# Patient Record
Sex: FEMALE | Race: WHITE | NOT HISPANIC OR LATINO
[De-identification: names, ages, dates, MRNs, and addresses within clinical notes are randomized per-mention and may not be internally consistent; named-entity substitution may affect disease eponyms.]

---

## 2017-02-17 ENCOUNTER — MEDICATION RENEWAL (OUTPATIENT)
Age: 67
End: 2017-02-17

## 2017-03-29 ENCOUNTER — MEDICATION RENEWAL (OUTPATIENT)
Age: 67
End: 2017-03-29

## 2017-05-03 ENCOUNTER — APPOINTMENT (OUTPATIENT)
Dept: CARDIOLOGY | Facility: CLINIC | Age: 67
End: 2017-05-03

## 2017-05-03 VITALS
OXYGEN SATURATION: 98 % | DIASTOLIC BLOOD PRESSURE: 82 MMHG | HEIGHT: 62 IN | WEIGHT: 212 LBS | BODY MASS INDEX: 39.01 KG/M2 | SYSTOLIC BLOOD PRESSURE: 117 MMHG | HEART RATE: 95 BPM

## 2017-05-04 LAB
25(OH)D3 SERPL-MCNC: 32.3 NG/ML
ALBUMIN SERPL ELPH-MCNC: 4.3 G/DL
ALP BLD-CCNC: 60 U/L
ALT SERPL-CCNC: 14 U/L
ANION GAP SERPL CALC-SCNC: 20 MMOL/L
AST SERPL-CCNC: 19 U/L
BASOPHILS # BLD AUTO: 0.03 K/UL
BASOPHILS NFR BLD AUTO: 0.3 %
BILIRUB SERPL-MCNC: 0.3 MG/DL
BUN SERPL-MCNC: 15 MG/DL
CALCIUM SERPL-MCNC: 9.8 MG/DL
CHLORIDE SERPL-SCNC: 101 MMOL/L
CHOLEST SERPL-MCNC: 168 MG/DL
CHOLEST/HDLC SERPL: 3.1 RATIO
CO2 SERPL-SCNC: 19 MMOL/L
CREAT SERPL-MCNC: 0.87 MG/DL
CRP SERPL HS-MCNC: 39.1 MG/L
EOSINOPHIL # BLD AUTO: 0.18 K/UL
EOSINOPHIL NFR BLD AUTO: 1.7 %
GLUCOSE SERPL-MCNC: 92 MG/DL
HBA1C MFR BLD HPLC: 6 %
HCT VFR BLD CALC: 46.7 %
HDLC SERPL-MCNC: 55 MG/DL
HGB BLD-MCNC: 15.4 G/DL
IMM GRANULOCYTES NFR BLD AUTO: 0.2 %
LDLC SERPL CALC-MCNC: 90 MG/DL
LYMPHOCYTES # BLD AUTO: 2.48 K/UL
LYMPHOCYTES NFR BLD AUTO: 23.8 %
MAGNESIUM SERPL-MCNC: 2.2 MG/DL
MAN DIFF?: NORMAL
MCHC RBC-ENTMCNC: 29.7 PG
MCHC RBC-ENTMCNC: 33 GM/DL
MCV RBC AUTO: 90.2 FL
MONOCYTES # BLD AUTO: 0.66 K/UL
MONOCYTES NFR BLD AUTO: 6.3 %
NEUTROPHILS # BLD AUTO: 7.03 K/UL
NEUTROPHILS NFR BLD AUTO: 67.7 %
PLATELET # BLD AUTO: 235 K/UL
POTASSIUM SERPL-SCNC: 4.7 MMOL/L
PROT SERPL-MCNC: 7.3 G/DL
RBC # BLD: 5.18 M/UL
RBC # FLD: 13.5 %
SODIUM SERPL-SCNC: 140 MMOL/L
T3FREE SERPL-MCNC: 3.32 PG/ML
T4 FREE SERPL-MCNC: 1.2 NG/DL
TRIGL SERPL-MCNC: 114 MG/DL
TSH SERPL-ACNC: 3.96 UIU/ML
WBC # FLD AUTO: 10.4 K/UL

## 2017-05-15 ENCOUNTER — APPOINTMENT (OUTPATIENT)
Dept: CARDIOLOGY | Facility: CLINIC | Age: 67
End: 2017-05-15

## 2017-11-15 ENCOUNTER — APPOINTMENT (OUTPATIENT)
Dept: CARDIOLOGY | Facility: CLINIC | Age: 67
End: 2017-11-15
Payer: MEDICARE

## 2017-11-15 ENCOUNTER — NON-APPOINTMENT (OUTPATIENT)
Age: 67
End: 2017-11-15

## 2017-11-15 VITALS
WEIGHT: 220 LBS | DIASTOLIC BLOOD PRESSURE: 60 MMHG | OXYGEN SATURATION: 96 % | HEIGHT: 62 IN | SYSTOLIC BLOOD PRESSURE: 104 MMHG | BODY MASS INDEX: 40.48 KG/M2 | HEART RATE: 99 BPM

## 2017-11-15 PROCEDURE — 93000 ELECTROCARDIOGRAM COMPLETE: CPT

## 2017-11-15 PROCEDURE — 99215 OFFICE O/P EST HI 40 MIN: CPT

## 2017-11-15 RX ORDER — METOPROLOL TARTRATE 25 MG/1
25 TABLET, FILM COATED ORAL TWICE DAILY
Qty: 45 | Refills: 3 | Status: DISCONTINUED | COMMUNITY
End: 2017-11-15

## 2018-01-18 ENCOUNTER — MEDICATION RENEWAL (OUTPATIENT)
Age: 68
End: 2018-01-18

## 2018-02-19 ENCOUNTER — NON-APPOINTMENT (OUTPATIENT)
Age: 68
End: 2018-02-19

## 2018-02-19 ENCOUNTER — APPOINTMENT (OUTPATIENT)
Dept: CARDIOLOGY | Facility: CLINIC | Age: 68
End: 2018-02-19
Payer: MEDICARE

## 2018-02-19 VITALS
WEIGHT: 233 LBS | SYSTOLIC BLOOD PRESSURE: 130 MMHG | HEART RATE: 126 BPM | BODY MASS INDEX: 42.88 KG/M2 | DIASTOLIC BLOOD PRESSURE: 80 MMHG | OXYGEN SATURATION: 98 % | HEIGHT: 62 IN

## 2018-02-19 PROCEDURE — 99215 OFFICE O/P EST HI 40 MIN: CPT

## 2018-02-19 PROCEDURE — 93000 ELECTROCARDIOGRAM COMPLETE: CPT

## 2018-02-23 LAB
25(OH)D3 SERPL-MCNC: 34.7 NG/ML
BASOPHILS # BLD AUTO: 0.04 K/UL
BASOPHILS NFR BLD AUTO: 0.4 %
CHOLEST SERPL-MCNC: 174 MG/DL
CHOLEST/HDLC SERPL: 2.9 RATIO
CRP SERPL HS-MCNC: 4 MG/L
EOSINOPHIL # BLD AUTO: 0.23 K/UL
EOSINOPHIL NFR BLD AUTO: 2.5 %
HBA1C MFR BLD HPLC: 6.2 %
HCT VFR BLD CALC: 45.9 %
HDLC SERPL-MCNC: 61 MG/DL
HGB BLD-MCNC: 14.6 G/DL
IMM GRANULOCYTES NFR BLD AUTO: 0.4 %
LDLC SERPL CALC-MCNC: 97 MG/DL
LYMPHOCYTES # BLD AUTO: 2.25 K/UL
LYMPHOCYTES NFR BLD AUTO: 24.9 %
MAGNESIUM SERPL-MCNC: 2 MG/DL
MAN DIFF?: NORMAL
MCHC RBC-ENTMCNC: 28.7 PG
MCHC RBC-ENTMCNC: 31.8 GM/DL
MCV RBC AUTO: 90.2 FL
MONOCYTES # BLD AUTO: 0.55 K/UL
MONOCYTES NFR BLD AUTO: 6.1 %
NEUTROPHILS # BLD AUTO: 5.92 K/UL
NEUTROPHILS NFR BLD AUTO: 65.7 %
PLATELET # BLD AUTO: 213 K/UL
RBC # BLD: 5.09 M/UL
RBC # FLD: 13.8 %
T3FREE SERPL-MCNC: 3.38 PG/ML
T4 FREE SERPL-MCNC: 1.2 NG/DL
TRIGL SERPL-MCNC: 81 MG/DL
TSH SERPL-ACNC: 2.97 UIU/ML
WBC # FLD AUTO: 9.03 K/UL

## 2018-02-27 ENCOUNTER — MEDICATION RENEWAL (OUTPATIENT)
Age: 68
End: 2018-02-27

## 2018-02-27 LAB
ALBUMIN SERPL ELPH-MCNC: 4 G/DL
ALP BLD-CCNC: 61 U/L
ALT SERPL-CCNC: 14 U/L
ANION GAP SERPL CALC-SCNC: 23 MMOL/L
AST SERPL-CCNC: 19 U/L
BILIRUB SERPL-MCNC: 0.2 MG/DL
BUN SERPL-MCNC: 17 MG/DL
CALCIUM SERPL-MCNC: 9.5 MG/DL
CHLORIDE SERPL-SCNC: 101 MMOL/L
CO2 SERPL-SCNC: 19 MMOL/L
CREAT SERPL-MCNC: 0.8 MG/DL
GLUCOSE SERPL-MCNC: 84 MG/DL
POTASSIUM SERPL-SCNC: 5.5 MMOL/L
PROT SERPL-MCNC: 7.1 G/DL
SODIUM SERPL-SCNC: 143 MMOL/L

## 2018-08-16 ENCOUNTER — NON-APPOINTMENT (OUTPATIENT)
Age: 68
End: 2018-08-16

## 2018-08-16 ENCOUNTER — APPOINTMENT (OUTPATIENT)
Dept: CARDIOLOGY | Facility: CLINIC | Age: 68
End: 2018-08-16
Payer: MEDICARE

## 2018-08-16 VITALS
HEIGHT: 62 IN | HEART RATE: 103 BPM | BODY MASS INDEX: 41.96 KG/M2 | WEIGHT: 228 LBS | DIASTOLIC BLOOD PRESSURE: 80 MMHG | SYSTOLIC BLOOD PRESSURE: 132 MMHG | OXYGEN SATURATION: 98 %

## 2018-08-16 PROCEDURE — 93000 ELECTROCARDIOGRAM COMPLETE: CPT

## 2018-08-16 PROCEDURE — 99407 BEHAV CHNG SMOKING > 10 MIN: CPT

## 2018-08-16 PROCEDURE — 99215 OFFICE O/P EST HI 40 MIN: CPT

## 2018-08-20 LAB
25(OH)D3 SERPL-MCNC: 39.3 NG/ML
ALBUMIN SERPL ELPH-MCNC: 4.6 G/DL
ALP BLD-CCNC: 56 U/L
ALT SERPL-CCNC: 10 U/L
ANION GAP SERPL CALC-SCNC: 29 MMOL/L
AST SERPL-CCNC: 16 U/L
BASOPHILS # BLD AUTO: 0.03 K/UL
BASOPHILS NFR BLD AUTO: 0.3 %
BILIRUB SERPL-MCNC: 0.3 MG/DL
BUN SERPL-MCNC: 15 MG/DL
CALCIUM SERPL-MCNC: 9.7 MG/DL
CHLORIDE SERPL-SCNC: 100 MMOL/L
CHOLEST SERPL-MCNC: 157 MG/DL
CHOLEST/HDLC SERPL: 2.9 RATIO
CO2 SERPL-SCNC: 15 MMOL/L
CREAT SERPL-MCNC: 0.96 MG/DL
CRP SERPL HS-MCNC: 5.5 MG/L
EOSINOPHIL # BLD AUTO: 0.16 K/UL
EOSINOPHIL NFR BLD AUTO: 1.5 %
GLUCOSE SERPL-MCNC: 60 MG/DL
HBA1C MFR BLD HPLC: 6.2 %
HCT VFR BLD CALC: 50.2 %
HDLC SERPL-MCNC: 55 MG/DL
HGB BLD-MCNC: 15.7 G/DL
IMM GRANULOCYTES NFR BLD AUTO: 0.3 %
LDLC SERPL CALC-MCNC: 75 MG/DL
LYMPHOCYTES # BLD AUTO: 2.28 K/UL
LYMPHOCYTES NFR BLD AUTO: 20.8 %
MAGNESIUM SERPL-MCNC: 2.3 MG/DL
MAN DIFF?: NORMAL
MCHC RBC-ENTMCNC: 29.2 PG
MCHC RBC-ENTMCNC: 31.3 GM/DL
MCV RBC AUTO: 93.3 FL
MONOCYTES # BLD AUTO: 0.75 K/UL
MONOCYTES NFR BLD AUTO: 6.8 %
NEUTROPHILS # BLD AUTO: 7.71 K/UL
NEUTROPHILS NFR BLD AUTO: 70.3 %
PLATELET # BLD AUTO: 219 K/UL
POTASSIUM SERPL-SCNC: 5 MMOL/L
PROT SERPL-MCNC: 7.1 G/DL
RBC # BLD: 5.38 M/UL
RBC # FLD: 14.6 %
SODIUM SERPL-SCNC: 144 MMOL/L
T3FREE SERPL-MCNC: 3.04 PG/ML
T4 FREE SERPL-MCNC: 1.3 NG/DL
TRIGL SERPL-MCNC: 134 MG/DL
TSH SERPL-ACNC: 2.93 UIU/ML
WBC # FLD AUTO: 10.96 K/UL

## 2018-09-14 ENCOUNTER — MESSAGE (OUTPATIENT)
Age: 68
End: 2018-09-14

## 2018-09-20 ENCOUNTER — RX RENEWAL (OUTPATIENT)
Age: 68
End: 2018-09-20

## 2018-12-06 ENCOUNTER — RX RENEWAL (OUTPATIENT)
Age: 68
End: 2018-12-06

## 2019-01-03 ENCOUNTER — RX RENEWAL (OUTPATIENT)
Age: 69
End: 2019-01-03

## 2019-01-15 ENCOUNTER — RX RENEWAL (OUTPATIENT)
Age: 69
End: 2019-01-15

## 2019-02-21 ENCOUNTER — RX RENEWAL (OUTPATIENT)
Age: 69
End: 2019-02-21

## 2019-04-11 ENCOUNTER — APPOINTMENT (OUTPATIENT)
Dept: CARDIOLOGY | Facility: CLINIC | Age: 69
End: 2019-04-11
Payer: MEDICARE

## 2019-04-11 ENCOUNTER — NON-APPOINTMENT (OUTPATIENT)
Age: 69
End: 2019-04-11

## 2019-04-11 VITALS
OXYGEN SATURATION: 97 % | DIASTOLIC BLOOD PRESSURE: 80 MMHG | BODY MASS INDEX: 39.75 KG/M2 | SYSTOLIC BLOOD PRESSURE: 132 MMHG | HEIGHT: 62 IN | HEART RATE: 110 BPM | WEIGHT: 216 LBS

## 2019-04-11 PROCEDURE — 93000 ELECTROCARDIOGRAM COMPLETE: CPT

## 2019-04-11 PROCEDURE — 99215 OFFICE O/P EST HI 40 MIN: CPT

## 2019-04-11 NOTE — REASON FOR VISIT
[FreeTextEntry1] : 68 year-old female s/p PTCA to an occluded right coronary artery and left anterior descending artery at [facility]. \par She denies any recent chest pain, shortness of breath, palpitations or diaphoresis.\par \par Hospitalized at [facility] with complaints of dyspnea suggestive of pneumonia. Patient had elevated troponins which prompted a repeat cardiac catheterization. Report states that the patient had a proximal RCA total obstruction and was being filled by collaterals from the mid LAD. The LAD shows a patent stent and a 50% mid LAD lesion. She also had moderate disease of the left circumflex artery. Subsequently underwent nuclear study which showed only a fixed inferoapical anteroapical defect.\par \par \par Since discharge from hospital she has been on nebulizers and steroids. Tried quitting smoking on several occasions, currently using nicotine patch.

## 2019-04-11 NOTE — DISCUSSION/SUMMARY
[Coronary Artery Disease] : coronary artery disease [Hyperlipidemia] : hyperlipidemia [Stable] : stable [Hypertension] : hypertension [None] : none [___ Month(s)] : [unfilled] month(s) [Patient] : the patient [FreeTextEntry1] : Mild tachycardia resolvced after several minutes of rest. \par Stopped smoking again, on Nicotine patch. Advised attempt smoking cessation with Chantix. explained R/B/A/L of use.\par Requested refill of rescue inhaler, followed by pulmonary. Awaiting CAM[facility] evaluation?. \par Labs to be drawn in 1-2 months.\par No evidencve of HF, decreased dose of diuretics.\par discussed weight loss and increased physical activity.\par A heart healthy diet and lifestyle was recommended including low-fat, low-cholesterol, low-salt foods with proper weight maintenance and better carbohydrate choices.

## 2019-04-11 NOTE — PHYSICAL EXAM
[General Appearance - Well Developed] : well developed [Normal Appearance] : normal appearance [Well Groomed] : well groomed [General Appearance - Well Nourished] : well nourished [No Deformities] : no deformities [General Appearance - In No Acute Distress] : no acute distress [Normal Conjunctiva] : the conjunctiva exhibited no abnormalities [Eyelids - No Xanthelasma] : the eyelids demonstrated no xanthelasmas [Normal Oral Mucosa] : normal oral mucosa [No Oral Pallor] : no oral pallor [No Oral Cyanosis] : no oral cyanosis [Normal Jugular Venous A Waves Present] : normal jugular venous A waves present [Normal Jugular Venous V Waves Present] : normal jugular venous V waves present [No Jugular Venous Wilkinson A Waves] : no jugular venous wilkinson A waves [Respiration, Rhythm And Depth] : normal respiratory rhythm and effort [Exaggerated Use Of Accessory Muscles For Inspiration] : no accessory muscle use [FreeTextEntry1] : Minimal expiratory wheezing. [Heart Rate And Rhythm] : heart rate and rhythm were normal [Heart Sounds] : normal S1 and S2 [Murmurs] : no murmurs present [Edema] : no peripheral edema present [Veins - Varicosity Changes] : no varicosital changes were noted in the lower extremities [2+] : left 2+ [1+] : left 1+ [No Abnormalities] : the abdominal aorta was not enlarged and no bruit was heard [Abdomen Soft] : soft [Abdomen Tenderness] : non-tender [Abdomen Mass (___ Cm)] : no abdominal mass palpated [Abnormal Walk] : normal gait [Gait - Sufficient For Exercise Testing] : the gait was sufficient for exercise testing [Nail Clubbing] : no clubbing of the fingernails [Cyanosis, Localized] : no localized cyanosis [Petechial Hemorrhages (___cm)] : no petechial hemorrhages [Skin Color & Pigmentation] : normal skin color and pigmentation [] : no rash [No Venous Stasis] : no venous stasis [Skin Lesions] : no skin lesions [No Skin Ulcers] : no skin ulcer [No Xanthoma] : no  xanthoma was observed [Oriented To Time, Place, And Person] : oriented to person, place, and time [Affect] : the affect was normal [Mood] : the mood was normal [No Anxiety] : not feeling anxious

## 2019-04-18 ENCOUNTER — RX RENEWAL (OUTPATIENT)
Age: 69
End: 2019-04-18

## 2019-06-13 ENCOUNTER — LABORATORY RESULT (OUTPATIENT)
Age: 69
End: 2019-06-13

## 2019-06-13 ENCOUNTER — APPOINTMENT (OUTPATIENT)
Dept: CARDIOLOGY | Facility: CLINIC | Age: 69
End: 2019-06-13
Payer: MEDICARE

## 2019-06-13 PROCEDURE — 36415 COLL VENOUS BLD VENIPUNCTURE: CPT

## 2019-08-14 ENCOUNTER — APPOINTMENT (OUTPATIENT)
Dept: CARDIOLOGY | Facility: CLINIC | Age: 69
End: 2019-08-14
Payer: MEDICARE

## 2019-08-14 ENCOUNTER — OUTPATIENT (OUTPATIENT)
Dept: OUTPATIENT SERVICES | Facility: HOSPITAL | Age: 69
LOS: 1 days | End: 2019-08-14
Payer: MEDICARE

## 2019-08-14 VITALS
DIASTOLIC BLOOD PRESSURE: 80 MMHG | HEART RATE: 71 BPM | SYSTOLIC BLOOD PRESSURE: 141 MMHG | HEIGHT: 62 IN | OXYGEN SATURATION: 96 %

## 2019-08-14 DIAGNOSIS — I25.10 ATHEROSCLEROTIC HEART DISEASE OF NATIVE CORONARY ARTERY WITHOUT ANGINA PECTORIS: ICD-10-CM

## 2019-08-14 DIAGNOSIS — Z87.81 PERSONAL HISTORY OF (HEALED) TRAUMATIC FRACTURE: ICD-10-CM

## 2019-08-14 DIAGNOSIS — Z82.49 FAMILY HISTORY OF ISCHEMIC HEART DISEASE AND OTHER DISEASES OF THE CIRCULATORY SYSTEM: ICD-10-CM

## 2019-08-14 PROCEDURE — 99215 OFFICE O/P EST HI 40 MIN: CPT

## 2019-08-14 PROCEDURE — 93306 TTE W/DOPPLER COMPLETE: CPT | Mod: 26

## 2019-08-14 PROCEDURE — 93000 ELECTROCARDIOGRAM COMPLETE: CPT

## 2019-08-14 PROCEDURE — 93306 TTE W/DOPPLER COMPLETE: CPT

## 2019-08-14 RX ORDER — VARENICLINE TARTRATE 0.5 (11)-1
0.5 MG X 11 & KIT ORAL
Qty: 53 | Refills: 0 | Status: DISCONTINUED | COMMUNITY
Start: 2018-08-16 | End: 2019-08-14

## 2019-08-14 RX ORDER — GUAIFENESIN AND CODEINE PHOSPHATE 100; 10 MG/5ML; MG/5ML
100-10 SYRUP ORAL
Qty: 240 | Refills: 0 | Status: DISCONTINUED | COMMUNITY
Start: 2017-11-16 | End: 2019-08-14

## 2019-08-14 RX ORDER — ALBUTEROL SULFATE 108 UG/1
108 (90 BASE) AEROSOL, METERED RESPIRATORY (INHALATION)
Qty: 1 | Refills: 3 | Status: DISCONTINUED | COMMUNITY
Start: 2018-02-19 | End: 2019-08-14

## 2019-09-24 NOTE — DISCUSSION/SUMMARY
[Coronary Artery Disease] : coronary artery disease [Hyperlipidemia] : hyperlipidemia [Hypertension] : hypertension [Stable] : stable [None] : none [Patient] : the patient [___ Month(s)] : [unfilled] month(s) [FreeTextEntry1] : 68 year-old female s/p PTCA to an occluded right coronary artery and left anterior descending artery at [facility] s/p cardiac arrest 6/14/19 - s/p cath and ICD that subsequently got infected - s/p extraction - now with LifeVest on\par - will refer for an echocardiogram\par - seeing Gutiérrez on Monday for re-evaluation for the device re-implant\par - would idealy like to tstart on BB\par \par

## 2019-09-24 NOTE — HISTORY OF PRESENT ILLNESS
[FreeTextEntry1] : 68 year-old female s/p PTCA to an occluded right coronary artery and left anterior descending artery at [facility] s/p cardiac arrest 6/14/19 - s/p cath and ICD that subsequently got infected - s/p extraction - now with LifeVest on\par \par \par She denies any recent chest pain, shortness of breath, palpitations or diaphoresis.\par \par Hospitalized at [facility] with complaints of dyspnea suggestive of pneumonia. Patient had elevated troponins which prompted a repeat cardiac catheterization. Report states that the patient had a proximal RCA total obstruction and was being filled by collaterals from the mid LAD. The LAD shows a patent stent and a 50% mid LAD lesion. She also had moderate disease of the left circumflex artery. Subsequently underwent nuclear study which showed only a fixed inferoapical anteroapical defect.\par \par \par Since discharge from hospital she has been on nebulizers and steroids. Tried quitting smoking on several occasions, currently using nicotine patch.\par

## 2019-09-24 NOTE — PHYSICAL EXAM
[General Appearance - Well Developed] : well developed [Normal Appearance] : normal appearance [Well Groomed] : well groomed [General Appearance - Well Nourished] : well nourished [No Deformities] : no deformities [General Appearance - In No Acute Distress] : no acute distress [Normal Conjunctiva] : the conjunctiva exhibited no abnormalities [Eyelids - No Xanthelasma] : the eyelids demonstrated no xanthelasmas [No Oral Pallor] : no oral pallor [Normal Oral Mucosa] : normal oral mucosa [Normal Jugular Venous A Waves Present] : normal jugular venous A waves present [No Oral Cyanosis] : no oral cyanosis [Normal Jugular Venous V Waves Present] : normal jugular venous V waves present [No Jugular Venous Wilkinson A Waves] : no jugular venous wilkinson A waves [Heart Rate And Rhythm] : heart rate and rhythm were normal [Heart Sounds] : normal S1 and S2 [Murmurs] : no murmurs present [Edema] : no peripheral edema present [Veins - Varicosity Changes] : no varicosital changes were noted in the lower extremities [2+] : right 2+ [1+] : left 1+ [No Abnormalities] : the abdominal aorta was not enlarged and no bruit was heard [Respiration, Rhythm And Depth] : normal respiratory rhythm and effort [Exaggerated Use Of Accessory Muscles For Inspiration] : no accessory muscle use [Abdomen Soft] : soft [Abdomen Tenderness] : non-tender [Abdomen Mass (___ Cm)] : no abdominal mass palpated [Abnormal Walk] : normal gait [Gait - Sufficient For Exercise Testing] : the gait was sufficient for exercise testing [Nail Clubbing] : no clubbing of the fingernails [Cyanosis, Localized] : no localized cyanosis [Petechial Hemorrhages (___cm)] : no petechial hemorrhages [Skin Color & Pigmentation] : normal skin color and pigmentation [] : no rash [No Venous Stasis] : no venous stasis [Skin Lesions] : no skin lesions [No Skin Ulcers] : no skin ulcer [No Xanthoma] : no  xanthoma was observed [Oriented To Time, Place, And Person] : oriented to person, place, and time [Affect] : the affect was normal [Mood] : the mood was normal [No Anxiety] : not feeling anxious [FreeTextEntry1] : Minimal expiratory wheezing.

## 2019-10-30 ENCOUNTER — APPOINTMENT (OUTPATIENT)
Dept: CARDIOLOGY | Facility: CLINIC | Age: 69
End: 2019-10-30

## 2019-11-14 ENCOUNTER — NON-APPOINTMENT (OUTPATIENT)
Age: 69
End: 2019-11-14

## 2019-11-14 ENCOUNTER — APPOINTMENT (OUTPATIENT)
Dept: CARDIOLOGY | Facility: CLINIC | Age: 69
End: 2019-11-14
Payer: MEDICARE

## 2019-11-14 VITALS — SYSTOLIC BLOOD PRESSURE: 142 MMHG | OXYGEN SATURATION: 98 % | DIASTOLIC BLOOD PRESSURE: 74 MMHG | HEART RATE: 113 BPM

## 2019-11-14 PROCEDURE — 93000 ELECTROCARDIOGRAM COMPLETE: CPT

## 2019-11-14 PROCEDURE — 99214 OFFICE O/P EST MOD 30 MIN: CPT

## 2019-11-14 RX ORDER — RAMIPRIL 2.5 MG/1
2.5 CAPSULE ORAL DAILY
Qty: 90 | Refills: 3 | Status: DISCONTINUED | COMMUNITY
End: 2019-11-14

## 2019-11-19 NOTE — HISTORY OF PRESENT ILLNESS
[FreeTextEntry1] : 68 year-old female s/p PTCA to an occluded right coronary artery and left anterior descending artery at [facility] s/p cardiac arrest 6/14/19 - s/p cath and ICD that subsequently got infected - s/p extraction - now with LifeVest on - now s/p re-implant of the ICD on 9/23/19. Now with SOB after walking about 3 blocks. \par \par \par She denies any recent chest pain, shortness of breath, palpitations or diaphoresis.\par \par Hospitalized at [facility] with complaints of dyspnea suggestive of pneumonia. Patient had elevated troponins which prompted a repeat cardiac catheterization. Report states that the patient had a proximal RCA total obstruction and was being filled by collaterals from the mid LAD. The LAD shows a patent stent and a 50% mid LAD lesion. She also had moderate disease of the left circumflex artery. Subsequently underwent nuclear study which showed only a fixed inferoapical anteroapical defect.\par \par \par Since discharge from hospital she has been on nebulizers and steroids. Tried quitting smoking on several occasions, currently using nicotine patch.\par

## 2019-11-19 NOTE — DISCUSSION/SUMMARY
[Coronary Artery Disease] : coronary artery disease [Hyperlipidemia] : hyperlipidemia [Hypertension] : hypertension [Stable] : stable [Patient] : the patient [None] : none [___ Month(s)] : [unfilled] month(s) [FreeTextEntry1] : 68 year-old female s/p PTCA to an occluded right coronary artery and left anterior descending artery at [facility] s/p cardiac arrest 6/14/19 - s/p cath and ICD that subsequently got infected - s/p extraction - now with LifeVest on - now s/p re-implant of the ICD on 9/23/19. Now with SOB after walking about 3 blocks. \par - following up with EP in 1/2020  to evaluate for any further arrhythmia - and if negative, will d/c the amiodarone\par - prior echo reviewed with the patient\par - seeing Brock on Monday for re-evaluation for the device re-implant\par - would idealy like to tstart on BB\par \par

## 2019-11-19 NOTE — PHYSICAL EXAM
[General Appearance - Well Developed] : well developed [Normal Appearance] : normal appearance [Well Groomed] : well groomed [No Deformities] : no deformities [General Appearance - Well Nourished] : well nourished [General Appearance - In No Acute Distress] : no acute distress [Normal Conjunctiva] : the conjunctiva exhibited no abnormalities [Eyelids - No Xanthelasma] : the eyelids demonstrated no xanthelasmas [No Oral Pallor] : no oral pallor [Normal Oral Mucosa] : normal oral mucosa [No Oral Cyanosis] : no oral cyanosis [Normal Jugular Venous A Waves Present] : normal jugular venous A waves present [Normal Jugular Venous V Waves Present] : normal jugular venous V waves present [No Jugular Venous Wilkinson A Waves] : no jugular venous wilkinson A waves [Respiration, Rhythm And Depth] : normal respiratory rhythm and effort [Exaggerated Use Of Accessory Muscles For Inspiration] : no accessory muscle use [Heart Rate And Rhythm] : heart rate and rhythm were normal [Heart Sounds] : normal S1 and S2 [Murmurs] : no murmurs present [Edema] : no peripheral edema present [Veins - Varicosity Changes] : no varicosital changes were noted in the lower extremities [2+] : left 2+ [1+] : left 1+ [No Abnormalities] : the abdominal aorta was not enlarged and no bruit was heard [Abdomen Soft] : soft [Abdomen Tenderness] : non-tender [Abdomen Mass (___ Cm)] : no abdominal mass palpated [Gait - Sufficient For Exercise Testing] : the gait was sufficient for exercise testing [Abnormal Walk] : normal gait [Nail Clubbing] : no clubbing of the fingernails [Cyanosis, Localized] : no localized cyanosis [Petechial Hemorrhages (___cm)] : no petechial hemorrhages [Skin Color & Pigmentation] : normal skin color and pigmentation [] : no rash [No Venous Stasis] : no venous stasis [Skin Lesions] : no skin lesions [No Skin Ulcers] : no skin ulcer [No Xanthoma] : no  xanthoma was observed [Oriented To Time, Place, And Person] : oriented to person, place, and time [Affect] : the affect was normal [Mood] : the mood was normal [No Anxiety] : not feeling anxious [FreeTextEntry1] : Minimal expiratory wheezing.

## 2020-02-24 ENCOUNTER — NON-APPOINTMENT (OUTPATIENT)
Age: 70
End: 2020-02-24

## 2020-02-24 ENCOUNTER — APPOINTMENT (OUTPATIENT)
Dept: CARDIOLOGY | Facility: CLINIC | Age: 70
End: 2020-02-24
Payer: MEDICARE

## 2020-02-24 VITALS
OXYGEN SATURATION: 98 % | HEART RATE: 66 BPM | DIASTOLIC BLOOD PRESSURE: 94 MMHG | BODY MASS INDEX: 39.69 KG/M2 | WEIGHT: 217 LBS | SYSTOLIC BLOOD PRESSURE: 169 MMHG

## 2020-02-24 PROCEDURE — 99214 OFFICE O/P EST MOD 30 MIN: CPT

## 2020-02-24 PROCEDURE — 93000 ELECTROCARDIOGRAM COMPLETE: CPT

## 2020-02-24 NOTE — HISTORY OF PRESENT ILLNESS
[FreeTextEntry1] : 69 year-old female s/p PTCA to an occluded right coronary artery and left anterior descending artery at [facility] s/p cardiac arrest 6/14/19 - s/p cath and ICD that subsequently got infected - s/p extraction - now with LifeVest on - now s/p re-implant of the ICD on 9/23/19. Now with SOB after walking about 3 blocks. \par \par \par She denies any recent chest pain, shortness of breath, palpitations or diaphoresis.\par \par Hospitalized at [facility] with complaints of dyspnea suggestive of pneumonia. Patient had elevated troponins which prompted a repeat cardiac catheterization. Report states that the patient had a proximal RCA total obstruction and was being filled by collaterals from the mid LAD. The LAD shows a patent stent and a 50% mid LAD lesion. She also had moderate disease of the left circumflex artery. Subsequently underwent nuclear study which showed only a fixed inferoapical anteroapical defect.\par \par \par Since discharge from hospital she has been on nebulizers and steroids. Tried quitting smoking on several occasions, currently using nicotine patch.\par

## 2020-02-24 NOTE — PHYSICAL EXAM
[General Appearance - Well Developed] : well developed [Well Groomed] : well groomed [Normal Appearance] : normal appearance [General Appearance - Well Nourished] : well nourished [No Deformities] : no deformities [General Appearance - In No Acute Distress] : no acute distress [Normal Conjunctiva] : the conjunctiva exhibited no abnormalities [Normal Oral Mucosa] : normal oral mucosa [Eyelids - No Xanthelasma] : the eyelids demonstrated no xanthelasmas [No Oral Pallor] : no oral pallor [No Oral Cyanosis] : no oral cyanosis [Normal Jugular Venous A Waves Present] : normal jugular venous A waves present [Normal Jugular Venous V Waves Present] : normal jugular venous V waves present [No Jugular Venous Wilkinson A Waves] : no jugular venous wilkinson A waves [Respiration, Rhythm And Depth] : normal respiratory rhythm and effort [Heart Rate And Rhythm] : heart rate and rhythm were normal [Exaggerated Use Of Accessory Muscles For Inspiration] : no accessory muscle use [Edema] : no peripheral edema present [Heart Sounds] : normal S1 and S2 [Murmurs] : no murmurs present [Veins - Varicosity Changes] : no varicosital changes were noted in the lower extremities [2+] : left 2+ [No Abnormalities] : the abdominal aorta was not enlarged and no bruit was heard [1+] : left 1+ [Abdomen Soft] : soft [Abdomen Tenderness] : non-tender [Gait - Sufficient For Exercise Testing] : the gait was sufficient for exercise testing [Abdomen Mass (___ Cm)] : no abdominal mass palpated [Abnormal Walk] : normal gait [Cyanosis, Localized] : no localized cyanosis [Petechial Hemorrhages (___cm)] : no petechial hemorrhages [Nail Clubbing] : no clubbing of the fingernails [Skin Color & Pigmentation] : normal skin color and pigmentation [No Venous Stasis] : no venous stasis [] : no rash [Skin Lesions] : no skin lesions [No Skin Ulcers] : no skin ulcer [No Xanthoma] : no  xanthoma was observed [Oriented To Time, Place, And Person] : oriented to person, place, and time [No Anxiety] : not feeling anxious [Affect] : the affect was normal [Mood] : the mood was normal [FreeTextEntry1] : Minimal expiratory wheezing.

## 2020-02-24 NOTE — DISCUSSION/SUMMARY
[Coronary Artery Disease] : coronary artery disease [Hyperlipidemia] : hyperlipidemia [Stable] : stable [None] : none [Patient] : the patient [Hypertension] : hypertension [___ Month(s)] : [unfilled] month(s) [FreeTextEntry1] : 69 year-old female s/p PTCA to an occluded right coronary artery and left anterior descending artery at [facility] s/p cardiac arrest 6/14/19 - s/p cath and ICD that subsequently got infected - s/p extraction - now with LifeVest on - now s/p re-implant of the ICD on 9/23/19. Now with SOB after walking about 3 blocks. \par - following up with EP in 1/2020  to evaluate for any further arrhythmia - and if negative, will d/c the amiodarone - taking every other day; weaning it off\par - taking low dose entresto - will titrate as BP allows once the BB is started \par - prior echo reviewed with the patient\par - seeing Brock on Monday for re-evaluation for the device re-implant\par - will start low dose BB - toprol\par \par

## 2020-06-01 ENCOUNTER — APPOINTMENT (OUTPATIENT)
Dept: CARDIOLOGY | Facility: CLINIC | Age: 70
End: 2020-06-01
Payer: MEDICARE

## 2020-06-01 ENCOUNTER — NON-APPOINTMENT (OUTPATIENT)
Age: 70
End: 2020-06-01

## 2020-06-01 VITALS
DIASTOLIC BLOOD PRESSURE: 66 MMHG | OXYGEN SATURATION: 96 % | SYSTOLIC BLOOD PRESSURE: 161 MMHG | BODY MASS INDEX: 42.51 KG/M2 | HEIGHT: 62 IN | WEIGHT: 231 LBS | HEART RATE: 88 BPM

## 2020-06-01 PROCEDURE — 99215 OFFICE O/P EST HI 40 MIN: CPT

## 2020-06-01 PROCEDURE — 93000 ELECTROCARDIOGRAM COMPLETE: CPT

## 2020-06-01 NOTE — PHYSICAL EXAM
[General Appearance - Well Developed] : well developed [Normal Appearance] : normal appearance [Well Groomed] : well groomed [No Deformities] : no deformities [General Appearance - Well Nourished] : well nourished [Normal Conjunctiva] : the conjunctiva exhibited no abnormalities [General Appearance - In No Acute Distress] : no acute distress [Eyelids - No Xanthelasma] : the eyelids demonstrated no xanthelasmas [Normal Oral Mucosa] : normal oral mucosa [Normal Jugular Venous A Waves Present] : normal jugular venous A waves present [No Oral Pallor] : no oral pallor [No Oral Cyanosis] : no oral cyanosis [Normal Jugular Venous V Waves Present] : normal jugular venous V waves present [No Jugular Venous Wilkinson A Waves] : no jugular venous wilkinson A waves [Respiration, Rhythm And Depth] : normal respiratory rhythm and effort [Exaggerated Use Of Accessory Muscles For Inspiration] : no accessory muscle use [Heart Sounds] : normal S1 and S2 [Heart Rate And Rhythm] : heart rate and rhythm were normal [Murmurs] : no murmurs present [Edema] : no peripheral edema present [Veins - Varicosity Changes] : no varicosital changes were noted in the lower extremities [2+] : left 2+ [1+] : left 1+ [No Abnormalities] : the abdominal aorta was not enlarged and no bruit was heard [Abdomen Soft] : soft [Abdomen Tenderness] : non-tender [Abdomen Mass (___ Cm)] : no abdominal mass palpated [Gait - Sufficient For Exercise Testing] : the gait was sufficient for exercise testing [Abnormal Walk] : normal gait [Cyanosis, Localized] : no localized cyanosis [Petechial Hemorrhages (___cm)] : no petechial hemorrhages [Nail Clubbing] : no clubbing of the fingernails [Skin Color & Pigmentation] : normal skin color and pigmentation [] : no rash [No Venous Stasis] : no venous stasis [Skin Lesions] : no skin lesions [No Skin Ulcers] : no skin ulcer [Oriented To Time, Place, And Person] : oriented to person, place, and time [No Xanthoma] : no  xanthoma was observed [Affect] : the affect was normal [No Anxiety] : not feeling anxious [Mood] : the mood was normal [FreeTextEntry1] : Minimal expiratory wheezing.

## 2020-06-01 NOTE — DISCUSSION/SUMMARY
[Coronary Artery Disease] : coronary artery disease [Hyperlipidemia] : hyperlipidemia [Hypertension] : hypertension [Stable] : stable [None] : none [Patient] : the patient [___ Month(s)] : [unfilled] month(s) [FreeTextEntry1] : 69 year-old female s/p PTCA to an occluded right coronary artery and left anterior descending artery at [facility] s/p cardiac arrest 6/14/19 - s/p cath and ICD that subsequently got infected - s/p extraction - now with LifeVest on - now s/p re-implant of the ICD on 9/23/19. Now with SOB after walking about 3 blocks. \par - following up with EP in 1/2020  to evaluate for any further arrhythmia - and if negative, will d/c the amiodarone - taking every other day; weaning it off\par - taking low dose entresto - will increase to 48/51 - for better BP control\par - BP poorly controlled - has gained over 16 lbs over the quarantine - will get a cuff for closer monitoring at home\par - prior echo reviewed with the patient\par - will c/w low dose BB - toprol\par - Encouraged patient to continue healthy exercise and eating habits, focusing on a Mediterranean style of eating and aiming for the recommended 150 minutes per week of moderate physical activity.\par \par \par

## 2020-06-01 NOTE — HISTORY OF PRESENT ILLNESS
[FreeTextEntry1] : 69 year-old female s/p PTCA to an occluded right coronary artery and left anterior descending artery at [facility] s/p cardiac arrest 6/14/19 - s/p cath and ICD that subsequently got infected - s/p extraction - now with LifeVest on - now s/p re-implant of the ICD on 9/23/19. Now with SOB after walking about 3 blocks. \par \par \par She denies any recent chest pain, shortness of breath, palpitations or diaphoresis.\par \par Hospitalized at [facility] with complaints of dyspnea suggestive of pneumonia. Patient had elevated troponins which prompted a repeat cardiac catheterization. Report states that the patient had a proximal RCA total obstruction and was being filled by collaterals from the mid LAD. The LAD shows a patent stent and a 50% mid LAD lesion. She also had moderate disease of the left circumflex artery. Subsequently underwent nuclear study which showed only a fixed inferoapical anteroapical defect.\par \par \par Since discharge from hospital she has been on nebulizers and steroids. Tried quitting smoking on several occasions, currently using nicotine patch.\par

## 2020-06-23 ENCOUNTER — APPOINTMENT (OUTPATIENT)
Dept: ELECTROPHYSIOLOGY | Facility: CLINIC | Age: 70
End: 2020-06-23
Payer: MEDICARE

## 2020-06-23 VITALS
HEART RATE: 79 BPM | SYSTOLIC BLOOD PRESSURE: 179 MMHG | HEIGHT: 62 IN | BODY MASS INDEX: 42.51 KG/M2 | OXYGEN SATURATION: 96 % | DIASTOLIC BLOOD PRESSURE: 81 MMHG | WEIGHT: 231 LBS

## 2020-06-23 PROCEDURE — 93282 PRGRMG EVAL IMPLANTABLE DFB: CPT

## 2020-06-30 ENCOUNTER — RX RENEWAL (OUTPATIENT)
Age: 70
End: 2020-06-30

## 2020-08-20 ENCOUNTER — RX RENEWAL (OUTPATIENT)
Age: 70
End: 2020-08-20

## 2020-09-17 ENCOUNTER — RX RENEWAL (OUTPATIENT)
Age: 70
End: 2020-09-17

## 2020-09-23 ENCOUNTER — APPOINTMENT (OUTPATIENT)
Dept: ELECTROPHYSIOLOGY | Facility: CLINIC | Age: 70
End: 2020-09-23
Payer: MEDICARE

## 2020-09-23 PROCEDURE — 93295 DEV INTERROG REMOTE 1/2/MLT: CPT

## 2020-09-23 PROCEDURE — 93296 REM INTERROG EVL PM/IDS: CPT

## 2020-10-05 ENCOUNTER — APPOINTMENT (OUTPATIENT)
Dept: ELECTROPHYSIOLOGY | Facility: CLINIC | Age: 70
End: 2020-10-05
Payer: MEDICARE

## 2020-10-05 ENCOUNTER — NON-APPOINTMENT (OUTPATIENT)
Age: 70
End: 2020-10-05

## 2020-10-05 ENCOUNTER — APPOINTMENT (OUTPATIENT)
Dept: CARDIOLOGY | Facility: CLINIC | Age: 70
End: 2020-10-05
Payer: MEDICARE

## 2020-10-05 VITALS
DIASTOLIC BLOOD PRESSURE: 81 MMHG | BODY MASS INDEX: 45.08 KG/M2 | WEIGHT: 245 LBS | OXYGEN SATURATION: 97 % | HEIGHT: 62 IN | SYSTOLIC BLOOD PRESSURE: 152 MMHG | HEART RATE: 67 BPM

## 2020-10-05 PROCEDURE — 93282 PRGRMG EVAL IMPLANTABLE DFB: CPT

## 2020-10-05 PROCEDURE — 93000 ELECTROCARDIOGRAM COMPLETE: CPT

## 2020-10-05 PROCEDURE — 99215 OFFICE O/P EST HI 40 MIN: CPT

## 2020-10-05 RX ORDER — AMIODARONE HYDROCHLORIDE 200 MG/1
200 TABLET ORAL DAILY
Qty: 90 | Refills: 3 | Status: DISCONTINUED | COMMUNITY
Start: 2020-06-30 | End: 2020-10-05

## 2020-10-25 NOTE — PHYSICAL EXAM
[General Appearance - Well Developed] : well developed [Normal Appearance] : normal appearance [Well Groomed] : well groomed [General Appearance - Well Nourished] : well nourished [No Deformities] : no deformities [General Appearance - In No Acute Distress] : no acute distress [Normal Conjunctiva] : the conjunctiva exhibited no abnormalities [Eyelids - No Xanthelasma] : the eyelids demonstrated no xanthelasmas [Normal Oral Mucosa] : normal oral mucosa [No Oral Pallor] : no oral pallor [No Oral Cyanosis] : no oral cyanosis [Normal Jugular Venous A Waves Present] : normal jugular venous A waves present [Normal Jugular Venous V Waves Present] : normal jugular venous V waves present [No Jugular Venous Wilkinson A Waves] : no jugular venous wilkinson A waves [Respiration, Rhythm And Depth] : normal respiratory rhythm and effort [Exaggerated Use Of Accessory Muscles For Inspiration] : no accessory muscle use [Heart Rate And Rhythm] : heart rate and rhythm were normal [Heart Sounds] : normal S1 and S2 [Murmurs] : no murmurs present [Edema] : no peripheral edema present [Veins - Varicosity Changes] : no varicosital changes were noted in the lower extremities [2+] : left 2+ [1+] : left 1+ [No Abnormalities] : the abdominal aorta was not enlarged and no bruit was heard [Abdomen Soft] : soft [Abdomen Tenderness] : non-tender [Abdomen Mass (___ Cm)] : no abdominal mass palpated [Abnormal Walk] : normal gait [Gait - Sufficient For Exercise Testing] : the gait was sufficient for exercise testing [Nail Clubbing] : no clubbing of the fingernails [Cyanosis, Localized] : no localized cyanosis [Petechial Hemorrhages (___cm)] : no petechial hemorrhages [Skin Color & Pigmentation] : normal skin color and pigmentation [] : no rash [No Venous Stasis] : no venous stasis [Skin Lesions] : no skin lesions [No Skin Ulcers] : no skin ulcer [No Xanthoma] : no  xanthoma was observed [Oriented To Time, Place, And Person] : oriented to person, place, and time [Affect] : the affect was normal [Mood] : the mood was normal [No Anxiety] : not feeling anxious [FreeTextEntry1] : Minimal expiratory wheezing.

## 2020-10-25 NOTE — HISTORY OF PRESENT ILLNESS
[FreeTextEntry1] : 69 year-old female s/p PTCA to an occluded right coronary artery and left anterior descending artery at [facility] s/p cardiac arrest 6/14/19 - s/p cath and ICD that subsequently got infected - s/p extraction - now with LifeVest on - now s/p re-implant of the ICD on 9/23/19. Now with SOB after walking about 3 blocks. \par \par \par She denies any recent chest pain, shortness of breath, palpitations or diaphoresis.\par \par Hospitalized at [facility] with complaints of dyspnea suggestive of pneumonia. Patient had elevated troponins which prompted a repeat cardiac catheterization. Report states that the patient had a proximal RCA total obstruction and was being filled by collaterals from the mid LAD. The LAD shows a patent stent and a 50% mid LAD lesion. She also had moderate disease of the left circumflex artery. Subsequently underwent nuclear study which showed only a fixed inferoapical anteroapical defect.\par \par \par Since discharge from hospital she has been on nebulizers and steroids. Tried quitting smoking on several occasions, currently using nicotine patch.\par

## 2020-10-25 NOTE — DISCUSSION/SUMMARY
[Coronary Artery Disease] : coronary artery disease [Hyperlipidemia] : hyperlipidemia [Hypertension] : hypertension [Stable] : stable [None] : none [Patient] : the patient [___ Month(s)] : [unfilled] month(s) [FreeTextEntry1] : 69 year-old female s/p PTCA to an occluded right coronary artery and left anterior descending artery at [facility] s/p cardiac arrest 6/14/19 - s/p cath and ICD that subsequently got infected - s/p extraction - now with LifeVest on - now s/p re-implant of the ICD on 9/23/19. Now with SOB after walking about 3 blocks. \par - following up with EP in 1/2020  to evaluate for any further arrhythmia - since its negative, will d/c the amiodarone - taking every other day; weaning it off\par - taking low dose entresto - will increase to 48/51 - for better BP control\par - BP poorly controlled - has gained over 16 lbs over the quarantine - will get a cuff for closer monitoring at home. Encouraged the patient to monitor blood pressure at home, keep a log, and report results back to us for evaluation. Based on results, we will adjust the regimen as necessary.\par - prior echo reviewed with the patient\par - will c/w low dose BB - toprol\par - Encouraged patient to continue healthy exercise and eating habits, focusing on a Mediterranean style of eating and aiming for the recommended 150 minutes per week of moderate physical activity.\par - Encouraged the patient to find healthy outlets and coping mechanisms to help manage stress, such as physical activity/exercise, reducing workload if possible, spending time with family and friends, engaging in an enjoyable hobby, or using meditation or mindfulness techniques.\par \par \par \par \par

## 2020-10-29 ENCOUNTER — RX RENEWAL (OUTPATIENT)
Age: 70
End: 2020-10-29

## 2020-11-05 ENCOUNTER — RX RENEWAL (OUTPATIENT)
Age: 70
End: 2020-11-05

## 2020-11-12 ENCOUNTER — RX RENEWAL (OUTPATIENT)
Age: 70
End: 2020-11-12

## 2020-12-10 ENCOUNTER — RX RENEWAL (OUTPATIENT)
Age: 70
End: 2020-12-10

## 2021-01-04 ENCOUNTER — APPOINTMENT (OUTPATIENT)
Dept: ELECTROPHYSIOLOGY | Facility: CLINIC | Age: 71
End: 2021-01-04
Payer: MEDICARE

## 2021-01-04 PROCEDURE — 93296 REM INTERROG EVL PM/IDS: CPT

## 2021-01-04 PROCEDURE — 93295 DEV INTERROG REMOTE 1/2/MLT: CPT

## 2021-04-05 ENCOUNTER — APPOINTMENT (OUTPATIENT)
Dept: ELECTROPHYSIOLOGY | Facility: CLINIC | Age: 71
End: 2021-04-05
Payer: MEDICARE

## 2021-04-05 ENCOUNTER — NON-APPOINTMENT (OUTPATIENT)
Age: 71
End: 2021-04-05

## 2021-04-05 PROCEDURE — 93295 DEV INTERROG REMOTE 1/2/MLT: CPT

## 2021-04-05 PROCEDURE — 93296 REM INTERROG EVL PM/IDS: CPT

## 2021-04-08 ENCOUNTER — APPOINTMENT (OUTPATIENT)
Dept: CARDIOLOGY | Facility: CLINIC | Age: 71
End: 2021-04-08
Payer: MEDICARE

## 2021-04-08 ENCOUNTER — APPOINTMENT (OUTPATIENT)
Dept: ELECTROPHYSIOLOGY | Facility: CLINIC | Age: 71
End: 2021-04-08
Payer: MEDICARE

## 2021-04-08 VITALS
SYSTOLIC BLOOD PRESSURE: 158 MMHG | HEIGHT: 62 IN | WEIGHT: 255 LBS | HEART RATE: 99 BPM | OXYGEN SATURATION: 95 % | DIASTOLIC BLOOD PRESSURE: 86 MMHG | BODY MASS INDEX: 46.93 KG/M2

## 2021-04-08 PROCEDURE — 93282 PRGRMG EVAL IMPLANTABLE DFB: CPT

## 2021-04-08 PROCEDURE — 99072 ADDL SUPL MATRL&STAF TM PHE: CPT

## 2021-04-08 PROCEDURE — 93000 ELECTROCARDIOGRAM COMPLETE: CPT

## 2021-04-08 PROCEDURE — 99215 OFFICE O/P EST HI 40 MIN: CPT

## 2021-04-14 ENCOUNTER — NON-APPOINTMENT (OUTPATIENT)
Age: 71
End: 2021-04-14

## 2021-07-06 ENCOUNTER — APPOINTMENT (OUTPATIENT)
Dept: ELECTROPHYSIOLOGY | Facility: CLINIC | Age: 71
End: 2021-07-06
Payer: MEDICARE

## 2021-07-06 ENCOUNTER — NON-APPOINTMENT (OUTPATIENT)
Age: 71
End: 2021-07-06

## 2021-07-06 PROCEDURE — 93296 REM INTERROG EVL PM/IDS: CPT

## 2021-07-06 PROCEDURE — 93295 DEV INTERROG REMOTE 1/2/MLT: CPT

## 2021-07-14 ENCOUNTER — APPOINTMENT (OUTPATIENT)
Dept: CARDIOLOGY | Facility: CLINIC | Age: 71
End: 2021-07-14
Payer: MEDICARE

## 2021-07-14 ENCOUNTER — NON-APPOINTMENT (OUTPATIENT)
Age: 71
End: 2021-07-14

## 2021-07-14 VITALS — HEART RATE: 85 BPM | SYSTOLIC BLOOD PRESSURE: 150 MMHG | DIASTOLIC BLOOD PRESSURE: 77 MMHG | OXYGEN SATURATION: 95 %

## 2021-07-14 DIAGNOSIS — J44.9 CHRONIC OBSTRUCTIVE PULMONARY DISEASE, UNSPECIFIED: ICD-10-CM

## 2021-07-14 DIAGNOSIS — E66.9 OBESITY, UNSPECIFIED: ICD-10-CM

## 2021-07-14 DIAGNOSIS — I73.9 PERIPHERAL VASCULAR DISEASE, UNSPECIFIED: ICD-10-CM

## 2021-07-14 PROCEDURE — 93000 ELECTROCARDIOGRAM COMPLETE: CPT

## 2021-07-14 PROCEDURE — 99214 OFFICE O/P EST MOD 30 MIN: CPT

## 2021-07-14 PROCEDURE — 99072 ADDL SUPL MATRL&STAF TM PHE: CPT

## 2021-07-14 RX ORDER — FLUTICASONE PROPIONATE 50 MCG
50 SPRAY, SUSPENSION NASAL DAILY
Refills: 0 | Status: DISCONTINUED | COMMUNITY
End: 2021-07-14

## 2021-07-14 RX ORDER — ALBUTEROL SULFATE 90 UG/1
108 (90 BASE) AEROSOL, METERED RESPIRATORY (INHALATION)
Qty: 18 | Refills: 3 | Status: DISCONTINUED | COMMUNITY
Start: 2018-09-20 | End: 2021-07-14

## 2021-07-16 ENCOUNTER — NON-APPOINTMENT (OUTPATIENT)
Age: 71
End: 2021-07-16

## 2021-07-16 NOTE — CARDIOLOGY SUMMARY
[___] : [unfilled] [LVEF ___%] : LVEF [unfilled]% [de-identified] : TTE EF 33%, mild MS, mild Aortic regurgitation, severe segmental LV systolic dysfunction.

## 2021-07-16 NOTE — REASON FOR VISIT
[Other: ____] : [unfilled] [Spouse] : spouse [Follow-Up - Clinic] : a clinic follow-up of [FreeTextEntry1] : \par  [FreeTextEntry2] : for cardiac clearance for eye surgery

## 2021-07-16 NOTE — DISCUSSION/SUMMARY
[Coronary Artery Disease] : coronary artery disease [Hyperlipidemia] : hyperlipidemia [Hypertension] : hypertension [Stable] : stable [Patient] : the patient [___ Month(s)] : in [unfilled] month(s) [FreeTextEntry1] : 69 year-old female s/p PTCA to an occluded right coronary artery with collaterals and mLAD PCI  ( 2015 ), Cardiac arrest 6/14/19 - s/p cath and ICD that subsequently got infected - s/p extraction and re-implant of the ICD- right side on 9/23/19 presents in for cardiac clearance prior to cataract surgery scheduled for 7/20/21 and 8/3/21. She reports chronic SOB (unchanged )still smoking , but denies chest pain, palpitations or diaphoresis. \par BP elevated ( states at home they are lower ). Remote transmission from ICD showed NSVT brief episode noted on 6/13/21 lasting .01 @ 192 bpm. Other NSVT lasting 0.01 sec was noted on 5/16/21 4/23/21 and 4/5/21. \par \par 1) CAD -  The LAD shows a patent stent and a 50% mid LAD lesion. She also had moderate disease of the left circumflex artery. Subsequently underwent nuclear study which showed only a fixed inferoapical anteroapical defect.\par Continue Plavix 75 mg QD and Metoprolol ER 50 mg QD \par \par 2) NSVT - Noted brief episodes of NSVT less than 0.01 seconds \par                  Increase Metop XL 75 mg QD \par                  \par \par 3) Ischemic CMP- S/p ICD \par Continue Entresto 49/51 mg bid \par Continue Lasix 40 mg QD \par check daily weights \par \par \par 3) LBBB - chronic \par Will consider EP eval for BIV upgrade if HF symptoms persist. \par \par 4) HLD- Continue Atorvastatin 40 mg QD \par \par 5) Reinforced smoking cessation \par 6) cardiac clearance pending discussion with Dr. Carl Re: if  Plavix needs to be held ( last PCI 2015 pLAD by Dr. MONTIEL) and patient with ICD which needs to be in asynchronous mode if cautery use is anticipated during eye surgery. \par \par Addendum: Discussed with Dr. Morris ( eye surgeon) re: Ms. Kohli's upcoming surgery ( cataract extraction ). Per Dr. Loja-  Need not withhold antiplatelet therapy.\par \par   Recommendations:\par  1) May continue antiplatelet therapy\par  2) Patient with ICD ( MDT ). Not dependent. It is recommended a magnet be used for the eye surgery since there is a potential for oversensing while using cautery causing inappropriate ICD therapy. Additionally she must be monitored during procedure. \par \par Discussed with [email or web address] text services also. \par \par Thank you. \par KYLE Maddox\par \par

## 2021-07-16 NOTE — HISTORY OF PRESENT ILLNESS
[FreeTextEntry1] : 69 year-old female s/p PTCA to an occluded right coronary artery with collaterals and mLAD PCI  ( 2015 ), Cardiac arrest 6/14/19 - s/p cath and ICD that subsequently got infected - s/p extraction and re-implant of the ICD- right side on 9/23/19 presents in for cardiac clearance prior to cataract surgery scheduled for 7/20/21 and 8/3/21. She reports chronic SOB (unchanged )still smoking , but denies chest pain, palpitations or diaphoresis. \par BP elevated ( states at home they are lower ). Remote transmission from ICD showed NSVT brief episode noted on 6/13/21 lasting .01 @ 192 bpm. Other NSVT lasting 0.01 sec was noted on 5/16/21 4/23/21 and 4/5/21. \par \par 1) CAD -  The LAD shows a patent stent and a 50% mid LAD lesion. She also had moderate disease of the left circumflex artery. Subsequently underwent nuclear study which showed only a fixed inferoapical anteroapical defect.\par Continue Plavix 75 mg QD and Metoprolol ER 50 mg QD \par \par 2) NSVT - Noted brief episodes of NSVT less than 0.01 seconds \par                  Increase Metop XL 75 mg QD \par                  \par \par 3) Ischemic CMP- S/p ICD \par Continue Entresto 49/51 mg bid \par Increase Lasix 40 mg bid x 4 days \par check daily weights \par \par \par 3) LBBB - chronic \par Will consider EP eval for BIV upgrade if HF symptoms persist. \par \par 4) HLD- Continue Atorvastatin 40 mg QD \par \par 5) Reinforced smoking cessation \par \par \par   \par

## 2021-07-19 ENCOUNTER — APPOINTMENT (OUTPATIENT)
Dept: CARDIOLOGY | Facility: CLINIC | Age: 71
End: 2021-07-19

## 2021-10-04 ENCOUNTER — NON-APPOINTMENT (OUTPATIENT)
Age: 71
End: 2021-10-04

## 2021-10-05 ENCOUNTER — APPOINTMENT (OUTPATIENT)
Dept: ELECTROPHYSIOLOGY | Facility: CLINIC | Age: 71
End: 2021-10-05
Payer: MEDICARE

## 2021-10-05 PROCEDURE — 93295 DEV INTERROG REMOTE 1/2/MLT: CPT

## 2021-10-05 PROCEDURE — 93296 REM INTERROG EVL PM/IDS: CPT

## 2021-10-14 RX ORDER — METOPROLOL SUCCINATE 25 MG/1
25 TABLET, EXTENDED RELEASE ORAL
Qty: 90 | Refills: 2 | Status: DISCONTINUED | COMMUNITY
Start: 2021-07-14 | End: 2021-10-14

## 2021-12-16 ENCOUNTER — RX RENEWAL (OUTPATIENT)
Age: 71
End: 2021-12-16

## 2022-01-03 ENCOUNTER — NON-APPOINTMENT (OUTPATIENT)
Age: 72
End: 2022-01-03

## 2022-01-04 ENCOUNTER — APPOINTMENT (OUTPATIENT)
Dept: ELECTROPHYSIOLOGY | Facility: CLINIC | Age: 72
End: 2022-01-04
Payer: MEDICARE

## 2022-01-04 PROCEDURE — 93296 REM INTERROG EVL PM/IDS: CPT

## 2022-01-04 PROCEDURE — 93295 DEV INTERROG REMOTE 1/2/MLT: CPT

## 2022-01-20 ENCOUNTER — APPOINTMENT (OUTPATIENT)
Dept: CARDIOLOGY | Facility: CLINIC | Age: 72
End: 2022-01-20
Payer: MEDICARE

## 2022-01-20 ENCOUNTER — APPOINTMENT (OUTPATIENT)
Dept: ELECTROPHYSIOLOGY | Facility: CLINIC | Age: 72
End: 2022-01-20
Payer: MEDICARE

## 2022-01-20 VITALS — SYSTOLIC BLOOD PRESSURE: 137 MMHG | HEART RATE: 101 BPM | DIASTOLIC BLOOD PRESSURE: 96 MMHG | OXYGEN SATURATION: 96 %

## 2022-01-20 PROCEDURE — 93282 PRGRMG EVAL IMPLANTABLE DFB: CPT

## 2022-01-20 PROCEDURE — 99215 OFFICE O/P EST HI 40 MIN: CPT

## 2022-01-20 PROCEDURE — 93000 ELECTROCARDIOGRAM COMPLETE: CPT

## 2022-01-20 NOTE — REVIEW OF SYSTEMS
[Feeling Fatigued] : feeling fatigued [Negative] : Heme/Lymph [SOB] : shortness of breath [Dyspnea on exertion] : dyspnea during exertion

## 2022-01-20 NOTE — HISTORY OF PRESENT ILLNESS
[FreeTextEntry1] : 71 year-old female s/p PTCA to an occluded right coronary artery and left anterior descending artery at [facility] s/p cardiac arrest 6/14/19 - s/p cath and ICD that subsequently got infected - s/p extraction - now with LifeVest on - now s/p re-implant of the ICD on 9/23/19. Now with SOB after walking about 3 blocks. \par She denies any recent chest pain, shortness of breath, palpitations or diaphoresis.\par Hospitalized at [facility] with complaints of dyspnea suggestive of pneumonia. Patient had elevated troponins which prompted a repeat cardiac catheterization. Report states that the patient had a proximal RCA total obstruction and was being filled by collaterals from the mid LAD. The LAD shows a patent stent and a 50% mid LAD lesion. She also had moderate disease of the left circumflex artery. Subsequently underwent nuclear study which showed only a fixed inferoapical anteroapical defect.\par Since discharge from hospital she has been on nebulizers and steroids. Tried quitting smoking on several occasions, currently using nicotine patch. \par She still is smoking. \par \par 1) CAD -  The LAD shows a patent stent and a 50% mid LAD lesion. She also had moderate disease of the left circumflex artery. Subsequently underwent nuclear study which showed only a fixed inferoapical anteroapical defect.\par Continue Plavix 75 mg QD and Metoprolol ER 50 mg QD \par \par 2) Ischemic CMP- S/p ICD \par Continue Entresto 49/51 mg bid \par Increase Lasix 40 mg bid x 4 days \par check daily weights \par Check labs Next week. \par \par 3) LBBB - chronic \par Will consider EP eval for BIV upgrade after HF symptoms are optimized \par \par 4) HLD- Continue Atorvastatin 40 mg QD \par 5) Reinforced smoking cessation \par \par \par   \par

## 2022-01-20 NOTE — DISCUSSION/SUMMARY
[Coronary Artery Disease] : coronary artery disease [Hyperlipidemia] : hyperlipidemia [Hypertension] : hypertension [Stable] : stable [Patient] : the patient [___ Month(s)] : in [unfilled] month(s) [FreeTextEntry1] : 71 year-old female s/p PTCA to an occluded right coronary artery and left anterior descending artery at [facility] s/p cardiac arrest 6/14/19 - s/p cath and ICD that subsequently got infected - s/p extraction and re-implant of the ICD on 9/23/19. Now with SOB after walking about 3 blocks. \par \par 1) HTN - taking carvedilol 25 mg bid since her admission for the knee,  taking low dose entresto - will increase to 48/51 - for better BP control\par - BP poorly controlled - has gained over 16 lbs over the quarantine - will get a cuff for closer monitoring at home. Encouraged the patient to monitor blood pressure at home, keep a log, and report results back to us for evaluation. Based on results, we will adjust the regimen as necessary.\par - will refer for repeat TTE in setting of worsening CHO/SOB - pt is not very active - especially since she broke her leg in 9/2021 - was hospitalized and then had an infection in that leg\par \par \par 2) CAD - Cath showed LAD showed a patent stent and a 50% mid LAD lesion. She also had moderate disease of the left circumflex artery. Subsequently underwent nuclear study which showed only a fixed inferoapical anteroapical defect.\par Continue Plavix 75 mg QD and Metoprolol ER 50 mg QD \par \par 2) Ischemic CMP- S/p ICD \par Continue Entresto 49/51 mg bid \par Increase Lasix 40 mg bid x 4 days \par check daily weights \par Check labs Next week. \par \par 3) LBBB - chronic \par Will consider EP eval for BIV upgrade after HF symptoms are optimized\par of note - she was seen by EP in 1/2020  to evaluate for any further arrhythmia - since its negative, was Amiodarone was discontinued .\par \par 4) HLD- Continue Atorvastatin 40 mg QD  Encouraged patient to continue healthy exercise and eating habits, focusing on a Mediterranean style of eating and aiming for the recommended 150 minutes per week of moderate physical activity.\par \par 5) Reinforced smoking cessation \par \par F/u in 3 MOnths but advised to call with BP nos and weight in 2 weeks\par \par \par   \par \par \par

## 2022-01-27 ENCOUNTER — APPOINTMENT (OUTPATIENT)
Dept: CARDIOLOGY | Facility: CLINIC | Age: 72
End: 2022-01-27

## 2022-01-31 RX ORDER — METOPROLOL SUCCINATE 50 MG/1
50 TABLET, EXTENDED RELEASE ORAL DAILY
Qty: 90 | Refills: 3 | Status: DISCONTINUED | COMMUNITY
Start: 2020-02-24 | End: 2022-01-31

## 2022-02-22 ENCOUNTER — APPOINTMENT (OUTPATIENT)
Dept: CARDIOLOGY | Facility: CLINIC | Age: 72
End: 2022-02-22
Payer: MEDICARE

## 2022-02-22 PROCEDURE — 93306 TTE W/DOPPLER COMPLETE: CPT

## 2022-03-03 ENCOUNTER — RX RENEWAL (OUTPATIENT)
Age: 72
End: 2022-03-03

## 2022-03-04 ENCOUNTER — RX RENEWAL (OUTPATIENT)
Age: 72
End: 2022-03-04

## 2022-04-11 ENCOUNTER — RX RENEWAL (OUTPATIENT)
Age: 72
End: 2022-04-11

## 2022-04-21 ENCOUNTER — NON-APPOINTMENT (OUTPATIENT)
Age: 72
End: 2022-04-21

## 2022-04-21 ENCOUNTER — APPOINTMENT (OUTPATIENT)
Dept: ELECTROPHYSIOLOGY | Facility: CLINIC | Age: 72
End: 2022-04-21
Payer: MEDICARE

## 2022-04-21 PROCEDURE — 93296 REM INTERROG EVL PM/IDS: CPT

## 2022-04-21 PROCEDURE — 93295 DEV INTERROG REMOTE 1/2/MLT: CPT

## 2022-07-21 ENCOUNTER — APPOINTMENT (OUTPATIENT)
Dept: ELECTROPHYSIOLOGY | Facility: CLINIC | Age: 72
End: 2022-07-21

## 2022-07-21 ENCOUNTER — APPOINTMENT (OUTPATIENT)
Dept: CARDIOLOGY | Facility: CLINIC | Age: 72
End: 2022-07-21

## 2022-07-21 ENCOUNTER — NON-APPOINTMENT (OUTPATIENT)
Age: 72
End: 2022-07-21

## 2022-07-21 VITALS — OXYGEN SATURATION: 95 % | BODY MASS INDEX: 43.24 KG/M2 | HEIGHT: 62 IN | WEIGHT: 235 LBS | HEART RATE: 82 BPM

## 2022-07-21 VITALS — OXYGEN SATURATION: 97 % | SYSTOLIC BLOOD PRESSURE: 159 MMHG | DIASTOLIC BLOOD PRESSURE: 79 MMHG

## 2022-07-21 PROCEDURE — 99214 OFFICE O/P EST MOD 30 MIN: CPT

## 2022-07-21 PROCEDURE — 93000 ELECTROCARDIOGRAM COMPLETE: CPT | Mod: 59

## 2022-07-21 PROCEDURE — 93282 PRGRMG EVAL IMPLANTABLE DFB: CPT

## 2022-07-21 PROCEDURE — 93000 ELECTROCARDIOGRAM COMPLETE: CPT

## 2022-07-21 NOTE — PHYSICAL EXAM
[Normal Appearance] : normal appearance [General Appearance - Well Developed] : well developed [Well Groomed] : well groomed [General Appearance - Well Nourished] : well nourished [General Appearance - In No Acute Distress] : no acute distress [No Deformities] : no deformities [Normal Conjunctiva] : the conjunctiva exhibited no abnormalities [Eyelids - No Xanthelasma] : the eyelids demonstrated no xanthelasmas [Normal Oral Mucosa] : normal oral mucosa [No Oral Pallor] : no oral pallor [No Oral Cyanosis] : no oral cyanosis [Normal Jugular Venous A Waves Present] : normal jugular venous A waves present [Normal Jugular Venous V Waves Present] : normal jugular venous V waves present [No Jugular Venous Wilkinson A Waves] : no jugular venous wilkinson A waves [Respiration, Rhythm And Depth] : normal respiratory rhythm and effort [Exaggerated Use Of Accessory Muscles For Inspiration] : no accessory muscle use [Heart Rate And Rhythm] : heart rate and rhythm were normal [Heart Sounds] : normal S1 and S2 [Murmurs] : no murmurs present [Edema] : no peripheral edema present [Veins - Varicosity Changes] : no varicosital changes were noted in the lower extremities [2+] : left 2+ [1+] : left 1+ [No Abnormalities] : the abdominal aorta was not enlarged and no bruit was heard [Abdomen Tenderness] : non-tender [Abdomen Soft] : soft [Abdomen Mass (___ Cm)] : no abdominal mass palpated [Gait - Sufficient For Exercise Testing] : the gait was sufficient for exercise testing [Abnormal Walk] : normal gait [Nail Clubbing] : no clubbing of the fingernails [Cyanosis, Localized] : no localized cyanosis [Petechial Hemorrhages (___cm)] : no petechial hemorrhages [Skin Color & Pigmentation] : normal skin color and pigmentation [] : no rash [No Venous Stasis] : no venous stasis [No Skin Ulcers] : no skin ulcer [Skin Lesions] : no skin lesions [No Xanthoma] : no  xanthoma was observed [Oriented To Time, Place, And Person] : oriented to person, place, and time [Affect] : the affect was normal [No Anxiety] : not feeling anxious [Mood] : the mood was normal [FreeTextEntry1] : Minimal expiratory wheezing.

## 2022-07-21 NOTE — DISCUSSION/SUMMARY
[Coronary Artery Disease] : coronary artery disease [Hyperlipidemia] : hyperlipidemia [Hypertension] : hypertension [Stable] : stable [Patient] : the patient [___ Month(s)] : in [unfilled] month(s) [FreeTextEntry1] : 71 year-old female s/p PTCA to an occluded right coronary artery and left anterior descending artery at [facility] s/p cardiac arrest 6/14/19 - s/p cath and ICD that subsequently got infected - s/p extraction and re-implant of the ICD on 9/23/19. Now with SOB after walking about 3 blocks. \par \par 1) HTN - taking carvedilol 25 mg bid since her admission for the knee,  taking low dose entresto - will increase to 48/51 - for better BP control\par - BP poorly controlled - has gained over 16 lbs over the quarantine - will get a cuff for closer monitoring at home. Encouraged the patient to monitor blood pressure at home, keep a log, and report results back to us for evaluation. Based on results, we will adjust the regimen as necessary.\par - will refer for repeat TTE in setting of worsening CHO/SOB - pt is not very active - especially since she broke her leg in 9/2021 - was hospitalized and then had an infection in that leg\par \par \par 2) CAD - Cath showed LAD showed a patent stent and a 50% mid LAD lesion. She also had moderate disease of the left circumflex artery. Subsequently underwent nuclear study which showed only a fixed inferoapical anteroapical defect.\par Continue Plavix 75 mg QD and Metoprolol ER 50 mg QD \par \par 2) Ischemic CMP- S/p ICD \par Continue Entresto 49/51 mg bid \par Increase Lasix 40 mg bid x 4 days \par check daily weights \par Check labs Next week. \par \par 3) LBBB - chronic \par Will consider EP eval for BIV upgrade after HF symptoms are optimized\par of note - she was seen by EP in 1/2020  to evaluate for any further arrhythmia - since its negative, was Amiodarone was discontinued .\par \par 4) HLD- Continue Atorvastatin 40 mg QD  Encouraged patient to continue healthy exercise and eating habits, focusing on a Mediterranean style of eating and aiming for the recommended 150 minutes per week of moderate physical activity.\par \par 5) Reinforced smoking cessation \par \par F/u in 3 MOnths but advised to call with BP nos and weight in 2 weeks\par \par \par   \par \par \par  [EKG obtained to assist in diagnosis and management of assessed problem(s)] : EKG obtained to assist in diagnosis and management of assessed problem(s)

## 2022-07-21 NOTE — ADDENDUM
[FreeTextEntry1] : Reviewed meds and dosages with patients  and sent in Rx. \par \par Thanks,\par bt

## 2022-07-21 NOTE — HISTORY OF PRESENT ILLNESS
[FreeTextEntry1] : 71 year-old female s/p PTCA to an occluded right coronary artery and left anterior descending artery at [facility] s/p cardiac arrest 6/14/19 - s/p cath and ICD that subsequently got infected - s/p extraction - now with LifeVest on - now s/p re-implant of the ICD on 9/23/19. Now with SOB after walking about 3 blocks. \par She denies any recent chest pain, shortness of breath, palpitations or diaphoresis.\par Hospitalized at [facility] with complaints of dyspnea suggestive of pneumonia. Patient had elevated troponins which prompted a repeat cardiac catheterization. Report states that the patient had a proximal RCA total obstruction and was being filled by collaterals from the mid LAD. The LAD shows a patent stent and a 50% mid LAD lesion. She also had moderate disease of the left circumflex artery. Subsequently underwent nuclear study which showed only a fixed inferoapical anteroapical defect.\par Since discharge from hospital she has been on nebulizers and steroids. Tried quitting smoking on several occasions, currently using nicotine patch. \par She still is smoking. \par \par 1) CAD -  The LAD shows a patent stent and a 50% mid LAD lesion. She also had moderate disease of the left circumflex artery. Subsequently underwent nuclear study which showed only a fixed inferoapical anteroapical defect.\par Continue Plavix 75 mg QD and Metoprolol ER 50 mg QD \par \par 2) Ischemic CMP- S/p ICD \par Continue Entresto 49/51 mg bid \par Increase Lasix 40 mg bid x 4 days \par check daily weights \par Check labs Next week. \par \par 3) LBBB - chronic \par Will consider EP eval for BIV upgrade after HF symptoms are optimized \par \par 4) HLD- Continue Atorvastatin 40 mg QD \par 5) Reinforced smoking cessation \par \par Interval Note\par July 21, 2022\par \par Ms. Kohli is a 71 year old female that [facility].\par She carries a past medical history as outlined below:\par \par -Hx of Cardiac Arrest: 6/14/2019\par -Hx of PTCA-> RCA/ LAD\par -Hx of ICD implant infection -> reimplant 9/23/2019-> [employer] Visia AF MRI VR [id number]\par -Hx of ischemic cardiomyopathy\par -Hx of LBBB- chronic\par -Hx of Hyperlipidemia\par \par Most recent echocardiogram performed on February 22, 2022\par LVEF 40%\par Heavily calcified mitral valve leaflets with decrease opening\par MIld MR\par Mild LA enlargement\par MIld concentric LVH\par Moderate segmental LV systolic dysfunction wit hypokinesis of the inferior wall,posterior-lateral wall\par basal interventricular septum and apex\par \par   \par

## 2022-08-02 NOTE — REVIEW OF SYSTEMS
Left a message for pt to return call in regards to the previous message.     E advise sent to pt as well.    [Negative] : Heme/Lymph

## 2022-09-29 ENCOUNTER — RX RENEWAL (OUTPATIENT)
Age: 72
End: 2022-09-29

## 2022-10-20 ENCOUNTER — APPOINTMENT (OUTPATIENT)
Dept: ELECTROPHYSIOLOGY | Facility: CLINIC | Age: 72
End: 2022-10-20

## 2022-10-20 ENCOUNTER — NON-APPOINTMENT (OUTPATIENT)
Age: 72
End: 2022-10-20

## 2022-10-20 PROCEDURE — 93296 REM INTERROG EVL PM/IDS: CPT

## 2022-10-20 PROCEDURE — 93295 DEV INTERROG REMOTE 1/2/MLT: CPT

## 2022-10-21 ENCOUNTER — APPOINTMENT (OUTPATIENT)
Dept: ELECTROPHYSIOLOGY | Facility: CLINIC | Age: 72
End: 2022-10-21

## 2022-11-03 ENCOUNTER — RX RENEWAL (OUTPATIENT)
Age: 72
End: 2022-11-03

## 2022-11-09 ENCOUNTER — RX RENEWAL (OUTPATIENT)
Age: 72
End: 2022-11-09

## 2022-12-15 ENCOUNTER — RX RENEWAL (OUTPATIENT)
Age: 72
End: 2022-12-15

## 2022-12-29 ENCOUNTER — RX RENEWAL (OUTPATIENT)
Age: 72
End: 2022-12-29

## 2023-01-01 ENCOUNTER — INPATIENT (INPATIENT)
Facility: HOSPITAL | Age: 73
LOS: 4 days | Discharge: HOME HEALTH | DRG: 309 | End: 2023-09-01
Attending: HOSPITALIST | Admitting: HOSPITALIST
Payer: MEDICARE

## 2023-01-01 ENCOUNTER — LABORATORY RESULT (OUTPATIENT)
Age: 73
End: 2023-01-01

## 2023-01-01 ENCOUNTER — TRANSCRIPTION ENCOUNTER (OUTPATIENT)
Age: 73
End: 2023-01-01

## 2023-01-01 ENCOUNTER — RX RENEWAL (OUTPATIENT)
Age: 73
End: 2023-01-01

## 2023-01-01 ENCOUNTER — NON-APPOINTMENT (OUTPATIENT)
Age: 73
End: 2023-01-01

## 2023-01-01 ENCOUNTER — INPATIENT (INPATIENT)
Facility: HOSPITAL | Age: 73
LOS: 24 days | Discharge: HOME | DRG: 270 | End: 2023-11-09
Attending: INTERNAL MEDICINE | Admitting: STUDENT IN AN ORGANIZED HEALTH CARE EDUCATION/TRAINING PROGRAM
Payer: MEDICARE

## 2023-01-01 ENCOUNTER — INPATIENT (INPATIENT)
Facility: HOSPITAL | Age: 73
LOS: 5 days | Discharge: HOME | DRG: 286 | End: 2023-10-06
Attending: STUDENT IN AN ORGANIZED HEALTH CARE EDUCATION/TRAINING PROGRAM | Admitting: HOSPITALIST
Payer: MEDICARE

## 2023-01-01 ENCOUNTER — APPOINTMENT (OUTPATIENT)
Dept: CV DIAGNOSITCS | Facility: HOSPITAL | Age: 73
End: 2023-01-01

## 2023-01-01 ENCOUNTER — APPOINTMENT (OUTPATIENT)
Dept: ELECTROPHYSIOLOGY | Facility: CLINIC | Age: 73
End: 2023-01-01
Payer: MEDICARE

## 2023-01-01 ENCOUNTER — EMERGENCY (EMERGENCY)
Facility: HOSPITAL | Age: 73
LOS: 1 days | Discharge: HOME | End: 2023-01-01
Attending: STUDENT IN AN ORGANIZED HEALTH CARE EDUCATION/TRAINING PROGRAM
Payer: MEDICARE

## 2023-01-01 ENCOUNTER — EMERGENCY (EMERGENCY)
Facility: HOSPITAL | Age: 73
LOS: 0 days | Discharge: ANOTHER HOSPITAL | End: 2023-09-30
Attending: STUDENT IN AN ORGANIZED HEALTH CARE EDUCATION/TRAINING PROGRAM
Payer: MEDICARE

## 2023-01-01 ENCOUNTER — APPOINTMENT (OUTPATIENT)
Dept: ELECTROPHYSIOLOGY | Facility: CLINIC | Age: 73
End: 2023-01-01

## 2023-01-01 ENCOUNTER — APPOINTMENT (OUTPATIENT)
Dept: CARDIOLOGY | Facility: CLINIC | Age: 73
End: 2023-01-01
Payer: MEDICARE

## 2023-01-01 ENCOUNTER — INPATIENT (INPATIENT)
Facility: HOSPITAL | Age: 73
LOS: 3 days | Discharge: HOME | DRG: 871 | End: 2023-11-29
Attending: SURGERY | Admitting: SURGERY
Payer: MEDICARE

## 2023-01-01 ENCOUNTER — APPOINTMENT (OUTPATIENT)
Dept: CARDIOLOGY | Facility: CLINIC | Age: 73
End: 2023-01-01

## 2023-01-01 ENCOUNTER — APPOINTMENT (OUTPATIENT)
Dept: CARDIOTHORACIC SURGERY | Facility: HOSPITAL | Age: 73
End: 2023-01-01

## 2023-01-01 VITALS
SYSTOLIC BLOOD PRESSURE: 121 MMHG | RESPIRATION RATE: 18 BRPM | OXYGEN SATURATION: 95 % | TEMPERATURE: 98 F | HEART RATE: 68 BPM | DIASTOLIC BLOOD PRESSURE: 64 MMHG

## 2023-01-01 VITALS
HEART RATE: 66 BPM | RESPIRATION RATE: 18 BRPM | DIASTOLIC BLOOD PRESSURE: 58 MMHG | TEMPERATURE: 99 F | OXYGEN SATURATION: 96 % | SYSTOLIC BLOOD PRESSURE: 118 MMHG

## 2023-01-01 VITALS
HEIGHT: 62 IN | HEART RATE: 68 BPM | SYSTOLIC BLOOD PRESSURE: 136 MMHG | WEIGHT: 176 LBS | DIASTOLIC BLOOD PRESSURE: 76 MMHG | OXYGEN SATURATION: 97 % | BODY MASS INDEX: 32.39 KG/M2

## 2023-01-01 VITALS
HEART RATE: 66 BPM | SYSTOLIC BLOOD PRESSURE: 130 MMHG | RESPIRATION RATE: 18 BRPM | TEMPERATURE: 98 F | DIASTOLIC BLOOD PRESSURE: 68 MMHG | OXYGEN SATURATION: 95 %

## 2023-01-01 VITALS
OXYGEN SATURATION: 97 % | TEMPERATURE: 98 F | DIASTOLIC BLOOD PRESSURE: 41 MMHG | SYSTOLIC BLOOD PRESSURE: 121 MMHG | WEIGHT: 190.04 LBS | HEART RATE: 87 BPM | RESPIRATION RATE: 18 BRPM | HEIGHT: 62 IN

## 2023-01-01 VITALS
SYSTOLIC BLOOD PRESSURE: 131 MMHG | DIASTOLIC BLOOD PRESSURE: 52 MMHG | HEART RATE: 76 BPM | WEIGHT: 179.02 LBS | HEIGHT: 62 IN | RESPIRATION RATE: 18 BRPM | OXYGEN SATURATION: 94 % | TEMPERATURE: 98 F

## 2023-01-01 VITALS
WEIGHT: 186.95 LBS | DIASTOLIC BLOOD PRESSURE: 71 MMHG | SYSTOLIC BLOOD PRESSURE: 141 MMHG | TEMPERATURE: 98 F | HEART RATE: 72 BPM | OXYGEN SATURATION: 92 % | HEIGHT: 62 IN | RESPIRATION RATE: 18 BRPM

## 2023-01-01 VITALS
HEIGHT: 62 IN | WEIGHT: 179.02 LBS | RESPIRATION RATE: 22 BRPM | SYSTOLIC BLOOD PRESSURE: 128 MMHG | OXYGEN SATURATION: 95 % | HEART RATE: 76 BPM | TEMPERATURE: 98 F | DIASTOLIC BLOOD PRESSURE: 52 MMHG

## 2023-01-01 VITALS
SYSTOLIC BLOOD PRESSURE: 94 MMHG | HEART RATE: 54 BPM | TEMPERATURE: 98 F | RESPIRATION RATE: 18 BRPM | DIASTOLIC BLOOD PRESSURE: 48 MMHG | HEIGHT: 62 IN | WEIGHT: 205.91 LBS | OXYGEN SATURATION: 97 %

## 2023-01-01 VITALS
DIASTOLIC BLOOD PRESSURE: 73 MMHG | HEART RATE: 76 BPM | SYSTOLIC BLOOD PRESSURE: 126 MMHG | OXYGEN SATURATION: 95 % | TEMPERATURE: 99 F | RESPIRATION RATE: 18 BRPM

## 2023-01-01 VITALS
SYSTOLIC BLOOD PRESSURE: 153 MMHG | OXYGEN SATURATION: 93 % | DIASTOLIC BLOOD PRESSURE: 75 MMHG | RESPIRATION RATE: 16 BRPM | HEART RATE: 76 BPM | TEMPERATURE: 98 F

## 2023-01-01 VITALS
DIASTOLIC BLOOD PRESSURE: 57 MMHG | TEMPERATURE: 98 F | RESPIRATION RATE: 18 BRPM | HEART RATE: 68 BPM | OXYGEN SATURATION: 96 % | SYSTOLIC BLOOD PRESSURE: 106 MMHG

## 2023-01-01 VITALS
OXYGEN SATURATION: 96 % | TEMPERATURE: 98 F | RESPIRATION RATE: 16 BRPM | WEIGHT: 190.04 LBS | HEART RATE: 70 BPM | DIASTOLIC BLOOD PRESSURE: 48 MMHG | SYSTOLIC BLOOD PRESSURE: 109 MMHG | HEIGHT: 62 IN

## 2023-01-01 VITALS
HEART RATE: 65 BPM | DIASTOLIC BLOOD PRESSURE: 67 MMHG | BODY MASS INDEX: 35.51 KG/M2 | OXYGEN SATURATION: 95 % | HEIGHT: 62 IN | SYSTOLIC BLOOD PRESSURE: 121 MMHG | WEIGHT: 193 LBS

## 2023-01-01 VITALS
OXYGEN SATURATION: 95 % | SYSTOLIC BLOOD PRESSURE: 112 MMHG | HEART RATE: 80 BPM | WEIGHT: 206 LBS | HEIGHT: 62 IN | BODY MASS INDEX: 37.91 KG/M2 | DIASTOLIC BLOOD PRESSURE: 67 MMHG

## 2023-01-01 VITALS — OXYGEN SATURATION: 95 % | SYSTOLIC BLOOD PRESSURE: 150 MMHG | DIASTOLIC BLOOD PRESSURE: 78 MMHG | HEART RATE: 66 BPM

## 2023-01-01 VITALS
HEART RATE: 65 BPM | SYSTOLIC BLOOD PRESSURE: 156 MMHG | WEIGHT: 181 LBS | OXYGEN SATURATION: 96 % | HEIGHT: 62 IN | BODY MASS INDEX: 33.31 KG/M2 | DIASTOLIC BLOOD PRESSURE: 78 MMHG

## 2023-01-01 DIAGNOSIS — J44.9 CHRONIC OBSTRUCTIVE PULMONARY DISEASE, UNSPECIFIED: ICD-10-CM

## 2023-01-01 DIAGNOSIS — Z95.810 PRESENCE OF AUTOMATIC (IMPLANTABLE) CARDIAC DEFIBRILLATOR: ICD-10-CM

## 2023-01-01 DIAGNOSIS — B37.2 CANDIDIASIS OF SKIN AND NAIL: ICD-10-CM

## 2023-01-01 DIAGNOSIS — E11.9 TYPE 2 DIABETES MELLITUS WITHOUT COMPLICATIONS: ICD-10-CM

## 2023-01-01 DIAGNOSIS — R41.0 DISORIENTATION, UNSPECIFIED: ICD-10-CM

## 2023-01-01 DIAGNOSIS — D72.829 ELEVATED WHITE BLOOD CELL COUNT, UNSPECIFIED: ICD-10-CM

## 2023-01-01 DIAGNOSIS — I48.91 UNSPECIFIED ATRIAL FIBRILLATION: ICD-10-CM

## 2023-01-01 DIAGNOSIS — Z79.01 LONG TERM (CURRENT) USE OF ANTICOAGULANTS: ICD-10-CM

## 2023-01-01 DIAGNOSIS — F17.200 NICOTINE DEPENDENCE, UNSPECIFIED, UNCOMPLICATED: ICD-10-CM

## 2023-01-01 DIAGNOSIS — Z87.891 PERSONAL HISTORY OF NICOTINE DEPENDENCE: ICD-10-CM

## 2023-01-01 DIAGNOSIS — I25.10 ATHEROSCLEROTIC HEART DISEASE OF NATIVE CORONARY ARTERY WITHOUT ANGINA PECTORIS: ICD-10-CM

## 2023-01-01 DIAGNOSIS — J96.01 ACUTE RESPIRATORY FAILURE WITH HYPOXIA: ICD-10-CM

## 2023-01-01 DIAGNOSIS — I50.9 HEART FAILURE, UNSPECIFIED: ICD-10-CM

## 2023-01-01 DIAGNOSIS — Z29.9 ENCOUNTER FOR PROPHYLACTIC MEASURES, UNSPECIFIED: ICD-10-CM

## 2023-01-01 DIAGNOSIS — I10 ESSENTIAL (PRIMARY) HYPERTENSION: ICD-10-CM

## 2023-01-01 DIAGNOSIS — I31.39 OTHER PERICARDIAL EFFUSION (NONINFLAMMATORY): ICD-10-CM

## 2023-01-01 DIAGNOSIS — Z79.02 LONG TERM (CURRENT) USE OF ANTITHROMBOTICS/ANTIPLATELETS: ICD-10-CM

## 2023-01-01 DIAGNOSIS — R21 RASH AND OTHER NONSPECIFIC SKIN ERUPTION: ICD-10-CM

## 2023-01-01 DIAGNOSIS — R53.81 OTHER MALAISE: ICD-10-CM

## 2023-01-01 DIAGNOSIS — I49.9 CARDIAC ARRHYTHMIA, UNSPECIFIED: ICD-10-CM

## 2023-01-01 DIAGNOSIS — I25.5 ISCHEMIC CARDIOMYOPATHY: ICD-10-CM

## 2023-01-01 DIAGNOSIS — I70.213 ATHEROSCLEROSIS OF NATIVE ARTERIES OF EXTREMITIES WITH INTERMITTENT CLAUDICATION, BILATERAL LEGS: ICD-10-CM

## 2023-01-01 DIAGNOSIS — K21.9 GASTRO-ESOPHAGEAL REFLUX DISEASE WITHOUT ESOPHAGITIS: ICD-10-CM

## 2023-01-01 DIAGNOSIS — I49.01 VENTRICULAR FIBRILLATION: ICD-10-CM

## 2023-01-01 DIAGNOSIS — J96.00 ACUTE RESPIRATORY FAILURE, UNSPECIFIED WHETHER WITH HYPOXIA OR HYPERCAPNIA: ICD-10-CM

## 2023-01-01 DIAGNOSIS — Z98.890 OTHER SPECIFIED POSTPROCEDURAL STATES: Chronic | ICD-10-CM

## 2023-01-01 DIAGNOSIS — D75.1 SECONDARY POLYCYTHEMIA: ICD-10-CM

## 2023-01-01 DIAGNOSIS — I50.21 ACUTE SYSTOLIC (CONGESTIVE) HEART FAILURE: ICD-10-CM

## 2023-01-01 DIAGNOSIS — Z92.89 PERSONAL HISTORY OF OTHER MEDICAL TREATMENT: ICD-10-CM

## 2023-01-01 DIAGNOSIS — N39.0 URINARY TRACT INFECTION, SITE NOT SPECIFIED: ICD-10-CM

## 2023-01-01 DIAGNOSIS — Z45.02 ENCOUNTER FOR ADJUSTMENT AND MANAGEMENT OF AUTOMATIC IMPLANTABLE CARDIAC DEFIBRILLATOR: ICD-10-CM

## 2023-01-01 DIAGNOSIS — I73.9 PERIPHERAL VASCULAR DISEASE, UNSPECIFIED: ICD-10-CM

## 2023-01-01 DIAGNOSIS — R06.02 SHORTNESS OF BREATH: ICD-10-CM

## 2023-01-01 DIAGNOSIS — I11.0 HYPERTENSIVE HEART DISEASE WITH HEART FAILURE: ICD-10-CM

## 2023-01-01 DIAGNOSIS — K25.5 CHRONIC OR UNSPECIFIED GASTRIC ULCER WITH PERFORATION: ICD-10-CM

## 2023-01-01 DIAGNOSIS — E87.6 HYPOKALEMIA: ICD-10-CM

## 2023-01-01 DIAGNOSIS — R07.9 CHEST PAIN, UNSPECIFIED: ICD-10-CM

## 2023-01-01 DIAGNOSIS — R06.00 DYSPNEA, UNSPECIFIED: ICD-10-CM

## 2023-01-01 DIAGNOSIS — I30.9 ACUTE PERICARDITIS, UNSPECIFIED: ICD-10-CM

## 2023-01-01 DIAGNOSIS — I31.4 CARDIAC TAMPONADE: ICD-10-CM

## 2023-01-01 DIAGNOSIS — E78.5 HYPERLIPIDEMIA, UNSPECIFIED: ICD-10-CM

## 2023-01-01 DIAGNOSIS — I50.20 UNSPECIFIED SYSTOLIC (CONGESTIVE) HEART FAILURE: ICD-10-CM

## 2023-01-01 DIAGNOSIS — R79.89 OTHER SPECIFIED ABNORMAL FINDINGS OF BLOOD CHEMISTRY: ICD-10-CM

## 2023-01-01 DIAGNOSIS — I47.20 VENTRICULAR TACHYCARDIA, UNSPECIFIED: ICD-10-CM

## 2023-01-01 DIAGNOSIS — I50.22 CHRONIC SYSTOLIC (CONGESTIVE) HEART FAILURE: ICD-10-CM

## 2023-01-01 LAB
-  AMIKACIN: SIGNIFICANT CHANGE UP
-  AMOXICILLIN/CLAVULANIC ACID: SIGNIFICANT CHANGE UP
-  AMPICILLIN/SULBACTAM: SIGNIFICANT CHANGE UP
-  AMPICILLIN: SIGNIFICANT CHANGE UP
-  AZTREONAM: SIGNIFICANT CHANGE UP
-  CEFAZOLIN: SIGNIFICANT CHANGE UP
-  CEFEPIME: SIGNIFICANT CHANGE UP
-  CEFOXITIN: SIGNIFICANT CHANGE UP
-  CEFTRIAXONE: SIGNIFICANT CHANGE UP
-  CEFUROXIME: SIGNIFICANT CHANGE UP
-  CIPROFLOXACIN: SIGNIFICANT CHANGE UP
-  ERTAPENEM: SIGNIFICANT CHANGE UP
-  GENTAMICIN: SIGNIFICANT CHANGE UP
-  IMIPENEM: SIGNIFICANT CHANGE UP
-  LEVOFLOXACIN: SIGNIFICANT CHANGE UP
-  MEROPENEM: SIGNIFICANT CHANGE UP
-  NITROFURANTOIN: SIGNIFICANT CHANGE UP
-  PIPERACILLIN/TAZOBACTAM: SIGNIFICANT CHANGE UP
-  STAPHYLOCOCCUS EPIDERMIDIS, METHICILLIN RESISTANT: SIGNIFICANT CHANGE UP
-  TOBRAMYCIN: SIGNIFICANT CHANGE UP
-  TRIMETHOPRIM/SULFAMETHOXAZOLE: SIGNIFICANT CHANGE UP
A1C WITH ESTIMATED AVERAGE GLUCOSE RESULT: 6.4 % — HIGH (ref 4–5.6)
A1C WITH ESTIMATED AVERAGE GLUCOSE RESULT: 6.4 % — HIGH (ref 4–5.6)
A1C WITH ESTIMATED AVERAGE GLUCOSE RESULT: 6.7 % — HIGH (ref 4–5.6)
ALBUMIN SERPL ELPH-MCNC: 2.2 G/DL — LOW (ref 3.3–5)
ALBUMIN SERPL ELPH-MCNC: 2.2 G/DL — LOW (ref 3.3–5)
ALBUMIN SERPL ELPH-MCNC: 2.3 G/DL — LOW (ref 3.3–5)
ALBUMIN SERPL ELPH-MCNC: 2.4 G/DL — LOW (ref 3.3–5)
ALBUMIN SERPL ELPH-MCNC: 2.4 G/DL — LOW (ref 3.3–5)
ALBUMIN SERPL ELPH-MCNC: 2.5 G/DL — LOW (ref 3.3–5)
ALBUMIN SERPL ELPH-MCNC: 2.6 G/DL — LOW (ref 3.3–5)
ALBUMIN SERPL ELPH-MCNC: 2.7 G/DL — LOW (ref 3.3–5)
ALBUMIN SERPL ELPH-MCNC: 2.8 G/DL — LOW (ref 3.3–5)
ALBUMIN SERPL ELPH-MCNC: 3 G/DL — LOW (ref 3.3–5)
ALBUMIN SERPL ELPH-MCNC: 3.2 G/DL — LOW (ref 3.3–5)
ALBUMIN SERPL ELPH-MCNC: 3.3 G/DL — SIGNIFICANT CHANGE UP (ref 3.3–5)
ALBUMIN SERPL ELPH-MCNC: 3.4 G/DL — SIGNIFICANT CHANGE UP (ref 3.3–5)
ALBUMIN SERPL ELPH-MCNC: 3.6 G/DL
ALP BLD-CCNC: 89 U/L
ALP SERPL-CCNC: 55 U/L — SIGNIFICANT CHANGE UP (ref 40–120)
ALP SERPL-CCNC: 55 U/L — SIGNIFICANT CHANGE UP (ref 40–120)
ALP SERPL-CCNC: 56 U/L — SIGNIFICANT CHANGE UP (ref 40–120)
ALP SERPL-CCNC: 56 U/L — SIGNIFICANT CHANGE UP (ref 40–120)
ALP SERPL-CCNC: 57 U/L — SIGNIFICANT CHANGE UP (ref 40–120)
ALP SERPL-CCNC: 57 U/L — SIGNIFICANT CHANGE UP (ref 40–120)
ALP SERPL-CCNC: 58 U/L — SIGNIFICANT CHANGE UP (ref 40–120)
ALP SERPL-CCNC: 59 U/L — SIGNIFICANT CHANGE UP (ref 40–120)
ALP SERPL-CCNC: 60 U/L — SIGNIFICANT CHANGE UP (ref 40–120)
ALP SERPL-CCNC: 62 U/L — SIGNIFICANT CHANGE UP (ref 40–120)
ALP SERPL-CCNC: 62 U/L — SIGNIFICANT CHANGE UP (ref 40–120)
ALP SERPL-CCNC: 63 U/L — SIGNIFICANT CHANGE UP (ref 40–120)
ALP SERPL-CCNC: 64 U/L — SIGNIFICANT CHANGE UP (ref 40–120)
ALP SERPL-CCNC: 65 U/L — SIGNIFICANT CHANGE UP (ref 40–120)
ALP SERPL-CCNC: 66 U/L — SIGNIFICANT CHANGE UP (ref 40–120)
ALP SERPL-CCNC: 66 U/L — SIGNIFICANT CHANGE UP (ref 40–120)
ALP SERPL-CCNC: 67 U/L — SIGNIFICANT CHANGE UP (ref 40–120)
ALP SERPL-CCNC: 67 U/L — SIGNIFICANT CHANGE UP (ref 40–120)
ALP SERPL-CCNC: 68 U/L — SIGNIFICANT CHANGE UP (ref 40–120)
ALP SERPL-CCNC: 69 U/L — SIGNIFICANT CHANGE UP (ref 40–120)
ALP SERPL-CCNC: 69 U/L — SIGNIFICANT CHANGE UP (ref 40–120)
ALP SERPL-CCNC: 70 U/L — SIGNIFICANT CHANGE UP (ref 40–120)
ALP SERPL-CCNC: 70 U/L — SIGNIFICANT CHANGE UP (ref 40–120)
ALP SERPL-CCNC: 71 U/L — SIGNIFICANT CHANGE UP (ref 40–120)
ALP SERPL-CCNC: 72 U/L — SIGNIFICANT CHANGE UP (ref 40–120)
ALP SERPL-CCNC: 72 U/L — SIGNIFICANT CHANGE UP (ref 40–120)
ALP SERPL-CCNC: 73 U/L — SIGNIFICANT CHANGE UP (ref 40–120)
ALP SERPL-CCNC: 76 U/L — SIGNIFICANT CHANGE UP (ref 40–120)
ALP SERPL-CCNC: 76 U/L — SIGNIFICANT CHANGE UP (ref 40–120)
ALP SERPL-CCNC: 80 U/L — SIGNIFICANT CHANGE UP (ref 40–120)
ALP SERPL-CCNC: 82 U/L — SIGNIFICANT CHANGE UP (ref 40–120)
ALP SERPL-CCNC: 85 U/L — SIGNIFICANT CHANGE UP (ref 40–120)
ALP SERPL-CCNC: 91 U/L — SIGNIFICANT CHANGE UP (ref 40–120)
ALP SERPL-CCNC: 91 U/L — SIGNIFICANT CHANGE UP (ref 40–120)
ALT FLD-CCNC: 10 U/L — SIGNIFICANT CHANGE UP (ref 10–45)
ALT FLD-CCNC: 11 U/L — SIGNIFICANT CHANGE UP (ref 10–45)
ALT FLD-CCNC: 12 U/L — SIGNIFICANT CHANGE UP (ref 10–45)
ALT FLD-CCNC: 13 U/L — SIGNIFICANT CHANGE UP (ref 10–45)
ALT FLD-CCNC: 14 U/L — SIGNIFICANT CHANGE UP (ref 10–45)
ALT FLD-CCNC: 14 U/L — SIGNIFICANT CHANGE UP (ref 12–78)
ALT FLD-CCNC: 15 U/L — SIGNIFICANT CHANGE UP (ref 10–45)
ALT FLD-CCNC: 16 U/L — SIGNIFICANT CHANGE UP (ref 10–45)
ALT FLD-CCNC: 16 U/L — SIGNIFICANT CHANGE UP (ref 10–45)
ALT FLD-CCNC: 17 U/L — SIGNIFICANT CHANGE UP (ref 10–45)
ALT FLD-CCNC: 17 U/L — SIGNIFICANT CHANGE UP (ref 10–45)
ALT FLD-CCNC: 18 U/L — SIGNIFICANT CHANGE UP (ref 10–45)
ALT FLD-CCNC: 19 U/L — SIGNIFICANT CHANGE UP (ref 10–45)
ALT FLD-CCNC: 19 U/L — SIGNIFICANT CHANGE UP (ref 10–45)
ALT FLD-CCNC: 21 U/L — SIGNIFICANT CHANGE UP (ref 10–45)
ALT FLD-CCNC: 22 U/L — SIGNIFICANT CHANGE UP (ref 10–45)
ALT FLD-CCNC: 22 U/L — SIGNIFICANT CHANGE UP (ref 10–45)
ALT FLD-CCNC: 7 U/L — LOW (ref 10–45)
ALT FLD-CCNC: 8 U/L — LOW (ref 10–45)
ALT FLD-CCNC: 9 U/L — LOW (ref 10–45)
ALT SERPL-CCNC: 13 U/L
ANION GAP SERPL CALC-SCNC: 10 MMOL/L — SIGNIFICANT CHANGE UP (ref 5–17)
ANION GAP SERPL CALC-SCNC: 11 MMOL/L — SIGNIFICANT CHANGE UP (ref 5–17)
ANION GAP SERPL CALC-SCNC: 12 MMOL/L — SIGNIFICANT CHANGE UP (ref 5–17)
ANION GAP SERPL CALC-SCNC: 13 MMOL/L — SIGNIFICANT CHANGE UP (ref 5–17)
ANION GAP SERPL CALC-SCNC: 14 MMOL/L — SIGNIFICANT CHANGE UP (ref 5–17)
ANION GAP SERPL CALC-SCNC: 15 MMOL/L
ANION GAP SERPL CALC-SCNC: 15 MMOL/L — SIGNIFICANT CHANGE UP (ref 5–17)
ANION GAP SERPL CALC-SCNC: 16 MMOL/L — SIGNIFICANT CHANGE UP (ref 5–17)
ANION GAP SERPL CALC-SCNC: 17 MMOL/L — SIGNIFICANT CHANGE UP (ref 5–17)
ANION GAP SERPL CALC-SCNC: 17 MMOL/L — SIGNIFICANT CHANGE UP (ref 5–17)
ANION GAP SERPL CALC-SCNC: 18 MMOL/L — HIGH (ref 5–17)
ANION GAP SERPL CALC-SCNC: 18 MMOL/L — HIGH (ref 5–17)
ANION GAP SERPL CALC-SCNC: 7 MMOL/L — SIGNIFICANT CHANGE UP (ref 5–17)
ANION GAP SERPL CALC-SCNC: 8 MMOL/L — SIGNIFICANT CHANGE UP (ref 5–17)
ANION GAP SERPL CALC-SCNC: 9 MMOL/L — SIGNIFICANT CHANGE UP (ref 5–17)
APPEARANCE UR: ABNORMAL
APTT BLD: 20.2 SEC — LOW (ref 24.5–35.6)
APTT BLD: 20.2 SEC — LOW (ref 24.5–35.6)
APTT BLD: 22 SEC — LOW (ref 24.5–35.6)
APTT BLD: 22 SEC — LOW (ref 24.5–35.6)
APTT BLD: 23.9 SEC — LOW (ref 24.5–35.6)
APTT BLD: 23.9 SEC — LOW (ref 24.5–35.6)
APTT BLD: 25.5 SEC — SIGNIFICANT CHANGE UP (ref 24.5–35.6)
APTT BLD: 25.5 SEC — SIGNIFICANT CHANGE UP (ref 24.5–35.6)
APTT BLD: 26 SEC — SIGNIFICANT CHANGE UP (ref 24.5–35.6)
APTT BLD: 26 SEC — SIGNIFICANT CHANGE UP (ref 24.5–35.6)
APTT BLD: 26.2 SEC — SIGNIFICANT CHANGE UP (ref 24.5–35.6)
APTT BLD: 26.2 SEC — SIGNIFICANT CHANGE UP (ref 24.5–35.6)
APTT BLD: 26.4 SEC — SIGNIFICANT CHANGE UP (ref 24.5–35.6)
APTT BLD: 26.4 SEC — SIGNIFICANT CHANGE UP (ref 24.5–35.6)
APTT BLD: 27.1 SEC — SIGNIFICANT CHANGE UP (ref 24.5–35.6)
APTT BLD: 27.1 SEC — SIGNIFICANT CHANGE UP (ref 24.5–35.6)
APTT BLD: 28.2 SEC — SIGNIFICANT CHANGE UP (ref 24.5–35.6)
APTT BLD: 28.2 SEC — SIGNIFICANT CHANGE UP (ref 24.5–35.6)
APTT BLD: 28.4 SEC — SIGNIFICANT CHANGE UP (ref 24.5–35.6)
APTT BLD: 28.8 SEC — SIGNIFICANT CHANGE UP (ref 24.5–35.6)
APTT BLD: 29.2 SEC — SIGNIFICANT CHANGE UP (ref 24.5–35.6)
APTT BLD: 29.2 SEC — SIGNIFICANT CHANGE UP (ref 24.5–35.6)
APTT BLD: 29.5 SEC — SIGNIFICANT CHANGE UP (ref 24.5–35.6)
APTT BLD: 29.5 SEC — SIGNIFICANT CHANGE UP (ref 24.5–35.6)
APTT BLD: 30.4 SEC — SIGNIFICANT CHANGE UP (ref 24.5–35.6)
APTT BLD: 30.4 SEC — SIGNIFICANT CHANGE UP (ref 24.5–35.6)
APTT BLD: 30.8 SEC — SIGNIFICANT CHANGE UP (ref 24.5–35.6)
APTT BLD: 38 SEC — HIGH (ref 24.5–35.6)
APTT BLD: 38 SEC — HIGH (ref 24.5–35.6)
APTT BLD: 42.8 SEC — HIGH (ref 24.5–35.6)
APTT BLD: 42.8 SEC — HIGH (ref 24.5–35.6)
APTT BLD: 48 SEC — HIGH (ref 24.5–35.6)
APTT BLD: 48 SEC — HIGH (ref 24.5–35.6)
APTT BLD: 50.8 SEC — HIGH (ref 24.5–35.6)
APTT BLD: 50.8 SEC — HIGH (ref 24.5–35.6)
APTT BLD: 60.2 SEC — HIGH (ref 24.5–35.6)
APTT BLD: 60.2 SEC — HIGH (ref 24.5–35.6)
APTT BLD: 63.7 SEC — HIGH (ref 24.5–35.6)
APTT BLD: 63.7 SEC — HIGH (ref 24.5–35.6)
AST SERPL-CCNC: 10 U/L — SIGNIFICANT CHANGE UP (ref 10–40)
AST SERPL-CCNC: 11 U/L — SIGNIFICANT CHANGE UP (ref 10–40)
AST SERPL-CCNC: 13 U/L — SIGNIFICANT CHANGE UP (ref 10–40)
AST SERPL-CCNC: 14 U/L — SIGNIFICANT CHANGE UP (ref 10–40)
AST SERPL-CCNC: 15 U/L
AST SERPL-CCNC: 15 U/L — SIGNIFICANT CHANGE UP (ref 10–40)
AST SERPL-CCNC: 16 U/L — SIGNIFICANT CHANGE UP (ref 10–40)
AST SERPL-CCNC: 16 U/L — SIGNIFICANT CHANGE UP (ref 10–40)
AST SERPL-CCNC: 17 U/L — SIGNIFICANT CHANGE UP (ref 10–40)
AST SERPL-CCNC: 18 U/L — SIGNIFICANT CHANGE UP (ref 10–40)
AST SERPL-CCNC: 18 U/L — SIGNIFICANT CHANGE UP (ref 10–40)
AST SERPL-CCNC: 20 U/L — SIGNIFICANT CHANGE UP (ref 10–40)
AST SERPL-CCNC: 21 U/L — SIGNIFICANT CHANGE UP (ref 10–40)
AST SERPL-CCNC: 21 U/L — SIGNIFICANT CHANGE UP (ref 10–40)
AST SERPL-CCNC: 22 U/L — SIGNIFICANT CHANGE UP (ref 10–40)
AST SERPL-CCNC: 23 U/L — SIGNIFICANT CHANGE UP (ref 15–37)
AST SERPL-CCNC: 25 U/L — SIGNIFICANT CHANGE UP (ref 10–40)
AST SERPL-CCNC: 25 U/L — SIGNIFICANT CHANGE UP (ref 10–40)
AST SERPL-CCNC: 27 U/L — SIGNIFICANT CHANGE UP (ref 10–40)
AST SERPL-CCNC: 27 U/L — SIGNIFICANT CHANGE UP (ref 10–40)
AST SERPL-CCNC: 29 U/L — SIGNIFICANT CHANGE UP (ref 10–40)
AST SERPL-CCNC: 5 U/L — LOW (ref 10–40)
AST SERPL-CCNC: 5 U/L — LOW (ref 10–40)
AST SERPL-CCNC: 7 U/L — LOW (ref 10–40)
AST SERPL-CCNC: 7 U/L — LOW (ref 10–40)
AST SERPL-CCNC: 8 U/L — LOW (ref 10–40)
AST SERPL-CCNC: 8 U/L — LOW (ref 10–40)
B-OH-BUTYR SERPL-SCNC: 4.1 MMOL/L — HIGH
B-OH-BUTYR SERPL-SCNC: 4.1 MMOL/L — HIGH
BACTERIA # UR AUTO: ABNORMAL
BASE EXCESS BLDV CALC-SCNC: -0.3 MMOL/L — SIGNIFICANT CHANGE UP (ref -2–3)
BASE EXCESS BLDV CALC-SCNC: 1.3 MMOL/L — SIGNIFICANT CHANGE UP (ref -2–3)
BASE EXCESS BLDV CALC-SCNC: 1.3 MMOL/L — SIGNIFICANT CHANGE UP (ref -2–3)
BASE EXCESS BLDV CALC-SCNC: 2.6 MMOL/L — SIGNIFICANT CHANGE UP (ref -2–3)
BASE EXCESS BLDV CALC-SCNC: 2.9 MMOL/L — SIGNIFICANT CHANGE UP (ref -2–3)
BASE EXCESS BLDV CALC-SCNC: 2.9 MMOL/L — SIGNIFICANT CHANGE UP (ref -2–3)
BASE EXCESS BLDV CALC-SCNC: 3.6 MMOL/L — HIGH (ref -2–3)
BASE EXCESS BLDV CALC-SCNC: 3.6 MMOL/L — HIGH (ref -2–3)
BASE EXCESS BLDV CALC-SCNC: 4.1 MMOL/L — HIGH (ref -2–3)
BASE EXCESS BLDV CALC-SCNC: 4.1 MMOL/L — HIGH (ref -2–3)
BASE EXCESS BLDV CALC-SCNC: 4.7 MMOL/L — HIGH (ref -2–3)
BASE EXCESS BLDV CALC-SCNC: 4.7 MMOL/L — HIGH (ref -2–3)
BASE EXCESS BLDV CALC-SCNC: 5.7 MMOL/L — HIGH (ref -2–3)
BASE EXCESS BLDV CALC-SCNC: 5.7 MMOL/L — HIGH (ref -2–3)
BASOPHILS # BLD AUTO: 0 K/UL — SIGNIFICANT CHANGE UP (ref 0–0.2)
BASOPHILS # BLD AUTO: 0.04 K/UL — SIGNIFICANT CHANGE UP (ref 0–0.2)
BASOPHILS # BLD AUTO: 0.04 K/UL — SIGNIFICANT CHANGE UP (ref 0–0.2)
BASOPHILS # BLD AUTO: 0.05 K/UL — SIGNIFICANT CHANGE UP (ref 0–0.2)
BASOPHILS # BLD AUTO: 0.06 K/UL — SIGNIFICANT CHANGE UP (ref 0–0.2)
BASOPHILS # BLD AUTO: 0.07 K/UL — SIGNIFICANT CHANGE UP (ref 0–0.2)
BASOPHILS # BLD AUTO: 0.08 K/UL — SIGNIFICANT CHANGE UP (ref 0–0.2)
BASOPHILS # BLD AUTO: 0.09 K/UL — SIGNIFICANT CHANGE UP (ref 0–0.2)
BASOPHILS # BLD AUTO: 0.09 K/UL — SIGNIFICANT CHANGE UP (ref 0–0.2)
BASOPHILS # BLD AUTO: 0.14 K/UL — SIGNIFICANT CHANGE UP (ref 0–0.2)
BASOPHILS # BLD AUTO: 0.14 K/UL — SIGNIFICANT CHANGE UP (ref 0–0.2)
BASOPHILS NFR BLD AUTO: 0 % — SIGNIFICANT CHANGE UP (ref 0–2)
BASOPHILS NFR BLD AUTO: 0.2 % — SIGNIFICANT CHANGE UP (ref 0–2)
BASOPHILS NFR BLD AUTO: 0.3 % — SIGNIFICANT CHANGE UP (ref 0–2)
BASOPHILS NFR BLD AUTO: 0.4 % — SIGNIFICANT CHANGE UP (ref 0–2)
BASOPHILS NFR BLD AUTO: 0.6 % — SIGNIFICANT CHANGE UP (ref 0–2)
BASOPHILS NFR BLD AUTO: 0.9 % — SIGNIFICANT CHANGE UP (ref 0–2)
BASOPHILS NFR BLD AUTO: 0.9 % — SIGNIFICANT CHANGE UP (ref 0–2)
BILIRUB SERPL-MCNC: 0.2 MG/DL — SIGNIFICANT CHANGE UP (ref 0.2–1.2)
BILIRUB SERPL-MCNC: 0.3 MG/DL — SIGNIFICANT CHANGE UP (ref 0.2–1.2)
BILIRUB SERPL-MCNC: 0.4 MG/DL
BILIRUB SERPL-MCNC: 0.4 MG/DL — SIGNIFICANT CHANGE UP (ref 0.2–1.2)
BILIRUB SERPL-MCNC: 0.5 MG/DL — SIGNIFICANT CHANGE UP (ref 0.2–1.2)
BILIRUB SERPL-MCNC: 0.6 MG/DL — SIGNIFICANT CHANGE UP (ref 0.2–1.2)
BILIRUB SERPL-MCNC: 0.6 MG/DL — SIGNIFICANT CHANGE UP (ref 0.2–1.2)
BILIRUB SERPL-MCNC: 0.8 MG/DL — SIGNIFICANT CHANGE UP (ref 0.2–1.2)
BILIRUB SERPL-MCNC: 0.8 MG/DL — SIGNIFICANT CHANGE UP (ref 0.2–1.2)
BILIRUB UR-MCNC: NEGATIVE — SIGNIFICANT CHANGE UP
BLD GP AB SCN SERPL QL: NEGATIVE — SIGNIFICANT CHANGE UP
BUN SERPL-MCNC: 10 MG/DL — SIGNIFICANT CHANGE UP (ref 7–23)
BUN SERPL-MCNC: 11 MG/DL — SIGNIFICANT CHANGE UP (ref 7–23)
BUN SERPL-MCNC: 12 MG/DL — SIGNIFICANT CHANGE UP (ref 7–23)
BUN SERPL-MCNC: 13 MG/DL
BUN SERPL-MCNC: 13 MG/DL — SIGNIFICANT CHANGE UP (ref 7–23)
BUN SERPL-MCNC: 14 MG/DL — SIGNIFICANT CHANGE UP (ref 7–23)
BUN SERPL-MCNC: 15 MG/DL — SIGNIFICANT CHANGE UP (ref 7–23)
BUN SERPL-MCNC: 16 MG/DL — SIGNIFICANT CHANGE UP (ref 7–23)
BUN SERPL-MCNC: 17 MG/DL — SIGNIFICANT CHANGE UP (ref 7–23)
BUN SERPL-MCNC: 18 MG/DL — SIGNIFICANT CHANGE UP (ref 7–23)
BUN SERPL-MCNC: 19 MG/DL — SIGNIFICANT CHANGE UP (ref 7–23)
BUN SERPL-MCNC: 19 MG/DL — SIGNIFICANT CHANGE UP (ref 7–23)
BUN SERPL-MCNC: 20 MG/DL — SIGNIFICANT CHANGE UP (ref 7–23)
BUN SERPL-MCNC: 20 MG/DL — SIGNIFICANT CHANGE UP (ref 7–23)
BUN SERPL-MCNC: 22 MG/DL — SIGNIFICANT CHANGE UP (ref 7–23)
BUN SERPL-MCNC: 23 MG/DL — SIGNIFICANT CHANGE UP (ref 7–23)
BUN SERPL-MCNC: 23 MG/DL — SIGNIFICANT CHANGE UP (ref 7–23)
BUN SERPL-MCNC: 24 MG/DL — HIGH (ref 7–23)
BUN SERPL-MCNC: 9 MG/DL — SIGNIFICANT CHANGE UP (ref 7–23)
BURR CELLS BLD QL SMEAR: PRESENT — SIGNIFICANT CHANGE UP
C DIFF GDH STL QL: NEGATIVE — SIGNIFICANT CHANGE UP
C DIFF GDH STL QL: NEGATIVE — SIGNIFICANT CHANGE UP
C DIFF GDH STL QL: SIGNIFICANT CHANGE UP
C DIFF GDH STL QL: SIGNIFICANT CHANGE UP
CA-I SERPL-SCNC: 1.13 MMOL/L — LOW (ref 1.15–1.33)
CA-I SERPL-SCNC: 1.13 MMOL/L — LOW (ref 1.15–1.33)
CA-I SERPL-SCNC: 1.14 MMOL/L — LOW (ref 1.15–1.33)
CA-I SERPL-SCNC: 1.16 MMOL/L — SIGNIFICANT CHANGE UP (ref 1.15–1.33)
CA-I SERPL-SCNC: 1.18 MMOL/L — SIGNIFICANT CHANGE UP (ref 1.15–1.33)
CA-I SERPL-SCNC: 1.22 MMOL/L — SIGNIFICANT CHANGE UP (ref 1.15–1.33)
CA-I SERPL-SCNC: 1.22 MMOL/L — SIGNIFICANT CHANGE UP (ref 1.15–1.33)
CA-I SERPL-SCNC: 1.23 MMOL/L — SIGNIFICANT CHANGE UP (ref 1.15–1.33)
CA-I SERPL-SCNC: 1.23 MMOL/L — SIGNIFICANT CHANGE UP (ref 1.15–1.33)
CALCIUM SERPL-MCNC: 8 MG/DL — LOW (ref 8.4–10.5)
CALCIUM SERPL-MCNC: 8.1 MG/DL — LOW (ref 8.4–10.5)
CALCIUM SERPL-MCNC: 8.2 MG/DL — LOW (ref 8.4–10.5)
CALCIUM SERPL-MCNC: 8.3 MG/DL — LOW (ref 8.4–10.5)
CALCIUM SERPL-MCNC: 8.4 MG/DL — LOW (ref 8.5–10.1)
CALCIUM SERPL-MCNC: 8.4 MG/DL — SIGNIFICANT CHANGE UP (ref 8.4–10.5)
CALCIUM SERPL-MCNC: 8.5 MG/DL — SIGNIFICANT CHANGE UP (ref 8.4–10.5)
CALCIUM SERPL-MCNC: 8.6 MG/DL
CALCIUM SERPL-MCNC: 8.6 MG/DL — SIGNIFICANT CHANGE UP (ref 8.4–10.5)
CALCIUM SERPL-MCNC: 8.7 MG/DL — SIGNIFICANT CHANGE UP (ref 8.4–10.5)
CALCIUM SERPL-MCNC: 8.8 MG/DL — SIGNIFICANT CHANGE UP (ref 8.4–10.5)
CALCIUM SERPL-MCNC: 8.9 MG/DL — SIGNIFICANT CHANGE UP (ref 8.4–10.5)
CALCIUM SERPL-MCNC: 8.9 MG/DL — SIGNIFICANT CHANGE UP (ref 8.4–10.5)
CALCIUM SERPL-MCNC: 9 MG/DL — SIGNIFICANT CHANGE UP (ref 8.4–10.5)
CALCIUM SERPL-MCNC: 9.1 MG/DL — SIGNIFICANT CHANGE UP (ref 8.4–10.5)
CALCIUM SERPL-MCNC: 9.5 MG/DL — SIGNIFICANT CHANGE UP (ref 8.4–10.5)
CALCIUM SERPL-MCNC: 9.5 MG/DL — SIGNIFICANT CHANGE UP (ref 8.4–10.5)
CHLORIDE BLDV-SCNC: 101 MMOL/L — SIGNIFICANT CHANGE UP (ref 96–108)
CHLORIDE BLDV-SCNC: 101 MMOL/L — SIGNIFICANT CHANGE UP (ref 96–108)
CHLORIDE BLDV-SCNC: 102 MMOL/L — SIGNIFICANT CHANGE UP (ref 96–108)
CHLORIDE BLDV-SCNC: 103 MMOL/L — SIGNIFICANT CHANGE UP (ref 96–108)
CHLORIDE BLDV-SCNC: 104 MMOL/L — SIGNIFICANT CHANGE UP (ref 96–108)
CHLORIDE BLDV-SCNC: 104 MMOL/L — SIGNIFICANT CHANGE UP (ref 96–108)
CHLORIDE SERPL-SCNC: 100 MMOL/L
CHLORIDE SERPL-SCNC: 100 MMOL/L — SIGNIFICANT CHANGE UP (ref 96–108)
CHLORIDE SERPL-SCNC: 101 MMOL/L — SIGNIFICANT CHANGE UP (ref 96–108)
CHLORIDE SERPL-SCNC: 102 MMOL/L — SIGNIFICANT CHANGE UP (ref 96–108)
CHLORIDE SERPL-SCNC: 103 MMOL/L — SIGNIFICANT CHANGE UP (ref 96–108)
CHLORIDE SERPL-SCNC: 104 MMOL/L — SIGNIFICANT CHANGE UP (ref 96–108)
CHLORIDE SERPL-SCNC: 105 MMOL/L — SIGNIFICANT CHANGE UP (ref 96–108)
CHLORIDE SERPL-SCNC: 106 MMOL/L — SIGNIFICANT CHANGE UP (ref 96–108)
CHLORIDE SERPL-SCNC: 107 MMOL/L — SIGNIFICANT CHANGE UP (ref 96–108)
CHLORIDE SERPL-SCNC: 98 MMOL/L — SIGNIFICANT CHANGE UP (ref 96–108)
CHLORIDE SERPL-SCNC: 99 MMOL/L — SIGNIFICANT CHANGE UP (ref 96–108)
CHOLEST SERPL-MCNC: 109 MG/DL — SIGNIFICANT CHANGE UP
CHOLEST SERPL-MCNC: 118 MG/DL
CK MB BLD-MCNC: 1.4 % — SIGNIFICANT CHANGE UP (ref 0–3.5)
CK MB BLD-MCNC: <1.3 % — SIGNIFICANT CHANGE UP (ref 0–3.5)
CK MB CFR SERPL CALC: 8.3 NG/ML — HIGH (ref 0–3.8)
CK MB CFR SERPL CALC: <1 NG/ML — SIGNIFICANT CHANGE UP (ref 0.5–3.6)
CK SERPL-CCNC: 594 U/L — HIGH (ref 25–170)
CK SERPL-CCNC: 78 U/L — SIGNIFICANT CHANGE UP (ref 26–192)
CO2 BLDV-SCNC: 25 MMOL/L — SIGNIFICANT CHANGE UP (ref 22–26)
CO2 BLDV-SCNC: 27 MMOL/L — HIGH (ref 22–26)
CO2 BLDV-SCNC: 27 MMOL/L — HIGH (ref 22–26)
CO2 BLDV-SCNC: 28 MMOL/L — HIGH (ref 22–26)
CO2 BLDV-SCNC: 30 MMOL/L — HIGH (ref 22–26)
CO2 BLDV-SCNC: 32 MMOL/L — HIGH (ref 22–26)
CO2 BLDV-SCNC: 32 MMOL/L — HIGH (ref 22–26)
CO2 BLDV-SCNC: 35 MMOL/L — HIGH (ref 22–26)
CO2 BLDV-SCNC: 35 MMOL/L — HIGH (ref 22–26)
CO2 SERPL-SCNC: 14 MMOL/L — LOW (ref 22–31)
CO2 SERPL-SCNC: 14 MMOL/L — LOW (ref 22–31)
CO2 SERPL-SCNC: 16 MMOL/L — LOW (ref 22–31)
CO2 SERPL-SCNC: 17 MMOL/L — LOW (ref 22–31)
CO2 SERPL-SCNC: 17 MMOL/L — LOW (ref 22–31)
CO2 SERPL-SCNC: 18 MMOL/L — LOW (ref 22–31)
CO2 SERPL-SCNC: 18 MMOL/L — LOW (ref 22–31)
CO2 SERPL-SCNC: 19 MMOL/L — LOW (ref 22–31)
CO2 SERPL-SCNC: 20 MMOL/L — LOW (ref 22–31)
CO2 SERPL-SCNC: 21 MMOL/L — LOW (ref 22–31)
CO2 SERPL-SCNC: 22 MMOL/L — SIGNIFICANT CHANGE UP (ref 22–31)
CO2 SERPL-SCNC: 23 MMOL/L — SIGNIFICANT CHANGE UP (ref 22–31)
CO2 SERPL-SCNC: 24 MMOL/L — SIGNIFICANT CHANGE UP (ref 22–31)
CO2 SERPL-SCNC: 25 MMOL/L — SIGNIFICANT CHANGE UP (ref 22–31)
CO2 SERPL-SCNC: 26 MMOL/L
CO2 SERPL-SCNC: 26 MMOL/L — SIGNIFICANT CHANGE UP (ref 22–31)
CO2 SERPL-SCNC: 27 MMOL/L — SIGNIFICANT CHANGE UP (ref 22–31)
CO2 SERPL-SCNC: 28 MMOL/L — SIGNIFICANT CHANGE UP (ref 22–31)
CO2 SERPL-SCNC: 28 MMOL/L — SIGNIFICANT CHANGE UP (ref 22–31)
CO2 SERPL-SCNC: 29 MMOL/L — SIGNIFICANT CHANGE UP (ref 22–31)
CO2 SERPL-SCNC: 30 MMOL/L — SIGNIFICANT CHANGE UP (ref 22–31)
COLOR SPEC: SIGNIFICANT CHANGE UP
COLOR SPEC: YELLOW — SIGNIFICANT CHANGE UP
COLOR SPEC: YELLOW — SIGNIFICANT CHANGE UP
COMMENT - URINE: SIGNIFICANT CHANGE UP
COMMENT - URINE: SIGNIFICANT CHANGE UP
CREAT SERPL-MCNC: 0.5 MG/DL — SIGNIFICANT CHANGE UP (ref 0.5–1.3)
CREAT SERPL-MCNC: 0.5 MG/DL — SIGNIFICANT CHANGE UP (ref 0.5–1.3)
CREAT SERPL-MCNC: 0.54 MG/DL — SIGNIFICANT CHANGE UP (ref 0.5–1.3)
CREAT SERPL-MCNC: 0.54 MG/DL — SIGNIFICANT CHANGE UP (ref 0.5–1.3)
CREAT SERPL-MCNC: 0.55 MG/DL — SIGNIFICANT CHANGE UP (ref 0.5–1.3)
CREAT SERPL-MCNC: 0.55 MG/DL — SIGNIFICANT CHANGE UP (ref 0.5–1.3)
CREAT SERPL-MCNC: 0.56 MG/DL — SIGNIFICANT CHANGE UP (ref 0.5–1.3)
CREAT SERPL-MCNC: 0.61 MG/DL — SIGNIFICANT CHANGE UP (ref 0.5–1.3)
CREAT SERPL-MCNC: 0.61 MG/DL — SIGNIFICANT CHANGE UP (ref 0.5–1.3)
CREAT SERPL-MCNC: 0.62 MG/DL — SIGNIFICANT CHANGE UP (ref 0.5–1.3)
CREAT SERPL-MCNC: 0.65 MG/DL — SIGNIFICANT CHANGE UP (ref 0.5–1.3)
CREAT SERPL-MCNC: 0.66 MG/DL — SIGNIFICANT CHANGE UP (ref 0.5–1.3)
CREAT SERPL-MCNC: 0.66 MG/DL — SIGNIFICANT CHANGE UP (ref 0.5–1.3)
CREAT SERPL-MCNC: 0.67 MG/DL — SIGNIFICANT CHANGE UP (ref 0.5–1.3)
CREAT SERPL-MCNC: 0.69 MG/DL — SIGNIFICANT CHANGE UP (ref 0.5–1.3)
CREAT SERPL-MCNC: 0.7 MG/DL — SIGNIFICANT CHANGE UP (ref 0.5–1.3)
CREAT SERPL-MCNC: 0.7 MG/DL — SIGNIFICANT CHANGE UP (ref 0.5–1.3)
CREAT SERPL-MCNC: 0.71 MG/DL — SIGNIFICANT CHANGE UP (ref 0.5–1.3)
CREAT SERPL-MCNC: 0.72 MG/DL — SIGNIFICANT CHANGE UP (ref 0.5–1.3)
CREAT SERPL-MCNC: 0.73 MG/DL — SIGNIFICANT CHANGE UP (ref 0.5–1.3)
CREAT SERPL-MCNC: 0.73 MG/DL — SIGNIFICANT CHANGE UP (ref 0.5–1.3)
CREAT SERPL-MCNC: 0.75 MG/DL — SIGNIFICANT CHANGE UP (ref 0.5–1.3)
CREAT SERPL-MCNC: 0.75 MG/DL — SIGNIFICANT CHANGE UP (ref 0.5–1.3)
CREAT SERPL-MCNC: 0.76 MG/DL — SIGNIFICANT CHANGE UP (ref 0.5–1.3)
CREAT SERPL-MCNC: 0.77 MG/DL — SIGNIFICANT CHANGE UP (ref 0.5–1.3)
CREAT SERPL-MCNC: 0.78 MG/DL — SIGNIFICANT CHANGE UP (ref 0.5–1.3)
CREAT SERPL-MCNC: 0.79 MG/DL — SIGNIFICANT CHANGE UP (ref 0.5–1.3)
CREAT SERPL-MCNC: 0.81 MG/DL — SIGNIFICANT CHANGE UP (ref 0.5–1.3)
CREAT SERPL-MCNC: 0.82 MG/DL — SIGNIFICANT CHANGE UP (ref 0.5–1.3)
CREAT SERPL-MCNC: 0.83 MG/DL — SIGNIFICANT CHANGE UP (ref 0.5–1.3)
CREAT SERPL-MCNC: 0.85 MG/DL — SIGNIFICANT CHANGE UP (ref 0.5–1.3)
CREAT SERPL-MCNC: 0.86 MG/DL
CREAT SERPL-MCNC: 0.88 MG/DL — SIGNIFICANT CHANGE UP (ref 0.5–1.3)
CREAT SERPL-MCNC: 0.88 MG/DL — SIGNIFICANT CHANGE UP (ref 0.5–1.3)
CREAT SERPL-MCNC: 0.89 MG/DL — SIGNIFICANT CHANGE UP (ref 0.5–1.3)
CREAT SERPL-MCNC: 0.9 MG/DL — SIGNIFICANT CHANGE UP (ref 0.5–1.3)
CREAT SERPL-MCNC: 0.91 MG/DL — SIGNIFICANT CHANGE UP (ref 0.5–1.3)
CREAT SERPL-MCNC: 0.91 MG/DL — SIGNIFICANT CHANGE UP (ref 0.5–1.3)
CREAT SERPL-MCNC: 0.93 MG/DL — SIGNIFICANT CHANGE UP (ref 0.5–1.3)
CREAT SERPL-MCNC: 0.95 MG/DL — SIGNIFICANT CHANGE UP (ref 0.5–1.3)
CREAT SERPL-MCNC: 0.98 MG/DL — SIGNIFICANT CHANGE UP (ref 0.5–1.3)
CREAT SERPL-MCNC: 0.98 MG/DL — SIGNIFICANT CHANGE UP (ref 0.5–1.3)
CREAT SERPL-MCNC: 0.99 MG/DL — SIGNIFICANT CHANGE UP (ref 0.5–1.3)
CULTURE RESULTS: SIGNIFICANT CHANGE UP
DACRYOCYTES BLD QL SMEAR: SLIGHT — SIGNIFICANT CHANGE UP
DACRYOCYTES BLD QL SMEAR: SLIGHT — SIGNIFICANT CHANGE UP
DIFF PNL FLD: ABNORMAL
EGFR: 100 ML/MIN/1.73M2 — SIGNIFICANT CHANGE UP
EGFR: 100 ML/MIN/1.73M2 — SIGNIFICANT CHANGE UP
EGFR: 61 ML/MIN/1.73M2 — SIGNIFICANT CHANGE UP
EGFR: 62 ML/MIN/1.73M2 — SIGNIFICANT CHANGE UP
EGFR: 62 ML/MIN/1.73M2 — SIGNIFICANT CHANGE UP
EGFR: 64 ML/MIN/1.73M2 — SIGNIFICANT CHANGE UP
EGFR: 66 ML/MIN/1.73M2 — SIGNIFICANT CHANGE UP
EGFR: 67 ML/MIN/1.73M2 — SIGNIFICANT CHANGE UP
EGFR: 67 ML/MIN/1.73M2 — SIGNIFICANT CHANGE UP
EGFR: 68 ML/MIN/1.73M2 — SIGNIFICANT CHANGE UP
EGFR: 69 ML/MIN/1.73M2 — SIGNIFICANT CHANGE UP
EGFR: 70 ML/MIN/1.73M2 — SIGNIFICANT CHANGE UP
EGFR: 70 ML/MIN/1.73M2 — SIGNIFICANT CHANGE UP
EGFR: 72 ML/MIN/1.73M2
EGFR: 73 ML/MIN/1.73M2 — SIGNIFICANT CHANGE UP
EGFR: 75 ML/MIN/1.73M2 — SIGNIFICANT CHANGE UP
EGFR: 76 ML/MIN/1.73M2 — SIGNIFICANT CHANGE UP
EGFR: 78 ML/MIN/1.73M2 — SIGNIFICANT CHANGE UP
EGFR: 80 ML/MIN/1.73M2 — SIGNIFICANT CHANGE UP
EGFR: 81 ML/MIN/1.73M2 — SIGNIFICANT CHANGE UP
EGFR: 82 ML/MIN/1.73M2 — SIGNIFICANT CHANGE UP
EGFR: 84 ML/MIN/1.73M2 — SIGNIFICANT CHANGE UP
EGFR: 85 ML/MIN/1.73M2 — SIGNIFICANT CHANGE UP
EGFR: 85 ML/MIN/1.73M2 — SIGNIFICANT CHANGE UP
EGFR: 88 ML/MIN/1.73M2 — SIGNIFICANT CHANGE UP
EGFR: 88 ML/MIN/1.73M2 — SIGNIFICANT CHANGE UP
EGFR: 89 ML/MIN/1.73M2 — SIGNIFICANT CHANGE UP
EGFR: 91 ML/MIN/1.73M2 — SIGNIFICANT CHANGE UP
EGFR: 92 ML/MIN/1.73M2 — SIGNIFICANT CHANGE UP
EGFR: 92 ML/MIN/1.73M2 — SIGNIFICANT CHANGE UP
EGFR: 93 ML/MIN/1.73M2 — SIGNIFICANT CHANGE UP
EGFR: 94 ML/MIN/1.73M2 — SIGNIFICANT CHANGE UP
EGFR: 95 ML/MIN/1.73M2 — SIGNIFICANT CHANGE UP
EGFR: 96 ML/MIN/1.73M2 — SIGNIFICANT CHANGE UP
EGFR: 96 ML/MIN/1.73M2 — SIGNIFICANT CHANGE UP
EGFR: 98 ML/MIN/1.73M2 — SIGNIFICANT CHANGE UP
ELLIPTOCYTES BLD QL SMEAR: SLIGHT — SIGNIFICANT CHANGE UP
ELLIPTOCYTES BLD QL SMEAR: SLIGHT — SIGNIFICANT CHANGE UP
EOSINOPHIL # BLD AUTO: 0 K/UL — SIGNIFICANT CHANGE UP (ref 0–0.5)
EOSINOPHIL # BLD AUTO: 0.02 K/UL — SIGNIFICANT CHANGE UP (ref 0–0.5)
EOSINOPHIL # BLD AUTO: 0.02 K/UL — SIGNIFICANT CHANGE UP (ref 0–0.5)
EOSINOPHIL # BLD AUTO: 0.04 K/UL — SIGNIFICANT CHANGE UP (ref 0–0.5)
EOSINOPHIL # BLD AUTO: 0.07 K/UL — SIGNIFICANT CHANGE UP (ref 0–0.5)
EOSINOPHIL # BLD AUTO: 0.09 K/UL — SIGNIFICANT CHANGE UP (ref 0–0.5)
EOSINOPHIL # BLD AUTO: 0.09 K/UL — SIGNIFICANT CHANGE UP (ref 0–0.5)
EOSINOPHIL # BLD AUTO: 0.14 K/UL — SIGNIFICANT CHANGE UP (ref 0–0.5)
EOSINOPHIL # BLD AUTO: 0.16 K/UL — SIGNIFICANT CHANGE UP (ref 0–0.5)
EOSINOPHIL # BLD AUTO: 0.16 K/UL — SIGNIFICANT CHANGE UP (ref 0–0.5)
EOSINOPHIL # BLD AUTO: 0.18 K/UL — SIGNIFICANT CHANGE UP (ref 0–0.5)
EOSINOPHIL # BLD AUTO: 0.2 K/UL — SIGNIFICANT CHANGE UP (ref 0–0.5)
EOSINOPHIL # BLD AUTO: 0.25 K/UL — SIGNIFICANT CHANGE UP (ref 0–0.5)
EOSINOPHIL # BLD AUTO: 0.27 K/UL — SIGNIFICANT CHANGE UP (ref 0–0.5)
EOSINOPHIL # BLD AUTO: 0.36 K/UL — SIGNIFICANT CHANGE UP (ref 0–0.5)
EOSINOPHIL NFR BLD AUTO: 0 % — SIGNIFICANT CHANGE UP (ref 0–6)
EOSINOPHIL NFR BLD AUTO: 0.1 % — SIGNIFICANT CHANGE UP (ref 0–6)
EOSINOPHIL NFR BLD AUTO: 0.1 % — SIGNIFICANT CHANGE UP (ref 0–6)
EOSINOPHIL NFR BLD AUTO: 0.2 % — SIGNIFICANT CHANGE UP (ref 0–6)
EOSINOPHIL NFR BLD AUTO: 0.3 % — SIGNIFICANT CHANGE UP (ref 0–6)
EOSINOPHIL NFR BLD AUTO: 0.3 % — SIGNIFICANT CHANGE UP (ref 0–6)
EOSINOPHIL NFR BLD AUTO: 0.4 % — SIGNIFICANT CHANGE UP (ref 0–6)
EOSINOPHIL NFR BLD AUTO: 0.8 % — SIGNIFICANT CHANGE UP (ref 0–6)
EOSINOPHIL NFR BLD AUTO: 0.9 % — SIGNIFICANT CHANGE UP (ref 0–6)
EOSINOPHIL NFR BLD AUTO: 0.9 % — SIGNIFICANT CHANGE UP (ref 0–6)
EOSINOPHIL NFR BLD AUTO: 1.1 % — SIGNIFICANT CHANGE UP (ref 0–6)
EOSINOPHIL NFR BLD AUTO: 1.4 % — SIGNIFICANT CHANGE UP (ref 0–6)
EOSINOPHIL NFR BLD AUTO: 1.6 % — SIGNIFICANT CHANGE UP (ref 0–6)
EOSINOPHIL NFR BLD AUTO: 1.6 % — SIGNIFICANT CHANGE UP (ref 0–6)
EOSINOPHIL NFR BLD AUTO: 1.8 % — SIGNIFICANT CHANGE UP (ref 0–6)
EOSINOPHIL NFR BLD AUTO: 1.8 % — SIGNIFICANT CHANGE UP (ref 0–6)
EOSINOPHIL NFR BLD AUTO: 2.2 % — SIGNIFICANT CHANGE UP (ref 0–6)
EOSINOPHIL NFR BLD AUTO: 2.5 % — SIGNIFICANT CHANGE UP (ref 0–6)
EOSINOPHIL NFR BLD AUTO: 3.4 % — SIGNIFICANT CHANGE UP (ref 0–6)
EOSINOPHIL NFR BLD AUTO: 3.4 % — SIGNIFICANT CHANGE UP (ref 0–6)
EPI CELLS # UR: 1 /HPF — SIGNIFICANT CHANGE UP
EPI CELLS # UR: 12 /HPF — HIGH
EPI CELLS # UR: 12 /HPF — HIGH
EPO SERPL-MCNC: 8 MIU/ML — SIGNIFICANT CHANGE UP (ref 2.6–18.5)
ESTIMATED AVERAGE GLUCOSE: 120 MG/DL
ESTIMATED AVERAGE GLUCOSE: 137 MG/DL — HIGH (ref 68–114)
ESTIMATED AVERAGE GLUCOSE: 137 MG/DL — HIGH (ref 68–114)
ESTIMATED AVERAGE GLUCOSE: 146 MG/DL — HIGH (ref 68–114)
GAS PNL BLDA: SIGNIFICANT CHANGE UP
GAS PNL BLDV: 129 MMOL/L — LOW (ref 136–145)
GAS PNL BLDV: 132 MMOL/L — LOW (ref 136–145)
GAS PNL BLDV: 135 MMOL/L — LOW (ref 136–145)
GAS PNL BLDV: 136 MMOL/L — SIGNIFICANT CHANGE UP (ref 136–145)
GAS PNL BLDV: 136 MMOL/L — SIGNIFICANT CHANGE UP (ref 136–145)
GAS PNL BLDV: 138 MMOL/L — SIGNIFICANT CHANGE UP (ref 136–145)
GAS PNL BLDV: 138 MMOL/L — SIGNIFICANT CHANGE UP (ref 136–145)
GAS PNL BLDV: 139 MMOL/L — SIGNIFICANT CHANGE UP (ref 136–145)
GAS PNL BLDV: SIGNIFICANT CHANGE UP
GIANT PLATELETS BLD QL SMEAR: PRESENT — SIGNIFICANT CHANGE UP
GLUCOSE BLDC GLUCOMTR-MCNC: 101 MG/DL — HIGH (ref 70–99)
GLUCOSE BLDC GLUCOMTR-MCNC: 101 MG/DL — HIGH (ref 70–99)
GLUCOSE BLDC GLUCOMTR-MCNC: 102 MG/DL — HIGH (ref 70–99)
GLUCOSE BLDC GLUCOMTR-MCNC: 102 MG/DL — HIGH (ref 70–99)
GLUCOSE BLDC GLUCOMTR-MCNC: 104 MG/DL — HIGH (ref 70–99)
GLUCOSE BLDC GLUCOMTR-MCNC: 105 MG/DL — HIGH (ref 70–99)
GLUCOSE BLDC GLUCOMTR-MCNC: 105 MG/DL — HIGH (ref 70–99)
GLUCOSE BLDC GLUCOMTR-MCNC: 107 MG/DL — HIGH (ref 70–99)
GLUCOSE BLDC GLUCOMTR-MCNC: 107 MG/DL — HIGH (ref 70–99)
GLUCOSE BLDC GLUCOMTR-MCNC: 108 MG/DL — HIGH (ref 70–99)
GLUCOSE BLDC GLUCOMTR-MCNC: 109 MG/DL — HIGH (ref 70–99)
GLUCOSE BLDC GLUCOMTR-MCNC: 114 MG/DL — HIGH (ref 70–99)
GLUCOSE BLDC GLUCOMTR-MCNC: 115 MG/DL — HIGH (ref 70–99)
GLUCOSE BLDC GLUCOMTR-MCNC: 115 MG/DL — HIGH (ref 70–99)
GLUCOSE BLDC GLUCOMTR-MCNC: 116 MG/DL — HIGH (ref 70–99)
GLUCOSE BLDC GLUCOMTR-MCNC: 118 MG/DL — HIGH (ref 70–99)
GLUCOSE BLDC GLUCOMTR-MCNC: 120 MG/DL — HIGH (ref 70–99)
GLUCOSE BLDC GLUCOMTR-MCNC: 121 MG/DL — HIGH (ref 70–99)
GLUCOSE BLDC GLUCOMTR-MCNC: 122 MG/DL — HIGH (ref 70–99)
GLUCOSE BLDC GLUCOMTR-MCNC: 129 MG/DL — HIGH (ref 70–99)
GLUCOSE BLDC GLUCOMTR-MCNC: 144 MG/DL — HIGH (ref 70–99)
GLUCOSE BLDC GLUCOMTR-MCNC: 144 MG/DL — HIGH (ref 70–99)
GLUCOSE BLDC GLUCOMTR-MCNC: 86 MG/DL — SIGNIFICANT CHANGE UP (ref 70–99)
GLUCOSE BLDC GLUCOMTR-MCNC: 86 MG/DL — SIGNIFICANT CHANGE UP (ref 70–99)
GLUCOSE BLDC GLUCOMTR-MCNC: 90 MG/DL — SIGNIFICANT CHANGE UP (ref 70–99)
GLUCOSE BLDC GLUCOMTR-MCNC: 90 MG/DL — SIGNIFICANT CHANGE UP (ref 70–99)
GLUCOSE BLDC GLUCOMTR-MCNC: 95 MG/DL — SIGNIFICANT CHANGE UP (ref 70–99)
GLUCOSE BLDC GLUCOMTR-MCNC: 97 MG/DL — SIGNIFICANT CHANGE UP (ref 70–99)
GLUCOSE BLDC GLUCOMTR-MCNC: 97 MG/DL — SIGNIFICANT CHANGE UP (ref 70–99)
GLUCOSE BLDC GLUCOMTR-MCNC: 99 MG/DL — SIGNIFICANT CHANGE UP (ref 70–99)
GLUCOSE BLDC GLUCOMTR-MCNC: 99 MG/DL — SIGNIFICANT CHANGE UP (ref 70–99)
GLUCOSE BLDV-MCNC: 100 MG/DL — HIGH (ref 70–99)
GLUCOSE BLDV-MCNC: 100 MG/DL — HIGH (ref 70–99)
GLUCOSE BLDV-MCNC: 113 MG/DL — HIGH (ref 70–99)
GLUCOSE BLDV-MCNC: 113 MG/DL — HIGH (ref 70–99)
GLUCOSE BLDV-MCNC: 126 MG/DL — HIGH (ref 70–99)
GLUCOSE BLDV-MCNC: 127 MG/DL — HIGH (ref 70–99)
GLUCOSE BLDV-MCNC: 127 MG/DL — HIGH (ref 70–99)
GLUCOSE BLDV-MCNC: 129 MG/DL — HIGH (ref 70–99)
GLUCOSE BLDV-MCNC: 129 MG/DL — HIGH (ref 70–99)
GLUCOSE BLDV-MCNC: 130 MG/DL — HIGH (ref 70–99)
GLUCOSE BLDV-MCNC: 92 MG/DL — SIGNIFICANT CHANGE UP (ref 70–99)
GLUCOSE BLDV-MCNC: 92 MG/DL — SIGNIFICANT CHANGE UP (ref 70–99)
GLUCOSE BLDV-MCNC: 98 MG/DL — SIGNIFICANT CHANGE UP (ref 70–99)
GLUCOSE BLDV-MCNC: 98 MG/DL — SIGNIFICANT CHANGE UP (ref 70–99)
GLUCOSE SERPL-MCNC: 100 MG/DL — HIGH (ref 70–99)
GLUCOSE SERPL-MCNC: 101 MG/DL — HIGH (ref 70–99)
GLUCOSE SERPL-MCNC: 103 MG/DL — HIGH (ref 70–99)
GLUCOSE SERPL-MCNC: 103 MG/DL — HIGH (ref 70–99)
GLUCOSE SERPL-MCNC: 104 MG/DL — HIGH (ref 70–99)
GLUCOSE SERPL-MCNC: 104 MG/DL — HIGH (ref 70–99)
GLUCOSE SERPL-MCNC: 105 MG/DL — HIGH (ref 70–99)
GLUCOSE SERPL-MCNC: 106 MG/DL — HIGH (ref 70–99)
GLUCOSE SERPL-MCNC: 109 MG/DL — HIGH (ref 70–99)
GLUCOSE SERPL-MCNC: 111 MG/DL — HIGH (ref 70–99)
GLUCOSE SERPL-MCNC: 111 MG/DL — HIGH (ref 70–99)
GLUCOSE SERPL-MCNC: 112 MG/DL — HIGH (ref 70–99)
GLUCOSE SERPL-MCNC: 113 MG/DL — HIGH (ref 70–99)
GLUCOSE SERPL-MCNC: 115 MG/DL — HIGH (ref 70–99)
GLUCOSE SERPL-MCNC: 115 MG/DL — HIGH (ref 70–99)
GLUCOSE SERPL-MCNC: 116 MG/DL — HIGH (ref 70–99)
GLUCOSE SERPL-MCNC: 116 MG/DL — HIGH (ref 70–99)
GLUCOSE SERPL-MCNC: 117 MG/DL — HIGH (ref 70–99)
GLUCOSE SERPL-MCNC: 118 MG/DL
GLUCOSE SERPL-MCNC: 118 MG/DL — HIGH (ref 70–99)
GLUCOSE SERPL-MCNC: 121 MG/DL — HIGH (ref 70–99)
GLUCOSE SERPL-MCNC: 123 MG/DL — HIGH (ref 70–99)
GLUCOSE SERPL-MCNC: 124 MG/DL — HIGH (ref 70–99)
GLUCOSE SERPL-MCNC: 125 MG/DL — HIGH (ref 70–99)
GLUCOSE SERPL-MCNC: 125 MG/DL — HIGH (ref 70–99)
GLUCOSE SERPL-MCNC: 127 MG/DL — HIGH (ref 70–99)
GLUCOSE SERPL-MCNC: 127 MG/DL — HIGH (ref 70–99)
GLUCOSE SERPL-MCNC: 128 MG/DL — HIGH (ref 70–99)
GLUCOSE SERPL-MCNC: 129 MG/DL — HIGH (ref 70–99)
GLUCOSE SERPL-MCNC: 129 MG/DL — HIGH (ref 70–99)
GLUCOSE SERPL-MCNC: 130 MG/DL — HIGH (ref 70–99)
GLUCOSE SERPL-MCNC: 131 MG/DL — HIGH (ref 70–99)
GLUCOSE SERPL-MCNC: 133 MG/DL — HIGH (ref 70–99)
GLUCOSE SERPL-MCNC: 141 MG/DL — HIGH (ref 70–99)
GLUCOSE SERPL-MCNC: 141 MG/DL — HIGH (ref 70–99)
GLUCOSE SERPL-MCNC: 143 MG/DL — HIGH (ref 70–99)
GLUCOSE SERPL-MCNC: 143 MG/DL — HIGH (ref 70–99)
GLUCOSE SERPL-MCNC: 145 MG/DL — HIGH (ref 70–99)
GLUCOSE SERPL-MCNC: 145 MG/DL — HIGH (ref 70–99)
GLUCOSE SERPL-MCNC: 146 MG/DL — HIGH (ref 70–99)
GLUCOSE SERPL-MCNC: 146 MG/DL — HIGH (ref 70–99)
GLUCOSE SERPL-MCNC: 148 MG/DL — HIGH (ref 70–99)
GLUCOSE SERPL-MCNC: 149 MG/DL — HIGH (ref 70–99)
GLUCOSE SERPL-MCNC: 149 MG/DL — HIGH (ref 70–99)
GLUCOSE SERPL-MCNC: 150 MG/DL — HIGH (ref 70–99)
GLUCOSE SERPL-MCNC: 150 MG/DL — HIGH (ref 70–99)
GLUCOSE SERPL-MCNC: 155 MG/DL — HIGH (ref 70–99)
GLUCOSE SERPL-MCNC: 155 MG/DL — HIGH (ref 70–99)
GLUCOSE SERPL-MCNC: 157 MG/DL — HIGH (ref 70–99)
GLUCOSE SERPL-MCNC: 157 MG/DL — HIGH (ref 70–99)
GLUCOSE SERPL-MCNC: 160 MG/DL — HIGH (ref 70–99)
GLUCOSE SERPL-MCNC: 162 MG/DL — HIGH (ref 70–99)
GLUCOSE SERPL-MCNC: 162 MG/DL — HIGH (ref 70–99)
GLUCOSE SERPL-MCNC: 248 MG/DL — HIGH (ref 70–99)
GLUCOSE SERPL-MCNC: 76 MG/DL — SIGNIFICANT CHANGE UP (ref 70–99)
GLUCOSE SERPL-MCNC: 76 MG/DL — SIGNIFICANT CHANGE UP (ref 70–99)
GLUCOSE SERPL-MCNC: 91 MG/DL — SIGNIFICANT CHANGE UP (ref 70–99)
GLUCOSE SERPL-MCNC: 92 MG/DL — SIGNIFICANT CHANGE UP (ref 70–99)
GLUCOSE SERPL-MCNC: 92 MG/DL — SIGNIFICANT CHANGE UP (ref 70–99)
GLUCOSE SERPL-MCNC: 93 MG/DL — SIGNIFICANT CHANGE UP (ref 70–99)
GLUCOSE SERPL-MCNC: 93 MG/DL — SIGNIFICANT CHANGE UP (ref 70–99)
GLUCOSE SERPL-MCNC: 94 MG/DL — SIGNIFICANT CHANGE UP (ref 70–99)
GLUCOSE SERPL-MCNC: 94 MG/DL — SIGNIFICANT CHANGE UP (ref 70–99)
GLUCOSE SERPL-MCNC: 99 MG/DL — SIGNIFICANT CHANGE UP (ref 70–99)
GLUCOSE UR QL: ABNORMAL
GLUCOSE UR QL: ABNORMAL
GLUCOSE UR QL: NEGATIVE — SIGNIFICANT CHANGE UP
GRAM STN FLD: SIGNIFICANT CHANGE UP
H PYLORI AG STL QL: NEGATIVE — SIGNIFICANT CHANGE UP
H PYLORI AG STL QL: NEGATIVE — SIGNIFICANT CHANGE UP
HBA1C MFR BLD HPLC: 5.8 %
HCO3 BLDV-SCNC: 24 MMOL/L — SIGNIFICANT CHANGE UP (ref 22–29)
HCO3 BLDV-SCNC: 26 MMOL/L — SIGNIFICANT CHANGE UP (ref 22–29)
HCO3 BLDV-SCNC: 26 MMOL/L — SIGNIFICANT CHANGE UP (ref 22–29)
HCO3 BLDV-SCNC: 27 MMOL/L — SIGNIFICANT CHANGE UP (ref 22–29)
HCO3 BLDV-SCNC: 28 MMOL/L — SIGNIFICANT CHANGE UP (ref 22–29)
HCO3 BLDV-SCNC: 28 MMOL/L — SIGNIFICANT CHANGE UP (ref 22–29)
HCO3 BLDV-SCNC: 29 MMOL/L — SIGNIFICANT CHANGE UP (ref 22–29)
HCO3 BLDV-SCNC: 30 MMOL/L — HIGH (ref 22–29)
HCO3 BLDV-SCNC: 30 MMOL/L — HIGH (ref 22–29)
HCO3 BLDV-SCNC: 33 MMOL/L — HIGH (ref 22–29)
HCO3 BLDV-SCNC: 33 MMOL/L — HIGH (ref 22–29)
HCT VFR BLD CALC: 32.2 % — LOW (ref 34.5–45)
HCT VFR BLD CALC: 32.2 % — LOW (ref 34.5–45)
HCT VFR BLD CALC: 32.4 % — LOW (ref 34.5–45)
HCT VFR BLD CALC: 32.4 % — LOW (ref 34.5–45)
HCT VFR BLD CALC: 32.6 % — LOW (ref 34.5–45)
HCT VFR BLD CALC: 32.6 % — LOW (ref 34.5–45)
HCT VFR BLD CALC: 33 % — LOW (ref 34.5–45)
HCT VFR BLD CALC: 33 % — LOW (ref 34.5–45)
HCT VFR BLD CALC: 33.2 % — LOW (ref 34.5–45)
HCT VFR BLD CALC: 33.2 % — LOW (ref 34.5–45)
HCT VFR BLD CALC: 33.9 % — LOW (ref 34.5–45)
HCT VFR BLD CALC: 33.9 % — LOW (ref 34.5–45)
HCT VFR BLD CALC: 34.3 % — LOW (ref 34.5–45)
HCT VFR BLD CALC: 34.3 % — LOW (ref 34.5–45)
HCT VFR BLD CALC: 34.7 % — SIGNIFICANT CHANGE UP (ref 34.5–45)
HCT VFR BLD CALC: 34.8 % — SIGNIFICANT CHANGE UP (ref 34.5–45)
HCT VFR BLD CALC: 34.8 % — SIGNIFICANT CHANGE UP (ref 34.5–45)
HCT VFR BLD CALC: 34.9 % — SIGNIFICANT CHANGE UP (ref 34.5–45)
HCT VFR BLD CALC: 35 % — SIGNIFICANT CHANGE UP (ref 34.5–45)
HCT VFR BLD CALC: 35 % — SIGNIFICANT CHANGE UP (ref 34.5–45)
HCT VFR BLD CALC: 35.1 % — SIGNIFICANT CHANGE UP (ref 34.5–45)
HCT VFR BLD CALC: 35.1 % — SIGNIFICANT CHANGE UP (ref 34.5–45)
HCT VFR BLD CALC: 35.6 % — SIGNIFICANT CHANGE UP (ref 34.5–45)
HCT VFR BLD CALC: 35.6 % — SIGNIFICANT CHANGE UP (ref 34.5–45)
HCT VFR BLD CALC: 35.7 % — SIGNIFICANT CHANGE UP (ref 34.5–45)
HCT VFR BLD CALC: 35.7 % — SIGNIFICANT CHANGE UP (ref 34.5–45)
HCT VFR BLD CALC: 36.3 % — SIGNIFICANT CHANGE UP (ref 34.5–45)
HCT VFR BLD CALC: 36.3 % — SIGNIFICANT CHANGE UP (ref 34.5–45)
HCT VFR BLD CALC: 36.5 % — SIGNIFICANT CHANGE UP (ref 34.5–45)
HCT VFR BLD CALC: 36.5 % — SIGNIFICANT CHANGE UP (ref 34.5–45)
HCT VFR BLD CALC: 36.8 % — SIGNIFICANT CHANGE UP (ref 34.5–45)
HCT VFR BLD CALC: 36.8 % — SIGNIFICANT CHANGE UP (ref 34.5–45)
HCT VFR BLD CALC: 37.1 % — SIGNIFICANT CHANGE UP (ref 34.5–45)
HCT VFR BLD CALC: 37.1 % — SIGNIFICANT CHANGE UP (ref 34.5–45)
HCT VFR BLD CALC: 37.5 % — SIGNIFICANT CHANGE UP (ref 34.5–45)
HCT VFR BLD CALC: 37.5 % — SIGNIFICANT CHANGE UP (ref 34.5–45)
HCT VFR BLD CALC: 37.7 % — SIGNIFICANT CHANGE UP (ref 34.5–45)
HCT VFR BLD CALC: 37.7 % — SIGNIFICANT CHANGE UP (ref 34.5–45)
HCT VFR BLD CALC: 37.8 % — SIGNIFICANT CHANGE UP (ref 34.5–45)
HCT VFR BLD CALC: 37.8 % — SIGNIFICANT CHANGE UP (ref 34.5–45)
HCT VFR BLD CALC: 38.6 % — SIGNIFICANT CHANGE UP (ref 34.5–45)
HCT VFR BLD CALC: 38.6 % — SIGNIFICANT CHANGE UP (ref 34.5–45)
HCT VFR BLD CALC: 38.8 % — SIGNIFICANT CHANGE UP (ref 34.5–45)
HCT VFR BLD CALC: 38.8 % — SIGNIFICANT CHANGE UP (ref 34.5–45)
HCT VFR BLD CALC: 39.3 % — SIGNIFICANT CHANGE UP (ref 34.5–45)
HCT VFR BLD CALC: 39.3 % — SIGNIFICANT CHANGE UP (ref 34.5–45)
HCT VFR BLD CALC: 39.8 % — SIGNIFICANT CHANGE UP (ref 34.5–45)
HCT VFR BLD CALC: 40 % — SIGNIFICANT CHANGE UP (ref 34.5–45)
HCT VFR BLD CALC: 40 % — SIGNIFICANT CHANGE UP (ref 34.5–45)
HCT VFR BLD CALC: 40.1 % — SIGNIFICANT CHANGE UP (ref 34.5–45)
HCT VFR BLD CALC: 41.3 % — SIGNIFICANT CHANGE UP (ref 34.5–45)
HCT VFR BLD CALC: 41.3 % — SIGNIFICANT CHANGE UP (ref 34.5–45)
HCT VFR BLD CALC: 41.4 % — SIGNIFICANT CHANGE UP (ref 34.5–45)
HCT VFR BLD CALC: 41.9 % — SIGNIFICANT CHANGE UP (ref 34.5–45)
HCT VFR BLD CALC: 41.9 % — SIGNIFICANT CHANGE UP (ref 34.5–45)
HCT VFR BLD CALC: 42 % — SIGNIFICANT CHANGE UP (ref 34.5–45)
HCT VFR BLD CALC: 42.4 % — SIGNIFICANT CHANGE UP (ref 34.5–45)
HCT VFR BLD CALC: 43.2 % — SIGNIFICANT CHANGE UP (ref 34.5–45)
HCT VFR BLD CALC: 43.9 % — SIGNIFICANT CHANGE UP (ref 34.5–45)
HCT VFR BLD CALC: 45.2 % — HIGH (ref 34.5–45)
HCT VFR BLD CALC: 47.5 % — HIGH (ref 34.5–45)
HCT VFR BLD CALC: 48.1 % — HIGH (ref 34.5–45)
HCT VFR BLD CALC: 48.3 % — HIGH (ref 34.5–45)
HCT VFR BLD CALC: 48.6 % — HIGH (ref 34.5–45)
HCT VFR BLD CALC: 49.5 % — HIGH (ref 34.5–45)
HCT VFR BLD CALC: 50.1 % — HIGH (ref 34.5–45)
HCT VFR BLDA CALC: 33 % — LOW (ref 34.5–46.5)
HCT VFR BLDA CALC: 33 % — LOW (ref 34.5–46.5)
HCT VFR BLDA CALC: 35 % — SIGNIFICANT CHANGE UP (ref 34.5–46.5)
HCT VFR BLDA CALC: 35 % — SIGNIFICANT CHANGE UP (ref 34.5–46.5)
HCT VFR BLDA CALC: 36 % — SIGNIFICANT CHANGE UP (ref 34.5–46.5)
HCT VFR BLDA CALC: 36 % — SIGNIFICANT CHANGE UP (ref 34.5–46.5)
HCT VFR BLDA CALC: 38 % — SIGNIFICANT CHANGE UP (ref 34.5–46.5)
HCT VFR BLDA CALC: 38 % — SIGNIFICANT CHANGE UP (ref 34.5–46.5)
HCT VFR BLDA CALC: 39 % — SIGNIFICANT CHANGE UP (ref 34.5–46.5)
HCT VFR BLDA CALC: 40 % — SIGNIFICANT CHANGE UP (ref 34.5–46.5)
HCT VFR BLDA CALC: 51 % — HIGH (ref 34.5–46.5)
HCV AB S/CO SERPL IA: 0.1 S/CO — SIGNIFICANT CHANGE UP (ref 0–0.99)
HCV AB SERPL-IMP: SIGNIFICANT CHANGE UP
HDLC SERPL-MCNC: 33 MG/DL — LOW
HDLC SERPL-MCNC: 47 MG/DL
HGB BLD CALC-MCNC: 11 G/DL — LOW (ref 11.7–16.1)
HGB BLD CALC-MCNC: 11 G/DL — LOW (ref 11.7–16.1)
HGB BLD CALC-MCNC: 11.7 G/DL — SIGNIFICANT CHANGE UP (ref 11.7–16.1)
HGB BLD CALC-MCNC: 11.7 G/DL — SIGNIFICANT CHANGE UP (ref 11.7–16.1)
HGB BLD CALC-MCNC: 12 G/DL — SIGNIFICANT CHANGE UP (ref 11.7–16.1)
HGB BLD CALC-MCNC: 12 G/DL — SIGNIFICANT CHANGE UP (ref 11.7–16.1)
HGB BLD CALC-MCNC: 12.8 G/DL — SIGNIFICANT CHANGE UP (ref 11.7–16.1)
HGB BLD CALC-MCNC: 12.8 G/DL — SIGNIFICANT CHANGE UP (ref 11.7–16.1)
HGB BLD CALC-MCNC: 13 G/DL — SIGNIFICANT CHANGE UP (ref 11.7–16.1)
HGB BLD CALC-MCNC: 13.4 G/DL — SIGNIFICANT CHANGE UP (ref 11.7–16.1)
HGB BLD CALC-MCNC: 17 G/DL — HIGH (ref 11.7–16.1)
HGB BLD-MCNC: 10.2 G/DL — LOW (ref 11.5–15.5)
HGB BLD-MCNC: 10.2 G/DL — LOW (ref 11.5–15.5)
HGB BLD-MCNC: 10.4 G/DL — LOW (ref 11.5–15.5)
HGB BLD-MCNC: 10.5 G/DL — LOW (ref 11.5–15.5)
HGB BLD-MCNC: 10.9 G/DL — LOW (ref 11.5–15.5)
HGB BLD-MCNC: 10.9 G/DL — LOW (ref 11.5–15.5)
HGB BLD-MCNC: 11 G/DL — LOW (ref 11.5–15.5)
HGB BLD-MCNC: 11.1 G/DL — LOW (ref 11.5–15.5)
HGB BLD-MCNC: 11.2 G/DL — LOW (ref 11.5–15.5)
HGB BLD-MCNC: 11.5 G/DL — SIGNIFICANT CHANGE UP (ref 11.5–15.5)
HGB BLD-MCNC: 11.6 G/DL — SIGNIFICANT CHANGE UP (ref 11.5–15.5)
HGB BLD-MCNC: 11.6 G/DL — SIGNIFICANT CHANGE UP (ref 11.5–15.5)
HGB BLD-MCNC: 11.7 G/DL — SIGNIFICANT CHANGE UP (ref 11.5–15.5)
HGB BLD-MCNC: 11.7 G/DL — SIGNIFICANT CHANGE UP (ref 11.5–15.5)
HGB BLD-MCNC: 11.8 G/DL — SIGNIFICANT CHANGE UP (ref 11.5–15.5)
HGB BLD-MCNC: 11.9 G/DL — SIGNIFICANT CHANGE UP (ref 11.5–15.5)
HGB BLD-MCNC: 11.9 G/DL — SIGNIFICANT CHANGE UP (ref 11.5–15.5)
HGB BLD-MCNC: 12.1 G/DL — SIGNIFICANT CHANGE UP (ref 11.5–15.5)
HGB BLD-MCNC: 12.1 G/DL — SIGNIFICANT CHANGE UP (ref 11.5–15.5)
HGB BLD-MCNC: 12.2 G/DL — SIGNIFICANT CHANGE UP (ref 11.5–15.5)
HGB BLD-MCNC: 12.4 G/DL — SIGNIFICANT CHANGE UP (ref 11.5–15.5)
HGB BLD-MCNC: 12.8 G/DL — SIGNIFICANT CHANGE UP (ref 11.5–15.5)
HGB BLD-MCNC: 12.9 G/DL — SIGNIFICANT CHANGE UP (ref 11.5–15.5)
HGB BLD-MCNC: 13.1 G/DL — SIGNIFICANT CHANGE UP (ref 11.5–15.5)
HGB BLD-MCNC: 13.1 G/DL — SIGNIFICANT CHANGE UP (ref 11.5–15.5)
HGB BLD-MCNC: 13.3 G/DL — SIGNIFICANT CHANGE UP (ref 11.5–15.5)
HGB BLD-MCNC: 13.3 G/DL — SIGNIFICANT CHANGE UP (ref 11.5–15.5)
HGB BLD-MCNC: 13.5 G/DL — SIGNIFICANT CHANGE UP (ref 11.5–15.5)
HGB BLD-MCNC: 13.5 G/DL — SIGNIFICANT CHANGE UP (ref 11.5–15.5)
HGB BLD-MCNC: 13.6 G/DL — SIGNIFICANT CHANGE UP (ref 11.5–15.5)
HGB BLD-MCNC: 13.6 G/DL — SIGNIFICANT CHANGE UP (ref 11.5–15.5)
HGB BLD-MCNC: 13.7 G/DL — SIGNIFICANT CHANGE UP (ref 11.5–15.5)
HGB BLD-MCNC: 13.9 G/DL — SIGNIFICANT CHANGE UP (ref 11.5–15.5)
HGB BLD-MCNC: 14.1 G/DL — SIGNIFICANT CHANGE UP (ref 11.5–15.5)
HGB BLD-MCNC: 14.1 G/DL — SIGNIFICANT CHANGE UP (ref 11.5–15.5)
HGB BLD-MCNC: 14.3 G/DL — SIGNIFICANT CHANGE UP (ref 11.5–15.5)
HGB BLD-MCNC: 15.1 G/DL — SIGNIFICANT CHANGE UP (ref 11.5–15.5)
HGB BLD-MCNC: 15.3 G/DL — SIGNIFICANT CHANGE UP (ref 11.5–15.5)
HGB BLD-MCNC: 15.7 G/DL — HIGH (ref 11.5–15.5)
HGB BLD-MCNC: 15.8 G/DL — HIGH (ref 11.5–15.5)
HGB BLD-MCNC: 15.8 G/DL — HIGH (ref 11.5–15.5)
HGB BLD-MCNC: 16.5 G/DL — HIGH (ref 11.5–15.5)
HGB BLD-MCNC: 16.7 G/DL — HIGH (ref 11.5–15.5)
HGB BLDA-MCNC: 14.3 G/DL — SIGNIFICANT CHANGE UP (ref 11.7–16.1)
HGB FLD-MCNC: 14.4 G/DL — SIGNIFICANT CHANGE UP (ref 11.7–16.5)
HOROWITZ INDEX BLDV+IHG-RTO: 21 — SIGNIFICANT CHANGE UP
HOROWITZ INDEX BLDV+IHG-RTO: 21 — SIGNIFICANT CHANGE UP
HOROWITZ INDEX BLDV+IHG-RTO: 24 — SIGNIFICANT CHANGE UP
HOROWITZ INDEX BLDV+IHG-RTO: 24 — SIGNIFICANT CHANGE UP
HOROWITZ INDEX BLDV+IHG-RTO: 28 — SIGNIFICANT CHANGE UP
HOWELL-JOLLY BOD BLD QL SMEAR: PRESENT — SIGNIFICANT CHANGE UP
HOWELL-JOLLY BOD BLD QL SMEAR: PRESENT — SIGNIFICANT CHANGE UP
HYALINE CASTS # UR AUTO: 0 /LPF — SIGNIFICANT CHANGE UP (ref 0–2)
HYALINE CASTS # UR AUTO: 34 /LPF — HIGH (ref 0–2)
HYALINE CASTS # UR AUTO: 34 /LPF — HIGH (ref 0–2)
IMM GRANULOCYTES NFR BLD AUTO: 0.4 % — SIGNIFICANT CHANGE UP (ref 0–0.9)
IMM GRANULOCYTES NFR BLD AUTO: 0.5 % — SIGNIFICANT CHANGE UP (ref 0–0.9)
IMM GRANULOCYTES NFR BLD AUTO: 0.6 % — SIGNIFICANT CHANGE UP (ref 0–0.9)
IMM GRANULOCYTES NFR BLD AUTO: 0.7 % — SIGNIFICANT CHANGE UP (ref 0–0.9)
IMM GRANULOCYTES NFR BLD AUTO: 0.8 % — SIGNIFICANT CHANGE UP (ref 0–0.9)
IMM GRANULOCYTES NFR BLD AUTO: 0.8 % — SIGNIFICANT CHANGE UP (ref 0–0.9)
IMM GRANULOCYTES NFR BLD AUTO: 0.9 % — SIGNIFICANT CHANGE UP (ref 0–0.9)
IMM GRANULOCYTES NFR BLD AUTO: 1 % — HIGH (ref 0–0.9)
IMM GRANULOCYTES NFR BLD AUTO: 1.1 % — HIGH (ref 0–0.9)
IMM GRANULOCYTES NFR BLD AUTO: 1.1 % — HIGH (ref 0–0.9)
IMM GRANULOCYTES NFR BLD AUTO: 1.2 % — HIGH (ref 0–0.9)
IMM GRANULOCYTES NFR BLD AUTO: 1.3 % — HIGH (ref 0–0.9)
IMM GRANULOCYTES NFR BLD AUTO: 1.3 % — HIGH (ref 0–0.9)
IMM GRANULOCYTES NFR BLD AUTO: 2.2 % — HIGH (ref 0–0.9)
IMM GRANULOCYTES NFR BLD AUTO: 2.2 % — HIGH (ref 0–0.9)
INR BLD: 1.08 RATIO — SIGNIFICANT CHANGE UP (ref 0.85–1.18)
INR BLD: 1.09 RATIO — SIGNIFICANT CHANGE UP (ref 0.85–1.18)
INR BLD: 1.09 RATIO — SIGNIFICANT CHANGE UP (ref 0.85–1.18)
INR BLD: 1.12 RATIO — SIGNIFICANT CHANGE UP (ref 0.85–1.18)
INR BLD: 1.14 RATIO — SIGNIFICANT CHANGE UP (ref 0.85–1.18)
INR BLD: 1.14 RATIO — SIGNIFICANT CHANGE UP (ref 0.85–1.18)
INR BLD: 1.15 RATIO — SIGNIFICANT CHANGE UP (ref 0.85–1.18)
INR BLD: 1.16 RATIO — SIGNIFICANT CHANGE UP (ref 0.85–1.18)
INR BLD: 1.17 RATIO — SIGNIFICANT CHANGE UP (ref 0.85–1.18)
INR BLD: 1.18 RATIO — SIGNIFICANT CHANGE UP (ref 0.85–1.18)
INR BLD: 1.18 RATIO — SIGNIFICANT CHANGE UP (ref 0.85–1.18)
INR BLD: 1.2 RATIO — HIGH (ref 0.85–1.18)
INR BLD: 1.2 RATIO — HIGH (ref 0.85–1.18)
INR BLD: 1.21 RATIO — HIGH (ref 0.85–1.18)
INR BLD: 1.21 RATIO — HIGH (ref 0.85–1.18)
INR BLD: 1.23 RATIO — HIGH (ref 0.85–1.18)
INR BLD: 1.24 RATIO — HIGH (ref 0.85–1.18)
INR BLD: 1.24 RATIO — HIGH (ref 0.85–1.18)
INR BLD: 1.32 RATIO — HIGH (ref 0.85–1.18)
INR BLD: 1.32 RATIO — HIGH (ref 0.85–1.18)
INR BLD: 1.33 RATIO — HIGH (ref 0.85–1.18)
INR BLD: 1.33 RATIO — HIGH (ref 0.85–1.18)
INR BLD: 1.34 RATIO — HIGH (ref 0.85–1.18)
INR BLD: 1.34 RATIO — HIGH (ref 0.85–1.18)
INR BLD: 1.41 RATIO — HIGH (ref 0.85–1.18)
INR BLD: 1.41 RATIO — HIGH (ref 0.85–1.18)
INR BLD: 1.49 RATIO — HIGH (ref 0.85–1.18)
INR BLD: 1.49 RATIO — HIGH (ref 0.85–1.18)
INR BLD: 1.64 RATIO — HIGH (ref 0.85–1.18)
INR BLD: 1.64 RATIO — HIGH (ref 0.85–1.18)
INR BLD: 1.67 RATIO — HIGH (ref 0.85–1.18)
INR BLD: 1.67 RATIO — HIGH (ref 0.85–1.18)
INR BLD: 2.26 RATIO — HIGH (ref 0.85–1.18)
INR BLD: 2.26 RATIO — HIGH (ref 0.85–1.18)
KETONES UR-MCNC: ABNORMAL
KETONES UR-MCNC: ABNORMAL
KETONES UR-MCNC: NEGATIVE — SIGNIFICANT CHANGE UP
LACTATE BLDV-MCNC: 0.8 MMOL/L — SIGNIFICANT CHANGE UP (ref 0.5–2)
LACTATE BLDV-MCNC: 1 MMOL/L — SIGNIFICANT CHANGE UP (ref 0.5–2)
LACTATE BLDV-MCNC: 1 MMOL/L — SIGNIFICANT CHANGE UP (ref 0.5–2)
LACTATE BLDV-MCNC: 1.4 MMOL/L — SIGNIFICANT CHANGE UP (ref 0.5–2)
LACTATE BLDV-MCNC: 1.4 MMOL/L — SIGNIFICANT CHANGE UP (ref 0.5–2)
LACTATE BLDV-MCNC: 1.6 MMOL/L — SIGNIFICANT CHANGE UP (ref 0.5–2)
LACTATE BLDV-MCNC: 1.6 MMOL/L — SIGNIFICANT CHANGE UP (ref 0.5–2)
LACTATE BLDV-MCNC: 1.7 MMOL/L — SIGNIFICANT CHANGE UP (ref 0.5–2)
LACTATE BLDV-MCNC: 1.9 MMOL/L — SIGNIFICANT CHANGE UP (ref 0.5–2)
LACTATE BLDV-MCNC: 1.9 MMOL/L — SIGNIFICANT CHANGE UP (ref 0.5–2)
LACTATE BLDV-MCNC: 2 MMOL/L — SIGNIFICANT CHANGE UP (ref 0.5–2)
LACTATE BLDV-MCNC: 2.1 MMOL/L — HIGH (ref 0.5–2)
LACTATE BLDV-MCNC: 2.1 MMOL/L — HIGH (ref 0.5–2)
LACTATE SERPL-SCNC: 1.4 MMOL/L — SIGNIFICANT CHANGE UP (ref 0.5–2)
LACTATE SERPL-SCNC: 1.4 MMOL/L — SIGNIFICANT CHANGE UP (ref 0.5–2)
LACTATE SERPL-SCNC: 1.5 MMOL/L — SIGNIFICANT CHANGE UP (ref 0.5–2)
LACTATE SERPL-SCNC: 1.5 MMOL/L — SIGNIFICANT CHANGE UP (ref 0.5–2)
LDLC SERPL CALC-MCNC: 52 MG/DL
LEUKOCYTE ESTERASE UR-ACNC: ABNORMAL
LIDOCAIN IGE QN: 30 U/L — SIGNIFICANT CHANGE UP (ref 7–60)
LIDOCAIN IGE QN: 30 U/L — SIGNIFICANT CHANGE UP (ref 7–60)
LIDOCAIN IGE QN: 35 U/L — SIGNIFICANT CHANGE UP (ref 7–60)
LIDOCAIN IGE QN: 35 U/L — SIGNIFICANT CHANGE UP (ref 7–60)
LIPID PNL WITH DIRECT LDL SERPL: 60 MG/DL — SIGNIFICANT CHANGE UP
LYMPHOCYTES # BLD AUTO: 0.45 K/UL — LOW (ref 1–3.3)
LYMPHOCYTES # BLD AUTO: 0.45 K/UL — LOW (ref 1–3.3)
LYMPHOCYTES # BLD AUTO: 0.5 K/UL — LOW (ref 1–3.3)
LYMPHOCYTES # BLD AUTO: 0.5 K/UL — LOW (ref 1–3.3)
LYMPHOCYTES # BLD AUTO: 0.93 K/UL — LOW (ref 1–3.3)
LYMPHOCYTES # BLD AUTO: 0.93 K/UL — LOW (ref 1–3.3)
LYMPHOCYTES # BLD AUTO: 1.17 K/UL — SIGNIFICANT CHANGE UP (ref 1–3.3)
LYMPHOCYTES # BLD AUTO: 1.35 K/UL — SIGNIFICANT CHANGE UP (ref 1–3.3)
LYMPHOCYTES # BLD AUTO: 1.35 K/UL — SIGNIFICANT CHANGE UP (ref 1–3.3)
LYMPHOCYTES # BLD AUTO: 1.45 K/UL — SIGNIFICANT CHANGE UP (ref 1–3.3)
LYMPHOCYTES # BLD AUTO: 1.45 K/UL — SIGNIFICANT CHANGE UP (ref 1–3.3)
LYMPHOCYTES # BLD AUTO: 1.52 K/UL — SIGNIFICANT CHANGE UP (ref 1–3.3)
LYMPHOCYTES # BLD AUTO: 1.52 K/UL — SIGNIFICANT CHANGE UP (ref 1–3.3)
LYMPHOCYTES # BLD AUTO: 1.7 % — LOW (ref 13–44)
LYMPHOCYTES # BLD AUTO: 1.85 K/UL — SIGNIFICANT CHANGE UP (ref 1–3.3)
LYMPHOCYTES # BLD AUTO: 1.85 K/UL — SIGNIFICANT CHANGE UP (ref 1–3.3)
LYMPHOCYTES # BLD AUTO: 1.9 K/UL — SIGNIFICANT CHANGE UP (ref 1–3.3)
LYMPHOCYTES # BLD AUTO: 10.4 % — LOW (ref 13–44)
LYMPHOCYTES # BLD AUTO: 10.4 % — LOW (ref 13–44)
LYMPHOCYTES # BLD AUTO: 10.5 % — LOW (ref 13–44)
LYMPHOCYTES # BLD AUTO: 10.5 % — LOW (ref 13–44)
LYMPHOCYTES # BLD AUTO: 11.8 % — LOW (ref 13–44)
LYMPHOCYTES # BLD AUTO: 11.8 % — LOW (ref 13–44)
LYMPHOCYTES # BLD AUTO: 12.2 % — LOW (ref 13–44)
LYMPHOCYTES # BLD AUTO: 12.2 % — LOW (ref 13–44)
LYMPHOCYTES # BLD AUTO: 12.5 % — LOW (ref 13–44)
LYMPHOCYTES # BLD AUTO: 12.7 % — LOW (ref 13–44)
LYMPHOCYTES # BLD AUTO: 12.7 % — LOW (ref 13–44)
LYMPHOCYTES # BLD AUTO: 13.6 % — SIGNIFICANT CHANGE UP (ref 13–44)
LYMPHOCYTES # BLD AUTO: 13.6 % — SIGNIFICANT CHANGE UP (ref 13–44)
LYMPHOCYTES # BLD AUTO: 14.4 % — SIGNIFICANT CHANGE UP (ref 13–44)
LYMPHOCYTES # BLD AUTO: 14.5 % — SIGNIFICANT CHANGE UP (ref 13–44)
LYMPHOCYTES # BLD AUTO: 14.5 % — SIGNIFICANT CHANGE UP (ref 13–44)
LYMPHOCYTES # BLD AUTO: 15 % — SIGNIFICANT CHANGE UP (ref 13–44)
LYMPHOCYTES # BLD AUTO: 15 % — SIGNIFICANT CHANGE UP (ref 13–44)
LYMPHOCYTES # BLD AUTO: 15.5 % — SIGNIFICANT CHANGE UP (ref 13–44)
LYMPHOCYTES # BLD AUTO: 15.5 % — SIGNIFICANT CHANGE UP (ref 13–44)
LYMPHOCYTES # BLD AUTO: 16.8 % — SIGNIFICANT CHANGE UP (ref 13–44)
LYMPHOCYTES # BLD AUTO: 16.8 % — SIGNIFICANT CHANGE UP (ref 13–44)
LYMPHOCYTES # BLD AUTO: 18.9 % — SIGNIFICANT CHANGE UP (ref 13–44)
LYMPHOCYTES # BLD AUTO: 2.09 K/UL — SIGNIFICANT CHANGE UP (ref 1–3.3)
LYMPHOCYTES # BLD AUTO: 2.09 K/UL — SIGNIFICANT CHANGE UP (ref 1–3.3)
LYMPHOCYTES # BLD AUTO: 2.13 K/UL — SIGNIFICANT CHANGE UP (ref 1–3.3)
LYMPHOCYTES # BLD AUTO: 2.22 K/UL — SIGNIFICANT CHANGE UP (ref 1–3.3)
LYMPHOCYTES # BLD AUTO: 2.22 K/UL — SIGNIFICANT CHANGE UP (ref 1–3.3)
LYMPHOCYTES # BLD AUTO: 2.33 K/UL — SIGNIFICANT CHANGE UP (ref 1–3.3)
LYMPHOCYTES # BLD AUTO: 2.33 K/UL — SIGNIFICANT CHANGE UP (ref 1–3.3)
LYMPHOCYTES # BLD AUTO: 2.36 K/UL — SIGNIFICANT CHANGE UP (ref 1–3.3)
LYMPHOCYTES # BLD AUTO: 2.37 K/UL — SIGNIFICANT CHANGE UP (ref 1–3.3)
LYMPHOCYTES # BLD AUTO: 2.37 K/UL — SIGNIFICANT CHANGE UP (ref 1–3.3)
LYMPHOCYTES # BLD AUTO: 2.4 K/UL — SIGNIFICANT CHANGE UP (ref 1–3.3)
LYMPHOCYTES # BLD AUTO: 2.4 K/UL — SIGNIFICANT CHANGE UP (ref 1–3.3)
LYMPHOCYTES # BLD AUTO: 2.46 K/UL — SIGNIFICANT CHANGE UP (ref 1–3.3)
LYMPHOCYTES # BLD AUTO: 2.46 K/UL — SIGNIFICANT CHANGE UP (ref 1–3.3)
LYMPHOCYTES # BLD AUTO: 2.49 K/UL — SIGNIFICANT CHANGE UP (ref 1–3.3)
LYMPHOCYTES # BLD AUTO: 2.51 K/UL — SIGNIFICANT CHANGE UP (ref 1–3.3)
LYMPHOCYTES # BLD AUTO: 2.51 K/UL — SIGNIFICANT CHANGE UP (ref 1–3.3)
LYMPHOCYTES # BLD AUTO: 2.6 K/UL — SIGNIFICANT CHANGE UP (ref 1–3.3)
LYMPHOCYTES # BLD AUTO: 2.6 K/UL — SIGNIFICANT CHANGE UP (ref 1–3.3)
LYMPHOCYTES # BLD AUTO: 22.2 % — SIGNIFICANT CHANGE UP (ref 13–44)
LYMPHOCYTES # BLD AUTO: 27.4 % — SIGNIFICANT CHANGE UP (ref 13–44)
LYMPHOCYTES # BLD AUTO: 3.22 K/UL — SIGNIFICANT CHANGE UP (ref 1–3.3)
LYMPHOCYTES # BLD AUTO: 3.28 K/UL — SIGNIFICANT CHANGE UP (ref 1–3.3)
LYMPHOCYTES # BLD AUTO: 3.3 K/UL — SIGNIFICANT CHANGE UP (ref 1–3.3)
LYMPHOCYTES # BLD AUTO: 4.4 % — LOW (ref 13–44)
LYMPHOCYTES # BLD AUTO: 4.4 % — LOW (ref 13–44)
LYMPHOCYTES # BLD AUTO: 4.9 % — LOW (ref 13–44)
LYMPHOCYTES # BLD AUTO: 4.9 % — LOW (ref 13–44)
LYMPHOCYTES # BLD AUTO: 6.1 % — LOW (ref 13–44)
LYMPHOCYTES # BLD AUTO: 6.1 % — LOW (ref 13–44)
LYMPHOCYTES # BLD AUTO: 6.8 % — LOW (ref 13–44)
LYMPHOCYTES # BLD AUTO: 7.3 % — LOW (ref 13–44)
LYMPHOCYTES # BLD AUTO: 7.3 % — LOW (ref 13–44)
LYMPHOCYTES # BLD AUTO: 8.2 % — LOW (ref 13–44)
LYMPHOCYTES # BLD AUTO: 8.2 % — LOW (ref 13–44)
LYMPHOCYTES # BLD AUTO: 8.8 % — LOW (ref 13–44)
LYMPHOCYTES # BLD AUTO: 8.8 % — LOW (ref 13–44)
LYMPHOCYTES # BLD AUTO: 9.7 % — LOW (ref 13–44)
LYMPHOCYTES # BLD AUTO: 9.7 % — LOW (ref 13–44)
MAGNESIUM SERPL-MCNC: 1.7 MG/DL — SIGNIFICANT CHANGE UP (ref 1.6–2.6)
MAGNESIUM SERPL-MCNC: 1.7 MG/DL — SIGNIFICANT CHANGE UP (ref 1.6–2.6)
MAGNESIUM SERPL-MCNC: 1.8 MG/DL — SIGNIFICANT CHANGE UP (ref 1.6–2.6)
MAGNESIUM SERPL-MCNC: 1.9 MG/DL — SIGNIFICANT CHANGE UP (ref 1.6–2.6)
MAGNESIUM SERPL-MCNC: 2 MG/DL — SIGNIFICANT CHANGE UP (ref 1.6–2.6)
MAGNESIUM SERPL-MCNC: 2.1 MG/DL — SIGNIFICANT CHANGE UP (ref 1.6–2.6)
MAGNESIUM SERPL-MCNC: 2.2 MG/DL — SIGNIFICANT CHANGE UP (ref 1.6–2.6)
MAGNESIUM SERPL-MCNC: 2.3 MG/DL — SIGNIFICANT CHANGE UP (ref 1.6–2.6)
MAGNESIUM SERPL-MCNC: 2.4 MG/DL — SIGNIFICANT CHANGE UP (ref 1.6–2.6)
MAGNESIUM SERPL-MCNC: 2.5 MG/DL — SIGNIFICANT CHANGE UP (ref 1.6–2.6)
MAGNESIUM SERPL-MCNC: 2.6 MG/DL — SIGNIFICANT CHANGE UP (ref 1.6–2.6)
MAGNESIUM SERPL-MCNC: 2.7 MG/DL — HIGH (ref 1.6–2.6)
MAGNESIUM SERPL-MCNC: 2.7 MG/DL — HIGH (ref 1.6–2.6)
MAGNESIUM SERPL-MCNC: 2.8 MG/DL — HIGH (ref 1.6–2.6)
MAGNESIUM SERPL-MCNC: 2.9 MG/DL — HIGH (ref 1.6–2.6)
MAGNESIUM SERPL-MCNC: 2.9 MG/DL — HIGH (ref 1.6–2.6)
MANUAL SMEAR VERIFICATION: SIGNIFICANT CHANGE UP
MCHC RBC-ENTMCNC: 28.1 PG — SIGNIFICANT CHANGE UP (ref 27–34)
MCHC RBC-ENTMCNC: 28.2 PG — SIGNIFICANT CHANGE UP (ref 27–34)
MCHC RBC-ENTMCNC: 28.2 PG — SIGNIFICANT CHANGE UP (ref 27–34)
MCHC RBC-ENTMCNC: 28.3 PG — SIGNIFICANT CHANGE UP (ref 27–34)
MCHC RBC-ENTMCNC: 28.3 PG — SIGNIFICANT CHANGE UP (ref 27–34)
MCHC RBC-ENTMCNC: 28.4 PG — SIGNIFICANT CHANGE UP (ref 27–34)
MCHC RBC-ENTMCNC: 28.5 PG — SIGNIFICANT CHANGE UP (ref 27–34)
MCHC RBC-ENTMCNC: 28.6 PG — SIGNIFICANT CHANGE UP (ref 27–34)
MCHC RBC-ENTMCNC: 28.7 PG — SIGNIFICANT CHANGE UP (ref 27–34)
MCHC RBC-ENTMCNC: 28.8 PG — SIGNIFICANT CHANGE UP (ref 27–34)
MCHC RBC-ENTMCNC: 28.9 PG — SIGNIFICANT CHANGE UP (ref 27–34)
MCHC RBC-ENTMCNC: 29 PG — SIGNIFICANT CHANGE UP (ref 27–34)
MCHC RBC-ENTMCNC: 29.1 PG — SIGNIFICANT CHANGE UP (ref 27–34)
MCHC RBC-ENTMCNC: 29.2 PG — SIGNIFICANT CHANGE UP (ref 27–34)
MCHC RBC-ENTMCNC: 29.3 PG — SIGNIFICANT CHANGE UP (ref 27–34)
MCHC RBC-ENTMCNC: 29.3 PG — SIGNIFICANT CHANGE UP (ref 27–34)
MCHC RBC-ENTMCNC: 29.4 PG — SIGNIFICANT CHANGE UP (ref 27–34)
MCHC RBC-ENTMCNC: 29.4 PG — SIGNIFICANT CHANGE UP (ref 27–34)
MCHC RBC-ENTMCNC: 29.7 PG — SIGNIFICANT CHANGE UP (ref 27–34)
MCHC RBC-ENTMCNC: 29.7 PG — SIGNIFICANT CHANGE UP (ref 27–34)
MCHC RBC-ENTMCNC: 30.7 GM/DL — LOW (ref 32–36)
MCHC RBC-ENTMCNC: 30.7 GM/DL — LOW (ref 32–36)
MCHC RBC-ENTMCNC: 31.2 GM/DL — LOW (ref 32–36)
MCHC RBC-ENTMCNC: 31.2 GM/DL — LOW (ref 32–36)
MCHC RBC-ENTMCNC: 31.3 GM/DL — LOW (ref 32–36)
MCHC RBC-ENTMCNC: 31.3 GM/DL — LOW (ref 32–36)
MCHC RBC-ENTMCNC: 31.4 GM/DL — LOW (ref 32–36)
MCHC RBC-ENTMCNC: 31.4 GM/DL — LOW (ref 32–36)
MCHC RBC-ENTMCNC: 31.5 GM/DL — LOW (ref 32–36)
MCHC RBC-ENTMCNC: 31.6 GM/DL — LOW (ref 32–36)
MCHC RBC-ENTMCNC: 31.7 GM/DL — LOW (ref 32–36)
MCHC RBC-ENTMCNC: 31.8 GM/DL — LOW (ref 32–36)
MCHC RBC-ENTMCNC: 31.9 GM/DL — LOW (ref 32–36)
MCHC RBC-ENTMCNC: 31.9 GM/DL — LOW (ref 32–36)
MCHC RBC-ENTMCNC: 32 GM/DL — SIGNIFICANT CHANGE UP (ref 32–36)
MCHC RBC-ENTMCNC: 32.1 GM/DL — SIGNIFICANT CHANGE UP (ref 32–36)
MCHC RBC-ENTMCNC: 32.2 GM/DL — SIGNIFICANT CHANGE UP (ref 32–36)
MCHC RBC-ENTMCNC: 32.3 GM/DL — SIGNIFICANT CHANGE UP (ref 32–36)
MCHC RBC-ENTMCNC: 32.4 GM/DL — SIGNIFICANT CHANGE UP (ref 32–36)
MCHC RBC-ENTMCNC: 32.4 GM/DL — SIGNIFICANT CHANGE UP (ref 32–36)
MCHC RBC-ENTMCNC: 32.5 GM/DL — SIGNIFICANT CHANGE UP (ref 32–36)
MCHC RBC-ENTMCNC: 32.6 GM/DL — SIGNIFICANT CHANGE UP (ref 32–36)
MCHC RBC-ENTMCNC: 32.7 GM/DL — SIGNIFICANT CHANGE UP (ref 32–36)
MCHC RBC-ENTMCNC: 32.7 GM/DL — SIGNIFICANT CHANGE UP (ref 32–36)
MCHC RBC-ENTMCNC: 32.8 GM/DL — SIGNIFICANT CHANGE UP (ref 32–36)
MCHC RBC-ENTMCNC: 32.8 GM/DL — SIGNIFICANT CHANGE UP (ref 32–36)
MCHC RBC-ENTMCNC: 32.9 GM/DL — SIGNIFICANT CHANGE UP (ref 32–36)
MCHC RBC-ENTMCNC: 33.1 GM/DL — SIGNIFICANT CHANGE UP (ref 32–36)
MCHC RBC-ENTMCNC: 33.1 GM/DL — SIGNIFICANT CHANGE UP (ref 32–36)
MCHC RBC-ENTMCNC: 33.3 GM/DL — SIGNIFICANT CHANGE UP (ref 32–36)
MCHC RBC-ENTMCNC: 33.4 G/DL — SIGNIFICANT CHANGE UP (ref 32–36)
MCHC RBC-ENTMCNC: 33.4 GM/DL — SIGNIFICANT CHANGE UP (ref 32–36)
MCHC RBC-ENTMCNC: 33.4 GM/DL — SIGNIFICANT CHANGE UP (ref 32–36)
MCHC RBC-ENTMCNC: 33.7 GM/DL — SIGNIFICANT CHANGE UP (ref 32–36)
MCV RBC AUTO: 85.8 FL — SIGNIFICANT CHANGE UP (ref 80–100)
MCV RBC AUTO: 86.7 FL — SIGNIFICANT CHANGE UP (ref 80–100)
MCV RBC AUTO: 87.1 FL — SIGNIFICANT CHANGE UP (ref 80–100)
MCV RBC AUTO: 87.1 FL — SIGNIFICANT CHANGE UP (ref 80–100)
MCV RBC AUTO: 87.3 FL — SIGNIFICANT CHANGE UP (ref 80–100)
MCV RBC AUTO: 87.3 FL — SIGNIFICANT CHANGE UP (ref 80–100)
MCV RBC AUTO: 87.4 FL — SIGNIFICANT CHANGE UP (ref 80–100)
MCV RBC AUTO: 87.6 FL — SIGNIFICANT CHANGE UP (ref 80–100)
MCV RBC AUTO: 87.7 FL — SIGNIFICANT CHANGE UP (ref 80–100)
MCV RBC AUTO: 87.8 FL — SIGNIFICANT CHANGE UP (ref 80–100)
MCV RBC AUTO: 87.8 FL — SIGNIFICANT CHANGE UP (ref 80–100)
MCV RBC AUTO: 88.1 FL — SIGNIFICANT CHANGE UP (ref 80–100)
MCV RBC AUTO: 88.2 FL — SIGNIFICANT CHANGE UP (ref 80–100)
MCV RBC AUTO: 88.3 FL — SIGNIFICANT CHANGE UP (ref 80–100)
MCV RBC AUTO: 88.4 FL — SIGNIFICANT CHANGE UP (ref 80–100)
MCV RBC AUTO: 88.4 FL — SIGNIFICANT CHANGE UP (ref 80–100)
MCV RBC AUTO: 88.5 FL — SIGNIFICANT CHANGE UP (ref 80–100)
MCV RBC AUTO: 88.7 FL — SIGNIFICANT CHANGE UP (ref 80–100)
MCV RBC AUTO: 88.8 FL — SIGNIFICANT CHANGE UP (ref 80–100)
MCV RBC AUTO: 88.9 FL — SIGNIFICANT CHANGE UP (ref 80–100)
MCV RBC AUTO: 89.1 FL — SIGNIFICANT CHANGE UP (ref 80–100)
MCV RBC AUTO: 89.1 FL — SIGNIFICANT CHANGE UP (ref 80–100)
MCV RBC AUTO: 89.6 FL — SIGNIFICANT CHANGE UP (ref 80–100)
MCV RBC AUTO: 89.6 FL — SIGNIFICANT CHANGE UP (ref 80–100)
MCV RBC AUTO: 89.7 FL — SIGNIFICANT CHANGE UP (ref 80–100)
MCV RBC AUTO: 89.9 FL — SIGNIFICANT CHANGE UP (ref 80–100)
MCV RBC AUTO: 89.9 FL — SIGNIFICANT CHANGE UP (ref 80–100)
MCV RBC AUTO: 90 FL — SIGNIFICANT CHANGE UP (ref 80–100)
MCV RBC AUTO: 90.1 FL — SIGNIFICANT CHANGE UP (ref 80–100)
MCV RBC AUTO: 90.1 FL — SIGNIFICANT CHANGE UP (ref 80–100)
MCV RBC AUTO: 90.2 FL — SIGNIFICANT CHANGE UP (ref 80–100)
MCV RBC AUTO: 90.3 FL — SIGNIFICANT CHANGE UP (ref 80–100)
MCV RBC AUTO: 90.3 FL — SIGNIFICANT CHANGE UP (ref 80–100)
MCV RBC AUTO: 90.5 FL — SIGNIFICANT CHANGE UP (ref 80–100)
MCV RBC AUTO: 90.5 FL — SIGNIFICANT CHANGE UP (ref 80–100)
MCV RBC AUTO: 90.6 FL — SIGNIFICANT CHANGE UP (ref 80–100)
MCV RBC AUTO: 91.1 FL — SIGNIFICANT CHANGE UP (ref 80–100)
MCV RBC AUTO: 91.1 FL — SIGNIFICANT CHANGE UP (ref 80–100)
MCV RBC AUTO: 91.2 FL — SIGNIFICANT CHANGE UP (ref 80–100)
MCV RBC AUTO: 91.4 FL — SIGNIFICANT CHANGE UP (ref 80–100)
MCV RBC AUTO: 91.4 FL — SIGNIFICANT CHANGE UP (ref 80–100)
MCV RBC AUTO: 91.5 FL — SIGNIFICANT CHANGE UP (ref 80–100)
MCV RBC AUTO: 91.5 FL — SIGNIFICANT CHANGE UP (ref 80–100)
MCV RBC AUTO: 91.9 FL — SIGNIFICANT CHANGE UP (ref 80–100)
MCV RBC AUTO: 91.9 FL — SIGNIFICANT CHANGE UP (ref 80–100)
MCV RBC AUTO: 92 FL — SIGNIFICANT CHANGE UP (ref 80–100)
MCV RBC AUTO: 92 FL — SIGNIFICANT CHANGE UP (ref 80–100)
MCV RBC AUTO: 92.4 FL — SIGNIFICANT CHANGE UP (ref 80–100)
MCV RBC AUTO: 92.4 FL — SIGNIFICANT CHANGE UP (ref 80–100)
MCV RBC AUTO: 93.1 FL — SIGNIFICANT CHANGE UP (ref 80–100)
MCV RBC AUTO: 93.1 FL — SIGNIFICANT CHANGE UP (ref 80–100)
METHOD TYPE: SIGNIFICANT CHANGE UP
MONOCYTES # BLD AUTO: 0 K/UL — SIGNIFICANT CHANGE UP (ref 0–0.9)
MONOCYTES # BLD AUTO: 0 K/UL — SIGNIFICANT CHANGE UP (ref 0–0.9)
MONOCYTES # BLD AUTO: 0.45 K/UL — SIGNIFICANT CHANGE UP (ref 0–0.9)
MONOCYTES # BLD AUTO: 0.45 K/UL — SIGNIFICANT CHANGE UP (ref 0–0.9)
MONOCYTES # BLD AUTO: 0.52 K/UL — SIGNIFICANT CHANGE UP (ref 0–0.9)
MONOCYTES # BLD AUTO: 0.52 K/UL — SIGNIFICANT CHANGE UP (ref 0–0.9)
MONOCYTES # BLD AUTO: 0.59 K/UL — SIGNIFICANT CHANGE UP (ref 0–0.9)
MONOCYTES # BLD AUTO: 0.65 K/UL — SIGNIFICANT CHANGE UP (ref 0–0.9)
MONOCYTES # BLD AUTO: 0.65 K/UL — SIGNIFICANT CHANGE UP (ref 0–0.9)
MONOCYTES # BLD AUTO: 0.84 K/UL — SIGNIFICANT CHANGE UP (ref 0–0.9)
MONOCYTES # BLD AUTO: 0.84 K/UL — SIGNIFICANT CHANGE UP (ref 0–0.9)
MONOCYTES # BLD AUTO: 0.92 K/UL — HIGH (ref 0–0.9)
MONOCYTES # BLD AUTO: 0.92 K/UL — HIGH (ref 0–0.9)
MONOCYTES # BLD AUTO: 0.99 K/UL — HIGH (ref 0–0.9)
MONOCYTES # BLD AUTO: 1 K/UL — HIGH (ref 0–0.9)
MONOCYTES # BLD AUTO: 1.02 K/UL — HIGH (ref 0–0.9)
MONOCYTES # BLD AUTO: 1.02 K/UL — HIGH (ref 0–0.9)
MONOCYTES # BLD AUTO: 1.03 K/UL — HIGH (ref 0–0.9)
MONOCYTES # BLD AUTO: 1.09 K/UL — HIGH (ref 0–0.9)
MONOCYTES # BLD AUTO: 1.09 K/UL — HIGH (ref 0–0.9)
MONOCYTES # BLD AUTO: 1.21 K/UL — HIGH (ref 0–0.9)
MONOCYTES # BLD AUTO: 1.21 K/UL — HIGH (ref 0–0.9)
MONOCYTES # BLD AUTO: 1.22 K/UL — HIGH (ref 0–0.9)
MONOCYTES # BLD AUTO: 1.22 K/UL — HIGH (ref 0–0.9)
MONOCYTES # BLD AUTO: 1.27 K/UL — HIGH (ref 0–0.9)
MONOCYTES # BLD AUTO: 1.51 K/UL — HIGH (ref 0–0.9)
MONOCYTES # BLD AUTO: 1.58 K/UL — HIGH (ref 0–0.9)
MONOCYTES # BLD AUTO: 1.58 K/UL — HIGH (ref 0–0.9)
MONOCYTES # BLD AUTO: 1.69 K/UL — HIGH (ref 0–0.9)
MONOCYTES # BLD AUTO: 1.84 K/UL — HIGH (ref 0–0.9)
MONOCYTES # BLD AUTO: 1.84 K/UL — HIGH (ref 0–0.9)
MONOCYTES # BLD AUTO: 1.86 K/UL — HIGH (ref 0–0.9)
MONOCYTES # BLD AUTO: 1.86 K/UL — HIGH (ref 0–0.9)
MONOCYTES # BLD AUTO: 1.88 K/UL — HIGH (ref 0–0.9)
MONOCYTES # BLD AUTO: 1.88 K/UL — HIGH (ref 0–0.9)
MONOCYTES # BLD AUTO: 1.89 K/UL — HIGH (ref 0–0.9)
MONOCYTES # BLD AUTO: 1.89 K/UL — HIGH (ref 0–0.9)
MONOCYTES # BLD AUTO: 2.15 K/UL — HIGH (ref 0–0.9)
MONOCYTES # BLD AUTO: 2.15 K/UL — HIGH (ref 0–0.9)
MONOCYTES NFR BLD AUTO: 0 % — LOW (ref 2–14)
MONOCYTES NFR BLD AUTO: 0 % — LOW (ref 2–14)
MONOCYTES NFR BLD AUTO: 1.7 % — LOW (ref 2–14)
MONOCYTES NFR BLD AUTO: 1.7 % — LOW (ref 2–14)
MONOCYTES NFR BLD AUTO: 11.8 % — SIGNIFICANT CHANGE UP (ref 2–14)
MONOCYTES NFR BLD AUTO: 11.8 % — SIGNIFICANT CHANGE UP (ref 2–14)
MONOCYTES NFR BLD AUTO: 12.5 % — SIGNIFICANT CHANGE UP (ref 2–14)
MONOCYTES NFR BLD AUTO: 12.5 % — SIGNIFICANT CHANGE UP (ref 2–14)
MONOCYTES NFR BLD AUTO: 2.5 % — SIGNIFICANT CHANGE UP (ref 2–14)
MONOCYTES NFR BLD AUTO: 2.5 % — SIGNIFICANT CHANGE UP (ref 2–14)
MONOCYTES NFR BLD AUTO: 2.6 % — SIGNIFICANT CHANGE UP (ref 2–14)
MONOCYTES NFR BLD AUTO: 2.6 % — SIGNIFICANT CHANGE UP (ref 2–14)
MONOCYTES NFR BLD AUTO: 3.4 % — SIGNIFICANT CHANGE UP (ref 2–14)
MONOCYTES NFR BLD AUTO: 4.3 % — SIGNIFICANT CHANGE UP (ref 2–14)
MONOCYTES NFR BLD AUTO: 4.3 % — SIGNIFICANT CHANGE UP (ref 2–14)
MONOCYTES NFR BLD AUTO: 5.5 % — SIGNIFICANT CHANGE UP (ref 2–14)
MONOCYTES NFR BLD AUTO: 5.5 % — SIGNIFICANT CHANGE UP (ref 2–14)
MONOCYTES NFR BLD AUTO: 5.7 % — SIGNIFICANT CHANGE UP (ref 2–14)
MONOCYTES NFR BLD AUTO: 5.7 % — SIGNIFICANT CHANGE UP (ref 2–14)
MONOCYTES NFR BLD AUTO: 6 % — SIGNIFICANT CHANGE UP (ref 2–14)
MONOCYTES NFR BLD AUTO: 6 % — SIGNIFICANT CHANGE UP (ref 2–14)
MONOCYTES NFR BLD AUTO: 6.1 % — SIGNIFICANT CHANGE UP (ref 2–14)
MONOCYTES NFR BLD AUTO: 6.4 % — SIGNIFICANT CHANGE UP (ref 2–14)
MONOCYTES NFR BLD AUTO: 6.4 % — SIGNIFICANT CHANGE UP (ref 2–14)
MONOCYTES NFR BLD AUTO: 6.5 % — SIGNIFICANT CHANGE UP (ref 2–14)
MONOCYTES NFR BLD AUTO: 6.5 % — SIGNIFICANT CHANGE UP (ref 2–14)
MONOCYTES NFR BLD AUTO: 6.6 % — SIGNIFICANT CHANGE UP (ref 2–14)
MONOCYTES NFR BLD AUTO: 6.6 % — SIGNIFICANT CHANGE UP (ref 2–14)
MONOCYTES NFR BLD AUTO: 6.8 % — SIGNIFICANT CHANGE UP (ref 2–14)
MONOCYTES NFR BLD AUTO: 7.3 % — SIGNIFICANT CHANGE UP (ref 2–14)
MONOCYTES NFR BLD AUTO: 7.6 % — SIGNIFICANT CHANGE UP (ref 2–14)
MONOCYTES NFR BLD AUTO: 7.6 % — SIGNIFICANT CHANGE UP (ref 2–14)
MONOCYTES NFR BLD AUTO: 7.7 % — SIGNIFICANT CHANGE UP (ref 2–14)
MONOCYTES NFR BLD AUTO: 7.7 % — SIGNIFICANT CHANGE UP (ref 2–14)
MONOCYTES NFR BLD AUTO: 7.8 % — SIGNIFICANT CHANGE UP (ref 2–14)
MONOCYTES NFR BLD AUTO: 7.8 % — SIGNIFICANT CHANGE UP (ref 2–14)
MONOCYTES NFR BLD AUTO: 7.9 % — SIGNIFICANT CHANGE UP (ref 2–14)
MONOCYTES NFR BLD AUTO: 7.9 % — SIGNIFICANT CHANGE UP (ref 2–14)
MONOCYTES NFR BLD AUTO: 8.3 % — SIGNIFICANT CHANGE UP (ref 2–14)
MONOCYTES NFR BLD AUTO: 8.3 % — SIGNIFICANT CHANGE UP (ref 2–14)
MONOCYTES NFR BLD AUTO: 8.6 % — SIGNIFICANT CHANGE UP (ref 2–14)
MONOCYTES NFR BLD AUTO: 8.7 % — SIGNIFICANT CHANGE UP (ref 2–14)
MONOCYTES NFR BLD AUTO: 8.7 % — SIGNIFICANT CHANGE UP (ref 2–14)
MONOCYTES NFR BLD AUTO: 9.1 % — SIGNIFICANT CHANGE UP (ref 2–14)
MONOCYTES NFR BLD AUTO: 9.1 % — SIGNIFICANT CHANGE UP (ref 2–14)
MRSA PCR RESULT.: SIGNIFICANT CHANGE UP
MRSA PCR RESULT.: SIGNIFICANT CHANGE UP
MYELOCYTES NFR BLD: 0.9 % — HIGH (ref 0–0)
MYELOCYTES NFR BLD: 0.9 % — HIGH (ref 0–0)
NEUTROPHILS # BLD AUTO: 10 K/UL — HIGH (ref 1.8–7.4)
NEUTROPHILS # BLD AUTO: 10 K/UL — HIGH (ref 1.8–7.4)
NEUTROPHILS # BLD AUTO: 10.47 K/UL — HIGH (ref 1.8–7.4)
NEUTROPHILS # BLD AUTO: 10.47 K/UL — HIGH (ref 1.8–7.4)
NEUTROPHILS # BLD AUTO: 10.99 K/UL — HIGH (ref 1.8–7.4)
NEUTROPHILS # BLD AUTO: 10.99 K/UL — HIGH (ref 1.8–7.4)
NEUTROPHILS # BLD AUTO: 12.16 K/UL — HIGH (ref 1.8–7.4)
NEUTROPHILS # BLD AUTO: 12.16 K/UL — HIGH (ref 1.8–7.4)
NEUTROPHILS # BLD AUTO: 12.35 K/UL — HIGH (ref 1.8–7.4)
NEUTROPHILS # BLD AUTO: 12.35 K/UL — HIGH (ref 1.8–7.4)
NEUTROPHILS # BLD AUTO: 12.41 K/UL — HIGH (ref 1.8–7.4)
NEUTROPHILS # BLD AUTO: 12.72 K/UL — HIGH (ref 1.8–7.4)
NEUTROPHILS # BLD AUTO: 13.93 K/UL — HIGH (ref 1.8–7.4)
NEUTROPHILS # BLD AUTO: 13.93 K/UL — HIGH (ref 1.8–7.4)
NEUTROPHILS # BLD AUTO: 14.44 K/UL — HIGH (ref 1.8–7.4)
NEUTROPHILS # BLD AUTO: 14.44 K/UL — HIGH (ref 1.8–7.4)
NEUTROPHILS # BLD AUTO: 14.68 K/UL — HIGH (ref 1.8–7.4)
NEUTROPHILS # BLD AUTO: 14.68 K/UL — HIGH (ref 1.8–7.4)
NEUTROPHILS # BLD AUTO: 15.31 K/UL — HIGH (ref 1.8–7.4)
NEUTROPHILS # BLD AUTO: 15.41 K/UL — HIGH (ref 1.8–7.4)
NEUTROPHILS # BLD AUTO: 15.41 K/UL — HIGH (ref 1.8–7.4)
NEUTROPHILS # BLD AUTO: 16.3 K/UL — HIGH (ref 1.8–7.4)
NEUTROPHILS # BLD AUTO: 16.3 K/UL — HIGH (ref 1.8–7.4)
NEUTROPHILS # BLD AUTO: 18.65 K/UL — HIGH (ref 1.8–7.4)
NEUTROPHILS # BLD AUTO: 18.65 K/UL — HIGH (ref 1.8–7.4)
NEUTROPHILS # BLD AUTO: 19.42 K/UL — HIGH (ref 1.8–7.4)
NEUTROPHILS # BLD AUTO: 19.42 K/UL — HIGH (ref 1.8–7.4)
NEUTROPHILS # BLD AUTO: 19.53 K/UL — HIGH (ref 1.8–7.4)
NEUTROPHILS # BLD AUTO: 19.53 K/UL — HIGH (ref 1.8–7.4)
NEUTROPHILS # BLD AUTO: 21.24 K/UL — HIGH (ref 1.8–7.4)
NEUTROPHILS # BLD AUTO: 21.24 K/UL — HIGH (ref 1.8–7.4)
NEUTROPHILS # BLD AUTO: 21.79 K/UL — HIGH (ref 1.8–7.4)
NEUTROPHILS # BLD AUTO: 21.79 K/UL — HIGH (ref 1.8–7.4)
NEUTROPHILS # BLD AUTO: 22.55 K/UL — HIGH (ref 1.8–7.4)
NEUTROPHILS # BLD AUTO: 22.55 K/UL — HIGH (ref 1.8–7.4)
NEUTROPHILS # BLD AUTO: 22.83 K/UL — HIGH (ref 1.8–7.4)
NEUTROPHILS # BLD AUTO: 22.83 K/UL — HIGH (ref 1.8–7.4)
NEUTROPHILS # BLD AUTO: 25.68 K/UL — HIGH (ref 1.8–7.4)
NEUTROPHILS # BLD AUTO: 25.68 K/UL — HIGH (ref 1.8–7.4)
NEUTROPHILS # BLD AUTO: 25.92 K/UL — HIGH (ref 1.8–7.4)
NEUTROPHILS # BLD AUTO: 25.92 K/UL — HIGH (ref 1.8–7.4)
NEUTROPHILS # BLD AUTO: 28.97 K/UL — HIGH (ref 1.8–7.4)
NEUTROPHILS # BLD AUTO: 28.97 K/UL — HIGH (ref 1.8–7.4)
NEUTROPHILS # BLD AUTO: 7.33 K/UL — SIGNIFICANT CHANGE UP (ref 1.8–7.4)
NEUTROPHILS # BLD AUTO: 7.89 K/UL — HIGH (ref 1.8–7.4)
NEUTROPHILS # BLD AUTO: 7.89 K/UL — HIGH (ref 1.8–7.4)
NEUTROPHILS # BLD AUTO: 9.8 K/UL — HIGH (ref 1.8–7.4)
NEUTROPHILS NFR BLD AUTO: 61 % — SIGNIFICANT CHANGE UP (ref 43–77)
NEUTROPHILS NFR BLD AUTO: 67.4 % — SIGNIFICANT CHANGE UP (ref 43–77)
NEUTROPHILS NFR BLD AUTO: 69.9 % — SIGNIFICANT CHANGE UP (ref 43–77)
NEUTROPHILS NFR BLD AUTO: 69.9 % — SIGNIFICANT CHANGE UP (ref 43–77)
NEUTROPHILS NFR BLD AUTO: 71.5 % — SIGNIFICANT CHANGE UP (ref 43–77)
NEUTROPHILS NFR BLD AUTO: 72.5 % — SIGNIFICANT CHANGE UP (ref 43–77)
NEUTROPHILS NFR BLD AUTO: 72.5 % — SIGNIFICANT CHANGE UP (ref 43–77)
NEUTROPHILS NFR BLD AUTO: 73 % — SIGNIFICANT CHANGE UP (ref 43–77)
NEUTROPHILS NFR BLD AUTO: 73 % — SIGNIFICANT CHANGE UP (ref 43–77)
NEUTROPHILS NFR BLD AUTO: 74.1 % — SIGNIFICANT CHANGE UP (ref 43–77)
NEUTROPHILS NFR BLD AUTO: 74.1 % — SIGNIFICANT CHANGE UP (ref 43–77)
NEUTROPHILS NFR BLD AUTO: 74.2 % — SIGNIFICANT CHANGE UP (ref 43–77)
NEUTROPHILS NFR BLD AUTO: 74.2 % — SIGNIFICANT CHANGE UP (ref 43–77)
NEUTROPHILS NFR BLD AUTO: 77.4 % — HIGH (ref 43–77)
NEUTROPHILS NFR BLD AUTO: 77.4 % — HIGH (ref 43–77)
NEUTROPHILS NFR BLD AUTO: 77.6 % — HIGH (ref 43–77)
NEUTROPHILS NFR BLD AUTO: 77.7 % — HIGH (ref 43–77)
NEUTROPHILS NFR BLD AUTO: 77.7 % — HIGH (ref 43–77)
NEUTROPHILS NFR BLD AUTO: 78 % — HIGH (ref 43–77)
NEUTROPHILS NFR BLD AUTO: 78 % — HIGH (ref 43–77)
NEUTROPHILS NFR BLD AUTO: 79.1 % — HIGH (ref 43–77)
NEUTROPHILS NFR BLD AUTO: 79.1 % — HIGH (ref 43–77)
NEUTROPHILS NFR BLD AUTO: 79.8 % — HIGH (ref 43–77)
NEUTROPHILS NFR BLD AUTO: 79.8 % — HIGH (ref 43–77)
NEUTROPHILS NFR BLD AUTO: 81.1 % — HIGH (ref 43–77)
NEUTROPHILS NFR BLD AUTO: 81.1 % — HIGH (ref 43–77)
NEUTROPHILS NFR BLD AUTO: 81.3 % — HIGH (ref 43–77)
NEUTROPHILS NFR BLD AUTO: 81.3 % — HIGH (ref 43–77)
NEUTROPHILS NFR BLD AUTO: 81.5 % — HIGH (ref 43–77)
NEUTROPHILS NFR BLD AUTO: 81.5 % — HIGH (ref 43–77)
NEUTROPHILS NFR BLD AUTO: 81.9 % — HIGH (ref 43–77)
NEUTROPHILS NFR BLD AUTO: 81.9 % — HIGH (ref 43–77)
NEUTROPHILS NFR BLD AUTO: 83.1 % — HIGH (ref 43–77)
NEUTROPHILS NFR BLD AUTO: 83.1 % — HIGH (ref 43–77)
NEUTROPHILS NFR BLD AUTO: 85.8 % — HIGH (ref 43–77)
NEUTROPHILS NFR BLD AUTO: 85.8 % — HIGH (ref 43–77)
NEUTROPHILS NFR BLD AUTO: 87.6 % — HIGH (ref 43–77)
NEUTROPHILS NFR BLD AUTO: 87.6 % — HIGH (ref 43–77)
NEUTROPHILS NFR BLD AUTO: 88.9 % — HIGH (ref 43–77)
NEUTROPHILS NFR BLD AUTO: 90.4 % — HIGH (ref 43–77)
NEUTROPHILS NFR BLD AUTO: 90.4 % — HIGH (ref 43–77)
NEUTROPHILS NFR BLD AUTO: 92.2 % — HIGH (ref 43–77)
NEUTROPHILS NFR BLD AUTO: 92.2 % — HIGH (ref 43–77)
NEUTROPHILS NFR BLD AUTO: 95.7 % — HIGH (ref 43–77)
NEUTROPHILS NFR BLD AUTO: 95.7 % — HIGH (ref 43–77)
NEUTROPHILS NFR BLD AUTO: 98.3 % — HIGH (ref 43–77)
NEUTROPHILS NFR BLD AUTO: 98.3 % — HIGH (ref 43–77)
NEUTS BAND # BLD: 0.9 % — SIGNIFICANT CHANGE UP (ref 0–8)
NEUTS BAND # BLD: 0.9 % — SIGNIFICANT CHANGE UP (ref 0–8)
NITRITE UR-MCNC: NEGATIVE — SIGNIFICANT CHANGE UP
NON HDL CHOLESTEROL: 76 MG/DL — SIGNIFICANT CHANGE UP
NONHDLC SERPL-MCNC: 71 MG/DL
NRBC # BLD: 0 /100 WBCS — SIGNIFICANT CHANGE UP (ref 0–0)
NT-PROBNP SERPL-SCNC: 2656 PG/ML — HIGH (ref 0–300)
NT-PROBNP SERPL-SCNC: 3416 PG/ML — HIGH (ref 0–300)
NT-PROBNP SERPL-SCNC: 3797 PG/ML — HIGH (ref 0–300)
NT-PROBNP SERPL-SCNC: 3797 PG/ML — HIGH (ref 0–300)
NT-PROBNP SERPL-SCNC: 3973 PG/ML — HIGH (ref 0–300)
NT-PROBNP SERPL-SCNC: 3973 PG/ML — HIGH (ref 0–300)
NT-PROBNP SERPL-SCNC: 5222 PG/ML — HIGH (ref 0–300)
NT-PROBNP SERPL-SCNC: 5222 PG/ML — HIGH (ref 0–300)
ORGANISM # SPEC MICROSCOPIC CNT: SIGNIFICANT CHANGE UP
OTHER CELLS CSF MANUAL: 17.4 ML/DL — LOW (ref 18–22)
OVALOCYTES BLD QL SMEAR: SLIGHT — SIGNIFICANT CHANGE UP
OXYHGB MFR BLDA: 91.8 % — SIGNIFICANT CHANGE UP (ref 90–95)
OXYHGB MFR BLDMV: 73.2 % — LOW (ref 90–95)
PA ADP PRP-ACNC: 17 PRU — LOW (ref 194–417)
PA ADP PRP-ACNC: 17 PRU — LOW (ref 194–417)
PCO2 BLDV: 38 MMHG — LOW (ref 39–42)
PCO2 BLDV: 40 MMHG — SIGNIFICANT CHANGE UP (ref 39–42)
PCO2 BLDV: 40 MMHG — SIGNIFICANT CHANGE UP (ref 39–42)
PCO2 BLDV: 41 MMHG — SIGNIFICANT CHANGE UP (ref 39–42)
PCO2 BLDV: 42 MMHG — SIGNIFICANT CHANGE UP (ref 39–42)
PCO2 BLDV: 42 MMHG — SIGNIFICANT CHANGE UP (ref 39–42)
PCO2 BLDV: 47 MMHG — HIGH (ref 39–42)
PCO2 BLDV: 51 MMHG — HIGH (ref 39–42)
PCO2 BLDV: 51 MMHG — HIGH (ref 39–42)
PCO2 BLDV: 58 MMHG — HIGH (ref 39–42)
PCO2 BLDV: 58 MMHG — HIGH (ref 39–42)
PH BLDV: 7.36 — SIGNIFICANT CHANGE UP (ref 7.32–7.43)
PH BLDV: 7.36 — SIGNIFICANT CHANGE UP (ref 7.32–7.43)
PH BLDV: 7.38 — SIGNIFICANT CHANGE UP (ref 7.32–7.43)
PH BLDV: 7.38 — SIGNIFICANT CHANGE UP (ref 7.32–7.43)
PH BLDV: 7.39 — SIGNIFICANT CHANGE UP (ref 7.32–7.43)
PH BLDV: 7.39 — SIGNIFICANT CHANGE UP (ref 7.32–7.43)
PH BLDV: 7.4 — SIGNIFICANT CHANGE UP (ref 7.32–7.43)
PH BLDV: 7.4 — SIGNIFICANT CHANGE UP (ref 7.32–7.43)
PH BLDV: 7.41 — SIGNIFICANT CHANGE UP (ref 7.32–7.43)
PH BLDV: 7.42 — SIGNIFICANT CHANGE UP (ref 7.32–7.43)
PH BLDV: 7.42 — SIGNIFICANT CHANGE UP (ref 7.32–7.43)
PH BLDV: 7.43 — SIGNIFICANT CHANGE UP (ref 7.32–7.43)
PH BLDV: 7.45 — HIGH (ref 7.32–7.43)
PH BLDV: 7.45 — HIGH (ref 7.32–7.43)
PH UR: 5 — SIGNIFICANT CHANGE UP (ref 5–8)
PH UR: 5.5 — SIGNIFICANT CHANGE UP (ref 5–8)
PH UR: 5.5 — SIGNIFICANT CHANGE UP (ref 5–8)
PHOSPHATE SERPL-MCNC: 2.3 MG/DL — LOW (ref 2.5–4.5)
PHOSPHATE SERPL-MCNC: 2.3 MG/DL — LOW (ref 2.5–4.5)
PHOSPHATE SERPL-MCNC: 2.4 MG/DL — LOW (ref 2.5–4.5)
PHOSPHATE SERPL-MCNC: 2.5 MG/DL — SIGNIFICANT CHANGE UP (ref 2.5–4.5)
PHOSPHATE SERPL-MCNC: 2.5 MG/DL — SIGNIFICANT CHANGE UP (ref 2.5–4.5)
PHOSPHATE SERPL-MCNC: 2.6 MG/DL — SIGNIFICANT CHANGE UP (ref 2.5–4.5)
PHOSPHATE SERPL-MCNC: 2.8 MG/DL — SIGNIFICANT CHANGE UP (ref 2.5–4.5)
PHOSPHATE SERPL-MCNC: 2.8 MG/DL — SIGNIFICANT CHANGE UP (ref 2.5–4.5)
PHOSPHATE SERPL-MCNC: 2.9 MG/DL — SIGNIFICANT CHANGE UP (ref 2.5–4.5)
PHOSPHATE SERPL-MCNC: 3 MG/DL — SIGNIFICANT CHANGE UP (ref 2.5–4.5)
PHOSPHATE SERPL-MCNC: 3.1 MG/DL — SIGNIFICANT CHANGE UP (ref 2.5–4.5)
PHOSPHATE SERPL-MCNC: 3.2 MG/DL — SIGNIFICANT CHANGE UP (ref 2.5–4.5)
PHOSPHATE SERPL-MCNC: 3.2 MG/DL — SIGNIFICANT CHANGE UP (ref 2.5–4.5)
PHOSPHATE SERPL-MCNC: 3.3 MG/DL — SIGNIFICANT CHANGE UP (ref 2.5–4.5)
PHOSPHATE SERPL-MCNC: 3.4 MG/DL — SIGNIFICANT CHANGE UP (ref 2.5–4.5)
PHOSPHATE SERPL-MCNC: 3.5 MG/DL — SIGNIFICANT CHANGE UP (ref 2.5–4.5)
PHOSPHATE SERPL-MCNC: 3.6 MG/DL — SIGNIFICANT CHANGE UP (ref 2.5–4.5)
PHOSPHATE SERPL-MCNC: 3.7 MG/DL — SIGNIFICANT CHANGE UP (ref 2.5–4.5)
PHOSPHATE SERPL-MCNC: 3.9 MG/DL — SIGNIFICANT CHANGE UP (ref 2.5–4.5)
PLAT MORPH BLD: ABNORMAL
PLAT MORPH BLD: ABNORMAL
PLAT MORPH BLD: NORMAL — SIGNIFICANT CHANGE UP
PLATELET # BLD AUTO: 243 K/UL — SIGNIFICANT CHANGE UP (ref 150–400)
PLATELET # BLD AUTO: 243 K/UL — SIGNIFICANT CHANGE UP (ref 150–400)
PLATELET # BLD AUTO: 244 K/UL — SIGNIFICANT CHANGE UP (ref 150–400)
PLATELET # BLD AUTO: 244 K/UL — SIGNIFICANT CHANGE UP (ref 150–400)
PLATELET # BLD AUTO: 249 K/UL — SIGNIFICANT CHANGE UP (ref 150–400)
PLATELET # BLD AUTO: 251 K/UL — SIGNIFICANT CHANGE UP (ref 150–400)
PLATELET # BLD AUTO: 253 K/UL — SIGNIFICANT CHANGE UP (ref 150–400)
PLATELET # BLD AUTO: 253 K/UL — SIGNIFICANT CHANGE UP (ref 150–400)
PLATELET # BLD AUTO: 260 K/UL — SIGNIFICANT CHANGE UP (ref 150–400)
PLATELET # BLD AUTO: 263 K/UL — SIGNIFICANT CHANGE UP (ref 150–400)
PLATELET # BLD AUTO: 263 K/UL — SIGNIFICANT CHANGE UP (ref 150–400)
PLATELET # BLD AUTO: 266 K/UL — SIGNIFICANT CHANGE UP (ref 150–400)
PLATELET # BLD AUTO: 268 K/UL — SIGNIFICANT CHANGE UP (ref 150–400)
PLATELET # BLD AUTO: 268 K/UL — SIGNIFICANT CHANGE UP (ref 150–400)
PLATELET # BLD AUTO: 270 K/UL — SIGNIFICANT CHANGE UP (ref 150–400)
PLATELET # BLD AUTO: 270 K/UL — SIGNIFICANT CHANGE UP (ref 150–400)
PLATELET # BLD AUTO: 274 K/UL — SIGNIFICANT CHANGE UP (ref 150–400)
PLATELET # BLD AUTO: 274 K/UL — SIGNIFICANT CHANGE UP (ref 150–400)
PLATELET # BLD AUTO: 275 K/UL — SIGNIFICANT CHANGE UP (ref 150–400)
PLATELET # BLD AUTO: 286 K/UL — SIGNIFICANT CHANGE UP (ref 150–400)
PLATELET # BLD AUTO: 291 K/UL — SIGNIFICANT CHANGE UP (ref 150–400)
PLATELET # BLD AUTO: 291 K/UL — SIGNIFICANT CHANGE UP (ref 150–400)
PLATELET # BLD AUTO: 293 K/UL — SIGNIFICANT CHANGE UP (ref 150–400)
PLATELET # BLD AUTO: 295 K/UL — SIGNIFICANT CHANGE UP (ref 150–400)
PLATELET # BLD AUTO: 301 K/UL — SIGNIFICANT CHANGE UP (ref 150–400)
PLATELET # BLD AUTO: 301 K/UL — SIGNIFICANT CHANGE UP (ref 150–400)
PLATELET # BLD AUTO: 310 K/UL — SIGNIFICANT CHANGE UP (ref 150–400)
PLATELET # BLD AUTO: 310 K/UL — SIGNIFICANT CHANGE UP (ref 150–400)
PLATELET # BLD AUTO: 311 K/UL — SIGNIFICANT CHANGE UP (ref 150–400)
PLATELET # BLD AUTO: 313 K/UL — SIGNIFICANT CHANGE UP (ref 150–400)
PLATELET # BLD AUTO: 313 K/UL — SIGNIFICANT CHANGE UP (ref 150–400)
PLATELET # BLD AUTO: 318 K/UL — SIGNIFICANT CHANGE UP (ref 150–400)
PLATELET # BLD AUTO: 318 K/UL — SIGNIFICANT CHANGE UP (ref 150–400)
PLATELET # BLD AUTO: 320 K/UL — SIGNIFICANT CHANGE UP (ref 150–400)
PLATELET # BLD AUTO: 320 K/UL — SIGNIFICANT CHANGE UP (ref 150–400)
PLATELET # BLD AUTO: 321 K/UL — SIGNIFICANT CHANGE UP (ref 150–400)
PLATELET # BLD AUTO: 325 K/UL — SIGNIFICANT CHANGE UP (ref 150–400)
PLATELET # BLD AUTO: 325 K/UL — SIGNIFICANT CHANGE UP (ref 150–400)
PLATELET # BLD AUTO: 329 K/UL — SIGNIFICANT CHANGE UP (ref 150–400)
PLATELET # BLD AUTO: 329 K/UL — SIGNIFICANT CHANGE UP (ref 150–400)
PLATELET # BLD AUTO: 335 K/UL — SIGNIFICANT CHANGE UP (ref 150–400)
PLATELET # BLD AUTO: 347 K/UL — SIGNIFICANT CHANGE UP (ref 150–400)
PLATELET # BLD AUTO: 350 K/UL — SIGNIFICANT CHANGE UP (ref 150–400)
PLATELET # BLD AUTO: 350 K/UL — SIGNIFICANT CHANGE UP (ref 150–400)
PLATELET # BLD AUTO: 352 K/UL — SIGNIFICANT CHANGE UP (ref 150–400)
PLATELET # BLD AUTO: 354 K/UL — SIGNIFICANT CHANGE UP (ref 150–400)
PLATELET # BLD AUTO: 354 K/UL — SIGNIFICANT CHANGE UP (ref 150–400)
PLATELET # BLD AUTO: 362 K/UL — SIGNIFICANT CHANGE UP (ref 150–400)
PLATELET # BLD AUTO: 362 K/UL — SIGNIFICANT CHANGE UP (ref 150–400)
PLATELET # BLD AUTO: 367 K/UL — SIGNIFICANT CHANGE UP (ref 150–400)
PLATELET # BLD AUTO: 367 K/UL — SIGNIFICANT CHANGE UP (ref 150–400)
PLATELET # BLD AUTO: 377 K/UL — SIGNIFICANT CHANGE UP (ref 150–400)
PLATELET # BLD AUTO: 377 K/UL — SIGNIFICANT CHANGE UP (ref 150–400)
PLATELET # BLD AUTO: 378 K/UL — SIGNIFICANT CHANGE UP (ref 150–400)
PLATELET # BLD AUTO: 378 K/UL — SIGNIFICANT CHANGE UP (ref 150–400)
PLATELET # BLD AUTO: 381 K/UL — SIGNIFICANT CHANGE UP (ref 150–400)
PLATELET # BLD AUTO: 389 K/UL — SIGNIFICANT CHANGE UP (ref 150–400)
PLATELET # BLD AUTO: 389 K/UL — SIGNIFICANT CHANGE UP (ref 150–400)
PLATELET # BLD AUTO: 391 K/UL — SIGNIFICANT CHANGE UP (ref 150–400)
PLATELET # BLD AUTO: 391 K/UL — SIGNIFICANT CHANGE UP (ref 150–400)
PLATELET # BLD AUTO: 392 K/UL — SIGNIFICANT CHANGE UP (ref 150–400)
PLATELET # BLD AUTO: 392 K/UL — SIGNIFICANT CHANGE UP (ref 150–400)
PLATELET # BLD AUTO: 394 K/UL — SIGNIFICANT CHANGE UP (ref 150–400)
PLATELET # BLD AUTO: 394 K/UL — SIGNIFICANT CHANGE UP (ref 150–400)
PLATELET # BLD AUTO: 411 K/UL — HIGH (ref 150–400)
PLATELET # BLD AUTO: 411 K/UL — HIGH (ref 150–400)
PLATELET # BLD AUTO: 442 K/UL — HIGH (ref 150–400)
PLATELET # BLD AUTO: 442 K/UL — HIGH (ref 150–400)
PLATELET # BLD AUTO: 443 K/UL — HIGH (ref 150–400)
PLATELET # BLD AUTO: 443 K/UL — HIGH (ref 150–400)
PLATELET COUNT - ESTIMATE: NORMAL — SIGNIFICANT CHANGE UP
PLATELET COUNT - ESTIMATE: NORMAL — SIGNIFICANT CHANGE UP
PO2 BLDV: 44 MMHG — SIGNIFICANT CHANGE UP (ref 25–45)
PO2 BLDV: 44 MMHG — SIGNIFICANT CHANGE UP (ref 25–45)
PO2 BLDV: 45 MMHG — SIGNIFICANT CHANGE UP (ref 25–45)
PO2 BLDV: 45 MMHG — SIGNIFICANT CHANGE UP (ref 25–45)
PO2 BLDV: 52 MMHG — HIGH (ref 25–45)
PO2 BLDV: 52 MMHG — HIGH (ref 25–45)
PO2 BLDV: 53 MMHG — HIGH (ref 25–45)
PO2 BLDV: 53 MMHG — HIGH (ref 25–45)
PO2 BLDV: 60 MMHG — HIGH (ref 25–45)
PO2 BLDV: 71 MMHG — HIGH (ref 25–45)
PO2 BLDV: 71 MMHG — HIGH (ref 25–45)
PO2 BLDV: 74 MMHG — HIGH (ref 25–45)
POIKILOCYTOSIS BLD QL AUTO: SIGNIFICANT CHANGE UP
POIKILOCYTOSIS BLD QL AUTO: SIGNIFICANT CHANGE UP
POIKILOCYTOSIS BLD QL AUTO: SLIGHT — SIGNIFICANT CHANGE UP
POLYCHROMASIA BLD QL SMEAR: SLIGHT — SIGNIFICANT CHANGE UP
POLYCHROMASIA BLD QL SMEAR: SLIGHT — SIGNIFICANT CHANGE UP
POTASSIUM BLDV-SCNC: 3.2 MMOL/L — LOW (ref 3.5–5.1)
POTASSIUM BLDV-SCNC: 3.3 MMOL/L — LOW (ref 3.5–5.1)
POTASSIUM BLDV-SCNC: 3.3 MMOL/L — LOW (ref 3.5–5.1)
POTASSIUM BLDV-SCNC: 3.5 MMOL/L — SIGNIFICANT CHANGE UP (ref 3.5–5.1)
POTASSIUM BLDV-SCNC: 3.7 MMOL/L — SIGNIFICANT CHANGE UP (ref 3.5–5.1)
POTASSIUM BLDV-SCNC: 3.7 MMOL/L — SIGNIFICANT CHANGE UP (ref 3.5–5.1)
POTASSIUM BLDV-SCNC: 3.9 MMOL/L — SIGNIFICANT CHANGE UP (ref 3.5–5.1)
POTASSIUM BLDV-SCNC: 4.2 MMOL/L — SIGNIFICANT CHANGE UP (ref 3.5–5.1)
POTASSIUM BLDV-SCNC: 4.2 MMOL/L — SIGNIFICANT CHANGE UP (ref 3.5–5.1)
POTASSIUM SERPL-MCNC: 2.8 MMOL/L — CRITICAL LOW (ref 3.5–5.3)
POTASSIUM SERPL-MCNC: 3 MMOL/L — LOW (ref 3.5–5.3)
POTASSIUM SERPL-MCNC: 3.3 MMOL/L — LOW (ref 3.5–5.3)
POTASSIUM SERPL-MCNC: 3.5 MMOL/L — SIGNIFICANT CHANGE UP (ref 3.5–5.3)
POTASSIUM SERPL-MCNC: 3.7 MMOL/L — SIGNIFICANT CHANGE UP (ref 3.5–5.3)
POTASSIUM SERPL-MCNC: 3.8 MMOL/L — SIGNIFICANT CHANGE UP (ref 3.5–5.3)
POTASSIUM SERPL-MCNC: 3.9 MMOL/L — SIGNIFICANT CHANGE UP (ref 3.5–5.3)
POTASSIUM SERPL-MCNC: 4 MMOL/L — SIGNIFICANT CHANGE UP (ref 3.5–5.3)
POTASSIUM SERPL-MCNC: 4.1 MMOL/L — SIGNIFICANT CHANGE UP (ref 3.5–5.3)
POTASSIUM SERPL-MCNC: 4.2 MMOL/L — SIGNIFICANT CHANGE UP (ref 3.5–5.3)
POTASSIUM SERPL-MCNC: 4.3 MMOL/L — SIGNIFICANT CHANGE UP (ref 3.5–5.3)
POTASSIUM SERPL-MCNC: 4.4 MMOL/L — SIGNIFICANT CHANGE UP (ref 3.5–5.3)
POTASSIUM SERPL-MCNC: 4.5 MMOL/L — SIGNIFICANT CHANGE UP (ref 3.5–5.3)
POTASSIUM SERPL-MCNC: 4.5 MMOL/L — SIGNIFICANT CHANGE UP (ref 3.5–5.3)
POTASSIUM SERPL-MCNC: 4.6 MMOL/L — SIGNIFICANT CHANGE UP (ref 3.5–5.3)
POTASSIUM SERPL-MCNC: 4.6 MMOL/L — SIGNIFICANT CHANGE UP (ref 3.5–5.3)
POTASSIUM SERPL-MCNC: 4.7 MMOL/L — SIGNIFICANT CHANGE UP (ref 3.5–5.3)
POTASSIUM SERPL-MCNC: 5 MMOL/L — SIGNIFICANT CHANGE UP (ref 3.5–5.3)
POTASSIUM SERPL-MCNC: 5.2 MMOL/L — SIGNIFICANT CHANGE UP (ref 3.5–5.3)
POTASSIUM SERPL-MCNC: 5.2 MMOL/L — SIGNIFICANT CHANGE UP (ref 3.5–5.3)
POTASSIUM SERPL-SCNC: 2.8 MMOL/L — CRITICAL LOW (ref 3.5–5.3)
POTASSIUM SERPL-SCNC: 3 MMOL/L — LOW (ref 3.5–5.3)
POTASSIUM SERPL-SCNC: 3.3 MMOL/L — LOW (ref 3.5–5.3)
POTASSIUM SERPL-SCNC: 3.5 MMOL/L — SIGNIFICANT CHANGE UP (ref 3.5–5.3)
POTASSIUM SERPL-SCNC: 3.7 MMOL/L
POTASSIUM SERPL-SCNC: 3.7 MMOL/L — SIGNIFICANT CHANGE UP (ref 3.5–5.3)
POTASSIUM SERPL-SCNC: 3.8 MMOL/L — SIGNIFICANT CHANGE UP (ref 3.5–5.3)
POTASSIUM SERPL-SCNC: 3.9 MMOL/L — SIGNIFICANT CHANGE UP (ref 3.5–5.3)
POTASSIUM SERPL-SCNC: 4 MMOL/L — SIGNIFICANT CHANGE UP (ref 3.5–5.3)
POTASSIUM SERPL-SCNC: 4.1 MMOL/L — SIGNIFICANT CHANGE UP (ref 3.5–5.3)
POTASSIUM SERPL-SCNC: 4.2 MMOL/L — SIGNIFICANT CHANGE UP (ref 3.5–5.3)
POTASSIUM SERPL-SCNC: 4.3 MMOL/L — SIGNIFICANT CHANGE UP (ref 3.5–5.3)
POTASSIUM SERPL-SCNC: 4.4 MMOL/L — SIGNIFICANT CHANGE UP (ref 3.5–5.3)
POTASSIUM SERPL-SCNC: 4.5 MMOL/L — SIGNIFICANT CHANGE UP (ref 3.5–5.3)
POTASSIUM SERPL-SCNC: 4.5 MMOL/L — SIGNIFICANT CHANGE UP (ref 3.5–5.3)
POTASSIUM SERPL-SCNC: 4.6 MMOL/L — SIGNIFICANT CHANGE UP (ref 3.5–5.3)
POTASSIUM SERPL-SCNC: 4.6 MMOL/L — SIGNIFICANT CHANGE UP (ref 3.5–5.3)
POTASSIUM SERPL-SCNC: 4.7 MMOL/L — SIGNIFICANT CHANGE UP (ref 3.5–5.3)
POTASSIUM SERPL-SCNC: 5 MMOL/L — SIGNIFICANT CHANGE UP (ref 3.5–5.3)
POTASSIUM SERPL-SCNC: 5.2 MMOL/L — SIGNIFICANT CHANGE UP (ref 3.5–5.3)
POTASSIUM SERPL-SCNC: 5.2 MMOL/L — SIGNIFICANT CHANGE UP (ref 3.5–5.3)
PROCALCITONIN SERPL-MCNC: 0.06 NG/ML — SIGNIFICANT CHANGE UP (ref 0.02–0.1)
PROT SERPL-MCNC: 5.2 G/DL — LOW (ref 6–8.3)
PROT SERPL-MCNC: 5.2 G/DL — LOW (ref 6–8.3)
PROT SERPL-MCNC: 5.3 G/DL — LOW (ref 6–8.3)
PROT SERPL-MCNC: 5.4 G/DL — LOW (ref 6–8.3)
PROT SERPL-MCNC: 5.4 G/DL — LOW (ref 6–8.3)
PROT SERPL-MCNC: 5.5 G/DL — LOW (ref 6–8.3)
PROT SERPL-MCNC: 5.6 G/DL — LOW (ref 6–8.3)
PROT SERPL-MCNC: 5.8 G/DL — LOW (ref 6–8.3)
PROT SERPL-MCNC: 5.9 G/DL — LOW (ref 6–8.3)
PROT SERPL-MCNC: 6 G/DL — SIGNIFICANT CHANGE UP (ref 6–8.3)
PROT SERPL-MCNC: 6.1 G/DL — SIGNIFICANT CHANGE UP (ref 6–8.3)
PROT SERPL-MCNC: 6.3 G/DL — SIGNIFICANT CHANGE UP (ref 6–8.3)
PROT SERPL-MCNC: 6.4 G/DL
PROT SERPL-MCNC: 6.4 G/DL — SIGNIFICANT CHANGE UP (ref 6–8.3)
PROT SERPL-MCNC: 6.4 G/DL — SIGNIFICANT CHANGE UP (ref 6–8.3)
PROT SERPL-MCNC: 6.5 G/DL — SIGNIFICANT CHANGE UP (ref 6–8.3)
PROT SERPL-MCNC: 6.5 G/DL — SIGNIFICANT CHANGE UP (ref 6–8.3)
PROT SERPL-MCNC: 6.5 GM/DL — SIGNIFICANT CHANGE UP (ref 6–8.3)
PROT SERPL-MCNC: 6.6 G/DL — SIGNIFICANT CHANGE UP (ref 6–8.3)
PROT SERPL-MCNC: 6.7 G/DL — SIGNIFICANT CHANGE UP (ref 6–8.3)
PROT SERPL-MCNC: 7.1 G/DL — SIGNIFICANT CHANGE UP (ref 6–8.3)
PROT SERPL-MCNC: 7.1 G/DL — SIGNIFICANT CHANGE UP (ref 6–8.3)
PROT SERPL-MCNC: 7.2 G/DL — SIGNIFICANT CHANGE UP (ref 6–8.3)
PROT SERPL-MCNC: 7.2 G/DL — SIGNIFICANT CHANGE UP (ref 6–8.3)
PROT UR-MCNC: ABNORMAL
PROT UR-MCNC: ABNORMAL
PROT UR-MCNC: NEGATIVE — SIGNIFICANT CHANGE UP
PROTHROM AB SERPL-ACNC: 11.3 SEC — SIGNIFICANT CHANGE UP (ref 9.5–13)
PROTHROM AB SERPL-ACNC: 11.9 SEC — SIGNIFICANT CHANGE UP (ref 9.5–13)
PROTHROM AB SERPL-ACNC: 12 SEC — SIGNIFICANT CHANGE UP (ref 9.5–13)
PROTHROM AB SERPL-ACNC: 12.1 SEC — SIGNIFICANT CHANGE UP (ref 9.5–13)
PROTHROM AB SERPL-ACNC: 12.1 SEC — SIGNIFICANT CHANGE UP (ref 9.5–13)
PROTHROM AB SERPL-ACNC: 12.3 SEC — SIGNIFICANT CHANGE UP (ref 9.5–13)
PROTHROM AB SERPL-ACNC: 12.6 SEC — SIGNIFICANT CHANGE UP (ref 9.5–13)
PROTHROM AB SERPL-ACNC: 12.6 SEC — SIGNIFICANT CHANGE UP (ref 9.5–13)
PROTHROM AB SERPL-ACNC: 12.7 SEC — SIGNIFICANT CHANGE UP (ref 9.5–13)
PROTHROM AB SERPL-ACNC: 12.7 SEC — SIGNIFICANT CHANGE UP (ref 9.5–13)
PROTHROM AB SERPL-ACNC: 12.8 SEC — SIGNIFICANT CHANGE UP (ref 9.5–13)
PROTHROM AB SERPL-ACNC: 12.9 SEC — SIGNIFICANT CHANGE UP (ref 9.5–13)
PROTHROM AB SERPL-ACNC: 12.9 SEC — SIGNIFICANT CHANGE UP (ref 9.5–13)
PROTHROM AB SERPL-ACNC: 13.1 SEC — HIGH (ref 9.5–13)
PROTHROM AB SERPL-ACNC: 13.1 SEC — HIGH (ref 9.5–13)
PROTHROM AB SERPL-ACNC: 13.5 SEC — HIGH (ref 9.5–13)
PROTHROM AB SERPL-ACNC: 13.5 SEC — HIGH (ref 9.5–13)
PROTHROM AB SERPL-ACNC: 13.6 SEC — HIGH (ref 9.5–13)
PROTHROM AB SERPL-ACNC: 13.6 SEC — HIGH (ref 9.5–13)
PROTHROM AB SERPL-ACNC: 14 SEC — HIGH (ref 9.5–13)
PROTHROM AB SERPL-ACNC: 14 SEC — HIGH (ref 9.5–13)
PROTHROM AB SERPL-ACNC: 14.4 SEC — HIGH (ref 9.5–13)
PROTHROM AB SERPL-ACNC: 14.4 SEC — HIGH (ref 9.5–13)
PROTHROM AB SERPL-ACNC: 14.5 SEC — HIGH (ref 9.5–13)
PROTHROM AB SERPL-ACNC: 14.5 SEC — HIGH (ref 9.5–13)
PROTHROM AB SERPL-ACNC: 14.7 SEC — HIGH (ref 9.5–13)
PROTHROM AB SERPL-ACNC: 14.7 SEC — HIGH (ref 9.5–13)
PROTHROM AB SERPL-ACNC: 15.5 SEC — HIGH (ref 9.5–13)
PROTHROM AB SERPL-ACNC: 15.5 SEC — HIGH (ref 9.5–13)
PROTHROM AB SERPL-ACNC: 17.8 SEC — HIGH (ref 9.5–13)
PROTHROM AB SERPL-ACNC: 17.8 SEC — HIGH (ref 9.5–13)
PROTHROM AB SERPL-ACNC: 18.1 SEC — HIGH (ref 9.5–13)
PROTHROM AB SERPL-ACNC: 18.1 SEC — HIGH (ref 9.5–13)
PROTHROM AB SERPL-ACNC: 23.2 SEC — HIGH (ref 9.5–13)
PROTHROM AB SERPL-ACNC: 23.2 SEC — HIGH (ref 9.5–13)
RAPID RVP RESULT: SIGNIFICANT CHANGE UP
RBC # BLD: 3.57 M/UL — LOW (ref 3.8–5.2)
RBC # BLD: 3.57 M/UL — LOW (ref 3.8–5.2)
RBC # BLD: 3.58 M/UL — LOW (ref 3.8–5.2)
RBC # BLD: 3.58 M/UL — LOW (ref 3.8–5.2)
RBC # BLD: 3.63 M/UL — LOW (ref 3.8–5.2)
RBC # BLD: 3.63 M/UL — LOW (ref 3.8–5.2)
RBC # BLD: 3.64 M/UL — LOW (ref 3.8–5.2)
RBC # BLD: 3.64 M/UL — LOW (ref 3.8–5.2)
RBC # BLD: 3.66 M/UL — LOW (ref 3.8–5.2)
RBC # BLD: 3.77 M/UL — LOW (ref 3.8–5.2)
RBC # BLD: 3.77 M/UL — LOW (ref 3.8–5.2)
RBC # BLD: 3.8 M/UL — SIGNIFICANT CHANGE UP (ref 3.8–5.2)
RBC # BLD: 3.8 M/UL — SIGNIFICANT CHANGE UP (ref 3.8–5.2)
RBC # BLD: 3.82 M/UL — SIGNIFICANT CHANGE UP (ref 3.8–5.2)
RBC # BLD: 3.82 M/UL — SIGNIFICANT CHANGE UP (ref 3.8–5.2)
RBC # BLD: 3.85 M/UL — SIGNIFICANT CHANGE UP (ref 3.8–5.2)
RBC # BLD: 3.85 M/UL — SIGNIFICANT CHANGE UP (ref 3.8–5.2)
RBC # BLD: 3.89 M/UL — SIGNIFICANT CHANGE UP (ref 3.8–5.2)
RBC # BLD: 3.89 M/UL — SIGNIFICANT CHANGE UP (ref 3.8–5.2)
RBC # BLD: 3.9 M/UL — SIGNIFICANT CHANGE UP (ref 3.8–5.2)
RBC # BLD: 3.9 M/UL — SIGNIFICANT CHANGE UP (ref 3.8–5.2)
RBC # BLD: 3.93 M/UL — SIGNIFICANT CHANGE UP (ref 3.8–5.2)
RBC # BLD: 3.93 M/UL — SIGNIFICANT CHANGE UP (ref 3.8–5.2)
RBC # BLD: 3.94 M/UL — SIGNIFICANT CHANGE UP (ref 3.8–5.2)
RBC # BLD: 3.94 M/UL — SIGNIFICANT CHANGE UP (ref 3.8–5.2)
RBC # BLD: 3.95 M/UL — SIGNIFICANT CHANGE UP (ref 3.8–5.2)
RBC # BLD: 3.96 M/UL — SIGNIFICANT CHANGE UP (ref 3.8–5.2)
RBC # BLD: 3.96 M/UL — SIGNIFICANT CHANGE UP (ref 3.8–5.2)
RBC # BLD: 4.03 M/UL — SIGNIFICANT CHANGE UP (ref 3.8–5.2)
RBC # BLD: 4.03 M/UL — SIGNIFICANT CHANGE UP (ref 3.8–5.2)
RBC # BLD: 4.06 M/UL — SIGNIFICANT CHANGE UP (ref 3.8–5.2)
RBC # BLD: 4.06 M/UL — SIGNIFICANT CHANGE UP (ref 3.8–5.2)
RBC # BLD: 4.07 M/UL — SIGNIFICANT CHANGE UP (ref 3.8–5.2)
RBC # BLD: 4.07 M/UL — SIGNIFICANT CHANGE UP (ref 3.8–5.2)
RBC # BLD: 4.09 M/UL — SIGNIFICANT CHANGE UP (ref 3.8–5.2)
RBC # BLD: 4.09 M/UL — SIGNIFICANT CHANGE UP (ref 3.8–5.2)
RBC # BLD: 4.16 M/UL — SIGNIFICANT CHANGE UP (ref 3.8–5.2)
RBC # BLD: 4.17 M/UL — SIGNIFICANT CHANGE UP (ref 3.8–5.2)
RBC # BLD: 4.17 M/UL — SIGNIFICANT CHANGE UP (ref 3.8–5.2)
RBC # BLD: 4.22 M/UL — SIGNIFICANT CHANGE UP (ref 3.8–5.2)
RBC # BLD: 4.22 M/UL — SIGNIFICANT CHANGE UP (ref 3.8–5.2)
RBC # BLD: 4.3 M/UL — SIGNIFICANT CHANGE UP (ref 3.8–5.2)
RBC # BLD: 4.3 M/UL — SIGNIFICANT CHANGE UP (ref 3.8–5.2)
RBC # BLD: 4.31 M/UL — SIGNIFICANT CHANGE UP (ref 3.8–5.2)
RBC # BLD: 4.31 M/UL — SIGNIFICANT CHANGE UP (ref 3.8–5.2)
RBC # BLD: 4.39 M/UL — SIGNIFICANT CHANGE UP (ref 3.8–5.2)
RBC # BLD: 4.39 M/UL — SIGNIFICANT CHANGE UP (ref 3.8–5.2)
RBC # BLD: 4.45 M/UL — SIGNIFICANT CHANGE UP (ref 3.8–5.2)
RBC # BLD: 4.45 M/UL — SIGNIFICANT CHANGE UP (ref 3.8–5.2)
RBC # BLD: 4.51 M/UL — SIGNIFICANT CHANGE UP (ref 3.8–5.2)
RBC # BLD: 4.51 M/UL — SIGNIFICANT CHANGE UP (ref 3.8–5.2)
RBC # BLD: 4.52 M/UL — SIGNIFICANT CHANGE UP (ref 3.8–5.2)
RBC # BLD: 4.57 M/UL — SIGNIFICANT CHANGE UP (ref 3.8–5.2)
RBC # BLD: 4.57 M/UL — SIGNIFICANT CHANGE UP (ref 3.8–5.2)
RBC # BLD: 4.59 M/UL — SIGNIFICANT CHANGE UP (ref 3.8–5.2)
RBC # BLD: 4.65 M/UL — SIGNIFICANT CHANGE UP (ref 3.8–5.2)
RBC # BLD: 4.66 M/UL — SIGNIFICANT CHANGE UP (ref 3.8–5.2)
RBC # BLD: 4.68 M/UL — SIGNIFICANT CHANGE UP (ref 3.8–5.2)
RBC # BLD: 4.72 M/UL — SIGNIFICANT CHANGE UP (ref 3.8–5.2)
RBC # BLD: 4.72 M/UL — SIGNIFICANT CHANGE UP (ref 3.8–5.2)
RBC # BLD: 4.74 M/UL — SIGNIFICANT CHANGE UP (ref 3.8–5.2)
RBC # BLD: 4.74 M/UL — SIGNIFICANT CHANGE UP (ref 3.8–5.2)
RBC # BLD: 4.77 M/UL — SIGNIFICANT CHANGE UP (ref 3.8–5.2)
RBC # BLD: 4.79 M/UL — SIGNIFICANT CHANGE UP (ref 3.8–5.2)
RBC # BLD: 4.88 M/UL — SIGNIFICANT CHANGE UP (ref 3.8–5.2)
RBC # BLD: 4.88 M/UL — SIGNIFICANT CHANGE UP (ref 3.8–5.2)
RBC # BLD: 4.96 M/UL — SIGNIFICANT CHANGE UP (ref 3.8–5.2)
RBC # BLD: 5.17 M/UL — SIGNIFICANT CHANGE UP (ref 3.8–5.2)
RBC # BLD: 5.33 M/UL — HIGH (ref 3.8–5.2)
RBC # BLD: 5.45 M/UL — HIGH (ref 3.8–5.2)
RBC # BLD: 5.48 M/UL — HIGH (ref 3.8–5.2)
RBC # BLD: 5.51 M/UL — HIGH (ref 3.8–5.2)
RBC # BLD: 5.72 M/UL — HIGH (ref 3.8–5.2)
RBC # BLD: 5.77 M/UL — HIGH (ref 3.8–5.2)
RBC # FLD: 13.9 % — SIGNIFICANT CHANGE UP (ref 10.3–14.5)
RBC # FLD: 14.1 % — SIGNIFICANT CHANGE UP (ref 10.3–14.5)
RBC # FLD: 14.1 % — SIGNIFICANT CHANGE UP (ref 10.3–14.5)
RBC # FLD: 14.3 % — SIGNIFICANT CHANGE UP (ref 10.3–14.5)
RBC # FLD: 14.3 % — SIGNIFICANT CHANGE UP (ref 10.3–14.5)
RBC # FLD: 14.4 % — SIGNIFICANT CHANGE UP (ref 10.3–14.5)
RBC # FLD: 15.5 % — HIGH (ref 10.3–14.5)
RBC # FLD: 15.6 % — HIGH (ref 10.3–14.5)
RBC # FLD: 15.7 % — HIGH (ref 10.3–14.5)
RBC # FLD: 15.8 % — HIGH (ref 10.3–14.5)
RBC # FLD: 15.9 % — HIGH (ref 10.3–14.5)
RBC # FLD: 16 % — HIGH (ref 10.3–14.5)
RBC # FLD: 16.1 % — HIGH (ref 10.3–14.5)
RBC # FLD: 16.2 % — HIGH (ref 10.3–14.5)
RBC # FLD: 16.3 % — HIGH (ref 10.3–14.5)
RBC # FLD: 16.3 % — HIGH (ref 10.3–14.5)
RBC # FLD: 16.4 % — HIGH (ref 10.3–14.5)
RBC # FLD: 16.5 % — HIGH (ref 10.3–14.5)
RBC # FLD: 16.7 % — HIGH (ref 10.3–14.5)
RBC # FLD: 16.8 % — HIGH (ref 10.3–14.5)
RBC # FLD: 16.9 % — HIGH (ref 10.3–14.5)
RBC # FLD: 16.9 % — HIGH (ref 10.3–14.5)
RBC # FLD: 17.1 % — HIGH (ref 10.3–14.5)
RBC # FLD: 17.1 % — HIGH (ref 10.3–14.5)
RBC # FLD: 17.2 % — HIGH (ref 10.3–14.5)
RBC # FLD: 17.5 % — HIGH (ref 10.3–14.5)
RBC # FLD: 17.5 % — HIGH (ref 10.3–14.5)
RBC # FLD: 17.7 % — HIGH (ref 10.3–14.5)
RBC BLD AUTO: ABNORMAL
RBC BLD AUTO: SIGNIFICANT CHANGE UP
RBC BLD AUTO: SIGNIFICANT CHANGE UP
RBC CASTS # UR COMP ASSIST: 9 /HPF — HIGH (ref 0–4)
RH IG SCN BLD-IMP: POSITIVE — SIGNIFICANT CHANGE UP
S AUREUS DNA NOSE QL NAA+PROBE: SIGNIFICANT CHANGE UP
S AUREUS DNA NOSE QL NAA+PROBE: SIGNIFICANT CHANGE UP
SAO2 % BLD: 75.5 % — SIGNIFICANT CHANGE UP (ref 60–90)
SAO2 % BLDA: 94.8 % — SIGNIFICANT CHANGE UP (ref 94–98)
SAO2 % BLDV: 63.9 % — LOW (ref 67–88)
SAO2 % BLDV: 63.9 % — LOW (ref 67–88)
SAO2 % BLDV: 68.9 % — SIGNIFICANT CHANGE UP (ref 67–88)
SAO2 % BLDV: 68.9 % — SIGNIFICANT CHANGE UP (ref 67–88)
SAO2 % BLDV: 78.4 % — SIGNIFICANT CHANGE UP (ref 67–88)
SAO2 % BLDV: 78.4 % — SIGNIFICANT CHANGE UP (ref 67–88)
SAO2 % BLDV: 85.8 % — SIGNIFICANT CHANGE UP (ref 67–88)
SAO2 % BLDV: 85.8 % — SIGNIFICANT CHANGE UP (ref 67–88)
SAO2 % BLDV: 90.1 % — HIGH (ref 67–88)
SAO2 % BLDV: 90.1 % — HIGH (ref 67–88)
SAO2 % BLDV: 91.8 % — HIGH (ref 67–88)
SAO2 % BLDV: 95.5 % — HIGH (ref 67–88)
SAO2 % BLDV: 95.5 % — HIGH (ref 67–88)
SAO2 % BLDV: 96.8 % — HIGH (ref 67–88)
SARS-COV-2 RNA SPEC QL NAA+PROBE: SIGNIFICANT CHANGE UP
SODIUM SERPL-SCNC: 132 MMOL/L — LOW (ref 135–145)
SODIUM SERPL-SCNC: 133 MMOL/L — LOW (ref 135–145)
SODIUM SERPL-SCNC: 134 MMOL/L — LOW (ref 135–145)
SODIUM SERPL-SCNC: 135 MMOL/L — SIGNIFICANT CHANGE UP (ref 135–145)
SODIUM SERPL-SCNC: 135 MMOL/L — SIGNIFICANT CHANGE UP (ref 135–145)
SODIUM SERPL-SCNC: 136 MMOL/L — SIGNIFICANT CHANGE UP (ref 135–145)
SODIUM SERPL-SCNC: 137 MMOL/L — SIGNIFICANT CHANGE UP (ref 135–145)
SODIUM SERPL-SCNC: 138 MMOL/L — SIGNIFICANT CHANGE UP (ref 135–145)
SODIUM SERPL-SCNC: 139 MMOL/L — SIGNIFICANT CHANGE UP (ref 135–145)
SODIUM SERPL-SCNC: 140 MMOL/L — SIGNIFICANT CHANGE UP (ref 135–145)
SODIUM SERPL-SCNC: 141 MMOL/L — SIGNIFICANT CHANGE UP (ref 135–145)
SODIUM SERPL-SCNC: 142 MMOL/L
SODIUM SERPL-SCNC: 142 MMOL/L — SIGNIFICANT CHANGE UP (ref 135–145)
SODIUM SERPL-SCNC: 143 MMOL/L — SIGNIFICANT CHANGE UP (ref 135–145)
SP GR SPEC: 1.01 — SIGNIFICANT CHANGE UP (ref 1.01–1.02)
SP GR SPEC: 1.02 — SIGNIFICANT CHANGE UP (ref 1.01–1.02)
SP GR SPEC: 1.02 — SIGNIFICANT CHANGE UP (ref 1.01–1.02)
SPECIMEN SOURCE: SIGNIFICANT CHANGE UP
T3 SERPL-MCNC: 106 NG/DL
T3 SERPL-MCNC: 123 NG/DL — SIGNIFICANT CHANGE UP (ref 80–200)
T3 SERPL-MCNC: 123 NG/DL — SIGNIFICANT CHANGE UP (ref 80–200)
T4 AB SER-ACNC: 7.4 UG/DL — SIGNIFICANT CHANGE UP (ref 4.6–12)
T4 AB SER-ACNC: 7.4 UG/DL — SIGNIFICANT CHANGE UP (ref 4.6–12)
T4 SERPL-MCNC: 8.6 UG/DL
TRIGL SERPL-MCNC: 105 MG/DL
TRIGL SERPL-MCNC: 80 MG/DL — SIGNIFICANT CHANGE UP
TROPONIN I, HIGH SENSITIVITY RESULT: 207.8 NG/L — HIGH
TROPONIN T, HIGH SENSITIVITY RESULT: 117 NG/L — HIGH (ref 0–51)
TROPONIN T, HIGH SENSITIVITY RESULT: 24 NG/L — SIGNIFICANT CHANGE UP (ref 0–51)
TROPONIN T, HIGH SENSITIVITY RESULT: 26 NG/L — SIGNIFICANT CHANGE UP (ref 0–51)
TROPONIN T, HIGH SENSITIVITY RESULT: 27 NG/L — SIGNIFICANT CHANGE UP (ref 0–51)
TROPONIN T, HIGH SENSITIVITY RESULT: 28 NG/L — SIGNIFICANT CHANGE UP (ref 0–51)
TROPONIN T, HIGH SENSITIVITY RESULT: 29 NG/L — SIGNIFICANT CHANGE UP (ref 0–51)
TROPONIN T, HIGH SENSITIVITY RESULT: 29 NG/L — SIGNIFICANT CHANGE UP (ref 0–51)
TSH SERPL-ACNC: 9.93 UIU/ML
TSH SERPL-MCNC: 2.58 UIU/ML — SIGNIFICANT CHANGE UP (ref 0.27–4.2)
TSH SERPL-MCNC: 3.58 UIU/ML — SIGNIFICANT CHANGE UP (ref 0.27–4.2)
TSH SERPL-MCNC: 9.32 UIU/ML — HIGH (ref 0.27–4.2)
TSH SERPL-MCNC: 9.32 UIU/ML — HIGH (ref 0.27–4.2)
UFH PPP CHRO-ACNC: <0.04 IU/ML (ref 0.3–0.7)
UFH PPP CHRO-ACNC: <0.04 IU/ML (ref 0.3–0.7)
UROBILINOGEN FLD QL: NEGATIVE — SIGNIFICANT CHANGE UP
VANCOMYCIN FLD-MCNC: 15 UG/ML — SIGNIFICANT CHANGE UP
WBC # BLD: 10.63 K/UL — HIGH (ref 3.8–10.5)
WBC # BLD: 10.63 K/UL — HIGH (ref 3.8–10.5)
WBC # BLD: 11.26 K/UL — HIGH (ref 3.8–10.5)
WBC # BLD: 11.26 K/UL — HIGH (ref 3.8–10.5)
WBC # BLD: 11.45 K/UL — HIGH (ref 3.8–10.5)
WBC # BLD: 11.45 K/UL — HIGH (ref 3.8–10.5)
WBC # BLD: 11.56 K/UL — HIGH (ref 3.8–10.5)
WBC # BLD: 11.56 K/UL — HIGH (ref 3.8–10.5)
WBC # BLD: 11.65 K/UL — HIGH (ref 3.8–10.5)
WBC # BLD: 11.65 K/UL — HIGH (ref 3.8–10.5)
WBC # BLD: 12.03 K/UL — HIGH (ref 3.8–10.5)
WBC # BLD: 12.07 K/UL — HIGH (ref 3.8–10.5)
WBC # BLD: 12.18 K/UL — HIGH (ref 3.8–10.5)
WBC # BLD: 12.36 K/UL — HIGH (ref 3.8–10.5)
WBC # BLD: 12.52 K/UL — HIGH (ref 3.8–10.5)
WBC # BLD: 12.52 K/UL — HIGH (ref 3.8–10.5)
WBC # BLD: 13.6 K/UL — HIGH (ref 3.8–10.5)
WBC # BLD: 13.67 K/UL — HIGH (ref 3.8–10.5)
WBC # BLD: 13.67 K/UL — HIGH (ref 3.8–10.5)
WBC # BLD: 14.1 K/UL — HIGH (ref 3.8–10.5)
WBC # BLD: 14.1 K/UL — HIGH (ref 3.8–10.5)
WBC # BLD: 14.31 K/UL — HIGH (ref 3.8–10.5)
WBC # BLD: 14.34 K/UL — HIGH (ref 3.8–10.5)
WBC # BLD: 14.52 K/UL — HIGH (ref 3.8–10.5)
WBC # BLD: 14.53 K/UL — HIGH (ref 3.8–10.5)
WBC # BLD: 14.53 K/UL — HIGH (ref 3.8–10.5)
WBC # BLD: 14.71 K/UL — HIGH (ref 3.8–10.5)
WBC # BLD: 14.71 K/UL — HIGH (ref 3.8–10.5)
WBC # BLD: 15.03 K/UL — HIGH (ref 3.8–10.5)
WBC # BLD: 15.16 K/UL — HIGH (ref 3.8–10.5)
WBC # BLD: 15.16 K/UL — HIGH (ref 3.8–10.5)
WBC # BLD: 15.25 K/UL — HIGH (ref 3.8–10.5)
WBC # BLD: 15.45 K/UL — HIGH (ref 3.8–10.5)
WBC # BLD: 15.45 K/UL — HIGH (ref 3.8–10.5)
WBC # BLD: 15.61 K/UL — HIGH (ref 3.8–10.5)
WBC # BLD: 15.61 K/UL — HIGH (ref 3.8–10.5)
WBC # BLD: 15.65 K/UL — HIGH (ref 3.8–10.5)
WBC # BLD: 16.15 K/UL — HIGH (ref 3.8–10.5)
WBC # BLD: 16.39 K/UL — HIGH (ref 3.8–10.5)
WBC # BLD: 16.54 K/UL — HIGH (ref 3.8–10.5)
WBC # BLD: 16.54 K/UL — HIGH (ref 3.8–10.5)
WBC # BLD: 16.65 K/UL — HIGH (ref 3.8–10.5)
WBC # BLD: 16.65 K/UL — HIGH (ref 3.8–10.5)
WBC # BLD: 17.22 K/UL — HIGH (ref 3.8–10.5)
WBC # BLD: 17.29 K/UL — HIGH (ref 3.8–10.5)
WBC # BLD: 17.29 K/UL — HIGH (ref 3.8–10.5)
WBC # BLD: 17.37 K/UL — HIGH (ref 3.8–10.5)
WBC # BLD: 17.94 K/UL — HIGH (ref 3.8–10.5)
WBC # BLD: 17.94 K/UL — HIGH (ref 3.8–10.5)
WBC # BLD: 18.06 K/UL — HIGH (ref 3.8–10.5)
WBC # BLD: 18.06 K/UL — HIGH (ref 3.8–10.5)
WBC # BLD: 18.5 K/UL — HIGH (ref 3.8–10.5)
WBC # BLD: 18.5 K/UL — HIGH (ref 3.8–10.5)
WBC # BLD: 19.38 K/UL — HIGH (ref 3.8–10.5)
WBC # BLD: 19.9 K/UL — HIGH (ref 3.8–10.5)
WBC # BLD: 19.9 K/UL — HIGH (ref 3.8–10.5)
WBC # BLD: 20.44 K/UL — HIGH (ref 3.8–10.5)
WBC # BLD: 20.44 K/UL — HIGH (ref 3.8–10.5)
WBC # BLD: 21.06 K/UL — HIGH (ref 3.8–10.5)
WBC # BLD: 21.06 K/UL — HIGH (ref 3.8–10.5)
WBC # BLD: 22.87 K/UL — HIGH (ref 3.8–10.5)
WBC # BLD: 22.87 K/UL — HIGH (ref 3.8–10.5)
WBC # BLD: 24.08 K/UL — HIGH (ref 3.8–10.5)
WBC # BLD: 24.08 K/UL — HIGH (ref 3.8–10.5)
WBC # BLD: 24.95 K/UL — HIGH (ref 3.8–10.5)
WBC # BLD: 24.95 K/UL — HIGH (ref 3.8–10.5)
WBC # BLD: 25.36 K/UL — HIGH (ref 3.8–10.5)
WBC # BLD: 25.36 K/UL — HIGH (ref 3.8–10.5)
WBC # BLD: 25.54 K/UL — HIGH (ref 3.8–10.5)
WBC # BLD: 25.54 K/UL — HIGH (ref 3.8–10.5)
WBC # BLD: 26.58 K/UL — HIGH (ref 3.8–10.5)
WBC # BLD: 26.58 K/UL — HIGH (ref 3.8–10.5)
WBC # BLD: 27.89 K/UL — HIGH (ref 3.8–10.5)
WBC # BLD: 27.89 K/UL — HIGH (ref 3.8–10.5)
WBC # BLD: 29.47 K/UL — HIGH (ref 3.8–10.5)
WBC # BLD: 29.47 K/UL — HIGH (ref 3.8–10.5)
WBC # BLD: 29.6 K/UL — HIGH (ref 3.8–10.5)
WBC # BLD: 29.6 K/UL — HIGH (ref 3.8–10.5)
WBC # BLD: 9.85 K/UL — SIGNIFICANT CHANGE UP (ref 3.8–10.5)
WBC # BLD: 9.85 K/UL — SIGNIFICANT CHANGE UP (ref 3.8–10.5)
WBC # FLD AUTO: 10.63 K/UL — HIGH (ref 3.8–10.5)
WBC # FLD AUTO: 10.63 K/UL — HIGH (ref 3.8–10.5)
WBC # FLD AUTO: 11.26 K/UL — HIGH (ref 3.8–10.5)
WBC # FLD AUTO: 11.26 K/UL — HIGH (ref 3.8–10.5)
WBC # FLD AUTO: 11.45 K/UL — HIGH (ref 3.8–10.5)
WBC # FLD AUTO: 11.45 K/UL — HIGH (ref 3.8–10.5)
WBC # FLD AUTO: 11.56 K/UL — HIGH (ref 3.8–10.5)
WBC # FLD AUTO: 11.56 K/UL — HIGH (ref 3.8–10.5)
WBC # FLD AUTO: 11.65 K/UL — HIGH (ref 3.8–10.5)
WBC # FLD AUTO: 11.65 K/UL — HIGH (ref 3.8–10.5)
WBC # FLD AUTO: 12.03 K/UL — HIGH (ref 3.8–10.5)
WBC # FLD AUTO: 12.07 K/UL — HIGH (ref 3.8–10.5)
WBC # FLD AUTO: 12.18 K/UL — HIGH (ref 3.8–10.5)
WBC # FLD AUTO: 12.36 K/UL — HIGH (ref 3.8–10.5)
WBC # FLD AUTO: 12.52 K/UL — HIGH (ref 3.8–10.5)
WBC # FLD AUTO: 12.52 K/UL — HIGH (ref 3.8–10.5)
WBC # FLD AUTO: 13.6 K/UL — HIGH (ref 3.8–10.5)
WBC # FLD AUTO: 13.67 K/UL — HIGH (ref 3.8–10.5)
WBC # FLD AUTO: 13.67 K/UL — HIGH (ref 3.8–10.5)
WBC # FLD AUTO: 14.1 K/UL — HIGH (ref 3.8–10.5)
WBC # FLD AUTO: 14.1 K/UL — HIGH (ref 3.8–10.5)
WBC # FLD AUTO: 14.31 K/UL — HIGH (ref 3.8–10.5)
WBC # FLD AUTO: 14.34 K/UL — HIGH (ref 3.8–10.5)
WBC # FLD AUTO: 14.52 K/UL — HIGH (ref 3.8–10.5)
WBC # FLD AUTO: 14.53 K/UL — HIGH (ref 3.8–10.5)
WBC # FLD AUTO: 14.53 K/UL — HIGH (ref 3.8–10.5)
WBC # FLD AUTO: 14.71 K/UL — HIGH (ref 3.8–10.5)
WBC # FLD AUTO: 14.71 K/UL — HIGH (ref 3.8–10.5)
WBC # FLD AUTO: 15.03 K/UL — HIGH (ref 3.8–10.5)
WBC # FLD AUTO: 15.16 K/UL — HIGH (ref 3.8–10.5)
WBC # FLD AUTO: 15.16 K/UL — HIGH (ref 3.8–10.5)
WBC # FLD AUTO: 15.25 K/UL — HIGH (ref 3.8–10.5)
WBC # FLD AUTO: 15.45 K/UL — HIGH (ref 3.8–10.5)
WBC # FLD AUTO: 15.45 K/UL — HIGH (ref 3.8–10.5)
WBC # FLD AUTO: 15.61 K/UL — HIGH (ref 3.8–10.5)
WBC # FLD AUTO: 15.61 K/UL — HIGH (ref 3.8–10.5)
WBC # FLD AUTO: 15.65 K/UL — HIGH (ref 3.8–10.5)
WBC # FLD AUTO: 16.15 K/UL — HIGH (ref 3.8–10.5)
WBC # FLD AUTO: 16.39 K/UL — HIGH (ref 3.8–10.5)
WBC # FLD AUTO: 16.54 K/UL — HIGH (ref 3.8–10.5)
WBC # FLD AUTO: 16.54 K/UL — HIGH (ref 3.8–10.5)
WBC # FLD AUTO: 16.65 K/UL — HIGH (ref 3.8–10.5)
WBC # FLD AUTO: 16.65 K/UL — HIGH (ref 3.8–10.5)
WBC # FLD AUTO: 17.22 K/UL — HIGH (ref 3.8–10.5)
WBC # FLD AUTO: 17.29 K/UL — HIGH (ref 3.8–10.5)
WBC # FLD AUTO: 17.29 K/UL — HIGH (ref 3.8–10.5)
WBC # FLD AUTO: 17.37 K/UL — HIGH (ref 3.8–10.5)
WBC # FLD AUTO: 17.94 K/UL — HIGH (ref 3.8–10.5)
WBC # FLD AUTO: 17.94 K/UL — HIGH (ref 3.8–10.5)
WBC # FLD AUTO: 18.06 K/UL — HIGH (ref 3.8–10.5)
WBC # FLD AUTO: 18.06 K/UL — HIGH (ref 3.8–10.5)
WBC # FLD AUTO: 18.5 K/UL — HIGH (ref 3.8–10.5)
WBC # FLD AUTO: 18.5 K/UL — HIGH (ref 3.8–10.5)
WBC # FLD AUTO: 19.38 K/UL — HIGH (ref 3.8–10.5)
WBC # FLD AUTO: 19.9 K/UL — HIGH (ref 3.8–10.5)
WBC # FLD AUTO: 19.9 K/UL — HIGH (ref 3.8–10.5)
WBC # FLD AUTO: 20.44 K/UL — HIGH (ref 3.8–10.5)
WBC # FLD AUTO: 20.44 K/UL — HIGH (ref 3.8–10.5)
WBC # FLD AUTO: 21.06 K/UL — HIGH (ref 3.8–10.5)
WBC # FLD AUTO: 21.06 K/UL — HIGH (ref 3.8–10.5)
WBC # FLD AUTO: 22.87 K/UL — HIGH (ref 3.8–10.5)
WBC # FLD AUTO: 22.87 K/UL — HIGH (ref 3.8–10.5)
WBC # FLD AUTO: 24.08 K/UL — HIGH (ref 3.8–10.5)
WBC # FLD AUTO: 24.08 K/UL — HIGH (ref 3.8–10.5)
WBC # FLD AUTO: 24.95 K/UL — HIGH (ref 3.8–10.5)
WBC # FLD AUTO: 24.95 K/UL — HIGH (ref 3.8–10.5)
WBC # FLD AUTO: 25.36 K/UL — HIGH (ref 3.8–10.5)
WBC # FLD AUTO: 25.36 K/UL — HIGH (ref 3.8–10.5)
WBC # FLD AUTO: 25.54 K/UL — HIGH (ref 3.8–10.5)
WBC # FLD AUTO: 25.54 K/UL — HIGH (ref 3.8–10.5)
WBC # FLD AUTO: 26.58 K/UL — HIGH (ref 3.8–10.5)
WBC # FLD AUTO: 26.58 K/UL — HIGH (ref 3.8–10.5)
WBC # FLD AUTO: 27.89 K/UL — HIGH (ref 3.8–10.5)
WBC # FLD AUTO: 27.89 K/UL — HIGH (ref 3.8–10.5)
WBC # FLD AUTO: 29.47 K/UL — HIGH (ref 3.8–10.5)
WBC # FLD AUTO: 29.47 K/UL — HIGH (ref 3.8–10.5)
WBC # FLD AUTO: 29.6 K/UL — HIGH (ref 3.8–10.5)
WBC # FLD AUTO: 29.6 K/UL — HIGH (ref 3.8–10.5)
WBC # FLD AUTO: 9.85 K/UL — SIGNIFICANT CHANGE UP (ref 3.8–10.5)
WBC # FLD AUTO: 9.85 K/UL — SIGNIFICANT CHANGE UP (ref 3.8–10.5)
WBC UR QL: 138 /HPF — HIGH (ref 0–5)
WBC UR QL: 138 /HPF — HIGH (ref 0–5)
WBC UR QL: 5 /HPF — SIGNIFICANT CHANGE UP (ref 0–5)

## 2023-01-01 PROCEDURE — 99232 SBSQ HOSP IP/OBS MODERATE 35: CPT

## 2023-01-01 PROCEDURE — 93456 R HRT CORONARY ARTERY ANGIO: CPT

## 2023-01-01 PROCEDURE — 85014 HEMATOCRIT: CPT

## 2023-01-01 PROCEDURE — 99292 CRITICAL CARE ADDL 30 MIN: CPT | Mod: FS

## 2023-01-01 PROCEDURE — 93010 ELECTROCARDIOGRAM REPORT: CPT | Mod: 76

## 2023-01-01 PROCEDURE — 99233 SBSQ HOSP IP/OBS HIGH 50: CPT

## 2023-01-01 PROCEDURE — 83880 ASSAY OF NATRIURETIC PEPTIDE: CPT

## 2023-01-01 PROCEDURE — 96374 THER/PROPH/DIAG INJ IV PUSH: CPT

## 2023-01-01 PROCEDURE — 84100 ASSAY OF PHOSPHORUS: CPT

## 2023-01-01 PROCEDURE — 93010 ELECTROCARDIOGRAM REPORT: CPT

## 2023-01-01 PROCEDURE — 99232 SBSQ HOSP IP/OBS MODERATE 35: CPT | Mod: GC

## 2023-01-01 PROCEDURE — 99223 1ST HOSP IP/OBS HIGH 75: CPT

## 2023-01-01 PROCEDURE — 86901 BLOOD TYPING SEROLOGIC RH(D): CPT

## 2023-01-01 PROCEDURE — 71045 X-RAY EXAM CHEST 1 VIEW: CPT | Mod: 26,77

## 2023-01-01 PROCEDURE — 82962 GLUCOSE BLOOD TEST: CPT

## 2023-01-01 PROCEDURE — 85730 THROMBOPLASTIN TIME PARTIAL: CPT

## 2023-01-01 PROCEDURE — 93321 DOPPLER ECHO F-UP/LMTD STD: CPT | Mod: 26

## 2023-01-01 PROCEDURE — 80053 COMPREHEN METABOLIC PANEL: CPT

## 2023-01-01 PROCEDURE — 85025 COMPLETE CBC W/AUTO DIFF WBC: CPT

## 2023-01-01 PROCEDURE — 84484 ASSAY OF TROPONIN QUANT: CPT

## 2023-01-01 PROCEDURE — 84295 ASSAY OF SERUM SODIUM: CPT

## 2023-01-01 PROCEDURE — 84132 ASSAY OF SERUM POTASSIUM: CPT

## 2023-01-01 PROCEDURE — 93308 TTE F-UP OR LMTD: CPT | Mod: 26

## 2023-01-01 PROCEDURE — 93005 ELECTROCARDIOGRAM TRACING: CPT

## 2023-01-01 PROCEDURE — C1889: CPT

## 2023-01-01 PROCEDURE — C8929: CPT

## 2023-01-01 PROCEDURE — 83735 ASSAY OF MAGNESIUM: CPT

## 2023-01-01 PROCEDURE — 99215 OFFICE O/P EST HI 40 MIN: CPT

## 2023-01-01 PROCEDURE — 93306 TTE W/DOPPLER COMPLETE: CPT | Mod: 26

## 2023-01-01 PROCEDURE — 81001 URINALYSIS AUTO W/SCOPE: CPT

## 2023-01-01 PROCEDURE — 85027 COMPLETE CBC AUTOMATED: CPT

## 2023-01-01 PROCEDURE — 82947 ASSAY GLUCOSE BLOOD QUANT: CPT

## 2023-01-01 PROCEDURE — 99233 SBSQ HOSP IP/OBS HIGH 50: CPT | Mod: GC

## 2023-01-01 PROCEDURE — 82435 ASSAY OF BLOOD CHLORIDE: CPT

## 2023-01-01 PROCEDURE — 80061 LIPID PANEL: CPT

## 2023-01-01 PROCEDURE — 83036 HEMOGLOBIN GLYCOSYLATED A1C: CPT

## 2023-01-01 PROCEDURE — 93654 COMPRE EP EVAL TX VT: CPT

## 2023-01-01 PROCEDURE — 99285 EMERGENCY DEPT VISIT HI MDM: CPT | Mod: 25

## 2023-01-01 PROCEDURE — 83605 ASSAY OF LACTIC ACID: CPT

## 2023-01-01 PROCEDURE — C1887: CPT

## 2023-01-01 PROCEDURE — 99152 MOD SED SAME PHYS/QHP 5/>YRS: CPT

## 2023-01-01 PROCEDURE — 99223 1ST HOSP IP/OBS HIGH 75: CPT | Mod: FS

## 2023-01-01 PROCEDURE — 93295 DEV INTERROG REMOTE 1/2/MLT: CPT

## 2023-01-01 PROCEDURE — P9045: CPT

## 2023-01-01 PROCEDURE — 97165 OT EVAL LOW COMPLEX 30 MIN: CPT

## 2023-01-01 PROCEDURE — 94640 AIRWAY INHALATION TREATMENT: CPT

## 2023-01-01 PROCEDURE — 99291 CRITICAL CARE FIRST HOUR: CPT | Mod: 25

## 2023-01-01 PROCEDURE — 87641 MR-STAPH DNA AMP PROBE: CPT

## 2023-01-01 PROCEDURE — 93306 TTE W/DOPPLER COMPLETE: CPT

## 2023-01-01 PROCEDURE — 97162 PT EVAL MOD COMPLEX 30 MIN: CPT

## 2023-01-01 PROCEDURE — 93000 ELECTROCARDIOGRAM COMPLETE: CPT | Mod: 59

## 2023-01-01 PROCEDURE — C8924: CPT

## 2023-01-01 PROCEDURE — 71045 X-RAY EXAM CHEST 1 VIEW: CPT | Mod: 26

## 2023-01-01 PROCEDURE — 71045 X-RAY EXAM CHEST 1 VIEW: CPT

## 2023-01-01 PROCEDURE — 74174 CTA ABD&PLVS W/CONTRAST: CPT | Mod: MA

## 2023-01-01 PROCEDURE — 97161 PT EVAL LOW COMPLEX 20 MIN: CPT

## 2023-01-01 PROCEDURE — 87077 CULTURE AEROBIC IDENTIFY: CPT

## 2023-01-01 PROCEDURE — 96375 TX/PRO/DX INJ NEW DRUG ADDON: CPT

## 2023-01-01 PROCEDURE — 36573 INSJ PICC RS&I 5 YR+: CPT

## 2023-01-01 PROCEDURE — 93925 LOWER EXTREMITY STUDY: CPT

## 2023-01-01 PROCEDURE — 85018 HEMOGLOBIN: CPT

## 2023-01-01 PROCEDURE — 99239 HOSP IP/OBS DSCHRG MGMT >30: CPT

## 2023-01-01 PROCEDURE — 71260 CT THORAX DX C+: CPT | Mod: MA

## 2023-01-01 PROCEDURE — 93282 PRGRMG EVAL IMPLANTABLE DFB: CPT

## 2023-01-01 PROCEDURE — C1759: CPT

## 2023-01-01 PROCEDURE — 93000 ELECTROCARDIOGRAM COMPLETE: CPT | Mod: 59,PD

## 2023-01-01 PROCEDURE — 99285 EMERGENCY DEPT VISIT HI MDM: CPT | Mod: GC

## 2023-01-01 PROCEDURE — 71046 X-RAY EXAM CHEST 2 VIEWS: CPT | Mod: 26

## 2023-01-01 PROCEDURE — 87186 SC STD MICRODIL/AGAR DIL: CPT

## 2023-01-01 PROCEDURE — 93642 EP EVL 1/2CHMB TRNSVNS CVDFB: CPT | Mod: 26

## 2023-01-01 PROCEDURE — 99231 SBSQ HOSP IP/OBS SF/LOW 25: CPT | Mod: GC

## 2023-01-01 PROCEDURE — 99292 CRITICAL CARE ADDL 30 MIN: CPT

## 2023-01-01 PROCEDURE — C1732: CPT

## 2023-01-01 PROCEDURE — 82803 BLOOD GASES ANY COMBINATION: CPT

## 2023-01-01 PROCEDURE — 86850 RBC ANTIBODY SCREEN: CPT

## 2023-01-01 PROCEDURE — 87040 BLOOD CULTURE FOR BACTERIA: CPT

## 2023-01-01 PROCEDURE — 33025 INCISION OF HEART SAC: CPT

## 2023-01-01 PROCEDURE — 86900 BLOOD TYPING SEROLOGIC ABO: CPT

## 2023-01-01 PROCEDURE — 71046 X-RAY EXAM CHEST 2 VIEWS: CPT

## 2023-01-01 PROCEDURE — 99497 ADVNCD CARE PLAN 30 MIN: CPT | Mod: GC

## 2023-01-01 PROCEDURE — 93000 ELECTROCARDIOGRAM COMPLETE: CPT

## 2023-01-01 PROCEDURE — C9399: CPT

## 2023-01-01 PROCEDURE — 99285 EMERGENCY DEPT VISIT HI MDM: CPT

## 2023-01-01 PROCEDURE — C1730: CPT

## 2023-01-01 PROCEDURE — 82330 ASSAY OF CALCIUM: CPT

## 2023-01-01 PROCEDURE — 86923 COMPATIBILITY TEST ELECTRIC: CPT

## 2023-01-01 PROCEDURE — 97530 THERAPEUTIC ACTIVITIES: CPT

## 2023-01-01 PROCEDURE — 99292 CRITICAL CARE ADDL 30 MIN: CPT | Mod: FS,25

## 2023-01-01 PROCEDURE — 84436 ASSAY OF TOTAL THYROXINE: CPT

## 2023-01-01 PROCEDURE — 36415 COLL VENOUS BLD VENIPUNCTURE: CPT

## 2023-01-01 PROCEDURE — 80048 BASIC METABOLIC PNL TOTAL CA: CPT

## 2023-01-01 PROCEDURE — 97110 THERAPEUTIC EXERCISES: CPT

## 2023-01-01 PROCEDURE — 99285 EMERGENCY DEPT VISIT HI MDM: CPT | Mod: FS

## 2023-01-01 PROCEDURE — 97116 GAIT TRAINING THERAPY: CPT

## 2023-01-01 PROCEDURE — 99214 OFFICE O/P EST MOD 30 MIN: CPT

## 2023-01-01 PROCEDURE — 99291 CRITICAL CARE FIRST HOUR: CPT

## 2023-01-01 PROCEDURE — 84480 ASSAY TRIIODOTHYRONINE (T3): CPT

## 2023-01-01 PROCEDURE — 82668 ASSAY OF ERYTHROPOIETIN: CPT

## 2023-01-01 PROCEDURE — 74240 X-RAY XM UPR GI TRC 1CNTRST: CPT

## 2023-01-01 PROCEDURE — 87338 HPYLORI STOOL AG IA: CPT

## 2023-01-01 PROCEDURE — 93462 L HRT CATH TRNSPTL PUNCTURE: CPT

## 2023-01-01 PROCEDURE — 99233 SBSQ HOSP IP/OBS HIGH 50: CPT | Mod: FS

## 2023-01-01 PROCEDURE — 93282 PRGRMG EVAL IMPLANTABLE DFB: CPT | Mod: 26

## 2023-01-01 PROCEDURE — 93654 COMPRE EP EVAL TX VT: CPT | Mod: 74

## 2023-01-01 PROCEDURE — 84443 ASSAY THYROID STIM HORMONE: CPT

## 2023-01-01 PROCEDURE — 94660 CPAP INITIATION&MGMT: CPT

## 2023-01-01 PROCEDURE — 93321 DOPPLER ECHO F-UP/LMTD STD: CPT

## 2023-01-01 PROCEDURE — 93282 PRGRMG EVAL IMPLANTABLE DFB: CPT | Mod: PD

## 2023-01-01 PROCEDURE — 87086 URINE CULTURE/COLONY COUNT: CPT

## 2023-01-01 PROCEDURE — 74174 CTA ABD&PLVS W/CONTRAST: CPT | Mod: 26,MA

## 2023-01-01 PROCEDURE — 0225U NFCT DS DNA&RNA 21 SARSCOV2: CPT

## 2023-01-01 PROCEDURE — 71045 X-RAY EXAM CHEST 1 VIEW: CPT | Mod: 26,76

## 2023-01-01 PROCEDURE — 87640 STAPH A DNA AMP PROBE: CPT

## 2023-01-01 PROCEDURE — 85610 PROTHROMBIN TIME: CPT

## 2023-01-01 PROCEDURE — 99223 1ST HOSP IP/OBS HIGH 75: CPT | Mod: GC

## 2023-01-01 PROCEDURE — 80202 ASSAY OF VANCOMYCIN: CPT

## 2023-01-01 PROCEDURE — 93925 LOWER EXTREMITY STUDY: CPT | Mod: 26

## 2023-01-01 PROCEDURE — 82565 ASSAY OF CREATININE: CPT

## 2023-01-01 PROCEDURE — 82550 ASSAY OF CK (CPK): CPT

## 2023-01-01 PROCEDURE — 85379 FIBRIN DEGRADATION QUANT: CPT

## 2023-01-01 PROCEDURE — C1769: CPT

## 2023-01-01 PROCEDURE — 74240 X-RAY XM UPR GI TRC 1CNTRST: CPT | Mod: 26

## 2023-01-01 PROCEDURE — 99213 OFFICE O/P EST LOW 20 MIN: CPT

## 2023-01-01 PROCEDURE — 87324 CLOSTRIDIUM AG IA: CPT

## 2023-01-01 PROCEDURE — 93308 TTE F-UP OR LMTD: CPT

## 2023-01-01 PROCEDURE — 75572 CT HRT W/3D IMAGE: CPT | Mod: 26

## 2023-01-01 PROCEDURE — C1766: CPT

## 2023-01-01 PROCEDURE — 75572 CT HRT W/3D IMAGE: CPT

## 2023-01-01 PROCEDURE — 94010 BREATHING CAPACITY TEST: CPT | Mod: 26

## 2023-01-01 PROCEDURE — 99223 1ST HOSP IP/OBS HIGH 75: CPT | Mod: FS,GC

## 2023-01-01 PROCEDURE — 93010 ELECTROCARDIOGRAM REPORT: CPT | Mod: 77

## 2023-01-01 PROCEDURE — 71260 CT THORAX DX C+: CPT | Mod: 26,MA

## 2023-01-01 PROCEDURE — 99232 SBSQ HOSP IP/OBS MODERATE 35: CPT | Mod: FS

## 2023-01-01 PROCEDURE — 96376 TX/PRO/DX INJ SAME DRUG ADON: CPT

## 2023-01-01 PROCEDURE — 82553 CREATINE MB FRACTION: CPT

## 2023-01-01 PROCEDURE — G0378: CPT

## 2023-01-01 PROCEDURE — 85520 HEPARIN ASSAY: CPT

## 2023-01-01 PROCEDURE — 82010 KETONE BODYS QUAN: CPT

## 2023-01-01 PROCEDURE — 83690 ASSAY OF LIPASE: CPT

## 2023-01-01 PROCEDURE — 85576 BLOOD PLATELET AGGREGATION: CPT

## 2023-01-01 PROCEDURE — 93456 R HRT CORONARY ARTERY ANGIO: CPT | Mod: 26

## 2023-01-01 PROCEDURE — 99221 1ST HOSP IP/OBS SF/LOW 40: CPT

## 2023-01-01 PROCEDURE — 31622 DX BRONCHOSCOPE/WASH: CPT

## 2023-01-01 PROCEDURE — 84145 PROCALCITONIN (PCT): CPT

## 2023-01-01 PROCEDURE — 86891 AUTOLOGOUS BLOOD OP SALVAGE: CPT

## 2023-01-01 PROCEDURE — C1894: CPT

## 2023-01-01 PROCEDURE — 86803 HEPATITIS C AB TEST: CPT

## 2023-01-01 PROCEDURE — 87449 NOS EACH ORGANISM AG IA: CPT

## 2023-01-01 PROCEDURE — 87150 DNA/RNA AMPLIFIED PROBE: CPT

## 2023-01-01 PROCEDURE — 93296 REM INTERROG EVL PM/IDS: CPT

## 2023-01-01 PROCEDURE — 94010 BREATHING CAPACITY TEST: CPT

## 2023-01-01 DEVICE — LIGATING CLIPS WECK HORIZON MEDIUM (BLUE) 24: Type: IMPLANTABLE DEVICE | Status: FUNCTIONAL

## 2023-01-01 DEVICE — KIT A-LINE 1LUM 20G X 12CM SAFE KIT: Type: IMPLANTABLE DEVICE | Status: FUNCTIONAL

## 2023-01-01 DEVICE — CHEST DRAIN THORACIC ARGYLE PVC 32FR RIGHT ANGLE: Type: IMPLANTABLE DEVICE | Status: FUNCTIONAL

## 2023-01-01 RX ORDER — FUROSEMIDE 40 MG
40 TABLET ORAL DAILY
Refills: 0 | Status: DISCONTINUED | OUTPATIENT
Start: 2023-01-01 | End: 2023-01-01

## 2023-01-01 RX ORDER — ATORVASTATIN CALCIUM 80 MG/1
40 TABLET, FILM COATED ORAL AT BEDTIME
Refills: 0 | Status: DISCONTINUED | OUTPATIENT
Start: 2023-01-01 | End: 2023-01-01

## 2023-01-01 RX ORDER — METOPROLOL TARTRATE 50 MG
50 TABLET ORAL THREE TIMES A DAY
Refills: 0 | Status: DISCONTINUED | OUTPATIENT
Start: 2023-01-01 | End: 2023-01-01

## 2023-01-01 RX ORDER — SODIUM CHLORIDE 9 MG/ML
500 INJECTION, SOLUTION INTRAVENOUS ONCE
Refills: 0 | Status: COMPLETED | OUTPATIENT
Start: 2023-01-01 | End: 2023-01-01

## 2023-01-01 RX ORDER — METOPROLOL TARTRATE 50 MG
12.5 TABLET ORAL EVERY 12 HOURS
Refills: 0 | Status: DISCONTINUED | OUTPATIENT
Start: 2023-01-01 | End: 2023-01-01

## 2023-01-01 RX ORDER — CLOPIDOGREL BISULFATE 75 MG/1
75 TABLET, FILM COATED ORAL DAILY
Refills: 0 | Status: DISCONTINUED | OUTPATIENT
Start: 2023-01-01 | End: 2023-01-01

## 2023-01-01 RX ORDER — BUDESONIDE AND FORMOTEROL FUMARATE DIHYDRATE 160; 4.5 UG/1; UG/1
2 AEROSOL RESPIRATORY (INHALATION)
Refills: 0 | Status: DISCONTINUED | OUTPATIENT
Start: 2023-01-01 | End: 2023-01-01

## 2023-01-01 RX ORDER — METOPROLOL TARTRATE 50 MG
1 TABLET ORAL
Refills: 0 | DISCHARGE

## 2023-01-01 RX ORDER — AMIODARONE HYDROCHLORIDE 400 MG/1
150 TABLET ORAL ONCE
Refills: 0 | Status: COMPLETED | OUTPATIENT
Start: 2023-01-01 | End: 2023-01-01

## 2023-01-01 RX ORDER — AMIODARONE HYDROCHLORIDE 400 MG/1
200 TABLET ORAL DAILY
Refills: 0 | Status: DISCONTINUED | OUTPATIENT
Start: 2023-01-01 | End: 2023-01-01

## 2023-01-01 RX ORDER — AMIODARONE HYDROCHLORIDE 400 MG/1
TABLET ORAL
Refills: 0 | Status: DISCONTINUED | OUTPATIENT
Start: 2023-01-01 | End: 2023-01-01

## 2023-01-01 RX ORDER — NYSTATIN CREAM 100000 [USP'U]/G
1 CREAM TOPICAL THREE TIMES A DAY
Refills: 0 | Status: DISCONTINUED | OUTPATIENT
Start: 2023-01-01 | End: 2023-01-01

## 2023-01-01 RX ORDER — METOPROLOL TARTRATE 50 MG
50 TABLET ORAL ONCE
Refills: 0 | Status: DISCONTINUED | OUTPATIENT
Start: 2023-01-01 | End: 2023-01-01

## 2023-01-01 RX ORDER — IPRATROPIUM/ALBUTEROL SULFATE 18-103MCG
3 AEROSOL WITH ADAPTER (GRAM) INHALATION EVERY 6 HOURS
Refills: 0 | Status: DISCONTINUED | OUTPATIENT
Start: 2023-01-01 | End: 2023-01-01

## 2023-01-01 RX ORDER — METOPROLOL TARTRATE 50 MG
50 TABLET ORAL DAILY
Refills: 0 | Status: DISCONTINUED | OUTPATIENT
Start: 2023-01-01 | End: 2023-01-01

## 2023-01-01 RX ORDER — SOTALOL HCL 120 MG
80 TABLET ORAL EVERY 12 HOURS
Refills: 0 | Status: DISCONTINUED | OUTPATIENT
Start: 2023-01-01 | End: 2023-01-01

## 2023-01-01 RX ORDER — WARFARIN SODIUM 2.5 MG/1
5 TABLET ORAL ONCE
Refills: 0 | Status: COMPLETED | OUTPATIENT
Start: 2023-01-01 | End: 2023-01-01

## 2023-01-01 RX ORDER — DAPAGLIFLOZIN 10 MG/1
1 TABLET, FILM COATED ORAL
Qty: 30 | Refills: 0
Start: 2023-01-01 | End: 2023-01-01

## 2023-01-01 RX ORDER — AMIODARONE HYDROCHLORIDE 400 MG/1
1 TABLET ORAL
Qty: 30 | Refills: 0
Start: 2023-01-01 | End: 2023-01-01

## 2023-01-01 RX ORDER — PANTOPRAZOLE SODIUM 20 MG/1
1 TABLET, DELAYED RELEASE ORAL
Qty: 60 | Refills: 0
Start: 2023-01-01 | End: 2023-01-01

## 2023-01-01 RX ORDER — SODIUM CHLORIDE 9 MG/ML
3 INJECTION INTRAMUSCULAR; INTRAVENOUS; SUBCUTANEOUS
Refills: 0 | Status: DISCONTINUED | OUTPATIENT
Start: 2023-01-01 | End: 2023-01-01

## 2023-01-01 RX ORDER — CHLORHEXIDINE GLUCONATE 213 G/1000ML
1 SOLUTION TOPICAL
Refills: 0 | Status: DISCONTINUED | OUTPATIENT
Start: 2023-01-01 | End: 2023-01-01

## 2023-01-01 RX ORDER — WARFARIN SODIUM 2.5 MG/1
2.5 TABLET ORAL ONCE
Refills: 0 | Status: COMPLETED | OUTPATIENT
Start: 2023-01-01 | End: 2023-01-01

## 2023-01-01 RX ORDER — WARFARIN SODIUM 2.5 MG/1
2 TABLET ORAL
Qty: 60 | Refills: 0
Start: 2023-01-01 | End: 2023-01-01

## 2023-01-01 RX ORDER — HYDROMORPHONE HYDROCHLORIDE 2 MG/ML
0.5 INJECTION INTRAMUSCULAR; INTRAVENOUS; SUBCUTANEOUS EVERY 4 HOURS
Refills: 0 | Status: DISCONTINUED | OUTPATIENT
Start: 2023-01-01 | End: 2023-01-01

## 2023-01-01 RX ORDER — FUROSEMIDE 40 MG
1 TABLET ORAL
Refills: 0 | DISCHARGE

## 2023-01-01 RX ORDER — NYSTATIN CREAM 100000 [USP'U]/G
1 CREAM TOPICAL
Qty: 5 | Refills: 0 | DISCHARGE
Start: 2023-01-01 | End: 2023-01-01

## 2023-01-01 RX ORDER — ENOXAPARIN SODIUM 100 MG/ML
40 INJECTION SUBCUTANEOUS
Refills: 0 | Status: DISCONTINUED | OUTPATIENT
Start: 2023-01-01 | End: 2023-01-01

## 2023-01-01 RX ORDER — MEXILETINE HYDROCHLORIDE 150 MG/1
1 CAPSULE ORAL
Qty: 60 | Refills: 0
Start: 2023-01-01 | End: 2023-01-01

## 2023-01-01 RX ORDER — DEXMEDETOMIDINE HYDROCHLORIDE IN 0.9% SODIUM CHLORIDE 4 UG/ML
0.6 INJECTION INTRAVENOUS
Qty: 200 | Refills: 0 | Status: DISCONTINUED | OUTPATIENT
Start: 2023-01-01 | End: 2023-01-01

## 2023-01-01 RX ORDER — ASPIRIN/CALCIUM CARB/MAGNESIUM 324 MG
81 TABLET ORAL DAILY
Refills: 0 | Status: DISCONTINUED | OUTPATIENT
Start: 2023-01-01 | End: 2023-01-01

## 2023-01-01 RX ORDER — INFLUENZA VIRUS VACCINE 15; 15; 15; 15 UG/.5ML; UG/.5ML; UG/.5ML; UG/.5ML
0.7 SUSPENSION INTRAMUSCULAR ONCE
Refills: 0 | Status: DISCONTINUED | OUTPATIENT
Start: 2023-01-01 | End: 2023-01-01

## 2023-01-01 RX ORDER — DILTIAZEM HCL 120 MG
5 CAPSULE, EXT RELEASE 24 HR ORAL
Qty: 125 | Refills: 0 | Status: DISCONTINUED | OUTPATIENT
Start: 2023-01-01 | End: 2023-01-01

## 2023-01-01 RX ORDER — MAGNESIUM SULFATE 500 MG/ML
2 VIAL (ML) INJECTION ONCE
Refills: 0 | Status: COMPLETED | OUTPATIENT
Start: 2023-01-01 | End: 2023-01-01

## 2023-01-01 RX ORDER — FAMOTIDINE 20 MG/1
20 TABLET, FILM COATED ORAL
Qty: 90 | Refills: 3 | Status: DISCONTINUED | COMMUNITY
Start: 2020-10-29 | End: 2023-01-01

## 2023-01-01 RX ORDER — NICOTINE POLACRILEX 2 MG
1 GUM BUCCAL DAILY
Refills: 0 | Status: DISCONTINUED | OUTPATIENT
Start: 2023-01-01 | End: 2023-01-01

## 2023-01-01 RX ORDER — ACETAMINOPHEN 500 MG
1000 TABLET ORAL EVERY 6 HOURS
Refills: 0 | Status: DISCONTINUED | OUTPATIENT
Start: 2023-01-01 | End: 2023-01-01

## 2023-01-01 RX ORDER — QUINIDINE SULFATE 200 MG
1 TABLET ORAL
Qty: 60 | Refills: 0
Start: 2023-01-01 | End: 2023-01-01

## 2023-01-01 RX ORDER — SODIUM CHLORIDE 9 MG/ML
1000 INJECTION, SOLUTION INTRAVENOUS
Refills: 0 | Status: DISCONTINUED | OUTPATIENT
Start: 2023-01-01 | End: 2023-01-01

## 2023-01-01 RX ORDER — QUINIDINE SULFATE 200 MG
324 TABLET ORAL THREE TIMES A DAY
Refills: 0 | Status: DISCONTINUED | OUTPATIENT
Start: 2023-01-01 | End: 2023-01-01

## 2023-01-01 RX ORDER — ALBUTEROL 90 UG/1
2.5 AEROSOL, METERED ORAL ONCE
Refills: 0 | Status: COMPLETED | OUTPATIENT
Start: 2023-01-01 | End: 2023-01-01

## 2023-01-01 RX ORDER — ONDANSETRON 8 MG/1
4 TABLET, FILM COATED ORAL EVERY 8 HOURS
Refills: 0 | Status: DISCONTINUED | OUTPATIENT
Start: 2023-01-01 | End: 2023-01-01

## 2023-01-01 RX ORDER — POTASSIUM CHLORIDE 20 MEQ
20 PACKET (EA) ORAL ONCE
Refills: 0 | Status: COMPLETED | OUTPATIENT
Start: 2023-01-01 | End: 2023-01-01

## 2023-01-01 RX ORDER — LOSARTAN POTASSIUM 100 MG/1
1 TABLET, FILM COATED ORAL
Qty: 30 | Refills: 0
Start: 2023-01-01 | End: 2023-01-01

## 2023-01-01 RX ORDER — ASPIRIN/CALCIUM CARB/MAGNESIUM 324 MG
81 TABLET ORAL ONCE
Refills: 0 | Status: COMPLETED | OUTPATIENT
Start: 2023-01-01 | End: 2023-01-01

## 2023-01-01 RX ORDER — METOPROLOL TARTRATE 50 MG
25 TABLET ORAL DAILY
Refills: 0 | Status: DISCONTINUED | OUTPATIENT
Start: 2023-01-01 | End: 2023-01-01

## 2023-01-01 RX ORDER — AMIODARONE HYDROCHLORIDE 400 MG/1
200 TABLET ORAL
Refills: 0 | Status: COMPLETED | OUTPATIENT
Start: 2023-01-01 | End: 2023-01-01

## 2023-01-01 RX ORDER — HEPARIN SODIUM 5000 [USP'U]/ML
5000 INJECTION INTRAVENOUS; SUBCUTANEOUS EVERY 8 HOURS
Refills: 0 | Status: DISCONTINUED | OUTPATIENT
Start: 2023-01-01 | End: 2023-01-01

## 2023-01-01 RX ORDER — PANTOPRAZOLE SODIUM 20 MG/1
80 TABLET, DELAYED RELEASE ORAL ONCE
Refills: 0 | Status: COMPLETED | OUTPATIENT
Start: 2023-01-01 | End: 2023-01-01

## 2023-01-01 RX ORDER — NYSTATIN/TRIAMCINOLONE ACET
1 OINTMENT (GRAM) TOPICAL
Refills: 0 | Status: DISCONTINUED | OUTPATIENT
Start: 2023-01-01 | End: 2023-01-01

## 2023-01-01 RX ORDER — QUINIDINE SULFATE 200 MG
324 TABLET ORAL EVERY 8 HOURS
Refills: 0 | Status: DISCONTINUED | OUTPATIENT
Start: 2023-01-01 | End: 2023-01-01

## 2023-01-01 RX ORDER — SACUBITRIL AND VALSARTAN 49; 51 MG/1; MG/1
49-51 TABLET, FILM COATED ORAL
Qty: 180 | Refills: 1 | Status: DISCONTINUED | COMMUNITY
Start: 2020-09-17 | End: 2023-01-01

## 2023-01-01 RX ORDER — LIDOCAINE HCL 20 MG/ML
0.5 VIAL (ML) INJECTION
Qty: 2 | Refills: 0 | Status: DISCONTINUED | OUTPATIENT
Start: 2023-01-01 | End: 2023-01-01

## 2023-01-01 RX ORDER — FENTANYL CITRATE 50 UG/ML
50 INJECTION INTRAVENOUS ONCE
Refills: 0 | Status: DISCONTINUED | OUTPATIENT
Start: 2023-01-01 | End: 2023-01-01

## 2023-01-01 RX ORDER — LOSARTAN POTASSIUM 100 MG/1
25 TABLET, FILM COATED ORAL DAILY
Refills: 0 | Status: DISCONTINUED | OUTPATIENT
Start: 2023-01-01 | End: 2023-01-01

## 2023-01-01 RX ORDER — ACETAMINOPHEN 500 MG
1000 TABLET ORAL ONCE
Refills: 0 | Status: COMPLETED | OUTPATIENT
Start: 2023-01-01 | End: 2023-01-01

## 2023-01-01 RX ORDER — CEFTRIAXONE 500 MG/1
1000 INJECTION, POWDER, FOR SOLUTION INTRAMUSCULAR; INTRAVENOUS EVERY 24 HOURS
Refills: 0 | Status: COMPLETED | OUTPATIENT
Start: 2023-01-01 | End: 2023-01-01

## 2023-01-01 RX ORDER — ACETAMINOPHEN 500 MG
650 TABLET ORAL EVERY 6 HOURS
Refills: 0 | Status: DISCONTINUED | OUTPATIENT
Start: 2023-01-01 | End: 2023-01-01

## 2023-01-01 RX ORDER — FUROSEMIDE 40 MG
1 TABLET ORAL
Qty: 60 | Refills: 0
Start: 2023-01-01 | End: 2023-01-01

## 2023-01-01 RX ORDER — HEPARIN SODIUM 5000 [USP'U]/ML
1200 INJECTION INTRAVENOUS; SUBCUTANEOUS
Qty: 25000 | Refills: 0 | Status: DISCONTINUED | OUTPATIENT
Start: 2023-01-01 | End: 2023-01-01

## 2023-01-01 RX ORDER — HYDROMORPHONE HYDROCHLORIDE 2 MG/ML
0.25 INJECTION INTRAMUSCULAR; INTRAVENOUS; SUBCUTANEOUS
Refills: 0 | Status: DISCONTINUED | OUTPATIENT
Start: 2023-01-01 | End: 2023-01-01

## 2023-01-01 RX ORDER — METOPROLOL TARTRATE 50 MG
1 TABLET ORAL
Qty: 30 | Refills: 0
Start: 2023-01-01 | End: 2023-01-01

## 2023-01-01 RX ORDER — NYSTATIN 100000 1/G
100000 POWDER TOPICAL
Qty: 1 | Refills: 1 | Status: ACTIVE | COMMUNITY
Start: 1900-01-01 | End: 1900-01-01

## 2023-01-01 RX ORDER — CLOPIDOGREL BISULFATE 75 MG/1
1 TABLET, FILM COATED ORAL
Qty: 90 | Refills: 0
Start: 2023-01-01 | End: 2024-01-01

## 2023-01-01 RX ORDER — DAPAGLIFLOZIN 10 MG/1
1 TABLET, FILM COATED ORAL
Qty: 30 | Refills: 0 | DISCHARGE
Start: 2023-01-01 | End: 2023-01-01

## 2023-01-01 RX ORDER — INSULIN LISPRO 100/ML
VIAL (ML) SUBCUTANEOUS EVERY 6 HOURS
Refills: 0 | Status: DISCONTINUED | OUTPATIENT
Start: 2023-01-01 | End: 2023-01-01

## 2023-01-01 RX ORDER — DIGOXIN 250 MCG
500 TABLET ORAL ONCE
Refills: 0 | Status: DISCONTINUED | OUTPATIENT
Start: 2023-01-01 | End: 2023-01-01

## 2023-01-01 RX ORDER — MEXILETINE HYDROCHLORIDE 150 MG/1
150 CAPSULE ORAL
Refills: 0 | Status: DISCONTINUED | OUTPATIENT
Start: 2023-01-01 | End: 2023-01-01

## 2023-01-01 RX ORDER — FUROSEMIDE 40 MG
40 TABLET ORAL EVERY 12 HOURS
Refills: 0 | Status: DISCONTINUED | OUTPATIENT
Start: 2023-01-01 | End: 2023-01-01

## 2023-01-01 RX ORDER — WARFARIN SODIUM 2.5 MG/1
2 TABLET ORAL ONCE
Refills: 0 | Status: COMPLETED | OUTPATIENT
Start: 2023-01-01 | End: 2023-01-01

## 2023-01-01 RX ORDER — AMIODARONE HYDROCHLORIDE 400 MG/1
1 TABLET ORAL
Qty: 450 | Refills: 0 | Status: DISCONTINUED | OUTPATIENT
Start: 2023-01-01 | End: 2023-01-01

## 2023-01-01 RX ORDER — CHLORHEXIDINE GLUCONATE 213 G/1000ML
1 SOLUTION TOPICAL DAILY
Refills: 0 | Status: DISCONTINUED | OUTPATIENT
Start: 2023-01-01 | End: 2023-01-01

## 2023-01-01 RX ORDER — AMIODARONE HYDROCHLORIDE 400 MG/1
400 TABLET ORAL EVERY 8 HOURS
Refills: 0 | Status: DISCONTINUED | OUTPATIENT
Start: 2023-01-01 | End: 2023-01-01

## 2023-01-01 RX ORDER — FUROSEMIDE 40 MG
40 TABLET ORAL
Refills: 0 | Status: DISCONTINUED | OUTPATIENT
Start: 2023-01-01 | End: 2023-01-01

## 2023-01-01 RX ORDER — NICOTINE POLACRILEX 2 MG
1 GUM BUCCAL
Qty: 30 | Refills: 0
Start: 2023-01-01 | End: 2023-01-01

## 2023-01-01 RX ORDER — CARVEDILOL PHOSPHATE 80 MG/1
1 CAPSULE, EXTENDED RELEASE ORAL
Qty: 60 | Refills: 0
Start: 2023-01-01 | End: 2023-01-01

## 2023-01-01 RX ORDER — COLCHICINE 0.6 MG
0.6 TABLET ORAL DAILY
Refills: 0 | Status: DISCONTINUED | OUTPATIENT
Start: 2023-01-01 | End: 2023-01-01

## 2023-01-01 RX ORDER — PANTOPRAZOLE SODIUM 20 MG/1
40 TABLET, DELAYED RELEASE ORAL
Refills: 0 | Status: DISCONTINUED | OUTPATIENT
Start: 2023-01-01 | End: 2023-01-01

## 2023-01-01 RX ORDER — MIDAZOLAM HYDROCHLORIDE 1 MG/ML
2 INJECTION, SOLUTION INTRAMUSCULAR; INTRAVENOUS ONCE
Refills: 0 | Status: DISCONTINUED | OUTPATIENT
Start: 2023-01-01 | End: 2023-01-01

## 2023-01-01 RX ORDER — NYSTATIN CREAM 100000 [USP'U]/G
1 CREAM TOPICAL
Refills: 0 | Status: DISCONTINUED | OUTPATIENT
Start: 2023-01-01 | End: 2023-01-01

## 2023-01-01 RX ORDER — CHLORHEXIDINE GLUCONATE 213 G/1000ML
30 SOLUTION TOPICAL ONCE
Refills: 0 | Status: COMPLETED | OUTPATIENT
Start: 2023-01-01 | End: 2023-01-01

## 2023-01-01 RX ORDER — FUROSEMIDE 40 MG
40 TABLET ORAL
Refills: 0 | Status: COMPLETED | OUTPATIENT
Start: 2023-01-01 | End: 2023-01-01

## 2023-01-01 RX ORDER — METOPROLOL TARTRATE 50 MG
12.5 TABLET ORAL
Refills: 0 | Status: DISCONTINUED | OUTPATIENT
Start: 2023-01-01 | End: 2023-01-01

## 2023-01-01 RX ORDER — NYSTATIN CREAM 100000 [USP'U]/G
1 CREAM TOPICAL EVERY 12 HOURS
Refills: 0 | Status: DISCONTINUED | OUTPATIENT
Start: 2023-01-01 | End: 2023-01-01

## 2023-01-01 RX ORDER — SOTALOL HCL 120 MG
1 TABLET ORAL
Qty: 10 | Refills: 0
Start: 2023-01-01 | End: 2023-01-01

## 2023-01-01 RX ORDER — ALBUTEROL SULFATE 2.5 MG/3ML
(2.5 MG/3ML) SOLUTION RESPIRATORY (INHALATION)
Refills: 3 | Status: ACTIVE | COMMUNITY
Start: 2021-04-08

## 2023-01-01 RX ORDER — DAPAGLIFLOZIN 10 MG/1
10 TABLET, FILM COATED ORAL EVERY 24 HOURS
Refills: 0 | Status: DISCONTINUED | OUTPATIENT
Start: 2023-01-01 | End: 2023-01-01

## 2023-01-01 RX ORDER — CARVEDILOL PHOSPHATE 80 MG/1
6.25 CAPSULE, EXTENDED RELEASE ORAL EVERY 12 HOURS
Refills: 0 | Status: DISCONTINUED | OUTPATIENT
Start: 2023-01-01 | End: 2023-01-01

## 2023-01-01 RX ORDER — VANCOMYCIN HCL 1 G
1250 VIAL (EA) INTRAVENOUS EVERY 12 HOURS
Refills: 0 | Status: DISCONTINUED | OUTPATIENT
Start: 2023-01-01 | End: 2023-01-01

## 2023-01-01 RX ORDER — CHOLECALCIFEROL (VITAMIN D3) 125 MCG
2000 CAPSULE ORAL DAILY
Refills: 0 | Status: DISCONTINUED | OUTPATIENT
Start: 2023-01-01 | End: 2023-01-01

## 2023-01-01 RX ORDER — WARFARIN SODIUM 2.5 MG/1
2 TABLET ORAL
Qty: 6 | Refills: 0
Start: 2023-01-01 | End: 2023-01-01

## 2023-01-01 RX ORDER — METOPROLOL TARTRATE 50 MG
25 TABLET ORAL
Refills: 0 | Status: DISCONTINUED | OUTPATIENT
Start: 2023-01-01 | End: 2023-01-01

## 2023-01-01 RX ORDER — CEFAZOLIN SODIUM 1 G
VIAL (EA) INJECTION
Refills: 0 | Status: DISCONTINUED | OUTPATIENT
Start: 2023-01-01 | End: 2023-01-01

## 2023-01-01 RX ORDER — CARVEDILOL 6.25 MG/1
6.25 TABLET, FILM COATED ORAL
Qty: 180 | Refills: 3 | Status: DISCONTINUED | COMMUNITY
Start: 2021-10-14 | End: 2023-01-01

## 2023-01-01 RX ORDER — FAMOTIDINE 10 MG/ML
1 INJECTION INTRAVENOUS
Refills: 0 | DISCHARGE

## 2023-01-01 RX ORDER — PANTOPRAZOLE SODIUM 20 MG/1
40 TABLET, DELAYED RELEASE ORAL EVERY 12 HOURS
Refills: 0 | Status: DISCONTINUED | OUTPATIENT
Start: 2023-01-01 | End: 2023-01-01

## 2023-01-01 RX ORDER — ACETAMINOPHEN 500 MG
2 TABLET ORAL
Qty: 0 | Refills: 0 | DISCHARGE
Start: 2023-01-01

## 2023-01-01 RX ORDER — TETRACAINE/BENZOCAINE/BUTAMBEN 2%-14%-2%
1 OINTMENT (GRAM) TOPICAL ONCE
Refills: 0 | Status: COMPLETED | OUTPATIENT
Start: 2023-01-01 | End: 2023-01-01

## 2023-01-01 RX ORDER — METOPROLOL TARTRATE 50 MG
12.5 TABLET ORAL DAILY
Refills: 0 | Status: DISCONTINUED | OUTPATIENT
Start: 2023-01-01 | End: 2023-01-01

## 2023-01-01 RX ORDER — LANOLIN ALCOHOL/MO/W.PET/CERES
5 CREAM (GRAM) TOPICAL AT BEDTIME
Refills: 0 | Status: DISCONTINUED | OUTPATIENT
Start: 2023-01-01 | End: 2023-01-01

## 2023-01-01 RX ORDER — ADHESIVE TAPE 3"X 2.3 YD
50 MCG TAPE, NON-MEDICATED TOPICAL
Refills: 0 | Status: ACTIVE | COMMUNITY

## 2023-01-01 RX ORDER — POTASSIUM CHLORIDE 20 MEQ
40 PACKET (EA) ORAL EVERY 4 HOURS
Refills: 0 | Status: COMPLETED | OUTPATIENT
Start: 2023-01-01 | End: 2023-01-01

## 2023-01-01 RX ORDER — PIPERACILLIN AND TAZOBACTAM 4; .5 G/20ML; G/20ML
3.38 INJECTION, POWDER, LYOPHILIZED, FOR SOLUTION INTRAVENOUS EVERY 8 HOURS
Refills: 0 | Status: DISCONTINUED | OUTPATIENT
Start: 2023-01-01 | End: 2023-01-01

## 2023-01-01 RX ORDER — CLOPIDOGREL BISULFATE 75 MG/1
1 TABLET, FILM COATED ORAL
Refills: 0 | DISCHARGE

## 2023-01-01 RX ORDER — QUINIDINE SULFATE 200 MG
324 TABLET ORAL EVERY 12 HOURS
Refills: 0 | Status: DISCONTINUED | OUTPATIENT
Start: 2023-01-01 | End: 2023-01-01

## 2023-01-01 RX ORDER — NICOTINE POLACRILEX 2 MG
1 GUM BUCCAL EVERY 24 HOURS
Refills: 0 | Status: DISCONTINUED | OUTPATIENT
Start: 2023-01-01 | End: 2023-01-01

## 2023-01-01 RX ORDER — LIDOCAINE HCL 20 MG/ML
100 VIAL (ML) INJECTION ONCE
Refills: 0 | Status: COMPLETED | OUTPATIENT
Start: 2023-01-01 | End: 2023-01-01

## 2023-01-01 RX ORDER — SOTALOL HCL 120 MG
80 TABLET ORAL ONCE
Refills: 0 | Status: COMPLETED | OUTPATIENT
Start: 2023-01-01 | End: 2023-01-01

## 2023-01-01 RX ORDER — QUINIDINE SULFATE 200 MG
1 TABLET ORAL
Qty: 90 | Refills: 0
Start: 2023-01-01 | End: 2023-01-01

## 2023-01-01 RX ORDER — SACUBITRIL AND VALSARTAN 24; 26 MG/1; MG/1
1 TABLET, FILM COATED ORAL
Refills: 0 | Status: DISCONTINUED | OUTPATIENT
Start: 2023-01-01 | End: 2023-01-01

## 2023-01-01 RX ORDER — SODIUM,POTASSIUM PHOSPHATES 278-250MG
2 POWDER IN PACKET (EA) ORAL ONCE
Refills: 0 | Status: COMPLETED | OUTPATIENT
Start: 2023-01-01 | End: 2023-01-01

## 2023-01-01 RX ORDER — POTASSIUM CHLORIDE 20 MEQ
40 PACKET (EA) ORAL ONCE
Refills: 0 | Status: COMPLETED | OUTPATIENT
Start: 2023-01-01 | End: 2023-01-01

## 2023-01-01 RX ORDER — ENOXAPARIN SODIUM 100 MG/ML
40 INJECTION SUBCUTANEOUS EVERY 24 HOURS
Refills: 0 | Status: DISCONTINUED | OUTPATIENT
Start: 2023-01-01 | End: 2023-01-01

## 2023-01-01 RX ORDER — CHLORHEXIDINE GLUCONATE 4 %
5 LIQUID (ML) TOPICAL
Refills: 0 | Status: ACTIVE | COMMUNITY

## 2023-01-01 RX ORDER — SOTALOL HCL 120 MG
1 TABLET ORAL
Qty: 60 | Refills: 0
Start: 2023-01-01 | End: 2023-01-01

## 2023-01-01 RX ORDER — NOREPINEPHRINE BITARTRATE/D5W 8 MG/250ML
0.08 PLASTIC BAG, INJECTION (ML) INTRAVENOUS
Qty: 8 | Refills: 0 | Status: DISCONTINUED | OUTPATIENT
Start: 2023-01-01 | End: 2023-01-01

## 2023-01-01 RX ORDER — VANCOMYCIN HCL 1 G
1250 VIAL (EA) INTRAVENOUS ONCE
Refills: 0 | Status: COMPLETED | OUTPATIENT
Start: 2023-01-01 | End: 2023-01-01

## 2023-01-01 RX ORDER — CARVEDILOL PHOSPHATE 80 MG/1
3.12 CAPSULE, EXTENDED RELEASE ORAL EVERY 12 HOURS
Refills: 0 | Status: DISCONTINUED | OUTPATIENT
Start: 2023-01-01 | End: 2023-01-01

## 2023-01-01 RX ORDER — SACUBITRIL AND VALSARTAN 24; 26 MG/1; MG/1
1 TABLET, FILM COATED ORAL
Refills: 0 | DISCHARGE

## 2023-01-01 RX ORDER — IPRATROPIUM/ALBUTEROL SULFATE 18-103MCG
3 AEROSOL WITH ADAPTER (GRAM) INHALATION ONCE
Refills: 0 | Status: COMPLETED | OUTPATIENT
Start: 2023-01-01 | End: 2023-01-01

## 2023-01-01 RX ORDER — POTASSIUM CHLORIDE 20 MEQ
40 PACKET (EA) ORAL DAILY
Refills: 0 | Status: DISCONTINUED | OUTPATIENT
Start: 2023-01-01 | End: 2023-01-01

## 2023-01-01 RX ORDER — SACUBITRIL AND VALSARTAN 24; 26 MG/1; MG/1
1 TABLET, FILM COATED ORAL
Qty: 60 | Refills: 0
Start: 2023-01-01 | End: 2023-01-01

## 2023-01-01 RX ORDER — FAMOTIDINE 10 MG/ML
20 INJECTION INTRAVENOUS DAILY
Refills: 0 | Status: DISCONTINUED | OUTPATIENT
Start: 2023-01-01 | End: 2023-01-01

## 2023-01-01 RX ORDER — ALBUTEROL 90 UG/1
3 AEROSOL, METERED ORAL
Refills: 0 | DISCHARGE

## 2023-01-01 RX ORDER — AMIODARONE HYDROCHLORIDE 400 MG/1
400 TABLET ORAL EVERY 8 HOURS
Refills: 0 | Status: COMPLETED | OUTPATIENT
Start: 2023-01-01 | End: 2023-01-01

## 2023-01-01 RX ORDER — PIPERACILLIN AND TAZOBACTAM 4; .5 G/20ML; G/20ML
3.38 INJECTION, POWDER, LYOPHILIZED, FOR SOLUTION INTRAVENOUS ONCE
Refills: 0 | Status: COMPLETED | OUTPATIENT
Start: 2023-01-01 | End: 2023-01-01

## 2023-01-01 RX ORDER — OXYCODONE HYDROCHLORIDE 5 MG/1
10 TABLET ORAL EVERY 4 HOURS
Refills: 0 | Status: DISCONTINUED | OUTPATIENT
Start: 2023-01-01 | End: 2023-01-01

## 2023-01-01 RX ORDER — MAGNESIUM SULFATE 500 MG/ML
1 VIAL (ML) INJECTION ONCE
Refills: 0 | Status: COMPLETED | OUTPATIENT
Start: 2023-01-01 | End: 2023-01-01

## 2023-01-01 RX ORDER — POTASSIUM CHLORIDE 20 MEQ
10 PACKET (EA) ORAL
Refills: 0 | Status: COMPLETED | OUTPATIENT
Start: 2023-01-01 | End: 2023-01-01

## 2023-01-01 RX ORDER — FUROSEMIDE 40 MG
40 TABLET ORAL ONCE
Refills: 0 | Status: COMPLETED | OUTPATIENT
Start: 2023-01-01 | End: 2023-01-01

## 2023-01-01 RX ORDER — POTASSIUM CHLORIDE 20 MEQ
10 PACKET (EA) ORAL ONCE
Refills: 0 | Status: COMPLETED | OUTPATIENT
Start: 2023-01-01 | End: 2023-01-01

## 2023-01-01 RX ORDER — NYSTATIN CREAM 100000 [USP'U]/G
1 CREAM TOPICAL
Qty: 5 | Refills: 0
Start: 2023-01-01 | End: 2023-01-01

## 2023-01-01 RX ORDER — FUROSEMIDE 40 MG
20 TABLET ORAL ONCE
Refills: 0 | Status: COMPLETED | OUTPATIENT
Start: 2023-01-01 | End: 2023-01-01

## 2023-01-01 RX ORDER — ONDANSETRON 8 MG/1
4 TABLET, FILM COATED ORAL ONCE
Refills: 0 | Status: COMPLETED | OUTPATIENT
Start: 2023-01-01 | End: 2023-01-01

## 2023-01-01 RX ORDER — CHLORHEXIDINE GLUCONATE 213 G/1000ML
1 SOLUTION TOPICAL ONCE
Refills: 0 | Status: COMPLETED | OUTPATIENT
Start: 2023-01-01 | End: 2023-01-01

## 2023-01-01 RX ORDER — LANOLIN ALCOHOL/MO/W.PET/CERES
1 CREAM (GRAM) TOPICAL
Qty: 0 | Refills: 0 | DISCHARGE
Start: 2023-01-01

## 2023-01-01 RX ORDER — INSULIN LISPRO 100/ML
VIAL (ML) SUBCUTANEOUS AT BEDTIME
Refills: 0 | Status: DISCONTINUED | OUTPATIENT
Start: 2023-01-01 | End: 2023-01-01

## 2023-01-01 RX ORDER — OXYCODONE HYDROCHLORIDE 5 MG/1
5 TABLET ORAL EVERY 4 HOURS
Refills: 0 | Status: DISCONTINUED | OUTPATIENT
Start: 2023-01-01 | End: 2023-01-01

## 2023-01-01 RX ORDER — SODIUM CHLORIDE 9 MG/ML
4 INJECTION INTRAMUSCULAR; INTRAVENOUS; SUBCUTANEOUS EVERY 12 HOURS
Refills: 0 | Status: DISCONTINUED | OUTPATIENT
Start: 2023-01-01 | End: 2023-01-01

## 2023-01-01 RX ORDER — BUDESONIDE AND FORMOTEROL FUMARATE DIHYDRATE 160; 4.5 UG/1; UG/1
2 AEROSOL RESPIRATORY (INHALATION)
Refills: 0 | DISCHARGE

## 2023-01-01 RX ORDER — AMIODARONE HYDROCHLORIDE 400 MG/1
1 TABLET ORAL
Qty: 60 | Refills: 0
Start: 2023-01-01 | End: 2023-01-01

## 2023-01-01 RX ORDER — WARFARIN SODIUM 2.5 MG/1
4 TABLET ORAL ONCE
Refills: 0 | Status: COMPLETED | OUTPATIENT
Start: 2023-01-01 | End: 2023-01-01

## 2023-01-01 RX ORDER — LIDOCAINE HCL 20 MG/ML
1 VIAL (ML) INJECTION
Qty: 2 | Refills: 0 | Status: DISCONTINUED | OUTPATIENT
Start: 2023-01-01 | End: 2023-01-01

## 2023-01-01 RX ORDER — SACUBITRIL AND VALSARTAN 49; 51 MG/1; MG/1
49-51 TABLET, FILM COATED ORAL
Qty: 60 | Refills: 8 | Status: DISCONTINUED | COMMUNITY
Start: 2019-11-14 | End: 2023-01-01

## 2023-01-01 RX ORDER — METOPROLOL TARTRATE 50 MG
5 TABLET ORAL ONCE
Refills: 0 | Status: DISCONTINUED | OUTPATIENT
Start: 2023-01-01 | End: 2023-01-01

## 2023-01-01 RX ORDER — SOTALOL HYDROCHLORIDE 80 MG/1
80 TABLET ORAL TWICE DAILY
Qty: 180 | Refills: 3 | Status: DISCONTINUED | COMMUNITY
End: 2023-01-01

## 2023-01-01 RX ORDER — SODIUM CHLORIDE 9 MG/ML
500 INJECTION, SOLUTION INTRAVENOUS ONCE
Refills: 0 | Status: DISCONTINUED | OUTPATIENT
Start: 2023-01-01 | End: 2023-01-01

## 2023-01-01 RX ORDER — METOPROLOL TARTRATE 50 MG
50 TABLET ORAL
Refills: 0 | Status: DISCONTINUED | OUTPATIENT
Start: 2023-01-01 | End: 2023-01-01

## 2023-01-01 RX ORDER — ASPIRIN ENTERIC COATED TABLETS 81 MG 81 MG/1
81 TABLET, DELAYED RELEASE ORAL
Refills: 0 | Status: DISCONTINUED | COMMUNITY
End: 2023-01-01

## 2023-01-01 RX ORDER — WARFARIN SODIUM 2.5 MG/1
2.5 TABLET ORAL AT BEDTIME
Refills: 0 | Status: COMPLETED | OUTPATIENT
Start: 2023-01-01 | End: 2023-01-01

## 2023-01-01 RX ORDER — CEFUROXIME AXETIL 250 MG
1500 TABLET ORAL ONCE
Refills: 0 | Status: DISCONTINUED | OUTPATIENT
Start: 2023-01-01 | End: 2023-01-01

## 2023-01-01 RX ORDER — WARFARIN SODIUM 2.5 MG/1
4 TABLET ORAL ONCE
Refills: 0 | Status: DISCONTINUED | OUTPATIENT
Start: 2023-01-01 | End: 2023-01-01

## 2023-01-01 RX ORDER — AMIODARONE HYDROCHLORIDE 400 MG/1
150 TABLET ORAL ONCE
Refills: 0 | Status: DISCONTINUED | OUTPATIENT
Start: 2023-01-01 | End: 2023-01-01

## 2023-01-01 RX ORDER — INSULIN LISPRO 100/ML
VIAL (ML) SUBCUTANEOUS
Refills: 0 | Status: DISCONTINUED | OUTPATIENT
Start: 2023-01-01 | End: 2023-01-01

## 2023-01-01 RX ORDER — METOPROLOL TARTRATE 50 MG
0.5 TABLET ORAL
Qty: 15 | Refills: 0
Start: 2023-01-01 | End: 2023-01-01

## 2023-01-01 RX ORDER — SODIUM CHLORIDE 9 MG/ML
4 INJECTION INTRAMUSCULAR; INTRAVENOUS; SUBCUTANEOUS EVERY 8 HOURS
Refills: 0 | Status: DISCONTINUED | OUTPATIENT
Start: 2023-01-01 | End: 2023-01-01

## 2023-01-01 RX ORDER — BUDESONIDE AND FORMOTEROL FUMARATE DIHYDRATE 160; 4.5 UG/1; UG/1
160-4.5 AEROSOL RESPIRATORY (INHALATION)
Refills: 0 | Status: ACTIVE | COMMUNITY
Start: 2018-01-11

## 2023-01-01 RX ORDER — AMIODARONE HYDROCHLORIDE 400 MG/1
200 TABLET ORAL EVERY 12 HOURS
Refills: 0 | Status: DISCONTINUED | OUTPATIENT
Start: 2023-01-01 | End: 2023-01-01

## 2023-01-01 RX ORDER — ASPIRIN/CALCIUM CARB/MAGNESIUM 324 MG
1 TABLET ORAL
Qty: 30 | Refills: 0
Start: 2023-01-01 | End: 2023-01-01

## 2023-01-01 RX ORDER — ALBUTEROL 90 UG/1
2 AEROSOL, METERED ORAL
Refills: 0 | DISCHARGE

## 2023-01-01 RX ADMIN — Medication 324 MILLIGRAM(S): at 06:07

## 2023-01-01 RX ADMIN — CLOPIDOGREL BISULFATE 75 MILLIGRAM(S): 75 TABLET, FILM COATED ORAL at 13:04

## 2023-01-01 RX ADMIN — CARVEDILOL PHOSPHATE 6.25 MILLIGRAM(S): 80 CAPSULE, EXTENDED RELEASE ORAL at 05:00

## 2023-01-01 RX ADMIN — BUDESONIDE AND FORMOTEROL FUMARATE DIHYDRATE 2 PUFF(S): 160; 4.5 AEROSOL RESPIRATORY (INHALATION) at 18:26

## 2023-01-01 RX ADMIN — BUDESONIDE AND FORMOTEROL FUMARATE DIHYDRATE 2 PUFF(S): 160; 4.5 AEROSOL RESPIRATORY (INHALATION) at 05:56

## 2023-01-01 RX ADMIN — BUDESONIDE AND FORMOTEROL FUMARATE DIHYDRATE 2 PUFF(S): 160; 4.5 AEROSOL RESPIRATORY (INHALATION) at 05:07

## 2023-01-01 RX ADMIN — Medication 650 MILLIGRAM(S): at 12:14

## 2023-01-01 RX ADMIN — Medication 80 MILLIGRAM(S): at 19:53

## 2023-01-01 RX ADMIN — WARFARIN SODIUM 2.5 MILLIGRAM(S): 2.5 TABLET ORAL at 23:01

## 2023-01-01 RX ADMIN — Medication 12.5 MILLIGRAM(S): at 05:34

## 2023-01-01 RX ADMIN — CARVEDILOL PHOSPHATE 3.12 MILLIGRAM(S): 80 CAPSULE, EXTENDED RELEASE ORAL at 17:03

## 2023-01-01 RX ADMIN — Medication 324 MILLIGRAM(S): at 06:46

## 2023-01-01 RX ADMIN — Medication 40 MILLIEQUIVALENT(S): at 21:19

## 2023-01-01 RX ADMIN — SACUBITRIL AND VALSARTAN 1 TABLET(S): 24; 26 TABLET, FILM COATED ORAL at 17:31

## 2023-01-01 RX ADMIN — SODIUM CHLORIDE 3 MILLILITER(S): 9 INJECTION INTRAMUSCULAR; INTRAVENOUS; SUBCUTANEOUS at 05:29

## 2023-01-01 RX ADMIN — AMIODARONE HYDROCHLORIDE 600 MILLIGRAM(S): 400 TABLET ORAL at 21:23

## 2023-01-01 RX ADMIN — Medication 7.5 MG/MIN: at 19:59

## 2023-01-01 RX ADMIN — SODIUM CHLORIDE 25 MILLILITER(S): 9 INJECTION, SOLUTION INTRAVENOUS at 07:32

## 2023-01-01 RX ADMIN — Medication 1 APPLICATION(S): at 18:52

## 2023-01-01 RX ADMIN — AMIODARONE HYDROCHLORIDE 400 MILLIGRAM(S): 400 TABLET ORAL at 15:43

## 2023-01-01 RX ADMIN — BUDESONIDE AND FORMOTEROL FUMARATE DIHYDRATE 2 PUFF(S): 160; 4.5 AEROSOL RESPIRATORY (INHALATION) at 20:47

## 2023-01-01 RX ADMIN — Medication 5 MILLIGRAM(S): at 21:35

## 2023-01-01 RX ADMIN — BUDESONIDE AND FORMOTEROL FUMARATE DIHYDRATE 2 PUFF(S): 160; 4.5 AEROSOL RESPIRATORY (INHALATION) at 09:42

## 2023-01-01 RX ADMIN — Medication 5 MILLIGRAM(S): at 21:51

## 2023-01-01 RX ADMIN — CEFTRIAXONE 100 MILLIGRAM(S): 500 INJECTION, POWDER, FOR SOLUTION INTRAMUSCULAR; INTRAVENOUS at 16:57

## 2023-01-01 RX ADMIN — SACUBITRIL AND VALSARTAN 1 TABLET(S): 24; 26 TABLET, FILM COATED ORAL at 17:39

## 2023-01-01 RX ADMIN — CEFTRIAXONE 100 MILLIGRAM(S): 500 INJECTION, POWDER, FOR SOLUTION INTRAMUSCULAR; INTRAVENOUS at 15:42

## 2023-01-01 RX ADMIN — Medication 324 MILLIGRAM(S): at 05:17

## 2023-01-01 RX ADMIN — CLOPIDOGREL BISULFATE 75 MILLIGRAM(S): 75 TABLET, FILM COATED ORAL at 11:26

## 2023-01-01 RX ADMIN — Medication 324 MILLIGRAM(S): at 14:00

## 2023-01-01 RX ADMIN — Medication 650 MILLIGRAM(S): at 14:11

## 2023-01-01 RX ADMIN — Medication 650 MILLIGRAM(S): at 06:09

## 2023-01-01 RX ADMIN — PANTOPRAZOLE SODIUM 40 MILLIGRAM(S): 20 TABLET, DELAYED RELEASE ORAL at 08:50

## 2023-01-01 RX ADMIN — Medication 40 MILLIGRAM(S): at 20:12

## 2023-01-01 RX ADMIN — BUDESONIDE AND FORMOTEROL FUMARATE DIHYDRATE 2 PUFF(S): 160; 4.5 AEROSOL RESPIRATORY (INHALATION) at 21:19

## 2023-01-01 RX ADMIN — WARFARIN SODIUM 2.5 MILLIGRAM(S): 2.5 TABLET ORAL at 21:25

## 2023-01-01 RX ADMIN — PIPERACILLIN AND TAZOBACTAM 25 GRAM(S): 4; .5 INJECTION, POWDER, LYOPHILIZED, FOR SOLUTION INTRAVENOUS at 06:03

## 2023-01-01 RX ADMIN — Medication 1 APPLICATION(S): at 17:11

## 2023-01-01 RX ADMIN — LOSARTAN POTASSIUM 25 MILLIGRAM(S): 100 TABLET, FILM COATED ORAL at 05:45

## 2023-01-01 RX ADMIN — Medication 40 MILLIEQUIVALENT(S): at 05:29

## 2023-01-01 RX ADMIN — Medication 12.5 MILLIGRAM(S): at 05:18

## 2023-01-01 RX ADMIN — Medication 1 PATCH: at 11:03

## 2023-01-01 RX ADMIN — Medication 3 MILLILITER(S): at 20:21

## 2023-01-01 RX ADMIN — Medication 1 APPLICATION(S): at 00:25

## 2023-01-01 RX ADMIN — SODIUM CHLORIDE 4 MILLILITER(S): 9 INJECTION INTRAMUSCULAR; INTRAVENOUS; SUBCUTANEOUS at 17:31

## 2023-01-01 RX ADMIN — SODIUM CHLORIDE 4 MILLILITER(S): 9 INJECTION INTRAMUSCULAR; INTRAVENOUS; SUBCUTANEOUS at 11:53

## 2023-01-01 RX ADMIN — Medication 40 MILLIGRAM(S): at 17:08

## 2023-01-01 RX ADMIN — Medication 0.6 MILLIGRAM(S): at 11:56

## 2023-01-01 RX ADMIN — Medication 100 MILLIGRAM(S): at 05:26

## 2023-01-01 RX ADMIN — NYSTATIN CREAM 1 APPLICATION(S): 100000 CREAM TOPICAL at 17:09

## 2023-01-01 RX ADMIN — CLOPIDOGREL BISULFATE 75 MILLIGRAM(S): 75 TABLET, FILM COATED ORAL at 17:02

## 2023-01-01 RX ADMIN — AMIODARONE HYDROCHLORIDE 200 MILLIGRAM(S): 400 TABLET ORAL at 05:11

## 2023-01-01 RX ADMIN — SACUBITRIL AND VALSARTAN 1 TABLET(S): 24; 26 TABLET, FILM COATED ORAL at 17:07

## 2023-01-01 RX ADMIN — Medication 40 MILLIGRAM(S): at 05:01

## 2023-01-01 RX ADMIN — BUDESONIDE AND FORMOTEROL FUMARATE DIHYDRATE 2 PUFF(S): 160; 4.5 AEROSOL RESPIRATORY (INHALATION) at 06:30

## 2023-01-01 RX ADMIN — Medication 30 MILLILITER(S): at 19:54

## 2023-01-01 RX ADMIN — Medication 80 MILLIGRAM(S): at 12:29

## 2023-01-01 RX ADMIN — Medication 324 MILLIGRAM(S): at 05:14

## 2023-01-01 RX ADMIN — ATORVASTATIN CALCIUM 40 MILLIGRAM(S): 80 TABLET, FILM COATED ORAL at 22:45

## 2023-01-01 RX ADMIN — Medication 100 MILLIGRAM(S): at 05:29

## 2023-01-01 RX ADMIN — Medication 40 MILLIGRAM(S): at 05:18

## 2023-01-01 RX ADMIN — SODIUM CHLORIDE 4 MILLILITER(S): 9 INJECTION INTRAMUSCULAR; INTRAVENOUS; SUBCUTANEOUS at 00:05

## 2023-01-01 RX ADMIN — CHLORHEXIDINE GLUCONATE 1 APPLICATION(S): 213 SOLUTION TOPICAL at 05:23

## 2023-01-01 RX ADMIN — SODIUM CHLORIDE 3 MILLILITER(S): 9 INJECTION INTRAMUSCULAR; INTRAVENOUS; SUBCUTANEOUS at 18:14

## 2023-01-01 RX ADMIN — ATORVASTATIN CALCIUM 40 MILLIGRAM(S): 80 TABLET, FILM COATED ORAL at 21:04

## 2023-01-01 RX ADMIN — Medication 650 MILLIGRAM(S): at 19:09

## 2023-01-01 RX ADMIN — CHLORHEXIDINE GLUCONATE 1 APPLICATION(S): 213 SOLUTION TOPICAL at 05:17

## 2023-01-01 RX ADMIN — Medication 324 MILLIGRAM(S): at 05:52

## 2023-01-01 RX ADMIN — ENOXAPARIN SODIUM 40 MILLIGRAM(S): 100 INJECTION SUBCUTANEOUS at 05:10

## 2023-01-01 RX ADMIN — Medication 100 MILLIGRAM(S): at 13:04

## 2023-01-01 RX ADMIN — WARFARIN SODIUM 5 MILLIGRAM(S): 2.5 TABLET ORAL at 22:45

## 2023-01-01 RX ADMIN — CEFTRIAXONE 100 MILLIGRAM(S): 500 INJECTION, POWDER, FOR SOLUTION INTRAMUSCULAR; INTRAVENOUS at 21:30

## 2023-01-01 RX ADMIN — Medication 50 MILLIGRAM(S): at 05:52

## 2023-01-01 RX ADMIN — Medication 650 MILLIGRAM(S): at 17:38

## 2023-01-01 RX ADMIN — PANTOPRAZOLE SODIUM 40 MILLIGRAM(S): 20 TABLET, DELAYED RELEASE ORAL at 06:04

## 2023-01-01 RX ADMIN — AMIODARONE HYDROCHLORIDE 200 MILLIGRAM(S): 400 TABLET ORAL at 17:38

## 2023-01-01 RX ADMIN — SACUBITRIL AND VALSARTAN 1 TABLET(S): 24; 26 TABLET, FILM COATED ORAL at 05:30

## 2023-01-01 RX ADMIN — HEPARIN SODIUM 5000 UNIT(S): 5000 INJECTION INTRAVENOUS; SUBCUTANEOUS at 09:00

## 2023-01-01 RX ADMIN — PIPERACILLIN AND TAZOBACTAM 25 GRAM(S): 4; .5 INJECTION, POWDER, LYOPHILIZED, FOR SOLUTION INTRAVENOUS at 13:19

## 2023-01-01 RX ADMIN — CARVEDILOL PHOSPHATE 6.25 MILLIGRAM(S): 80 CAPSULE, EXTENDED RELEASE ORAL at 05:09

## 2023-01-01 RX ADMIN — ATORVASTATIN CALCIUM 40 MILLIGRAM(S): 80 TABLET, FILM COATED ORAL at 21:35

## 2023-01-01 RX ADMIN — Medication 40 MILLIEQUIVALENT(S): at 18:46

## 2023-01-01 RX ADMIN — Medication 650 MILLIGRAM(S): at 13:04

## 2023-01-01 RX ADMIN — HEPARIN SODIUM 5000 UNIT(S): 5000 INJECTION INTRAVENOUS; SUBCUTANEOUS at 23:13

## 2023-01-01 RX ADMIN — Medication 1 APPLICATION(S): at 18:18

## 2023-01-01 RX ADMIN — CLOPIDOGREL BISULFATE 75 MILLIGRAM(S): 75 TABLET, FILM COATED ORAL at 11:23

## 2023-01-01 RX ADMIN — ATORVASTATIN CALCIUM 40 MILLIGRAM(S): 80 TABLET, FILM COATED ORAL at 22:28

## 2023-01-01 RX ADMIN — BUDESONIDE AND FORMOTEROL FUMARATE DIHYDRATE 2 PUFF(S): 160; 4.5 AEROSOL RESPIRATORY (INHALATION) at 17:31

## 2023-01-01 RX ADMIN — LOSARTAN POTASSIUM 25 MILLIGRAM(S): 100 TABLET, FILM COATED ORAL at 06:18

## 2023-01-01 RX ADMIN — HEPARIN SODIUM 5000 UNIT(S): 5000 INJECTION INTRAVENOUS; SUBCUTANEOUS at 08:00

## 2023-01-01 RX ADMIN — ATORVASTATIN CALCIUM 40 MILLIGRAM(S): 80 TABLET, FILM COATED ORAL at 21:18

## 2023-01-01 RX ADMIN — NYSTATIN CREAM 1 APPLICATION(S): 100000 CREAM TOPICAL at 06:03

## 2023-01-01 RX ADMIN — Medication 650 MILLIGRAM(S): at 11:26

## 2023-01-01 RX ADMIN — NYSTATIN CREAM 1 APPLICATION(S): 100000 CREAM TOPICAL at 06:09

## 2023-01-01 RX ADMIN — NYSTATIN CREAM 1 APPLICATION(S): 100000 CREAM TOPICAL at 13:51

## 2023-01-01 RX ADMIN — ATORVASTATIN CALCIUM 40 MILLIGRAM(S): 80 TABLET, FILM COATED ORAL at 21:12

## 2023-01-01 RX ADMIN — PIPERACILLIN AND TAZOBACTAM 25 GRAM(S): 4; .5 INJECTION, POWDER, LYOPHILIZED, FOR SOLUTION INTRAVENOUS at 22:21

## 2023-01-01 RX ADMIN — SODIUM CHLORIDE 3 MILLILITER(S): 9 INJECTION INTRAMUSCULAR; INTRAVENOUS; SUBCUTANEOUS at 23:45

## 2023-01-01 RX ADMIN — FAMOTIDINE 20 MILLIGRAM(S): 10 INJECTION INTRAVENOUS at 11:02

## 2023-01-01 RX ADMIN — Medication 12.5 MILLIGRAM(S): at 06:08

## 2023-01-01 RX ADMIN — Medication 324 MILLIGRAM(S): at 06:17

## 2023-01-01 RX ADMIN — Medication 650 MILLIGRAM(S): at 06:00

## 2023-01-01 RX ADMIN — Medication 1 PATCH: at 19:00

## 2023-01-01 RX ADMIN — HEPARIN SODIUM 5000 UNIT(S): 5000 INJECTION INTRAVENOUS; SUBCUTANEOUS at 17:39

## 2023-01-01 RX ADMIN — AMIODARONE HYDROCHLORIDE 200 MILLIGRAM(S): 400 TABLET ORAL at 08:48

## 2023-01-01 RX ADMIN — Medication 324 MILLIGRAM(S): at 05:32

## 2023-01-01 RX ADMIN — PIPERACILLIN AND TAZOBACTAM 25 GRAM(S): 4; .5 INJECTION, POWDER, LYOPHILIZED, FOR SOLUTION INTRAVENOUS at 21:51

## 2023-01-01 RX ADMIN — WARFARIN SODIUM 2 MILLIGRAM(S): 2.5 TABLET ORAL at 21:11

## 2023-01-01 RX ADMIN — Medication 40 MILLIGRAM(S): at 16:58

## 2023-01-01 RX ADMIN — CHLORHEXIDINE GLUCONATE 1 APPLICATION(S): 213 SOLUTION TOPICAL at 08:19

## 2023-01-01 RX ADMIN — HEPARIN SODIUM 5000 UNIT(S): 5000 INJECTION INTRAVENOUS; SUBCUTANEOUS at 00:18

## 2023-01-01 RX ADMIN — Medication 100 MILLIGRAM(S): at 21:43

## 2023-01-01 RX ADMIN — BUDESONIDE AND FORMOTEROL FUMARATE DIHYDRATE 2 PUFF(S): 160; 4.5 AEROSOL RESPIRATORY (INHALATION) at 18:10

## 2023-01-01 RX ADMIN — Medication 12.5 MILLIGRAM(S): at 06:17

## 2023-01-01 RX ADMIN — Medication 40 MILLIGRAM(S): at 05:09

## 2023-01-01 RX ADMIN — Medication 2000 UNIT(S): at 11:54

## 2023-01-01 RX ADMIN — Medication 100 MILLIEQUIVALENT(S): at 21:34

## 2023-01-01 RX ADMIN — HEPARIN SODIUM 5000 UNIT(S): 5000 INJECTION INTRAVENOUS; SUBCUTANEOUS at 16:38

## 2023-01-01 RX ADMIN — SODIUM CHLORIDE 4 MILLILITER(S): 9 INJECTION INTRAMUSCULAR; INTRAVENOUS; SUBCUTANEOUS at 23:01

## 2023-01-01 RX ADMIN — MEXILETINE HYDROCHLORIDE 150 MILLIGRAM(S): 150 CAPSULE ORAL at 05:09

## 2023-01-01 RX ADMIN — Medication 25 MILLIGRAM(S): at 05:29

## 2023-01-01 RX ADMIN — Medication 100 MILLIGRAM(S): at 05:09

## 2023-01-01 RX ADMIN — HYDROMORPHONE HYDROCHLORIDE 0.25 MILLIGRAM(S): 2 INJECTION INTRAMUSCULAR; INTRAVENOUS; SUBCUTANEOUS at 07:32

## 2023-01-01 RX ADMIN — ENOXAPARIN SODIUM 40 MILLIGRAM(S): 100 INJECTION SUBCUTANEOUS at 06:04

## 2023-01-01 RX ADMIN — HEPARIN SODIUM 5000 UNIT(S): 5000 INJECTION INTRAVENOUS; SUBCUTANEOUS at 16:53

## 2023-01-01 RX ADMIN — Medication 1 APPLICATION(S): at 17:09

## 2023-01-01 RX ADMIN — SODIUM CHLORIDE 3 MILLILITER(S): 9 INJECTION INTRAMUSCULAR; INTRAVENOUS; SUBCUTANEOUS at 12:12

## 2023-01-01 RX ADMIN — Medication 5 MILLIGRAM(S): at 21:10

## 2023-01-01 RX ADMIN — BUDESONIDE AND FORMOTEROL FUMARATE DIHYDRATE 2 PUFF(S): 160; 4.5 AEROSOL RESPIRATORY (INHALATION) at 05:28

## 2023-01-01 RX ADMIN — ATORVASTATIN CALCIUM 40 MILLIGRAM(S): 80 TABLET, FILM COATED ORAL at 21:51

## 2023-01-01 RX ADMIN — ATORVASTATIN CALCIUM 40 MILLIGRAM(S): 80 TABLET, FILM COATED ORAL at 21:26

## 2023-01-01 RX ADMIN — MEXILETINE HYDROCHLORIDE 150 MILLIGRAM(S): 150 CAPSULE ORAL at 05:18

## 2023-01-01 RX ADMIN — AMIODARONE HYDROCHLORIDE 400 MILLIGRAM(S): 400 TABLET ORAL at 21:12

## 2023-01-01 RX ADMIN — AMIODARONE HYDROCHLORIDE 600 MILLIGRAM(S): 400 TABLET ORAL at 17:05

## 2023-01-01 RX ADMIN — Medication 650 MILLIGRAM(S): at 22:57

## 2023-01-01 RX ADMIN — Medication 324 MILLIGRAM(S): at 17:29

## 2023-01-01 RX ADMIN — PANTOPRAZOLE SODIUM 40 MILLIGRAM(S): 20 TABLET, DELAYED RELEASE ORAL at 17:11

## 2023-01-01 RX ADMIN — MEXILETINE HYDROCHLORIDE 150 MILLIGRAM(S): 150 CAPSULE ORAL at 17:38

## 2023-01-01 RX ADMIN — PANTOPRAZOLE SODIUM 40 MILLIGRAM(S): 20 TABLET, DELAYED RELEASE ORAL at 06:08

## 2023-01-01 RX ADMIN — Medication 1 TABLET(S): at 12:05

## 2023-01-01 RX ADMIN — Medication 650 MILLIGRAM(S): at 17:25

## 2023-01-01 RX ADMIN — Medication 25 GRAM(S): at 08:39

## 2023-01-01 RX ADMIN — Medication 650 MILLIGRAM(S): at 05:29

## 2023-01-01 RX ADMIN — Medication 5 MILLIGRAM(S): at 21:23

## 2023-01-01 RX ADMIN — CHLORHEXIDINE GLUCONATE 30 MILLILITER(S): 213 SOLUTION TOPICAL at 05:11

## 2023-01-01 RX ADMIN — CARVEDILOL PHOSPHATE 3.12 MILLIGRAM(S): 80 CAPSULE, EXTENDED RELEASE ORAL at 17:37

## 2023-01-01 RX ADMIN — Medication 40 MILLIGRAM(S): at 05:33

## 2023-01-01 RX ADMIN — HYDROMORPHONE HYDROCHLORIDE 0.5 MILLIGRAM(S): 2 INJECTION INTRAMUSCULAR; INTRAVENOUS; SUBCUTANEOUS at 13:00

## 2023-01-01 RX ADMIN — Medication 650 MILLIGRAM(S): at 05:59

## 2023-01-01 RX ADMIN — NYSTATIN CREAM 1 APPLICATION(S): 100000 CREAM TOPICAL at 21:12

## 2023-01-01 RX ADMIN — SACUBITRIL AND VALSARTAN 1 TABLET(S): 24; 26 TABLET, FILM COATED ORAL at 05:09

## 2023-01-01 RX ADMIN — PIPERACILLIN AND TAZOBACTAM 25 GRAM(S): 4; .5 INJECTION, POWDER, LYOPHILIZED, FOR SOLUTION INTRAVENOUS at 13:55

## 2023-01-01 RX ADMIN — Medication 5 MILLIGRAM(S): at 22:45

## 2023-01-01 RX ADMIN — Medication 324 MILLIGRAM(S): at 13:36

## 2023-01-01 RX ADMIN — AMIODARONE HYDROCHLORIDE 200 MILLIGRAM(S): 400 TABLET ORAL at 05:17

## 2023-01-01 RX ADMIN — BUDESONIDE AND FORMOTEROL FUMARATE DIHYDRATE 2 PUFF(S): 160; 4.5 AEROSOL RESPIRATORY (INHALATION) at 05:50

## 2023-01-01 RX ADMIN — Medication 80 MILLIGRAM(S): at 08:22

## 2023-01-01 RX ADMIN — Medication 5 MG/HR: at 22:55

## 2023-01-01 RX ADMIN — NYSTATIN CREAM 1 APPLICATION(S): 100000 CREAM TOPICAL at 06:08

## 2023-01-01 RX ADMIN — CLOPIDOGREL BISULFATE 75 MILLIGRAM(S): 75 TABLET, FILM COATED ORAL at 11:19

## 2023-01-01 RX ADMIN — LOSARTAN POTASSIUM 25 MILLIGRAM(S): 100 TABLET, FILM COATED ORAL at 06:08

## 2023-01-01 RX ADMIN — Medication 650 MILLIGRAM(S): at 18:58

## 2023-01-01 RX ADMIN — Medication 400 MILLIGRAM(S): at 17:05

## 2023-01-01 RX ADMIN — Medication 12.5 MILLIGRAM(S): at 21:17

## 2023-01-01 RX ADMIN — PANTOPRAZOLE SODIUM 40 MILLIGRAM(S): 20 TABLET, DELAYED RELEASE ORAL at 22:54

## 2023-01-01 RX ADMIN — Medication 650 MILLIGRAM(S): at 23:01

## 2023-01-01 RX ADMIN — Medication 40 MILLIGRAM(S): at 05:27

## 2023-01-01 RX ADMIN — ATORVASTATIN CALCIUM 40 MILLIGRAM(S): 80 TABLET, FILM COATED ORAL at 22:32

## 2023-01-01 RX ADMIN — Medication 40 MILLIGRAM(S): at 05:17

## 2023-01-01 RX ADMIN — Medication 12.7 MICROGRAM(S)/KG/MIN: at 22:29

## 2023-01-01 RX ADMIN — Medication 166.67 MILLIGRAM(S): at 17:28

## 2023-01-01 RX ADMIN — Medication 40 MILLIEQUIVALENT(S): at 01:19

## 2023-01-01 RX ADMIN — HEPARIN SODIUM 5000 UNIT(S): 5000 INJECTION INTRAVENOUS; SUBCUTANEOUS at 08:34

## 2023-01-01 RX ADMIN — Medication 3 MILLILITER(S): at 03:04

## 2023-01-01 RX ADMIN — Medication 12.5 MILLIGRAM(S): at 05:45

## 2023-01-01 RX ADMIN — CHLORHEXIDINE GLUCONATE 1 APPLICATION(S): 213 SOLUTION TOPICAL at 05:29

## 2023-01-01 RX ADMIN — BUDESONIDE AND FORMOTEROL FUMARATE DIHYDRATE 2 PUFF(S): 160; 4.5 AEROSOL RESPIRATORY (INHALATION) at 05:49

## 2023-01-01 RX ADMIN — Medication 20 MILLIEQUIVALENT(S): at 17:10

## 2023-01-01 RX ADMIN — BUDESONIDE AND FORMOTEROL FUMARATE DIHYDRATE 2 PUFF(S): 160; 4.5 AEROSOL RESPIRATORY (INHALATION) at 06:19

## 2023-01-01 RX ADMIN — Medication 1 PATCH: at 09:18

## 2023-01-01 RX ADMIN — NYSTATIN CREAM 1 APPLICATION(S): 100000 CREAM TOPICAL at 13:41

## 2023-01-01 RX ADMIN — Medication 1 TABLET(S): at 13:16

## 2023-01-01 RX ADMIN — Medication 324 MILLIGRAM(S): at 18:46

## 2023-01-01 RX ADMIN — SODIUM CHLORIDE 25 MILLILITER(S): 9 INJECTION, SOLUTION INTRAVENOUS at 19:50

## 2023-01-01 RX ADMIN — CHLORHEXIDINE GLUCONATE 1 APPLICATION(S): 213 SOLUTION TOPICAL at 10:59

## 2023-01-01 RX ADMIN — Medication 12.5 MILLIGRAM(S): at 17:08

## 2023-01-01 RX ADMIN — CHLORHEXIDINE GLUCONATE 1 APPLICATION(S): 213 SOLUTION TOPICAL at 11:30

## 2023-01-01 RX ADMIN — Medication 81 MILLIGRAM(S): at 12:15

## 2023-01-01 RX ADMIN — Medication 2 PACKET(S): at 11:53

## 2023-01-01 RX ADMIN — CLOPIDOGREL BISULFATE 75 MILLIGRAM(S): 75 TABLET, FILM COATED ORAL at 11:34

## 2023-01-01 RX ADMIN — ATORVASTATIN CALCIUM 40 MILLIGRAM(S): 80 TABLET, FILM COATED ORAL at 22:02

## 2023-01-01 RX ADMIN — CHLORHEXIDINE GLUCONATE 1 APPLICATION(S): 213 SOLUTION TOPICAL at 05:02

## 2023-01-01 RX ADMIN — Medication 40 MILLIGRAM(S): at 05:45

## 2023-01-01 RX ADMIN — Medication 650 MILLIGRAM(S): at 06:25

## 2023-01-01 RX ADMIN — BUDESONIDE AND FORMOTEROL FUMARATE DIHYDRATE 2 PUFF(S): 160; 4.5 AEROSOL RESPIRATORY (INHALATION) at 18:59

## 2023-01-01 RX ADMIN — Medication 7.5 MG/MIN: at 19:09

## 2023-01-01 RX ADMIN — Medication 40 MILLIGRAM(S): at 06:10

## 2023-01-01 RX ADMIN — Medication 10 MG/HR: at 21:35

## 2023-01-01 RX ADMIN — Medication 1 PATCH: at 21:37

## 2023-01-01 RX ADMIN — CLOPIDOGREL BISULFATE 75 MILLIGRAM(S): 75 TABLET, FILM COATED ORAL at 12:19

## 2023-01-01 RX ADMIN — NYSTATIN CREAM 1 APPLICATION(S): 100000 CREAM TOPICAL at 13:46

## 2023-01-01 RX ADMIN — HYDROMORPHONE HYDROCHLORIDE 0.25 MILLIGRAM(S): 2 INJECTION INTRAMUSCULAR; INTRAVENOUS; SUBCUTANEOUS at 07:47

## 2023-01-01 RX ADMIN — SODIUM CHLORIDE 4 MILLILITER(S): 9 INJECTION INTRAMUSCULAR; INTRAVENOUS; SUBCUTANEOUS at 15:21

## 2023-01-01 RX ADMIN — LOSARTAN POTASSIUM 25 MILLIGRAM(S): 100 TABLET, FILM COATED ORAL at 05:59

## 2023-01-01 RX ADMIN — Medication 12.5 MILLIGRAM(S): at 05:32

## 2023-01-01 RX ADMIN — SODIUM CHLORIDE 4 MILLILITER(S): 9 INJECTION INTRAMUSCULAR; INTRAVENOUS; SUBCUTANEOUS at 15:05

## 2023-01-01 RX ADMIN — MIDAZOLAM HYDROCHLORIDE 2 MILLIGRAM(S): 1 INJECTION, SOLUTION INTRAMUSCULAR; INTRAVENOUS at 07:14

## 2023-01-01 RX ADMIN — ATORVASTATIN CALCIUM 40 MILLIGRAM(S): 80 TABLET, FILM COATED ORAL at 20:05

## 2023-01-01 RX ADMIN — PIPERACILLIN AND TAZOBACTAM 25 GRAM(S): 4; .5 INJECTION, POWDER, LYOPHILIZED, FOR SOLUTION INTRAVENOUS at 22:09

## 2023-01-01 RX ADMIN — PIPERACILLIN AND TAZOBACTAM 200 GRAM(S): 4; .5 INJECTION, POWDER, LYOPHILIZED, FOR SOLUTION INTRAVENOUS at 23:01

## 2023-01-01 RX ADMIN — Medication 0.6 MILLIGRAM(S): at 11:25

## 2023-01-01 RX ADMIN — CARVEDILOL PHOSPHATE 6.25 MILLIGRAM(S): 80 CAPSULE, EXTENDED RELEASE ORAL at 16:57

## 2023-01-01 RX ADMIN — Medication 12.5 MILLIGRAM(S): at 18:58

## 2023-01-01 RX ADMIN — CLOPIDOGREL BISULFATE 75 MILLIGRAM(S): 75 TABLET, FILM COATED ORAL at 11:59

## 2023-01-01 RX ADMIN — ATORVASTATIN CALCIUM 40 MILLIGRAM(S): 80 TABLET, FILM COATED ORAL at 21:27

## 2023-01-01 RX ADMIN — Medication 324 MILLIGRAM(S): at 14:10

## 2023-01-01 RX ADMIN — HYDROMORPHONE HYDROCHLORIDE 0.5 MILLIGRAM(S): 2 INJECTION INTRAMUSCULAR; INTRAVENOUS; SUBCUTANEOUS at 12:32

## 2023-01-01 RX ADMIN — SODIUM CHLORIDE 4 MILLILITER(S): 9 INJECTION INTRAMUSCULAR; INTRAVENOUS; SUBCUTANEOUS at 02:46

## 2023-01-01 RX ADMIN — FAMOTIDINE 20 MILLIGRAM(S): 10 INJECTION INTRAVENOUS at 13:36

## 2023-01-01 RX ADMIN — MEXILETINE HYDROCHLORIDE 150 MILLIGRAM(S): 150 CAPSULE ORAL at 22:33

## 2023-01-01 RX ADMIN — Medication 324 MILLIGRAM(S): at 21:57

## 2023-01-01 RX ADMIN — Medication 650 MILLIGRAM(S): at 12:37

## 2023-01-01 RX ADMIN — HEPARIN SODIUM 15 UNIT(S)/HR: 5000 INJECTION INTRAVENOUS; SUBCUTANEOUS at 05:22

## 2023-01-01 RX ADMIN — AMIODARONE HYDROCHLORIDE 200 MILLIGRAM(S): 400 TABLET ORAL at 06:08

## 2023-01-01 RX ADMIN — Medication 5 MILLIGRAM(S): at 21:18

## 2023-01-01 RX ADMIN — CLOPIDOGREL BISULFATE 75 MILLIGRAM(S): 75 TABLET, FILM COATED ORAL at 14:19

## 2023-01-01 RX ADMIN — Medication 324 MILLIGRAM(S): at 21:23

## 2023-01-01 RX ADMIN — Medication 81 MILLIGRAM(S): at 08:44

## 2023-01-01 RX ADMIN — Medication 40 MILLIEQUIVALENT(S): at 12:17

## 2023-01-01 RX ADMIN — Medication 1 APPLICATION(S): at 21:24

## 2023-01-01 RX ADMIN — Medication 100 MILLIGRAM(S): at 13:44

## 2023-01-01 RX ADMIN — Medication 100 MILLIEQUIVALENT(S): at 14:45

## 2023-01-01 RX ADMIN — SODIUM CHLORIDE 4 MILLILITER(S): 9 INJECTION INTRAMUSCULAR; INTRAVENOUS; SUBCUTANEOUS at 23:57

## 2023-01-01 RX ADMIN — BUDESONIDE AND FORMOTEROL FUMARATE DIHYDRATE 2 PUFF(S): 160; 4.5 AEROSOL RESPIRATORY (INHALATION) at 05:10

## 2023-01-01 RX ADMIN — SODIUM CHLORIDE 500 MILLILITER(S): 9 INJECTION, SOLUTION INTRAVENOUS at 20:49

## 2023-01-01 RX ADMIN — Medication 650 MILLIGRAM(S): at 00:29

## 2023-01-01 RX ADMIN — HYDROMORPHONE HYDROCHLORIDE 0.25 MILLIGRAM(S): 2 INJECTION INTRAMUSCULAR; INTRAVENOUS; SUBCUTANEOUS at 15:15

## 2023-01-01 RX ADMIN — FAMOTIDINE 20 MILLIGRAM(S): 10 INJECTION INTRAVENOUS at 11:26

## 2023-01-01 RX ADMIN — Medication 650 MILLIGRAM(S): at 00:31

## 2023-01-01 RX ADMIN — BUDESONIDE AND FORMOTEROL FUMARATE DIHYDRATE 2 PUFF(S): 160; 4.5 AEROSOL RESPIRATORY (INHALATION) at 19:48

## 2023-01-01 RX ADMIN — Medication 1 PATCH: at 06:33

## 2023-01-01 RX ADMIN — AMIODARONE HYDROCHLORIDE 200 MILLIGRAM(S): 400 TABLET ORAL at 05:18

## 2023-01-01 RX ADMIN — Medication 7.5 MG/MIN: at 08:00

## 2023-01-01 RX ADMIN — AMIODARONE HYDROCHLORIDE 400 MILLIGRAM(S): 400 TABLET ORAL at 21:27

## 2023-01-01 RX ADMIN — Medication 25 MILLIGRAM(S): at 17:29

## 2023-01-01 RX ADMIN — NYSTATIN CREAM 1 APPLICATION(S): 100000 CREAM TOPICAL at 17:39

## 2023-01-01 RX ADMIN — SODIUM CHLORIDE 500 MILLILITER(S): 9 INJECTION, SOLUTION INTRAVENOUS at 23:32

## 2023-01-01 RX ADMIN — Medication 3.75 MG/MIN: at 05:18

## 2023-01-01 RX ADMIN — AMIODARONE HYDROCHLORIDE 400 MILLIGRAM(S): 400 TABLET ORAL at 15:26

## 2023-01-01 RX ADMIN — Medication 1 APPLICATION(S): at 05:59

## 2023-01-01 RX ADMIN — SODIUM CHLORIDE 4 MILLILITER(S): 9 INJECTION INTRAMUSCULAR; INTRAVENOUS; SUBCUTANEOUS at 18:41

## 2023-01-01 RX ADMIN — Medication 650 MILLIGRAM(S): at 05:18

## 2023-01-01 RX ADMIN — Medication 12.5 MILLIGRAM(S): at 06:02

## 2023-01-01 RX ADMIN — PIPERACILLIN AND TAZOBACTAM 25 GRAM(S): 4; .5 INJECTION, POWDER, LYOPHILIZED, FOR SOLUTION INTRAVENOUS at 14:53

## 2023-01-01 RX ADMIN — CARVEDILOL PHOSPHATE 3.12 MILLIGRAM(S): 80 CAPSULE, EXTENDED RELEASE ORAL at 05:18

## 2023-01-01 RX ADMIN — SODIUM CHLORIDE 3 MILLILITER(S): 9 INJECTION INTRAMUSCULAR; INTRAVENOUS; SUBCUTANEOUS at 05:26

## 2023-01-01 RX ADMIN — Medication 25 GRAM(S): at 10:50

## 2023-01-01 RX ADMIN — ENOXAPARIN SODIUM 40 MILLIGRAM(S): 100 INJECTION SUBCUTANEOUS at 06:09

## 2023-01-01 RX ADMIN — BUDESONIDE AND FORMOTEROL FUMARATE DIHYDRATE 2 PUFF(S): 160; 4.5 AEROSOL RESPIRATORY (INHALATION) at 17:32

## 2023-01-01 RX ADMIN — NYSTATIN CREAM 1 APPLICATION(S): 100000 CREAM TOPICAL at 23:05

## 2023-01-01 RX ADMIN — Medication 80 MILLIGRAM(S): at 09:01

## 2023-01-01 RX ADMIN — NYSTATIN CREAM 1 APPLICATION(S): 100000 CREAM TOPICAL at 06:59

## 2023-01-01 RX ADMIN — Medication 0: at 17:22

## 2023-01-01 RX ADMIN — Medication 80 MILLIGRAM(S): at 08:17

## 2023-01-01 RX ADMIN — Medication 1 PATCH: at 11:24

## 2023-01-01 RX ADMIN — BUDESONIDE AND FORMOTEROL FUMARATE DIHYDRATE 2 PUFF(S): 160; 4.5 AEROSOL RESPIRATORY (INHALATION) at 05:25

## 2023-01-01 RX ADMIN — Medication 12.5 MILLIGRAM(S): at 19:10

## 2023-01-01 RX ADMIN — Medication 5 MILLIGRAM(S): at 22:02

## 2023-01-01 RX ADMIN — Medication 10 MG/HR: at 08:04

## 2023-01-01 RX ADMIN — SODIUM CHLORIDE 4 MILLILITER(S): 9 INJECTION INTRAMUSCULAR; INTRAVENOUS; SUBCUTANEOUS at 08:19

## 2023-01-01 RX ADMIN — SODIUM CHLORIDE 25 MILLILITER(S): 9 INJECTION, SOLUTION INTRAVENOUS at 11:59

## 2023-01-01 RX ADMIN — Medication 600 MILLIGRAM(S): at 05:28

## 2023-01-01 RX ADMIN — Medication 1 PATCH: at 19:46

## 2023-01-01 RX ADMIN — NYSTATIN CREAM 1 APPLICATION(S): 100000 CREAM TOPICAL at 05:33

## 2023-01-01 RX ADMIN — NYSTATIN CREAM 1 APPLICATION(S): 100000 CREAM TOPICAL at 14:01

## 2023-01-01 RX ADMIN — NYSTATIN CREAM 1 APPLICATION(S): 100000 CREAM TOPICAL at 06:21

## 2023-01-01 RX ADMIN — ENOXAPARIN SODIUM 40 MILLIGRAM(S): 100 INJECTION SUBCUTANEOUS at 05:05

## 2023-01-01 RX ADMIN — SODIUM CHLORIDE 3 MILLILITER(S): 9 INJECTION INTRAMUSCULAR; INTRAVENOUS; SUBCUTANEOUS at 17:31

## 2023-01-01 RX ADMIN — Medication 324 MILLIGRAM(S): at 14:22

## 2023-01-01 RX ADMIN — Medication 650 MILLIGRAM(S): at 11:49

## 2023-01-01 RX ADMIN — NYSTATIN CREAM 1 APPLICATION(S): 100000 CREAM TOPICAL at 16:38

## 2023-01-01 RX ADMIN — BUDESONIDE AND FORMOTEROL FUMARATE DIHYDRATE 2 PUFF(S): 160; 4.5 AEROSOL RESPIRATORY (INHALATION) at 17:38

## 2023-01-01 RX ADMIN — SACUBITRIL AND VALSARTAN 1 TABLET(S): 24; 26 TABLET, FILM COATED ORAL at 05:49

## 2023-01-01 RX ADMIN — Medication 650 MILLIGRAM(S): at 13:00

## 2023-01-01 RX ADMIN — Medication 50 MILLIGRAM(S): at 05:57

## 2023-01-01 RX ADMIN — Medication 650 MILLIGRAM(S): at 18:11

## 2023-01-01 RX ADMIN — BUDESONIDE AND FORMOTEROL FUMARATE DIHYDRATE 2 PUFF(S): 160; 4.5 AEROSOL RESPIRATORY (INHALATION) at 22:33

## 2023-01-01 RX ADMIN — PANTOPRAZOLE SODIUM 40 MILLIGRAM(S): 20 TABLET, DELAYED RELEASE ORAL at 17:07

## 2023-01-01 RX ADMIN — Medication 1 PATCH: at 11:21

## 2023-01-01 RX ADMIN — FENTANYL CITRATE 50 MICROGRAM(S): 50 INJECTION INTRAVENOUS at 07:30

## 2023-01-01 RX ADMIN — Medication 324 MILLIGRAM(S): at 17:38

## 2023-01-01 RX ADMIN — CARVEDILOL PHOSPHATE 3.12 MILLIGRAM(S): 80 CAPSULE, EXTENDED RELEASE ORAL at 06:59

## 2023-01-01 RX ADMIN — Medication 600 MILLIGRAM(S): at 05:01

## 2023-01-01 RX ADMIN — Medication 324 MILLIGRAM(S): at 18:58

## 2023-01-01 RX ADMIN — Medication 0.6 MILLIGRAM(S): at 13:31

## 2023-01-01 RX ADMIN — BUDESONIDE AND FORMOTEROL FUMARATE DIHYDRATE 2 PUFF(S): 160; 4.5 AEROSOL RESPIRATORY (INHALATION) at 06:01

## 2023-01-01 RX ADMIN — Medication 1 APPLICATION(S): at 18:38

## 2023-01-01 RX ADMIN — Medication 3 MILLILITER(S): at 08:20

## 2023-01-01 RX ADMIN — SACUBITRIL AND VALSARTAN 1 TABLET(S): 24; 26 TABLET, FILM COATED ORAL at 05:00

## 2023-01-01 RX ADMIN — Medication 12.5 MILLIGRAM(S): at 17:30

## 2023-01-01 RX ADMIN — BUDESONIDE AND FORMOTEROL FUMARATE DIHYDRATE 2 PUFF(S): 160; 4.5 AEROSOL RESPIRATORY (INHALATION) at 05:45

## 2023-01-01 RX ADMIN — MEXILETINE HYDROCHLORIDE 150 MILLIGRAM(S): 150 CAPSULE ORAL at 05:57

## 2023-01-01 RX ADMIN — CARVEDILOL PHOSPHATE 3.12 MILLIGRAM(S): 80 CAPSULE, EXTENDED RELEASE ORAL at 05:30

## 2023-01-01 RX ADMIN — BUDESONIDE AND FORMOTEROL FUMARATE DIHYDRATE 2 PUFF(S): 160; 4.5 AEROSOL RESPIRATORY (INHALATION) at 17:59

## 2023-01-01 RX ADMIN — Medication 81 MILLIGRAM(S): at 12:38

## 2023-01-01 RX ADMIN — Medication 1 TABLET(S): at 15:39

## 2023-01-01 RX ADMIN — NYSTATIN CREAM 1 APPLICATION(S): 100000 CREAM TOPICAL at 21:25

## 2023-01-01 RX ADMIN — SACUBITRIL AND VALSARTAN 1 TABLET(S): 24; 26 TABLET, FILM COATED ORAL at 05:18

## 2023-01-01 RX ADMIN — Medication 166.67 MILLIGRAM(S): at 18:13

## 2023-01-01 RX ADMIN — Medication 1 PATCH: at 22:00

## 2023-01-01 RX ADMIN — Medication 1000 MILLIGRAM(S): at 18:00

## 2023-01-01 RX ADMIN — Medication 100 MILLIEQUIVALENT(S): at 19:55

## 2023-01-01 RX ADMIN — Medication 600 MILLIGRAM(S): at 05:09

## 2023-01-01 RX ADMIN — SODIUM CHLORIDE 500 MILLILITER(S): 9 INJECTION, SOLUTION INTRAVENOUS at 06:17

## 2023-01-01 RX ADMIN — Medication 1 PATCH: at 08:20

## 2023-01-01 RX ADMIN — SODIUM CHLORIDE 3 MILLILITER(S): 9 INJECTION INTRAMUSCULAR; INTRAVENOUS; SUBCUTANEOUS at 11:59

## 2023-01-01 RX ADMIN — Medication 15 MG/MIN: at 21:35

## 2023-01-01 RX ADMIN — Medication 324 MILLIGRAM(S): at 05:18

## 2023-01-01 RX ADMIN — Medication 20 MILLIEQUIVALENT(S): at 11:53

## 2023-01-01 RX ADMIN — FAMOTIDINE 20 MILLIGRAM(S): 10 INJECTION INTRAVENOUS at 08:19

## 2023-01-01 RX ADMIN — Medication 5 MG/HR: at 12:05

## 2023-01-01 RX ADMIN — NYSTATIN CREAM 1 APPLICATION(S): 100000 CREAM TOPICAL at 05:18

## 2023-01-01 RX ADMIN — Medication 100 MILLIEQUIVALENT(S): at 18:46

## 2023-01-01 RX ADMIN — NYSTATIN CREAM 1 APPLICATION(S): 100000 CREAM TOPICAL at 22:26

## 2023-01-01 RX ADMIN — MEXILETINE HYDROCHLORIDE 150 MILLIGRAM(S): 150 CAPSULE ORAL at 05:52

## 2023-01-01 RX ADMIN — Medication 40 MILLIEQUIVALENT(S): at 13:47

## 2023-01-01 RX ADMIN — SODIUM CHLORIDE 4 MILLILITER(S): 9 INJECTION INTRAMUSCULAR; INTRAVENOUS; SUBCUTANEOUS at 17:25

## 2023-01-01 RX ADMIN — CEFTRIAXONE 100 MILLIGRAM(S): 500 INJECTION, POWDER, FOR SOLUTION INTRAMUSCULAR; INTRAVENOUS at 21:39

## 2023-01-01 RX ADMIN — HEPARIN SODIUM 5000 UNIT(S): 5000 INJECTION INTRAVENOUS; SUBCUTANEOUS at 08:45

## 2023-01-01 RX ADMIN — AMIODARONE HYDROCHLORIDE 400 MILLIGRAM(S): 400 TABLET ORAL at 05:25

## 2023-01-01 RX ADMIN — Medication 650 MILLIGRAM(S): at 13:37

## 2023-01-01 RX ADMIN — ATORVASTATIN CALCIUM 40 MILLIGRAM(S): 80 TABLET, FILM COATED ORAL at 21:19

## 2023-01-01 RX ADMIN — AMIODARONE HYDROCHLORIDE 400 MILLIGRAM(S): 400 TABLET ORAL at 21:35

## 2023-01-01 RX ADMIN — Medication 80 MILLIGRAM(S): at 20:23

## 2023-01-01 RX ADMIN — Medication 650 MILLIGRAM(S): at 12:31

## 2023-01-01 RX ADMIN — Medication 25 MILLIGRAM(S): at 17:39

## 2023-01-01 RX ADMIN — BUDESONIDE AND FORMOTEROL FUMARATE DIHYDRATE 2 PUFF(S): 160; 4.5 AEROSOL RESPIRATORY (INHALATION) at 05:17

## 2023-01-01 RX ADMIN — SODIUM CHLORIDE 3 MILLILITER(S): 9 INJECTION INTRAMUSCULAR; INTRAVENOUS; SUBCUTANEOUS at 03:04

## 2023-01-01 RX ADMIN — Medication 12.5 MILLIGRAM(S): at 05:33

## 2023-01-01 RX ADMIN — Medication 324 MILLIGRAM(S): at 13:59

## 2023-01-01 RX ADMIN — AMIODARONE HYDROCHLORIDE 600 MILLIGRAM(S): 400 TABLET ORAL at 07:14

## 2023-01-01 RX ADMIN — Medication 650 MILLIGRAM(S): at 21:55

## 2023-01-01 RX ADMIN — FENTANYL CITRATE 50 MICROGRAM(S): 50 INJECTION INTRAVENOUS at 21:59

## 2023-01-01 RX ADMIN — Medication 324 MILLIGRAM(S): at 06:08

## 2023-01-01 RX ADMIN — CHLORHEXIDINE GLUCONATE 1 APPLICATION(S): 213 SOLUTION TOPICAL at 07:15

## 2023-01-01 RX ADMIN — PIPERACILLIN AND TAZOBACTAM 3.38 GRAM(S): 4; .5 INJECTION, POWDER, LYOPHILIZED, FOR SOLUTION INTRAVENOUS at 02:40

## 2023-01-01 RX ADMIN — Medication 25 MILLIGRAM(S): at 05:14

## 2023-01-01 RX ADMIN — Medication 324 MILLIGRAM(S): at 17:48

## 2023-01-01 RX ADMIN — Medication 650 MILLIGRAM(S): at 06:30

## 2023-01-01 RX ADMIN — AMIODARONE HYDROCHLORIDE 200 MILLIGRAM(S): 400 TABLET ORAL at 05:59

## 2023-01-01 RX ADMIN — HEPARIN SODIUM 5000 UNIT(S): 5000 INJECTION INTRAVENOUS; SUBCUTANEOUS at 08:56

## 2023-01-01 RX ADMIN — AMIODARONE HYDROCHLORIDE 400 MILLIGRAM(S): 400 TABLET ORAL at 05:11

## 2023-01-01 RX ADMIN — BUDESONIDE AND FORMOTEROL FUMARATE DIHYDRATE 2 PUFF(S): 160; 4.5 AEROSOL RESPIRATORY (INHALATION) at 05:04

## 2023-01-01 RX ADMIN — Medication 12.5 MILLIGRAM(S): at 17:49

## 2023-01-01 RX ADMIN — Medication 3.75 MG/MIN: at 19:46

## 2023-01-01 RX ADMIN — Medication 600 MILLIGRAM(S): at 18:14

## 2023-01-01 RX ADMIN — Medication 324 MILLIGRAM(S): at 16:59

## 2023-01-01 RX ADMIN — SODIUM CHLORIDE 150 MILLILITER(S): 9 INJECTION, SOLUTION INTRAVENOUS at 18:45

## 2023-01-01 RX ADMIN — Medication 1 APPLICATION(S): at 05:15

## 2023-01-01 RX ADMIN — NYSTATIN CREAM 1 APPLICATION(S): 100000 CREAM TOPICAL at 21:35

## 2023-01-01 RX ADMIN — Medication 12.7 MICROGRAM(S)/KG/MIN: at 23:32

## 2023-01-01 RX ADMIN — CARVEDILOL PHOSPHATE 3.12 MILLIGRAM(S): 80 CAPSULE, EXTENDED RELEASE ORAL at 16:39

## 2023-01-01 RX ADMIN — Medication 20 MILLIGRAM(S): at 11:59

## 2023-01-01 RX ADMIN — AMIODARONE HYDROCHLORIDE 200 MILLIGRAM(S): 400 TABLET ORAL at 05:33

## 2023-01-01 RX ADMIN — Medication 650 MILLIGRAM(S): at 00:34

## 2023-01-01 RX ADMIN — Medication 12.5 MILLIGRAM(S): at 18:11

## 2023-01-01 RX ADMIN — NYSTATIN CREAM 1 APPLICATION(S): 100000 CREAM TOPICAL at 06:01

## 2023-01-01 RX ADMIN — Medication 324 MILLIGRAM(S): at 17:26

## 2023-01-01 RX ADMIN — AMIODARONE HYDROCHLORIDE 200 MILLIGRAM(S): 400 TABLET ORAL at 05:32

## 2023-01-01 RX ADMIN — FAMOTIDINE 20 MILLIGRAM(S): 10 INJECTION INTRAVENOUS at 11:59

## 2023-01-01 RX ADMIN — Medication 650 MILLIGRAM(S): at 12:00

## 2023-01-01 RX ADMIN — NYSTATIN CREAM 1 APPLICATION(S): 100000 CREAM TOPICAL at 17:38

## 2023-01-01 RX ADMIN — Medication 7.5 MG/MIN: at 17:50

## 2023-01-01 RX ADMIN — NYSTATIN CREAM 1 APPLICATION(S): 100000 CREAM TOPICAL at 05:11

## 2023-01-01 RX ADMIN — CHLORHEXIDINE GLUCONATE 1 APPLICATION(S): 213 SOLUTION TOPICAL at 07:06

## 2023-01-01 RX ADMIN — Medication 650 MILLIGRAM(S): at 05:32

## 2023-01-01 RX ADMIN — Medication 100 MILLIGRAM(S): at 13:46

## 2023-01-01 RX ADMIN — NYSTATIN CREAM 1 APPLICATION(S): 100000 CREAM TOPICAL at 05:17

## 2023-01-01 RX ADMIN — HEPARIN SODIUM 5000 UNIT(S): 5000 INJECTION INTRAVENOUS; SUBCUTANEOUS at 17:07

## 2023-01-01 RX ADMIN — Medication 650 MILLIGRAM(S): at 12:08

## 2023-01-01 RX ADMIN — Medication 5 MILLIGRAM(S): at 22:46

## 2023-01-01 RX ADMIN — BUDESONIDE AND FORMOTEROL FUMARATE DIHYDRATE 2 PUFF(S): 160; 4.5 AEROSOL RESPIRATORY (INHALATION) at 07:30

## 2023-01-01 RX ADMIN — Medication 324 MILLIGRAM(S): at 05:33

## 2023-01-01 RX ADMIN — CLOPIDOGREL BISULFATE 75 MILLIGRAM(S): 75 TABLET, FILM COATED ORAL at 11:02

## 2023-01-01 RX ADMIN — WARFARIN SODIUM 5 MILLIGRAM(S): 2.5 TABLET ORAL at 22:02

## 2023-01-01 RX ADMIN — MEXILETINE HYDROCHLORIDE 150 MILLIGRAM(S): 150 CAPSULE ORAL at 05:49

## 2023-01-01 RX ADMIN — CHLORHEXIDINE GLUCONATE 1 APPLICATION(S): 213 SOLUTION TOPICAL at 05:14

## 2023-01-01 RX ADMIN — CHLORHEXIDINE GLUCONATE 1 APPLICATION(S): 213 SOLUTION TOPICAL at 13:04

## 2023-01-01 RX ADMIN — FENTANYL CITRATE 50 MICROGRAM(S): 50 INJECTION INTRAVENOUS at 07:14

## 2023-01-01 RX ADMIN — MEXILETINE HYDROCHLORIDE 150 MILLIGRAM(S): 150 CAPSULE ORAL at 16:39

## 2023-01-01 RX ADMIN — Medication 324 MILLIGRAM(S): at 17:17

## 2023-01-01 RX ADMIN — CLOPIDOGREL BISULFATE 75 MILLIGRAM(S): 75 TABLET, FILM COATED ORAL at 14:32

## 2023-01-01 RX ADMIN — AMIODARONE HYDROCHLORIDE 400 MILLIGRAM(S): 400 TABLET ORAL at 21:39

## 2023-01-01 RX ADMIN — Medication 40 MILLIEQUIVALENT(S): at 21:40

## 2023-01-01 RX ADMIN — Medication 1000 MILLIGRAM(S): at 12:35

## 2023-01-01 RX ADMIN — Medication 324 MILLIGRAM(S): at 05:57

## 2023-01-01 RX ADMIN — Medication 324 MILLIGRAM(S): at 18:18

## 2023-01-01 RX ADMIN — Medication 100 MILLIGRAM(S): at 13:20

## 2023-01-01 RX ADMIN — Medication 40 MILLIEQUIVALENT(S): at 02:31

## 2023-01-01 RX ADMIN — ATORVASTATIN CALCIUM 40 MILLIGRAM(S): 80 TABLET, FILM COATED ORAL at 22:04

## 2023-01-01 RX ADMIN — Medication 12.5 MILLIGRAM(S): at 05:17

## 2023-01-01 RX ADMIN — Medication 324 MILLIGRAM(S): at 22:33

## 2023-01-01 RX ADMIN — Medication 1 PATCH: at 13:16

## 2023-01-01 RX ADMIN — Medication 80 MILLIGRAM(S): at 09:12

## 2023-01-01 RX ADMIN — Medication 3.75 MG/MIN: at 01:43

## 2023-01-01 RX ADMIN — Medication 0.6 MILLIGRAM(S): at 11:49

## 2023-01-01 RX ADMIN — ENOXAPARIN SODIUM 40 MILLIGRAM(S): 100 INJECTION SUBCUTANEOUS at 05:22

## 2023-01-01 RX ADMIN — ONDANSETRON 4 MILLIGRAM(S): 8 TABLET, FILM COATED ORAL at 17:34

## 2023-01-01 RX ADMIN — ATORVASTATIN CALCIUM 40 MILLIGRAM(S): 80 TABLET, FILM COATED ORAL at 23:04

## 2023-01-01 RX ADMIN — Medication 650 MILLIGRAM(S): at 17:30

## 2023-01-01 RX ADMIN — AMIODARONE HYDROCHLORIDE 200 MILLIGRAM(S): 400 TABLET ORAL at 05:45

## 2023-01-01 RX ADMIN — Medication 25 GRAM(S): at 01:19

## 2023-01-01 RX ADMIN — SACUBITRIL AND VALSARTAN 1 TABLET(S): 24; 26 TABLET, FILM COATED ORAL at 16:58

## 2023-01-01 RX ADMIN — Medication 1 PATCH: at 13:42

## 2023-01-01 RX ADMIN — Medication 1 PATCH: at 07:40

## 2023-01-01 RX ADMIN — Medication 81 MILLIGRAM(S): at 11:55

## 2023-01-01 RX ADMIN — CLOPIDOGREL BISULFATE 75 MILLIGRAM(S): 75 TABLET, FILM COATED ORAL at 12:21

## 2023-01-01 RX ADMIN — CLOPIDOGREL BISULFATE 75 MILLIGRAM(S): 75 TABLET, FILM COATED ORAL at 13:17

## 2023-01-01 RX ADMIN — Medication 324 MILLIGRAM(S): at 05:29

## 2023-01-01 RX ADMIN — Medication 40 MILLIEQUIVALENT(S): at 08:10

## 2023-01-01 RX ADMIN — NYSTATIN CREAM 1 APPLICATION(S): 100000 CREAM TOPICAL at 05:16

## 2023-01-01 RX ADMIN — Medication 166.67 MILLIGRAM(S): at 05:11

## 2023-01-01 RX ADMIN — NYSTATIN CREAM 1 APPLICATION(S): 100000 CREAM TOPICAL at 05:58

## 2023-01-01 RX ADMIN — Medication 1 PATCH: at 13:35

## 2023-01-01 RX ADMIN — SODIUM CHLORIDE 75 MILLILITER(S): 9 INJECTION, SOLUTION INTRAVENOUS at 06:05

## 2023-01-01 RX ADMIN — ATORVASTATIN CALCIUM 40 MILLIGRAM(S): 80 TABLET, FILM COATED ORAL at 21:10

## 2023-01-01 RX ADMIN — Medication 1 APPLICATION(S): at 05:19

## 2023-01-01 RX ADMIN — CHLORHEXIDINE GLUCONATE 1 APPLICATION(S): 213 SOLUTION TOPICAL at 10:25

## 2023-01-01 RX ADMIN — Medication 650 MILLIGRAM(S): at 12:04

## 2023-01-01 RX ADMIN — ATORVASTATIN CALCIUM 40 MILLIGRAM(S): 80 TABLET, FILM COATED ORAL at 21:25

## 2023-01-01 RX ADMIN — Medication 7.5 MG/MIN: at 13:27

## 2023-01-01 RX ADMIN — NYSTATIN CREAM 1 APPLICATION(S): 100000 CREAM TOPICAL at 17:23

## 2023-01-01 RX ADMIN — Medication 81 MILLIGRAM(S): at 11:49

## 2023-01-01 RX ADMIN — Medication 81 MILLIGRAM(S): at 11:26

## 2023-01-01 RX ADMIN — ATORVASTATIN CALCIUM 40 MILLIGRAM(S): 80 TABLET, FILM COATED ORAL at 21:29

## 2023-01-01 RX ADMIN — Medication 1 APPLICATION(S): at 05:12

## 2023-01-01 RX ADMIN — CLOPIDOGREL BISULFATE 75 MILLIGRAM(S): 75 TABLET, FILM COATED ORAL at 13:36

## 2023-01-01 RX ADMIN — Medication 1 TABLET(S): at 11:53

## 2023-01-01 RX ADMIN — SODIUM CHLORIDE 4 MILLILITER(S): 9 INJECTION INTRAMUSCULAR; INTRAVENOUS; SUBCUTANEOUS at 17:14

## 2023-01-01 RX ADMIN — Medication 324 MILLIGRAM(S): at 17:08

## 2023-01-01 RX ADMIN — Medication 1 PATCH: at 11:26

## 2023-01-01 RX ADMIN — AMIODARONE HYDROCHLORIDE 600 MILLIGRAM(S): 400 TABLET ORAL at 16:27

## 2023-01-01 RX ADMIN — BUDESONIDE AND FORMOTEROL FUMARATE DIHYDRATE 2 PUFF(S): 160; 4.5 AEROSOL RESPIRATORY (INHALATION) at 08:44

## 2023-01-01 RX ADMIN — AMIODARONE HYDROCHLORIDE 600 MILLIGRAM(S): 400 TABLET ORAL at 16:30

## 2023-01-01 RX ADMIN — ATORVASTATIN CALCIUM 40 MILLIGRAM(S): 80 TABLET, FILM COATED ORAL at 21:41

## 2023-01-01 RX ADMIN — Medication 1000 MILLIGRAM(S): at 06:32

## 2023-01-01 RX ADMIN — Medication 40 MILLIGRAM(S): at 05:58

## 2023-01-01 RX ADMIN — Medication 25 MILLIGRAM(S): at 05:17

## 2023-01-01 RX ADMIN — Medication 650 MILLIGRAM(S): at 12:38

## 2023-01-01 RX ADMIN — Medication 1 APPLICATION(S): at 05:57

## 2023-01-01 RX ADMIN — ATORVASTATIN CALCIUM 40 MILLIGRAM(S): 80 TABLET, FILM COATED ORAL at 21:30

## 2023-01-01 RX ADMIN — Medication 324 MILLIGRAM(S): at 17:32

## 2023-01-01 RX ADMIN — Medication 324 MILLIGRAM(S): at 18:06

## 2023-01-01 RX ADMIN — Medication 100 MILLIGRAM(S): at 14:31

## 2023-01-01 RX ADMIN — SODIUM CHLORIDE 4 MILLILITER(S): 9 INJECTION INTRAMUSCULAR; INTRAVENOUS; SUBCUTANEOUS at 08:40

## 2023-01-01 RX ADMIN — Medication 3 MILLILITER(S): at 09:44

## 2023-01-01 RX ADMIN — Medication 650 MILLIGRAM(S): at 05:17

## 2023-01-01 RX ADMIN — NYSTATIN CREAM 1 APPLICATION(S): 100000 CREAM TOPICAL at 05:30

## 2023-01-01 RX ADMIN — MEXILETINE HYDROCHLORIDE 150 MILLIGRAM(S): 150 CAPSULE ORAL at 05:29

## 2023-01-01 RX ADMIN — SODIUM CHLORIDE 4 MILLILITER(S): 9 INJECTION INTRAMUSCULAR; INTRAVENOUS; SUBCUTANEOUS at 14:30

## 2023-01-01 RX ADMIN — LOSARTAN POTASSIUM 25 MILLIGRAM(S): 100 TABLET, FILM COATED ORAL at 05:33

## 2023-01-01 RX ADMIN — FAMOTIDINE 20 MILLIGRAM(S): 10 INJECTION INTRAVENOUS at 14:31

## 2023-01-01 RX ADMIN — MEXILETINE HYDROCHLORIDE 150 MILLIGRAM(S): 150 CAPSULE ORAL at 18:52

## 2023-01-01 RX ADMIN — BUDESONIDE AND FORMOTEROL FUMARATE DIHYDRATE 2 PUFF(S): 160; 4.5 AEROSOL RESPIRATORY (INHALATION) at 08:56

## 2023-01-01 RX ADMIN — NYSTATIN CREAM 1 APPLICATION(S): 100000 CREAM TOPICAL at 06:24

## 2023-01-01 RX ADMIN — BUDESONIDE AND FORMOTEROL FUMARATE DIHYDRATE 2 PUFF(S): 160; 4.5 AEROSOL RESPIRATORY (INHALATION) at 05:43

## 2023-01-01 RX ADMIN — SODIUM CHLORIDE 4 MILLILITER(S): 9 INJECTION INTRAMUSCULAR; INTRAVENOUS; SUBCUTANEOUS at 17:32

## 2023-01-01 RX ADMIN — Medication 100 MILLIGRAM(S): at 22:04

## 2023-01-01 RX ADMIN — Medication 100 MILLIEQUIVALENT(S): at 20:59

## 2023-01-01 RX ADMIN — ATORVASTATIN CALCIUM 40 MILLIGRAM(S): 80 TABLET, FILM COATED ORAL at 22:54

## 2023-01-01 RX ADMIN — CHLORHEXIDINE GLUCONATE 1 APPLICATION(S): 213 SOLUTION TOPICAL at 05:33

## 2023-01-01 RX ADMIN — Medication 100 MILLIGRAM(S): at 21:31

## 2023-01-01 RX ADMIN — NYSTATIN CREAM 1 APPLICATION(S): 100000 CREAM TOPICAL at 17:44

## 2023-01-01 RX ADMIN — CHLORHEXIDINE GLUCONATE 1 APPLICATION(S): 213 SOLUTION TOPICAL at 06:03

## 2023-01-01 RX ADMIN — Medication 1 PATCH: at 08:00

## 2023-01-01 RX ADMIN — SACUBITRIL AND VALSARTAN 1 TABLET(S): 24; 26 TABLET, FILM COATED ORAL at 17:38

## 2023-01-01 RX ADMIN — ALBUTEROL 2.5 MILLIGRAM(S): 90 AEROSOL, METERED ORAL at 02:46

## 2023-01-01 RX ADMIN — SODIUM CHLORIDE 4 MILLILITER(S): 9 INJECTION INTRAMUSCULAR; INTRAVENOUS; SUBCUTANEOUS at 10:25

## 2023-01-01 RX ADMIN — Medication 40 MILLIGRAM(S): at 06:59

## 2023-01-01 RX ADMIN — CHLORHEXIDINE GLUCONATE 1 APPLICATION(S): 213 SOLUTION TOPICAL at 21:50

## 2023-01-01 RX ADMIN — Medication 12.5 MILLIGRAM(S): at 05:16

## 2023-01-01 RX ADMIN — CHLORHEXIDINE GLUCONATE 1 APPLICATION(S): 213 SOLUTION TOPICAL at 05:15

## 2023-01-01 RX ADMIN — ATORVASTATIN CALCIUM 40 MILLIGRAM(S): 80 TABLET, FILM COATED ORAL at 21:09

## 2023-01-01 RX ADMIN — CLOPIDOGREL BISULFATE 75 MILLIGRAM(S): 75 TABLET, FILM COATED ORAL at 12:44

## 2023-01-01 RX ADMIN — Medication 1 APPLICATION(S): at 17:34

## 2023-01-01 RX ADMIN — Medication 324 MILLIGRAM(S): at 21:09

## 2023-01-01 RX ADMIN — Medication 650 MILLIGRAM(S): at 00:06

## 2023-01-01 RX ADMIN — Medication 400 MILLIGRAM(S): at 12:38

## 2023-01-01 RX ADMIN — Medication 40 MILLIGRAM(S): at 06:21

## 2023-01-01 RX ADMIN — HEPARIN SODIUM 5000 UNIT(S): 5000 INJECTION INTRAVENOUS; SUBCUTANEOUS at 23:30

## 2023-01-01 RX ADMIN — Medication 1 APPLICATION(S): at 05:17

## 2023-01-01 RX ADMIN — Medication 650 MILLIGRAM(S): at 17:32

## 2023-01-01 RX ADMIN — Medication 12.7 MICROGRAM(S)/KG/MIN: at 08:31

## 2023-01-01 RX ADMIN — Medication 600 MILLIGRAM(S): at 05:05

## 2023-01-01 RX ADMIN — BUDESONIDE AND FORMOTEROL FUMARATE DIHYDRATE 2 PUFF(S): 160; 4.5 AEROSOL RESPIRATORY (INHALATION) at 18:32

## 2023-01-01 RX ADMIN — SACUBITRIL AND VALSARTAN 1 TABLET(S): 24; 26 TABLET, FILM COATED ORAL at 05:42

## 2023-01-01 RX ADMIN — SACUBITRIL AND VALSARTAN 1 TABLET(S): 24; 26 TABLET, FILM COATED ORAL at 18:14

## 2023-01-01 RX ADMIN — Medication 12.5 MILLIGRAM(S): at 17:39

## 2023-01-01 RX ADMIN — AMIODARONE HYDROCHLORIDE 400 MILLIGRAM(S): 400 TABLET ORAL at 14:38

## 2023-01-01 RX ADMIN — HEPARIN SODIUM 15 UNIT(S)/HR: 5000 INJECTION INTRAVENOUS; SUBCUTANEOUS at 19:58

## 2023-01-01 RX ADMIN — SODIUM CHLORIDE 4 MILLILITER(S): 9 INJECTION INTRAMUSCULAR; INTRAVENOUS; SUBCUTANEOUS at 23:36

## 2023-01-01 RX ADMIN — Medication 1000 MILLIGRAM(S): at 13:00

## 2023-01-01 RX ADMIN — CLOPIDOGREL BISULFATE 75 MILLIGRAM(S): 75 TABLET, FILM COATED ORAL at 05:53

## 2023-01-01 RX ADMIN — SACUBITRIL AND VALSARTAN 1 TABLET(S): 24; 26 TABLET, FILM COATED ORAL at 06:07

## 2023-01-01 RX ADMIN — PANTOPRAZOLE SODIUM 40 MILLIGRAM(S): 20 TABLET, DELAYED RELEASE ORAL at 18:06

## 2023-01-01 RX ADMIN — Medication 324 MILLIGRAM(S): at 13:58

## 2023-01-01 RX ADMIN — NYSTATIN CREAM 1 APPLICATION(S): 100000 CREAM TOPICAL at 17:33

## 2023-01-01 RX ADMIN — Medication 1 PATCH: at 22:03

## 2023-01-01 RX ADMIN — NYSTATIN CREAM 1 APPLICATION(S): 100000 CREAM TOPICAL at 21:51

## 2023-01-01 RX ADMIN — Medication 324 MILLIGRAM(S): at 05:44

## 2023-01-01 RX ADMIN — Medication 7.5 MG/MIN: at 05:23

## 2023-01-01 RX ADMIN — CLOPIDOGREL BISULFATE 75 MILLIGRAM(S): 75 TABLET, FILM COATED ORAL at 11:56

## 2023-01-01 RX ADMIN — Medication 81 MILLIGRAM(S): at 12:05

## 2023-01-01 RX ADMIN — ATORVASTATIN CALCIUM 40 MILLIGRAM(S): 80 TABLET, FILM COATED ORAL at 21:39

## 2023-01-01 RX ADMIN — Medication 80 MILLIGRAM(S): at 10:28

## 2023-01-01 RX ADMIN — AMIODARONE HYDROCHLORIDE 400 MILLIGRAM(S): 400 TABLET ORAL at 05:22

## 2023-01-01 RX ADMIN — Medication 40 MILLIGRAM(S): at 16:10

## 2023-01-01 RX ADMIN — Medication 100 MILLIGRAM(S): at 21:30

## 2023-01-01 RX ADMIN — Medication 650 MILLIGRAM(S): at 13:49

## 2023-01-01 RX ADMIN — Medication 40 MILLIGRAM(S): at 06:08

## 2023-01-01 RX ADMIN — Medication 81 MILLIGRAM(S): at 13:04

## 2023-01-01 RX ADMIN — Medication 25 MILLIGRAM(S): at 17:09

## 2023-01-01 RX ADMIN — AMIODARONE HYDROCHLORIDE 200 MILLIGRAM(S): 400 TABLET ORAL at 18:11

## 2023-01-01 RX ADMIN — CHLORHEXIDINE GLUCONATE 1 APPLICATION(S): 213 SOLUTION TOPICAL at 08:03

## 2023-01-01 RX ADMIN — Medication 650 MILLIGRAM(S): at 18:32

## 2023-01-01 RX ADMIN — Medication 100 GRAM(S): at 10:58

## 2023-01-01 RX ADMIN — Medication 40 MILLIGRAM(S): at 17:26

## 2023-01-01 RX ADMIN — Medication 25 GRAM(S): at 12:17

## 2023-01-01 RX ADMIN — BUDESONIDE AND FORMOTEROL FUMARATE DIHYDRATE 2 PUFF(S): 160; 4.5 AEROSOL RESPIRATORY (INHALATION) at 18:53

## 2023-01-01 RX ADMIN — SODIUM CHLORIDE 3 MILLILITER(S): 9 INJECTION INTRAMUSCULAR; INTRAVENOUS; SUBCUTANEOUS at 08:21

## 2023-01-01 RX ADMIN — CARVEDILOL PHOSPHATE 6.25 MILLIGRAM(S): 80 CAPSULE, EXTENDED RELEASE ORAL at 18:15

## 2023-01-01 RX ADMIN — Medication 1 PATCH: at 19:01

## 2023-01-01 RX ADMIN — Medication 1000 MILLIGRAM(S): at 12:59

## 2023-01-01 RX ADMIN — ATORVASTATIN CALCIUM 40 MILLIGRAM(S): 80 TABLET, FILM COATED ORAL at 21:32

## 2023-01-01 RX ADMIN — Medication 40 MILLIGRAM(S): at 16:57

## 2023-01-01 RX ADMIN — Medication 1 PATCH: at 21:41

## 2023-01-01 RX ADMIN — Medication 324 MILLIGRAM(S): at 13:52

## 2023-01-01 RX ADMIN — Medication 1 PATCH: at 17:09

## 2023-01-01 RX ADMIN — Medication 1 APPLICATION(S): at 05:26

## 2023-01-01 RX ADMIN — SODIUM CHLORIDE 4 MILLILITER(S): 9 INJECTION INTRAMUSCULAR; INTRAVENOUS; SUBCUTANEOUS at 08:07

## 2023-01-01 RX ADMIN — BUDESONIDE AND FORMOTEROL FUMARATE DIHYDRATE 2 PUFF(S): 160; 4.5 AEROSOL RESPIRATORY (INHALATION) at 18:07

## 2023-01-01 RX ADMIN — Medication 3 MILLILITER(S): at 15:20

## 2023-01-01 RX ADMIN — WARFARIN SODIUM 5 MILLIGRAM(S): 2.5 TABLET ORAL at 23:27

## 2023-01-01 RX ADMIN — CARVEDILOL PHOSPHATE 6.25 MILLIGRAM(S): 80 CAPSULE, EXTENDED RELEASE ORAL at 05:26

## 2023-01-01 RX ADMIN — CARVEDILOL PHOSPHATE 6.25 MILLIGRAM(S): 80 CAPSULE, EXTENDED RELEASE ORAL at 17:31

## 2023-01-01 RX ADMIN — Medication 1 PATCH: at 21:29

## 2023-01-01 RX ADMIN — Medication 1 PATCH: at 04:54

## 2023-01-01 RX ADMIN — PIPERACILLIN AND TAZOBACTAM 25 GRAM(S): 4; .5 INJECTION, POWDER, LYOPHILIZED, FOR SOLUTION INTRAVENOUS at 05:42

## 2023-01-01 RX ADMIN — NYSTATIN CREAM 1 APPLICATION(S): 100000 CREAM TOPICAL at 21:55

## 2023-01-01 RX ADMIN — Medication 650 MILLIGRAM(S): at 06:17

## 2023-01-01 RX ADMIN — FAMOTIDINE 20 MILLIGRAM(S): 10 INJECTION INTRAVENOUS at 17:02

## 2023-01-01 RX ADMIN — Medication 40 MILLIGRAM(S): at 13:40

## 2023-01-01 RX ADMIN — ENOXAPARIN SODIUM 40 MILLIGRAM(S): 100 INJECTION SUBCUTANEOUS at 05:29

## 2023-01-01 RX ADMIN — AMIODARONE HYDROCHLORIDE 200 MILLIGRAM(S): 400 TABLET ORAL at 17:00

## 2023-01-01 RX ADMIN — Medication 324 MILLIGRAM(S): at 05:09

## 2023-01-01 RX ADMIN — Medication 650 MILLIGRAM(S): at 23:34

## 2023-01-01 RX ADMIN — Medication 12.5 MILLIGRAM(S): at 17:00

## 2023-01-01 RX ADMIN — Medication 100 MILLIGRAM(S): at 05:01

## 2023-01-01 RX ADMIN — FAMOTIDINE 20 MILLIGRAM(S): 10 INJECTION INTRAVENOUS at 11:23

## 2023-01-01 RX ADMIN — MEXILETINE HYDROCHLORIDE 150 MILLIGRAM(S): 150 CAPSULE ORAL at 16:52

## 2023-01-01 RX ADMIN — Medication 650 MILLIGRAM(S): at 05:34

## 2023-01-01 RX ADMIN — Medication 1 PATCH: at 07:30

## 2023-01-01 RX ADMIN — Medication 12.5 MILLIGRAM(S): at 05:59

## 2023-01-01 RX ADMIN — Medication 1 APPLICATION(S): at 07:14

## 2023-01-01 RX ADMIN — PIPERACILLIN AND TAZOBACTAM 25 GRAM(S): 4; .5 INJECTION, POWDER, LYOPHILIZED, FOR SOLUTION INTRAVENOUS at 06:04

## 2023-01-01 RX ADMIN — Medication 0.6 MILLIGRAM(S): at 12:44

## 2023-01-01 RX ADMIN — FAMOTIDINE 20 MILLIGRAM(S): 10 INJECTION INTRAVENOUS at 13:05

## 2023-01-01 RX ADMIN — HEPARIN SODIUM 12 UNIT(S)/HR: 5000 INJECTION INTRAVENOUS; SUBCUTANEOUS at 21:35

## 2023-01-01 RX ADMIN — SODIUM CHLORIDE 4 MILLILITER(S): 9 INJECTION INTRAMUSCULAR; INTRAVENOUS; SUBCUTANEOUS at 00:48

## 2023-01-01 RX ADMIN — Medication 40 MILLIGRAM(S): at 13:45

## 2023-01-01 RX ADMIN — Medication 324 MILLIGRAM(S): at 21:24

## 2023-01-01 RX ADMIN — Medication 40 MILLIGRAM(S): at 22:54

## 2023-01-01 RX ADMIN — Medication 5 MILLIGRAM(S): at 23:04

## 2023-01-01 RX ADMIN — SACUBITRIL AND VALSARTAN 1 TABLET(S): 24; 26 TABLET, FILM COATED ORAL at 05:52

## 2023-01-01 RX ADMIN — HYDROMORPHONE HYDROCHLORIDE 0.25 MILLIGRAM(S): 2 INJECTION INTRAMUSCULAR; INTRAVENOUS; SUBCUTANEOUS at 07:00

## 2023-01-01 RX ADMIN — Medication 5 MILLIGRAM(S): at 21:32

## 2023-01-01 RX ADMIN — BUDESONIDE AND FORMOTEROL FUMARATE DIHYDRATE 2 PUFF(S): 160; 4.5 AEROSOL RESPIRATORY (INHALATION) at 09:01

## 2023-01-01 RX ADMIN — Medication 81 MILLIGRAM(S): at 15:55

## 2023-01-01 RX ADMIN — Medication 40 MILLIGRAM(S): at 05:29

## 2023-01-01 RX ADMIN — Medication 1 PATCH: at 21:34

## 2023-01-01 RX ADMIN — Medication 650 MILLIGRAM(S): at 06:02

## 2023-01-01 RX ADMIN — Medication 100 MILLIEQUIVALENT(S): at 13:34

## 2023-01-01 RX ADMIN — WARFARIN SODIUM 2.5 MILLIGRAM(S): 2.5 TABLET ORAL at 22:18

## 2023-01-01 RX ADMIN — AMIODARONE HYDROCHLORIDE 400 MILLIGRAM(S): 400 TABLET ORAL at 05:17

## 2023-01-01 RX ADMIN — Medication 324 MILLIGRAM(S): at 18:11

## 2023-01-01 RX ADMIN — ATORVASTATIN CALCIUM 40 MILLIGRAM(S): 80 TABLET, FILM COATED ORAL at 22:09

## 2023-01-01 RX ADMIN — Medication 324 MILLIGRAM(S): at 05:54

## 2023-01-01 RX ADMIN — SODIUM CHLORIDE 3 MILLILITER(S): 9 INJECTION INTRAMUSCULAR; INTRAVENOUS; SUBCUTANEOUS at 05:02

## 2023-01-01 RX ADMIN — AMIODARONE HYDROCHLORIDE 200 MILLIGRAM(S): 400 TABLET ORAL at 05:35

## 2023-01-01 RX ADMIN — HEPARIN SODIUM 5000 UNIT(S): 5000 INJECTION INTRAVENOUS; SUBCUTANEOUS at 17:38

## 2023-01-01 RX ADMIN — Medication 40 MILLIGRAM(S): at 17:02

## 2023-01-01 RX ADMIN — CHLORHEXIDINE GLUCONATE 1 APPLICATION(S): 213 SOLUTION TOPICAL at 06:10

## 2023-01-01 RX ADMIN — WARFARIN SODIUM 2 MILLIGRAM(S): 2.5 TABLET ORAL at 21:51

## 2023-01-01 RX ADMIN — CHLORHEXIDINE GLUCONATE 1 APPLICATION(S): 213 SOLUTION TOPICAL at 05:13

## 2023-01-01 RX ADMIN — AMIODARONE HYDROCHLORIDE 200 MILLIGRAM(S): 400 TABLET ORAL at 05:29

## 2023-01-01 RX ADMIN — Medication 650 MILLIGRAM(S): at 16:59

## 2023-01-01 RX ADMIN — Medication 25 MILLIGRAM(S): at 05:28

## 2023-01-01 RX ADMIN — HEPARIN SODIUM 5000 UNIT(S): 5000 INJECTION INTRAVENOUS; SUBCUTANEOUS at 00:55

## 2023-01-01 RX ADMIN — Medication 40 MILLIGRAM(S): at 05:32

## 2023-01-01 RX ADMIN — Medication 40 MILLIGRAM(S): at 05:05

## 2023-01-01 RX ADMIN — Medication 650 MILLIGRAM(S): at 18:00

## 2023-01-01 RX ADMIN — NYSTATIN CREAM 1 APPLICATION(S): 100000 CREAM TOPICAL at 17:18

## 2023-01-01 RX ADMIN — Medication 10 MG/HR: at 16:36

## 2023-01-01 RX ADMIN — Medication 1 PATCH: at 09:49

## 2023-01-01 RX ADMIN — NYSTATIN CREAM 1 APPLICATION(S): 100000 CREAM TOPICAL at 17:20

## 2023-01-01 RX ADMIN — Medication 1 APPLICATION(S): at 17:50

## 2023-01-01 RX ADMIN — Medication 650 MILLIGRAM(S): at 00:36

## 2023-01-01 RX ADMIN — AMIODARONE HYDROCHLORIDE 200 MILLIGRAM(S): 400 TABLET ORAL at 21:17

## 2023-01-01 RX ADMIN — Medication 400 MILLIGRAM(S): at 05:22

## 2023-01-01 RX ADMIN — Medication 324 MILLIGRAM(S): at 05:49

## 2023-01-01 RX ADMIN — Medication 1 APPLICATION(S): at 05:23

## 2023-01-01 RX ADMIN — SODIUM CHLORIDE 4 MILLILITER(S): 9 INJECTION INTRAMUSCULAR; INTRAVENOUS; SUBCUTANEOUS at 17:26

## 2023-01-01 RX ADMIN — Medication 600 MILLIGRAM(S): at 16:58

## 2023-01-01 RX ADMIN — NYSTATIN CREAM 1 APPLICATION(S): 100000 CREAM TOPICAL at 14:11

## 2023-01-01 RX ADMIN — Medication 2000 UNIT(S): at 12:05

## 2023-01-01 RX ADMIN — Medication 7.5 MG/MIN: at 21:19

## 2023-01-01 RX ADMIN — BUDESONIDE AND FORMOTEROL FUMARATE DIHYDRATE 2 PUFF(S): 160; 4.5 AEROSOL RESPIRATORY (INHALATION) at 05:01

## 2023-01-01 RX ADMIN — Medication 324 MILLIGRAM(S): at 05:30

## 2023-01-01 RX ADMIN — SACUBITRIL AND VALSARTAN 1 TABLET(S): 24; 26 TABLET, FILM COATED ORAL at 17:37

## 2023-01-01 RX ADMIN — ATORVASTATIN CALCIUM 40 MILLIGRAM(S): 80 TABLET, FILM COATED ORAL at 21:54

## 2023-01-01 RX ADMIN — Medication 81 MILLIGRAM(S): at 12:07

## 2023-01-01 RX ADMIN — BUDESONIDE AND FORMOTEROL FUMARATE DIHYDRATE 2 PUFF(S): 160; 4.5 AEROSOL RESPIRATORY (INHALATION) at 17:11

## 2023-01-01 RX ADMIN — ATORVASTATIN CALCIUM 40 MILLIGRAM(S): 80 TABLET, FILM COATED ORAL at 21:50

## 2023-01-01 RX ADMIN — CHLORHEXIDINE GLUCONATE 1 APPLICATION(S): 213 SOLUTION TOPICAL at 06:00

## 2023-01-01 RX ADMIN — Medication 650 MILLIGRAM(S): at 01:15

## 2023-01-01 RX ADMIN — SACUBITRIL AND VALSARTAN 1 TABLET(S): 24; 26 TABLET, FILM COATED ORAL at 05:28

## 2023-01-01 RX ADMIN — WARFARIN SODIUM 2.5 MILLIGRAM(S): 2.5 TABLET ORAL at 21:10

## 2023-01-01 RX ADMIN — CARVEDILOL PHOSPHATE 3.12 MILLIGRAM(S): 80 CAPSULE, EXTENDED RELEASE ORAL at 17:38

## 2023-01-01 RX ADMIN — Medication 80 MILLIGRAM(S): at 21:04

## 2023-01-01 RX ADMIN — CARVEDILOL PHOSPHATE 3.12 MILLIGRAM(S): 80 CAPSULE, EXTENDED RELEASE ORAL at 16:52

## 2023-01-01 RX ADMIN — FAMOTIDINE 20 MILLIGRAM(S): 10 INJECTION INTRAVENOUS at 12:11

## 2023-01-01 RX ADMIN — Medication 1 APPLICATION(S): at 18:53

## 2023-01-01 RX ADMIN — Medication 20 MILLIEQUIVALENT(S): at 10:58

## 2023-01-01 RX ADMIN — SACUBITRIL AND VALSARTAN 1 TABLET(S): 24; 26 TABLET, FILM COATED ORAL at 16:52

## 2023-01-01 RX ADMIN — SACUBITRIL AND VALSARTAN 1 TABLET(S): 24; 26 TABLET, FILM COATED ORAL at 05:04

## 2023-01-01 RX ADMIN — Medication 12.5 MILLIGRAM(S): at 16:59

## 2023-01-01 RX ADMIN — Medication 600 MILLIGRAM(S): at 17:31

## 2023-01-01 RX ADMIN — WARFARIN SODIUM 4 MILLIGRAM(S): 2.5 TABLET ORAL at 21:53

## 2023-01-01 RX ADMIN — Medication 650 MILLIGRAM(S): at 17:17

## 2023-01-01 RX ADMIN — Medication 650 MILLIGRAM(S): at 14:34

## 2023-01-01 RX ADMIN — LOSARTAN POTASSIUM 25 MILLIGRAM(S): 100 TABLET, FILM COATED ORAL at 13:46

## 2023-01-01 RX ADMIN — ENOXAPARIN SODIUM 40 MILLIGRAM(S): 100 INJECTION SUBCUTANEOUS at 04:58

## 2023-01-01 RX ADMIN — Medication 324 MILLIGRAM(S): at 17:19

## 2023-01-01 RX ADMIN — Medication 650 MILLIGRAM(S): at 05:58

## 2023-01-01 RX ADMIN — BUDESONIDE AND FORMOTEROL FUMARATE DIHYDRATE 2 PUFF(S): 160; 4.5 AEROSOL RESPIRATORY (INHALATION) at 05:59

## 2023-01-01 RX ADMIN — AMIODARONE HYDROCHLORIDE 600 MILLIGRAM(S): 400 TABLET ORAL at 11:40

## 2023-01-01 RX ADMIN — HYDROMORPHONE HYDROCHLORIDE 0.25 MILLIGRAM(S): 2 INJECTION INTRAMUSCULAR; INTRAVENOUS; SUBCUTANEOUS at 14:56

## 2023-01-01 RX ADMIN — BUDESONIDE AND FORMOTEROL FUMARATE DIHYDRATE 2 PUFF(S): 160; 4.5 AEROSOL RESPIRATORY (INHALATION) at 05:26

## 2023-01-01 RX ADMIN — BUDESONIDE AND FORMOTEROL FUMARATE DIHYDRATE 2 PUFF(S): 160; 4.5 AEROSOL RESPIRATORY (INHALATION) at 20:33

## 2023-01-01 RX ADMIN — SODIUM CHLORIDE 4 MILLILITER(S): 9 INJECTION INTRAMUSCULAR; INTRAVENOUS; SUBCUTANEOUS at 10:28

## 2023-01-01 RX ADMIN — BUDESONIDE AND FORMOTEROL FUMARATE DIHYDRATE 2 PUFF(S): 160; 4.5 AEROSOL RESPIRATORY (INHALATION) at 14:22

## 2023-01-01 RX ADMIN — Medication 100 MILLIGRAM(S): at 05:05

## 2023-01-01 RX ADMIN — BUDESONIDE AND FORMOTEROL FUMARATE DIHYDRATE 2 PUFF(S): 160; 4.5 AEROSOL RESPIRATORY (INHALATION) at 20:24

## 2023-01-01 RX ADMIN — Medication 400 MILLIGRAM(S): at 12:19

## 2023-01-01 RX ADMIN — Medication 40 MILLIGRAM(S): at 23:29

## 2023-01-01 RX ADMIN — Medication 650 MILLIGRAM(S): at 18:18

## 2023-01-01 RX ADMIN — Medication 650 MILLIGRAM(S): at 11:44

## 2023-01-01 RX ADMIN — Medication 100 GRAM(S): at 10:55

## 2023-01-01 RX ADMIN — SODIUM CHLORIDE 3 MILLILITER(S): 9 INJECTION INTRAMUSCULAR; INTRAVENOUS; SUBCUTANEOUS at 12:25

## 2023-01-01 RX ADMIN — FENTANYL CITRATE 50 MICROGRAM(S): 50 INJECTION INTRAVENOUS at 21:35

## 2023-01-01 RX ADMIN — CARVEDILOL PHOSPHATE 3.12 MILLIGRAM(S): 80 CAPSULE, EXTENDED RELEASE ORAL at 06:06

## 2023-01-01 RX ADMIN — MIDAZOLAM HYDROCHLORIDE 2 MILLIGRAM(S): 1 INJECTION, SOLUTION INTRAMUSCULAR; INTRAVENOUS at 21:35

## 2023-01-01 RX ADMIN — Medication 5 MILLIGRAM(S): at 22:18

## 2023-01-01 RX ADMIN — CLOPIDOGREL BISULFATE 75 MILLIGRAM(S): 75 TABLET, FILM COATED ORAL at 13:00

## 2023-01-01 RX ADMIN — AMIODARONE HYDROCHLORIDE 400 MILLIGRAM(S): 400 TABLET ORAL at 06:49

## 2023-01-01 RX ADMIN — Medication 5 MILLIGRAM(S): at 21:50

## 2023-01-01 RX ADMIN — Medication 1 PATCH: at 11:27

## 2023-01-01 RX ADMIN — Medication 324 MILLIGRAM(S): at 21:11

## 2023-01-01 RX ADMIN — NYSTATIN CREAM 1 APPLICATION(S): 100000 CREAM TOPICAL at 05:44

## 2023-01-01 RX ADMIN — AMIODARONE HYDROCHLORIDE 200 MILLIGRAM(S): 400 TABLET ORAL at 05:14

## 2023-01-01 RX ADMIN — AMIODARONE HYDROCHLORIDE 200 MILLIGRAM(S): 400 TABLET ORAL at 06:17

## 2023-01-01 RX ADMIN — BUDESONIDE AND FORMOTEROL FUMARATE DIHYDRATE 2 PUFF(S): 160; 4.5 AEROSOL RESPIRATORY (INHALATION) at 18:18

## 2023-01-01 RX ADMIN — LOSARTAN POTASSIUM 25 MILLIGRAM(S): 100 TABLET, FILM COATED ORAL at 05:16

## 2023-01-01 RX ADMIN — BUDESONIDE AND FORMOTEROL FUMARATE DIHYDRATE 2 PUFF(S): 160; 4.5 AEROSOL RESPIRATORY (INHALATION) at 05:13

## 2023-01-01 RX ADMIN — CARVEDILOL PHOSPHATE 3.12 MILLIGRAM(S): 80 CAPSULE, EXTENDED RELEASE ORAL at 17:07

## 2023-01-01 RX ADMIN — CLOPIDOGREL BISULFATE 75 MILLIGRAM(S): 75 TABLET, FILM COATED ORAL at 08:19

## 2023-01-01 RX ADMIN — Medication 324 MILLIGRAM(S): at 05:34

## 2023-01-01 RX ADMIN — Medication 650 MILLIGRAM(S): at 19:58

## 2023-01-01 RX ADMIN — SODIUM CHLORIDE 4 MILLILITER(S): 9 INJECTION INTRAMUSCULAR; INTRAVENOUS; SUBCUTANEOUS at 06:00

## 2023-01-01 RX ADMIN — Medication 1 TABLET(S): at 14:20

## 2023-01-01 RX ADMIN — CHLORHEXIDINE GLUCONATE 1 APPLICATION(S): 213 SOLUTION TOPICAL at 05:59

## 2023-01-01 RX ADMIN — Medication 0.6 MILLIGRAM(S): at 11:34

## 2023-01-01 RX ADMIN — Medication 324 MILLIGRAM(S): at 05:59

## 2023-01-01 RX ADMIN — Medication 1 PATCH: at 07:43

## 2023-01-01 RX ADMIN — Medication 324 MILLIGRAM(S): at 22:35

## 2023-01-01 RX ADMIN — Medication 5 MILLIGRAM(S): at 20:06

## 2023-01-01 RX ADMIN — SODIUM CHLORIDE 3 MILLILITER(S): 9 INJECTION INTRAMUSCULAR; INTRAVENOUS; SUBCUTANEOUS at 05:10

## 2023-01-01 RX ADMIN — MEXILETINE HYDROCHLORIDE 150 MILLIGRAM(S): 150 CAPSULE ORAL at 17:37

## 2023-01-01 RX ADMIN — Medication 1 TABLET(S): at 11:22

## 2023-01-01 RX ADMIN — CLOPIDOGREL BISULFATE 75 MILLIGRAM(S): 75 TABLET, FILM COATED ORAL at 13:31

## 2023-01-01 RX ADMIN — SODIUM CHLORIDE 500 MILLILITER(S): 9 INJECTION, SOLUTION INTRAVENOUS at 00:16

## 2023-01-01 RX ADMIN — Medication 80 MILLIGRAM(S): at 20:19

## 2023-01-01 RX ADMIN — CLOPIDOGREL BISULFATE 75 MILLIGRAM(S): 75 TABLET, FILM COATED ORAL at 11:53

## 2023-01-01 RX ADMIN — NYSTATIN CREAM 1 APPLICATION(S): 100000 CREAM TOPICAL at 14:32

## 2023-01-01 RX ADMIN — Medication 650 MILLIGRAM(S): at 11:54

## 2023-01-01 RX ADMIN — CEFTRIAXONE 100 MILLIGRAM(S): 500 INJECTION, POWDER, FOR SOLUTION INTRAMUSCULAR; INTRAVENOUS at 16:01

## 2023-01-01 RX ADMIN — NYSTATIN CREAM 1 APPLICATION(S): 100000 CREAM TOPICAL at 17:41

## 2023-01-01 RX ADMIN — CLOPIDOGREL BISULFATE 75 MILLIGRAM(S): 75 TABLET, FILM COATED ORAL at 11:48

## 2023-01-01 RX ADMIN — SACUBITRIL AND VALSARTAN 1 TABLET(S): 24; 26 TABLET, FILM COATED ORAL at 06:59

## 2023-01-01 RX ADMIN — BUDESONIDE AND FORMOTEROL FUMARATE DIHYDRATE 2 PUFF(S): 160; 4.5 AEROSOL RESPIRATORY (INHALATION) at 08:35

## 2023-01-01 RX ADMIN — Medication 650 MILLIGRAM(S): at 21:54

## 2023-01-01 RX ADMIN — SODIUM CHLORIDE 4 MILLILITER(S): 9 INJECTION INTRAMUSCULAR; INTRAVENOUS; SUBCUTANEOUS at 22:01

## 2023-01-01 RX ADMIN — SODIUM CHLORIDE 4 MILLILITER(S): 9 INJECTION INTRAMUSCULAR; INTRAVENOUS; SUBCUTANEOUS at 16:49

## 2023-01-01 RX ADMIN — Medication 5 MILLIGRAM(S): at 21:26

## 2023-01-01 RX ADMIN — SACUBITRIL AND VALSARTAN 1 TABLET(S): 24; 26 TABLET, FILM COATED ORAL at 18:07

## 2023-01-01 RX ADMIN — Medication 1 PATCH: at 22:50

## 2023-01-01 RX ADMIN — PANTOPRAZOLE SODIUM 40 MILLIGRAM(S): 20 TABLET, DELAYED RELEASE ORAL at 05:44

## 2023-01-01 RX ADMIN — SACUBITRIL AND VALSARTAN 1 TABLET(S): 24; 26 TABLET, FILM COATED ORAL at 17:10

## 2023-01-01 RX ADMIN — MEXILETINE HYDROCHLORIDE 150 MILLIGRAM(S): 150 CAPSULE ORAL at 06:07

## 2023-01-01 RX ADMIN — CHLORHEXIDINE GLUCONATE 1 APPLICATION(S): 213 SOLUTION TOPICAL at 23:02

## 2023-01-01 RX ADMIN — AMIODARONE HYDROCHLORIDE 400 MILLIGRAM(S): 400 TABLET ORAL at 21:31

## 2023-01-01 RX ADMIN — Medication 81 MILLIGRAM(S): at 13:46

## 2023-01-01 RX ADMIN — CARVEDILOL PHOSPHATE 3.12 MILLIGRAM(S): 80 CAPSULE, EXTENDED RELEASE ORAL at 06:11

## 2023-01-01 RX ADMIN — Medication 7.5 MG/MIN: at 11:12

## 2023-01-01 RX ADMIN — CLOPIDOGREL BISULFATE 75 MILLIGRAM(S): 75 TABLET, FILM COATED ORAL at 12:11

## 2023-01-01 RX ADMIN — SACUBITRIL AND VALSARTAN 1 TABLET(S): 24; 26 TABLET, FILM COATED ORAL at 05:57

## 2023-01-01 RX ADMIN — CLOPIDOGREL BISULFATE 75 MILLIGRAM(S): 75 TABLET, FILM COATED ORAL at 11:15

## 2023-01-01 RX ADMIN — Medication 3 MILLILITER(S): at 15:00

## 2023-01-01 RX ADMIN — ATORVASTATIN CALCIUM 40 MILLIGRAM(S): 80 TABLET, FILM COATED ORAL at 21:57

## 2023-01-01 RX ADMIN — SODIUM CHLORIDE 3 MILLILITER(S): 9 INJECTION INTRAMUSCULAR; INTRAVENOUS; SUBCUTANEOUS at 13:04

## 2023-01-01 RX ADMIN — Medication 12.5 MILLIGRAM(S): at 17:31

## 2023-01-01 RX ADMIN — CLOPIDOGREL BISULFATE 75 MILLIGRAM(S): 75 TABLET, FILM COATED ORAL at 17:31

## 2023-01-01 RX ADMIN — Medication 324 MILLIGRAM(S): at 17:09

## 2023-01-01 RX ADMIN — Medication 50 MILLIGRAM(S): at 05:49

## 2023-01-01 RX ADMIN — SODIUM CHLORIDE 4 MILLILITER(S): 9 INJECTION INTRAMUSCULAR; INTRAVENOUS; SUBCUTANEOUS at 08:48

## 2023-01-01 RX ADMIN — CARVEDILOL PHOSPHATE 3.12 MILLIGRAM(S): 80 CAPSULE, EXTENDED RELEASE ORAL at 05:09

## 2023-01-01 RX ADMIN — ATORVASTATIN CALCIUM 40 MILLIGRAM(S): 80 TABLET, FILM COATED ORAL at 22:17

## 2023-01-01 RX ADMIN — Medication 650 MILLIGRAM(S): at 06:03

## 2023-01-01 RX ADMIN — Medication 650 MILLIGRAM(S): at 00:52

## 2023-01-01 RX ADMIN — BUDESONIDE AND FORMOTEROL FUMARATE DIHYDRATE 2 PUFF(S): 160; 4.5 AEROSOL RESPIRATORY (INHALATION) at 06:00

## 2023-01-01 RX ADMIN — Medication 40 MILLIGRAM(S): at 08:49

## 2023-01-01 RX ADMIN — PANTOPRAZOLE SODIUM 80 MILLIGRAM(S): 20 TABLET, DELAYED RELEASE ORAL at 23:38

## 2023-01-01 RX ADMIN — Medication 30 MILLILITER(S): at 13:44

## 2023-01-01 RX ADMIN — Medication 40 MILLIEQUIVALENT(S): at 13:15

## 2023-01-01 RX ADMIN — Medication 650 MILLIGRAM(S): at 11:40

## 2023-01-01 RX ADMIN — NYSTATIN CREAM 1 APPLICATION(S): 100000 CREAM TOPICAL at 22:01

## 2023-01-01 RX ADMIN — Medication 40 MILLIGRAM(S): at 06:06

## 2023-01-01 RX ADMIN — ATORVASTATIN CALCIUM 40 MILLIGRAM(S): 80 TABLET, FILM COATED ORAL at 21:03

## 2023-01-01 RX ADMIN — SODIUM CHLORIDE 4 MILLILITER(S): 9 INJECTION INTRAMUSCULAR; INTRAVENOUS; SUBCUTANEOUS at 23:02

## 2023-01-01 RX ADMIN — ATORVASTATIN CALCIUM 40 MILLIGRAM(S): 80 TABLET, FILM COATED ORAL at 21:24

## 2023-01-01 RX ADMIN — AMIODARONE HYDROCHLORIDE 200 MILLIGRAM(S): 400 TABLET ORAL at 06:03

## 2023-01-01 RX ADMIN — Medication 1 PATCH: at 14:20

## 2023-01-01 RX ADMIN — Medication 650 MILLIGRAM(S): at 00:10

## 2023-01-01 RX ADMIN — Medication 650 MILLIGRAM(S): at 12:06

## 2023-01-01 RX ADMIN — ATORVASTATIN CALCIUM 40 MILLIGRAM(S): 80 TABLET, FILM COATED ORAL at 22:46

## 2023-01-01 RX ADMIN — Medication 650 MILLIGRAM(S): at 00:56

## 2023-01-01 RX ADMIN — DEXMEDETOMIDINE HYDROCHLORIDE IN 0.9% SODIUM CHLORIDE 12.8 MICROGRAM(S)/KG/HR: 4 INJECTION INTRAVENOUS at 14:50

## 2023-01-01 RX ADMIN — CEFTRIAXONE 100 MILLIGRAM(S): 500 INJECTION, POWDER, FOR SOLUTION INTRAMUSCULAR; INTRAVENOUS at 21:34

## 2023-01-01 RX ADMIN — Medication 0.6 MILLIGRAM(S): at 15:25

## 2023-01-01 RX ADMIN — SACUBITRIL AND VALSARTAN 1 TABLET(S): 24; 26 TABLET, FILM COATED ORAL at 16:39

## 2023-01-01 RX ADMIN — NYSTATIN CREAM 1 APPLICATION(S): 100000 CREAM TOPICAL at 22:44

## 2023-01-01 RX ADMIN — SODIUM CHLORIDE 3 MILLILITER(S): 9 INJECTION INTRAMUSCULAR; INTRAVENOUS; SUBCUTANEOUS at 17:49

## 2023-01-01 RX ADMIN — Medication 650 MILLIGRAM(S): at 17:48

## 2023-01-01 RX ADMIN — Medication 5 MILLIGRAM(S): at 21:12

## 2023-01-01 RX ADMIN — AMIODARONE HYDROCHLORIDE 400 MILLIGRAM(S): 400 TABLET ORAL at 15:23

## 2023-01-01 RX ADMIN — Medication 81 MILLIGRAM(S): at 12:04

## 2023-01-01 RX ADMIN — NYSTATIN CREAM 1 APPLICATION(S): 100000 CREAM TOPICAL at 14:17

## 2023-01-01 RX ADMIN — HYDROMORPHONE HYDROCHLORIDE 0.25 MILLIGRAM(S): 2 INJECTION INTRAMUSCULAR; INTRAVENOUS; SUBCUTANEOUS at 06:31

## 2023-01-01 RX ADMIN — Medication 400 MILLIGRAM(S): at 12:29

## 2023-01-01 RX ADMIN — Medication 5 MILLIGRAM(S): at 21:53

## 2023-01-01 RX ADMIN — PIPERACILLIN AND TAZOBACTAM 25 GRAM(S): 4; .5 INJECTION, POWDER, LYOPHILIZED, FOR SOLUTION INTRAVENOUS at 06:09

## 2023-01-01 RX ADMIN — CHLORHEXIDINE GLUCONATE 1 APPLICATION(S): 213 SOLUTION TOPICAL at 05:19

## 2023-01-01 RX ADMIN — Medication 100 MILLIEQUIVALENT(S): at 11:50

## 2023-01-01 RX ADMIN — Medication 80 MILLIGRAM(S): at 19:33

## 2023-01-01 RX ADMIN — Medication 5 MILLIGRAM(S): at 21:33

## 2023-01-01 RX ADMIN — Medication 650 MILLIGRAM(S): at 17:20

## 2023-01-01 RX ADMIN — BUDESONIDE AND FORMOTEROL FUMARATE DIHYDRATE 2 PUFF(S): 160; 4.5 AEROSOL RESPIRATORY (INHALATION) at 16:58

## 2023-01-01 RX ADMIN — Medication 25 GRAM(S): at 21:51

## 2023-01-01 RX ADMIN — NYSTATIN CREAM 1 APPLICATION(S): 100000 CREAM TOPICAL at 14:48

## 2023-01-01 RX ADMIN — LOSARTAN POTASSIUM 25 MILLIGRAM(S): 100 TABLET, FILM COATED ORAL at 05:34

## 2023-01-01 RX ADMIN — Medication 100 MILLIGRAM(S): at 16:00

## 2023-01-01 RX ADMIN — Medication 600 MILLIGRAM(S): at 18:07

## 2023-01-01 RX ADMIN — Medication 324 MILLIGRAM(S): at 05:12

## 2023-01-01 RX ADMIN — Medication 1 SPRAY(S): at 14:50

## 2023-01-01 RX ADMIN — Medication 40 MILLIEQUIVALENT(S): at 06:11

## 2023-01-01 RX ADMIN — Medication 25 GRAM(S): at 02:56

## 2023-01-01 RX ADMIN — ATORVASTATIN CALCIUM 40 MILLIGRAM(S): 80 TABLET, FILM COATED ORAL at 21:33

## 2023-01-01 RX ADMIN — Medication 3 MILLILITER(S): at 14:51

## 2023-01-02 ENCOUNTER — RX RENEWAL (OUTPATIENT)
Age: 73
End: 2023-01-02

## 2023-01-06 ENCOUNTER — NON-APPOINTMENT (OUTPATIENT)
Age: 73
End: 2023-01-06

## 2023-01-06 ENCOUNTER — APPOINTMENT (OUTPATIENT)
Dept: ELECTROPHYSIOLOGY | Facility: CLINIC | Age: 73
End: 2023-01-06
Payer: MEDICARE

## 2023-01-06 ENCOUNTER — APPOINTMENT (OUTPATIENT)
Dept: CARDIOLOGY | Facility: CLINIC | Age: 73
End: 2023-01-06
Payer: MEDICARE

## 2023-01-06 VITALS — OXYGEN SATURATION: 97 % | DIASTOLIC BLOOD PRESSURE: 80 MMHG | SYSTOLIC BLOOD PRESSURE: 136 MMHG | HEART RATE: 78 BPM

## 2023-01-06 PROCEDURE — 93282 PRGRMG EVAL IMPLANTABLE DFB: CPT

## 2023-01-06 PROCEDURE — 93000 ELECTROCARDIOGRAM COMPLETE: CPT

## 2023-01-06 PROCEDURE — 99214 OFFICE O/P EST MOD 30 MIN: CPT

## 2023-01-06 NOTE — HISTORY OF PRESENT ILLNESS
[FreeTextEntry1] : 72 year-old female s/p PTCA to an occluded right coronary artery and left anterior descending artery at [facility] s/p cardiac arrest 6/14/19 - s/p cath and ICD that subsequently got infected - s/p extraction - now with LifeVest on - now s/p re-implant of the ICD on 9/23/19. Now with SOB after walking about 3 blocks. \par She denies any recent chest pain, shortness of breath, palpitations or diaphoresis.\par Hospitalized at [facility] with complaints of dyspnea suggestive of pneumonia. Patient had elevated troponins which prompted a repeat cardiac catheterization. Report states that the patient had a proximal RCA total obstruction and was being filled by collaterals from the mid LAD. The LAD shows a patent stent and a 50% mid LAD lesion. She also had moderate disease of the left circumflex artery. Subsequently underwent nuclear study which showed only a fixed inferoapical anteroapical defect.\par Since discharge from hospital she has been on nebulizers and steroids. Tried quitting smoking on several occasions, currently using nicotine patch. \par She still is smoking. \par \par 1) CAD -  The LAD shows a patent stent and a 50% mid LAD lesion. She also had moderate disease of the left circumflex artery. Subsequently underwent nuclear study which showed only a fixed inferoapical anteroapical defect.\par Continue Plavix 75 mg QD and Metoprolol ER 50 mg QD \par \par 2) Ischemic CMP- S/p ICD \par Continue Entresto 49/51 mg bid \par c/w Lasix 40 mg daily\par check daily weights \par labs reviewed \par interrogation shows several episodes of SVT and a couple of NSVT (5-9 beats up to 200 bpm) - asymptomatic - will increase the carvedilol to 9.375 mg bid\par \par 3) LBBB - chronic \par Will consider EP eval for BIV upgrade after HF symptoms are optimized \par \par 4) HLD- Continue Atorvastatin 40 mg QD \par 5) Reinforced smoking cessation \par \par Interval Note\par 1/6/23\par \par Ms. Kohli is a 72 year old female that [facility] a [facility].\par She carries a past medical history as outlined below:\par \par -Hx of Cardiac Arrest: 6/14/2019\par -Hx of PTCA-> RCA/ LAD\par -Hx of ICD implant infection -> reimplant 9/23/2019-> [employer], Visia AF MRI VR [id number]\par -Hx of ischemic cardiomyopathy\par -Hx of LBBB- chronic\par -Hx of Hyperlipidemia\par \par Most recent echocardiogram performed on February 22, 2022\par LVEF 40%\par Heavily calcified mitral valve leaflets with decrease opening\par MIld MR\par Mild LA enlargement\par MIld concentric LVH\par Moderate segmental LV systolic dysfunction wit hypokinesis of the inferior wall,posterior-lateral wall\par basal interventricular septum and apex\par \par has not staying active\par denies any symptoms\par compliant with all meds\par interrogation shows several episodes of SVT and a couple of NSVT (5-9 beats up to 200 bpm) - asymptomatic\par   \par

## 2023-01-06 NOTE — DISCUSSION/SUMMARY
[Coronary Artery Disease] : coronary artery disease [Hyperlipidemia] : hyperlipidemia [Hypertension] : hypertension [Stable] : stable [Patient] : the patient [___ Month(s)] : in [unfilled] month(s) [EKG obtained to assist in diagnosis and management of assessed problem(s)] : EKG obtained to assist in diagnosis and management of assessed problem(s) [FreeTextEntry1] : 72 year-old female s/p PTCA to an occluded right coronary artery and left anterior descending artery at [facility] s/p cardiac arrest 6/14/19 - s/p cath and ICD that subsequently got infected - s/p extraction and re-implant of the ICD on 9/23/19. Now with SOB after walking about 3 blocks. \par \par Interval Note\par 1/6/23\par \par Ms. Kohli is a 72 year old female that [facility] cardiovascular follow up.\par She carries a past medical history as outlined below:\par \par -Hx of Cardiac Arrest: 6/14/2019\par -Hx of PTCA-> RCA/ LAD\par -Hx of ICD implant infection -> reimplant 9/23/2019-> [employer], Visia AF MRI VR [id number]\par -Hx of ischemic cardiomyopathy\par -Hx of LBBB- chronic\par -Hx of Hyperlipidemia\par \par Most recent echocardiogram performed on February 22, 2022\par LVEF 40%\par Heavily calcified mitral valve leaflets with decrease opening\par MIld MR\par Mild LA enlargement\par MIld concentric LVH\par Moderate segmental LV systolic dysfunction wit hypokinesis of the inferior wall,posterior-lateral wall\par basal interventricular septum and apex\par \par has not staying active\par denies any symptoms\par compliant with all meds\par interogation shows several episodes of SVT and a coupls of NSVT (5-9 beats up to 200 bpm) - asymptomatic\par   \par \par 1) CAD -  The LAD shows a patent stent and a 50% mid LAD lesion. She also had moderate disease of the left circumflex artery. Subsequently underwent nuclear study which showed only a fixed inferoapical anteroapical defect.\par Continue Plavix 75 mg QD and Metoprolol ER 50 mg QD \par \par 2) Ischemic CMP- S/p ICD \par Continue Entresto 49/51 mg bid \par c/w Lasix 40 mg daily\par check daily weights \par labs reviewed \par interrogation shows several episodes of SVT and a couple of NSVT (5-9 beats up to 200 bpm) - asymptomatic - will increase the carvedilol to 9.375 mg bid\par \par 3) LBBB - chronic \par Will consider EP eval for BIV upgrade after HF symptoms are optimized \par \par 4) HLD- Continue Atorvastatin 40 mg QD \par 5) Reinforced smoking cessation \par \par \par   \par \par \par

## 2023-04-07 ENCOUNTER — NON-APPOINTMENT (OUTPATIENT)
Age: 73
End: 2023-04-07

## 2023-04-07 ENCOUNTER — APPOINTMENT (OUTPATIENT)
Dept: ELECTROPHYSIOLOGY | Facility: CLINIC | Age: 73
End: 2023-04-07
Payer: MEDICARE

## 2023-04-07 PROCEDURE — 93296 REM INTERROG EVL PM/IDS: CPT

## 2023-04-07 PROCEDURE — 93295 DEV INTERROG REMOTE 1/2/MLT: CPT

## 2023-04-20 ENCOUNTER — RX RENEWAL (OUTPATIENT)
Age: 73
End: 2023-04-20

## 2023-05-04 ENCOUNTER — RX RENEWAL (OUTPATIENT)
Age: 73
End: 2023-05-04

## 2023-07-07 NOTE — HISTORY OF PRESENT ILLNESS
[FreeTextEntry1] : 72 year-old female s/p PTCA to an occluded right coronary artery and left anterior descending artery at [facility] s/p cardiac arrest 6/14/19 - s/p cath and ICD that subsequently got infected - s/p extraction - now with LifeVest on - now s/p re-implant of the ICD on 9/23/19. Now with SOB after walking about 3 blocks. \par She denies any recent chest pain, shortness of breath, palpitations or diaphoresis.\par Hospitalized at [facility] with complaints of dyspnea suggestive of pneumonia. Patient had elevated troponins which prompted a repeat cardiac catheterization. Report states that the patient had a proximal RCA total obstruction and was being filled by collaterals from the mid LAD. The LAD shows a patent stent and a 50% mid LAD lesion. She also had moderate disease of the left circumflex artery. Subsequently underwent nuclear study which showed only a fixed inferoapical anteroapical defect.\par Since discharge from hospital she has been on nebulizers and steroids. Tried quitting smoking on several occasions, currently using nicotine patch. \par She still is smoking. \par \par 1) CAD -  The LAD shows a patent stent and a 50% mid LAD lesion. She also had moderate disease of the left circumflex artery. Subsequently underwent nuclear study which showed only a fixed inferoapical anteroapical defect.\par Continue Plavix 75 mg QD and Metoprolol ER 50 mg QD \par \par 2) Ischemic CMP- S/p ICD \par Continue Entresto 49/51 mg bid \par c/w Lasix 40 mg daily\par check daily weights \par labs reviewed \par interrogation shows several episodes of SVT and a couple of NSVT (5-9 beats up to 200 bpm) - asymptomatic - will increase the carvedilol to 9.375 mg bid\par \par 3) LBBB - chronic \par Will consider EP eval for BIV upgrade after HF symptoms are optimized \par \par 4) HLD- Continue Atorvastatin 40 mg QD \par 5) Reinforced smoking cessation \par \par Interval Note\par 7/7/23\par \par Ms. Kohli is a 72 year old female that presents [facility] for a cardiovascular follow up.\par She carries a past medical history as outlined below:\par \par -Hx of Cardiac Arrest: 6/14/2019\par -Hx of PTCA-> RCA/ LAD\par -Hx of ICD implant infection -> reimplant 9/23/2019-> [employer], Visia AF MRI VR [id number]\par -Hx of ischemic cardiomyopathy\par -Hx of LBBB- chronic\par -Hx of Hyperlipidemia\par \par Most recent echocardiogram performed on February 22, 2022\par LVEF 40%\par Heavily calcified mitral valve leaflets with decrease opening\par MIld MR\par Mild LA enlargement\par MIld concentric LVH\par Moderate segmental LV systolic dysfunction wit hypokinesis of the inferior wall,posterior-lateral wall\par basal interventricular septum and apex\par \par has not staying active\par denies any symptoms\par compliant with all meds\par interrogation shows several episodes of SVT and a couple of NSVT (5-9 beats up to 200 bpm) - asymptomatic\par   \par

## 2023-07-07 NOTE — DISCUSSION/SUMMARY
[Coronary Artery Disease] : coronary artery disease [Hyperlipidemia] : hyperlipidemia [Hypertension] : hypertension [Stable] : stable [Patient] : the patient [___ Month(s)] : in [unfilled] month(s) [EKG obtained to assist in diagnosis and management of assessed problem(s)] : EKG obtained to assist in diagnosis and management of assessed problem(s) [FreeTextEntry1] : 72 year-old female s/p PTCA to an occluded right coronary artery and left anterior descending artery at [facility] s/p cardiac arrest 6/14/19 - s/p cath and ICD that subsequently got infected - s/p extraction and re-implant of the ICD on 9/23/19. Now with SOB after walking about 3 blocks. \par \par Interval Note\par 7/7/23\par \par Ms. Kohli is a 72 year old female that presents [facility] for a cardiovascular follow up.\par She carries a past medical history as outlined below:\par \par -Hx of Cardiac Arrest: 6/14/2019\par -Hx of PTCA-> RCA/ LAD\par -Hx of ICD implant infection -> reimplant 9/23/2019-> [employer], Visia AF MRI VR [id number]\par -Hx of ischemic cardiomyopathy\par -Hx of LBBB- chronic\par -Hx of Hyperlipidemia\par \par Most recent echocardiogram performed on February 22, 2022\par LVEF 40%\par Heavily calcified mitral valve leaflets with decrease opening\par MIld MR\par Mild LA enlargement\par MIld concentric LVH\par Moderate segmental LV systolic dysfunction wit hypokinesis of the inferior wall,posterior-lateral wall\par basal interventricular septum and apex\par \par has not staying active\par denies any symptoms\par compliant with all meds\par interogation shows several episodes of SVT and a coupls of NSVT (5-9 beats up to 200 bpm) - asymptomatic\par   \par \par 1) CAD -  The LAD shows a patent stent and a 50% mid LAD lesion. She also had moderate disease of the left circumflex artery. Subsequently underwent nuclear study which showed only a fixed inferoapical anteroapical defect.\par Continue Plavix 75 mg QD and Metoprolol ER 50 mg QD \par subtle changes in ECG that will need a repeat TTE to reassess her LV function\par \par 2) Ischemic CMP- S/p ICD \par Continue Entresto 49/51 mg bid \par c/w Lasix 40 mg daily\par check daily weights \par labs reviewed - pt was sent for her routine blood work in light of the significant risk factors and the meds that she is on - needed to monitor renal, liver function along with all of her risk factors HLD, DM, etc - will resubmit the bill for the blood work\par interrogation shows several episodes of SVT and a couple of NSVT (5-9 beats up to 200 bpm) - asymptomatic - will increase the carvedilol to 9.375 mg bid\par \par 3) LBBB - chronic \par Will consider EP eval for BIV upgrade after HF symptoms are optimized \par \par 4) HLD- Continue Atorvastatin 40 mg QD \par 5) Reinforced smoking cessation \par \par \par   \par \par \par

## 2023-08-27 NOTE — ED PROVIDER NOTE - CLINICAL SUMMARY MEDICAL DECISION MAKING FREE TEXT BOX
dyspnea in setting of possibly taking coreeg+ metoprolol  known aicd w/ shock given this week  plan for labs check lytes hx of diarrhea possible electrolyte imbalance, will replete as needed   admitto tele initialtrop indeterminent will repeat, low suspicion for vte  possibly mild volume overloaded  pt w/ hypok will replete  found to have leukocytosis, pt w/ no fevers, rectall afebrile, blood cx, sent abd soft nt nd, low suspicion for intraabdominal pathology possibly hemconcentration vs viral gastro

## 2023-08-27 NOTE — ED ADULT NURSE NOTE - NSFALLRISKINTERV_ED_ALL_ED

## 2023-08-27 NOTE — H&P ADULT - PROBLEM SELECTOR PROBLEM 5
Hypokalemia Verbalized Understanding/Straightforward: Basic instructions, no meds, no home treatment/Simple: Patient demonstrates quick and easy understanding

## 2023-08-27 NOTE — H&P ADULT - PROBLEM SELECTOR PLAN 1
- Poss iso COPD exac vs HF exacerbation vs PNA  - Prelim CXR: no focal consolidations  - Neg RVP  - Currently on 2L NC >92%  - Maintain O2 sat 88-92% and wean O2 as tolerated  - Duonebs Q6H, hypersal neb Q6H, Chest PT,   - f/u procal, VBG - Poss iso arrhythmia vs URI vs COPD exac vs less likely HF exacerbation   - Prelim CXR: no focal consolidations  - Pt no longer complaining of SOB  - Neg RVP  - Currently on 2L NC >92%  - Maintain O2 sat 88-92% and wean O2 as tolerated  - Duonebs Q6H, hypersal neb Q6H, Chest PT,   - f/u procal, VBG

## 2023-08-27 NOTE — H&P ADULT - ASSESSMENT
73 yo F w/ PMH COPD, HLD, HTN, PVD, CAD s/p PCI 2015, ischemic cardiomyopathy, cardiac arrest s/p left-sided ICD (6/4/2019) complicated by infection and s/p R single-chamber ICD ([place] in 9/23/2019) p/w SOB and hyperkalemia w/ prelim CXR showing no focal consolidation.

## 2023-08-27 NOTE — ED PROVIDER NOTE - OBJECTIVE STATEMENT
72F w/ hx of CAD status post PCI (2015), ischemic cardiomyopathy, cardiac arrest s/p left-sided ICD (6/2019) complicated by infection and s/p right sided single-chamber ICD (at [place] by Dr. Gutiérrez in 9/2019). No prior history of appropriate therapies. Echo in 2/2022 demonstrated mild LAE, EF 40-45%, moderate segmental LV systolic dysfunction with hypokinesis of the inferior wall, posterior-lateral wall, basal anterior-ventricular septum and apex, mild concentric LVH pt here presents to the ER w/ shortness of breath that occurred while at rest.occurred at around noon while  pt was at rest transient.n oassociated cp, noo associated lightheadedness/n/v.of note pt w/ diarrhea today 3 bowel movemetns loose nonblodoy. pt previously this week was found to have an episode on SVT and then tachycardia w/ single AICD shock pt felt dizzy she was changed from coreg to toprol, however, pt  at bedside reports he stoppeed plavix instead of coreg

## 2023-08-27 NOTE — H&P ADULT - NSHPLABSRESULTS_GEN_ALL_CORE
16.7   17.22 )-----------( 347      ( 27 Aug 2023 16:20 )             49.5       08-27    138  |  99  |  13  ----------------------------<  148<H>  3.0<L>   |  25  |  0.99    Ca    9.0      27 Aug 2023 16:20  Mg     1.9     08-27    TPro  6.7  /  Alb  3.3  /  TBili  0.5  /  DBili  x   /  AST  15  /  ALT  9<L>  /  AlkPhos  80  08-27              PT/INR - ( 27 Aug 2023 16:20 )   PT: 12.3 sec;   INR: 1.12 ratio         PTT - ( 27 Aug 2023 16:20 )  PTT:30.8 sec      < from: Xray Chest 1 View- PORTABLE-Urgent (08.27.23 @ 17:09) >    INTERPRETATION:  Clear lungs.    < end of copied text > 16.7   17. )-----------( 347      ( 27 Aug 2023 16:20 )             49.5           138  |  99  |  13  ----------------------------<  148<H>  3.0<L>   |  25  |  0.99    Ca    9.0      27 Aug 2023 16:20  Mg     1.9         TPro  6.7  /  Alb  3.3  /  TBili  0.5  /  DBili  x   /  AST  15  /  ALT  9<L>  /  AlkPhos  80                PT/INR - ( 27 Aug 2023 16:20 )   PT: 12.3 sec;   INR: 1.12 ratio         PTT - ( 27 Aug 2023 16:20 )  PTT:30.8 sec      < from: Xray Chest 1 View- PORTABLE-Urgent (23 @ 17:09) >    INTERPRETATION:  Clear lungs.    < end of copied text >    Most recent echocardiogram performed on 2022:  LVEF 40%  Heavily calcified mitral valve leaflets with decrease opening  MIld MR  Mild LA enlargement  MIld concentric LVH  Moderate segmental LV systolic dysfunction wit hypokinesis of the inferior wall,posterior-lateral wall  basal interventricular septum and apex    EK23, Sinus Rhythm  Low voltage in precordial leads.   -Incomplete left bundle branch block.   -Poor R-wave progression LABS:                16.7   17.22 )-----------( 347      ( 27 Aug 2023 16:20 )             49.5       08-27    138  |  99  |  13  ----------------------------<  148<H>  3.0<L>   |  25  |  0.99    Ca    9.0      27 Aug 2023 16:20  Mg     1.9     08-27    TPro  6.7  /  Alb  3.3  /  TBili  0.5  /  DBili  x   /  AST  15  /  ALT  9<L>  /  AlkPhos  80  08-27        PT/INR - ( 27 Aug 2023 16:20 )   PT: 12.3 sec;   INR: 1.12 ratio    PTT - ( 27 Aug 2023 16:20 )  PTT:30.8 sec    IMAGING:  Xray Chest 1 View- PORTABLE-Urgent (08.27.23 @ 17:09)  INTERPRETATION:  Clear lungs.    Most recent echocardiogram performed on February 22, 2022:  LVEF 40%  Heavily calcified mitral valve leaflets with decrease opening  MIld MR  Mild LA enlargement  MIld concentric LVH  Moderate segmental LV systolic dysfunction wit hypokinesis of the inferior wall,posterior-lateral wall  basal interventricular septum and apex    Imaging personally reviewed by me: No consolidation on CXR, possible L small pleural effusion   EKG personally reviewed by me: Sinus Rhythm  Low voltage in precordial leads.   -Incomplete left bundle branch block.   -Poor R-wave progression

## 2023-08-27 NOTE — H&P ADULT - NSICDXPASTMEDICALHX_GEN_ALL_CORE_FT
PAST MEDICAL HISTORY:  Obese     Obesity     Smoker      PAST MEDICAL HISTORY:  CAD (coronary artery disease)     CHF with cardiomyopathy     HLD (hyperlipidemia)     HTN (hypertension)     Obese     Smoker

## 2023-08-27 NOTE — ED PROVIDER NOTE - NS ED ROS FT
Constitutional: no fevers no chills.   CV: no chest pain, no palpitations   Respiratory: + shortness of breath, no cough   GI: no abdominal pain, no nausea no vomiting  +diarrhea  Neuro: no headache, no weakness, no numbness

## 2023-08-27 NOTE — H&P ADULT - HISTORY OF PRESENT ILLNESS
73 yo F w/ PMH CAD s/p PCI 2015, ischemic cardiomyopathy, cardiac arrest s/p left-sided ICD (6/4/2019) complicated by infection and s/p R single-chamber ICD ([place] in 9/23/2019)    Echo 40-45% 2022  moderate segmental LV systolic dysfunction with hypokinesis of the inferior wall, posterior-lateral wall, basal anterior-ventricular septum and apex, mild concentric LVH.     73 yo F w/ PMH COPD, HLD, HTN, PVD, CAD s/p PCI 2015, ischemic cardiomyopathy, cardiac arrest s/p left-sided ICD (6/4/2019) complicated by infection and s/p R single-chamber ICD ([place] in 9/23/2019) p/w SOB. Pt reports random onset of dyspnea while sitting and watching TV at her daughter's home this afternoon. Pt reports her defibrillator went off a few days ago one time and has been a bit anxious since then. Pt reports episodes of diarrhea today and a productive cough w/ phlegm. Pt denies nausea, vomiting, fever, chills, chest pain, sick contacts, dysuria, hematuria, and luis daniel LE edema. Pt reports receiving 2 COVID vaccines.     Per pt's , pt  went to cardiologist last week (Dr. Wise) and was called by someone in the office who instructed pt's daughter to discontinue plavix. Pt stopped taking plavix since last week.     In ED: Potassium repletion, 40 mg IV lasix 71 yo F w/ PMH COPD, HLD, HTN, PVD, CAD s/p PCI 2015, ischemic cardiomyopathy, cardiac arrest s/p left-sided ICD (6/4/2019) complicated by infection and s/p R single-chamber ICD ([place] in 9/23/2019) p/w SOB. Pt reports random onset of dyspnea while sitting and watching TV at her daughter's home this afternoon. Pt reports her defibrillator went off a few days ago one time and has been a bit anxious since then. Pt reports 3 episodes of diarrhea today and a productive cough w/ phlegm. Pt denies nausea, vomiting, fever, chills, chest pain, sick contacts, dysuria, hematuria, and luis daniel LE edema. Pt reports receiving 2 COVID vaccines.     Per pt's , pt  went to cardiologist last week (Dr. Wise) and was called by someone in the office who instructed pt's daughter to discontinue plavix. Pt stopped taking plavix since last week, as there was confusion. Pt should have discontinued carvedilol 6.25 mg 1.5 BID rather than discontinuing clopidogrel 75 mg QD. Pt has     In ED: Potassium repletion, 40 mg IV lasix

## 2023-08-27 NOTE — H&P ADULT - NSHPSOCIALHISTORY_GEN_ALL_CORE
Pt reports living at home w/  and is fully independent, able to perform all ADLs. Pt is retired. Pt reports tobacco use (40 pack years) w/ occasional smoking currently. Pt denies alcohol and any other illicit drug use.

## 2023-08-27 NOTE — H&P ADULT - PROBLEM SELECTOR PLAN 6
- /59 on admission   - On home lasix 40 mg QD, metoprolol ER 50 mg QD  - C/w lasix and metoprolol - /59 on admission   - On home lasix 40 mg QD, metoprolol ER 50 mg BID (per previous cardiology)  - C/w lasix and metoprolol 50 mg BID

## 2023-08-27 NOTE — ED PROVIDER NOTE - ATTENDING CONTRIBUTION TO CARE
72F w/ hx of CAD status post PCI (2015), ischemic cardiomyopathy, cardiac arrest s/p left-sided ICD (6/2019) complicated by infection and s/p right sided single-chamber ICD (at [place] by Dr. Gutiérrez in 9/2019). No prior history of appropriate therapies. Echo in 2/2022 demonstrated mild LAE, EF 40-45%, moderate segmental LV systolic dysfunction with hypokinesis of the inferior wall, posterior-lateral wall, basal anterior-ventricular septum and apex, mild concentric LVH pt here presents to the ER w/ shortness of breath that occurred while at rest. pt previously this week was found to have an episode on SVT and then tachycardia w/ single AICD shock pt felt dizzy she was changed from coreg to toprol, however, pt  at bedside reports see note

## 2023-08-27 NOTE — H&P ADULT - PROBLEM SELECTOR PLAN 4
- Prelim CXR: no focal consolidations  - Currently on 2L NC >92%  - Maintain O2 sat 88-92% and wean O2 as tolerated  - Duonebs Q6H, hypersal neb Q6H, Chest PT, bedside spirometry  - f/u VBG  - CT chest  - 40mg Prednisone  - CTX 1g x7 days - Prelim CXR: no focal consolidations  - Currently on 2L NC >92%  - Maintain O2 sat 88-92% and wean O2 as tolerated  - Duonebs Q6H, hypersal neb Q6H, Chest PT, bedside spirometry  - f/u VBG

## 2023-08-27 NOTE — H&P ADULT - PROBLEM SELECTOR PLAN 3
- C/w home Entresto 49/51 mg bid   - c/w home Lasix 40 mg daily  - check daily weights  - Strict I's and O's    #HFrEF  Echo Feb 22, 2022  - LVEF 40%. MIld MR. Mild LA enlargement. MIld concentric LVH. Moderate segmental LV systolic dysfunction with hypokinesis of the inferior wall, posterior-lateral wall basal interventricular septum and apex.  - The LAD shows a patent stent and a 50% mid LAD lesion. Moderate disease of the left circumflex artery.   - F/u Echo - C/w home Entresto 49/51 mg bid   - c/w home Lasix 40 mg daily  - check daily weights  - Consult EP for device interrogation in AM  - Strict I's and O's    #HFrEF  Echo Feb 22, 2022  - LVEF 40%. MIld MR. Mild LA enlargement. MIld concentric LVH. Moderate segmental LV systolic dysfunction with hypokinesis of the inferior wall, posterior-lateral wall basal interventricular septum and apex.  - The LAD shows a patent stent and a 50% mid LAD lesion. Moderate disease of the left circumflex artery.   - F/u Echo

## 2023-08-27 NOTE — H&P ADULT - ATTENDING COMMENTS
71yo F w/ PMH of HTN, HLD, COPD, CAD s/p PCI 2015, PVD, ICM, cardiac arrest s/p ICD p/w brief episode of SOB while at rest, which resolved upon presentation w/ findings of  leukocytosis and hypokalemia on labs.    # SOB  - c/f arrythmia as symptoms described are similar to prior arrythmia where shock was administered  - Other ddx includes URI vs less likely COPD exacerbation or AdHF as symptoms resolved spontaneously w/out wheezing on exam or s/s of congestion or LE edema  - Device interrogation in AM, consider EP eval  - Duonebs  - Monitor on tele  - TTE  - c/w Toprol 50mg BID (increased recently on outpt cards visit notes)    # Rest of plan as above

## 2023-08-27 NOTE — ED PROVIDER NOTE - PHYSICAL EXAMINATION
GENERAL: short pf breath  HEENT:  oral mucosa moist, uvula midline, no tonsilar exudates, no JVD  CARDIAC: regular rate and rhythm, normal S1S2, no appreciable murmurs, 2+ pulses in UE/LE b/l  PULM: normal breath sounds, clear to ascultation bilaterally, no rales, rhonchi, wheezing  GI: Abd soft, nondistended, nontender, no rebound tenderness, no guarding, no rigidity  : no CVA tenderness b/l, no suprapubic tenderness  NEURO: no focal motor or sensory deficits, CN2-12 intact, normal speech, PERRLA, EOMI, normal gait, AAOx3  MSK: ROM intact, no peripheral edema, no calf tenderness b/l  SKIN: well-perfused, extremities warm, no visible rashes  PSYCH: appropriate mood and affect

## 2023-08-27 NOTE — ED ADULT NURSE NOTE - OBJECTIVE STATEMENT
Patient presents to Ed with c/o sob. Patient with cardiac hx +defibrillator presents with sob and reports defib recently fired  in which she has an echo scheduled for next week. Patient A&Ox3 denies chest pain +sob (pursed lip breathing) no nausea  vomiting cough fever chills headache or dizziness +rt knee surgical scar no lower ext swelling. Rt lateral AC 20g iv lock placed   labs drawn and sent.

## 2023-08-27 NOTE — H&P ADULT - NSICDXPASTSURGICALHX_GEN_ALL_CORE_FT
PAST SURGICAL HISTORY:  No significant past surgical history     No significant past surgical history      PAST SURGICAL HISTORY:  History of hip surgery

## 2023-08-27 NOTE — H&P ADULT - NSHPREVIEWOFSYSTEMS_GEN_ALL_CORE
REVIEW OF SYSTEMS:    Constitutional: No fever, chills, night sweats, or fatigue  	Eyes:  No eye pain, visual disturbances, or discharge  	ENMT:  No neck pain  	Cardiac:  No chest pain, palpitations, no leg swelling  	Respiratory:  No cough, SOB  	GI:  No nausea, vomiting, diarrhea, abdominal pain.  	:  no dysuria, hematuria, or incontinence  	MS:  No back pain.  	Neuro:  No headache or lightheadedness, dizziness   	Skin:  No skin rash REVIEW OF SYSTEMS:    Constitutional: No fever, chills, night sweats, or fatigue  	Eyes:  No eye pain, visual disturbances, or discharge  	ENMT:  No neck pain  	Cardiac:  No chest pain, palpitations, no leg swelling  	Respiratory:  +cough, +SOB  	GI:  +Diarrhea; No nausea, vomiting, abdominal pain.  	:  no dysuria, hematuria, or incontinence  	MS:  No back pain.  	Neuro:  No headache or lightheadedness, dizziness   	Skin:  No skin rash Constitutional: No fever, chills, night sweats, or fatigue  Eyes:  No eye pain, visual disturbances, or discharge  ENMT:  No neck pain  Cardiac:  No chest pain, palpitations, no leg swelling  Respiratory:  +cough, +SOB  GI:  +Diarrhea; No nausea, vomiting, abdominal pain.  :  no dysuria, hematuria, or incontinence  MS:  No back pain.  Neuro:  No headache or lightheadedness, dizziness   Skin:  No skin rash

## 2023-08-27 NOTE — H&P ADULT - NSHPPHYSICALEXAM_GEN_ALL_CORE
VITALS:   T(C): 37.1 (08-27-23 @ 19:06), Max: 37.1 (08-27-23 @ 19:06)  HR: 66 (08-27-23 @ 19:36) (54 - 71)  BP: 121/59 (08-27-23 @ 19:36) (94/48 - 130/63)  RR: 18 (08-27-23 @ 19:36) (18 - 20)  SpO2: 98% (08-27-23 @ 19:36) (97% - 98%)    PHYSICAL EXAM:     GENERAL: NAD, lying in bed comfortably  HEAD:  Atraumatic, Normocephalic  EYES: EOMI, PERRLA, conjunctiva and sclera clear  ENT: Moist mucous membranes  NECK: Supple, No JVD  CHEST/LUNG: Clear to auscultation bilaterally; No rales, rhonchi, wheezing, or rubs. Unlabored respirations  HEART: Regular rate and rhythm; No murmurs, rubs, or gallops  ABDOMEN: normal bowel sounds; Soft, nontender, nondistended  EXTREMITIES:  2+ Peripheral Pulses, brisk capillary refill. No clubbing, cyanosis, or edema  Neurological:  A&Ox3, no focal deficits   SKIN: No rashes or lesions  PSYCH: normal affect and mood VITALS:   T(C): 37.1 (08-27-23 @ 19:06), Max: 37.1 (08-27-23 @ 19:06)  HR: 66 (08-27-23 @ 19:36) (54 - 71)  BP: 121/59 (08-27-23 @ 19:36) (94/48 - 130/63)  RR: 18 (08-27-23 @ 19:36) (18 - 20)  SpO2: 98% (08-27-23 @ 19:36) (97% - 98%)    PHYSICAL EXAM:     GENERAL: +Appears uncomfortable and mildly distressed  HEAD:  Atraumatic, Normocephalic  EYES: EOMI, PERRLA, conjunctiva and sclera clear  ENT: Moist mucous membranes  NECK: Supple, No JVD  CHEST/LUNG: +luis daniel coarse breath sounds in luis daniel lung fields anteriorly and posteriorly; No rales or rubs. Unlabored respirations  HEART: Regular rate and rhythm; No murmurs, rubs, or gallops  ABDOMEN: normal bowel sounds; Soft, nontender, nondistended  EXTREMITIES:  2+ Peripheral Pulses, brisk capillary refill. No clubbing, cyanosis, or edema  Neurological:  A&Ox3, no focal deficits   SKIN: +luis daniel LE chronic venous stasis changes  PSYCH: normal affect and mood VITALS:   T(C): 37.1 (08-27-23 @ 19:06), Max: 37.1 (08-27-23 @ 19:06)  HR: 66 (08-27-23 @ 19:36) (54 - 71)  BP: 121/59 (08-27-23 @ 19:36) (94/48 - 130/63)  RR: 18 (08-27-23 @ 19:36) (18 - 20)  SpO2: 98% (08-27-23 @ 19:36) (97% - 98%)    PHYSICAL EXAM:   GENERAL: +Appears uncomfortable and mildly distressed  HEAD:  Atraumatic, Normocephalic  EYES: EOMI, PERRLA, conjunctiva and sclera clear  ENT: Moist mucous membranes  NECK: Supple, No JVD  CHEST/LUNG: +luis daniel coarse breath sounds in luis daniel lung fields anteriorly and posteriorly; No rales or rubs. Unlabored respirations  HEART: Regular rate and rhythm; No murmurs, rubs, or gallops  ABDOMEN: normal bowel sounds; Soft, nontender, nondistended  EXTREMITIES:  2+ Peripheral Pulses, brisk capillary refill. No clubbing, cyanosis, or edema  Neurological:  A&Ox3, no focal deficits   SKIN: +luis daniel LE chronic venous stasis changes  PSYCH: normal affect and mood

## 2023-08-27 NOTE — ED PROVIDER NOTE - NSICDXFAMILYHX_GEN_ALL_CORE_FT
FAMILY HISTORY:  Father  Still living? No  Family history of Alzheimer's disease, Age at diagnosis: Age Unknown    Mother  Still living? No  Family history of cancer, Age at diagnosis: Age Unknown

## 2023-08-27 NOTE — ED ADULT NURSE NOTE - NSFALLRISKASMT_ED_ALL_ED_DT
New Prescription: Pazeo (olopatadine): drops: 0.7% 1 drop once a day as directed into both eyes 02- 27-Aug-2023 16:24

## 2023-08-27 NOTE — H&P ADULT - PROBLEM SELECTOR PLAN 5
- K+ to 3.0 on admission  - S/p 40mE KCl tablet and 10 mEQ IVPB in ED  - Maintain K>4 and Mg>2 - K+ to 3.0 on admission  - S/p 40mEq KCl tablet and 10 mEQ IVPB in ED  - Maintain K>4 and Mg>2  - F/u BMP and replete as necessary

## 2023-08-27 NOTE — H&P ADULT - PROBLEM SELECTOR PLAN 2
Echo Feb 22, 2022  - LVEF 40%. MIld MR. Mild LA enlargement. MIld concentric LVH. Moderate segmental LV systolic dysfunction with hypokinesis of the inferior wall, posterior-lateral wall basal interventricular septum and apex.  - The LAD shows a patent stent and a 50% mid LAD lesion. Moderate disease of the left circumflex artery.   - C/w home Plavix 75 mg QD and home Metoprolol ER 50 mg QD   - f/u Echo

## 2023-08-28 NOTE — PHYSICAL THERAPY INITIAL EVALUATION ADULT - ADDITIONAL COMMENTS
Pt lives with  in private house, several stairs to enter. Ambulates with no device at home, uses cane in the community. Owns rolling walker from previous injury, does not use. Has scooter that uses recreationally when is out for a long time.

## 2023-08-28 NOTE — DISCUSSION/SUMMARY
[Coronary Artery Disease] : coronary artery disease [Hyperlipidemia] : hyperlipidemia [Hypertension] : hypertension [Patient] : the patient [Stable] : stable [___ Month(s)] : in [unfilled] month(s) [FreeTextEntry1] : 72 year-old female s/p PTCA to an occluded right coronary artery and left anterior descending artery at [facility] s/p cardiac arrest 6/14/19 - s/p cath and ICD that subsequently got infected - s/p extraction and re-implant of the ICD on 9/23/19. Now with SOB after walking about 3 blocks. \par  \par  Interval Note\par  7/7/23\par  \par  Ms. 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/2019-> Medtronic, Visia AF MRI VR [id number]\par  -Hx of ischemic cardiomyopathy\par  -Hx of LBBB- chronic\par  -Hx of Hyperlipidemia\par  \par  Most recent echocardiogram performed on February 22, 2022\par  LVEF 40%\par  Heavily calcified mitral valve leaflets with decrease opening\par  MIld MR\par  Mild LA enlargement\par  MIld concentric LVH\par  Moderate segmental LV systolic dysfunction wit hypokinesis of the inferior wall,posterior-lateral wall\par  basal interventricular septum and apex\par  \par  has not staying active\par  denies any symptoms\par  compliant with all meds\par  interogation shows several episodes of SVT and a coupls of NSVT (5-9 beats up to 200 bpm) - asymptomatic\par    \par  \par  1) CAD -  The LAD shows a patent stent and a 50% mid LAD lesion. She also had moderate disease of the left circumflex artery. Subsequently underwent nuclear study which showed only a fixed inferoapical anteroapical defect.\par  Continue Plavix 75 mg QD and Metoprolol ER 50 mg QD \par  subtle changes in ECG that will need a repeat TTE to reassess her LV function\par  \par  2) Ischemic CMP- S/p ICD \par  Continue Entresto 49/51 mg bid \par  c/w Lasix 40 mg daily\par  check daily weights \par  labs reviewed - pt was sent for her routine blood work in light of the significant risk factors and the meds that she is on - needed to monitor renal, liver function along with all of her risk factors HLD, DM, etc - will resubmit the bill for the blood work\par  interrogation shows several episodes of SVT and a couple of NSVT (5-9 beats up to 200 bpm) - asymptomatic - will increase the carvedilol to 9.375 mg bid\par  \par  3) LBBB - chronic \par  Will consider EP eval for BIV upgrade after HF symptoms are optimized \par  \par  4) HLD- Continue Atorvastatin 40 mg QD \par  5) Reinforced smoking cessation \par  \par  \par    \par  \par  \par

## 2023-08-28 NOTE — CONSULT NOTE ADULT - ASSESSMENT
72 year old female w/ PMH COPD, HLD, HTN, PVD, CAD s/p PCI 2015, ischemic cardiomyopathy, cardiac arrest s/p left-sided ICD (6/4/2019) complicated by infection and underwent extraction with right sided Medtronic single-chamber ICD ( at [place] by Dr. Gutiérrez on 9/23/2019, follows by Dr. Hurt) p/w SOB at rest. Pt was given IV lasix in the ED. Pt reports 3 episodes of diarrhea yesterday and this morning, decrease in appetite for about 1 week and a productive cough w/ phlegm. Labs notable for leukocytosis and UA slightly turbid, +bacteria, +leukocyte esterase. ICD remote transmission from 8/22/23 showed several episodes of nonsustained supraventricular tachycardia (SVT) as well as an episode on 8/21/23 around 10:30pm of a tachycardia at a rate of about 222 bpm that failed to terminated with 2 rounds of ATP and terminated with a single ICD shock (likely ventricular tachycardia per Dr. Hurt). Patient was watching TV and then began feeling dizzy (no CP, SOB, palpitations or syncope). Patient was recommended to discontinue coreg, started on metoprolol succinate 50mg BID. EP consulted due to recent ICD shock for VT.     1. CAD s/p PCI 2015  2. ICM w/ LVEF 40-45%  3. VT s/p ICD shock on 8/21/23  4. p/w SOB, now resolved   5. Leukocytosis    - ICD interrogated today; no further events since last remote transmission from 8/22/23.  -Outpatient TTE (2/22/2022): LVEF 40-45%, mod. segmental LVSD with hypokinesis of the inferior wall, posterior-lateral wall, basal inter-ventricular septum and apex. Mild concentric LVH (septum 1.4cm), mild LA enlargement (WILLIAN 31 cc/m2), mild MR, no AI, mild TR, no pericardial effusion.  -Pending repeat TTE  -CXR: no focal consolidations  -Blood cultures x 2 sent on 8/27/23, f/u result  -Pending Urine cx  -Continue metoprolol succinate 50mg BID  -Further EP recommendation pending discussion with Dr. Hurt.    SOO Nguyen-Ale, NP-C  [id number]   72 year old female w/ PMH COPD, HLD, HTN, PVD, CAD s/p PCI 2015, ischemic cardiomyopathy, cardiac arrest s/p left-sided ICD (6/4/2019) complicated by infection and underwent extraction with right sided Medtronic single-chamber ICD ( at [place] by Dr. Gutiérrez on 9/23/2019, follows by Dr. Hurt) p/w SOB at rest. Pt was given IV lasix in the ED. Pt reports 3 episodes of diarrhea yesterday and this morning, decrease in appetite for about 1 week and a productive cough w/ phlegm. Labs notable for leukocytosis and UA slightly turbid, +bacteria, +leukocyte esterase. ICD remote transmission from 8/22/23 showed several episodes of nonsustained supraventricular tachycardia (SVT) as well as an episode on 8/21/23 around 10:30pm of a tachycardia at a rate of about 222 bpm that failed to terminated with 2 rounds of ATP and terminated with a single ICD shock (likely ventricular tachycardia per Dr. Hurt). Patient was watching TV and then began feeling dizzy (no CP, SOB, palpitations or syncope). Patient was recommended to discontinue coreg, started on metoprolol succinate 50mg BID. EP consulted due to recent ICD shock for VT.     1. CAD s/p PCI 2015  2. ICM w/ LVEF 40-45%  3. VT s/p ICD shock on 8/21/23  4. p/w SOB, now resolved   5. Leukocytosis    - ICD interrogated today; no further events since last remote transmission from 8/22/23.  -Outpatient TTE (2/22/2022): LVEF 40-45%, mod. segmental LVSD with hypokinesis of the inferior wall, posterior-lateral wall, basal inter-ventricular septum and apex. Mild concentric LVH (septum 1.4cm), mild LA enlargement (WILLIAN 31 cc/m2), mild MR, no AI, mild TR, no pericardial effusion.  -Pending repeat TTE  -CXR: no focal consolidations  -Blood cultures x 2 sent on 8/27/23, f/u result  -Pending Urine cx  -Started on ceftriaxone per primary team  -Continue metoprolol succinate 50mg BID  -Further EP recommendation pending discussion with Dr. Hurt.    SOO Comer, NP-C  [id number]   72 year old female w/ PMH COPD, HLD, HTN, PVD, CAD s/p PCI 2015, ischemic cardiomyopathy, cardiac arrest s/p left-sided ICD (6/4/2019) complicated by infection and underwent extraction with right sided Medtronic single-chamber ICD ( at [place] by Dr. Gutiérrez on 9/23/2019, follows by Dr. Hurt) p/w SOB at rest. Pt was given IV lasix in the ED. Pt reports 3 episodes of diarrhea yesterday and this morning, decrease in appetite for about 1 week and a productive cough w/ phlegm. Labs notable for leukocytosis and UA slightly turbid, +bacteria, +leukocyte esterase. ICD remote transmission from 8/22/23 showed several episodes of nonsustained supraventricular tachycardia (SVT) as well as an episode on 8/21/23 around 10:30pm of a tachycardia at a rate of about 222 bpm that failed to terminated with 2 rounds of ATP and terminated with a single ICD shock (likely ventricular tachycardia per Dr. Hurt). Patient was watching TV and then began feeling dizzy (no CP, SOB, palpitations or syncope). Patient was recommended to discontinue coreg, started on metoprolol succinate 50mg BID. EP consulted due to recent ICD shock for VT.     1. CAD s/p PCI 2015  2. ICM w/ LVEF 40-45%  3. VT s/p ICD shock on 8/21/23  4. p/w SOB, now resolved   5. Leukocytosis    - ICD interrogated today; no further events since last remote transmission from 8/22/23.  -Outpatient TTE (2/22/2022): LVEF 40-45%, mod. segmental LVSD with hypokinesis of the inferior wall, posterior-lateral wall, basal inter-ventricular septum and apex. Mild concentric LVH (septum 1.4cm), mild LA enlargement (WILLIAN 31 cc/m2), mild MR, no AI, mild TR, no pericardial effusion.  -Pending repeat TTE  -CXR: no focal consolidations  -Blood cultures x 2 sent on 8/27/23, f/u result  -Pending Urine cx  -Started on ceftriaxone per primary team  -If potassium >4.0 and Mg >2.0, plan is to start sotalol 80mg Q12hr. Obtain EKG 2 hours after each dose of sotalol. Hold Sotalol for QTc >500ms (Baseline QTc is 468ms).  -Starting tomorrow (8/29/23) decrease metoprolol succinate to 25mg once daily   -Plan discussed with T5 and Dr. Hurt.    SOO Comer, NP-C  [id number]

## 2023-08-28 NOTE — PROGRESS NOTE ADULT - PROBLEM SELECTOR PLAN 2
Echo Feb 22, 2022  - LVEF 40%. MIld MR. Mild LA enlargement. MIld concentric LVH. Moderate segmental LV systolic dysfunction with hypokinesis of the inferior wall, posterior-lateral wall basal interventricular septum and apex.  - The LAD shows a patent stent and a 50% mid LAD lesion. Moderate disease of the left circumflex artery.   - C/w home Plavix 75 mg QD and home Metoprolol ER 50 mg QD   - f/u Echo - Persistent leukocytosis (17.22 --> 19.38)  - CXR clear, Urinalysis unremarkable  - f/u cultures and GI PCR - Persistent leukocytosis (17.22 --> 19.38)  - CXR clear  - Urinalysis: slightly turbid, +bacteria, +leukocyte esterase  - start ceftriaxone  - f/u cultures and GI PCR

## 2023-08-28 NOTE — PROGRESS NOTE ADULT - PROBLEM SELECTOR PLAN 5
- K+ to 3.0 on admission  - S/p 40mEq KCl tablet and 10 mEQ IVPB in ED  - Maintain K>4 and Mg>2  - F/u BMP and replete as necessary - K+ to 3.0 on admission, now 3.3  - S/p 40mEq KCl tablet and 10 mEQ IVPB in ED  - Maintain K>4 and Mg>2  - F/u BMP and replete as necessary

## 2023-08-28 NOTE — PATIENT PROFILE ADULT - FALL HARM RISK - HARM RISK INTERVENTIONS
Assistance with ambulation/Assistance OOB with selected safe patient handling equipment/Communicate Risk of Fall with Harm to all staff/Monitor for mental status changes/Monitor gait and stability/Reinforce activity limits and safety measures with patient and family/Reorient to person, place and time as needed/Review medications for side effects contributing to fall risk/Sit up slowly, dangle for a short time, stand at bedside before walking/Tailored Fall Risk Interventions/Toileting schedule using arm’s reach rule for commode and bathroom/Use of alarms - bed, chair and/or voice tab/Visual Cue: Yellow wristband and red socks/Bed in lowest position, wheels locked, appropriate side rails in place/Call bell, personal items and telephone in reach/Instruct patient to call for assistance before getting out of bed or chair/Non-slip footwear when patient is out of bed/[place] to call system/Physically safe environment - no spills, clutter or unnecessary equipment/Purposeful Proactive Rounding/Room/bathroom lighting operational, light cord in reach

## 2023-08-28 NOTE — PROGRESS NOTE ADULT - PROBLEM SELECTOR PLAN 6
- /59 on admission   - On home lasix 40 mg QD, metoprolol ER 50 mg BID (per previous cardiology)  - C/w lasix and metoprolol 50 mg BID - H/H consistently elevated  - f/u EPO

## 2023-08-28 NOTE — PATIENT PROFILE ADULT - NSTOBACCOCESSATIONEDU5_GEN_A_NUR
Withdrawal symptoms include negative mood, urges to smoke, and difficulty concentrating Bactrim Pregnancy And Lactation Text: This medication is Pregnancy Category D and is known to cause fetal risk.  It is also excreted in breast milk.

## 2023-08-28 NOTE — CARDIOLOGY SUMMARY
[___] : [unfilled] [LVEF ___%] : LVEF [unfilled]% [de-identified] : Subsequently underwent nuclear study which showed only a fixed inferoapical anteroapical defect. [de-identified] : ------------------------------------------------------------------------------------  Echo in 2/2022 demonstrated mild LAE, EF 40-45%, moderate segmental LV systolic dysfunction with hypokinesis of the inferior wall, posterior-lateral wall, basal anterior-ventricular septum and apex, mild concentric LVH. [de-identified] : Cath at [place]: Patient had elevated troponins which prompted a repeat cardiac catheterization. Report states that the patient had a proximal RCA total obstruction and was being filled by collaterals from the mid LAD. The LAD shows a patent stent and a 50% mid LAD lesion. She also had moderate disease of the left circumflex artery.

## 2023-08-28 NOTE — PROGRESS NOTE ADULT - SUBJECTIVE AND OBJECTIVE BOX
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      SUBJECTIVE / OVERNIGHT EVENTS:  Patient seen and evaluated at bedside.    Patient no longer complaining of SOB    Vital Signs Last 24 Hrs  T(C): 36.6 (28 Aug 2023 04:47), Max: 37.1 (27 Aug 2023 19:06)  T(F): 97.9 (28 Aug 2023 04:47), Max: 98.7 (27 Aug 2023 19:06)  HR: 71 (28 Aug 2023 04:47) (54 - 77)  BP: 111/46 (28 Aug 2023 04:47) (94/48 - 130/63)  BP(mean): 84 (27 Aug 2023 17:47) (84 - 84)  RR: 18 (28 Aug 2023 04:47) (18 - 20)  SpO2: 96% (28 Aug 2023 04:47) (96% - 98%)    Parameters below as of 28 Aug 2023 04:47  Patient On (Oxygen Delivery Method): nasal cannula  O2 Flow (L/min): 2    ROS:  Constitutional: No fever, chills, night sweats, or fatigue  Eyes:  No eye pain, visual disturbances, or discharge  ENMT:  No neck pain  Cardiac:  No chest pain, palpitations, no leg swelling  Respiratory:  +cough, no SOB  GI:  +Diarrhea; No nausea, vomiting, abdominal pain.  :  no dysuria, hematuria, or incontinence  MS:  No back pain.  Neuro:  No headache or lightheadedness, dizziness   Skin:  No skin rash    PHYSICAL EXAM:  GENERAL: +Appears uncomfortable and mildly distressed  HEAD:  Atraumatic, Normocephalic  EYES: EOMI, PERRLA, conjunctiva and sclera clear  ENT: Moist mucous membranes  NECK: Supple, No JVD  CHEST/LUNG: +luis daniel coarse breath sounds in luis daniel lung fields anteriorly and posteriorly; No rales or rubs. Unlabored respirations  HEART: Regular rate and rhythm; No murmurs, rubs, or gallops  ABDOMEN: normal bowel sounds; Soft, nontender, nondistended  EXTREMITIES:  2+ Peripheral Pulses, brisk capillary refill. No clubbing, cyanosis, or edema  Neurological:  A&Ox3, no focal deficits   SKIN: +luis daniel LE chronic venous stasis changes  PSYCH: normal affect and mood      LABS:                        16.7   17.22 )-----------( 347      ( 27 Aug 2023 16:20 )             49.5     Hgb Trend: 16.7<--  08-27    138  |  99  |  13  ----------------------------<  148<H>  3.0<L>   |  25  |  0.99    Ca    9.0      27 Aug 2023 16:20  Mg     1.9     08-27    TPro  6.7  /  Alb  3.3  /  TBili  0.5  /  DBili  x   /  AST  15  /  ALT  9<L>  /  AlkPhos  80  08-27    Creatinine Trend: 0.99<--  LIVER FUNCTIONS - ( 27 Aug 2023 16:20 )  Alb: 3.3 g/dL / Pro: 6.7 g/dL / ALK PHOS: 80 U/L / ALT: 9 U/L / AST: 15 U/L / GGT: x           PT/INR - ( 27 Aug 2023 16:20 )   PT: 12.3 sec;   INR: 1.12 ratio    PTT - ( 27 Aug 2023 16:20 )  PTT:30.8 sec    D-Dimer Assay, Quantitative (08.27.23 @ 16:20)    D-Dimer Assay, Quantitative: 275    Blood Gas Profile - Venous (08.28.23 @ 07:03)    pH, Venous: 7.43: No collection time indicated, please interpret with caution   pCO2, Venous: 41 mmHg   pO2, Venous: 74 mmHg   HCO3, Venous: 27 mmol/L   Base Excess, Venous: 2.6 mmol/L   Oxygen Saturation, Venous: 96.8 %   Total CO2, Venous: 28 mmol/L    Urinalysis Basic - ( 27 Aug 2023 16:20 )    Color: x / Appearance: x / SG: x / pH: x  Gluc: 148 mg/dL / Ketone: x  / Bili: x / Urobili: x   Blood: x / Protein: x / Nitrite: x   Leuk Esterase: x / RBC: x / WBC x   Sq Epi: x / Non Sq Epi: x / Bacteria: x      IMAGING:    < from: Xray Chest 1 View- PORTABLE-Urgent (08.27.23 @ 17:09) >  ACC: [id number] EXAM:  XR CHEST PORTABLE URGENT 1V   ORDERED BY: MICHAEL TORRES     PROCEDURE DATE:  08/27/2023      INTERPRETATION:  EXAMINATION: XR CHEST URGENT  CLINICAL INDICATION: Chest Pain  TECHNIQUE: Single frontal, portable view of the chest was obtained.  COMPARISON: Chest x-ray 1/19/2015.    FINDINGS:  Right-sided ICD.  The heart is not accurately assessed in this AP projection.  No focal consolidations. There is no pleural effusion.  There is no pneumothorax.  No acute bony abnormality.    IMPRESSION:  No focal consolidations.  --- End of Report ---      Most recent Echocardiogram from 2/22/2022:  LVEF 40%  Heavily calcified mitral valve leaflets with decrease opening  MIld MR  Mild LA enlargement  MIld concentric LVH  Moderate segmental LV systolic dysfunction wit hypokinesis of the inferior wall,posterior-lateral wall  basal interventricular septum and apex      MEDICATIONS  (STANDING):  albuterol/ipratropium for Nebulization 3 milliLiter(s) Nebulizer every 6 hours  atorvastatin 40 milliGRAM(s) Oral at bedtime  benzonatate 100 milliGRAM(s) Oral every 8 hours  budesonide  80 MICROgram(s)/formoterol 4.5 MICROgram(s) Inhaler 2 Puff(s) Inhalation two times a day  clopidogrel Tablet 75 milliGRAM(s) Oral daily  enoxaparin Injectable 40 milliGRAM(s) SubCutaneous every 24 hours  famotidine    Tablet 20 milliGRAM(s) Oral daily  furosemide    Tablet 40 milliGRAM(s) Oral daily  guaiFENesin  milliGRAM(s) Oral every 12 hours  metoprolol succinate ER 50 milliGRAM(s) Oral two times a day  nicotine -  14 mG/24Hr(s) Patch 1 Patch Transdermal every 24 hours  sacubitril 49 mG/valsartan 51 mG 1 Tablet(s) Oral two times a day  sodium chloride 0.9% for Nebulization 3 milliLiter(s) Nebulizer four times a day Elizabeth Mueller  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    SUBJECTIVE / OVERNIGHT EVENTS:  Patient seen and evaluated at bedside. She was alert and oriented, sitting in bed but appeared uncomfortable.    Patient no longer complaining of SOB. She states that she vomited earlier this morning and has not had much of an appetite. She denies diarrhea since admission. She continues to have cough productive of white sputum. Patient's  shared that her ICD is a Medtronic device and that defibrillation episode a few days ago was accompanied by tachycardia and "irregular beats."    ICU Vital Signs Last 24 Hrs  T(C): 36.8 (28 Aug 2023 12:24), Max: 37.1 (27 Aug 2023 19:06)  T(F): 98.2 (28 Aug 2023 12:24), Max: 98.7 (27 Aug 2023 19:06)  HR: 81 (28 Aug 2023 12:24) (54 - 81)  BP: 119/67 (28 Aug 2023 12:24) (94/48 - 141/83)  BP(mean): 84 (27 Aug 2023 17:47) (84 - 84)  RR: 18 (28 Aug 2023 12:24) (18 - 20)  SpO2: 97% (28 Aug 2023 12:24) (96% - 98%)    O2 Parameters below as of 28 Aug 2023 12:24  Patient On (Oxygen Delivery Method): nasal cannula  O2 Flow (L/min): 2    ROS:  Constitutional: No fever, chills, night sweats, or fatigue  Eyes:  No eye pain, visual disturbances, or discharge  ENMT:  No neck pain  Cardiac:  No chest pain, no palpitations, no leg swelling  Respiratory:  +cough, no SOB  GI:  +Diarrhea; +vomiting; no abdominal pain.  :  no dysuria, hematuria, or incontinence  MS:  No back pain.  Neuro:  No headache or lightheadedness, dizziness   Skin:  No skin rash    PHYSICAL EXAM:  GENERAL: +Appears tired and uncomfortable, sitting upright  HEAD:  Atraumatic, Normocephalic  EYES: EOMI, PERRLA, conjunctiva and sclera clear  ENT: Moist mucous membranes  NECK: Supple, No JVD  CHEST/LUNG: +luis daniel coarse breath sounds in luis daniel lung fields anteriorly and posteriorly; No rales or rubs. Unlabored respirations  HEART: Regular rate and rhythm; No murmurs, rubs, or gallops  ABDOMEN: normal bowel sounds; Soft, nontender, nondistended  EXTREMITIES:  2+ Peripheral Pulses, brisk capillary refill. No clubbing, cyanosis, or edema  Neurological:  A&Ox3, no focal deficits   SKIN: +luis daniel LE chronic venous stasis changes  PSYCH: normal affect and mood      LABS:                        16.5   19.38 )-----------( 320      ( 28 Aug 2023 07:16 )             50.1     WBC Trend:            Hgb Trend: 16.7<-- 16.5  08-28    140  |  101  |  11  ----------------------------<  117<H>  3.3<L>   |  24  |  0.81    Ca    8.7      28 Aug 2023 07:16  Phos  3.0     08-28  Mg     2.5     08-28    Creatinine Trend: 0.99<-- 0.81    TPro  6.3  /  Alb  3.0<L>  /  TBili  0.4  /  DBili  x   /  AST  15  /  ALT  7<L>  /  AlkPhos  82  08-28  LIVER FUNCTIONS - ( 28 Aug 2023 07:16 )  Alb: 3.0 g/dL / Pro: 6.3 g/dL / ALK PHOS: 82 U/L / ALT: 7 U/L / AST: 15 U/L / GGT: x             PTT - ( 27 Aug 2023 16:20 )  PTT:30.8 sec    PT/INR - ( 27 Aug 2023 16:20 )   PT: 12.3 sec;   INR: 1.12 ratio    PTT - ( 27 Aug 2023 16:20 )  PTT:30.8 sec    D-Dimer Assay, Quantitative (08.27.23 @ 16:20)    D-Dimer Assay, Quantitative: 275    Blood Gas Profile - Venous (08.28.23 @ 07:03)    pH, Venous: 7.43: No collection time indicated, please interpret with caution   pCO2, Venous: 41 mmHg   pO2, Venous: 74 mmHg   HCO3, Venous: 27 mmol/L   Base Excess, Venous: 2.6 mmol/L   Oxygen Saturation, Venous: 96.8 %   Total CO2, Venous: 28 mmol/L    Urinalysis Basic - ( 27 Aug 2023 16:20 )    Color: x / Appearance: x / SG: x / pH: x  Gluc: 148 mg/dL / Ketone: x  / Bili: x / Urobili: x   Blood: x / Protein: x / Nitrite: x   Leuk Esterase: x / RBC: x / WBC x   Sq Epi: x / Non Sq Epi: x / Bacteria: x      MEDICATIONS  (STANDING):  albuterol/ipratropium for Nebulization 3 milliLiter(s) Nebulizer every 6 hours  atorvastatin 40 milliGRAM(s) Oral at bedtime  benzonatate 100 milliGRAM(s) Oral every 8 hours  budesonide  80 MICROgram(s)/formoterol 4.5 MICROgram(s) Inhaler 2 Puff(s) Inhalation two times a day  clopidogrel Tablet 75 milliGRAM(s) Oral daily  enoxaparin Injectable 40 milliGRAM(s) SubCutaneous every 24 hours  famotidine    Tablet 20 milliGRAM(s) Oral daily  furosemide    Tablet 40 milliGRAM(s) Oral daily  guaiFENesin  milliGRAM(s) Oral every 12 hours  metoprolol succinate ER 50 milliGRAM(s) Oral two times a day  nicotine -  14 mG/24Hr(s) Patch 1 Patch Transdermal every 24 hours  sacubitril 49 mG/valsartan 51 mG 1 Tablet(s) Oral two times a day  sodium chloride 0.9% for Nebulization 3 milliLiter(s) Nebulizer four times a day      IMAGING:    < from: Xray Chest 1 View- PORTABLE-Urgent (08.27.23 @ 17:09) >  ACC: [id number] EXAM:  XR CHEST PORTABLE URGENT 1V   ORDERED BY: MICHAEL TORRES     PROCEDURE DATE:  08/27/2023      INTERPRETATION:  EXAMINATION: XR CHEST URGENT  CLINICAL INDICATION: Chest Pain  TECHNIQUE: Single frontal, portable view of the chest was obtained.  COMPARISON: Chest x-ray 1/19/2015.    FINDINGS:  Right-sided ICD.  The heart is not accurately assessed in this AP projection.  No focal consolidations. There is no pleural effusion.  There is no pneumothorax.  No acute bony abnormality.    IMPRESSION:  No focal consolidations.  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    SUBJECTIVE / OVERNIGHT EVENTS:  Patient seen and evaluated at bedside. She was alert and oriented, sitting in bed but appeared uncomfortable.    Patient no longer complaining of SOB. She states that she vomited earlier this morning and has not had much of an appetite. She denies diarrhea since admission. She continues to have cough productive of white sputum. Patient's  shared that her ICD is a [employer] device and that defibrillation episode a few days ago was accompanied by tachycardia and "irregular beats."    ICU Vital Signs Last 24 Hrs  T(C): 36.8 (28 Aug 2023 12:24), Max: 37.1 (27 Aug 2023 19:06)  T(F): 98.2 (28 Aug 2023 12:24), Max: 98.7 (27 Aug 2023 19:06)  HR: 81 (28 Aug 2023 12:24) (54 - 81)  BP: 119/67 (28 Aug 2023 12:24) (94/48 - 141/83)  BP(mean): 84 (27 Aug 2023 17:47) (84 - 84)  RR: 18 (28 Aug 2023 12:24) (18 - 20)  SpO2: 97% (28 Aug 2023 12:24) (96% - 98%)    O2 Parameters below as of 28 Aug 2023 12:24  Patient On (Oxygen Delivery Method): nasal cannula  O2 Flow (L/min): 2    ROS:  Constitutional: No fever, chills, night sweats, or fatigue  Eyes:  No eye pain, visual disturbances, or discharge  ENMT:  No neck pain  Cardiac:  No chest pain, no palpitations, no leg swelling  Respiratory:  +cough, no SOB  GI:  +Diarrhea; +vomiting; no abdominal pain.  :  no dysuria, hematuria, or incontinence  MS:  No back pain.  Neuro:  No headache or lightheadedness, dizziness   Skin:  No skin rash    PHYSICAL EXAM:  GENERAL: +Appears tired and uncomfortable, sitting upright  HEAD:  Atraumatic, Normocephalic  EYES: EOMI, PERRLA, conjunctiva and sclera clear  ENT: Moist mucous membranes  NECK: Supple, No JVD  CHEST/LUNG: +luis daniel coarse breath sounds in luis daniel lung fields anteriorly and posteriorly; No rales or rubs. Unlabored respirations  HEART: Regular rate and rhythm; No murmurs, rubs, or gallops  ABDOMEN: normal bowel sounds; Soft, nontender, nondistended  EXTREMITIES:  2+ Peripheral Pulses, brisk capillary refill. No clubbing, cyanosis, or edema  Neurological:  A&Ox3, no focal deficits   SKIN: +luis daniel LE chronic venous stasis changes  PSYCH: normal affect and mood      LABS:                        16.5   19.38 )-----------( 320      ( 28 Aug 2023 07:16 )             50.1     WBC Trend:            Hgb Trend: 16.7<-- 16.5      140  |  101  |  11  ----------------------------<  117<H>  3.3<L>   |  24  |  0.81    Ca    8.7      28 Aug 2023 07:16  Phos  3.0       Mg     2.5         Creatinine Trend: 0.99<-- 0.81    TPro  6.3  /  Alb  3.0<L>  /  TBili  0.4  /  DBili  x   /  AST  15  /  ALT  7<L>  /  AlkPhos  82    LIVER FUNCTIONS - ( 28 Aug 2023 07:16 )  Alb: 3.0 g/dL / Pro: 6.3 g/dL / ALK PHOS: 82 U/L / ALT: 7 U/L / AST: 15 U/L / GGT: x             PTT - ( 27 Aug 2023 16:20 )  PTT:30.8 sec    PT/INR - ( 27 Aug 2023 16:20 )   PT: 12.3 sec;   INR: 1.12 ratio    PTT - ( 27 Aug 2023 16:20 )  PTT:30.8 sec    D-Dimer Assay, Quantitative (23 @ 16:20)    D-Dimer Assay, Quantitative: 275    Blood Gas Profile - Venous (23 @ 07:03)    pH, Venous: 7.43: No collection time indicated, please interpret with caution   pCO2, Venous: 41 mmHg   pO2, Venous: 74 mmHg   HCO3, Venous: 27 mmol/L   Base Excess, Venous: 2.6 mmol/L   Oxygen Saturation, Venous: 96.8 %   Total CO2, Venous: 28 mmol/L    Urinalysis Basic - ( 28 Aug 2023 13:12 )    Color: Light Yellow / Appearance: Slightly Turbid / S.010 / pH: x  Gluc: x / Ketone: Negative  / Bili: Negative / Urobili: Negative   Blood: x / Protein: Negative / Nitrite: Negative   Leuk Esterase: Large / RBC: 9 /hpf / WBC 5 /HPF   Sq Epi: x / Non Sq Epi: x / Bacteria: Many      MEDICATIONS  (STANDING):  albuterol/ipratropium for Nebulization 3 milliLiter(s) Nebulizer every 6 hours  atorvastatin 40 milliGRAM(s) Oral at bedtime  benzonatate 100 milliGRAM(s) Oral every 8 hours  budesonide  80 MICROgram(s)/formoterol 4.5 MICROgram(s) Inhaler 2 Puff(s) Inhalation two times a day  clopidogrel Tablet 75 milliGRAM(s) Oral daily  enoxaparin Injectable 40 milliGRAM(s) SubCutaneous every 24 hours  famotidine    Tablet 20 milliGRAM(s) Oral daily  furosemide    Tablet 40 milliGRAM(s) Oral daily  guaiFENesin  milliGRAM(s) Oral every 12 hours  metoprolol succinate ER 50 milliGRAM(s) Oral two times a day  nicotine -  14 mG/24Hr(s) Patch 1 Patch Transdermal every 24 hours  sacubitril 49 mG/valsartan 51 mG 1 Tablet(s) Oral two times a day  sodium chloride 0.9% for Nebulization 3 milliLiter(s) Nebulizer four times a day      IMAGING:    < from: Xray Chest 1 View- PORTABLE-Urgent (23 @ 17:09) >  ACC: [id number] EXAM:  XR CHEST PORTABLE URGENT 1V   ORDERED BY: MICHAEL TORRES     PROCEDURE DATE:  2023      INTERPRETATION:  EXAMINATION: XR CHEST URGENT  CLINICAL INDICATION: Chest Pain  TECHNIQUE: Single frontal, portable view of the chest was obtained.  COMPARISON: Chest x-ray 2015.    FINDINGS:  Right-sided ICD.  The heart is not accurately assessed in this AP projection.  No focal consolidations. There is no pleural effusion.  There is no pneumothorax.  No acute bony abnormality.    IMPRESSION:  No focal consolidations.  --- End of Report ---      Most recent Echocardiogram from 2022:  LVEF 40%  Heavily calcified mitral valve leaflets with decrease opening  MIld MR  Mild LA enlargement  MIld concentric LVH  Moderate segmental LV systolic dysfunction wit hypokinesis of the inferior wall,posterior-lateral wall  basal interventricular septum and apex

## 2023-08-28 NOTE — PROGRESS NOTE ADULT - PROBLEM SELECTOR PLAN 8
- /59 on admission   - On home lasix 40 mg QD, metoprolol ER 50 mg BID (per previous cardiology)  - Hold lasix  - C/w metoprolol 50 mg BID

## 2023-08-28 NOTE — PROGRESS NOTE ADULT - PROBLEM SELECTOR PLAN 1
- Poss iso arrhythmia vs URI vs COPD exac vs less likely HF exacerbation   - Prelim CXR: no focal consolidations  - Pt no longer complaining of SOB  - Neg RVP, Neg COVID  - Currently on 2L NC >92%  - Maintain O2 sat 88-92% and wean O2 as tolerated  - Duonebs Q6H, hypersal neb Q6H, Chest PT  - VBG pH 7.43, pCO2 41, pO2 74, Calc HCO3 27  - f/u procal - Poss iso arrhythmia vs URI vs COPD exac vs less likely HF exacerbation   - Prelim CXR: no focal consolidations  - Pt no longer complaining of SOB  - Neg RVP, Neg COVID  - VBG pH 7.43, pCO2 41, pO2 74, Calc HCO3 27  - Currently on 2L NC >92%  - Maintain O2 sat 88-92% and wean O2 as tolerated  - Duonebs PRN, Symbicort PRN, Chest PT  - f/u procal

## 2023-08-28 NOTE — PHYSICAL THERAPY INITIAL EVALUATION ADULT - PLANNED THERAPY INTERVENTIONS, PT EVAL
Stair training- GOAL: 8 stairs with rail independnt, 4 wks/balance training/bed mobility training/gait training/transfer training

## 2023-08-28 NOTE — PROGRESS NOTE ADULT - PROBLEM SELECTOR PLAN 4
- Prelim CXR: no focal consolidations  - Currently on 2L NC >92%  - Maintain O2 sat 88-92% and wean O2 as tolerated  - Duonebs Q6H, hypersal neb Q6H, Chest PT, bedside spirometry  - VBG pH 7.43, pCO2 41, pO2 74, Calc HCO3 27 - C/w home Entresto 49/51 mg bid   - hold home Lasix 40 mg daily  - check daily weights  - Consult EP for device interrogation  - Strict I's and O's    #HFrEF  Echo Feb 22, 2022  - LVEF 40%. MIld MR. Mild LA enlargement. MIld concentric LVH. Moderate segmental LV systolic dysfunction with hypokinesis of the inferior wall, posterior-lateral wall basal interventricular septum and apex.  - The LAD shows a patent stent and a 50% mid LAD lesion. Moderate disease of the left circumflex artery.   - F/u Echo

## 2023-08-28 NOTE — PROGRESS NOTE ADULT - ATTENDING COMMENTS
73yo F w/ PMH of HTN, HLD, COPD, CAD s/p PCI 2015, PVD, ICM, cardiac arrest s/p ICD p/w brief episode of SOB while at rest, which resolved upon presentation w/ findings of leukocytosis and hypokalemia on labs.    #dyspnea  - c/f arrythmia as symptoms described are similar to prior arrythmia where shock was administered  - Other ddx includes URI vs less likely COPD exacerbation or AdHF as symptoms resolved spontaneously w/out wheezing on exam or s/s of congestion or LE edema  - Device interrogation pending, Ep eval pending  - Monitor on tele. EKG with PVCs  - TTE  - c/w Toprol 50mg BID (increased recently on outpt cards visit notes)  - monitor bmp, keep k>4, mg>2   - duonebs prn. continue home maintenance inhalers. wean off supplemental oxygen as tolerated.     #leukocytosis , infectious vs reactive   - f/u ua, urine cx, blood cx, procal, urine ags, gi pcr. trend lactate.   - encourage po fluid intake  - monitor cbc    #HFrEF  - euvolemic. would hold lasix in the setting of diarrhea, acute infection  - monitor closely

## 2023-08-28 NOTE — PROGRESS NOTE ADULT - ASSESSMENT
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  73 yo F w/ PMH COPD, HLD, HTN, PVD, CAD s/p PCI 2015, ischemic cardiomyopathy, cardiac arrest s/p left-sided ICD (6/4/2019) complicated by infection and s/p R single-chamber ICD ([place] in 9/23/2019) p/w SOB and hypokalemia w/ prelim CXR showing no focal consolidation.

## 2023-08-28 NOTE — PROGRESS NOTE ADULT - PROBLEM SELECTOR PLAN 3
- C/w home Entresto 49/51 mg bid   - c/w home Lasix 40 mg daily  - check daily weights  - Consult EP for device interrogation  - Strict I's and O's    #HFrEF  Echo Feb 22, 2022  - LVEF 40%. MIld MR. Mild LA enlargement. MIld concentric LVH. Moderate segmental LV systolic dysfunction with hypokinesis of the inferior wall, posterior-lateral wall basal interventricular septum and apex.  - The LAD shows a patent stent and a 50% mid LAD lesion. Moderate disease of the left circumflex artery.   - F/u Echo - Defibrillation a few days ago via ICD (Medtronic device, implanted 2019)  - f/u tele  - Consult EP  - f/u echo

## 2023-08-28 NOTE — PROGRESS NOTE ADULT - PROBLEM SELECTOR PLAN 7
- DVT ppx: Lovenox  - Diet: DASH  - GOC: Full Code - Prelim CXR: no focal consolidations  - Currently on 2L NC >92%  - Maintain O2 sat 88-92% and wean O2 as tolerated  - Duonebs PRN, Symbicort PRN, Chest PT, bedside spirometry  - VBG pH 7.43, pCO2 41, pO2 74, Calc HCO3 27

## 2023-08-28 NOTE — PHYSICAL THERAPY INITIAL EVALUATION ADULT - RISK REDUCTION/PREVENTION, PT EVAL
S CIWA





- CIWA Score


Nausea/Vomitin-No Nausea/No Vomiting


Muscle Tremors: 3


Anxiety: 2


Agitation: 2


Paroxysmal Sweats: 2


Orientation: 0-Oriented


Tacttile Disturbances: 0-None


Auditory Disturbances: 0-None


Visual Disturbances: 0-None


Headache: 0-None Present


CIWA-Ar Total Score: 9





[facility] Progress Note (SOAP)


Subjective: 





insomnia


restless


anxiety


Objective: 





20 11:15


                                   Vital Signs











Temperature  97.1 F L  20 08:49


 


Pulse Rate  84   20 08:49


 


Respiratory Rate  18   20 08:49


 


Blood Pressure  126/77   20 08:49


 


O2 Sat by Pulse Oximetry (%)  98   20 05:25








                                Laboratory Tests











  20





  14:00 14:00 14:00


 


WBC  10.7 H  


 


RBC  4.51  


 


Hgb  13.8  


 


Hct  42.0  


 


MCV  93.1  


 


MCH  30.6  


 


MCHC  32.8  


 


RDW  14.2  


 


Plt Count  176  


 


MPV  10.2  


 


Sodium   140 


 


Potassium   4.1 


 


Chloride   108 H 


 


Carbon Dioxide   24 


 


Anion Gap   9 


 


BUN   24.6 H 


 


Creatinine   1.3 


 


Est GFR (CKD-EPI)AfAm   69.22 


 


Est GFR (CKD-EPI)NonAf   59.72 


 


Random Glucose   119 H 


 


Calcium   9.5 


 


Total Bilirubin   0.4 


 


AST   25 


 


ALT   23 


 


Alkaline Phosphatase   86 


 


Total Protein   9.0 H 


 


Albumin   3.8 


 


Syphilis Serology    Non-reactive


 


COVID-19 ([facility])   














  20





  14:00


 


WBC 


 


RBC 


 


Hgb 


 


Hct 


 


MCV 


 


MCH 


 


MCHC 


 


RDW 


 


Plt Count 


 


MPV 


 


Sodium 


 


Potassium 


 


Chloride 


 


Carbon Dioxide 


 


Anion Gap 


 


BUN 


 


Creatinine 


 


Est GFR (CKD-EPI)AfAm 


 


Est GFR (CKD-EPI)NonAf 


 


Random Glucose 


 


Calcium 


 


Total Bilirubin 


 


AST 


 


ALT 


 


Alkaline Phosphatase 


 


Total Protein 


 


Albumin 


 


Syphilis Serology 


 


COVID-19 ([facility])  Not detected








labs noted


aaox3


ambulating


no acute distress





Assessment: 





20 11:16


withdrawals


Plan: 





medication for insomnia has been ordered by psychiatrist.


encouraged pt to let the medication take effect


increase fluids


continue detox risk factors

## 2023-08-28 NOTE — HISTORY OF PRESENT ILLNESS
[FreeTextEntry1] : Patient presents in for follow up s/p ICD shock  8/21/23 for possibly VT @ 222 bpm around 10:30  bpm that failed to terminate with 2 rounds of ATP and terminated with a single ICD shock. This was reviewed with EP Dr. De La Torre and her BB was changed from Carvedilol to Metop 50 mg bid . Ms. Kohli is a 72-year-old history of current smoking, CAD status post PCI (2015), ischemic cardiomyopathy, cardiac arrest s/p left-sided ICD (6/2019) complicated by infection and s/p right sided single-chamber ICD (at [facility] by Dr. Gutiérrez in 9/2019) and reimplant 9/2019, PTCA-> RCA/ LAD, chronic LBBB. Echo in 2/2022 demonstrated mild LAE, EF 40-45%, moderate segmental LV systolic dysfunction with hypokinesis of the inferior wall, posterior-lateral wall, basal anterior-ventricular septum and apex, mild concentric LVH. Patient still is smoking.   1) CAD -  The LAD shows a patent stent and a 50% mid LAD lesion. She also had moderate disease of the left circumflex artery. Subsequently underwent nuclear study which showed only a fixed inferoapical anteroapical defect. Continue Plavix 75 mg QD and Metoprolol tartrate 50 mg bid    2) Ischemic CMP- S/p ICD  Continue Entresto 49/51 mg bid  c/w Lasix 40 mg daily check daily weights  labs reviewed  interrogation shows several episodes of SVT and a couple of NSVT (5-9 beats up to 200 bpm) - asymptomatic - will increase the carvedilol to 9.375 mg bid  3) LBBB - chronic  Will consider EP eval for BIV upgrade after HF symptoms are optimized   4) HLD- Continue Atorvastatin 40 mg QD  5) Reinforced smoking cessation

## 2023-08-28 NOTE — PROCEDURE NOTE - ADDITIONAL PROCEDURE DETAILS
1) Indication for interrogation: s/p recent ICD shock   2) Presenting rhythm/tele: SR in the 70's, frequent VPCs and APCs  3) Measured data WNL, normal ICD function, Pt is not pacemaker dependent  4) Since 1/6/2023: V pace 0.1%  5) Stored data revealed no further events since last remote interrogated from 8/22/23. Previous remote transmission showed several episodes of nonsustained supraventricular tachycardia (SVT) as well as an episode on 8/21/23 around 10:30pm of a tachycardia at a rate of about 222 bpm that failed to terminated with 2 rounds of ATP and terminated with a single ICD shock (likely ventricular tachycardia per Dr. Hurt).  6) Changes made: The patient's ICD model is part of a [employer] advisory in which there is a potential for reduced energy or no energy delivered during high voltage therapy when programmed AX>B configuration. The device therapies have therefore been reprogrammed in the B>AX configuration as advised by [employer].  7) See EP consult note for further recommendation.     SOO Comer, NP-C  [id number]

## 2023-08-28 NOTE — PHYSICAL THERAPY INITIAL EVALUATION ADULT - PERTINENT HX OF CURRENT PROBLEM, REHAB EVAL
72 y/oF admitted 7/27 with SOB. As per H&P, random onset of dyspnea while sitting and watching TV at her daughter's home this afternoon. Pt reports her defibrillator went off a few days ago one time and has been a bit anxious since then. Pt reports 3 episodes of diarrhea on day of admission and a productive cough w/ phlegm. Per pt's , pt  went to cardiologist last week (Dr. Wise) and was called by someone in the office who instructed pt's daughter to discontinue plavix. Pt stopped taking plavix since last week, as there was confusion. Pt should have discontinued carvedilol 6.25 mg 1.5 BID rather than discontinuing clopidogrel 75 mg QD. CXR showing no focal consolidation. As per H&P, Poss iso arrhythmia vs URI vs COPD exac vs less likely HF exacerbation. PMH COPD, HLD, HTN, PVD, CAD s/p PCI 2015, ischemic cardiomyopathy, cardiac arrest s/p left-sided ICD (6/4/2019) complicated by infection and s/p R single-chamber ICD ([place] in 9/23/2019

## 2023-08-28 NOTE — PATIENT PROFILE ADULT - CENTRAL VENOUS CATHETER/PICC LINE
- Subjective


Encounter Date: 01/27/20


Encounter Time: 13:02


Subjective: 





breathing looning up a bit





- Objective


Vital Signs & Weight: 


 Vital Signs (12 hours)











  Temp Pulse Resp BP BP BP Pulse Ox


 


 01/27/20 12:05        95


 


 01/27/20 11:32   56 L  16     95


 


 01/27/20 11:18  97.5 F L  55 L    136/77   95


 


 01/27/20 07:44   63  16     93 L


 


 01/27/20 05:37     135/74   


 


 01/27/20 04:58  98.2 F  69  18    135/74  96








 Weight











Admit Weight                   178 lb 6 oz


 


Weight                         178 lb 6 oz














Result Diagrams: 


 01/26/20 06:11





 01/24/20 04:41





Hospitalist ROS





- Medication


Medications: 


Active Medications











Generic Name Dose Route Start Last Admin





  Trade Name Freq  PRN Reason Stop Dose Admin


 


Albuterol Sulfate  2.5 mg  01/23/20 08:51  01/24/20 04:59





  Ventolin  NEB   2.5 mg





  [id number] PRN   Administration





  Wheezing   





     





     





     


 


Albuterol/Ipratropium  3 ml  01/23/20 11:00  01/27/20 11:32





  Duoneb  NEB   3 ml





  [id number] LUIS   Administration





     





     





     





     


 


Budesonide  0.5 mg  01/24/20 18:30  01/27/20 07:46





  Pulmicort Neb Solution  INH   0.5 mg





  BID-RT LUIS   Administration





     





     





     





     


 


Carvedilol  6.25 mg  01/23/20 09:00  01/27/20 05:37





  Coreg  PO   6.25 mg





  DAILY LUIS   Administration





     





     





     





     


 


Diphenhydramine HCl  25 mg  01/23/20 15:21  01/27/20 11:33





  Benadryl  PO   25 mg





  Q4H PRN   Administration





  Itching   





     





     





     


 


Docusate Sodium  100 mg  01/24/20 12:37  01/25/20 09:17





  Colace  PO   100 mg





  DAILYPRN PRN   Administration





  Constipation   





     





     





     


 


Duloxetine HCl  30 mg  01/23/20 09:00  01/27/20 11:22





  Cymbalta  PO   30 mg





  DAILY LUIS   Administration





     





     





     





     


 


Enoxaparin Sodium  40 mg  01/24/20 09:00  01/27/20 09:13





  Lovenox  SC   Not Given





  0900 LUIS   





     





     





     





     


 


Estradiol  2 mg  01/23/20 09:00  01/27/20 11:23





  Estrace  PO   2 mg





  DAILY LUIS   Administration





     





     





     





     


 


Guaifenesin/Dextromethorphan  2 tab  01/23/20 09:00  01/27/20 11:24





  Mucinex Dm  PO   2 tab





  Q12HR LUIS   Administration





     





     





     





     


 


Vancomycin HCl 1.25 gm/ Sodium  250 mls @ 166.667 mls/hr  01/26/20 11:00  01/27/ 20 11:21





  Chloride  IVPB   250 mls





  1100,2300 LUIS   Administration





     





     





     





     


 


Lorazepam  0.5 mg  01/25/20 11:36  01/26/20 22:43





  Ativan  PO   0.5 mg





  Q4H PRN   Administration





  Anxiety   





     





     





     


 


Melatonin  3 mg  01/25/20 21:00  01/26/20 22:43





  Melatonin  PO   3 mg





  HS LUIS   Administration





     





     





     





     


 


Menthol/Methyl Salicylate  0 gm  01/23/20 23:05  01/23/20 23:20





  Muscle Rub Cream (Beng[place])  TOP   1 gm





  ASDIR PRN   Administration





  Muscle Pain   





     





     





     


 


Methocarbamol  750 mg  01/23/20 15:00  01/27/20 11:22





  Robaxin  PO   750 mg





  TID LUIS   Administration





     





     





     





     


 


Methylprednisolone Sodium Succinate  20 mg  01/25/20 12:00  01/27/20 11:25





  Solu-Medrol  IVP   20 mg





  Q6HR LUIS   Administration





     





     





     





     


 


Montelukast Sodium  10 mg  01/23/20 09:00  01/27/20 11:24





  Singulair  PO   10 mg





  DAILY LUIS   Administration





     





     





     





     


 


Morphine Sulfate  15 mg  01/23/20 09:00  01/27/20 11:23





  Ms Contin  PO   15 mg





  TID LUIS   Administration





     





     





     





     


 


Pramipexole Dihydrochloride  2 mg  01/23/20 21:00  01/26/20 20:53





  Mirapex  PO   2 mg





  HS LUIS   Administration





     





     





     





     


 


Sodium Chloride  10 ml  01/23/20 21:00  01/27/20 11:25





  Flush - Normal Saline  IVF   10 ml





  Q12HR LUIS   Administration





     





     





     





     


 


Sodium Chloride  10 ml  01/23/20 09:26  01/24/20 18:05





  Flush - Normal Saline  IVF   10 ml





  PRN PRN   Administration





  Saline Flush   





     





     





     


 


Trazodone HCl  50 mg  01/23/20 21:00  01/26/20 20:53





  Desyrel  PO   50 mg





  HS LUIS   Administration





     





     





     





     


 


Trimethoprim/Sulfamethoxazole  1 tab  01/23/20 09:00  01/27/20 11:24





  Bactrim Ds  PO   1 tab





  BID LUIS   Administration





     





     





     





     














- Exam


General Appearance: awake alert


Neck: no JVD


Heart: RRR, no murmur


Respiratory - other findings: hyperresonant , tight exp wheezes


Gastrointestinal: soft, normal bowel sounds


Extremities: no edema


Neurological - other findings: bandaged left axilla





Hosp A/P


(1) COPD exacerbation


Code(s): J44.1 - CHRONIC OBSTRUCTIVE PULMONARY DISEASE W (ACUTE) EXACERBATION   

Status: Acute   





(2) Acute respiratory failure with hypoxia


Code(s): J96.01 - ACUTE RESPIRATORY FAILURE WITH HYPOXIA   Status: Acute   





(3) Abscess of axilla, left


Code(s): L02.412 - CUTANEOUS ABSCESS OF LEFT AXILLA   Status: Acute   





- Plan





cont nebs, iv steroids, O2, etc


cont iv vancomycin pending wound C&S no

## 2023-08-29 PROBLEM — I25.10 ATHEROSCLEROTIC HEART DISEASE OF NATIVE CORONARY ARTERY WITHOUT ANGINA PECTORIS: Chronic | Status: ACTIVE | Noted: 2023-01-01

## 2023-08-29 PROBLEM — I10 ESSENTIAL (PRIMARY) HYPERTENSION: Chronic | Status: ACTIVE | Noted: 2023-01-01

## 2023-08-29 PROBLEM — I50.9 HEART FAILURE, UNSPECIFIED: Chronic | Status: ACTIVE | Noted: 2023-01-01

## 2023-08-29 PROBLEM — E78.5 HYPERLIPIDEMIA, UNSPECIFIED: Chronic | Status: ACTIVE | Noted: 2023-01-01

## 2023-08-29 NOTE — PROGRESS NOTE ADULT - PROBLEM SELECTOR PLAN 7
- Prelim CXR: no focal consolidations  - Currently on 2L NC >92%  - Maintain O2 sat 88-92% and wean O2 as tolerated  - VBG pH 7.43, pCO2 41, pO2 74, Calc HCO3 27  - Duonebs PRN, Symbicort PRN, Chest PT, bedside spirometry - Prelim CXR: no focal consolidations  - No longer requiring nasal canula  - VBG pH 7.43, pCO2 41, pO2 74, Calc HCO3 27  - Duonebs PRN, Symbicort BID, Chest PT, bedside spirometry

## 2023-08-29 NOTE — PROGRESS NOTE ADULT - PROBLEM SELECTOR PLAN 9
- DVT ppx: Lovenox  - Diet: DASH  - GOC: Full Code - DVT ppx: Lovenox  - Diet: DASH, encourage PO fluids  - GOC: Full Code

## 2023-08-29 NOTE — PROGRESS NOTE ADULT - PROBLEM SELECTOR PLAN 4
- C/w home Entresto 49/51 mg bid   - Hold lasix due to euvolemia and diarrhea  - check daily weights  - Strict I's and O's    #HFrEF  Echo Feb 22, 2022  - LVEF 40%. MIld MR. Mild LA enlargement. MIld concentric LVH. Moderate segmental LV systolic dysfunction with hypokinesis of the inferior wall, posterior-lateral wall basal interventricular septum and apex.  - The LAD shows a patent stent and a 50% mid LAD lesion. Moderate disease of the left circumflex artery.   - F/u Echo - C/w home Entresto 49/51 mg bid   - Hold lasix, can reassess volume status tomorrow (8/30)  - check daily weights  - Strict I's and O's    #HFrEF  Echo Feb 22, 2022  - LVEF 40%. MIld MR. Mild LA enlargement. MIld concentric LVH. Moderate segmental LV systolic dysfunction with hypokinesis of the inferior wall, posterior-lateral wall basal interventricular septum and apex.  - The LAD shows a patent stent and a 50% mid LAD lesion. Moderate disease of the left circumflex artery.   - F/u Echo

## 2023-08-29 NOTE — PROGRESS NOTE ADULT - SUBJECTIVE AND OBJECTIVE BOX
Elizabeth Mueller  4th Year Medical Student    Patient is a 72y old  Female with PMH COPD, HLD, HTN, PVD, CAD s/p PCI 2015, ischemic cardiomyopathy, cardiac arrest s/p left-sided ICD (6/4/2019) complicated by infection and s/p R single-chamber ICE ([place] 9/23/2019) presenting with SOB, hypokalemia, and leukocytosis.      SUBJECTIVE / OVERNIGHT EVENTS:  Patient seen and evaluated at bedside.    Overnight found to have QT//467 and given 2L at 94% O2    Vital Signs Last 24 Hrs  T(C): 36.7 (29 Aug 2023 04:51), Max: 36.8 (28 Aug 2023 11:10)  T(F): 98 (29 Aug 2023 04:51), Max: 98.2 (28 Aug 2023 11:10)  HR: 72 (29 Aug 2023 04:51) (72 - 94)  BP: 125/68 (29 Aug 2023 04:51) (119/67 - 142/68)  BP(mean): --  RR: 18 (29 Aug 2023 04:51) (18 - 18)  SpO2: 94% (29 Aug 2023 04:51) (94% - 98%)    Parameters below as of 29 Aug 2023 04:51  Patient On (Oxygen Delivery Method): nasal cannula  O2 Flow (L/min): 2    ROS:    PHYSICAL EXAM:  GENERAL: NAD, well-developed  HEAD:  Atraumatic, Normocephalic  EYES: EOMI, PERRLA, conjunctiva and sclera clear  NECK: Supple, No JVD  CHEST/LUNG: Clear to auscultation bilaterally; No wheeze  HEART: Regular rate and rhythm; Normal S1 S2, No murmurs, rubs, or gallops  ABDOMEN: Soft, Nontender, Nondistended; Bowel sounds present  EXTREMITIES:  2+ Peripheral Pulses, No clubbing, cyanosis, or edema  PSYCH: AAOx3  NEUROLOGY: non-focal  SKIN: No rashes or lesions    LABS:                        16.5   19.38 )-----------( 320      ( 28 Aug 2023 07:16 )             50.1     Hgb Trend: 16.5<--, 16.7<--  08-28    139  |  100  |  11  ----------------------------<  112<H>  4.7   |  24  |  0.79    Ca    8.7      28 Aug 2023 18:39  Phos  3.2     08-28  Mg     2.7     08-28    TPro  6.3  /  Alb  3.0<L>  /  TBili  0.4  /  DBili  x   /  AST  15  /  ALT  7<L>  /  AlkPhos  82  08-28    Creatinine Trend: 0.79<--, 0.83<--, 0.81<--, 0.99<--  LIVER FUNCTIONS - ( 28 Aug 2023 07:16 )  Alb: 3.0 g/dL / Pro: 6.3 g/dL / ALK PHOS: 82 U/L / ALT: 7 U/L / AST: 15 U/L / GGT: x           PT/INR - ( 27 Aug 2023 16:20 )   PT: 12.3 sec;   INR: 1.12 ratio    PTT - ( 27 Aug 2023 16:20 )  PTT:30.8 sec    D-Dimer Assay, Quantitative (08.27.23 @ 16:20)    D-Dimer Assay, Quantitative: 275:    Blood Gas Profile - Venous (08.28.23 @ 07:03)    pH, Venous: 7.43: No collection time indicated, please interpret with caution   pCO2, Venous: 41 mmHg   pO2, Venous: 74 mmHg   HCO3, Venous: 27 mmol/L   Base Excess, Venous: 2.6 mmol/L   Oxygen Saturation, Venous: 96.8 %   Total CO2, Venous: 28 mmol/L  Blood Gas Venous - Chloride (08.28.23 @ 07:03)    Blood Gas Venous - Chloride: 101 mmol/L    Urinalysis + Microscopic Examination (08.28.23 @ 13:12)    pH Urine: 5.0   Urine Appearance: Slightly Turbid   Color: Light Yellow   Specific Gravity: 1.010   Protein, Urine: Negative   Glucose Qualitative, Urine: Negative   Ketone - Urine: Negative   Blood, Urine: Large   Bilirubin: Negative   Urobilinogen: Negative   Leukocyte Esterase Concentration: Large   Nitrite: Negative   White Blood Cell - Urine: 5 /HPF   Red Blood Cell - Urine: 9 /hpf   Bacteria: Many   Hyaline Casts: 0 /lpf   Squamous Epithelial Cells: 1 /hpf      MEDICATIONS  (STANDING):  atorvastatin 40 milliGRAM(s) Oral at bedtime  benzonatate 100 milliGRAM(s) Oral every 8 hours  budesonide  80 MICROgram(s)/formoterol 4.5 MICROgram(s) Inhaler 2 Puff(s) Inhalation two times a day  cefTRIAXone   IVPB 1000 milliGRAM(s) IV Intermittent every 24 hours  clopidogrel Tablet 75 milliGRAM(s) Oral daily  enoxaparin Injectable 40 milliGRAM(s) SubCutaneous every 24 hours  famotidine    Tablet 20 milliGRAM(s) Oral daily  guaiFENesin  milliGRAM(s) Oral every 12 hours  metoprolol succinate ER 25 milliGRAM(s) Oral daily  nicotine -  14 mG/24Hr(s) Patch 1 Patch Transdermal every 24 hours  sacubitril 49 mG/valsartan 51 mG 1 Tablet(s) Oral two times a day  sodium chloride 0.9% for Nebulization 3 milliLiter(s) Nebulizer four times a day  sotalol 80 milliGRAM(s) Oral every 12 hours    MEDICATIONS  (PRN):  albuterol/ipratropium for Nebulization 3 milliLiter(s) Nebulizer every 6 hours PRN Shortness of Breath and/or Wheezing      IMAGING:    < from: Xray Chest 1 View- PORTABLE-Urgent (08.27.23 @ 17:09) >  INTERPRETATION:  EXAMINATION: XR CHEST URGENT  CLINICAL INDICATION: Chest Pain  TECHNIQUE: Single frontal, portable view of the chest was obtained.  COMPARISON: Chest x-ray 1/19/2015.    FINDINGS:  Right-sided ICD.  The heart is not accurately assessed in this AP projection.  No focal consolidations. There is no pleural effusion.  There is no pneumothorax.  No acute bony abnormality.    IMPRESSION:  No focal consolidations.  --- End of Report ---    Most recent Echocardiogram from 2/22/2022:  LVEF 40%  Heavily calcified mitral valve leaflets with decrease opening  MIld MR  Mild LA enlargement  MIld concentric LVH  Moderate segmental LV systolic dysfunction wit hypokinesis of the inferior wall,posterior-lateral wall  basal interventricular septum and apex   Elizabeth Mueller  4th Year Medical Student    Patient is a 72y old  Female with PMH COPD, HLD, HTN, PVD, CAD s/p PCI 2015, ischemic cardiomyopathy, cardiac arrest s/p left-sided ICD (6/4/2019) complicated by infection and s/p R single-chamber ICE ([place] 9/23/2019) presenting with SOB, hypokalemia, and leukocytosis.      SUBJECTIVE / OVERNIGHT EVENTS:  Patient seen and evaluated at bedside.    Overnight found to have QT//467 and given 2L at 94% O2    Vital Signs Last 24 Hrs  T(C): 36.7 (29 Aug 2023 04:51), Max: 36.8 (28 Aug 2023 11:10)  T(F): 98 (29 Aug 2023 04:51), Max: 98.2 (28 Aug 2023 11:10)  HR: 72 (29 Aug 2023 04:51) (72 - 94)  BP: 125/68 (29 Aug 2023 04:51) (119/67 - 142/68)  BP(mean): --  RR: 18 (29 Aug 2023 04:51) (18 - 18)  SpO2: 94% (29 Aug 2023 04:51) (94% - 98%)    Parameters below as of 29 Aug 2023 04:51  Patient On (Oxygen Delivery Method): nasal cannula  O2 Flow (L/min): 2    ROS:    PHYSICAL EXAM:  GENERAL: NAD, well-developed  HEAD:  Atraumatic, Normocephalic  EYES: EOMI, PERRLA, conjunctiva and sclera clear  NECK: Supple, No JVD  CHEST/LUNG: Clear to auscultation bilaterally; No wheeze  HEART: Regular rate and rhythm; Normal S1 S2, No murmurs, rubs, or gallops  ABDOMEN: Soft, Nontender, Nondistended; Bowel sounds present  EXTREMITIES:  2+ Peripheral Pulses, No clubbing, cyanosis, or edema  PSYCH: AAOx3  NEUROLOGY: non-focal  SKIN: No rashes or lesions    LABS:                        16.5   19.38 )-----------( 320      ( 28 Aug 2023 07:16 )             50.1     Hgb Trend: 16.5<--, 16.7<--  08-28    139  |  100  |  11  ----------------------------<  112<H>  4.7   |  24  |  0.79    Ca    8.7      28 Aug 2023 18:39  Phos  3.2     08-28  Mg     2.7     08-28    TPro  6.3  /  Alb  3.0<L>  /  TBili  0.4  /  DBili  x   /  AST  15  /  ALT  7<L>  /  AlkPhos  82  08-28    Creatinine Trend: 0.79<--, 0.83<--, 0.81<--, 0.99<--  LIVER FUNCTIONS - ( 28 Aug 2023 07:16 )  Alb: 3.0 g/dL / Pro: 6.3 g/dL / ALK PHOS: 82 U/L / ALT: 7 U/L / AST: 15 U/L / GGT: x           PT/INR - ( 27 Aug 2023 16:20 )   PT: 12.3 sec;   INR: 1.12 ratio    PTT - ( 27 Aug 2023 16:20 )  PTT:30.8 sec    D-Dimer Assay, Quantitative (08.27.23 @ 16:20)    D-Dimer Assay, Quantitative: 275    Procalcitonin, Serum (08.28.23 @ 07:16)    Procalcitonin, Serum: 0.06    Blood Gas Profile - Venous (08.28.23 @ 07:03)    pH, Venous: 7.43: No collection time indicated, please interpret with caution   pCO2, Venous: 41 mmHg   pO2, Venous: 74 mmHg   HCO3, Venous: 27 mmol/L   Base Excess, Venous: 2.6 mmol/L   Oxygen Saturation, Venous: 96.8 %   Total CO2, Venous: 28 mmol/L  Blood Gas Venous - Chloride (08.28.23 @ 07:03)    Blood Gas Venous - Chloride: 101 mmol/L    Urinalysis + Microscopic Examination (08.28.23 @ 13:12)    pH Urine: 5.0   Urine Appearance: Slightly Turbid   Color: Light Yellow   Specific Gravity: 1.010   Protein, Urine: Negative   Glucose Qualitative, Urine: Negative   Ketone - Urine: Negative   Blood, Urine: Large   Bilirubin: Negative   Urobilinogen: Negative   Leukocyte Esterase Concentration: Large   Nitrite: Negative   White Blood Cell - Urine: 5 /HPF   Red Blood Cell - Urine: 9 /hpf   Bacteria: Many   Hyaline Casts: 0 /lpf   Squamous Epithelial Cells: 1 /hpf      MEDICATIONS  (STANDING):  atorvastatin 40 milliGRAM(s) Oral at bedtime  benzonatate 100 milliGRAM(s) Oral every 8 hours  budesonide  80 MICROgram(s)/formoterol 4.5 MICROgram(s) Inhaler 2 Puff(s) Inhalation two times a day  cefTRIAXone   IVPB 1000 milliGRAM(s) IV Intermittent every 24 hours  clopidogrel Tablet 75 milliGRAM(s) Oral daily  enoxaparin Injectable 40 milliGRAM(s) SubCutaneous every 24 hours  famotidine    Tablet 20 milliGRAM(s) Oral daily  guaiFENesin  milliGRAM(s) Oral every 12 hours  metoprolol succinate ER 25 milliGRAM(s) Oral daily  nicotine -  14 mG/24Hr(s) Patch 1 Patch Transdermal every 24 hours  sacubitril 49 mG/valsartan 51 mG 1 Tablet(s) Oral two times a day  sodium chloride 0.9% for Nebulization 3 milliLiter(s) Nebulizer four times a day  sotalol 80 milliGRAM(s) Oral every 12 hours    MEDICATIONS  (PRN):  albuterol/ipratropium for Nebulization 3 milliLiter(s) Nebulizer every 6 hours PRN Shortness of Breath and/or Wheezing      IMAGING:    < from: Xray Chest 1 View- PORTABLE-Urgent (08.27.23 @ 17:09) >  INTERPRETATION:  EXAMINATION: XR CHEST URGENT  CLINICAL INDICATION: Chest Pain  TECHNIQUE: Single frontal, portable view of the chest was obtained.  COMPARISON: Chest x-ray 1/19/2015.    FINDINGS:  Right-sided ICD.  The heart is not accurately assessed in this AP projection.  No focal consolidations. There is no pleural effusion.  There is no pneumothorax.  No acute bony abnormality.    IMPRESSION:  No focal consolidations.  --- End of Report ---    Most recent Echocardiogram from 2/22/2022:  LVEF 40%  Heavily calcified mitral valve leaflets with decrease opening  MIld MR  Mild LA enlargement  MIld concentric LVH  Moderate segmental LV systolic dysfunction wit hypokinesis of the inferior wall,posterior-lateral wall  basal interventricular septum and apex   Elizabeth Mueller  4th Year Medical Student    Patient is a 72y old  Female with PMH COPD, HLD, HTN, PVD, CAD s/p PCI 2015, ischemic cardiomyopathy, cardiac arrest s/p left-sided ICD (6/4/2019) complicated by infection and s/p R single-chamber ICE ([place] 9/23/2019) presenting with SOB, hypokalemia, and leukocytosis.      SUBJECTIVE / OVERNIGHT EVENTS:  Patient seen and evaluated at bedside, no complaints. She states that she is feeling better and has a good appetite. She denies any further SOB, N/V/D. No longer requiring nasal canula. No acute events overnight.    EKG after most 8:15AM Sotalol dose: QT//484    Overnight found to have QT//467 and given 2L at 94% O2; Tele: SR at 60-90s, APCs, PVCs, and ventricular couplets during the night    Vital Signs Last 24 Hrs  T(C): 36.7 (29 Aug 2023 04:51), Max: 36.8 (28 Aug 2023 11:10)  T(F): 98 (29 Aug 2023 04:51), Max: 98.2 (28 Aug 2023 11:10)  HR: 72 (29 Aug 2023 04:51) (72 - 94)  BP: 125/68 (29 Aug 2023 04:51) (119/67 - 142/68)  BP(mean): --  RR: 18 (29 Aug 2023 04:51) (18 - 18)  SpO2: 94% (29 Aug 2023 04:51) (94% - 98%)    Parameters below as of 29 Aug 2023 04:51  Patient On (Oxygen Delivery Method): nasal cannula  O2 Flow (L/min): 2    ROS:  Constitutional: No fever, chills, night sweats, or fatigue  Eyes:  No eye pain, visual disturbances, or discharge  ENMT:  No neck pain  Cardiac:  No chest pain, no palpitations, no leg swelling  Respiratory:  mild cough, no SOB  GI:  no vomiting, no diarrhea, no abdominal pain.  :  no dysuria, hematuria, or incontinence  MS:  No back pain.  Neuro:  No headache or lightheadedness, dizziness   Skin:  No skin rash    PHYSICAL EXAM:  GENERAL: NAD, well-developed  HEAD:  Atraumatic, Normocephalic  EYES: EOMI, PERRLA, conjunctiva and sclera clear  NECK: Supple, No JVD  CHEST/LUNG: Clear to auscultation bilaterally; No wheeze  HEART: Regular rate and rhythm; Normal S1 S2, No murmurs, rubs, or gallops  ABDOMEN: Soft, Nontender, Nondistended; Bowel sounds present  EXTREMITIES:  2+ Peripheral Pulses, No clubbing, cyanosis, or edema  PSYCH: AAOx3  NEUROLOGY: non-focal  SKIN: +luis daniel LE chronic venous stasis changes    LABS:                        16.5   19.38 )-----------( 320      ( 28 Aug 2023 07:16 )             50.1     Hgb Trend: 16.5<--, 16.7<--  08-28    139  |  100  |  11  ----------------------------<  112<H>  4.7   |  24  |  0.79    Ca    8.7      28 Aug 2023 18:39  Phos  3.2     08-28  Mg     2.7     08-28    TPro  6.3  /  Alb  3.0<L>  /  TBili  0.4  /  DBili  x   /  AST  15  /  ALT  7<L>  /  AlkPhos  82  08-28    Creatinine Trend: 0.79<--, 0.83<--, 0.81<--, 0.99<--  LIVER FUNCTIONS - ( 28 Aug 2023 07:16 )  Alb: 3.0 g/dL / Pro: 6.3 g/dL / ALK PHOS: 82 U/L / ALT: 7 U/L / AST: 15 U/L / GGT: x           PT/INR - ( 27 Aug 2023 16:20 )   PT: 12.3 sec;   INR: 1.12 ratio    PTT - ( 27 Aug 2023 16:20 )  PTT:30.8 sec    D-Dimer Assay, Quantitative (08.27.23 @ 16:20)    D-Dimer Assay, Quantitative: 275    Procalcitonin, Serum (08.28.23 @ 07:16)    Procalcitonin, Serum: 0.06    Blood Gas Profile - Venous (08.28.23 @ 07:03)    pH, Venous: 7.43: No collection time indicated, please interpret with caution   pCO2, Venous: 41 mmHg   pO2, Venous: 74 mmHg   HCO3, Venous: 27 mmol/L   Base Excess, Venous: 2.6 mmol/L   Oxygen Saturation, Venous: 96.8 %   Total CO2, Venous: 28 mmol/L  Blood Gas Venous - Chloride (08.28.23 @ 07:03)    Blood Gas Venous - Chloride: 101 mmol/L    Urinalysis + Microscopic Examination (08.28.23 @ 13:12)    pH Urine: 5.0   Urine Appearance: Slightly Turbid   Color: Light Yellow   Specific Gravity: 1.010   Protein, Urine: Negative   Glucose Qualitative, Urine: Negative   Ketone - Urine: Negative   Blood, Urine: Large   Bilirubin: Negative   Urobilinogen: Negative   Leukocyte Esterase Concentration: Large   Nitrite: Negative   White Blood Cell - Urine: 5 /HPF   Red Blood Cell - Urine: 9 /hpf   Bacteria: Many   Hyaline Casts: 0 /lpf   Squamous Epithelial Cells: 1 /hpf      MEDICATIONS  (STANDING):  atorvastatin 40 milliGRAM(s) Oral at bedtime  benzonatate 100 milliGRAM(s) Oral every 8 hours  budesonide  80 MICROgram(s)/formoterol 4.5 MICROgram(s) Inhaler 2 Puff(s) Inhalation two times a day  cefTRIAXone   IVPB 1000 milliGRAM(s) IV Intermittent every 24 hours  clopidogrel Tablet 75 milliGRAM(s) Oral daily  enoxaparin Injectable 40 milliGRAM(s) SubCutaneous every 24 hours  famotidine    Tablet 20 milliGRAM(s) Oral daily  guaiFENesin  milliGRAM(s) Oral every 12 hours  metoprolol succinate ER 25 milliGRAM(s) Oral daily  nicotine -  14 mG/24Hr(s) Patch 1 Patch Transdermal every 24 hours  sacubitril 49 mG/valsartan 51 mG 1 Tablet(s) Oral two times a day  sodium chloride 0.9% for Nebulization 3 milliLiter(s) Nebulizer four times a day  sotalol 80 milliGRAM(s) Oral every 12 hours    MEDICATIONS  (PRN):  albuterol/ipratropium for Nebulization 3 milliLiter(s) Nebulizer every 6 hours PRN Shortness of Breath and/or Wheezing      IMAGING:    < from: Xray Chest 1 View- PORTABLE-Urgent (08.27.23 @ 17:09) >  INTERPRETATION:  EXAMINATION: XR CHEST URGENT  CLINICAL INDICATION: Chest Pain  TECHNIQUE: Single frontal, portable view of the chest was obtained.  COMPARISON: Chest x-ray 1/19/2015.    FINDINGS:  Right-sided ICD.  The heart is not accurately assessed in this AP projection.  No focal consolidations. There is no pleural effusion.  There is no pneumothorax.  No acute bony abnormality.    IMPRESSION:  No focal consolidations.  --- End of Report ---    Most recent Echocardiogram from 2/22/2022:  LVEF 40%  Heavily calcified mitral valve leaflets with decrease opening  MIld MR  Mild LA enlargement  MIld concentric LVH  Moderate segmental LV systolic dysfunction wit hypokinesis of the inferior wall,posterior-lateral wall  basal interventricular septum and apex

## 2023-08-29 NOTE — CONSULT NOTE ADULT - ASSESSMENT
*** Incomplete note.  72 year old female w/ MHx COPD (does not require home O2), HLD, HTN, PVD, CAD s/p PCI 2015, ischemic cardiomyopathy (LVEF 40-45%), cardiac arrest s/p left-sided ICD (6/4/2019) complicated by infection and underwent extraction with right sided [employer] single-chamber ICD ( at [place] by Dr. Gutiérrez on 9/23/2019) p/w SOB at rest, found ot ahve episode of VT on ICD interrogation, started on sotalol.    Pt reports her defibrillator went off a few days  prior to admission, but she didn't come in at that point, however, she felt worse and more short of breath over the next few days so she came to the ED. Of note, she thought she was instructed to stop plavix.  She has poor baseline functional status of about 2 blocks and stops 2/2 SOB, but no chest pain . Denies orthopnea, PND, or LE edema. Of note, at some point, patient had Cardiac Cath/PCI: Cath at [place]: Patient had elevated troponins which prompted a repeat cardiac catheterization. Report states that the patient had a proximal RCA total obstruction and was being filled by collaterals from the mid LAD. The LAD shows a patent stent and a 50% mid LAD lesion. She also had moderate disease of the left circumflex artery. Also reported TTE 2/2022 demonstrated mild LAE, EF 40-45%, moderate segmental LV systolic dysfunction with hypokinesis of the inferior wall, posterior-lateral wall, basal anterior-ventricular septum and apex, mild concentric LVH.      #HFmrEF  -EKG:sinus with nonspecific ST/T changes.   -Etiology: ICM  -Home Diuretics: lasix 40mg daily  -Prior TTE: reported TTE 2/2022 demonstrated mild LAE, EF 40-45%, moderate segmental LV systolic dysfunction with hypokinesis of the inferior wall, posterior-lateral wall, basal anterior-ventricular septum and apex, mild concentric LVH.    -Obtain TTE:  -Device: ICD  -GDMT   --c/w coreg  --c/w entresto  --MRA when K/Cr proves stable.   --Consider SGLT2i (if there is no contraindication):  -c/w statin  -c/w lasix  -Duonebs per primary team  -monitor on telemetry  -Monitor Strict I/O's  -Monitor standing, pre-prandial daily weights  -Monitor and correct electrolytes, K=4-5, and mg 2-3  -Check Lipids, A1c, TSH/FT4    #VT  -c/w BB and sotalol per EP recs  -ICD in place  - monitor on tele  -K 4-5, Mg 2-3    #CAD s/p PCi in the past  -need to obtain records of recent cath and stress testing  - no clear ischemic symptoms at this time  -VT likely scar mediated  -c/w plavix, it was apparently stopped  -c/w statin  -pharm stress contraindicated at present time 2/2 active wheezing from COPD    Aaron Hallman, Cardiology Fellow, F-2    For all New Consults and Questions:  [email or web address]   Login: [email or web address]    *** Note not final until signed by attending

## 2023-08-29 NOTE — CONSULT NOTE ADULT - ATTENDING COMMENTS
No ECG evidence of ischaemia. May consider pharm stress testing on outpatient basis when her COPD/asthma exacerbation improves.   Repeat TTE for evaluation of LV-EF reportedly recovered function from previous   Continue Plavix, Coreg, Entresto, atorvastatin at current dose.   Continue to monitor on tele

## 2023-08-29 NOTE — PROGRESS NOTE ADULT - ATTENDING COMMENTS
73yo F w/ PMH of HTN, HLD, COPD, CAD s/p PCI 2015, PVD, ICM, cardiac arrest s/p ICD p/w brief episode of SOB while at rest, which resolved upon presentation w/ findings of leukocytosis and hypokalemia on labs.    #dyspnea  - c/f arrythmia as symptoms described are similar to prior arrythmia where shock was administered  - Other ddx includes URI vs less likely COPD exacerbation or AdHF as symptoms resolved spontaneously w/out wheezing on exam or s/s of congestion or LE edema  - Device interrogated by EP, EP team started sotalol and decreasing toprolxl dosing. check EKG 2hr post sotalol dose.   - Monitor on tele. EKG with PVCs  - TTE. cards consulted for ischemic eval.   - monitor bmp, keep k>4, mg>2   - duonebs prn. continue home maintenance inhalers. wean off supplemental oxygen as tolerated.     #leukocytosis , infectious vs reactive   - UA+, on ceftriaxone pending urine cx  - f/u blood cx, procal neg, urine ags, gi pcr. trend lactate.   - encourage po fluid intake  - monitor cbc    #HFrEF  - euvolemic. would hold lasix in the setting of diarrhea, acute infection  - monitor closely

## 2023-08-29 NOTE — PROGRESS NOTE ADULT - PROBLEM SELECTOR PLAN 1
- Poss iso arrhythmia vs URI vs COPD exac vs less likely HF exacerbation  - Prelim CXR: no focal consolidations  - Pt no longer complaining of SOB  - Neg RVP, Neg COVID  - VBG pH 7.43, pCO2 41, pO2 74, Calc HCO3 27  - Currently on 2L NC >92%  - Maintain O2 sat 88-92% and wean O2 as tolerated  - Duonebs PRN, Symbicort PRN, Chest PT  - f/u procal - Poss iso arrhythmia vs URI vs COPD exac vs less likely HF exacerbation  - Prelim CXR: no focal consolidations  - Pt no longer complaining of SOB  - Neg RVP, Neg COVID  - VBG pH 7.43, pCO2 41, pO2 74, Calc HCO3 27  - Procalcitonin: 0.06  - Currently on 2L NC >92%  - Maintain O2 sat 88-92% and wean O2 as tolerated  - Duonebs PRN, Symbicort PRN, Chest PT - Poss iso arrhythmia vs URI vs COPD exac vs less likely HF exacerbation  - Prelim CXR: no focal consolidations  - Pt no longer complaining of SOB  - Neg RVP, Neg COVID  - VBG pH 7.43, pCO2 41, pO2 74, Calc HCO3 27  - Procalcitonin: 0.06  - No longer requiring nasal canula  - Duonebs PRN, Symbicort BID, Chest PT

## 2023-08-29 NOTE — PROGRESS NOTE ADULT - PROBLEM SELECTOR PLAN 8
- /59 on admission   - Hold  - C/w metoprolol 25 mg BID - /59 on admission   - Hold  - C/w metoprolol 25 mg daily - /59 on admission   - Hold lasix, reassess volume status tomorrow (8/30)  - C/w metoprolol 25 mg daily

## 2023-08-29 NOTE — PROGRESS NOTE ADULT - SUBJECTIVE AND OBJECTIVE BOX
24H hour events: Pt without complaint, no acute events overnight, Tele: SR at 60-90's, APCs, VPCs and ventricular cuplets      MEDICATIONS:  clopidogrel Tablet 75 milliGRAM(s) Oral daily  enoxaparin Injectable 40 milliGRAM(s) SubCutaneous every 24 hours  metoprolol succinate ER 25 milliGRAM(s) Oral daily  sacubitril 49 mG/valsartan 51 mG 1 Tablet(s) Oral two times a day  sotalol 80 milliGRAM(s) Oral every 12 hours  cefTRIAXone   IVPB 1000 milliGRAM(s) IV Intermittent every 24 hours  albuterol/ipratropium for Nebulization 3 milliLiter(s) Nebulizer every 6 hours PRN  benzonatate 100 milliGRAM(s) Oral every 8 hours  budesonide  80 MICROgram(s)/formoterol 4.5 MICROgram(s) Inhaler 2 Puff(s) Inhalation two times a day  guaiFENesin  milliGRAM(s) Oral every 12 hours  sodium chloride 0.9% for Nebulization 3 milliLiter(s) Nebulizer four times a day  famotidine    Tablet 20 milliGRAM(s) Oral daily  atorvastatin 40 milliGRAM(s) Oral at bedtime        REVIEW OF SYSTEMS:  Complete 12 point ROS negative.    PHYSICAL EXAM:  T(C): 36.7 (08-29-23 @ 04:51), Max: 36.8 (08-28-23 @ 11:10)  HR: 72 (08-29-23 @ 04:51) (72 - 94)  BP: 125/68 (08-29-23 @ 04:51) (119/67 - 142/68)  RR: 18 (08-29-23 @ 04:51) (18 - 18)  SpO2: 94% (08-29-23 @ 04:51) (94% - 98%)  Wt(kg): --  I&O's Summary      Appearance: Normal	  HEENT: PERRL, EOMI	  Cardiovascular: Normal S1 S2, No JVD, No murmurs  Respiratory: Lungs clear to auscultation	  Psychiatry: A & O x 3, Mood & affect appropriate  Gastrointestinal:  Soft, Non-tender, + BS	  Skin: No rashes, No ecchymoses, No cyanosis	  Neurologic: Grossly intact  Extremities: No clubbing, cyanosis or edema  Vascular: Peripheral pulses palpable 2+ bilaterally        LABS:	 	    CBC Full  -  ( 29 Aug 2023 07:36 )  WBC Count : 17.37 K/uL  Hemoglobin : 15.3 g/dL  Hematocrit : 48.3 %  Platelet Count - Automated : 321 K/uL  Mean Cell Volume : 90.6 fl  Mean Cell Hemoglobin : 28.7 pg  Mean Cell Hemoglobin Concentration : 31.7 gm/dL  Auto Neutrophil # : 12.41 K/uL  Auto Lymphocyte # : 3.28 K/uL  Auto Monocyte # : 1.27 K/uL  Auto Eosinophil # : 0.25 K/uL  Auto Basophil # : 0.07 K/uL  Auto Neutrophil % : 71.5 %  Auto Lymphocyte % : 18.9 %  Auto Monocyte % : 7.3 %  Auto Eosinophil % : 1.4 %  Auto Basophil % : 0.4 %    08-29    140  |  103  |  12  ----------------------------<  91  5.0   |  25  |  0.72  08-28    139  |  100  |  11  ----------------------------<  112<H>  4.7   |  24  |  0.79    Ca    8.7      29 Aug 2023 07:35  Ca    8.7      28 Aug 2023 18:39  Phos  3.4     08-29  Phos  3.2     08-28  Mg     2.7     08-29  Mg     2.7     08-28    TPro  5.9<L>  /  Alb  2.7<L>  /  TBili  0.3  /  DBili  x   /  AST  15  /  ALT  8<L>  /  AlkPhos  85  08-29  TPro  6.3  /  Alb  3.0<L>  /  TBili  0.4  /  DBili  x   /  AST  15  /  ALT  7<L>  /  AlkPhos  82  08-28    Thyroid Stimulating Hormone, Serum in AM (08.28.23 @ 07:17)    Thyroid Stimulating Hormone, Serum: 2.58 uIU/mL        TELEMETRY: SR at 60-90's, APCs, VPCs and ventricular cuplets

## 2023-08-29 NOTE — PROGRESS NOTE ADULT - ASSESSMENT
72 year old female w/ PMH COPD, HLD, HTN, PVD, CAD with s/p PCI 2015 and known RCA , ischemic cardiomyopathy, cardiac arrest s/p left-sided ICD (6/4/2019) complicated by infection and underwent extraction with right sided Medtronic single-chamber ICD ( at [place] by Dr. Gutiérrez on 9/23/2019, follows by Dr. Hurt) p/w SOB, hypokalemia, and leukocytosis. CXR showing no focal consolidation and urinalysis indicating infection. EP consulted for recent ICD shock on 8/21/23 for VT at rate 222 bpm.     1. CAD s/p PCI 2015  2. ICM w/ LVEF 40-45%  3. VT s/p ICD shock on 8/21/23  4. p/w SOB, now resolved   5. Leukocytosis    -ICD interrogated 8/28; no further events since last remote transmission from 8/22/23.  -Outpatient TTE (2/22/2022): LVEF 40-45%, mod. segmental LVSD with hypokinesis of the inferior wall, posterior-lateral wall, basal inter-ventricular septum and apex. Mild concentric LVH (septum 1.4cm), mild LA enlargement (WILLIAN 31 cc/m2), mild MR, no AI, mild TR, no pericardial effusion.  -TTE done, f/u result  -Blood cultures from 8/27/23 no growth to date  -F/U Urine cx result  -Started on ceftriaxone per primary team  -Continue sotalol 80mg Q12hr. Obtain EKG 2 hours after each dose of sotalol. Hold Sotalol for QTc >500ms (Baseline QTc is 468ms). QTc stable at 467ms after 1st dose of sotalol.   -Continue Metoprolol succinate at 25mg once daily   -Please have house cardiology to see patient  -Plan discussed with T5 and Dr. Hurt.    SOO Comer NP-ATIYA  Can reach me via Teams

## 2023-08-29 NOTE — PROGRESS NOTE ADULT - PROBLEM SELECTOR PLAN 5
- K+ to 3.0 on admission, currently wnl  - S/p 40mEq KCl tablet and 10 mEQ IVPB in ED  - Maintain K>4 and Mg>2  - F/u BMP and replete as necessary

## 2023-08-29 NOTE — PROGRESS NOTE ADULT - ASSESSMENT
73 yo F w/ PMH COPD, HLD, HTN, PVD, CAD s/p PCI 2015, ischemic cardiomyopathy, cardiac arrest s/p left-sided ICD (6/4/2019) complicated by infection and s/p R single-chamber ICD ([place] in 9/23/2019) p/w SOB, hypokalemia, and leukocytosis w/ prelim CXR showing no focal consolidation and urinalysis indicating infection.

## 2023-08-29 NOTE — CONSULT NOTE ADULT - SUBJECTIVE AND OBJECTIVE BOX
Patient seen and evaluated at bedside    Chief Complaint:shortness of breath.     HPI:  72 year old female w/ MHx COPD (does not require home O2), HLD, HTN, PVD, CAD s/p PCI , ischemic cardiomyopathy, cardiac arrest s/p left-sided ICD (2019) complicated by infection and underwent extraction with right sided Medtronic single-chamber ICD ( at [place] by Dr. Gutiérrez on 2019) p/w SOB at rest. Pt reports her defibrillator went off a few days  prior to admission, but she didn't come in at that point, however, she felt worse and more short of breath over the next few days so she came to the ED. Of note, she thought she was instructed to stop plavix.  She has poor baseline functional status of about 2 blocks and stops 2/2 SOB, but no chest pain . Denies orthopnea, PND, or LE edema. Of note, at some point, patient had Cardiac Cath/PCI: Cath at [place]: Patient had elevated troponins which prompted a repeat cardiac catheterization. Report states that the patient had a proximal RCA total obstruction and was being filled by collaterals from the mid LAD. The LAD shows a patent stent and a 50% mid LAD lesion. She also had moderate disease of the left circumflex artery.      PMHx:   No pertinent past medical history    Obese    Smoker    Obesity    HTN (hypertension)    HLD (hyperlipidemia)    CAD (coronary artery disease)    CHF with cardiomyopathy        PSHx:   No significant past surgical history    No significant past surgical history    History of hip surgery        Allergies:  No Known Allergies      Home Meds:    Current Medications:   albuterol/ipratropium for Nebulization 3 milliLiter(s) Nebulizer every 6 hours PRN  atorvastatin 40 milliGRAM(s) Oral at bedtime  benzonatate 100 milliGRAM(s) Oral every 8 hours  budesonide  80 MICROgram(s)/formoterol 4.5 MICROgram(s) Inhaler 2 Puff(s) Inhalation two times a day  cefTRIAXone   IVPB 1000 milliGRAM(s) IV Intermittent every 24 hours  clopidogrel Tablet 75 milliGRAM(s) Oral daily  enoxaparin Injectable 40 milliGRAM(s) SubCutaneous every 24 hours  famotidine    Tablet 20 milliGRAM(s) Oral daily  guaiFENesin  milliGRAM(s) Oral every 12 hours  metoprolol succinate ER 25 milliGRAM(s) Oral daily  nicotine -  14 mG/24Hr(s) Patch 1 Patch Transdermal every 24 hours  sacubitril 49 mG/valsartan 51 mG 1 Tablet(s) Oral two times a day  sodium chloride 0.9% for Nebulization 3 milliLiter(s) Nebulizer four times a day  sotalol 80 milliGRAM(s) Oral every 12 hours      FAMILY HISTORY:  Family history of Alzheimer's disease (Father)    Family history of cancer (Mother)        Social History:  Smoking History:  Alcohol Use:  Drug Use:    REVIEW OF SYSTEMS:  Constitutional:     [ ] negative [ ] fevers [ ] chills [ ] weight loss [ ] weight gain  HEENT:                  [ ] negative [ ] dry eyes [ ] eye irritation [ ] postnasal drip [ ] nasal congestion  CV:                         [ ] negative  [ ] chest pain [ ] orthopnea [ ] palpitations [ ] murmur  Resp:                     [ ] negative [ ] cough [ ] shortness of breath [ ] dyspnea [ ] wheezing [ ] sputum [ ]hemoptysis  GI:                          [ ] negative [ ] nausea [ ] vomiting [ ] diarrhea [ ] constipation [ ] abd pain [ ] dysphagia   :                        [ ] negative [ ] dysuria [ ] nocturia [ ] hematuria [ ] increased urinary frequency  Musculoskeletal: [ ] negative [ ] back pain [ ] myalgias [ ] arthralgias [ ] fracture  Skin:                       [ ] negative [ ] rash [ ] itch  Neurological:        [ ] negative [ ] headache [ ] dizziness [ ] syncope [ ] weakness [ ] numbness  Psychiatric:           [ ] negative [ ] anxiety [ ] depression  Endocrine:            [ ] negative [ ] diabetes [ ] thyroid problem  Heme/Lymph:      [ ] negative [ ] anemia [ ] bleeding problem  Allergic/Immune: [ ] negative [ ] itchy eyes [ ] nasal discharge [ ] hives [ ] angioedema    [ ] All other systems negative  [ ] Unable to assess ROS due to      Physical Exam:  T(F): 98.6 (-), Max: 98.6 ()  HR: 69 () (69 - 94)  BP: 146/72 () (125/68 - 146/72)  RR: 18 (-)  SpO2: 94% ()  GENERAL: No acute distress, well-developed  HEAD:  Atraumatic, Normocephalic  ENT: EOMI, PERRLA, conjunctiva and sclera clear, Neck supple, No JVD, moist mucosa  CHEST/LUNG: Clear to auscultation bilaterally; No wheeze, equal breath sounds bilaterally   BACK: No spinal tenderness  HEART: Regular rate and rhythm; No murmurs, rubs, or gallops  ABDOMEN: Soft, Nontender, Nondistended; Bowel sounds present  EXTREMITIES:  No clubbing, cyanosis, or edema  PSYCH: Nl behavior, nl affect  NEUROLOGY: AAOx3, non-focal, cranial nerves intact  SKIN: Normal color, No rashes or lesions  LINES:    Cardiovascular Diagnostic Testing:    ECG: Personally reviewed:    Echo: Personally reviewed:    Stress Testing:    Cath:    Imaging:    CXR: Personally reviewed    Labs: Personally reviewed                        15.3   17.37 )-----------( 321      ( 29 Aug 2023 07:36 )             48.3         140  |  103  |  12  ----------------------------<  91  5.0   |  25  |  0.72    Ca    8.7      29 Aug 2023 07:35  Phos  3.4       Mg     2.7         TPro  5.9<L>  /  Alb  2.7<L>  /  TBili  0.3  /  DBili  x   /  AST  15  /  ALT  8<L>  /  AlkPhos  85      PT/INR - ( 27 Aug 2023 16:20 )   PT: 12.3 sec;   INR: 1.12 ratio         PTT - ( 27 Aug 2023 16:20 )  PTT:30.8 sec    Total Cholesterol: 109  LDL: --  HDL: 33  T      Thyroid Stimulating Hormone, Serum: 2.58 uIU/mL ( @ 07:17)   Patient seen and evaluated at bedside    Chief Complaint:shortness of breath.     HPI:  72 year old female w/ MHx COPD (does not require home O2), HLD, HTN, PVD, CAD s/p PCI , ischemic cardiomyopathy, cardiac arrest s/p left-sided ICD (2019) complicated by infection and underwent extraction with right sided Medtronic single-chamber ICD ( at [place] by Dr. Gutiérrez on 2019) p/w SOB at rest. Pt reports her defibrillator went off a few days  prior to admission, but she didn't come in at that point, however, she felt worse and more short of breath over the next few days so she came to the ED. Of note, she thought she was instructed to stop plavix.  She has poor baseline functional status of about 2 blocks and stops 2/2 SOB, but no chest pain . Denies orthopnea, PND, or LE edema. Of note, at some point, patient had Cardiac Cath/PCI: at [place]: Patient had elevated troponins which prompted a repeat cardiac catheterization. Report states that the patient had a proximal RCA total obstruction and was being filled by collaterals from the mid LAD. The LAD shows a patent stent and a 50% mid LAD lesion. She also had moderate disease of the left circumflex artery. Also reported TTE 2022 demonstrated mild LAE, EF 40-45%, moderate segmental LV systolic dysfunction with hypokinesis of the inferior wall, posterior-lateral wall, basal anterior-ventricular septum and apex, mild concentric LVH.        PMHx:   No pertinent past medical history    Obese    Smoker    Obesity    HTN (hypertension)    HLD (hyperlipidemia)    CAD (coronary artery disease)    CHF with cardiomyopathy        PSHx:   No significant past surgical history    No significant past surgical history    History of hip surgery        Allergies:  No Known Allergies      Home Meds:  entresto  lasix  famotidine  atorvastatin  plavix  carvedilol  symbicort  albuterol  Current Medications:   albuterol/ipratropium for Nebulization 3 milliLiter(s) Nebulizer every 6 hours PRN  atorvastatin 40 milliGRAM(s) Oral at bedtime  benzonatate 100 milliGRAM(s) Oral every 8 hours  budesonide  80 MICROgram(s)/formoterol 4.5 MICROgram(s) Inhaler 2 Puff(s) Inhalation two times a day  cefTRIAXone   IVPB 1000 milliGRAM(s) IV Intermittent every 24 hours  clopidogrel Tablet 75 milliGRAM(s) Oral daily  enoxaparin Injectable 40 milliGRAM(s) SubCutaneous every 24 hours  famotidine    Tablet 20 milliGRAM(s) Oral daily  guaiFENesin  milliGRAM(s) Oral every 12 hours  metoprolol succinate ER 25 milliGRAM(s) Oral daily  nicotine -  14 mG/24Hr(s) Patch 1 Patch Transdermal every 24 hours  sacubitril 49 mG/valsartan 51 mG 1 Tablet(s) Oral two times a day  sodium chloride 0.9% for Nebulization 3 milliLiter(s) Nebulizer four times a day  sotalol 80 milliGRAM(s) Oral every 12 hours      FAMILY HISTORY:  Family history of Alzheimer's disease (Father)    Family history of cancer (Mother)        Social History:  Smoking History:active  Alcohol Use:denies  Drug Use:denies    REVIEW OF SYSTEMS:  Constitutional:     [ x] negative [ ] fevers [ ] chills [ ] weight loss [ ] weight gain  HEENT:                  [ x] negative [ ] dry eyes [ ] eye irritation [ ] postnasal drip [ ] nasal congestion  CV:                         [ x] negative  [ ] chest pain [ ] orthopnea [ ] palpitations [ ] murmur  Resp:                     as above[ ] negative [ ] cough [ ] shortness of breath [ ] dyspnea [ ] wheezing [ ] sputum [ ]hemoptysis  GI:                          [x ] negative [ ] nausea [ ] vomiting [ ] diarrhea [ ] constipation [ ] abd pain [ ] dysphagia   :                        [ x] negative [ ] dysuria [ ] nocturia [ ] hematuria [ ] increased urinary frequency  Musculoskeletal: [x ] negative [ ] back pain [ ] myalgias [ ] arthralgias [ ] fracture  Skin:                       [x ] negative [ ] rash [ ] itch  Neurological:        [ x] negative [ ] headache [ ] dizziness [ ] syncope [ ] weakness [ ] numbness  Psychiatric:           [ x] negative [ ] anxiety [ ] depression  Endocrine:            [x ] negative [ ] diabetes [ ] thyroid problem  Heme/Lymph:      [x ] negative [ ] anemia [ ] bleeding problem  Allergic/Immune: [x ] negative [ ] itchy eyes [ ] nasal discharge [ ] hives [ ] angioedema    [ x] All other systems negative        Physical Exam:  T(F): 98.6 (-), Max: 98.6 (-)  HR: 69 () (69 - 94)  BP: 146/72 () (125/68 - 146/72)  RR: 18 (-)  SpO2: 94% ()  GENERAL: No acute distress, well-developed  HEAD:  Atraumatic, Normocephalic  ENT: EOMI, PERRLA, conjunctiva and sclera clear, Neck supple, No JVD, moist mucosa  CHEST/LUNG: wheezing bilat.   BACK: No spinal tenderness  HEART: Regular rate and rhythm; No murmurs, rubs, or gallops  ABDOMEN: Soft, Nontender, Nondistended; Bowel sounds present  EXTREMITIES:  No clubbing, cyanosis, or edema  PSYCH: Nl behavior, nl affect  NEUROLOGY: AAOx3, non-focal, cranial nerves intact  SKIN: Normal color, No rashes or lesions      Cardiovascular Diagnostic Testing:    ECG: Personally reviewed:sinus rhtyhm with normal axis and non-specific ST/T wave changes.     CXR: Personally reviewed    Labs: Personally reviewed                        15.3   17.37 )-----------( 321      ( 29 Aug 2023 07:36 )             48.3         140  |  103  |  12  ----------------------------<  91  5.0   |  25  |  0.72    Ca    8.7      29 Aug 2023 07:35  Phos  3.4       Mg     2.7         TPro  5.9<L>  /  Alb  2.7<L>  /  TBili  0.3  /  DBili  x   /  AST  15  /  ALT  8<L>  /  AlkPhos  85      PT/INR - ( 27 Aug 2023 16:20 )   PT: 12.3 sec;   INR: 1.12 ratio         PTT - ( 27 Aug 2023 16:20 )  PTT:30.8 sec    Total Cholesterol: 109  LDL: --  HDL: 33  T      Thyroid Stimulating Hormone, Serum: 2.58 uIU/mL ( @ 07:17)

## 2023-08-29 NOTE — PROGRESS NOTE ADULT - PROBLEM SELECTOR PLAN 3
- Defibrillation a few days ago via ICD (Medtronic device, implanted 2019)  - f/u tele  - Per EP: ICD interrogated 8/28, found to have no episodes since 8/22/23  - Per EP: If potassium >4.0 and Mg >2.0, c/w sotalol 80mg Q12hr. Obtain EKG 2 hours after each dose of sotalol. Hold Sotalol for QTc >500ms (Baseline QTc is 468ms).  - Decrease metoprolol succinate to 25mg once daily - Defibrillation a few days ago via ICD (Medtronic device, implanted 2019)  - f/u tele  - Per EP: ICD interrogated 8/28, found to have no episodes since 8/22/23  - Per EP: If potassium >4.0 and Mg >2.0, c/w sotalol 80mg Q12hr. Obtain EKG 2 hours after each dose of sotalol. Hold Sotalol for QTc >500ms (Baseline QTc is 468ms).  - C/w metoprolol succinate to 25mg once daily - Defibrillation a few days ago via ICD (Medtronic device, implanted 2019)  - f/u tele  - Per EP: ICD interrogated 8/28, found to have no episodes since 8/22/23  - Per EP: If potassium >4.0 and Mg >2.0, c/w sotalol 80mg Q12hr. Obtain EKG 2 hours after each dose of sotalol. Hold Sotalol for QTc >500ms (Baseline QTc is 468ms).  - f/u cardiology consult/recs  - C/w metoprolol succinate to 25mg once daily

## 2023-08-29 NOTE — PROGRESS NOTE ADULT - PROBLEM SELECTOR PLAN 2
- Persistent leukocytosis (17.22 --> 19.38)  - CXR clear  - Urinalysis: slightly turbid, +bacteria, +leukocyte esterase  - start ceftriaxone  - f/u cultures and GI PCR. - Persistent leukocytosis (17.22 --> 19.38)  - CXR clear  - Urinalysis: slightly turbid, +bacteria, +leukocyte esterase  - C/w ceftriaxone  - f/u cultures and GI PCR. - Persistent leukocytosis (17.22 --> 19.38 --> 17.37)  - CXR clear  - Urinalysis: slightly turbid, +bacteria, +leukocyte esterase  - C/w ceftriaxone  - f/u cultures and GI PCR.

## 2023-08-30 NOTE — DIETITIAN INITIAL EVALUATION ADULT - PROBLEM SELECTOR PLAN 4
- Prelim CXR: no focal consolidations  - Currently on 2L NC >92%  - Maintain O2 sat 88-92% and wean O2 as tolerated  - Duonebs Q6H, hypersal neb Q6H, Chest PT, bedside spirometry  - f/u VBG

## 2023-08-30 NOTE — DIETITIAN INITIAL EVALUATION ADULT - PROBLEM SELECTOR PLAN 6
- /59 on admission   - On home lasix 40 mg QD, metoprolol ER 50 mg BID (per previous cardiology)  - C/w lasix and metoprolol 50 mg BID

## 2023-08-30 NOTE — DISCHARGE NOTE PROVIDER - NSDCMRMEDTOKEN_GEN_ALL_CORE_FT
Airet 2.5 mg/3 mL (0.083%) inhalation solution: 3 milligram(s) by nebulizer  atorvastatin 40 mg oral tablet: 1 tab(s) orally once a day (at bedtime)  D3 50 mcg (2000 intl units) oral capsule: 1 cap(s) orally  dapagliflozin 10 mg oral tablet: 1 tab(s) orally once a day  Entresto 49 mg-51 mg oral tablet: 1 tab(s) orally 2 times a day  famotidine 20 mg oral tablet, disintegratin tab(s) orally once a day (at bedtime)  Lasix 40 mg oral tablet: 1 tab(s) orally once a day  Plavix 75 mg oral tablet: 1 tab(s) orally once a day  Symbicort 160 mcg-4.5 mcg/inh inhalation aerosol: 2 puff(s) inhaled   Airet 2.5 mg/3 mL (0.083%) inhalation solution: 3 milligram(s) by nebulizer  atorvastatin 40 mg oral tablet: 1 tab(s) orally once a day (at bedtime)  carvedilol 6.25 mg oral tablet: 1 tab(s) orally every 12 hours  D3 50 mcg (2000 intl units) oral capsule: 1 cap(s) orally  dapagliflozin 10 mg oral tablet: 1 tab(s) orally once a day  Entresto 49 mg-51 mg oral tablet: 1 tab(s) orally 2 times a day  famotidine 20 mg oral tablet, disintegratin tab(s) orally once a day (at bedtime)  Lasix 40 mg oral tablet: 1 tab(s) orally once a day  Plavix 75 mg oral tablet: 1 tab(s) orally once a day  sotalol 80 mg oral tablet: 1 tab(s) orally every 12 hours  Symbicort 160 mcg-4.5 mcg/inh inhalation aerosol: 2 puff(s) inhaled once a day   Airet 2.5 mg/3 mL (0.083%) inhalation solution: 3 milligram(s) by nebulizer  atorvastatin 40 mg oral tablet: 1 tab(s) orally once a day (at bedtime)  Betapace 80 mg oral tablet: 1 tab(s) orally every 12 hours  carvedilol 6.25 mg oral tablet: 1 tab(s) orally every 12 hours  D3 50 mcg (2000 intl units) oral capsule: 1 cap(s) orally  dapagliflozin 10 mg oral tablet: 1 tab(s) orally once a day  Entresto 49 mg-51 mg oral tablet: 1 tab(s) orally 2 times a day  famotidine 20 mg oral tablet, disintegratin tab(s) orally once a day (at bedtime)  Lasix 40 mg oral tablet: 1 tab(s) orally once a day  Plavix 75 mg oral tablet: 1 tab(s) orally once a day  sotalol 80 mg oral tablet: 1 tab(s) orally every 12 hours  Symbicort 160 mcg-4.5 mcg/inh inhalation aerosol: 2 puff(s) inhaled once a day

## 2023-08-30 NOTE — CHART NOTE - NSCHARTNOTEFT_GEN_A_CORE
RD CHF education chart note    Patient was visited by RD for CHF education. Heart failure education provided to the patient in detail. Discussed heart failure nutrition therapy, sodium and fluid intake, importance of diet adherence, daily weights monitoring with the patient. Reinforced importance of weight gain parameters and importance of contacting MD’s about weight changes. Provided handouts on heart failure nutrition therapy, reading heart healthy nutrition labels, heart healthy shopping tips and low sodium food list. Patient verbalized understanding demonstrated by teach back method. RD contact information left with patient for any future questioning.

## 2023-08-30 NOTE — CONSULT NOTE ADULT - SUBJECTIVE AND OBJECTIVE BOX
CARDIOLOGY CONSULT PROGRESS NOTE  AUSTIN LEE  MRN-[id number]    INTERVAL EVENTS:  - tele:   -     ROS:  Negative, except as above.    MEDICATIONS  (STANDING):  atorvastatin 40 milliGRAM(s) Oral at bedtime  benzonatate 100 milliGRAM(s) Oral every 8 hours  budesonide  80 MICROgram(s)/formoterol 4.5 MICROgram(s) Inhaler 2 Puff(s) Inhalation two times a day  carvedilol 6.25 milliGRAM(s) Oral every 12 hours  cefTRIAXone   IVPB 1000 milliGRAM(s) IV Intermittent every 24 hours  clopidogrel Tablet 75 milliGRAM(s) Oral daily  enoxaparin Injectable 40 milliGRAM(s) SubCutaneous every 24 hours  famotidine    Tablet 20 milliGRAM(s) Oral daily  furosemide    Tablet 40 milliGRAM(s) Oral daily  guaiFENesin  milliGRAM(s) Oral every 12 hours  nicotine -  14 mG/24Hr(s) Patch 1 Patch Transdermal every 24 hours  sacubitril 49 mG/valsartan 51 mG 1 Tablet(s) Oral two times a day  sodium chloride 0.9% for Nebulization 3 milliLiter(s) Nebulizer four times a day  sotalol 80 milliGRAM(s) Oral every 12 hours    MEDICATIONS  (PRN):  albuterol/ipratropium for Nebulization 3 milliLiter(s) Nebulizer every 6 hours PRN Shortness of Breath and/or Wheezing      Vitals:  Vital Signs Last 24 Hrs  T(C): 36.6 (30 Aug 2023 04:31), Max: 36.9 (29 Aug 2023 20:05)  T(F): 97.8 (30 Aug 2023 04:31), Max: 98.5 (29 Aug 2023 20:05)  HR: 67 (30 Aug 2023 04:31) (67 - 74)  BP: 137/70 (30 Aug 2023 04:31) (113/68 - 141/70)  BP(mean): --  RR: 18 (30 Aug 2023 04:31) (18 - 18)  SpO2: 95% (30 Aug 2023 04:31) (93% - 95%)    Parameters below as of 30 Aug 2023 04:31  Patient On (Oxygen Delivery Method): nasal cannula  O2 Flow (L/min): 2      PE  - gen: well appearing, laying in hospital bed, NAD  - HEENT: MMM, NCAT  - neck: no JVD, supple  - heart: RRR, nml S1/S2, no RMG  - lungs: CTABL, nml WOB  - abd: soft, NTND, NABS  - ext: WWP, PPP, no ANEL    RELEVANT RECENT LABS/IMAGING/STUDIES:                        15.7   15.25 )-----------( 318      ( 30 Aug 2023 05:08 )             48.1     08-30    138  |  103  |  13  ----------------------------<  121<H>  4.1   |  26  |  0.73    Ca    8.3<L>      30 Aug 2023 05:08  Phos  3.1     08-30  Mg     2.0     08-30    TPro  5.9<L>  /  Alb  2.7<L>  /  TBili  0.3  /  DBili  x   /  AST  15  /  ALT  8<L>  /  AlkPhos  85  08-29    LIVER FUNCTIONS - ( 29 Aug 2023 07:35 )  Alb: 2.7 g/dL / Pro: 5.9 g/dL / ALK PHOS: 85 U/L / ALT: 8 U/L / AST: 15 U/L / GGT: x             --------------------------------------        --------------------------------------    Culture - Urine (collected 28 Aug 2023 17:20)  Source: Clean Catch Clean Catch (Midstream)  Preliminary Report (29 Aug 2023 22:13):    >100,000 CFU/ml Gram Negative Rods    Culture - Blood (collected 27 Aug 2023 18:50)  Source: .Blood Blood-Peripheral  Gram Stain (29 Aug 2023 14:27):    Growth in aerobic bottle: Gram Positive Cocci in Clusters  Preliminary Report (29 Aug 2023 14:27):    Growth in aerobic bottle: Gram Positive Cocci in Clusters    Direct identification is available within approximately 3-5    hours either by Blood Panel Multiplexed PCR or Direct    MALDI-TOF. Details: [email or web address]  Organism: Blood Culture PCR (29 Aug 2023 16:27)  Organism: Blood Culture PCR (29 Aug 2023 16:27)    Culture - Blood (collected 27 Aug 2023 18:40)  Source: .Blood Blood-Venous  Preliminary Report (29 Aug 2023 23:01):    No growth at 48 Hours    TTE 8/29:  CONCLUSIONS:      1. Technically difficult image quality.   2. Normal left ventricular cavity size. Left ventricular wall thickness is normal. Left ventricular systolic function is normal.   3. Basal and mid anterolateral wall, basal and mid inferolateral wall, and basal inferior segment are abnormal.   4. There is moderate (grade 2) left ventricular diastolic dysfunction, with elevated filling pressure.   5. Normal right ventricular cavity size, normal right ventricular wall thickness and normal right ventricular systolic function.   6. The left atrium is mildly dilated in size.   7. The aortic arch diameter is normal.   8. There is calcification of the mitral valve annulus.   9. No pericardial effusion seen.    ________________________________________________________________________________________  FINDINGS:     Left Ventricle:  Normal left ventricular cavity size. Left ventricular wall thickness is normal. Left ventricular systolic function is normal with an ejection fraction visually estimated at 45 to 50%. There is moderate (grade 2) left ventricular diastolic dysfunction, with elevated filling pressure. Severe left ventricular hypertrophy. There is increased LV mass and concentric hypertrophy. There is no evidence of a thrombus in the left ventricle.  LV Wall Scoring: The basal and mid anterolateral wall, basal and mid  inferolateral wall, and basal inferior segment are hypokinetic. All remaining  scored segments are normal.          Right Ventricle:  Normal right ventricular cavity size, normal wall thickness and normal right ventricular systolic function. Tricuspid annular plane systolic excursion (TAPSE) is 1.6 cm (normal >=1.7 cm). A device lead is visualized.     Left Atrium:  The left atrium is mildly dilated in size with an indexed volume of 35.20 ml/m².     Right Atrium:  The right atrium is normal in size with an indexed volume of 18.69 ml/m².     Aortic Valve:  The aortic valve appears trileaflet. There is no evidence of aortic regurgitation.     Mitral Valve:  There is calcification of the mitral valve annulus. There is mild mitral valve stenosis. There is trace mitral regurgitation.     Tricuspid Valve:  Structurally normal tricuspid valve with normal leaflet excursion. There is mild tricuspid regurgitation. Estimated pulmonary artery systolic pressure is 29 mmHg.     Pulmonic Valve:  Structurally normal pulmonic valve with normal leaflet excursion. There is trace pulmonic regurgitation.     Aorta:  The aortic annulus and aortic root appear normal in size. The aortic root at the sinuses of Valsalva diameter is normal. The ascending aorta diameter is normal. The aortic arch diameter is normal.     Pericardium:  No pericardial effusion seen. Pericardial fat pad is seen anterior to the right ventricle. There is a trace pericardial effusion.     Systemic Veins:  The inferior vena cava is normal in size measuring 1.40 cm in diameter, (normal <2.1cm) with normal inspiratory collapse (normal >50%) consistent with normal right atrial pressure (~3, range 0-5mmHg).     CARDIOLOGY CONSULT PROGRESS NOTE  AUSTIN LEE  MRN-[id number]    INTERVAL EVENTS:  - y/d TTE showed EF 45%, normal systolic fx, basal-mid antlateral wall, basal-mid inferolateral wall and basal infer walls hypokinetic; grade 2 diastolic dysfx  - NAEO      ROS:  Negative, except as above.    MEDICATIONS  (STANDING):  atorvastatin 40 milliGRAM(s) Oral at bedtime  benzonatate 100 milliGRAM(s) Oral every 8 hours  budesonide  80 MICROgram(s)/formoterol 4.5 MICROgram(s) Inhaler 2 Puff(s) Inhalation two times a day  carvedilol 6.25 milliGRAM(s) Oral every 12 hours  cefTRIAXone   IVPB 1000 milliGRAM(s) IV Intermittent every 24 hours  clopidogrel Tablet 75 milliGRAM(s) Oral daily  enoxaparin Injectable 40 milliGRAM(s) SubCutaneous every 24 hours  famotidine    Tablet 20 milliGRAM(s) Oral daily  furosemide    Tablet 40 milliGRAM(s) Oral daily  guaiFENesin  milliGRAM(s) Oral every 12 hours  nicotine -  14 mG/24Hr(s) Patch 1 Patch Transdermal every 24 hours  sacubitril 49 mG/valsartan 51 mG 1 Tablet(s) Oral two times a day  sodium chloride 0.9% for Nebulization 3 milliLiter(s) Nebulizer four times a day  sotalol 80 milliGRAM(s) Oral every 12 hours    MEDICATIONS  (PRN):  albuterol/ipratropium for Nebulization 3 milliLiter(s) Nebulizer every 6 hours PRN Shortness of Breath and/or Wheezing      Vitals:  Vital Signs Last 24 Hrs  T(C): 36.6 (30 Aug 2023 04:31), Max: 36.9 (29 Aug 2023 20:05)  T(F): 97.8 (30 Aug 2023 04:31), Max: 98.5 (29 Aug 2023 20:05)  HR: 67 (30 Aug 2023 04:31) (67 - 74)  BP: 137/70 (30 Aug 2023 04:31) (113/68 - 141/70)  BP(mean): --  RR: 18 (30 Aug 2023 04:31) (18 - 18)  SpO2: 95% (30 Aug 2023 04:31) (93% - 95%)    Parameters below as of 30 Aug 2023 04:31  Patient On (Oxygen Delivery Method): nasal cannula  O2 Flow (L/min): 2      PE  - gen: well appearing, laying in hospital bed, NAD  - HEENT: MMM, NCAT  - neck: no JVD, supple  - heart: RRR, nml S1/S2, no RMG  - lungs: CTABL, nml WOB  - abd: soft, NTND, NABS  - ext: WWP, PPP, no ANEL    RELEVANT RECENT LABS/IMAGING/STUDIES:                        15.7   15.25 )-----------( 318      ( 30 Aug 2023 05:08 )             48.1     08-30    138  |  103  |  13  ----------------------------<  121<H>  4.1   |  26  |  0.73    Ca    8.3<L>      30 Aug 2023 05:08  Phos  3.1     08-30  Mg     2.0     08-30    TPro  5.9<L>  /  Alb  2.7<L>  /  TBili  0.3  /  DBili  x   /  AST  15  /  ALT  8<L>  /  AlkPhos  85  08-29    LIVER FUNCTIONS - ( 29 Aug 2023 07:35 )  Alb: 2.7 g/dL / Pro: 5.9 g/dL / ALK PHOS: 85 U/L / ALT: 8 U/L / AST: 15 U/L / GGT: x             --------------------------------------        --------------------------------------    Culture - Urine (collected 28 Aug 2023 17:20)  Source: Clean Catch Clean Catch (Midstream)  Preliminary Report (29 Aug 2023 22:13):    >100,000 CFU/ml Gram Negative Rods    Culture - Blood (collected 27 Aug 2023 18:50)  Source: .Blood Blood-Peripheral  Gram Stain (29 Aug 2023 14:27):    Growth in aerobic bottle: Gram Positive Cocci in Clusters  Preliminary Report (29 Aug 2023 14:27):    Growth in aerobic bottle: Gram Positive Cocci in Clusters    Direct identification is available within approximately 3-5    hours either by Blood Panel Multiplexed PCR or Direct    MALDI-TOF. Details: [email or web address]  Organism: Blood Culture PCR (29 Aug 2023 16:27)  Organism: Blood Culture PCR (29 Aug 2023 16:27)    Culture - Blood (collected 27 Aug 2023 18:40)  Source: .Blood Blood-Venous  Preliminary Report (29 Aug 2023 23:01):    No growth at 48 Hours    TTE 8/29:  CONCLUSIONS:      1. Technically difficult image quality.   2. Normal left ventricular cavity size. Left ventricular wall thickness is normal. Left ventricular systolic function is normal.   3. Basal and mid anterolateral wall, basal and mid inferolateral wall, and basal inferior segment are abnormal.   4. There is moderate (grade 2) left ventricular diastolic dysfunction, with elevated filling pressure.   5. Normal right ventricular cavity size, normal right ventricular wall thickness and normal right ventricular systolic function.   6. The left atrium is mildly dilated in size.   7. The aortic arch diameter is normal.   8. There is calcification of the mitral valve annulus.   9. No pericardial effusion seen.    ________________________________________________________________________________________  FINDINGS:     Left Ventricle:  Normal left ventricular cavity size. Left ventricular wall thickness is normal. Left ventricular systolic function is normal with an ejection fraction visually estimated at 45 to 50%. There is moderate (grade 2) left ventricular diastolic dysfunction, with elevated filling pressure. Severe left ventricular hypertrophy. There is increased LV mass and concentric hypertrophy. There is no evidence of a thrombus in the left ventricle.  LV Wall Scoring: The basal and mid anterolateral wall, basal and mid  inferolateral wall, and basal inferior segment are hypokinetic. All remaining  scored segments are normal.          Right Ventricle:  Normal right ventricular cavity size, normal wall thickness and normal right ventricular systolic function. Tricuspid annular plane systolic excursion (TAPSE) is 1.6 cm (normal >=1.7 cm). A device lead is visualized.     Left Atrium:  The left atrium is mildly dilated in size with an indexed volume of 35.20 ml/m².     Right Atrium:  The right atrium is normal in size with an indexed volume of 18.69 ml/m².     Aortic Valve:  The aortic valve appears trileaflet. There is no evidence of aortic regurgitation.     Mitral Valve:  There is calcification of the mitral valve annulus. There is mild mitral valve stenosis. There is trace mitral regurgitation.     Tricuspid Valve:  Structurally normal tricuspid valve with normal leaflet excursion. There is mild tricuspid regurgitation. Estimated pulmonary artery systolic pressure is 29 mmHg.     Pulmonic Valve:  Structurally normal pulmonic valve with normal leaflet excursion. There is trace pulmonic regurgitation.     Aorta:  The aortic annulus and aortic root appear normal in size. The aortic root at the sinuses of Valsalva diameter is normal. The ascending aorta diameter is normal. The aortic arch diameter is normal.     Pericardium:  No pericardial effusion seen. Pericardial fat pad is seen anterior to the right ventricle. There is a trace pericardial effusion.     Systemic Veins:  The inferior vena cava is normal in size measuring 1.40 cm in diameter, (normal <2.1cm) with normal inspiratory collapse (normal >50%) consistent with normal right atrial pressure (~3, range 0-5mmHg).

## 2023-08-30 NOTE — PROGRESS NOTE ADULT - PROBLEM SELECTOR PLAN 4
- C/w home Entresto 49/51 mg bid  - C/w lasix 40mg qd  - check daily weights  - Strict I's and O's    #HFrEF  Echo Feb 22, 2022  - LVEF 40%. MIld MR. Mild LA enlargement. MIld concentric LVH. Moderate segmental LV systolic dysfunction with hypokinesis of the inferior wall, posterior-lateral wall basal interventricular septum and apex.  - The LAD shows a patent stent and a 50% mid LAD lesion. Moderate disease of the left circumflex artery.     Echo:  - LVEF 45-50%; severe LVH with concentric hypertrophy and moderate LV diastolic dysfunction, mild LA dilation; mild mitral valve stenosis - C/w home Entresto 49/51 mg bid  - C/w lasix 40mg qd  - check daily weights  - Strict I's and O's    #HFrEF  Echo 8/29:  - LVEF 45-50%; severe LVH with concentric hypertrophy and moderate LV diastolic dysfunction, mild LA dilation; mild mitral valve stenosis    Echo Feb 22, 2022:  - LVEF 40%. MIld MR. Mild LA enlargement. MIld concentric LVH. Moderate segmental LV systolic dysfunction with hypokinesis of the inferior wall, posterior-lateral wall basal interventricular septum and apex.  - The LAD shows a patent stent and a 50% mid LAD lesion. Moderate disease of the left circumflex artery.

## 2023-08-30 NOTE — DISCHARGE NOTE PROVIDER - NSDCFUSCHEDAPPT_GEN_ALL_CORE_FT
Campbell Hurt  [facility]  ELECTROPH [address]  Scheduled Appointment: 09/28/2023    [facility]  ELECTROPH [address]  Scheduled Appointment: 10/06/2023

## 2023-08-30 NOTE — DIETITIAN INITIAL EVALUATION ADULT - PROBLEM SELECTOR PLAN 1
- Poss iso arrhythmia vs URI vs COPD exac vs less likely HF exacerbation   - Prelim CXR: no focal consolidations  - Pt no longer complaining of SOB  - Neg RVP  - Currently on 2L NC >92%  - Maintain O2 sat 88-92% and wean O2 as tolerated  - Duonebs Q6H, hypersal neb Q6H, Chest PT,   - f/u procal, VBG

## 2023-08-30 NOTE — DISCHARGE NOTE PROVIDER - NSDCFUADDAPPT_GEN_ALL_CORE_FT
APPTS ARE READY TO BE MADE: [ X] YES    Best Family or Patient Contact (if needed):    Additional Information about above appointments (if needed):    1: Cardiology (Dr. Wise)  2: New PCP  3:   Other comments or requests:    APPTS ARE READY TO BE MADE: [ X] YES    Best Family or Patient Contact (if needed):    Additional Information about above appointments (if needed):    1: Cardiology (Dr. Wise) within 2 weeks of DC  2: New PCP  3: EP (Appt made)  Other comments or requests:    APPTS ARE READY TO BE MADE: [ X] YES    Best Family or Patient Contact (if needed):    Additional Information about above appointments (if needed):    1: Cardiology (Dr. Wise) within 2 weeks of DC  2: New PCP  3: EP (Appt made)  Other comments or requests:   Patient is awaiting callback form Dr. Wise office for scheduling.  Patient declined services for PCP.

## 2023-08-30 NOTE — PROGRESS NOTE ADULT - PROBLEM SELECTOR PLAN 7
- Prelim CXR: no focal consolidations  - No longer requiring nasal canula  - VBG pH 7.43, pCO2 41, pO2 74, Calc HCO3 27  - Duonebs PRN, Symbicort BID, Chest PT, bedside spirometry

## 2023-08-30 NOTE — DIETITIAN INITIAL EVALUATION ADULT - NSICDXPASTMEDICALHX_GEN_ALL_CORE_FT
PAST MEDICAL HISTORY:  CAD (coronary artery disease)     CHF with cardiomyopathy     HLD (hyperlipidemia)     HTN (hypertension)     Obese     Smoker

## 2023-08-30 NOTE — PROGRESS NOTE ADULT - PROBLEM SELECTOR PLAN 1
- Poss iso arrhythmia vs URI vs COPD exac vs less likely HF exacerbation  - Prelim CXR: no focal consolidations  - Pt no longer complaining of SOB  - Neg RVP, Neg COVID  - VBG pH 7.43, pCO2 41, pO2 74, Calc HCO3 27  - Procalcitonin: 0.06  - No longer requiring nasal canula  - Duonebs PRN, Symbicort BID, Chest PT

## 2023-08-30 NOTE — DISCHARGE NOTE PROVIDER - HOSPITAL COURSE
HPI:  73 yo F w/ PMH COPD, HLD, HTN, PVD, CAD s/p PCI 2015, ischemic cardiomyopathy, cardiac arrest s/p left-sided ICD (6/4/2019) complicated by infection and s/p R single-chamber ICD ([place] in 9/23/2019) p/w SOB. Pt reports random onset of dyspnea while sitting and watching TV at her daughter's home this afternoon. Pt reports her defibrillator went off a few days ago one time and has been a bit anxious since then. Pt reports 3 episodes of diarrhea today and a productive cough w/ phlegm. Pt denies nausea, vomiting, fever, chills, chest pain, sick contacts, dysuria, hematuria, and luis daniel LE edema. Pt reports receiving 2 COVID vaccines.     Per pt's , pt  went to cardiologist last week (Dr. Wise) and was called by someone in the office who instructed pt's daughter to discontinue plavix. Pt stopped taking plavix since last week, as there was confusion. Pt should have discontinued carvedilol 6.25 mg 1.5 BID rather than discontinuing clopidogrel 75 mg QD. Pt has     In ED: Potassium repletion, 40 mg IV lasix (27 Aug 2023 21:07)    Patient was admitted for SOB likely due to arrhythmia intermittent. EP consulted, interrogated PPM, found to have SVT vs VT. Initiated sotalol inpatient with EKG monitoring closely. Tolerated well    Cards consulted, added coreg, home lasix, sent dapaglaflozin to pharmacy to initiate.     Also treated for Klebsiella UTI while admitted.    Patient is hemodynamically stable and medically optimized for discharge.

## 2023-08-30 NOTE — PROGRESS NOTE ADULT - SUBJECTIVE AND OBJECTIVE BOX
Elizabeth Mueller  4th Year Medical Student    Patient is a 72y old Female w PMH COPD, HLD, HTN, PVD, CAD s/p PCI 2015, ischemic cardiomyopathy, cardiac arrest s/p L sided ICD complicated by infection and s/p R sided ICD (9/23/19) p/w SOB and hypokalemia.      SUBJECTIVE / OVERNIGHT EVENTS:  Patient seen and evaluated at bedside.    Overnight continued Sotalol, QTc ~460s      Vital Signs Last 24 Hrs  T(C): 36.6 (30 Aug 2023 04:31), Max: 36.9 (29 Aug 2023 20:05)  T(F): 97.8 (30 Aug 2023 04:31), Max: 98.5 (29 Aug 2023 20:05)  HR: 67 (30 Aug 2023 04:31) (67 - 74)  BP: 137/70 (30 Aug 2023 04:31) (113/68 - 141/70)  BP(mean): --  RR: 18 (30 Aug 2023 04:31) (18 - 18)  SpO2: 95% (30 Aug 2023 04:31) (93% - 95%)    Parameters below as of 30 Aug 2023 04:31  Patient On (Oxygen Delivery Method): nasal cannula  O2 Flow (L/min): 2    ROS:    PHYSICAL EXAM:  GENERAL: NAD, well-developed  HEAD:  Atraumatic, Normocephalic  EYES: EOMI, PERRLA, conjunctiva and sclera clear  NECK: Supple, No JVD  CHEST/LUNG: Clear to auscultation bilaterally; No wheeze  HEART: Regular rate and rhythm; Normal S1 S2, No murmurs, rubs, or gallops  ABDOMEN: Soft, Nontender, Nondistended; Bowel sounds present  EXTREMITIES:  2+ Peripheral Pulses, No clubbing, cyanosis, or edema  PSYCH: AAOx3  NEUROLOGY: non-focal  SKIN: No rashes or lesions      MEDICATIONS  (STANDING):  atorvastatin 40 milliGRAM(s) Oral at bedtime  benzonatate 100 milliGRAM(s) Oral every 8 hours  budesonide  80 MICROgram(s)/formoterol 4.5 MICROgram(s) Inhaler 2 Puff(s) Inhalation two times a day  carvedilol 6.25 milliGRAM(s) Oral every 12 hours  cefTRIAXone   IVPB 1000 milliGRAM(s) IV Intermittent every 24 hours  clopidogrel Tablet 75 milliGRAM(s) Oral daily  enoxaparin Injectable 40 milliGRAM(s) SubCutaneous every 24 hours  famotidine    Tablet 20 milliGRAM(s) Oral daily  furosemide    Tablet 40 milliGRAM(s) Oral daily  guaiFENesin  milliGRAM(s) Oral every 12 hours  nicotine -  14 mG/24Hr(s) Patch 1 Patch Transdermal every 24 hours  sacubitril 49 mG/valsartan 51 mG 1 Tablet(s) Oral two times a day  sodium chloride 0.9% for Nebulization 3 milliLiter(s) Nebulizer four times a day  sotalol 80 milliGRAM(s) Oral every 12 hours    MEDICATIONS  (PRN):  albuterol/ipratropium for Nebulization 3 milliLiter(s) Nebulizer every 6 hours PRN Shortness of Breath and/or Wheezing        LABS:                        15.7   15.25 )-----------( 318      ( 30 Aug 2023 05:08 )             48.1     Hgb Trend: 15.7<--, 15.3<--, 16.5<--, 16.7  08-31    138  |  103  |  13  ----------------------------<  121<H>  4.1   |  26  |  0.73    Ca    8.3<L>      30 Aug 2023 05:08  Phos  3.1     08-30  Mg     2.0     08-30    TPro  5.9<L>  /  Alb  2.7<L>  /  TBili  0.3  /  DBili  x   /  AST  15  /  ALT  8<L>  /  AlkPhos  85  08-29    Creatinine Trend: 0.73<--, 0.72<--, 0.79<--, 0.83<--, 0.81<--, 0.99<--  LIVER FUNCTIONS - ( 29 Aug 2023 07:35 )  Alb: 2.7 g/dL / Pro: 5.9 g/dL / ALK PHOS: 85 U/L / ALT: 8 U/L / AST: 15 U/L / GGT: x             Urinalysis + Microscopic Examination (08.28.23 @ 13:12)    pH Urine: 5.0   Urine Appearance: Slightly Turbid   Color: Light Yellow   Specific Gravity: 1.010   Protein, Urine: Negative   Glucose Qualitative, Urine: Negative   Ketone - Urine: Negative   Blood, Urine: Large   Bilirubin: Negative   Urobilinogen: Negative   Leukocyte Esterase Concentration: Large   Nitrite: Negative   White Blood Cell - Urine: 5 /HPF   Red Blood Cell - Urine: 9 /hpf   Bacteria: Many   Hyaline Casts: 0 /lpf   Squamous Epithelial Cells: 1 /hpf    Culture - Urine (08.28.23 @ 17:20)    Specimen Source: Clean Catch Clean Catch (Midstream)   Culture Results:   >100,000 CFU/ml Gram Negative Rods      IMAGING:    < from: Xray Chest 1 View- PORTABLE-Urgent (08.27.23 @ 17:09) >  INTERPRETATION:  EXAMINATION: XR CHEST URGENT  CLINICAL INDICATION: Chest Pain  TECHNIQUE: Single frontal, portable view of the chest was obtained.  COMPARISON: Chest x-ray 1/19/2015.    FINDINGS:  Right-sided ICD.  The heart is not accurately assessed in this AP projection.  No focal consolidations. There is no pleural effusion.  There is no pneumothorax.  No acute bony abnormality.    IMPRESSION:  No focal consolidations.  --- End of Report ---      < from: TTE W or WO Ultrasound Enhancing Agent (08.29.23 @ 07:18) >  FINDINGS:  Left Ventricle:  Normal left ventricular cavity size. Left ventricular wall thickness is normal. Left ventricular systolic function is normal with an ejection fraction visually estimated at 45 to 50%. There is moderate (grade 2) left ventricular diastolic dysfunction, with elevated filling pressure. Severe left ventricular hypertrophy. There is increased LV mass and concentric hypertrophy. There is no evidence of a thrombus in the left ventricle.  LV Wall Scoring: The basal and mid anterolateral wall, basal and mid  inferolateral wall, and basal inferior segment are hypokinetic. All remaining  scored segments are normal.     Right Ventricle:  Normal right ventricular cavity size, normal wall thickness and normal right ventricular systolic function. Tricuspid annular plane systolic excursion (TAPSE) is 1.6 cm (normal >=1.7 cm). A device lead is visualized.     Left Atrium:  The left atrium is mildly dilated in size with an indexed volume of 35.20 ml/m².     Right Atrium:  The right atrium is normal in size with an indexed volume of 18.69 ml/m².     Aortic Valve:  The aortic valve appears trileaflet. There is no evidence of aortic regurgitation.     Mitral Valve:  There is calcification of the mitral valve annulus. There is mild mitral valve stenosis. There is trace mitral regurgitation.     Tricuspid Valve:  Structurally normal tricuspid valve with normal leaflet excursion. There is mild tricuspid regurgitation. Estimated pulmonary artery systolic pressure is 29 mmHg.     Pulmonic Valve:  Structurally normal pulmonic valve with normal leaflet excursion. There is trace pulmonic regurgitation.     Aorta:  The aortic annulus and aortic root appear normal in size. The aortic root at the sinuses of Valsalva diameter is normal. The ascending aortadiameter is normal. The aortic arch diameter is normal.     Pericardium:  No pericardial effusion seen. Pericardial fat pad is seen anterior to the right ventricle. There is a trace pericardial effusion.     Systemic Veins:  The inferior vena cava isnormal in size measuring 1.40 cm in diameter, (normal <2.1cm) with normal inspiratory collapse (normal >50%) consistent with normal right atrial pressure (~3, range 0-5mmHg).    CONCLUSIONS:      1. Technically difficult image quality.   2. Normal left ventricular cavity size. Left ventricular wall thickness is normal. Left ventricular systolic function is normal.   3. Basal and mid anterolateral wall, basal and mid inferolateral wall, and basal inferior segment are abnormal.   4. There is moderate (grade 2) left ventricular diastolic dysfunction, with elevated filling pressure.   5. Normal right ventricular cavity size, normal right ventricular wall thickness and normal right ventricular systolic function.   6. The left atrium is mildly dilated in size.   7. The aortic arch diameter is normal.   8. There is calcification of the mitral valve annulus.   9. No pericardial effusion seen.    < end of copied text > Elizabeth Mueller  4th Year Medical Student    Patient is a 72y old Female w PMH COPD, HLD, HTN, PVD, CAD s/p PCI 2015, ischemic cardiomyopathy, cardiac arrest s/p L sided ICD complicated by infection and s/p R sided ICD (9/23/19) p/w SOB and hypokalemia.      SUBJECTIVE / OVERNIGHT EVENTS:  Patient seen and evaluated at bedside. She was alert and laying comfortably in bed. Patient states she is feeling well, is no longer experiencing SOB, and does not require O2 via NC. She denies CP or NVD and states that her appetite is good. She states that she sometimes feels dizzy when getting up but that it resolves.    Overnight continued Sotalol, post-dose ECG showed QTc ~460s  Tele: 1 episode of PVC triplet, returned to SR ~60s      Vital Signs Last 24 Hrs  T(C): 36.6 (30 Aug 2023 04:31), Max: 36.9 (29 Aug 2023 20:05)  T(F): 97.8 (30 Aug 2023 04:31), Max: 98.5 (29 Aug 2023 20:05)  HR: 67 (30 Aug 2023 04:31) (67 - 74)  BP: 137/70 (30 Aug 2023 04:31) (113/68 - 141/70)  BP(mean): --  RR: 18 (30 Aug 2023 04:31) (18 - 18)  SpO2: 95% (30 Aug 2023 04:31) (93% - 95%)    Parameters below as of 30 Aug 2023 04:31  Patient On (Oxygen Delivery Method): nasal cannula  O2 Flow (L/min): 2    ROS:  Constitutional: No fever, chills, night sweats, or fatigue  Eyes:  No eye pain, visual disturbances, or discharge  ENMT:  No neck pain  Cardiac:  No chest pain, no palpitations, no leg swelling  Respiratory:  mild cough, no SOB  GI:  no vomiting, no diarrhea, no abdominal pain.  :  no dysuria, hematuria, or incontinence  MS:  No back pain.  Neuro:  No headache or lightheadedness, dizziness   Skin:  No skin rash    PHYSICAL EXAM:  GENERAL: NAD, well-developed  HEAD:  Atraumatic, Normocephalic  EYES: EOMI, PERRLA, conjunctiva and sclera clear  CHEST/LUNG: Clear to auscultation bilaterally; No wheeze  HEART: Regular rate and rhythm; Normal S1 S2, No murmurs, rubs, or gallops  ABDOMEN: Soft, Nontender, Nondistended; Bowel sounds present  EXTREMITIES:  2+ Peripheral Pulses, no edema; +bilateral chronic venous stasis skin changes  PSYCH: AAOx3  NEUROLOGY: non-focal  SKIN: No rashes or lesions      MEDICATIONS  (STANDING):  atorvastatin 40 milliGRAM(s) Oral at bedtime  benzonatate 100 milliGRAM(s) Oral every 8 hours  budesonide  80 MICROgram(s)/formoterol 4.5 MICROgram(s) Inhaler 2 Puff(s) Inhalation two times a day  carvedilol 6.25 milliGRAM(s) Oral every 12 hours  cefTRIAXone   IVPB 1000 milliGRAM(s) IV Intermittent every 24 hours  clopidogrel Tablet 75 milliGRAM(s) Oral daily  enoxaparin Injectable 40 milliGRAM(s) SubCutaneous every 24 hours  famotidine    Tablet 20 milliGRAM(s) Oral daily  furosemide    Tablet 40 milliGRAM(s) Oral daily  guaiFENesin  milliGRAM(s) Oral every 12 hours  nicotine -  14 mG/24Hr(s) Patch 1 Patch Transdermal every 24 hours  sacubitril 49 mG/valsartan 51 mG 1 Tablet(s) Oral two times a day  sodium chloride 0.9% for Nebulization 3 milliLiter(s) Nebulizer four times a day  sotalol 80 milliGRAM(s) Oral every 12 hours    MEDICATIONS  (PRN):  albuterol/ipratropium for Nebulization 3 milliLiter(s) Nebulizer every 6 hours PRN Shortness of Breath and/or Wheezing        LABS:                        15.7   15.25 )-----------( 318      ( 30 Aug 2023 05:08 )             48.1     WBC Trend: 15.25<--, 17.37<--, 19.38<--, 17.22  08-30    Hgb Trend: 15.7<--, 15.3<--, 16.5<--, 16.7  08-30    138  |  103  |  13  ----------------------------<  121<H>  4.1   |  26  |  0.73    Potassium Trend: 4.1<--, 5.0<--, 4.7<--, 4.3<--, 3.3<--, 3.0  08-30    Ca    8.3<L>      30 Aug 2023 05:08  Phos  3.1     08-30  Mg     2.0     08-30    TPro  5.9<L>  /  Alb  2.7<L>  /  TBili  0.3  /  DBili  x   /  AST  15  /  ALT  8<L>  /  AlkPhos  85  08-29    Creatinine Trend: 0.73<--, 0.72<--, 0.79<--, 0.83<--, 0.81<--, 0.99<--  LIVER FUNCTIONS - ( 29 Aug 2023 07:35 )  Alb: 2.7 g/dL / Pro: 5.9 g/dL / ALK PHOS: 85 U/L / ALT: 8 U/L / AST: 15 U/L / GGT: x             I&O's Summary  29 Aug 2023 07:01  -  30 Aug 2023 07:00  --------------------------------------------------------  IN: 200 mL / OUT: 600 mL / NET: -400 mL      Urinalysis + Microscopic Examination (08.28.23 @ 13:12)    pH Urine: 5.0   Urine Appearance: Slightly Turbid   Color: Light Yellow   Specific Gravity: 1.010   Protein, Urine: Negative   Glucose Qualitative, Urine: Negative   Ketone - Urine: Negative   Blood, Urine: Large   Bilirubin: Negative   Urobilinogen: Negative   Leukocyte Esterase Concentration: Large   Nitrite: Negative   White Blood Cell - Urine: 5 /HPF   Red Blood Cell - Urine: 9 /hpf   Bacteria: Many   Hyaline Casts: 0 /lpf   Squamous Epithelial Cells: 1 /hpf    Culture - Urine (08.28.23 @ 17:20)    Specimen Source: Clean Catch Clean Catch (Midstream)   Culture Results:   >100,000 CFU/ml Gram Negative Rods      Culture - Blood (08.27.23 @ 18:50)    -  Staphylococcus epidermidis, Methicillin resistant: Detec   Gram Stain:   Growth in aerobic bottle: Gram Positive Cocci in Clusters   Specimen Source: .Blood Blood-Peripheral   Organism: Blood Culture PCR   Culture Results:   Growth in aerobic bottle: Gram Positive Cocci in Clusters  Direct identification is available within approximately 3-5  hours either by Blood Panel Multiplexed PCR or Direct  MALDI-TOF. Details: [email or web address]   Organism Identification: Blood Culture PCR   Method Type: PCR    Culture - Blood (08.27.23 @ 18:40)    Specimen Source: .Blood Blood-Venous   Culture Results:   No growth at 48 Hours      IMAGING:    CXR 8/27:    FINDINGS:  Right-sided ICD.  The heart is not accurately assessed in this AP projection.  No focal consolidations. There is no pleural effusion.  There is no pneumothorax.  No acute bony abnormality.    IMPRESSION:  No focal consolidations.      TTE 8/29:  CONCLUSIONS:   1. Technically difficult image quality.   2. Normal left ventricular cavity size. Left ventricular wall thickness is normal. Left ventricular systolic function is normal.   3. Basal and mid anterolateral wall, basal and mid inferolateral wall, and basal inferior segment are abnormal.   4. There is moderate (grade 2) left ventricular diastolic dysfunction, with elevated filling pressure.   5. Normal right ventricular cavity size, normal right ventricular wall thickness and normal right ventricular systolic function.   6. The left atrium is mildly dilated in size.   7. The aortic arch diameter is normal.   8. There is calcification of the mitral valve annulus.   9. No pericardial effusion seen.    FINDINGS:  Left Ventricle:  Normal left ventricular cavity size. Left ventricular wall thickness is normal. Left ventricular systolic function is normal with an ejection fraction visually estimated at 45 to 50%. There is moderate (grade 2) left ventricular diastolic dysfunction, with elevated filling pressure. Severe left ventricular hypertrophy. There is increased LV mass and concentric hypertrophy. There is no evidence of a thrombus in the left ventricle.  LV Wall Scoring: The basal and mid anterolateral wall, basal and mid  inferolateral wall, and basal inferior segment are hypokinetic. All remaining  scored segments are normal.     Right Ventricle:  Normal right ventricular cavity size, normal wall thickness and normal right ventricular systolic function. Tricuspid annular plane systolic excursion (TAPSE) is 1.6 cm (normal >=1.7 cm). A device lead is visualized.     Left Atrium:  The left atrium is mildly dilated in size with an indexed volume of 35.20 ml/m².     Right Atrium:  The right atrium is normal in size with an indexed volume of 18.69 ml/m².     Aortic Valve:  The aortic valve appears trileaflet. There is no evidence of aortic regurgitation.     Mitral Valve:  There is calcification of the mitral valve annulus. There is mild mitral valve stenosis. There is trace mitral regurgitation.     Tricuspid Valve:  Structurally normal tricuspid valve with normal leaflet excursion. There is mild tricuspid regurgitation. Estimated pulmonary artery systolic pressure is 29 mmHg.     Pulmonic Valve:  Structurally normal pulmonic valve with normal leaflet excursion. There is trace pulmonic regurgitation.     Aorta:  The aortic annulus and aortic root appear normal in size. The aortic root at the sinuses of Valsalva diameter is normal. The ascending aortadiameter is normal. The aortic arch diameter is normal.     Pericardium:  No pericardial effusion seen. Pericardial fat pad is seen anterior to the right ventricle. There is a trace pericardial effusion.     Systemic Veins:  The inferior vena cava isnormal in size measuring 1.40 cm in diameter, (normal <2.1cm) with normal inspiratory collapse (normal >50%) consistent with normal right atrial pressure (~3, range 0-5mmHg). Elizabeth Mueller  4th Year Medical Student    Patient is a 72y old Female w PMH COPD, HLD, HTN, PVD, CAD s/p PCI 2015, ischemic cardiomyopathy, cardiac arrest s/p L sided ICD complicated by infection and s/p R sided ICD (9/23/19) p/w SOB and hypokalemia.      SUBJECTIVE / OVERNIGHT EVENTS:  Patient seen and evaluated at bedside. She was alert and laying comfortably in bed. Patient states she is feeling well, is no longer experiencing SOB, and does not require O2 via NC. She denies CP or NVD and states that her appetite is good. She states that she sometimes feels dizzy when getting up but that it resolves.    Overnight continued Sotalol, post-dose ECG showed QTc ~460s  Tele: 1 episode of PVC triplet, returned to SR ~60s      Vital Signs Last 24 Hrs  T(C): 36.6 (30 Aug 2023 04:31), Max: 36.9 (29 Aug 2023 20:05)  T(F): 97.8 (30 Aug 2023 04:31), Max: 98.5 (29 Aug 2023 20:05)  HR: 67 (30 Aug 2023 04:31) (67 - 74)  BP: 137/70 (30 Aug 2023 04:31) (113/68 - 141/70)  BP(mean): --  RR: 18 (30 Aug 2023 04:31) (18 - 18)  SpO2: 95% (30 Aug 2023 04:31) (93% - 95%)    Parameters below as of 30 Aug 2023 04:31  Patient On (Oxygen Delivery Method): nasal cannula  O2 Flow (L/min): 2    ROS:  Constitutional: No fever, chills, night sweats, or fatigue  Eyes:  No eye pain, visual disturbances, or discharge  ENMT:  No neck pain  Cardiac:  No chest pain, no palpitations, no leg swelling  Respiratory:  mild cough, no SOB  GI:  no vomiting, no diarrhea, no abdominal pain.  :  no dysuria, hematuria, or incontinence  MS:  No back pain.  Neuro:  No headache or lightheadedness, dizziness   Skin:  No skin rash    PHYSICAL EXAM:  GENERAL: NAD, well-developed  HEAD:  Atraumatic, Normocephalic  EYES: EOMI, PERRLA, conjunctiva and sclera clear  CHEST/LUNG: Clear to auscultation bilaterally; No wheeze  HEART: Regular rate and rhythm; Normal S1 S2, No murmurs, rubs, or gallops  ABDOMEN: Soft, Nontender, Nondistended; Bowel sounds present  EXTREMITIES:  2+ Peripheral Pulses, no edema; +bilateral chronic venous stasis skin changes  PSYCH: AAOx3  NEUROLOGY: non-focal  SKIN: No rashes or lesions      MEDICATIONS  (STANDING):  atorvastatin 40 milliGRAM(s) Oral at bedtime  benzonatate 100 milliGRAM(s) Oral every 8 hours  budesonide  80 MICROgram(s)/formoterol 4.5 MICROgram(s) Inhaler 2 Puff(s) Inhalation two times a day  carvedilol 6.25 milliGRAM(s) Oral every 12 hours  cefTRIAXone   IVPB 1000 milliGRAM(s) IV Intermittent every 24 hours  clopidogrel Tablet 75 milliGRAM(s) Oral daily  enoxaparin Injectable 40 milliGRAM(s) SubCutaneous every 24 hours  famotidine    Tablet 20 milliGRAM(s) Oral daily  furosemide    Tablet 40 milliGRAM(s) Oral daily  guaiFENesin  milliGRAM(s) Oral every 12 hours  nicotine -  14 mG/24Hr(s) Patch 1 Patch Transdermal every 24 hours  sacubitril 49 mG/valsartan 51 mG 1 Tablet(s) Oral two times a day  sodium chloride 0.9% for Nebulization 3 milliLiter(s) Nebulizer four times a day  sotalol 80 milliGRAM(s) Oral every 12 hours    MEDICATIONS  (PRN):  albuterol/ipratropium for Nebulization 3 milliLiter(s) Nebulizer every 6 hours PRN Shortness of Breath and/or Wheezing        LABS:                        15.7   15.25 )-----------( 318      ( 30 Aug 2023 05:08 )             48.1     WBC Trend: 15.25<--, 17.37<--, 19.38<--, 17.22  08-30    Hgb Trend: 15.7<--, 15.3<--, 16.5<--, 16.7  08-30    138  |  103  |  13  ----------------------------<  121<H>  4.1   |  26  |  0.73    Potassium Trend: 4.1<--, 5.0<--, 4.7<--, 4.3<--, 3.3<--, 3.0  08-30    Ca    8.3<L>      30 Aug 2023 05:08  Phos  3.1     08-30  Mg     2.0     08-30    TPro  5.9<L>  /  Alb  2.7<L>  /  TBili  0.3  /  DBili  x   /  AST  15  /  ALT  8<L>  /  AlkPhos  85  08-29    Creatinine Trend: 0.73<--, 0.72<--, 0.79<--, 0.83<--, 0.81<--, 0.99<--  LIVER FUNCTIONS - ( 29 Aug 2023 07:35 )  Alb: 2.7 g/dL / Pro: 5.9 g/dL / ALK PHOS: 85 U/L / ALT: 8 U/L / AST: 15 U/L / GGT: x             Erythropoietin Level (08.29.23 @ 07:36)    Erythropoietin Level: 8.0      I&O's Summary  29 Aug 2023 07:01  -  30 Aug 2023 07:00  --------------------------------------------------------  IN: 200 mL / OUT: 600 mL / NET: -400 mL      Urinalysis + Microscopic Examination (08.28.23 @ 13:12)    pH Urine: 5.0   Urine Appearance: Slightly Turbid   Color: Light Yellow   Specific Gravity: 1.010   Protein, Urine: Negative   Glucose Qualitative, Urine: Negative   Ketone - Urine: Negative   Blood, Urine: Large   Bilirubin: Negative   Urobilinogen: Negative   Leukocyte Esterase Concentration: Large   Nitrite: Negative   White Blood Cell - Urine: 5 /HPF   Red Blood Cell - Urine: 9 /hpf   Bacteria: Many   Hyaline Casts: 0 /lpf   Squamous Epithelial Cells: 1 /hpf    Culture - Urine (08.28.23 @ 17:20)    Specimen Source: Clean Catch Clean Catch (Midstream)   Culture Results:   >100,000 CFU/ml Gram Negative Rods      Culture - Blood (08.27.23 @ 18:50)    -  Staphylococcus epidermidis, Methicillin resistant: Detec   Gram Stain:   Growth in aerobic bottle: Gram Positive Cocci in Clusters   Specimen Source: .Blood Blood-Peripheral   Organism: Blood Culture PCR   Culture Results:   Growth in aerobic bottle: Gram Positive Cocci in Clusters  Direct identification is available within approximately 3-5  hours either by Blood Panel Multiplexed PCR or Direct  MALDI-TOF. Details: [email or web address]   Organism Identification: Blood Culture PCR   Method Type: PCR    Culture - Blood (08.27.23 @ 18:40)    Specimen Source: .Blood Blood-Venous   Culture Results:   No growth at 48 Hours      IMAGING:    CXR 8/27:    FINDINGS:  Right-sided ICD.  The heart is not accurately assessed in this AP projection.  No focal consolidations. There is no pleural effusion.  There is no pneumothorax.  No acute bony abnormality.    IMPRESSION:  No focal consolidations.      TTE 8/29:  CONCLUSIONS:   1. Technically difficult image quality.   2. Normal left ventricular cavity size. Left ventricular wall thickness is normal. Left ventricular systolic function is normal.   3. Basal and mid anterolateral wall, basal and mid inferolateral wall, and basal inferior segment are abnormal.   4. There is moderate (grade 2) left ventricular diastolic dysfunction, with elevated filling pressure.   5. Normal right ventricular cavity size, normal right ventricular wall thickness and normal right ventricular systolic function.   6. The left atrium is mildly dilated in size.   7. The aortic arch diameter is normal.   8. There is calcification of the mitral valve annulus.   9. No pericardial effusion seen.    FINDINGS:  Left Ventricle:  Normal left ventricular cavity size. Left ventricular wall thickness is normal. Left ventricular systolic function is normal with an ejection fraction visually estimated at 45 to 50%. There is moderate (grade 2) left ventricular diastolic dysfunction, with elevated filling pressure. Severe left ventricular hypertrophy. There is increased LV mass and concentric hypertrophy. There is no evidence of a thrombus in the left ventricle.  LV Wall Scoring: The basal and mid anterolateral wall, basal and mid  inferolateral wall, and basal inferior segment are hypokinetic. All remaining  scored segments are normal.     Right Ventricle:  Normal right ventricular cavity size, normal wall thickness and normal right ventricular systolic function. Tricuspid annular plane systolic excursion (TAPSE) is 1.6 cm (normal >=1.7 cm). A device lead is visualized.     Left Atrium:  The left atrium is mildly dilated in size with an indexed volume of 35.20 ml/m².     Right Atrium:  The right atrium is normal in size with an indexed volume of 18.69 ml/m².     Aortic Valve:  The aortic valve appears trileaflet. There is no evidence of aortic regurgitation.     Mitral Valve:  There is calcification of the mitral valve annulus. There is mild mitral valve stenosis. There is trace mitral regurgitation.     Tricuspid Valve:  Structurally normal tricuspid valve with normal leaflet excursion. There is mild tricuspid regurgitation. Estimated pulmonary artery systolic pressure is 29 mmHg.     Pulmonic Valve:  Structurally normal pulmonic valve with normal leaflet excursion. There is trace pulmonic regurgitation.     Aorta:  The aortic annulus and aortic root appear normal in size. The aortic root at the sinuses of Valsalva diameter is normal. The ascending aortadiameter is normal. The aortic arch diameter is normal.     Pericardium:  No pericardial effusion seen. Pericardial fat pad is seen anterior to the right ventricle. There is a trace pericardial effusion.     Systemic Veins:  The inferior vena cava isnormal in size measuring 1.40 cm in diameter, (normal <2.1cm) with normal inspiratory collapse (normal >50%) consistent with normal right atrial pressure (~3, range 0-5mmHg).

## 2023-08-30 NOTE — DIETITIAN INITIAL EVALUATION ADULT - NS FNS DIET ORDER
Diet, Regular:   DASH/TLC {Sodium & Cholesterol Restricted} (DASH) (08-27-23 @ 22:55) [Active]

## 2023-08-30 NOTE — PROGRESS NOTE ADULT - ASSESSMENT
71 yo F w/ PMH COPD, HLD, HTN, PVD, CAD s/p PCI 2015, ischemic cardiomyopathy, cardiac arrest s/p left-sided ICD (6/4/2019) complicated by infection and s/p R single-chamber ICD ([place] in 9/23/2019) p/w SOB, hypokalemia, and leukocytosis w/ prelim CXR showing no focal consolidation and urinalysis indicating infection.

## 2023-08-30 NOTE — DIETITIAN INITIAL EVALUATION ADULT - PROBLEM SELECTOR PLAN 3
- C/w home Entresto 49/51 mg bid   - c/w home Lasix 40 mg daily  - check daily weights  - Consult EP for device interrogation in AM  - Strict I's and O's    #HFrEF  Echo Feb 22, 2022  - LVEF 40%. MIld MR. Mild LA enlargement. MIld concentric LVH. Moderate segmental LV systolic dysfunction with hypokinesis of the inferior wall, posterior-lateral wall basal interventricular septum and apex.  - The LAD shows a patent stent and a 50% mid LAD lesion. Moderate disease of the left circumflex artery.   - F/u Echo

## 2023-08-30 NOTE — PROGRESS NOTE ADULT - ATTENDING COMMENTS
71yo F w/ PMH of HTN, HLD, COPD, CAD s/p PCI 2015, PVD, ICM, cardiac arrest s/p ICD p/w brief episode of SOB while at rest, which resolved upon presentation w/ findings of leukocytosis and hypokalemia on labs.    #dyspnea  - c/f arrythmia as symptoms described are similar to prior arrythmia where shock was administered  - Other ddx includes URI vs less likely COPD exacerbation or AdHF as symptoms resolved spontaneously w/out wheezing on exam or s/s of congestion or LE edema  - Device interrogated by EP, EP team started sotalol. check EKG 2hr post sotalol dose.   - Monitor on tele. EKG with PVCs  - TTE. cards consulted for ischemic eval, f/u  - monitor bmp, keep k>4, mg>2   - duonebs prn. continue home maintenance inhalers. wean off supplemental oxygen as tolerated.     #leukocytosis , infectious vs reactive   - UA+, on ceftriaxone, urine cx growing klebsiella  - f/u blood cx - mrse likely contaminant, f/u repeat blood cx, procal neg, urine ags, gi pcr. trend lactate.   - encourage po fluid intake  - monitor cbc    #HFrEF  - euvolemic.  - lasix resumed per cards, toprolxl switched to coreg. continue entresto. hold off on dapagliflozin given uti.   - monitor closely . 73yo F w/ PMH of HTN, HLD, COPD, CAD s/p PCI 2015, PVD, ICM, cardiac arrest s/p ICD p/w brief episode of SOB while at rest, which resolved upon presentation w/ findings of leukocytosis and hypokalemia on labs.    #dyspnea  - c/f arrythmia as symptoms described are similar to prior arrythmia where shock was administered  - Other ddx includes URI vs less likely COPD exacerbation or AdHF as symptoms resolved spontaneously w/out wheezing on exam or s/s of congestion or LE edema  - Device interrogated by EP, EP team started sotalol. check EKG 2hr post sotalol dose.   - Monitor on tele. EKG with PVCs  - TTE. cards consulted for ischemic eval, f/u  - monitor bmp, keep k>4, mg>2   - duonebs prn. continue home maintenance inhalers. wean off supplemental oxygen as tolerated. check orthostatic vs. oobtc.     #leukocytosis , infectious vs reactive   - UA+, on ceftriaxone, urine cx growing klebsiella  - f/u blood cx - mrse likely contaminant, f/u repeat blood cx, procal neg, urine ags, gi pcr. trend lactate.   - encourage po fluid intake  - monitor cbc    #HFrEF  - euvolemic.  - lasix resumed per cards, toprolxl switched to coreg. continue entresto. hold off on dapagliflozin given uti.   - monitor closely .

## 2023-08-30 NOTE — PROGRESS NOTE ADULT - PROBLEM SELECTOR PLAN 6
- H/H consistently elevated  - f/u EPO - H/H consistently elevated  - EPO: 8.0  - Recommend heme f/u outpatient

## 2023-08-30 NOTE — PROGRESS NOTE ADULT - PROBLEM SELECTOR PLAN 3
- Defibrillation a few days ago via ICD (Medtronic device, implanted 2019)  - f/u tele  - Per EP: ICD interrogated 8/28, found to have no episodes since 8/22/23  - Per EP: If potassium >4.0 and Mg >2.0, c/w sotalol 80mg Q12hr. Obtain EKG 2 hours after each dose of sotalol. Hold Sotalol for QTc >500ms (Baseline QTc is 468ms).  - Per cardiology: c/w carvedilol 6.25mg BID, lasix 40mg qd

## 2023-08-30 NOTE — PROGRESS NOTE ADULT - SUBJECTIVE AND OBJECTIVE BOX
24H hour events: No acute overnight events    MEDICATIONS:  carvedilol 6.25 milliGRAM(s) Oral every 12 hours  clopidogrel Tablet 75 milliGRAM(s) Oral daily  enoxaparin Injectable 40 milliGRAM(s) SubCutaneous every 24 hours  furosemide    Tablet 40 milliGRAM(s) Oral daily  sacubitril 49 mG/valsartan 51 mG 1 Tablet(s) Oral two times a day  sotalol 80 milliGRAM(s) Oral every 12 hours  cefTRIAXone   IVPB 1000 milliGRAM(s) IV Intermittent every 24 hours  albuterol/ipratropium for Nebulization 3 milliLiter(s) Nebulizer every 6 hours PRN  benzonatate 100 milliGRAM(s) Oral every 8 hours  budesonide  80 MICROgram(s)/formoterol 4.5 MICROgram(s) Inhaler 2 Puff(s) Inhalation two times a day  guaiFENesin  milliGRAM(s) Oral every 12 hours  sodium chloride 0.9% for Nebulization 3 milliLiter(s) Nebulizer four times a day  famotidine    Tablet 20 milliGRAM(s) Oral daily  atorvastatin 40 milliGRAM(s) Oral at bedtime        REVIEW OF SYSTEMS:  See HPI, otherwise ROS negative.    PHYSICAL EXAM:  T(C): 36.6 (08-30-23 @ 04:31), Max: 36.9 (08-29-23 @ 20:05)  HR: 67 (08-30-23 @ 04:31) (67 - 74)  BP: 137/70 (08-30-23 @ 04:31) (113/68 - 141/70)  RR: 18 (08-30-23 @ 04:31) (18 - 18)  SpO2: 95% (08-30-23 @ 04:31) (93% - 95%)  Wt(kg): --  I&O's Summary    29 Aug 2023 07:01  -  30 Aug 2023 07:00  --------------------------------------------------------  IN: 200 mL / OUT: 600 mL / NET: -400 mL        Appearance: Alert. NAD	  Cardiovascular: +S1S2 RRR no m/g/r  Respiratory: CTA B/L	  Psychiatry: A & O x 3, Mood & affect appropriate  Skin: No rashes	  Neurologic: Non-focal  Extremities: No edema BLE  Vascular: Peripheral pulses palpable 2+ bilaterally      LABS:	 	    CBC Full  -  ( 30 Aug 2023 05:08 )  WBC Count : 15.25 K/uL  Hemoglobin : 15.7 g/dL  Hematocrit : 48.1 %  Platelet Count - Automated : 318 K/uL  Mean Cell Volume : 88.3 fl  Mean Cell Hemoglobin : 28.8 pg  Mean Cell Hemoglobin Concentration : 32.6 gm/dL  Auto Neutrophil # : x  Auto Lymphocyte # : x  Auto Monocyte # : x  Auto Eosinophil # : x  Auto Basophil # : x  Auto Neutrophil % : x  Auto Lymphocyte % : x  Auto Monocyte % : x  Auto Eosinophil % : x  Auto Basophil % : x    08-30    138  |  103  |  13  ----------------------------<  121<H>  4.1   |  26  |  0.73  08-29    140  |  103  |  12  ----------------------------<  91  5.0   |  25  |  0.72    Ca    8.3<L>      30 Aug 2023 05:08  Ca    8.7      29 Aug 2023 07:35  Phos  3.1     08-30  Phos  3.4     08-29  Mg     2.0     08-30  Mg     2.7     08-29    TPro  5.9<L>  /  Alb  2.7<L>  /  TBili  0.3  /  DBili  x   /  AST  15  /  ALT  8<L>  /  AlkPhos  85  08-29    TELEMETRY: SR 60-80s

## 2023-08-30 NOTE — DIETITIAN INITIAL EVALUATION ADULT - PROBLEM SELECTOR PLAN 5
- K+ to 3.0 on admission  - S/p 40mEq KCl tablet and 10 mEQ IVPB in ED  - Maintain K>4 and Mg>2  - F/u BMP and replete as necessary

## 2023-08-30 NOTE — PROGRESS NOTE ADULT - PROBLEM SELECTOR PLAN 8
- /59 on admission   - Hold lasix, reassess volume status tomorrow (8/30)  - C/w carvedilol 6.25 mg BID - /59 on admission   - C/w Lasix 40mg qd  - C/w carvedilol 6.25 mg BID - /59 on admission   - C/w Lasix 40mg qd  - C/w carvedilol 6.25 mg BID  - Orthostatic workup for brief dizziness on standing

## 2023-08-30 NOTE — PROGRESS NOTE ADULT - ASSESSMENT
72 year old female w/ PMH COPD, HLD, HTN, PVD, CAD with s/p PCI 2015 and known RCA , ischemic cardiomyopathy, cardiac arrest s/p left-sided ICD (6/4/2019) complicated by infection and underwent extraction with right sided Medtronic single-chamber ICD ( at [place] by Dr. Gutiérrez on 9/23/2019, follows by Dr. Hurt) p/w SOB, hypokalemia, and leukocytosis. CXR showing no focal consolidation and urinalysis indicating infection. EP consulted for recent ICD shock on 8/21/23 for VT at rate 222 bpm.     1. CAD s/p PCI 2015  2. ICM w/ LVEF 40-45%  3. VT s/p ICD shock on 8/21/23  4. p/w SOB, now resolved   5. Leukocytosis    - Continue to monitor on telemetry  - Keep K>4 and Mg>2  - Outpatient TTE (2/22/2022): LVEF 40-45%, mod. segmental LVSD with hypokinesis of the inferior wall, posterior-lateral wall, basal inter-ventricular septum and apex. Mild concentric LVH (septum 1.4cm), mild LA enlargement (WILLIAN 31 cc/m2), mild MR, no AI, mild TR, no pericardial effusion.  - 1/4 BCx positive for GPC, pending final  - Started on ceftriaxone per primary team  - Continue sotalol 80mg Q12hr. Obtain EKG 2 hours after each dose of sotalol. Hold Sotalol for QTc >500ms (Baseline QTc is 468ms). QTc stable at 477ms after 3rd dose of sotalol.   - Continue Metoprolol succinate at 25mg once daily     STACI Huertas-C  #[id number] 72 year old female w/ PMH COPD, HLD, HTN, PVD, CAD with s/p PCI 2015 and known RCA , ischemic cardiomyopathy, cardiac arrest s/p left-sided ICD (6/4/2019) complicated by infection and underwent extraction with right sided Medtronic single-chamber ICD ( at [place] by Dr. Gutiérrez on 9/23/2019, follows by Dr. Hurt) p/w SOB, hypokalemia, and leukocytosis. CXR showing no focal consolidation and urinalysis indicating infection. EP consulted for recent ICD shock on 8/21/23 for VT at rate 222 bpm.     1. CAD s/p PCI 2015  2. ICM w/ LVEF 40-45%  3. VT s/p ICD shock on 8/21/23  4. p/w SOB, now resolved   5. Leukocytosis    - Continue to monitor on telemetry  - Keep K>4 and Mg>2  - Outpatient TTE (2/22/2022): LVEF 40-45%, mod. segmental LVSD with hypokinesis of the inferior wall, posterior-lateral wall, basal inter-ventricular septum and apex. Mild concentric LVH (septum 1.4cm), mild LA enlargement (WILLIAN 31 cc/m2), mild MR, no AI, mild TR, no pericardial effusion.  - 1/4 BCx positive for GPC, pending final  - Started on ceftriaxone per primary team  - Continue sotalol 80mg Q12hr. Obtain EKG 2 hours after each dose of sotalol. Hold Sotalol for QTc >500ms (Baseline QTc is 468ms). QTc stable at 485ms after 4th dose of sotalol. Reviewed with Dr. Hurt.  - Continue Metoprolol succinate at 25mg once daily     STACI Huertas-C  #[id number]

## 2023-08-30 NOTE — DIETITIAN INITIAL EVALUATION ADULT - PERTINENT MEDS FT
MEDICATIONS  (STANDING):  atorvastatin 40 milliGRAM(s) Oral at bedtime  benzonatate 100 milliGRAM(s) Oral every 8 hours  budesonide  80 MICROgram(s)/formoterol 4.5 MICROgram(s) Inhaler 2 Puff(s) Inhalation two times a day  carvedilol 6.25 milliGRAM(s) Oral every 12 hours  cefTRIAXone   IVPB 1000 milliGRAM(s) IV Intermittent every 24 hours  clopidogrel Tablet 75 milliGRAM(s) Oral daily  enoxaparin Injectable 40 milliGRAM(s) SubCutaneous every 24 hours  famotidine    Tablet 20 milliGRAM(s) Oral daily  furosemide    Tablet 40 milliGRAM(s) Oral daily  guaiFENesin  milliGRAM(s) Oral every 12 hours  nicotine -  14 mG/24Hr(s) Patch 1 Patch Transdermal every 24 hours  sacubitril 49 mG/valsartan 51 mG 1 Tablet(s) Oral two times a day  sodium chloride 0.9% for Nebulization 3 milliLiter(s) Nebulizer four times a day  sotalol 80 milliGRAM(s) Oral every 12 hours    MEDICATIONS  (PRN):  albuterol/ipratropium for Nebulization 3 milliLiter(s) Nebulizer every 6 hours PRN Shortness of Breath and/or Wheezing

## 2023-08-30 NOTE — PROGRESS NOTE ADULT - PROBLEM SELECTOR PLAN 9
- DVT ppx: Lovenox  - Diet: DASH, encourage PO fluids  - GOC: Full Code - DVT ppx: Lovenox  - Diet: Per dietitian, change to consistent carb/DASH, encourage PO fluids  - GOC: Full Code

## 2023-08-30 NOTE — DISCHARGE NOTE PROVIDER - NSDCCPTREATMENT_GEN_ALL_CORE_FT
PRINCIPAL PROCEDURE  Procedure: Transthoracic echocardiography (TTE)  Findings and Treatment: Your echocardiogram showed that your left ventricular ejection fraction is 45-50%. It also showed mild left atrial dilation and mild mitral stenosis. You were given several medications to help with your heart condition, including Lasix, Carvedilol, and Entresto.

## 2023-08-30 NOTE — CONSULT NOTE ADULT - ASSESSMENT
72 year old female w/ MHx COPD (does not require home O2), HLD, HTN, PVD, CAD s/p PCI 2015, ischemic cardiomyopathy (LVEF 40-45%), cardiac arrest s/p left-sided ICD (6/4/2019) complicated by infection and underwent extraction with right sided Medtronic single-chamber ICD ( at [facility] by Dr. Gutiérrez on 9/23/2019) p/w SOB at rest, found ot ahve episode of VT on ICD interrogation, started on sotalol.    Pt reports her defibrillator went off a few days  prior to admission, but she didn't come in at that point, however, she felt worse and more short of breath over the next few days so she came to the ED. Of note, she thought she was instructed to stop plavix.  She has poor baseline functional status of about 2 blocks and stops 2/2 SOB, but no chest pain . Denies orthopnea, PND, or LE edema. Of note, at some point, patient had Cardiac Cath/PCI: Cath at [facility]: Patient had elevated troponins which prompted a repeat cardiac catheterization. Report states that the patient had a proximal RCA total obstruction and was being filled by collaterals from the mid LAD. The LAD shows a patent stent and a 50% mid LAD lesion. She also had moderate disease of the left circumflex artery. Also reported TTE 2/2022 demonstrated mild LAE, EF 40-45%, moderate segmental LV systolic dysfunction with hypokinesis of the inferior wall, posterior-lateral wall, basal anterior-ventricular septum and apex, mild concentric LVH.      #HFmrEF  -EKG:sinus with nonspecific ST/T changes.   -Etiology: ICM  -Home Diuretics: lasix 40mg daily  -TTE: 8/2023 EF 45%, normal systolic fx, basal-mid antlateral wall, basal-mid inferolateral wall and basal infer walls hypokinetic; grade 2 diastolic dysfx  -Device: ICD  -GDMT   --c/w coreg 6.25 BID  --c/w entresto 49/51  --MRA when K/Cr proves stable.   --Consider SGLT2i (if there is no contraindication):  -c/w statin  -c/w lasix 40 po qd  -Duonebs per primary team  -monitor on telemetry  -Monitor Strict I/O's  -Monitor standing, pre-prandial daily weights  -Monitor and correct electrolytes, K=4-5, and mg 2-3  -TSH 2.58  Tchol 109, LDL 76, HDL 33, TGs 80    #VT  -c/w BB as above and sotalol per EP recs  -ICD in place  - monitor on tele  -K 4-5, Mg 2-3    #CAD s/p PCi in the past  -need to obtain records of recent cath and stress testing  - no clear ischemic symptoms at this time  -VT likely scar mediated  -c/w plavix, it was apparently stopped  -c/w statin  -pharm stress contraindicated at present time 2/2 active wheezing from COPD    Danie Serrano MD  Cardiology Fellow    For all New Consults and Questions:  [email or web address]   Login: cardfellows    *** Recommendations are preliminary until cosigned by the attending. 72 year old female w/ MHx COPD (does not require home O2), HLD, HTN, PVD, CAD s/p PCI 2015, ischemic cardiomyopathy (LVEF 40-45%), cardiac arrest s/p left-sided ICD (6/4/2019) complicated by infection and underwent extraction with right sided Medtronic single-chamber ICD ( at [facility] by Dr. Gutiérrez on 9/23/2019) p/w SOB at rest, found ot ahve episode of VT on ICD interrogation, started on sotalol.    Pt reports her defibrillator went off a few days  prior to admission, but she didn't come in at that point, however, she felt worse and more short of breath over the next few days so she came to the ED. Of note, she thought she was instructed to stop plavix.  She has poor baseline functional status of about 2 blocks and stops 2/2 SOB, but no chest pain . Denies orthopnea, PND, or LE edema. Of note, at some point, patient had Cardiac Cath/PCI: Cath at [facility]: Patient had elevated troponins which prompted a repeat cardiac catheterization. Report states that the patient had a proximal RCA total obstruction and was being filled by collaterals from the mid LAD. The LAD shows a patent stent and a 50% mid LAD lesion. She also had moderate disease of the left circumflex artery. Also reported TTE 2/2022 demonstrated mild LAE, EF 40-45%, moderate segmental LV systolic dysfunction with hypokinesis of the inferior wall, posterior-lateral wall, basal anterior-ventricular septum and apex, mild concentric LVH.      #HFmrEF  -EKG:sinus with nonspecific ST/T changes.   -Etiology: ICM  -Home Diuretics: lasix 40mg daily  -TTE: 8/2023 EF 45%, normal systolic fx, basal-mid antlateral wall, basal-mid inferolateral wall and basal infer walls hypokinetic; grade 2 diastolic dysfx  -Device: ICD  -GDMT   --c/w coreg 6.25 BID  --c/w entresto 49/51  --MRA when K/Cr proves stable.   --please start dapagliflozin 10mg qd  -c/w statin  -c/w lasix 40 po qd  -Duonebs per primary team  -monitor on telemetry  -Monitor Strict I/O's  -Monitor standing, pre-prandial daily weights  -Monitor and correct electrolytes, K=4-5, and mg 2-3  -TSH 2.58  Tchol 109, LDL 76, HDL 33, TGs 80    #VT  -c/w BB as above and sotalol per EP recs  -ICD in place  - monitor on tele  -K 4-5, Mg 2-3    #CAD s/p PCi in the past  -need to obtain records of recent cath and stress testing  - no clear ischemic symptoms at this time  -VT likely scar mediated  -c/w plavix, it was apparently stopped  -c/w statin  -pharm stress contraindicated at present time 2/2 active wheezing from COPD    Danie Serrano MD  Cardiology Fellow    For all New Consults and Questions:  [email or web address]   Login: cardfellows    *** Recommendations are preliminary until cosigned by the attending. 72 year old female w/ MHx COPD (does not require home O2), HLD, HTN, PVD, CAD s/p PCI 2015, ischemic cardiomyopathy (LVEF 40-45%), cardiac arrest s/p left-sided ICD (6/4/2019) complicated by infection and underwent extraction with right sided Medtronic single-chamber ICD ( at [facility] by Dr. Gutiérrez on 9/23/2019) p/w SOB at rest, found ot ahve episode of VT on ICD interrogation, started on sotalol.    Pt reports her defibrillator went off a few days  prior to admission, but she didn't come in at that point, however, she felt worse and more short of breath over the next few days so she came to the ED. Of note, she thought she was instructed to stop plavix.  She has poor baseline functional status of about 2 blocks and stops 2/2 SOB, but no chest pain . Denies orthopnea, PND, or LE edema. Of note, at some point, patient had Cardiac Cath/PCI: Cath at [facility]: Patient had elevated troponins which prompted a repeat cardiac catheterization. Report states that the patient had a proximal RCA total obstruction and was being filled by collaterals from the mid LAD. The LAD shows a patent stent and a 50% mid LAD lesion. She also had moderate disease of the left circumflex artery. Also reported TTE 2/2022 demonstrated mild LAE, EF 40-45%, moderate segmental LV systolic dysfunction with hypokinesis of the inferior wall, posterior-lateral wall, basal anterior-ventricular septum and apex, mild concentric LVH.      #HFmrEF  -EKG:sinus with nonspecific ST/T changes.   -Etiology: ICM  -Home Diuretics: lasix 40mg daily  -TTE: 8/2023 EF 45%, normal systolic fx, basal-mid antlateral wall, basal-mid inferolateral wall and basal infer walls hypokinetic; grade 2 diastolic dysfx  -Device: ICD  -GDMT   --c/w coreg 6.25 BID  --c/w entresto 49/51  --MRA when K/Cr proves stable.   --please start dapagliflozin 10mg qd once UTI resolved, consider as outpt if needed  -c/w statin  -c/w lasix 40 po qd  -Duonebs per primary team  -monitor on telemetry  -Monitor Strict I/O's  -Monitor standing, pre-prandial daily weights  -Monitor and correct electrolytes, K=4-5, and mg 2-3  -TSH 2.58  Tchol 109, LDL 76, HDL 33, TGs 80    #VT  -c/w BB as above and sotalol per EP recs  -ICD in place  - monitor on tele  -K 4-5, Mg 2-3    #CAD s/p PCi in the past  -need to obtain records of recent cath and stress testing  - no clear ischemic symptoms at this time  -VT likely scar mediated  -c/w plavix, it was apparently stopped  -c/w statin  -pharm stress contraindicated at present time 2/2 active wheezing from COPD    Danie Serrano MD  Cardiology Fellow    For all New Consults and Questions:  [email or web address]   Login: cardfellows    *** Recommendations are preliminary until cosigned by the attending. 72 year old female w/ MHx COPD (does not require home O2), HLD, HTN, PVD, CAD s/p PCI 2015, ischemic cardiomyopathy (LVEF 40-45%), cardiac arrest s/p left-sided ICD (6/4/2019) complicated by infection and underwent extraction with right sided Medtronic single-chamber ICD ( at [facility] by Dr. Gutiérrez on 9/23/2019) p/w SOB at rest, found ot ahve episode of VT on ICD interrogation, started on sotalol.    Pt reports her defibrillator went off a few days  prior to admission, but she didn't come in at that point, however, she felt worse and more short of breath over the next few days so she came to the ED. Of note, she thought she was instructed to stop plavix.  She has poor baseline functional status of about 2 blocks and stops 2/2 SOB, but no chest pain . Denies orthopnea, PND, or LE edema. Of note, at some point, patient had Cardiac Cath/PCI: Cath at [facility]: Patient had elevated troponins which prompted a repeat cardiac catheterization. Report states that the patient had a proximal RCA total obstruction and was being filled by collaterals from the mid LAD. The LAD shows a patent stent and a 50% mid LAD lesion. She also had moderate disease of the left circumflex artery. Also reported TTE 2/2022 demonstrated mild LAE, EF 40-45%, moderate segmental LV systolic dysfunction with hypokinesis of the inferior wall, posterior-lateral wall, basal anterior-ventricular septum and apex, mild concentric LVH.      #HFmrEF  -EKG:sinus with nonspecific ST/T changes.   -Etiology: ICM  -Home Diuretics: lasix 40mg daily  -TTE: 8/2023 EF 45%, normal systolic fx, basal-mid antlateral wall, basal-mid inferolateral wall and basal infer walls hypokinetic; grade 2 diastolic dysfx  -Device: ICD  -GDMT   --c/w coreg 6.25 BID  --c/w entresto 49/51  --MRA when K/Cr proves stable.   --please start dapagliflozin 10mg qd once UTI resolved, consider as outpt if needed  -c/w statin  -c/w lasix 40 po qd  -Duonebs per primary team  -monitor on telemetry  -Monitor Strict I/O's  -Monitor standing, pre-prandial daily weights  -Monitor and correct electrolytes, K=4-5, and mg 2-3  -TSH 2.58  Tchol 109, LDL 76, HDL 33, TGs 80    #VT  -c/w BB as above and sotalol per EP recs  -ICD in place  - monitor on tele  -K 4-5, Mg 2-3    #CAD s/p PCi in the past  -need to obtain records of recent cath and stress testing  - no clear ischemic symptoms at this time  -VT likely scar mediated  -c/w plavix, it was apparently stopped  -c/w statin  -pharm stress contraindicated at present time 2/2 active wheezing from COPD    Danie Serrano MD  Cardiology Fellow    For all New Consults and Questions:  [email or web address]   Login: [employer]    *** Recommendations are preliminary until cosigned by the attending.    This patient was seen and examined personally by me and the plan was discussed with the fellow and/or resident above. Amendments were made as necessary to the above. Agree with the excellent note and plan above. GDMT ongoing.    Jefferson Lakhani MD, MPhil, [facility]  Cardiologist, [facility]  ; [facility]  Email: [email or web address] on-service contact/call information, go to [email or web address] and use "P[employer]ard" to login.  Outpatient Cardiology appointments, call [phone] to arrange with a colleague; I do not have outpatient Cardiology clinic.

## 2023-08-30 NOTE — PROGRESS NOTE ADULT - PROBLEM SELECTOR PLAN 2
- Persistent leukocytosis (17.22 --> 19.38 --> 17.37)  - CXR clear  - Urinalysis: slightly turbid, +bacteria, +leukocyte esterase  - C/w ceftriaxone  - Urine culture prelim: Gram neg rods  - f/u GI PCR - Leukocytosis: 15.25<--, 17.37<--, 19.38<--, 17.22  - Urinalysis: slightly turbid, +bacteria, +leukocyte esterase  - Urine culture prelim: Gram neg rods  - C/w ceftriaxone - Leukocytosis: 15.25<--, 17.37<--, 19.38<--, 17.22  - Urinalysis: slightly turbid, +bacteria, +leukocyte esterase  - Urine culture prelim: Gram neg rods  - f/u urine culture sensitivity  - C/w ceftriaxone

## 2023-08-30 NOTE — DIETITIAN INITIAL EVALUATION ADULT - PERTINENT LABORATORY DATA
08-30    138  |  103  |  13  ----------------------------<  121<H>  4.1   |  26  |  0.73    Ca    8.3<L>      30 Aug 2023 05:08  Phos  3.1     08-30  Mg     2.0     08-30    TPro  5.9<L>  /  Alb  2.7<L>  /  TBili  0.3  /  DBili  x   /  AST  15  /  ALT  8<L>  /  AlkPhos  85  08-29  A1C with Estimated Average Glucose Result: 6.7 % (08-28-23 @ 07:18)

## 2023-08-30 NOTE — DISCHARGE NOTE PROVIDER - CARE PROVIDER_API CALL
Marcy Wise  Cardiovascular Disease  [address] [place] [address], [address] [place] [id number]  Phone: [phone]  Fax: [phone]  Established Patient  Follow Up Time: 2 weeks

## 2023-08-30 NOTE — DISCHARGE NOTE PROVIDER - NSDCCPCAREPLAN_GEN_ALL_CORE_FT
PRINCIPAL DISCHARGE DIAGNOSIS  Diagnosis: Cardiac arrhythmia  Assessment and Plan of Treatment:       SECONDARY DISCHARGE DIAGNOSES  Diagnosis: Ischemic cardiomyopathy  Assessment and Plan of Treatment:     Diagnosis: Acute UTI  Assessment and Plan of Treatment:      PRINCIPAL DISCHARGE DIAGNOSIS  Diagnosis: Cardiac arrhythmia  Assessment and Plan of Treatment: You came to the hospital with shortness of breath and a recent shock from your pacemaker. EP interrogated the device and you were found to have SVT vs VT. You were started on Sotalol while in the hospital and tolerated it well. We recommend following up with your cardiologist outpatient for continued treatment.      SECONDARY DISCHARGE DIAGNOSES  Diagnosis: Acute UTI  Assessment and Plan of Treatment: When you came to the hospital, you were found to have signs of infection and bacteria in your urine. You were started on Ceftriaxone and responded well to treatment.    Diagnosis: Ischemic cardiomyopathy  Assessment and Plan of Treatment: To manage your heart failure, you were treated with Lasix, Carvedilol, and Entresto. Cardiology here recommends that you start Dapagliflozin outpatient. A prescription will be provided for you on discharge. We recommend following up with your cardiologist outpatient for continued treatment.

## 2023-08-31 NOTE — PROGRESS NOTE ADULT - PROBLEM SELECTOR PLAN 7
- Prelim CXR: no focal consolidations  - No longer requiring nasal canula  - VBG pH 7.43, pCO2 41, pO2 74, Calc HCO3 27  - Duonebs PRN, Symbicort BID, Chest PT, bedside spirometry - H/H consistently elevated  - EPO: 8.0  - Recommend heme f/u outpatient

## 2023-08-31 NOTE — PROGRESS NOTE ADULT - PROBLEM SELECTOR PLAN 3
- Defibrillation a few days ago via ICD (Medtronic device, implanted 2019)  - f/u tele  - Per EP: ICD interrogated 8/28, found to have no episodes since 8/22/23  - Per EP: If potassium >4.0 and Mg >2.0, c/w sotalol 80mg Q12hr. Obtain EKG 2 hours after each dose of sotalol. Hold Sotalol for QTc >500ms (Baseline QTc is 468ms).  - Completed 5 doses Sotalol  - Per cardiology: c/w carvedilol 6.25mg BID, lasix 40mg qd - Continued high WBC  - BCx grew MRSE, likely contaminant  - f/u repeat BCx, treat w IV Vanc pending results  - monitor CBC

## 2023-08-31 NOTE — PROGRESS NOTE ADULT - ASSESSMENT
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 Gutiérrez on 9/23/2019) p/w SOB at rest, found ot ahve episode of VT on ICD interrogation, started on sotalol.      #HFmrEF  -EKG:sinus with nonspecific ST/T changes.   -Etiology: ICM  -Home Diuretics: lasix 40mg PO  daily  -TTE: 8/2023 EF 45%, normal systolic fx, basal-mid antlateral wall, basal-mid inferolateral wall and basal infer walls hypokinetic; grade 2 diastolic dysfx, similar to prior.   -Device: ICD  -GDMT   --c/w coreg 6.25 BID  --c/w entresto 49/51  --MRA when K/Cr proves stable.   --please start dapagliflozin 10mg qd as an outpatient once UTI resolved  -c/w statin  -Duonebs per primary team  -monitor on telemetry  -Monitor Strict I/O's  -Monitor standing, pre-prandial daily weights  -Monitor and correct electrolytes, K=4-5, and mg 2-3  -TSH 2.58  Tchol 109, LDL 76, HDL 33, TGs 80    #VT  -c/w BB as above and sotalol per EP recs  -ICD in place  - monitor on tele  -K 4-5, Mg 2-3    #CAD s/p PCi in the past  -need to obtain records of recent cath and stress testing  - Can not get exercise, dobutamine or regadenoson stress testing at present time.    - no clear ischemic symptoms at this time  -VT likely scar mediated  -c/w plavix, it was apparently stopped  -c/w statin  outpatient follow up for further evaluation.         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 Gutiérrez on 9/23/2019) p/w SOB at rest, found ot ahve episode of VT on ICD interrogation, started on sotalol.      #HFmrEF  -EKG:sinus with nonspecific ST/T changes.   -Etiology: ICM  -Home Diuretics: lasix 40mg PO  daily  -TTE: 8/2023 EF 45%, normal systolic fx, basal-mid antlateral wall, basal-mid inferolateral wall and basal infer walls hypokinetic; grade 2 diastolic dysfx, similar to prior.   -Device: ICD  -GDMT   --c/w coreg 6.25 BID  --c/w entresto 49/51  --MRA when K/Cr proves stable.   --please start dapagliflozin 10mg qd as an outpatient once UTI resolved  -c/w statin  -Duonebs per primary team  -monitor on telemetry  -Monitor Strict I/O's  -Monitor standing, pre-prandial daily weights  -Monitor and correct electrolytes, K=4-5, and mg 2-3  -TSH 2.58  Tchol 109, LDL 76, HDL 33, TGs 80    #VT  -c/w BB as above and sotalol per EP recs  -ICD in place  - monitor on tele  -K 4-5, Mg 2-3    #CAD s/p PCi in the past  -need to obtain records of recent cath and stress testing  - Can not get exercise, dobutamine or regadenoson stress testing at present time.    - no clear ischemic symptoms at this time  -VT likely scar mediated  -c/w plavix, it was apparently stopped  -c/w statin  outpatient follow up for further evaluation.     General Cardiology Consult service will signoff at this time. Please call back with any additional questions.        Aaron Hallman, Cardiology Fellow, F-2    For all New Consults and Questions:  [email or web address]   Login: cardfellows    *** Note not final until signed by attending

## 2023-08-31 NOTE — PROGRESS NOTE ADULT - PROBLEM SELECTOR PLAN 9
- DVT ppx: Lovenox  - Diet: Per dietitian, change to consistent carb/DASH, encourage PO fluids  - GOC: Full Code - /59 on admission   - C/w Lasix 40mg qd  - C/w carvedilol 6.25 mg BID  - Orthostatic workup for brief dizziness on standing

## 2023-08-31 NOTE — PROGRESS NOTE ADULT - SUBJECTIVE AND OBJECTIVE BOX
Elizabeth Mueller  4th Year Medical Student    Patient is a 72y old Female w PMH COPD, HLD, HTN, PVD, CAD s/p PCI 2015, ischemic cardiomyopathy, cardiac arrest s/p L sided ICD complicated by infection and s/p R sided ICD (9/23/19) p/w SOB and hypokalemia.    SUBJECTIVE / OVERNIGHT EVENTS:  Patient seen and evaluated at bedside.    No acute events overnight. Patient continued Sotalol, post-dose EKG showed QTc:  Tele:    MEDICATIONS  (STANDING):  atorvastatin 40 milliGRAM(s) Oral at bedtime  benzonatate 100 milliGRAM(s) Oral every 8 hours  budesonide  80 MICROgram(s)/formoterol 4.5 MICROgram(s) Inhaler 2 Puff(s) Inhalation two times a day  carvedilol 6.25 milliGRAM(s) Oral every 12 hours  clopidogrel Tablet 75 milliGRAM(s) Oral daily  enoxaparin Injectable 40 milliGRAM(s) SubCutaneous every 24 hours  famotidine    Tablet 20 milliGRAM(s) Oral daily  furosemide    Tablet 40 milliGRAM(s) Oral daily  guaiFENesin  milliGRAM(s) Oral every 12 hours  nicotine -  14 mG/24Hr(s) Patch 1 Patch Transdermal every 24 hours  sacubitril 49 mG/valsartan 51 mG 1 Tablet(s) Oral two times a day  sodium chloride 0.9% for Nebulization 3 milliLiter(s) Nebulizer four times a day  sotalol 80 milliGRAM(s) Oral every 12 hours    MEDICATIONS  (PRN):  albuterol/ipratropium for Nebulization 3 milliLiter(s) Nebulizer every 6 hours PRN Shortness of Breath and/or Wheezing      Vital Signs Last 24 Hrs  T(C): 36.8 (31 Aug 2023 03:54), Max: 36.8 (31 Aug 2023 03:54)  T(F): 98.3 (31 Aug 2023 03:54), Max: 98.3 (31 Aug 2023 03:54)  HR: 62 (31 Aug 2023 03:54) (61 - 68)  BP: 115/72 (31 Aug 2023 03:54) (107/68 - 115/72)  BP(mean): --  RR: 18 (31 Aug 2023 03:54) (18 - 18)  SpO2: 93% (31 Aug 2023 03:54) (93% - 95%)    Parameters below as of 31 Aug 2023 03:54  Patient On (Oxygen Delivery Method): room air    PHYSICAL EXAM:  GENERAL: NAD, well-developed  HEAD:  Atraumatic, Normocephalic  EYES: EOMI, PERRLA, conjunctiva and sclera clear  NECK: Supple, No JVD  CHEST/LUNG: Clear to auscultation bilaterally; No wheeze  HEART: Regular rate and rhythm; Normal S1 S2, No murmurs, rubs, or gallops  ABDOMEN: Soft, Nontender, Nondistended; Bowel sounds present  EXTREMITIES:  2+ Peripheral Pulses, No clubbing, cyanosis, or edema  PSYCH: AAOx3  NEUROLOGY: non-focal  SKIN: No rashes or lesions    ROS:        LABS:                        15.7   15.25 )-----------( 318      ( 30 Aug 2023 05:08 )             48.1     WBC Trend: 15.25<--, 17.37<--, 19.38<--, 17.22  08-30    Hgb Trend: 15.7<--, 15.3<--, 16.5<--, 16.7<--  08-30    138  |  103  |  13  ----------------------------<  121<H>  4.1   |  26  |  0.73    Potassium Trend: 4.1<--, 5.0<--, 4.7<--, 4.3<--, 3.3<--, 3.0  08-30    Ca    8.3<L>      30 Aug 2023 05:08  Phos  3.1     08-30  Mg     2.0     08-30    TPro  5.9<L>  /  Alb  2.7<L>  /  TBili  0.3  /  DBili  x   /  AST  15  /  ALT  8<L>  /  AlkPhos  85  08-29    Creatinine Trend: 0.73<--, 0.72<--, 0.79<--, 0.83<--, 0.81<--, 0.99<--  LIVER FUNCTIONS - ( 29 Aug 2023 07:35 )  Alb: 2.7 g/dL / Pro: 5.9 g/dL / ALK PHOS: 85 U/L / ALT: 8 U/L / AST: 15 U/L / GGT: x             Erythropoietin Level (08.29.23 @ 07:36)    Erythropoietin Level: 8.0    I&O's Summary    30 Aug 2023 07:01  -  31 Aug 2023 07:00  --------------------------------------------------------  IN: 240 mL / OUT: 200 mL / NET: 40 mL      Urinalysis + Microscopic Examination (08.28.23 @ 13:12)    pH Urine: 5.0   Urine Appearance: Slightly Turbid   Color: Light Yellow   Specific Gravity: 1.010   Protein, Urine: Negative   Glucose Qualitative, Urine: Negative   Ketone - Urine: Negative   Blood, Urine: Large   Bilirubin: Negative   Urobilinogen: Negative   Leukocyte Esterase Concentration: Large   Nitrite: Negative   White Blood Cell - Urine: 5 /HPF   Red Blood Cell - Urine: 9 /hpf   Bacteria: Many   Hyaline Casts: 0 /lpf   Squamous Epithelial Cells: 1 /hpf    Culture - Urine (08.28.23 @ 17:20)    Specimen Source: Clean Catch Clean Catch (Midstream)   Culture Results:   >100,000 CFU/ml Klebsiella pneumoniae      Culture - Blood (08.27.23 @ 18:50)    -  Staphylococcus epidermidis, Methicillin resistant: Detec   Gram Stain:   Growth in aerobic bottle: Gram Positive Cocci in Clusters   Specimen Source: .Blood Blood-Peripheral   Organism: Blood Culture PCR   Culture Results:   Growth in aerobic bottle: Staphylococcus epidermidis  Coagulase Negative Staphylococci isolated from a single blood culture set  may represent contamination.  Contact the Microbiology Department at [phone] if susceptibility  testing is clinically indicated.  Direct identification is available within approximately 3-5  hours either by Blood Panel Multiplexed PCR or Direct  MALDI-TOF. Details: [email or web address]   Organism Identification: Blood Culture PCR   Method Type: PCR    Culture - Blood (08.27.23 @ 18:40)    Specimen Source: .Blood Blood-Venous   Culture Results:   No growth at 72 Hours      IMAGING:    CXR 8/27:  FINDINGS:  Right-sided ICD.  The heart is not accurately assessed in this AP projection.  No focal consolidations. There is no pleural effusion.  There is no pneumothorax.  No acute bony abnormality.    IMPRESSION:  No focal consolidations.      ECHO 8/29:  CONCLUSIONS:   1. Technically difficult image quality.   2. Normal left ventricular cavity size. Left ventricular wall thickness is normal. Left ventricular systolic function is normal.   3. Basal and mid anterolateral wall, basal and mid inferolateral wall, and basal inferior segment are abnormal.   4. There is moderate (grade 2) left ventricular diastolic dysfunction, with elevated filling pressure.   5. Normal right ventricular cavity size, normal right ventricular wall thickness and normal right ventricular systolic function.   6. The left atrium is mildly dilated in size.   7. The aortic arch diameter is normal.   8. There is calcification of the mitral valve annulus.   9. No pericardial effusion seen.    FINDINGS:  Left Ventricle:  Normal left ventricular cavity size. Left ventricular wall thickness is normal. Left ventricular systolic function is normal with an ejection fraction visually estimated at 45 to 50%. There is moderate (grade 2) left ventricular diastolic dysfunction, with elevated filling pressure. Severe left ventricular hypertrophy. There is increased LV mass and concentric hypertrophy. There is no evidence of a thrombus in the left ventricle.  LV Wall Scoring: The basal and mid anterolateral wall, basal and mid  inferolateral wall, and basal inferior segment are hypokinetic. All remaining  scored segments are normal.     Right Ventricle:  Normal right ventricular cavity size, normal wall thickness and normal right ventricular systolic function. Tricuspid annular plane systolic excursion (TAPSE) is 1.6 cm (normal >=1.7 cm). A device lead is visualized.     Left Atrium:  The left atrium is mildly dilated in size with an indexed volume of 35.20 ml/m².     Right Atrium:  The right atrium is normal in size with an indexed volume of 18.69 ml/m².     Aortic Valve:  The aortic valve appears trileaflet. There is no evidence of aortic regurgitation.     Mitral Valve:  There is calcification of the mitral valve annulus. There is mild mitral valve stenosis. There is trace mitral regurgitation.     Tricuspid Valve:  Structurally normal tricuspid valve with normal leaflet excursion. There is mild tricuspid regurgitation. Estimated pulmonary artery systolic pressure is 29 mmHg.     Pulmonic Valve:  Structurally normal pulmonic valve with normal leaflet excursion. There is trace pulmonic regurgitation.     Aorta:  The aortic annulus and aortic root appear normal in size. The aortic root at the sinuses of Valsalva diameter is normal. The ascending aortadiameter is normal. The aortic arch diameter is normal.     Pericardium:  No pericardial effusion seen. Pericardial fat pad is seen anterior to the right ventricle. There is a trace pericardial effusion.     Systemic Veins:  The inferior vena cava isnormal in size measuring 1.40 cm in diameter, (normal <2.1cm) with normal inspiratory collapse (normal >50%) consistent with normal right atrial pressure (~3, range 0-5mmHg).         Elizabeth Mueller  4th Year Medical Student    Patient is a 72y old Female w PMH COPD, HLD, HTN, PVD, CAD s/p PCI 2015, ischemic cardiomyopathy, cardiac arrest s/p L sided ICD complicated by infection and s/p R sided ICD (9/23/19) p/w SOB and hypokalemia.    SUBJECTIVE / OVERNIGHT EVENTS:  Patient seen and evaluated at bedside.    No acute events overnight. Patient continued Sotalol, post-dose EKG showed QTc: 462; morning EKG still wnl  Tele: SR 50s-60s; episodes of wide PVCs ovn    MEDICATIONS  (STANDING):  atorvastatin 40 milliGRAM(s) Oral at bedtime  benzonatate 100 milliGRAM(s) Oral every 8 hours  budesonide  80 MICROgram(s)/formoterol 4.5 MICROgram(s) Inhaler 2 Puff(s) Inhalation two times a day  carvedilol 6.25 milliGRAM(s) Oral every 12 hours  clopidogrel Tablet 75 milliGRAM(s) Oral daily  enoxaparin Injectable 40 milliGRAM(s) SubCutaneous every 24 hours  famotidine    Tablet 20 milliGRAM(s) Oral daily  furosemide    Tablet 40 milliGRAM(s) Oral daily  guaiFENesin  milliGRAM(s) Oral every 12 hours  nicotine -  14 mG/24Hr(s) Patch 1 Patch Transdermal every 24 hours  sacubitril 49 mG/valsartan 51 mG 1 Tablet(s) Oral two times a day  sodium chloride 0.9% for Nebulization 3 milliLiter(s) Nebulizer four times a day  sotalol 80 milliGRAM(s) Oral every 12 hours    MEDICATIONS  (PRN):  albuterol/ipratropium for Nebulization 3 milliLiter(s) Nebulizer every 6 hours PRN Shortness of Breath and/or Wheezing      Vital Signs Last 24 Hrs  T(C): 36.8 (31 Aug 2023 03:54), Max: 36.8 (31 Aug 2023 03:54)  T(F): 98.3 (31 Aug 2023 03:54), Max: 98.3 (31 Aug 2023 03:54)  HR: 62 (31 Aug 2023 03:54) (61 - 68)  BP: 115/72 (31 Aug 2023 03:54) (107/68 - 115/72)  BP(mean): --  RR: 18 (31 Aug 2023 03:54) (18 - 18)  SpO2: 93% (31 Aug 2023 03:54) (93% - 95%)    Parameters below as of 31 Aug 2023 03:54  Patient On (Oxygen Delivery Method): room air    PHYSICAL EXAM:  GENERAL: NAD, well-developed  HEAD:  Atraumatic, Normocephalic  EYES: EOMI, PERRLA, conjunctiva and sclera clear  NECK: Supple, No JVD  CHEST/LUNG: Clear to auscultation bilaterally; No wheeze  HEART: Regular rate and rhythm; Normal S1 S2, No murmurs, rubs, or gallops  ABDOMEN: Soft, Nontender, Nondistended; Bowel sounds present  EXTREMITIES:  2+ Peripheral Pulses, No clubbing, cyanosis, or edema  PSYCH: AAOx3  NEUROLOGY: non-focal  SKIN: No rashes or lesions    ROS:        LABS:                        15.7   15.25 )-----------( 318      ( 30 Aug 2023 05:08 )             48.1     WBC Trend: 15.25<--, 17.37<--, 19.38<--, 17.22  08-30    Hgb Trend: 15.7<--, 15.3<--, 16.5<--, 16.7<--  08-30    138  |  103  |  13  ----------------------------<  121<H>  4.1   |  26  |  0.73    Potassium Trend: 4.1<--, 5.0<--, 4.7<--, 4.3<--, 3.3<--, 3.0  08-30    Ca    8.3<L>      30 Aug 2023 05:08  Phos  3.1     08-30  Mg     2.0     08-30    TPro  5.9<L>  /  Alb  2.7<L>  /  TBili  0.3  /  DBili  x   /  AST  15  /  ALT  8<L>  /  AlkPhos  85  08-29    Creatinine Trend: 0.73<--, 0.72<--, 0.79<--, 0.83<--, 0.81<--, 0.99<--  LIVER FUNCTIONS - ( 29 Aug 2023 07:35 )  Alb: 2.7 g/dL / Pro: 5.9 g/dL / ALK PHOS: 85 U/L / ALT: 8 U/L / AST: 15 U/L / GGT: x             Erythropoietin Level (08.29.23 @ 07:36)    Erythropoietin Level: 8.0    I&O's Summary    30 Aug 2023 07:01  -  31 Aug 2023 07:00  --------------------------------------------------------  IN: 240 mL / OUT: 200 mL / NET: 40 mL      Urinalysis + Microscopic Examination (08.28.23 @ 13:12)    pH Urine: 5.0   Urine Appearance: Slightly Turbid   Color: Light Yellow   Specific Gravity: 1.010   Protein, Urine: Negative   Glucose Qualitative, Urine: Negative   Ketone - Urine: Negative   Blood, Urine: Large   Bilirubin: Negative   Urobilinogen: Negative   Leukocyte Esterase Concentration: Large   Nitrite: Negative   White Blood Cell - Urine: 5 /HPF   Red Blood Cell - Urine: 9 /hpf   Bacteria: Many   Hyaline Casts: 0 /lpf   Squamous Epithelial Cells: 1 /hpf    Culture - Urine (08.28.23 @ 17:20)    Specimen Source: Clean Catch Clean Catch (Midstream)   Culture Results:   >100,000 CFU/ml Klebsiella pneumoniae      Culture - Blood (08.27.23 @ 18:50)    -  Staphylococcus epidermidis, Methicillin resistant: Detec   Gram Stain:   Growth in aerobic bottle: Gram Positive Cocci in Clusters   Specimen Source: .Blood Blood-Peripheral   Organism: Blood Culture PCR   Culture Results:   Growth in aerobic bottle: Staphylococcus epidermidis  Coagulase Negative Staphylococci isolated from a single blood culture set  may represent contamination.  Contact the Microbiology Department at [phone] if susceptibility  testing is clinically indicated.  Direct identification is available within approximately 3-5  hours either by Blood Panel Multiplexed PCR or Direct  MALDI-TOF. Details: [email or web address]   Organism Identification: Blood Culture PCR   Method Type: PCR    Culture - Blood (08.27.23 @ 18:40)    Specimen Source: .Blood Blood-Venous   Culture Results:   No growth at 72 Hours      IMAGING:    CXR 8/27:  FINDINGS:  Right-sided ICD.  The heart is not accurately assessed in this AP projection.  No focal consolidations. There is no pleural effusion.  There is no pneumothorax.  No acute bony abnormality.    IMPRESSION:  No focal consolidations.      ECHO 8/29:  CONCLUSIONS:   1. Technically difficult image quality.   2. Normal left ventricular cavity size. Left ventricular wall thickness is normal. Left ventricular systolic function is normal.   3. Basal and mid anterolateral wall, basal and mid inferolateral wall, and basal inferior segment are abnormal.   4. There is moderate (grade 2) left ventricular diastolic dysfunction, with elevated filling pressure.   5. Normal right ventricular cavity size, normal right ventricular wall thickness and normal right ventricular systolic function.   6. The left atrium is mildly dilated in size.   7. The aortic arch diameter is normal.   8. There is calcification of the mitral valve annulus.   9. No pericardial effusion seen.    FINDINGS:  Left Ventricle:  Normal left ventricular cavity size. Left ventricular wall thickness is normal. Left ventricular systolic function is normal with an ejection fraction visually estimated at 45 to 50%. There is moderate (grade 2) left ventricular diastolic dysfunction, with elevated filling pressure. Severe left ventricular hypertrophy. There is increased LV mass and concentric hypertrophy. There is no evidence of a thrombus in the left ventricle.  LV Wall Scoring: The basal and mid anterolateral wall, basal and mid  inferolateral wall, and basal inferior segment are hypokinetic. All remaining  scored segments are normal.     Right Ventricle:  Normal right ventricular cavity size, normal wall thickness and normal right ventricular systolic function. Tricuspid annular plane systolic excursion (TAPSE) is 1.6 cm (normal >=1.7 cm). A device lead is visualized.     Left Atrium:  The left atrium is mildly dilated in size with an indexed volume of 35.20 ml/m².     Right Atrium:  The right atrium is normal in size with an indexed volume of 18.69 ml/m².     Aortic Valve:  The aortic valve appears trileaflet. There is no evidence of aortic regurgitation.     Mitral Valve:  There is calcification of the mitral valve annulus. There is mild mitral valve stenosis. There is trace mitral regurgitation.     Tricuspid Valve:  Structurally normal tricuspid valve with normal leaflet excursion. There is mild tricuspid regurgitation. Estimated pulmonary artery systolic pressure is 29 mmHg.     Pulmonic Valve:  Structurally normal pulmonic valve with normal leaflet excursion. There is trace pulmonic regurgitation.     Aorta:  The aortic annulus and aortic root appear normal in size. The aortic root at the sinuses of Valsalva diameter is normal. The ascending aortadiameter is normal. The aortic arch diameter is normal.     Pericardium:  No pericardial effusion seen. Pericardial fat pad is seen anterior to the right ventricle. There is a trace pericardial effusion.     Systemic Veins:  The inferior vena cava isnormal in size measuring 1.40 cm in diameter, (normal <2.1cm) with normal inspiratory collapse (normal >50%) consistent with normal right atrial pressure (~3, range 0-5mmHg).

## 2023-08-31 NOTE — PROGRESS NOTE ADULT - PROBLEM SELECTOR PLAN 8
- /59 on admission   - C/w Lasix 40mg qd  - C/w carvedilol 6.25 mg BID  - Orthostatic workup for brief dizziness on standing - Prelim CXR: no focal consolidations  - No longer requiring nasal canula  - VBG pH 7.43, pCO2 41, pO2 74, Calc HCO3 27  - Duonebs PRN, Symbicort BID, Chest PT, bedside spirometry

## 2023-08-31 NOTE — PROGRESS NOTE ADULT - ATTENDING COMMENTS
71yo F w/ PMH of HTN, HLD, COPD, CAD s/p PCI 2015, PVD, ICM, cardiac arrest s/p ICD p/w brief episode of SOB while at rest, which resolved upon presentation w/ findings of leukocytosis and hypokalemia on labs.    #dyspnea  - c/f arrythmia as symptoms described are similar to prior arrythmia where shock was administered  - Other ddx includes URI vs less likely COPD exacerbation or AdHF as symptoms resolved spontaneously w/out wheezing on exam or s/s of congestion or LE edema  - Device interrogated by EP, EP team started sotalol, monitoring complete.   - Monitor on tele. EKG with PVCs  - TTE. cards consulted for ischemic eval, f/u  - monitor bmp, keep k>4, mg>2   - duonebs prn. continue home maintenance inhalers. wean off supplemental oxygen as tolerated. check orthostatic vs. oobtc.     #leukocytosis , infectious vs reactive   - UA+, on ceftriaxone, urine cx growing klebsiella  - f/u initial blood cx - grew mrse likely contaminant, continue vanc until repeat blood cx are negative.   - f/u repeat blood cx, procal neg, urine ags, gi pcr. trend lactate.   - encourage po fluid intake  - monitor cbc    #HFrEF  - euvolemic.  - lasix resumed per cards, toprolxl switched to coreg. continue entresto. hold off on dapagliflozin given uti/?bacteremia.   - monitor closely    discharge planning

## 2023-08-31 NOTE — PROGRESS NOTE ADULT - ASSESSMENT
72 year old female w/ PMH COPD, HLD, HTN, PVD, CAD with s/p PCI 2015 and known RCA , ischemic cardiomyopathy, cardiac arrest s/p left-sided ICD (6/4/2019) complicated by infection and underwent extraction with right sided [employer] single-chamber ICD ( at [place] by Dr. Gutiérrez on 9/23/2019, follows by Dr. Hurt) p/w SOB, hypokalemia, and leukocytosis. CXR showing no focal consolidation and urinalysis indicating infection. EP consulted for recent ICD shock on 8/21/23 for VT at rate 222 bpm.     1. CAD s/p PCI 2015  2. ICM w/ LVEF 40-45%  3. VT s/p ICD shock on 8/21/23  4. p/w SOB, now resolved   5. Leukocytosis    - Keep K>4 and Mg>2  - Outpatient TTE (2/22/2022): LVEF 40-45%, mod. segmental LVSD with hypokinesis of the inferior wall, posterior-lateral wall, basal inter-ventricular septum and apex. Mild concentric LVH (septum 1.4cm), mild LA enlargement (WILLIAN 31 cc/m2), mild MR, no AI, mild TR, no pericardial effusion.  - 1/4 BCx positive for GPC - likely contaminant, remaining blood cultures without growth  - Started on ceftriaxone per primary team  - Sotalol monitoring is complete, patient is cleared for discharge on Sotalol 80mg BID.  - Outpatient follow up in EP device clinic in 3 months    Kamilah Nazario PA-C  #[phone]

## 2023-08-31 NOTE — PROGRESS NOTE ADULT - ASSESSMENT
71 yo F w/ PMH COPD, HLD, HTN, PVD, CAD s/p PCI 2015, ischemic cardiomyopathy, cardiac arrest s/p left-sided ICD (6/4/2019) complicated by infection and s/p R single-chamber ICD ([facility] in 9/23/2019) p/w SOB, hypokalemia, and leukocytosis w/ prelim CXR showing no focal consolidation and urinalysis indicating infection.

## 2023-08-31 NOTE — PROGRESS NOTE ADULT - PROBLEM SELECTOR PLAN 2
- Leukocytosis: 15.25<--, 17.37<--, 19.38<--, 17.22  - Urinalysis: slightly turbid, +bacteria, +leukocyte esterase  - Urine culture prelim: Gram neg rods  - f/u urine culture sensitivity  - Finished CTX

## 2023-08-31 NOTE — PROGRESS NOTE ADULT - SUBJECTIVE AND OBJECTIVE BOX
Patient seen and examined at bedside.    Overnight Events: No acute events overnight.      REVIEW OF SYSTEMS:  Constitutional:     [ x] negative [ ] fevers [ ] chills [ ] weight loss [ ] weight gain  HEENT:                  [ x] negative [ ] dry eyes [ ] eye irritation [ ] postnasal drip [ ] nasal congestion  CV:                         [x ] negative  [ ] chest pain [ ] orthopnea [ ] palpitations [ ] murmur  Resp:                     [ x] negative [ ] cough [ ] shortness of breath [ ] dyspnea [ ] wheezing [ ] sputum [ ]hemoptysis  GI:                          [ x] negative [ ] nausea [ ] vomiting [ ] diarrhea [ ] constipation [ ] abd pain [ ] dysphagia   :                        [ x] negative [ ] dysuria [ ] nocturia [ ] hematuria [ ] increased urinary frequency  Musculoskeletal: [ x] negative [ ] back pain [ ] myalgias [ ] arthralgias [ ] fracture  Skin:                       [ x] negative [ ] rash [ ] itch  Neurological:        [ x] negative [ ] headache [ ] dizziness [ ] syncope [ ] weakness [ ] numbness  Psychiatric:           [ x] negative [ ] anxiety [ ] depression  Endocrine:            [ x] negative [ ] diabetes [ ] thyroid problem  Heme/Lymph:      [ x] negative [ ] anemia [ ] bleeding problem  Allergic/Immune: [x ] negative [ ] itchy eyes [ ] nasal discharge [ ] hives [ ] angioedema    [x ] All other systems negative  [ ] Unable to assess ROS due to    Current Meds:  albuterol/ipratropium for Nebulization 3 milliLiter(s) Nebulizer every 6 hours PRN  atorvastatin 40 milliGRAM(s) Oral at bedtime  benzonatate 100 milliGRAM(s) Oral every 8 hours  budesonide  80 MICROgram(s)/formoterol 4.5 MICROgram(s) Inhaler 2 Puff(s) Inhalation two times a day  carvedilol 6.25 milliGRAM(s) Oral every 12 hours  clopidogrel Tablet 75 milliGRAM(s) Oral daily  enoxaparin Injectable 40 milliGRAM(s) SubCutaneous every 24 hours  famotidine    Tablet 20 milliGRAM(s) Oral daily  furosemide    Tablet 40 milliGRAM(s) Oral daily  guaiFENesin  milliGRAM(s) Oral every 12 hours  nicotine -  14 mG/24Hr(s) Patch 1 Patch Transdermal every 24 hours  sacubitril 49 mG/valsartan 51 mG 1 Tablet(s) Oral two times a day  sodium chloride 0.9% for Nebulization 3 milliLiter(s) Nebulizer four times a day  sotalol 80 milliGRAM(s) Oral every 12 hours      PAST MEDICAL & SURGICAL HISTORY:  Obese      Smoker      HTN (hypertension)      HLD (hyperlipidemia)      CAD (coronary artery disease)      CHF with cardiomyopathy      History of hip surgery          Vitals:  T(F): 98.3 (08-31), Max: 98.3 (08-31)  HR: 62 (08-31) (61 - 68)  BP: 115/72 (08-31) (107/68 - 115/72)  RR: 18 (08-31)  SpO2: 93% (08-31)  I&O's Summary    29 Aug 2023 07:01  -  30 Aug 2023 07:00  --------------------------------------------------------  IN: 200 mL / OUT: 600 mL / NET: -400 mL    30 Aug 2023 07:01  -  31 Aug 2023 06:54  --------------------------------------------------------  IN: 240 mL / OUT: 200 mL / NET: 40 mL        Physical Exam:  GENERAL: No acute distress, well-developed  HEAD:  Atraumatic, Normocephalic  ENT: EOMI, PERRLA, conjunctiva and sclera clear, Neck supple, No JVD, moist mucosa  CHEST/LUNG: wheezing bilat.   BACK: No spinal tenderness  HEART: Regular rate and rhythm; No murmurs, rubs, or gallops  ABDOMEN: Soft, Nontender, Nondistended; Bowel sounds present  EXTREMITIES:  No clubbing, cyanosis, or edema  PSYCH: Nl behavior, nl affect  NEUROLOGY: AAOx3, non-focal, cranial nerves intact  SKIN: Normal color, No rashes or lesions                          15.7   15.25 )-----------( 318      ( 30 Aug 2023 05:08 )             48.1     08-30    138  |  103  |  13  ----------------------------<  121<H>  4.1   |  26  |  0.73    Ca    8.3<L>      30 Aug 2023 05:08  Phos  3.1     08-30  Mg     2.0     08-30    TPro  5.9<L>  /  Alb  2.7<L>  /  TBili  0.3  /  DBili  x   /  AST  15  /  ALT  8<L>  /  AlkPhos  85  08-29

## 2023-08-31 NOTE — PROGRESS NOTE ADULT - PROBLEM SELECTOR PLAN 4
- C/w home Entresto 49/51 mg bid  - C/w lasix 40mg qd  - check daily weights  - Strict I's and O's    #HFrEF  Echo 8/29:  - LVEF 45-50%; severe LVH with concentric hypertrophy and moderate LV diastolic dysfunction, mild LA dilation; mild mitral valve stenosis    Echo Feb 22, 2022:  - LVEF 40%. MIld MR. Mild LA enlargement. MIld concentric LVH. Moderate segmental LV systolic dysfunction with hypokinesis of the inferior wall, posterior-lateral wall basal interventricular septum and apex.  - The LAD shows a patent stent and a 50% mid LAD lesion. Moderate disease of the left circumflex artery. - Defibrillation a few days ago via ICD (Medtronic device, implanted 2019)  - f/u tele  - Per EP: ICD interrogated 8/28, found to have no episodes since 8/22/23  - Per EP: If potassium >4.0 and Mg >2.0, c/w sotalol 80mg Q12hr. Obtain EKG 2 hours after each dose of sotalol. Hold Sotalol for QTc >500ms (Baseline QTc is 468ms).  - Completed 5 doses Sotalol  - Per cardiology: c/w carvedilol 6.25mg BID, lasix 40mg qd

## 2023-08-31 NOTE — PROGRESS NOTE ADULT - SUBJECTIVE AND OBJECTIVE BOX
24H hour events: No acute overnight events    MEDICATIONS:  carvedilol 6.25 milliGRAM(s) Oral every 12 hours  clopidogrel Tablet 75 milliGRAM(s) Oral daily  enoxaparin Injectable 40 milliGRAM(s) SubCutaneous every 24 hours  furosemide    Tablet 40 milliGRAM(s) Oral daily  sacubitril 49 mG/valsartan 51 mG 1 Tablet(s) Oral two times a day  sotalol 80 milliGRAM(s) Oral every 12 hours  albuterol/ipratropium for Nebulization 3 milliLiter(s) Nebulizer every 6 hours PRN  benzonatate 100 milliGRAM(s) Oral every 8 hours  budesonide  80 MICROgram(s)/formoterol 4.5 MICROgram(s) Inhaler 2 Puff(s) Inhalation two times a day  guaiFENesin  milliGRAM(s) Oral every 12 hours  sodium chloride 0.9% for Nebulization 3 milliLiter(s) Nebulizer four times a day  famotidine    Tablet 20 milliGRAM(s) Oral daily  atorvastatin 40 milliGRAM(s) Oral at bedtime        REVIEW OF SYSTEMS:  See HPI, otherwise ROS negative.    PHYSICAL EXAM:  T(C): 36.8 (08-31-23 @ 03:54), Max: 36.8 (08-31-23 @ 03:54)  HR: 62 (08-31-23 @ 03:54) (61 - 68)  BP: 115/72 (08-31-23 @ 03:54) (107/68 - 115/72)  RR: 18 (08-31-23 @ 03:54) (18 - 18)  SpO2: 93% (08-31-23 @ 03:54) (93% - 95%)  Wt(kg): --  I&O's Summary    30 Aug 2023 07:01  -  31 Aug 2023 07:00  --------------------------------------------------------  IN: 240 mL / OUT: 200 mL / NET: 40 mL        Appearance: Alert. NAD	  Cardiovascular: +S1S2 RRR no m/g/r  Respiratory: Chest clear to auscultation  Psychiatry: A & O x 3, Mood & affect appropriate	  Skin: No rashes	  Neurologic: Non-focal  Extremities: No edema BLE  Vascular: Peripheral pulses palpable 2+ bilaterally      LABS:	 	    CBC Full  -  ( 31 Aug 2023 06:38 )  WBC Count : 14.34 K/uL  Hemoglobin : 15.8 g/dL  Hematocrit : 48.6 %  Platelet Count - Automated : 335 K/uL  Mean Cell Volume : 88.2 fl  Mean Cell Hemoglobin : 28.7 pg  Mean Cell Hemoglobin Concentration : 32.5 gm/dL  Auto Neutrophil # : x  Auto Lymphocyte # : x  Auto Monocyte # : x  Auto Eosinophil # : x  Auto Basophil # : x  Auto Neutrophil % : x  Auto Lymphocyte % : x  Auto Monocyte % : x  Auto Eosinophil % : x  Auto Basophil % : x    08-31    137  |  99  |  14  ----------------------------<  109<H>  3.9   |  25  |  0.75  08-30    138  |  103  |  13  ----------------------------<  121<H>  4.1   |  26  |  0.73    Ca    9.1      31 Aug 2023 06:41  Ca    8.3<L>      30 Aug 2023 05:08  Phos  3.9     08-31  Phos  3.1     08-30  Mg     2.0     08-31  Mg     2.0     08-30    TPro  6.1  /  Alb  2.8<L>  /  TBili  0.3  /  DBili  x   /  AST  14  /  ALT  10  /  AlkPhos  73  08-31    TELEMETRY: SR  	    ECG:  	SR

## 2023-08-31 NOTE — PROVIDER CONTACT NOTE (OTHER) - ASSESSMENT
pt asymptomatic, denies cp/sob. vitals stable. pt asymptomatic. SR 60's on tele monitor. Vitals stable, /72, hr 62

## 2023-08-31 NOTE — PROVIDER CONTACT NOTE (OTHER) - REASON
3 beats WCT, upto 120s, back in SR 60s-70s
PAT 4.2 seconds, upto 127, back to SR 60s-70s
pt had 5 beats of WCT on tele  monitor.

## 2023-08-31 NOTE — PROVIDER CONTACT NOTE (OTHER) - SITUATION
pt had 5 beats of WCT on tele  monitor. 3 beats in past.
PAT 4.2 seconds, upto 127, back to SR 60s-70s
3 beats WCT, upto 120s, back in SR 60s-70s

## 2023-08-31 NOTE — PROGRESS NOTE ADULT - PROBLEM SELECTOR PLAN 5
- K+ to 3.0 on admission, currently wnl  - S/p 40mEq KCl tablet and 10 mEQ IVPB in ED  - Maintain K>4 and Mg>2  - F/u BMP and replete as necessary - C/w home Entresto 49/51 mg bid  - C/w lasix 40mg qd  - check daily weights  - Strict I's and O's    #HFrEF  Echo 8/29:  - LVEF 45-50%; severe LVH with concentric hypertrophy and moderate LV diastolic dysfunction, mild LA dilation; mild mitral valve stenosis    Echo Feb 22, 2022:  - LVEF 40%. MIld MR. Mild LA enlargement. MIld concentric LVH. Moderate segmental LV systolic dysfunction with hypokinesis of the inferior wall, posterior-lateral wall basal interventricular septum and apex.  - The LAD shows a patent stent and a 50% mid LAD lesion. Moderate disease of the left circumflex artery.

## 2023-09-01 NOTE — PROGRESS NOTE ADULT - PROBLEM SELECTOR PLAN 3
- Continued high WBC  - BCx grew MRSE, likely contaminant  - repeat BCx still showing no growth  - monitor CBC

## 2023-09-01 NOTE — DISCHARGE NOTE NURSING/CASE MANAGEMENT/SOCIAL WORK - NSDCPEEMAIL_GEN_ALL_CORE
[facility] email [email or web address]

## 2023-09-01 NOTE — PROGRESS NOTE ADULT - PROBLEM SELECTOR PLAN 4
Defibrillation a few days ago via ICD (Medtronic device, implanted 2019)  Per EP: ICD interrogated 8/28, found to have no episodes since 8/22/23  - Per EP: If potassium >4.0 and Mg >2.0, c/w sotalol 80mg Q12hr. Obtain EKG 2 hours after each dose of sotalol. Hold Sotalol for QTc >500ms (Baseline QTc is 468ms).  - Completed 5 doses Sotalol, can c/w Sotalol 80mg BID outpatient per EP, f/u at clinic in 3 months  - Per cardiology: c/w carvedilol 6.25mg BID, lasix 40mg qd

## 2023-09-01 NOTE — PROGRESS NOTE ADULT - PROVIDER SPECIALTY LIST ADULT
Electrophysiology
Internal Medicine
Cardiology
Electrophysiology
Electrophysiology
Internal Medicine

## 2023-09-01 NOTE — PROGRESS NOTE ADULT - ASSESSMENT
71 yo F w/ PMH COPD, HLD, HTN, PVD, CAD s/p PCI 2015, ischemic cardiomyopathy, cardiac arrest s/p left-sided ICD (6/4/2019) complicated by infection and s/p R single-chamber ICD ([place] in 9/23/2019) p/w SOB, hypokalemia, and leukocytosis w/ prelim CXR showing no focal consolidation and urinalysis indicating infection.

## 2023-09-01 NOTE — DISCHARGE NOTE NURSING/CASE MANAGEMENT/SOCIAL WORK - NSDCPEWEB_GEN_ALL_CORE
[facility] website --- [email or web address] --- [email or web address]

## 2023-09-01 NOTE — PROGRESS NOTE ADULT - PROBLEM SELECTOR PLAN 5
- C/w home Entresto 49/51 mg bid  - C/w lasix 40mg qd  - check daily weights  - Strict I's and O's    #HFrEF  Echo 8/29:  - LVEF 45-50%; severe LVH with concentric hypertrophy and moderate LV diastolic dysfunction, mild LA dilation; mild mitral valve stenosis    Echo Feb 22, 2022:  - LVEF 40%. MIld MR. Mild LA enlargement. MIld concentric LVH. Moderate segmental LV systolic dysfunction with hypokinesis of the inferior wall, posterior-lateral wall basal interventricular septum and apex.  - The LAD shows a patent stent and a 50% mid LAD lesion. Moderate disease of the left circumflex artery.

## 2023-09-01 NOTE — DISCHARGE NOTE NURSING/CASE MANAGEMENT/SOCIAL WORK - NSDCVIVACCINE_GEN_ALL_CORE_FT
influenza, injectable, quadrivalent, preservative free; 24-Jan-2015 06:54; [place], Auto-process (IS); [employer]; [id number]; IntraMuscular; Deltoid Left.; 0.5 milliLiter(s); VIS (VIS Published: 19-Aug-2014, VIS Presented: 24-Jan-2015);

## 2023-09-01 NOTE — PROGRESS NOTE ADULT - PROBLEM SELECTOR PLAN 7
[facility] Dosing Service  Warfarin (Coumadin) Subsequent Dosing    Art Ordaz is a 81year old female for whom pharmacy has been dosing warfarin (Coumadin).  Goal INR is 2-3    Recent Labs   Lab  11/20/18   2141  11/21/18   0923  11/22/18   0523  1 - H/H consistently elevated  - EPO: 8.0  - Recommend heme f/u outpatient

## 2023-09-01 NOTE — PROGRESS NOTE ADULT - CONVERSATION DETAILS
Spoke to patient regarding code status.    At this time she is FULL CODE. She is a survivor of cardiac arrest via CPR and is very much FULL CODE, advised that she can change her decision at any time as long as she has decision making capacity.

## 2023-09-01 NOTE — DISCHARGE NOTE NURSING/CASE MANAGEMENT/SOCIAL WORK - PATIENT PORTAL LINK FT
You can access the FollowMyHealth Patient Portal offered by [facility] by registering at the following website: [email or web address]. By joining [employer]’s FollowMyHealth portal, you will also be able to view your health information using other applications (apps) compatible with our system.

## 2023-09-01 NOTE — DISCHARGE NOTE NURSING/CASE MANAGEMENT/SOCIAL WORK - NSSCTYPOFSERV_GEN_ALL_CORE
home care agency  to follow up with teaching on dxHf and medication  after accepting  home care agency  to follow up with teaching on dxHf  and meds

## 2023-09-01 NOTE — PROGRESS NOTE ADULT - PROBLEM SELECTOR PLAN 10
- DVT ppx: Lovenox  - Diet: Per dietitian, change to consistent carb/DASH, encourage PO fluids  - GOC: Full Code
- DVT ppx: Lovenox  - Diet: Per dietitian, change to consistent carb/DASH, encourage PO fluids  - GOC: Full Code

## 2023-09-01 NOTE — DISCHARGE NOTE NURSING/CASE MANAGEMENT/SOCIAL WORK - NSDCFUADDAPPT_GEN_ALL_CORE_FT
APPTS ARE READY TO BE MADE: [ X] YES    Best Family or Patient Contact (if needed):    Additional Information about above appointments (if needed):    1: Cardiology (Dr. Wise) within 2 weeks of DC  2: New PCP  3: EP (Appt made)  Other comments or requests:

## 2023-09-01 NOTE — PROGRESS NOTE ADULT - SUBJECTIVE AND OBJECTIVE BOX
Elizabeth Mueller  4th Year Medical Student    Patient is a 72y old  Female w PMH COPD, HLD, HTN, PVD, CAD s/p PCI 2015, ischemic cardiomyopathy, cardiac arrest s/p left-sided ICD (6/4/2019) complicated by infection and s/p R single-chamber ICD (9/23/2019) presenting with SOB and found to have acute UTI.      Subjective: Patient was evaluated at bedside. She was laying comfortably and reported feeling well today. She denies SOB, CP, NVD, HA.    INTERVAL HPI/OVERNIGHT EVENTS:   No acute events overnight  Afebrile, hemodynamically stable     ICU Vital Signs Last 24 Hrs  T(C): 36.5 (01 Sep 2023 04:34), Max: 36.7 (31 Aug 2023 12:37)  T(F): 97.7 (01 Sep 2023 04:34), Max: 98.1 (31 Aug 2023 12:37)  HR: 65 (01 Sep 2023 04:34) (62 - 65)  BP: 140/75 (01 Sep 2023 04:34) (111/72 - 140/75)  RR: 18 (01 Sep 2023 04:34) (18 - 18)  SpO2: 92% (01 Sep 2023 04:34) (92% - 96%)  Patient On (Oxygen Delivery Method): room air      MEDICATIONS  (STANDING):  atorvastatin 40 milliGRAM(s) Oral at bedtime  benzonatate 100 milliGRAM(s) Oral every 8 hours  budesonide  80 MICROgram(s)/formoterol 4.5 MICROgram(s) Inhaler 2 Puff(s) Inhalation two times a day  carvedilol 6.25 milliGRAM(s) Oral every 12 hours  clopidogrel Tablet 75 milliGRAM(s) Oral daily  enoxaparin Injectable 40 milliGRAM(s) SubCutaneous every 24 hours  famotidine    Tablet 20 milliGRAM(s) Oral daily  furosemide    Tablet 40 milliGRAM(s) Oral daily  guaiFENesin  milliGRAM(s) Oral every 12 hours  nicotine -  14 mG/24Hr(s) Patch 1 Patch Transdermal every 24 hours  sacubitril 49 mG/valsartan 51 mG 1 Tablet(s) Oral two times a day  sodium chloride 0.9% for Nebulization 3 milliLiter(s) Nebulizer four times a day  sotalol 80 milliGRAM(s) Oral every 12 hours      PHYSICAL EXAM:  GENERAL: Patient laying in bed, in no acute distress.   HEAD:  Normocephalic, Atraumatic.  NERVOUS SYSTEM:  Alert & Awake. No gross motor deficits.   CHEST/LUNG: CTAB. No rales, rhonchi, or wheezes appreciated  HEART: Normal S1S2,  Regular rate and rhythm, No murmurs, rubs, or gallops appreciated.  ABDOMEN: Soft, Nontender, Nondistended  EXTREMITIES: 2+ Peripheral Pulses, No clubbing, cyanosis, or edema  SKIN: No rashes or lesions    Review of Systems:  CONSTITUTIONAL:  No weight loss, fever, chills, weakness or fatigue.  HEENT: No headache  CARDIOVASCULAR:  No chest pain, chest pressure or chest discomfort. No palpitations.  RESPIRATORY:  No shortness of breath  GASTROINTESTINAL:  No anorexia, nausea, vomiting or diarrhea.  GENITOURINARY:  Denies hematuria, dysuria.   NEUROLOGICAL:  No headache, dizziness. No change in bowel or bladder control.      LABS:                        15.8   16.15 )-----------( 311      ( 01 Sep 2023 07:11 )             47.5     09-01    136  |  98  |  17  ----------------------------<  130<H>  3.9   |  23  |  0.77    Ca    8.8      01 Sep 2023 07:11  Phos  3.3     09-01  Mg     2.0     09-01    TPro  6.1  /  Alb  3.0<L>  /  TBili  0.4  /  DBili  x   /  AST  15  /  ALT  11  /  AlkPhos  68  09-01    LIVER FUNCTIONS - ( 01 Sep 2023 07:11 )  Alb: 3.0 g/dL / Pro: 6.1 g/dL / ALK PHOS: 68 U/L / ALT: 11 U/L / AST: 15 U/L / GGT: x           Urinalysis + Microscopic Examination (08.28.23 @ 13:12)    pH Urine: 5.0   Urine Appearance: Slightly Turbid   Color: Light Yellow   Specific Gravity: 1.010   Protein, Urine: Negative   Glucose Qualitative, Urine: Negative   Ketone - Urine: Negative   Blood, Urine: Large   Bilirubin: Negative   Urobilinogen: Negative   Leukocyte Esterase Concentration: Large   Nitrite: Negative   White Blood Cell - Urine: 5 /HPF   Red Blood Cell - Urine: 9 /hpf   Bacteria: Many   Hyaline Casts: 0 /lpf   Squamous Epithelial Cells: 1 /hpf    Culture - Urine (08.28.23 @ 17:20)    -  Amikacin: S <=16   -  Amoxicillin/Clavulanic Acid: S <=8/4   -  Ampicillin: R 16 These ampicillin results predict results for amoxicillin   -  Ampicillin/Sulbactam: S <=4/2   -  Aztreonam: S <=4   -  Cefazolin: S <=2 For uncomplicated UTI with K. pneumoniae, E. coli, or P. mirablis: RONALDO <=16 is sensitive and RONALDO >=32 is resistant. This also predicts results for oral agents cefaclor, cefdinir, cefpodoxime, cefprozil, cefuroxime axetil, cephalexin and locarbef for uncomplicated UTI. Note that some isolates may be susceptible to these agents while testing resistant to cefazolin.   -  Cefepime: S <=2   -  Cefoxitin: S <=8   -  Ceftriaxone: S <=1   -  Cefuroxime: S <=4   -  Ciprofloxacin: S <=0.25   -  Ertapenem: S <=0.5   -  Gentamicin: S <=2   -  Imipenem: S <=1   -  Levofloxacin: S <=0.5   -  Meropenem: S <=1   -  Nitrofurantoin: S <=32 Should not be used to treat pyelonephritis   -  Piperacillin/Tazobactam: S <=8   -  Tobramycin: S <=2   -  Trimethoprim/Sulfamethoxazole: S <=0.5/9.5   Specimen Source: Clean Catch Clean Catch (Midstream)   Culture Results:   >100,000 CFU/ml Klebsiella pneumoniae   Organism Identification: Klebsiella pneumoniae   Organism: Klebsiella pneumoniae   Method Type: RONALDO      Culture - Blood (08.30.23 @ 15:43)    Specimen Source: .Blood Blood-Venous   Culture Results:   No growth at 24 hours    Culture - Blood (08.30.23 @ 15:43)    Specimen Source: .Blood Blood-Peripheral   Culture Results:   No growth at 24 hours    Culture - Blood (08.27.23 @ 18:50)    -  Staphylococcus epidermidis, Methicillin resistant: Detec   Gram Stain:   Growth in aerobic bottle: Gram Positive Cocci in Clusters   Specimen Source: .Blood Blood-Peripheral   Organism: Blood Culture PCR   Culture Results:   Growth in aerobic bottle: Staphylococcus epidermidis  Coagulase Negative Staphylococci isolated from a single blood culture set  may represent contamination.  Contact the Microbiology Department at [phone] if susceptibility  testing is clinically indicated.  Direct identification is available within approximately 3-5  hours either by Blood Panel Multiplexed PCR or Direct  MALDI-TOF. Details: [email or web address]   Organism Identification: Blood Culture PCR   Method Type: PCR        I&O's Summary    31 Aug 2023 07:01  -  01 Sep 2023 07:00  --------------------------------------------------------  IN: 470 mL / OUT: 750 mL / NET: -280 mL      RADIOLOGY & ADDITIONAL TESTS:    CXR 8/27:  FINDINGS:  Right-sided ICD.  The heart is not accurately assessed in this AP projection.  No focal consolidations. There is no pleural effusion.  There is no pneumothorax.  No acute bony abnormality.    IMPRESSION:  No focal consolidations.      ECHO 8/29:  CONCLUSIONS:   1. Technically difficult image quality.   2. Normal left ventricular cavity size. Left ventricular wall thickness is normal. Left ventricular systolic function is normal.   3. Basal and mid anterolateral wall, basal and mid inferolateral wall, and basal inferior segment are abnormal.   4. There is moderate (grade 2) left ventricular diastolic dysfunction, with elevated filling pressure.   5. Normal right ventricular cavity size, normal right ventricular wall thickness and normal right ventricular systolic function.   6. The left atrium is mildly dilated in size.   7. The aortic arch diameter is normal.   8. There is calcification of the mitral valve annulus.   9. No pericardial effusion seen.    FINDINGS:  Left Ventricle:  Normal left ventricular cavity size. Left ventricular wall thickness is normal. Left ventricular systolic function is normal with an ejection fraction visually estimated at 45 to 50%. There is moderate (grade 2) left ventricular diastolic dysfunction, with elevated filling pressure. Severe left ventricular hypertrophy. There is increased LV mass and concentric hypertrophy. There is no evidence of a thrombus in the left ventricle.  LV Wall Scoring: The basal and mid anterolateral wall, basal and mid  inferolateral wall, and basal inferior segment are hypokinetic. All remaining  scored segments are normal.     Right Ventricle:  Normal right ventricular cavity size, normal wall thickness and normal right ventricular systolic function. Tricuspid annular plane systolic excursion (TAPSE) is 1.6 cm (normal >=1.7 cm). A device lead is visualized.     Left Atrium:  The left atrium is mildly dilated in size with an indexed volume of 35.20 ml/m².     Right Atrium:  The right atrium is normal in size with an indexed volume of 18.69 ml/m².     Aortic Valve:  The aortic valve appears trileaflet. There is no evidence of aortic regurgitation.     Mitral Valve:  There is calcification of the mitral valve annulus. There is mild mitral valve stenosis. There is trace mitral regurgitation.     Tricuspid Valve:  Structurally normal tricuspid valve with normal leaflet excursion. There is mild tricuspid regurgitation. Estimated pulmonary artery systolic pressure is 29 mmHg.     Pulmonic Valve:  Structurally normal pulmonic valve with normal leaflet excursion. There is trace pulmonic regurgitation.     Aorta:  The aortic annulus and aortic root appear normal in size. The aortic root at the sinuses of Valsalva diameter is normal. The ascending aortadiameter is normal. The aortic arch diameter is normal.     Pericardium:  No pericardial effusion seen. Pericardial fat pad is seen anterior to the right ventricle. There is a trace pericardial effusion.     Systemic Veins:  The inferior vena cava isnormal in size measuring 1.40 cm in diameter, (normal <2.1cm) with normal inspiratory collapse (normal >50%) consistent with normal right atrial pressure (~3, range 0-5mmHg).

## 2023-09-30 NOTE — ED PROVIDER NOTE - OBJECTIVE STATEMENT
72 year old female with h/o HTN, HLD, CAD, obesity, cardiac arrest s/p L sided ICD, s/p infection with extraction, now with R side ICD and PVD presents today mike from home accompanied with her  who states that she woke up this morning asking him for assistance to go to the bathroom, he states that he usually is able to do this by herself but today unable to, after using the bathroom she sat in the chair near the kitchen, shortly after he heard a scream, when he went to her, he found she was confused and groggy, he also reports hearing her defibrillator go off 2-3 times, EMS state that her defibrillator went off once in route, pt presented awake and alert, able to follow commands, lips dry, vitals stable, bp 124/74 at the bedside, pt was seen by her electrophysiologist two days ago, her defibrillator was tested and told it was fine 72 year old female with h/o HTN, HLD, CAD, obesity, cardiac arrest s/p L sided ICD, s/p infection with extraction, now with R side ICD and PVD presents today mike from home accompanied with her  who states that she woke up this morning asking him for assistance to go to the bathroom, he states that he usually is able to do this by herself but today unable to, after using the bathroom she sat in the chair near the kitchen, shortly after he heard a scream, when he went to her, he found she was confused and groggy, he also reports hearing her defibrillator go off 2-3 times, EMS state that her defibrillator went off once in route, pt presented awake and alert, able to follow commands, lips dry, vitals stable, bp 124/74 at the bedside, pt was seen by her electrophysiologist two days ago, her defibrillator was tested and told it was fine    electrophysiologist: Dr Campbell Hurt

## 2023-09-30 NOTE — ED PROVIDER NOTE - ATTENDING APP SHARED VISIT CONTRIBUTION OF CARE
Haider Tyson DO: I have personally performed a face to face medical and diagnostic evaluation of the patient. I have discussed with and reviewed the Resident's and/or ACP's and/or Medical/PA/NP student's note and agree with the History, ROS, Physical Exam and MDM unless otherwise indicated. A brief summary of my personal evaluation and impression can be found below.    72 year old Female w/ PMHx COPD, HLD, HTN, PVD, CAD s/p PCI, ischemic cardiomyopathy, cardiac arrest s/p left-sided ICD (6/4/2019) complicated by infection and s/p R single-chamber ICD ([place] in 9/23/2019) with admission last month for firing of her ICD presents to the ED as a transfer from [facility] for firing of her ICD. Patient reports that this morning she woke up and was feeling okay.  Reports that she had gone to the bathroom and was sitting in a chair and suddenly felt a sharp pain in her right side shoulder with loud sound.  Patient screamed and was evaluated by her  who noted that she seemed a bit confused and groggy.  EMS was called and patient's defibrillator fired an additional 2 times.  She reports that she had an episode of vomiting after initial shock.  Since has felt fine and was without complaints.  She last saw her EP doctor 2 days ago with device interrogation which per patient she was told was unremarkable.  She denies headaches, dizziness, chest pain, shortness of breath, abdominal pain nausea or vomiting.    CONSTITUTIONAL: well-appearing, in NAD  SKIN: Warm dry, normal skin turgor  HEAD: NCAT  EYES: EOMI, PERRLA, no scleral icterus, conjunctiva pink  ENT: normal pharynx with no erythema or exudates  NECK: Supple; non tender. Full ROM.  CARD: RRR, no murmurs.  RESP: clear to ausculation b/l. No crackles or wheezing.  ABD: soft, non-tender, non-distended, no rebound or guarding.  NEURO: normal motor. normal sensory. CN II-XII intact. Cerebellar testing normal. Normal gait.  PSYCH: Cooperative, appropriate.     Patient with multiple shocks and a history of V. tach concern for repeat ventricular tachycardia requiring defibrillation favored over incorrectly functioning defibrillator especially as it was checked 2 days ago, will get EKG labs including troponin and proBNP electrolytes EP consult, this time patient is having no symptoms and feels okay but will keep on cardiac monitor.  Patient will need to be admitted to telemetry versus to CICU depending on findings and AICD interrogation.

## 2023-09-30 NOTE — H&P ADULT - NSHPADDITIONALINFOADULT_GEN_ALL_CORE
71yo woman with active smoking/ COPD, PVD, HFrEF s/p R sided ICD (L side removed for infection) presents after multiple shocks  Neuro: no issues  Pulm: cont home nebs, O2 goal of > 92%  CV: VF s/p over a dozen shocks from R sided ICD. No new ischemic disease and no e/o HF on RHC. Will discuss next steps with EP. Cont lido gtt for now  Renal: no issues  ID: no issues  Heme: hsq ppx  No central access

## 2023-09-30 NOTE — ED PROVIDER NOTE - OBJECTIVE STATEMENT
72 year old Female w/ PMHx COPD, HLD, HTN, PVD, CAD s/p PCI, ischemic cardiomyopathy, cardiac arrest s/p left-sided ICD (6/4/2019) complicated by infection and s/p R single-chamber ICD ([place] in 9/23/2019) with admission last month for firing of her ICD presents to the ED as a transfer from [facility] for firing of her ICD. Patient reports that this morning she woke up and was feeling okay.  Reports that she had gone to the bathroom and was sitting in a chair and suddenly felt a sharp pain in her right side shoulder with loud sound.  Patient screamed and was evaluated by her  who noted that she seemed a bit confused and groggy.  EMS was called and patient's defibrillator fired an additional 2 times.  She reports that she had an episode of vomiting after initial shock.  Since has felt fine and was without complaints.  She last saw her EP doctor 2 days ago with device interrogation which per patient she was told was unremarkable.  She denies headaches, dizziness, chest pain, shortness of breath, abdominal pain nausea or vomiting.

## 2023-09-30 NOTE — ED PROVIDER NOTE - CPE EDP EYES NORM
History of Present Illness





- Reason for Consult


Consult date: 07/08/22


Abnormal CBC with peripheral blasts


Requesting physician: Kasia Abarca





- History of Present Illness





Mr. Hilliard is a 61 year old male who presented with recurrent syncopal episodes 

with loss of consciousness. He has a known history of DVT on eliquis. On 

admission found to be markedly macrocytis, thrombocytemia, platelets 74K, 

peripheral differential revealing blasts 2%. I have spoken to primary team who 

has asked us to see patient for concern of acute bone marrow disorder, 

concerning for acute leukemia. 














Past Medical History


Past Medical History: Blood Disorder, COPD, Hypertension


Additional Past Medical History / Comment(s): chronic back pain


History of Any Multi-Drug Resistant Organisms: None Reported


Past Surgical History: Back Surgery, Orthopedic Surgery


Past Anesthesia/Blood Transfusion Reactions: No Reported Reaction


Smoking Status: Current every day smoker


Past Alcohol Use History: None Reported


Past Drug Use History: None Reported





- Past Family History


  ** Mother


Family Medical History: Hypertension





Medications and Allergies


                                Home Medications











 Medication  Instructions  Recorded  Confirmed  Type


 


ALPRAZolam [Xanax] 1 mg PO TID 07/07/22 07/07/22 History


 


Albuterol Inhaler [Ventolin Hfa 2 puff INHALATION RT-QID PRN 07/07/22 07/07/22 H

istory





Inhaler]    


 


Apixaban [Eliquis] 5 mg PO BID 07/07/22 07/07/22 History


 


Baclofen 10 mg PO BID 07/07/22 07/07/22 History


 


Fluticasone/Umeclidin/Vilanter 1 puff INHALATION RT-DAILY 07/07/22 07/07/22 

History





[Trelegy Ellipta 100-62.5-25]    


 


Furosemide [Lasix] 20 mg PO HS 07/07/22 07/07/22 History


 


Furosemide [Lasix] 40 mg PO DAILY 07/07/22 07/07/22 History


 


Mirtazapine 30 mg PO HS 07/07/22 07/07/22 History


 


Montelukast [Singulair] 10 mg PO HS 07/07/22 07/07/22 History


 


Mupirocin 2% Oint [Bactroban 2% 1 applic TOPICAL DAILY 07/07/22 07/07/22 History





Oint]    


 


Naloxone HCl [Narcan] 4 mg NASAL ONCE PRN 07/07/22 07/07/22 History


 


Pantoprazole [Protonix] 40 mg PO DAILY 07/07/22 07/07/22 History


 


Potassium Chloride ER [K-Dur 20] 20 meq PO DAILY 07/07/22 07/07/22 History


 


Pregabalin [Lyrica] 150 mg PO BID 07/07/22 07/07/22 History


 


Promethazine HCl [Phenergan Syrup] 6.25 mg PO Q6H PRN 07/07/22 07/07/22 History


 


hydroCHLOROthiazide [Hydrodiuril] 25 mg PO DAILY 07/07/22 07/07/22 History


 


lisinopriL [Prinivil] 20 mg PO DAILY 07/07/22 07/07/22 History


 


oxyCODONE-APAP 10-325MG [Percocet 1 tab PO TID 07/07/22 07/07/22 History





 mg]    








                                    Allergies











Allergy/AdvReac Type Severity Reaction Status Date / Time


 


Penicillins Allergy  Anaphylaxis Verified 07/07/22 18:15


 


tamsulosin [From Flomax] Allergy  Rash/Hives Verified 07/07/22 17:40


 


ibuprofen AdvReac  Nausea Verified 07/07/22 17:40














Physical Exam


Vitals: 


                                   Vital Signs











  Temp Pulse Pulse Pulse Resp BP BP


 


 07/08/22 16:25       


 


 07/08/22 14:00      18  


 


 07/08/22 12:08  98.0 F   64   18   133/79


 


 07/08/22 08:00  97.9 F   63   18   122/67


 


 07/08/22 04:09  97.7 F    64  16   124/71


 


 07/08/22 03:11      16  


 


 07/08/22 00:44  97.7 F    62  16   135/70


 


 07/07/22 22:19  97.8 F   72  75  18   133/64


 


 07/07/22 19:20  97.8 F  78    18  139/72 


 


 07/07/22 17:09   75    20  137/71 














  Pulse Ox


 


 07/08/22 16:25  95


 


 07/08/22 14:00 


 


 07/08/22 12:08  93 L


 


 07/08/22 08:00  97


 


 07/08/22 04:09  94 L


 


 07/08/22 03:11 


 


 07/08/22 00:44  98


 


 07/07/22 22:19  98


 


 07/07/22 19:20  95


 


 07/07/22 17:09  98








                                Intake and Output











 07/08/22 07/08/22 07/08/22





 06:59 14:59 22:59


 


Intake Total 10 358 


 


Output Total 300 300 


 


Balance -290 58 


 


Intake:   


 


  IV 10  


 


    Invasive Line 1 10  


 


  Oral  358 


 


Output:   


 


  Urine 300 300 


 


Other:   


 


  Voiding Method Toilet Toilet 





 Urinal Urinal 

















- Constitutional


General appearance: Present: cooperative, no acute distress





- EENT


Eyes: Present: EOMI


ENT: Present: NA/AT





- Neck


Neck: Present: normal ROM





- Respiratory


Respiratory: bilateral: CTA





- Cardiovascular


Rhythm: regularly irregular





- Gastrointestinal


General gastrointestinal: Present: distended, soft





- Integumentary


Integumentary: Present: pale





- Neurologic


Neurologic: Present: CNII-XII intact





- Musculoskeletal


Musculoskeletal: Present: generalized weakness





- Psychiatric


Psychiatric: Present: A&O x's 3





Results


CBC & Chem 7: 


                                 07/09/22 07:12





                                 07/09/22 07:12


Labs: 


                  Abnormal Lab Results - Last 24 Hours (Table)











  07/07/22 07/07/22 07/08/22 Range/Units





  14:48 23:00 02:13 


 


RBC  2.25 L  2.23 L  2.17 L  (4.30-5.90)  m/uL


 


Hgb  8.5 L  8.4 L  8.1 L  (13.0-17.5)  gm/dL


 


Hct  25.9 L  25.9 L  25.6 L  (39.0-53.0)  %


 


MCV  114.8 H  116.0 H  117.8 H  (80.0-100.0)  fL


 


MCH  37.5 H  37.5 H  37.1 H  (25.0-35.0)  pg


 


RDW  17.0 H  16.9 H  17.6 H  (11.5-15.5)  %


 


Plt Count  74 L  79 L  72 L  (150-450)  k/uL


 


Blast Cells %  14 H*  9 H*  4 H*  %


 


Lymphocytes # (Manual)  0.57 L  0.75 L  0.84 L  (1.0-4.8)  k/uL


 


Myelocytes # (Manual)  0.09 H    (0)  k/uL


 


Blast Cells # (Man)  0.62 H  0.45 H  0.18 H  (0)  k/uL


 


Nucleated RBCs  1 H  1 H  1 H  (0-0)  /100 WBC


 


Pathologist Review  See comment A    


 


Macrocytosis  Marked A  Marked A  Marked A  


 


Potassium     (3.5-5.1)  mmol/L


 


Chloride     ()  mmol/L


 


BUN     (9-20)  mg/dL


 


Glucose     (74-99)  mg/dL


 


Calcium     (8.4-10.2)  mg/dL


 


AST     (17-59)  U/L


 


ALT     (4-49)  U/L


 


Total Protein     (6.3-8.2)  g/dL


 


Albumin     (3.5-5.0)  g/dL


 


HDL Cholesterol     (40.00-60.00)  mg/dL














  07/08/22 07/08/22 Range/Units





  02:13 07:53 


 


RBC    (4.30-5.90)  m/uL


 


Hgb    (13.0-17.5)  gm/dL


 


Hct    (39.0-53.0)  %


 


MCV    (80.0-100.0)  fL


 


MCH    (25.0-35.0)  pg


 


RDW    (11.5-15.5)  %


 


Plt Count    (150-450)  k/uL


 


Blast Cells %    %


 


Lymphocytes # (Manual)    (1.0-4.8)  k/uL


 


Myelocytes # (Manual)    (0)  k/uL


 


Blast Cells # (Man)    (0)  k/uL


 


Nucleated RBCs    (0-0)  /100 WBC


 


Pathologist Review    


 


Macrocytosis    


 


Potassium   3.4 L  (3.5-5.1)  mmol/L


 


Chloride   108 H  ()  mmol/L


 


BUN   39 H  (9-20)  mg/dL


 


Glucose   120 H  (74-99)  mg/dL


 


Calcium   8.0 L  (8.4-10.2)  mg/dL


 


AST   73 H  (17-59)  U/L


 


ALT   62 H  (4-49)  U/L


 


Total Protein   5.7 L  (6.3-8.2)  g/dL


 


Albumin   3.2 L  (3.5-5.0)  g/dL


 


HDL Cholesterol  32.90 L   (40.00-60.00)  mg/dL














Assessment and Plan


(1) Abnormal complete blood count


Narrative/Plan: 


COncern for primary acute bone marrow discorder


 - Bone Marrow Biopsy scheduled for Tuesday


Current Visit: Yes   Status: Acute   Code(s): R79.89 - OTHER SPECIFIED ABNORMAL 

FINDINGS OF BLOOD CHEMISTRY   SNOMED Code(s): 126604984


   





(2) Macrocytic anemia


Current Visit: Yes   Status: Acute   Code(s): D53.9 - NUTRITIONAL ANEMIA, 

UNSPECIFIED   SNOMED Code(s): 30410953


   





(3) Thrombocytopenia


Current Visit: Yes   Status: Acute   Code(s): D69.6 - THROMBOCYTOPENIA, 

UNSPECIFIED   SNOMED Code(s): 338524821


   





(4) Hypokalemia


Current Visit: Yes   Status: Acute   Code(s): E87.6 - HYPOKALEMIA   SNOMED 

Code(s): 90235520


   


Plan: 





Spoke with primary team regarding abnormal peripheral smear.  Marked 

Macrocytosis with peripheral blasts amongst other immature blood cells. Likely 

concern for underlying acute bone marrow issue





Bone Marrow Biopsy Scheduled for Tuesday


Full Macrocytic Work-up Ordered





Monitor closely for infection, bleeding, DIC. 


Replacement of electrolytes per primary team, Hypokalemia, check mag


If rapid increase in WBC over weekend and a blast crisis is presenting can start

hydrea





Bone marrow biopsy will be completed on Tuesday, patient aware. We discussed 

risks and benefits and procedure. All questions answered.
History of Present Illness





- Reason for Consult


Consult date: 07/12/22


Hepatitis C


Requesting physician: Tahira Gooden





- Chief Complaint


Weakness and fall x few days





- History of Present Illness





Patient is a 61-year-old  male with a past medical he significant for 

COPD hypertension and smoking presenting to the hospital about 6 days ago for 

evaluation of possible gout about 3 times in 3 days before presentation to the 

hospital patient feels lightheaded and the next thing he is walking up on the 

floor with the symptoms the patient has been evaluated over the last 5 to 6 days

in this hospital by cardiology and neurology services patient was noticed to 

have elevated liver enzymes further work-up has been done and the patient 

hepatitis C RNA came back positive that has prompted this infectious disease 

consultation patient did not remember if he was ever tested positive for 

hepatitis C before patient did admit to smoking however is categorically denying

using any IV drugs or snorting cocaine no history of blood transfusion patient 

currently denies any pain to the right upper quadrant area no nausea no vomiting

no chest pain shortness of breath or cough





Review of Systems


Positive point has been  mentioned in the HPI rest of the systems are negative








Past Medical History


Past Medical History: Blood Disorder, COPD, Hypertension


Additional Past Medical History / Comment(s): chronic back pain


History of Any Multi-Drug Resistant Organisms: None Reported


Past Surgical History: Back Surgery, Orthopedic Surgery


Past Anesthesia/Blood Transfusion Reactions: No Reported Reaction


Smoking Status: Current every day smoker


Past Alcohol Use History: None Reported


Past Drug Use History: None Reported





- Past Family History


  ** Mother


Family Medical History: Hypertension





Medications and Allergies


                                Home Medications











 Medication  Instructions  Recorded  Confirmed  Type


 


ALPRAZolam [Xanax] 1 mg PO TID 07/07/22 07/07/22 History


 


Albuterol Inhaler [Ventolin Hfa 2 puff INHALATION RT-QID PRN 07/07/22 07/07/22 

History





Inhaler]    


 


Baclofen 10 mg PO BID 07/07/22 07/07/22 History


 


Fluticasone/Umeclidin/Vilanter 1 puff INHALATION RT-DAILY 07/07/22 07/07/22 

History





[Trelegy Ellipta 100-62.5-25]    


 


Furosemide [Lasix] 20 mg PO HS 07/07/22 07/07/22 History


 


Mirtazapine 30 mg PO HS 07/07/22 07/07/22 History


 


Montelukast [Singulair] 10 mg PO HS 07/07/22 07/07/22 History


 


Mupirocin 2% Oint [Bactroban 2% 1 applic TOPICAL DAILY 07/07/22 07/07/22 History





Oint]    


 


Naloxone HCl [Narcan] 4 mg NASAL ONCE PRN 07/07/22 07/07/22 History


 


Pantoprazole [Protonix] 40 mg PO DAILY 07/07/22 07/07/22 History


 


Potassium Chloride ER [K-Dur 20] 20 meq PO DAILY 07/07/22 07/07/22 History


 


Pregabalin [Lyrica] 150 mg PO BID 07/07/22 07/07/22 History


 


Promethazine HCl [Phenergan Syrup] 6.25 mg PO Q6H PRN 07/07/22 07/07/22 History


 


hydroCHLOROthiazide [Hydrodiuril] 25 mg PO DAILY 07/07/22 07/07/22 History


 


lisinopriL [Prinivil] 20 mg PO DAILY 07/07/22 07/07/22 History


 


Folic Acid 1 mg PO DAILY 30 Days #30 tab 07/15/22  Rx


 


HYDROcodone/APAP 7.5-325MG [Norco 1 each PO Q8H PRN 14 Days #42 tab 07/15/22  Rx





7.5-325]    


 


levETIRAcetam [Keppra] 500 mg PO Q12HR 30 Days #60 tab 07/15/22  Rx








                                    Allergies











Allergy/AdvReac Type Severity Reaction Status Date / Time


 


Penicillins Allergy  Anaphylaxis Verified 07/07/22 18:15


 


tamsulosin [From Flomax] Allergy  Rash/Hives Verified 07/07/22 17:40


 


ibuprofen AdvReac  Nausea Verified 07/07/22 17:40














Physical Exam


Vitals: 


                                   Vital Signs











  Temp Pulse Pulse Resp BP BP Pulse Ox


 


 07/12/22 12:03   80     


 


 07/12/22 04:00  97.6 F   94  20   165/72  100


 


 07/11/22 23:48    96  24   152/75  99


 


 07/11/22 20:00  98.5 F   83  18   175/80  93 L


 


 07/11/22 15:45  97.3 F L   80  16  173/89   91 L








                                Intake and Output











 07/11/22 07/12/22 07/12/22





 22:59 06:59 14:59


 


Intake Total 120  


 


Balance 120  


 


Intake:   


 


  Oral 120  


 


Other:   


 


  Voiding Method Incontinent Incontinent 


 


  # Voids 1 0 











GENERAL DESCRIPTION: Middle-aged male lying in bed, no distress. No tachypnea or

accessory muscle of respiration use.


HEENT: Shows Pallor , no scleral icterus. Oral mucous membrane is dry. No 

pharyngeal erythema or thrush


NECK: Trachea central, no thyromegaly.


LUNGS: Unlabored breathing.  Decreased breath sounds at the Base. No wheeze or 

crackle.


HEART: S1, S2, regular rate and rhythm. No loud murmur


ABDOMEN: Soft, no tenderness , guarding or rigidity, no organomegaly


EXTREMITIES: No edema of feet.


SKIN: No rash, no masses palpable.


NEUROLOGICAL: The patient is awake, alert, oriented x3, mood and affect normal.

















Results


CBC & Chem 7: 


                                 07/15/22 06:59





                                 07/15/22 06:59


Labs: 


                  Abnormal Lab Results - Last 24 Hours (Table)











  07/08/22 07/08/22 07/11/22 Range/Units





  02:13 17:01 16:18 


 


WBC    3.0 L  (3.8-10.6)  k/uL


 


RBC    2.32 L  (4.30-5.90)  m/uL


 


Hgb    8.6 L  (13.0-17.5)  gm/dL


 


Hct    26.3 L  (39.0-53.0)  %


 


MCV    113.3 H  (80.0-100.0)  fL


 


MCH    37.1 H  (25.0-35.0)  pg


 


RDW    16.3 H  (11.5-15.5)  %


 


Plt Count    60 L  (150-450)  k/uL


 


Blast Cells %    6 H*  %


 


Neutrophils # (Manual)    1.29 L  (1.3-7.7)  k/uL


 


Lymphocytes # (Manual)     (1.0-4.8)  k/uL


 


Blast Cells # (Man)    0.18 H  (0)  k/uL


 


Macrocytosis    Marked A  


 


Potassium     (3.5-5.1)  mmol/L


 


Chloride     ()  mmol/L


 


Creatinine     (0.66-1.25)  mg/dL


 


POC Glucose (mg/dL)     ()  mg/dL


 


Calcium     (8.4-10.2)  mg/dL


 


AST     (17-59)  U/L


 


ALT     (4-49)  U/L


 


Total Protein     (6.3-8.2)  g/dL


 


Albumin     (3.5-5.0)  g/dL


 


RBC Folate  1,187 H    (280 - 791)  ng/mL


 


HCV RNA Qual (PCR)   DETECTED A   (Not detected)  














  07/11/22 07/12/22 07/12/22 Range/Units





  16:18 02:33 07:20 


 


WBC    3.0 L  (3.8-10.6)  k/uL


 


RBC    2.41 L  (4.30-5.90)  m/uL


 


Hgb    9.0 L  (13.0-17.5)  gm/dL


 


Hct    28.2 L  (39.0-53.0)  %


 


MCV    117.1 H  (80.0-100.0)  fL


 


MCH    37.2 H  (25.0-35.0)  pg


 


RDW    16.8 H  (11.5-15.5)  %


 


Plt Count    59 L  (150-450)  k/uL


 


Blast Cells %    7 H*  %


 


Neutrophils # (Manual)     (1.3-7.7)  k/uL


 


Lymphocytes # (Manual)    0.99 L  (1.0-4.8)  k/uL


 


Blast Cells # (Man)    0.21 H  (0)  k/uL


 


Macrocytosis    Marked A  


 


Potassium  3.3 L    (3.5-5.1)  mmol/L


 


Chloride  111 H    ()  mmol/L


 


Creatinine  0.65 L    (0.66-1.25)  mg/dL


 


POC Glucose (mg/dL)   117 H   ()  mg/dL


 


Calcium  8.1 L    (8.4-10.2)  mg/dL


 


AST  105 H    (17-59)  U/L


 


ALT  122 H    (4-49)  U/L


 


Total Protein  5.7 L    (6.3-8.2)  g/dL


 


Albumin  3.4 L    (3.5-5.0)  g/dL


 


RBC Folate     (280 - 791)  ng/mL


 


HCV RNA Qual (PCR)     (Not detected)  














  07/12/22 Range/Units





  07:20 


 


WBC   (3.8-10.6)  k/uL


 


RBC   (4.30-5.90)  m/uL


 


Hgb   (13.0-17.5)  gm/dL


 


Hct   (39.0-53.0)  %


 


MCV   (80.0-100.0)  fL


 


MCH   (25.0-35.0)  pg


 


RDW   (11.5-15.5)  %


 


Plt Count   (150-450)  k/uL


 


Blast Cells %   %


 


Neutrophils # (Manual)   (1.3-7.7)  k/uL


 


Lymphocytes # (Manual)   (1.0-4.8)  k/uL


 


Blast Cells # (Man)   (0)  k/uL


 


Macrocytosis   


 


Potassium   (3.5-5.1)  mmol/L


 


Chloride  114 H  ()  mmol/L


 


Creatinine   (0.66-1.25)  mg/dL


 


POC Glucose (mg/dL)   ()  mg/dL


 


Calcium  8.0 L  (8.4-10.2)  mg/dL


 


AST  83 H  (17-59)  U/L


 


ALT  109 H  (4-49)  U/L


 


Total Protein  5.5 L  (6.3-8.2)  g/dL


 


Albumin  3.3 L  (3.5-5.0)  g/dL


 


RBC Folate   (280 - 791)  ng/mL


 


HCV RNA Qual (PCR)   (Not detected)  








                      Microbiology - Last 24 Hours (Table)











 07/08/22 17:01 Blood Culture - Preliminary





 Blood    No Growth after 72 hours


 


 07/08/22 16:47 Blood Culture - Preliminary





 Blood    No Growth after 72 hours














Assessment and Plan


(1) Hepatitis C


Status: Acute   Code(s): B19.20 - UNSPECIFIED VIRAL HEPATITIS C WITHOUT HEPATIC 

COMA   SNOMED Code(s): 04353937


   


Plan: 


1patient with a positive hepatitis C RNA likely indicating of chronic hepatitis

C however the patient seem to be not aware of, however no evidence of any acute 

decompensation of his liver and treatment will be mostly outpatient.


2the natural course of chronic hepatitis C including progression to liver 

cirrhosis and hepatocellular carcinoma has been discussed with the patient and 

the patient seem to be interested in getting treatment.


3we will check HIV to make sure no evidence of any coinfection genotype and 

treatment will be mostly outpatient setting.


4advised against excessive Tylenol or alcohol intake.


We will follow on clinical condition and cultures to further adjust medication 

if needed


Thank you for this consultation will follow this patient along with you





Time with Patient: Greater than 30
normal...

## 2023-09-30 NOTE — H&P ADULT - NSHPREVIEWOFSYSTEMS_GEN_ALL_CORE
CONSTITUTIONAL: No weakness, fevers or chills  EYES/ENT: No visual changes;  No vertigo or throat pain   NECK: No pain or stiffness  RESPIRATORY: No cough, wheezing, hemoptysis; No shortness of breath  CARDIOVASCULAR: No chest pain or palpitations  GASTROINTESTINAL: No abdominal or epigastric pain. No nausea, vomiting, or hematemesis; No diarrhea or constipation. No melena or hematochezia.  GENITOURINARY: No dysuria, frequency or hematuria  NEUROLOGICAL: No numbness or weakness  SKIN: No itching, rashes  All other negative CONSTITUTIONAL: No weakness, fevers or chills  EYES/ENT: No visual changes;  No vertigo or throat pain   NECK: No pain or stiffness  RESPIRATORY: No cough, wheezing, hemoptysis; No shortness of breath  CARDIOVASCULAR: See HPI   GASTROINTESTINAL: No abdominal or epigastric pain. No nausea, vomiting, or hematemesis; No diarrhea or constipation. No melena or hematochezia.  GENITOURINARY: No dysuria, frequency or hematuria  NEUROLOGICAL: No numbness or weakness  SKIN: No itching, rashes  All other negative

## 2023-09-30 NOTE — PROGRESS NOTE ADULT - SUBJECTIVE AND OBJECTIVE BOX
Patient is a 72y old  Female who presents with a chief complaint of ICD fires (30 Sep 2023 14:23)    INTERVAL HISTORY:  -     SUBJECTIVE  - Patient seen and evaluated at bedside.     MEDICATIONS:  MEDICATIONS  (STANDING):  atorvastatin 40 milliGRAM(s) Oral at bedtime  budesonide 160 MICROgram(s)/formoterol 4.5 MICROgram(s) Inhaler 2 Puff(s) Inhalation two times a day  carvedilol 3.125 milliGRAM(s) Oral every 12 hours  clopidogrel Tablet 75 milliGRAM(s) Oral daily  famotidine    Tablet 20 milliGRAM(s) Oral daily  furosemide    Tablet 40 milliGRAM(s) Oral daily  influenza  Vaccine (HIGH DOSE) 0.7 milliLiter(s) IntraMuscular once  insulin lispro (ADMELOG) corrective regimen sliding scale   SubCutaneous at bedtime  insulin lispro (ADMELOG) corrective regimen sliding scale   SubCutaneous three times a day before meals  lidocaine   Infusion 1 mG/Min (7.5 mL/Hr) IV Continuous <Continuous>  nicotine - 21 mG/24Hr(s) Patch 1 Patch Transdermal daily  nystatin Cream 1 Application(s) Topical every 12 hours    OBJECTIVE:  ICU Vital Signs Last 24 Hrs  T(C): 36.7 (30 Sep 2023 16:00), Max: 36.8 (30 Sep 2023 04:26)  T(F): 98 (30 Sep 2023 16:00), Max: 98.3 (30 Sep 2023 04:26)  HR: 69 (30 Sep 2023 19:15) (57 - 87)  BP: 127/46 (30 Sep 2023 19:15) (73/39 - 127/46)  BP(mean): 66 (30 Sep 2023 19:15) (50 - 81)  RR: 30 (30 Sep 2023 19:15) (16 - 30)  SpO2: 94% (30 Sep 2023 19:15) (90% - 97%)    O2 Parameters below as of 30 Sep 2023 18:30  Patient On (Oxygen Delivery Method): room air    Adult Advanced Hemodynamics Last 24 Hrs  CVP(mm Hg): --  CVP(cm H2O): --  CO: --  CI: --  PA: --  PA(mean): --  PCWP: --  SVR: --  SVRI: --  PVR: --  PVRI: --  CAPILLARY BLOOD GLUCOSE        CAPILLARY BLOOD GLUCOSE        I&O's Summary    30 Sep 2023 07:01  -  30 Sep 2023 19:58  --------------------------------------------------------  IN: 377.5 mL / OUT: 200 mL / NET: 177.5 mL      Daily Height in cm: 154.94 (30 Sep 2023 12:28)    Daily     PHYSICAL EXAM:  General: NAD, well-groomed, well-developed  Eyes: Conjunctiva and sclera clear  ENMT: Moist mucous membranes  Neck: Supple  Chest: Clear to auscultation bilaterally; no rales, rhonchi, or wheezing  Heart: Regular rate and rhythm; normal S1 and S2; no murmurs, rubs, or gallops  Abd: Soft, nontender, nondistended  Nervous System: AAOX3  Ext: no peripheral LE edema bilaterally  Lines/Tubes: IV peripheral    LABS:                          14.3   16.39 )-----------( 318      ( 30 Sep 2023 09:56 )             43.9     09-30    141  |  105  |  9   ----------------------------<  133<H>  4.0   |  25  |  0.56    Ca    8.4      30 Sep 2023 09:56  Phos  4.0     09-30  Mg     2.2     09-30    TPro  6.0  /  Alb  2.8<L>  /  TBili  0.4  /  DBili  x   /  AST  29  /  ALT  11  /  AlkPhos  63  09-30    LIVER FUNCTIONS - ( 30 Sep 2023 09:56 )  Alb: 2.8 g/dL / Pro: 6.0 g/dL / ALK PHOS: 63 U/L / ALT: 11 U/L / AST: 29 U/L / GGT: x           PT/INR - ( 30 Sep 2023 09:56 )   PT: 11.3 sec;   INR: 1.08 ratio         PTT - ( 30 Sep 2023 09:56 )  PTT:28.4 sec  Creatine Kinase, Serum: 594 U/L (09-30 @ 09:56)  CKMB Units: 8.3 ng/mL (09-30 @ 09:56)  Creatine Kinase, Serum: 78 U/L (09-30 @ 05:10)  CKMB Units: <1.0 ng/mL (09-30 @ 05:10)    CARDIAC MARKERS ( 30 Sep 2023 09:56 )  x     / x     / 594 U/L / x     / 8.3 ng/mL  CARDIAC MARKERS ( 30 Sep 2023 05:10 )  x     / x     / 78 U/L / x     / <1.0 ng/mL      Urinalysis Basic - ( 30 Sep 2023 09:56 )    Color: x / Appearance: x / SG: x / pH: x  Gluc: 133 mg/dL / Ketone: x  / Bili: x / Urobili: x   Blood: x / Protein: x / Nitrite: x   Leuk Esterase: x / RBC: x / WBC x   Sq Epi: x / Non Sq Epi: x / Bacteria: x      Assessment: 72F COPD, HLD, HTN, PVD, ICM/CAD s/p PCI, cardiac arrest s/p left-sided ICD (6/4/2019) complicated by infection and s/p R single-chamber ICD ([place] in 9/23/2019) recent admission of UTI (treated w/ ceftriaxone and Vanco x 3 days) and now presented w/ x14 appropriate ICD firing episodes today. Admitted to CICU on Lido gtt.    Plan:  ====================== NEUROLOGY=====================  A&Ox3  - continue to monitor mental status as per protocol     ==================== RESPIRATORY======================  Stable on room air  - continue to monitor SpO2 with goal >94%    ====================CARDIOVASCULAR==================  VF  - with x14 appropriate ICD firing episodes per interrogation  - c/w lido gtt at 1 - 9/30 LHC/RHC: Patent LAD stent, RCA , mild LCx dz, RA 8, PA 39/24, PCW 22, MVO2 76%, CI 3.76. No intervention  - 8/29 TTE: normal LV function w/ Basal/mid anterolateral wall, basal/mid inferolateral wall, and basal inferior segment are abnormal  - monitor lytes  - f/u EP reccs  - HF consult in AM    ICM/hx MI  - c/w Plavix   - Lipitor   - Coreg 3.125  - c/w Lasix 40BID     ===================== RENAL =========================  Monitor Cr and lytes  - Cr downtrending   - Continue monitoring urine output, lytes, SCr/ BUN  - replete lytes prn with goal K >4 and Mg >2    =============== GASTROINTESTINAL===================  DASH diet    ===================ENDO====================  DM  - A1c 6.7 (on 8/28)  - ISS    ===================HEMATOLOGIC/ONC ===================  H/H  & plts stable  - Monitor H/H and plts  - DVT PPX: HSQ @ 8AM 10/1    ==================INFECTIOUS DISEASE================  No acute issues  - afebrile  - + leukocytosis  - monitor and trend WBC and temperature curve     Dispo: Maintain in ICU while on Lido gtt.    Patient requires continuous monitoring with bedside rhythm monitoring, arterial line, pulse oximetry, ventilator monitoring and intermittent blood gas analysis.  Care plan discussed with ICU care team.  I have spent 35 minutes providing critical care, in addition to initial critical time provided by CICU attending Dr. Recinos, re-evaluated multiple times during the day.    Kamilah Astudillo PA-C Patient is a 72y old  Female who presents with a chief complaint of ICD fires (30 Sep 2023 14:23)    INTERVAL HISTORY:  - Admitted to CICU on Lido gtt    SUBJECTIVE  - Patient seen and evaluated at bedside.     MEDICATIONS:  MEDICATIONS  (STANDING):  atorvastatin 40 milliGRAM(s) Oral at bedtime  budesonide 160 MICROgram(s)/formoterol 4.5 MICROgram(s) Inhaler 2 Puff(s) Inhalation two times a day  carvedilol 3.125 milliGRAM(s) Oral every 12 hours  clopidogrel Tablet 75 milliGRAM(s) Oral daily  famotidine    Tablet 20 milliGRAM(s) Oral daily  furosemide    Tablet 40 milliGRAM(s) Oral daily  influenza  Vaccine (HIGH DOSE) 0.7 milliLiter(s) IntraMuscular once  insulin lispro (ADMELOG) corrective regimen sliding scale   SubCutaneous at bedtime  insulin lispro (ADMELOG) corrective regimen sliding scale   SubCutaneous three times a day before meals  lidocaine   Infusion 1 mG/Min (7.5 mL/Hr) IV Continuous <Continuous>  nicotine - 21 mG/24Hr(s) Patch 1 Patch Transdermal daily  nystatin Cream 1 Application(s) Topical every 12 hours    OBJECTIVE:  ICU Vital Signs Last 24 Hrs  T(C): 36.7 (30 Sep 2023 16:00), Max: 36.8 (30 Sep 2023 04:26)  T(F): 98 (30 Sep 2023 16:00), Max: 98.3 (30 Sep 2023 04:26)  HR: 69 (30 Sep 2023 19:15) (57 - 87)  BP: 127/46 (30 Sep 2023 19:15) (73/39 - 127/46)  BP(mean): 66 (30 Sep 2023 19:15) (50 - 81)  RR: 30 (30 Sep 2023 19:15) (16 - 30)  SpO2: 94% (30 Sep 2023 19:15) (90% - 97%)    O2 Parameters below as of 30 Sep 2023 18:30  Patient On (Oxygen Delivery Method): room air    I&O's Summary    30 Sep 2023 07:01  -  30 Sep 2023 19:58  --------------------------------------------------------  IN: 377.5 mL / OUT: 200 mL / NET: 177.5 mL      Daily Height in cm: 154.94 (30 Sep 2023 12:28)    Daily     PHYSICAL EXAM:  General: NAD, well-groomed, well-developed  Eyes: Conjunctiva and sclera clear  ENMT: Moist mucous membranes  Neck: Supple  Chest: Clear to auscultation bilaterally; no rales, rhonchi, or wheezing  Heart: Regular rate and rhythm; normal S1 and S2; no murmurs, rubs, or gallops  Abd: Soft, nontender, nondistended  Nervous System: AAOX3  Ext: no peripheral LE edema bilaterally      LABS:                        14.3   16.39 )-----------( 318      ( 30 Sep 2023 09:56 )             43.9     09-30    141  |  105  |  9   ----------------------------<  133<H>  4.0   |  25  |  0.56    Ca    8.4      30 Sep 2023 09:56  Phos  4.0     09-30  Mg     2.2     09-30    TPro  6.0  /  Alb  2.8<L>  /  TBili  0.4  /  DBili  x   /  AST  29  /  ALT  11  /  AlkPhos  63  09-30    LIVER FUNCTIONS - ( 30 Sep 2023 09:56 )  Alb: 2.8 g/dL / Pro: 6.0 g/dL / ALK PHOS: 63 U/L / ALT: 11 U/L / AST: 29 U/L / GGT: x           PT/INR - ( 30 Sep 2023 09:56 )   PT: 11.3 sec;   INR: 1.08 ratio         PTT - ( 30 Sep 2023 09:56 )  PTT:28.4 sec  Creatine Kinase, Serum: 594 U/L (09-30 @ 09:56)  CKMB Units: 8.3 ng/mL (09-30 @ 09:56)  Creatine Kinase, Serum: 78 U/L (09-30 @ 05:10)  CKMB Units: <1.0 ng/mL (09-30 @ 05:10)    CARDIAC MARKERS ( 30 Sep 2023 09:56 )  x     / x     / 594 U/L / x     / 8.3 ng/mL  CARDIAC MARKERS ( 30 Sep 2023 05:10 )  x     / x     / 78 U/L / x     / <1.0 ng/mL      Urinalysis Basic - ( 30 Sep 2023 09:56 )    Color: x / Appearance: x / SG: x / pH: x  Gluc: 133 mg/dL / Ketone: x  / Bili: x / Urobili: x   Blood: x / Protein: x / Nitrite: x   Leuk Esterase: x / RBC: x / WBC x   Sq Epi: x / Non Sq Epi: x / Bacteria: x      Assessment: 72F COPD, HLD, HTN, PVD, ICM/CAD s/p PCI, cardiac arrest s/p left-sided ICD (6/4/2019) complicated by infection and s/p R single-chamber ICD ([place] in 9/23/2019) recent admission of UTI (treated w/ ceftriaxone and Vanco x 3 days) and now presented w/ x14 appropriate ICD firing episodes today. Admitted to CICU on Lido gtt.    Plan:  ====================== NEUROLOGY=====================  A&Ox3  - continue to monitor mental status as per protocol     ==================== RESPIRATORY======================  Stable on room air  - continue to monitor SpO2 with goal >94%    ====================CARDIOVASCULAR==================  VF  - with x14 appropriate ICD firing episodes per interrogation  - c/w lido gtt at 1  - 9/30 LHC/RHC: Patent LAD stent, RCA , mild LCx dz, RA 8, PA 39/24, PCW 22, MVO2 76%, CI 3.76. No intervention  - 8/29 TTE: normal LV function w/ Basal/mid anterolateral wall, basal/mid inferolateral wall, and basal inferior segment are abnormal  - monitor lytes  - f/u EP reccs  - HF consult in AM    ICM/hx MI  - c/w Plavix   - Lipitor   - Coreg 3.125  - c/w Lasix 40BID     ===================== RENAL =========================  Monitor Cr and lytes  - Cr downtrending   - Continue monitoring urine output, lytes, SCr/ BUN  - replete lytes prn with goal K >4 and Mg >2    =============== GASTROINTESTINAL===================  DASH diet    ===================ENDO====================  DM  - A1c 6.7 (on 8/28)  - ISS    ===================HEMATOLOGIC/ONC ===================  H/H  & plts stable  - Monitor H/H and plts  - DVT PPX: HSQ @ 8AM 10/1    ==================INFECTIOUS DISEASE================  No acute issues  - afebrile  - + leukocytosis  - monitor and trend WBC and temperature curve     Dispo: Maintain in ICU while on Lido gtt.    Patient requires continuous monitoring with bedside rhythm monitoring, arterial line, pulse oximetry, ventilator monitoring and intermittent blood gas analysis.  Care plan discussed with ICU care team.  I have spent 35 minutes providing critical care, in addition to initial critical time provided by CICU attending Dr. Recinos, re-evaluated multiple times during the day.    Kamilah Astudillo PA-C

## 2023-09-30 NOTE — ED PROVIDER NOTE - NS ED ATTENDING STATEMENT MOD
This was a shared visit with the ROSIBEL. I reviewed and verified the documentation and independently performed the documented:

## 2023-09-30 NOTE — CHART NOTE - NSCHARTNOTEFT_GEN_A_CORE
Removal of Femoral Sheath    Pulses in the right lower extremity are palpable. The patient was placed in the supine position. The insertion site was identified and the sutures were removed per protocol.  The _7/5___ Persian femoral sheath was then removed. Direct pressure was applied for  __15____ minutes.     Monitoring of the right groin and both lower extremities including neuro-vascular checks and vital signs every 15 minutes x 4, then every 30 minutes x 2, then every 1 hour was ordered.    Complications: None    Comments: reggie villalobos

## 2023-09-30 NOTE — H&P ADULT - ASSESSMENT
73 y/o F w/ PMHx of COPD, HLD, HTN, PVD, ICM/CAD s/p PCI, cardiac arrest s/p left-sided ICD (6/4/2019) complicated by infection and s/p R single-chamber ICD ([place] in 9/23/2019) recent admission of ICD firing and now presented w/ another ICD firing episode today.     #NEURO: AAOX 3   Answers questions appropriately  No active issues   - Neuro assessment as per ICU protocol     #RESPIRATORY: Denies SOB or dyspnea   CTA on exam   No active issues   - Oxygen supplement as needed to maintain O 2 >92%    #CARDIOVASCULAR   Multiple ICD firings   Evaluated by EP at the bedside  - Hold PO meds and start Lido gtt   - LHC/RHC today   - Follow up w/ EP recommendation     ICM/CAD w/ hx of s/p PCI  8/29/23 echo showed normal LV function w/ Basal and mid anterolateral wall, basal and mid inferolateral wall, and basal inferior segment are abnormal.  - Cont. Home meds: Lipitor. plavix, Captopril     #GI: No active issues  - DASH diet    #RENAL   Nl kidney function  - Monitor BUN/Cr, UO and lytes    # Heme   H/H stable     # ID   afebrile and + Leukocytosis in setting of ICD firing   Denies fever, chills, sore throat, cough, urinary symptoms  - Monitor WBC     No central lines

## 2023-09-30 NOTE — ED PROVIDER NOTE - CLINICAL SUMMARY MEDICAL DECISION MAKING FREE TEXT BOX
72 year old female with h/o HTN, HLD, CAD, obesity, cardiac arrest s/p L sided ICD, s/p infection with extraction, now with R side ICD and PVD presents today mike from home accompanied with her  who states that she woke up this morning asking him for assistance to go to the bathroom, he states that he usually is able to do this by herself but today unable to, after using the bathroom she sat in the chair near the kitchen, shortly after he heard a scream, when he went to her, he found she was confused and groggy, he also reports hearing her defibrillator go off 2-3 times, EMS state that her defibrillator went off once in route, pt presented awake and alert, able to follow commands, lips dry, vitals stable, bp 124/74 at the bedside, pt was seen by her electrophysiologist two days ago, her defibrillator was tested and told it was fine, at the bedside pt is awake and alert, able to answer questions appropriately, appears in no distress, pt is not in pain,  at bedside, no pacer firing noted, vitals stable, labs drawn, transfer center called, consulted with electrophysiology, pt transferred for further evaluation

## 2023-09-30 NOTE — CONSULT NOTE ADULT - ATTENDING COMMENTS
Patient seen at bedside in CICU. I discussed the case with the CICU Attending, and Interventional Cardiology Attending on Call. The patient's  was at the bedside. He contributed to the history as needed. The patient was in her usual state of health with reported compliance with her medications and no reports of dietary indiscretion. She received 15 defibrillations for recurrent ventricular flutter / ventricular fibrillation (not ventricular tachycardia). She lost consciousness due to the arrhythmia. She reports compliance with her home dose of Sotalol (this medication was initiated in August of 2023). Her renal function is stable and her QTc is not prolonged. She has a right-sided 1-ch ICD - some of the VF events required multiple defibrillations to successfully terminate. Of note, on her interrogation, her OptiVol has been increasing. She has a known ischemic cardiomyopathy. Her last LHC was from 2015. I recommended a L and RHC today and a Lidocaine gtt. EP will follow closely.    JESICA Moon

## 2023-09-30 NOTE — ED PROVIDER NOTE - PROGRESS NOTE DETAILS
Pt evaluated by EP and Cards fellow, plan to admit to CCU for multiple episodes defibrillation secondary to VT/VF   Sherice Arrington PA-C

## 2023-09-30 NOTE — PATIENT PROFILE ADULT - FOOD INSECURITY
CHIEF COMPLAINT:   had concerns including Convey Results.      HISTORY OF PRESENT ILLNESS:   Tamia Ruffin is a 74 year old female presenting  For follow up results  Palpitations had a pasemaker placed on 5/22  Shortness of breath as always  Chest pain no  Dizziness no   Exercising with bands while watching TV every day  Has had an EGD done 2 years ago while at the hospital, can be seen in care everywhere 1/30/2017, as op document      3. Essential hypertension  Controlled, labs reviewed, taking medication as rx, needs refills, denies any change to her shortness of breath with or without activity, denies chest pains, dizziness, palpitations and myalgias    2. Sinus node dysfunction (CMS/HCC)  Following with cardiology as prescribed, denies palpitations, denies chest pains    3. Chronic kidney disease (CKD), stage 3  stable, labs reviewed, avoiding nsaids, keeping up with hydration, denies change in urination patterns such as oligouria or polyuria and no hemuturia    4. Low bone density for age  Doing weight-bearing exercises recommend    5. Pre-diabetes  Improved, No regular exercise,  specific diet to keep the carbohydrates limited,with mild weight loss, denies any polyuria, polydipsia, polyphagia    6. Hypercholesterolemia with hypertriglyceridemia  taking medications as prescribed as well as  watching fats/carbohydrates in diet,  mild recent weight loss, not exercising 3 times a week by walking  denies any change to her shortness of breath with or without activity, denies chest pains, dizziness, palpitations and myalgias    8. Fatty pancreas  Identified on CT x2 without progression, patient denies any persistent common nausea, vomiting, diarrhea the, abdominal pain    I have reviewed the medications and allergies listed in the medical record as obtained by my nursing staff and support staff and agree with their documentation.    Social history: Patient is a nonsmoker.    REVIEW OF SYSTEMS:     Please refer to  hpi for pertinent negative and positive review of systems.       PHYSICAL EXAMINATION:   Blood pressure 118/60, pulse 88, weight 80 kg, SpO2 96 %.    General:   Alert and oriented ×3, in no acute distress, pleasant and well-groomed, cooperative, conversive  Cardiovascular:  Regular rate and rhythm with  no murmur noted. Positive S1-S2 no S3-S4, no carotid bruits noted,   Respiratory:  Normal respiratory effort.  Clear to auscultation bilaterally, symmetric expansion bilateral.  No rales or rhonchi., Mild intermittent expiratory wheeze only at patient expiration       LAB RESULTS:    Lab results reviewed and can be found in the EHR     ASSESSMENT and PLAN:   Patient is a pleasant 74 year old female presenting tofollow-up on pre diabetes hypertension      3. Essential hypertension  Cont medication as rx, refills provided, labs reviewed, no medication change needed, meryl q6 months     Prediabetes  Uncontrolled, but much improved between diet changes as well metformin, recommend continue decrease carbohydrate and fats  in diet and increase cardiovascular exercise, recheck labs 12 months  Last A1c the 20185.8, 20196.3, 2025.6    Previously discussed the study showing exercise and diet changes being the best at reducing A1c, metformin the second, the patient is motivated to change diet and exercise I continue to encourage adaptation of healthy lifestyle changes  Patient is aware that there is a 70% chance of developing diabetes over the lifetime if A1c is not reduce      2. Sinus node dysfunction (CMS/HCC)  Continue following with Cardiology, notes reviewed, has pacemaker recheck annually    3. Low bone density for age  Continue good calcium intake and weight-bearing exercises last bone density 10/19    5. Hypercholesterolemia with hypertriglyceridemia   Controlled overall except for HDL and triglycerides injure always a problem, continue current medication as prescribed,  refills provided, recommend continue decrease  carbohydrate and fats  in diet and increase cardiovascular exercise, recheck 1 year    6. Fatty pancreas  Do ultrasound every 3 years, 2 CT scans 1 in 2010 and 1 in 2018 showed no progression, encourage patient to continue with weight loss    7. CKD (chronic kidney disease) stage 3, GFR 30-59 ml/min (CMS/[facility])  Reinforced continue avoiding nsaid medication on chronic basis, labs reviewed, continue increase fluids and good bp control as well as ace/arb medication, refills provided, meryl q6 months          The patient indicated and verbalized understanding of the diagnosis and agreed with the plan of care. Will return to clinic for any new or worsening symptoms otherwise at regularly scheduled appointment.       Already has follow-up scheduled for September Medicare     no

## 2023-09-30 NOTE — ED ADULT TRIAGE NOTE - CHIEF COMPLAINT QUOTE
defibrillating fired x 2 , denies chest pain, 9/28 defibrillating was check at [facility]

## 2023-09-30 NOTE — PROCEDURE NOTE - ADDITIONAL PROCEDURE DETAILS
1) Indication for interrogation: reports ICD shocks   2) Presenting rhythm: SR in the 60's   3) Measured data WNL, Pt is not pacemaker dependent, V pace 0.7% since 9/28/23  4) Stored data revealed patient received 15 shocks today between 4:54am-5:01am for ventricular fibrillation with average ventricular rate 250-462 bpm, 3 of which episodes required 2 shocks to convert to sinus rhythm and 1 episode required 3 shocks to convert to sinus rhythm.   5) OptiVol fluid index remains elevated since 9/13/23, this is indicative of fluid accumulation.   6) Patient needs to be transferred to CICU.   7) See EP consult note for further recommendation.    SOO Comer, NP-C  [phone]

## 2023-09-30 NOTE — ED PROVIDER NOTE - PROGRESS NOTE DETAILS
transfer center called, consulted with electrophysiology, discussed case with Dr michelle haq, who agreed for transfer to [facility] for evaluation

## 2023-09-30 NOTE — ED ADULT NURSE NOTE - OBJECTIVE STATEMENT
Pt states she had a dream, she doesn't remember what happened. Pt states she had a dream, she doesn't remember what happened. As per spouse states he heard pt screaming, staring into space and grabbed her chest. States he called his daughter that lived across the street and when she arrived, the defibrillator went off again. States he thinks it went off at least 3 times.

## 2023-09-30 NOTE — ED ADULT NURSE NOTE - NSFALLLASTSIX_ED_ALL_ED
No.
FAMILY HISTORY:  Father  Still living? Yes, Estimated age: 71-80  FH: diabetes mellitus, Age at diagnosis: Age Unknown    Sibling  Still living? Yes, Estimated age: 41-50  FH: breast cancer, Age at diagnosis: 31-40

## 2023-09-30 NOTE — ED ADULT NURSE NOTE - NSFALLHARMRISKINTERV_ED_ALL_ED

## 2023-09-30 NOTE — ED ADULT NURSE NOTE - CHIEF COMPLAINT QUOTE
defibrillating fired x 2 , denies chest pain, 9/28 defibrillating was check at [facility]

## 2023-09-30 NOTE — H&P ADULT - HISTORY OF PRESENT ILLNESS
72 year old Female w/ PMHx COPD, HLD, HTN, PVD, CAD s/p PCI, ischemic cardiomyopathy, cardiac arrest s/p left-sided ICD (6/4/2019) complicated by infection and s/p R single-chamber ICD ([place] in 9/23/2019) with admission last month for firing of her ICD presents to the ED as a transfer from [facility] for firing of her ICD. Patient reports that this morning she woke up and was feeling okay.  Reports that she had gone to the bathroom and was sitting in a chair and suddenly felt a sharp pain in her right side shoulder with loud sound.  Patient screamed and was evaluated by her  who noted that she seemed a bit confused and groggy.  EMS was called and patient's defibrillator fired an additional 2 times.  She reports that she had an episode of vomiting after initial shock.  Since has felt fine and was without complaints.  She last saw her EP doctor 2 days ago with device interrogation which per patient she was told was unremarkable.  She denies headaches, dizziness, chest pain, shortness of breath, abdominal pain nausea or vomiting. 71 y/o F w/ PMHx COPD, HLD, HTN, PVD, ICM/CAD s/p PCI, cardiac arrest s/p left-sided ICD (6/4/2019) complicated by infection and s/p R single-chamber ICD ([place] in 9/23/2019) recent admission of ICD firing and now presented w/ another ICD firing episode today.   Pt eports that she had gone to the bathroom and was sitting in a chair and suddenly felt a sharp pain and lightheadness in her right side shoulder with loud sound.  Patient screamed and when  came to see her, the  stated " she had a funny face, not able to answer my questions and grabbing her chest ~5mins) EMS was called and patient's defibrillator fired an additional 2 times.  She reports that she had an episode of vomiting after initial shock.  Since has felt fine and was without complaints. Pt reports that she had no further episode in ED but was told that she had 13 ICD firings.   Denies any chest pain, SOB/dyspnea, fever, chills, cough, or sore throat.  71 y/o F w/ PMHx COPD, HLD, HTN, PVD, ICM/CAD s/p PCI, cardiac arrest s/p left-sided ICD (6/4/2019) complicated by infection and s/p R single-chamber ICD ([place] in 9/23/2019) recent admission of UTI and now presented w/ multiple ICD firing episode today.   Pt reports that she had gone to the bathroom and was sitting in a chair and suddenly felt a sharp pain and lightheadness in her right side shoulder with loud sound.  Patient screamed and when  came to see her, the  stated " she had a funny face, not able to answer my questions and grabbing her chest ~5mins) EMS was called and patient's defibrillator fired an additional 2 times.  She reports that she had an episode of vomiting after initial shock.  Since has felt fine and was without complaints. Pt reports that she had no further episode in ED but was told that she had 13 ICD firings.   Denies any chest pain, SOB/dyspnea, fever, chills, cough, or sore throat.  73 y/o F w/ PMHx COPD, HLD, HTN, PVD, ICM/CAD s/p PCI, cardiac arrest s/p left-sided ICD (6/4/2019) complicated by infection and s/p R single-chamber ICD ([place] in 9/23/2019) recent admission of UTI (treated w/ ceftriaxone and Vanco x 3 days) and now presented w/ multiple ICD firing episode today.   Pt reports that she had gone to the bathroom and was sitting in a chair and suddenly felt a sharp pain and lightheadness in her right side shoulder with loud sound.  Patient screamed and when  came to see her, the  stated " she had a funny face, not able to answer my questions and grabbing her chest ~5mins) EMS was called and patient's defibrillator fired an additional 2 times.  She reports that she had an episode of vomiting after initial shock.  Since has felt fine and was without complaints. Pt reports that she had no further episode in ED but was told that she had 13 ICD firings.   Denies any chest pain, SOB/dyspnea, fever, chills, cough, or sore throat.  71 y/o F w/ PMHx COPD, HLD, HTN, PVD, ICM/CAD s/p PCI, cardiac arrest s/p left-sided ICD (6/4/2019) complicated by infection and s/p R single-chamber ICD ([place] in 9/23/2019) recent admission of UTI (treated w/ ceftriaxone and Vanco x 3 days) and now presented w/ multiple ICD firing episode today.   Pt reports that she had gone to the bathroom and was sitting in a chair and suddenly felt a sharp pain and lightheadedness in her right side shoulder with loud sound.  Patient screamed and when  came to see her, the  stated " she had a blank stare, not able to answer my questions and grabbing her chest ~5mins) EMS was called and patient's defibrillator fired an additional 2 times.  She reports that she had an episode of vomiting after initial shock.  Since has felt fine and was without complaints. Pt reports that she had no further episode in ED but was told that she had 13 ICD firings.   Denies any chest pain, SOB/dyspnea, fever, chills, cough, or sore throat.

## 2023-09-30 NOTE — ED PROVIDER NOTE - CARE PLAN
1 Principal Discharge DX:	History of implantable cardiac defibrillator (ICD)  Secondary Diagnosis:	Chest pain

## 2023-09-30 NOTE — ED PROVIDER NOTE - PHYSICAL EXAMINATION
CONSTITUTIONAL: Patient is awake, alert and oriented x 3. Patient is chronically ill appearing and in no acute distress  HEAD: NCAT  NECK: supple, FROM  LUNGS: CTA b/l, no wheezing or rales   HEART: RRR.+S1S2 no murmurs  ABDOMEN: Soft, non-distended, nttp, no rebound or guarding  EXTREMITY: no edema or calf tenderness b/l, FROM upper and lower ext b/l  SKIN: with no rash or lesions  NEURO: No focal deficits

## 2023-09-30 NOTE — CONSULT NOTE ADULT - SUBJECTIVE AND OBJECTIVE BOX
EP FELLOW CONSULT NOTE    Consulting service: ED/Licha  Reason for consult: ICD shocks     HPI:  73 y/o F w/ PMHx COPD, HLD, HTN, PVD, ICM/CAD s/p PCI, cardiac arrest s/p left-sided ICD (6/4/2019) complicated by infection and s/p R single-chamber ICD ([place] in 9/23/2019) recent admission of ICD firing and now presents with ICD shocks. Per patient she woke up not feeling well this morning and got up to go the bathroom with her husbands assistance. She then woke up confused surrounded by family. Per her  while going to the bathroom she became acutely unresponsive with her eyes rolling back and was unconscious for 20-30 seconds in two separate episodes. After she awoke she heart popping sounds and had mild R arm pain which she denotes was similar to prior episodes of her ICD firing. As such she presented to OSH ED. EP contacted from OSH and pt transferred to [facility] for further dx/tx.    Patient seen in ED. History obtained as above. She denies any recent FC NV CP SOB LE edema orthopnea PND or palpitations. She notes she was recently admitted 1 mth ago for ICD shocks and ADHF and was discharged on coreg/sotalol 80 BID/farxiga. She has been compliant with her home medications and has not missed any doses. She denies any recent illness, has had good PO intake, and denies any recent substance/ETOH use or new medications. She is minimally active at baseline, mainly walking around her house, which she has not left for the last week due to poor weather. She does not have any stairs in her home but says she generally has no CP/CHO when ambulating at home but she "gets tired if she overdoes it" but this is at baseline. She denies an recent hx of CP.    ICD interrogated:  2) Presenting rhythm: SR in the 60's   3) Measured data WNL, Pt is not pacemaker dependent, V pace 0.7% since 9/28/23  4) Stored data revealed patient received 15 shocks today between 4:54am-5:01am for ventricular fibrillation with average ventricular rate 250-462 bpm, 3 of which episodes required 2 shocks to convert to sinus rhythm and 1 episode required 3 shocks to convert to sinus rhythm.   5) OptiVol fluid index remains elevated since 9/13/23, this is indicative of fluid accumulation.       PMH:   Obese  Smoker  HTN (hypertension)  HLD (hyperlipidemia)  CAD (coronary artery disease)  CHF with cardiomyopathy    PSH:   History of hip surgery    FAMILY HISTORY:  Family history of Alzheimer's disease (Father)  Family history of cancer (Mother)    Allergies:  No Known Allergies    Home Meds:    Current Medications:   influenza  Vaccine (HIGH DOSE) 0.7 milliLiter(s) IntraMuscular once  insulin lispro (ADMELOG) corrective regimen sliding scale   SubCutaneous three times a day before meals  insulin lispro (ADMELOG) corrective regimen sliding scale   SubCutaneous at bedtime  lidocaine   Infusion 1 mG/Min IV Continuous <Continuous>  nicotine - 21 mG/24Hr(s) Patch 1 Patch Transdermal daily  nystatin Cream 1 Application(s) Topical every 12 hours    REVIEW OF SYSTEMS:  CONSTITUTIONAL: No weakness, fevers or chills  EYES/ENT: No visual changes;  No dysphagia  NECK: No pain or stiffness  RESPIRATORY: No cough, wheezing, hemoptysis; No shortness of breath  CARDIOVASCULAR: No chest pain or palpitations; No lower extremity edema  GASTROINTESTINAL: No abdominal or epigastric pain. No nausea, vomiting, or hematemesis; No diarrhea or constipation. No melena or hematochezia.  BACK: No back pain  GENITOURINARY: No dysuria, frequency or hematuria  NEUROLOGICAL: No numbness or weakness  SKIN: No itching, burning, rashes, or lesions   All other review of systems is negative unless indicated above.    Physical Exam:  T(F): 97.8 (09-30), Max: 98.3 (09-30)  HR: 65 (09-30) (65 - 87)  BP: 89/54 (09-30) (89/54 - 124/45)  RR: 18 (09-30)  SpO2: 94% (09-30)  GENERAL: No acute distress, well-developed  HEAD:  Atraumatic, Normocephalic  ENT: EOMI, PERRLA, conjunctiva and sclera clear, Neck supple,, moist mucosa  CHEST/LUNG: Mildly dec breath sounds in the bases, faint dry crackles, No wheeze, equal breath sounds bilaterally   HEART: Regular rate and rhythm; No murmurs, rubs, or gallops, minimal JVD  ABDOMEN: Soft, Nontender, Nondistended; Bowel sounds present  EXTREMITIES:  No clubbing, cyanosis, or edema  PSYCH: Nl behavior, nl affect  NEUROLOGY: AAOx3, non-focal, cranial nerves intact  SKIN: Normal color, No rashes or lesions  LINES:    ECG: NSR anteroseptal Qs  ms    Echo:   CONCLUSIONS:      1. Technically difficult image quality.   2. Normal left ventricular cavity size. Left ventricular wall thickness is normal. Left ventricular systolic function is normal.   3. Basal and mid anterolateral wall, basal and mid inferolateral wall, and basal inferior segment are abnormal.   4. There is moderate (grade 2) left ventricular diastolic dysfunction, with elevated filling pressure.   5. Normal right ventricular cavity size, normal right ventricular wall thickness and normal right ventricular systolic function.   6. The left atrium is mildly dilated in size.   7. The aortic arch diameter is normal.   8. There is calcification of the mitral valve annulus.   9. No pericardial effusion seen.      Cath:  CORONARY VESSELS: The coronary circulation is right dominant.  LM:   --  Distal left main: There was a 20 % stenosis.  LAD:--  Proximal LAD: There was a 80 % stenosis.  CX:   --  Distal circumflex: Angiography showed mild atherosclerosis with  no flow limiting lesions.  RCA:   --  Mid RCA: There was a 100 % stenosis.  --  Intervention on mid LAD: drug-eluting stent.                        14.3   16.39 )-----------( 318      ( 30 Sep 2023 09:56 )             43.9     09-30    141  |  105  |  9   ----------------------------<  133<H>  4.0   |  25  |  0.56    Ca    8.4      30 Sep 2023 09:56  Phos  4.0     09-30  Mg     2.2     09-30    TPro  6.0  /  Alb  2.8<L>  /  TBili  0.4  /  DBili  x   /  AST  29  /  ALT  11  /  AlkPhos  63  09-30    PT/INR - ( 30 Sep 2023 09:56 )   PT: 11.3 sec;   INR: 1.08 ratio    PTT - ( 30 Sep 2023 09:56 )  PTT:28.4 sec  CARDIAC MARKERS ( 30 Sep 2023 09:56 )  117 ng/L / x     / x     / 594 U/L / x     / 8.3 ng/mL  CARDIAC MARKERS ( 30 Sep 2023 05:10 )  x     / x     / x     / 78 U/L / x     / <1.0 ng/mL      A/P  73 y/o F w/ PMHx COPD, HLD, HTN, PVD, ICM/CAD s/p PCI, cardiac arrest s/p left-sided ICD (6/4/2019) complicated by infection and s/p R single-chamber ICD ([place] in 9/23/2019) recent admission of ICD firing and now presents with ICD shocks. P    #ICD Shocks  #VF Storm  - Pt with significant cardiac hx p/w 15 ICD shocks (some not successful) and multiple VF episodes c/w VF storm  - EKG NSR anteroseptal Qs and  ms on Sotalol 80 BID  - Denies any non-compliance with home meds, no recent HF sx but optivol elevated on ICD, grossly euvolemic on exam. Has hx of 2V CAD (RCA  LAD PCI) last checked in 2015  - Electrolytes grossly WNL, given 2mg Mg in ED  Plan:  - Admit to CCU for monitoring  - Resume home coreg and sotalol  - Start lido gtt at 1  - F/U LHC/RHC to r/o ischemia iso persistent VF (less c/f scar as VF not VT)  - F/U TTE  - Low threshold for intubation and sedation if VF recurs   - Rest of care per CCU team     Signed by:  Jc Dunn PGY5  Cardiology Fellow    [facility] Moon/Licha/Isauro Chou/Jorje

## 2023-09-30 NOTE — H&P ADULT - NSHPLABSRESULTS_GEN_ALL_CORE
Interpretation of Telemetry:                          14.3   16.39 )-----------( 318      ( 30 Sep 2023 09:56 )             43.9       09-30    141  |  105  |  9   ----------------------------<  133<H>  4.0   |  25  |  0.56    Ca    8.4      30 Sep 2023 09:56  Mg     2.2     09-30    TPro  6.0  /  Alb  2.8<L>  /  TBili  0.4  /  DBili  x   /  AST  29  /  ALT  11  /  AlkPhos  63  09-30      CARDIAC MARKERS ( 30 Sep 2023 09:56 )  x     / x     / 594 U/L / x     / 8.3 ng/mL  CARDIAC MARKERS ( 30 Sep 2023 05:10 )  x     / x     / 78 U/L / x     / <1.0 ng/mL Interpretation of Telemetry:                          14.3   16.39 )-----------( 318      ( 30 Sep 2023 09:56 )             43.9         141  |  105  |  9   ----------------------------<  133<H>  4.0   |  25  |  0.56    Ca    8.4      30 Sep 2023 09:56  Mg     2.2     09-30    TPro  6.0  /  Alb  2.8<L>  /  TBili  0.4  /  DBili  x   /  AST  29  /  ALT  11  /  AlkPhos  63  09-30      CARDIAC MARKERS ( 30 Sep 2023 09:56 )  x     / x     / 594 U/L / x     / 8.3 ng/mL  CARDIAC MARKERS ( 30 Sep 2023 05:10 )  x     / x     / 78 U/L / x     / <1.0 ng/mL

## 2023-09-30 NOTE — ED ADULT NURSE NOTE - OBJECTIVE STATEMENT
73 yo female A&OX4 BIB EMS from [place] s/p defib firing. Patient has hx MI w/ multiple stents and cardiac arrest, defib placed 3 years ago. Yesterday defib fired 3 times prompting visit to ED in [place]. On arrival, patient denies all complaints. Patient states never had any complaints other than defib firing. Patient denies chest pain, palpitations, SOB, LOC, abd pain, n/v/d/dizziness. Denies fevers/chills.

## 2023-10-01 NOTE — PROGRESS NOTE ADULT - ASSESSMENT
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 patient deemed stable for continued mgmt on general medical floor with telemetry. 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 c/b cardiac arrest s/p icd c/b device infections s/p removal and reimplant, copd, gerd, p/w multiple icd shocks; in er, ppm interrogated and found to have multiple recurrences of vfib over short period of time c/w electrical storm; started on lidocaine infusion and admitted to cicu for further mgmt; while in cicu, patient underwent rhc + lhc which showed no new acute significant pathological triggers; given patient has remained electrically quiet on Lidocaine infusion, ep recommended stopping lidocaine infusion, continuing to monitor on telemetry for pvcs, and considering Quinidine vs ablation. patient deemed stable for continued mgmt on general medical floor with telemetry.

## 2023-10-01 NOTE — PROGRESS NOTE ADULT - PROBLEM SELECTOR PLAN 4
hold home meds (farxiga)  a1c ~6.7  monitor fingerstick glu tidac + hs  carb consistent diet  low dose correctional scale lispro tidac + hs while inpatient  adjust to maintain goal bg 100-180

## 2023-10-01 NOTE — PROGRESS NOTE ADULT - ASSESSMENT
Amie Kohli is a 72 year old woman with COPD, HLD, HTN, PVD, CAD (s/p PCI to LAD, known  of RCA), cardiac arrest (s/p a left-sided ICD 6/4/2019,  complicated by infection, s/p removal with implantation of a R-sided, single-chamber ICD, [place] in 9/23/2019). She was recently admitted for VT and received Sotalol. 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    She has remained electrically quite on Lidocaine. Given results of L and RHC consideration given to a triggered VF - need to consider Quinidine vs. ablation. For now would stop IV Lido, resume oral Sotalol and if the patient is having PVCs on Tele obtain a 12-lead rhythm strip ( spent 10 minutes at the bedside this morning with a 12-lead ECG attached and was not able to capture a PVC). EP will follow.     JESICA Moon   [General Appearance - Well Nourished] : well nourished [General Appearance - In No Acute Distress] : no acute distress [General Appearance - Well Developed] : well developed [Normal Conjunctiva] : the conjunctiva exhibited no abnormalities [Normal Oral Mucosa] : normal oral mucosa [Normal Oropharynx] : normal oropharynx [Respiration, Rhythm And Depth] : normal respiratory rhythm and effort [Heart Rate And Rhythm] : heart rate and rhythm were normal [Heart Sounds] : normal S1 and S2 [Arterial Pulses Normal] : the arterial pulses were normal [Systolic grade ___/6] : A grade [unfilled]/6 systolic murmur was heard. [Abdomen Soft] : soft [Bowel Sounds] : normal bowel sounds [Abdomen Tenderness] : non-tender [Abnormal Walk] : normal gait [Nail Clubbing] : no clubbing of the fingernails [FreeTextEntry1] : erythema b/l LE, + venostasis [Oriented To Time, Place, And Person] : oriented to person, place, and time [Affect] : the affect was normal

## 2023-10-01 NOTE — PROGRESS NOTE ADULT - SUBJECTIVE AND OBJECTIVE BOX
JESICA Moon MD  EP Attending    Patient seen and examined at bedside in CICU    Overnight Events: No events. The patient underwent a L and RHC on 9/30    Review Of Systems: No chest pain, shortness of breath, or palpitations            Current Meds:  atorvastatin 40 milliGRAM(s) Oral at bedtime  budesonide 160 MICROgram(s)/formoterol 4.5 MICROgram(s) Inhaler 2 Puff(s) Inhalation two times a day  carvedilol 3.125 milliGRAM(s) Oral every 12 hours  clopidogrel Tablet 75 milliGRAM(s) Oral daily  clotrimazole 2% Vaginal Cream 1 Applicatorful Vaginal every 24 hours PRN  famotidine    Tablet 20 milliGRAM(s) Oral daily  furosemide    Tablet 40 milliGRAM(s) Oral daily  heparin   Injectable 5000 Unit(s) SubCutaneous every 8 hours  influenza  Vaccine (HIGH DOSE) 0.7 milliLiter(s) IntraMuscular once  insulin lispro (ADMELOG) corrective regimen sliding scale   SubCutaneous three times a day before meals  insulin lispro (ADMELOG) corrective regimen sliding scale   SubCutaneous at bedtime  lidocaine   Infusion 1 mG/Min IV Continuous <Continuous>  nicotine - 21 mG/24Hr(s) Patch 1 Patch Transdermal daily  nystatin Cream 1 Application(s) Topical every 12 hours  sacubitril 24 mG/valsartan 26 mG 1 Tablet(s) Oral two times a day    Vitals:  T(F): 98.3 (10-01), Max: 98.5 (09-30)  HR: 74 (10-01) (57 - 79)  BP: 123/58 (10-01) (73/39 - 137/59)  RR: 26 (10-01)  SpO2: 93% (10-01)  I&O's Summary    30 Sep 2023 07:01  -  01 Oct 2023 07:00  --------------------------------------------------------  IN: 587.5 mL / OUT: 850 mL / NET: -262.5 mL    01 Oct 2023 07:01  -  01 Oct 2023 09:51  --------------------------------------------------------  IN: 22.5 mL / OUT: 0 mL / NET: 22.5 mL    Physical Exam:  Appearance: No acute distress; well appearing  Eyes: PERRL, EOMI, pink conjunctiva  HEENT: Normal oral mucosa  Cardiovascular: RRR, S1, S2, no murmurs, rubs, or gallops; no edema; no JVD  Respiratory: Clear to auscultation bilaterally  Gastrointestinal: soft, non-tender, non-distended with normal bowel sounds  Musculoskeletal: No clubbing; no joint deformity   Neurologic: Non-focal  Lymphatic: No lymphadenopathy  Psychiatry: AAOx3, mood & affect appropriate  Skin: No rashes, ecchymoses, or cyanosis                          13.5   15.65 )-----------( 295      ( 01 Oct 2023 01:53 )             42.0     10-01    142  |  106  |  9   ----------------------------<  124<H>  3.5   |  25  |  0.71    Ca    8.1<L>      01 Oct 2023 01:53  Phos  3.5     10-01  Mg     2.2     10-01    TPro  5.5<L>  /  Alb  2.7<L>  /  TBili  0.4  /  DBili  x   /  AST  21  /  ALT  10  /  AlkPhos  59  10-01    PT/INR - ( 01 Oct 2023 01:53 )   PT: 12.0 sec;   INR: 1.15 ratio    PTT - ( 01 Oct 2023 01:53 )  PTT:28.8 sec    CARDIAC MARKERS ( 30 Sep 2023 09:56 )  117 ng/L / x     / x     / 594 U/L / x     / 8.3 ng/mL  CARDIAC MARKERS ( 30 Sep 2023 05:10 )  x     / x     / x     / 78 U/L / x     / <1.0 ng/mL      TTE from 8/29/2023: Technically difficult image quality, Normal left ventricular cavity size. Left ventricular wall thickness is normal. Left ventricular systolic function is normal. Basal and mid anterolateral wall, basal and mid inferolateral wall, and basal inferior segment are abnormal. There is moderate (grade 2) left ventricular diastolic dysfunction, with elevated filling pressure. Normal right ventricular cavity size, normal right ventricular wall thickness and normal right ventricular systolic function. The left atrium is mildly dilated in size. The aortic arch diameter is normal. There is calcification of the mitral valve annulus. No pericardial effusion seen. < end of copied text >    L and R Cath from 9/30/2023: Conclusions: Patent LAD stent. Mildly elevated filling pressures with preserved cardiac output.    RA 8mmHg   RV 45/12mmHg   PA 39/24/mean 31mmHg   PCWP 19mmHg   CO 6.83 L/min, CI 3.67 L/min/m2     Interpretation of Telemetry: Infrequent PVCs - R bundle, superior axis

## 2023-10-01 NOTE — PROGRESS NOTE ADULT - ASSESSMENT
Assessment: 72F COPD, HLD, HTN, PVD, ICM/CAD s/p PCI, cardiac arrest s/p left-sided ICD (6/4/2019) complicated by infection and s/p R single-chamber ICD ([place] in 9/23/2019) recent admission of UTI (treated w/ ceftriaxone and Vanco x 3 days) and now presented w/ x14 appropriate ICD firing episodes today. Admitted to CICU on [facility] gtt.    Plan:  ====================== NEUROLOGY=====================  A&Ox3  - continue to monitor mental status as per protocol     ==================== RESPIRATORY======================  Stable on room air  - continue to monitor SpO2 with goal >94%    ====================CARDIOVASCULAR==================  VF  - with x14 appropriate ICD firing episodes per interrogation  - c/w lido gtt at 1  - 9/30 LHC/RHC: Patent LAD stent, RCA , mild LCx dz, RA 8, PA 39/24, PCW 22, MVO2 76%, CI 3.76. No intervention  - 8/29 TTE: normal LV function w/ Basal/mid anterolateral wall, basal/mid inferolateral wall, and basal inferior segment are abnormal  - monitor lytes  - f/u EP reccs  - HF consult in AM    ICM/hx MI  - c/w Plavix   - Lipitor   - Coreg 3.125  - c/w Lasix 40BID     ===================== RENAL =========================  Monitor Cr and lytes  - Cr downtrending   - Continue monitoring urine output, lytes, SCr/ BUN  - replete lytes prn with goal K >4 and Mg >2    =============== GASTROINTESTINAL===================  DASH diet    ===================ENDO====================  DM  - A1c 6.7 (on 8/28)  - ISS    ===================HEMATOLOGIC/ONC ===================  H/H  & plts stable  - Monitor H/H and plts  - DVT PPX: HSQ @ 8AM 10/1    ==================INFECTIOUS DISEASE================  No acute issues  - afebrile  - + leukocytosis  - monitor and trend WBC and temperature curve     Dispo: Maintain in ICU while on Lido gtt.   Assessment: 72F COPD, HLD, HTN, PVD, ICM/CAD s/p PCI, cardiac arrest s/p left-sided ICD (6/4/2019) complicated by infection and s/p R single-chamber ICD ([place] in 9/23/2019) recent admission of UTI (treated w/ ceftriaxone and Vanco x 3 days) and now presented w/ x14 appropriate ICD firing episodes today. Admitted to CICU on [facility] gtt.    Plan:  ====================== NEUROLOGY=====================  A&Ox3  - continue to monitor mental status as per protocol     ==================== RESPIRATORY======================  Stable on room air  - continue to monitor SpO2 with goal >94%    ====================CARDIOVASCULAR==================  VF  - with x14 appropriate ICD firing episodes per interrogation  - c/w lido gtt at 1  - 9/30 LHC/RHC: Patent LAD stent, RCA , mild LCx dz, RA 8, PA 39/24, PCW 22, MVO2 76%, CI 3.76. No intervention  - 8/29 TTE: normal LV function w/ Basal/mid anterolateral wall, basal/mid inferolateral wall, and basal inferior segment are abnormal  - monitor lytes  - f/u EP for antiarrythmic agents     ICM/hx MI  - c/w Plavix   - Lipitor   - Coreg 3.125  - c/w Lasix 40BID     ===================== RENAL =========================  Monitor Cr and lytes  - Cr downtrending   - Continue monitoring urine output, lytes, SCr/ BUN  - replete lytes prn with goal K >4 and Mg >2    =============== GASTROINTESTINAL===================  DASH diet  - No active issues   ===================ENDO====================  DM  - A1c 6.7 (on 8/28)  - ISS    ===================HEMATOLOGIC/ONC ===================  H/H  & plts stable  - Monitor H/H and plts  - DVT PPX: HSQ    ==================INFECTIOUS DISEASE================  No acute issues  - afebrile  - + leukocytosis  - monitor and trend WBC and temperature curve     Dispo: Maintain in ICU while on [facility] gtt.   Assessment: 72F COPD, HLD, HTN, PVD, ICM/CAD s/p PCI, cardiac arrest s/p left-sided ICD (6/4/2019) complicated by infection and s/p R single-chamber ICD ([place] in 9/23/2019) recent admission of UTI (treated w/ ceftriaxone and Vanco x 3 days) and now presented w/ x14 appropriate ICD firing episodes today. Admitted to CICU on [facility] gtt.    Plan:  ====================== NEUROLOGY=====================  A&Ox3  - continue to monitor mental status as per protocol     ==================== RESPIRATORY======================  Stable on room air  - continue to monitor SpO2 with goal >94%    ====================CARDIOVASCULAR==================  VF  - with x14 appropriate ICD firing episodes per interrogation  - c/w lido gtt at 1  - 9/30 LHC/RHC: Patent LAD stent, RCA , mild LCx dz, RA 8, PA 39/24, PCW 22, MVO2 76%, CI 3.76. No intervention  - 8/29 TTE: normal LV function w/ Basal/mid anterolateral wall, basal/mid inferolateral wall, and basal inferior segment are abnormal  - f/u EP for antiarrythmic agents : Off lido@9:30am to capture pVC rhythm strip. restart Sotalol now 80mg BID, Cont. Coreg  - Resume entresto a lower dose and eventually increase to her home dose     ICM/hx MI  - c/w Plavix   - Lipitor   - Coreg 3.125  - c/w Lasix 40BID     ===================== RENAL =========================  Monitor Cr and lytes  - Cr downtrending   - Continue monitoring urine output, lytes, SCr/ BUN  - replete lytes prn with goal K >4 and Mg >2    =============== GASTROINTESTINAL===================  DASH diet  - No active issues   ===================ENDO====================  DM  - A1c 6.7 (on 8/28)  - ISS    ===================HEMATOLOGIC/ONC ===================  H/H  & plts stable  - Monitor H/H and plts  - DVT PPX: HSQ    ==================INFECTIOUS DISEASE================  No acute issues  - afebrile  - + leukocytosis  - monitor and trend WBC and temperature curve     Dispo: Maintain in ICU while on Lido gtt.

## 2023-10-01 NOTE — PROGRESS NOTE ADULT - SUBJECTIVE AND OBJECTIVE BOX
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 c/b cardiac arrest s/p icd c/b device infections s/p removal and reimplant, p/w multiple icd shocks; in er, ppm interrogated and found to have, "15 shocks today between 4:54am-5:01am for ventricular fibrillation with average ventricular rate 250-462 bpm, 3 of which episodes required 2 shocks to convert to sinus rhythm and 1 episode required 3 shocks to convert to sinus rhythm;" patient started on lidocaine infusion and admitted to cicu for further mgmt of multiple recurrences of vfib over short period of time c/w electrical storm; while in cicu, patient underwent rhc + lhc which showed patent lad stent, mildly elevated biventricular pressure, preserved co, but otherwise without any new acute significant pathological triggers; given patient has remained electrically quiet on Lidocaine infusion, ep recommended stopping lidocaine infusion, continuing to monitor on telemetry for pvcs, and considering Quinidine vs ablation. patient deemed stable for continued mgmt on general medical floor with telemetry.      REVIEW OF SYSTEMS:  CONSTITUTIONAL: No fever, chills  HEENT:  No blurry vision No sinus or throat pain  NECK: No pain or stiffness  RESPIRATORY: No cough, wheezing, chills or hemoptysis; No shortness of breath  CARDIOVASCULAR: No chest pain, palpitations  GASTROINTESTINAL: No abdominal pain. No nausea, vomiting, or diarrhea  GENITOURINARY: No dysuria  NEUROLOGICAL: No HA, No focal weakness  SKIN: No itching, burning, rashes, or lesions   MUSCULOSKELETAL: No joint pain or swelling; No muscle, back, or extremity pain    MEDICATIONS:  atorvastatin 40 milliGRAM(s) Oral at bedtime  budesonide 160 MICROgram(s)/formoterol 4.5 MICROgram(s) Inhaler 2 Puff(s) Inhalation two times a day  carvedilol 3.125 milliGRAM(s) Oral every 12 hours  clopidogrel Tablet 75 milliGRAM(s) Oral daily  clotrimazole 2% Vaginal Cream 1 Applicatorful Vaginal every 24 hours PRN  famotidine    Tablet 20 milliGRAM(s) Oral daily  furosemide    Tablet 40 milliGRAM(s) Oral daily  heparin   Injectable 5000 Unit(s) SubCutaneous every 8 hours  influenza  Vaccine (HIGH DOSE) 0.7 milliLiter(s) IntraMuscular once  insulin lispro (ADMELOG) corrective regimen sliding scale   SubCutaneous at bedtime  insulin lispro (ADMELOG) corrective regimen sliding scale   SubCutaneous three times a day before meals  nicotine - 21 mG/24Hr(s) Patch 1 Patch Transdermal daily  nystatin Cream 1 Application(s) Topical every 12 hours  sacubitril 24 mG/valsartan 26 mG 1 Tablet(s) Oral two times a day  sotalol. 80 milliGRAM(s) Oral every 12 hours      T(C): 37.1 (10-01-23 @ 21:30), Max: 37.1 (10-01-23 @ 21:30)  HR: 79 (10-01-23 @ 21:30) (69 - 80)  BP: 136/71 (10-01-23 @ 21:30) (100/48 - 165/59)  RR: 18 (10-01-23 @ 21:30) (18 - 36)  SpO2: 94% (10-01-23 @ 21:30) (90% - 97%)  Wt(kg): --Vital Signs Last 24 Hrs  T(C): 37.1 (01 Oct 2023 21:30), Max: 37.1 (01 Oct 2023 21:30)  T(F): 98.8 (01 Oct 2023 21:30), Max: 98.8 (01 Oct 2023 21:30)  HR: 79 (01 Oct 2023 21:30) (69 - 80)  BP: 136/71 (01 Oct 2023 21:30) (100/48 - 165/59)  BP(mean): 90 (01 Oct 2023 21:00) (69 - 94)  RR: 18 (01 Oct 2023 21:30) (18 - 36)  SpO2: 94% (01 Oct 2023 21:30) (90% - 97%)    Parameters below as of 01 Oct 2023 21:30  Patient On (Oxygen Delivery Method): room air        PHYSICAL EXAM:  GENERAL: NAD, well-groomed, well-developed  HEAD:  Atraumatic, Normocephalic  EYES: EOMI, PERRLA, conjunctiva and sclera clear  ENMT:  Moist mucous membranes  NECK: Supple, No JVD,  CHEST/LUNG: Clear to auscultation bilaterally; No rales, rhonchi, wheezing, or rubs  HEART: Regular rate and rhythm; No murmurs, rubs, or gallops  ABDOMEN: Soft, Nontender, Nondistended; Bowel sounds present  NEURO: Alert & Oriented X3  EXTREMITIES: No LE edema, no calf tenderness  LYMPH: No lymphadenopathy noted  SKIN: No rashes or lesions    Consultant(s) Notes Reviewed:  [x ] YES  [ ] NO  Care Discussed with Consultants/Other Providers [ x] YES  [ ] NO    LABS:                        13.5   15.65 )-----------( 295      ( 01 Oct 2023 01:53 )             42.0     10-01    140  |  105  |  14  ----------------------------<  117<H>  4.2   |  27  |  0.93    Ca    8.6      01 Oct 2023 17:02  Phos  3.5     10-01  Mg     2.2     10-01    TPro  5.8<L>  /  Alb  2.7<L>  /  TBili  0.4  /  DBili  x   /  AST  17  /  ALT  11  /  AlkPhos  65  10-01    PT/INR - ( 01 Oct 2023 01:53 )   PT: 12.0 sec;   INR: 1.15 ratio         PTT - ( 01 Oct 2023 01:53 )  PTT:28.8 sec  Urinalysis Basic - ( 01 Oct 2023 17:02 )    Color: x / Appearance: x / SG: x / pH: x  Gluc: 117 mg/dL / Ketone: x  / Bili: x / Urobili: x   Blood: x / Protein: x / Nitrite: x   Leuk Esterase: x / RBC: x / WBC x   Sq Epi: x / Non Sq Epi: x / Bacteria: x      CAPILLARY BLOOD GLUCOSE      POCT Blood Glucose.: 114 mg/dL (01 Oct 2023 21:03)  POCT Blood Glucose.: 104 mg/dL (01 Oct 2023 17:01)  POCT Blood Glucose.: 129 mg/dL (01 Oct 2023 11:31)  POCT Blood Glucose.: 120 mg/dL (01 Oct 2023 08:01)        Urinalysis Basic - ( 01 Oct 2023 17:02 )    Color: x / Appearance: x / SG: x / pH: x  Gluc: 117 mg/dL / Ketone: x  / Bili: x / Urobili: x   Blood: x / Protein: x / Nitrite: x   Leuk Esterase: x / RBC: x / WBC x   Sq Epi: x / Non Sq Epi: x / Bacteria: x        RADIOLOGY & ADDITIONAL TESTS:    Imaging Personally Reviewed:  [x ] YES  [ ] NO   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 c/b cardiac arrest s/p icd c/b device infections s/p removal and reimplant, copd, gerd, p/w multiple icd shocks; in er, ppm interrogated and found to have, "15 shocks today between 4:54am-5:01am for ventricular fibrillation with average ventricular rate 250-462 bpm, 3 of which episodes required 2 shocks to convert to sinus rhythm and 1 episode required 3 shocks to convert to sinus rhythm;" patient started on lidocaine infusion and admitted to cicu for further mgmt of multiple recurrences of vfib over short period of time c/w electrical storm; while in cicu, patient underwent rhc + lhc which showed patent lad stent, mildly elevated biventricular pressure, preserved co, but otherwise without any new acute significant pathological triggers; given patient has remained electrically quiet on Lidocaine infusion, ep recommended stopping lidocaine infusion, continuing to monitor on telemetry for pvcs, and considering Quinidine vs ablation. patient deemed stable for continued mgmt on general medical floor with telemetry.      REVIEW OF SYSTEMS:  CONSTITUTIONAL: No fever, chills  HEENT:  No blurry vision No sinus or throat pain  NECK: No pain or stiffness  RESPIRATORY: No cough, wheezing, chills or hemoptysis; No shortness of breath  CARDIOVASCULAR: No chest pain, palpitations  GASTROINTESTINAL: No abdominal pain. No nausea, vomiting, or diarrhea  GENITOURINARY: No dysuria  NEUROLOGICAL: No HA, No focal weakness  SKIN: No itching, burning, rashes, or lesions   MUSCULOSKELETAL: No joint pain or swelling; No muscle, back, or extremity pain    MEDICATIONS:  atorvastatin 40 milliGRAM(s) Oral at bedtime  budesonide 160 MICROgram(s)/formoterol 4.5 MICROgram(s) Inhaler 2 Puff(s) Inhalation two times a day  carvedilol 3.125 milliGRAM(s) Oral every 12 hours  clopidogrel Tablet 75 milliGRAM(s) Oral daily  clotrimazole 2% Vaginal Cream 1 Applicatorful Vaginal every 24 hours PRN  famotidine    Tablet 20 milliGRAM(s) Oral daily  furosemide    Tablet 40 milliGRAM(s) Oral daily  heparin   Injectable 5000 Unit(s) SubCutaneous every 8 hours  influenza  Vaccine (HIGH DOSE) 0.7 milliLiter(s) IntraMuscular once  insulin lispro (ADMELOG) corrective regimen sliding scale   SubCutaneous at bedtime  insulin lispro (ADMELOG) corrective regimen sliding scale   SubCutaneous three times a day before meals  nicotine - 21 mG/24Hr(s) Patch 1 Patch Transdermal daily  nystatin Cream 1 Application(s) Topical every 12 hours  sacubitril 24 mG/valsartan 26 mG 1 Tablet(s) Oral two times a day  sotalol. 80 milliGRAM(s) Oral every 12 hours      T(C): 37.1 (10-01-23 @ 21:30), Max: 37.1 (10-01-23 @ 21:30)  HR: 79 (10-01-23 @ 21:30) (69 - 80)  BP: 136/71 (10-01-23 @ 21:30) (100/48 - 165/59)  RR: 18 (10-01-23 @ 21:30) (18 - 36)  SpO2: 94% (10-01-23 @ 21:30) (90% - 97%)  Wt(kg): --Vital Signs Last 24 Hrs  T(C): 37.1 (01 Oct 2023 21:30), Max: 37.1 (01 Oct 2023 21:30)  T(F): 98.8 (01 Oct 2023 21:30), Max: 98.8 (01 Oct 2023 21:30)  HR: 79 (01 Oct 2023 21:30) (69 - 80)  BP: 136/71 (01 Oct 2023 21:30) (100/48 - 165/59)  BP(mean): 90 (01 Oct 2023 21:00) (69 - 94)  RR: 18 (01 Oct 2023 21:30) (18 - 36)  SpO2: 94% (01 Oct 2023 21:30) (90% - 97%)    Parameters below as of 01 Oct 2023 21:30  Patient On (Oxygen Delivery Method): room air        PHYSICAL EXAM:  GENERAL: NAD, well-groomed, well-developed  HEAD:  Atraumatic, Normocephalic  EYES: EOMI, PERRLA, conjunctiva and sclera clear  ENMT:  Moist mucous membranes  NECK: Supple, No JVD,  CHEST/LUNG: Clear to auscultation bilaterally; No rales, rhonchi, wheezing, or rubs  HEART: Regular rate and rhythm; No murmurs, rubs, or gallops; ICD in place  ABDOMEN: Soft, Nontender, Nondistended; Bowel sounds present  NEURO: Alert & Oriented X3  EXTREMITIES: No LE edema, no calf tenderness  LYMPH: No lymphadenopathy noted  SKIN: No rashes or lesions    Consultant(s) Notes Reviewed:  [x ] YES  [ ] NO  Care Discussed with Consultants/Other Providers [ x] YES  [ ] NO    LABS:                        13.5   15.65 )-----------( 295      ( 01 Oct 2023 01:53 )             42.0     10-01    140  |  105  |  14  ----------------------------<  117<H>  4.2   |  27  |  0.93    Ca    8.6      01 Oct 2023 17:02  Phos  3.5     10-01  Mg     2.2     10-01    TPro  5.8<L>  /  Alb  2.7<L>  /  TBili  0.4  /  DBili  x   /  AST  17  /  ALT  11  /  AlkPhos  65  10-01    PT/INR - ( 01 Oct 2023 01:53 )   PT: 12.0 sec;   INR: 1.15 ratio         PTT - ( 01 Oct 2023 01:53 )  PTT:28.8 sec  Urinalysis Basic - ( 01 Oct 2023 17:02 )    Color: x / Appearance: x / SG: x / pH: x  Gluc: 117 mg/dL / Ketone: x  / Bili: x / Urobili: x   Blood: x / Protein: x / Nitrite: x   Leuk Esterase: x / RBC: x / WBC x   Sq Epi: x / Non Sq Epi: x / Bacteria: x      CAPILLARY BLOOD GLUCOSE      POCT Blood Glucose.: 114 mg/dL (01 Oct 2023 21:03)  POCT Blood Glucose.: 104 mg/dL (01 Oct 2023 17:01)  POCT Blood Glucose.: 129 mg/dL (01 Oct 2023 11:31)  POCT Blood Glucose.: 120 mg/dL (01 Oct 2023 08:01)        Urinalysis Basic - ( 01 Oct 2023 17:02 )    Color: x / Appearance: x / SG: x / pH: x  Gluc: 117 mg/dL / Ketone: x  / Bili: x / Urobili: x   Blood: x / Protein: x / Nitrite: x   Leuk Esterase: x / RBC: x / WBC x   Sq Epi: x / Non Sq Epi: x / Bacteria: x        RADIOLOGY & ADDITIONAL TESTS:    Imaging Personally Reviewed:  [x ] YES  [ ] NO

## 2023-10-01 NOTE — PROGRESS NOTE ADULT - PROBLEM SELECTOR PLAN 1
ppm interrogation revealed multiple recurrences of vfib over short period of time   c/w electrical storm  rhc + lhc which showed no new acute significant pathological trigger  s/p lidocaine infusion in cicu  ep consulted, and given patient has remained electrically quiet on Lidocaine infusion, ep recommended stopping lidocaine infusion, continuing to monitor on telemetry for pvcs, and considering Quinidine vs ablation  monitor on telemetry  cont betapace + coreg  ep follow up in am

## 2023-10-01 NOTE — PROGRESS NOTE ADULT - SUBJECTIVE AND OBJECTIVE BOX
CICU DAY NOTE  Admission date: 9/30/23  Chief complaint/ Diagnosis: STEMI  HPI: 71 y/o F w/ PMHx COPD, HLD, HTN, PVD, ICM/CAD s/p PCI, cardiac arrest s/p left-sided ICD (6/4/2019) complicated by infection and s/p R single-chamber ICD ([place] in 9/23/2019) recent admission of UTI (treated w/ ceftriaxone and Vanco x 3 days) and now presented w/ multiple ICD firing episode today.   Pt reports that she had gone to the bathroom and was sitting in a chair and suddenly felt a sharp pain and lightheadedness in her right side shoulder with loud sound.  Patient screamed and when  came to see her, the  stated " she had a blank stare, not able to answer my questions and grabbing her chest ~5mins) EMS was called and patient's defibrillator fired an additional 2 times.  She reports that she had an episode of vomiting after initial shock.  Since has felt fine and was without complaints. Pt reports that she had no further episode in ED but was told that she had 13 ICD firings.   Denies any chest pain, SOB/dyspnea, fever, chills, cough, or sore throat.  (30 Sep 2023 12:46)    Interval history:    REVIEW OF SYSTEMS  Denies CP, Palpitation, SOB, Dyspnea [ X ] All other systems negative    MEDICATIONS  (STANDING)  atorvastatin 40 milliGRAM(s) Oral at bedtime  budesonide 160 MICROgram(s)/formoterol 4.5 MICROgram(s) Inhaler 2 Puff(s) Inhalation two times a day  carvedilol 3.125 milliGRAM(s) Oral every 12 hours  clopidogrel Tablet 75 milliGRAM(s) Oral daily  famotidine    Tablet 20 milliGRAM(s) Oral daily  furosemide    Tablet 40 milliGRAM(s) Oral daily  heparin   Injectable 5000 Unit(s) SubCutaneous every 8 hours  influenza  Vaccine (HIGH DOSE) 0.7 milliLiter(s) IntraMuscular once  insulin lispro (ADMELOG) corrective regimen sliding scale   SubCutaneous three times a day before meals  insulin lispro (ADMELOG) corrective regimen sliding scale   SubCutaneous at bedtime  lidocaine   Infusion 1 mG/Min (7.5 mL/Hr) IV Continuous <Continuous>  nicotine - 21 mG/24Hr(s) Patch 1 Patch Transdermal daily  nystatin Cream 1 Application(s) Topical every 12 hours    MEDICATIONS  (PRN):  clotrimazole 2% Vaginal Cream 1 Applicatorful Vaginal every 24 hours PRN itchiness    PAST MEDICAL & SURGICAL HISTORY:  Obese  Smoker  HTN (hypertension)  HlD (hyperlipidemia)  CAD (coronary artery disease)  CHF with cardiomyopathy  History of hip surgery    FAMILY HISTORY:  Family history of Alzheimer's disease (Father)  Family history of cancer (Mother)    Allergy   No Known Allergies    ICU Vital Signs Last 24 Hrs  T(C): 36.8 (Max: 36.9)  HR: 71 (57 - 79)  BP: 100/48  (73/39 - 137/59)  BP(mean): 69(50 - 108)  RR: 29 (16 - 36)  SpO2: 96%  (90% - 97%) on room air     I&O's Summary  IN: 587.5 mL / OUT: 850 mL / NET: -262.5 mL    PHYSICAL EXAM  Appearance: Normal, NAD  HEAD:   Normocephalic  EYES: PERRLA, conjunctiva and sclera clear  NECK: Supple, No JVD  CHEST/LUNG: Clear to auscultation bilaterally; No wheezing  HEART: Normal S1, S2. No murmurs, rubs, or gallops  ABDOMEN: + Bowel sounds, Soft, NT, ND   EXTREMITIES:  2+ Peripheral Pulses, No clubbing, cyanosis, or edema  NEUROLOGY: non-focal, aaox3  SKIN: No rashes or lesions    Interpretation of Telemetry:                        13.5   15.65 )-----------( 295                42.0       142  |  106  |  9   ----------------------------<  124<H>  3.5   |  25  |  0.71    Ca    8.1<L>     Phos  3.5     Mg     2.2       TPro  5.5<L>  /  Alb  2.7<L>  /  TBili  0.4  /  DBili  x   /  AST  21  /  ALT  10  /  AlkPhos  59                CICU DAY NOTE  Admission date: 9/30/23  Chief complaint/ Diagnosis: STEMI  HPI: 73 y/o F w/ PMHx COPD, HLD, HTN, PVD, ICM/CAD s/p PCI, cardiac arrest s/p left-sided ICD (6/4/2019) complicated by infection and s/p R single-chamber ICD ([place] in 9/23/2019) recent admission of UTI (treated w/ ceftriaxone and Vanco x 3 days) and now presented w/ multiple ICD firing episode today.   Pt reports that she had gone to the bathroom and was sitting in a chair and suddenly felt a sharp pain and lightheadedness in her right side shoulder with loud sound.  Patient screamed and when  came to see her, the  stated " she had a blank stare, not able to answer my questions and grabbing her chest ~5mins) EMS was called and patient's defibrillator fired an additional 2 times.  She reports that she had an episode of vomiting after initial shock.  Since has felt fine and was without complaints. Pt reports that she had no further episode in ED but was told that she had 13 ICD firings.   Denies any chest pain, SOB/dyspnea, fever, chills, cough, or sore throat.  (30 Sep 2023 12:46)  8/29 TTE: normal LV function w/ Basal/mid anterolateral wall, basal/mid inferolateral wall, and basal inferior segment are abnormal    Interval history: s/p LHC no intervention RCA   w/ CI 3.76  Currently on lido gtt  No further episode except frequent PVCs    REVIEW OF SYSTEMS  Denies CP, Palpitation, SOB, Dyspnea [ X ] All other systems negative    MEDICATIONS  (STANDING)  atorvastatin 40 milliGRAM(s) Oral at bedtime  budesonide 160 MICROgram(s)/formoterol 4.5 MICROgram(s) Inhaler 2 Puff(s) Inhalation two times a day  carvedilol 3.125 milliGRAM(s) Oral every 12 hours  clopidogrel Tablet 75 milliGRAM(s) Oral daily  famotidine    Tablet 20 milliGRAM(s) Oral daily  furosemide    Tablet 40 milliGRAM(s) Oral daily  heparin   Injectable 5000 Unit(s) SubCutaneous every 8 hours  influenza  Vaccine (HIGH DOSE) 0.7 milliLiter(s) IntraMuscular once  insulin lispro (ADMELOG) corrective regimen sliding scale   SubCutaneous three times a day before meals  insulin lispro (ADMELOG) corrective regimen sliding scale   SubCutaneous at bedtime  lidocaine   Infusion 1 mG/Min (7.5 mL/Hr) IV Continuous <Continuous>  nicotine - 21 mG/24Hr(s) Patch 1 Patch Transdermal daily  nystatin Cream 1 Application(s) Topical every 12 hours    MEDICATIONS  (PRN):  clotrimazole 2% Vaginal Cream 1 Applicatorful Vaginal every 24 hours PRN itchiness    PAST MEDICAL & SURGICAL HISTORY:  Obese  Smoker  HTN (hypertension)  HlD (hyperlipidemia)  CAD (coronary artery disease)  CHF with cardiomyopathy  History of hip surgery    FAMILY HISTORY:  Family history of Alzheimer's disease (Father)  Family history of cancer (Mother)    Allergy   No Known Allergies    ICU Vital Signs Last 24 Hrs  T(C): 36.8 (Max: 36.9)  HR: 71 (57 - 79)  BP: 100/48  (73/39 - 137/59)  BP(mean): 69(50 - 108)  RR: 29 (16 - 36)  SpO2: 96%  (90% - 97%) on room air     I&O's Summary  IN: 587.5 mL / OUT: 850 mL / NET: -262.5 mL    PHYSICAL EXAM  Appearance: Normal, NAD  HEAD:   Normocephalic  EYES: PERRLA, conjunctiva and sclera clear  NECK: Supple, No JVD  CHEST/LUNG: Clear to auscultation bilaterally; No wheezing  HEART: Normal S1, S2. No murmurs, rubs, or gallops  ABDOMEN: + Bowel sounds, Soft, NT, ND   EXTREMITIES:  2+ Peripheral Pulses, No clubbing, cyanosis, or edema  NEUROLOGY: non-focal, aaox3  SKIN: No rashes or lesions    Interpretation of Telemetry:                                 13.5   15.65<16.39 )-----------( 295                         42.0     142  |  106  |  9   ----------------------<  124<H>  3.5 (repleted)  |  25  |  0.71    Ca    8.1<L>     Phos  3.5     Mg     2.2       TPro  5.5<L>  /  Alb  2.7<L>  /  TBili  0.4  /  DBili  x   /  AST  21  /  ALT  10  /  AlkPhos  59

## 2023-10-01 NOTE — PROGRESS NOTE ADULT - ATTENDING COMMENTS
73yo woman with active smoking/ COPD, PVD, HFpEF s/p R sided ICD (L side removed for infection) presents after multiple shocks, unclear precipitant. May be due to frequent PVCs, not in HF and no new ischemia.    Neuro: no issues  Pulm: cont home nebs, O2 goal of > 92%  CV: VF s/p over a dozen shocks from R sided ICD. No new ischemic disease and no e/o HF on RHC. Will discuss next steps with EP. Cont lido gtt for now.   Renal: no issues  GI: DASH Diet  ID: no issues  Heme: hsq ppx  Endo: BG controlled  No central access 73yo woman with active smoking/ COPD, PVD, HFpEF s/p R sided ICD (L side removed for infection) presents after multiple shocks, unclear precipitant. May be due to frequent PVCs, not in HF and no new ischemia.    Neuro: no issues  Pulm: cont home nebs, O2 goal of > 92%  CV: VF s/p over a dozen shocks from R sided ICD. No new ischemic disease and no e/o HF on RHC. Will discuss next steps with EP. d/c lido gtt and resume home sotalaol  Renal: no issues  GI: DASH Diet  ID: no issues  Heme: hsq ppx  Endo: BG controlled  No central access

## 2023-10-01 NOTE — CHART NOTE - NSCHARTNOTEFT_GEN_A_CORE
CICU Transfer Note    Transfer from: CICU    Transfer to: (  ) Medicine    (  ) Telemetry     (   ) RCU        (    ) Palliative         (   ) Stroke Unit       (  ) MICU       Accepting Physician: Dr. Jimenez    Signout given to: Hospitalist      PAST MEDICAL & SURGICAL HISTORY:  Obese      Smoker      HTN (hypertension)      HLD (hyperlipidemia)      CAD (coronary artery disease)      CHF with cardiomyopathy      History of hip surgery          Vital Signs Last 24 Hrs  T(C): 36.7 (01 Oct 2023 15:00), Max: 36.9 (30 Sep 2023 23:00)  T(F): 98.1 (01 Oct 2023 15:00), Max: 98.4 (30 Sep 2023 23:00)  HR: 76 (01 Oct 2023 19:00) (65 - 80)  BP: 137/61 (01 Oct 2023 19:00) (100/48 - 165/59)  BP(mean): 88 (01 Oct 2023 19:00) (69 - 108)  RR: 22 (01 Oct 2023 19:00) (18 - 36)  SpO2: 92% (01 Oct 2023 19:00) (90% - 97%)    Parameters below as of 01 Oct 2023 18:00  Patient On (Oxygen Delivery Method): room air      I&O's Summary    30 Sep 2023 07:01  -  01 Oct 2023 07:00  --------------------------------------------------------  IN: 587.5 mL / OUT: 850 mL / NET: -262.5 mL    01 Oct 2023 07:01  -  01 Oct 2023 19:23  --------------------------------------------------------  IN: 382.5 mL / OUT: 925 mL / NET: -542.5 mL        MEDICATIONS  (STANDING):  atorvastatin 40 milliGRAM(s) Oral at bedtime  budesonide 160 MICROgram(s)/formoterol 4.5 MICROgram(s) Inhaler 2 Puff(s) Inhalation two times a day  carvedilol 3.125 milliGRAM(s) Oral every 12 hours  clopidogrel Tablet 75 milliGRAM(s) Oral daily  famotidine    Tablet 20 milliGRAM(s) Oral daily  furosemide    Tablet 40 milliGRAM(s) Oral daily  heparin   Injectable 5000 Unit(s) SubCutaneous every 8 hours  influenza  Vaccine (HIGH DOSE) 0.7 milliLiter(s) IntraMuscular once  insulin lispro (ADMELOG) corrective regimen sliding scale   SubCutaneous at bedtime  insulin lispro (ADMELOG) corrective regimen sliding scale   SubCutaneous three times a day before meals  nicotine - 21 mG/24Hr(s) Patch 1 Patch Transdermal daily  nystatin Cream 1 Application(s) Topical every 12 hours  sacubitril 24 mG/valsartan 26 mG 1 Tablet(s) Oral two times a day  sotalol. 80 milliGRAM(s) Oral every 12 hours    MEDICATIONS  (PRN):  clotrimazole 2% Vaginal Cream 1 Applicatorful Vaginal every 24 hours PRN itchiness    CARDIAC MARKERS ( 30 Sep 2023 09:56 )  x     / x     / 594 U/L / x     / 8.3 ng/mL  CARDIAC MARKERS ( 30 Sep 2023 05:10 )  x     / x     / 78 U/L / x     / <1.0 ng/mL                          13.5   15.65 )-----------( 295      ( 01 Oct 2023 01:53 )             42.0     10-01    140  |  105  |  14  ----------------------------<  117<H>  4.2   |  27  |  0.93    Ca    8.6      01 Oct 2023 17:02  Phos  3.5     10-01  Mg     2.2     10-01    TPro  5.8<L>  /  Alb  2.7<L>  /  TBili  0.4  /  DBili  x   /  AST  17  /  ALT  11  /  AlkPhos  65  10-01    PT/INR - ( 01 Oct 2023 01:53 )   PT: 12.0 sec;   INR: 1.15 ratio         PTT - ( 01 Oct 2023 01:53 )  PTT:28.8 sec      ASSESSMENT & PLAN:   Assessment: 72F COPD, HLD, HTN, PVD, ICM/CAD s/p PCI, cardiac arrest s/p left-sided ICD (6/4/2019) complicated by infection and s/p R single-chamber ICD ([place] in 9/23/2019) recent admission of UTI (treated w/ ceftriaxone and Vanco x 3 days) and now presented w/ x14 appropriate ICD firing episodes today. Admitted to CICU on Lido gtt.    Plan:  ====================== NEUROLOGY=====================  A&Ox3  - continue to monitor mental status as per protocol     ==================== RESPIRATORY======================  Stable on room air  - continue to monitor SpO2 with goal >94%    ====================CARDIOVASCULAR==================  VF  - with x14 appropriate ICD firing episodes per interrogation  - Lido gtt d/c'd ~9AM  - 9/30 LHC/RHC: Patent LAD stent, RCA , mild LCx dz, RA 8, PA 39/24, PCW 22, MVO2 76%, CI 3.76. No intervention  - 8/29 TTE: normal LV function w/ Basal/mid anterolateral wall, basal/mid inferolateral wall, and basal inferior segment are abnormal  - f/u EP for antiarrythmic agents : Off lido@9:30am to capture pVC rhythm strip. restart Sotalol now 80mg BID, Cont. Coreg  - Resume entresto a lower dose and eventually increase to her home dose    ICM/hx MI  - c/w Plavix   - Lipitor   - Coreg 3.125  - c/w Lasix 40BID     ===================== RENAL =========================  Monitor Cr and lytes  - Cr downtrending   - Continue monitoring urine output, lytes, SCr/ BUN  - replete lytes prn with goal K >4 and Mg >2    =============== GASTROINTESTINAL===================  DASH diet  - No active issues   ===================ENDO====================  DM  - A1c 6.7 (on 8/28)  - ISS    ===================HEMATOLOGIC/ONC ===================  H/H  & plts stable  - Monitor H/H and plts  - DVT PPX: HSQ    ==================INFECTIOUS DISEASE================  No acute issues  - afebrile  - + leukocytosis  - monitor and trend WBC and temperature curve       FOR FOLLOW UP:  [] EP reccs  [] Monitor QTc while on Sotalol  [] increase Entresto/Coreg dose as tolerated    Kamilah Astudillo PA-C

## 2023-10-02 NOTE — PROGRESS NOTE ADULT - SUBJECTIVE AND OBJECTIVE BOX
24H hour events:  Tele SR rates ~60-70s w/ brief pAT ~7 seconds. Pt feeling well. Denies chest pain/palpitations/SOB.     MEDICATIONS:  carvedilol 3.125 milliGRAM(s) Oral every 12 hours  clopidogrel Tablet 75 milliGRAM(s) Oral daily  furosemide    Tablet 40 milliGRAM(s) Oral daily  heparin   Injectable 5000 Unit(s) SubCutaneous every 8 hours  sacubitril 24 mG/valsartan 26 mG 1 Tablet(s) Oral two times a day  sotalol. 80 milliGRAM(s) Oral every 12 hours  budesonide 160 MICROgram(s)/formoterol 4.5 MICROgram(s) Inhaler 2 Puff(s) Inhalation two times a day  famotidine    Tablet 20 milliGRAM(s) Oral daily  atorvastatin 40 milliGRAM(s) Oral at bedtime  insulin lispro (ADMELOG) corrective regimen sliding scale   SubCutaneous at bedtime  insulin lispro (ADMELOG) corrective regimen sliding scale   SubCutaneous three times a day before meals  clotrimazole 2% Vaginal Cream 1 Applicatorful Vaginal every 24 hours PRN  influenza  Vaccine (HIGH DOSE) 0.7 milliLiter(s) IntraMuscular once  nystatin Cream 1 Application(s) Topical every 12 hours    REVIEW OF SYSTEMS:  Complete 12point ROS negative.    PHYSICAL EXAM:  T(C): 36.8 (10-02-23 @ 06:53), Max: 37.1 (10-01-23 @ 21:30)  HR: 71 (10-02-23 @ 06:53) (71 - 80)  BP: 134/75 (10-02-23 @ 06:53) (120/73 - 165/59)  RR: 18 (10-02-23 @ 06:53) (18 - 28)  SpO2: 94% (10-02-23 @ 06:53) (90% - 94%)  Wt(kg): --  I&O's Summary    01 Oct 2023 07:01  -  02 Oct 2023 07:00  --------------------------------------------------------  IN: 382.5 mL / OUT: 1425 mL / NET: -1042.5 mL    02 Oct 2023 07:01  -  02 Oct 2023 10:52  --------------------------------------------------------  IN: 240 mL / OUT: 0 mL / NET: 240 mL        Appearance: Normal	  HEENT: NC/AT  Cardiovascular: Normal S1 S2  Respiratory: Lungs clear to auscultation	  Psychiatry: A & O x 3, Mood & affect appropriate  Neurologic: Non-focal  Extremities: trace BLE edema        LABS:	 	    CBC Full  -  ( 02 Oct 2023 05:24 )  WBC Count : 12.03 K/uL  Hemoglobin : 12.9 g/dL  Hematocrit : 40.1 %  Platelet Count - Automated : 260 K/uL  Mean Cell Volume : 88.7 fl  Mean Cell Hemoglobin : 28.5 pg  Mean Cell Hemoglobin Concentration : 32.2 gm/dL  Auto Neutrophil # : 7.33 K/uL  Auto Lymphocyte # : 3.30 K/uL  Auto Monocyte # : 1.03 K/uL  Auto Eosinophil # : 0.27 K/uL  Auto Basophil # : 0.05 K/uL  Auto Neutrophil % : 61.0 %  Auto Lymphocyte % : 27.4 %  Auto Monocyte % : 8.6 %  Auto Eosinophil % : 2.2 %  Auto Basophil % : 0.4 %    10-02    140  |  106  |  15  ----------------------------<  113<H>  4.0   |  26  |  0.83  10-01    140  |  105  |  14  ----------------------------<  117<H>  4.2   |  27  |  0.93    Ca    8.5      02 Oct 2023 05:25  Ca    8.6      01 Oct 2023 17:02  Phos  2.9     10-02  Phos  3.5     10-01  Mg     2.0     10-02  Mg     2.2     10-01    TPro  5.5<L>  /  Alb  2.8<L>  /  TBili  0.4  /  DBili  x   /  AST  17  /  ALT  10  /  AlkPhos  63  10-02  TPro  5.8<L>  /  Alb  2.7<L>  /  TBili  0.4  /  DBili  x   /  AST  17  /  ALT  11  /  AlkPhos  65  10-01      proBNP: Pro-Brain Natriuretic Peptide (10.02.23 @ 05:25)    Pro-Brain Natriuretic Peptide: 3416 pg/mL  TSH: Thyroid Stimulating Hormone, Serum in AM (10.02.23 @ 05:25)    Thyroid Stimulating Hormone, Serum: 3.58 uIU/mL    CARDIAC MARKERS: Troponin T, High Sensitivity (09.30.23 @ 09:56)    Troponin T, High Sensitivity Result: 117: Moderate Hemolysis,  Troponin T results may be falsely decreased  *  *  Rapid upward or downward changes in high-sensitivity troponin levels  suggest acute myocardial injury. Renal impairment may cause sustained  troponin elevations.  Normal: <6 - 14ng/L  Indeterminate: 15-51 ng/L  Elevated: > 51 ng/L  See [email or web address]/[email or web address] on the [facility] intranet for more  information ng/L    RADIOLOGY: < from: Xray Chest 1 View- PORTABLE-Urgent (09.30.23 @ 12:19) >  FINDINGS:  Right chest wall AICD with intact leads.  The heart is normal in size.  The lungs are clear.  There is no pneumothorax or pleural effusion.  No acute bony abnormality.    IMPRESSION:  Clear lungs.    < end of copied text >	    PREVIOUS DIAGNOSTIC TESTING:    [ ] Echocardiogram: < from: TTE W or WO Ultrasound Enhancing Agent (08.29.23 @ 07:18) >     CONCLUSIONS:      1. Technically difficult image quality.   2. Normal left ventricular cavity size. Left ventricular wall thickness is normal. Left ventricular systolic function is normal.   3. Basal and mid anterolateral wall, basal and mid inferolateral wall, and basal inferior segment are abnormal.   4. There is moderate (grade 2) left ventricular diastolic dysfunction, with elevated filling pressure.   5. Normal right ventricular cavity size, normal right ventricular wall thickness and normal right ventricular systolic function.   6. The left atrium is mildly dilated in size.   7. The aortic arch diameter is normal.   8. There is calcification of the mitral valve annulus.   9. No pericardial effusion seen.      < end of copied text >      [ ]  Catheterization: < from: Cardiac Catheterization (09.30.23 @ 15:04) >    Diagnostic Conclusions:     Patent LAD stent. Mildly elevated filling pressures with preserved  cardiac output.  Recommendations:     1. Monitor access site.    2. Continue medical management of CAD. No obvious reason for multiple  episodes of VF and subsequent ICD shocks.    < end of copied text >

## 2023-10-02 NOTE — PHARMACOTHERAPY INTERVENTION NOTE - COMMENTS
Performed medication reconciliation and home medication list updated in prescription writer/ outpatient medication review. Medications verified with patient and patient's  at bedside and [facility]  pharmacy.     Home Medications:  albuterol 2.5 mg/3 mL (0.083%) inhalation solution: 3 milliliter(s) by nebulizer prn as needed for wheezing  atorvastatin 40 mg oral tablet: 1 tab(s) orally once a day (at bedtime)  Vitamin D3 50 mcg (2000 intl units) oral capsule: 1 cap(s) orally   Entresto 49 mg-51 mg oral tablet: 1 tab(s) orally 2 times a day   famotidine 20 mg oral tablet, disintegratin tab(s) orally once a day (at bedtime)   Lasix 40 mg oral tablet: 1 tab(s) orally once a day  Plavix 75 mg oral tablet: 1 tab(s) orally once a day  Symbicort 160 mcg-4.5 mcg/inh inhalation aerosol: 2 puff(s) inhaled once a day    Sherri Spear  Pharmacy Intern  PharmD. c/o     Performed medication reconciliation and home medication list updated in prescription writer/ outpatient medication review. Medications verified with patient and patient's  at bedside and [facility]  pharmacy.     Home Medications:  albuterol 2.5 mg/3 mL (0.083%) inhalation solution: 3 milliliter(s) by nebulizer prn as needed for wheezing  atorvastatin 40 mg oral tablet: 1 tab(s) orally once a day (at bedtime)  Vitamin D3 50 mcg (2000 intl units) oral capsule: 1 cap(s) orally   Entresto 49 mg-51 mg oral tablet: 1 tab(s) orally 2 times a day   famotidine 20 mg oral tablet, disintegratin tab(s) orally once a day (at bedtime)   Lasix 40 mg oral tablet: 1 tab(s) orally once a day  Plavix 75 mg oral tablet: 1 tab(s) orally once a day  Symbicort 160 mcg-4.5 mcg/inh inhalation aerosol: 2 puff(s) inhaled once a day    Sherri Spear  Pharmacy Intern  PharmD. c/o      Marlen Sands, PharmD, BCPS  Clinical Pharmacy Specialist  [phone] or Teams

## 2023-10-02 NOTE — PROGRESS NOTE ADULT - PROBLEM SELECTOR PLAN 1
ppm interrogation revealed multiple recurrences of vfib over short period of time   c/w electrical storm  rhc + lhc which showed no new acute significant pathological trigger  s/p lidocaine infusion in cicu  ep consulted, and given patient has remained electrically quiet on Lidocaine infusion, ep recommended stopping lidocaine infusion, continuing to monitor on telemetry for pvcs, and considering Quinidine  c/w sotalol  monitor on telemetry  cont betapace + coreg  f/u EP recs

## 2023-10-02 NOTE — PROGRESS NOTE ADULT - SUBJECTIVE AND OBJECTIVE BOX
SUBJECTIVE / OVERNIGHT EVENTS:  Today is hospital day 2d. There are no new issues or overnight events.   Denies any headache, lightheadedness, vertigo, shortness of breathe, cough, chest pain, palpitations, tachycardia, abdominal pain, nausea, vomiting, diarrhea or constipation currently    HPI:  73 y/o F w/ PMHx COPD, HLD, HTN, PVD, ICM/CAD s/p PCI, cardiac arrest s/p left-sided ICD (2019) complicated by infection and s/p R single-chamber ICD ([place] in 2019) recent admission of UTI (treated w/ ceftriaxone and Vanco x 3 days) and now presented w/ multiple ICD firing episode today.   Pt reports that she had gone to the bathroom and was sitting in a chair and suddenly felt a sharp pain and lightheadedness in her right side shoulder with loud sound.  Patient screamed and when  came to see her, the  stated " she had a blank stare, not able to answer my questions and grabbing her chest ~5mins) EMS was called and patient's defibrillator fired an additional 2 times.  She reports that she had an episode of vomiting after initial shock.  Since has felt fine and was without complaints. Pt reports that she had no further episode in ED but was told that she had 13 ICD firings.   Denies any chest pain, SOB/dyspnea, fever, chills, cough, or sore throat.  (30 Sep 2023 12:46)    MEDICATIONS  (STANDING):  atorvastatin 40 milliGRAM(s) Oral at bedtime  budesonide 160 MICROgram(s)/formoterol 4.5 MICROgram(s) Inhaler 2 Puff(s) Inhalation two times a day  carvedilol 3.125 milliGRAM(s) Oral every 12 hours  clopidogrel Tablet 75 milliGRAM(s) Oral daily  famotidine    Tablet 20 milliGRAM(s) Oral daily  furosemide    Tablet 40 milliGRAM(s) Oral daily  heparin   Injectable 5000 Unit(s) SubCutaneous every 8 hours  influenza  Vaccine (HIGH DOSE) 0.7 milliLiter(s) IntraMuscular once  insulin lispro (ADMELOG) corrective regimen sliding scale   SubCutaneous at bedtime  insulin lispro (ADMELOG) corrective regimen sliding scale   SubCutaneous three times a day before meals  nicotine - 21 mG/24Hr(s) Patch 1 Patch Transdermal daily  nystatin Cream 1 Application(s) Topical every 12 hours  sacubitril 24 mG/valsartan 26 mG 1 Tablet(s) Oral two times a day  sotalol. 80 milliGRAM(s) Oral every 12 hours    MEDICATIONS  (PRN):  clotrimazole 2% Vaginal Cream 1 Applicatorful Vaginal every 24 hours PRN itchiness    HOME MEDICATIONS:  albuterol 2.5 mg/3 mL (0.083%) inhalation solution: 3 milliliter(s) by nebulizer prn as needed for  wheezing (02 Oct 2023 11:27)  atorvastatin 40 mg oral tablet: 1 tab(s) orally once a day (at bedtime) (02 Oct 2023 11:27)  D3 50 mcg (2000 intl units) oral capsule: 1 cap(s) orally (02 Oct 2023 11:27)  Entresto 49 mg-51 mg oral tablet: 1 tab(s) orally 2 times a day (02 Oct 2023 11:27)  famotidine 20 mg oral tablet, disintegratin tab(s) orally once a day (at bedtime) (02 Oct 2023 11:27)  Lasix 40 mg oral tablet: 1 tab(s) orally once a day (02 Oct 2023 11:27)  Plavix 75 mg oral tablet: 1 tab(s) orally once a day (02 Oct 2023 11:27)  Symbicort 160 mcg-4.5 mcg/inh inhalation aerosol: 2 puff(s) inhaled once a day (02 Oct 2023 11:27)    PHYSICAL EXAM:  Vital Signs Last 24 Hrs  T(C): 36.8 (02 Oct 2023 06:53), Max: 37.1 (01 Oct 2023 21:30)  T(F): 98.2 (02 Oct 2023 06:53), Max: 98.8 (01 Oct 2023 21:30)  HR: 71 (02 Oct 2023 06:53) (71 - 80)  BP: 134/75 (02 Oct 2023 06:53) (125/56 - 140/65)  BP(mean): 90 (01 Oct 2023 21:00) (81 - 94)  RR: 18 (02 Oct 2023 06:53) (18 - 28)  SpO2: 94% (02 Oct 2023 06:53) (92% - 94%)    Parameters below as of 02 Oct 2023 06:53  Patient On (Oxygen Delivery Method): room air      I&O's Summary    01 Oct 2023 07:01  -  02 Oct 2023 07:00  --------------------------------------------------------  IN: 382.5 mL / OUT: 1425 mL / NET: -1042.5 mL    02 Oct 2023 07:01  -  02 Oct 2023 14:19  --------------------------------------------------------  IN: 600 mL / OUT: 800 mL / NET: -200 mL      CONSTITUTIONAL: Well-groomed, in no apparent distress  EYES: No conjunctival or scleral injection, non-icteric  ENMT: No external nasal lesions; Normal outer ears  NECK: Supple; Trachea midline  RESPIRATORY: Normal respiratory effort; lungs are clear to auscultation bilaterally without wheeze/rhonchi/rales  CARDIOVASCULAR: Regular rate and rhythm, normal S1 and S2, no murmur/rub/gallop; No lower extremity edema  GASTROINTESTINAL: Non-distended; No palpable masses; No tenderness; No rebound/guarding  MUSCULOSKELETAL:  Normal gait;  NEUROLOGY: A+O to person, place, and time; no gross motor deficits   PSYCHIATRY: Mood and Affect appropriate    LABS:                        12.9   12.03 )-----------( 260      ( 02 Oct 2023 05:24 )             40.1     10-02    140  |  106  |  15  ----------------------------<  113<H>  4.0   |  26  |  0.83    Ca    8.5      02 Oct 2023 05:25  Phos  2.9     10-02  Mg     2.0     10-02    TPro  5.5<L>  /  Alb  2.8<L>  /  TBili  0.4  /  DBili  x   /  AST  17  /  ALT  10  /  AlkPhos  63  10-02    PT/INR - ( 01 Oct 2023 01:53 )   PT: 12.0 sec;   INR: 1.15 ratio         PTT - ( 01 Oct 2023 01:53 )  PTT:28.8 sec      Urinalysis Basic - ( 02 Oct 2023 05:25 )    Color: x / Appearance: x / SG: x / pH: x  Gluc: 113 mg/dL / Ketone: x  / Bili: x / Urobili: x   Blood: x / Protein: x / Nitrite: x   Leuk Esterase: x / RBC: x / WBC x   Sq Epi: x / Non Sq Epi: x / Bacteria: x        SARS-CoV-2: NotDetec (27 Aug 2023 19:13)      RADIOLOGY & ADDITIONAL TESTS:  EKG  12 Lead ECG:   Ventricular Rate 67 BPM    Atrial Rate 67 BPM    P-R Interval 128 ms    QRS Duration 120 ms    Q-T Interval 444 ms    QTC Calculation(Bazett) 469 ms    P Axis -25 degrees    R Axis 45 degrees    T Axis 159 degrees    Diagnosis Line NORMAL SINUS RHYTHM  ANTEROSEPTAL INFARCT (CITED ON OR BEFORE 27-AUG-2023)  ABNORMAL ECG  WHEN COMPARED WITH ECG OF 31-AUG-2023 08:15,  NO SIGNIFICANT CHANGE WAS FOUND  Confirmed by MD Moon James ([id number]) on 2023 2:13:40 PM (23 @ 09:33)  12 Lead ECG:   Ventricular Rate 81 BPM    Atrial Rate 81 BPM    P-R Interval 172 ms    QRS Duration 124 ms    Q-T Interval 416 ms    QTC Calculation(Bazett) 483 ms    P Axis 98 degrees    R Axis 5 degrees    T Axis 166 degrees    Diagnosis Line Normal sinus rhythm  Septal infarct (cited on or before 30-SEP-2023)  Marked ST abnormality, possible lateral subendocardial injury  Abnormal ECG  When compared with ECG of 30-SEP-2023 04:27,  premature ventricular complexes are no longer present  Confirmed by Unruly Yung ([id number]) on 2023 12:21:13 PM (23 @ 04:28)    Xray Chest 1 View- PORTABLE-Urgent:   ACC: [id number] EXAM:  XR CHEST PORTABLE URGENT 1V   ORDERED BY: KYLEIGH KUHN     PROCEDURE DATE:  2023          INTERPRETATION:  EXAMINATION: XR CHEST URGENT    CLINICAL INDICATION: Chest Pain    TECHNIQUE: Single frontal, portable view of the chest was obtained.    COMPARISON: Chest x-ray 2023.    FINDINGS:  Right chest wall AICD with intact leads.  The heart is normal in size.  The lungs are clear.  There is no pneumothorax or pleural effusion.  No acute bony abnormality.    IMPRESSION:  Clear lungs.    --- End of Report ---          DANA FAUSTIN MD; Resident Radiologist  This document has been electronically signed.  MARK SHEEHAN MD; Attending Radiologist  This document has been electronically signed. Sep 30 2023 12:47PM (23 @ 12:19)  Xray Chest 1 View- PORTABLE-Urgent:   ACC: [id number] EXAM:  XR CHEST PORTABLE URGENT 1V   ORDERED BY: ZIYAD OBRIEN     PROCEDURE DATE:  2023          INTERPRETATION:  Clinical Information: Chest pain    Technique: 1 AP chest x-ray(s)    Comparison: None    Findings/  Impression: Status post defibrillator. Cardiomegaly. The lungs are clear.   No acute osseous abnormality.    --- End of Report ---            FRED SALINAS MD; Attending Interventional Radiologist  This document has been electronically signed. Sep 30 2023  2:11PM (23 @ 06:40)

## 2023-10-02 NOTE — PROGRESS NOTE ADULT - ASSESSMENT
Amie Kohli is a 72 year old woman with COPD, HLD, HTN, PVD, CAD (s/p PCI to LAD, known  of RCA), cardiac arrest (s/p a left-sided ICD 6/4/2019,  complicated by infection, s/p removal with implantation of a R-sided, single-chamber ICD, [place] in 9/23/2019). She was recently admitted for VT and received Sotalol. 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      #VF storm s/p multiple ICD defibrillations  #HFrEF   #CAD s/p PCI    - Tele overnight to this AM Sr rates ~60-70s, brief pAT ~7 secs.   - Now back on Sotalol 80mg BID, continue. EKG from this AM QTc ~470ms.   - Lidocaine discontinued over weekend, pt relatively quiet electrically. Will discuss potentially adding on Quinidine.   - RHX/LHC no CAD and mildly elevated filling pressures w/ preserved CO  - Continue diuresis, currently on Lasix 40mg qD. Would appreciate cardiology recs.   - Keep on tele. Keep k>4, Mg>2  - Discussed with EP attending and primary team.  Amie Kohli is a 72 year old woman with COPD, HLD, HTN, PVD, CAD (s/p PCI to LAD, known  of RCA), cardiac arrest (s/p a left-sided ICD 6/4/2019,  complicated by infection, s/p removal with implantation of a R-sided, single-chamber ICD, [place] in 9/23/2019). She was recently admitted for VT and received Sotalol. 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      #VF storm s/p multiple ICD defibrillations  #HFrEF   #CAD s/p PCI    - Tele overnight to this AM Sr rates ~60-70s, brief pAT ~7 secs.   - Now back on Sotalol 80mg BID, continue. EKG from this AM QTc ~470ms.   - Lidocaine discontinued over weekend, pt relatively quiet electrically. Will discuss potentially adding on Quinidine.   - RHX/LHC no CAD and mildly elevated filling pressures w/ preserved CO  - Continue diuresis, currently on Lasix 40mg qD. Would appreciate cardiology recs.   - Keep on tele. Keep k>4, Mg>2  - Discussed with EP attending and primary team.     Addendum:  - Spoke with Dr. Hurt.   - Please discontinue Sotalol, QTc borderline ~470ms and pt had breakthrough VF on Sotalol.   - Please start Quinidine 324mg TID and Mexiletine 150mg BID.   - Once pt received adequate doses of both, would do DFT to ensure any future VT/VF is sensed and appropriately treated by the ICD. Also discussed RF ablation pending DFT.  - Continue Coreg   - Keep on tele. Keep K>4, Mg>2  - Discussed with EP attending and primary team.  Amie Kohli is a 72 year old woman with COPD, HLD, HTN, PVD, CAD (s/p PCI to LAD, known  of RCA), cardiac arrest (s/p a left-sided ICD 6/4/2019,  complicated by infection, s/p removal with implantation of a R-sided, single-chamber ICD, [place] in 9/23/2019). She was recently admitted for VT and received Sotalol. 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      #VF storm s/p multiple ICD defibrillations  #HFrEF   #CAD s/p PCI    - Tele overnight to this AM Sr rates ~60-70s, brief pAT ~7 secs.   - Now back on Sotalol 80mg BID, continue. EKG from this AM QTc ~470ms.   - Lidocaine discontinued over weekend, pt relatively quiet electrically. Will discuss potentially adding on Quinidine.   - RHX/LHC no CAD and mildly elevated filling pressures w/ preserved CO  - Continue diuresis, currently on Lasix 40mg qD. Would appreciate cardiology recs.   - Keep on tele. Keep k>4, Mg>2  - Discussed with EP attending and primary team.     Addendum:  - Spoke with Dr. Hurt.   - Please discontinue Sotalol, QTc borderline ~470ms and pt had breakthrough VF on Sotalol.   - Please start Quinidine 324mg TID and Mexiletine 150mg BID.   - Once pt received adequate doses of both, would do DFT to ensure any future VT/VF is sensed and appropriately treated by the ICD. Also discussed RF ablation pending DFT.  - Continue Coreg   - Please get daily EKGs  - Keep on tele. Keep K>4, Mg>2  - Discussed with EP attending and primary team.

## 2023-10-02 NOTE — PROGRESS NOTE ADULT - ASSESSMENT
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 c/b cardiac arrest s/p icd c/b device infections s/p removal and reimplant, copd, gerd, p/w multiple icd shocks; in er, ppm interrogated and found to have multiple recurrences of vfib over short period of time c/w electrical storm; started on lidocaine infusion and admitted to [facility] for further mgmt; while in [facility], patient underwent rhc + lhc which showed no new acute significant pathological triggers; given patient has remained electrically quiet on Lidocaine infusion, ep recommended stopping lidocaine infusion, continuing to monitor on telemetry for pvcs, and considering Quinidine vs ablation. patient deemed stable for continued mgmt on general medical floor with telemetry.

## 2023-10-03 NOTE — DIETITIAN INITIAL EVALUATION ADULT - ORAL INTAKE PTA/DIET HISTORY
Nutrition assessment completed at bedside with pt's  (Mason) present at the bedside for interview. Pt reported change in appetite and intake over the past 6 months, stating she "eats less than she used to." Pt states she typically eats 2 meals a day at home and has a snack in between. Pt stated 6 months ago she used to weigh ~230 pounds, now she weighs ~193 pounds (day before admission). Pt stated wt loss possibly 2/2 reduced intake shortly after last hospital admission. Confirmed no known food allergies.

## 2023-10-03 NOTE — DIETITIAN INITIAL EVALUATION ADULT - ETIOLOGY
related to lack of prior exposure to heart healthy medical nutrition therapy related to inability to meet sufficient protein energy needs in setting of decreased oral intake

## 2023-10-03 NOTE — DIETITIAN INITIAL EVALUATION ADULT - NSFNSADHERENCEPTAFT_GEN_A_CORE
Prior to admission, pt stated she followed a regular diet at home, would limit the amount of sodium she consumes in setting of CHF and HTN. Pt stated she would typically have eggs and a corn muffin for breakfast, and for a late lunch/dinner she would have a salad and a turkey burger. Pt stated for a snack she would have yogurt. Based off of dietary recall, pt is likely meeting <75% of estimated nutrient needs.     Off note: Pt declined history of DM during RD interview.

## 2023-10-03 NOTE — PROGRESS NOTE ADULT - SUBJECTIVE AND OBJECTIVE BOX
24H hour events: Tele sinus rhythm  rates ~70-80s with intermittent PVCs. Pt feeling well. Denies chest pain/palpitations/SOB.     MEDICATIONS:  carvedilol 3.125 milliGRAM(s) Oral every 12 hours  clopidogrel Tablet 75 milliGRAM(s) Oral daily  furosemide    Tablet 40 milliGRAM(s) Oral daily  heparin   Injectable 5000 Unit(s) SubCutaneous every 8 hours  mexiletine 150 milliGRAM(s) Oral two times a day  quiNIDine gluconate  milliGRAM(s) Oral every 8 hours  sacubitril 24 mG/valsartan 26 mG 1 Tablet(s) Oral two times a day  budesonide 160 MICROgram(s)/formoterol 4.5 MICROgram(s) Inhaler 2 Puff(s) Inhalation two times a day  famotidine    Tablet 20 milliGRAM(s) Oral daily  atorvastatin 40 milliGRAM(s) Oral at bedtime  insulin lispro (ADMELOG) corrective regimen sliding scale   SubCutaneous three times a day before meals  insulin lispro (ADMELOG) corrective regimen sliding scale   SubCutaneous at bedtime  clotrimazole 2% Vaginal Cream 1 Applicatorful Vaginal every 24 hours PRN  influenza  Vaccine (HIGH DOSE) 0.7 milliLiter(s) IntraMuscular once  nystatin Cream 1 Application(s) Topical every 12 hours    REVIEW OF SYSTEMS:  Complete 12point ROS negative.    PHYSICAL EXAM:  T(C): 36.8 (10-03-23 @ 04:40), Max: 36.9 (10-02-23 @ 19:58)  HR: 74 (10-03-23 @ 04:40) (64 - 74)  BP: 140/75 (10-03-23 @ 04:40) (127/54 - 156/79)  RR: 18 (10-03-23 @ 04:40) (16 - 18)  SpO2: 95% (10-03-23 @ 04:40) (93% - 95%)  Wt(kg): --  I&O's Summary    02 Oct 2023 07:01  -  03 Oct 2023 07:00  --------------------------------------------------------  IN: 600 mL / OUT: 800 mL / NET: -200 mL        Appearance: Normal	  HEENT: NC/AT  Cardiovascular: Normal S1 S2  Respiratory: Lungs clear to auscultation	  Psychiatry: A & O x 3, Mood & affect appropriate  Neurologic: Non-focal  Extremities: No BLE edema    LABS:	 	    CBC Full  -  ( 03 Oct 2023 06:16 )  WBC Count : 13.60 K/uL  Hemoglobin : 13.7 g/dL  Hematocrit : 42.4 %  Platelet Count - Automated : 293 K/uL  Mean Cell Volume : 88.9 fl  Mean Cell Hemoglobin : 28.7 pg  Mean Cell Hemoglobin Concentration : 32.3 gm/dL  10-03    140  |  105  |  16  ----------------------------<  118<H>  3.8   |  26  |  0.75  10-02    140  |  106  |  15  ----------------------------<  113<H>  4.0   |  26  |  0.83    Ca    8.3<L>      03 Oct 2023 06:04  Ca    8.5      02 Oct 2023 05:25  Phos  2.9     10-02  Mg     2.2     10-03  Mg     2.0     10-02    TPro  5.5<L>  /  Alb  2.8<L>  /  TBili  0.4  /  DBili  x   /  AST  17  /  ALT  10  /  AlkPhos  63  10-02  TPro  5.8<L>  /  Alb  2.7<L>  /  TBili  0.4  /  DBili  x   /  AST  17  /  ALT  11  /  AlkPhos  65  10-01      proBNP: Pro-Brain Natriuretic Peptide (10.02.23 @ 05:25)    Pro-Brain Natriuretic Peptide: 3416 pg/mL  TSH: Thyroid Stimulating Hormone, Serum in AM (10.02.23 @ 05:25)    Thyroid Stimulating Hormone, Serum: 3.58 uIU/mL    CARDIAC MARKERS: Troponin T, High Sensitivity (09.30.23 @ 09:56)    Troponin T, High Sensitivity Result: 117: Moderate Hemolysis,  Troponin T results may be falsely decreased  *  *  Rapid upward or downward changes in high-sensitivity troponin levels  suggest acute myocardial injury. Renal impairment may cause sustained  troponin elevations.  Normal: <6 - 14ng/L  Indeterminate: 15-51 ng/L  Elevated: > 51 ng/L  See [email or web address]/test/[email or web address] on the [facility] intranet for more  information ng/L    RADIOLOGY: < from: Xray Chest 1 View- PORTABLE-Urgent (09.30.23 @ 12:19) >    FINDINGS:  Right chest wall AICD with intact leads.  The heart is normal in size.  The lungs are clear.  There is no pneumothorax or pleural effusion.  No acute bony abnormality.    IMPRESSION:  Clear lungs.    < end of copied text >    PREVIOUS DIAGNOSTIC TESTING:    [ ] Echocardiogram: < from: TTE W or WO Ultrasound Enhancing Agent (08.29.23 @ 07:18) >     CONCLUSIONS:      1. Technically difficult image quality.   2. Normal left ventricular cavity size. Left ventricular wall thickness is normal. Left ventricular systolic function is normal.   3. Basal and mid anterolateral wall, basal and mid inferolateral wall, and basal inferior segment are abnormal.   4. There is moderate (grade 2) left ventricular diastolic dysfunction, with elevated filling pressure.   5. Normal right ventricular cavity size, normal right ventricular wall thickness and normal right ventricular systolic function.   6. The left atrium is mildly dilated in size.   7. The aortic arch diameter is normal.   8. There is calcification of the mitral valve annulus.   9. No pericardial effusion seen.    < end of copied text >    [ ]  Catheterization: < from: Cardiac Catheterization (09.30.23 @ 15:04) >  Diagnostic Conclusions:     Patent LAD stent. Mildly elevated filling pressures with preserved  cardiac output.  Recommendations:     1. Monitor access site.    2. Continue medical management of CAD. No obvious reason for multiple  episodes of VF and subsequent ICD shocks.    < end of copied text >

## 2023-10-03 NOTE — DIETITIAN INITIAL EVALUATION ADULT - PERTINENT LABORATORY DATA
10-03    140  |  105  |  16  ----------------------------<  118<H>  3.8   |  26  |  0.75    Ca    8.3<L>      03 Oct 2023 06:04  Phos  2.9     10-02  Mg     2.2     10-03    TPro  5.5<L>  /  Alb  2.8<L>  /  TBili  0.4  /  DBili  x   /  AST  17  /  ALT  10  /  AlkPhos  63  10-02  POCT Blood Glucose.: 114 mg/dL (10-03-23 @ 07:46)  A1C with Estimated Average Glucose Result: 6.7 % (08-28-23 @ 07:18)

## 2023-10-03 NOTE — DIETITIAN INITIAL EVALUATION ADULT - REASON INDICATOR FOR ASSESSMENT
RD consult warranted for Assessment and Education   Source: Patient, Patient's  (Mason), Electronic Medical Record  Chart reviewed, events noted.

## 2023-10-03 NOTE — PROGRESS NOTE ADULT - PROBLEM SELECTOR PLAN 1
ppm interrogation revealed multiple recurrences of vfib over short period of time   c/w electrical storm  rhc + lhc which showed no new acute significant pathological trigger  s/p lidocaine infusion in cicu  s/p sotalol  Started quinidine yesterday  monitor on telemetry  cont mexiletine + coreg  f/u EP recs. Possible defibrillation threshold testing this week

## 2023-10-03 NOTE — DIETITIAN INITIAL EVALUATION ADULT - NSFNSGIIOFT_GEN_A_CORE
Pt reported no nausea, vomiting, diarrhea or constipation at this time.   - Note: Pt is ordered for Pepcid (per orders).   Pt stated her last BM was this morning.   - Last documented BM: 10/03 (per flow sheet)  - Bowel Regimen: Pt is not currently ordered for a bowel regimen (per orders)

## 2023-10-03 NOTE — DIETITIAN INITIAL EVALUATION ADULT - PERTINENT MEDS FT
MEDICATIONS  (STANDING):  atorvastatin 40 milliGRAM(s) Oral at bedtime  budesonide 160 MICROgram(s)/formoterol 4.5 MICROgram(s) Inhaler 2 Puff(s) Inhalation two times a day  carvedilol 3.125 milliGRAM(s) Oral every 12 hours  clopidogrel Tablet 75 milliGRAM(s) Oral daily  famotidine    Tablet 20 milliGRAM(s) Oral daily  furosemide    Tablet 40 milliGRAM(s) Oral daily  heparin   Injectable 5000 Unit(s) SubCutaneous every 8 hours  influenza  Vaccine (HIGH DOSE) 0.7 milliLiter(s) IntraMuscular once  insulin lispro (ADMELOG) corrective regimen sliding scale   SubCutaneous three times a day before meals  insulin lispro (ADMELOG) corrective regimen sliding scale   SubCutaneous at bedtime  mexiletine 150 milliGRAM(s) Oral two times a day  nicotine - 21 mG/24Hr(s) Patch 1 Patch Transdermal daily  nystatin Powder 1 Application(s) Topical two times a day  quiNIDine gluconate  milliGRAM(s) Oral every 8 hours  sacubitril 24 mG/valsartan 26 mG 1 Tablet(s) Oral two times a day    MEDICATIONS  (PRN):  clotrimazole 2% Vaginal Cream 1 Applicatorful Vaginal every 24 hours PRN itchiness

## 2023-10-03 NOTE — DIETITIAN INITIAL EVALUATION ADULT - ENERGY INTAKE
Fair (50-75%) Pt stated currently in-house, she is eating ~60% of meals provided. Per flow sheets, pt ate 51-75% of breakfast and % of lunch yesterday. RD provided pt with a menu to assist with food preferences and optimize protein-energy intake. Pt is amenable to trialing Glucerna 1x/day to optimize protein-energy intake. RD obtained food preferences, will honor as able.

## 2023-10-03 NOTE — DIETITIAN INITIAL EVALUATION ADULT - NS FNS DIET ORDER
[place] to call system/Call bell, personal items and telephone within reach/Instruct patient to call for assistance/Room bathroom lighting operational/Non-slip footwear when patient is off stretcher/Physically safe environment: no spills, clutter or unnecessary equipment/Stretcher in lowest position, wheels locked, appropriate side rails in place/Provide visual cue, wrist band, yellow gown, etc./Monitor gait and stability/Monitor for mental status changes and reorient to person, place, and time/Provide visual clues: red socks Diet, DASH/TLC:   Sodium & Cholesterol Restricted  Consistent Carbohydrate {No Snacks} (CSTCHO) (09-30-23 @ 13:46) [Active]

## 2023-10-03 NOTE — PROGRESS NOTE ADULT - PROBLEM SELECTOR PLAN 5
coreg  lasix  entresto hold home meds (farxiga)  a1c ~6.7  monitor fingerstick glu tidac + hs  carb consistent diet  low dose correctional scale lispro tidac + hs while inpatient  adjust to maintain goal bg 100-180

## 2023-10-03 NOTE — DIETITIAN NUTRITION RISK NOTIFICATION - TREATMENT: THE FOLLOWING DIET HAS BEEN RECOMMENDED
Diet, DASH/TLC:   Sodium & Cholesterol Restricted  Consistent Carbohydrate {No Snacks} (CSTCHO) (09-30-23 @ 13:46) [Active]

## 2023-10-03 NOTE — PROGRESS NOTE ADULT - PROBLEM SELECTOR PLAN 4
hold home meds (farxiga)  a1c ~6.7  monitor fingerstick glu tidac + hs  carb consistent diet  low dose correctional scale lispro tidac + hs while inpatient  adjust to maintain goal bg 100-180 Under L breast and L groin  Changed from nystatin cream to powder. Better for high moisture environments  Will need rx at discharge

## 2023-10-03 NOTE — PHARMACOTHERAPY INTERVENTION NOTE - COMMENTS
Counseled patient and patient's  at bedside on the following inpatient/discharge medications names (brand/generic), indication, and possible side effects:    - mexiletine 150 milliGRAM(s) Oral two times a day  - quiNIDine gluconate  milliGRAM(s) Oral every 8 hours    Patient was provided with a medication card for their new medications. Patient questions and concerns were answered and addressed. Patient demonstrated understanding. Discussed with patient that home Sotalol was discontinued.    Sherri Spear  Pharmacy Intern  PharmD. c/o 2024  Counseled patient and patient's  at bedside on the following inpatient/discharge medications names (brand/generic), indication, and possible side effects:    - mexiletine 150 milliGRAM(s) Oral two times a day  - quiNIDine gluconate  milliGRAM(s) Oral every 8 hours    Patient was provided with written education material regarding these medications. Patient questions and concerns were answered and addressed. Patient demonstrated understanding. Discussed with patient that home sotalol was discontinued.    Sherri Spear  Pharmacy Intern  PharmD. c/o 2024     Marlen Sands, PharmD, [facility]  Clinical Pharmacy Specialist  [phone] or Teams

## 2023-10-03 NOTE — PROGRESS NOTE ADULT - SUBJECTIVE AND OBJECTIVE BOX
SUBJECTIVE / OVERNIGHT EVENTS:  Today is hospital day 3d. There are no new issues or overnight events.   Denies any headache, lightheadedness, vertigo, shortness of breathe, cough, chest pain, palpitations, tachycardia, abdominal pain, nausea, vomiting, diarrhea or constipation currently    HPI:  73 y/o F w/ PMHx COPD, HLD, HTN, PVD, ICM/CAD s/p PCI, cardiac arrest s/p left-sided ICD (2019) complicated by infection and s/p R single-chamber ICD ([place] in 2019) recent admission of UTI (treated w/ ceftriaxone and Vanco x 3 days) and now presented w/ multiple ICD firing episode today.   Pt reports that she had gone to the bathroom and was sitting in a chair and suddenly felt a sharp pain and lightheadedness in her right side shoulder with loud sound.  Patient screamed and when  came to see her, the  stated " she had a blank stare, not able to answer my questions and grabbing her chest ~5mins) EMS was called and patient's defibrillator fired an additional 2 times.  She reports that she had an episode of vomiting after initial shock.  Since has felt fine and was without complaints. Pt reports that she had no further episode in ED but was told that she had 13 ICD firings.   Denies any chest pain, SOB/dyspnea, fever, chills, cough, or sore throat.  (30 Sep 2023 12:46)    MEDICATIONS  (STANDING):  atorvastatin 40 milliGRAM(s) Oral at bedtime  budesonide 160 MICROgram(s)/formoterol 4.5 MICROgram(s) Inhaler 2 Puff(s) Inhalation two times a day  carvedilol 3.125 milliGRAM(s) Oral every 12 hours  clopidogrel Tablet 75 milliGRAM(s) Oral daily  famotidine    Tablet 20 milliGRAM(s) Oral daily  furosemide    Tablet 40 milliGRAM(s) Oral daily  heparin   Injectable 5000 Unit(s) SubCutaneous every 8 hours  influenza  Vaccine (HIGH DOSE) 0.7 milliLiter(s) IntraMuscular once  insulin lispro (ADMELOG) corrective regimen sliding scale   SubCutaneous at bedtime  insulin lispro (ADMELOG) corrective regimen sliding scale   SubCutaneous three times a day before meals  mexiletine 150 milliGRAM(s) Oral two times a day  nicotine - 21 mG/24Hr(s) Patch 1 Patch Transdermal daily  nystatin Powder 1 Application(s) Topical two times a day  quiNIDine gluconate  milliGRAM(s) Oral every 8 hours  sacubitril 24 mG/valsartan 26 mG 1 Tablet(s) Oral two times a day    MEDICATIONS  (PRN):  clotrimazole 2% Vaginal Cream 1 Applicatorful Vaginal every 24 hours PRN itchiness    HOME MEDICATIONS:  albuterol 2.5 mg/3 mL (0.083%) inhalation solution: 3 milliliter(s) by nebulizer prn as needed for  wheezing (02 Oct 2023 11:27)  atorvastatin 40 mg oral tablet: 1 tab(s) orally once a day (at bedtime) (02 Oct 2023 11:27)  D3 50 mcg (2000 intl units) oral capsule: 1 cap(s) orally (02 Oct 2023 11:27)  Entresto 49 mg-51 mg oral tablet: 1 tab(s) orally 2 times a day (02 Oct 2023 11:27)  famotidine 20 mg oral tablet, disintegratin tab(s) orally once a day (at bedtime) (02 Oct 2023 11:27)  Lasix 40 mg oral tablet: 1 tab(s) orally once a day (02 Oct 2023 11:27)  Plavix 75 mg oral tablet: 1 tab(s) orally once a day (02 Oct 2023 11:27)  Symbicort 160 mcg-4.5 mcg/inh inhalation aerosol: 2 puff(s) inhaled once a day (02 Oct 2023 11:27)    PHYSICAL EXAM:  Vital Signs Last 24 Hrs  T(C): 36.6 (03 Oct 2023 11:34), Max: 36.9 (02 Oct 2023 19:58)  T(F): 97.9 (03 Oct 2023 11:34), Max: 98.4 (02 Oct 2023 19:58)  HR: 68 (03 Oct 2023 11:34) (64 - 74)  BP: 135/75 (03 Oct 2023 11:34) (135/75 - 156/79)  BP(mean): --  RR: 17 (03 Oct 2023 11:34) (16 - 18)  SpO2: 93% (03 Oct 2023 11:34) (93% - 95%)    Parameters below as of 03 Oct 2023 11:34  Patient On (Oxygen Delivery Method): room air      I&O's Summary    02 Oct 2023 07:01  -  03 Oct 2023 07:00  --------------------------------------------------------  IN: 600 mL / OUT: 800 mL / NET: -200 mL    03 Oct 2023 07:01  -  03 Oct 2023 12:57  --------------------------------------------------------  IN: 240 mL / OUT: 0 mL / NET: 240 mL      CONSTITUTIONAL: Well-groomed, in no apparent distress  EYES: No conjunctival or scleral injection, non-icteric  ENMT: No external nasal lesions; Normal outer ears  NECK: Supple; Trachea midline  RESPIRATORY: Normal respiratory effort; lungs are clear to auscultation bilaterally without wheeze/rhonchi/rales  CARDIOVASCULAR: Regular rate and rhythm, normal S1 and S2, no murmur/rub/gallop; No lower extremity edema  GASTROINTESTINAL: Non-distended; No palpable masses; No tenderness; No rebound/guarding  MUSCULOSKELETAL:  Normal gait;  NEUROLOGY: A+O to person, place, and time; no gross motor deficits   PSYCHIATRY: Mood and Affect appropriate    LABS:                        13.7   13.60 )-----------( 293      ( 03 Oct 2023 06:16 )             42.4     10-03    140  |  105  |  16  ----------------------------<  118<H>  3.8   |  26  |  0.75    Ca    8.3<L>      03 Oct 2023 06:04  Phos  2.9     10-02  Mg     2.2     10-03    TPro  5.5<L>  /  Alb  2.8<L>  /  TBili  0.4  /  DBili  x   /  AST  17  /  ALT  10  /  AlkPhos  63  10-02          Urinalysis Basic - ( 03 Oct 2023 06:04 )    Color: x / Appearance: x / SG: x / pH: x  Gluc: 118 mg/dL / Ketone: x  / Bili: x / Urobili: x   Blood: x / Protein: x / Nitrite: x   Leuk Esterase: x / RBC: x / WBC x   Sq Epi: x / Non Sq Epi: x / Bacteria: x        SARS-CoV-2: NotDetec (27 Aug 2023 19:13)      RADIOLOGY & ADDITIONAL TESTS:  EKG  12 Lead ECG:   Ventricular Rate 71 BPM    Atrial Rate 71 BPM    P-R Interval 168 ms    QRS Duration 110 ms    Q-T Interval 436 ms    QTC Calculation(Bazett) 473 ms    P Axis 63 degrees    R Axis 33 degrees    T Axis 146 degrees    Diagnosis Line NORMAL SINUS RHYTHM  ANTEROLATERAL INFARCT (CITED ON OR BEFORE 30-AUG-2023)  ABNORMAL ECG  WHEN COMPARED WITH ECG OF 01-OCT-2023 06:29, (UNCONFIRMED)  NO SIGNIFICANT CHANGE WAS FOUND  Confirmed by MD FLORENCE, ROSEMARY ([id number]) on 10/2/2023 8:45:16 PM (10-02-23 @ 06:09)  12 Lead ECG:   Ventricular Rate 67 BPM    Atrial Rate 67 BPM    P-R Interval 128 ms    QRS Duration 120 ms    Q-T Interval 444 ms    QTC Calculation(Bazett) 469 ms    P Axis -25 degrees    R Axis 45 degrees    T Axis 159 degrees    Diagnosis Line NORMAL SINUS RHYTHM  ANTEROSEPTAL INFARCT (CITED ON OR BEFORE 27-AUG-2023)  ABNORMAL ECG  WHEN COMPARED WITH ECG OF 31-AUG-2023 08:15,  NO SIGNIFICANT CHANGE WAS FOUND  Confirmed by MD Sarai, Gurinder ([id number]) on 2023 2:13:40 PM (23 @ 09:33)    Xray Chest 1 View- PORTABLE-Urgent:   ACC: [id number] EXAM:  XR CHEST PORTABLE URGENT 1V   ORDERED BY: KYLEIGH KUHN     PROCEDURE DATE:  2023          INTERPRETATION:  EXAMINATION: XR CHEST URGENT    CLINICAL INDICATION: Chest Pain    TECHNIQUE: Single frontal, portable view of the chest was obtained.    COMPARISON: Chest x-ray 2023.    FINDINGS:  Right chest wall AICD with intact leads.  The heart is normal in size.  The lungs are clear.  There is no pneumothorax or pleural effusion.  No acute bony abnormality.    IMPRESSION:  Clear lungs.    --- End of Report ---          DANA FAUSTIN MD; Resident Radiologist  This document has been electronically signed.  MARK SHEEHAN MD; Attending Radiologist  This document has been electronically signed. Sep 30 2023 12:47PM (23 @ 12:19)  Xray Chest 1 View- PORTABLE-Urgent:   ACC: [id number] EXAM:  XR CHEST PORTABLE URGENT 1V   ORDERED BY: ZIYAD OBRIEN     PROCEDURE DATE:  2023          INTERPRETATION:  Clinical Information: Chest pain    Technique: 1 AP chest x-ray(s)    Comparison: None    Findings/  Impression: Status post defibrillator. Cardiomegaly. The lungs are clear.   No acute osseous abnormality.    --- End of Report ---            FRED SALINAS MD; Attending Interventional Radiologist  This document has been electronically signed. Sep 30 2023  2:11PM (23 @ 06:40)

## 2023-10-03 NOTE — DIETITIAN INITIAL EVALUATION ADULT - SIGNS/SYMPTOMS
as evidenced by pt likely meeting <75% of EER > 1 month prior to admission, >10% wt loss x 6 months as evidenced by pt with heart failure, HTN, and CAD.

## 2023-10-03 NOTE — DIETITIAN INITIAL EVALUATION ADULT - OTHER CALCULATIONS
Estimated protein-energy needs calculated using dosing weight of 196.2 pounds for calories and IBW +10% of 115.5 pounds for protein with consideration for BMI of 37.   Defer fluid calculations to the medical team.

## 2023-10-03 NOTE — DIETITIAN INITIAL EVALUATION ADULT - PERSON TAUGHT/METHOD
Reviewed CHF diet education. Discussed Na restriction, foods high in Na to avoid, reading food labels, tips for limiting Na in your diet. Reviewed daily weights, Wt gain parameters to contact MD. Discussed Na intake in relation to fluid retention, edema and Wt gain. Pt verbalized understanding and accepted Heart Failure Nutrition Therapy Handout. Educated on importance of limiting added sugars and refined carbohydrates in setting of elevated BG levels and history of DM. Encouraged consumption of oral nutrition supplement, HBV proteins for preservation of lean muscle mass, and snacking on nutrient dense foods. RD remains available for diet education review./verbal instruction/written material/patient instructed/spouse instructed

## 2023-10-03 NOTE — DIETITIAN INITIAL EVALUATION ADULT - REASON FOR ADMISSION
Cardiac arrhythmia.  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 c/b cardiac arrest s/p icd c/b device infections s/p removal and reimplant, copd, gerd, p/w multiple icd shocks; in er, ppm interrogated and found to have multiple recurrences of vfib over short period of time c/w electrical storm; started on lidocaine infusion and admitted to [facility] for further mgmt; while in [facility], patient underwent rhc + lhc which showed no new acute significant pathological triggers; given patient has remained electrically quiet on Lidocaine infusion, ep recommended stopping lidocaine infusion, continuing to monitor on telemetry for pvcs, and considering Quinidine vs ablation. patient deemed stable for continued mgmt on general medical floor with telemetry."

## 2023-10-03 NOTE — PROGRESS NOTE ADULT - ASSESSMENT
Amie Kohli is a 72 year old woman with COPD, HLD, HTN, PVD, CAD (s/p PCI to LAD, known  of RCA), cardiac arrest (s/p a left-sided ICD 6/4/2019,  complicated by infection, s/p removal with implantation of a R-sided, single-chamber ICD, [place] in 9/23/2019). She was recently admitted for VT and received Sotalol. 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      #VF storm s/p multiple ICD defibrillations  #HFrEF   #CAD s/p PCI    - Tele SR rates ~60-80s with intermittent PVCs, 5 WCT.   - Pt now on Quinidine 324mg TID and Mexiletine 150mg BID. Continue.   - Sotalol discontinued.   - Continue Coreg  - Once pt received adequate doses of both, would do DFT to ensure any future VT/VF is sensed and appropriately treated by the ICD; potentially Friday?. Also discussed RF ablation pending DFT.  - RHX/LHC no CAD and mildly elevated filling pressures w/ preserved CO  - Continue diuresis, currently on Lasix 40mg qD. Would appreciate cardiology recs.   - Daily EKGs please.   - Keep on tele. Keep k>4, Mg>2  - Discussed with EP attending and primary team.  Amie Kohli is a 72 year old woman with COPD, HLD, HTN, PVD, CAD (s/p PCI to LAD, known  of RCA), cardiac arrest (s/p a left-sided ICD 6/4/2019,  complicated by infection, s/p removal with implantation of a R-sided, single-chamber ICD, [place] in 9/23/2019). She was recently admitted for VT and received Sotalol. 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      #VF storm s/p multiple ICD defibrillations  #HFrEF   #CAD s/p PCI    - Tele SR  rates ~60-80s with intermittent PVCs, 5 WCT.   - Pt now on Quinidine 324mg TID and Mexiletine 150mg BID. Continue.   - Sotalol discontinued.   - Continue Coreg  - Once pt received adequate doses of both, would do DFT to ensure any future VT/VF is sensed and appropriately treated by the ICD; potentially Friday?. Also discussed RF ablation pending DFT.  - RHX/LHC no CAD and mildly elevated filling pressures w/ preserved CO  - Continue diuresis, currently on Lasix 40mg qD. Would appreciate cardiology recs.   - Daily EKGs please.   - Keep on tele. Keep k>4, Mg>2  - Discussed with EP attending and primary team.

## 2023-10-03 NOTE — DIETITIAN INITIAL EVALUATION ADULT - NSFNSNUTRHOMESUPPLEMENTFT_GEN_A_CORE
Pt reported taking Vitamin D prior to admission. Pt stated she did not consume any oral nutrition supplements prior to admission.

## 2023-10-03 NOTE — DIETITIAN INITIAL EVALUATION ADULT - OTHER INFO
Weights:  - UBW (per patient): Pt stated she has currently been ~193 pounds (last weighed herself the day before admission), and stated she was ~230 pounds 6 months ago (~April 2023).  - Dosing Weight (per chart): 196.2 pounds (09/30) (Used for BMI)  - Daily Weights (per flow sheets): 197 pounds (10/03), 194.8 pounds (10/01)  - Bed Scale Weight (taken by RD): 193.6 pounds (10/03) (question accuracy)  - Per [facility] HIE: 190 pounds (09/14), 192.7 pounds (08/29), 205.15 pounds (08/27), 206 pounds (07/08), 255 pounds (04/08/2021)  Note: Pt has possibly experienced a ~15.8% weight loss x 6 months based on stated weight from 6 months ago and current bed scale weight taken by RD (clinically significant). RD to continue to monitor weights and trends as able.     Pt with HFrEF (per chart).   - Pt ordered for Lasix, Coreg and Entresto (per orders) .    Pt with history of DM (per chart).   - A1C 6.7% (08/28) indicating fair glycemic control for age.   - Per chart, "goal bg 100-180."  - Pt ordered for insulin regimen in-house (pre orders).

## 2023-10-03 NOTE — DIETITIAN INITIAL EVALUATION ADULT - ADD RECOMMEND
1) Recommend continue current diet as tolerated: DASH/TLC, Consistent Carbohydrate   2) Recommend trialing Glucerna 1x/day to optimize protein-energy intake.   3) Consider adding multivitamin daily for micronutrient coverage unless contraindicated.   4) RD obtained food preferences to optimize PO intake, will honor as able.   5) Monitor glucose fingersticks.   6) Monitor nutritional intake, tolerance to diet prescription, weights, labs, and skin integrity.   7) Malnutrition alert placed in chart.

## 2023-10-04 NOTE — PROGRESS NOTE ADULT - NS ATTEND AMEND GEN_ALL_CORE FT
I had a detailed discussion with the patient and daughter regarding options including drug therapy and NIPS vs catheter ablation. While I think she will need the latter, she would prefer to try AA therapy first. Will switch from sotalol to quinidine and mexiletine since the VF may be triggered and plan for NIPS prior to discharge.  She is agreeable to this plan. 55 minutes spent in total managing and discussing the issues and personal review of echocardiogram and prior cath films. She has a  of right and LAD stent patent
f/u QT  keep K >4, Mg > 2.0

## 2023-10-04 NOTE — PROGRESS NOTE ADULT - SUBJECTIVE AND OBJECTIVE BOX
SUBJECTIVE / OVERNIGHT EVENTS:  Today is hospital day 4d. There are no new issues or overnight events.   Denies any headache, lightheadedness, vertigo, shortness of breathe, cough, chest pain, palpitations, tachycardia, abdominal pain, nausea, vomiting, diarrhea or constipation currently    HPI:  71 y/o F w/ PMHx COPD, HLD, HTN, PVD, ICM/CAD s/p PCI, cardiac arrest s/p left-sided ICD (2019) complicated by infection and s/p R single-chamber ICD ([place] in 2019) recent admission of UTI (treated w/ ceftriaxone and Vanco x 3 days) and now presented w/ multiple ICD firing episode today.   Pt reports that she had gone to the bathroom and was sitting in a chair and suddenly felt a sharp pain and lightheadedness in her right side shoulder with loud sound.  Patient screamed and when  came to see her, the  stated " she had a blank stare, not able to answer my questions and grabbing her chest ~5mins) EMS was called and patient's defibrillator fired an additional 2 times.  She reports that she had an episode of vomiting after initial shock.  Since has felt fine and was without complaints. Pt reports that she had no further episode in ED but was told that she had 13 ICD firings.   Denies any chest pain, SOB/dyspnea, fever, chills, cough, or sore throat.  (30 Sep 2023 12:46)    MEDICATIONS  (STANDING):  atorvastatin 40 milliGRAM(s) Oral at bedtime  budesonide 160 MICROgram(s)/formoterol 4.5 MICROgram(s) Inhaler 2 Puff(s) Inhalation two times a day  carvedilol 3.125 milliGRAM(s) Oral every 12 hours  clopidogrel Tablet 75 milliGRAM(s) Oral daily  famotidine    Tablet 20 milliGRAM(s) Oral daily  furosemide    Tablet 40 milliGRAM(s) Oral daily  heparin   Injectable 5000 Unit(s) SubCutaneous every 8 hours  influenza  Vaccine (HIGH DOSE) 0.7 milliLiter(s) IntraMuscular once  insulin lispro (ADMELOG) corrective regimen sliding scale   SubCutaneous at bedtime  insulin lispro (ADMELOG) corrective regimen sliding scale   SubCutaneous three times a day before meals  mexiletine 150 milliGRAM(s) Oral two times a day  multivitamin 1 Tablet(s) Oral daily  nicotine - 21 mG/24Hr(s) Patch 1 Patch Transdermal daily  nystatin Powder 1 Application(s) Topical two times a day  quiNIDine gluconate  milliGRAM(s) Oral every 8 hours  sacubitril 24 mG/valsartan 26 mG 1 Tablet(s) Oral two times a day    MEDICATIONS  (PRN):  clotrimazole 2% Vaginal Cream 1 Applicatorful Vaginal every 24 hours PRN itchiness    HOME MEDICATIONS:  albuterol 2.5 mg/3 mL (0.083%) inhalation solution: 3 milliliter(s) by nebulizer prn as needed for  wheezing (02 Oct 2023 11:27)  atorvastatin 40 mg oral tablet: 1 tab(s) orally once a day (at bedtime) (02 Oct 2023 11:27)  D3 50 mcg (2000 intl units) oral capsule: 1 cap(s) orally (02 Oct 2023 11:27)  Entresto 49 mg-51 mg oral tablet: 1 tab(s) orally 2 times a day (02 Oct 2023 11:27)  famotidine 20 mg oral tablet, disintegratin tab(s) orally once a day (at bedtime) (02 Oct 2023 11:27)  Lasix 40 mg oral tablet: 1 tab(s) orally once a day (02 Oct 2023 11:27)  Plavix 75 mg oral tablet: 1 tab(s) orally once a day (02 Oct 2023 11:27)  Symbicort 160 mcg-4.5 mcg/inh inhalation aerosol: 2 puff(s) inhaled once a day (02 Oct 2023 11:27)    PHYSICAL EXAM:  Vital Signs Last 24 Hrs  T(C): 36.4 (04 Oct 2023 05:01), Max: 36.7 (03 Oct 2023 16:53)  T(F): 97.6 (04 Oct 2023 05:01), Max: 98 (03 Oct 2023 16:53)  HR: 68 (04 Oct 2023 05:01) (68 - 82)  BP: 108/60 (04 Oct 2023 05:01) (108/60 - 149/89)  BP(mean): --  RR: 18 (04 Oct 2023 05:01) (16 - 18)  SpO2: 92% (04 Oct 2023 05:01) (92% - 96%)    Parameters below as of 04 Oct 2023 05:01  Patient On (Oxygen Delivery Method): room air      I&O's Summary    03 Oct 2023 07:01  -  04 Oct 2023 07:00  --------------------------------------------------------  IN: 720 mL / OUT: 750 mL / NET: -30 mL      CONSTITUTIONAL: Well-groomed, in no apparent distress  EYES: No conjunctival or scleral injection, non-icteric  ENMT: No external nasal lesions; Normal outer ears  NECK: Supple; Trachea midline  RESPIRATORY: Normal respiratory effort; lungs are clear to auscultation bilaterally without wheeze/rhonchi/rales  CARDIOVASCULAR: Regular rate and rhythm, normal S1 and S2, no murmur/rub/gallop; No lower extremity edema  GASTROINTESTINAL: Non-distended; No palpable masses; No tenderness; No rebound/guarding  MUSCULOSKELETAL:  Normal gait;  NEUROLOGY: A+O to person, place, and time; no gross motor deficits   PSYCHIATRY: Mood and Affect appropriate    LABS:                        13.3   12.07 )-----------( 266      ( 04 Oct 2023 06:29 )             41.3     10-04    141  |  107  |  13  ----------------------------<  106<H>  3.9   |  26  |  0.73    Ca    8.6      04 Oct 2023 06:29  Mg     2.2     10-04            Urinalysis Basic - ( 04 Oct 2023 06:29 )    Color: x / Appearance: x / SG: x / pH: x  Gluc: 106 mg/dL / Ketone: x  / Bili: x / Urobili: x   Blood: x / Protein: x / Nitrite: x   Leuk Esterase: x / RBC: x / WBC x   Sq Epi: x / Non Sq Epi: x / Bacteria: x        SARS-CoV-2: NotDetec (27 Aug 2023 19:13)      RADIOLOGY & ADDITIONAL TESTS:  EKG  12 Lead ECG:   Ventricular Rate 72 BPM    Atrial Rate 72 BPM    P-R Interval 168 ms    QRS Duration 118 ms    Q-T Interval 446 ms    QTC Calculation(Bazett) 488 ms    P Axis 53 degrees    R Axis 39 degrees    T Axis 140 degrees    Diagnosis Line NORMAL SINUS RHYTHM  SEPTAL INFARCT (CITED ON OR BEFORE 27-AUG-2023)  ST & T WAVE ABNORMALITY, CONSIDER LATERAL ISCHEMIA  ABNORMAL ECG  WHEN COMPARED WITH ECG OF 02-OCT-2023 06:09,  NO SIGNIFICANT CHANGE WAS FOUND  Confirmed by NOVA Casanova Zlata ([id number]) on 10/3/2023 3:14:57 PM (10-03-23 @ 07:54)  12 Lead ECG:   Ventricular Rate 71 BPM    Atrial Rate 71 BPM    P-R Interval 168 ms    QRS Duration 110 ms    Q-T Interval 436 ms    QTC Calculation(Bazett) 473 ms    P Axis 63 degrees    R Axis 33 degrees    T Axis 146 degrees    Diagnosis Line NORMAL SINUS RHYTHM  ANTEROLATERAL INFARCT (CITED ON OR BEFORE 30-AUG-2023)  ABNORMAL ECG  WHEN COMPARED WITH ECG OF 01-OCT-2023 06:29, (UNCONFIRMED)  NO SIGNIFICANT CHANGE WAS FOUND  Confirmed by MD FLORENCE, ROSEMARY ([id number]) on 10/2/2023 8:45:16 PM (10-02-23 @ 06:09)    Xray Chest 1 View- PORTABLE-Urgent:   ACC: [id number] EXAM:  XR CHEST PORTABLE URGENT 1V   ORDERED BY: KYLEIGH KUHN     PROCEDURE DATE:  2023          INTERPRETATION:  EXAMINATION: XR CHEST URGENT    CLINICAL INDICATION: Chest Pain    TECHNIQUE: Single frontal, portable view of the chest was obtained.    COMPARISON: Chest x-ray 2023.    FINDINGS:  Right chest wall AICD with intact leads.  The heart is normal in size.  The lungs are clear.  There is no pneumothorax or pleural effusion.  No acute bony abnormality.    IMPRESSION:  Clear lungs.    --- End of Report ---          DANA FAUSTIN MD; Resident Radiologist  This document has been electronically signed.  MARK SHEEHAN MD; Attending Radiologist  This document has been electronically signed. Sep 30 2023 12:47PM (23 @ 12:19)  Xray Chest 1 View- PORTABLE-Urgent:   ACC: [id number] EXAM:  XR CHEST PORTABLE URGENT 1V   ORDERED BY: ZIYAD OBRIEN     PROCEDURE DATE:  2023          INTERPRETATION:  Clinical Information: Chest pain    Technique: 1 AP chest x-ray(s)    Comparison: None    Findings/  Impression: Status post defibrillator. Cardiomegaly. The lungs are clear.   No acute osseous abnormality.    --- End of Report ---            FRED SALINAS MD; Attending Interventional Radiologist  This document has been electronically signed. Sep 30 2023  2:11PM (23 @ 06:40)

## 2023-10-04 NOTE — PROGRESS NOTE ADULT - SUBJECTIVE AND OBJECTIVE BOX
24H hour events: Tele SR rates ~60-70s w/ intermittent PVCs intermittent VPacing. Pt feeling well, denies any complaints.     MEDICATIONS:  carvedilol 3.125 milliGRAM(s) Oral every 12 hours  clopidogrel Tablet 75 milliGRAM(s) Oral daily  furosemide    Tablet 40 milliGRAM(s) Oral daily  heparin   Injectable 5000 Unit(s) SubCutaneous every 8 hours  mexiletine 150 milliGRAM(s) Oral two times a day  quiNIDine gluconate  milliGRAM(s) Oral every 8 hours  sacubitril 24 mG/valsartan 26 mG 1 Tablet(s) Oral two times a day  budesonide 160 MICROgram(s)/formoterol 4.5 MICROgram(s) Inhaler 2 Puff(s) Inhalation two times a day  famotidine    Tablet 20 milliGRAM(s) Oral daily  atorvastatin 40 milliGRAM(s) Oral at bedtime  insulin lispro (ADMELOG) corrective regimen sliding scale   SubCutaneous at bedtime  insulin lispro (ADMELOG) corrective regimen sliding scale   SubCutaneous three times a day before meals  clotrimazole 2% Vaginal Cream 1 Applicatorful Vaginal every 24 hours PRN  influenza  Vaccine (HIGH DOSE) 0.7 milliLiter(s) IntraMuscular once  multivitamin 1 Tablet(s) Oral daily  nystatin Powder 1 Application(s) Topical two times a day      REVIEW OF SYSTEMS:  Complete 12point ROS negative.    PHYSICAL EXAM:  T(C): 36.4 (10-04-23 @ 05:01), Max: 36.7 (10-03-23 @ 16:53)  HR: 68 (10-04-23 @ 05:01) (68 - 82)  BP: 108/60 (10-04-23 @ 05:01) (108/60 - 149/89)  RR: 18 (10-04-23 @ 05:01) (16 - 18)  SpO2: 92% (10-04-23 @ 05:01) (92% - 96%)  Wt(kg): --  I&O's Summary    03 Oct 2023 07:01  -  04 Oct 2023 07:00  --------------------------------------------------------  IN: 720 mL / OUT: 750 mL / NET: -30 mL        Appearance: Normal	  HEENT:   Normal oral mucosa, PERRL, EOMI	  Lymphatic: No lymphadenopathy  Cardiovascular: Normal S1 S2, No JVD, No murmurs, No edema  Respiratory: Lungs clear to auscultation	  Psychiatry: A & O x 3, Mood & affect appropriate  Gastrointestinal:  Soft, Non-tender, + BS	  Skin: No rashes, No ecchymoses, No cyanosis	  Neurologic: Non-focal  Extremities: Normal range of motion, No clubbing, cyanosis or edema  Vascular: Peripheral pulses palpable 2+ bilaterally        LABS:	 	    CBC Full  -  ( 04 Oct 2023 06:29 )  WBC Count : 12.07 K/uL  Hemoglobin : 13.3 g/dL  Hematocrit : 41.3 %  Platelet Count - Automated : 266 K/uL  Mean Cell Volume : 88.8 fl  Mean Cell Hemoglobin : 28.6 pg  Mean Cell Hemoglobin Concentration : 32.2 gm/dL  Auto Neutrophil # : x  Auto Lymphocyte # : x  Auto Monocyte # : x  Auto Eosinophil # : x  Auto Basophil # : x  Auto Neutrophil % : x  Auto Lymphocyte % : x  Auto Monocyte % : x  Auto Eosinophil % : x  Auto Basophil % : x    10-04    141  |  107  |  13  ----------------------------<  106<H>  3.9   |  26  |  0.73  10-03    140  |  105  |  16  ----------------------------<  118<H>  3.8   |  26  |  0.75    Ca    8.6      04 Oct 2023 06:29  Ca    8.3<L>      03 Oct 2023 06:04  Mg     2.2     10-04  Mg     2.2     10-03        proBNP:   Lipid Profile:   HgA1c:   TSH:       CARDIAC MARKERS:          TELEMETRY: 	    ECG:  	  RADIOLOGY:  OTHER: 	    PREVIOUS DIAGNOSTIC TESTING:    [ ] Echocardiogram:    [ ]  Catheterization:  [ ] Stress Test:  	  	  ASSESSMENT/PLAN: 	     24H hour events: Tele SR rates ~60-70s w/ intermittent PVCs intermittent VPacing. Pt feeling well, denies any complaints.     MEDICATIONS:  carvedilol 3.125 milliGRAM(s) Oral every 12 hours  clopidogrel Tablet 75 milliGRAM(s) Oral daily  furosemide    Tablet 40 milliGRAM(s) Oral daily  heparin   Injectable 5000 Unit(s) SubCutaneous every 8 hours  mexiletine 150 milliGRAM(s) Oral two times a day  quiNIDine gluconate  milliGRAM(s) Oral every 8 hours  sacubitril 24 mG/valsartan 26 mG 1 Tablet(s) Oral two times a day  budesonide 160 MICROgram(s)/formoterol 4.5 MICROgram(s) Inhaler 2 Puff(s) Inhalation two times a day  famotidine    Tablet 20 milliGRAM(s) Oral daily  atorvastatin 40 milliGRAM(s) Oral at bedtime  insulin lispro (ADMELOG) corrective regimen sliding scale   SubCutaneous at bedtime  insulin lispro (ADMELOG) corrective regimen sliding scale   SubCutaneous three times a day before meals  clotrimazole 2% Vaginal Cream 1 Applicatorful Vaginal every 24 hours PRN  influenza  Vaccine (HIGH DOSE) 0.7 milliLiter(s) IntraMuscular once  multivitamin 1 Tablet(s) Oral daily  nystatin Powder 1 Application(s) Topical two times a day      REVIEW OF SYSTEMS:  Complete 12point ROS negative.    PHYSICAL EXAM:  T(C): 36.4 (10-04-23 @ 05:01), Max: 36.7 (10-03-23 @ 16:53)  HR: 68 (10-04-23 @ 05:01) (68 - 82)  BP: 108/60 (10-04-23 @ 05:01) (108/60 - 149/89)  RR: 18 (10-04-23 @ 05:01) (16 - 18)  SpO2: 92% (10-04-23 @ 05:01) (92% - 96%)  Wt(kg): --  I&O's Summary    03 Oct 2023 07:01  -  04 Oct 2023 07:00  --------------------------------------------------------  IN: 720 mL / OUT: 750 mL / NET: -30 mL        Appearance: Normal	  HEENT: NC/AT  Cardiovascular: Normal S1 S2  Respiratory: Lungs clear to auscultation	  Psychiatry: A & O x 3, Mood & affect appropriate  Neurologic: Non-focal  Extremities: No BLE edema    LABS:	 	    CBC Full  -  ( 04 Oct 2023 06:29 )  WBC Count : 12.07 K/uL  Hemoglobin : 13.3 g/dL  Hematocrit : 41.3 %  Platelet Count - Automated : 266 K/uL  Mean Cell Volume : 88.8 fl  Mean Cell Hemoglobin : 28.6 pg  Mean Cell Hemoglobin Concentration : 32.2 gm/dL  10-04    141  |  107  |  13  ----------------------------<  106<H>  3.9   |  26  |  0.73  10-03    140  |  105  |  16  ----------------------------<  118<H>  3.8   |  26  |  0.75    Ca    8.6      04 Oct 2023 06:29  Ca    8.3<L>      03 Oct 2023 06:04  Mg     2.2     10-04  Mg     2.2     10-03    proBNP: Pro-Brain Natriuretic Peptide (10.02.23 @ 05:25)    Pro-Brain Natriuretic Peptide: 3416 pg/mL    TSH: Thyroid Stimulating Hormone, Serum in AM (10.02.23 @ 05:25)    Thyroid Stimulating Hormone, Serum: 3.58 uIU/mL    CARDIAC MARKERS: Troponin T, High Sensitivity (09.30.23 @ 09:56)   Troponin T, High Sensitivity Result: 117: Moderate Hemolysis, Troponin T results may be falsely decreased     proBNP: Pro-Brain Natriuretic Peptide (10.02.23 @ 05:25)    Pro-Brain Natriuretic Peptide: 3416 pg/mL  TSH: Thyroid Stimulating Hormone, Serum in AM (10.02.23 @ 05:25)    Thyroid Stimulating Hormone, Serum: 3.58 uIU/mL    CARDIAC MARKERS: Troponin T, High Sensitivity (09.30.23 @ 09:56)    Troponin T, High Sensitivity Result: 117: Moderate Hemolysis,  Troponin T results may be falsely decreased  *  *  Rapid upward or downward changes in high-sensitivity troponin levels  suggest acute myocardial injury. Renal impairment may cause sustained  troponin elevations.  Normal: <6 - 14ng/L  Indeterminate: 15-51 ng/L  Elevated: > 51 ng/L  See [email or web address] on the [facility] intranet for more  information ng/L    RADIOLOGY: < from: Xray Chest 1 View- PORTABLE-Urgent (09.30.23 @ 12:19) >    FINDINGS:  Right chest wall AICD with intact leads.  The heart is normal in size.  The lungs are clear.  There is no pneumothorax or pleural effusion.  No acute bony abnormality.    IMPRESSION:  Clear lungs.    < end of copied text >    PREVIOUS DIAGNOSTIC TESTING:    [ ] Echocardiogram: < from: TTE W or WO Ultrasound Enhancing Agent (08.29.23 @ 07:18) >     CONCLUSIONS:      1. Technically difficult image quality.   2. Normal left ventricular cavity size. Left ventricular wall thickness is normal. Left ventricular systolic function is normal.   3. Basal and mid anterolateral wall, basal and mid inferolateral wall, and basal inferior segment are abnormal.   4. There is moderate (grade 2) left ventricular diastolic dysfunction, with elevated filling pressure.   5. Normal right ventricular cavity size, normal right ventricular wall thickness and normal right ventricular systolic function.   6. The left atrium is mildly dilated in size.   7. The aortic arch diameter is normal.   8. There is calcification of the mitral valve annulus.   9. No pericardial effusion seen.    < end of copied text >    [ ]  Catheterization: < from: Cardiac Catheterization (09.30.23 @ 15:04) >  Diagnostic Conclusions:     Patent LAD stent. Mildly elevated filling pressures with preserved  cardiac output.  Recommendations:     1. Monitor access site.    2. Continue medical management of CAD. No obvious reason for multiple  episodes of VF and subsequent ICD shocks.    < end of copied text >

## 2023-10-04 NOTE — PROGRESS NOTE ADULT - ASSESSMENT
Amie Kohli is a 72 year old woman with COPD, HLD, HTN, PVD, CAD (s/p PCI to LAD, known  of RCA), cardiac arrest (s/p a left-sided ICD 6/4/2019,  complicated by infection, s/p removal with implantation of a R-sided, single-chamber ICD, [place] in 9/23/2019). She was recently admitted for VT and received Sotalol. 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      #VF storm s/p multiple ICD defibrillations  #HFrEF   #CAD s/p PCI    - Tele SR intermittent  rates ~60-80s with intermittent PVCs, 5 WCT.   - Pt now on Quinidine 324mg TID and Mexiletine 150mg BID. Continue.   - Sotalol discontinued.   - Continue Coreg  - Once pt received adequate doses of both, would do DFT to ensure any future VT/VF is sensed and appropriately treated by the ICD; potentially Friday. Also discussed RF ablation pending DFT.  - RHX/LHC no CAD and mildly elevated filling pressures w/ preserved CO  - Continue diuresis, currently on Lasix 40mg qD. Would appreciate cardiology recs.   - Daily EKGs please. QTc prolonged today ~500ms?  - Keep on tele. Keep k>4, Mg>2  - Discussed with EP attending and primary team.        Amie Kohli is a 72 year old woman with COPD, HLD, HTN, PVD, CAD (s/p PCI to LAD, known  of RCA), cardiac arrest (s/p a left-sided ICD 6/4/2019,  complicated by infection, s/p removal with implantation of a R-sided, single-chamber ICD, [place] in 9/23/2019). She was recently admitted for VT and received Sotalol. 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      #VF storm s/p multiple ICD defibrillations  #HFrEF   #CAD s/p PCI    - Tele SR intermittent  rates ~60-80s with intermittent PVCs, 5 WCT.   - Pt now on Quinidine 324mg TID and Mexiletine 150mg BID. Continue.   - Sotalol discontinued.   - Continue Coreg  - Once pt received adequate doses of both, would do DFT to ensure any future VT/VF is sensed and appropriately treated by the ICD; potentially Friday. Also discussed RF ablation pending DFT.  - RHX/LHC no CAD and mildly elevated filling pressures w/ preserved CO  - Continue diuresis, currently on Lasix 40mg qD. Would appreciate cardiology recs.   - Daily EKGs please. QTc prolonged today ~500ms. D/C Famotidine please  - Keep on tele. Keep k>4, Mg>2  - Discussed with EP attending and primary team.        Amie Kohli is a 72 year old woman with COPD, HLD, HTN, PVD, CAD (s/p PCI to LAD, known  of RCA), cardiac arrest (s/p a left-sided ICD 6/4/2019,  complicated by infection, s/p removal with implantation of a R-sided, single-chamber ICD, [place] in 9/23/2019). She was recently admitted for VT and received Sotalol. 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      #VF storm s/p multiple ICD defibrillations  #HFrEF   #CAD s/p PCI    - Tele SR intermittent  rates ~60-80s with intermittent PVCs, 5 WCT.   - Pt now on Quinidine 324mg TID and Mexiletine 150mg BID. Continue.   - Sotalol discontinued.   - Continue Coreg  - Per Dr. Hurt, once pt received adequate doses of both, would do DFT to ensure any future VT/VF is sensed and appropriately treated by the ICD; potentially Friday. Also discussed RF ablation pending DFT.  - RHX/LHC no CAD and mildly elevated filling pressures w/ preserved CO  - Continue diuresis, currently on Lasix 40mg qD. Would appreciate cardiology recs.   - Daily EKGs please. QTc prolonged today ~500ms. D/C Famotidine please  - Keep on tele. Keep k>4, Mg>2  - Discussed with EP attending and primary team.

## 2023-10-04 NOTE — PROGRESS NOTE ADULT - PROBLEM SELECTOR PLAN 1
ppm interrogation revealed multiple recurrences of vfib over short period of time   c/w electrical storm  rhc + lhc which showed no new acute significant pathological trigger  s/p lidocaine infusion in cicu  s/p sotalol  Continue quinidine per EP  monitor on telemetry  cont mexiletine + coreg  f/u EP recs. Possible defibrillation threshold testing later this week ppm interrogation revealed multiple recurrences of vfib over short period of time   c/w electrical storm  rhc + lhc which showed no new acute significant pathological trigger  s/p lidocaine infusion in cicu  s/p sotalol  Continue quinidine per EP  monitor on telemetry  cont mexiletine + coreg  f/u EP recs. Possible defibrillation threshold testing later this week on Friday

## 2023-10-05 NOTE — PROGRESS NOTE ADULT - NSPROGADDITIONALINFOA_GEN_ALL_CORE
full code  activity as tolerated   vte ppx w heparin subq  cardiac diet
DVT Prophylaxis: Heparin SC  Diet: DASH  GI Prophylaxis: Pepcid  Activity: Increase As Tolerated  Code Status: Full  Disposition: d/c after EP clearance
DVT Prophylaxis: Heparin SC  Diet: DASH  GI Prophylaxis: Pepcid  Activity: Increase As Tolerated  Code Status: Full  Disposition: d/c after EP clearance
DVT Prophylaxis: Heparin SC  Diet: DASH  GI Prophylaxis: Pepcid  Activity: Increase As Tolerated  Code Status: Full  Disposition: d/c after EP procedure on Friday
DVT Prophylaxis: Heparin SC  Diet: DASH  GI Prophylaxis: Pepcid  Activity: Increase As Tolerated  Code Status: Full  Disposition: d/c after EP workup

## 2023-10-05 NOTE — PROVIDER CONTACT NOTE (OTHER) - ASSESSMENT
Patient is A&Ox4, no distress, no dizziness, VSS, see in flow sheets.
A&OX4, all VSS, NAD, no c/o.
Patient is A&Ox4, no distress, no dizziness, no SOB, VSS, see in flow sheets.
Patient is A&Ox4, no distress, no dizziness, no SOB, VSS, see in flow sheets. EKG obtained.

## 2023-10-05 NOTE — PROVIDER CONTACT NOTE (OTHER) - SITUATION
Sotalol EKG obtained
12-lead EKG obtained
Pt had 5 beats WCT, asymptomatic
PAT for 7.6 sec HR up to 120
Pt only has 1 IV access and requires multiple medications. Multiple attempts made by 3 RNs.

## 2023-10-05 NOTE — PROGRESS NOTE ADULT - ATTENDING COMMENTS
No VT. She would like to avoid ablation at this time if possible, Will do NIPS to make sure device detect and treats VT and check DFT

## 2023-10-05 NOTE — PROGRESS NOTE ADULT - SUBJECTIVE AND OBJECTIVE BOX
Electrophysiology Progress Note    Patient seen and examined at bedside.    Overnight Events: No acute events overnight.   - Sinus 60s-80s w PVCs on telemetry  - QTc 506 on EKG today    Review Of Systems: No chest pain, shortness of breath, palpitations, abdominal pain, reflux.     Current Meds:  atorvastatin 40 milliGRAM(s) Oral at bedtime  budesonide 160 MICROgram(s)/formoterol 4.5 MICROgram(s) Inhaler 2 Puff(s) Inhalation two times a day  carvedilol 3.125 milliGRAM(s) Oral every 12 hours  clopidogrel Tablet 75 milliGRAM(s) Oral daily  clotrimazole 2% Vaginal Cream 1 Applicatorful Vaginal every 24 hours PRN  furosemide    Tablet 40 milliGRAM(s) Oral daily  heparin   Injectable 5000 Unit(s) SubCutaneous every 8 hours  influenza  Vaccine (HIGH DOSE) 0.7 milliLiter(s) IntraMuscular once  insulin lispro (ADMELOG) corrective regimen sliding scale   SubCutaneous at bedtime  insulin lispro (ADMELOG) corrective regimen sliding scale   SubCutaneous three times a day before meals  mexiletine 150 milliGRAM(s) Oral two times a day  multivitamin 1 Tablet(s) Oral daily  nicotine - 21 mG/24Hr(s) Patch 1 Patch Transdermal daily  nystatin Powder 1 Application(s) Topical two times a day  potassium chloride   Powder 40 milliEquivalent(s) Oral once  quiNIDine gluconate  milliGRAM(s) Oral every 8 hours  sacubitril 24 mG/valsartan 26 mG 1 Tablet(s) Oral two times a day      Vitals:  T(F): 97.8 (10-05), Max: 97.8 (10-05)  HR: 78 (10-05) (71 - 78)  BP: 127/72 (10-05) (127/72 - 151/75)  RR: 18 (10-05)  SpO2: 93% (10-05)  I&O's Summary    04 Oct 2023 07:01  -  05 Oct 2023 07:00  --------------------------------------------------------  IN: 480 mL / OUT: 300 mL / NET: 180 mL    05 Oct 2023 07:01  -  05 Oct 2023 10:42  --------------------------------------------------------  IN: 240 mL / OUT: 250 mL / NET: -10 mL        Physical Exam:  Appearance: No acute distress; well appearing  Eyes: PERRL, EOMI, pink conjunctiva  HEENT: Normal oral mucosa  Cardiovascular: RRR, S1, S2, no murmurs, rubs, or gallops; no edema  Respiratory: Clear to auscultation bilaterally  Neurologic: Non-focal  Psychiatry: AAOx3, mood & affect appropriate  Skin: No rashes, ecchymoses, or cyanosis                          13.5   12.18 )-----------( 275      ( 05 Oct 2023 07:04 )             41.4     10-05    140  |  104  |  14  ----------------------------<  99  3.7   |  26  |  0.82    Ca    8.7      05 Oct 2023 07:04  Phos  3.6     10-05  Mg     2.1     10-05        CARDIAC MARKERS ( 30 Sep 2023 09:56 )  117 ng/L / x     / x     / 594 U/L / x     / 8.3 ng/mL  CARDIAC MARKERS ( 30 Sep 2023 05:10 )  x     / x     / x     / 78 U/L / x     / <1.0 ng/mL      New ECG(s): Personally reviewed  SR 78, Poor R wave progression, IVCD. QTc 506    Echo:    Stress Testing:     Cath:    Imaging:    Interpretation of Telemetry:  Sinus rhythm 60s-80s w PVCs

## 2023-10-05 NOTE — PROGRESS NOTE ADULT - PROBLEM SELECTOR PLAN 1
ppm interrogation revealed multiple recurrences of vfib over short period of time   c/w electrical storm  rhc + lhc which showed no new acute significant pathological trigger  s/p lidocaine infusion in cicu  s/p sotalol  Continue quinidine per EP  monitor on telemetry  cont mexiletine + coreg  f/u EP recs. Possible defibrillation threshold testing later this week on Friday

## 2023-10-05 NOTE — PROGRESS NOTE ADULT - ASSESSMENT
Amie Kohli is a 72 year old woman with COPD, HLD, HTN, PVD, CAD (s/p PCI to LAD, known  of RCA), cardiac arrest (s/p a left-sided ICD 6/4/2019,  complicated by infection, s/p removal with implantation of a R-sided, single-chamber ICD, [place] in 9/23/2019). She was recently admitted for VT and received Sotalol. 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 Continue.   - Sotalol discontinued.   - Continue Coreg  - Per Dr. Hurt, once pt received adequate doses of both, would do DFT to ensure any future VT/VF is sensed and appropriately treated by the ICD; potentially Friday. Also discussed RF ablation pending DFT.  - Keep NPO at midnight for procedure 10/6  - RHC/C no CAD and mildly elevated filling pressures w/ preserved CO  - Continue diuresis, currently on Lasix 40mg qD. Would appreciate cardiology recs.   - Daily EKGs please. QTc prolonged today ~500ms. D/C Famotidine please  - Keep on tele. Keep K>4, Mg>2    Jayy Bertrand MD  Cardiology Fellow  All Cardiology service information can be found 24/7 on [email or web address], password: [email or web address]      Amie Kohli is a 72 year old woman with COPD, HLD, HTN, PVD, CAD (s/p PCI to LAD, known  of RCA), cardiac arrest (s/p a left-sided ICD 6/4/2019,  complicated by infection, s/p removal with implantation of a R-sided, single-chamber ICD, [place] in 9/23/2019). She was recently admitted for VT and received Sotalol. 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 Continue.   - Sotalol discontinued.   - Continue Coreg  - Per Dr. Hurt, once pt received adequate doses of both, would do DFT to ensure any future VT/VF is sensed and appropriately treated by the ICD; potentially Friday. Also discussed RF ablation pending DFT.  - Keep NPO at midnight for procedure 10/6  - RHC/C no CAD and mildly elevated filling pressures w/ preserved CO  - Continue diuresis, currently on Lasix 40mg qD.  - Daily EKGs please. QTc prolonged today ~500ms, stable from yesterday.  - Keep on tele. Keep K>4, Mg>2    Jayy Bertrand MD  Cardiology Fellow  All Cardiology service information can be found 24/7 on [email or web address], password: [employer]      Amie Kohli is a 72 year old woman with COPD, HLD, HTN, PVD, CAD (s/p PCI to LAD, known  of RCA), cardiac arrest (s/p a left-sided ICD 6/4/2019,  complicated by infection, s/p removal with implantation of a R-sided, single-chamber ICD, [place] in 9/23/2019). She was recently admitted for VT and received Sotalol. 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 Continue.   - Sotalol discontinued.   - Continue Coreg  - Per Dr. Hurt, once pt received adequate doses of both, would do DFT to ensure any future VT/VF is sensed and appropriately treated by the ICD; potentially Friday. Also discussed RF ablation pending DFT.  - Keep NPO at midnight for NIPS with possible VT ablation 10/6  - RHC/LHC no CAD and mildly elevated filling pressures w/ preserved CO  - Continue diuresis, currently on Lasix 40mg qD.  - Daily EKGs please. QTc prolonged today ~500ms, stable from yesterday.  - Keep on tele. Keep K>4, Mg>2    Jayy Bertrand MD  Cardiology Fellow  All Cardiology service information can be found 24/7 on [email or web address], password: [email or web address]

## 2023-10-05 NOTE — PROGRESS NOTE ADULT - PROBLEM SELECTOR PLAN 3
h/o s/p pci w stent  no clinft of acs  trop 117  ekg showed nsr w no new acute st seg - t wave changes suggestive of acute ischemia; similar to prior recent ekg  [facility] 9/30 showed patent lad stent  monitor for chest pain, ekg/telemetry changes  cont home plavix, coreg, lipitor
h/o s/p pci w stent  trop 117  ekg showed nsr w no new acute st seg - t wave changes suggestive of acute ischemia; similar to prior recent ekg  [facility] 9/30 showed patent lad stent  monitor for chest pain, ekg/telemetry changes  cont home plavix, coreg, lipitor
h/o s/p pci w stent  trop 117  ekg showed nsr w no new acute st seg - t wave changes suggestive of acute ischemia; similar to prior recent ekg  [facility] 9/30 showed patent lad stent  monitor for chest pain, ekg/telemetry changes  cont home plavix, coreg, lipitor
h/o s/p pci w stent  no clinft of acs  trop 117  ekg showed nsr w no new acute st seg - t wave changes suggestive of acute ischemia; similar to prior recent ekg  [facility] 9/30 showed patent lad stent  monitor for chest pain, ekg/telemetry changes  cont home plavix, coreg, lipitor
h/o s/p pci w stent  trop 117  ekg showed nsr w no new acute st seg - t wave changes suggestive of acute ischemia; similar to prior recent ekg  [facility] + [facility] 9/30 showed patent lad stent  monitor for chest pain, ekg/telemetry changes  cont home plavix, coreg, lipitor

## 2023-10-05 NOTE — PROGRESS NOTE ADULT - SUBJECTIVE AND OBJECTIVE BOX
SUBJECTIVE / OVERNIGHT EVENTS:  Today is hospital day 5d. There are no new issues or overnight events.   Denies any headache, lightheadedness, vertigo, shortness of breathe, cough, chest pain, palpitations, tachycardia, abdominal pain, nausea, vomiting, diarrhea or constipation currently    HPI:  71 y/o F w/ PMHx COPD, HLD, HTN, PVD, ICM/CAD s/p PCI, cardiac arrest s/p left-sided ICD (2019) complicated by infection and s/p R single-chamber ICD ([place] in 2019) recent admission of UTI (treated w/ ceftriaxone and Vanco x 3 days) and now presented w/ multiple ICD firing episode today.   Pt reports that she had gone to the bathroom and was sitting in a chair and suddenly felt a sharp pain and lightheadedness in her right side shoulder with loud sound.  Patient screamed and when  came to see her, the  stated " she had a blank stare, not able to answer my questions and grabbing her chest ~5mins) EMS was called and patient's defibrillator fired an additional 2 times.  She reports that she had an episode of vomiting after initial shock.  Since has felt fine and was without complaints. Pt reports that she had no further episode in ED but was told that she had 13 ICD firings.   Denies any chest pain, SOB/dyspnea, fever, chills, cough, or sore throat.  (30 Sep 2023 12:46)    MEDICATIONS  (STANDING):  atorvastatin 40 milliGRAM(s) Oral at bedtime  budesonide 160 MICROgram(s)/formoterol 4.5 MICROgram(s) Inhaler 2 Puff(s) Inhalation two times a day  carvedilol 3.125 milliGRAM(s) Oral every 12 hours  clopidogrel Tablet 75 milliGRAM(s) Oral daily  furosemide    Tablet 40 milliGRAM(s) Oral daily  heparin   Injectable 5000 Unit(s) SubCutaneous every 8 hours  influenza  Vaccine (HIGH DOSE) 0.7 milliLiter(s) IntraMuscular once  insulin lispro (ADMELOG) corrective regimen sliding scale   SubCutaneous at bedtime  insulin lispro (ADMELOG) corrective regimen sliding scale   SubCutaneous three times a day before meals  mexiletine 150 milliGRAM(s) Oral two times a day  multivitamin 1 Tablet(s) Oral daily  nicotine - 21 mG/24Hr(s) Patch 1 Patch Transdermal daily  nystatin Powder 1 Application(s) Topical two times a day  potassium chloride   Powder 40 milliEquivalent(s) Oral once  quiNIDine gluconate  milliGRAM(s) Oral every 8 hours  sacubitril 24 mG/valsartan 26 mG 1 Tablet(s) Oral two times a day    MEDICATIONS  (PRN):  clotrimazole 2% Vaginal Cream 1 Applicatorful Vaginal every 24 hours PRN itchiness    HOME MEDICATIONS:  albuterol 2.5 mg/3 mL (0.083%) inhalation solution: 3 milliliter(s) by nebulizer prn as needed for  wheezing (02 Oct 2023 11:27)  atorvastatin 40 mg oral tablet: 1 tab(s) orally once a day (at bedtime) (02 Oct 2023 11:27)  D3 50 mcg (2000 intl units) oral capsule: 1 cap(s) orally (02 Oct 2023 11:27)  Entresto 49 mg-51 mg oral tablet: 1 tab(s) orally 2 times a day (02 Oct 2023 11:27)  famotidine 20 mg oral tablet, disintegratin tab(s) orally once a day (at bedtime) (02 Oct 2023 11:27)  Lasix 40 mg oral tablet: 1 tab(s) orally once a day (02 Oct 2023 11:27)  Plavix 75 mg oral tablet: 1 tab(s) orally once a day (02 Oct 2023 11:27)  Symbicort 160 mcg-4.5 mcg/inh inhalation aerosol: 2 puff(s) inhaled once a day (02 Oct 2023 11:27)    PHYSICAL EXAM:  Vital Signs Last 24 Hrs  T(C): 36.6 (05 Oct 2023 04:00), Max: 36.6 (05 Oct 2023 04:00)  T(F): 97.8 (05 Oct 2023 04:00), Max: 97.8 (05 Oct 2023 04:00)  HR: 78 (05 Oct 2023 04:00) (71 - 78)  BP: 127/72 (05 Oct 2023 04:00) (127/72 - 151/75)  BP(mean): --  RR: 18 (05 Oct 2023 04:00) (17 - 18)  SpO2: 93% (05 Oct 2023 04:00) (93% - 96%)    Parameters below as of 05 Oct 2023 04:00  Patient On (Oxygen Delivery Method): room air      I&O's Summary    04 Oct 2023 07:01  -  05 Oct 2023 07:00  --------------------------------------------------------  IN: 480 mL / OUT: 300 mL / NET: 180 mL      CONSTITUTIONAL: Well-groomed, in no apparent distress  EYES: No conjunctival or scleral injection, non-icteric  ENMT: No external nasal lesions; Normal outer ears  NECK: Supple; Trachea midline  RESPIRATORY: Normal respiratory effort; lungs are clear to auscultation bilaterally without wheeze/rhonchi/rales  CARDIOVASCULAR: Regular rate and rhythm, normal S1 and S2, no murmur/rub/gallop; No lower extremity edema  GASTROINTESTINAL: Non-distended; No palpable masses; No tenderness; No rebound/guarding  MUSCULOSKELETAL:  Normal gait;  NEUROLOGY: A+O to person, place, and time; no gross motor deficits   PSYCHIATRY: Mood and Affect appropriate    LABS:                        13.5   12.18 )-----------( 275      ( 05 Oct 2023 07:04 )             41.4     10-05    140  |  104  |  14  ----------------------------<  99  3.7   |  26  |  0.82    Ca    8.7      05 Oct 2023 07:04  Phos  3.6     10-05  Mg     2.1     10-05            Urinalysis Basic - ( 05 Oct 2023 07:04 )    Color: x / Appearance: x / SG: x / pH: x  Gluc: 99 mg/dL / Ketone: x  / Bili: x / Urobili: x   Blood: x / Protein: x / Nitrite: x   Leuk Esterase: x / RBC: x / WBC x   Sq Epi: x / Non Sq Epi: x / Bacteria: x        SARS-CoV-2: NotDetec (27 Aug 2023 19:13)      RADIOLOGY & ADDITIONAL TESTS:  EKG  12 Lead ECG:   Ventricular Rate 72 BPM    Atrial Rate 72 BPM    P-R Interval 168 ms    QRS Duration 118 ms    Q-T Interval 446 ms    QTC Calculation(Bazett) 488 ms    P Axis 53 degrees    R Axis 39 degrees    T Axis 140 degrees    Diagnosis Line NORMAL SINUS RHYTHM  SEPTAL INFARCT (CITED ON OR BEFORE 27-AUG-2023)  ST & T WAVE ABNORMALITY, CONSIDER LATERAL ISCHEMIA  ABNORMAL ECG  WHEN COMPARED WITH ECG OF 02-OCT-2023 06:09,  NO SIGNIFICANT CHANGE WAS FOUND  Confirmed by NOVA Casanova Zlata ([id number]) on 10/3/2023 3:14:57 PM (10-03-23 @ 07:54)  12 Lead ECG:   Ventricular Rate 71 BPM    Atrial Rate 71 BPM    P-R Interval 168 ms    QRS Duration 110 ms    Q-T Interval 436 ms    QTC Calculation(Bazett) 473 ms    P Axis 63 degrees    R Axis 33 degrees    T Axis 146 degrees    Diagnosis Line NORMAL SINUS RHYTHM  ANTEROLATERAL INFARCT (CITED ON OR BEFORE 30-AUG-2023)  ABNORMAL ECG  WHEN COMPARED WITH ECG OF 01-OCT-2023 06:29, (UNCONFIRMED)  NO SIGNIFICANT CHANGE WAS FOUND  Confirmed by MD FLORENCE, ROSEMARY ([id number]) on 10/2/2023 8:45:16 PM (10-02-23 @ 06:09)    Xray Chest 1 View- PORTABLE-Urgent:   ACC: [id number] EXAM:  XR CHEST PORTABLE URGENT 1V   ORDERED BY: KYLEIGH KUHN     PROCEDURE DATE:  2023          INTERPRETATION:  EXAMINATION: XR CHEST URGENT    CLINICAL INDICATION: Chest Pain    TECHNIQUE: Single frontal, portable view of the chest was obtained.    COMPARISON: Chest x-ray 2023.    FINDINGS:  Right chest wall AICD with intact leads.  The heart is normal in size.  The lungs are clear.  There is no pneumothorax or pleural effusion.  No acute bony abnormality.    IMPRESSION:  Clear lungs.    --- End of Report ---          DANA FAUSTIN MD; Resident Radiologist  This document has been electronically signed.  MARK SHEEHAN MD; Attending Radiologist  This document has been electronically signed. Sep 30 2023 12:47PM (23 @ 12:19)  Xray Chest 1 View- PORTABLE-Urgent:   ACC: [id number] EXAM:  XR CHEST PORTABLE URGENT 1V   ORDERED BY: ZIYAD OBRIEN     PROCEDURE DATE:  2023          INTERPRETATION:  Clinical Information: Chest pain    Technique: 1 AP chest x-ray(s)    Comparison: None    Findings/  Impression: Status post defibrillator. Cardiomegaly. The lungs are clear.   No acute osseous abnormality.    --- End of Report ---            FRED SALINAS MD; Attending Interventional Radiologist  This document has been electronically signed. Sep 30 2023  2:11PM (23 @ 06:40)

## 2023-10-05 NOTE — PROVIDER CONTACT NOTE (OTHER) - REASON
12-lead EKG obtained
Unable additional IV access
5 beat WCT
Sotalol EKG obtained
PAT for 7.6 sec HR up to 120

## 2023-10-05 NOTE — PROVIDER CONTACT NOTE (OTHER) - RECOMMENDATIONS
Continue to monitor.
Place midline.
Cont to monitor.
Provider aware.
Cardiology will f/u w/ EKG. Administer Sotalol @9 AM. Med should be given q12h.

## 2023-10-05 NOTE — PROGRESS NOTE ADULT - PROBLEM SELECTOR PROBLEM 1
VF (ventricular fibrillation)

## 2023-10-05 NOTE — PROVIDER CONTACT NOTE (OTHER) - ACTION/TREATMENT ORDERED:
Cont to monitor.
Continue to monitor.
Cardiology will f/u w/ EKG. Administer Sotalol @9 AM. Med should be given q12h.
Midline ordered. Central line team contacted for placement.
Provider aware.

## 2023-10-05 NOTE — PROGRESS NOTE ADULT - TIME BILLING
- Ordering, reviewing, and/or interpreting labs, testing, and/or imaging  - Independently obtaining a review of systems and performing a physical exam  - Reviewing consultant documentation/recommendations where indicated  - Counselling and educating patient and/or family regarding interpretation of aforementioned items and plan of care

## 2023-10-05 NOTE — PROVIDER CONTACT NOTE (OTHER) - BACKGROUND
Admitted for cardiac arrythmia.
Admitted for Chest pain.
Admitted for chest pain.
Pt COVID +, PE, AHRF requiring HFNC, PMHX: pancreatic cancer with mets, OM, lung cancer
PMHX: VF with ICD

## 2023-10-05 NOTE — PROGRESS NOTE ADULT - PROBLEM SELECTOR PLAN 2
2/2 icm c/b cardiac arrest s/p icd c/b device infections s/p removal and reimplant  clinically euvolemic  tte 8/30 showed "lvef 45-50%...Normal left ventricular cavity size. Left ventricular wall thickness is normal. Left ventricular systolic function is normal....Basal and mid anterolateral wall, basal and mid inferolateral wall, and basal inferior segment are abnormal...moderate (grade 2) left ventricular diastolic dysfunction, with elevated filling pressure"  lhc + rhc 9/30 shows "Mildly elevated filling pressures with preserved cardiac output"  strict i/o and daily weights  cont home lasix, coreg, entresto
2/2 icm c/b cardiac arrest s/p icd c/b device infections s/p removal and reimplant  clinically euvolemic  tte 8/30 showed "lvef 45-50%...Normal left ventricular cavity size. Left ventricular wall thickness is normal. Left ventricular systolic function is normal....Basal and mid anterolateral wall, basal and mid inferolateral wall, and basal inferior segment are abnormal...moderate (grade 2) left ventricular diastolic dysfunction, with elevated filling pressure"  lhc + rhc 9/30 shows "Mildly elevated filling pressures with preserved cardiac output"  follow up bnp, repeat tte  trend volume status w i/o and daily weights  cont home lasix, coreg, entresto
2/2 icm c/b cardiac arrest s/p icd c/b device infections s/p removal and reimplant  clinically euvolemic  tte 8/30 showed "lvef 45-50%...Normal left ventricular cavity size. Left ventricular wall thickness is normal. Left ventricular systolic function is normal....Basal and mid anterolateral wall, basal and mid inferolateral wall, and basal inferior segment are abnormal...moderate (grade 2) left ventricular diastolic dysfunction, with elevated filling pressure"  lhc + rhc 9/30 shows "Mildly elevated filling pressures with preserved cardiac output"  strict i/o and daily weights  cont home lasix, coreg, entresto
2/2 icm c/b cardiac arrest s/p icd c/b device infections s/p removal and reimplant  clinically euvolemic  tte 8/30 showed "lvef 45-50%...Normal left ventricular cavity size. Left ventricular wall thickness is normal. Left ventricular systolic function is normal....Basal and mid anterolateral wall, basal and mid inferolateral wall, and basal inferior segment are abnormal...moderate (grade 2) left ventricular diastolic dysfunction, with elevated filling pressure"  lhc + rhc 9/30 shows "Mildly elevated filling pressures with preserved cardiac output"  follow up bnp, repeat tte  trend volume status w i/o and daily weights  cont home lasix, coreg, entresto
2/2 icm c/b cardiac arrest s/p icd c/b device infections s/p removal and reimplant  clinically euvolemic  tte 8/30 showed "lvef 45-50%...Normal left ventricular cavity size. Left ventricular wall thickness is normal. Left ventricular systolic function is normal....Basal and mid anterolateral wall, basal and mid inferolateral wall, and basal inferior segment are abnormal...moderate (grade 2) left ventricular diastolic dysfunction, with elevated filling pressure"  lhc + rhc 9/30 shows "Mildly elevated filling pressures with preserved cardiac output"  strict i/o and daily weights  cont home lasix, coreg, entresto

## 2023-10-06 NOTE — PROGRESS NOTE ADULT - ASSESSMENT
Amie Kohli is a 72 year old woman with COPD, HLD, HTN, PVD, CAD (s/p PCI to LAD, known  of RCA), cardiac arrest (s/p a left-sided ICD 6/4/2019,  complicated by infection, s/p removal with implantation of a R-sided, single-chamber ICD, [place] in 9/23/2019). She was recently admitted for VT and received Sotalol. 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 Continue.   - Sotalol discontinued.   - Continue Coreg  - Plan for ICD testing today with Dr. Hurt. Consented for ICD testing with possible ablation and/or ICD change. Has been NPO  - RHC/C no CAD and mildly elevated filling pressures w/ preserved CO  - Continue diuresis, currently on Lasix 40mg qD.  - Daily EKGs please. QTc prolonged today ~500ms, stable from yesterday.  - Keep on tele. Keep K>4, Mg>2    Jayy Bertrand MD  Cardiology Fellow  All Cardiology service information can be found 24/7 on [email or web address], password: [email or web address]      Amie Kohli is a 72 year old woman with COPD, HLD, HTN, PVD, CAD (s/p PCI to LAD, known  of RCA), cardiac arrest (s/p a left-sided ICD 6/4/2019,  complicated by infection, s/p removal with implantation of a R-sided, single-chamber ICD, [place] in 9/23/2019). She was recently admitted for VT and received Sotalol. 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 Continue.   - Sotalol discontinued.   - DC Coreg, start Toprol XL 25mg QD  - S/p VT/VF induction with Dr. Hurt, found to have acceptable defib threshold.   - RHC/LHC no CAD and mildly elevated filling pressures w/ preserved CO  - Continue diuresis, currently on Lasix 40mg qD.  - Daily EKGs please. QTc prolonged today ~500ms, stable from yesterday.  - Keep on tele. Keep K>4, Mg>2  - Ok for DC from EP standpoint with follow up with Dr. Hurt.     Jayy Bertrand MD  Cardiology Fellow  All Cardiology service information can be found 24/7 on [email or web address], password: [email or web address]

## 2023-10-06 NOTE — DISCHARGE NOTE PROVIDER - HOSPITAL COURSE
HPI:  73 y/o F w/ PMHx COPD, HLD, HTN, PVD, ICM/CAD s/p PCI, cardiac arrest s/p left-sided ICD (6/4/2019) complicated by infection and s/p R single-chamber ICD ([place] in 9/23/2019) recent admission of UTI (treated w/ ceftriaxone and Vanco x 3 days) and now presented w/ multiple ICD firing episode today.   Pt reports that she had gone to the bathroom and was sitting in a chair and suddenly felt a sharp pain and lightheadedness in her right side shoulder with loud sound.  Patient screamed and when  came to see her, the  stated " she had a blank stare, not able to answer my questions and grabbing her chest ~5mins) EMS was called and patient's defibrillator fired an additional 2 times.  She reports that she had an episode of vomiting after initial shock.  Since has felt fine and was without complaints. Pt reports that she had no further episode in ED but was told that she had 13 ICD firings.   Denies any chest pain, SOB/dyspnea, fever, chills, cough, or sore throat.  (30 Sep 2023 12:46)     Problem/Plan - 1:  ·  Problem: VF (ventricular fibrillation).   ·  Plan: ppm interrogation revealed multiple recurrences of vfib over short period of time   c/w electrical storm  rhc + lhc which showed no new acute significant pathological trigger  s/p lidocaine infusion in cicu  s/p sotalol  Continue quinidine per EP  monitor on telemetry  cont mexiletine + coreg  f/u EP recs. Possible defibrillation threshold testing later this week on Friday.     Problem/Plan - 2:  ·  Problem: HFrEF (heart failure with reduced ejection fraction).   ·  Plan: 2/2 icm c/b cardiac arrest s/p icd c/b device infections s/p removal and reimplant  clinically euvolemic  tte 8/30 showed "lvef 45-50%...Normal left ventricular cavity size. Left ventricular wall thickness is normal. Left ventricular systolic function is normal....Basal and mid anterolateral wall, basal and mid inferolateral wall, and basal inferior segment are abnormal...moderate (grade 2) left ventricular diastolic dysfunction, with elevated filling pressure"  lhc + rhc 9/30 shows "Mildly elevated filling pressures with preserved cardiac output"  strict i/o and daily weights  cont home lasix, coreg, entresto.     Problem/Plan - 3:  ·  Problem: CAD (coronary artery disease).   ·  Plan: h/o s/p pci w stent  trop 117  ekg showed nsr w no new acute st seg - t wave changes suggestive of acute ischemia; similar to prior recent ekg  lhc + rhc 9/30 showed patent lad stent  monitor for chest pain, ekg/telemetry changes  cont home plavix, coreg, lipitor.     Problem/Plan - 4:  ·  Problem: Candidal intertrigo.   ·  Plan: Under L breast and L groin  c/w nystatin powder  Will need rx at discharge.     Problem/Plan - 5:  ·  Problem: DM (diabetes mellitus).   ·  Plan: hold home meds (farxiga)  a1c ~6.7  monitor fingerstick glu tidac + hs  carb consistent diet  low dose correctional scale lispro tidac + hs while inpatient  adjust to maintain goal bg 100-180.     Problem/Plan - 6:  ·  Problem: HTN (hypertension).   ·  Plan: coreg  lasix  entresto.     Problem/Plan - 7:  ·  Problem: HLD (hyperlipidemia).   ·  Plan: lipitor.     Problem/Plan - 8:  ·  Problem: Chronic obstructive pulmonary disease (COPD).   ·  Plan: active smoker  symbicort inhaler  accuneb/airet neb  nictoine patch.     Problem/Plan - 9:  ·  Problem: GERD (gastroesophageal reflux disease).   ·  Plan: pepcid.       Additional Information:  Additional Information: DVT Prophylaxis: Heparin SC      Important Medication Changes and Reason:    Active or Pending Issues Requiring Follow-up:    Advanced Directives:   [ x] Full code  [ ] DNR  [ ] Hospice    Discharge Diagnoses: Vfib , chf, CAD          HPI:  71 y/o F w/ PMHx COPD, HLD, HTN, PVD, ICM/CAD s/p PCI, cardiac arrest s/p left-sided ICD (6/4/2019) complicated by infection and s/p R single-chamber ICD ([place] in 9/23/2019) recent admission of UTI (treated w/ ceftriaxone and Vanco x 3 days) and now presented w/ multiple ICD firing episode today.   Pt reports that she had gone to the bathroom and was sitting in a chair and suddenly felt a sharp pain and lightheadedness in her right side shoulder with loud sound.  Patient screamed and when  came to see her, the  stated " she had a blank stare, not able to answer my questions and grabbing her chest ~5mins) EMS was called and patient's defibrillator fired an additional 2 times.  She reports that she had an episode of vomiting after initial shock.  Since has felt fine and was without complaints. Pt reports that she had no further episode in ED but was told that she had 13 ICD firings.   Denies any chest pain, SOB/dyspnea, fever, chills, cough, or sore throat.  (30 Sep 2023 12:46)     Problem/Plan - 1:  ·  Problem: VF (ventricular fibrillation).   ·  Plan: ppm interrogation revealed multiple recurrences of vfib over short period of time   c/w electrical storm  rhc + lhc which showed no new acute significant pathological trigger  s/p lidocaine infusion in cicu  s/p sotalol  Continue quinidine per EP  monitor on telemetry  cont mexiletine + coreg  f/u EP recs. Possible defibrillation threshold testing later this week on Friday.     Problem/Plan - 2:  ·  Problem: HFrEF (heart failure with reduced ejection fraction).   ·  Plan: 2/2 icm c/b cardiac arrest s/p icd c/b device infections s/p removal and reimplant  clinically euvolemic  tte 8/30 showed "lvef 45-50%...Normal left ventricular cavity size. Left ventricular wall thickness is normal. Left ventricular systolic function is normal....Basal and mid anterolateral wall, basal and mid inferolateral wall, and basal inferior segment are abnormal...moderate (grade 2) left ventricular diastolic dysfunction, with elevated filling pressure"  lhc + rhc 9/30 shows "Mildly elevated filling pressures with preserved cardiac output"  strict i/o and daily weights  cont home lasix, coreg, entresto.     Problem/Plan - 3:  ·  Problem: CAD (coronary artery disease).   ·  Plan: h/o s/p pci w stent  trop 117  ekg showed nsr w no new acute st seg - t wave changes suggestive of acute ischemia; similar to prior recent ekg  lhc + rhc 9/30 showed patent lad stent  monitor for chest pain, ekg/telemetry changes  cont home plavix, coreg, lipitor.     Problem/Plan - 4:  ·  Problem: Candidal intertrigo.   ·  Plan: Under L breast and L groin  c/w nystatin powder  Will need rx at discharge.     Problem/Plan - 5:  ·  Problem: DM (diabetes mellitus).   ·  Plan: hold home meds (farxiga)  a1c ~6.7  monitor fingerstick glu tidac + hs  carb consistent diet  low dose correctional scale lispro tidac + hs while inpatient  adjust to maintain goal bg 100-180.     Problem/Plan - 6:  ·  Problem: HTN (hypertension).   ·  Plan: coreg  lasix  entresto.     Problem/Plan - 7:  ·  Problem: HLD (hyperlipidemia).   ·  Plan: lipitor.     Problem/Plan - 8:  ·  Problem: Chronic obstructive pulmonary disease (COPD).   ·  Plan: active smoker  symbicort inhaler  accuneb/airet neb  nictoine patch.     Problem/Plan - 9:  ·  Problem: GERD (gastroesophageal reflux disease).   ·  Plan: pepcid.       Additional Information:  Additional Information: DVT Prophylaxis: Heparin SC      Important Medication Changes and Reason:    Active or Pending Issues Requiring Follow-up:    Advanced Directives:   [ x] Full code  [ ] DNR  [ ] Hospice    Discharge Diagnoses:   Vfib storm s/p multiple ICD defibrillations, s/p LHC and RHC  Pacemaker   Chronic HFrEF  CAD   Candidal intertrigo  COPD  Active Smoker

## 2023-10-06 NOTE — DISCHARGE NOTE PROVIDER - ATTENDING DISCHARGE PHYSICAL EXAMINATION:
Vital Signs Last 24 Hrs  T(C): 36.6 (06 Oct 2023 13:32), Max: 36.6 (06 Oct 2023 09:20)  T(F): 97.8 (06 Oct 2023 13:32), Max: 97.9 (06 Oct 2023 09:20)  HR: 76 (06 Oct 2023 13:32) (71 - 81)  BP: 153/75 (06 Oct 2023 13:32) (92/45 - 153/75)  BP(mean): 57 (06 Oct 2023 10:47) (57 - 57)  RR: 16 (06 Oct 2023 13:32) (16 - 20)  SpO2: 93% (06 Oct 2023 13:32) (91% - 93%)    Parameters below as of 06 Oct 2023 13:32  Patient On (Oxygen Delivery Method): room air      CONSTITUTIONAL: Well-groomed, in no apparent distress  EYES: No conjunctival or scleral injection, non-icteric  ENMT: No external nasal lesions; Normal outer ears  NECK: Supple; Trachea midline  RESPIRATORY: Normal respiratory effort; lungs are clear to auscultation bilaterally without wheeze/rhonchi/rales  CARDIOVASCULAR: Regular rate and rhythm, normal S1 and S2, no murmur/rub/gallop; No lower extremity edema  GASTROINTESTINAL: Non-distended; No palpable masses; No tenderness; No rebound/guarding  MUSCULOSKELETAL:  Normal gait;  NEUROLOGY: A+O to person, place, and time; no gross motor deficits   PSYCHIATRY: Mood and Affect appropriate

## 2023-10-06 NOTE — DISCHARGE NOTE NURSING/CASE MANAGEMENT/SOCIAL WORK - NSDCFUADDAPPT_GEN_ALL_CORE_FT
APPTS ARE READY TO BE MADE: [x ] YES    Best Family or Patient Contact (if needed):    Additional Information about above appointments (if needed):    1:   2:   3:     Other comments or requests:       Patient/Caregiver was provided with follow up request details and prefers to call the providers office on their own to schedule.

## 2023-10-06 NOTE — PROGRESS NOTE ADULT - NUTRITIONAL ASSESSMENT
This patient has been assessed with a concern for Malnutrition and has been determined to have a diagnosis/diagnoses of Severe protein-calorie malnutrition.    This patient is being managed with:   Diet DASH/TLC-  Sodium & Cholesterol Restricted  Consistent Carbohydrate {No Snacks} (CSTCHO)  Supplement Feeding Modality:  Oral  Glucerna Shake Cans or Servings Per Day:  1       Frequency:  Daily  Entered: Oct  3 2023  5:19PM  

## 2023-10-06 NOTE — DISCHARGE NOTE PROVIDER - PROVIDER TOKENS
PROVIDER:[TOKEN:[[id number]],FOLLOWUP:[2 weeks]] PROVIDER:[TOKEN:[[id number]],FOLLOWUP:[2 weeks]],PROVIDER:[TOKEN:[[id number]],FOLLOWUP:[1 month]]

## 2023-10-06 NOTE — DISCHARGE NOTE PROVIDER - NSFOLLOWUPCLINICS_GEN_ALL_ED_FT
[facility] General Internal Medicine  General Internal Medicine  [address] [id number]  Phone: [phone]  Fax:   Follow Up Time: 2 weeks

## 2023-10-06 NOTE — DISCHARGE NOTE PROVIDER - ATTENDING DISCHARGE PHYSICAL EXAM:
OP note    Pt's name:Marleny Lawrence   Pt's MRN: [id number]  1934 88 year old female  Pt's PCP: Cindy Villanueva DO    Procedure date: 12/15/2022   Preoperative Diagnosis:   1. Vulval lesion  Postoperative Diagnosis:   1. Same as above  2. S/p exam under anesthesia, vulvar biopsy x4  Procedure: Exam under anesthesia, vulvar biopsy x4  Surgeon: Dr. Mony Hernandez MD  Assistant: Dr. Kymberly Torres MD, PGY-3  Anesthesia: General, laryngeal mask airway  General   Anesthesiologist: Jordyn Smith MD; Jade Witt MD  Estimated Blood Loss: <5 cc  Fluids: per anesthesia record  Complications: None    Operative findings: Exam under anesthesia revealed a small uterus with no palpable cervical masses; no blood was noted in the vaginal vault on the exam. On the thorough exam of the vulva, both labia were noted to be swollen.  Left labia majora with white atrophic patch encompassing most of the surface; numerous white hyperkeratotic lesions were noted, as well as small, subcentimer, erythematous ulcerations.  R labia majora with a 9 cm x 2.5 cm friable, ulcerated lesion with raised, erythematous borders. Posterior fourchette with signs of atrophy; but no lesions in the area. Anus w/o lesions.  Atrophic vagina.   Erythematous indurated rash was noted on the left inner thigh.  Specimen:     SPECIMEN(S) REMOVED:   Order Name Source Comment Collection Info Order Time   SURGICAL PATHOLOGY Labia, Right 9 CM, 2-3CM WIDE Collected By: Mony Hernandez MD 12/15/2022  6:14 PM     How should test results be released to the patient's [facility] portal?   Automatic Release            Operative Procedure: Patient was taken to the operating room with IV fluids running and LMA was given.  She was then placed on the operating table in dorsal lithotomy position in Lucas stirrups and prepped and draped in the usual sterile fashion for vaginal surgery.  A timeout was performed and all parties were in agreement.  Exam under anesthesia was  performed with the above findings noted.   Four vulvar biopsy sites were identified. Lidocaine 1 % was injected subcutaneously at those areas. 4 mm punch biopsies were performed. The 1st biopsy was performed at the superior area of the right labia majora ~2 cm from the midline. Next punch biopsy was performed ~1.5 cm from the midline at the inferior portion of the right labia majora. The 3rd punch biopsy was done at the level of the left superior labia majora; the 4th one was performed at the left inferior labia majora.  Monsel's solution was applied to all the biopsy sites. The right superior labia majora and left inferior labia majora biopsy sites were still oozy, so were reinforced with a figure-of eight stitch using 3-0 Vicryl. Hemostasis was achieved.   Pt was awoken and moved to the PACU in a stable condition.        Attending: Dr. Mony Hernandez  Resident: Kymberly Torres MD, MD, PGY-3   Attending Discharge Physical Examination:

## 2023-10-06 NOTE — DISCHARGE NOTE PROVIDER - CARE PROVIDER_API CALL
Jayy Bertrand  Internal Medicine  [address] [place], [place] [address]  Phone: [phone]  Follow Up Time: 2 weeks   Jayy Bertrand  Internal Medicine  [address] [place] [address]  Phone: [phone]  Follow Up Time: 2 weeks    Campbell Hurt  Cardiac Electrophysiology  [address] [facility], [address] [place] [id number]  Phone: [phone]  Fax: [phone]  Follow Up Time: 1 month

## 2023-10-06 NOTE — DISCHARGE NOTE PROVIDER - NSDCCPCAREPLAN_GEN_ALL_CORE_FT
PRINCIPAL DISCHARGE DIAGNOSIS  Diagnosis: Ventricular arrhythmia  Assessment and Plan of Treatment: S/P dEFIB Testing      SECONDARY DISCHARGE DIAGNOSES  Diagnosis: HFrEF (heart failure with reduced ejection fraction)  Assessment and Plan of Treatment: Weigh yourself daily.  If you gain 3lbs in 3 days, or 5lbs in a week call your Health Care Provider.  Do not eat or drink foods containing more than 2000mg of salt (sodium) in your diet every day.  Call your Health Care Provider if you have any swelling or increased swelling in your feet, ankles, and/or stomach.  Take all of your medication as directed.  If you become dizzy call your Health Care Provider.

## 2023-10-06 NOTE — DISCHARGE NOTE PROVIDER - NSDCMRMEDTOKEN_GEN_ALL_CORE_FT
albuterol 2.5 mg/3 mL (0.083%) inhalation solution: 3 milliliter(s) by nebulizer prn as needed for  wheezing  atorvastatin 40 mg oral tablet: 1 tab(s) orally once a day (at bedtime)  carvedilol 3.125 mg oral tablet: 1 tab(s) orally every 12 hours  D3 50 mcg (2000 intl units) oral capsule: 1 cap(s) orally  dapagliflozin 10 mg oral tablet: 1 tab(s) orally once a day  Lasix 40 mg oral tablet: 1 tab(s) orally once a day  mexiletine 150 mg oral capsule: 1 cap(s) orally 2 times a day  Multiple Vitamins oral tablet: 1 tab(s) orally once a day  nicotine 21 mg/24 hr transdermal film, extended release: 1 patch transdermal once a day  Plavix 75 mg oral tablet: 1 tab(s) orally once a day  quiNIDine 324 mg oral tablet, extended release: 1 tab(s) orally every 8 hours  sacubitril-valsartan 24 mg-26 mg oral tablet: 1 tab(s) orally 2 times a day  Symbicort 160 mcg-4.5 mcg/inh inhalation aerosol: 2 puff(s) inhaled once a day   albuterol 2.5 mg/3 mL (0.083%) inhalation solution: 3 milliliter(s) by nebulizer prn as needed for  wheezing  atorvastatin 40 mg oral tablet: 1 tab(s) orally once a day (at bedtime)  D3 50 mcg (2000 intl units) oral capsule: 1 cap(s) orally  dapagliflozin 10 mg oral tablet: 1 tab(s) orally once a day  Lasix 40 mg oral tablet: 1 tab(s) orally once a day  metoprolol succinate 50 mg oral tablet, extended release: 1 tab(s) orally once a day  mexiletine 150 mg oral capsule: 1 cap(s) orally 2 times a day  Multiple Vitamins oral tablet: 1 tab(s) orally once a day  nicotine 21 mg/24 hr transdermal film, extended release: 1 patch transdermal once a day  Plavix 75 mg oral tablet: 1 tab(s) orally once a day  quiNIDine 324 mg oral tablet, extended release: 1 tab(s) orally every 8 hours  sacubitril-valsartan 24 mg-26 mg oral tablet: 1 tab(s) orally 2 times a day  Symbicort 160 mcg-4.5 mcg/inh inhalation aerosol: 2 puff(s) inhaled once a day   albuterol 2.5 mg/3 mL (0.083%) inhalation solution: 3 milliliter(s) by nebulizer prn as needed for  wheezing  atorvastatin 40 mg oral tablet: 1 tab(s) orally once a day (at bedtime)  D3 50 mcg (2000 intl units) oral capsule: 1 cap(s) orally  dapagliflozin 10 mg oral tablet: 1 tab(s) orally once a day  Lasix 40 mg oral tablet: 1 tab(s) orally once a day  metoprolol succinate 50 mg oral tablet, extended release: 1 tab(s) orally once a day  mexiletine 150 mg oral capsule: 1 cap(s) orally 2 times a day  Multiple Vitamins oral tablet: 1 tab(s) orally once a day  nicotine 21 mg/24 hr transdermal film, extended release: 1 patch transdermal once a day  nystatin 100,000 units/g topical powder: Apply topically to affected area 3 times a day  Plavix 75 mg oral tablet: 1 tab(s) orally once a day  quiNIDine 324 mg oral tablet, extended release: 1 tab(s) orally every 8 hours  sacubitril-valsartan 24 mg-26 mg oral tablet: 1 tab(s) orally 2 times a day  Symbicort 160 mcg-4.5 mcg/inh inhalation aerosol: 2 puff(s) inhaled once a day

## 2023-10-06 NOTE — DISCHARGE NOTE NURSING/CASE MANAGEMENT/SOCIAL WORK - NSDCPEFALRISK_GEN_ALL_CORE
For information on Fall & Injury Prevention, visit: [email or web address] OR  [email or web address] OR  https://www.cdc.gov/steadi/patient.html

## 2023-10-06 NOTE — PROGRESS NOTE ADULT - PROVIDER SPECIALTY LIST ADULT
Electrophysiology
Hospitalist
Electrophysiology
LEWIS
Electrophysiology
LEWIS
Hospitalist

## 2023-10-06 NOTE — DISCHARGE NOTE PROVIDER - NSDCFUADDAPPT_GEN_ALL_CORE_FT
APPTS ARE READY TO BE MADE: [x ] YES    Best Family or Patient Contact (if needed):    Additional Information about above appointments (if needed):    1:   2:   3:     Other comments or requests:    APPTS ARE READY TO BE MADE: [x ] YES    Best Family or Patient Contact (if needed):    Additional Information about above appointments (if needed):    1:   2:   3:     Other comments or requests:       Patient/Caregiver was provided with follow up request details and prefers to call the providers office on their own to schedule.  APPTS ARE READY TO BE MADE: [x ] YES    Best Family or Patient Contact (if needed):    Additional Information about above appointments (if needed):    1:   2:   3:     Other comments or requests:     Patient is scheduled to see Dr. Hurt at 10:00AM on 11/2 at [address] [place] [address]    Patient/Caregiver was provided with follow up request details and prefers to call the providers office on their own to schedule.

## 2023-10-06 NOTE — DISCHARGE NOTE PROVIDER - NSDCFUSCHEDAPPT_GEN_ALL_CORE_FT
Campbell Hurt  [facility]  ELECTROPH [address]  Scheduled Appointment: 11/02/2023     Campbell Hurt  [facility]  ELECTROPH [address]  Scheduled Appointment: 11/02/2023    [facility]  ELECTROPH [address]  Scheduled Appointment: 11/07/2023

## 2023-10-06 NOTE — PROGRESS NOTE ADULT - ATTENDING COMMENTS
She has had no VT and rare VPD's on quinidine and mexiletine. She is tolerating the medications well. QRS duration is 120 ms and QTc is 489ms. For NIPS and possible ablation today

## 2023-10-06 NOTE — DISCHARGE NOTE NURSING/CASE MANAGEMENT/SOCIAL WORK - NSDCVIVACCINE_GEN_ALL_CORE_FT
influenza, injectable, quadrivalent, preservative free; 24-Jan-2015 06:54; [place], Auto-process (IS); [employer]; [id number]; IntraMuscular; Deltoid Left.; 0.5 milliLiter(s); VIS (VIS Published: 19-Aug-2014, VIS Presented: 24-Jan-2015);

## 2023-10-06 NOTE — DISCHARGE NOTE PROVIDER - DETAILS OF MALNUTRITION DIAGNOSIS/DIAGNOSES
This patient has been assessed with a concern for Malnutrition and was treated during this hospitalization for the following Nutrition diagnosis/diagnoses:     -  10/03/2023: Severe protein-calorie malnutrition

## 2023-10-06 NOTE — DISCHARGE NOTE NURSING/CASE MANAGEMENT/SOCIAL WORK - PATIENT PORTAL LINK FT
You can access the FollowMyHealth Patient Portal offered by [facility] by registering at the following website: [email or web address]. By joining [employer]’s FollowMyHealth portal, you will also be able to view your health information using other applications (apps) compatible with our system.

## 2023-10-06 NOTE — DISCHARGE NOTE NURSING/CASE MANAGEMENT/SOCIAL WORK - NSDCPEEMAIL_GEN_ALL_CORE
[facility] for Tobacco Control email [email or web address]

## 2023-10-06 NOTE — DISCHARGE NOTE NURSING/CASE MANAGEMENT/SOCIAL WORK - NSDCPEWEB_GEN_ALL_CORE
[facility] for Tobacco Control website --- [email or web address] website --- [email or web address]

## 2023-10-15 NOTE — ED ADULT TRIAGE NOTE - CHIEF COMPLAINT QUOTE
AICD fired once this morning- felt dizzy and nauseous before firing. Pt still slightly nauseous. No chest pain.

## 2023-10-15 NOTE — ED PROVIDER NOTE - CLINICAL SUMMARY MEDICAL DECISION MAKING FREE TEXT BOX
72-year-old female with past medical history of COPD, CAD, HTN, HLD, PVD, cardiac arrest s/p AICD placement in 2019 on right presents emergency department after AICD firing. Pt felt dizzy prior to firing. Pt recently d/c'ed after admission for multiple firings of AICD. Pt denying symptoms at this time. + wheezing in b/l lungs. AOx3. Plan for labs, EKG, CXR. Will get cards/EP consult. Dispo pending labs, imaging and cards recs.

## 2023-10-15 NOTE — ED PROVIDER NOTE - OBJECTIVE STATEMENT
72-year-old female with past medical history of COPD, CAD, HTN, HLD, PVD, cardiac arrest s/p AICD placement in 2019 on right presents emergency department after AICD firing.  Patient states she was feeling lightheaded and dizzy at around 3 PM and then had 1 shock of her AICD.  Patient states she was recently discharged about 1 week ago after multiple firings of her AICD.  Patient supposed to have ablation in the next few weeks.  AICD is [employer] device.  Denies fevers, chills, chest pain, trouble breathing, abdominal pain, nausea/vomiting, diarrhea/constipation, dysuria, hematuria.  Patient endorses some coughing and occasional wheezing.

## 2023-10-15 NOTE — ED ADULT NURSE NOTE - OBJECTIVE STATEMENT
73 y/o female with PMH of CAD, CHF, HTN, HLD AICD, arrives to the ER complaining of AICD fired. Pt reports feeling dizzy and nauseated after that AICD fired once today in the morning.  At arrival denies SOB, chest pain, dizziness.  On assessment pt is well appearing, A&Ox4, speaking coherently, airway is patent, breathing spontaneously and unlabored. Skin is dry, warm. EKG performed, pt placed on a cardiac monitor, G placed on the, blood sent to the lab.   Comfort and safety provided. will continue to reassess. 73 y/o female with PMH of CAD, CHF, HTN, HLD AICD, arrives to the ER complaining of AICD fired. Pt reports feeling dizzy and nauseated after that AICD fired once today in the afternoon. Patient reports being recently discharged last week, after multiple firings of her AICD. Pt is scheduled for an ablation next week.  At arrival denies SOB, chest pain, dizziness.  On assessment pt is well appearing, A&Ox4, speaking coherently, airway is patent, breathing spontaneously and unlabored. Skin is dry, warm. EKG performed, pt placed on a cardiac monitor, G placed on the, blood sent to the lab.   Comfort and safety provided. will continue to reassess.

## 2023-10-15 NOTE — ED PROVIDER NOTE - PROGRESS NOTE DETAILS
Meera Reyes PGY2: Cards consulted and will come see pt. Majeste- EP interrogation of aicd revealed episode of VTach coinciding with fire. EP recommending admit and consideration for urgent ablation

## 2023-10-15 NOTE — ED PROVIDER NOTE - ATTENDING CONTRIBUTION TO CARE
HX: pt with AICD fire prior to arrival, preceding sxs of lightheadedness.  Post-AICD fire no symptoms.  REcent admission for same. Due for ablation in 2wks.      PE: well appearing, nontoxic, no respiratory distress.  Neuro nonfocal.  Skin intact. Psych normal mood.    MDM: AICD fire, check cbc r/o anemia or leukocytosis, check bmp to r/o metabolic derangement and lyte imbalance, cardiologist consult, ekg, tele monitoring, determine need for inpatient for EP procedure versus outpatient.

## 2023-10-15 NOTE — ED PROVIDER NOTE - PHYSICAL EXAMINATION
Constitutional: VS reviewed. Alert and orientedx3, well appearing, no apparent distress  HEENT: Atraumatic, EOMI  CV: RRR  Lungs: Scatted wheezes.   Abdomen: Soft, nondistended, nontender  MSK: No deformities  Skin: Warm and dry. As visualized no rashes, lesions, bruising or erythema  Neuro: Strength 5/5 in all extremities. Sensation intact.   Lymph: No pitting edema in extremities.

## 2023-10-16 NOTE — PHYSICAL THERAPY INITIAL EVALUATION ADULT - PERTINENT HX OF CURRENT PROBLEM, REHAB EVAL
73 y/o F w/ PMHx COPD, HLD, HTN, PVD, ICM/CAD s/p PCI (has  of RCA), cardiac arrest s/p left-sided ICD (6/4/2019) complicated by infection and s/p R single-chamber ICD ([employer] in 9/23/2019), recent admission for VT storm with adjustment of medication and NIPS now presenting for ICD shock in the setting of recurrent monomorphic VT with unsuccessful ATP.

## 2023-10-16 NOTE — H&P ADULT - ASSESSMENT
72F c hx COPD, HTN, PAD, CAD s/p LAD stent (last LHC showing 100%  of RCA), chronic leukocytosis of unclear etiol, cardiac arrest s/p left-sided ICD (6/4/2019) complicated by infection and s/p R single-chamber ICD reimplantation ([place] in 9/23/2019), CHF EF 45%, grade 2 DHF, recent hospitalization for UTI (treated w/ ceftriaxone and Vanco x 3 days) and VFib s/p 13 AICD firings, pw lightheadedness 2/2 sustained Vtach s/p appropriate AICD firing.

## 2023-10-16 NOTE — PHYSICAL THERAPY INITIAL EVALUATION ADULT - PLANNED THERAPY INTERVENTIONS, PT EVAL
stair negotiation GOAL: patient will negotiate up / down 3 steps independently in 2 weeks/balance training/bed mobility training/gait training/transfer training

## 2023-10-16 NOTE — H&P ADULT - NSHPREVIEWOFSYSTEMS_GEN_ALL_CORE
REVIEW OF SYSTEMS:  CONSTITUTIONAL: No weakness. No fevers. No chills. No rigors. No poor appetite.  EYES: No blurry or double vision. No eye pain.  ENT: No hearing difficulty. No sore throat. No Sinusitis/rhinorrhea.   NECK: No pain. No stiffness/rigidity.  CARDIAC: No chest pain. No palpitations. +lightheadedness. No syncope.  RESPIRATORY: No cough. No SOB.   GASTROINTESTINAL: No abdominal pain. No nausea. No vomiting. No diarrhea. No constipation.   GENITOURINARY: No dysuria. No frequency. No oliguria.  NEUROLOGICAL: No numbness/tingling. No focal weakness. No headache. +unsteady gait.  BACK: No back pain. No flank pain.  EXTREMITIES: No lower extremity edema. Full ROM. No joint pain.  SKIN: No rashes. No itching. No other lesions.  All other review of systems is negative unless indicated above.  Unless indicated above, unable to assess ROS 2/2

## 2023-10-16 NOTE — H&P ADULT - NSHPLABSRESULTS_GEN_ALL_CORE
Personally reviewed old records.  Personally reviewed labs.  Personally reviewed imaging.  Personally reviewed EKG. NSR rate 72, RBBB, qtc 490. TWI in 1/2/L/F.                          13.9   15.03 )-----------( 251      ( 15 Oct 2023 19:49 )             43.2       10-15    132<L>  |  100  |  15  ----------------------------<  121<H>  4.3   |  19<L>  |  0.89    Ca    8.7      15 Oct 2023 17:43  Phos  3.2     10-15  Mg     1.9     10-15    TPro  6.6  /  Alb  3.2<L>  /  TBili  0.5  /  DBili  x   /  AST  22  /  ALT  18  /  AlkPhos  63  10-15            LIVER FUNCTIONS - ( 15 Oct 2023 17:43 )  Alb: 3.2 g/dL / Pro: 6.6 g/dL / ALK PHOS: 63 U/L / ALT: 18 U/L / AST: 22 U/L / GGT: x                 Urinalysis Basic - ( 15 Oct 2023 17:43 )    Color: x / Appearance: x / SG: x / pH: x  Gluc: 121 mg/dL / Ketone: x  / Bili: x / Urobili: x   Blood: x / Protein: x / Nitrite: x   Leuk Esterase: x / RBC: x / WBC x   Sq Epi: x / Non Sq Epi: x / Bacteria: x

## 2023-10-16 NOTE — CONSULT NOTE ADULT - SUBJECTIVE AND OBJECTIVE BOX
Electrophysiology Consult Note   [Please check [email or web address]: [email or web address]" for cardiology service schedule and contact information]    HPI:  71 y/o F w/ PMHx COPD, HLD, HTN, PVD, ICM/CAD s/p PCI, cardiac arrest s/p left-sided ICD (6/4/2019) complicated by infection and s/p R single-chamber ICD ([place] in 9/23/2019) presenting for ICD shock.  Of note, patient recently admitted (9/30-10/6) for multiple ICD shocks- required admission to the ICU for lidocaine and sotalol.  Was shocked total of 13 times by ICD- discharged on Toprol 50mg qd, Mexilitine 150mg BID, Quinidine 324 mg q8h.  Other cardiac meds upon dc: Atorvastatin 40, Dapa 10, Lasix 40mg qd, Plavix 75mg qd, Entresto 24-26mg BID    The patient states that she has been feeling well and compliant with all of her meds since discharge.  On the day of admission, she was sitting on the couch ~430pm when she suddenly became dizzy, and shortly afterwards felt her ICD fire.  She denied any chest pain, SOB, diaphoresis, nausea, vomiting, or palpitations prior to the ICD firing.  She also denies any symptoms following the ICD firing.  ICD interrogation performed by myself in the ED notable for a 32 second episode of VT at 1627 requiring ATP x 2 and 35J shock x 1 to return to NSR.  No other recent arrhythmias or delivered therapies noted.  Labs in the ED notable for WBC 15, Na 132, K 4.3, Mg 1.9, lactate 1.7.  Per the patient, she was planned for a VT ablation with Dr Hurt in the upcoming weeks.      PAST MEDICAL & SURGICAL HISTORY:  Obese      Smoker      HTN (hypertension)      HLD (hyperlipidemia)      CAD (coronary artery disease)      CHF with cardiomyopathy      History of hip surgery        FAMILY HISTORY:  Family history of Alzheimer's disease (Father)    Family history of cancer (Mother)      SOCIAL HISTORY:  unchanged    MEDICATIONS:                  REVIEW OF SYSTEMS:  CV: chest pain (-), palpitation (-), orthopnea (-), PND (-), edema (-)  PULM: SOB (-), cough (-), wheezing (-), hemoptysis (-).   CONST: fever (-), chills (-) or fatigue (-)  GI: abdominal distension (-), abdominal pain (-) , nausea/vomiting (-), hematemesis, (-), melena (-), hematochezia (-)  : dysuria (-), frequency (-), hematuria (-).   NEURO: numbness (-), weakness (-), dizziness (-)  SKIN: itching (-), rash (-)  HEENT:  visual changes (-); vertigo or throat pain (-);  neck stiffness (-)     All other review of systems is negative unless indicated above.   -------------------------------------------------------------------------------------------  PHYSICAL EXAM:  T(C): 36.6 (10-15-23 @ 23:30), Max: 36.8 (10-15-23 @ 16:24)  HR: 74 (10-15-23 @ 23:30) (69 - 74)  BP: 143/47 (10-15-23 @ 23:30) (141/71 - 147/58)  RR: 20 (10-15-23 @ 23:30) (16 - 20)  SpO2: 95% (10-15-23 @ 23:30) (92% - 95%)  Wt(kg): --  I&O's Summary      GENERAL: NAD, obese  HEAD: Atraumatic, Normocephalic.  EYES: EOMI, PERRLA, conjunctiva and sclera clear.  ENT: Moist mucous membranes.  NECK: Supple, No JVD.  CHEST/LUNG: Clear to auscultation bilaterally; +expiratory upper airway wheezing  HEART: Regular rate and rhythm; No murmurs, rubs, or gallops.  ABDOMEN: Bowel sounds present; Soft, Nontender, Nondistended.   EXTREMITIES:  2+ Peripheral Pulses, brisk capillary refill. No clubbing, cyanosis, or edema.  PSYCH: Normal affect.  SKIN: No rashes or lesions.    -------------------------------------------------------------------------------------------  LABS:                          13.9   15.03 )-----------( 251      ( 15 Oct 2023 19:49 )             43.2     10-15    132<L>  |  100  |  15  ----------------------------<  121<H>  4.3   |  19<L>  |  0.89    Ca    8.7      15 Oct 2023 17:43  Phos  3.2     10-15  Mg     1.9     10-15    TPro  6.6  /  Alb  3.2<L>  /  TBili  0.5  /  DBili  x   /  AST  22  /  ALT  18  /  AlkPhos  63  10-15      CARDIAC MARKERS ( 15 Oct 2023 20:58 )  24 ng/L / x     / x     / x     / x     / x      CARDIAC MARKERS ( 15 Oct 2023 17:43 )  27 ng/L / x     / x     / x     / x     / x

## 2023-10-16 NOTE — PHYSICAL THERAPY INITIAL EVALUATION ADULT - GAIT TRAINING, PT EVAL
GOAL: Patient will ambulate 200 feet independently with least restrictive assistive device in 2 weeks

## 2023-10-16 NOTE — ED ADULT NURSE REASSESSMENT NOTE - NS ED NURSE REASSESS COMMENT FT1
Patient sitting comfortably in bed. Patient updated on POC. Patient on continuous cardiac monitor. Patient on purewick for hygiene. Patient given breakfast tray and adjusted in bed. No acute distress present at this time. Call bell within reach, bed in lowest position.

## 2023-10-16 NOTE — PHYSICAL THERAPY INITIAL EVALUATION ADULT - ADDITIONAL COMMENTS
Patient lives in private home with spouse, 3-4 steps to enter, first floor set up. DME: cane, rolling walker, scooter

## 2023-10-16 NOTE — H&P ADULT - PROBLEM SELECTOR PLAN 1
- cont quinidine, mexiletine, metoprolol  - f/u EP regarding poss ablation and other recs  - cont tele  - K>4, Mg >2  - recent RHF/LHC performed - cont quinidine, mexiletine, metoprolol  - reportedly EP ablation not performed last hospitalization 2/2 right groin infection s/p nystatin powder  - f/u EP regarding poss ablation and other recs  - cont tele  - K>4, Mg >2  - recent RHF/LHC performed

## 2023-10-16 NOTE — PROGRESS NOTE ADULT - SUBJECTIVE AND OBJECTIVE BOX
Patient seen and examined at bedside.    Overnight Events: No acute events overnight     Review Of Systems:   CONSTITUTIONAL: No weakness, fevers or chills  EYES/ENT: No visual changes;  No dysphagia  NECK: No pain or stiffness  RESPIRATORY: No cough, wheezing, hemoptysis  CARDIOVASCULAR: No chest pain or palpitations; No lower extremity edema  GASTROINTESTINAL: No abdominal or epigastric pain. No nausea, vomiting, or hematemesis; No diarrhea or constipation. No melena or hematochezia.  BACK: No back pain  GENITOURINARY: No dysuria, frequency or hematuria  NEUROLOGICAL: No numbness or weakness  SKIN: No itching, burning, rashes, or lesions   All other review of systems is negative unless indicated above.          Current Meds:  atorvastatin 40 milliGRAM(s) Oral at bedtime  budesonide 160 MICROgram(s)/formoterol 4.5 MICROgram(s) Inhaler 2 Puff(s) Inhalation two times a day  clopidogrel Tablet 75 milliGRAM(s) Oral daily  metoprolol succinate ER 50 milliGRAM(s) Oral daily  mexiletine 150 milliGRAM(s) Oral two times a day  quiNIDine gluconate  milliGRAM(s) Oral every 8 hours  sacubitril 24 mG/valsartan 26 mG 1 Tablet(s) Oral two times a day      Vitals:  T(F): 99.8 (10-16), Max: 99.8 (10-16)  HR: 76 (10-16) (69 - 80)  BP: 155/58 (10-16) (120/68 - 155/58)  RR: 20 (10-16)  SpO2: 96% (10-16)  I&O's Summary      Physical Exam:  Appearance: No acute distress; well appearing  Eyes: PERRL, EOMI, pink conjunctiva  HEENT: Normal oral mucosa  Cardiovascular: RRR, S1, S2, no murmurs, rubs, or gallops; no edema; no JVD  Respiratory: Clear to auscultation bilaterally  Gastrointestinal: soft, non-tender, non-distended with normal bowel sounds  Musculoskeletal: No clubbing; no joint deformity   Neurologic: Non-focal  Lymphatic: No lymphadenopathy  Psychiatry: AAOx3, mood & affect appropriate  Skin: No rashes, ecchymoses, or cyanosis                          13.9   15.03 )-----------( 251      ( 15 Oct 2023 19:49 )             43.2     10-15    132<L>  |  100  |  15  ----------------------------<  121<H>  4.3   |  19<L>  |  0.89    Ca    8.7      15 Oct 2023 17:43  Phos  3.2     10-15  Mg     1.9     10-15    TPro  6.6  /  Alb  3.2<L>  /  TBili  0.5  /  DBili  x   /  AST  22  /  ALT  18  /  AlkPhos  63  10-15      CARDIAC MARKERS ( 15 Oct 2023 20:58 )  24 ng/L / x     / x     / x     / x     / x      CARDIAC MARKERS ( 15 Oct 2023 17:43 )  27 ng/L / x     / x     / x     / x     / x            TTE 10 2023   1. Left ventricular wall thickness is normal. Left ventricular systolic function is preserved with an ejection fraction visually estimated at 55 %. Regional wall motion abnormalities present.   2. Basal inferoseptal segment, mid inferolateral segment, and basal inferior segment are abnormal.   3. Average global longitudinal strain measurements performed on [employer] Epiq machine at average HR 82 bpm, /69 mmHg. Average GLS=-16.2% (borderline).   4. The right ventricle is not well visualized. grossly normal right ventricular cavity size, wall thickness and systolic function.   5. A device wire is visualized in the right heart.   6. Mild to moderate mitral valve stenosis.   7. Trace mitral regurgitation.   8. Trileaflet aortic valve with normal systolic excursion. calcification of the aortic valve leaflets.   9. No evidence of aortic regurgitation.  10. Compared to the transthoracic echocardiogram performed on 8/29/2023 findings are similar on today's study.    Coronary Angiography 9 2023  The coronary circulation is right dominant.    LM   Distal left main: There is a 20 % stenosis.    LAD   Left anterior descending artery: Mid LAD stent patent.    CX   Mid circumflex: There is a 30 % stenosis.    RCA   Right coronary artery: Angiography shows complete occlusion.  of  the proximal RCA with left to right collaterals filling the  rPDA. There is a 100 % stenosis.

## 2023-10-16 NOTE — H&P ADULT - HISTORY OF PRESENT ILLNESS
72F c hx COPD, HTN, PAD, CAD s/p LAD stent (last [facility] showing 100%  of RCA), chronic leukocytosis of unclear etiol, cardiac arrest s/p left-sided ICD (6/4/2019) complicated by infection and s/p R single-chamber ICD reimplantation ([place] in 9/23/2019), CHF EF 45%, grade 2 DHF, recent hospitalization for UTI (treated w/ ceftriaxone and Vanco x 3 days) and VFib s/p 13 AICD firings, pw lightheadedness and AICD firing.    Pt recently hospitalized for VFib s/p multiple aicd firing. Pt placed on quinidine, mexiletine, metoprolol. Pt reports good compliance with her medications, even though she doesn't know what the meds are. Pt states at around 3-4PM yesterday, she felt "dizzy" for a few seconds, then felt AICD shock, then had resolution of her dizziness. Denies fevers, chills, CP, SOB, URI symptoms, GI symptoms. Pt states she's never seen a hematologist for leukocytosis. Pt was supposed to f/u with EP as outpt for ablation.

## 2023-10-16 NOTE — CHART NOTE - NSCHARTNOTEFT_GEN_A_CORE
Addendum Note - Medicine Attending:  ========================================================    The patient was seen and examined by me today. Currently feels well, no complaints. Ate breakfast this morning.     VT - awaiting input from EP regarding possible ablation.   Continue to monitor on telemetry.  Patient understands and agrees with the plan.     Gerber Ramos MD, TAISHA  Available on [employer] Teams

## 2023-10-16 NOTE — H&P ADULT - NSHPPHYSICALEXAM_GEN_ALL_CORE
PHYSICAL EXAM:   GENERAL: Alert. Not confused. No acute distress. Not thin. Not cachectic. Not obese.  HEAD:  Atraumatic. Normocephalic.  EYES: EOMI. PERRLA. Normal conjunctiva/sclera.  ENT: Neck supple. No JVD. Moist oral mucosa. Not edentulous. No thrush.  LYMPH: Normal supraclavicular/cervical lymph nodes.   CARDIAC: Not tachy, Not julisa. Regular rhythm. Not irregularly irregular. S1. S2. No murmur. No rub. No distant heart sounds.  LUNG/CHEST: CTAB. BS equal bilaterally. No wheezes. No rales. No rhonchi.  ABDOMEN: Soft. No tenderness. No distension. No fluid wave. Normal bowel sounds.  BACK: No midline/vertebral tenderness. No flank tenderness.  VASCULAR: +2 b/l radial or ulnar pulses. Palpable DP pulses.  EXTREMITIES:  No clubbing. No cyanosis. No edema. Moving all 4.  NEUROLOGY: A&Ox3. Non-focal exam. Cranial nerves intact. Normal speech. Sensation intact.  PSYCH: Normal behavior. Normal affect.  SKIN: No jaundice. No erythema. No rash/lesion.  Vital Signs Last 24 Hrs  T(C): 37.4 (16 Oct 2023 03:45), Max: 37.4 (16 Oct 2023 03:45)  T(F): 99.3 (16 Oct 2023 03:45), Max: 99.3 (16 Oct 2023 03:45)  HR: 74 (16 Oct 2023 03:45) (69 - 74)  BP: 120/68 (16 Oct 2023 03:45) (120/68 - 147/58)  BP(mean): 74 (15 Oct 2023 23:30) (74 - 88)  ABP: --  ABP(mean): --  RR: 20 (16 Oct 2023 03:45) (16 - 20)  SpO2: 94% (16 Oct 2023 03:45) (92% - 95%)    O2 Parameters below as of 16 Oct 2023 03:45  Patient On (Oxygen Delivery Method): room air          I&O's Summary

## 2023-10-16 NOTE — ED ADULT NURSE REASSESSMENT NOTE - NS ED NURSE REASSESS COMMENT FT1
Pt placed on primafit. Denies any pain or discomfort. Pt updated on plan of care, admitted to tele, awaiting bed. Safety and comfort measures maintained- bed in lowest position, locked, and blanket given.

## 2023-10-16 NOTE — H&P ADULT - NSHPSOCIALHISTORY_GEN_ALL_CORE
Denies Drinking   Denies substance abuse  livse with   3 children  50 pk yr smoker, quit 2 weeks ago

## 2023-10-16 NOTE — H&P ADULT - TIME BILLING
Time-based billing (NON-critical care)    The necessity of the time spent during the encounter on this date of service was due to:   - Personally obtaining above history, review of systems and physical exam findings where appropriate.  - Ordering, reviewing, and interpreting labs, imaging, and other tests where appropriate.  - Reviewing consultant documentation/recommendations in addition to discussing plan of care with consultants where appropriate.  - Answering questions, providing , and informing patient and/or family regarding above items and plan of care.  - Documentation as above.

## 2023-10-16 NOTE — H&P ADULT - NSICDXPASTSURGICALHX_GEN_ALL_CORE_FT
PAST SURGICAL HISTORY:  History of hip surgery      [Post-hospitalization from ___ Hospital] : Post-hospitalization from [unfilled] Hospital [Admitted on: ___] : The patient was admitted on [unfilled] [Discharged on ___] : discharged on [unfilled] [Discharge Summary] : discharge summary [Pertinent Labs] : pertinent labs [Radiology Findings] : radiology findings [Discharge Med List] : discharge medication list [Med Reconciliation] : medication reconciliation has been completed [Patient Contacted By: ____] : and contacted by [unfilled] [FreeTextEntry2] : Copied from [email or web address]\par '75 y.o. Female with hx of HTN, HLD, moderate to severe MR CAD/MI ICM lasted PCI 2019 to OSMAR to LCx  of RCA left to right collaterals mild LAD/LM, Lung cancer baseline O2 therapy with METS s/p chemotherapy/ immunotherapy and radiation now in remission, COPD former smoker and chronic systolic HFrEF EF ~ 35 to 40% on chronic 3l via nc o2 qhs who presents with worsening sob, VILLARREAL and orthopnea. Noted to be hypoxic to low 80s in the ed with increased work of breathing, elevated probnp and noted b/l congestion on imaging. Pt admitted with acute resp failure with hypoxia 2/2 pulm edema 2/2 acute on chronic systolic HFref exacerbation. Pt was seen in consultation with\[facility]  Cardiology team [facility] and was offered ischemic work up with [facility] as well as possible ICD placement. However pt declined further test and intervenition at this time and prefers to f/u with her primary cardiologist Dr. Hernandez. She verbalised understanding of the risk of leaving w/o further test and  possible interventions. She diuresed with few doses of IV furosemide and on discharge back to euvolemic and will continue Furosemide 40 mg twice a day in addition to entresto, metoprolol, DAPT, statins. \par She also was provided with Life vest before d/c.' \par \par Pt seen at home post- hospitalization follow-up under [facility]. Pt AOx3, denies worsening SOB, CP or dizziness.  on 3L O2, compliant with Life Vest. Pt's spouse is very involved in care and provides goo support. Pt has no acute complaints. \par

## 2023-10-16 NOTE — PROGRESS NOTE ADULT - ASSESSMENT
73 y/o F w/ PMHx COPD, HLD, HTN, PVD, ICM/CAD s/p PCI (has  of RCA), cardiac arrest s/p left-sided ICD (6/4/2019) complicated by infection and s/p R single-chamber ICD ([place] in 9/23/2019), recent admission for VT storm with adjustment of medication and NIPS now presenting for ICD shock in the setting of recurrent monomorphic VT with unsuccessful ATP.     - Recent admission for incessant VF requiring lido, sotalol, and ~13 ICD shocks, LHC/RHC at that time with no CAD and mildly elevated filling pressures w/ preserved CO  - ICD interrogation with appropriate shock for 32 sec run of VT  - continue with home mexilitine, metoprolol, quinidine for now  - K>4, Mg>2  - continue to monitor on tele  - can continue home meds with the exception of farxiga, please continue to hold farxiga   - patient was not amenable to ablation at last admission but is now willing to proceed, will discuss ablation this admission   - likely fungal infection of the groin, reportedly being treated outpatient, please continue to manage while inpatient to reduce the risk of procedure related infection    71 y/o F w/ PMHx COPD, HLD, HTN, PVD, ICM/CAD s/p PCI (has  of RCA), cardiac arrest s/p left-sided ICD (6/4/2019) complicated by infection and s/p R single-chamber ICD ([place] in 9/23/2019), recent admission for VT storm with adjustment of medication and NIPS now presenting for ICD shock in the setting of recurrent monomorphic VT with unsuccessful ATP.     - Recent admission for incessant VF requiring lido, sotalol, and ~13 ICD shocks, LHC/RHC at that time with no CAD and mildly elevated filling pressures w/ preserved CO  - ICD interrogation with appropriate shock for 32 sec run of VT  - continue with home mexilitine, metoprolol, quinidine for now  - K>4, Mg>2  - continue to monitor on tele  - can continue home meds with the exception of farxiga, please continue to hold farxiga   - patient was not amenable to ablation at last admission but is now willing to proceed, will plan for ablation later this week   - likely fungal infection of the groin, reportedly being treated outpatient, please continue to manage while inpatient to reduce the risk of procedure related infection

## 2023-10-16 NOTE — PROCEDURE NOTE - INTERROGATION NOTE: COMMENTS
1 episode of VT detected at 1647. Device delivered 2 ATP and 1 35J shock in order to convert back to NSR.

## 2023-10-17 NOTE — PROGRESS NOTE ADULT - ASSESSMENT
72F pmh COPD, HTN, PAD, CAD s/p LAD stent (last [facility] showing 100%  of RCA), chronic leukocytosis of unclear etiol, cardiac arrest s/p left-sided ICD (6/4/2019) complicated by infection and s/p R single-chamber ICD reimplantation ([place] in 9/23/2019), CHF EF 45%, grade 2 DHF, and VFib s/p 13 AICD firings, p/w lightheadedness 2/2 sustained Vtach s/p appropriate AICD firing she is being followed by EP for a VT ablation but she is first being treated for a fungal groin infection

## 2023-10-17 NOTE — DISCHARGE NOTE PROVIDER - DETAILS OF MALNUTRITION DIAGNOSIS/DIAGNOSES
This patient has been assessed with a concern for Malnutrition and was treated during this hospitalization for the following Nutrition diagnosis/diagnoses:     -  10/25/2023: Severe protein-calorie malnutrition

## 2023-10-17 NOTE — DISCHARGE NOTE PROVIDER - HOSPITAL COURSE
72F c hx COPD, HTN, PAD, CAD s/p LAD stent (last [facility] showing 100%  of RCA), chronic leukocytosis of unclear etiol, cardiac arrest s/p left-sided ICD (6/4/2019) complicated by infection and s/p R single-chamber ICD reimplantation ([place] in 9/23/2019), CHF EF 45%, grade 2 DHF, recent hospitalization for UTI (treated w/ ceftriaxone and Vanco x 3 days) and VFib s/p 13 AICD firings, pw lightheadedness and AICD firing.Pt recently hospitalized for VFib s/p multiple aicd firing. Pt placed on quinidine, mexiletine, metoprolol. Pt reports good compliance with her medications, even though she doesn't know what the meds are. Pt states at around 3-4PM yesterday, she felt "dizzy" for a few seconds, then felt AICD shock, then had resolution of her dizziness. Pt states she's never seen a hematologist for leukocytosis. Pt was supposed to f/u with EP as outpt for ablation. (16 Oct 2023 03:47)   REVIEW OF SYSTEMS:    CONSTITUTIONAL: No weakness, fevers or chills  EYES/ENT: No visual changes;  No vertigo or throat pain   NECK: No pain or stiffness  RESPIRATORY: No cough, wheezing, hemoptysis; No shortness of breath  CARDIOVASCULAR: No chest pain or palpitations  GASTROINTESTINAL: No abdominal or epigastric pain. No nausea, vomiting, or hematemesis; No diarrhea or constipation. No melena or hematochezia.  GENITOURINARY: No dysuria, frequency or hematuria  NEUROLOGICAL: No numbness or weakness  SKIN: No itching, rashes HPI:  72F c hx COPD, HTN, PAD, CAD s/p LAD stent (last [facility] showing 100%  of RCA), chronic leukocytosis of unclear etiol, cardiac arrest s/p left-sided ICD (6/4/2019) complicated by infection and s/p R single-chamber ICD reimplantation ([place] in 9/23/2019), CHF EF 45%, grade 2 DHF, recent hospitalization for UTI (treated w/ ceftriaxone and Vanco x 3 days) and VFib s/p 13 AICD firings, pw lightheadedness and AICD firing.    Pt recently hospitalized for VFib s/p multiple aicd firing. Pt placed on quinidine, mexiletine, metoprolol. Pt reports good compliance with her medications, even though she doesn't know what the meds are. Pt states at around 3-4PM yesterday, she felt "dizzy" for a few seconds, then felt AICD shock, then had resolution of her dizziness. Denies fevers, chills, CP, SOB, URI symptoms, GI symptoms. Pt states she's never seen a hematologist for leukocytosis. Pt was supposed to f/u with EP as outpt for ablation. (16 Oct 2023 03:47)    Hospital Course:      Important Medication Changes and Reason:  - Coumadin 2mg PO with follow up for INR checks    Active or Pending Issues Requiring Follow-up:  - f/u with Dr. Marcy Wise for INR check     Advanced Directives:   [x] Full code  [ ] DNR  [ ] Hospice    Discharge Diagnoses:  coumadin 2 REVIEW OF SYSTEMS:    CONSTITUTIONAL: No weakness, fevers or chills  EYES/ENT: No visual changes;  No vertigo or throat pain   NECK: No pain or stiffness  RESPIRATORY: No cough, wheezing, hemoptysis; No shortness of breath  CARDIOVASCULAR: No chest pain or palpitations  GASTROINTESTINAL: No abdominal or epigastric pain. No nausea, vomiting, or hematemesis; No diarrhea  No melena or hematochezia. Constipation +ve  GENITOURINARY: No dysuria, frequency or hematuria, incontinence +ve  NEUROLOGICAL: No numbness or weakness  SKIN: No itching, rashes HPI:  72F c hx COPD, HTN, PAD, CAD s/p LAD stent (last [facility] showing 100%  of RCA), chronic leukocytosis of unclear etiol, cardiac arrest s/p left-sided ICD (6/4/2019) complicated by infection and s/p R single-chamber ICD reimplantation ([place] in 9/23/2019), CHF EF 45%, grade 2 DHF, recent hospitalization for UTI (treated w/ ceftriaxone and Vanco x 3 days) and VFib s/p 13 AICD firings, pw lightheadedness and AICD firing.    Pt recently hospitalized for VFib s/p multiple aicd firing. Pt placed on quinidine, mexiletine, metoprolol. Pt reports good compliance with her medications, even though she doesn't know what the meds are. Pt states at around 3-4PM yesterday, she felt "dizzy" for a few seconds, then felt AICD shock, then had resolution of her dizziness. Denies fevers, chills, CP, SOB, URI symptoms, GI symptoms. Pt states she's never seen a hematologist for leukocytosis. Pt was supposed to f/u with EP as outpt for ablation. (16 Oct 2023 03:47)    Hospital Course:  73 y/o F w/ PMHx COPD, HLD, HTN, PVD, ICM/CAD s/p PCI (has  of RCA), cardiac arrest s/p left-sided ICD (6/4/2019) complicated by infection and s/p R single-chamber ICD ([place] in 9/23/2019), recent admission for VT storm with adjustment of medication and NIPS now presenting for ICD shock in the setting of recurrent monomorphic VT with unsuccessful ATP.   Seen by EP. Ablation attempted 10/20 but aborted due to hemodynamically significant pericardial effusion with drain placement. Pericardial drain removed on 10/30. Post ablation attempt developed recurrent VT s/p ICD shock, and afib w/ RVR s/p cardioversion 10/21. LRL of device reduced to 35bpm.     - Continue metoprolol succinate 12.5mg daily in AM  - Continue Amiodarone 200mg daily   - Continue dosing coumadin INR goal 2-3;  - repeat TTE done on 11/8 with trace pericardial effusion   - Continue on Quinidine   - Will allow for medical optimization and discuss plan for ablation in the future   - INR today 2.26, cleared by EP for discharge planning as discussed with Medicine ACP  Coumadin 2mg PO x 1 today  - Outpatient follow up with Dr. Barb Mcelroy and Dr. Hurt   - Keep Mg > 2 and K > 4    Also evaluated by cardiology for acute on chronic systolic HF. S/p IV diuresis, now euvolemic, recommended to continue c/w ASA 81mg, atorvastatin 40, metoprolol 12.5mg BID, and switch to Lasix PO 40mg BID.     Important Medication Changes and Reason:  - Coumadin 2mg PO with follow up for INR checks    Active or Pending Issues Requiring Follow-up:  - f/u with Dr Blas or Dr Wise for INR check     Advanced Directives:   [x] Full code  [ ] DNR  [ ] Hospice    Discharge Diagnoses:  Chronic systolic congestive heart failure  Ventricular tachycardia  Cardiac tamponade  Atrial fibrillation  COPD  Leukocytosis   HPI:  72F c hx COPD, HTN, PAD, CAD s/p LAD stent (last [facility] showing 100%  of RCA), chronic leukocytosis of unclear etiol, cardiac arrest s/p left-sided ICD (6/4/2019) complicated by infection and s/p R single-chamber ICD reimplantation ([place] in 9/23/2019), CHF EF 45%, grade 2 DHF, recent hospitalization for UTI (treated w/ ceftriaxone and Vanco x 3 days) and VFib s/p 13 AICD firings, pw lightheadedness and AICD firing.    Pt recently hospitalized for VFib s/p multiple aicd firing. Pt placed on quinidine, mexiletine, metoprolol. Pt reports good compliance with her medications, even though she doesn't know what the meds are. Pt states at around 3-4PM yesterday, she felt "dizzy" for a few seconds, then felt AICD shock, then had resolution of her dizziness. Denies fevers, chills, CP, SOB, URI symptoms, GI symptoms. Pt states she's never seen a hematologist for leukocytosis. Pt was supposed to f/u with EP as outpt for ablation. (16 Oct 2023 03:47)    Hospital Course:  71 y/o F w/ PMHx COPD, HLD, HTN, PVD, ICM/CAD s/p PCI (has  of RCA), cardiac arrest s/p left-sided ICD (6/4/2019) complicated by infection and s/p R single-chamber ICD ([place] in 9/23/2019), recent admission for VT storm with adjustment of medication and NIPS now presenting for ICD shock in the setting of recurrent monomorphic VT with unsuccessful ATP.   Seen by EP. Ablation attempted 10/20 but aborted due to hemodynamically significant pericardial effusion with drain placement. Pericardial drain removed on 10/30. Post ablation attempt developed recurrent VT s/p ICD shock, and afib w/ RVR s/p cardioversion 10/21. LRL of device reduced to 35bpm.     - Continue metoprolol succinate 12.5mg daily in AM  - Continue Amiodarone 200mg daily   - Continue dosing coumadin INR goal 2-3;  - repeat TTE done on 11/8 with trace pericardial effusion   - Continue on Quinidine   - Will allow for medical optimization and discuss plan for ablation in the future   - INR today 2.26, cleared by EP for discharge planning as discussed with Medicine ACP  Coumadin 2mg PO x 1 today  - Outpatient follow up with Dr. Barb Mcelroy and Dr. Hurt   - Keep Mg > 2 and K > 4    Also evaluated by cardiology for acute on chronic systolic HF. S/p IV diuresis, now euvolemic, recommended to continue c/w ASA 81mg, atorvastatin 40, metoprolol 12.5mg BID, and switch to Lasix PO 40mg BID.     Important Medication Changes and Reason:  - Coumadin 2mg PO with follow up for INR checks    Active or Pending Issues Requiring Follow-up:  - f/u with Dr Blas or Dr Wise for INR check     Advanced Directives:   [x] Full code  [ ] DNR  [ ] Hospice    Discharge Diagnoses:  Chronic systolic congestive heart failure  Ventricular tachycardia  Cardiac tamponade  Atrial fibrillation  COPD  Leukocytosis

## 2023-10-17 NOTE — PROGRESS NOTE ADULT - SUBJECTIVE AND OBJECTIVE BOX
Patient seen and examined at bedside.    Overnight Events: No acute events overnight, occasional PVCs, no sustained arrhythmia.     Review Of Systems:   CONSTITUTIONAL: No weakness, fevers or chills  EYES/ENT: No visual changes;  No dysphagia  NECK: No pain or stiffness  RESPIRATORY: No cough, wheezing, hemoptysis  CARDIOVASCULAR: No chest pain or palpitations; No lower extremity edema  GASTROINTESTINAL: No abdominal or epigastric pain. No nausea, vomiting, or hematemesis; No diarrhea or constipation. No melena or hematochezia.  BACK: No back pain  GENITOURINARY: No dysuria, frequency or hematuria  NEUROLOGICAL: No numbness or weakness  SKIN: No itching, burning, rashes, or lesions   All other review of systems is negative unless indicated above.          Current Meds:  atorvastatin 40 milliGRAM(s) Oral at bedtime  budesonide 160 MICROgram(s)/formoterol 4.5 MICROgram(s) Inhaler 2 Puff(s) Inhalation two times a day  clopidogrel Tablet 75 milliGRAM(s) Oral daily  influenza  Vaccine (HIGH DOSE) 0.7 milliLiter(s) IntraMuscular once  metoprolol succinate ER 50 milliGRAM(s) Oral daily  mexiletine 150 milliGRAM(s) Oral two times a day  nystatin Powder 1 Application(s) Topical two times a day  quiNIDine gluconate  milliGRAM(s) Oral every 8 hours  sacubitril 24 mG/valsartan 26 mG 1 Tablet(s) Oral two times a day      Vitals:  T(F): 98.7 (10-17), Max: 99.3 (10-16)  HR: 72 (10-17) (72 - 79)  BP: 100/51 (10-17) (100/51 - 147/63)  RR: 18 (10-17)  SpO2: 92% (10-17)  I&O's Summary    16 Oct 2023 07:01  -  17 Oct 2023 07:00  --------------------------------------------------------  IN: 0 mL / OUT: 600 mL / NET: -600 mL        Physical Exam:  Appearance: No acute distress; well appearing  Eyes: PERRL, EOMI, pink conjunctiva  HEENT: Normal oral mucosa  Cardiovascular: RRR, S1, S2, no murmurs, rubs, or gallops; no edema; no JVD  Respiratory: Clear to auscultation bilaterally  Gastrointestinal: soft, non-tender, non-distended with normal bowel sounds  Musculoskeletal: No clubbing; no joint deformity   Neurologic: Non-focal  Lymphatic: No lymphadenopathy  Psychiatry: AAOx3, mood & affect appropriate  Skin: No rashes, ecchymoses, or cyanosis                          13.1   14.31 )-----------( 243      ( 17 Oct 2023 01:02 )             39.8     10-17    133<L>  |  99  |  17  ----------------------------<  101<H>  3.8   |  20<L>  |  0.83    Ca    8.3<L>      17 Oct 2023 01:01  Phos  3.0     10-17  Mg     1.9     10-17    TPro  6.6  /  Alb  3.2<L>  /  TBili  0.5  /  DBili  x   /  AST  22  /  ALT  18  /  AlkPhos  63  10-15      CARDIAC MARKERS ( 15 Oct 2023 20:58 )  24 ng/L / x     / x     / x     / x     / x      CARDIAC MARKERS ( 15 Oct 2023 17:43 )  27 ng/L / x     / x     / x     / x     / x

## 2023-10-17 NOTE — DISCHARGE NOTE PROVIDER - PROVIDER TOKENS
PROVIDER:[TOKEN:[[id number]],FOLLOWUP:[1-3 days]] PROVIDER:[TOKEN:[[id number]],FOLLOWUP:[1-3 days]],PROVIDER:[TOKEN:[[id number]],FOLLOWUP:[1-3 days],ESTABLISHEDPATIENT:[T]],PROVIDER:[TOKEN:[[id number]],FOLLOWUP:[Routine]]

## 2023-10-17 NOTE — PROGRESS NOTE ADULT - SUBJECTIVE AND OBJECTIVE BOX
Andre Reyes, M.D.  Pager: [phone]  Office: [phone]    Patient is a 72y old  Female who presents with a chief complaint of AICD discharge (17 Oct 2023 09:31)          SUBJECTIVE / OVERNIGHT EVENTS:    No acute overnight events.  pt feels nauseas today    ROS: ( - ) Fever, ( - )Chills,  ( + )Nausea, ( - ) Cough, ( - )Shortness of breath, ( - )Chest Pain    MEDICATIONS  (STANDING):  atorvastatin 40 milliGRAM(s) Oral at bedtime  budesonide 160 MICROgram(s)/formoterol 4.5 MICROgram(s) Inhaler 2 Puff(s) Inhalation two times a day  clopidogrel Tablet 75 milliGRAM(s) Oral daily  influenza  Vaccine (HIGH DOSE) 0.7 milliLiter(s) IntraMuscular once  metoprolol succinate ER 50 milliGRAM(s) Oral daily  mexiletine 150 milliGRAM(s) Oral two times a day  nystatin/triamcinolone Cream 1 Application(s) Topical two times a day  quiNIDine gluconate  milliGRAM(s) Oral every 8 hours  sacubitril 24 mG/valsartan 26 mG 1 Tablet(s) Oral two times a day    MEDICATIONS  (PRN):          T(C): 36.7 (10-17 @ 12:00), Max: 37.4 (10-16 @ 19:31)   HR: 67   BP: 107/53   RR: 18   SpO2: 93%    PHYSICAL EXAM:    CONSTITUTIONAL: NAD, well-developed, well-groomed  EYES: PERRLA; conjunctiva and sclera clear  ENMT: Moist oral mucosa, no pharyngeal injection or exudates; normal dentition  NECK: Supple, no palpable masses; no thyromegaly  RESPIRATORY: Normal respiratory effort; lungs are clear to auscultation bilaterally  CARDIOVASCULAR: Regular rate and rhythm, normal S1 and S2, no murmur/rub/gallop; No lower extremity edema; Peripheral pulses are 2+ bilaterally  ABDOMEN: Nontender to palpation, normoactive bowel sounds, no rebound/guarding; No hepatosplenomegaly  MUSCULOSKELETAL:  no clubbing or cyanosis of digits; no joint swelling or tenderness to palpation  PSYCH: A+O to person, place, and time; affect appropriate  NEUROLOGY: CN 2-12 are intact and symmetric; no gross sensory deficits   SKIN: rash under panus and bilateral groins      LABS:                        13.1   14.31 )-----------( 243      ( 17 Oct 2023 01:02 )             39.8      10-17    133<L>  |  99  |  17  ----------------------------<  101<H>  3.8   |  20<L>  |  0.83    Ca    8.3<L>      17 Oct 2023 01:01  Phos  3.0     10-17  Mg     1.9     10-17    TPro  6.6  /  Alb  3.2<L>  /  TBili  0.5  /  DBili  x   /  AST  22  /  ALT  18  /  AlkPhos  63  10-15       CAPILLARY BLOOD GLUCOSE          RADIOLOGY & ADDITIONAL TESTS:    Imaging Personally Reviewed:  Consultant(s) Notes Reviewed:    Care Discussed with Consultants/Other Providers:

## 2023-10-17 NOTE — PROGRESS NOTE ADULT - ASSESSMENT
73 y/o F w/ PMHx COPD, HLD, HTN, PVD, ICM/CAD s/p PCI (has  of RCA), cardiac arrest s/p left-sided ICD (6/4/2019) complicated by infection and s/p R single-chamber ICD ([place] in 9/23/2019), recent admission for VT storm with adjustment of medication and NIPS now presenting for ICD shock in the setting of recurrent monomorphic VT with unsuccessful ATP.     - Recent admission for incessant VF requiring lido, sotalol, and ~13 ICD shocks, LHC/RHC at that time with no CAD and mildly elevated filling pressures w/ preserved CO  - ICD interrogation with appropriate shock for 32 sec run of VT  - continue with home mexilitine, metoprolol, quinidine for now  - K>4, Mg>2  - continue to monitor on tele  - can continue home meds with the exception of farxiga, please continue to hold farxiga   - patient was not amenable to ablation at last admission but is now willing to proceed, will plan for ablation later this week, tentative plan for Thursday   - likely fungal infection of the groin, reportedly being treated outpatient, please continue to manage while inpatient to reduce the risk of procedure related infection, currently appears mildly improved

## 2023-10-17 NOTE — DISCHARGE NOTE PROVIDER - NSDCCPCAREPLAN_GEN_ALL_CORE_FT
PRINCIPAL DISCHARGE DIAGNOSIS  Diagnosis: AICD discharge  Assessment and Plan of Treatment: Ablation attempted 10/20 but aborted due to hemodynamically significant pericardial effusion with drain placement. Pericardial drain removed on 10/30. Post ablation attempt developed recurrent VT s/p ICD shock, and atrial fibrillation with RVR s/p cardioversion 10/21. LRL of device reduced to 35bpm.   - Continue metoprolol succinate 12.5mg daily in AM  - Continue Amiodarone 200mg daily   - Continue dosing coumadin INR goal 2-3  - repeat TTE done on 11/8 with trace pericardial effusion   - Continue on quinidine   - allow for medical optimization and discuss plan for ablation in the future   - INR today 2.26, continue on Coumadin 2mg daily and follow up with Dr Blas or Dr Wise in 1-3 days to check INR  - Outpatient follow up with Dr. Barb Mcelroy and Dr. Hurt         SECONDARY DISCHARGE DIAGNOSES  Diagnosis: Atrial fibrillation  Assessment and Plan of Treatment: Atrial fibrillation is a common heart rhythm problem which increases the risk of stroke and heat attack.  It helps if you control your blood pressure, avoid alcohol, cut down on caffeine, get treatment for your thyroid if it is overactive, and perform moderate exercise in consultation with your Primary Care Provider.  Call your doctor if you experience chest tightness/pain, lightheadedness, loss of consciousness, shortness of breath (especially with exercise), feel your heart racing or beating unusually, frequent or abnormal bleeding.  It is important to take all your heart medications as prescribed.      Diagnosis: Chronic obstructive pulmonary disease (COPD)  Assessment and Plan of Treatment: Avoid environmental allergens and asthma triggers.  Participate in physical activity as tolerated.  Seek immediate medical attention if the shortness of breath does not improve with asthma treatments, or if you experience chest pain or palpitations.  Call an ambulance if your lips or nail beds become a bluish color.      Diagnosis: CHF, acute on chronic  Assessment and Plan of Treatment:   LVEF 45-50%  - continue aspirin 81mg daily, atorvastatin 40mg daily, metoprolol 12.5mg twice a day  - switch to Lasix PO 40mg twice a day; now euvolemic    Diagnosis: Leukocytosis  Assessment and Plan of Treatment:   Persistent leukocytosis since August 2023 ranging 14-29k  - no evidence of active infection or sepsis at this time  - s/p 7 days of ceftriaxone for pneumonia completed 10/28   - outpatient hematology follow up

## 2023-10-17 NOTE — DISCHARGE NOTE PROVIDER - INSTRUCTIONS
Ensure Plus High Protein Cans or Servings Per Day:  2       Frequency:  Two Times a day (11-03-23 @ 13:29) [Active]

## 2023-10-17 NOTE — DISCHARGE NOTE PROVIDER - NSDCFUSCHEDAPPT_GEN_ALL_CORE_FT
Campbell Hurt  [facility]  ELECTROPH [address]  Scheduled Appointment: 11/02/2023    [facility]  ELECTROPH [address]  Scheduled Appointment: 11/07/2023    Marcy Wise  [facility]  CARDIOLOGY [address]  Scheduled Appointment: 01/12/2024     Derrick Rios  [facility]  ELECTROPH [address]  Scheduled Appointment: 12/06/2023    Marcy Wise  [facility]  CARDIOLOGY [address]  Scheduled Appointment: 01/12/2024     Campbell Hurt  [facility]  ELECTROPH [address]  Scheduled Appointment: 12/21/2023    Marcy Wise  [facility]  CARDIOLOGY [address]  Scheduled Appointment: 01/12/2024

## 2023-10-17 NOTE — DISCHARGE NOTE PROVIDER - NSDCFUADDAPPT_GEN_ALL_CORE_FT
APPTS ARE READY TO BE MADE: [X] YES    Best Family or Patient Contact (if needed):    Additional Information about above appointments (if needed):    1: Dr. Wise for INR checks (pt has appt scheduled in Jan, needs sooner appt for f/u)  2:  3:  APPTS ARE READY TO BE MADE: [X] YES    Best Family or Patient Contact (if needed):    Additional Information about above appointments (if needed):    1: Dr. Wise for INR checks (pt has appt scheduled in Jan, needs sooner appt for f/u)  2:  3:         Patient was previously scheduled to see Dr. Hurt at 10:30AM on 12/21 at [address] [place] [address]    Patient requesting scheduling assistance and Dr. Wise's office was contacted to secure a sooner appointment. APPTS ARE READY TO BE MADE: [X] YES    Best Family or Patient Contact (if needed):    Additional Information about above appointments (if needed):    1: Dr. Wise for INR checks (pt has appt scheduled in Jan, needs sooner appt for f/u)  2:  3:   Patient was previously scheduled to see Dr. Hurt at 10:30AM on 12/21 at [address] [place] [id number]    Patient was scheduled to see Dr. Wise at 9:45AM on 11/17 at [address] [place] [id number]    Patient declined scheduling assistance for Dr. Blas as they prefer to self schedule

## 2023-10-17 NOTE — PROGRESS NOTE ADULT - PROBLEM SELECTOR PLAN 2
- appears to be appropriate for sustained VT that was symptomatic  - f/u EP will change nystatin powder to Mycolog oitment BID will change nystatin powder to Mycolog ointment BID

## 2023-10-17 NOTE — DISCHARGE NOTE PROVIDER - CARE PROVIDER_API CALL
Marcy Wise  Cardiovascular Disease  [address] [facility] [address], [address] [place] [address]  Phone: [phone]  Fax: [phone]  Follow Up Time: 1-3 days   Marcy Wise  Cardiovascular Disease  [address], [address] [place] [address]  Phone: [phone]  Fax: [phone]  Follow Up Time: 1-3 days    Terrance Blas  Internal Medicine  [address] [facility], [address]  [place] [address]  Phone: [phone]  Fax: [phone]  Established Patient  Follow Up Time: 1-3 days    Campbell Hurt  Cardiac Electrophysiology  [address], [address] [place] [address]  Phone: [phone]  Fax: [phone]  Follow Up Time: Routine

## 2023-10-17 NOTE — DISCHARGE NOTE PROVIDER - CARE PROVIDERS DIRECT ADDRESSES
,[email or web address],DirectAddress_Unknown,DirectAddress_Unknown

## 2023-10-17 NOTE — PROGRESS NOTE ADULT - PROBLEM SELECTOR PLAN 1
- cont quinidine, mexiletine, metoprolol  - reportedly EP ablation not performed last hospitalization 2/2 right groin infection s/p nystatin powder  - f/u EP regarding poss ablation and other recs  - cont tele  - K>4, Mg >2  - recent RHF/LHC performed - cont quinidine, mexiletine, metoprolol  - EP ablation is pending improvement of right groin infection (candidal dermatitis)  - cont tele  - K>4, Mg >2  - recent RHF/LHC performed

## 2023-10-17 NOTE — DISCHARGE NOTE PROVIDER - NSDCMRMEDTOKEN_GEN_ALL_CORE_FT
atorvastatin 40 mg oral tablet: 1 tab(s) orally once a day (at bedtime)  D3 50 mcg (2000 intl units) oral capsule: 1 cap(s) orally  dapagliflozin 10 mg oral tablet: 1 tab(s) orally once a day  Lasix 40 mg oral tablet: 1 tab(s) orally once a day  metoprolol succinate 50 mg oral tablet, extended release: 1 tab(s) orally once a day  mexiletine 150 mg oral capsule: 1 cap(s) orally 2 times a day  Multiple Vitamins oral tablet: 1 tab(s) orally once a day  nystatin 100,000 units/g topical powder: Apply topically to affected area 3 times a day  Plavix 75 mg oral tablet: 1 tab(s) orally once a day  quiNIDine 324 mg oral tablet, extended release: 1 tab(s) orally every 8 hours  sacubitril-valsartan 24 mg-26 mg oral tablet: 1 tab(s) orally 2 times a day  Symbicort 160 mcg-4.5 mcg/inh inhalation aerosol: 2 puff(s) inhaled once a day   acetaminophen 325 mg oral tablet: 2 tab(s) orally every 6 hours  aluminum hydroxide-magnesium hydroxide 200 mg-200 mg/5 mL oral suspension: 30 milliliter(s) orally every 4 hours As needed Dyspepsia  amiodarone 200 mg oral tablet: 1 tab(s) orally once a day  aspirin 81 mg oral tablet, chewable: 1 tab(s) orally once a day  atorvastatin 40 mg oral tablet: 1 tab(s) orally once a day (at bedtime)  D3 50 mcg (2000 intl units) oral capsule: 1 cap(s) orally  dapagliflozin 10 mg oral tablet: 1 tab(s) orally once a day  furosemide 40 mg oral tablet: 1 tab(s) orally every 12 hours  losartan 25 mg oral tablet: 1 tab(s) orally once a day  melatonin 5 mg oral tablet: 1 tab(s) orally once a day (at bedtime)  Multiple Vitamins oral tablet: 1 tab(s) orally once a day  nystatin 100,000 units/g topical powder: Apply topically to affected area 3 times a day  quiNIDine 324 mg oral tablet, extended release: 1 tab(s) orally every 12 hours  Symbicort 160 mcg-4.5 mcg/inh inhalation aerosol: 2 puff(s) inhaled once a day  Toprol-XL 25 mg oral tablet, extended release: 0.5 tab(s) orally once a day  warfarin 1 mg oral tablet: 2 tab(s) orally once a day   acetaminophen 325 mg oral tablet: 2 tab(s) orally every 6 hours  amiodarone 200 mg oral tablet: 1 tab(s) orally once a day  amoxicillin-clavulanate 875 mg-125 mg oral tablet: 875 milligram(s) orally 2 times a day  atorvastatin 40 mg oral tablet: 1 tab(s) orally once a day (at bedtime)  clopidogrel 75 mg oral tablet: 1 tab(s) orally once a day  D3 50 mcg (2000 intl units) oral capsule: 1 cap(s) orally once a day  dapagliflozin 10 mg oral tablet: 1 tab(s) orally once a day  furosemide 40 mg oral tablet: 1 tab(s) orally every 12 hours  losartan 25 mg oral tablet: 1 tab(s) orally once a day  melatonin 5 mg oral tablet: 1 tab(s) orally once a day (at bedtime)  Multiple Vitamins oral tablet: 1 tab(s) orally once a day  nystatin 100,000 units/g topical powder: Apply topically to affected area 3 times a day  pantoprazole 40 mg oral delayed release tablet: 1 tab(s) orally 2 times a day  quiNIDine 324 mg oral tablet, extended release: 1 tab(s) orally every 12 hours  Symbicort 160 mcg-4.5 mcg/inh inhalation aerosol: 2 puff(s) inhaled once a day  Toprol-XL 25 mg oral tablet, extended release: 0.5 tab(s) orally once a day  warfarin 1 mg oral tablet: 2 tab(s) orally once a day

## 2023-10-18 NOTE — CONSULT NOTE ADULT - SUBJECTIVE AND OBJECTIVE BOX
Patient is a 72y old  Female who presents with a chief complaint of AICD discharge (18 Oct 2023 12:07)      HPI:  72F c hx COPD, HTN, PAD, CAD s/p LAD stent (last C showing 100%  of RCA), chronic leukocytosis of unclear etiol, cardiac arrest s/p left-sided ICD (6/4/2019) complicated by infection and s/p R single-chamber ICD reimplantation ([place] in 9/23/2019), CHF EF 45%, grade 2 DHF, recent hospitalization for UTI (treated w/ ceftriaxone and Vanco x 3 days) and VFib s/p 13 AICD firings, pw lightheadedness and AICD firing.    Pt recently hospitalized for VFib s/p multiple aicd firing. Pt placed on quinidine, mexiletine, metoprolol. Pt reports good compliance with her medications, even though she doesn't know what the meds are. Pt states at around 3-4PM yesterday, she felt "dizzy" for a few seconds, then felt AICD shock, then had resolution of her dizziness. Denies fevers, chills, CP, SOB, URI symptoms, GI symptoms. Pt states she's never seen a hematologist for leukocytosis. Pt was supposed to f/u with EP as outpt for ablation. (16 Oct 2023 03:47)      PAST MEDICAL & SURGICAL HISTORY:  Obese      Smoker      HTN (hypertension)      HLD (hyperlipidemia)      CAD (coronary artery disease)      CHF with cardiomyopathy      History of hip surgery          MEDICATIONS  (STANDING):  atorvastatin 40 milliGRAM(s) Oral at bedtime  budesonide 160 MICROgram(s)/formoterol 4.5 MICROgram(s) Inhaler 2 Puff(s) Inhalation two times a day  clopidogrel Tablet 75 milliGRAM(s) Oral daily  influenza  Vaccine (HIGH DOSE) 0.7 milliLiter(s) IntraMuscular once  metoprolol succinate ER 50 milliGRAM(s) Oral daily  mexiletine 150 milliGRAM(s) Oral two times a day  nystatin/triamcinolone Cream 1 Application(s) Topical two times a day  potassium chloride    Tablet ER 20 milliEquivalent(s) Oral once  quiNIDine gluconate  milliGRAM(s) Oral every 8 hours  sacubitril 24 mG/valsartan 26 mG 1 Tablet(s) Oral two times a day    MEDICATIONS  (PRN):      Allergies    No Known Allergies    Intolerances        VITALS:    Vital Signs Last 24 Hrs  T(C): 36.8 (18 Oct 2023 12:34), Max: 36.9 (18 Oct 2023 11:26)  T(F): 98.2 (18 Oct 2023 12:34), Max: 98.4 (18 Oct 2023 11:26)  HR: 66 (18 Oct 2023 12:34) (66 - 79)  BP: 158/79 (18 Oct 2023 12:34) (120/58 - 158/79)  BP(mean): --  RR: 18 (18 Oct 2023 12:34) (16 - 18)  SpO2: 94% (18 Oct 2023 12:34) (92% - 95%)    Parameters below as of 18 Oct 2023 12:34  Patient On (Oxygen Delivery Method): room air        LABS:                          13.6   14.53 )-----------( 244      ( 18 Oct 2023 07:54 )             41.4       10-18    137  |  101  |  17  ----------------------------<  76  3.8   |  21<L>  |  0.70    Ca    8.6      18 Oct 2023 07:54  Phos  3.0     10-17  Mg     2.5     10-18        CAPILLARY BLOOD GLUCOSE              LOWER EXTREMITY PHYSICAL EXAM:    Vasular: DP/PT 0/4, B/L, CFT <3 seconds B/L, Temperature gradient WNL, B/L.   Neuro: Epicritic sensation intact to the level of foot, B/L.  Musculoskeletal/Ortho: pain on palp of heels  Skin: bilateral heel blanching erythema consistent with deep tissue injuries secondary to pressure present on admission

## 2023-10-18 NOTE — ADVANCED PRACTICE NURSE CONSULT - ASSESSMENT
The pt was encountered on [employer]- Mrs Kohli was lethargic but awake and able to state her name and . She is in a [employer] support surface and was able to turn herself to her side independently. As she was a/w a deep tissue injury, will request a nutrition consult for further evaluation.  upon assessment of her skin, she presents with the following:  Under breasts, pannus, b/l groins: skin changes suggestive of a fungal rash- the skin is intact with erythema and hyperpigmentation. staff had applied fungal powder which was caking from the moisture in the folds. will recommend changing to Interdry AG to be placed into the folds to wick moisture form the area. Pt reports occasional itching under her breasts.  the pt is incontinent of urine and staff have applied a Pure-wick catheter to divert urine from the skin.  On the sacrum and b/l buttocks were skin changes consistent with IAD, however, cannot r/o a deep tissue injury . The area in question measures 13cm x14cm x0cm- the skin is intact with a deep red discoloration. Will recommend routine pericare with el.  Staff was concerned that the pt had deep tissue injuries on the heels- at this time the skin is intact but dry- given pts history of PAD, will recommend boots for off-loading,

## 2023-10-18 NOTE — PROGRESS NOTE ADULT - SUBJECTIVE AND OBJECTIVE BOX
Patient seen and examined at bedside.    Overnight Events: No acute events     Review Of Systems:   CONSTITUTIONAL: No weakness, fevers or chills  EYES/ENT: No visual changes;  No dysphagia  NECK: No pain or stiffness  RESPIRATORY: No cough, wheezing, hemoptysis  CARDIOVASCULAR: No chest pain or palpitations; No lower extremity edema  GASTROINTESTINAL: No abdominal or epigastric pain. No nausea, vomiting, or hematemesis; No diarrhea or constipation. No melena or hematochezia.  BACK: No back pain  GENITOURINARY: No dysuria, frequency or hematuria  NEUROLOGICAL: No numbness or weakness  SKIN: No itching, burning, rashes, or lesions   All other review of systems is negative unless indicated above.          Current Meds:  atorvastatin 40 milliGRAM(s) Oral at bedtime  budesonide 160 MICROgram(s)/formoterol 4.5 MICROgram(s) Inhaler 2 Puff(s) Inhalation two times a day  clopidogrel Tablet 75 milliGRAM(s) Oral daily  influenza  Vaccine (HIGH DOSE) 0.7 milliLiter(s) IntraMuscular once  metoprolol succinate ER 50 milliGRAM(s) Oral daily  mexiletine 150 milliGRAM(s) Oral two times a day  nystatin/triamcinolone Cream 1 Application(s) Topical two times a day  potassium chloride    Tablet ER 20 milliEquivalent(s) Oral once  quiNIDine gluconate  milliGRAM(s) Oral every 8 hours  sacubitril 24 mG/valsartan 26 mG 1 Tablet(s) Oral two times a day      Vitals:  T(F): 98.2 (10-18), Max: 98.4 (10-18)  HR: 66 (10-18) (66 - 79)  BP: 158/79 (10-18) (120/58 - 158/79)  RR: 18 (10-18)  SpO2: 94% (10-18)  I&O's Summary    17 Oct 2023 07:01  -  18 Oct 2023 07:00  --------------------------------------------------------  IN: 500 mL / OUT: 351 mL / NET: 149 mL    18 Oct 2023 07:01  -  18 Oct 2023 13:16  --------------------------------------------------------  IN: 480 mL / OUT: 0 mL / NET: 480 mL        Physical Exam:  Appearance: No acute distress; well appearing  Eyes: PERRL, EOMI, pink conjunctiva  HEENT: Normal oral mucosa  Cardiovascular: RRR, S1, S2, no murmurs, rubs, or gallops; no edema; no JVD  Respiratory: Clear to auscultation bilaterally  Gastrointestinal: soft, non-tender, non-distended with normal bowel sounds  Musculoskeletal: No clubbing; no joint deformity   Neurologic: Non-focal  Lymphatic: No lymphadenopathy  Psychiatry: AAOx3, mood & affect appropriate  Skin: No rashes, ecchymoses, or cyanosis                          13.6   14.53 )-----------( 244      ( 18 Oct 2023 07:54 )             41.4     10-18    137  |  101  |  17  ----------------------------<  76  3.8   |  21<L>  |  0.70    Ca    8.6      18 Oct 2023 07:54  Phos  3.0     10-17  Mg     2.5     10-18        CARDIAC MARKERS ( 15 Oct 2023 20:58 )  24 ng/L / x     / x     / x     / x     / x      CARDIAC MARKERS ( 15 Oct 2023 17:43 )  27 ng/L / x     / x     / x     / x     / x

## 2023-10-18 NOTE — PROGRESS NOTE ADULT - ASSESSMENT
73 y/o F w/ PMHx COPD, HLD, HTN, PVD, ICM/CAD s/p PCI (has  of RCA), cardiac arrest s/p left-sided ICD (6/4/2019) complicated by infection and s/p R single-chamber ICD ([place] in 9/23/2019), recent admission for VT storm with adjustment of medication and NIPS now presenting for ICD shock in the setting of recurrent monomorphic VT with unsuccessful ATP.     - plan for VT ablation Thursday 10/18, please keep NPO at midnight, obtain am labs including coags   - for inheart CT today for scar assessment to aid in procedural mapping and ablation   - Recent admission for incessant VF requiring lido, sotalol, and ~13 ICD shocks, LHC/RHC at that time with no CAD and mildly elevated filling pressures w/ preserved CO  - ICD interrogation with appropriate shock for 32 sec run of VT, initial TCL prior to acceleration by -320 msec  - continue metoprolol, hold quinidine and mexiletine prior to ablation   - K>4, Mg>2  - continue to monitor on tele  - please continue to hold farxiga   - likely fungal infection of the groin, reportedly being treated outpatient, please continue to manage while inpatient to reduce the risk of procedure related infection, improving

## 2023-10-18 NOTE — PROGRESS NOTE ADULT - ASSESSMENT
72F pmh COPD, HTN, PAD, CAD s/p LAD stent (last LHC showing 100%  of RCA), chronic leukocytosis of unclear etiol, cardiac arrest s/p left-sided ICD (6/4/2019) complicated by infection and s/p R single-chamber ICD reimplantation ([place] in 9/23/2019), CHF EF 45%, grade 2 DHF, and VFib s/p 13 AICD firings, p/w lightheadedness 2/2 sustained Vtach s/p appropriate AICD firing she is being followed by EP for a VT ablation but she is first being treated for a fungal groin infection

## 2023-10-18 NOTE — PROGRESS NOTE ADULT - PROBLEM SELECTOR PLAN 1
- cont quinidine, mexiletine, metoprolol  - EP f/u appreciated--> pt planned for ablation tomorrow.  - cont tele  - K>4, Mg >2  - recent RHF/LHC performed

## 2023-10-18 NOTE — ADVANCED PRACTICE NURSE CONSULT - RECOMMEDATIONS
Will recommend the followin. B/l breasts, pannus, b/l groins: routine skin care and pat dry, Place an Interdry Gauze into the fold and allow the skin to fold over and hold it in place. Cleanse skin daily, may use the Interdry x 5 days unless it is visible soiled.  2. Sacrum, b/l buttocks: continue to monitor, routine pericare with el  3. B/l heels : routine foot care and apply moisturizer daily, off-load heels with boots  4. continue to encourage mobility  5. Seat cushion when OOB to chair  6. Nutrition consult  Tx discussed with pt/RN

## 2023-10-18 NOTE — PROGRESS NOTE ADULT - SUBJECTIVE AND OBJECTIVE BOX
Andre Reyes, M.D.  Pager: [phone]  Office: [phone]    Patient is a 72y old  Female who presents with a chief complaint of AICD discharge due to V tach (17 Oct 2023 13:34)          SUBJECTIVE / OVERNIGHT EVENTS:    No acute overnight events.    ROS: ( - ) Fever, ( - )Chills,  ( - )Nausea/Vomiting, ( - ) Cough, ( - )Shortness of breath, ( - )Chest Pain    MEDICATIONS  (STANDING):  atorvastatin 40 milliGRAM(s) Oral at bedtime  budesonide 160 MICROgram(s)/formoterol 4.5 MICROgram(s) Inhaler 2 Puff(s) Inhalation two times a day  clopidogrel Tablet 75 milliGRAM(s) Oral daily  influenza  Vaccine (HIGH DOSE) 0.7 milliLiter(s) IntraMuscular once  metoprolol succinate ER 50 milliGRAM(s) Oral daily  mexiletine 150 milliGRAM(s) Oral two times a day  nystatin/triamcinolone Cream 1 Application(s) Topical two times a day  quiNIDine gluconate  milliGRAM(s) Oral every 8 hours  sacubitril 24 mG/valsartan 26 mG 1 Tablet(s) Oral two times a day    MEDICATIONS  (PRN):          T(C): 36.9 (10-18 @ 11:26), Max: 36.9 (10-18 @ 11:26)   HR: 72   BP: 146/74   RR: 18   SpO2: 95%    PHYSICAL EXAM:    CONSTITUTIONAL: NAD, well-developed, well-groomed  EYES: PERRLA; conjunctiva and sclera clear  ENMT: Moist oral mucosa, no pharyngeal injection or exudates; normal dentition  NECK: Supple, no palpable masses; no thyromegaly  RESPIRATORY: Normal respiratory effort; lungs are clear to auscultation bilaterally  CARDIOVASCULAR: Regular rate and rhythm, normal S1 and S2, no murmur/rub/gallop; No lower extremity edema; Peripheral pulses are 2+ bilaterally  ABDOMEN: Nontender to palpation, normoactive bowel sounds, no rebound/guarding; No hepatosplenomegaly  MUSCULOSKELETAL:  no clubbing or cyanosis of digits; no joint swelling or tenderness to palpation  PSYCH: A+O to person, place, and time; affect appropriate  NEUROLOGY: CN 2-12 are intact and symmetric; no gross sensory deficits   SKIN: No rashes; no palpable lesions      LABS:                        13.6   14.53 )-----------( 244      ( 18 Oct 2023 07:54 )             41.4      10-18    137  |  101  |  17  ----------------------------<  76  3.8   |  21<L>  |  0.70    Ca    8.6      18 Oct 2023 07:54  Phos  3.0     10-17  Mg     2.5     10-18         CAPILLARY BLOOD GLUCOSE          RADIOLOGY & ADDITIONAL TESTS:    Imaging Personally Reviewed:  Consultant(s) Notes Reviewed:    Care Discussed with Consultants/Other Providers:   Andre Reyes, M.D.  Pager: [phone]  Office: [phone]    Patient is a 72y old  Female who presents with a chief complaint of AICD discharge due to V tach (17 Oct 2023 13:34)          SUBJECTIVE / OVERNIGHT EVENTS:    No acute overnight events.  No complaints  tele: sinus 60-90s PVCs    ROS: ( - ) Fever, ( - )Chills,  ( - )Nausea/Vomiting, ( - ) Cough, ( - )Shortness of breath, ( - )Chest Pain    MEDICATIONS  (STANDING):  atorvastatin 40 milliGRAM(s) Oral at bedtime  budesonide 160 MICROgram(s)/formoterol 4.5 MICROgram(s) Inhaler 2 Puff(s) Inhalation two times a day  clopidogrel Tablet 75 milliGRAM(s) Oral daily  influenza  Vaccine (HIGH DOSE) 0.7 milliLiter(s) IntraMuscular once  metoprolol succinate ER 50 milliGRAM(s) Oral daily  mexiletine 150 milliGRAM(s) Oral two times a day  nystatin/triamcinolone Cream 1 Application(s) Topical two times a day  quiNIDine gluconate  milliGRAM(s) Oral every 8 hours  sacubitril 24 mG/valsartan 26 mG 1 Tablet(s) Oral two times a day    MEDICATIONS  (PRN):          T(C): 36.9 (10-18 @ 11:26), Max: 36.9 (10-18 @ 11:26)   HR: 72   BP: 146/74   RR: 18   SpO2: 95%    PHYSICAL EXAM:    CONSTITUTIONAL: NAD, well-developed, well-groomed  EYES: PERRLA; conjunctiva and sclera clear  ENMT: Moist oral mucosa, no pharyngeal injection or exudates; normal dentition  NECK: Supple, no palpable masses; no thyromegaly  RESPIRATORY: Normal respiratory effort; lungs are clear to auscultation bilaterally  CARDIOVASCULAR: Regular rate and rhythm, normal S1 and S2, no murmur/rub/gallop; No lower extremity edema; Peripheral pulses are 2+ bilaterally  ABDOMEN: Nontender to palpation, normoactive bowel sounds, no rebound/guarding; No hepatosplenomegaly  MUSCULOSKELETAL:  no clubbing or cyanosis of digits; no joint swelling or tenderness to palpation  PSYCH: A+O to person, place, and time; affect appropriate  NEUROLOGY: CN 2-12 are intact and symmetric; no gross sensory deficits   SKIN: groins rash improving less erythema. rash under breast and rash is also improved with less erythema      LABS:                        13.6   14.53 )-----------( 244      ( 18 Oct 2023 07:54 )             41.4      10-18    137  |  101  |  17  ----------------------------<  76  3.8   |  21<L>  |  0.70    Ca    8.6      18 Oct 2023 07:54  Phos  3.0     10-17  Mg     2.5     10-18         CAPILLARY BLOOD GLUCOSE          RADIOLOGY & ADDITIONAL TESTS:    Imaging Personally Reviewed:  Consultant(s) Notes Reviewed:    Care Discussed with Consultants/Other Providers:

## 2023-10-18 NOTE — ADVANCED PRACTICE NURSE CONSULT - REASON FOR CONSULT
Requested by staff to assess skin status of pt a/w a deep tissue injury. PMH is noted:  72F c hx COPD, HTN, PAD, CAD s/p LAD stent (last C showing 100%  of RCA), chronic leukocytosis of unclear etiol, cardiac arrest s/p left-sided ICD (6/4/2019) complicated by infection and s/p R single-chamber ICD reimplantation ([place] in 9/23/2019), CHF EF 45%, grade 2 DHF, recent hospitalization for UTI (treated w/ ceftriaxone and Vanco x 3 days) and VFib s/p 13 AICD firings, pw lightheadedness and AICD firing.    Pt recently hospitalized for VFib s/p multiple aicd firing. Pt placed on quinidine, mexiletine, metoprolol. Pt reports good compliance with her medications, even though she doesn't know what the meds are. Pt states at around 3-4PM yesterday, she felt "dizzy" for a few seconds, then felt AICD shock, then had resolution of her dizziness. Denies fevers, chills, CP, SOB, URI symptoms, GI symptoms. Pt states she's never seen a hematologist for leukocytosis. Pt was supposed to f/u with EP as outpt for ablation.

## 2023-10-19 NOTE — PROGRESS NOTE ADULT - SUBJECTIVE AND OBJECTIVE BOX
Patient seen and examined at bedside.    Overnight Events: No acute events     Review Of Systems:   CONSTITUTIONAL: No weakness, fevers or chills  EYES/ENT: No visual changes;  No dysphagia  NECK: No pain or stiffness  RESPIRATORY: No cough, wheezing, hemoptysis  CARDIOVASCULAR: No chest pain or palpitations; No lower extremity edema  GASTROINTESTINAL: No abdominal or epigastric pain. No nausea, vomiting, or hematemesis; No diarrhea or constipation. No melena or hematochezia.  BACK: No back pain  GENITOURINARY: No dysuria, frequency or hematuria  NEUROLOGICAL: No numbness or weakness  SKIN: No itching, burning, rashes, or lesions   All other review of systems is negative unless indicated above.          Current Meds:  atorvastatin 40 milliGRAM(s) Oral at bedtime  budesonide 160 MICROgram(s)/formoterol 4.5 MICROgram(s) Inhaler 2 Puff(s) Inhalation two times a day  chlorhexidine 2% Cloths 1 Application(s) Topical <User Schedule>  clopidogrel Tablet 75 milliGRAM(s) Oral daily  influenza  Vaccine (HIGH DOSE) 0.7 milliLiter(s) IntraMuscular once  metoprolol succinate ER 50 milliGRAM(s) Oral daily  nystatin/triamcinolone Cream 1 Application(s) Topical two times a day  sacubitril 24 mG/valsartan 26 mG 1 Tablet(s) Oral two times a day      Vitals:  T(F): 98.8 (10-19), Max: 98.8 (10-19)  HR: 78 (10-19) (66 - 78)  BP: 113/56 (10-19) (113/56 - 158/79)  RR: 17 (10-19)  SpO2: 91% (10-19)  I&O's Summary    18 Oct 2023 07:01  -  19 Oct 2023 07:00  --------------------------------------------------------  IN: 720 mL / OUT: 200 mL / NET: 520 mL        Physical Exam:  Appearance: No acute distress; well appearing  Eyes: PERRL, EOMI, pink conjunctiva  HEENT: Normal oral mucosa  Cardiovascular: RRR, S1, S2, no murmurs, rubs, or gallops; no edema; no JVD  Respiratory: Clear to auscultation bilaterally  Gastrointestinal: soft, non-tender, non-distended with normal bowel sounds  Musculoskeletal: No clubbing; no joint deformity   Neurologic: Non-focal  Lymphatic: No lymphadenopathy  Psychiatry: AAOx3, mood & affect appropriate  Skin: No rashes, ecchymoses, or cyanosis                          14.1   16.54 )-----------( 266      ( 19 Oct 2023 06:28 )             43.2     10-19    137  |  103  |  14  ----------------------------<  100<H>  4.3   |  21<L>  |  0.67    Ca    8.6      19 Oct 2023 06:30  Mg     2.2     10-19      PT/INR - ( 19 Oct 2023 06:29 )   PT: 11.9 sec;   INR: 1.14 ratio         PTT - ( 19 Oct 2023 06:29 )  PTT:26.0 sec  CARDIAC MARKERS ( 15 Oct 2023 20:58 )  24 ng/L / x     / x     / x     / x     / x      CARDIAC MARKERS ( 15 Oct 2023 17:43 )  27 ng/L / x     / x     / x     / x     / x

## 2023-10-19 NOTE — PROGRESS NOTE ADULT - SUBJECTIVE AND OBJECTIVE BOX
Andre Reyes, M.D.  Pager: [phone]  Office: [phone]    Patient is a 72y old  Female who presents with a chief complaint of AICD discharge (19 Oct 2023 09:59)          SUBJECTIVE / OVERNIGHT EVENTS:    No acute overnight events.    ROS: ( - ) Fever, ( - )Chills,  ( - )Nausea/Vomiting, ( - ) Cough, ( - )Shortness of breath, ( - )Chest Pain    MEDICATIONS  (STANDING):  atorvastatin 40 milliGRAM(s) Oral at bedtime  budesonide 160 MICROgram(s)/formoterol 4.5 MICROgram(s) Inhaler 2 Puff(s) Inhalation two times a day  chlorhexidine 2% Cloths 1 Application(s) Topical <User Schedule>  clopidogrel Tablet 75 milliGRAM(s) Oral daily  influenza  Vaccine (HIGH DOSE) 0.7 milliLiter(s) IntraMuscular once  metoprolol succinate ER 50 milliGRAM(s) Oral daily  nystatin/triamcinolone Cream 1 Application(s) Topical two times a day  sacubitril 24 mG/valsartan 26 mG 1 Tablet(s) Oral two times a day    MEDICATIONS  (PRN):          T(C): 36.7 (10-19 @ 12:35), Max: 37.1 (10-19 @ 04:14)   HR: 75   BP: 138/59   RR: 16   SpO2: 95%    PHYSICAL EXAM:    CONSTITUTIONAL: NAD, well-developed, well-groomed  EYES: PERRLA; conjunctiva and sclera clear  ENMT: Moist oral mucosa, no pharyngeal injection or exudates; normal dentition  NECK: Supple, no palpable masses; no thyromegaly  RESPIRATORY: Normal respiratory effort; lungs are clear to auscultation bilaterally  CARDIOVASCULAR: Regular rate and rhythm, normal S1 and S2, no murmur/rub/gallop; No lower extremity edema; Peripheral pulses are 2+ bilaterally  ABDOMEN: Nontender to palpation, normoactive bowel sounds, no rebound/guarding; No hepatosplenomegaly  MUSCULOSKELETAL:  no clubbing or cyanosis of digits; no joint swelling or tenderness to palpation  PSYCH: A+O to person, place, and time; affect appropriate  NEUROLOGY: CN 2-12 are intact and symmetric; no gross sensory deficits   SKIN: No rashes; no palpable lesions      LABS:                        14.1   16.54 )-----------( 266      ( 19 Oct 2023 06:28 )             43.2      10-19    137  |  103  |  14  ----------------------------<  100<H>  4.3   |  21<L>  |  0.67    Ca    8.6      19 Oct 2023 06:30  Mg     2.2     10-19         CAPILLARY BLOOD GLUCOSE          RADIOLOGY & ADDITIONAL TESTS:    Imaging Personally Reviewed:  Consultant(s) Notes Reviewed:    Care Discussed with Consultants/Other Providers:   Andre Reyes, M.D.  Pager: [phone]  Office: [phone]    Patient is a 72y old  Female who presents with a chief complaint of AICD discharge (19 Oct 2023 09:59)          SUBJECTIVE / OVERNIGHT EVENTS:    No acute overnight events.  no complaints today just nervous about todays procedure.    ROS: ( - ) Fever, ( - )Chills,  ( - )Nausea/Vomiting, ( - ) Cough, ( - )Shortness of breath, ( - )Chest Pain    MEDICATIONS  (STANDING):  atorvastatin 40 milliGRAM(s) Oral at bedtime  budesonide 160 MICROgram(s)/formoterol 4.5 MICROgram(s) Inhaler 2 Puff(s) Inhalation two times a day  chlorhexidine 2% Cloths 1 Application(s) Topical <User Schedule>  clopidogrel Tablet 75 milliGRAM(s) Oral daily  influenza  Vaccine (HIGH DOSE) 0.7 milliLiter(s) IntraMuscular once  metoprolol succinate ER 50 milliGRAM(s) Oral daily  nystatin/triamcinolone Cream 1 Application(s) Topical two times a day  sacubitril 24 mG/valsartan 26 mG 1 Tablet(s) Oral two times a day    MEDICATIONS  (PRN):          T(C): 36.7 (10-19 @ 12:35), Max: 37.1 (10-19 @ 04:14)   HR: 75   BP: 138/59   RR: 16   SpO2: 95%    PHYSICAL EXAM:    CONSTITUTIONAL: NAD, well-developed, well-groomed  EYES: PERRLA; conjunctiva and sclera clear  ENMT: Moist oral mucosa, no pharyngeal injection or exudates; normal dentition  NECK: Supple, no palpable masses; no thyromegaly  RESPIRATORY: Normal respiratory effort; lungs are clear to auscultation bilaterally  CARDIOVASCULAR: Regular rate and rhythm, normal S1 and S2, no murmur/rub/gallop; No lower extremity edema; Peripheral pulses are 2+ bilaterally  ABDOMEN: Nontender to palpation, normoactive bowel sounds, no rebound/guarding; No hepatosplenomegaly  MUSCULOSKELETAL:  no clubbing or cyanosis of digits; no joint swelling or tenderness to palpation  PSYCH: A+O to person, place, and time; affect appropriate  NEUROLOGY: CN 2-12 are intact and symmetric; no gross sensory deficits   SKIN: groins rash improving less erythema. rash under breast and rash is also improved with less erythema      LABS:                        14.1   16.54 )-----------( 266      ( 19 Oct 2023 06:28 )             43.2      10-19    137  |  103  |  14  ----------------------------<  100<H>  4.3   |  21<L>  |  0.67    Ca    8.6      19 Oct 2023 06:30  Mg     2.2     10-19         CAPILLARY BLOOD GLUCOSE          RADIOLOGY & ADDITIONAL TESTS:    Imaging Personally Reviewed:  Consultant(s) Notes Reviewed:    Care Discussed with Consultants/Other Providers:

## 2023-10-19 NOTE — PROGRESS NOTE ADULT - PROBLEM SELECTOR PLAN 1
- cont quinidine, mexiletine, metoprolol  - planned ablation today  - cont tele  - K>4, Mg >2  - recent RHF/LHC performed

## 2023-10-19 NOTE — CHART NOTE - NSCHARTNOTEFT_GEN_A_CORE
CICU Accept Note  Transfer from: EP Lab  Accepting physician: Dr. Nav Recinos    HPI: 72F PMHx COPD, HTN, PAD, CAD s/p LAD stent (last C showing 100%  of RCA), chronic leukocytosis of unclear etiol, cardiac arrest s/p left-sided ICD (6/4/2019) complicated by infection and s/p R single-chamber ICD reimplantation ([place] in 9/23/2019), CHF EF 45%, grade 2 DHF, recent hospitalization for UTI (treated w/ ceftriaxone and Vanco x 3 days) and VFib s/p 13 AICD firings, pw lightheadedness and AICD firing.    Pt recently hospitalized for VFib s/p multiple AICD firing. Pt placed on quinidine, mexiletine, metoprolol. Pt reports good compliance with her medications, even though she doesn't know what the meds are. Pt states at around 3-4PM yesterday, she felt "dizzy" for a few seconds, then felt AICD shock, then had resolution of her dizziness. Denies fevers, chills, CP, SOB, URI symptoms, GI symptoms. Pt states she's never seen a hematologist for leukocytosis. Pt was supposed to f/u with EP as outpt for ablation.    Patient was brought for VT ablation today and while on the table patient became hypotensive requiring dual pressors (Jarred and levo gtt). Stat TTE showed evidence of pericardial tamponade. Pericardiocentesis intraop drained 400cc of khadijah blood. Given 1350cc NS and 250cc albumin. Patient transferred to CICU for further monitoring. Upon arrival patient A&Ox3, levo gtt at 0.8.     SUBJECTIVE DATA:    REVIEW OF SYSTEMS:  CONSTITUTIONAL: No weakness, fevers or chills  EYES/ENT: No visual changes;  No vertigo or throat pain   NECK: No pain or stiffness  RESPIRATORY: No cough, wheezing, hemoptysis; No shortness of breath  CARDIOVASCULAR: No chest pain or palpitations  GASTROINTESTINAL: No abdominal or epigastric pain. No nausea, vomiting, or hematemesis; No diarrhea or constipation. No melena or hematochezia.  GENITOURINARY: No dysuria, frequency or hematuria  NEUROLOGICAL: No numbness or weakness  SKIN: No itching, rashes    OBJECTIVE DATA:    Vital Signs Last 24 Hrs  T(C): 36.7 (19 Oct 2023 14:43), Max: 37.1 (19 Oct 2023 04:14)  T(F): 98.1 (19 Oct 2023 12:35), Max: 98.8 (19 Oct 2023 04:14)  HR: 75 (19 Oct 2023 14:43) (71 - 78)  BP: 138/59 (19 Oct 2023 14:43) (113/56 - 152/79)  BP(mean): 77 (19 Oct 2023 14:43) (77 - 77)  RR: 16 (19 Oct 2023 14:43) (16 - 18)  SpO2: 95% (19 Oct 2023 14:43) (91% - 95%)    Parameters below as of 19 Oct 2023 12:35  Patient On (Oxygen Delivery Method): room air      I&O's Summary    18 Oct 2023 07:01  -  19 Oct 2023 07:00  --------------------------------------------------------  IN: 720 mL / OUT: 200 mL / NET: 520 mL    19 Oct 2023 07:01  -  19 Oct 2023 20:30  --------------------------------------------------------  IN: 150 mL / OUT: 0 mL / NET: 150 mL    General: Well nourished, well developed, NAD  Neurology/Psychiatric: A&Ox3, nonfocal, UPTON x 4. Mood and affect appropriate for situation  Respiratory: Breath sounds CTA in all lung fields b/l. No rhonchi, rales, wheezes  CV: RRR, S1, S2. No murmurs, rubs or gallops. No JVD  Abdominal: Soft, non-tender, non-distended. normoactive BS. No CVAT  Extremities: No peripheral edema, 2+ peripheral pulses in UE/LE b/l. 9 Fr venous sheath in right groin.   Skin: pustules under b/l breasts.   Lines/Tubes: Pericardial Drain (10/19-), Left radial A-Line (10/19-)    Allergies: NKDA      MEDICATIONS  (STANDING):  atorvastatin 40 milliGRAM(s) Oral at bedtime  budesonide 160 MICROgram(s)/formoterol 4.5 MICROgram(s) Inhaler 2 Puff(s) Inhalation two times a day  chlorhexidine 2% Cloths 1 Application(s) Topical <User Schedule>  clopidogrel Tablet 75 milliGRAM(s) Oral daily  influenza  Vaccine (HIGH DOSE) 0.7 milliLiter(s) IntraMuscular once  norepinephrine Infusion 0.08 MICROgram(s)/kG/Min (12.7 mL/Hr) IV Continuous <Continuous>  nystatin/triamcinolone Cream 1 Application(s) Topical two times a day    MEDICATIONS  (PRN):      LABS                                            14.1                  Neurophils% (auto):   x      (10-19 @ 06:28):    16.54)-----------(266          Lymphocytes% (auto):  x                                             43.2                   Eosinphils% (auto):   x        Manual%: Neutrophils x    ; Lymphocytes x    ; Eosinophils x    ; Bands%: x    ; Blasts x                                    137    |  103    |  14                  Calcium: 8.6   / iCa: x      (10-19 @ 06:30)    ----------------------------<  100       Magnesium: 2.2                              4.3     |  21     |  0.67             Phosphorous: x          ( 10-19 @ 06:29 )   PT: 11.9 sec;   INR: 1.14 ratio  aPTT: 26.0 sec        ASSESSMENT & PLAN:   72F PMHx COPD, HTN, PAD, CAD s/p LAD stent (last [facility] showing 100%  of RCA), chronic leukocytosis of unclear etiol, cardiac arrest s/p left-sided ICD (6/4/2019) complicated by infection and s/p R single-chamber ICD reimplantation ([place] in 9/23/2019), CHF EF 45%, grade 2 DHF, and VFib s/p 13 AICD firings underwent VT ablation today c/b pericardial tamponade. Transferred to CICU for further monitoring.     PLAN  ===NEURO===  #No active issues  - A&O x3    ===RESPIRATORY===  #Hx COPD  - Currently on room air sat >88%    ===CARDIOVASCULAR===  #Pericardial Tamponade  - s/p VT ablation c/b pericardial tamponade  - pericardial drain with 40 cc drained initially, 250cc in bag upon arrival to CICU  - Started on Jarred gtt and levo gtt, currently only on 0.08 mcg/hr levo gtt  - wean pressors as tolerated  - s/p 1350cc NS and 250cc albumin intraop  - TTE in am  - Monitor drain output  - Hold AC at this time  - Trend CBC    #CHF  - TTE 10/5 EF 55%, reg WMA, mild-mod MS, CICU Accept Note  Transfer from: EP Lab  Accepting physician: Dr. Nav Recinos    HPI: 72F PMHx COPD, HTN, PAD, CAD s/p LAD stent (last C showing 100%  of RCA), chronic leukocytosis of unclear etiol, cardiac arrest s/p left-sided ICD (6/4/2019) complicated by infection and s/p R single-chamber ICD reimplantation ([place] in 9/23/2019), CHF EF 45%, grade 2 DHF, recent hospitalization for UTI (treated w/ ceftriaxone and Vanco x 3 days) and VFib s/p 13 AICD firings, pw lightheadedness and AICD firing.    Pt recently hospitalized for VFib s/p multiple AICD firing. Pt placed on quinidine, mexiletine, metoprolol. Pt reports good compliance with her medications, even though she doesn't know what the meds are. Pt states at around 3-4PM yesterday, she felt "dizzy" for a few seconds, then felt AICD shock, then had resolution of her dizziness. Denies fevers, chills, CP, SOB, URI symptoms, GI symptoms. Pt states she's never seen a hematologist for leukocytosis. Pt was supposed to f/u with EP as outpt for ablation.    Patient was brought for VT ablation today and while on the table patient became hypotensive requiring dual pressors (Jarred and levo gtt). Stat TTE showed evidence of pericardial tamponade. Pericardiocentesis intraop drained 400cc of khadijah blood. Given 1350cc NS and 250cc albumin. Patient transferred to CICU for further monitoring. Upon arrival patient A&Ox3, levo gtt at 0.8.     SUBJECTIVE DATA:    REVIEW OF SYSTEMS:  CONSTITUTIONAL: No weakness, fevers or chills  EYES/ENT: No visual changes;  No vertigo or throat pain   NECK: No pain or stiffness  RESPIRATORY: No cough, wheezing, hemoptysis; No shortness of breath  CARDIOVASCULAR: No chest pain or palpitations  GASTROINTESTINAL: No abdominal or epigastric pain. No nausea, vomiting, or hematemesis; No diarrhea or constipation. No melena or hematochezia.  GENITOURINARY: No dysuria, frequency or hematuria  NEUROLOGICAL: No numbness or weakness  SKIN: No itching, rashes    OBJECTIVE DATA:    Vital Signs Last 24 Hrs  T(C): 36.7 (19 Oct 2023 14:43), Max: 37.1 (19 Oct 2023 04:14)  T(F): 98.1 (19 Oct 2023 12:35), Max: 98.8 (19 Oct 2023 04:14)  HR: 75 (19 Oct 2023 14:43) (71 - 78)  BP: 138/59 (19 Oct 2023 14:43) (113/56 - 152/79)  BP(mean): 77 (19 Oct 2023 14:43) (77 - 77)  RR: 16 (19 Oct 2023 14:43) (16 - 18)  SpO2: 95% (19 Oct 2023 14:43) (91% - 95%)    Parameters below as of 19 Oct 2023 12:35  Patient On (Oxygen Delivery Method): room air      I&O's Summary    18 Oct 2023 07:01  -  19 Oct 2023 07:00  --------------------------------------------------------  IN: 720 mL / OUT: 200 mL / NET: 520 mL    19 Oct 2023 07:01  -  19 Oct 2023 20:30  --------------------------------------------------------  IN: 150 mL / OUT: 0 mL / NET: 150 mL    General: Well nourished, well developed, NAD  Neurology/Psychiatric: A&Ox3, nonfocal, UPTON x 4. Mood and affect appropriate for situation  Respiratory: Breath sounds CTA in all lung fields b/l. No rhonchi, rales, wheezes  CV: RRR, S1, S2. No murmurs, rubs or gallops. No JVD  Abdominal: Soft, non-tender, non-distended. normoactive BS. No CVAT  Extremities: No peripheral edema, 2+ peripheral pulses in UE/LE b/l. 9 Fr venous sheath in right groin.   Skin: pustules under b/l breasts.   Lines/Tubes: Pericardial Drain (10/19-), Left radial A-Line (10/19-)    Allergies: NKDA      MEDICATIONS  (STANDING):  atorvastatin 40 milliGRAM(s) Oral at bedtime  budesonide 160 MICROgram(s)/formoterol 4.5 MICROgram(s) Inhaler 2 Puff(s) Inhalation two times a day  chlorhexidine 2% Cloths 1 Application(s) Topical <User Schedule>  clopidogrel Tablet 75 milliGRAM(s) Oral daily  influenza  Vaccine (HIGH DOSE) 0.7 milliLiter(s) IntraMuscular once  norepinephrine Infusion 0.08 MICROgram(s)/kG/Min (12.7 mL/Hr) IV Continuous <Continuous>  nystatin/triamcinolone Cream 1 Application(s) Topical two times a day    MEDICATIONS  (PRN):      LABS                                            14.1                  Neurophils% (auto):   x      (10-19 @ 06:28):    16.54)-----------(266          Lymphocytes% (auto):  x                                             43.2                   Eosinphils% (auto):   x        Manual%: Neutrophils x    ; Lymphocytes x    ; Eosinophils x    ; Bands%: x    ; Blasts x                                    137    |  103    |  14                  Calcium: 8.6   / iCa: x      (10-19 @ 06:30)    ----------------------------<  100       Magnesium: 2.2                              4.3     |  21     |  0.67             Phosphorous: x          ( 10-19 @ 06:29 )   PT: 11.9 sec;   INR: 1.14 ratio  aPTT: 26.0 sec        ASSESSMENT & PLAN:   72F PMHx COPD, HTN, PAD, CAD s/p LAD stent (last [facility] showing 100%  of RCA), chronic leukocytosis of unclear etiol, cardiac arrest s/p left-sided ICD (6/4/2019) complicated by infection and s/p R single-chamber ICD reimplantation ([place] in 9/23/2019), CHF EF 45%, grade 2 DHF, and VFib s/p 13 AICD firings underwent VT ablation today c/b pericardial tamponade. Transferred to CICU for further monitoring.     PLAN  ===NEURO===  #No active issues  - A&O x3    ===RESPIRATORY===  #Hx COPD  - Currently on room air sat >88%    ===CARDIOVASCULAR===  #Pericardial Tamponade  - s/p VT ablation c/b pericardial tamponade  - pericardial drain with 40 cc drained initially, 250cc in bag upon arrival to CICU  - Started on Jarred gtt and levo gtt, currently only on 0.08 mcg/hr levo gtt  - wean pressors as tolerated  - s/p 1350cc NS and 250cc albumin intraop  - TTE in am  - Monitor drain output  - Hold AC at this time  - Trend CBC    #History VT  - Start 400 amio TID PO per EP  - VT ablation aborted after tamponade  - AICD  - replete lytes as needed to maintain K>4, Mag>2    #CHF  - TTE 10/5 EF 55%, reg WMA, mild-mod MS  - Hold metoprolol and entresto i/s/o hypotesion    #CAD s/p stents  - Continue plavix    ===GI===  #No active issues  - Monitor for BM  - DASH Diet    ===RENAL===  #No active issues  - Strict I&O's  - Replete lytes as needed to maintain K>4, Mag>2    ===ENDO===  #No active issues  - Monitor glucose on CMP  - TSH wnl    ===HEME-ONC===  #Pericardial tamponade  - Hold AC  - Trend CBC  - No blood products given    ===ID===  #Chronic leukocytosis  - 1 dose vanco given  - Give CTX now for gram negative coverage  - Blood cultures sent    ======================= DISPOSITION  =====================  [X] Critical   [ ] Guarded    [ ] Stable    [X] Maintain in CICU  [ ] Downgrade to [place]  [ ] Discharge Home    Patient requires continuous monitoring with bedside rhythm monitoring, pulse ox monitoring, and intermittent blood gas analysis. Care plan discussed with ICU care team. Patient remained critical and at risk for life threatening decompensation.  Patient seen, examined and plan discussed with CCU team during rounds.     I have personally provided 35 minutes of critical care time excluding time spent on separate procedures, in addition to initial critical care time provided by the CICU Attending, Dr. Recinos

## 2023-10-19 NOTE — PROGRESS NOTE ADULT - ASSESSMENT
71 y/o F w/ PMHx COPD, HLD, HTN, PVD, ICM/CAD s/p PCI (has  of RCA), cardiac arrest s/p left-sided ICD (6/4/2019) complicated by infection and s/p R single-chamber ICD ([place] in 9/23/2019), recent admission for VT storm with adjustment of medication and NIPS now presenting for ICD shock in the setting of recurrent monomorphic VT with unsuccessful ATP.     - plan for VT ablation today, please keep NPO   - Recent admission for incessant VF requiring lido, sotalol, and ~13 ICD shocks, LHC/RHC at that time with no CAD and mildly elevated filling pressures w/ preserved CO  - ICD interrogation with appropriate shock for 32 sec run of VT, initial TCL prior to acceleration by -320 msec  - continue metoprolol, hold quinidine and mexiletine prior to ablation   - K>4, Mg>2  - continue to monitor on tele  - please continue to hold farxiga   - likely fungal infection of the groin, reportedly being treated outpatient, please continue to manage while inpatient to reduce the risk of procedure related infection, improving

## 2023-10-19 NOTE — PRE-ANESTHESIA EVALUATION ADULT - NSANTHPMHFT_GEN_ALL_CORE
right groin puncture site with purulent discharge. pt had a c/cath about 1 month ago. ultrasound done pe-op - no abscess  aicd turned off -pre-procedure

## 2023-10-20 NOTE — PROGRESS NOTE ADULT - SUBJECTIVE AND OBJECTIVE BOX
Patient is a 72y old  Female who presents with a chief complaint of AICD discharge (19 Oct 2023 13:00)    HPI:  72F c hx COPD, HTN, PAD, CAD s/p LAD stent (last C showing 100%  of RCA), chronic leukocytosis of unclear etiol, cardiac arrest s/p left-sided ICD (2019) complicated by infection and s/p R single-chamber ICD reimplantation ([place] in 2019), CHF EF 45%, grade 2 DHF, recent hospitalization for UTI (treated w/ ceftriaxone and Vanco x 3 days) and VFib s/p 13 AICD firings, pw lightheadedness and AICD firing.    Pt recently hospitalized for VFib s/p multiple aicd firing. Pt placed on quinidine, mexiletine, metoprolol. Pt reports good compliance with her medications, even though she doesn't know what the meds are. Pt states at around 3-4PM yesterday, she felt "dizzy" for a few seconds, then felt AICD shock, then had resolution of her dizziness. Denies fevers, chills, CP, SOB, URI symptoms, GI symptoms. Pt states she's never seen a hematologist for leukocytosis. Pt was supposed to f/u with EP as outpt for ablation. (16 Oct 2023 03:47)       INTERVAL HPI/OVERNIGHT EVENTS:   No overnight events   Afebrile, hemodynamically stable     Subjective:    ICU Vital Signs Last 24 Hrs  T(C): 37.1 (20 Oct 2023 03:00), Max: 37.1 (20 Oct 2023 03:00)  T(F): 98.7 (20 Oct 2023 03:00), Max: 98.7 (20 Oct 2023 03:00)  HR: 63 (20 Oct 2023 06:49) (63 - 114)  BP: 138/59 (19 Oct 2023 14:43) (138/59 - 152/79)  BP(mean): 77 (19 Oct 2023 14:43) (77 - 77)  ABP: 112/45 (20 Oct 2023 06:45) (91/45 - 142/66)  ABP(mean): 70 (20 Oct 2023 06:45) (62 - 95)  RR: 23 (20 Oct 2023 06:45) (14 - 32)  SpO2: 97% (20 Oct 2023 06:49) (92% - 99%)    O2 Parameters below as of 20 Oct 2023 06:49  Patient On (Oxygen Delivery Method): room air          I&O's Summary    19 Oct 2023 07:01  -  20 Oct 2023 07:00  --------------------------------------------------------  IN: 1266.7 mL / OUT: 740 mL / NET: 526.7 mL          Daily Height in cm: 157.48 (19 Oct 2023 20:30)    Daily Weight in k.6 (20 Oct 2023 04:15)    Adult Advanced Hemodynamics Last 24 Hrs  CVP(mm Hg): --  CVP(cm H2O): --  CO: --  CI: --  PA: --  PA(mean): --  PCWP: --  SVR: --  SVRI: --  PVR: --  PVRI: --    EKG/Telemetry Events:    MEDICATIONS  (STANDING):  aMIOdarone    Tablet 400 milliGRAM(s) Oral every 8 hours  aMIOdarone    Tablet   Oral   atorvastatin 40 milliGRAM(s) Oral at bedtime  budesonide 160 MICROgram(s)/formoterol 4.5 MICROgram(s) Inhaler 2 Puff(s) Inhalation two times a day  cefTRIAXone   IVPB 1000 milliGRAM(s) IV Intermittent every 24 hours  chlorhexidine 2% Cloths 1 Application(s) Topical <User Schedule>  clopidogrel Tablet 75 milliGRAM(s) Oral daily  influenza  Vaccine (HIGH DOSE) 0.7 milliLiter(s) IntraMuscular once  norepinephrine Infusion 0.08 MICROgram(s)/kG/Min (12.7 mL/Hr) IV Continuous <Continuous>  nystatin/triamcinolone Cream 1 Application(s) Topical two times a day    MEDICATIONS  (PRN):      PHYSICAL EXAM:  GENERAL:   HEAD:  Atraumatic, Normocephalic  EYES: EOMI, PERRLA, conjunctiva and sclera clear  NECK: Supple, No JVD, Normal thyroid, no enlarged nodes  NERVOUS SYSTEM:  Alert & Awake.   CHEST/LUNG: B/L good air entry; No rales, rhonchi, or wheezing  HEART: S1S2 normal, no S3, Regular rate and rhythm; No murmurs  ABDOMEN: Soft, Nontender, Nondistended; Bowel sounds present  EXTREMITIES:  2+ Peripheral Pulses, No clubbing, cyanosis, or edema  LYMPH: No lymphadenopathy noted  SKIN: No rashes or lesions    LABS:                        12.8   24.95 )-----------( 268      ( 20 Oct 2023 06:57 )             39.3     10-20    137  |  105  |  18  ----------------------------<  146<H>  4.5   |  16<L>  |  0.76    Ca    8.8      20 Oct 2023 06:54  Phos  3.9     10-20  Mg     2.0     10-20    TPro  6.0  /  Alb  3.0<L>  /  TBili  0.4  /  DBili  x   /  AST  17  /  ALT  14  /  AlkPhos  64  10-20    LIVER FUNCTIONS - ( 20 Oct 2023 06:54 )  Alb: 3.0 g/dL / Pro: 6.0 g/dL / ALK PHOS: 64 U/L / ALT: 14 U/L / AST: 17 U/L / GGT: x           PT/INR - ( 20 Oct 2023 02:20 )   PT: 12.9 sec;   INR: 1.18 ratio         PTT - ( 20 Oct 2023 02:20 )  PTT:26.4 sec  CAPILLARY BLOOD GLUCOSE        ABG - ( 20 Oct 2023 06:48 )  pH, Arterial: 7.35  pH, Blood: x     /  pCO2: 32    /  pO2: 85    / HCO3: 18    / Base Excess: -6.9  /  SaO2: 97.8                    Urinalysis Basic - ( 20 Oct 2023 06:54 )    Color: x / Appearance: x / SG: x / pH: x  Gluc: 146 mg/dL / Ketone: x  / Bili: x / Urobili: x   Blood: x / Protein: x / Nitrite: x   Leuk Esterase: x / RBC: x / WBC x   Sq Epi: x / Non Sq Epi: x / Bacteria: x          RADIOLOGY & ADDITIONAL TESTS:  CXR:        Care Discussed with Consultants/Other Providers [ x] YES  [ ] NO           Patient is a 72y old  Female who presents with a chief complaint of AICD discharge (19 Oct 2023 13:00)    HPI:  72F c hx COPD, HTN, PAD, CAD s/p LAD stent (last C showing 100%  of RCA), chronic leukocytosis of unclear etiol, cardiac arrest s/p left-sided ICD (2019) complicated by infection and s/p R single-chamber ICD reimplantation ([place] in 2019), CHF EF 45%, grade 2 DHF, recent hospitalization for UTI (treated w/ ceftriaxone and Vanco x 3 days) and VFib s/p 13 AICD firings, pw lightheadedness and AICD firing.    Pt recently hospitalized for VFib s/p multiple aicd firing. Pt placed on quinidine, mexiletine, metoprolol. Pt reports good compliance with her medications, even though she doesn't know what the meds are. Pt states at around 3-4PM yesterday, she felt "dizzy" for a few seconds, then felt AICD shock, then had resolution of her dizziness. Denies fevers, chills, CP, SOB, URI symptoms, GI symptoms. Pt states she's never seen a hematologist for leukocytosis. Pt was supposed to f/u with EP as outpt for ablation. (16 Oct 2023 03:47)       INTERVAL HPI/OVERNIGHT EVENTS:   No overnight events, pressors able to be weaned  Afebrile, hemodynamically stable     Subjective: Pt reports feeling better than yesterday with no complaints    ICU Vital Signs Last 24 Hrs  T(C): 37.1 (20 Oct 2023 03:00), Max: 37.1 (20 Oct 2023 03:00)  T(F): 98.7 (20 Oct 2023 03:00), Max: 98.7 (20 Oct 2023 03:00)  HR: 63 (20 Oct 2023 06:49) (63 - 114)  BP: 138/59 (19 Oct 2023 14:43) (138/59 - 152/79)  BP(mean): 77 (19 Oct 2023 14:43) (77 - 77)  ABP: 112/45 (20 Oct 2023 06:45) (91/45 - 142/66)  ABP(mean): 70 (20 Oct 2023 06:45) (62 - 95)  RR: 23 (20 Oct 2023 06:45) (14 - 32)  SpO2: 97% (20 Oct 2023 06:49) (92% - 99%)    O2 Parameters below as of 20 Oct 2023 06:49  Patient On (Oxygen Delivery Method): room air          I&O's Summary    19 Oct 2023 07:01  -  20 Oct 2023 07:00  --------------------------------------------------------  IN: 1266.7 mL / OUT: 740 mL / NET: 526.7 mL          Daily Height in cm: 157.48 (19 Oct 2023 20:30)    Daily Weight in k.6 (20 Oct 2023 04:15)    Adult Advanced Hemodynamics Last 24 Hrs  CVP(mm Hg): --  CVP(cm H2O): --  CO: --  CI: --  PA: --  PA(mean): --  PCWP: --  SVR: --  SVRI: --  PVR: --  PVRI: --    EKG/Telemetry Events:    MEDICATIONS  (STANDING):  aMIOdarone    Tablet 400 milliGRAM(s) Oral every 8 hours  aMIOdarone    Tablet   Oral   atorvastatin 40 milliGRAM(s) Oral at bedtime  budesonide 160 MICROgram(s)/formoterol 4.5 MICROgram(s) Inhaler 2 Puff(s) Inhalation two times a day  cefTRIAXone   IVPB 1000 milliGRAM(s) IV Intermittent every 24 hours  chlorhexidine 2% Cloths 1 Application(s) Topical <User Schedule>  clopidogrel Tablet 75 milliGRAM(s) Oral daily  influenza  Vaccine (HIGH DOSE) 0.7 milliLiter(s) IntraMuscular once  norepinephrine Infusion 0.08 MICROgram(s)/kG/Min (12.7 mL/Hr) IV Continuous <Continuous>  nystatin/triamcinolone Cream 1 Application(s) Topical two times a day    MEDICATIONS  (PRN):      PHYSICAL EXAM:  GENERAL:   HEAD:  Atraumatic, Normocephalic  EYES: EOMI, PERRLA, conjunctiva and sclera clear  NECK: Supple, No JVD  NERVOUS SYSTEM:  Alert & Awake.   CHEST/LUNG: B/L good air entry; No rales, rhonchi, or wheezing  HEART: S1S2 normal, no S3, Regular rate and rhythm; No murmurs  ABDOMEN: Soft, Nontender, Nondistended; Bowel sounds present  EXTREMITIES:  2+ Peripheral Pulses, No clubbing, cyanosis, or edema      LABS:                        12.8   24.95 )-----------( 268      ( 20 Oct 2023 06:57 )             39.3     10-20    137  |  105  |  18  ----------------------------<  146<H>  4.5   |  16<L>  |  0.76    Ca    8.8      20 Oct 2023 06:54  Phos  3.9     10-20  Mg     2.0     10-20    TPro  6.0  /  Alb  3.0<L>  /  TBili  0.4  /  DBili  x   /  AST  17  /  ALT  14  /  AlkPhos  64  10-20    LIVER FUNCTIONS - ( 20 Oct 2023 06:54 )  Alb: 3.0 g/dL / Pro: 6.0 g/dL / ALK PHOS: 64 U/L / ALT: 14 U/L / AST: 17 U/L / GGT: x           PT/INR - ( 20 Oct 2023 02:20 )   PT: 12.9 sec;   INR: 1.18 ratio         PTT - ( 20 Oct 2023 02:20 )  PTT:26.4 sec  CAPILLARY BLOOD GLUCOSE        ABG - ( 20 Oct 2023 06:48 )  pH, Arterial: 7.35  pH, Blood: x     /  pCO2: 32    /  pO2: 85    / HCO3: 18    / Base Excess: -6.9  /  SaO2: 97.8                    Urinalysis Basic - ( 20 Oct 2023 06:54 )    Color: x / Appearance: x / SG: x / pH: x  Gluc: 146 mg/dL / Ketone: x  / Bili: x / Urobili: x   Blood: x / Protein: x / Nitrite: x   Leuk Esterase: x / RBC: x / WBC x   Sq Epi: x / Non Sq Epi: x / Bacteria: x          RADIOLOGY & ADDITIONAL TESTS:  CXR:        Care Discussed with Consultants/Other Providers [ x] YES  [ ] NO

## 2023-10-20 NOTE — CHART NOTE - NSCHARTNOTEFT_GEN_A_CORE
CCU Transfer Note    Transfer from: CCU  Transfer to:  (  ) Medicine    ( x ) Telemetry    (  ) RCU    (  ) Palliative    (  ) Stroke Unit    (  ) _______________  Accepting physician: Derrick    HPI  72F c hx COPD, HTN, PAD, CAD s/p LAD stent (last [facility] showing 100%  of RCA), chronic leukocytosis of unclear etiol, cardiac arrest s/p left-sided ICD (6/4/2019) complicated by infection and s/p R single-chamber ICD reimplantation ([place] in 9/23/2019), CHF EF 45%, grade 2 DHF, recent hospitalization for UTI (treated w/ ceftriaxone and Vanco x 3 days) and VFib s/p 13 AICD firings, pw lightheadedness and AICD firing.    Pt recently hospitalized for VFib s/p multiple aicd firing. Pt placed on quinidine, mexiletine, metoprolol. Pt reports good compliance with her medications, even though she doesn't know what the meds are. Pt states at around 3-4PM yesterday, she felt "dizzy" for a few seconds, then felt AICD shock, then had resolution of her dizziness. Denies fevers, chills, CP, SOB, URI symptoms, GI symptoms. Pt states she's never seen a hematologist for leukocytosis. Pt was supposed to f/u with EP as outpt for ablation.    Hospital/CCU course:  Pt went for VT ablation on 10/19 which was aborted 2/2 cardiac tamponade causing hypotension requiring pressors. A pericardial drain was placed. In the CICU, pressors were weaned and the drain was removed after decreased output and a TTE showing no pericardial effusion. EP continues to follow though is unlikely to do any further procedure this admission.     To-Do:  [ ] f/u EP recs  [ ] restart AC once appropriate after bloody pericardial effusion  [ ] metoprolol and entresto have been restarted after hypotension resolved  [ ] f/u blood and urine cultures, continue antibiotics for now for UTI  [ ] should see heme outpt for chronic leukocytosis        MEDICATIONS:  STANDING MEDICATIONS  aMIOdarone    Tablet 400 milliGRAM(s) Oral every 8 hours  aMIOdarone    Tablet   Oral   atorvastatin 40 milliGRAM(s) Oral at bedtime  budesonide 160 MICROgram(s)/formoterol 4.5 MICROgram(s) Inhaler 2 Puff(s) Inhalation two times a day  cefTRIAXone   IVPB 1000 milliGRAM(s) IV Intermittent every 24 hours  chlorhexidine 2% Cloths 1 Application(s) Topical <User Schedule>  clopidogrel Tablet 75 milliGRAM(s) Oral daily  influenza  Vaccine (HIGH DOSE) 0.7 milliLiter(s) IntraMuscular once  nystatin/triamcinolone Cream 1 Application(s) Topical two times a day  ondansetron Injectable 4 milliGRAM(s) IV Push once    PRN MEDICATIONS      VITAL SIGNS: Last 24 Hours  T(C): 36.4 (20 Oct 2023 16:00), Max: 37.1 (20 Oct 2023 03:00)  T(F): 97.5 (20 Oct 2023 16:00), Max: 98.7 (20 Oct 2023 03:00)  HR: 66 (20 Oct 2023 16:00) (58 - 114)  BP: 118/56 (20 Oct 2023 16:00) (102/52 - 148/65)  BP(mean): 80 (20 Oct 2023 16:00) (68 - 95)  RR: 28 (20 Oct 2023 16:00) (14 - 38)  SpO2: 92% (20 Oct 2023 16:00) (91% - 99%)    LABS:                        12.8   24.95 )-----------( 268      ( 20 Oct 2023 06:57 )             39.3     10-20    137  |  105  |  18  ----------------------------<  146<H>  4.5   |  16<L>  |  0.76    Ca    8.8      20 Oct 2023 06:54  Phos  3.9     10-20  Mg     2.0     10-20    TPro  6.0  /  Alb  3.0<L>  /  TBili  0.4  /  DBili  x   /  AST  17  /  ALT  14  /  AlkPhos  64  10-20    PT/INR - ( 20 Oct 2023 02:20 )   PT: 12.9 sec;   INR: 1.18 ratio         PTT - ( 20 Oct 2023 02:20 )  PTT:26.4 sec  Urinalysis Basic - ( 20 Oct 2023 06:54 )    Color: x / Appearance: x / SG: x / pH: x  Gluc: 146 mg/dL / Ketone: x  / Bili: x / Urobili: x   Blood: x / Protein: x / Nitrite: x   Leuk Esterase: x / RBC: x / WBC x   Sq Epi: x / Non Sq Epi: x / Bacteria: x      ABG - ( 20 Oct 2023 06:48 )  pH, Arterial: 7.35  pH, Blood: x     /  pCO2: 32    /  pO2: 85    / HCO3: 18    / Base Excess: -6.9  /  SaO2: 97.8                  ASSESSMENT & PLAN:         For Follow-Up:

## 2023-10-20 NOTE — PROGRESS NOTE ADULT - SUBJECTIVE AND OBJECTIVE BOX
Patient seen and examined at bedside.    Overnight Events: Patient taken for VT ablation yesterday afternoon complicated by small but hemodynamically significant pericardial effusion. Procedure aborted and pericardial drain placed with 240cc of blood expressed. Overnight drainage has been minimal - drain flushed several times without evidence of obstruction. Patient is hemodynamically and symptomatically improved although still on low dose levophed (0.3).     Current Meds:  aMIOdarone    Tablet 400 milliGRAM(s) Oral every 8 hours  aMIOdarone    Tablet   Oral   atorvastatin 40 milliGRAM(s) Oral at bedtime  budesonide 160 MICROgram(s)/formoterol 4.5 MICROgram(s) Inhaler 2 Puff(s) Inhalation two times a day  cefTRIAXone   IVPB 1000 milliGRAM(s) IV Intermittent every 24 hours  chlorhexidine 2% Cloths 1 Application(s) Topical <User Schedule>  clopidogrel Tablet 75 milliGRAM(s) Oral daily  influenza  Vaccine (HIGH DOSE) 0.7 milliLiter(s) IntraMuscular once  norepinephrine Infusion 0.08 MICROgram(s)/kG/Min IV Continuous <Continuous>  nystatin/triamcinolone Cream 1 Application(s) Topical two times a day      Vitals:  T(F): 98.3 (10-20), Max: 98.7 (10-20)  HR: 74 (10-20) (58 - 114)  BP: 126/59 (10-20) (126/59 - 152/79)  RR: 25 (10-20)  SpO2: 94% (10-20)  I&O's Summary    19 Oct 2023 07:01  -  20 Oct 2023 07:00  --------------------------------------------------------  IN: 1266.7 mL / OUT: 740 mL / NET: 526.7 mL    20 Oct 2023 07:01  -  20 Oct 2023 08:51  --------------------------------------------------------  IN: 6.4 mL / OUT: 0 mL / NET: 6.4 mL        Physical Exam:  Appearance: No acute distress; well appearing  Eyes: PERRL, EOMI, pink conjunctiva  HEENT: Normal oral mucosa  Cardiovascular: RRR, S1, S2, no murmurs, rubs, or gallops; no edema; no JVD  Respiratory: Clear to auscultation bilaterally  Gastrointestinal: soft, non-tender, non-distended with normal bowel sounds  Musculoskeletal: No clubbing; no joint deformity   Neurologic: Non-focal  Lymphatic: No lymphadenopathy  Psychiatry: AAOx3, mood & affect appropriate  Skin: No rashes, ecchymoses, or cyanosis                          12.8   24.95 )-----------( 268      ( 20 Oct 2023 06:57 )             39.3     10-20    137  |  105  |  18  ----------------------------<  146<H>  4.5   |  16<L>  |  0.76    Ca    8.8      20 Oct 2023 06:54  Phos  3.9     10-20  Mg     2.0     10-20    TPro  6.0  /  Alb  3.0<L>  /  TBili  0.4  /  DBili  x   /  AST  17  /  ALT  14  /  AlkPhos  64  10-20    PT/INR - ( 20 Oct 2023 02:20 )   PT: 12.9 sec;   INR: 1.18 ratio         PTT - ( 20 Oct 2023 02:20 )  PTT:26.4 sec  CARDIAC MARKERS ( 15 Oct 2023 20:58 )  24 ng/L / x     / x     / x     / x     / x      CARDIAC MARKERS ( 15 Oct 2023 17:43 )  27 ng/L / x     / x     / x     / x     / x

## 2023-10-20 NOTE — PROGRESS NOTE ADULT - ASSESSMENT
73 y/o F w/ PMHx COPD, HLD, HTN, PVD, ICM/CAD s/p PCI (has  of RCA), cardiac arrest s/p left-sided ICD (6/4/2019) complicated by infection and s/p R single-chamber ICD ([place] in 9/23/2019), recent admission for VT storm with adjustment of medication and NIPS now presenting for ICD shock in the setting of recurrent monomorphic VT with unsuccessful ATP. Ablation attempted 10/20 but aborted due to hemodynamically significant pericardial effusion s/p drain placement.    - s/p aborted VT ablation in s/o pericardial effusion, drain in place with minimal output despite several attempts at flushing. Will repeat TTE today to assess effusion.   - undergoing oral amiodarone load, continue as written, will need q6mo TSH / LFT, annual CXR and eye exam as outpatient   - remains on low dose levophed, downtitrate as tolerated   - will restart beta blockade when hemodynamically appropriate   - continue to monitor on tele  - likely fungal infection of the groin as well as infectious process involving the site of prior RHC, management per CICU team, remains on Abx and anti fungals  - no further VT overnight, short runs of SVT initiated by PVCs, will monitor   - EP will continue to follow

## 2023-10-20 NOTE — PROGRESS NOTE ADULT - ATTENDING COMMENTS
71yo woman with COPD HTN CAD s/p PCI to LAD and RCA , recurrent VT who presented for ablation yesterday, developed pericardial effusion with tamponade prior and pericardial drain was placed with improvment in her shock. Now off levo all together    Neuro: no active issues  Resp: COPD history but O2 sat 93% on RA. Cont duonebs prn  CV: Tamponade that has improved post drain. Overnight no drainage. Holding AC, cont amio 400 TID PO. pull drain. likely repeat VT ablation in 1mo  GI: DASH diet  Renal: Cr at baseline  ID: cont CFTX for UTI, ultrasound her R leg for possible absces  Heme: Cont plavix  Endo: BG controlled  9Fr sheath in her groin from procedure, remove today. positional radial art line. Remove today also, BP cuff correlating. Pull pericardial drain

## 2023-10-20 NOTE — PROGRESS NOTE ADULT - ASSESSMENT
ASSESSMENT & PLAN:   72F PMHx COPD, HTN, PAD, CAD s/p LAD stent (last C showing 100%  of RCA), chronic leukocytosis of unclear etiol, cardiac arrest s/p left-sided ICD (6/4/2019) complicated by infection and s/p R single-chamber ICD reimplantation ([place] in 9/23/2019), CHF EF 45%, grade 2 DHF, and VFib s/p 13 AICD firings underwent VT ablation today c/b pericardial tamponade. Transferred to CICU for further monitoring.     PLAN  ===NEURO===  #No active issues  - A&O x3    ===RESPIRATORY===  #Hx COPD  - Currently on room air sat >88%    ===CARDIOVASCULAR===  #Pericardial Tamponade  - s/p VT ablation c/b pericardial tamponade  - pericardial drain with 40 cc drained initially, 250cc in bag upon arrival to CICU  - Started on Jarred gtt and levo gtt, currently only on 0.08 mcg/hr levo gtt  - wean pressors as tolerated  - s/p 1350cc NS and 250cc albumin intraop  - TTE in am  - Monitor drain output  - Hold AC at this time  - Trend CBC    #History VT  - Start 400 amio TID PO per EP  - VT ablation aborted after tamponade  - AICD  - replete lytes as needed to maintain K>4, Mag>2    #CHF  - TTE 10/5 EF 55%, reg WMA, mild-mod MS  - Hold metoprolol and entresto i/s/o hypotesion    #CAD s/p stents  - Continue plavix    ===GI===  #No active issues  - Monitor for BM  - DASH Diet    ===RENAL===  #No active issues  - Strict I&O's  - Replete lytes as needed to maintain K>4, Mag>2    ===ENDO===  #No active issues  - Monitor glucose on CMP  - TSH wnl    ===HEME-ONC===  #Pericardial tamponade  - Hold AC  - Trend CBC  - No blood products given    ===ID===  #Chronic leukocytosis  - 1 dose vanco given  - Give CTX now for gram negative coverage  - Blood cultures sent    ======================= DISPOSITION  =====================  [X] Critical   [ ] Guarded    [ ] Stable    [X] Maintain in CICU  [ ] Downgrade to Telemtry  [ ] Discharge Home   ASSESSMENT & PLAN:   72F PMHx COPD, HTN, PAD, CAD s/p LAD stent (last C showing 100%  of RCA), chronic leukocytosis of unclear etiol, cardiac arrest s/p left-sided ICD (6/4/2019) complicated by infection and s/p R single-chamber ICD reimplantation ([place] in 9/23/2019), CHF EF 45%, grade 2 DHF, and VFib s/p 13 AICD firings underwent VT ablation today c/b pericardial tamponade. Transferred to CICU for further monitoring.     PLAN  ===NEURO===  #No active issues  - A&O x3    ===RESPIRATORY===  #Hx COPD  - Currently on room air sat >88%    ===CARDIOVASCULAR===  #Pericardial Tamponade  - s/p VT ablation c/b pericardial tamponade  - pericardial drain with 40 cc drained initially, 250cc in bag upon arrival to CICU  - Started on Jarred gtt and levo gtt, currently only on 0.08 mcg/hr levo gtt  - wean pressors as tolerated  - s/p 1350cc NS and 250cc albumin intraop  -  f/u TTE  - Monitor drain output, has slowed to minimal output   - Hold AC at this time  - Trend CBC    #History VT  - Started 400 amio TID PO per EP  - VT ablation aborted after tamponade  - has AICD  - replete lytes as needed to maintain K>4, Mag>2  - fu EP recs   - will need q6mo TSH / LFT, annual CXR and eye exam as outpatient (amiodarone)    #CHF  - TTE 10/5 EF 55%, reg WMA, mild-mod MS  - Hold metoprolol and entresto i/s/o hypotension, restart as tolerated and per EP     #CAD s/p stents  - Continue plavix    ===GI===  #No active issues  - Monitor for BM  - DASH Diet    ===RENAL===  #No active issues  - Strict I&O's  - Replete lytes as needed to maintain K>4, Mag>2    ===ENDO===  #No active issues  - Monitor glucose on CMP  - TSH wnl    ===HEME-ONC===  #Pericardial tamponade  - Hold AC  - Trend CBC  - No blood products given    ===ID===  #Chronic leukocytosis  - 1 dose vanco given  - Give CTX now for gram negative coverage  - Blood cultures sent, f/u results  - reports of pus from previous groin access, ordering ultrasound to r/o abscess    ======================= DISPOSITION  =====================  [X] Critical   [ ] Guarded    [ ] Stable    [X] Maintain in CICU  [ ] Downgrade to Telemtry  [ ] Discharge Home

## 2023-10-21 NOTE — PROGRESS NOTE ADULT - ASSESSMENT
71 y/o F w/ PMHx COPD, HLD, HTN, PVD, ICM/CAD s/p PCI (has  of RCA), cardiac arrest s/p left-sided ICD (6/4/2019) complicated by infection and s/p R single-chamber ICD ([place] in 9/23/2019), recent admission for VT storm with adjustment of medication and NIPS now presenting for ICD shock in the setting of recurrent monomorphic VT with unsuccessful ATP.  Ablation attempted 10/20 but aborted due to hemodynamically significant pericardial effusion with drain placement.  Pericardial drain removed on 10/20/23.  This AM patient appears tachypneic RR 30 with O2 sat 90% on room air. She complaints of pleuritic CP with coughing only.     1.  monomorphic VT with ICD shock  2. VT Ablation attempted 10/20 but aborted due to hemodynamically significant pericardial effusion with drain placement  3. History of CAD/ PCI/ ICM   4. Fungal infection groins     - pericardial drain removed yesterday, c/o pleuritic chest pain with coughing.  Obtain limited echo today to assess for pericardial effusion - ordered  - Check BNP and Chest XRAY now to assess for HF exacerbation    - continue on oral amiodarone load 400mg PO Q 8 hours. Monitor TSH, LFT's, opthalmology and pulmonary function tests as outpatient   - Started Colchicine 0.6mg PO daily now- ordered   - plan to restart beta blockade when hemodynamically appropriate   - EP will continue to follow   - Above discussed with Medicine ACP    RONNIE Domínguez [facility]  [phone]     Addendum- Notified by ACP that patient has monomorphic VT on telemetry, appears to be initiated with PVC, attempted unsuccessful termination with ATP with subsequent ICD shock back to NSR.  Code team called.  Discussed with CICU Fellow at bedside plan to transfer back to CICU and add Lidocaine drip for now.  Obtain limited Echo at bedside in CICU.  Will continue to  monitor closely.     RONNIE AKINSCNP-BC 73 y/o F w/ PMHx COPD, HLD, HTN, PVD, ICM/CAD s/p PCI (has  of RCA), cardiac arrest s/p left-sided ICD (6/4/2019) complicated by infection and s/p R single-chamber ICD ([place] in 9/23/2019), recent admission for VT storm with adjustment of medication and NIPS now presenting for ICD shock in the setting of recurrent monomorphic VT with unsuccessful ATP.  Ablation attempted 10/20 but aborted due to hemodynamically significant pericardial effusion with drain placement.  Pericardial drain removed on 10/20/23.  This AM patient appears tachypneic RR 30 with O2 sat 90% on room air. She complaints of pleuritic CP with coughing only.     1.  monomorphic VT with ICD shock  2. VT Ablation attempted 10/20 but aborted due to hemodynamically significant pericardial effusion with drain placement  3. History of CAD/ PCI/ ICM   4. Fungal infection groins   5. Leukocytosis 24.95 on 10/20     - pericardial drain removed yesterday, c/o pleuritic chest pain with coughing.  Obtain limited echo today to assess for pericardial effusion - ordered  - Check BNP and Chest XRAY now to assess for HF exacerbation    - continue on oral amiodarone load 400mg PO Q 8 hours. Monitor TSH, LFT's, opthalmology and pulmonary function tests as outpatient   AST 10, ALT 12, baseline TSH 3.58 on 10/2   - Started Colchicine 0.6mg PO daily now- ordered   - plan to restart beta blockade when hemodynamically appropriate   - Follow up labs today, monitor Leukocytosis   - EP will continue to follow   - Above discussed with Medicine ACP    RONNIE Domínguez [facility]  [phone]     Addendum- Notified by ACP that patient has monomorphic VT on telemetry, appears to be initiated with PVC, attempted unsuccessful termination with ATP with subsequent ICD shock back to NSR.  Code team called.  Discussed with CICU Fellow at bedside plan to transfer back to CICU and add Lidocaine drip for now.  Obtain limited Echo at bedside in CICU.  Will continue to  monitor closely.     RONNIE Domínguez [facility]

## 2023-10-21 NOTE — PROGRESS NOTE ADULT - NS ATTEND AMEND GEN_ALL_CORE FT
71 y/o F w/ PMHx COPD, HLD, HTN, PVD, ICM/CAD s/p PCI (has  of RCA), cardiac arrest s/p left-sided ICD (6/4/2019) complicated by infection and s/p R single-chamber ICD ([place] in 9/23/2019), recent admission for VT storm with adjustment of medication and NIPS now presenting for ICD shock in the setting of recurrent monomorphic VT with unsuccessful ATP.  Ablation attempted 10/20 but aborted due to hemodynamically significant pericardial effusion with drain placement.  Pericardial drain removed on 10/20/23.  This AM patient appears tachypneic RR 30 with O2 sat 90% on room air. She complaints of pleuritic CP with coughing only. Pericardial drain removed yesterday, c/o pleuritic chest pain with coughing.  Obtain limited echo today to assess for pericardial effusion - ordered. Check BNP and Chest XRAY now to assess for HF exacerbation. Continue on oral amiodarone load 400mg PO Q 8 hours. Monitor TSH, LFT's, ophthalmology and pulmonary function tests as outpatient. AST 10, ALT 12, baseline TSH 3.58 on 10/2. Started Colchicine 0.6mg PO daily now- ordered. Plan to restart beta blockade when hemodynamically appropriate. Follow up labs today, monitor Leukocytosis. EP will continue to follow. Above discussed with Medicine. Addendum- Notified by ACP that patient has monomorphic VT on telemetry, appears to be initiated with PVC, attempted unsuccessful termination with ATP with subsequent ICD shock back to NSR.  Code team called.  Discussed with CICU Fellow at bedside plan to transfer back to CICU and add Lidocaine drip for now.  Obtain limited Echo at bedside in CICU.  Will continue to  monitor closely.     Later on in the evening patient developed persistent atrial fibrillation.  Despite amiodarone boluses IV, AF remains persistent.  Will cardiovert now before concern for AC becomes an issue given recent pericardial effusion.
seen and agree  plan for VT ablation tomorrow
seen and agree  breakthrough VT despite meds  Plan for VT ablation wed/thurs  please continue to treat groin infection

## 2023-10-21 NOTE — PROGRESS NOTE ADULT - PROBLEM SELECTOR PLAN 4
TTE: Left ventricular systolic function is mildly decreased (45-50%)  Home Entresto and metoprolol held due to hypotension perioperatively. Now off pressors.

## 2023-10-21 NOTE — PROGRESS NOTE ADULT - NSPROGADDITIONALINFOA_GEN_ALL_CORE
Time-based billing (NON-critical care).      minutes spent on total encounter. The necessity of the time spent during the encounter on this date of service was due to:     - Reviewing, and interpreting labs, testing, and imaging.  - Independently obtaining a review of systems and performing a physical exam  - Reviewing prior records and where necessary, outpatient records.  - Counselling and educating patient regarding interpretation of aforementioned items and plan of care.    d/w ACP Time-based billing (NON-critical care).     51 minutes spent on total encounter. The necessity of the time spent during the encounter on this date of service was due to:     - Reviewing, and interpreting labs, testing, and imaging.  - Independently obtaining a review of systems and performing a physical exam  - Reviewing prior records and where necessary, outpatient records.  - Counselling and educating patient regarding interpretation of aforementioned items and plan of care.    d/w ACP

## 2023-10-21 NOTE — RAPID RESPONSE TEAM SUMMARY - NSSITUATIONBACKGROUNDRRT_GEN_ALL_CORE
72F PMHx COPD, HTN, PAD, CAD s/p LAD stent (last C showing 100%  of RCA), chronic leukocytosis of unclear etiol, cardiac arrest s/p left-sided ICD (6/4/2019) complicated by infection and s/p R single-chamber ICD reimplantation ([place] in 9/23/2019), CHF EF 45%, grade 2 DHF, and VFib s/p 13 AICD firings    Underwent VT ablation 2d prior, just downgraded from CICU.    RRT called for symptomatic VT to 220s, patient felt anxious and chest discomfort.    On arrival, patient sinus tach to 100s. Hemodynamically stable, slightly decreased SPO2 started on NC.    Patient AOx4, anxious.    Tele reviewed, true VT, shocked by AICD out of arrythmia.    Interventions:  - Lidocaine gtt started  - EP alerted, came to bedside  - CICU consulted, accepted patient back to CICU  - Mg noted to be 1.8, gave 2g mag IV and sent CBC, CMP, VBG w lytes to be followed up in CICU.

## 2023-10-21 NOTE — PROGRESS NOTE ADULT - SUBJECTIVE AND OBJECTIVE BOX
AUSTIN LEE  MRN-[id number]  Patient is a 72y old  Female who presents with a chief complaint of AICD discharge (21 Oct 2023 09:26)    HPI:  72F c hx COPD, HTN, PAD, CAD s/p LAD stent (last C showing 100%  of RCA), chronic leukocytosis of unclear etiol, cardiac arrest s/p left-sided ICD (6/4/2019) complicated by infection and s/p R single-chamber ICD reimplantation ([facility] in 9/23/2019), CHF EF 45%, grade 2 DHF, recent hospitalization for UTI (treated w/ ceftriaxone and Vanco x 3 days) and VFib s/p 13 AICD firings, pw lightheadedness and AICD firing.    Pt recently hospitalized for VFib s/p multiple aicd firing. Pt placed on quinidine, mexiletine, metoprolol. Pt reports good compliance with her medications, even though she doesn't know what the meds are. Pt states at around 3-4PM yesterday, she felt "dizzy" for a few seconds, then felt AICD shock, then had resolution of her dizziness. Denies fevers, chills, CP, SOB, URI symptoms, GI symptoms. Pt states she's never seen a hematologist for leukocytosis. Pt was supposed to f/u with EP as outpt for ablation. (16 Oct 2023 03:47)      Hospital Course:    24 HOUR EVENTS:    REVIEW OF SYSTEMS:    CONSTITUTIONAL: No weakness, fevers or chills  EYES/ENT: No visual changes;  No vertigo or throat pain   NECK: No pain or stiffness  RESPIRATORY: No cough, wheezing, hemoptysis; No shortness of breath  CARDIOVASCULAR: No chest pain or palpitations  GASTROINTESTINAL: No abdominal or epigastric pain. No nausea, vomiting, or hematemesis; No diarrhea or constipation. No melena or hematochezia.  GENITOURINARY: No dysuria, frequency or hematuria  NEUROLOGICAL: No numbness or weakness  SKIN: No itching, rashes      ICU Vital Signs Last 24 Hrs  T(C): 36.6 (21 Oct 2023 17:00), Max: 37.2 (21 Oct 2023 09:26)  T(F): 97.8 (21 Oct 2023 17:00), Max: 98.9 (21 Oct 2023 09:26)  HR: 130 (21 Oct 2023 18:00) (75 - 143)  BP: 87/54 (21 Oct 2023 18:00) (86/54 - 144/59)  BP(mean): 64 (21 Oct 2023 18:00) (64 - 113)  ABP: --  ABP(mean): --  RR: 22 (21 Oct 2023 18:00) (18 - 37)  SpO2: 96% (21 Oct 2023 18:00) (90% - 97%)    O2 Parameters below as of 21 Oct 2023 17:00  Patient On (Oxygen Delivery Method): nasal cannula  O2 Flow (L/min): 4          CVP(mm Hg): --  CO: --  CI: --  PA: --  PA(mean): --  PA(direct): --  PCWP: --  LA: --  RA: --  SVR: --  SVRI: --  PVR: --  PVRI: --  I&O's Summary    20 Oct 2023 07:01  -  21 Oct 2023 07:00  --------------------------------------------------------  IN: 514.4 mL / OUT: 275 mL / NET: 239.4 mL    21 Oct 2023 07:01  -  21 Oct 2023 20:12  --------------------------------------------------------  IN: 127.5 mL / OUT: 200 mL / NET: -72.5 mL        CAPILLARY BLOOD GLUCOSE    CAPILLARY BLOOD GLUCOSE      POCT Blood Glucose.: 144 mg/dL (21 Oct 2023 09:30)      PHYSICAL EXAM:  GENERAL: No acute distress, well-developed  HEAD:  Atraumatic, Normocephalic  EYES: EOMI, PERRLA, conjunctiva and sclera clear  NECK: Supple, no lymphadenopathy, no JVD  CHEST/LUNG: CTAB; No wheezes, rales, or rhonchi  HEART: Regular rate and rhythm. Normal S1/S2. No murmurs, rubs, or gallops  ABDOMEN: Soft, non-tender, non-distended; normal bowel sounds, no organomegaly  EXTREMITIES:  2+ peripheral pulses b/l, No clubbing, cyanosis, or edema  NEUROLOGY: A&O x 3, no focal deficits  SKIN: No rashes or lesions    ============================I/O===========================   I&O's Detail    20 Oct 2023 07:01  -  21 Oct 2023 07:00  --------------------------------------------------------  IN:    IV PiggyBack: 50 mL    Norepinephrine: 14.4 mL    Oral Fluid: 450 mL  Total IN: 514.4 mL    OUT:    Voided (mL): 275 mL  Total OUT: 275 mL    Total NET: 239.4 mL      21 Oct 2023 07:01  -  21 Oct 2023 20:12  --------------------------------------------------------  IN:    Lidocaine: 67.5 mL    Oral Fluid: 60 mL  Total IN: 127.5 mL    OUT:    Voided (mL): 200 mL  Total OUT: 200 mL    Total NET: -72.5 mL        ============================ LABS =========================                        12.2   25.36 )-----------( 253      ( 21 Oct 2023 17:23 )             36.5     10-21    133<L>  |  102  |  22  ----------------------------<  150<H>  3.9   |  18<L>  |  0.71    Ca    8.6      21 Oct 2023 17:23  Phos  2.4     10-21  Mg     2.8     10-21    TPro  5.8<L>  /  Alb  2.6<L>  /  TBili  0.5  /  DBili  x   /  AST  11  /  ALT  12  /  AlkPhos  64  10-21                LIVER FUNCTIONS - ( 21 Oct 2023 17:23 )  Alb: 2.6 g/dL / Pro: 5.8 g/dL / ALK PHOS: 64 U/L / ALT: 12 U/L / AST: 11 U/L / GGT: x           PT/INR - ( 20 Oct 2023 02:20 )   PT: 12.9 sec;   INR: 1.18 ratio         PTT - ( 20 Oct 2023 02:20 )  PTT:26.4 sec  ABG - ( 20 Oct 2023 06:48 )  pH, Arterial: 7.35  pH, Blood: x     /  pCO2: 32    /  pO2: 85    / HCO3: 18    / Base Excess: -6.9  /  SaO2: 97.8              Blood Gas Venous - Lactate: 2.1 mmol/L (10-21-23 @ 17:20)  Lactate, Blood: 1.5 mmol/L (10-20-23 @ 17:31)  Blood Gas Arterial, Lactate: 1.0 mmol/L (10-20-23 @ 06:48)  Blood Gas Arterial, Lactate: 0.9 mmol/L (10-20-23 @ 01:55)  Blood Gas Arterial, Lactate: 1.6 mmol/L (10-19-23 @ 20:50)  Blood Gas Arterial, Lactate: 1.6 mmol/L (10-19-23 @ 19:12)  Blood Gas Arterial, Lactate: 1.4 mmol/L (10-19-23 @ 17:53)    Urinalysis Basic - ( 21 Oct 2023 17:23 )    Color: x / Appearance: x / SG: x / pH: x  Gluc: 150 mg/dL / Ketone: x  / Bili: x / Urobili: x   Blood: x / Protein: x / Nitrite: x   Leuk Esterase: x / RBC: x / WBC x   Sq Epi: x / Non Sq Epi: x / Bacteria: x      ======================Micro/Rad/Cardio=================  Telemtry: Reviewed   EKG: Reviewed  CXR: Reviewed  Culture: Reviewed   Echo: Transthoracic Echocardiogram:   Patient name: AUSTIN LEE  YOB: 1950   Age: 68 (F)   MR#: [id number]  Study Date: 8/14/2019  Location: O/PSonographer: Sherri Cadet RDCS    ======================================================  PAST MEDICAL & SURGICAL HISTORY:  Obese      Smoker      HTN (hypertension)      HLD (hyperlipidemia)      CAD (coronary artery disease)      CHF with cardiomyopathy      History of hip surgery  ====================ASSESSMENT ==============  72F PMHx COPD, HTN, PAD, CAD s/p LAD stent (last LHC showing 100%  of RCA), chronic leukocytosis of unclear etiol, cardiac arrest s/p left-sided ICD (6/4/2019) complicated by infection and s/p R single-chamber ICD reimplantation ([facility] in 9/23/2019), CHF EF 45%, grade 2 DHF, and VFib s/p 13 AICD firings underwent VT ablation today c/b pericardial tamponade. Transferred to CICU for further monitoring. 10/21 developed AF w/ RVR.    PLAN  ===NEURO===  #No active issues  - A&O x3    ===RESPIRATORY===  #Hx COPD  - Currently on room air sat >88%    ===CARDIOVASCULAR===  #Pericardial Tamponade  - s/p VT ablation c/b pericardial tamponade  - pericardial drain with 40 cc drained initially, 250cc in bag upon arrival to CICU  - Started on Jarred gtt and levo gtt, currently only on 0.08 mcg/hr levo gtt  - wean pressors as tolerated  - s/p 1350cc NS and 250cc albumin intraop  -  f/u TTE  - Monitor drain output, has slowed to minimal output   - Trend CBC    #AF w/ RVR  #History VT  - Started 400 amio TID PO per EP  - VT ablation aborted after tamponade  - has AICD  - replete lytes as needed to maintain K>4, Mag>2  - fu EP recs   - will need q6mo TSH / LFT, annual CXR and eye exam as outpatient (amiodarone)  - New AF w/ RVR on 10/21 PM, s/p Amio 150mg IV x1  - Would consider r/s AC soon given new AF w/ RVR, pending resolution of bloody tamponade    #CHF  - TTE 10/5 EF 55%, reg WMA, mild-mod MS  - Hold metoprolol and entresto i/s/o hypotension, restart as tolerated and per EP     #CAD s/p stents  - Continue plavix    ===GI===  #No active issues  - Monitor for BM  - DASH Diet    ===RENAL===  #No active issues  - Strict I&O's  - Replete lytes as needed to maintain K>4, Mag>2    ===ENDO===  #No active issues  - Monitor glucose on CMP  - TSH wnl    ===HEME-ONC===  #Pericardial tamponade  - Would consider r/s AC soon given new AF w/ RVR, pending resolution of bloody tamponade  - Trend CBC  - No blood products given    ===ID===  #Chronic leukocytosis  - 1 dose vanco given  - Give CTX now for gram negative coverage  - Blood cultures sent, f/u results  - reports of pus from previous groin access, ordering ultrasound to r/o abscess.      Patient requires continuous monitoring with bedside rhythm monitoring, pulse ox monitoring, and intermittent blood gas analysis. Care plan discussed with ICU care team. Patient remained critical and at risk for life threatening decompensation.  Patient seen, examined and plan discussed with CCU team during rounds.     I have personally provided ____ minutes of critical care time excluding time spent on separate procedures, in addition to initial critical care time provided by the CICU Attending, Dr. Recinos.    By signing my name below, I, Brandee Spencer, attest that this documentation has been prepared under the direction and in the presence of Annalee Chandler NP.  Electronically signed: Dallin Spaulding, 10-21-23 @ 20:12    ELDA, Annalee Chandler_ , personally performed the services described in this documentation. all medical record entries made by the scribe were at my direction and in my presence. I have reviewed the chart and agree that the record reflects my personal performance and is accurate and complete  Electronically signed: Annalee Chandler NP.       AUSTIN LEE  MRN-[id number]  Patient is a 72y old  Female who presents with a chief complaint of AICD discharge (21 Oct 2023 09:26)    HPI:  72F c hx COPD, HTN, PAD, CAD s/p LAD stent (last LHC showing 100%  of RCA), chronic leukocytosis of unclear etiol, cardiac arrest s/p left-sided ICD (6/4/2019) complicated by infection and s/p R single-chamber ICD reimplantation ([facility] in 9/23/2019), CHF EF 45%, grade 2 DHF, recent hospitalization for UTI (treated w/ ceftriaxone and Vanco x 3 days) and VFib s/p 13 AICD firings, pw lightheadedness and AICD firing.    Pt recently hospitalized for VFib s/p multiple aicd firing. Pt placed on quinidine, mexiletine, metoprolol. Pt reports good compliance with her medications, even though she doesn't know what the meds are. Pt states at around 3-4PM yesterday, she felt "dizzy" for a few seconds, then felt AICD shock, then had resolution of her dizziness. Denies fevers, chills, CP, SOB, URI symptoms, GI symptoms. Pt states she's never seen a hematologist for leukocytosis. Pt was supposed to f/u with EP as outpt for ablation. (16 Oct 2023 03:47)      Hospital Course:    24 HOUR EVENTS:  s/p DCCV x2 at 200J with conversion to NSR, heparin gtt started     REVIEW OF SYSTEMS:    CONSTITUTIONAL: No weakness, fevers or chills  EYES/ENT: No visual changes;  No vertigo or throat pain   NECK: No pain or stiffness  RESPIRATORY: No cough, wheezing, hemoptysis; No shortness of breath  CARDIOVASCULAR: No chest pain or palpitations  GASTROINTESTINAL: No abdominal or epigastric pain. No nausea, vomiting, or hematemesis; No diarrhea or constipation. No melena or hematochezia.  GENITOURINARY: No dysuria, frequency or hematuria  NEUROLOGICAL: No numbness or weakness  SKIN: No itching, rashes      ICU Vital Signs Last 24 Hrs  T(C): 36.6 (21 Oct 2023 17:00), Max: 37.2 (21 Oct 2023 09:26)  T(F): 97.8 (21 Oct 2023 17:00), Max: 98.9 (21 Oct 2023 09:26)  HR: 130 (21 Oct 2023 18:00) (75 - 143)  BP: 87/54 (21 Oct 2023 18:00) (86/54 - 144/59)  BP(mean): 64 (21 Oct 2023 18:00) (64 - 113)  ABP: --  ABP(mean): --  RR: 22 (21 Oct 2023 18:00) (18 - 37)  SpO2: 96% (21 Oct 2023 18:00) (90% - 97%)    O2 Parameters below as of 21 Oct 2023 17:00  Patient On (Oxygen Delivery Method): nasal cannula  O2 Flow (L/min): 4        I&O's Summary    20 Oct 2023 07:01  -  21 Oct 2023 07:00  --------------------------------------------------------  IN: 514.4 mL / OUT: 275 mL / NET: 239.4 mL    21 Oct 2023 07:01  -  21 Oct 2023 20:12  --------------------------------------------------------  IN: 127.5 mL / OUT: 200 mL / NET: -72.5 mL        CAPILLARY BLOOD GLUCOSE    CAPILLARY BLOOD GLUCOSE      POCT Blood Glucose.: 144 mg/dL (21 Oct 2023 09:30)      PHYSICAL EXAM:  GENERAL: No acute distress, well-developed  HEAD:  Atraumatic, Normocephalic  EYES: EOMI, PERRLA, conjunctiva and sclera clear  NECK: Supple, no lymphadenopathy, no JVD  CHEST/LUNG: CTAB; No wheezes, rales, or rhonchi  HEART: Regular rate and rhythm. Normal S1/S2. No murmurs, rubs, or gallops  ABDOMEN: Soft, non-tender, non-distended; normal bowel sounds, no organomegaly  EXTREMITIES:  2+ peripheral pulses b/l, No clubbing, cyanosis, or edema  NEUROLOGY: A&O x 3, no focal deficits  SKIN: No rashes or lesions    ============================I/O===========================   I&O's Detail    20 Oct 2023 07:01  -  21 Oct 2023 07:00  --------------------------------------------------------  IN:    IV PiggyBack: 50 mL    Norepinephrine: 14.4 mL    Oral Fluid: 450 mL  Total IN: 514.4 mL    OUT:    Voided (mL): 275 mL  Total OUT: 275 mL    Total NET: 239.4 mL      21 Oct 2023 07:01  -  21 Oct 2023 20:12  --------------------------------------------------------  IN:    Lidocaine: 67.5 mL    Oral Fluid: 60 mL  Total IN: 127.5 mL    OUT:    Voided (mL): 200 mL  Total OUT: 200 mL    Total NET: -72.5 mL        ============================ LABS =========================                        12.2   25.36 )-----------( 253      ( 21 Oct 2023 17:23 )             36.5     10-21    133<L>  |  102  |  22  ----------------------------<  150<H>  3.9   |  18<L>  |  0.71    Ca    8.6      21 Oct 2023 17:23  Phos  2.4     10-21  Mg     2.8     10-21    TPro  5.8<L>  /  Alb  2.6<L>  /  TBili  0.5  /  DBili  x   /  AST  11  /  ALT  12  /  AlkPhos  64  10-21                LIVER FUNCTIONS - ( 21 Oct 2023 17:23 )  Alb: 2.6 g/dL / Pro: 5.8 g/dL / ALK PHOS: 64 U/L / ALT: 12 U/L / AST: 11 U/L / GGT: x           PT/INR - ( 20 Oct 2023 02:20 )   PT: 12.9 sec;   INR: 1.18 ratio         PTT - ( 20 Oct 2023 02:20 )  PTT:26.4 sec  ABG - ( 20 Oct 2023 06:48 )  pH, Arterial: 7.35  pH, Blood: x     /  pCO2: 32    /  pO2: 85    / HCO3: 18    / Base Excess: -6.9  /  SaO2: 97.8              Blood Gas Venous - Lactate: 2.1 mmol/L (10-21-23 @ 17:20)  Lactate, Blood: 1.5 mmol/L (10-20-23 @ 17:31)  Blood Gas Arterial, Lactate: 1.0 mmol/L (10-20-23 @ 06:48)  Blood Gas Arterial, Lactate: 0.9 mmol/L (10-20-23 @ 01:55)  Blood Gas Arterial, Lactate: 1.6 mmol/L (10-19-23 @ 20:50)  Blood Gas Arterial, Lactate: 1.6 mmol/L (10-19-23 @ 19:12)  Blood Gas Arterial, Lactate: 1.4 mmol/L (10-19-23 @ 17:53)    Urinalysis Basic - ( 21 Oct 2023 17:23 )    Color: x / Appearance: x / SG: x / pH: x  Gluc: 150 mg/dL / Ketone: x  / Bili: x / Urobili: x   Blood: x / Protein: x / Nitrite: x   Leuk Esterase: x / RBC: x / WBC x   Sq Epi: x / Non Sq Epi: x / Bacteria: x      ======================Micro/Rad/Cardio=================  Telemtry: Reviewed   EKG: Reviewed  CXR: Reviewed  Culture: Reviewed   Echo: Transthoracic Echocardiogram:   Patient name: AUSTIN LEE  YOB: 1950   Age: 68 (F)   MR#: [id number]  Study Date: 8/14/2019  Location: O/PSonographer: Sherri Cadet RDCS    ======================================================  PAST MEDICAL & SURGICAL HISTORY:  Obese      Smoker      HTN (hypertension)      HLD (hyperlipidemia)      CAD (coronary artery disease)      CHF with cardiomyopathy      History of hip surgery  ====================ASSESSMENT ==============  72F PMHx COPD, HTN, PAD, CAD s/p LAD stent (last LHC showing 100%  of RCA), chronic leukocytosis of unclear etiol, cardiac arrest s/p left-sided ICD (6/4/2019) complicated by infection and s/p R single-chamber ICD reimplantation ([facility] in 9/23/2019), CHF EF 45%, grade 2 DHF, and VFib s/p 13 AICD firings underwent VT ablation today c/b pericardial tamponade. Transferred to CICU for further monitoring. 10/21 developed AF w/ RVR.    PLAN  ===NEURO===  #No active issues  - A&O x3    ===RESPIRATORY===  #Hx COPD  - Currently on room air sat >88%    ===CARDIOVASCULAR===  #Pericardial Tamponade  - s/p VT ablation c/b pericardial tamponade  - pericardial drain with 40 cc drained initially, 250cc in bag upon arrival to CICU  -s/p drainage removal 10/20  -  f/u TTE in the AM to assess for reaccumulation   - Trend CBC    #AF w/ RVR  -new onset  -s/p DCCVx2 at 200J, now in NSR  -start heparin gtt per protocol      # VT  - Started 400 amio TID PO per EP  - VT ablation aborted after tamponade  -c/w lido gtt, wean as tolerated   - has AICD  - replete lytes as needed to maintain K>4, Mag>2  - fu EP recs   - will need q6mo TSH / LFT, annual CXR and eye exam as outpatient (amiodarone)  - New AF w/ RVR on 10/21 PM, s/p Amio 150mg IV x1  - Would consider r/s AC soon given new AF w/ RVR, pending resolution of bloody tamponade    #CHF  - TTE 10/5 EF 55%, reg WMA, mild-mod MS  - Hold metoprolol and entresto i/s/o hypotension, restart as tolerated and per EP     #CAD s/p stents  - Continue plavix    ===GI===  #No active issues  - Monitor for BM  - DASH Diet    ===RENAL===  #No active issues  - Strict I&O's  - Replete lytes as needed to maintain K>4, Mag>2    ===ENDO===  #No active issues  - Monitor glucose on CMP  - TSH wnl    ===HEME-ONC===  #Pericardial tamponade  - Would consider r/s AC soon given new AF w/ RVR, pending resolution of bloody tamponade  - Trend CBC  - No blood products given    ===ID===  #Chronic leukocytosis  - 1 dose vanco given  - Give CTX now for gram negative coverage  - Blood cultures sent, f/u results  - reports of pus from previous groin access, ordering ultrasound to r/o abscess.      Patient requires continuous monitoring with bedside rhythm monitoring, pulse ox monitoring, and intermittent blood gas analysis. Care plan discussed with ICU care team. Patient remained critical and at risk for life threatening decompensation.  Patient seen, examined and plan discussed with CCU team during rounds.     I have personally provided __30__ minutes of critical care time excluding time spent on separate procedures, in addition to initial critical care time provided by the CICU Attending, Dr. Recinos.    By signing my name below, I, Brandee Spencer, attest that this documentation has been prepared under the direction and in the presence of Annalee Chandler NP.  Electronically signed: Dallin Spaulding, 10-21-23 @ 20:12    IAnnalee_ , personally performed the services described in this documentation. all medical record entries made by the lashawnibmary were at my direction and in my presence. I have reviewed the chart and agree that the record reflects my personal performance and is accurate and complete  Electronically signed: Annalee Chandler NP.

## 2023-10-21 NOTE — PROGRESS NOTE ADULT - PROBLEM SELECTOR PLAN 1
EP following.  c/w amiodarone - will need q6mo TSH / LFT, annual CXR and eye exam as outpatient EP following.  c/w amiodarone - will need q6mo TSH / LFT, annual CXR and eye exam as outpatient    s/p 2G Mg. Now on Lidocaine drip.

## 2023-10-21 NOTE — PROGRESS NOTE ADULT - PROBLEM SELECTOR PLAN 6
Chronic   BClx: NGTD  UA with LE, WBC. Follow-up UClx  Empirically on Ceftriaxone. DC if culture negative    Outpatient hematology follow-up

## 2023-10-21 NOTE — PROGRESS NOTE ADULT - ASSESSMENT
72F c hx COPD, HTN, PAD, CAD s/p LAD stent (last LHC showing 100%  of RCA), chronic leukocytosis of unclear etiol, cardiac arrest s/p left-sided ICD (6/4/2019) complicated by infection and s/p R single-chamber ICD reimplantation ([facility] in 9/23/2019), CHF EF 45%, grade 2 DHF, recent hospitalization for UTI (treated w/ ceftriaxone and Vanco x 3 days) and VFib s/p 13 AICD firings, pw lightheadedness and AICD firing.   Initiall downgraded from CICU but with Vtach and ACID firing this AM. To be transferred back to CICU.

## 2023-10-21 NOTE — PROGRESS NOTE ADULT - SUBJECTIVE AND OBJECTIVE BOX
[facility] Division of Hospital Medicine  Seble Ruiz DO  Pager (M-F, 8A-5P): MS Teams PREFERRED    ACCEPT NOTE     Hospital Course:   72F c hx COPD, HTN, PAD, CAD s/p LAD stent (last [facility] showing 100%  of RCA), chronic leukocytosis of unclear etiol, cardiac arrest s/p left-sided ICD (6/4/2019) complicated by infection and s/p R single-chamber ICD reimplantation ([place] in 9/23/2019), CHF EF 45%, grade 2 DHF, recent hospitalization for UTI (treated w/ ceftriaxone and Vanco x 3 days) and VFib s/p 13 AICD firings, pw lightheadedness and AICD firing.    Pt went for VT ablation on 10/19 which was aborted 2/2 cardiac tamponade causing hypotension requiring pressors. A pericardial drain was placed. In the CICU, pressors were weaned and the drain was removed after decreased output and a TTE showing no pericardial effusion. Downgraded from CICU.     SUBJECTIVE / OVERNIGHT EVENTS:  ADDITIONAL REVIEW OF SYSTEMS:    MEDICATIONS  (STANDING):  aMIOdarone    Tablet 400 milliGRAM(s) Oral every 8 hours  aMIOdarone    Tablet   Oral   atorvastatin 40 milliGRAM(s) Oral at bedtime  budesonide 160 MICROgram(s)/formoterol 4.5 MICROgram(s) Inhaler 2 Puff(s) Inhalation two times a day  cefTRIAXone   IVPB 1000 milliGRAM(s) IV Intermittent every 24 hours  chlorhexidine 2% Cloths 1 Application(s) Topical <User Schedule>  clopidogrel Tablet 75 milliGRAM(s) Oral daily  colchicine 0.6 milliGRAM(s) Oral daily  influenza  Vaccine (HIGH DOSE) 0.7 milliLiter(s) IntraMuscular once  nystatin/triamcinolone Cream 1 Application(s) Topical two times a day    MEDICATIONS  (PRN):      I&O's Summary    20 Oct 2023 07:01  -  21 Oct 2023 07:00  --------------------------------------------------------  IN: 514.4 mL / OUT: 275 mL / NET: 239.4 mL        PHYSICAL EXAM:  Vital Signs Last 24 Hrs  T(C): 36.7 (21 Oct 2023 04:44), Max: 37.1 (20 Oct 2023 19:00)  T(F): 98.1 (21 Oct 2023 04:44), Max: 98.7 (20 Oct 2023 19:00)  HR: 90 (21 Oct 2023 04:44) (60 - 90)  BP: 117/65 (21 Oct 2023 04:44) (102/52 - 147/63)  BP(mean): 85 (21 Oct 2023 00:00) (68 - 113)  RR: 18 (21 Oct 2023 04:44) (18 - 38)  SpO2: 90% (21 Oct 2023 04:44) (90% - 97%)    Parameters below as of 21 Oct 2023 00:26  Patient On (Oxygen Delivery Method): room air          LABS:                        12.8   24.95 )-----------( 268      ( 20 Oct 2023 06:57 )             39.3     10-20    135  |  105  |  24<H>  ----------------------------<  160<H>  4.4   |  14<L>  |  0.83    Ca    8.6      20 Oct 2023 17:31  Phos  3.6     10-20  Mg     1.8     10-20    TPro  5.8<L>  /  Alb  2.7<L>  /  TBili  0.4  /  DBili  x   /  AST  14  /  ALT  13  /  AlkPhos  60  10-20    PT/INR - ( 20 Oct 2023 02:20 )   PT: 12.9 sec;   INR: 1.18 ratio         PTT - ( 20 Oct 2023 02:20 )  PTT:26.4 sec      Urinalysis Basic - ( 20 Oct 2023 17:31 )    Color: x / Appearance: x / SG: x / pH: x  Gluc: 160 mg/dL / Ketone: x  / Bili: x / Urobili: x   Blood: x / Protein: x / Nitrite: x   Leuk Esterase: x / RBC: x / WBC x   Sq Epi: x / Non Sq Epi: x / Bacteria: x        Culture - Blood (collected 19 Oct 2023 20:50)  Source: .Blood Blood  Preliminary Report (21 Oct 2023 01:02):    No growth at 24 hours    Culture - Blood (collected 19 Oct 2023 20:45)  Source: .Blood Blood  Preliminary Report (21 Oct 2023 01:02):    No growth at 24 hours        RADIOLOGY & ADDITIONAL TESTS:  Results Reviewed:   Imaging Personally Reviewed:  Electrocardiogram Personally Reviewed:    COORDINATION OF CARE:  Care Discussed with Consultants/Other Providers [Y/N]: Y  Prior or Outpatient Records Reviewed [Y/N]: Y   [facility] Division of Hospital Medicine  Seble DO Joseph  Pager (M-F, 8A-5P): MS Teams PREFERRED      Hospital Course:   72F c hx COPD, HTN, PAD, CAD s/p LAD stent (last C showing 100%  of RCA), chronic leukocytosis of unclear etiol, cardiac arrest s/p left-sided ICD (6/4/2019) complicated by infection and s/p R single-chamber ICD reimplantation ([place] in 9/23/2019), CHF EF 45%, grade 2 DHF, recent hospitalization for UTI (treated w/ ceftriaxone and Vanco x 3 days) and VFib s/p 13 AICD firings, pw lightheadedness and AICD firing.    Pt went for VT ablation on 10/19 which was aborted 2/2 cardiac tamponade causing hypotension requiring pressors. A pericardial drain was placed. In the CICU, pressors were weaned and the drain was removed after decreased output and a TTE showing no pericardial effusion. Downgraded from CICU.     SUBJECTIVE / OVERNIGHT EVENTS: Patient initially accepted for transfer from CICU. This morning, she reported chest pain. VTach was noted with subsequent AICD firing. She was also dyspneic. RRT was called (see event note). Patient started on Lidocaine drip and to be transferred back to CICU.     ADDITIONAL REVIEW OF SYSTEMS:    MEDICATIONS  (STANDING):  aMIOdarone    Tablet 400 milliGRAM(s) Oral every 8 hours  aMIOdarone    Tablet   Oral   atorvastatin 40 milliGRAM(s) Oral at bedtime  budesonide 160 MICROgram(s)/formoterol 4.5 MICROgram(s) Inhaler 2 Puff(s) Inhalation two times a day  cefTRIAXone   IVPB 1000 milliGRAM(s) IV Intermittent every 24 hours  chlorhexidine 2% Cloths 1 Application(s) Topical <User Schedule>  clopidogrel Tablet 75 milliGRAM(s) Oral daily  colchicine 0.6 milliGRAM(s) Oral daily  influenza  Vaccine (HIGH DOSE) 0.7 milliLiter(s) IntraMuscular once  nystatin/triamcinolone Cream 1 Application(s) Topical two times a day    MEDICATIONS  (PRN):      I&O's Summary    20 Oct 2023 07:01  -  21 Oct 2023 07:00  --------------------------------------------------------  IN: 514.4 mL / OUT: 275 mL / NET: 239.4 mL        PHYSICAL EXAM:  Vital Signs Last 24 Hrs  T(C): 36.7 (21 Oct 2023 04:44), Max: 37.1 (20 Oct 2023 19:00)  T(F): 98.1 (21 Oct 2023 04:44), Max: 98.7 (20 Oct 2023 19:00)  HR: 90 (21 Oct 2023 04:44) (60 - 90)  BP: 117/65 (21 Oct 2023 04:44) (102/52 - 147/63)  BP(mean): 85 (21 Oct 2023 00:00) (68 - 113)  RR: 18 (21 Oct 2023 04:44) (18 - 38)  SpO2: 90% (21 Oct 2023 04:44) (90% - 97%)    Parameters below as of 21 Oct 2023 00:26  Patient On (Oxygen Delivery Method): room air    CONSTITUTIONAL: Anxious-appearing.   EYES: Conjunctiva and sclera clear  ENMT: Moist oral mucosa, no pharyngeal injection or exudatesNECK: Supple, midline  RESPIRATORY: Tachypneic. Mild crackles at bases.   CARDIOVASCULAR: Regular rate and rhythm, normal S1 and S2.  ABDOMEN: Nontender to palpation, normoactive bowel sounds, no rebound/guarding  PSYCH: A+O to person, place, and time; anxious  NEUROLOGY: Moving all four extremities.   LABS:                        12.8   24.95 )-----------( 268      ( 20 Oct 2023 06:57 )             39.3     10-20    135  |  105  |  24<H>  ----------------------------<  160<H>  4.4   |  14<L>  |  0.83    Ca    8.6      20 Oct 2023 17:31  Phos  3.6     10-20  Mg     1.8     10-20    TPro  5.8<L>  /  Alb  2.7<L>  /  TBili  0.4  /  DBili  x   /  AST  14  /  ALT  13  /  AlkPhos  60  10-20    PT/INR - ( 20 Oct 2023 02:20 )   PT: 12.9 sec;   INR: 1.18 ratio         PTT - ( 20 Oct 2023 02:20 )  PTT:26.4 sec      Urinalysis Basic - ( 20 Oct 2023 17:31 )    Color: x / Appearance: x / SG: x / pH: x  Gluc: 160 mg/dL / Ketone: x  / Bili: x / Urobili: x   Blood: x / Protein: x / Nitrite: x   Leuk Esterase: x / RBC: x / WBC x   Sq Epi: x / Non Sq Epi: x / Bacteria: x        Culture - Blood (collected 19 Oct 2023 20:50)  Source: .Blood Blood  Preliminary Report (21 Oct 2023 01:02):    No growth at 24 hours    Culture - Blood (collected 19 Oct 2023 20:45)  Source: .Blood Blood  Preliminary Report (21 Oct 2023 01:02):    No growth at 24 hours        RADIOLOGY & ADDITIONAL TESTS:  Results Reviewed:   Imaging Personally Reviewed:  Electrocardiogram Personally Reviewed:    COORDINATION OF CARE:  Care Discussed with Consultants/Other Providers [Y/N]: Y  Prior or Outpatient Records Reviewed [Y/N]: Y

## 2023-10-21 NOTE — CHART NOTE - NSCHARTNOTEFT_GEN_A_CORE
CCU Accept Note    AUSTIN LEE  72y  Patient is a 72y old  Female who presents with a chief complaint of AICD discharge (21 Oct 2023 09:26)      ====================  HPI & Hospital Course:   72F c hx COPD, HTN, PAD, CAD s/p LAD stent (last LHC showing 100%  of RCA), chronic leukocytosis of unclear etiol, cardiac arrest s/p left-sided ICD (6/4/2019) complicated by infection and s/p R single-chamber ICD reimplantation ([place] in 9/23/2019), CHF EF 45%, grade 2 DHF, recent hospitalization for UTI (treated w/ ceftriaxone and Vanco x 3 days) and VFib s/p 13 AICD firings, pw lightheadedness and AICD firing.    Pt recently hospitalized for VFib s/p multiple aicd firing. Pt placed on quinidine, mexiletine, metoprolol. Pt reports good compliance with her medications, even though she doesn't know what the meds are. Pt states at around 3-4PM yesterday, she felt "dizzy" for a few seconds, then felt AICD shock, then had resolution of her dizziness. Denies fevers, chills, CP, SOB, URI symptoms, GI symptoms. Pt states she's never seen a hematologist for leukocytosis. Pt was supposed to f/u with EP as outpt for ablation.    Admitted to CICU on arrival. Pt went for VT ablation on 10/19 which was aborted 2/2 cardiac tamponade causing hypotension requiring pressors. A pericardial drain was placed. In the CICU, pressors were weaned and the drain was removed after decreased output and a TTE showing no pericardial effusion. EP continues to follow though is unlikely to do any further procedure this admission. Patient chantal downgraded to medicine floor on 10/20, however had another episode of VT x 1 minutes    Past Medical History:     Past Surgical History:     Home Medications:  atorvastatin 40 mg oral tablet: 1 tab(s) orally once a day (at bedtime) (16 Oct 2023 05:51)  D3 50 mcg (2000 intl units) oral capsule: 1 cap(s) orally (16 Oct 2023 05:51)  Lasix 40 mg oral tablet: 1 tab(s) orally once a day (16 Oct 2023 05:51)  Multiple Vitamins oral tablet: 1 tab(s) orally once a day (16 Oct 2023 05:51)  Plavix 75 mg oral tablet: 1 tab(s) orally once a day (16 Oct 2023 05:51)  Symbicort 160 mcg-4.5 mcg/inh inhalation aerosol: 2 puff(s) inhaled once a day (16 Oct 2023 05:51)      Current Medications:   MEDICATIONS  (STANDING):  aMIOdarone    Tablet 400 milliGRAM(s) Oral every 8 hours  aMIOdarone    Tablet   Oral   atorvastatin 40 milliGRAM(s) Oral at bedtime  budesonide 160 MICROgram(s)/formoterol 4.5 MICROgram(s) Inhaler 2 Puff(s) Inhalation two times a day  cefTRIAXone   IVPB 1000 milliGRAM(s) IV Intermittent every 24 hours  chlorhexidine 2% Cloths 1 Application(s) Topical <User Schedule>  clopidogrel Tablet 75 milliGRAM(s) Oral daily  colchicine 0.6 milliGRAM(s) Oral daily  influenza  Vaccine (HIGH DOSE) 0.7 milliLiter(s) IntraMuscular once  lidocaine   Infusion 1 mG/Min (7.5 mL/Hr) IV Continuous <Continuous>  magnesium sulfate  IVPB 2 Gram(s) IV Intermittent once  nystatin/triamcinolone Cream 1 Application(s) Topical two times a day    MEDICATIONS  (PRN):      Allergies:     Family History:     Social History:     ====================  Vital Signs Last 24 Hrs  T(C): 37.2 (21 Oct 2023 09:26), Max: 37.2 (21 Oct 2023 09:26)  T(F): 98.9 (21 Oct 2023 09:26), Max: 98.9 (21 Oct 2023 09:26)  HR: 94 (21 Oct 2023 10:23) (66 - 100)  BP: 131/50 (21 Oct 2023 10:23) (102/52 - 144/59)  BP(mean): 72 (21 Oct 2023 10:23) (68 - 113)  RR: 30 (21 Oct 2023 10:23) (18 - 38)  SpO2: 95% (21 Oct 2023 10:23) (90% - 96%)    Parameters below as of 21 Oct 2023 09:26  Patient On (Oxygen Delivery Method): nasal cannula  O2 Flow (L/min): 3      Adult Advanced Hemodynamics Last 24 Hrs  CVP(mm Hg): --  CVP(cm H2O): --  CO: --  CI: --  PA: --  PA(mean): --  PCWP: --  SVR: --  SVRI: --  PVR: --  PVRI: --    Physical Exam:   General: NAD  HEENT: PERRL, EOMI, normal sclera and conjunctiva, no oral lesions  Neck: Supple, no JVD  Lungs: CTA bilaterally  Heart: RRR, normal S1S2, no murmurs/rubs/gallops  Abdomen: Soft, ND/NT  Extremities: 2+ peripheral pulses, no cyanosis/clubbing/edema, full ROM  Skin: Warm, well-perfused  Neuro: A&O x3, no focal deficits      ====================  Labs & Imaging:   CBC Full  -  ( 21 Oct 2023 09:48 )  WBC Count : 27.89 K/uL  RBC Count : 4.09 M/uL  Hemoglobin : 11.9 g/dL  Hematocrit : 35.7 %  Platelet Count - Automated : 249 K/uL  Mean Cell Volume : 87.3 fl  Mean Cell Hemoglobin : 29.1 pg  Mean Cell Hemoglobin Concentration : 33.3 gm/dL  Auto Neutrophil # : 22.83 K/uL  Auto Lymphocyte # : 2.46 K/uL  Auto Monocyte # : 2.15 K/uL  Auto Eosinophil # : 0.00 K/uL  Auto Basophil # : 0.09 K/uL  Auto Neutrophil % : 81.9 %  Auto Lymphocyte % : 8.8 %  Auto Monocyte % : 7.7 %  Auto Eosinophil % : 0.0 %  Auto Basophil % : 0.3 %    10-21    133<L>  |  104  |  23  ----------------------------<  160<H>  4.3   |  19<L>  |  0.71    Ca    8.7      21 Oct 2023 09:48  Phos  2.3     10-21  Mg     2.4     10-21    TPro  6.0  /  Alb  2.5<L>  /  TBili  0.4  /  DBili  x   /  AST  10  /  ALT  12  /  AlkPhos  60  10-21    PT/INR - ( 20 Oct 2023 02:20 )   PT: 12.9 sec;   INR: 1.18 ratio         PTT - ( 20 Oct 2023 02:20 )  PTT:26.4 sec  ABG - ( 20 Oct 2023 06:48 )  pH, Arterial: 7.35  pH, Blood: x     /  pCO2: 32    /  pO2: 85    / HCO3: 18    / Base Excess: -6.9  /  SaO2: 97.8                    Culture - Blood (collected 19 Oct 2023 20:50)  Source: .Blood Blood  Preliminary Report (21 Oct 2023 01:02):    No growth at 24 hours    Culture - Blood (collected 19 Oct 2023 20:45)  Source: .Blood Blood  Preliminary Report (21 Oct 2023 01:02):    No growth at 24 hours      Urinalysis Basic - ( 21 Oct 2023 09:48 )    Color: x / Appearance: x / SG: x / pH: x  Gluc: 160 mg/dL / Ketone: x  / Bili: x / Urobili: x   Blood: x / Protein: x / Nitrite: x   Leuk Esterase: x / RBC: x / WBC x   Sq Epi: x / Non Sq Epi: x / Bacteria: x          ====================  Assessment & Plan:     ====================      Janie Mclaughlin MD PGY2 CCU Accept Note    AUSTIN LEE  72y  Patient is a 72y old  Female who presents with a chief complaint of AICD discharge (21 Oct 2023 09:26)      ====================  HPI & Hospital Course:   72F c hx COPD, HTN, PAD, CAD s/p LAD stent (last LHC showing 100%  of RCA), chronic leukocytosis of unclear etiol, cardiac arrest s/p left-sided ICD (6/4/2019) complicated by infection and s/p R single-chamber ICD reimplantation ([place] in 9/23/2019), CHF EF 45%, grade 2 DHF, recent hospitalization for UTI (treated w/ ceftriaxone and Vanco x 3 days) and VFib s/p 13 AICD firings, pw lightheadedness and AICD firing.    Pt recently hospitalized for VFib s/p multiple aicd firing. Pt placed on quinidine, mexiletine, metoprolol. Pt reports good compliance with her medications, even though she doesn't know what the meds are. Pt states at around 3-4PM yesterday, she felt "dizzy" for a few seconds, then felt AICD shock, then had resolution of her dizziness. Denies fevers, chills, CP, SOB, URI symptoms, GI symptoms. Pt states she's never seen a hematologist for leukocytosis. Pt was supposed to f/u with EP as outpt for ablation.    Admitted to CICU on arrival. Pt went for VT ablation on 10/19 which was aborted 2/2 cardiac tamponade causing hypotension requiring pressors. A pericardial drain was placed. In the CICU, pressors were weaned and the drain was removed after decreased output and a TTE showing no pericardial effusion. EP continues to follow though is unlikely to do any further procedure this admission. Patient chantal downgraded to medicine floor on 10/20, however had another episode of VT x 1 minutes, started on lidocaine gtt and gave 2g Mg, AICD shocked x1 and out of arrythmia.    Past Medical History:     Past Surgical History:     Home Medications:  atorvastatin 40 mg oral tablet: 1 tab(s) orally once a day (at bedtime) (16 Oct 2023 05:51)  D3 50 mcg (2000 intl units) oral capsule: 1 cap(s) orally (16 Oct 2023 05:51)  Lasix 40 mg oral tablet: 1 tab(s) orally once a day (16 Oct 2023 05:51)  Multiple Vitamins oral tablet: 1 tab(s) orally once a day (16 Oct 2023 05:51)  Plavix 75 mg oral tablet: 1 tab(s) orally once a day (16 Oct 2023 05:51)  Symbicort 160 mcg-4.5 mcg/inh inhalation aerosol: 2 puff(s) inhaled once a day (16 Oct 2023 05:51)      Current Medications:   MEDICATIONS  (STANDING):  aMIOdarone    Tablet 400 milliGRAM(s) Oral every 8 hours  aMIOdarone    Tablet   Oral   atorvastatin 40 milliGRAM(s) Oral at bedtime  budesonide 160 MICROgram(s)/formoterol 4.5 MICROgram(s) Inhaler 2 Puff(s) Inhalation two times a day  cefTRIAXone   IVPB 1000 milliGRAM(s) IV Intermittent every 24 hours  chlorhexidine 2% Cloths 1 Application(s) Topical <User Schedule>  clopidogrel Tablet 75 milliGRAM(s) Oral daily  colchicine 0.6 milliGRAM(s) Oral daily  influenza  Vaccine (HIGH DOSE) 0.7 milliLiter(s) IntraMuscular once  lidocaine   Infusion 1 mG/Min (7.5 mL/Hr) IV Continuous <Continuous>  magnesium sulfate  IVPB 2 Gram(s) IV Intermittent once  nystatin/triamcinolone Cream 1 Application(s) Topical two times a day    MEDICATIONS  (PRN):      Allergies:     Family History:     Social History:     ====================  Vital Signs Last 24 Hrs  T(C): 37.2 (21 Oct 2023 09:26), Max: 37.2 (21 Oct 2023 09:26)  T(F): 98.9 (21 Oct 2023 09:26), Max: 98.9 (21 Oct 2023 09:26)  HR: 94 (21 Oct 2023 10:23) (66 - 100)  BP: 131/50 (21 Oct 2023 10:23) (102/52 - 144/59)  BP(mean): 72 (21 Oct 2023 10:23) (68 - 113)  RR: 30 (21 Oct 2023 10:23) (18 - 38)  SpO2: 95% (21 Oct 2023 10:23) (90% - 96%)    Parameters below as of 21 Oct 2023 09:26  Patient On (Oxygen Delivery Method): nasal cannula  O2 Flow (L/min): 3      Adult Advanced Hemodynamics Last 24 Hrs  CVP(mm Hg): --  CVP(cm H2O): --  CO: --  CI: --  PA: --  PA(mean): --  PCWP: --  SVR: --  SVRI: --  PVR: --  PVRI: --    Physical Exam:   General: NAD  HEENT: PERRL, EOMI, normal sclera and conjunctiva, no oral lesions  Neck: Supple, no JVD  Lungs: CTA bilaterally  Heart: RRR, normal S1S2, no murmurs/rubs/gallops  Abdomen: Soft, ND/NT  Extremities: 2+ peripheral pulses, no cyanosis/clubbing/edema, full ROM  Skin: Warm, well-perfused  Neuro: A&O x3, no focal deficits      ====================  Labs & Imaging:   CBC Full  -  ( 21 Oct 2023 09:48 )  WBC Count : 27.89 K/uL  RBC Count : 4.09 M/uL  Hemoglobin : 11.9 g/dL  Hematocrit : 35.7 %  Platelet Count - Automated : 249 K/uL  Mean Cell Volume : 87.3 fl  Mean Cell Hemoglobin : 29.1 pg  Mean Cell Hemoglobin Concentration : 33.3 gm/dL  Auto Neutrophil # : 22.83 K/uL  Auto Lymphocyte # : 2.46 K/uL  Auto Monocyte # : 2.15 K/uL  Auto Eosinophil # : 0.00 K/uL  Auto Basophil # : 0.09 K/uL  Auto Neutrophil % : 81.9 %  Auto Lymphocyte % : 8.8 %  Auto Monocyte % : 7.7 %  Auto Eosinophil % : 0.0 %  Auto Basophil % : 0.3 %    10-21    133<L>  |  104  |  23  ----------------------------<  160<H>  4.3   |  19<L>  |  0.71    Ca    8.7      21 Oct 2023 09:48  Phos  2.3     10-21  Mg     2.4     10-21    TPro  6.0  /  Alb  2.5<L>  /  TBili  0.4  /  DBili  x   /  AST  10  /  ALT  12  /  AlkPhos  60  10-21    PT/INR - ( 20 Oct 2023 02:20 )   PT: 12.9 sec;   INR: 1.18 ratio         PTT - ( 20 Oct 2023 02:20 )  PTT:26.4 sec  ABG - ( 20 Oct 2023 06:48 )  pH, Arterial: 7.35  pH, Blood: x     /  pCO2: 32    /  pO2: 85    / HCO3: 18    / Base Excess: -6.9  /  SaO2: 97.8                    Culture - Blood (collected 19 Oct 2023 20:50)  Source: .Blood Blood  Preliminary Report (21 Oct 2023 01:02):    No growth at 24 hours    Culture - Blood (collected 19 Oct 2023 20:45)  Source: .Blood Blood  Preliminary Report (21 Oct 2023 01:02):    No growth at 24 hours      Urinalysis Basic - ( 21 Oct 2023 09:48 )    Color: x / Appearance: x / SG: x / pH: x  Gluc: 160 mg/dL / Ketone: x  / Bili: x / Urobili: x   Blood: x / Protein: x / Nitrite: x   Leuk Esterase: x / RBC: x / WBC x   Sq Epi: x / Non Sq Epi: x / Bacteria: x          ASSESSMENT & PLAN:   72F PMHx COPD, HTN, PAD, CAD s/p LAD stent (last LHC showing 100%  of RCA), chronic leukocytosis of unclear etiol, cardiac arrest s/p left-sided ICD (6/4/2019) complicated by infection and s/p R single-chamber ICD reimplantation ([place] in 9/23/2019), CHF EF 45%, grade 2 DHF, and VFib s/p 13 AICD firings underwent VT ablation today c/b pericardial tamponade. Transferred to CICU for further monitoring.     PLAN  ===NEURO===  #No active issues  - A&O x3    ===RESPIRATORY===  #Hx COPD  - Currently on room air sat >88%    ===CARDIOVASCULAR===  #Pericardial Tamponade  - s/p VT ablation c/b pericardial tamponade  - pericardial drain with 40 cc drained initially, 250cc in bag upon arrival to CICU  - Started on Jarred gtt and levo gtt, currently only on 0.08 mcg/hr levo gtt  - wean pressors as tolerated  - s/p 1350cc NS and 250cc albumin intraop  -  f/u TTE  - Monitor drain output, has slowed to minimal output   - Hold AC at this time  - Trend CBC    #History VT  - Started 400 amio TID PO per EP  - VT ablation aborted after tamponade  - has AICD  - replete lytes as needed to maintain K>4, Mag>2  - fu EP recs   - will need q6mo TSH / LFT, annual CXR and eye exam as outpatient (amiodarone)    #CHF  - TTE 10/5 EF 55%, reg WMA, mild-mod MS  - Hold metoprolol and entresto i/s/o hypotension, restart as tolerated and per EP     #CAD s/p stents  - Continue plavix    ===GI===  #No active issues  - Monitor for BM  - DASH Diet    ===RENAL===  #No active issues  - Strict I&O's  - Replete lytes as needed to maintain K>4, Mag>2    ===ENDO===  #No active issues  - Monitor glucose on CMP  - TSH wnl    ===HEME-ONC===  #Pericardial tamponade  - Hold AC  - Trend CBC  - No blood products given    ===ID===  #Chronic leukocytosis  - 1 dose vanco given  - Give CTX now for gram negative coverage  - Blood cultures sent, f/u results  - reports of pus from previous groin access, ordering ultrasound to r/o abscess CCU Accept Note    AUSTIN LEE  72y  Patient is a 72y old  Female who presents with a chief complaint of AICD discharge (21 Oct 2023 09:26)      ====================  HPI & Hospital Course:   72F c hx COPD, HTN, PAD, CAD s/p LAD stent (last LHC showing 100%  of RCA), chronic leukocytosis of unclear etiol, cardiac arrest s/p left-sided ICD (6/4/2019) complicated by infection and s/p R single-chamber ICD reimplantation ([place] in 9/23/2019), CHF EF 45%, grade 2 DHF, recent hospitalization for UTI (treated w/ ceftriaxone and Vanco x 3 days) and VFib s/p 13 AICD firings, pw lightheadedness and AICD firing.    Pt recently hospitalized for VFib s/p multiple aicd firing. Pt placed on quinidine, mexiletine, metoprolol. Pt reports good compliance with her medications, even though she doesn't know what the meds are. Pt states at around 3-4PM yesterday, she felt "dizzy" for a few seconds, then felt AICD shock, then had resolution of her dizziness. Denies fevers, chills, CP, SOB, URI symptoms, GI symptoms. Pt states she's never seen a hematologist for leukocytosis. Pt was supposed to f/u with EP as outpt for ablation.    Admitted to CICU on arrival. Pt went for VT ablation on 10/19 which was aborted 2/2 cardiac tamponade causing hypotension requiring pressors. A pericardial drain was placed. In the CICU, pressors were weaned and the drain was removed after decreased output and a TTE showing no pericardial effusion. EP continues to follow though is unlikely to do any further procedure this admission. Patient chantal downgraded to medicine floor on 10/20, however had another episode of VT x 1 minutes, started on lidocaine gtt and gave 2g Mg, AICD shocked x1 and out of arrythmia.    Past Medical History:     Past Surgical History:     Home Medications:  atorvastatin 40 mg oral tablet: 1 tab(s) orally once a day (at bedtime) (16 Oct 2023 05:51)  D3 50 mcg (2000 intl units) oral capsule: 1 cap(s) orally (16 Oct 2023 05:51)  Lasix 40 mg oral tablet: 1 tab(s) orally once a day (16 Oct 2023 05:51)  Multiple Vitamins oral tablet: 1 tab(s) orally once a day (16 Oct 2023 05:51)  Plavix 75 mg oral tablet: 1 tab(s) orally once a day (16 Oct 2023 05:51)  Symbicort 160 mcg-4.5 mcg/inh inhalation aerosol: 2 puff(s) inhaled once a day (16 Oct 2023 05:51)      Current Medications:   MEDICATIONS  (STANDING):  aMIOdarone    Tablet 400 milliGRAM(s) Oral every 8 hours  aMIOdarone    Tablet   Oral   atorvastatin 40 milliGRAM(s) Oral at bedtime  budesonide 160 MICROgram(s)/formoterol 4.5 MICROgram(s) Inhaler 2 Puff(s) Inhalation two times a day  cefTRIAXone   IVPB 1000 milliGRAM(s) IV Intermittent every 24 hours  chlorhexidine 2% Cloths 1 Application(s) Topical <User Schedule>  clopidogrel Tablet 75 milliGRAM(s) Oral daily  colchicine 0.6 milliGRAM(s) Oral daily  influenza  Vaccine (HIGH DOSE) 0.7 milliLiter(s) IntraMuscular once  lidocaine   Infusion 1 mG/Min (7.5 mL/Hr) IV Continuous <Continuous>  magnesium sulfate  IVPB 2 Gram(s) IV Intermittent once  nystatin/triamcinolone Cream 1 Application(s) Topical two times a day    MEDICATIONS  (PRN):      Allergies:     Family History:     Social History:     ====================  Vital Signs Last 24 Hrs  T(C): 37.2 (21 Oct 2023 09:26), Max: 37.2 (21 Oct 2023 09:26)  T(F): 98.9 (21 Oct 2023 09:26), Max: 98.9 (21 Oct 2023 09:26)  HR: 94 (21 Oct 2023 10:23) (66 - 100)  BP: 131/50 (21 Oct 2023 10:23) (102/52 - 144/59)  BP(mean): 72 (21 Oct 2023 10:23) (68 - 113)  RR: 30 (21 Oct 2023 10:23) (18 - 38)  SpO2: 95% (21 Oct 2023 10:23) (90% - 96%)    Parameters below as of 21 Oct 2023 09:26  Patient On (Oxygen Delivery Method): nasal cannula  O2 Flow (L/min): 3      Adult Advanced Hemodynamics Last 24 Hrs  CVP(mm Hg): --  CVP(cm H2O): --  CO: --  CI: --  PA: --  PA(mean): --  PCWP: --  SVR: --  SVRI: --  PVR: --  PVRI: --    Physical Exam:   General: NAD  HEENT: PERRL, EOMI, normal sclera and conjunctiva, no oral lesions  Neck: Supple, no JVD  Lungs: CTA bilaterally  Heart: RRR, normal S1S2, no murmurs/rubs/gallops  Abdomen: Soft, ND/NT  Extremities: 2+ peripheral pulses, no cyanosis/clubbing/edema, full ROM  Skin: Warm, well-perfused  Neuro: A&O x3, no focal deficits      ====================  Labs & Imaging:   CBC Full  -  ( 21 Oct 2023 09:48 )  WBC Count : 27.89 K/uL  RBC Count : 4.09 M/uL  Hemoglobin : 11.9 g/dL  Hematocrit : 35.7 %  Platelet Count - Automated : 249 K/uL  Mean Cell Volume : 87.3 fl  Mean Cell Hemoglobin : 29.1 pg  Mean Cell Hemoglobin Concentration : 33.3 gm/dL  Auto Neutrophil # : 22.83 K/uL  Auto Lymphocyte # : 2.46 K/uL  Auto Monocyte # : 2.15 K/uL  Auto Eosinophil # : 0.00 K/uL  Auto Basophil # : 0.09 K/uL  Auto Neutrophil % : 81.9 %  Auto Lymphocyte % : 8.8 %  Auto Monocyte % : 7.7 %  Auto Eosinophil % : 0.0 %  Auto Basophil % : 0.3 %    10-21    133<L>  |  104  |  23  ----------------------------<  160<H>  4.3   |  19<L>  |  0.71    Ca    8.7      21 Oct 2023 09:48  Phos  2.3     10-21  Mg     2.4     10-21    TPro  6.0  /  Alb  2.5<L>  /  TBili  0.4  /  DBili  x   /  AST  10  /  ALT  12  /  AlkPhos  60  10-21    PT/INR - ( 20 Oct 2023 02:20 )   PT: 12.9 sec;   INR: 1.18 ratio         PTT - ( 20 Oct 2023 02:20 )  PTT:26.4 sec  ABG - ( 20 Oct 2023 06:48 )  pH, Arterial: 7.35  pH, Blood: x     /  pCO2: 32    /  pO2: 85    / HCO3: 18    / Base Excess: -6.9  /  SaO2: 97.8                    Culture - Blood (collected 19 Oct 2023 20:50)  Source: .Blood Blood  Preliminary Report (21 Oct 2023 01:02):    No growth at 24 hours    Culture - Blood (collected 19 Oct 2023 20:45)  Source: .Blood Blood  Preliminary Report (21 Oct 2023 01:02):    No growth at 24 hours      Urinalysis Basic - ( 21 Oct 2023 09:48 )    Color: x / Appearance: x / SG: x / pH: x  Gluc: 160 mg/dL / Ketone: x  / Bili: x / Urobili: x   Blood: x / Protein: x / Nitrite: x   Leuk Esterase: x / RBC: x / WBC x   Sq Epi: x / Non Sq Epi: x / Bacteria: x          ASSESSMENT & PLAN:   72F PMHx COPD, HTN, PAD, CAD s/p LAD stent (last LHC showing 100%  of RCA), chronic leukocytosis of unclear etiol, cardiac arrest s/p left-sided ICD (6/4/2019) complicated by infection and s/p R single-chamber ICD reimplantation ([place] in 9/23/2019), CHF EF 45%, grade 2 DHF, and VFib s/p 13 AICD firings underwent VT ablation today c/b pericardial tamponade. Transferred to CICU for further monitoring. 10/21 developed AF w/ RVR.    PLAN  ===NEURO===  #No active issues  - A&O x3    ===RESPIRATORY===  #Hx COPD  - Currently on room air sat >88%    ===CARDIOVASCULAR===  #Pericardial Tamponade  - s/p VT ablation c/b pericardial tamponade  - pericardial drain with 40 cc drained initially, 250cc in bag upon arrival to CICU  - Started on Jarred gtt and levo gtt, currently only on 0.08 mcg/hr levo gtt  - wean pressors as tolerated  - s/p 1350cc NS and 250cc albumin intraop  -  f/u TTE  - Monitor drain output, has slowed to minimal output   - Trend CBC    #AF w/ RVR  #History VT  - Started 400 amio TID PO per EP  - VT ablation aborted after tamponade  - has AICD  - replete lytes as needed to maintain K>4, Mag>2  - fu EP recs   - will need q6mo TSH / LFT, annual CXR and eye exam as outpatient (amiodarone)  - New AF w/ RVR on 10/21 PM, s/p Amio 150mg IV x1  - Would consider r/s AC soon given new AF w/ RVR, pending resolution of bloody tamponade    #CHF  - TTE 10/5 EF 55%, reg WMA, mild-mod MS  - Hold metoprolol and entresto i/s/o hypotension, restart as tolerated and per EP     #CAD s/p stents  - Continue plavix    ===GI===  #No active issues  - Monitor for BM  - DASH Diet    ===RENAL===  #No active issues  - Strict I&O's  - Replete lytes as needed to maintain K>4, Mag>2    ===ENDO===  #No active issues  - Monitor glucose on CMP  - TSH wnl    ===HEME-ONC===  #Pericardial tamponade  - Would consider r/s AC soon given new AF w/ RVR, pending resolution of bloody tamponade  - Trend CBC  - No blood products given    ===ID===  #Chronic leukocytosis  - 1 dose vanco given  - Give CTX now for gram negative coverage  - Blood cultures sent, f/u results  - reports of pus from previous groin access, ordering ultrasound to r/o abscess

## 2023-10-21 NOTE — CHART NOTE - NSCHARTNOTEFT_GEN_A_CORE
Patient is a 72y old  Female who presents with a chief complaint of AICD discharge (21 Oct 2023 09:26)  Notified by RN , patient c/o chest pain, VT tach with AICD firing x1 . Pt seen/ examined and RRT called. EP and Cardiology consult at bedside . Repeat labs obtained , Mag 2 G and Lidocaine gtt started. Pt transferred back to ICU . Plan for VT ablation next week once diuresed. Attending  When updated and at beside.      Vital Signs Last 24 Hrs  T(C): 36.7 (21 Oct 2023 04:44), Max: 37.1 (20 Oct 2023 19:00)  T(F): 98.1 (21 Oct 2023 04:44), Max: 98.7 (20 Oct 2023 19:00)  HR: 90 (21 Oct 2023 04:44) (66 - 90)  BP: 117/65 (21 Oct 2023 04:44) (102/52 - 144/59)  BP(mean): 85 (21 Oct 2023 00:00) (68 - 113)  RR: 18 (21 Oct 2023 04:44) (18 - 38)  SpO2: 90% (21 Oct 2023 04:44) (90% - 96%)    Parameters below as of 21 Oct 2023 00:26  Patient On (Oxygen Delivery Method): room air    2L NC       Physical Exam:  General: WN/WD mild resp distress  Neurology: A&Ox3, nonfocal, UPTON x 4  Head:  Normocephalic, atraumatic  Respiratory: +rales, tachypneic RR 30's  CV: RRR, S1S2, no murmur  Abdominal: Soft, NT, ND no palpable mass  MSK: No edema, + peripheral pulses, FROM all 4 extremity  Labs:                          11.9   27.89 )-----------( x        ( 21 Oct 2023 09:48 )             35.7     10-20    135  |  105  |  24<H>  ----------------------------<  160<H>  4.4   |  14<L>  |  0.83    Ca    8.6      20 Oct 2023 17:31  Phos  3.6     10-20  Mg     1.8     10-20    TPro  5.8<L>  /  Alb  2.7<L>  /  TBili  0.4  /  DBili  x   /  AST  14  /  ALT  13  /  AlkPhos  60  10-20        ABG - ( 20 Oct 2023 06:48 )  pH, Arterial: 7.35  pH, Blood: x     /  pCO2: 32    /  pO2: 85    / HCO3: 18    / Base Excess: -6.9  /  SaO2: 97.8                Radiology:    HPI:  72F c hx COPD, HTN, PAD, CAD s/p LAD stent (last C showing 100%  of RCA), chronic leukocytosis of unclear etiol, cardiac arrest s/p left-sided ICD (6/4/2019) complicated by infection and s/p R single-chamber ICD reimplantation ([place] in 9/23/2019), CHF EF 45%, grade 2 DHF, recent hospitalization for UTI (treated w/ ceftriaxone and Vanco x 3 days) and VFib s/p 13 AICD firings, pw lightheadedness and AICD firing.    Pt recently hospitalized for VFib s/p multiple aicd firing. Pt placed on quinidine, mexiletine, metoprolol. Pt reports good compliance with her medications, even though she doesn't know what the meds are. Pt states at around 3-4PM yesterday, she felt "dizzy" for a few seconds, then felt AICD shock, then had resolution of her dizziness. Denies fevers, chills, CP, SOB, URI symptoms, GI symptoms. Pt states she's never seen a hematologist for leukocytosis. Pt was supposed to f/u with EP as outpt for ablation. (16 Oct 2023 03:47)    Assessment & Plan:  > 2L NC applied  >mag 1.8- Mag 2G IVPB given  >Lidocaine gtt started   >repeat cbc,bmp,mag obtained  >transfer to ICU     Brenda Lopez NP-C   #[id number]

## 2023-10-21 NOTE — PROGRESS NOTE ADULT - SUBJECTIVE AND OBJECTIVE BOX
24H hour events: Patient admits to pleuritic chest pain with coughing only.  Denies CP, palpitations or SOB.  Patient does appear tachypneic with RR 30 at times and O2 sat 90% on room air earlier this AM.     MEDICATIONS:  aMIOdarone    Tablet 400 milliGRAM(s) Oral every 8 hours  aMIOdarone    Tablet   Oral   clopidogrel Tablet 75 milliGRAM(s) Oral daily  lidocaine   Infusion 1 mG/Min IV Continuous <Continuous>    cefTRIAXone   IVPB 1000 milliGRAM(s) IV Intermittent every 24 hours    budesonide 160 MICROgram(s)/formoterol 4.5 MICROgram(s) Inhaler 2 Puff(s) Inhalation two times a day    atorvastatin 40 milliGRAM(s) Oral at bedtime  colchicine 0.6 milliGRAM(s) Oral daily    chlorhexidine 2% Cloths 1 Application(s) Topical <User Schedule>  influenza  Vaccine (HIGH DOSE) 0.7 milliLiter(s) IntraMuscular once  nystatin/triamcinolone Cream 1 Application(s) Topical two times a day      REVIEW OF SYSTEMS:  Complete 12point ROS negative.    PHYSICAL EXAM:  T(C): 37.2 (10-21-23 @ 09:26), Max: 37.2 (10-21-23 @ 09:26)  HR: 91 (10-21-23 @ 12:00) (66 - 100)  BP: 125/54 (10-21-23 @ 12:00) (106/51 - 144/59)  RR: 20 (10-21-23 @ 12:00) (18 - 38)  SpO2: 95% (10-21-23 @ 12:00) (90% - 96%)    20 Oct 2023 07:01  -  21 Oct 2023 07:00  --------------------------------------------------------  IN: 514.4 mL / OUT: 275 mL / NET: 239.4 mL    21 Oct 2023 07:01  -  21 Oct 2023 12:44  --------------------------------------------------------  IN: 15 mL / OUT: 0 mL / NET: 15 mL    Appearance: Normal	  HEENT:   Normal oral mucosa, PERRL, EOMI	  Cardiovascular: Normal S1 S2, regular. No JVD, No murmurs, No edema  Respiratory: Lungs diminished at B/L bases   Psychiatry: A & O x 3, Mood & affect appropriate  Gastrointestinal:  Soft, Non-tender, + BS	  Skin: mid chest with dry gauze dressing, clean and intact. Right groin open to air, no hematoma or bleeding.  B/L groin fungal rash.   Extremities: Normal range of motion, No clubbing, cyanosis or edema  Vascular: Peripheral pulses palpable 2+ bilaterally      LABS:	 	    CBC Full  -  ( 21 Oct 2023 09:48 )  WBC Count : 27.89 K/uL  Hemoglobin : 11.9 g/dL  Hematocrit : 35.7 %  Platelet Count - Automated : 249 K/uL  Mean Cell Volume : 87.3 fl  Mean Cell Hemoglobin : 29.1 pg  Mean Cell Hemoglobin Concentration : 33.3 gm/dL  Auto Neutrophil # : 22.83 K/uL  Auto Lymphocyte # : 2.46 K/uL  Auto Monocyte # : 2.15 K/uL  Auto Eosinophil # : 0.00 K/uL  Auto Basophil # : 0.09 K/uL  Auto Neutrophil % : 81.9 %  Auto Lymphocyte % : 8.8 %  Auto Monocyte % : 7.7 %  Auto Eosinophil % : 0.0 %  Auto Basophil % : 0.3 %    10-21    133<L>  |  104  |  23  ----------------------------<  160<H>  4.3   |  19<L>  |  0.71  10-20    135  |  105  |  24<H>  ----------------------------<  160<H>  4.4   |  14<L>  |  0.83    Ca    8.7      21 Oct 2023 09:48  Ca    8.6      20 Oct 2023 17:31  Phos  2.3     10-21  Phos  3.6     10-20  Mg     2.4     10-21  Mg     1.8     10-20    TPro  6.0  /  Alb  2.5<L>  /  TBili  0.4  /  DBili  x   /  AST  10  /  ALT  12  /  AlkPhos  60  10-21  TPro  5.8<L>  /  Alb  2.7<L>  /  TBili  0.4  /  DBili  x   /  AST  14  /  ALT  13  /  AlkPhos  60  10-20        TELEMETRY: 	 NSR 80-90 with PAC, PVC, couplets    TTE:     TRANSTHORACIC ECHOCARDIOGRAM REPORT     Pt. Name:       AUSTIN LEE Study Date:    10/20/2023  MRN:            [id number]    YOB: 1950  Accession #:    [id number]     Age:           72 years  Account#:       [id number]  Gender:        F  Heart Rate:     61 bpm        Height:        62.00 in (157.48 cm)  Rhythm:         sinus rhythm  Weight:        188.00 lb (85.28 kg)  Blood Pressure: 112/45 mmHg   BSA/BMI:       1.86 m² / 34.39 kg/m²  ________________________________________________________________________________________  Referring Physician:    [id number] Nav Recinos  Interpreting Physician: Flaco Hilliard MD  Primary Sonographer:    Cintia Katz    CPT:                ECHO TTE W CON FU LTD - .m;LIMITED SPECTRAL -                      78249.m;DEFINITY ECHO CONTRAST PER ML WASTED -                      .m;DEFINITY ECHO CONTRAST PER ML - .m  Indication(s):      Other pericardial effusion (noninflammatory) - I31.39  Procedure:          Limited transthoracic echocardiogram.  Ordering Location:  CICU  Admission Status:   Inpatient  Contrast Injection: Verbal consent was obtained for injection of Ultrasonic                      Enhancing Agent following a discussion of risks and                      benefits.                      Endocardial visualization enhanced with 2 ml of Definity                      Ultrasound enhancing agent (Lot#:[id number] Exp.Date:11/2024                      Discarded Dose:8ml).  UEA Reaction:       Patient had no adverse reaction after injection of                      Ultrasound Enhancing Agent.  Study Information:  Image quality for this study is adequate.     CONCLUSIONS:      1. Left ventricular systolic function is mildly decreased. Regional wall motion abnormalities present.   2. Basal inferoseptal segment, mid inferolateral segment, mid anterolateral segment, and basal inferior segment are abnormal.   3. Device lead is visualized in the right heart.   4. No pericardial effusion seen.    FINDINGS:     Left Ventricle:  After obtaining consent, Definity ultrasound enhancing agent was given for enhanced left ventricular opacification and improved delineation of the left ventricular endocardial borders.Left ventricular systolic function is mildly decreased with an ejection fraction visually estimated at 45 to 50%. There are regional wall motion abnormalities present.  LV Wall Scoring: The basal inferoseptal segment, mid inferolateral segment, mid  anterolateral segment, and basal inferior segment are akinetic. All remaining  scored segments are normal.     Right Ventricle:  A device lead is visualized in the right heart.     Mitral Valve:  There is calcification of the mitral valve annulus.     Pericardium:  No pericardial effusion seen. There is a trace pericardial effusion.  ____________________________________________________________________  Quantitative Data:  Left Ventricle Measurements: (Indexed to BSA)     Visualized LV EF%: 45to 50%    ________________________________________________________________________________________  Electronically signed on 10/20/2023 at 10:43:50 AM by Flaco Hilliard MD    *** Final ***

## 2023-10-22 NOTE — PROGRESS NOTE ADULT - ATTENDING COMMENTS
73yo woman with COPD HTN CAD s/p PCI to LAD and RCA , recurrent VT who presented for ablation yesterday, developed pericardial effusion with tamponade prior and again developed VT while on the floor, 1 shock, and then new onset AFib with RVR unresponsive to medication, requiring repeated DCCV tonight.    Neuro: remeron for sleep tonight  Resp: COPD history but O2 sat 93% on RA. Cont duonebs prn  CV: Recurrent VT on PO amio and now IV lidocaine, EP planning another attempt at ablation  GI: DASH diet  Renal: Cr at baseline  ID: completed abx for SSTI  Heme: Cont plavix, start heparin for recent cardioversion  Endo: BG controlled  No central access

## 2023-10-22 NOTE — PROGRESS NOTE ADULT - ASSESSMENT
71 y/o F w/ PMHx COPD, HLD, HTN, PVD, ICM/CAD s/p PCI (has  of RCA), cardiac arrest s/p left-sided ICD (6/4/2019) complicated by infection and s/p R single-chamber ICD ([place] in 9/23/2019), recent admission for VT storm with adjustment of medication and NIPS now presenting for ICD shock in the setting of recurrent monomorphic VT with unsuccessful ATP.  Ablation attempted 10/20 but aborted due to hemodynamically significant pericardial effusion with drain placement.  Pericardial drain removed on 10/20/23.  Over the weekend developed new shortness of breath, recurrent VT s/p ICD shock, and afib w/ RVR s/p cardioversion. She continues to have NSVT. Currently in sinus rhythm.     - pericardial drain removed, c/o pleuritic chest pain, agree with low dose colchicine   - TTE obtained yesterday without re accumulation of pericardial fluid, now on heparin for afib s/p cardioversion, would monitor very closely for reaccumulation, consider additional limited echo in am, certainly with any clinical changes   - Elevated BNP and shortness of breath with mild reduction in EF, suspect component of HF, would consider diuresis and beta blockade as tolerated   - continue on oral amiodarone load 400mg PO Q 8 hours. Monitor TSH, LFT's, opthalmology and pulmonary function tests as outpatient   - can continue lidocaine in setting of recent VT, will discuss ideal timing for reattempt at ablation   AST 10, ALT 12, baseline TSH 3.58 on 10/2   - white count remains elevated, fungal groin infection and puss noted from prior access site earlier this week, would have very low threshold to complete 7 day course of Abx, will defer to management per CCU team   - EP will continue to follow

## 2023-10-22 NOTE — PROGRESS NOTE ADULT - SUBJECTIVE AND OBJECTIVE BOX
Patient is a 72y old  Female who presents with a chief complaint of AICD discharge (21 Oct 2023 20:12)    HPI:  72F c hx COPD, HTN, PAD, CAD s/p LAD stent (last C showing 100%  of RCA), chronic leukocytosis of unclear etiol, cardiac arrest s/p left-sided ICD (6/4/2019) complicated by infection and s/p R single-chamber ICD reimplantation ([place] in 9/23/2019), CHF EF 45%, grade 2 DHF, recent hospitalization for UTI (treated w/ ceftriaxone and Vanco x 3 days) and VFib s/p 13 AICD firings, pw lightheadedness and AICD firing.    Pt recently hospitalized for VFib s/p multiple aicd firing. Pt placed on quinidine, mexiletine, metoprolol. Pt reports good compliance with her medications, even though she doesn't know what the meds are. Pt states at around 3-4PM yesterday, she felt "dizzy" for a few seconds, then felt AICD shock, then had resolution of her dizziness. Denies fevers, chills, CP, SOB, URI symptoms, GI symptoms. Pt states she's never seen a hematologist for leukocytosis. Pt was supposed to f/u with EP as outpt for ablation. (16 Oct 2023 03:47)       INTERVAL HPI/OVERNIGHT EVENTS:   No overnight events   Afebrile, hemodynamically stable     Subjective:    ICU Vital Signs Last 24 Hrs  T(C): 36.6 (21 Oct 2023 20:00), Max: 37.2 (21 Oct 2023 09:26)  T(F): 97.8 (21 Oct 2023 20:00), Max: 98.9 (21 Oct 2023 09:26)  HR: 96 (22 Oct 2023 06:45) (75 - 155)  BP: 93/53 (22 Oct 2023 06:45) (86/54 - 133/88)  BP(mean): 68 (22 Oct 2023 06:45) (64 - 103)  ABP: --  ABP(mean): --  RR: 27 (22 Oct 2023 06:45) (18 - 37)  SpO2: 95% (22 Oct 2023 06:45) (87% - 97%)    O2 Parameters below as of 22 Oct 2023 06:00  Patient On (Oxygen Delivery Method): nasal cannula  O2 Flow (L/min): 2        I&O's Summary    21 Oct 2023 07:01  -  22 Oct 2023 07:00  --------------------------------------------------------  IN: 673 mL / OUT: 450 mL / NET: 223 mL          Daily Height in cm: 157.48 (21 Oct 2023 09:26)    Daily     Adult Advanced Hemodynamics Last 24 Hrs  CVP(mm Hg): --  CVP(cm H2O): --  CO: --  CI: --  PA: --  PA(mean): --  PCWP: --  SVR: --  SVRI: --  PVR: --  PVRI: --    EKG/Telemetry Events:    MEDICATIONS  (STANDING):  aMIOdarone    Tablet 400 milliGRAM(s) Oral every 8 hours  aMIOdarone    Tablet   Oral   aMIOdarone IVPB 150 milliGRAM(s) IV Intermittent once  atorvastatin 40 milliGRAM(s) Oral at bedtime  budesonide 160 MICROgram(s)/formoterol 4.5 MICROgram(s) Inhaler 2 Puff(s) Inhalation two times a day  chlorhexidine 2% Cloths 1 Application(s) Topical <User Schedule>  clopidogrel Tablet 75 milliGRAM(s) Oral daily  colchicine 0.6 milliGRAM(s) Oral daily  heparin  Infusion 1200 Unit(s)/Hr (13 mL/Hr) IV Continuous <Continuous>  influenza  Vaccine (HIGH DOSE) 0.7 milliLiter(s) IntraMuscular once  lidocaine   Infusion 1 mG/Min (7.5 mL/Hr) IV Continuous <Continuous>  nystatin/triamcinolone Cream 1 Application(s) Topical two times a day    MEDICATIONS  (PRN):      PHYSICAL EXAM:  GENERAL:   HEAD:  Atraumatic, Normocephalic  EYES: EOMI, PERRLA, conjunctiva and sclera clear  NECK: Supple, No JVD, Normal thyroid, no enlarged nodes  NERVOUS SYSTEM:  Alert & Awake.   CHEST/LUNG: B/L good air entry; No rales, rhonchi, or wheezing  HEART: S1S2 normal, no S3, Regular rate and rhythm; No murmurs  ABDOMEN: Soft, Nontender, Nondistended; Bowel sounds present  EXTREMITIES:  2+ Peripheral Pulses, No clubbing, cyanosis, or edema  LYMPH: No lymphadenopathy noted  SKIN: No rashes or lesions    LABS:                        12.2   24.08 )-----------( 249      ( 22 Oct 2023 02:08 )             38.6     10-22    134<L>  |  104  |  22  ----------------------------<  141<H>  4.4   |  20<L>  |  0.67    Ca    8.4      22 Oct 2023 02:08  Phos  2.9     10-22  Mg     2.9     10-22    TPro  6.1  /  Alb  2.5<L>  /  TBili  0.4  /  DBili  x   /  AST  10  /  ALT  13  /  AlkPhos  71  10-22    LIVER FUNCTIONS - ( 22 Oct 2023 02:08 )  Alb: 2.5 g/dL / Pro: 6.1 g/dL / ALK PHOS: 71 U/L / ALT: 13 U/L / AST: 10 U/L / GGT: x           PT/INR - ( 22 Oct 2023 04:16 )   PT: 13.1 sec;   INR: 1.20 ratio         PTT - ( 22 Oct 2023 04:16 )  PTT:38.0 sec  CAPILLARY BLOOD GLUCOSE      POCT Blood Glucose.: 144 mg/dL (21 Oct 2023 09:30)            Urinalysis Basic - ( 22 Oct 2023 02:08 )    Color: x / Appearance: x / SG: x / pH: x  Gluc: 141 mg/dL / Ketone: x  / Bili: x / Urobili: x   Blood: x / Protein: x / Nitrite: x   Leuk Esterase: x / RBC: x / WBC x   Sq Epi: x / Non Sq Epi: x / Bacteria: x          RADIOLOGY & ADDITIONAL TESTS:  CXR:        Care Discussed with Consultants/Other Providers [ x] YES  [ ] NO           Patient is a 72y old  Female who presents with a chief complaint of AICD discharge (21 Oct 2023 20:12)    HPI:  72F c hx COPD, HTN, PAD, CAD s/p LAD stent (last C showing 100%  of RCA), chronic leukocytosis of unclear etiol, cardiac arrest s/p left-sided ICD (6/4/2019) complicated by infection and s/p R single-chamber ICD reimplantation ([place] in 9/23/2019), CHF EF 45%, grade 2 DHF, recent hospitalization for UTI (treated w/ ceftriaxone and Vanco x 3 days) and VFib s/p 13 AICD firings, pw lightheadedness and AICD firing.    Pt recently hospitalized for VFib s/p multiple aicd firing. Pt placed on quinidine, mexiletine, metoprolol. Pt reports good compliance with her medications, even though she doesn't know what the meds are. Pt states at around 3-4PM yesterday, she felt "dizzy" for a few seconds, then felt AICD shock, then had resolution of her dizziness. Denies fevers, chills, CP, SOB, URI symptoms, GI symptoms. Pt states she's never seen a hematologist for leukocytosis. Pt was supposed to f/u with EP as outpt for ablation. (16 Oct 2023 03:47)       INTERVAL HPI/OVERNIGHT EVENTS:   No overnight events   Afebrile, hemodynamically stable     Subjective: Pt reports no specific physical complaints but feels overall anxious about her condition/course.     ICU Vital Signs Last 24 Hrs  T(C): 36.6 (21 Oct 2023 20:00), Max: 37.2 (21 Oct 2023 09:26)  T(F): 97.8 (21 Oct 2023 20:00), Max: 98.9 (21 Oct 2023 09:26)  HR: 96 (22 Oct 2023 06:45) (75 - 155)  BP: 93/53 (22 Oct 2023 06:45) (86/54 - 133/88)  BP(mean): 68 (22 Oct 2023 06:45) (64 - 103)  ABP: --  ABP(mean): --  RR: 27 (22 Oct 2023 06:45) (18 - 37)  SpO2: 95% (22 Oct 2023 06:45) (87% - 97%)    O2 Parameters below as of 22 Oct 2023 06:00  Patient On (Oxygen Delivery Method): nasal cannula  O2 Flow (L/min): 2        I&O's Summary    21 Oct 2023 07:01  -  22 Oct 2023 07:00  --------------------------------------------------------  IN: 673 mL / OUT: 450 mL / NET: 223 mL          Daily Height in cm: 157.48 (21 Oct 2023 09:26)    Daily     Adult Advanced Hemodynamics Last 24 Hrs  CVP(mm Hg): --  CVP(cm H2O): --  CO: --  CI: --  PA: --  PA(mean): --  PCWP: --  SVR: --  SVRI: --  PVR: --  PVRI: --    EKG/Telemetry Events:    MEDICATIONS  (STANDING):  aMIOdarone    Tablet 400 milliGRAM(s) Oral every 8 hours  aMIOdarone    Tablet   Oral   aMIOdarone IVPB 150 milliGRAM(s) IV Intermittent once  atorvastatin 40 milliGRAM(s) Oral at bedtime  budesonide 160 MICROgram(s)/formoterol 4.5 MICROgram(s) Inhaler 2 Puff(s) Inhalation two times a day  chlorhexidine 2% Cloths 1 Application(s) Topical <User Schedule>  clopidogrel Tablet 75 milliGRAM(s) Oral daily  colchicine 0.6 milliGRAM(s) Oral daily  heparin  Infusion 1200 Unit(s)/Hr (13 mL/Hr) IV Continuous <Continuous>  influenza  Vaccine (HIGH DOSE) 0.7 milliLiter(s) IntraMuscular once  lidocaine   Infusion 1 mG/Min (7.5 mL/Hr) IV Continuous <Continuous>  nystatin/triamcinolone Cream 1 Application(s) Topical two times a day    MEDICATIONS  (PRN):      PHYSICAL EXAM:  GENERAL:   HEAD:  Atraumatic, Normocephalic  EYES: EOMI, PERRLA, conjunctiva and sclera clear  NECK: Supple, No JVD, Normal thyroid, no enlarged nodes  NERVOUS SYSTEM:  Alert & Awake.   CHEST/LUNG: B/L good air entry; No rales, rhonchi, or wheezing  HEART: S1S2 normal, no S3, Regular rate and rhythm; No murmurs  ABDOMEN: Soft, Nontender, Nondistended; Bowel sounds present  EXTREMITIES:  2+ Peripheral Pulses, No clubbing, cyanosis, or edema      LABS:                        12.2   24.08 )-----------( 249      ( 22 Oct 2023 02:08 )             38.6     10-22    134<L>  |  104  |  22  ----------------------------<  141<H>  4.4   |  20<L>  |  0.67    Ca    8.4      22 Oct 2023 02:08  Phos  2.9     10-22  Mg     2.9     10-22    TPro  6.1  /  Alb  2.5<L>  /  TBili  0.4  /  DBili  x   /  AST  10  /  ALT  13  /  AlkPhos  71  10-22    LIVER FUNCTIONS - ( 22 Oct 2023 02:08 )  Alb: 2.5 g/dL / Pro: 6.1 g/dL / ALK PHOS: 71 U/L / ALT: 13 U/L / AST: 10 U/L / GGT: x           PT/INR - ( 22 Oct 2023 04:16 )   PT: 13.1 sec;   INR: 1.20 ratio         PTT - ( 22 Oct 2023 04:16 )  PTT:38.0 sec  CAPILLARY BLOOD GLUCOSE      POCT Blood Glucose.: 144 mg/dL (21 Oct 2023 09:30)            Urinalysis Basic - ( 22 Oct 2023 02:08 )    Color: x / Appearance: x / SG: x / pH: x  Gluc: 141 mg/dL / Ketone: x  / Bili: x / Urobili: x   Blood: x / Protein: x / Nitrite: x   Leuk Esterase: x / RBC: x / WBC x   Sq Epi: x / Non Sq Epi: x / Bacteria: x          RADIOLOGY & ADDITIONAL TESTS:  CXR:        Care Discussed with Consultants/Other Providers [ x] YES  [ ] NO

## 2023-10-22 NOTE — PROGRESS NOTE ADULT - ATTENDING COMMENTS
73 y/o F w/ PMHx COPD, HLD, HTN, PVD, ICM/CAD s/p PCI (has  of RCA), cardiac arrest s/p left-sided ICD (6/4/2019) complicated by infection and s/p R single-chamber ICD ([place] in 9/23/2019), recent admission for VT storm with adjustment of medication and NIPS now presenting for ICD shock in the setting of recurrent monomorphic VT with unsuccessful ATP.  Ablation attempted 10/20 but aborted due to hemodynamically significant pericardial effusion with drain placement.  Pericardial drain removed on 10/20/23.  Over the weekend developed new shortness of breath, recurrent VT s/p ICD shock, and afib w/ RVR s/p cardioversion. She continues to have NSVT. Currently in sinus rhythm. Pericardial drain removed, c/o pleuritic chest pain, agree with low dose colchicine. TTE obtained yesterday without re accumulation of pericardial fluid, now on heparin for afib s/p cardioversion, would monitor very closely for reaccumulation, consider additional limited echo in am, certainly with any clinical changes. Elevated BNP and shortness of breath with mild reduction in EF, suspect component of HF, would consider diuresis and beta blockade as tolerated. Continue on oral amiodarone load 400mg PO Q 8 hours. Monitor TSH, LFT's, opthalmology and pulmonary function tests as outpatient. Can continue lidocaine in setting of recent VT, will discuss ideal timing for reattempt at ablation. AST 10, ALT 12, baseline TSH 3.58 on 10/2. White count remains elevated, fungal groin infection and puss noted from prior access site earlier this week, would have very low threshold to complete 7 day course of Abx, will defer to management per CCU team.

## 2023-10-22 NOTE — PROGRESS NOTE ADULT - SUBJECTIVE AND OBJECTIVE BOX
AUSTIN LEE  MRN-[id number]  Patient is a 72y old  Female who presents with a chief complaint of AICD discharge (22 Oct 2023 11:23)    HPI:  72F c hx COPD, HTN, PAD, CAD s/p LAD stent (last LHC showing 100%  of RCA), chronic leukocytosis of unclear etiol, cardiac arrest s/p left-sided ICD (6/4/2019) complicated by infection and s/p R single-chamber ICD reimplantation ([facility] in 9/23/2019), CHF EF 45%, grade 2 DHF, recent hospitalization for UTI (treated w/ ceftriaxone and Vanco x 3 days) and VFib s/p 13 AICD firings, pw lightheadedness and AICD firing.    Pt recently hospitalized for VFib s/p multiple aicd firing. Pt placed on quinidine, mexiletine, metoprolol. Pt reports good compliance with her medications, even though she doesn't know what the meds are. Pt states at around 3-4PM yesterday, she felt "dizzy" for a few seconds, then felt AICD shock, then had resolution of her dizziness. Denies fevers, chills, CP, SOB, URI symptoms, GI symptoms. Pt states she's never seen a hematologist for leukocytosis. Pt was supposed to f/u with EP as outpt for ablation. (16 Oct 2023 03:47)      Hospital Course:    24 HOUR EVENTS:    REVIEW OF SYSTEMS:    CONSTITUTIONAL: No weakness, fevers or chills  EYES/ENT: No visual changes;  No vertigo or throat pain   NECK: No pain or stiffness  RESPIRATORY: No cough, wheezing, hemoptysis; No shortness of breath  CARDIOVASCULAR: No chest pain or palpitations  GASTROINTESTINAL: No abdominal or epigastric pain. No nausea, vomiting, or hematemesis; No diarrhea or constipation. No melena or hematochezia.  GENITOURINARY: No dysuria, frequency or hematuria  NEUROLOGICAL: No numbness or weakness  SKIN: No itching, rashes      ICU Vital Signs Last 24 Hrs  7:26)    ======================================================  PAST MEDICAL & SURGICAL HISTORY:  Obese      Smoker      HTN (hypertension)      HLD (hyperlipidemia)      CAD (coronary artery disease)      CHF with cardiomyopathy      History of hip surgery  ===================ASSESSMENT ==============  72F PMHx COPD, HTN, PAD, CAD s/p LAD stent (last LHC showing 100%  of RCA), chronic leukocytosis of unclear etiol, cardiac arrest s/p left-sided ICD (6/4/2019) complicated by infection and s/p R single-chamber ICD reimplantation ([facility] in 9/23/2019), CHF EF 45%, grade 2 DHF, and VFib s/p 13 AICD firings underwent VT ablation today c/b pericardial tamponade. Transferred to CICU for further monitoring. 10/21 developed AF w/ RVR.    PLAN  ===NEURO===  #No active issues  - A&O x3    ===RESPIRATORY===  #Hx COPD  - Currently on room air sat >88%    ===CARDIOVASCULAR===  #Pericardial Tamponade  - s/p VT ablation c/b pericardial tamponade  - pericardial drain with 40 cc drained initially, 250cc in bag upon arrival to CICU  -s/p drainage removal 10/20  -  f/u TTE 10/22am with moderate pericardial effusion, not seemingly large enough to affect hemodynamics  - Trend CBC    #AF w/ RVR  -new onset  -s/p DCCVx2 at 200J, now in NSR  -start heparin gtt per protocol      # VT  - Started 400 amio TID PO per EP  - VT ablation aborted after tamponade  -c/w lido gtt, wean as tolerated   - has AICD  - replete lytes as needed to maintain K>4, Mag>2  - fu EP recs   - will need q6mo TSH / LFT, annual CXR and eye exam as outpatient (amiodarone)  - New AF w/ RVR on 10/21 PM, s/p Amio 150mg IV x1  - AC restarted given new AF w/ RVR, closely trend Hb and pericardial effusion, can hold     #CHF  - TTE 10/5 EF 55%, reg WMA, mild-mod MS  - Hold metoprolol and entresto i/s/o hypotension, restart as tolerated and per EP     #CAD s/p stents  - Continue plavix    ===GI===  #No active issues  - Monitor for BM  - DASH Diet  - npo midnite for poss EP procedure     ===RENAL===  #No active issues  - Strict I&O's  - Replete lytes as needed to maintain K>4, Mag>2    ===ENDO===  #No active issues  - Monitor glucose on CMP  - TSH wnl    ===HEME-ONC===  #Pericardial tamponade  - heparin for AFib  - Trend CBC  - No blood products given    ===ID===  #Chronic leukocytosis  - 1 dose vanco given  - previously on CTX x3d   - Blood cultures negative  - E coli in urine   - reports of pus from previous groin access, ordering ultrasound to r/o abscess.        Patient requires continuous monitoring with bedside rhythm monitoring, pulse ox monitoring, and intermittent blood gas analysis. Care plan discussed with ICU care team. Patient remained critical and at risk for life threatening decompensation.  Patient seen, examined and plan discussed with CCU team during rounds.     I have personally provided ____ minutes of critical care time excluding time spent on separate procedures, in addition to initial critical care time provided by the CICU Attending, Dr. Recinos.    By signing my name below, I, Brandee Spencer, attest that this documentation has been prepared under the direction and in the presence of Annalee Chandler NP.  Electronically signed: Dallin Spaulding, 10-22-23 @ 20:17    I, Annalee Chandler , personally performed the services described in this documentation. all medical record entries made by the lashawnibmary were at my direction and in my presence. I have reviewed the chart and agree that the record reflects my personal performance and is accurate and complete  Electronically signed: Annalee Chandler NP.       AUSTIN LEE  MRN-[id number]  Patient is a 72y old  Female who presents with a chief complaint of AICD discharge (22 Oct 2023 11:23)    HPI:  72F c hx COPD, HTN, PAD, CAD s/p LAD stent (last LHC showing 100%  of RCA), chronic leukocytosis of unclear etiol, cardiac arrest s/p left-sided ICD (6/4/2019) complicated by infection and s/p R single-chamber ICD reimplantation ([facility] in 9/23/2019), CHF EF 45%, grade 2 DHF, recent hospitalization for UTI (treated w/ ceftriaxone and Vanco x 3 days) and VFib s/p 13 AICD firings, pw lightheadedness and AICD firing.    Pt recently hospitalized for VFib s/p multiple aicd firing. Pt placed on quinidine, mexiletine, metoprolol. Pt reports good compliance with her medications, even though she doesn't know what the meds are. Pt states at around 3-4PM yesterday, she felt "dizzy" for a few seconds, then felt AICD shock, then had resolution of her dizziness. Denies fevers, chills, CP, SOB, URI symptoms, GI symptoms. Pt states she's never seen a hematologist for leukocytosis. Pt was supposed to f/u with EP as outpt for ablation. (16 Oct 2023 03:47)      Hospital Course:    24 HOUR EVENTS:    REVIEW OF SYSTEMS:    CONSTITUTIONAL: No weakness, fevers or chills  EYES/ENT: No visual changes;  No vertigo or throat pain   NECK: No pain or stiffness  RESPIRATORY: No cough, wheezing, hemoptysis; No shortness of breath  CARDIOVASCULAR: No chest pain or palpitations  GASTROINTESTINAL: No abdominal or epigastric pain. No nausea, vomiting, or hematemesis; No diarrhea or constipation. No melena or hematochezia.  GENITOURINARY: No dysuria, frequency or hematuria  NEUROLOGICAL: No numbness or weakness  SKIN: No itching, rashes      ICU Vital Signs Last 24 Hrs  7:26)    ======================================================  PAST MEDICAL & SURGICAL HISTORY:  Obese      Smoker      HTN (hypertension)      HLD (hyperlipidemia)      CAD (coronary artery disease)      CHF with cardiomyopathy      History of hip surgery  ===================ASSESSMENT ==============  72F PMHx COPD, HTN, PAD, CAD s/p LAD stent (last LHC showing 100%  of RCA), chronic leukocytosis of unclear etiol, cardiac arrest s/p left-sided ICD (6/4/2019) complicated by infection and s/p R single-chamber ICD reimplantation ([facility] in 9/23/2019), CHF EF 45%, grade 2 DHF, and VFib s/p 13 AICD firings underwent VT ablation today c/b pericardial tamponade. Transferred to CICU for further monitoring. 10/21 developed AF w/ RVR.    PLAN  ===NEURO===  #No active issues  - A&O x3    ===RESPIRATORY===  #Hx COPD  - Currently on room air sat >88%    ===CARDIOVASCULAR===  #Pericardial Tamponade  - s/p VT ablation c/b pericardial tamponade  - pericardial drain with 40 cc drained initially, 250cc in bag upon arrival to CICU  -s/p drainage removal 10/20  -  f/u TTE 10/22am with moderate pericardial effusion, not seemingly large enough to affect hemodynamics  - Trend CBC    #AF w/ RVR  -new onset  -s/p DCCVx2 at 200J, now in NSR  -start heparin gtt per protocol      # VT  - Started 400 amio TID PO per EP  - VT ablation aborted after tamponade  -c/w lido gtt, wean as tolerated   - has AICD  - replete lytes as needed to maintain K>4, Mag>2  - fu EP recs   - will need q6mo TSH / LFT, annual CXR and eye exam as outpatient (amiodarone)  - New AF w/ RVR on 10/21 PM, s/p Amio 150mg IV x1  - AC restarted given new AF w/ RVR, closely trend Hb and pericardial effusion, can hold   -eventual VT ablation with EP    #CHF  - TTE 10/5 EF 55%, reg WMA, mild-mod MS  - Hold metoprolol and entresto i/s/o hypotension, restart as tolerated and per EP     #CAD s/p stents  - Continue plavix    ===GI===  #No active issues  - Monitor for BM  - DASH Diet  - npo midnite for poss EP procedure     ===RENAL===  #No active issues  - Strict I&O's  - Replete lytes as needed to maintain K>4, Mag>2    ===ENDO===  #No active issues  - Monitor glucose on CMP  - TSH wnl    ===HEME-ONC===  #Pericardial tamponade  - heparin for AFib  - Trend CBC  - No blood products given    ===ID===  #Chronic leukocytosis  - 1 dose vanco given  - previously on CTX x3d   - Blood cultures negative  - E coli in urine   - reports of pus from previous groin access, ordering ultrasound to r/o abscess.        Patient requires continuous monitoring with bedside rhythm monitoring, pulse ox monitoring, and intermittent blood gas analysis. Care plan discussed with ICU care team. Patient remained critical and at risk for life threatening decompensation.  Patient seen, examined and plan discussed with CCU team during rounds.     I have personally provided __30__ minutes of critical care time excluding time spent on separate procedures, in addition to initial critical care time provided by the CICU Attending, Dr. Recinos.    By signing my name below, I, Brandee Spencer, attest that this documentation has been prepared under the direction and in the presence of Annalee Chandler NP.  Electronically signed: Dallin Spaulding, 10-22-23 @ 20:17    I, Annalee Chandler , personally performed the services described in this documentation. all medical record entries made by the lashawnibmary were at my direction and in my presence. I have reviewed the chart and agree that the record reflects my personal performance and is accurate and complete  Electronically signed: Annalee Chandler NP.

## 2023-10-22 NOTE — PROGRESS NOTE ADULT - SUBJECTIVE AND OBJECTIVE BOX
Patient seen and examined at bedside.    Overnight Events: Patient developed monomorphic VT with ICD shock, transferred to CCU. Subsequent Afib with RVR for which she was cardioverted. Limited TTE with midly reduced EF, no effusion.     Current Meds:  albuterol/ipratropium for Nebulization 3 milliLiter(s) Nebulizer every 6 hours  aMIOdarone    Tablet 400 milliGRAM(s) Oral every 8 hours  aMIOdarone    Tablet   Oral   aMIOdarone IVPB 150 milliGRAM(s) IV Intermittent once  atorvastatin 40 milliGRAM(s) Oral at bedtime  budesonide 160 MICROgram(s)/formoterol 4.5 MICROgram(s) Inhaler 2 Puff(s) Inhalation two times a day  chlorhexidine 2% Cloths 1 Application(s) Topical <User Schedule>  clopidogrel Tablet 75 milliGRAM(s) Oral daily  colchicine 0.6 milliGRAM(s) Oral daily  heparin  Infusion 1200 Unit(s)/Hr IV Continuous <Continuous>  influenza  Vaccine (HIGH DOSE) 0.7 milliLiter(s) IntraMuscular once  lidocaine   Infusion 1 mG/Min IV Continuous <Continuous>  nystatin/triamcinolone Cream 1 Application(s) Topical two times a day      Vitals:  T(F): 98 (10-22), Max: 98 (10-22)  HR: 84 (10-22) (75 - 155)  BP: 113/49 (10-22) (86/54 - 133/88)  RR: 21 (10-22)  SpO2: 91% (10-22)  I&O's Summary    21 Oct 2023 07:01  -  22 Oct 2023 07:00  --------------------------------------------------------  IN: 693.5 mL / OUT: 450 mL / NET: 243.5 mL    22 Oct 2023 07:01  -  22 Oct 2023 11:23  --------------------------------------------------------  IN: 91.5 mL / OUT: 50 mL / NET: 41.5 mL        Physical Exam:  Appearance: No acute distress; well appearing  Eyes: PERRL, EOMI, pink conjunctiva  HEENT: Normal oral mucosa  Cardiovascular: RRR, S1, S2, no murmurs, rubs, or gallops; mild edema   Respiratory: dimished breath sounds at the bases   Gastrointestinal: soft, non-tender, non-distended with normal bowel sounds  Musculoskeletal: No clubbing; no joint deformity   Neurologic: Non-focal  Lymphatic: No lymphadenopathy  Psychiatry: AAOx3, mood & affect appropriate  Skin: No rashes, ecchymoses, or cyanosis                          12.2   24.08 )-----------( 249      ( 22 Oct 2023 02:08 )             38.6     10-22    134<L>  |  104  |  22  ----------------------------<  141<H>  4.4   |  20<L>  |  0.67    Ca    8.4      22 Oct 2023 02:08  Phos  2.9     10-22  Mg     2.9     10-22    TPro  6.1  /  Alb  2.5<L>  /  TBili  0.4  /  DBili  x   /  AST  10  /  ALT  13  /  AlkPhos  71  10-22    PT/INR - ( 22 Oct 2023 04:16 )   PT: 13.1 sec;   INR: 1.20 ratio         PTT - ( 22 Oct 2023 04:16 )  PTT:38.0 sec  CARDIAC MARKERS ( 15 Oct 2023 20:58 )  24 ng/L / x     / x     / x     / x     / x      CARDIAC MARKERS ( 15 Oct 2023 17:43 )  27 ng/L / x     / x     / x     / x     / x

## 2023-10-22 NOTE — PROGRESS NOTE ADULT - ASSESSMENT
====================ASSESSMENT ==============  72F PMHx COPD, HTN, PAD, CAD s/p LAD stent (last C showing 100%  of RCA), chronic leukocytosis of unclear etiol, cardiac arrest s/p left-sided ICD (6/4/2019) complicated by infection and s/p R single-chamber ICD reimplantation ([place] in 9/23/2019), CHF EF 45%, grade 2 DHF, and VFib s/p 13 AICD firings underwent VT ablation today c/b pericardial tamponade. Transferred to CICU for further monitoring. 10/21 developed AF w/ RVR.    PLAN  ===NEURO===  #No active issues  - A&O x3    ===RESPIRATORY===  #Hx COPD  - Currently on room air sat >88%    ===CARDIOVASCULAR===  #Pericardial Tamponade  - s/p VT ablation c/b pericardial tamponade  - pericardial drain with 40 cc drained initially, 250cc in bag upon arrival to CICU  -s/p drainage removal 10/20  -  f/u TTE in the AM to assess for reaccumulation   - Trend CBC    #AF w/ RVR  -new onset  -s/p DCCVx2 at 200J, now in NSR  -start heparin gtt per protocol      # VT  - Started 400 amio TID PO per EP  - VT ablation aborted after tamponade  -c/w lido gtt, wean as tolerated   - has AICD  - replete lytes as needed to maintain K>4, Mag>2  - fu EP recs   - will need q6mo TSH / LFT, annual CXR and eye exam as outpatient (amiodarone)  - New AF w/ RVR on 10/21 PM, s/p Amio 150mg IV x1  - Would consider r/s AC soon given new AF w/ RVR, pending resolution of bloody tamponade    #CHF  - TTE 10/5 EF 55%, reg WMA, mild-mod MS  - Hold metoprolol and entresto i/s/o hypotension, restart as tolerated and per EP     #CAD s/p stents  - Continue plavix    ===GI===  #No active issues  - Monitor for BM  - DASH Diet    ===RENAL===  #No active issues  - Strict I&O's  - Replete lytes as needed to maintain K>4, Mag>2    ===ENDO===  #No active issues  - Monitor glucose on CMP  - TSH wnl    ===HEME-ONC===  #Pericardial tamponade  - Would consider r/s AC soon given new AF w/ RVR, pending resolution of bloody tamponade  - Trend CBC  - No blood products given    ===ID===  #Chronic leukocytosis  - 1 dose vanco given  - Give CTX now for gram negative coverage  - Blood cultures sent, f/u results  - reports of pus from previous groin access, ordering ultrasound to r/o abscess.   ====================ASSESSMENT ==============  72F PMHx COPD, HTN, PAD, CAD s/p LAD stent (last C showing 100%  of RCA), chronic leukocytosis of unclear etiol, cardiac arrest s/p left-sided ICD (6/4/2019) complicated by infection and s/p R single-chamber ICD reimplantation ([place] in 9/23/2019), CHF EF 45%, grade 2 DHF, and VFib s/p 13 AICD firings underwent VT ablation today c/b pericardial tamponade. Transferred to CICU for further monitoring. 10/21 developed AF w/ RVR.    PLAN  ===NEURO===  #No active issues  - A&O x3    ===RESPIRATORY===  #Hx COPD  - Currently on room air sat >88%    ===CARDIOVASCULAR===  #Pericardial Tamponade  - s/p VT ablation c/b pericardial tamponade  - pericardial drain with 40 cc drained initially, 250cc in bag upon arrival to CICU  -s/p drainage removal 10/20  -  f/u TTE 10/22am with moderate pericardial effusion, not seemingly large enough to affect hemodynamics  - Trend CBC    #AF w/ RVR  -new onset  -s/p DCCVx2 at 200J, now in NSR  -start heparin gtt per protocol      # VT  - Started 400 amio TID PO per EP  - VT ablation aborted after tamponade  -c/w lido gtt, wean as tolerated   - has AICD  - replete lytes as needed to maintain K>4, Mag>2  - fu EP recs   - will need q6mo TSH / LFT, annual CXR and eye exam as outpatient (amiodarone)  - New AF w/ RVR on 10/21 PM, s/p Amio 150mg IV x1  - AC restarted given new AF w/ RVR, closely trend Hb and pericardial effusion, can hold     #CHF  - TTE 10/5 EF 55%, reg WMA, mild-mod MS  - Hold metoprolol and entresto i/s/o hypotension, restart as tolerated and per EP     #CAD s/p stents  - Continue plavix    ===GI===  #No active issues  - Monitor for BM  - DASH Diet  - npo midnite for poss EP procedure     ===RENAL===  #No active issues  - Strict I&O's  - Replete lytes as needed to maintain K>4, Mag>2    ===ENDO===  #No active issues  - Monitor glucose on CMP  - TSH wnl    ===HEME-ONC===  #Pericardial tamponade  - heparin for AFib  - Trend CBC  - No blood products given    ===ID===  #Chronic leukocytosis  - 1 dose vanco given  - previously on CTX x3d   - Blood cultures negative  - E coli in urine   - reports of pus from previous groin access, ordering ultrasound to r/o abscess.

## 2023-10-23 NOTE — PROGRESS NOTE ADULT - ATTENDING COMMENTS
History of COPD with active smoking  Admitted with VT requiring multiple AICD shocks with course complicated by afib also requiring multiple (external) shocks  During Vt ablation the patient went into tamponade and after a pericardiocentesis the ablation was aborted  She has had recurrent VT and afib thereafter  A+Ox3  VT on Lidocaine infusion and Amiodarone - wean Lidocaine and start Quinidine  Will also start Lopressor  Hemodynamics acceptable, no pressors or inotropes  O2 sats mid to high 90s on nasal cannula - wean to room air  DASH/diabetic diet  Normal renal function/Non-oliguric ARLENE  H/H normal/low but acceptable  HSQ for DVT prophylaxis / on Heparin drip for afib / PE / IABP  Afebrile, no antibiotics  Sugars controlled  No central access History of COPD with active smoking  Admitted with VT requiring multiple AICD shocks with course complicated by afib also requiring multiple (external) shocks  During Vt ablation the patient went into tamponade and after a pericardiocentesis the ablation was aborted  She has had recurrent VT and afib thereafter  A+Ox3  VT on Lidocaine infusion and Amiodarone - wean Lidocaine and start Quinidine  Will also start Lopressor  Hemodynamics acceptable, no pressors or inotropes  O2 sats mid to high 90s on nasal cannula - wean to room air  DASH/diabetic diet  Normal renal function/, compensated on exam - target even  H/H acceptable on Heparin drip for afib  Afebrile, no antibiotics  Sugars controlled  No central access

## 2023-10-23 NOTE — PROGRESS NOTE ADULT - SUBJECTIVE AND OBJECTIVE BOX
Patient seen and examined at bedside.    Overnight Events:   - Episodes of VT noted on telemetry     REVIEW OF SYSTEMS:  General: no fatigue/malaise, weight loss/gain.  Skin: no rashes.  Ophthalmologic: no blurred vision, no loss of vision. 	  ENT: no sore throat, rhinorrhea, sinus congestion.  Respiratory: no SOB, cough or wheeze.  CV: No chest pain. No palpitations.   Gastrointestinal:  no N/V/D, no melena/hematemesis/hematochezia.  Genitourinary: no dysuria/hesitancy or hematuria.  Musculoskeletal: no myalgias or arthralgias.  Neurological: no changes in vision or hearing, no lightheadedness/dizziness, no syncope/near syncope	  Psychiatric: no unusual stress/anxiety.   Hematology/Lymphatics: no unusual bleeding, bruising and no lymphadenopathy.  Endocrine: no unusual thirst.           Current Meds:  albuterol/ipratropium for Nebulization 3 milliLiter(s) Nebulizer every 6 hours  aMIOdarone    Tablet 400 milliGRAM(s) Oral every 8 hours  aMIOdarone    Tablet 200 milliGRAM(s) Oral daily  aMIOdarone    Tablet   Oral   aMIOdarone IVPB 150 milliGRAM(s) IV Intermittent once  atorvastatin 40 milliGRAM(s) Oral at bedtime  budesonide 160 MICROgram(s)/formoterol 4.5 MICROgram(s) Inhaler 2 Puff(s) Inhalation two times a day  chlorhexidine 2% Cloths 1 Application(s) Topical <User Schedule>  clopidogrel Tablet 75 milliGRAM(s) Oral daily  colchicine 0.6 milliGRAM(s) Oral daily  heparin  Infusion 1200 Unit(s)/Hr IV Continuous <Continuous>  influenza  Vaccine (HIGH DOSE) 0.7 milliLiter(s) IntraMuscular once  lidocaine   Infusion 1 mG/Min IV Continuous <Continuous>  melatonin 5 milliGRAM(s) Oral at bedtime  nystatin/triamcinolone Cream 1 Application(s) Topical two times a day      Vitals:  T(F): 97.3 (10-23), Max: 97.6 (10-22)  HR: 78 (10-23) (78 - 102)  BP: 111/49 (10-23) (97/61 - 158/64)  RR: 24 (10-23)  SpO2: 93% (10-23)  I&O's Summary    22 Oct 2023 07:01  -  23 Oct 2023 07:00  --------------------------------------------------------  IN: 744.5 mL / OUT: 450 mL / NET: 294.5 mL    23 Oct 2023 07:01  -  23 Oct 2023 11:16  --------------------------------------------------------  IN: 67.5 mL / OUT: 0 mL / NET: 67.5 mL        Physical Exam:  Appearance: No acute distress; well appearing  Eyes: PERRL, EOMI, pink conjunctiva  HEENT: Normal oral mucosa  Cardiovascular: RRR, S1, S2, no murmurs, rubs, or gallops; no edema; no JVD  Respiratory: Clear to auscultation bilaterally  Gastrointestinal: soft, non-tender, non-distended with normal bowel sounds  Musculoskeletal: No clubbing; no joint deformity   Neurologic: Non-focal  Lymphatic: No lymphadenopathy  Psychiatry: AAOx3, mood & affect appropriate  Skin: No rashes, ecchymoses, or cyanosis                          11.5   20.44 )-----------( 313      ( 23 Oct 2023 01:11 )             34.7     10-23    134<L>  |  104  |  20  ----------------------------<  128<H>  4.1   |  22  |  0.72    Ca    8.4      23 Oct 2023 01:11  Phos  2.6     10-23  Mg     2.6     10-23    TPro  5.8<L>  /  Alb  2.5<L>  /  TBili  0.3  /  DBili  x   /  AST  11  /  ALT  12  /  AlkPhos  69  10-23    PT/INR - ( 23 Oct 2023 01:11 )   PT: 12.8 sec;   INR: 1.23 ratio         PTT - ( 23 Oct 2023 06:19 )  PTT:63.7 sec

## 2023-10-23 NOTE — PROGRESS NOTE ADULT - ASSESSMENT
72F PMHx COPD, HTN, PAD, CAD s/p LAD stent (last [facility] showing 100%  of RCA), chronic leukocytosis of unclear etiol, cardiac arrest s/p left-sided ICD (6/4/2019) complicated by infection and s/p R single-chamber ICD reimplantation ([place] in 9/23/2019), CHF EF 45%, grade 2 DHF, and VFib s/p 13 AICD firings underwent VT ablation today c/b pericardial tamponade. Transferred to CICU for further monitoring. 10/21 developed AF w/ RVR.    PLAN  ===NEURO===  #No active issues  - A&O x3    ===RESPIRATORY===  #Hx COPD  - Currently on room air sat >88%    ===CARDIOVASCULAR===  #Pericardial Tamponade  - s/p VT ablation c/b pericardial tamponade  - pericardial drain with 40 cc drained initially, 250cc in bag upon arrival to CICU  -s/p drainage removal 10/20  -  f/u TTE 10/22am with moderate pericardial effusion, not seemingly large enough to affect hemodynamics  - Trend CBC    #AF w/ RVR  -new onset  -s/p DCCVx2 at 200J, now in NSR  -start heparin gtt per protocol      # VT  - Started 400 amio TID PO per EP  - VT ablation aborted after tamponade  -c/w lido gtt, wean as tolerated   - has AICD  - replete lytes as needed to maintain K>4, Mag>2  - fu EP recs   - will need q6mo TSH / LFT, annual CXR and eye exam as outpatient (amiodarone)  - New AF w/ RVR on 10/21 PM, s/p Amio 150mg IV x1  - AC restarted given new AF w/ RVR, closely trend Hb and pericardial effusion, can hold     #CHF  - TTE 10/5 EF 55%, reg WMA, mild-mod MS  - Hold metoprolol and entresto i/s/o hypotension, restart as tolerated and per EP     #CAD s/p stents  - Continue plavix    ===GI===  #No active issues  - Monitor for BM  - DASH Diet  - npo midnite for poss EP procedure     ===RENAL===  #No active issues  - Strict I&O's  - Replete lytes as needed to maintain K>4, Mag>2    ===ENDO===  #No active issues  - Monitor glucose on CMP  - TSH wnl    ===HEME-ONC===  #Pericardial tamponade  - heparin for AFib  - Trend CBC  - No blood products given    ===ID===  #Chronic leukocytosis  - 1 dose vanco given  - previously on CTX x3d   - Blood cultures negative  - E coli in urine   - reports of pus from previous groin access, ordering ultrasound to r/o abscess.   72F PMHx COPD, HTN, PAD, CAD s/p LAD stent (last [facility] showing 100%  of RCA), chronic leukocytosis of unclear etiol, cardiac arrest s/p left-sided ICD (6/4/2019) complicated by infection and s/p R single-chamber ICD reimplantation ([place] in 9/23/2019), CHF EF 45%, grade 2 DHF, and VFib s/p 13 AICD firings underwent VT ablation today c/b pericardial tamponade. Transferred to CICU for further monitoring. 10/21 developed AF w/ RVR.    PLAN  ===NEURO===  #No active issues  - A&O x3    ===RESPIRATORY===  #Hx COPD  - Currently on room air sat >88%    ===CARDIOVASCULAR===  #Pericardial Tamponade  - s/p VT ablation c/b pericardial tamponade  - pericardial drain with 40 cc drained initially, 250cc in bag upon arrival to CICU  -s/p drainage removal 10/20  - TTE 10/22 with moderate pericardial effusion, not seemingly large enough to affect hemodynamics  - TTE 23rd for staging next steps regarding attempt 2 for ablation:     - Trending CBC, stable (23rd)  - 23rd: Pt on plavix, stellate ganglion block likely not feasible at this time       #AF w/ RVR  -new onset  -s/p DCCVx2 at 200J, now in NSR  -start heparin gtt per protocol      # VT  - Started 400 amio TID PO per EP  - VT ablation aborted after tamponade  - Wean lido as tolerated. Will start quinidine 324 BID (23rd)    - has AICD  - replete lytes as needed to maintain K>4, Mag>2  - fu EP recs   - will need q6mo TSH / LFT, annual CXR and eye exam as outpatient (amiodarone)  - New AF w/ RVR on 10/21 PM, s/p Amio 150mg IV x1  - AC restarted given new AF w/ RVR, closely trend Hb and pericardial effusion, can hold     #CHF  - TTE 10/5 EF 55%, reg WMA, mild-mod MS  - Hold metoprolol and entresto i/s/o hypotension, restart as tolerated and per EP     #CAD s/p stents  - Continue plavix    ===GI===  #No active issues  - Monitor for BM  - DASH Diet  - npo midnite for poss EP procedure     ===RENAL===  #No active issues  - Strict I&O's  - Replete lytes as needed to maintain K>4, Mag>2    ===ENDO===  #No active issues  - Monitor glucose on CMP  - TSH wnl    ===HEME-ONC===  #Pericardial tamponade  - heparin for AFib  - Trend CBC  - No blood products given    ===ID===  #Chronic leukocytosis  - 1 dose vanco given  - previously on CTX x3d   - Blood cultures negative  - E coli in urine   - reports of pus from previous groin access, ultrasound considered to r/o abscess  - Pt w/o temp for days, pt WBC stable. Pt site showing fungal growth in the intertriginous areas, the wound itself appears clean, no fluctuance, no suppuration. Short timeline of ablation event to now (23rd), likely not abscess at this point     General plans for the day: Change lido to quinidine, TTE, she can eat until midnight, EP recs for ablation in future.   72F PMHx COPD, HTN, PAD, CAD s/p LAD stent (last C showing 100%  of RCA), chronic leukocytosis of unclear etiol, cardiac arrest s/p left-sided ICD (6/4/2019) complicated by infection and s/p R single-chamber ICD reimplantation ([place] in 9/23/2019), CHF EF 45%, grade 2 DHF, and VFib s/p 13 AICD firings underwent VT ablation today c/b pericardial tamponade. Transferred to CICU for further monitoring. 10/21 developed AF w/ RVR.    PLAN  ===NEURO===  #No active issues  - A&O x3    ===RESPIRATORY===  #Hx COPD  - Currently on room air sat >88%    ===CARDIOVASCULAR===  #Pericardial Tamponade  - s/p VT ablation c/b pericardial tamponade  - pericardial drain with 40 cc drained initially, 250cc in bag upon arrival to CICU, s/p drainage removal 10/20  - TTE 10/22 with moderate pericardial effusion, not seemingly large enough to affect hemodynamics  - TTE 23rd for staging next steps regarding attempt 2 for ablation:     - Trending CBC, stable (23rd)  - 23rd: Pt on plavix, stellate ganglion block likely not feasible at this time       #AF w/ RVR  -new onset  -s/p DCCVx2 at 200J (22nd), in NSR  -heparin gtt per protocol      # VT  - 400 amio TID PO per EP  - VT ablation aborted after tamponade  - Wean lido as tolerated. Will start quinidine 324 BID (23rd)    - has AICD  - replete lytes as needed to maintain K>4, Mag>2  - fu EP recs   - will need q6mo TSH / LFT, annual CXR and eye exam as outpatient (amiodarone)  - New AF w/ RVR on 10/21 PM, s/p Amio 150mg IV x1  - AC given new AF w/ RVR, closely trend Hb and pericardial effusion    #CHF  - TTE 10/5 EF 55%, reg WMA, mild-mod MS  - Held metoprolol and entresto i/s/o hypotension, restart as tolerated and per EP     #CAD s/p stents  - Continue plavix    ===GI===  #No active issues  - Monitor for BM  - DASH Diet  - npo midnite for poss EP procedure     ===RENAL===  #No active issues  - Strict I&O's  - Replete lytes as needed to maintain K>4, Mag>2    ===ENDO===  #No active issues  - Monitor glucose on CMP  - TSH wnl    ===HEME-ONC===  #Pericardial tamponade  - heparin for AFib  - Trend CBC  - No blood products given    ===ID===  #Chronic leukocytosis  - 1 dose vanco given  - previously on CTX x3d   - Blood cultures negative  - E coli in urine   - reports of pus from previous groin access, ultrasound considered to r/o abscess  - Pt w/o temp for days, pt WBC stable. Pt site showing fungal growth in the intertriginous areas, the wound itself appears clean, no fluctuance, no suppuration. Short timeline of ablation event to now (23rd), likely not abscess at this point     General plans for the day: Change lido to quinidine, TTE, she can eat until midnight, EP recs for ablation in future.

## 2023-10-23 NOTE — PROGRESS NOTE ADULT - ASSESSMENT
72F PMHx COPD, HTN, PAD, CAD s/p LAD stent (last [facility] showing 100%  of RCA), chronic leukocytosis of unclear etiol, cardiac arrest s/p left-sided ICD (6/4/2019) complicated by infection and s/p R single-chamber ICD reimplantation ([place] in 9/23/2019), CHF EF 45%, grade 2 DHF, and VFib s/p 13 AICD firings underwent VT ablation today c/b pericardial tamponade. Transferred to CICU for further monitoring. 10/21 developed AF w/ RVR.    PLAN  ===NEURO===  #No active issues  - A&O x3    ===RESPIRATORY===  #Hx COPD  - Currently on room air sat >88%    ===CARDIOVASCULAR===  #Pericardial Tamponade  - s/p VT ablation c/b pericardial tamponade  - pericardial drain with 40 cc drained initially, 250cc in bag upon arrival to CICU, s/p drainage removal 10/20  - TTE 10/22 with moderate pericardial effusion, not seemingly large enough to affect hemodynamics  - TTE 23rd for staging next steps regarding attempt 2 for ablation:     - Trending CBC, stable (23rd)  - 23rd: Pt on plavix, stellate ganglion block likely not feasible at this time       #AF w/ RVR  -new onset  -s/p DCCVx2 at 200J (22nd), in NSR  -heparin gtt held per EP d/t concern of accumulating effusion     # VT  - 400 amio TID PO per EP  - VT ablation aborted after tamponade  - Wean lido as tolerated. Will start quinidine 324 BID (23rd)    - has AICD  - replete lytes as needed to maintain K>4, Mag>2  - fu EP recs   - will need q6mo TSH / LFT, annual CXR and eye exam as outpatient (amiodarone)  - New AF w/ RVR on 10/21 PM, s/p Amio 150mg IV x1  - s/p 150 amio IVPB x2, lido 100mg x1, lido gtt increased to 1    #CHF  - TTE 10/5 EF 55%, reg WMA, mild-mod MS  - Held metoprolol and entresto i/s/o hypotension, restart as tolerated and per EP     #CAD s/p stents  - Continue plavix    ===GI===  #No active issues  - Monitor for BM  - DASH Diet     ===RENAL===  #No active issues  - Strict I&O's  - Replete lytes as needed to maintain K>4, Mag>2    ===ENDO===  #No active issues  - Monitor glucose on CMP  - TSH wnl    ===HEME-ONC===  #Pericardial tamponade  - heparin for AFib  - Trend CBC  - No blood products given    ===ID===  #Chronic leukocytosis  - 1 dose vanco given  - previously on CTX x3d   - Blood cultures negative  - E coli in urine   - reports of pus from previous groin access, ultrasound considered to r/o abscess  - Pt w/o temp for days, pt WBC stable. Pt site showing fungal growth in the intertriginous areas, the wound itself appears clean, no fluctuance, no suppuration. Short timeline of ablation event to now (23rd), likely not abscess at this point     ======================= DISPOSITION  =====================  [x] Critical   [ ] Guarded    [ ] Stable    [x] Maintain in CICU  [ ] Downgrade to Telemtry  [ ] Discharge Home    Patient requires continuous monitoring with bedside rhythm monitoring, pulse ox monitoring, and intermittent blood gas analysis. Care plan discussed with ICU care team. Patient remained critical and at risk for life threatening decompensation.  Patient seen, examined and plan discussed with CCU team during rounds.     I have personally provided 35 minutes of critical care time excluding time spent on separate procedures, in addition to initial critical care time provided by the CICU Attending, Dr. Brooks

## 2023-10-23 NOTE — PROGRESS NOTE ADULT - ASSESSMENT
73 y/o F w/ PMHx COPD, HLD, HTN, PVD, ICM/CAD s/p PCI (has  of RCA), cardiac arrest s/p left-sided ICD (6/4/2019) complicated by infection and s/p R single-chamber ICD ([place] in 9/23/2019), recent admission for VT storm with adjustment of medication and NIPS now presenting for ICD shock in the setting of recurrent monomorphic VT with unsuccessful ATP.  Ablation attempted 10/20 but aborted due to hemodynamically significant pericardial effusion with drain placement.  Pericardial drain removed on 10/20/23.  Over the weekend developed new shortness of breath, recurrent VT s/p ICD shock, and afib w/ RVR s/p cardioversion. She continues to have NSVT. Currently in sinus rhythm.     - Would repeat limited TTE today to assess for reaccumulation hold off on beta blocker  - Would start low dose quinidine Q12H and keep Lidocaine gtt for at least 24 hours   - continue on oral amiodarone load 400mg PO Q 8 hours. Monitor TSH, LFT's, opthalmology and pulmonary function tests as outpatient   AST 10, ALT 12, baseline TSH 3.58 on 10/2   - Should be evaluated for potential stellate ganglion block  - white count remains elevated, fungal groin infection and puss noted from prior access site earlier this week, would have very low threshold to complete 7 day course of Abx, will defer to management per CCU team   - Will decide timing of ablation based on repeat echo findings and treatment of possible infection    Note not final until signed by attending       Erasmo Lundberg MD  Cardiology Fellow PGY-6  Phone: [phone]    For all New Consults  [email or web address]   Login: cardThe [facility] For Medical & Veterinary SciencesyvonneFlixpress   73 y/o F w/ PMHx COPD, HLD, HTN, PVD, ICM/CAD s/p PCI (has  of RCA), cardiac arrest s/p left-sided ICD (6/4/2019) complicated by infection and s/p R single-chamber ICD ([place] in 9/23/2019), recent admission for VT storm with adjustment of medication and NIPS now presenting for ICD shock in the setting of recurrent monomorphic VT with unsuccessful ATP.  Ablation attempted 10/20 but aborted due to hemodynamically significant pericardial effusion with drain placement.  Pericardial drain removed on 10/20/23.  Over the weekend developed new shortness of breath, recurrent VT s/p ICD shock, and afib w/ RVR s/p cardioversion. She continues to have NSVT. Currently in sinus rhythm.     - Would repeat limited TTE today to assess for reaccumulation hold off on beta blocker  - Would start low dose quinidine Q12H and keep Lidocaine gtt for at least 24 hours   - continue on oral amiodarone load 400mg PO Q 8 hours. Monitor TSH, LFT's, opthalmology and pulmonary function tests as outpatient   AST 10, ALT 12, baseline TSH 3.58 on 10/2   - Should be evaluated for potential stellate ganglion block  - white count remains elevated, fungal groin infection and puss noted from prior access site earlier this week, would have very low threshold to complete 7 day course of Abx, will defer to management per CCU team   - Will decide timing of ablation based on repeat echo findings and treatment of possible infection    Note not final until signed by attending       Erasmo Lundberg MD  Cardiology Fellow PGY-6  Phone: [phone]    For all New Consults  [email or web address]   Login: [email or web address]   73 y/o F w/ PMHx COPD, HLD, HTN, PVD, ICM/CAD s/p PCI (has  of RCA), cardiac arrest s/p left-sided ICD (6/4/2019) complicated by infection and s/p R single-chamber ICD ([place] in 9/23/2019), recent admission for VT storm with adjustment of medication and NIPS now presenting for ICD shock in the setting of recurrent monomorphic VT with unsuccessful ATP.  Ablation attempted 10/20 but aborted due to hemodynamically significant pericardial effusion with drain placement.  Pericardial drain removed on 10/20/23.  Over the weekend developed new shortness of breath, recurrent VT s/p ICD shock, and afib w/ RVR s/p cardioversion. She continues to have NSVT. Currently in sinus rhythm.     - Would repeat limited TTE today to assess for reaccumulation hold off on beta blocker  - Would start low dose quinidine Q12H and keep Lidocaine gtt for at least 24 hours   - continue on oral amiodarone load 400mg PO Q 8 hours. Monitor TSH, LFT's, opthalmology and pulmonary function tests as outpatient   AST 10, ALT 12, baseline TSH 3.58 on 10/2   - Should be evaluated for potential stellate ganglion block  - white count remains elevated, fungal groin infection and puss noted from prior access site earlier this week, would have very low threshold to complete 7 day course of Abx, will defer to management per CCU team   - Will decide timing of ablation based on repeat echo findings and treatment of possible infection    Note not final until signed by attending       Erasmo Lundberg MD  Cardiology Fellow PGY-6  Phone: [phone]    For all New Consults  [email or web address]   Login: cardKnodayvonneTropic Networks   71 y/o F w/ PMHx COPD, HLD, HTN, PVD, ICM/CAD s/p PCI (has  of RCA), cardiac arrest s/p left-sided ICD (6/4/2019) complicated by infection and s/p R single-chamber ICD ([place] in 9/23/2019), recent admission for VT storm with adjustment of medication and NIPS now presenting for ICD shock in the setting of recurrent monomorphic VT with unsuccessful ATP.  Ablation attempted 10/20 but aborted due to hemodynamically significant pericardial effusion with drain placement.  Pericardial drain removed on 10/20/23.  Over the weekend developed new shortness of breath, recurrent VT s/p ICD shock, and afib w/ RVR s/p cardioversion. She continues to have NSVT. Currently in sinus rhythm.     - Would repeat limited TTE today to assess for reaccumulation hold off beta blocker until reassessment of effusion  - Can continue heparin gtt now for AF s/p electrical cardioversion 10/21  - Would start low dose quinidine Q12H and keep Lidocaine gtt for at least 24 hours   - continue on oral amiodarone load 400mg PO Q 8 hours. Monitor TSH, LFT's, opthalmology and pulmonary function tests as outpatient   AST 10, ALT 12, baseline TSH 3.58 on 10/2   - Should be evaluated for potential stellate ganglion block  - white count remains elevated, fungal groin infection and puss noted from prior access site earlier this week, would have very low threshold to complete 7 day course of Abx, will defer to management per CCU team   - Will decide timing of ablation based on repeat echo findings and treatment of possible infection    Note not final until signed by attending       Erasmo Lundberg MD  Cardiology Fellow PGY-6  Phone: [phone]    For all New Consults  [email or web address]   Login: cardScienceLogicyvonneKnox Payments   71 y/o F w/ PMHx COPD, HLD, HTN, PVD, ICM/CAD s/p PCI (has  of RCA), cardiac arrest s/p left-sided ICD (6/4/2019) complicated by infection and s/p R single-chamber ICD ([place] in 9/23/2019), recent admission for VT storm with adjustment of medication and NIPS now presenting for ICD shock in the setting of recurrent monomorphic VT with unsuccessful ATP.  Ablation attempted 10/20 but aborted due to hemodynamically significant pericardial effusion with drain placement.  Pericardial drain removed on 10/20/23.  Over the weekend developed new shortness of breath, recurrent VT s/p ICD shock, and afib w/ RVR s/p cardioversion. She continues to have NSVT. Currently in sinus rhythm.     - Would repeat limited TTE today to assess for reaccumulation hold off beta blocker until reassessment of effusion  - Can continue heparin gtt now for AF s/p electrical cardioversion 10/21  - Would start low dose quinidine Q12H and keep Lidocaine gtt for at least 24 hours   - continue on oral amiodarone load 400mg PO Q 8 hours. Monitor TSH, LFT's, opthalmology and pulmonary function tests as outpatient   AST 10, ALT 12, baseline TSH 3.58 on 10/2   - Should be evaluated for potential stellate ganglion block  - white count remains elevated, fungal groin infection and puss noted from prior access site earlier this week, would have very low threshold to complete 7 day course of Abx, will defer to management per CCU team   - Will decide timing of ablation based on repeat echo findings and treatment of possible infection    Note not final until signed by attending       Erasmo Lundberg MD  Cardiology Fellow PGY-6  Phone: [phone]    For all New Consults  [email or web address]   Login: [email or web address]   73 y/o F w/ PMHx COPD, HLD, HTN, PVD, ICM/CAD s/p PCI (has  of RCA), cardiac arrest s/p left-sided ICD (6/4/2019) complicated by infection and s/p R single-chamber ICD ([place] in 9/23/2019), recent admission for VT storm with adjustment of medication and NIPS now presenting for ICD shock in the setting of recurrent monomorphic VT with unsuccessful ATP.  Ablation attempted 10/20 but aborted due to hemodynamically significant pericardial effusion with drain placement.  Pericardial drain removed on 10/20/23.  Over the weekend developed new shortness of breath, recurrent VT s/p ICD shock, and afib w/ RVR s/p cardioversion. She continues to have NSVT. Currently in sinus rhythm.     - Would repeat limited TTE today to assess for reaccumulation hold off beta blocker until reassessment of effusion  - Can continue heparin gtt now for AF s/p electrical cardioversion 10/21  - Would start low dose quinidine Q12H and keep Lidocaine gtt for at least 24 hours   - continue on oral amiodarone load 400mg PO Q 8 hours. Monitor TSH, LFT's, opthalmology and pulmonary function tests as outpatient   AST 10, ALT 12, baseline TSH 3.58 on 10/2   - Should be evaluated for potential stellate ganglion block  - white count remains elevated, fungal groin infection and puss noted from prior access site earlier this week, would have very low threshold to complete 7 day course of Abx, will defer to management per CCU team   - Will decide timing of ablation based on repeat echo findings and treatment of possible infection    Note not final until signed by attending       Erasmo Lundberg MD  Cardiology Fellow PGY-6  Phone: [phone]    For all New Consults  [email or web address]   Login: [email or web address] Office

## 2023-10-23 NOTE — PROGRESS NOTE ADULT - SUBJECTIVE AND OBJECTIVE BOX
Patient is a 72y old  Female who presents with a chief complaint of AICD discharge (22 Oct 2023 20:16)    HPI:  72F c hx COPD, HTN, PAD, CAD s/p LAD stent (last C showing 100%  of RCA), chronic leukocytosis of unclear etiol, cardiac arrest s/p left-sided ICD (2019) complicated by infection and s/p R single-chamber ICD reimplantation ([place] in 2019), CHF EF 45%, grade 2 DHF, recent hospitalization for UTI (treated w/ ceftriaxone and Vanco x 3 days) and VFib s/p 13 AICD firings, pw lightheadedness and AICD firing.    Pt recently hospitalized for VFib s/p multiple aicd firing. Pt placed on quinidine, mexiletine, metoprolol. Pt reports good compliance with her medications, even though she doesn't know what the meds are. Pt states at around 3-4PM yesterday, she felt "dizzy" for a few seconds, then felt AICD shock, then had resolution of her dizziness. Denies fevers, chills, CP, SOB, URI symptoms, GI symptoms. Pt states she's never seen a hematologist for leukocytosis. Pt was supposed to f/u with EP as outpt for ablation. (16 Oct 2023 03:47)       INTERVAL HPI/OVERNIGHT EVENTS:   No overnight events   Afebrile, hemodynamically stable     Subjective:    ICU Vital Signs Last 24 Hrs  T(C): 36.3 (23 Oct 2023 03:00), Max: 36.4 (22 Oct 2023 19:00)  T(F): 97.3 (23 Oct 2023 03:00), Max: 97.6 (22 Oct 2023 19:00)  HR: 81 (23 Oct 2023 08:00) (78 - 102)  BP: 124/56 (23 Oct 2023 08:00) (97/61 - 158/64)  BP(mean): 80 (23 Oct 2023 08:00) (64 - 92)  ABP: --  ABP(mean): --  RR: 22 (23 Oct 2023 08:00) (18 - 30)  SpO2: 92% (23 Oct 2023 08:00) (89% - 96%)    O2 Parameters below as of 23 Oct 2023 04:00  Patient On (Oxygen Delivery Method): nasal cannula  O2 Flow (L/min): 2        I&O's Summary    22 Oct 2023 07:01  -  23 Oct 2023 07:00  --------------------------------------------------------  IN: 744.5 mL / OUT: 450 mL / NET: 294.5 mL          Daily     Daily Weight in k.1 (23 Oct 2023 05:00)    Adult Advanced Hemodynamics Last 24 Hrs  CVP(mm Hg): --  CVP(cm H2O): --  CO: --  CI: --  PA: --  PA(mean): --  PCWP: --  SVR: --  SVRI: --  PVR: --  PVRI: --    EKG/Telemetry Events:    MEDICATIONS  (STANDING):  albuterol/ipratropium for Nebulization 3 milliLiter(s) Nebulizer every 6 hours  aMIOdarone    Tablet 400 milliGRAM(s) Oral every 8 hours  aMIOdarone    Tablet 200 milliGRAM(s) Oral daily  aMIOdarone    Tablet   Oral   aMIOdarone IVPB 150 milliGRAM(s) IV Intermittent once  atorvastatin 40 milliGRAM(s) Oral at bedtime  budesonide 160 MICROgram(s)/formoterol 4.5 MICROgram(s) Inhaler 2 Puff(s) Inhalation two times a day  chlorhexidine 2% Cloths 1 Application(s) Topical <User Schedule>  clopidogrel Tablet 75 milliGRAM(s) Oral daily  colchicine 0.6 milliGRAM(s) Oral daily  heparin  Infusion 1200 Unit(s)/Hr (15 mL/Hr) IV Continuous <Continuous>  influenza  Vaccine (HIGH DOSE) 0.7 milliLiter(s) IntraMuscular once  lidocaine   Infusion 1 mG/Min (7.5 mL/Hr) IV Continuous <Continuous>  melatonin 5 milliGRAM(s) Oral at bedtime  nystatin/triamcinolone Cream 1 Application(s) Topical two times a day    MEDICATIONS  (PRN):      PHYSICAL EXAM:  GENERAL:   HEAD:  Atraumatic, Normocephalic  EYES: EOMI, PERRLA, conjunctiva and sclera clear  NECK: Supple, No JVD, Normal thyroid, no enlarged nodes  NERVOUS SYSTEM:  Alert & Awake.   CHEST/LUNG: B/L good air entry; No rales, rhonchi, or wheezing  HEART: S1S2 normal, no S3, Regular rate and rhythm; No murmurs  ABDOMEN: Soft, Nontender, Nondistended; Bowel sounds present  EXTREMITIES:  2+ Peripheral Pulses, No clubbing, cyanosis, or edema  LYMPH: No lymphadenopathy noted  SKIN: No rashes or lesions    LABS:                        11.5   20.44 )-----------( 313      ( 23 Oct 2023 01:11 )             34.7     10-    134<L>  |  104  |  20  ----------------------------<  128<H>  4.1   |  22  |  0.72    Ca    8.4      23 Oct 2023 01:11  Phos  2.6     10-  Mg     2.6     10-    TPro  5.8<L>  /  Alb  2.5<L>  /  TBili  0.3  /  DBili  x   /  AST  11  /  ALT  12  /  AlkPhos  69  10-23    LIVER FUNCTIONS - ( 23 Oct 2023 01:11 )  Alb: 2.5 g/dL / Pro: 5.8 g/dL / ALK PHOS: 69 U/L / ALT: 12 U/L / AST: 11 U/L / GGT: x           PT/INR - ( 23 Oct 2023 01:11 )   PT: 12.8 sec;   INR: 1.23 ratio         PTT - ( 23 Oct 2023 06:19 )  PTT:63.7 sec  CAPILLARY BLOOD GLUCOSE                Urinalysis Basic - ( 23 Oct 2023 01:11 )    Color: x / Appearance: x / SG: x / pH: x  Gluc: 128 mg/dL / Ketone: x  / Bili: x / Urobili: x   Blood: x / Protein: x / Nitrite: x   Leuk Esterase: x / RBC: x / WBC x   Sq Epi: x / Non Sq Epi: x / Bacteria: x          RADIOLOGY & ADDITIONAL TESTS:  CXR:        Care Discussed with Consultants/Other Providers [ x] YES  [ ] NO           Patient is a 72y old  Female who presents with a chief complaint of AICD discharge (22 Oct 2023 20:16)    HPI:  72F c hx COPD, HTN, PAD, CAD s/p LAD stent (last C showing 100%  of RCA), chronic leukocytosis of unclear etiol, cardiac arrest s/p left-sided ICD (2019) complicated by infection and s/p R single-chamber ICD reimplantation ([place] in 2019), CHF EF 45%, grade 2 DHF, recent hospitalization for UTI (treated w/ ceftriaxone and Vanco x 3 days) and VFib s/p 13 AICD firings, pw lightheadedness and AICD firing.    Pt recently hospitalized for VFib s/p multiple aicd firing. Pt placed on quinidine, mexiletine, metoprolol. Pt reports good compliance with her medications, even though she doesn't know what the meds are. Pt states at around 3-4PM yesterday, she felt "dizzy" for a few seconds, then felt AICD shock, then had resolution of her dizziness. Denies fevers, chills, CP, SOB, URI symptoms, GI symptoms. Pt states she's never seen a hematologist for leukocytosis. Pt was supposed to f/u with EP as outpt for ablation. (16 Oct 2023 03:47)       INTERVAL HPI/OVERNIGHT EVENTS:   No overnight events   Afebrile, hemodynamically stable     Subjective:    ICU Vital Signs Last 24 Hrs  T(C): 36.3 (23 Oct 2023 03:00), Max: 36.4 (22 Oct 2023 19:00)  T(F): 97.3 (23 Oct 2023 03:00), Max: 97.6 (22 Oct 2023 19:00)  HR: 81 (23 Oct 2023 08:00) (78 - 102)  BP: 124/56 (23 Oct 2023 08:00) (97/61 - 158/64)  BP(mean): 80 (23 Oct 2023 08:00) (64 - 92)  ABP: --  ABP(mean): --  RR: 22 (23 Oct 2023 08:00) (18 - 30)  SpO2: 92% (23 Oct 2023 08:00) (89% - 96%)    O2 Parameters below as of 23 Oct 2023 04:00  Patient On (Oxygen Delivery Method): nasal cannula  O2 Flow (L/min): 2    I&O's Summary    22 Oct 2023 07:01  -  23 Oct 2023 07:00  --------------------------------------------------------  IN: 744.5 mL / OUT: 450 mL / NET: 294.5 mL    Daily     Daily Weight in k.1 (23 Oct 2023 05:00)    Adult Advanced Hemodynamics Last 24 Hrs  CVP(mm Hg): --  CVP(cm H2O): --  CO: --  CI: --  PA: --  PA(mean): --  PCWP: --  SVR: --  SVRI: --  PVR: --  PVRI: --    EKG/Telemetry Events:    MEDICATIONS  (STANDING):  albuterol/ipratropium for Nebulization 3 milliLiter(s) Nebulizer every 6 hours  aMIOdarone    Tablet 400 milliGRAM(s) Oral every 8 hours  aMIOdarone    Tablet 200 milliGRAM(s) Oral daily  aMIOdarone    Tablet   Oral   aMIOdarone IVPB 150 milliGRAM(s) IV Intermittent once  atorvastatin 40 milliGRAM(s) Oral at bedtime  budesonide 160 MICROgram(s)/formoterol 4.5 MICROgram(s) Inhaler 2 Puff(s) Inhalation two times a day  chlorhexidine 2% Cloths 1 Application(s) Topical <User Schedule>  clopidogrel Tablet 75 milliGRAM(s) Oral daily  colchicine 0.6 milliGRAM(s) Oral daily  heparin  Infusion 1200 Unit(s)/Hr (15 mL/Hr) IV Continuous <Continuous>  influenza  Vaccine (HIGH DOSE) 0.7 milliLiter(s) IntraMuscular once  lidocaine   Infusion 1 mG/Min (7.5 mL/Hr) IV Continuous <Continuous>  melatonin 5 milliGRAM(s) Oral at bedtime  nystatin/triamcinolone Cream 1 Application(s) Topical two times a day    MEDICATIONS  (PRN):      PHYSICAL EXAM:  GENERAL:   HEAD:  Atraumatic, Normocephalic  EYES: EOMI, PERRLA, conjunctiva and sclera clear  NECK: Supple, No JVD,   NERVOUS SYSTEM:  Alert & Awake.   CHEST/LUNG: B/L good air entry; No rales, rhonchi, or wheezing  HEART: S1S2 normal, no S3, Regular rate and rhythm; No murmurs  ABDOMEN: Soft, Nontender, Nondistended; Bowel sounds present  EXTREMITIES:  2+ Peripheral Pulses, No clubbing, cyanosis, or edema  LYMPH: No lymphadenopathy noted  SKIN: No rashes or lesions. Fungal rash noted in b/l groin. Insertion site clean dry, nearby (~1 cm) from fungal rash, but puncture wound itself not suppurative, no surrounding erythema.      LABS:                        11.5   20.44 )-----------( 313      ( 23 Oct 2023 01:11 )             34.7     10-    134<L>  |  104  |  20  ----------------------------<  128<H>  4.1   |  22  |  0.72    Ca    8.4      23 Oct 2023 01:11  Phos  2.6     10-  Mg     2.6     10-    TPro  5.8<L>  /  Alb  2.5<L>  /  TBili  0.3  /  DBili  x   /  AST  11  /  ALT  12  /  AlkPhos  69  10-23    LIVER FUNCTIONS - ( 23 Oct 2023 01:11 )  Alb: 2.5 g/dL / Pro: 5.8 g/dL / ALK PHOS: 69 U/L / ALT: 12 U/L / AST: 11 U/L / GGT: x           PT/INR - ( 23 Oct 2023 01:11 )   PT: 12.8 sec;   INR: 1.23 ratio         PTT - ( 23 Oct 2023 06:19 )  PTT:63.7 sec  CAPILLARY BLOOD GLUCOSE    Urinalysis Basic - ( 23 Oct 2023 01:11 )    Color: x / Appearance: x / SG: x / pH: x  Gluc: 128 mg/dL / Ketone: x  / Bili: x / Urobili: x   Blood: x / Protein: x / Nitrite: x   Leuk Esterase: x / RBC: x / WBC x   Sq Epi: x / Non Sq Epi: x / Bacteria: x    RADIOLOGY & ADDITIONAL TESTS:  CXR:        Care Discussed with Consultants/Other Providers [ x] YES  [ ] NO           Patient is a 72y old  Female who presents with a chief complaint of AICD discharge (22 Oct 2023 20:16)    HPI:  72F c hx COPD, HTN, PAD, CAD s/p LAD stent (last C showing 100%  of RCA), chronic leukocytosis of unclear etiol, cardiac arrest s/p left-sided ICD (2019) complicated by infection and s/p R single-chamber ICD reimplantation ([place] in 2019), CHF EF 45%, grade 2 DHF, recent hospitalization for UTI (treated w/ ceftriaxone and Vanco x 3 days) and VFib s/p 13 AICD firings, pw lightheadedness and AICD firing.    Pt recently hospitalized for VFib s/p multiple aicd firing. Pt placed on quinidine, mexiletine, metoprolol. Pt reports good compliance with her medications, even though she doesn't know what the meds are. Pt states at around 3-4PM yesterday, she felt "dizzy" for a few seconds, then felt AICD shock, then had resolution of her dizziness. Denies fevers, chills, CP, SOB, URI symptoms, GI symptoms. Pt states she's never seen a hematologist for leukocytosis. Pt was supposed to f/u with EP as outpt for ablation. (16 Oct 2023 03:47)       INTERVAL HPI/OVERNIGHT EVENTS:   Intermittent VT   Afebrile, hemodynamically stable     Subjective:    ICU Vital Signs Last 24 Hrs  T(C): 36.3 (23 Oct 2023 03:00), Max: 36.4 (22 Oct 2023 19:00)  T(F): 97.3 (23 Oct 2023 03:00), Max: 97.6 (22 Oct 2023 19:00)  HR: 81 (23 Oct 2023 08:00) (78 - 102)  BP: 124/56 (23 Oct 2023 08:00) (97/61 - 158/64)  BP(mean): 80 (23 Oct 2023 08:00) (64 - 92)  ABP: --  ABP(mean): --  RR: 22 (23 Oct 2023 08:00) (18 - 30)  SpO2: 92% (23 Oct 2023 08:00) (89% - 96%)    O2 Parameters below as of 23 Oct 2023 04:00  Patient On (Oxygen Delivery Method): nasal cannula  O2 Flow (L/min): 2    I&O's Summary    22 Oct 2023 07:01  -  23 Oct 2023 07:00  --------------------------------------------------------  IN: 744.5 mL / OUT: 450 mL / NET: 294.5 mL    Daily     Daily Weight in k.1 (23 Oct 2023 05:00)    Adult Advanced Hemodynamics Last 24 Hrs  CVP(mm Hg): --  CVP(cm H2O): --  CO: --  CI: --  PA: --  PA(mean): --  PCWP: --  SVR: --  SVRI: --  PVR: --  PVRI: --    EKG/Telemetry Events:    MEDICATIONS  (STANDING):  albuterol/ipratropium for Nebulization 3 milliLiter(s) Nebulizer every 6 hours  aMIOdarone    Tablet 400 milliGRAM(s) Oral every 8 hours  aMIOdarone    Tablet 200 milliGRAM(s) Oral daily  aMIOdarone    Tablet   Oral   aMIOdarone IVPB 150 milliGRAM(s) IV Intermittent once  atorvastatin 40 milliGRAM(s) Oral at bedtime  budesonide 160 MICROgram(s)/formoterol 4.5 MICROgram(s) Inhaler 2 Puff(s) Inhalation two times a day  chlorhexidine 2% Cloths 1 Application(s) Topical <User Schedule>  clopidogrel Tablet 75 milliGRAM(s) Oral daily  colchicine 0.6 milliGRAM(s) Oral daily  heparin  Infusion 1200 Unit(s)/Hr (15 mL/Hr) IV Continuous <Continuous>  influenza  Vaccine (HIGH DOSE) 0.7 milliLiter(s) IntraMuscular once  lidocaine   Infusion 1 mG/Min (7.5 mL/Hr) IV Continuous <Continuous>  melatonin 5 milliGRAM(s) Oral at bedtime  nystatin/triamcinolone Cream 1 Application(s) Topical two times a day    MEDICATIONS  (PRN):      PHYSICAL EXAM:  GENERAL:   HEAD:  Atraumatic, Normocephalic  EYES: EOMI, PERRLA, conjunctiva and sclera clear  NECK: Supple, No JVD,   NERVOUS SYSTEM:  Alert & Awake.   CHEST/LUNG: B/L good air entry; No rales, rhonchi, or wheezing  HEART: S1S2 normal, no S3, Regular rate and rhythm; No murmurs  ABDOMEN: Soft, Nontender, Nondistended; Bowel sounds present  EXTREMITIES:  2+ Peripheral Pulses, No clubbing, cyanosis, or edema  LYMPH: No lymphadenopathy noted  SKIN: No rashes or lesions. Fungal rash noted in b/l groin. Insertion site clean dry, nearby (~1 cm) from fungal rash, but puncture wound itself not suppurative, no surrounding erythema.      LABS:                        11.5   20.44 )-----------( 313      ( 23 Oct 2023 01:11 )             34.7     10-    134<L>  |  104  |  20  ----------------------------<  128<H>  4.1   |  22  |  0.72    Ca    8.4      23 Oct 2023 01:11  Phos  2.6     10-  Mg     2.6     10-    TPro  5.8<L>  /  Alb  2.5<L>  /  TBili  0.3  /  DBili  x   /  AST  11  /  ALT  12  /  AlkPhos  69  10-23    LIVER FUNCTIONS - ( 23 Oct 2023 01:11 )  Alb: 2.5 g/dL / Pro: 5.8 g/dL / ALK PHOS: 69 U/L / ALT: 12 U/L / AST: 11 U/L / GGT: x           PT/INR - ( 23 Oct 2023 01:11 )   PT: 12.8 sec;   INR: 1.23 ratio         PTT - ( 23 Oct 2023 06:19 )  PTT:63.7 sec  CAPILLARY BLOOD GLUCOSE    Urinalysis Basic - ( 23 Oct 2023 01:11 )    Color: x / Appearance: x / SG: x / pH: x  Gluc: 128 mg/dL / Ketone: x  / Bili: x / Urobili: x   Blood: x / Protein: x / Nitrite: x   Leuk Esterase: x / RBC: x / WBC x   Sq Epi: x / Non Sq Epi: x / Bacteria: x    RADIOLOGY & ADDITIONAL TESTS:  CXR:        Care Discussed with Consultants/Other Providers [ x] YES  [ ] NO           Patient is a 72y old  Female who presents with a chief complaint of AICD discharge (22 Oct 2023 20:16)    HPI:  72F c hx COPD, HTN, PAD, CAD s/p LAD stent (last C showing 100%  of RCA), chronic leukocytosis of unclear etiol, cardiac arrest s/p left-sided ICD (2019) complicated by infection and s/p R single-chamber ICD reimplantation ([place] in 2019), CHF EF 45%, grade 2 DHF, recent hospitalization for UTI (treated w/ ceftriaxone and Vanco x 3 days) and VFib s/p 13 AICD firings, pw lightheadedness and AICD firing.    Pt recently hospitalized for VFib s/p multiple aicd firing. Pt placed on quinidine, mexiletine, metoprolol. Pt reports good compliance with her medications, even though she doesn't know what the meds are. Pt states at around 3-4PM yesterday, she felt "dizzy" for a few seconds, then felt AICD shock, then had resolution of her dizziness. Denies fevers, chills, CP, SOB, URI symptoms, GI symptoms. Pt states she's never seen a hematologist for leukocytosis. Pt was supposed to f/u with EP as outpt for ablation. (16 Oct 2023 03:47)       INTERVAL HPI/OVERNIGHT EVENTS:   Intermittent NSVT   Afebrile, hemodynamically stable     Subjective:    ICU Vital Signs Last 24 Hrs  T(C): 36.3 (23 Oct 2023 03:00), Max: 36.4 (22 Oct 2023 19:00)  T(F): 97.3 (23 Oct 2023 03:00), Max: 97.6 (22 Oct 2023 19:00)  HR: 81 (23 Oct 2023 08:00) (78 - 102)  BP: 124/56 (23 Oct 2023 08:00) (97/61 - 158/64)  BP(mean): 80 (23 Oct 2023 08:00) (64 - 92)  ABP: --  ABP(mean): --  RR: 22 (23 Oct 2023 08:00) (18 - 30)  SpO2: 92% (23 Oct 2023 08:00) (89% - 96%)    O2 Parameters below as of 23 Oct 2023 04:00  Patient On (Oxygen Delivery Method): nasal cannula  O2 Flow (L/min): 2    I&O's Summary    22 Oct 2023 07:01  -  23 Oct 2023 07:00  --------------------------------------------------------  IN: 744.5 mL / OUT: 450 mL / NET: 294.5 mL    Daily     Daily Weight in k.1 (23 Oct 2023 05:00)    Adult Advanced Hemodynamics Last 24 Hrs  CVP(mm Hg): --  CVP(cm H2O): --  CO: --  CI: --  PA: --  PA(mean): --  PCWP: --  SVR: --  SVRI: --  PVR: --  PVRI: --    EKG/Telemetry Events:    MEDICATIONS  (STANDING):  albuterol/ipratropium for Nebulization 3 milliLiter(s) Nebulizer every 6 hours  aMIOdarone    Tablet 400 milliGRAM(s) Oral every 8 hours  aMIOdarone    Tablet 200 milliGRAM(s) Oral daily  aMIOdarone    Tablet   Oral   aMIOdarone IVPB 150 milliGRAM(s) IV Intermittent once  atorvastatin 40 milliGRAM(s) Oral at bedtime  budesonide 160 MICROgram(s)/formoterol 4.5 MICROgram(s) Inhaler 2 Puff(s) Inhalation two times a day  chlorhexidine 2% Cloths 1 Application(s) Topical <User Schedule>  clopidogrel Tablet 75 milliGRAM(s) Oral daily  colchicine 0.6 milliGRAM(s) Oral daily  heparin  Infusion 1200 Unit(s)/Hr (15 mL/Hr) IV Continuous <Continuous>  influenza  Vaccine (HIGH DOSE) 0.7 milliLiter(s) IntraMuscular once  lidocaine   Infusion 1 mG/Min (7.5 mL/Hr) IV Continuous <Continuous>  melatonin 5 milliGRAM(s) Oral at bedtime  nystatin/triamcinolone Cream 1 Application(s) Topical two times a day    MEDICATIONS  (PRN):      PHYSICAL EXAM:  GENERAL:   HEAD:  Atraumatic, Normocephalic  EYES: EOMI, PERRLA, conjunctiva and sclera clear  NECK: Supple, No JVD,   NERVOUS SYSTEM:  Alert & Awake.   CHEST/LUNG: B/L good air entry; No rales, rhonchi, or wheezing  HEART: S1S2 normal, no S3, Regular rate and rhythm; No murmurs  ABDOMEN: Soft, Nontender, Nondistended; Bowel sounds present  EXTREMITIES:  2+ Peripheral Pulses, No clubbing, cyanosis, or edema  LYMPH: No lymphadenopathy noted  SKIN: No rashes or lesions. Fungal rash noted in b/l groin. Insertion site clean dry, nearby (~1 cm) from fungal rash, but puncture wound itself not suppurative, no surrounding erythema.      LABS:                        11.5   20.44 )-----------( 313      ( 23 Oct 2023 01:11 )             34.7     10-    134<L>  |  104  |  20  ----------------------------<  128<H>  4.1   |  22  |  0.72    Ca    8.4      23 Oct 2023 01:11  Phos  2.6     10-  Mg     2.6     10-    TPro  5.8<L>  /  Alb  2.5<L>  /  TBili  0.3  /  DBili  x   /  AST  11  /  ALT  12  /  AlkPhos  69  10-23    LIVER FUNCTIONS - ( 23 Oct 2023 01:11 )  Alb: 2.5 g/dL / Pro: 5.8 g/dL / ALK PHOS: 69 U/L / ALT: 12 U/L / AST: 11 U/L / GGT: x           PT/INR - ( 23 Oct 2023 01:11 )   PT: 12.8 sec;   INR: 1.23 ratio         PTT - ( 23 Oct 2023 06:19 )  PTT:63.7 sec  CAPILLARY BLOOD GLUCOSE    Urinalysis Basic - ( 23 Oct 2023 01:11 )    Color: x / Appearance: x / SG: x / pH: x  Gluc: 128 mg/dL / Ketone: x  / Bili: x / Urobili: x   Blood: x / Protein: x / Nitrite: x   Leuk Esterase: x / RBC: x / WBC x   Sq Epi: x / Non Sq Epi: x / Bacteria: x    RADIOLOGY & ADDITIONAL TESTS:  CXR:        Care Discussed with Consultants/Other Providers [ x] YES  [ ] NO

## 2023-10-23 NOTE — PROGRESS NOTE ADULT - SUBJECTIVE AND OBJECTIVE BOX
AUSTIN LEE  MRN-[id number]  Patient is a 72y old  Female who presents with a chief complaint of AICD discharge (23 Oct 2023 11:16)    HPI:  72F c hx COPD, HTN, PAD, CAD s/p LAD stent (last C showing 100%  of RCA), chronic leukocytosis of unclear etiol, cardiac arrest s/p left-sided ICD (2019) complicated by infection and s/p R single-chamber ICD reimplantation ([facility] in 2019), CHF EF 45%, grade 2 DHF, recent hospitalization for UTI (treated w/ ceftriaxone and Vanco x 3 days) and VFib s/p 13 AICD firings, pw lightheadedness and AICD firing.    Pt recently hospitalized for VFib s/p multiple aicd firing. Pt placed on quinidine, mexiletine, metoprolol. Pt reports good compliance with her medications, even though she doesn't know what the meds are. Pt states at around 3-4PM yesterday, she felt "dizzy" for a few seconds, then felt AICD shock, then had resolution of her dizziness. Denies fevers, chills, CP, SOB, URI symptoms, GI symptoms. Pt states she's never seen a hematologist for leukocytosis. Pt was supposed to f/u with EP as outpt for ablation. (16 Oct 2023 03:47)      24 HOUR EVENTS:  - Converted back to AFib this afternoon with decreased SBP, then AFlutter with rates 150  - Given Amio 150 IVPB x2 and lido 100mg x1. Lido gtt increased back to 1  - Started quinidine 324 BID   - Started lopressor 12.5    REVIEW OF SYSTEMS:  CONSTITUTIONAL: No weakness, fevers or chills  EYES/ENT: No visual changes;  No vertigo or throat pain   NECK: No pain or stiffness  RESPIRATORY: No cough, wheezing, hemoptysis; No shortness of breath  CARDIOVASCULAR: No chest pain or palpitations  GASTROINTESTINAL: No abdominal or epigastric pain. No nausea, vomiting, or hematemesis; No diarrhea or constipation. No melena or hematochezia.  GENITOURINARY: No dysuria, frequency or hematuria  NEUROLOGICAL: No numbness or weakness  SKIN: No itching, rashes      ICU Vital Signs Last 24 Hrs  T(C): 36.7 (23 Oct 2023 19:00), Max: 36.9 (23 Oct 2023 08:00)  T(F): 98 (23 Oct 2023 19:00), Max: 98.4 (23 Oct 2023 08:00)  HR: 84 (23 Oct 2023 19:00) (77 - 150)  BP: 113/59 (23 Oct 2023 19:00) (77/48 - 158/64)  BP(mean): 79 (23 Oct 2023 19:) (58 - 92)  ABP: --  ABP(mean): --  RR: 28 (23 Oct 2023 19:) (19 - 30)  SpO2: 93% (23 Oct 2023 19:) (90% - 100%)    O2 Parameters below as of 23 Oct 2023 19:  Patient On (Oxygen Delivery Method): nasal cannula  O2 Flow (L/min): 2          CVP(mm Hg): --  CO: --  CI: --  PA: --  PA(mean): --  PA(direct): --  PCWP: --  LA: --  RA: --  SVR: --  SVRI: --  PVR: --  PVRI: --  I&O's Summary    22 Oct 2023 07:  -  23 Oct 2023 07:00  --------------------------------------------------------  IN: 744.5 mL / OUT: 450 mL / NET: 294.5 mL    23 Oct 2023 07:  -  23 Oct 2023 19:39  --------------------------------------------------------  IN: 650.1 mL / OUT: 400 mL / NET: 250.1 mL        CAPILLARY BLOOD GLUCOSE    CAPILLARY BLOOD GLUCOSE          PHYSICAL EXAM:  GENERAL: No acute distress, well-developed  HEAD:  Atraumatic, Normocephalic  EYES: EOMI, PERRLA, conjunctiva and sclera clear  NECK: Supple, no lymphadenopathy, no JVD  CHEST/LUNG: CTAB; No wheezes, rales, or rhonchi  HEART: Regular rate and rhythm. Normal S1/S2. No murmurs, rubs, or gallops  ABDOMEN: Soft, non-tender, non-distended; normal bowel sounds, no organomegaly  EXTREMITIES:  2+ peripheral pulses b/l, No clubbing, cyanosis, or edema  NEUROLOGY: A&O x 3, no focal deficits  SKIN: No rashes or lesions    ============================I/O===========================   I&O's Detail    22 Oct 2023 07:01  -  23 Oct 2023 07:00  --------------------------------------------------------  IN:    Heparin: 272 mL    Lidocaine: 142.5 mL    Oral Fluid: 330 mL  Total IN: 744.5 mL    OUT:    Voided (mL): 450 mL  Total OUT: 450 mL    Total NET: 294.5 mL      23 Oct 2023 07:01  -  23 Oct 2023 19:39  --------------------------------------------------------  IN:    Heparin: 120 mL    IV PiggyBack: 200 mL    Lidocaine: 52.6 mL    Lidocaine: 37.5 mL    Oral Fluid: 240 mL  Total IN: 650.1 mL    OUT:    Voided (mL): 400 mL  Total OUT: 400 mL    Total NET: 250.1 mL        ============================ LABS =========================                        11.5   20.44 )-----------( 313      ( 23 Oct 2023 01:11 )             34.7     10-23    134<L>  |  104  |  20  ----------------------------<  128<H>  4.1   |  22  |  0.72    Ca    8.4      23 Oct 2023 01:11  Phos  2.6     10-23  Mg     2.6     10-23    TPro  5.8<L>  /  Alb  2.5<L>  /  TBili  0.3  /  DBili  x   /  AST  11  /  ALT  12  /  AlkPhos  69  10-23                LIVER FUNCTIONS - ( 23 Oct 2023 01:11 )  Alb: 2.5 g/dL / Pro: 5.8 g/dL / ALK PHOS: 69 U/L / ALT: 12 U/L / AST: 11 U/L / GGT: x           PT/INR - ( 23 Oct 2023 01:11 )   PT: 12.8 sec;   INR: 1.23 ratio         PTT - ( 23 Oct 2023 06:19 )  PTT:63.7 sec    Blood Gas Venous - Lactate: 2.1 mmol/L (10-21-23 @ 17:20)    Urinalysis Basic - ( 23 Oct 2023 01:11 )    Color: x / Appearance: x / SG: x / pH: x  Gluc: 128 mg/dL / Ketone: x  / Bili: x / Urobili: x   Blood: x / Protein: x / Nitrite: x   Leuk Esterase: x / RBC: x / WBC x   Sq Epi: x / Non Sq Epi: x / Bacteria: x      ======================Micro/Rad/Cardio=================  Telemetry: Reviewed   EKG: Reviewed  CXR: Reviewed  Culture: Reviewed   Echo: Transthoracic Echocardiogram:   Patient name: AUSTIN LEE  YOB: 1950   Age: 68 (F)   MR#: [id number]  Study Date: 2019  Location: O/PSonographer: Sherri Cadet RDCS  Study quality: Technically difficult  Referring Physician: RICCARDO WOLF MD  Blood Pressure: 140/80 mmHg  Height: 157 cm  Weight: 86 kg  BSA: 1.9 m2  Heart Rate: 60 mmHg  ------------------------------------------------------------------------  PROCEDURE: Transthoracic echocardiogram with 2-D, M-Mode  and complete spectral and color flow Doppler.  INDICATION: Atherosclerotic heart disease of native  coronary artery without angina pectoris (I25.10)  ------------------------------------------------------------------------  Dimensions:    Normal Values:  LA:     4.6    2.0 - 4.0 cm  Ao: 3.1    2.0 - 3.8 cm  SEPTUM: 1.4    0.6 - 1.2 cm  PWT:    1.3    0.6 - 1.1 cm  LVIDd:  6.4    3.0 - 5.6 cm  LVIDs:  4.3    1.8 - 4.0 cm  Derived variables:  LVMI: 219 g/m2  RWT: 0.40  Fractional short: 33 %  EF (Ahn Rule): 33 %Doppler Peak Velocity (m/sec):  AoV=1.2  ------------------------------------------------------------------------  Observations:  Mitral Valve: Mitral annular calcification and calcified  mitral leaflets with decreased diastolic opening. Mild  mitral regurgitation.  Peak mitral valve gradient equals 13  mm Hg, mean transmitral valve gradient equals 4 mm Hg,  consistent with mild mitral stenosis. Gradient measured at  a HR of 81bpm.  Aortic Valve/Aorta: Aortic valve not well visualized;  probably normal. Peak transaortic valve gradient equals 6  mm Hg, estimated aortic valve area equals 2.1 sqcm. Minimal  aortic regurgitation.  Peak left ventricular outflow tract  gradient equals 2 mm Hg.  Aortic Root: 3.1 cm.  Left Atrium: Normal left atrium.  LA volume index =31  cc/m2.  Left Ventricle: Severe segmental left ventricular systolic  dysfunction. Endocardial visualization enhanced with  intravenous injection of Ultrasonic Enhancing Agent  (Definity). The mid inferolateral wall, the basal  inferolateral wall, the basal inferior wall, the basal  anterolateral wall, the mid inferior wall, the mid  anterolateral wall, the mid inferoseptum, and the basal  inferoseptum are hypokinetic.  Moderate concentric left  ventricular hypertrophy. Moderate diastolic dysfunction  (Stage II).  Right Heart: Normal right atrium. Normal right ventricular  size and function. Normal tricuspid valve. Mild tricuspid  regurgitation. Normal pulmonic valve. Mild pulmonic  regurgitation.  Pericardium/Pleura: Normal pericardium with no pericardial  effusion.  Hemodynamic: Estimated right ventricular systolic pressure  equals 28 mm Hg, assuming right atrial pressure equals 3 mm  Hg, consistent with normal pulmonary pressures. Color  Doppler demonstrates no evidence of a patent foramen ovale.  ------------------------------------------------------------------------  Conclusions:  1. Mitral annular calcification and calcified mitral  leaflets with decreased diastolic opening. Mild mitral  regurgitation.  Peak mitral valve gradient equals 13 mm Hg,  mean transmitral valve gradient equals 4 mm Hg, consistent  with mild mitral stenosis. Gradient measured at a HR of  81bpm.  2. Aortic valve not well visualized; probably normal.  Minimal aortic regurgitation.  3. Moderate concentric left ventricular hypertrophy.  4. Severe segmental left ventricular systolic dysfunction.  Endocardial visualization enhanced with intravenous  injection of Ultrasonic Enhancing Agent (Definity). The mid  inferolateral wall, the basal  inferolateral wall, the  basal inferior wall, the basal anterolateral wall, the mid  inferior wall, the mid anterolateral wall, the mid  inferoseptum, and the basal inferoseptum are hypokinetic.  5. Moderate diastolic dysfunction (Stage II).  6. Normal right ventricular size and function.  *** No previous Echo exam.  ------------------------------------------------------------------------  Confirmed on  2019 - 16:47:43 by Dudley Marquez M.D.  ------------------------------------------------------------------------ (19 @ 13:56)    Cath: Cardiac Cath Lab - Adult:   [facility]  Department of Cardiology  [address] [place] [address]  [phone]  Cath Lab Report -- Comprehensive Report  Patient: AUSTIN LEE  Study date: 2015  Account number: [id number]  MR number: [id number]  : 1950  Gender: Female  Race: W  Case Physician(s):  Mo Izquierdo M.D.  Fellow:  СЕРГЕЙ Boogie M.D.  Referring Physician:  INDICATIONS: Angina/MI: unstable angina.  HISTORY: The patient has a history of coronary artery disease. The patient  has hypertension and medication-treated dyslipidemia.  PROCEDURE:  --  Intervention on mid LAD: drug-eluting stent.  TECHNIQUE: The risks and alternatives of the procedures and conscious  sedation were explained to the patient and informed consent was obtained.  Cardiac catheterization performed urgently. Coronary intervention  performed electively.  Local anesthetic given. Right radial artery access. A PRELUDE KIT was  inserted in the vessel. Right femoral artery access. A 6FR SHEATH PINNACLE  was inserted in the vessel. RADIATION EXPOSURE: 6.9 min. A successful  drug-eluting stent was performed on the 80 % lesion in the mid LAD.  Following intervention there was a 1 % residual stenosis. According to the  ACC/AHA classification system, this lesion was a type B2 lesion. There was  VELASQUEZ 3 flow after the procedure. Vessel setup was performed. A 6FR JL4  LAUNCHER guiding catheter was used to intubate the vessel. Vessel setup  was performed. A JASE [id number] wire was used to cross the lesion. A 3.50  X 20 PROMUS PREMIER drug-eluting stent was placed across the lesion and  deployed at a maximum inflation pressure of 20 gianna. Balloon angioplasty  was performed, using a 4.00 X 12 EUPHORA balloon, with 1 inflations and a  maximum inflation pressure of 12 gianna.  CONTRAST GIVEN: Omnipaque 85 ml.  MEDICATIONS GIVEN: Midazolam, 0.5 mg, IV. Fentanyl, 25 mcg, IV. Verapamil  (Isoptin, Calan, Covera), 2.5 mg, IA. Nitroglycerin, 300 mcg,  intracoronary. Labetalol (Trandate), 20 mg, IV. Heparin, 8000 units, IA.  CORONARY VESSELS:  LAD:   --  Mid LAD: There was a 80 % stenosis.  COMPLICATIONS: There were no complications.  INTERVENTIONAL RECOMMENDATIONS: ASA and Plavix for 1 year  Prepared and signed by  Mo Izquierdo M.D.  Signed 2015 11:02:43  HEMODYNAMIC TABLES  Pressures:  Intervention  Pressures:  - HR: 102  Pressures:  - Rhythm:  Pressures:  -- Aortic Pressure (S/D/M): 183/67/114  Outputs:  Baseline/ Room Air  Outputs:  -- CALCULATIONS[age]  Outputs:  -- CALCULATIONS: Body Surface Area: 2.03  Outputs:  -- CALCULATIONS: Height in cm: 160.00  Outputs:  -- CALCULATIONS: Sex: Female  Outputs:  -- CALCULATIONS: Weight in k.10 (01-23-15 @ 18:24)  Cardiac Cath Lab - Adult:   [facility]  Department of Cardiology  [address] [place] [address]  [phone]  Cath Lab Report -- Comprehensive Report  Patient: AUSTIN LEE  Study date: 2015  Account number: [id number]  MR number: [id number]  : 1950  Gender: Female  Race: W  Case Physician(s):  Mo Izquierdo M.D.  Fellow:  Sanjuana Campos M.D.  Referring Physician:  Alok Price M.D.  INDICATIONS: Angina/MI: unstable angina.  HISTORY: There was no prior cardiac history. The patient has hypertension,  medication-treated dyslipidemia, and morbid obesity.  PROCEDURE:  --  Left heart catheterization with ventriculography.  --  Left coronary angiography.  --  Right coronary angiography.  TECHNIQUE: The risks and alternatives of the procedures and conscious  sedation were explained to the patient and informed consent was obtained.  Cardiac catheterization performed electively.  Local anesthetic given. Right femoral artery access. A 5FR SHEATH PINNACLE  was inserted in the vessel. Left heart catheterization. Ventriculography  was performed. A 5FR  EXPO catheter was utilized. Left coronary  artery angiography. The vessel was injected utilizing a 5FR FL4.0 EXPO  catheter. Right coronary artery angiography. The vessel was injected  utilizing a 5FR FR4.0 EXPO catheter. RADIATION EXPOSURE: 1.6 min.  CONTRAST GIVEN: Visipaque 102 ml.  MEDICATIONS GIVEN: Fentanyl, 25 mcg, IV. Midazolam, 1 mg, IV. Labetalol  (Trandate), 20 mg, IV. Nitroglycerin, 300 mcg, IA. Labetalol (Trandate),  20 mg, IV.  VENTRICLES: Global left ventricular function was moderately depressed. EF  estimated was 40 %.  CORONARY VESSELS: The coronary circulation is right dominant.  LM:   --  Distal left main: There was a 20 % stenosis.  LAD:--  Proximal LAD: There was a 80 % stenosis.  CX:   --  Distal circumflex: Angiography showed mild atherosclerosis with  no flow limiting lesions.  RCA:   --  Mid RCA: There was a 100 % stenosis.  COMPLICATIONS: There were no complications.  DIAGNOSTIC RECOMMENDATIONS: Plan PCI of the LAD tomorrow. Pt with right  groin hematoma which is reason for delay in PCI  Prepared and signed by  Mo Izquierdo M.D.  Signed 2015 11:00:32  HEMODYNAMIC TABLES  Pressures:  Baseline/ Room Air  Pressures:  -HR: 85  Pressures:  - Rhythm:  Pressures:  -- Aortic Pressure (S/D/M): 210/77/128  Pressures:  -- Left Ventricle (s/edp): 209/25/--  Outputs:  Baseline/ Room Air  Outputs:  -- CALCULATIONS[age]  Outputs:  -- CALCULATIONS: Body SurfaceArea: 2.03  Outputs:  -- CALCULATIONS: Height in cm: 160.00  Outputs:  -- CALCULATIONS: Sex: Female  Outputs:  -- CALCULATIONS: Weight in k.10  Outputs:  -- OUTPUTS: O2 consumption: 272.02  Outputs:  -- OUTPUTS: Vo2 Indexed: 133.79 (01-22-15 @ 17:26)

## 2023-10-24 NOTE — PROGRESS NOTE ADULT - SUBJECTIVE AND OBJECTIVE BOX
Patient seen and examined at bedside.    Overnight Events:   - Had episode of slow VT given Amio 150 x 2    REVIEW OF SYSTEMS:  General: no fatigue/malaise, weight loss/gain.  Skin: no rashes.  Ophthalmologic: no blurred vision, no loss of vision. 	  ENT: no sore throat, rhinorrhea, sinus congestion.  Respiratory: no SOB, cough or wheeze.  CV: No chest pain. No palpitations.   Gastrointestinal:  no N/V/D, no melena/hematemesis/hematochezia.  Genitourinary: no dysuria/hesitancy or hematuria.  Musculoskeletal: no myalgias or arthralgias.  Neurological: no changes in vision or hearing, no lightheadedness/dizziness, no syncope/near syncope	  Psychiatric: no unusual stress/anxiety.   Hematology/Lymphatics: no unusual bleeding, bruising and no lymphadenopathy.  Endocrine: no unusual thirst.           Current Meds:  aMIOdarone    Tablet 200 milliGRAM(s) Oral daily  aMIOdarone    Tablet   Oral   aMIOdarone IVPB 150 milliGRAM(s) IV Intermittent once  atorvastatin 40 milliGRAM(s) Oral at bedtime  budesonide 160 MICROgram(s)/formoterol 4.5 MICROgram(s) Inhaler 2 Puff(s) Inhalation two times a day  chlorhexidine 2% Cloths 1 Application(s) Topical <User Schedule>  clopidogrel Tablet 75 milliGRAM(s) Oral daily  colchicine 0.6 milliGRAM(s) Oral daily  influenza  Vaccine (HIGH DOSE) 0.7 milliLiter(s) IntraMuscular once  lidocaine   Infusion 0.5 mG/Min IV Continuous <Continuous>  melatonin 5 milliGRAM(s) Oral at bedtime  metoprolol tartrate 12.5 milliGRAM(s) Oral every 12 hours  nystatin/triamcinolone Cream 1 Application(s) Topical two times a day  quiNIDine gluconate  milliGRAM(s) Oral every 12 hours      Vitals:  T(F): 98.1 (10-24), Max: 98.1 (10-24)  HR: 68 (10-24) (62 - 150)  BP: 135/59 (10-24) (77/48 - 163/69)  RR: 37 (10-24)  SpO2: 92% (10-24)  I&O's Summary    23 Oct 2023 07:01  -  24 Oct 2023 07:00  --------------------------------------------------------  IN: 890.5 mL / OUT: 750 mL / NET: 140.5 mL    24 Oct 2023 07:01  -  24 Oct 2023 11:13  --------------------------------------------------------  IN: 176.4 mL / OUT: 0 mL / NET: 176.4 mL        Physical Exam:  Appearance: No acute distress; well appearing  Eyes: PERRL, EOMI, pink conjunctiva  HEENT: Normal oral mucosa  Cardiovascular: RRR, S1, S2, no murmurs, rubs, or gallops; no edema; no JVD  Respiratory: Clear to auscultation bilaterally  Gastrointestinal: soft, non-tender, non-distended with normal bowel sounds  Musculoskeletal: No clubbing; no joint deformity   Neurologic: Non-focal  Lymphatic: No lymphadenopathy  Psychiatry: AAOx3, mood & affect appropriate  Skin: No rashes, ecchymoses, or cyanosis                          11.1   15.61 )-----------( 352      ( 24 Oct 2023 00:24 )             34.8     10-24    133<L>  |  102  |  16  ----------------------------<  123<H>  4.2   |  24  |  0.65    Ca    8.1<L>      24 Oct 2023 00:24  Phos  2.9     10-24  Mg     2.6     10-24    TPro  5.9<L>  /  Alb  2.3<L>  /  TBili  0.3  /  DBili  x   /  AST  11  /  ALT  13  /  AlkPhos  66  10-24    PT/INR - ( 24 Oct 2023 00:24 )   PT: 12.8 sec;   INR: 1.17 ratio         PTT - ( 24 Oct 2023 00:24 )  PTT:26.2 sec

## 2023-10-24 NOTE — PROGRESS NOTE ADULT - SUBJECTIVE AND OBJECTIVE BOX
AUSTIN LEE  MRN-[id number]  Patient is a 72y old  Female who presents with a chief complaint of AICD discharge (24 Oct 2023 11:12)    HPI:  72F c hx COPD, HTN, PAD, CAD s/p LAD stent (last LHC showing 100%  of RCA), chronic leukocytosis of unclear etiol, cardiac arrest s/p left-sided ICD (2019) complicated by infection and s/p R single-chamber ICD reimplantation ([place] in 2019), CHF EF 45%, grade 2 DHF, recent hospitalization for UTI (treated w/ ceftriaxone and Vanco x 3 days) and VFib s/p 13 AICD firings, pw lightheadedness and AICD firing.    Pt recently hospitalized for VFib s/p multiple aicd firing. Pt placed on quinidine, mexiletine, metoprolol. Pt reports good compliance with her medications, even though she doesn't know what the meds are. Pt states at around 3-4PM yesterday, she felt "dizzy" for a few seconds, then felt AICD shock, then had resolution of her dizziness. Denies fevers, chills, CP, SOB, URI symptoms, GI symptoms. Pt states she's never seen a hematologist for leukocytosis. Pt was supposed to f/u with EP as outpt for ablation. (16 Oct 2023 03:47)      24 HOUR EVENTS:    REVIEW OF SYSTEMS:    CONSTITUTIONAL: No weakness, fevers or chills  EYES/ENT: No visual changes;  No vertigo or throat pain   NECK: No pain or stiffness  RESPIRATORY: No cough, wheezing, hemoptysis; No shortness of breath  CARDIOVASCULAR: No chest pain or palpitations  GASTROINTESTINAL: No abdominal or epigastric pain. No nausea, vomiting, or hematemesis; No diarrhea or constipation. No melena or hematochezia.  GENITOURINARY: No dysuria, frequency or hematuria  NEUROLOGICAL: No numbness or weakness  SKIN: No itching, rashes      ICU Vital Signs Last 24 Hrs  T(C): 36.6 (24 Oct 2023 19:00), Max: 36.7 (24 Oct 2023 03:00)  T(F): 97.8 (24 Oct 2023 19:00), Max: 98.1 (24 Oct 2023 08:00)  HR: 68 (24 Oct 2023 19:00) (62 - 82)  BP: 138/59 (24 Oct 2023 19:00) (108/54 - 164/70)  BP(mean): 85 (24 Oct 2023 19:00) (76 - 116)  ABP: --  ABP(mean): --  RR: 26 (24 Oct 2023 19:00) (20 - 37)  SpO2: 94% (24 Oct 2023 19:00) (90% - 97%)    O2 Parameters below as of 24 Oct 2023 19:00  Patient On (Oxygen Delivery Method): nasal cannula  O2 Flow (L/min): 2          CVP(mm Hg): --  CO: --  CI: --  PA: --  PA(mean): --  PA(direct): --  PCWP: --  LA: --  RA: --  SVR: --  SVRI: --  PVR: --  PVRI: --  I&O's Summary    23 Oct 2023 07:01  -  24 Oct 2023 07:00  --------------------------------------------------------  IN: 890.5 mL / OUT: 750 mL / NET: 140.5 mL    24 Oct 2023 07:01  -  24 Oct 2023 19:09  --------------------------------------------------------  IN: 414.4 mL / OUT: 370 mL / NET: 44.4 mL        CAPILLARY BLOOD GLUCOSE    CAPILLARY BLOOD GLUCOSE          PHYSICAL EXAM:  GENERAL: No acute distress, well-developed  HEAD:  Atraumatic, Normocephalic  EYES: EOMI, PERRLA, conjunctiva and sclera clear  NECK: Supple, no lymphadenopathy, no JVD  CHEST/LUNG: CTAB; No wheezes, rales, or rhonchi  HEART: Regular rate and rhythm. Normal S1/S2. No murmurs, rubs, or gallops  ABDOMEN: Soft, non-tender, non-distended; normal bowel sounds, no organomegaly  EXTREMITIES:  2+ peripheral pulses b/l, No clubbing, cyanosis, or edema  NEUROLOGY: A&O x 3, no focal deficits  SKIN: No rashes or lesions    ============================I/O===========================   I&O's Detail    23 Oct 2023 07:  -  24 Oct 2023 07:00  --------------------------------------------------------  IN:    Heparin: 120 mL    IV PiggyBack: 200 mL    Lidocaine: 52.6 mL    Lidocaine: 75 mL    Lidocaine: 22.9 mL    Oral Fluid: 420 mL  Total IN: 890.5 mL    OUT:    Voided (mL): 750 mL  Total OUT: 750 mL    Total NET: 140.5 mL      24 Oct 2023 07:01  -  24 Oct 2023 19:09  --------------------------------------------------------  IN:    IV PiggyBack: 65 mL    Lidocaine: 49.4 mL    Oral Fluid: 300 mL  Total IN: 414.4 mL    OUT:    Voided (mL): 370 mL  Total OUT: 370 mL    Total NET: 44.4 mL        ============================ LABS =========================                        11.1   15.61 )-----------( 352      ( 24 Oct 2023 00:24 )             34.8     10-24    133<L>  |  102  |  16  ----------------------------<  123<H>  4.2   |  24  |  0.65    Ca    8.1<L>      24 Oct 2023 00:24  Phos  2.9     10-24  Mg     2.6     10-24    TPro  5.9<L>  /  Alb  2.3<L>  /  TBili  0.3  /  DBili  x   /  AST  11  /  ALT  13  /  AlkPhos  66  10-24                LIVER FUNCTIONS - ( 24 Oct 2023 00:24 )  Alb: 2.3 g/dL / Pro: 5.9 g/dL / ALK PHOS: 66 U/L / ALT: 13 U/L / AST: 11 U/L / GGT: x           PT/INR - ( 24 Oct 2023 00:24 )   PT: 12.8 sec;   INR: 1.17 ratio         PTT - ( 24 Oct 2023 00:24 )  PTT:26.2 sec      Urinalysis Basic - ( 24 Oct 2023 00:24 )    Color: x / Appearance: x / SG: x / pH: x  Gluc: 123 mg/dL / Ketone: x  / Bili: x / Urobili: x   Blood: x / Protein: x / Nitrite: x   Leuk Esterase: x / RBC: x / WBC x   Sq Epi: x / Non Sq Epi: x / Bacteria: x      ======================Micro/Rad/Cardio=================  Telemetry: Reviewed   EKG: Reviewed  CXR: Reviewed  Culture: Reviewed   Echo: Transthoracic Echocardiogram:   Patient name: AUSTIN LEE  YOB: 1950   Age: 68 (F)   MR#: [id number]  Study Date: 2019  Location: O/PSonographer: Sherri Cadet RDCS  Study quality: Technically difficult  Referring Physician: RICCARDO WOLF MD  Blood Pressure: 140/80 mmHg  Height: 157 cm  Weight: 86 kg  BSA: 1.9 m2  Heart Rate: 60 mmHg  ------------------------------------------------------------------------  PROCEDURE: Transthoracic echocardiogram with 2-D, M-Mode  and complete spectral and color flow Doppler.  INDICATION: Atherosclerotic heart disease of native  coronary artery without angina pectoris (I25.10)  ------------------------------------------------------------------------  Dimensions:    Normal Values:  LA:     4.6    2.0 - 4.0 cm  Ao: 3.1    2.0 - 3.8 cm  SEPTUM: 1.4    0.6 - 1.2 cm  PWT:    1.3    0.6 - 1.1 cm  LVIDd:  6.4    3.0 - 5.6 cm  LVIDs:  4.3    1.8 - 4.0 cm  Derived variables:  LVMI: 219 g/m2  RWT: 0.40  Fractional short: 33 %  EF (Ahn Rule): 33 %Doppler Peak Velocity (m/sec):  AoV=1.2  ------------------------------------------------------------------------  Observations:  Mitral Valve: Mitral annular calcification and calcified  mitral leaflets with decreased diastolic opening. Mild  mitral regurgitation.  Peak mitral valve gradient equals 13  mm Hg, mean transmitral valve gradient equals 4 mm Hg,  consistent with mild mitral stenosis. Gradient measured at  a HR of 81bpm.  Aortic Valve/Aorta: Aortic valve not well visualized;  probably normal. Peak transaortic valve gradient equals 6  mm Hg, estimated aortic valve area equals 2.1 sqcm. Minimal  aortic regurgitation.  Peak left ventricular outflow tract  gradient equals 2 mm Hg.  Aortic Root: 3.1 cm.  Left Atrium: Normal left atrium.  LA volume index =31  cc/m2.  Left Ventricle: Severe segmental left ventricular systolic  dysfunction. Endocardial visualization enhanced with  intravenous injection of Ultrasonic Enhancing Agent  (Definity). The mid inferolateral wall, the basal  inferolateral wall, the basal inferior wall, the basal  anterolateral wall, the mid inferior wall, the mid  anterolateral wall, the mid inferoseptum, and the basal  inferoseptum are hypokinetic.  Moderate concentric left  ventricular hypertrophy. Moderate diastolic dysfunction  (Stage II).  Right Heart: Normal right atrium. Normal right ventricular  size and function. Normal tricuspid valve. Mild tricuspid  regurgitation. Normal pulmonic valve. Mild pulmonic  regurgitation.  Pericardium/Pleura: Normal pericardium with no pericardial  effusion.  Hemodynamic: Estimated right ventricular systolic pressure  equals 28 mm Hg, assuming right atrial pressure equals 3 mm  Hg, consistent with normal pulmonary pressures. Color  Doppler demonstrates no evidence of a patent foramen ovale.  ------------------------------------------------------------------------  Conclusions:  1. Mitral annular calcification and calcified mitral  leaflets with decreased diastolic opening. Mild mitral  regurgitation.  Peak mitral valve gradient equals 13 mm Hg,  mean transmitral valve gradient equals 4 mm Hg, consistent  with mild mitral stenosis. Gradient measured at a HR of  81bpm.  2. Aortic valve not well visualized; probably normal.  Minimal aortic regurgitation.  3. Moderate concentric left ventricular hypertrophy.  4. Severe segmental left ventricular systolic dysfunction.  Endocardial visualization enhanced with intravenous  injection of Ultrasonic Enhancing Agent (Definity). The mid  inferolateral wall, the basal  inferolateral wall, the  basal inferior wall, the basal anterolateral wall, the mid  inferior wall, the mid anterolateral wall, the mid  inferoseptum, and the basal inferoseptum are hypokinetic.  5. Moderate diastolic dysfunction (Stage II).  6. Normal right ventricular size and function.  *** No previous Echo exam.  ------------------------------------------------------------------------  Confirmed on  2019 - 16:47:43 by Dudley Marquez M.D.  ------------------------------------------------------------------------ (19 @ 13:56)    Cath: Cardiac Cath Lab - Adult:   [facility]  Department of Cardiology  [address] [place] [address] [phone]  Cath Lab Report -- Comprehensive Report  Patient: AUSTIN LEE  Study date: 2015  Account number: [id number]  MR number: [id number]  : 1950  Gender: Female  Race: W  Case Physician(s):  Mo Izquierdo M.D.  Fellow:  СЕРГЕЙ Boogie M.D.  Referring Physician:  INDICATIONS: Angina/MI: unstable angina.  HISTORY: The patient has a history of coronary artery disease. The patient  has hypertension and medication-treated dyslipidemia.  PROCEDURE:  --  Intervention on mid LAD: drug-eluting stent.  TECHNIQUE: The risks and alternatives of the procedures and conscious  sedation were explained to the patient and informed consent was obtained.  Cardiac catheterization performed urgently. Coronary intervention  performed electively.  Local anesthetic given. Right radial artery access. A PRELUDE KIT was  inserted in the vessel. Right femoral artery access. A 6FR SHEATH PINNACLE  was inserted in the vessel. RADIATION EXPOSURE: 6.9 min. A successful  drug-eluting stent was performed on the 80 % lesion in the mid LAD.  Following intervention there was a 1 % residual stenosis. According to the  ACC/AHA classification system, this lesion was a type B2 lesion. There was  VELASQUEZ 3 flow after the procedure. Vessel setup was performed. A 6FR JL4  LAUNCHER guiding catheter was used to intubate the vessel. Vessel setup  was performed. A JASE [id number] wire was used to cross the lesion. A 3.50  X 20 PROMUS PREMIER drug-eluting stent was placed across the lesion and  deployed at a maximum inflation pressure of 20 gianna. Balloon angioplasty  was performed, using a 4.00 X 12 EUPHORA balloon, with 1 inflations and a  maximum inflation pressure of 12 gianna.  CONTRAST GIVEN: Omnipaque 85 ml.  MEDICATIONS GIVEN: Midazolam, 0.5 mg, IV. Fentanyl, 25 mcg, IV. Verapamil  (Isoptin, Calan, Covera), 2.5 mg, IA. Nitroglycerin, 300 mcg,  intracoronary. Labetalol (Trandate), 20 mg, IV. Heparin, 8000 units, IA.  CORONARY VESSELS:  LAD:   --  Mid LAD: There was a 80 % stenosis.  COMPLICATIONS: There were no complications.  INTERVENTIONAL RECOMMENDATIONS: ASA and Plavix for 1 year  Prepared and signed by  Mo Izquierdo M.D.  Signed 2015 11:02:43  HEMODYNAMIC TABLES  Pressures:  Intervention  Pressures:  - HR: 102  Pressures:  - Rhythm:  Pressures:  -- Aortic Pressure (S/D/M): 183/67/114  Outputs:  Baseline/ Room Air  Outputs:  -- CALCULATIONS[age]  Outputs:  -- CALCULATIONS: Body Surface Area: 2.03  Outputs:  -- CALCULATIONS: Height in cm: 160.00  Outputs:  -- CALCULATIONS: Sex: Female  Outputs:  -- CALCULATIONS: Weight in k.10 (01-23-15 @ 18:24)  Cardiac Cath Lab - Adult:   [facility]  Department of Cardiology  [address] [place] [address]  [phone]  Cath Lab Report -- Comprehensive Report  Patient: AUSTIN LEE  Study date: 2015  Account number: [id number]  MR number: [id number]  : 1950  Gender: Female  Race: W  Case Physician(s):  Mo Izquierdo M.D.  Fellow:  Sanjuana Campos M.D.  Referring Physician:  Alok Price M.D.  INDICATIONS: Angina/MI: unstable angina.  HISTORY: There was no prior cardiac history. The patient has hypertension,  medication-treated dyslipidemia, and morbid obesity.  PROCEDURE:  --  Left heart catheterization with ventriculography.  --  Left coronary angiography.  --  Right coronary angiography.  TECHNIQUE: The risks and alternatives of the procedures and conscious  sedation were explained to the patient and informed consent was obtained.  Cardiac catheterization performed electively.  Local anesthetic given. Right femoral artery access. A 5FR SHEATH PINNACLE  was inserted in the vessel. Left heart catheterization. Ventriculography  was performed. A 5FR  EXPO catheter was utilized. Left coronary  artery angiography. The vessel was injected utilizing a 5FR FL4.0 EXPO  catheter. Right coronary artery angiography. The vessel was injected  utilizing a 5FR FR4.0 EXPO catheter. RADIATION EXPOSURE: 1.6 min.  CONTRAST GIVEN: Visipaque 102 ml.  MEDICATIONS GIVEN: Fentanyl, 25 mcg, IV. Midazolam, 1 mg, IV. Labetalol  (Trandate), 20 mg, IV. Nitroglycerin, 300 mcg, IA. Labetalol (Trandate),  20 mg, IV.  VENTRICLES: Global left ventricular function was moderately depressed. EF  estimated was 40 %.  CORONARY VESSELS: The coronary circulation is right dominant.  LM:   --  Distal left main: There was a 20 % stenosis.  LAD:--  Proximal LAD: There was a 80 % stenosis.  CX:   --  Distal circumflex: Angiography showed mild atherosclerosis with  no flow limiting lesions.  RCA:   --  Mid RCA: There was a 100 % stenosis.  COMPLICATIONS: There were no complications.  DIAGNOSTIC RECOMMENDATIONS: Plan PCI of the LAD tomorrow. Pt with right  groin hematoma which is reason for delay in PCI  Prepared and signed by  Mo Izquierdo M.D.  Signed 2015 11:00:32  HEMODYNAMIC TABLES  Pressures:  Baseline/ Room Air  Pressures:  -HR: 85  Pressures:  - Rhythm:  Pressures:  -- Aortic Pressure (S/D/M): 210/77/128  Pressures:  -- Left Ventricle (s/edp): 209/25/--  Outputs:  Baseline/ Room Air  Outputs:  -- CALCULATIONS[age]  Outputs:  -- CALCULATIONS: Body SurfaceArea: 2.03  Outputs:  -- CALCULATIONS: Height in cm: 160.00  Outputs:  -- CALCULATIONS: Sex: Female  Outputs:  -- CALCULATIONS: Weight in k.10  Outputs:  -- OUTPUTS: O2 consumption: 272.02  Outputs:  -- OUTPUTS: Vo2 Indexed: 133.79 (01-22-15 @ 17:26)

## 2023-10-24 NOTE — PROGRESS NOTE ADULT - ASSESSMENT
Assessment and Plan:   · Assessment	  72F PMHx COPD, HTN, PAD, CAD s/p LAD stent (last [facility] showing 100%  of RCA), chronic leukocytosis of unclear etiol, cardiac arrest s/p left-sided ICD (6/4/2019) complicated by infection and s/p R single-chamber ICD reimplantation ([place] in 9/23/2019), CHF EF 45%, grade 2 DHF, and VFib s/p 13 AICD firings underwent VT ablation today c/b pericardial tamponade. Transferred to CICU for further monitoring. 10/21 developed AF w/ RVR.    PLAN  ===NEURO===  #No active issues  - A&O x3    ===RESPIRATORY===  #Hx COPD  - Currently on room air sat >88%    ===CARDIOVASCULAR===  #Pericardial Tamponade  - s/p VT ablation c/b pericardial tamponade  - pericardial drain with 40 cc drained initially, 250cc in bag upon arrival to CICU, s/p drainage removal 10/20  - TTE 10/22 with moderate pericardial effusion, not seemingly large enough to affect hemodynamics  - TTE 23rd for staging next steps regarding attempt 2 for ablation:     - Trending CBC, stable (23rd)  - 23rd: Pt on plavix, stellate ganglion block likely not feasible at this time       #AF w/ RVR  -new onset  -s/p DCCVx2 at 200J (22nd), in NSR  -heparin gtt held per EP d/t concern of accumulating effusion     # VT  - 400 amio TID PO per EP  - VT ablation aborted after tamponade  - Wean lido as tolerated. Will start quinidine 324 BID (23rd)    - has AICD  - replete lytes as needed to maintain K>4, Mag>2  - fu EP recs   - will need q6mo TSH / LFT, annual CXR and eye exam as outpatient (amiodarone)  - New AF w/ RVR on 10/21 PM, s/p Amio 150mg IV x1  - s/p 150 amio IVPB x2, lido 100mg x1, lido gtt increased to 1    #CHF  - TTE 10/5 EF 55%, reg WMA, mild-mod MS  - Held metoprolol and entresto i/s/o hypotension, restart as tolerated and per EP     #CAD s/p stents  - Continue plavix    ===GI===  #No active issues  - Monitor for BM  - DASH Diet     ===RENAL===  #No active issues  - Strict I&O's  - Replete lytes as needed to maintain K>4, Mag>2    ===ENDO===  #No active issues  - Monitor glucose on CMP  - TSH wnl    ===HEME-ONC===  #Pericardial tamponade  - heparin for AFib  - Trend CBC  - No blood products given    ===ID===  #Chronic leukocytosis  - 1 dose vanco given  - previously on CTX x3d   - Blood cultures negative  - E coli in urine   - reports of pus from previous groin access, ultrasound considered to r/o abscess  - Pt w/o temp for days, pt WBC stable. Pt site showing fungal growth in the intertriginous areas, the wound itself appears clean, no fluctuance, no suppuration. Short timeline of ablation event to now (23rd), likely not abscess at this point     ======================= DISPOSITION  =====================  [x] Critical   [ ] Guarded    [ ] Stable    [x] Maintain in CICU  [ ] Downgrade to [place]  [ ] Discharge Home Assessment and Plan:   	  72F PMHx COPD, HTN, PAD, CAD s/p LAD stent (last [facility] showing 100%  of RCA), chronic leukocytosis of unclear etiol, cardiac arrest s/p left-sided ICD (6/4/2019) complicated by infection and s/p R single-chamber ICD reimplantation ([place] in 9/23/2019), CHF EF 45%, grade 2 DHF, and VFib s/p 13 AICD firings underwent VT ablation today c/b pericardial tamponade. Transferred to CICU for further monitoring. 10/21 developed AF w/ RVR.    PLAN  ===NEURO===  #No active issues  - A&O x3    ===RESPIRATORY===  #Hx COPD  - Currently on room air sat >88%    ===CARDIOVASCULAR===  #Pericardial Tamponade  - s/p VT ablation c/b pericardial tamponade  - pericardial drain with 40 cc drained initially, 250cc in bag upon arrival to CICU, s/p drainage removal 10/20  - TTE 10/22 with moderate pericardial effusion, not seemingly large enough to affect hemodynamics  - TTE 23rd for staging next steps regarding attempt 2 for ablation: stable effusion from 22nd     - Trending CBC, stable (23rd)  - 23rd: Pt on plavix, stellate ganglion block likely not feasible at this time    - 24th: Pt in aflutter, given lido IVP and amio 150 mg 2x. Lido back to 1.        #AF w/ RVR  - new onset  - s/p DCCVx2 at 200J (22nd), in NSR  - 23rd: heparin gtt held per EP d/t concern of accumulating effusion  - 24th: Pt in aflutter, given lido IVP and amio 150 mg 2x. Lido back to 1.       # VT  - 400 amio TID PO per EP  - VT ablation aborted after tamponade  - Wean lido as tolerated. Will start quinidine 324 BID (23rd)    - has AICD  - replete lytes as needed to maintain K>4, Mag>2  - fu EP recs   - will need q6mo TSH / LFT, annual CXR and eye exam as outpatient (amiodarone)  - New AF w/ RVR on 10/21 PM, s/p Amio 150mg IV x1  - s/p 150 amio IVPB x2, lido 100mg x1, lido gtt increased to 1    #CHF  - TTE 10/5 EF 55%, reg WMA, mild-mod MS  - Held metoprolol and entresto i/s/o hypotension, restart as tolerated and per EP     #CAD s/p stents  - Continue plavix    ===GI===  #No active issues  - Monitor for BM  - DASH Diet     ===RENAL===  #No active issues  - Strict I&O's  - Replete lytes as needed to maintain K>4, Mag>2    ===ENDO===  #No active issues  - Monitor glucose on CMP  - TSH wnl    ===HEME-ONC===  #Pericardial tamponade  - heparin for AFib (24th: held d/t effusion and per EP )  - Trend CBC  - No blood products given    ===ID===  #Chronic leukocytosis  - 1 dose vanco given  - previously on CTX x3d   - Blood cultures negative  - E coli in urine   - reports of pus from previous groin access, ultrasound considered to r/o abscess  - Pt w/o temp for days, pt WBC stable. Pt site showing fungal growth in the intertriginous areas, the wound itself appears clean, no fluctuance, no suppuration. Short timeline of ablation event to now (23rd), likely not abscess at this point     ======================= DISPOSITION  =====================  [x] Critical   [ ] Guarded    [ ] Stable    [x] Maintain in CICU  [ ] Downgrade to [place]  [ ] Discharge Home Assessment and Plan:   	  72F PMHx COPD, HTN, PAD, CAD s/p LAD stent (last [facility] showing 100%  of RCA), chronic leukocytosis of unclear etiol, cardiac arrest s/p left-sided ICD (6/4/2019) complicated by infection and s/p R single-chamber ICD reimplantation ([place] in 9/23/2019), CHF EF 45%, grade 2 DHF, and VFib s/p 13 AICD firings underwent VT ablation today c/b pericardial tamponade. Transferred to CICU for further monitoring. 10/21 developed AF w/ RVR.    PLAN  ===NEURO===  #No active issues  - A&O x3    ===RESPIRATORY===  #Hx COPD  - Currently on room air sat >88%    ===CARDIOVASCULAR===  #Pericardial Tamponade  - s/p VT ablation c/b pericardial tamponade  - pericardial drain with 40 cc drained initially, 250cc in bag upon arrival to CICU, s/p drainage removal 10/20  - TTE 10/22 with moderate pericardial effusion, not seemingly large enough to affect hemodynamics  - TTE 23rd for staging next steps regarding attempt 2 for ablation: stable effusion from 22nd     - Trending CBC, stable (23rd)  - 23rd: Pt on plavix, stellate ganglion block likely not feasible at this time    - 24th: Pt in aflutter/NSVT, given lido IVP and amio 150 mg 2x. Lido back to 1, in morning changed to 0.5.        #AF w/ RVR  - new onset  - s/p DCCVx2 at 200J (22nd), in NSR  - 23rd: heparin gtt held per EP d/t concern of accumulating effusion  -  24th: Pt in aflutter/NSVT, given lido IVP and amio 150 mg 2x. Lido back to 1, in morning changed to 0.5.        # VT  - 400 amio TID PO per EP  - VT ablation aborted after tamponade  - Wean lido as tolerated. Will start quinidine 324 BID (23rd)    - has AICD  - replete lytes as needed to maintain K>4, Mag>2  - fu EP recs   - will need q6mo TSH / LFT, annual CXR and eye exam as outpatient (amiodarone)  - New AF w/ RVR on 10/21 PM, s/p Amio 150mg IV x1  - 24th: Pt in aflutter, given lido IVP and amio 150 mg 2x. Lido back to 1, in morning changed to 0.5.       #CHF  - TTE 10/5 EF 55%, reg WMA, mild-mod MS  - Held entresto i/s/o hypotension, restart as tolerated and per EP, metoprolol restarted (24th)    #CAD s/p stents  - Continue plavix    ===GI===  #No active issues  - Monitor for BM  - DASH Diet     ===RENAL===  #No active issues  - Strict I&O's  - Replete lytes as needed to maintain K>4, Mag>2    ===ENDO===  #No active issues  - Monitor glucose on CMP  - TSH wnl    ===HEME-ONC===  #Pericardial tamponade  - heparin for AFib (24th: held d/t effusion and per EP )  - Trend CBC  - No blood products given    ===ID===  #Chronic leukocytosis  - 1 dose vanco given  - previously on CTX x3d   - Blood cultures negative  - E coli in urine   - reports of pus from previous groin access, ultrasound considered to r/o abscess  - Pt w/o temp for days, pt WBC downtrending. Pt site showing fungal growth in the intertriginous areas, the wound itself appears clean, no fluctuance, no suppuration. Short timeline of ablation event to now (23rd), likely not abscess at this point     ======================= DISPOSITION  =====================  [x] Critical   [ ] Guarded    [ ] Stable    [x] Maintain in CICU  [ ] Downgrade to [place]  [ ] Discharge Home   Assessment and Plan:   	  72F PMHx COPD, HTN, PAD, CAD s/p LAD stent (last [facility] showing 100%  of RCA), chronic leukocytosis of unclear etiol, cardiac arrest s/p left-sided ICD (6/4/2019) complicated by infection and s/p R single-chamber ICD reimplantation ([place] in 9/23/2019), CHF EF 45%, grade 2 DHF, and VFib s/p 13 AICD firings underwent VT ablation today c/b pericardial tamponade. Transferred to CICU for further monitoring. 10/21 developed AF w/ RVR.    PLAN  ===NEURO===  #No active issues  - A&O x3    ===RESPIRATORY===  #Hx COPD  - Currently on room air sat >88%    ===CARDIOVASCULAR===  #Pericardial Tamponade  - s/p VT ablation c/b pericardial tamponade  - pericardial drain with 40 cc drained initially, 250cc in bag upon arrival to CICU, s/p drainage removal 10/20  - TTE 10/22 with moderate pericardial effusion, not seemingly large enough to affect hemodynamics  - Stable effusion from 24th < 23rd < 22nd     - Trending CBC, stable (23rd)  - 23rd: Pt on plavix, stellate ganglion block likely not feasible at this time      #AF w/ RVR  - new onset  - s/p DCCVx2 at 200J (22nd), in NSR  - 23rd: heparin gtt held per EP d/t concern of accumulating effusion  - 24th: Pt in slow VT later afternoon of 23rd, given lido IVP and amio 150 mg 2x. Lido back to 1, changed to 0.5 while rounding.          # VT  - 400 amio TID PO per EP  - VT ablation aborted after tamponade  - Wean lido as tolerated. Will start quinidine 324 BID (23rd)    - has AICD  - replete lytes as needed to maintain K>4, Mag>2  - fu EP recs   - will need q6mo TSH / LFT, annual CXR and eye exam as outpatient (amiodarone)  - New AF w/ RVR on 10/21 PM, s/p Amio 150mg IV x1  - 24th: Pt in slow VT later afternoon of 23rd, given lido IVP and amio 150 mg 2x. Lido back to 1, changed to 0.5 while rounding.       #CHF  - TTE 10/5 EF 55%, reg WMA, mild-mod MS  - Held entresto i/s/o hypotension, restart as tolerated and per EP, metoprolol restarted (24th)    #CAD s/p stents  - Continue plavix    ===GI===  #No active issues  - Monitor for BM  - DASH Diet     ===RENAL===  #No active issues  - Strict I&O's  - Replete lytes as needed to maintain K>4, Mag>2    ===ENDO===  #No active issues  - Monitor glucose on CMP  - TSH wnl    ===HEME-ONC===  #Pericardial tamponade  - heparin for AFib (24th: held d/t effusion and per E )  - Trend CBC  - No blood products given    ===ID===  #Chronic leukocytosis  - 1 dose vanco given  - previously on CTX x3d   - Blood cultures negative  - E coli in urine   - reports of pus from previous groin access, ultrasound considered to r/o abscess  - Pt w/o temp for days, pt WBC downtrending. Pt site showing fungal growth in the intertriginous areas, the wound itself appears clean, no fluctuance, no suppuration. Short timeline of ablation event to now (23rd), likely not abscess at this point     ======================= DISPOSITION  =====================  [x] Critical   [ ] Guarded    [ ] Stable    [x] Maintain in CICU  [ ] Downgrade to [place]lem[place]  [ ] Discharge Home

## 2023-10-24 NOTE — PROGRESS NOTE ADULT - SUBJECTIVE AND OBJECTIVE BOX
AUSTIN LEE  MRN-[id number]  Patient is a 72y old  Female who presents with a chief complaint of AICD discharge (23 Oct 2023 11:16)    HPI:  72F c hx COPD, HTN, PAD, CAD s/p LAD stent (last C showing 100%  of RCA), chronic leukocytosis of unclear etiol, cardiac arrest s/p left-sided ICD (2019) complicated by infection and s/p R single-chamber ICD reimplantation ([place] in 2019), CHF EF 45%, grade 2 DHF, recent hospitalization for UTI (treated w/ ceftriaxone and Vanco x 3 days) and VFib s/p 13 AICD firings, pw lightheadedness and AICD firing.    Pt recently hospitalized for VFib s/p multiple aicd firing. Pt placed on quinidine, mexiletine, metoprolol. Pt reports good compliance with her medications, even though she doesn't know what the meds are. Pt states at around 3-4PM yesterday, she felt "dizzy" for a few seconds, then felt AICD shock, then had resolution of her dizziness. Denies fevers, chills, CP, SOB, URI symptoms, GI symptoms. Pt states she's never seen a hematologist for leukocytosis. Pt was supposed to f/u with EP as outpt for ablation. (16 Oct 2023 03:47)      24 HOUR EVENTS:  - Converted back to AFib this afternoon with decreased SBP, then AFlutter with rates 150  - Given Amio 150 IVPB x2 and lido 100mg x1. Lido gtt increased back to 1  - Started quinidine 324 BID   - Started lopressor 12.5    REVIEW OF SYSTEMS:  CONSTITUTIONAL: No weakness, fevers or chills  EYES/ENT: No visual changes;  No vertigo or throat pain   NECK: No pain or stiffness  RESPIRATORY: No cough, wheezing, hemoptysis; No shortness of breath  CARDIOVASCULAR: No chest pain or palpitations  GASTROINTESTINAL: No abdominal or epigastric pain. No nausea, vomiting, or hematemesis; No diarrhea or constipation. No melena or hematochezia.  GENITOURINARY: No dysuria, frequency or hematuria  NEUROLOGICAL: No numbness or weakness  SKIN: No itching, rashes      ICU Vital Signs Last 24 Hrs  T(C): 36.7 (23 Oct 2023 19:00), Max: 36.9 (23 Oct 2023 08:00)  T(F): 98 (23 Oct 2023 19:00), Max: 98.4 (23 Oct 2023 08:00)  HR: 84 (23 Oct 2023 19:00) (77 - 150)  BP: 113/59 (23 Oct 2023 19:00) (77/48 - 158/64)  BP(mean): 79 (23 Oct 2023 19:) (58 - 92)  ABP: --  ABP(mean): --  RR: 28 (23 Oct 2023 19:) (19 - 30)  SpO2: 93% (23 Oct 2023 19:) (90% - 100%)    O2 Parameters below as of 23 Oct 2023 19:  Patient On (Oxygen Delivery Method): nasal cannula  O2 Flow (L/min): 2          CVP(mm Hg): --  CO: --  CI: --  PA: --  PA(mean): --  PA(direct): --  PCWP: --  LA: --  RA: --  SVR: --  SVRI: --  PVR: --  PVRI: --  I&O's Summary    22 Oct 2023 07:  -  23 Oct 2023 07:00  --------------------------------------------------------  IN: 744.5 mL / OUT: 450 mL / NET: 294.5 mL    23 Oct 2023 07:  -  23 Oct 2023 19:39  --------------------------------------------------------  IN: 650.1 mL / OUT: 400 mL / NET: 250.1 mL        CAPILLARY BLOOD GLUCOSE    CAPILLARY BLOOD GLUCOSE          PHYSICAL EXAM:  GENERAL: No acute distress, well-developed  HEAD:  Atraumatic, Normocephalic  EYES: EOMI, PERRLA, conjunctiva and sclera clear  NECK: Supple, no lymphadenopathy, no JVD  CHEST/LUNG: CTAB; No wheezes, rales, or rhonchi  HEART: Regular rate and rhythm. Normal S1/S2. No murmurs, rubs, or gallops  ABDOMEN: Soft, non-tender, non-distended; normal bowel sounds, no organomegaly  EXTREMITIES:  2+ peripheral pulses b/l, No clubbing, cyanosis, or edema  NEUROLOGY: A&O x 3, no focal deficits  SKIN: No rashes or lesions    ============================I/O===========================   I&O's Detail    22 Oct 2023 07:01  -  23 Oct 2023 07:00  --------------------------------------------------------  IN:    Heparin: 272 mL    Lidocaine: 142.5 mL    Oral Fluid: 330 mL  Total IN: 744.5 mL    OUT:    Voided (mL): 450 mL  Total OUT: 450 mL    Total NET: 294.5 mL      23 Oct 2023 07:01  -  23 Oct 2023 19:39  --------------------------------------------------------  IN:    Heparin: 120 mL    IV PiggyBack: 200 mL    Lidocaine: 52.6 mL    Lidocaine: 37.5 mL    Oral Fluid: 240 mL  Total IN: 650.1 mL    OUT:    Voided (mL): 400 mL  Total OUT: 400 mL    Total NET: 250.1 mL        ============================ LABS =========================                        11.5   20.44 )-----------( 313      ( 23 Oct 2023 01:11 )             34.7     10-23    134<L>  |  104  |  20  ----------------------------<  128<H>  4.1   |  22  |  0.72    Ca    8.4      23 Oct 2023 01:11  Phos  2.6     10-23  Mg     2.6     10-23    TPro  5.8<L>  /  Alb  2.5<L>  /  TBili  0.3  /  DBili  x   /  AST  11  /  ALT  12  /  AlkPhos  69  10-23                LIVER FUNCTIONS - ( 23 Oct 2023 01:11 )  Alb: 2.5 g/dL / Pro: 5.8 g/dL / ALK PHOS: 69 U/L / ALT: 12 U/L / AST: 11 U/L / GGT: x           PT/INR - ( 23 Oct 2023 01:11 )   PT: 12.8 sec;   INR: 1.23 ratio         PTT - ( 23 Oct 2023 06:19 )  PTT:63.7 sec    Blood Gas Venous - Lactate: 2.1 mmol/L (10-21-23 @ 17:20)    Urinalysis Basic - ( 23 Oct 2023 01:11 )    Color: x / Appearance: x / SG: x / pH: x  Gluc: 128 mg/dL / Ketone: x  / Bili: x / Urobili: x   Blood: x / Protein: x / Nitrite: x   Leuk Esterase: x / RBC: x / WBC x   Sq Epi: x / Non Sq Epi: x / Bacteria: x      ======================Micro/Rad/Cardio=================  Telemetry: Reviewed   EKG: Reviewed  CXR: Reviewed  Culture: Reviewed   Echo: Transthoracic Echocardiogram:   Patient name: AUSTIN LEE  YOB: 1950   Age: 68 (F)   MR#: [id number]  Study Date: 2019  Location: O/PSonographer: Sherri Cadet RDCS  Study quality: Technically difficult  Referring Physician: RICCARDO WOLF MD  Blood Pressure: 140/80 mmHg  Height: 157 cm  Weight: 86 kg  BSA: 1.9 m2  Heart Rate: 60 mmHg  ------------------------------------------------------------------------  PROCEDURE: Transthoracic echocardiogram with 2-D, M-Mode  and complete spectral and color flow Doppler.  INDICATION: Atherosclerotic heart disease of native  coronary artery without angina pectoris (I25.10)  ------------------------------------------------------------------------  Dimensions:    Normal Values:  LA:     4.6    2.0 - 4.0 cm  Ao: 3.1    2.0 - 3.8 cm  SEPTUM: 1.4    0.6 - 1.2 cm  PWT:    1.3    0.6 - 1.1 cm  LVIDd:  6.4    3.0 - 5.6 cm  LVIDs:  4.3    1.8 - 4.0 cm  Derived variables:  LVMI: 219 g/m2  RWT: 0.40  Fractional short: 33 %  EF (Ahn Rule): 33 %Doppler Peak Velocity (m/sec):  AoV=1.2  ------------------------------------------------------------------------  Observations:  Mitral Valve: Mitral annular calcification and calcified  mitral leaflets with decreased diastolic opening. Mild  mitral regurgitation.  Peak mitral valve gradient equals 13  mm Hg, mean transmitral valve gradient equals 4 mm Hg,  consistent with mild mitral stenosis. Gradient measured at  a HR of 81bpm.  Aortic Valve/Aorta: Aortic valve not well visualized;  probably normal. Peak transaortic valve gradient equals 6  mm Hg, estimated aortic valve area equals 2.1 sqcm. Minimal  aortic regurgitation.  Peak left ventricular outflow tract  gradient equals 2 mm Hg.  Aortic Root: 3.1 cm.  Left Atrium: Normal left atrium.  LA volume index =31  cc/m2.  Left Ventricle: Severe segmental left ventricular systolic  dysfunction. Endocardial visualization enhanced with  intravenous injection of Ultrasonic Enhancing Agent  (Definity). The mid inferolateral wall, the basal  inferolateral wall, the basal inferior wall, the basal  anterolateral wall, the mid inferior wall, the mid  anterolateral wall, the mid inferoseptum, and the basal  inferoseptum are hypokinetic.  Moderate concentric left  ventricular hypertrophy. Moderate diastolic dysfunction  (Stage II).  Right Heart: Normal right atrium. Normal right ventricular  size and function. Normal tricuspid valve. Mild tricuspid  regurgitation. Normal pulmonic valve. Mild pulmonic  regurgitation.  Pericardium/Pleura: Normal pericardium with no pericardial  effusion.  Hemodynamic: Estimated right ventricular systolic pressure  equals 28 mm Hg, assuming right atrial pressure equals 3 mm  Hg, consistent with normal pulmonary pressures. Color  Doppler demonstrates no evidence of a patent foramen ovale.  ------------------------------------------------------------------------  Conclusions:  1. Mitral annular calcification and calcified mitral  leaflets with decreased diastolic opening. Mild mitral  regurgitation.  Peak mitral valve gradient equals 13 mm Hg,  mean transmitral valve gradient equals 4 mm Hg, consistent  with mild mitral stenosis. Gradient measured at a HR of  81bpm.  2. Aortic valve not well visualized; probably normal.  Minimal aortic regurgitation.  3. Moderate concentric left ventricular hypertrophy.  4. Severe segmental left ventricular systolic dysfunction.  Endocardial visualization enhanced with intravenous  injection of Ultrasonic Enhancing Agent (Definity). The mid  inferolateral wall, the basal  inferolateral wall, the  basal inferior wall, the basal anterolateral wall, the mid  inferior wall, the mid anterolateral wall, the mid  inferoseptum, and the basal inferoseptum are hypokinetic.  5. Moderate diastolic dysfunction (Stage II).  6. Normal right ventricular size and function.  *** No previous Echo exam.  ------------------------------------------------------------------------  Confirmed on  2019 - 16:47:43 by Dudley Marquez M.D.  ------------------------------------------------------------------------ (19 @ 13:56)    Cath: Cardiac Cath Lab - Adult:   [facility]  Department of Cardiology  [address] [place] [address]  [phone]  Cath Lab Report -- Comprehensive Report  Patient: AUSTIN LEE  Study date: 2015  Account number: [id number]  MR number: [id number]  : 1950  Gender: Female  Race: W  Case Physician(s):  Mo Izquierdo M.D.  Fellow:  СЕРГЕЙ Boogie M.D.  Referring Physician:  INDICATIONS: Angina/MI: unstable angina.  HISTORY: The patient has a history of coronary artery disease. The patient  has hypertension and medication-treated dyslipidemia.  PROCEDURE:  --  Intervention on mid LAD: drug-eluting stent.  TECHNIQUE: The risks and alternatives of the procedures and conscious  sedation were explained to the patient and informed consent was obtained.  Cardiac catheterization performed urgently. Coronary intervention  performed electively.  Local anesthetic given. Right radial artery access. A PRELUDE KIT was  inserted in the vessel. Right femoral artery access. A 6FR SHEATH PINNACLE  was inserted in the vessel. RADIATION EXPOSURE: 6.9 min. A successful  drug-eluting stent was performed on the 80 % lesion in the mid LAD.  Following intervention there was a 1 % residual stenosis. According to the  ACC/AHA classification system, this lesion was a type B2 lesion. There was  VELASQUEZ 3 flow after the procedure. Vessel setup was performed. A 6FR JL4  LAUNCHER guiding catheter was used to intubate the vessel. Vessel setup  was performed. A JASE [id number] wire was used to cross the lesion. A 3.50  X 20 PROMUS PREMIER drug-eluting stent was placed across the lesion and  deployed at a maximum inflation pressure of 20 gianna. Balloon angioplasty  was performed, using a 4.00 X 12 EUPHORA balloon, with 1 inflations and a  maximum inflation pressure of 12 gianna.  CONTRAST GIVEN: Omnipaque 85 ml.  MEDICATIONS GIVEN: Midazolam, 0.5 mg, IV. Fentanyl, 25 mcg, IV. Verapamil  (Isoptin, Calan, Covera), 2.5 mg, IA. Nitroglycerin, 300 mcg,  intracoronary. Labetalol (Trandate), 20 mg, IV. Heparin, 8000 units, IA.  CORONARY VESSELS:  LAD:   --  Mid LAD: There was a 80 % stenosis.  COMPLICATIONS: There were no complications.  INTERVENTIONAL RECOMMENDATIONS: ASA and Plavix for 1 year  Prepared and signed by  Mo Izquierdo M.D.  Signed 2015 11:02:43  HEMODYNAMIC TABLES  Pressures:  Intervention  Pressures:  - HR: 102  Pressures:  - Rhythm:  Pressures:  -- Aortic Pressure (S/D/M): 183/67/114  Outputs:  Baseline/ Room Air  Outputs:  -- CALCULATIONS: Age in years: 64.17  Outputs:  -- CALCULATIONS: Body Surface Area: 2.03  Outputs:  -- CALCULATIONS: Height in cm: 160.00  Outputs:  -- CALCULATIONS: Sex: Female  Outputs:  -- CALCULATIONS: Weight in k.10 (01-23-15 @ 18:24)  Cardiac Cath Lab - Adult:   [facility]  Department of Cardiology  [address] [place] [address]  [phone]  Cath Lab Report -- Comprehensive Report  Patient: AUSTIN LEE  Study date: 2015  Account number: [id number]  MR number: [id number]  : 1950  Gender: Female  Race: W  Case Physician(s):  Mo Izquierdo M.D.  Fellow:  Sanjuana Campos M.D.  Referring Physician:  Alok Price M.D.  INDICATIONS: Angina/MI: unstable angina.  HISTORY: There was no prior cardiac history. The patient has hypertension,  medication-treated dyslipidemia, and morbid obesity.  PROCEDURE:  --  Left heart catheterization with ventriculography.  --  Left coronary angiography.  --  Right coronary angiography.  TECHNIQUE: The risks and alternatives of the procedures and conscious  sedation were explained to the patient and informed consent was obtained.  Cardiac catheterization performed electively.  Local anesthetic given. Right femoral artery access. A 5FR SHEATH PINNACLE  was inserted in the vessel. Left heart catheterization. Ventriculography  was performed. A 5FR  EXPO catheter was utilized. Left coronary  artery angiography. The vessel was injected utilizing a 5FR FL4.0 EXPO  catheter. Right coronary artery angiography. The vessel was injected  utilizing a 5FR FR4.0 EXPO catheter. RADIATION EXPOSURE: 1.6 min.  CONTRAST GIVEN: Visipaque 102 ml.  MEDICATIONS GIVEN: Fentanyl, 25 mcg, IV. Midazolam, 1 mg, IV. Labetalol  (Trandate), 20 mg, IV. Nitroglycerin, 300 mcg, IA. Labetalol (Trandate),  20 mg, IV.  VENTRICLES: Global left ventricular function was moderately depressed. EF  estimated was 40 %.  CORONARY VESSELS: The coronary circulation is right dominant.  LM:   --  Distal left main: There was a 20 % stenosis.  LAD:--  Proximal LAD: There was a 80 % stenosis.  CX:   --  Distal circumflex: Angiography showed mild atherosclerosis with  no flow limiting lesions.  RCA:   --  Mid RCA: There was a 100 % stenosis.  COMPLICATIONS: There were no complications.  DIAGNOSTIC RECOMMENDATIONS: Plan PCI of the LAD tomorrow. Pt with right  groin hematoma which is reason for delay in PCI  Prepared and signed by  Mo Izquierdo M.D.  Signed 2015 11:00:32  HEMODYNAMIC TABLES  Pressures:  Baseline/ Room Air  Pressures:  -HR: 85  Pressures:  - Rhythm:  Pressures:  -- Aortic Pressure (S/D/M): 210/77/128  Pressures:  -- Left Ventricle (s/edp): 209/25/--  Outputs:  Baseline/ Room Air  Outputs:  -- CALCULATIONS: Age in years: 64.16  Outputs:  -- CALCULATIONS: Body SurfaceArea: 2.03  Outputs:  -- CALCULATIONS: Height in cm: 160.00  Outputs:  -- CALCULATIONS: Sex: Female  Outputs:  -- CALCULATIONS: Weight in k.10  Outputs:  -- OUTPUTS: O2 consumption: 272.02  Outputs:  -- OUTPUTS: Vo2 Indexed: 133.79 (01-22-15 @ 17:26)            AUSTIN LEE  MRN-[id number]  Patient is a 72y old  Female who presents with a chief complaint of AICD discharge (23 Oct 2023 11:16)    HPI:  72F c hx COPD, HTN, PAD, CAD s/p LAD stent (last C showing 100%  of RCA), chronic leukocytosis of unclear etiol, cardiac arrest s/p left-sided ICD (2019) complicated by infection and s/p R single-chamber ICD reimplantation ([place] in 2019), CHF EF 45%, grade 2 DHF, recent hospitalization for UTI (treated w/ ceftriaxone and Vanco x 3 days) and VFib s/p 13 AICD firings, pw lightheadedness and AICD firing.    Pt recently hospitalized for VFib s/p multiple aicd firing. Pt placed on quinidine, mexiletine, metoprolol. Pt reports good compliance with her medications, even though she doesn't know what the meds are. Pt states at around 3-4PM yesterday, she felt "dizzy" for a few seconds, then felt AICD shock, then had resolution of her dizziness. Denies fevers, chills, CP, SOB, URI symptoms, GI symptoms. Pt states she's never seen a hematologist for leukocytosis. Pt was supposed to f/u with EP as outpt for ablation. (16 Oct 2023 03:47)      24 HOUR EVENTS:  - Converted back to AFib this afternoon with decreased SBP, then AFlutter with rates 150  - Given Amio 150 IVPB x2 and lido 100mg x1. Lido gtt increased back to 1  - Started quinidine 324 BID   - Started lopressor 12.5    REVIEW OF SYSTEMS:  CONSTITUTIONAL: No weakness, fevers or chills  EYES/ENT: No visual changes;  No vertigo or throat pain   NECK: No pain or stiffness  RESPIRATORY: No cough, wheezing, hemoptysis; No shortness of breath  CARDIOVASCULAR: No chest pain or palpitations  GASTROINTESTINAL: No abdominal or epigastric pain. No nausea, vomiting, or hematemesis; No diarrhea or constipation. No melena or hematochezia.  GENITOURINARY: No dysuria, frequency or hematuria  NEUROLOGICAL: No numbness or weakness  SKIN: No itching, rashes    ICU Vital Signs Last 24 Hrs  T(C): 36.7 (24 Oct 2023 03:00), Max: 36.7 (23 Oct 2023 19:00)  T(F): 98 (24 Oct 2023 03:00), Max: 98 (23 Oct 2023 19:00)  HR: 63 (24 Oct 2023 07:01) (63 - 150)  BP: 108/54 (24 Oct 2023 07:01) (77/48 - 163/69)  BP(mean): 78 (24 Oct 2023 07:) (58 - 116)  ABP: --  ABP(mean): --  RR: 24 (24 Oct 2023 07:01) (20 - 28)  SpO2: 96% (24 Oct 2023 07:) (90% - 100%)    O2 Parameters below as of 24 Oct 2023 07:  Patient On (Oxygen Delivery Method): nasal cannula  O2 Flow (L/min): 2    CVP(mm Hg): --  CO: --  CI: --  PA: --  PA(mean): --  PA(direct): --  PCWP: --  LA: --  RA: --  SVR: --  SVRI: --  PVR: --  PVRI: --    PHYSICAL EXAM:  GENERAL: No acute distress, well-developed  HEAD:  Atraumatic, Normocephalic  EYES: EOMI, PERRLA, conjunctiva and sclera clear  NECK: Supple, no lymphadenopathy, no JVD  CHEST/LUNG: CTAB; No wheezes, rales, or rhonchi  HEART: Regular rate and rhythm. Normal S1/S2. No murmurs, rubs, or gallops  ABDOMEN: Soft, non-tender, non-distended; normal bowel sounds, no organomegaly  EXTREMITIES:  2+ peripheral pulses b/l, No clubbing, cyanosis, or edema  NEUROLOGY: A&O x 3, no focal deficits  SKIN: No rashes or lesions    ============================I/O===========================  I&O's Detail    23 Oct 2023 07:01  -  24 Oct 2023 07:00  --------------------------------------------------------  IN:    Heparin: 120 mL    IV PiggyBack: 200 mL    Lidocaine: 52.6 mL    Lidocaine: 75 mL    Lidocaine: 22.9 mL    Oral Fluid: 420 mL  Total IN: 890.5 mL    OUT:    Voided (mL): 750 mL  Total OUT: 750 mL    Total NET: 140.5 mL      24 Oct 2023 07:01  -  24 Oct 2023 08:03  --------------------------------------------------------  IN:    IV PiggyBack: 5 mL    Lidocaine: 3.8 mL  Total IN: 8.8 mL    OUT:  Total OUT: 0 mL    Total NET: 8.8 mL  ============================ LABS =========================  LABS:                        11.1   15.61 )-----------( 352      ( 24 Oct 2023 00:24 )             34.8     10-24    133<L>  |  102  |  16  ----------------------------<  123<H>  4.2   |  24  |  0.65    Ca    8.1<L>      24 Oct 2023 00:24  Phos  2.9     10-24  Mg     2.6     10-24    TPro  5.9<L>  /  Alb  2.3<L>  /  TBili  0.3  /  DBili  x   /  AST  11  /  ALT  13  /  AlkPhos  66  10-24    PT/INR - ( 24 Oct 2023 00:24 )   PT: 12.8 sec;   INR: 1.17 ratio         PTT - ( 24 Oct 2023 00:24 )  PTT:26.2 sec    ======================Micro/Rad/Cardio=================  Telemetry: Reviewed   EKG: Reviewed  CXR: Reviewed  Culture: Reviewed   Echo: Transthoracic Echocardiogram:   Patient name: AUSTIN LEE  YOB: 1950   Age: 68 (F)   MR#: [id number]  Study Date: 2019  Location: O/PSonographer: Sherri Cadet RODGER  Study quality: Technically difficult  Referring Physician: RICCARDO WOLF MD  Blood Pressure: 140/80 mmHg  Height: 157 cm  Weight: 86 kg  BSA: 1.9 m2  Heart Rate: 60 mmHg  ------------------------------------------------------------------------  PROCEDURE: Transthoracic echocardiogram with 2-D, M-Mode  and complete spectral and color flow Doppler.  INDICATION: Atherosclerotic heart disease of native  coronary artery without angina pectoris (I25.10)  ------------------------------------------------------------------------  Dimensions:    Normal Values:  LA:     4.6    2.0 - 4.0 cm  Ao: 3.1    2.0 - 3.8 cm  SEPTUM: 1.4    0.6 - 1.2 cm  PWT:    1.3    0.6 - 1.1 cm  LVIDd:  6.4    3.0 - 5.6 cm  LVIDs:  4.3    1.8 - 4.0 cm  Derived variables:  LVMI: 219 g/m2  RWT: 0.40  Fractional short: 33 %  EF (Ahn Rule): 33 %Doppler Peak Velocity (m/sec):  AoV=1.2  ------------------------------------------------------------------------  Observations:  Mitral Valve: Mitral annular calcification and calcified  mitral leaflets with decreased diastolic opening. Mild  mitral regurgitation.  Peak mitral valve gradient equals 13  mm Hg, mean transmitral valve gradient equals 4 mm Hg,  consistent with mild mitral stenosis. Gradient measured at  a HR of 81bpm.  Aortic Valve/Aorta: Aortic valve not well visualized;  probably normal. Peak transaortic valve gradient equals 6  mm Hg, estimated aortic valve area equals 2.1 sqcm. Minimal  aortic regurgitation.  Peak left ventricular outflow tract  gradient equals 2 mm Hg.  Aortic Root: 3.1 cm.  Left Atrium: Normal left atrium.  LA volume index =31  cc/m2.  Left Ventricle: Severe segmental left ventricular systolic  dysfunction. Endocardial visualization enhanced with  intravenous injection of Ultrasonic Enhancing Agent  (Definity). The mid inferolateral wall, the basal  inferolateral wall, the basal inferior wall, the basal  anterolateral wall, the mid inferior wall, the mid  anterolateral wall, the mid inferoseptum, and the basal  inferoseptum are hypokinetic.  Moderate concentric left  ventricular hypertrophy. Moderate diastolic dysfunction  (Stage II).  Right Heart: Normal right atrium. Normal right ventricular  size and function. Normal tricuspid valve. Mild tricuspid  regurgitation. Normal pulmonic valve. Mild pulmonic  regurgitation.  Pericardium/Pleura: Normal pericardium with no pericardial  effusion.  Hemodynamic: Estimated right ventricular systolic pressure  equals 28 mm Hg, assuming right atrial pressure equals 3 mm  Hg, consistent with normal pulmonary pressures. Color  Doppler demonstrates no evidence of a patent foramen ovale.  ------------------------------------------------------------------------  Conclusions:  1. Mitral annular calcification and calcified mitral  leaflets with decreased diastolic opening. Mild mitral  regurgitation.  Peak mitral valve gradient equals 13 mm Hg,  mean transmitral valve gradient equals 4 mm Hg, consistent  with mild mitral stenosis. Gradient measured at a HR of  81bpm.  2. Aortic valve not well visualized; probably normal.  Minimal aortic regurgitation.  3. Moderate concentric left ventricular hypertrophy.  4. Severe segmental left ventricular systolic dysfunction.  Endocardial visualization enhanced with intravenous  injection of Ultrasonic Enhancing Agent (Definity). The mid  inferolateral wall, the basal  inferolateral wall, the  basal inferior wall, the basal anterolateral wall, the mid  inferior wall, the mid anterolateral wall, the mid  inferoseptum, and the basal inferoseptum are hypokinetic.  5. Moderate diastolic dysfunction (Stage II).  6. Normal right ventricular size and function.  *** No previous Echo exam.  ------------------------------------------------------------------------  Confirmed on  2019 - 16:47:43 by Dudley Marquez M.D.  ------------------------------------------------------------------------ (19 @ 13:56)    Cath: Cardiac Cath Lab - Adult:   [facility]  Department of Cardiology  [address] [place] [address]  [phone]  Cath Lab Report -- Comprehensive Report  Patient: AUSTIN LEE  Study date: 2015  Account number: [id number]  MR number: [id number]  : 1950  Gender: Female  Race: W  Case Physician(s):  Mo Izquierdo M.D.  Fellow:  СЕРГЕЙ Boogie M.D.  Referring Physician:  INDICATIONS: Angina/MI: unstable angina.  HISTORY: The patient has a history of coronary artery disease. The patient  has hypertension and medication-treated dyslipidemia.  PROCEDURE:  --  Intervention on mid LAD: drug-eluting stent.  TECHNIQUE: The risks and alternatives of the procedures and conscious  sedation were explained to the patient and informed consent was obtained.  Cardiac catheterization performed urgently. Coronary intervention  performed electively.  Local anesthetic given. Right radial artery access. A PRELUDE KIT was  inserted in the vessel. Right femoral artery access. A 6FR SHEATH PINNACLE  was inserted in the vessel. RADIATION EXPOSURE: 6.9 min. A successful  drug-eluting stent was performed on the 80 % lesion in the mid LAD.  Following intervention there was a 1 % residual stenosis. According to the  ACC/AHA classification system, this lesion was a type B2 lesion. There was  VELASQUEZ 3 flow after the procedure. Vessel setup was performed. A 6FR JL4  LAUNCHER guiding catheter was used to intubate the vessel. Vessel setup  was performed. A JASE [id number] wire was used to cross the lesion. A 3.50  X 20 PROMUS PREMIER drug-eluting stent was placed across the lesion and  deployed at a maximum inflation pressure of 20 gianna. Balloon angioplasty  was performed, using a 4.00 X 12 EUPHORA balloon, with 1 inflations and a  maximum inflation pressure of 12 gianna.  CONTRAST GIVEN: Omnipaque 85 ml.  MEDICATIONS GIVEN: Midazolam, 0.5 mg, IV. Fentanyl, 25 mcg, IV. Verapamil  (Isoptin, Calan, Covera), 2.5 mg, IA. Nitroglycerin, 300 mcg,  intracoronary. Labetalol (Trandate), 20 mg, IV. Heparin, 8000 units, IA.  CORONARY VESSELS:  LAD:   --  Mid LAD: There was a 80 % stenosis.  COMPLICATIONS: There were no complications.  INTERVENTIONAL RECOMMENDATIONS: ASA and Plavix for 1 year  Prepared and signed by  Mo Izquierdo M.D.  Signed 2015 11:02:43  HEMODYNAMIC TABLES  Pressures:  Intervention  Pressures:  - HR: 102  Pressures:  - Rhythm:  Pressures:  -- Aortic Pressure (S/D/M): 183/67/114  Outputs:  Baseline/ Room Air  Outputs:  -- CALCULATIONS[age]  Outputs:  -- CALCULATIONS: Body Surface Area: 2.03  Outputs:  -- CALCULATIONS: Height in cm: 160.00  Outputs:  -- CALCULATIONS: Sex: Female  Outputs:  -- CALCULATIONS: Weight in k.10 (01-23-15 @ 18:24)  Cardiac Cath Lab - Adult:   [facility]  Department of Cardiology  [address] [place] [address]  [phone]  Cath Lab Report -- Comprehensive Report  Patient: AUSTIN LEE  Study date: 2015  Account number: [id number]  MR number: [id number]  : 1950  Gender: Female  Race: W  Case Physician(s):  Mo Izquierdo M.D.  Fellow:  Sanjuana Campos M.D.  Referring Physician:  Alok Price M.D.  INDICATIONS: Angina/MI: unstable angina.  HISTORY: There was no prior cardiac history. The patient has hypertension,  medication-treated dyslipidemia, and morbid obesity.  PROCEDURE:  --  Left heart catheterization with ventriculography.  --  Left coronary angiography.  --  Right coronary angiography.  TECHNIQUE: The risks and alternatives of the procedures and conscious  sedation were explained to the patient and informed consent was obtained.  Cardiac catheterization performed electively.  Local anesthetic given. Right femoral artery access. A 5FR SHEATH PINNACLE  was inserted in the vessel. Left heart catheterization. Ventriculography  was performed. A 5FR  EXPO catheter was utilized. Left coronary  artery angiography. The vessel was injected utilizing a 5FR FL4.0 EXPO  catheter. Right coronary artery angiography. The vessel was injected  utilizing a 5FR FR4.0 EXPO catheter. RADIATION EXPOSURE: 1.6 min.  CONTRAST GIVEN: Visipaque 102 ml.  MEDICATIONS GIVEN: Fentanyl, 25 mcg, IV. Midazolam, 1 mg, IV. Labetalol  (Trandate), 20 mg, IV. Nitroglycerin, 300 mcg, IA. Labetalol (Trandate),  20 mg, IV.  VENTRICLES: Global left ventricular function was moderately depressed. EF  estimated was 40 %.  CORONARY VESSELS: The coronary circulation is right dominant.  LM:   --  Distal left main: There was a 20 % stenosis.  LAD:--  Proximal LAD: There was a 80 % stenosis.  CX:   --  Distal circumflex: Angiography showed mild atherosclerosis with  no flow limiting lesions.  RCA:   --  Mid RCA: There was a 100 % stenosis.  COMPLICATIONS: There were no complications.  DIAGNOSTIC RECOMMENDATIONS: Plan PCI of the LAD tomorrow. Pt with right  groin hematoma which is reason for delay in PCI  Prepared and signed by  Mo Izquierdo M.D.  Signed 2015 11:00:32  HEMODYNAMIC TABLES  Pressures:  Baseline/ Room Air  Pressures:  -HR: 85  Pressures:  - Rhythm:  Pressures:  -- Aortic Pressure (S/D/M): 210/77/128  Pressures:  -- Left Ventricle (s/edp): 209/25/--  Outputs:  Baseline/ Room Air  Outputs:  -- CALCULATIONS[age]  Outputs:  -- CALCULATIONS: Body SurfaceArea: 2.03  Outputs:  -- CALCULATIONS: Height in cm: 160.00  Outputs:  -- CALCULATIONS: Sex: Female  Outputs:  -- CALCULATIONS: Weight in k.10  Outputs:  -- OUTPUTS: O2 consumption: 272.02  Outputs:  -- OUTPUTS: Vo2 Indexed: 133.79 (01-22-15 @ 17:26)            AUSTIN LEE  MRN-[id number]  Patient is a 72y old  Female who presents with a chief complaint of AICD discharge (23 Oct 2023 11:16)    HPI:  72F c hx COPD, HTN, PAD, CAD s/p LAD stent (last C showing 100%  of RCA), chronic leukocytosis of unclear etiol, cardiac arrest s/p left-sided ICD (2019) complicated by infection and s/p R single-chamber ICD reimplantation ([place] in 2019), CHF EF 45%, grade 2 DHF, recent hospitalization for UTI (treated w/ ceftriaxone and Vanco x 3 days) and VFib s/p 13 AICD firings, pw lightheadedness and AICD firing.    Pt recently hospitalized for VFib s/p multiple aicd firing. Pt placed on quinidine, mexiletine, metoprolol. Pt reports good compliance with her medications, even though she doesn't know what the meds are. Pt states at around 3-4PM yesterday, she felt "dizzy" for a few seconds, then felt AICD shock, then had resolution of her dizziness. Denies fevers, chills, CP, SOB, URI symptoms, GI symptoms. Pt states she's never seen a hematologist for leukocytosis. Pt was supposed to f/u with EP as outpt for ablation. (16 Oct 2023 03:47)      24 HOUR EVENTS:  - Converted back to AFib this afternoon with decreased SBP, then slow VT with rates 150  - Given Amio 150 IVPB x2 and lido 100mg x1. Lido gtt increased back to 1  - Started quinidine 324 BID   - Started lopressor 12.5    REVIEW OF SYSTEMS:  CONSTITUTIONAL: No weakness, fevers or chills  EYES/ENT: No visual changes;  No vertigo or throat pain   NECK: No pain or stiffness  RESPIRATORY: No cough, wheezing, hemoptysis; No shortness of breath  CARDIOVASCULAR: No chest pain or palpitations  GASTROINTESTINAL: No abdominal or epigastric pain. No nausea, vomiting, or hematemesis; No diarrhea or constipation. No melena or hematochezia.  GENITOURINARY: No dysuria, frequency or hematuria  NEUROLOGICAL: No numbness or weakness  SKIN: No itching, rashes    ICU Vital Signs Last 24 Hrs  T(C): 36.7 (24 Oct 2023 03:00), Max: 36.7 (23 Oct 2023 19:00)  T(F): 98 (24 Oct 2023 03:00), Max: 98 (23 Oct 2023 19:00)  HR: 63 (24 Oct 2023 07:01) (63 - 150)  BP: 108/54 (24 Oct 2023 07:01) (77/48 - 163/69)  BP(mean): 78 (24 Oct 2023 07:) (58 - 116)  ABP: --  ABP(mean): --  RR: 24 (24 Oct 2023 07:01) (20 - 28)  SpO2: 96% (24 Oct 2023 07:) (90% - 100%)    O2 Parameters below as of 24 Oct 2023 07:  Patient On (Oxygen Delivery Method): nasal cannula  O2 Flow (L/min): 2    CVP(mm Hg): --  CO: --  CI: --  PA: --  PA(mean): --  PA(direct): --  PCWP: --  LA: --  RA: --  SVR: --  SVRI: --  PVR: --  PVRI: --    PHYSICAL EXAM:  GENERAL: No acute distress, well-developed  HEAD:  Atraumatic, Normocephalic  EYES: EOMI, PERRLA, conjunctiva and sclera clear  NECK: Supple, no lymphadenopathy, no JVD  CHEST/LUNG: CTAB; No wheezes, rales, or rhonchi  HEART: Regular rate and rhythm. Normal S1/S2. No murmurs, rubs, or gallops  ABDOMEN: Soft, non-tender, non-distended; normal bowel sounds, no organomegaly  EXTREMITIES:  2+ peripheral pulses b/l, No clubbing, cyanosis, or edema  NEUROLOGY: A&O x 3, no focal deficits  SKIN: No rashes or lesions    ============================I/O===========================  I&O's Detail    23 Oct 2023 07:01  -  24 Oct 2023 07:00  --------------------------------------------------------  IN:    Heparin: 120 mL    IV PiggyBack: 200 mL    Lidocaine: 52.6 mL    Lidocaine: 75 mL    Lidocaine: 22.9 mL    Oral Fluid: 420 mL  Total IN: 890.5 mL    OUT:    Voided (mL): 750 mL  Total OUT: 750 mL    Total NET: 140.5 mL      24 Oct 2023 07:01  -  24 Oct 2023 08:03  --------------------------------------------------------  IN:    IV PiggyBack: 5 mL    Lidocaine: 3.8 mL  Total IN: 8.8 mL    OUT:  Total OUT: 0 mL    Total NET: 8.8 mL  ============================ LABS =========================  LABS:                        11.1   15.61 )-----------( 352      ( 24 Oct 2023 00:24 )             34.8     10-24    133<L>  |  102  |  16  ----------------------------<  123<H>  4.2   |  24  |  0.65    Ca    8.1<L>      24 Oct 2023 00:24  Phos  2.9     10-24  Mg     2.6     10-24    TPro  5.9<L>  /  Alb  2.3<L>  /  TBili  0.3  /  DBili  x   /  AST  11  /  ALT  13  /  AlkPhos  66  10-24    PT/INR - ( 24 Oct 2023 00:24 )   PT: 12.8 sec;   INR: 1.17 ratio         PTT - ( 24 Oct 2023 00:24 )  PTT:26.2 sec    ======================Micro/Rad/Cardio=================  Telemetry: Reviewed   EKG: Reviewed  CXR: Reviewed  Culture: Reviewed   Echo: Transthoracic Echocardiogram:   Patient name: AUSTIN LEE  YOB: 1950   Age: 68 (F)   MR#: [id number]  Study Date: 2019  Location: O/PSonographer: Sherri Cadet RODGER  Study quality: Technically difficult  Referring Physician: RICCARDO WOLF MD  Blood Pressure: 140/80 mmHg  Height: 157 cm  Weight: 86 kg  BSA: 1.9 m2  Heart Rate: 60 mmHg  ------------------------------------------------------------------------  PROCEDURE: Transthoracic echocardiogram with 2-D, M-Mode  and complete spectral and color flow Doppler.  INDICATION: Atherosclerotic heart disease of native  coronary artery without angina pectoris (I25.10)  ------------------------------------------------------------------------  Dimensions:    Normal Values:  LA:     4.6    2.0 - 4.0 cm  Ao: 3.1    2.0 - 3.8 cm  SEPTUM: 1.4    0.6 - 1.2 cm  PWT:    1.3    0.6 - 1.1 cm  LVIDd:  6.4    3.0 - 5.6 cm  LVIDs:  4.3    1.8 - 4.0 cm  Derived variables:  LVMI: 219 g/m2  RWT: 0.40  Fractional short: 33 %  EF (Ahn Rule): 33 %Doppler Peak Velocity (m/sec):  AoV=1.2  ------------------------------------------------------------------------  Observations:  Mitral Valve: Mitral annular calcification and calcified  mitral leaflets with decreased diastolic opening. Mild  mitral regurgitation.  Peak mitral valve gradient equals 13  mm Hg, mean transmitral valve gradient equals 4 mm Hg,  consistent with mild mitral stenosis. Gradient measured at  a HR of 81bpm.  Aortic Valve/Aorta: Aortic valve not well visualized;  probably normal. Peak transaortic valve gradient equals 6  mm Hg, estimated aortic valve area equals 2.1 sqcm. Minimal  aortic regurgitation.  Peak left ventricular outflow tract  gradient equals 2 mm Hg.  Aortic Root: 3.1 cm.  Left Atrium: Normal left atrium.  LA volume index =31  cc/m2.  Left Ventricle: Severe segmental left ventricular systolic  dysfunction. Endocardial visualization enhanced with  intravenous injection of Ultrasonic Enhancing Agent  (Definity). The mid inferolateral wall, the basal  inferolateral wall, the basal inferior wall, the basal  anterolateral wall, the mid inferior wall, the mid  anterolateral wall, the mid inferoseptum, and the basal  inferoseptum are hypokinetic.  Moderate concentric left  ventricular hypertrophy. Moderate diastolic dysfunction  (Stage II).  Right Heart: Normal right atrium. Normal right ventricular  size and function. Normal tricuspid valve. Mild tricuspid  regurgitation. Normal pulmonic valve. Mild pulmonic  regurgitation.  Pericardium/Pleura: Normal pericardium with no pericardial  effusion.  Hemodynamic: Estimated right ventricular systolic pressure  equals 28 mm Hg, assuming right atrial pressure equals 3 mm  Hg, consistent with normal pulmonary pressures. Color  Doppler demonstrates no evidence of a patent foramen ovale.  ------------------------------------------------------------------------  Conclusions:  1. Mitral annular calcification and calcified mitral  leaflets with decreased diastolic opening. Mild mitral  regurgitation.  Peak mitral valve gradient equals 13 mm Hg,  mean transmitral valve gradient equals 4 mm Hg, consistent  with mild mitral stenosis. Gradient measured at a HR of  81bpm.  2. Aortic valve not well visualized; probably normal.  Minimal aortic regurgitation.  3. Moderate concentric left ventricular hypertrophy.  4. Severe segmental left ventricular systolic dysfunction.  Endocardial visualization enhanced with intravenous  injection of Ultrasonic Enhancing Agent (Definity). The mid  inferolateral wall, the basal  inferolateral wall, the  basal inferior wall, the basal anterolateral wall, the mid  inferior wall, the mid anterolateral wall, the mid  inferoseptum, and the basal inferoseptum are hypokinetic.  5. Moderate diastolic dysfunction (Stage II).  6. Normal right ventricular size and function.  *** No previous Echo exam.  ------------------------------------------------------------------------  Confirmed on  2019 - 16:47:43 by Dudley Marquez M.D.  ------------------------------------------------------------------------ (19 @ 13:56)    Cath: Cardiac Cath Lab - Adult:   [facility]  Department of Cardiology  [address] [place] [address]  [phone]  Cath Lab Report -- Comprehensive Report  Patient: AUSTIN LEE  Study date: 2015  Account number: [id number]  MR number: [id number]  : 1950  Gender: Female  Race: W  Case Physician(s):  Mo Izquierdo M.D.  Fellow:  СЕРГЕЙ Boogie M.D.  Referring Physician:  INDICATIONS: Angina/MI: unstable angina.  HISTORY: The patient has a history of coronary artery disease. The patient  has hypertension and medication-treated dyslipidemia.  PROCEDURE:  --  Intervention on mid LAD: drug-eluting stent.  TECHNIQUE: The risks and alternatives of the procedures and conscious  sedation were explained to the patient and informed consent was obtained.  Cardiac catheterization performed urgently. Coronary intervention  performed electively.  Local anesthetic given. Right radial artery access. A PRELUDE KIT was  inserted in the vessel. Right femoral artery access. A 6FR SHEATH PINNACLE  was inserted in the vessel. RADIATION EXPOSURE: 6.9 min. A successful  drug-eluting stent was performed on the 80 % lesion in the mid LAD.  Following intervention there was a 1 % residual stenosis. According to the  ACC/AHA classification system, this lesion was a type B2 lesion. There was  VELASQUEZ 3 flow after the procedure. Vessel setup was performed. A 6FR JL4  LAUNCHER guiding catheter was used to intubate the vessel. Vessel setup  was performed. A JASE [id number] wire was used to cross the lesion. A 3.50  X 20 PROMUS PREMIER drug-eluting stent was placed across the lesion and  deployed at a maximum inflation pressure of 20 gianna. Balloon angioplasty  was performed, using a 4.00 X 12 EUPHORA balloon, with 1 inflations and a  maximum inflation pressure of 12 gianna.  CONTRAST GIVEN: Omnipaque 85 ml.  MEDICATIONS GIVEN: Midazolam, 0.5 mg, IV. Fentanyl, 25 mcg, IV. Verapamil  (Isoptin, Calan, Covera), 2.5 mg, IA. Nitroglycerin, 300 mcg,  intracoronary. Labetalol (Trandate), 20 mg, IV. Heparin, 8000 units, IA.  CORONARY VESSELS:  LAD:   --  Mid LAD: There was a 80 % stenosis.  COMPLICATIONS: There were no complications.  INTERVENTIONAL RECOMMENDATIONS: ASA and Plavix for 1 year  Prepared and signed by  Mo Izquierdo M.D.  Signed 2015 11:02:43  HEMODYNAMIC TABLES  Pressures:  Intervention  Pressures:  - HR: 102  Pressures:  - Rhythm:  Pressures:  -- Aortic Pressure (S/D/M): 183/67/114  Outputs:  Baseline/ Room Air  Outputs:  -- CALCULATIONS: [age]  Outputs:  -- CALCULATIONS: Body Surface Area: 2.03  Outputs:  -- CALCULATIONS: Height in cm: 160.00  Outputs:  -- CALCULATIONS: Sex: Female  Outputs:  -- CALCULATIONS: Weight in k.10 (01-23-15 @ 18:24)  Cardiac Cath Lab - Adult:   [facility]  Department of Cardiology  [address] [place] [address]  [phone]  Cath Lab Report -- Comprehensive Report  Patient: AUSTIN LEE  Study date: 2015  Account number: [id number]  MR number: [id number]  : 1950  Gender: Female  Race: W  Case Physician(s):  Mo Izquierdo M.D.  Fellow:  Sanjuana Campos M.D.  Referring Physician:  Alok Price M.D.  INDICATIONS: Angina/MI: unstable angina.  HISTORY: There was no prior cardiac history. The patient has hypertension,  medication-treated dyslipidemia, and morbid obesity.  PROCEDURE:  --  Left heart catheterization with ventriculography.  --  Left coronary angiography.  --  Right coronary angiography.  TECHNIQUE: The risks and alternatives of the procedures and conscious  sedation were explained to the patient and informed consent was obtained.  Cardiac catheterization performed electively.  Local anesthetic given. Right femoral artery access. A 5FR SHEATH PINNACLE  was inserted in the vessel. Left heart catheterization. Ventriculography  was performed. A 5FR  EXPO catheter was utilized. Left coronary  artery angiography. The vessel was injected utilizing a 5FR FL4.0 EXPO  catheter. Right coronary artery angiography. The vessel was injected  utilizing a 5FR FR4.0 EXPO catheter. RADIATION EXPOSURE: 1.6 min.  CONTRAST GIVEN: Visipaque 102 ml.  MEDICATIONS GIVEN: Fentanyl, 25 mcg, IV. Midazolam, 1 mg, IV. Labetalol  (Trandate), 20 mg, IV. Nitroglycerin, 300 mcg, IA. Labetalol (Trandate),  20 mg, IV.  VENTRICLES: Global left ventricular function was moderately depressed. EF  estimated was 40 %.  CORONARY VESSELS: The coronary circulation is right dominant.  LM:   --  Distal left main: There was a 20 % stenosis.  LAD:--  Proximal LAD: There was a 80 % stenosis.  CX:   --  Distal circumflex: Angiography showed mild atherosclerosis with  no flow limiting lesions.  RCA:   --  Mid RCA: There was a 100 % stenosis.  COMPLICATIONS: There were no complications.  DIAGNOSTIC RECOMMENDATIONS: Plan PCI of the LAD tomorrow. Pt with right  groin hematoma which is reason for delay in PCI  Prepared and signed by  Mo Izquierdo M.D.  Signed 2015 11:00:32  HEMODYNAMIC TABLES  Pressures:  Baseline/ Room Air  Pressures:  -HR: 85  Pressures:  - Rhythm:  Pressures:  -- Aortic Pressure (S/D/M): 210/77/128  Pressures:  -- Left Ventricle (s/edp): 209/25/--  Outputs:  Baseline/ Room Air  Outputs:  -- CALCULATIONS[age]  Outputs:  -- CALCULATIONS: Body SurfaceArea: 2.03  Outputs:  -- CALCULATIONS: Height in cm: 160.00  Outputs:  -- CALCULATIONS: Sex: Female  Outputs:  -- CALCULATIONS: Weight in k.10  Outputs:  -- OUTPUTS: O2 consumption: 272.02  Outputs:  -- OUTPUTS: Vo2 Indexed: 133.79 (01-22-15 @ 17:26)

## 2023-10-24 NOTE — PROGRESS NOTE ADULT - ATTENDING COMMENTS
History of COPD with active smoking  Admitted with VT requiring multiple AICD shocks with course complicated by afib also requiring multiple (external) shocks  During VT ablation the patient went into tamponade and after a pericardiocentesis the ablation was aborted  She has had recurrent VT and afib thereafter  A+Ox3  VT on Lidocaine infusion, Quinidine, Lopressor and Amiodarone - wean Lidocaine off  Hemodynamics acceptable, no pressors or inotropes  O2 sats mid to high 90s on nasal cannula - wean to room air  DASH/diabetic diet  Normal renal function, compensated on exam - target even  H/H acceptable  After discussion with EP, Heparin drip for afib is being held due to pericardial effusion which formed after drip was started  Afebrile, no antibiotics  Sugars controlled  No central access

## 2023-10-24 NOTE — PROGRESS NOTE ADULT - ASSESSMENT
71 y/o F w/ PMHx COPD, HLD, HTN, PVD, ICM/CAD s/p PCI (has  of RCA), cardiac arrest s/p left-sided ICD (6/4/2019) complicated by infection and s/p R single-chamber ICD ([place] in 9/23/2019), recent admission for VT storm with adjustment of medication and NIPS now presenting for ICD shock in the setting of recurrent monomorphic VT with unsuccessful ATP.  Ablation attempted 10/20 but aborted due to hemodynamically significant pericardial effusion with drain placement.  Pericardial drain removed on 10/20/23.  Over the weekend developed new shortness of breath, recurrent VT s/p ICD shock, and afib w/ RVR s/p cardioversion 10/21. She continues to have NSVT. Currently in sinus rhythm.     - Had episode of slow VT 150s overnight requiring Amio 150 x 2  - Can stop lidocaine gtt  - Continue low dose quinidine Q12 and Lopressor 12.5 Q12  - continue on oral amiodarone load 400mg PO Q 8 hours. Monitor TSH, LFT's, opthalmology and pulmonary function tests as outpatient   AST 10, ALT 12, baseline TSH 3.58 on 10/2   - Should be evaluated for potential stellate ganglion block  - white count remains elevated, fungal groin infection and puss noted from prior access site earlier this week, would have very low threshold to complete 7 day course of Abx, will defer to management per CCU team   - Will decide timing of ablation based on repeat echo findings and treatment of possible infection      Note not final until signed by attending       Erasmo Lundberg MD  Cardiology Fellow PGY-6  Phone: [phone]    For all New Consults  [email or web address]   Login: roni 73 y/o F w/ PMHx COPD, HLD, HTN, PVD, ICM/CAD s/p PCI (has  of RCA), cardiac arrest s/p left-sided ICD (6/4/2019) complicated by infection and s/p R single-chamber ICD ([place] in 9/23/2019), recent admission for VT storm with adjustment of medication and NIPS now presenting for ICD shock in the setting of recurrent monomorphic VT with unsuccessful ATP.  Ablation attempted 10/20 but aborted due to hemodynamically significant pericardial effusion with drain placement.  Pericardial drain removed on 10/20/23.  Over the weekend developed new shortness of breath, recurrent VT s/p ICD shock, and afib w/ RVR s/p cardioversion 10/21. She continues to have NSVT. Currently in sinus rhythm.     - Had episode of slow VT 150s overnight requiring Amio 150 x 2  - Can stop lidocaine gtt  - Continue low dose quinidine Q12 and Lopressor 12.5 Q12  - continue on oral amiodarone load 400mg PO Q 8 hours. Monitor TSH, LFT's, opthalmology and pulmonary function tests as outpatient   AST 10, ALT 12, baseline TSH 3.58 on 10/2   - Should be evaluated for potential stellate ganglion block  - white count remains elevated, fungal groin infection and puss noted from prior access site earlier this week, would have very low threshold to complete 7 day course of Abx, will defer to management per CCU team   - Will decide timing of ablation based on repeat echo findings and treatment of possible infection      Note not final until signed by attending       Erasom Lundberg MD  Cardiology Fellow PGY-6  Phone: [phone]    For all New Consults  [email or web address]   Login: roni 71 y/o F w/ PMHx COPD, HLD, HTN, PVD, ICM/CAD s/p PCI (has  of RCA), cardiac arrest s/p left-sided ICD (6/4/2019) complicated by infection and s/p R single-chamber ICD ([place] in 9/23/2019), recent admission for VT storm with adjustment of medication and NIPS now presenting for ICD shock in the setting of recurrent monomorphic VT with unsuccessful ATP.  Ablation attempted 10/20 but aborted due to hemodynamically significant pericardial effusion with drain placement.  Pericardial drain removed on 10/20/23.  Over the weekend developed new shortness of breath, recurrent VT s/p ICD shock, and afib w/ RVR s/p cardioversion 10/21. She continues to have NSVT. Currently in sinus rhythm.     - Had episode of slow VT 150s overnight requiring Amio 150 x 2  - Can stop lidocaine gtt  - Continue low dose quinidine Q12 and Lopressor 12.5 Q12  - continue on oral amiodarone load 400mg PO Q 8 hours. Monitor TSH, LFT's, opthalmology and pulmonary function tests as outpatient   AST 10, ALT 12, baseline TSH 3.58 on 10/2   - Should be evaluated for potential stellate ganglion block  - white count remains elevated, fungal groin infection and puss noted from prior access site earlier this week, would have very low threshold to complete 7 day course of Abx, will defer to management per CCU team   - Will decide timing of ablation based on repeat echo findings and treatment of possible infection      Note not final until signed by attending       Erasmo Lundberg MD  Cardiology Fellow PGY-6  Phone: [phone]    For all New Consults  [email or web address]   Login: roni

## 2023-10-25 NOTE — DIETITIAN INITIAL EVALUATION ADULT - ETIOLOGY
inadequate protein energy intake in setting of chronic catabolic illness increased physiological demand for nutrients

## 2023-10-25 NOTE — DIETITIAN INITIAL EVALUATION ADULT - REASON FOR ADMISSION
Pt is a 72F PMHx COPD, HTN, PAD, CAD s/p LAD stent (last LHC showing 100%  of RCA), chronic leukocytosis of unclear etiol, cardiac arrest s/p left-sided ICD (6/4/2019) complicated by infection and s/p R single-chamber ICD reimplantation ([place] in 9/23/2019), CHF EF 45%, grade 2 DHF, and VFib s/p 13 AICD firings underwent VT ablation today c/b pericardial tamponade. Transferred to CICU for further monitoring. 10/21 developed AF w/ RVR.

## 2023-10-25 NOTE — PROGRESS NOTE ADULT - SUBJECTIVE AND OBJECTIVE BOX
AUSTIN LEE  MRN-[id number]  Patient is a 72y old  Female who presents with a chief complaint of AICD discharge (23 Oct 2023 11:16)    HPI:  72F c hx COPD, HTN, PAD, CAD s/p LAD stent (last LHC showing 100%  of RCA), chronic leukocytosis of unclear etiol, cardiac arrest s/p left-sided ICD (2019) complicated by infection and s/p R single-chamber ICD reimplantation ([place] in 2019), CHF EF 45%, grade 2 DHF, recent hospitalization for UTI (treated w/ ceftriaxone and Vanco x 3 days) and VFib s/p 13 AICD firings, pw lightheadedness and AICD firing.    Pt recently hospitalized for VFib s/p multiple aicd firing. Pt placed on quinidine, mexiletine, metoprolol. Pt reports good compliance with her medications, even though she doesn't know what the meds are. Pt states at around 3-4PM yesterday, she felt "dizzy" for a few seconds, then felt AICD shock, then had resolution of her dizziness. Denies fevers, chills, CP, SOB, URI symptoms, GI symptoms. Pt states she's never seen a hematologist for leukocytosis. Pt was supposed to f/u with EP as outpt for ablation. (16 Oct 2023 03:47)      24 HOUR EVENTS:  - Lidocaine d/c'd   - Tachypneic overnight w/ no inc o2 requirement. XR w L whiteout. Temp 35.6.     REVIEW OF SYSTEMS:  CONSTITUTIONAL: No weakness, fevers or chills  EYES/ENT: No visual changes;  No vertigo or throat pain   NECK: No pain or stiffness  RESPIRATORY: No cough, wheezing, hemoptysis; No shortness of breath  CARDIOVASCULAR: No chest pain or palpitations  GASTROINTESTINAL: No abdominal or epigastric pain. No nausea, vomiting, or hematemesis; No diarrhea or constipation. No melena or hematochezia.  GENITOURINARY: No dysuria, frequency or hematuria  NEUROLOGICAL: No numbness or weakness  SKIN: No itching, rashes    ICU Vital Signs Last 24 Hrs  T(C): 35.3 (25 Oct 2023 07:00), Max: 36.8 (25 Oct 2023 03:00)  T(F): 95.6 (25 Oct 2023 07:00), Max: 98.3 (25 Oct 2023 03:00)  HR: 61 (25 Oct 2023 07:00) (61 - 77)  BP: 136/62 (25 Oct 2023 07:00) (114/53 - 164/70)  BP(mean): 89 (25 Oct 2023 07:00) (76 - 100)  ABP: --  ABP(mean): --  RR: 27 (25 Oct 2023 07:) (21 - 37)  SpO2: 97% (25 Oct 2023 07:) (90% - 99%)    O2 Parameters below as of 25 Oct 2023 07:  Patient On (Oxygen Delivery Method): nasal cannula  O2 Flow (L/min): 3    PHYSICAL EXAM:  GENERAL: No acute distress, well-developed  HEAD:  Atraumatic, Normocephalic  EYES: EOMI, PERRLA, conjunctiva and sclera clear  NECK: Supple, no lymphadenopathy, no JVD  CHEST/LUNG: Grossly diminished on L   HEART: Regular rate and rhythm. Normal S1/S2. No murmurs, rubs, or gallops  ABDOMEN: Soft, non-tender, non-distended; normal bowel sounds, no organomegaly  EXTREMITIES:  2+ peripheral pulses b/l, No clubbing, cyanosis, or edema  NEUROLOGY: A&O x 3, no focal deficits  SKIN: No rashes or lesions    ============================I/O===========================  I&O's Detail    24 Oct 2023 07:01  -  25 Oct 2023 07:00  --------------------------------------------------------  IN:    IV PiggyBack: 80 mL    Lidocaine: 83.6 mL    Oral Fluid: 360 mL  Total IN: 523.6 mL    OUT:    Voided (mL): 2170 mL  Total OUT: 2170 mL    Total NET: -1646.4 mL    ============================ LABS =========================  LABS:                        12.1   15.16 )-----------( 394      ( 25 Oct 2023 00:50 )             37.7     10-25    137  |  101  |  17  ----------------------------<  129<H>  4.2   |  25  |  0.67    Ca    8.7      25 Oct 2023 00:49  Phos  3.3     10-25  Mg     2.3     10-25    TPro  6.5  /  Alb  2.7<L>  /  TBili  0.3  /  DBili  x   /  AST  17  /  ALT  15  /  AlkPhos  70  10-25    PT/INR - ( 25 Oct 2023 00:50 )   PT: 12.6 sec;   INR: 1.21 ratio         PTT - ( 25 Oct 2023 00:50 )  PTT:25.5 sec  ======================Micro/Rad/Cardio=================  Telemetry: Reviewed   EKG: Reviewed  CXR: Reviewed  Culture: Reviewed   Echo: Transthoracic Echocardiogram:   Patient name: AUSTIN LEE  YOB: 1950   Age: 68 (F)   MR#: [id number]  Study Date: 2019  Location: O/PSonographer: Sherri Cadet RDCS  Study quality: Technically difficult  Referring Physician: RICCARDO WOLF MD  Blood Pressure: 140/80 mmHg  Height: 157 cm  Weight: 86 kg  BSA: 1.9 m2  Heart Rate: 60 mmHg  ------------------------------------------------------------------------  PROCEDURE: Transthoracic echocardiogram with 2-D, M-Mode  and complete spectral and color flow Doppler.  INDICATION: Atherosclerotic heart disease of native  coronary artery without angina pectoris (I25.10)  ------------------------------------------------------------------------  Dimensions:    Normal Values:  LA:     4.6    2.0 - 4.0 cm  Ao: 3.1    2.0 - 3.8 cm  SEPTUM: 1.4    0.6 - 1.2 cm  PWT:    1.3    0.6 - 1.1 cm  LVIDd:  6.4    3.0 - 5.6 cm  LVIDs:  4.3    1.8 - 4.0 cm  Derived variables:  LVMI: 219 g/m2  RWT: 0.40  Fractional short: 33 %  EF (Ahn Rule): 33 %Doppler Peak Velocity (m/sec):  AoV=1.2  ------------------------------------------------------------------------  Observations:  Mitral Valve: Mitral annular calcification and calcified  mitral leaflets with decreased diastolic opening. Mild  mitral regurgitation.  Peak mitral valve gradient equals 13  mm Hg, mean transmitral valve gradient equals 4 mm Hg,  consistent with mild mitral stenosis. Gradient measured at  a HR of 81bpm.  Aortic Valve/Aorta: Aortic valve not well visualized;  probably normal. Peak transaortic valve gradient equals 6  mm Hg, estimated aortic valve area equals 2.1 sqcm. Minimal  aortic regurgitation.  Peak left ventricular outflow tract  gradient equals 2 mm Hg.  Aortic Root: 3.1 cm.  Left Atrium: Normal left atrium.  LA volume index =31  cc/m2.  Left Ventricle: Severe segmental left ventricular systolic  dysfunction. Endocardial visualization enhanced with  intravenous injection of Ultrasonic Enhancing Agent  (Definity). The mid inferolateral wall, the basal  inferolateral wall, the basal inferior wall, the basal  anterolateral wall, the mid inferior wall, the mid  anterolateral wall, the mid inferoseptum, and the basal  inferoseptum are hypokinetic.  Moderate concentric left  ventricular hypertrophy. Moderate diastolic dysfunction  (Stage II).  Right Heart: Normal right atrium. Normal right ventricular  size and function. Normal tricuspid valve. Mild tricuspid  regurgitation. Normal pulmonic valve. Mild pulmonic  regurgitation.  Pericardium/Pleura: Normal pericardium with no pericardial  effusion.  Hemodynamic: Estimated right ventricular systolic pressure  equals 28 mm Hg, assuming right atrial pressure equals 3 mm  Hg, consistent with normal pulmonary pressures. Color  Doppler demonstrates no evidence of a patent foramen ovale.  ------------------------------------------------------------------------  Conclusions:  1. Mitral annular calcification and calcified mitral  leaflets with decreased diastolic opening. Mild mitral  regurgitation.  Peak mitral valve gradient equals 13 mm Hg,  mean transmitral valve gradient equals 4 mm Hg, consistent  with mild mitral stenosis. Gradient measured at a HR of  81bpm.  2. Aortic valve not well visualized; probably normal.  Minimal aortic regurgitation.  3. Moderate concentric left ventricular hypertrophy.  4. Severe segmental left ventricular systolic dysfunction.  Endocardial visualization enhanced with intravenous  injection of Ultrasonic Enhancing Agent (Definity). The mid  inferolateral wall, the basal  inferolateral wall, the  basal inferior wall, the basal anterolateral wall, the mid  inferior wall, the mid anterolateral wall, the mid  inferoseptum, and the basal inferoseptum are hypokinetic.  5. Moderate diastolic dysfunction (Stage II).  6. Normal right ventricular size and function.  *** No previous Echo exam.  ------------------------------------------------------------------------  Confirmed on  2019 - 16:47:43 by Dudley Marquez M.D.  ------------------------------------------------------------------------ (19 @ 13:56)    Cath: Cardiac Cath Lab - Adult:   [facility]  Department of Cardiology  [address] [place] [address]  [phone]  Cath Lab Report -- Comprehensive Report  Patient: AUSTIN LEE  Study date: 2015  Account number: [id number]  MR number: [id number]  : 1950  Gender: Female  Race: W  Case Physician(s):  Mo Izquierdo M.D.  Fellow:  СЕРГЕЙ Boogie M.D.  Referring Physician:  INDICATIONS: Angina/MI: unstable angina.  HISTORY: The patient has a history of coronary artery disease. The patient  has hypertension and medication-treated dyslipidemia.  PROCEDURE:  --  Intervention on mid LAD: drug-eluting stent.  TECHNIQUE: The risks and alternatives of the procedures and conscious  sedation were explained to the patient and informed consent was obtained.  Cardiac catheterization performed urgently. Coronary intervention  performed electively.  Local anesthetic given. Right radial artery access. A PRELUDE KIT was  inserted in the vessel. Right femoral artery access. A 6FR SHEATH PINNACLE  was inserted in the vessel. RADIATION EXPOSURE: 6.9 min. A successful  drug-eluting stent was performed on the 80 % lesion in the mid LAD.  Following intervention there was a 1 % residual stenosis. According to the  ACC/AHA classification system, this lesion was a type B2 lesion. There was  VELASQUZE 3 flow after the procedure. Vessel setup was performed. A 6FR JL4  LAUNCHER guiding catheter was used to intubate the vessel. Vessel setup  was performed. A JASE [id number] wire was used to cross the lesion. A 3.50  X 20 PROMUS PREMIER drug-eluting stent was placed across the lesion and  deployed at a maximum inflation pressure of 20 gianna. Balloon angioplasty  was performed, using a 4.00 X 12 EUPHORA balloon, with 1 inflations and a  maximum inflation pressure of 12 gianna.  CONTRAST GIVEN: Omnipaque 85 ml.  MEDICATIONS GIVEN: Midazolam, 0.5 mg, IV. Fentanyl, 25 mcg, IV. Verapamil  (Isoptin, Calan, Covera), 2.5 mg, IA. Nitroglycerin, 300 mcg,  intracoronary. Labetalol (Trandate), 20 mg, IV. Heparin, 8000 units, IA.  CORONARY VESSELS:  LAD:   --  Mid LAD: There was a 80 % stenosis.  COMPLICATIONS: There were no complications.  INTERVENTIONAL RECOMMENDATIONS: ASA and Plavix for 1 year  Prepared and signed by  Mo Izquierdo M.D.  Signed 2015 11:02:43  HEMODYNAMIC TABLES  Pressures:  Intervention  Pressures:  - HR: 102  Pressures:  - Rhythm:  Pressures:  -- Aortic Pressure (S/D/M): 183/67/114  Outputs:  Baseline/ Room Air  Outputs:  -- CALCULATIONS[age]  Outputs:  -- CALCULATIONS: Body Surface Area: 2.03  Outputs:  -- CALCULATIONS: Height in cm: 160.00  Outputs:  -- CALCULATIONS: Sex: Female  Outputs:  -- CALCULATIONS: Weight in k.10 (01-23-15 @ 18:24)  Cardiac Cath Lab - Adult:   [facility]  Department of Cardiology  [address] [place] [address]  [phone]  Cath Lab Report -- Comprehensive Report  Patient: AUSTIN LEE  Study date: 2015  Account number: [id number]  MR number: [id number]  : 1950  Gender: Female  Race: W  Case Physician(s):  Mo Izquierdo M.D.  Fellow:  Sanjuana Campos M.D.  Referring Physician:  Alok Price M.D.  INDICATIONS: Angina/MI: unstable angina.  HISTORY: There was no prior cardiac history. The patient has hypertension,  medication-treated dyslipidemia, and morbid obesity.  PROCEDURE:  --  Left heart catheterization with ventriculography.  --  Left coronary angiography.  --  Right coronary angiography.  TECHNIQUE: The risks and alternatives of the procedures and conscious  sedation were explained to the patient and informed consent was obtained.  Cardiac catheterization performed electively.  Local anesthetic given. Right femoral artery access. A 5FR SHEATH PINNACLE  was inserted in the vessel. Left heart catheterization. Ventriculography  was performed. A 5FR  EXPO catheter was utilized. Left coronary  artery angiography. The vessel was injected utilizing a 5FR FL4.0 EXPO  catheter. Right coronary artery angiography. The vessel was injected  utilizing a 5FR FR4.0 EXPO catheter. RADIATION EXPOSURE: 1.6 min.  CONTRAST GIVEN: Visipaque 102 ml.  MEDICATIONS GIVEN: Fentanyl, 25 mcg, IV. Midazolam, 1 mg, IV. Labetalol  (Trandate), 20 mg, IV. Nitroglycerin, 300 mcg, IA. Labetalol (Trandate),  20 mg, IV.  VENTRICLES: Global left ventricular function was moderately depressed. EF  estimated was 40 %.  CORONARY VESSELS: The coronary circulation is right dominant.  LM:   --  Distal left main: There was a 20 % stenosis.  LAD:--  Proximal LAD: There was a 80 % stenosis.  CX:   --  Distal circumflex: Angiography showed mild atherosclerosis with  no flow limiting lesions.  RCA:   --  Mid RCA: There was a 100 % stenosis.  COMPLICATIONS: There were no complications.  DIAGNOSTIC RECOMMENDATIONS: Plan PCI of the LAD tomorrow. Pt with right  groin hematoma which is reason for delay in PCI  Prepared and signed by  Mo Izquierdo M.D.  Signed 2015 11:00:32  HEMODYNAMIC TABLES  Pressures:  Baseline/ Room Air  Pressures:  -HR: 85  Pressures:  - Rhythm:  Pressures:  -- Aortic Pressure (S/D/M): 210/77/128  Pressures:  -- Left Ventricle (s/edp): 209/25/--  Outputs:  Baseline/ Room Air  Outputs:  -- CALCULATIONS[age]  Outputs:  -- CALCULATIONS: Body SurfaceArea: 2.03  Outputs:  -- CALCULATIONS: Height in cm: 160.00  Outputs:  -- CALCULATIONS: Sex: Female  Outputs:  -- CALCULATIONS: Weight in k.10  Outputs:  -- OUTPUTS: O2 consumption: 272.02  Outputs:  -- OUTPUTS: Vo2 Indexed: 133.79 (01-22-15 @ 17:26)

## 2023-10-25 NOTE — DIETITIAN INITIAL EVALUATION ADULT - REASON INDICATOR FOR ASSESSMENT
Assessment warranted for length of stay.  Information obtained from pt, pt's  at bedside, medical record.

## 2023-10-25 NOTE — DIETITIAN INITIAL EVALUATION ADULT - PERTINENT LABORATORY DATA
10-25    137  |  101  |  17  ----------------------------<  129<H>  4.2   |  25  |  0.67    Ca    8.7      25 Oct 2023 00:49  Phos  3.3     10-25  Mg     2.3     10-25    TPro  6.5  /  Alb  2.7<L>  /  TBili  0.3  /  DBili  x   /  AST  17  /  ALT  15  /  AlkPhos  70  10-25  A1C with Estimated Average Glucose Result: 6.7 % (08-28-23 @ 07:18)

## 2023-10-25 NOTE — PROGRESS NOTE ADULT - ATTENDING COMMENTS
History of COPD with active smoking  Admitted with VT requiring multiple AICD shocks with course complicated by afib also requiring multiple (external) shocks  During VT ablation the patient went into tamponade and after a pericardiocentesis the ablation was aborted  She has had recurrent VT and afib thereafter  A+Ox3  VT on Lidocaine infusion, Quinidine, Lopressor and Amiodarone - wean Lidocaine off  Hemodynamics acceptable, no pressors or inotropes  Pericardial effusion is moderate and has been unchanged over the last few days  O2 sats mid to high 90s on nasal cannula, L lung is haris out on CXR, likely due to mucus plug, refractory to nebulizers  Continue aggressive chest PT, may need bronchoscopy if lung remains down  DASH/diabetic diet  Normal renal function, compensated on exam - target even  H/H acceptable  After discussion with EP, Heparin drip for afib is being held due to pericardial effusion which formed after drip was started  Afebrile, no antibiotics  Sugars controlled  No central access

## 2023-10-25 NOTE — PROGRESS NOTE ADULT - SUBJECTIVE AND OBJECTIVE BOX
Patient seen and examined at bedside.    Overnight Events:   - SR 60s-70s  - CXR shows left lung opaci    REVIEW OF SYSTEMS:  General: no fatigue/malaise, weight loss/gain.  Skin: no rashes.  Ophthalmologic: no blurred vision, no loss of vision. 	  ENT: no sore throat, rhinorrhea, sinus congestion.  Respiratory: no SOB, cough or wheeze.  CV: No chest pain. No palpitations.   Gastrointestinal:  no N/V/D, no melena/hematemesis/hematochezia.  Genitourinary: no dysuria/hesitancy or hematuria.  Musculoskeletal: no myalgias or arthralgias.  Neurological: no changes in vision or hearing, no lightheadedness/dizziness, no syncope/near syncope	  Psychiatric: no unusual stress/anxiety.   Hematology/Lymphatics: no unusual bleeding, bruising and no lymphadenopathy.  Endocrine: no unusual thirst.           Current Meds:  aMIOdarone    Tablet 200 milliGRAM(s) Oral daily  aMIOdarone    Tablet   Oral   aMIOdarone IVPB 150 milliGRAM(s) IV Intermittent once  atorvastatin 40 milliGRAM(s) Oral at bedtime  budesonide 160 MICROgram(s)/formoterol 4.5 MICROgram(s) Inhaler 2 Puff(s) Inhalation two times a day  chlorhexidine 2% Cloths 1 Application(s) Topical <User Schedule>  clopidogrel Tablet 75 milliGRAM(s) Oral daily  colchicine 0.6 milliGRAM(s) Oral daily  influenza  Vaccine (HIGH DOSE) 0.7 milliLiter(s) IntraMuscular once  melatonin 5 milliGRAM(s) Oral at bedtime  metoprolol tartrate 25 milliGRAM(s) Oral two times a day  nystatin/triamcinolone Cream 1 Application(s) Topical two times a day  quiNIDine gluconate  milliGRAM(s) Oral every 12 hours  sodium chloride 3%  Inhalation 4 milliLiter(s) Inhalation every 12 hours PRN      Vitals:  T(F): 97.5 (10-25), Max: 98.3 (10-25)  HR: 63 (10-25) (61 - 77)  BP: 122/57 (10-25) (117/52 - 164/70)  RR: 22 (10-25)  SpO2: 95% (10-25)  I&O's Summary    24 Oct 2023 07:01  -  25 Oct 2023 07:00  --------------------------------------------------------  IN: 523.6 mL / OUT: 2170 mL / NET: -1646.4 mL    25 Oct 2023 07:01  -  25 Oct 2023 09:58  --------------------------------------------------------  IN: 120 mL / OUT: 0 mL / NET: 120 mL        Physical Exam:  Appearance: No acute distress; well appearing  Eyes: PERRL, EOMI, pink conjunctiva  HEENT: Normal oral mucosa  Cardiovascular: RRR, S1, S2, no murmurs, rubs, or gallops; no edema; no JVD  Respiratory: Decreased breath sounds left side with crackles   Gastrointestinal: soft, non-tender, non-distended with normal bowel sounds  Musculoskeletal: No clubbing; no joint deformity   Neurologic: Non-focal  Lymphatic: No lymphadenopathy  Psychiatry: AAOx3, mood & affect appropriate  Skin: No rashes, ecchymoses, or cyanosis                          12.1   15.16 )-----------( 394      ( 25 Oct 2023 00:50 )             37.7     10-25    137  |  101  |  17  ----------------------------<  129<H>  4.2   |  25  |  0.67    Ca    8.7      25 Oct 2023 00:49  Phos  3.3     10-25  Mg     2.3     10-25    TPro  6.5  /  Alb  2.7<L>  /  TBili  0.3  /  DBili  x   /  AST  17  /  ALT  15  /  AlkPhos  70  10-25    PT/INR - ( 25 Oct 2023 00:50 )   PT: 12.6 sec;   INR: 1.21 ratio         PTT - ( 25 Oct 2023 00:50 )  PTT:25.5 sec

## 2023-10-25 NOTE — DIETITIAN INITIAL EVALUATION ADULT - PROBLEM SELECTOR PLAN 1
- cont quinidine, mexiletine, metoprolol  - reportedly EP ablation not performed last hospitalization 2/2 right groin infection s/p nystatin powder  - f/u EP regarding poss ablation and other recs  - cont tele  - K>4, Mg >2  - recent RHF/LHC performed

## 2023-10-25 NOTE — PROGRESS NOTE ADULT - SUBJECTIVE AND OBJECTIVE BOX
AUSTIN LEE  MRN-[id number]  Patient is a 72y old  Female who presents with a chief complaint of Pt is a 72F PMHx COPD, HTN, PAD, CAD s/p LAD stent (last LHC showing 100%  of RCA), chronic leukocytosis of unclear etiol, cardiac arrest s/p left-sided ICD (6/4/2019) complicated by infection and s/p R single-chamber ICD reimplantation ([place] in 9/23/2019), CHF EF 45%, grade 2 DHF, and VFib s/p 13 AICD firings underwent VT ablation today c/b pericardial tamponade. Transferred to CICU for further monitoring. 10/21 developed AF w/ RVR.     (25 Oct 2023 13:43)    HPI:  72F c hx COPD, HTN, PAD, CAD s/p LAD stent (last LHC showing 100%  of RCA), chronic leukocytosis of unclear etiol, cardiac arrest s/p left-sided ICD (6/4/2019) complicated by infection and s/p R single-chamber ICD reimplantation ([place] in 9/23/2019), CHF EF 45%, grade 2 DHF, recent hospitalization for UTI (treated w/ ceftriaxone and Vanco x 3 days) and VFib s/p 13 AICD firings, pw lightheadedness and AICD firing.    Pt recently hospitalized for VFib s/p multiple aicd firing. Pt placed on quinidine, mexiletine, metoprolol. Pt reports good compliance with her medications, even though she doesn't know what the meds are. Pt states at around 3-4PM yesterday, she felt "dizzy" for a few seconds, then felt AICD shock, then had resolution of her dizziness. Denies fevers, chills, CP, SOB, URI symptoms, GI symptoms. Pt states she's never seen a hematologist for leukocytosis. Pt was supposed to f/u with EP as outpt for ablation. (16 Oct 2023 03:47)      Hospital Course:    24 HOUR EVENTS:    REVIEW OF SYSTEMS:    CONSTITUTIONAL: No weakness, fevers or chills  EYES/ENT: No visual changes;  No vertigo or throat pain   NECK: No pain or stiffness  RESPIRATORY: No cough, wheezing, hemoptysis; No shortness of breath  CARDIOVASCULAR: No chest pain or palpitations  GASTROINTESTINAL: No abdominal or epigastric pain. No nausea, vomiting, or hematemesis; No diarrhea or constipation. No melena or hematochezia.  GENITOURINARY: No dysuria, frequency or hematuria  NEUROLOGICAL: No numbness or weakness  SKIN: No itching, rashes      ICU Vital Signs Last 24 Hrs  T(C): 36.3 (25 Oct 2023 13:00), Max: 36.8 (25 Oct 2023 03:00)  T(F): 97.4 (25 Oct 2023 13:00), Max: 98.3 (25 Oct 2023 03:00)  HR: 63 (25 Oct 2023 20:00) (61 - 72)  BP: 96/46 (25 Oct 2023 20:00) (96/46 - 160/63)  BP(mean): 66 (25 Oct 2023 20:00) (66 - 99)  ABP: --  ABP(mean): --  RR: 20 (25 Oct 2023 20:00) (20 - 29)  SpO2: 95% (25 Oct 2023 20:00) (90% - 99%)    O2 Parameters below as of 25 Oct 2023 18:40  Patient On (Oxygen Delivery Method): nasal cannula            CVP(mm Hg): --  CO: --  CI: --  PA: --  PA(mean): --  PA(direct): --  PCWP: --  LA: --  RA: --  SVR: --  SVRI: --  PVR: --  PVRI: --  I&O's Summary    24 Oct 2023 07:01  -  25 Oct 2023 07:00  --------------------------------------------------------  IN: 523.6 mL / OUT: 2170 mL / NET: -1646.4 mL    25 Oct 2023 07:01  -  25 Oct 2023 20:27  --------------------------------------------------------  IN: 300 mL / OUT: 300 mL / NET: 0 mL        CAPILLARY BLOOD GLUCOSE    CAPILLARY BLOOD GLUCOSE          PHYSICAL EXAM:  GENERAL: No acute distress, well-developed  HEAD:  Atraumatic, Normocephalic  EYES: EOMI, PERRLA, conjunctiva and sclera clear  NECK: Supple, no lymphadenopathy, no JVD  CHEST/LUNG: CTAB; No wheezes, rales, or rhonchi  HEART: Regular rate and rhythm. Normal S1/S2. No murmurs, rubs, or gallops  ABDOMEN: Soft, non-tender, non-distended; normal bowel sounds, no organomegaly  EXTREMITIES:  2+ peripheral pulses b/l, No clubbing, cyanosis, or edema  NEUROLOGY: A&O x 3, no focal deficits  SKIN: No rashes or lesions    ============================I/O===========================   I&O's Detail    24 Oct 2023 07:01  -  25 Oct 2023 07:00  --------------------------------------------------------  IN:    IV PiggyBack: 80 mL    Lidocaine: 83.6 mL    Oral Fluid: 360 mL  Total IN: 523.6 mL    OUT:    Voided (mL): 2170 mL  Total OUT: 2170 mL    Total NET: -1646.4 mL      25 Oct 2023 07:01  -  25 Oct 2023 20:27  --------------------------------------------------------  IN:    Oral Fluid: 300 mL  Total IN: 300 mL    OUT:    Voided (mL): 300 mL  Total OUT: 300 mL    Total NET: 0 mL        ============================ LABS =========================                        12.1   15.16 )-----------( 394      ( 25 Oct 2023 00:50 )             37.7     10-25    137  |  101  |  17  ----------------------------<  129<H>  4.2   |  25  |  0.67    Ca    8.7      25 Oct 2023 00:49  Phos  3.3     10-25  Mg     2.3     10-25    TPro  6.5  /  Alb  2.7<L>  /  TBili  0.3  /  DBili  x   /  AST  17  /  ALT  15  /  AlkPhos  70  10-25                LIVER FUNCTIONS - ( 25 Oct 2023 00:49 )  Alb: 2.7 g/dL / Pro: 6.5 g/dL / ALK PHOS: 70 U/L / ALT: 15 U/L / AST: 17 U/L / GGT: x           PT/INR - ( 25 Oct 2023 00:50 )   PT: 12.6 sec;   INR: 1.21 ratio         PTT - ( 25 Oct 2023 00:50 )  PTT:25.5 sec      Urinalysis Basic - ( 25 Oct 2023 00:49 )    Color: x / Appearance: x / SG: x / pH: x  Gluc: 129 mg/dL / Ketone: x  / Bili: x / Urobili: x   Blood: x / Protein: x / Nitrite: x   Leuk Esterase: x / RBC: x / WBC x   Sq Epi: x / Non Sq Epi: x / Bacteria: x      ======================Micro/Rad/Cardio=================  Telemtry: Reviewed   EKG: Reviewed  CXR: Reviewed  Culture: Reviewed   Echo: Transthoracic Echocardiogram:   Patient name: AUSTIN LEE  YOB: 1950   Age: 68 (F)   MR#: [id number]  Study Date: 8/14/2019  Location: O/PSonographer: Sherri Cadet RDCS  Study quality: Technically difficult  Referring Physician: RICCARDO WOLF MD  Blood Pressure: 140/80 mmHg  Height: 157 cm  Weight: 86 kg  BSA: 1.9 m2  Heart Rate: 60 mmHg  ------------------------------------------------------------------------  PROCEDURE: Transthoracic echocardiogram with 2-D, M-Mode  and complete spectral and color flow Doppler.  INDICATION: Atherosclerotic heart disease of native  coronary artery without angina pectoris (I25.10)  ------------------------------------------------------------------------  Dimensions:    Normal Values:  LA:     4.6    2.0 - 4.0 cm  Ao: 3.1    2.0 - 3.8 cm  SEPTUM: 1.4    0.6 - 1.2 cm  PWT:    1.3    0.6 - 1.1 cm  LVIDd:  6.4    3.0 - 5.6 cm  LVIDs:  4.3    1.8 - 4.0 cm  Derived variables:  LVMI: 219 g/m2  RWT: 0.40  Fractional short: 33 %  EF (Ahn Rule): 33 %Doppler Peak Velocity (m/sec):  AoV=1.2  ------------------------------------------------------------------------  Observations:  Mitral Valve: Mitral annular calcification and calcified  mitral leaflets with decreased diastolic opening. Mild  mitral regurgitation.  Peak mitral valve gradient equals 13  mm Hg, mean transmitral valve gradient equals 4 mm Hg,  consistent with mild mitral stenosis. Gradient measured at  a HR of 81bpm.  Aortic Valve/Aorta: Aortic valve not well visualized;  probably normal. Peak transaortic valve gradient equals 6  mm Hg, estimated aortic valve area equals 2.1 sqcm. Minimal  aortic regurgitation.  Peak left ventricular outflow tract  gradient equals 2 mm Hg.  Aortic Root: 3.1 cm.  Left Atrium: Normal left atrium.  LA volume index =31  cc/m2.  Left Ventricle: Severe segmental left ventricular systolic  dysfunction. Endocardial visualization enhanced with  intravenous injection of Ultrasonic Enhancing Agent  (Definity). The mid inferolateral wall, the basal  inferolateral wall, the basal inferior wall, the basal  anterolateral wall, the mid inferior wall, the mid  anterolateral wall, the mid inferoseptum, and the basal  inferoseptum are hypokinetic.  Moderate concentric left  ventricular hypertrophy. Moderate diastolic dysfunction  (Stage II).  Right Heart: Normal right atrium. Normal right ventricular  size and function. Normal tricuspid valve. Mild tricuspid  regurgitation. Normal pulmonic valve. Mild pulmonic  regurgitation.  Pericardium/Pleura: Normal pericardium with no pericardial  effusion.  Hemodynamic: Estimated right ventricular systolic pressure  equals 28 mm Hg, assuming right atrial pressure equals 3 mm  Hg, consistent with normal pulmonary pressures. Color  Doppler demonstrates no evidence of a patent foramen ovale.  ------------------------------------------------------------------------  Conclusions:  1. Mitral annular calcification and calcified mitral  leaflets with decreased diastolic opening. Mild mitral  regurgitation.  Peak mitral valve gradient equals 13 mm Hg,  mean transmitral valve gradient equals 4 mm Hg, consistent  with mild mitral stenosis. Gradient measured at a HR of  81bpm.  2. Aortic valve not well visualized; probably normal.  Minimal aortic regurgitation.  3. Moderate concentric left ventricular hypertrophy.  4. Severe segmental left ventricular systolic dysfunction.  Endocardial visualization enhanced with intravenous  injection of Ultrasonic Enhancing Agent (Definity). The mid  inferolateral wall, the basal  inferolateral wall, the  basal inferior wall, the basal anterolateral wall, the mid  inferior wall, the mid anterolateral wall, the mid  inferoseptum, and the basal inferoseptum are hypokinetic.  5. Moderate diastolic dysfunction (Stage II).  6. Normal right ventricular size and function.  *** No previous Echo exam.  ------------------------------------------------------------------------  Confirmed on  8/14/2019 - 16:47:43 by Dudley Marquez M.D.  ------------------------------------------------------------------------ (08-14-19 @ 13:56)  ======================================================  PAST MEDICAL & SURGICAL HISTORY:  Obese      Smoker      HTN (hypertension)      HLD (hyperlipidemia)      CAD (coronary artery disease)      CHF with cardiomyopathy      History of hip surgery        ====================ASSESSMENT ==============  72F PMHx COPD, HTN, PAD, CAD s/p LAD stent (last C showing 100%  of RCA), chronic leukocytosis of unclear etiol, cardiac arrest s/p left-sided ICD (6/4/2019) complicated by infection and s/p R single-chamber ICD reimplantation ([place] in 9/23/2019), CHF EF 45%, grade 2 DHF, and VFib s/p 13 AICD firings underwent VT ablation today c/b pericardial tamponade. Transferred to CICU for further monitoring. 10/21 developed AF w/ RVR.    Plan:  ====================== NEUROLOGY=====================  No active issues  - A&O x3    ==================== RESPIRATORY======================  Hx COPD  - Currently on room air sat >88%    ====================CARDIOVASCULAR==================  Pericardial Tamponade  - s/p VT ablation c/b pericardial tamponade  - pericardial drain with 40 cc drained initially, 250cc in bag upon arrival to CICU, s/p drainage removal 10/20  - TTE 10/22 with moderate pericardial effusion, not seemingly large enough to affect hemodynamics  - Stable effusion from 24th < 23rd < 22nd     - Trending CBC, stable (23rd)  - 23rd: Pt on plavix, stellate ganglion block likely not feasible at this time  - 24th: Heparin held per EP  - Per EP, no further interventions in-house this admit        AF w/ RVR  - new onset  - s/p DCCVx2 at 200J (22nd), in NSR  - 23rd: heparin gtt held per EP d/t concern of accumulating effusion  - 24th: Pt in slow VT later afternoon of 23rd, given lido IVP and amio 150 mg 2x. Lido back to 1, changed to 0.5 while rounding.     - 25th: No ovn electrical events on quinidine, amio, lopressor. Lidocaine off     VT  - 400 amio TID PO per EP  - VT ablation aborted after tamponade  - lido d/c'd/. Will start quinidine 324 BID (23rd)    - has AICD  - replete lytes as needed to maintain K>4, Mag>2  - will need q6mo TSH / LFT, annual CXR and eye exam as outpatient (amiodarone)  - New AF w/ RVR on 10/21 PM, s/p Amio 150mg IV x1  - 24th: Pt in slow VT later afternoon of 23rd, given lido IVP and amio 150 mg 2x. Lido back to 1, changed to 0.5 while rounding.     - 25th: No events ovn on quinidine and amio      CHF  - TTE 10/5 EF 55%, reg WMA, mild-mod MS  - Held entresto i/s/o hypotension, restart as tolerated and per EP, metoprolol restarted (24th)  - 24th: Restarted lopressor     CAD s/p stents  - Continue plavix    ===================HEMATOLOGIC/ONC ===================  clopidogrel Tablet 75 milliGRAM(s) Oral daily    ===================== RENAL =========================  Continue monitoring urine output    ==================== GASTROINTESTINAL===================  No active issues  - Monitor for BM  - DASH Diet     =======================    ENDOCRINE  =====================  No active issues  - Monitor glucose on CMP  - TSH wnl    ========================INFECTIOUS DISEASE================  Chronic leukocytosis  - 1 dose vanco given  - previously on CTX x3d   - Blood cultures negative  - E coli in urine   - reports of pus from previous groin access, ultrasound considered to r/o abscess  - Pt w/o temp for days, pt WBC downtrending. Pt site showing fungal growth in the intertriginous areas, the wound itself appears clean, no fluctuance, no suppuration. Short timeline of ablation event to 23rd, likely not abscess at this point     Opacified L lung  - i/s/o tachypnea ovn w/o inc o2 requirement    - Effusion vs pna vs atelectasis   - WBC improving, no temp  - Saline nebs, chest PT  - Careful monitoring w/ daily XR, trend WBC and fever curve          Patient requires continuous monitoring with bedside rhythm monitoring, pulse ox monitoring, and intermittent blood gas analysis. Care plan discussed with ICU care team. Patient remained critical and at risk for life threatening decompensation.  Patient seen, examined and plan discussed with CCU team during rounds.     I have personally provided ____ minutes of critical care time excluding time spent on separate procedures, in addition to initial critical care time provided by the CICU Attending, Dr. Brooks.     By signing my name below, I, Lacey Pang, attest that this documentation has been prepared under the direction and in the presence of Annalee Chandler NP   Electronically signed: Dallin Woodward, 10-25-23 @ 20:27    I, Annalee Chandler, personally performed the services described in this documentation. all medical record entries made by the scribe were at my direction and in my presence. I have reviewed the chart and agree that the record reflects my personal performance and is accurate and complete  Electronically signed: Annalee Chandler NP        AUSTIN LEE  MRN-[id number]  Patient is a 72y old  Female who presents with a chief complaint of Pt is a 72F PMHx COPD, HTN, PAD, CAD s/p LAD stent (last LHC showing 100%  of RCA), chronic leukocytosis of unclear etiol, cardiac arrest s/p left-sided ICD (6/4/2019) complicated by infection and s/p R single-chamber ICD reimplantation ([place] in 9/23/2019), CHF EF 45%, grade 2 DHF, and VFib s/p 13 AICD firings underwent VT ablation today c/b pericardial tamponade. Transferred to CICU for further monitoring. 10/21 developed AF w/ RVR.     (25 Oct 2023 13:43)    HPI:  72F c hx COPD, HTN, PAD, CAD s/p LAD stent (last LHC showing 100%  of RCA), chronic leukocytosis of unclear etiol, cardiac arrest s/p left-sided ICD (6/4/2019) complicated by infection and s/p R single-chamber ICD reimplantation ([place] in 9/23/2019), CHF EF 45%, grade 2 DHF, recent hospitalization for UTI (treated w/ ceftriaxone and Vanco x 3 days) and VFib s/p 13 AICD firings, pw lightheadedness and AICD firing.    Pt recently hospitalized for VFib s/p multiple aicd firing. Pt placed on quinidine, mexiletine, metoprolol. Pt reports good compliance with her medications, even though she doesn't know what the meds are. Pt states at around 3-4PM yesterday, she felt "dizzy" for a few seconds, then felt AICD shock, then had resolution of her dizziness. Denies fevers, chills, CP, SOB, URI symptoms, GI symptoms. Pt states she's never seen a hematologist for leukocytosis. Pt was supposed to f/u with EP as outpt for ablation. (16 Oct 2023 03:47)      Hospital Course:    24 HOUR EVENTS:    REVIEW OF SYSTEMS:    CONSTITUTIONAL: No weakness, fevers or chills  EYES/ENT: No visual changes;  No vertigo or throat pain   NECK: No pain or stiffness  RESPIRATORY: No cough, wheezing, hemoptysis; No shortness of breath  CARDIOVASCULAR: No chest pain or palpitations  GASTROINTESTINAL: No abdominal or epigastric pain. No nausea, vomiting, or hematemesis; No diarrhea or constipation. No melena or hematochezia.  GENITOURINARY: No dysuria, frequency or hematuria  NEUROLOGICAL: No numbness or weakness  SKIN: No itching, rashes      ICU Vital Signs Last 24 Hrs  T(C): 36.3 (25 Oct 2023 13:00), Max: 36.8 (25 Oct 2023 03:00)  T(F): 97.4 (25 Oct 2023 13:00), Max: 98.3 (25 Oct 2023 03:00)  HR: 63 (25 Oct 2023 20:00) (61 - 72)  BP: 96/46 (25 Oct 2023 20:00) (96/46 - 160/63)  BP(mean): 66 (25 Oct 2023 20:00) (66 - 99)  ABP: --  ABP(mean): --  RR: 20 (25 Oct 2023 20:00) (20 - 29)  SpO2: 95% (25 Oct 2023 20:00) (90% - 99%)    O2 Parameters below as of 25 Oct 2023 18:40  Patient On (Oxygen Delivery Method): nasal cannula      I&O's Summary    24 Oct 2023 07:01  -  25 Oct 2023 07:00  --------------------------------------------------------  IN: 523.6 mL / OUT: 2170 mL / NET: -1646.4 mL    25 Oct 2023 07:01  -  25 Oct 2023 20:27  --------------------------------------------------------  IN: 300 mL / OUT: 300 mL / NET: 0 mL        CAPILLARY BLOOD GLUCOSE    CAPILLARY BLOOD GLUCOSE          PHYSICAL EXAM:  GENERAL: No acute distress, well-developed  HEAD:  Atraumatic, Normocephalic  EYES: EOMI, PERRLA, conjunctiva and sclera clear  NECK: Supple, no lymphadenopathy, no JVD  CHEST/LUNG: CTAB; No wheezes, rales, or rhonchi  HEART: Regular rate and rhythm. Normal S1/S2. No murmurs, rubs, or gallops  ABDOMEN: Soft, non-tender, non-distended; normal bowel sounds, no organomegaly  EXTREMITIES:  2+ peripheral pulses b/l, No clubbing, cyanosis, or edema  NEUROLOGY: A&O x 3, no focal deficits  SKIN: No rashes or lesions    ============================I/O===========================   I&O's Detail    24 Oct 2023 07:01  -  25 Oct 2023 07:00  --------------------------------------------------------  IN:    IV PiggyBack: 80 mL    Lidocaine: 83.6 mL    Oral Fluid: 360 mL  Total IN: 523.6 mL    OUT:    Voided (mL): 2170 mL  Total OUT: 2170 mL    Total NET: -1646.4 mL      25 Oct 2023 07:01  -  25 Oct 2023 20:27  --------------------------------------------------------  IN:    Oral Fluid: 300 mL  Total IN: 300 mL    OUT:    Voided (mL): 300 mL  Total OUT: 300 mL    Total NET: 0 mL        ============================ LABS =========================                        12.1   15.16 )-----------( 394      ( 25 Oct 2023 00:50 )             37.7     10-25    137  |  101  |  17  ----------------------------<  129<H>  4.2   |  25  |  0.67    Ca    8.7      25 Oct 2023 00:49  Phos  3.3     10-25  Mg     2.3     10-25    TPro  6.5  /  Alb  2.7<L>  /  TBili  0.3  /  DBili  x   /  AST  17  /  ALT  15  /  AlkPhos  70  10-25                LIVER FUNCTIONS - ( 25 Oct 2023 00:49 )  Alb: 2.7 g/dL / Pro: 6.5 g/dL / ALK PHOS: 70 U/L / ALT: 15 U/L / AST: 17 U/L / GGT: x           PT/INR - ( 25 Oct 2023 00:50 )   PT: 12.6 sec;   INR: 1.21 ratio         PTT - ( 25 Oct 2023 00:50 )  PTT:25.5 sec      Urinalysis Basic - ( 25 Oct 2023 00:49 )    Color: x / Appearance: x / SG: x / pH: x  Gluc: 129 mg/dL / Ketone: x  / Bili: x / Urobili: x   Blood: x / Protein: x / Nitrite: x   Leuk Esterase: x / RBC: x / WBC x   Sq Epi: x / Non Sq Epi: x / Bacteria: x      ======================Micro/Rad/Cardio=================  Telemtry: Reviewed   EKG: Reviewed  CXR: Reviewed  Culture: Reviewed   Echo: Transthoracic Echocardiogram:   Patient name: AUSTIN LEE  YOB: 1950   Age: 68 (F)   MR#: [id number]  Study Date: 8/14/2019  Location: O/PSonographer: Sherri Cadet RDCS  Study quality: Technically difficult  Referring Physician: RICCARDO WOLF MD  Blood Pressure: 140/80 mmHg  Height: 157 cm  Weight: 86 kg  BSA: 1.9 m2  Heart Rate: 60 mmHg  ------------------------------------------------------------------------  PROCEDURE: Transthoracic echocardiogram with 2-D, M-Mode  and complete spectral and color flow Doppler.  INDICATION: Atherosclerotic heart disease of native  coronary artery without angina pectoris (I25.10)  ------------------------------------------------------------------------  Dimensions:    Normal Values:  LA:     4.6    2.0 - 4.0 cm  Ao: 3.1    2.0 - 3.8 cm  SEPTUM: 1.4    0.6 - 1.2 cm  PWT:    1.3    0.6 - 1.1 cm  LVIDd:  6.4    3.0 - 5.6 cm  LVIDs:  4.3    1.8 - 4.0 cm  Derived variables:  LVMI: 219 g/m2  RWT: 0.40  Fractional short: 33 %  EF (Ahn Rule): 33 %Doppler Peak Velocity (m/sec):  AoV=1.2  ------------------------------------------------------------------------  Observations:  Mitral Valve: Mitral annular calcification and calcified  mitral leaflets with decreased diastolic opening. Mild  mitral regurgitation.  Peak mitral valve gradient equals 13  mm Hg, mean transmitral valve gradient equals 4 mm Hg,  consistent with mild mitral stenosis. Gradient measured at  a HR of 81bpm.  Aortic Valve/Aorta: Aortic valve not well visualized;  probably normal. Peak transaortic valve gradient equals 6  mm Hg, estimated aortic valve area equals 2.1 sqcm. Minimal  aortic regurgitation.  Peak left ventricular outflow tract  gradient equals 2 mm Hg.  Aortic Root: 3.1 cm.  Left Atrium: Normal left atrium.  LA volume index =31  cc/m2.  Left Ventricle: Severe segmental left ventricular systolic  dysfunction. Endocardial visualization enhanced with  intravenous injection of Ultrasonic Enhancing Agent  (Definity). The mid inferolateral wall, the basal  inferolateral wall, the basal inferior wall, the basal  anterolateral wall, the mid inferior wall, the mid  anterolateral wall, the mid inferoseptum, and the basal  inferoseptum are hypokinetic.  Moderate concentric left  ventricular hypertrophy. Moderate diastolic dysfunction  (Stage II).  Right Heart: Normal right atrium. Normal right ventricular  size and function. Normal tricuspid valve. Mild tricuspid  regurgitation. Normal pulmonic valve. Mild pulmonic  regurgitation.  Pericardium/Pleura: Normal pericardium with no pericardial  effusion.  Hemodynamic: Estimated right ventricular systolic pressure  equals 28 mm Hg, assuming right atrial pressure equals 3 mm  Hg, consistent with normal pulmonary pressures. Color  Doppler demonstrates no evidence of a patent foramen ovale.  ------------------------------------------------------------------------  Conclusions:  1. Mitral annular calcification and calcified mitral  leaflets with decreased diastolic opening. Mild mitral  regurgitation.  Peak mitral valve gradient equals 13 mm Hg,  mean transmitral valve gradient equals 4 mm Hg, consistent  with mild mitral stenosis. Gradient measured at a HR of  81bpm.  2. Aortic valve not well visualized; probably normal.  Minimal aortic regurgitation.  3. Moderate concentric left ventricular hypertrophy.  4. Severe segmental left ventricular systolic dysfunction.  Endocardial visualization enhanced with intravenous  injection of Ultrasonic Enhancing Agent (Definity). The mid  inferolateral wall, the basal  inferolateral wall, the  basal inferior wall, the basal anterolateral wall, the mid  inferior wall, the mid anterolateral wall, the mid  inferoseptum, and the basal inferoseptum are hypokinetic.  5. Moderate diastolic dysfunction (Stage II).  6. Normal right ventricular size and function.  *** No previous Echo exam.  ------------------------------------------------------------------------  Confirmed on  8/14/2019 - 16:47:43 by Dudley Marquez M.D.  ------------------------------------------------------------------------ (08-14-19 @ 13:56)  ======================================================  PAST MEDICAL & SURGICAL HISTORY:  Obese      Smoker      HTN (hypertension)      HLD (hyperlipidemia)      CAD (coronary artery disease)      CHF with cardiomyopathy      History of hip surgery        ====================ASSESSMENT ==============  72F PMHx COPD, HTN, PAD, CAD s/p LAD stent (last LHC showing 100%  of RCA), chronic leukocytosis of unclear etiol, cardiac arrest s/p left-sided ICD (6/4/2019) complicated by infection and s/p R single-chamber ICD reimplantation ([place] in 9/23/2019), CHF EF 45%, grade 2 DHF, and VFib s/p 13 AICD firings underwent VT ablation today c/b pericardial tamponade. Transferred to CICU for further monitoring. 10/21 developed AF w/ RVR.    Plan:  ====================== NEUROLOGY=====================  No active issues  - A&O x3    ==================== RESPIRATORY======================  Hx COPD  - Currently on room air sat >88%    ====================CARDIOVASCULAR==================  Pericardial Tamponade  - s/p VT ablation c/b pericardial tamponade  - pericardial drain with 40 cc drained initially, 250cc in bag upon arrival to CICU, s/p drainage removal 10/20  - TTE 10/22 with moderate pericardial effusion, not seemingly large enough to affect hemodynamics  - Stable effusion from 24th < 23rd < 22nd     - Trending CBC, stable (23rd)  - 23rd: Pt on plavix, stellate ganglion block likely not feasible at this time  - 24th: Heparin held per EP  - Per EP, no further interventions in-house this admit        AF w/ RVR  - new onset  - s/p DCCVx2 at 200J (22nd), in NSR  - 23rd: heparin gtt held per EP d/t concern of accumulating effusion  - 24th: Pt in slow VT later afternoon of 23rd, given lido IVP and amio 150 mg 2x. Lido back to 1, changed to 0.5 while rounding.     - 25th: No electrical events on quinidine, amio, lopressor. Lidocaine off     VT  - 400 amio TID PO per EP  - VT ablation aborted after tamponade  - lido d/c'd/. Will start quinidine 324 BID (23rd)    - has AICD  - replete lytes as needed to maintain K>4, Mag>2  - will need q6mo TSH / LFT, annual CXR and eye exam as outpatient (amiodarone)  - New AF w/ RVR on 10/21 PM, s/p Amio 150mg IV x1  - 24th: Pt in slow VT later afternoon of 23rd, given lido IVP and amio 150 mg 2x. Lido back to 1, changed to 0.5 while rounding.     - 25th: No events on quinidine and amio      CHF  - TTE 10/5 EF 55%, reg WMA, mild-mod MS  - Held entresto i/s/o hypotension, restart as tolerated and per EP, metoprolol restarted (24th)  - 24th: Restarted lopressor     CAD s/p stents  - Continue plavix    ===================HEMATOLOGIC/ONC ===================  clopidogrel Tablet 75 milliGRAM(s) Oral daily    ===================== RENAL =========================  Continue monitoring urine output    ==================== GASTROINTESTINAL===================  No active issues  - Monitor for BM  - DASH Diet     =======================    ENDOCRINE  =====================  No active issues  - Monitor glucose on CMP  - TSH wnl    ========================INFECTIOUS DISEASE================  Chronic leukocytosis  - 1 dose vanco given  - previously on CTX x3d   - Blood cultures negative  - E coli in urine   - reports of pus from previous groin access, ultrasound considered to r/o abscess  - Pt w/o temp for days, pt WBC downtrending. Pt site showing fungal growth in the intertriginous areas, the wound itself appears clean, no fluctuance, no suppuration. Short timeline of ablation event to 23rd, likely not abscess at this point     Opacified L lung  - i/s/o tachypnea ovn w/o inc o2 requirement    - Effusion vs pna vs atelectasis   - WBC improving, no temp  - Saline nebs, chest PT  - Careful monitoring w/ daily XR, trend WBC and fever curve          Patient requires continuous monitoring with bedside rhythm monitoring, pulse ox monitoring, and intermittent blood gas analysis. Care plan discussed with ICU care team. Patient remained critical and at risk for life threatening decompensation.  Patient seen, examined and plan discussed with CCU team during rounds.     I have personally provided __30__ minutes of critical care time excluding time spent on separate procedures, in addition to initial critical care time provided by the CICU Attending, Dr. Brooks.     By signing my name below, I, Lacey Pang, attest that this documentation has been prepared under the direction and in the presence of Annalee Chandler NP   Electronically signed: Dallin Woodward, 10-25-23 @ 20:27    I, Annalee Chandler, personally performed the services described in this documentation. all medical record entries made by the scribe were at my direction and in my presence. I have reviewed the chart and agree that the record reflects my personal performance and is accurate and complete  Electronically signed: Annalee Chandler NP

## 2023-10-25 NOTE — DIETITIAN INITIAL EVALUATION ADULT - ADD RECOMMEND
- Consider multivitamin and vitamin C to promote wound healing.  - Provide encouragement with PO intake, menu selections, and assistance with meals as needed.   - Reinforce nutrition education as needed - RD remains available.   - Continue to monitor nutritional intake, labs, weights, BM, skin, clinical course.

## 2023-10-25 NOTE — PROGRESS NOTE ADULT - ASSESSMENT
72F PMHx COPD, HTN, PAD, CAD s/p LAD stent (last C showing 100%  of RCA), chronic leukocytosis of unclear etiol, cardiac arrest s/p left-sided ICD (6/4/2019) complicated by infection and s/p R single-chamber ICD reimplantation ([place] in 9/23/2019), CHF EF 45%, grade 2 DHF, and VFib s/p 13 AICD firings underwent VT ablation today c/b pericardial tamponade. Transferred to CICU for further monitoring. 10/21 developed AF w/ RVR.    PLAN  ===NEURO===  #No active issues  - A&O x3    ===RESPIRATORY===  #Hx COPD  - Currently on room air sat >88%    ===CARDIOVASCULAR===  #Pericardial Tamponade  - s/p VT ablation c/b pericardial tamponade  - pericardial drain with 40 cc drained initially, 250cc in bag upon arrival to CICU, s/p drainage removal 10/20  - TTE 10/22 with moderate pericardial effusion, not seemingly large enough to affect hemodynamics  - Stable effusion from 24th < 23rd < 22nd     - Trending CBC, stable (23rd)  - 23rd: Pt on plavix, stellate ganglion block likely not feasible at this time  - 24th: Heparin held per EP  - Per EP, no further interventions in-house this admit        #AF w/ RVR  - new onset  - s/p DCCVx2 at 200J (22nd), in NSR  - 23rd: heparin gtt held per EP d/t concern of accumulating effusion  - 24th: Pt in slow VT later afternoon of 23rd, given lido IVP and amio 150 mg 2x. Lido back to 1, changed to 0.5 while rounding.     - 25th: No ovn electrical events on quinidine, amio, lopressor. Lidocaine off     # VT  - 400 amio TID PO per EP  - VT ablation aborted after tamponade  - lido d/c'd/. Will start quinidine 324 BID (23rd)    - has AICD  - replete lytes as needed to maintain K>4, Mag>2  - will need q6mo TSH / LFT, annual CXR and eye exam as outpatient (amiodarone)  - New AF w/ RVR on 10/21 PM, s/p Amio 150mg IV x1  - 24th: Pt in slow VT later afternoon of 23rd, given lido IVP and amio 150 mg 2x. Lido back to 1, changed to 0.5 while rounding.     - 25th: No events ovn on quinidine and amio      #CHF  - TTE 10/5 EF 55%, reg WMA, mild-mod MS  - Held entresto i/s/o hypotension, restart as tolerated and per EP, metoprolol restarted (24th)  - 24th: Restarted lopressor     #CAD s/p stents  - Continue plavix    ===GI===  #No active issues  - Monitor for BM  - DASH Diet     ===RENAL===  #No active issues  - Strict I&O's  - Replete lytes as needed to maintain K>4, Mag>2    ===ENDO===  #No active issues  - Monitor glucose on CMP  - TSH wnl    ===HEME-ONC===  #Pericardial tamponade  - heparin for AFib (24th: held d/t effusion and per EP)  - Trend CBC  - No blood products given    ===ID===  #Chronic leukocytosis  - 1 dose vanco given  - previously on CTX x3d   - Blood cultures negative  - E coli in urine   - reports of pus from previous groin access, ultrasound considered to r/o abscess  - Pt w/o temp for days, pt WBC downtrending. Pt site showing fungal growth in the intertriginous areas, the wound itself appears clean, no fluctuance, no suppuration. Short timeline of ablation event to 23rd, likely not abscess at this point     #Opacified L lung  - i/s/o tachypnea ovn w/o inc o2 requirement    - Effusion vs pna vs atelectasis   - WBC improving, no temp  - Saline nebs, chest PT  - Careful monitoring w/ daily XR, trend WBC and fever curve    ======================= DISPOSITION  =====================  [x] Critical   [ ] Guarded    [ ] Stable    [x] Maintain in CICU  [ ] Downgrade to [facility]  [ ] Discharge Home

## 2023-10-25 NOTE — DIETITIAN INITIAL EVALUATION ADULT - PERTINENT MEDS FT
MEDICATIONS  (STANDING):  aMIOdarone    Tablet 200 milliGRAM(s) Oral daily  aMIOdarone    Tablet   Oral   aMIOdarone IVPB 150 milliGRAM(s) IV Intermittent once  atorvastatin 40 milliGRAM(s) Oral at bedtime  budesonide 160 MICROgram(s)/formoterol 4.5 MICROgram(s) Inhaler 2 Puff(s) Inhalation two times a day  chlorhexidine 2% Cloths 1 Application(s) Topical <User Schedule>  clopidogrel Tablet 75 milliGRAM(s) Oral daily  colchicine 0.6 milliGRAM(s) Oral daily  influenza  Vaccine (HIGH DOSE) 0.7 milliLiter(s) IntraMuscular once  melatonin 5 milliGRAM(s) Oral at bedtime  metoprolol tartrate 25 milliGRAM(s) Oral two times a day  nystatin/triamcinolone Cream 1 Application(s) Topical two times a day  quiNIDine gluconate  milliGRAM(s) Oral every 12 hours    MEDICATIONS  (PRN):  sodium chloride 3%  Inhalation 4 milliLiter(s) Inhalation every 12 hours PRN SOB

## 2023-10-25 NOTE — PROGRESS NOTE ADULT - ASSESSMENT
73 y/o F w/ PMHx COPD, HLD, HTN, PVD, ICM/CAD s/p PCI (has  of RCA), cardiac arrest s/p left-sided ICD (6/4/2019) complicated by infection and s/p R single-chamber ICD ([place] in 9/23/2019), recent admission for VT storm with adjustment of medication and NIPS now presenting for ICD shock in the setting of recurrent monomorphic VT with unsuccessful ATP.  Ablation attempted 10/20 but aborted due to hemodynamically significant pericardial effusion with drain placement.  Pericardial drain removed on 10/20/23.  Over the weekend developed new shortness of breath, recurrent VT s/p ICD shock, and afib w/ RVR s/p cardioversion 10/21. She continues to have NSVT. Currently in sinus rhythm.     - Continue low dose quinidine Q12 and Lopressor 25 Q12  - continue on oral amiodarone load 400mg PO Q 8 hours. Monitor TSH, LFT's, opthalmology and pulmonary function tests as outpatient   AST 10, ALT 12, baseline TSH 3.58 on 10/2   - Anticoagulation on hold, TTE shows stable pericardial effusion  - Will decide timing of ablation based on repeat echo findings and treatment of possible infection    Note not final until signed by attending       Erasmo Lundberg MD  Cardiology Fellow PGY-6  Phone: [phone]    For all New Consults  [email or web address]   Login: [employer]   73 y/o F w/ PMHx COPD, HLD, HTN, PVD, ICM/CAD s/p PCI (has  of RCA), cardiac arrest s/p left-sided ICD (6/4/2019) complicated by infection and s/p R single-chamber ICD ([place] in 9/23/2019), recent admission for VT storm with adjustment of medication and NIPS now presenting for ICD shock in the setting of recurrent monomorphic VT with unsuccessful ATP.  Ablation attempted 10/20 but aborted due to hemodynamically significant pericardial effusion with drain placement.  Pericardial drain removed on 10/20/23.  Over the weekend developed new shortness of breath, recurrent VT s/p ICD shock, and afib w/ RVR s/p cardioversion 10/21. She continues to have NSVT. Currently in sinus rhythm.     - Continue low dose quinidine Q12 and Lopressor 25 Q12  - continue on oral amiodarone load 400mg PO Q 8 hours. Monitor TSH, LFT's, opthalmology and pulmonary function tests as outpatient   AST 10, ALT 12, baseline TSH 3.58 on 10/2   - Anticoagulation on hold, TTE shows stable pericardial effusion  - Will decide timing of ablation based on repeat echo findings and treatment of possible infection    Note not final until signed by attending       Erasmo Lundberg MD  Cardiology Fellow PGY-6  Phone: [phone]    For all New Consults  [email or web address]   Login: [employer]   71 y/o F w/ PMHx COPD, HLD, HTN, PVD, ICM/CAD s/p PCI (has  of RCA), cardiac arrest s/p left-sided ICD (6/4/2019) complicated by infection and s/p R single-chamber ICD ([place] in 9/23/2019), recent admission for VT storm with adjustment of medication and NIPS now presenting for ICD shock in the setting of recurrent monomorphic VT with unsuccessful ATP.  Ablation attempted 10/20 but aborted due to hemodynamically significant pericardial effusion with drain placement.  Pericardial drain removed on 10/20/23.  Over the weekend developed new shortness of breath, recurrent VT s/p ICD shock, and afib w/ RVR s/p cardioversion 10/21. She continues to have NSVT. Currently in sinus rhythm.     - Continue low dose quinidine Q12 and Lopressor 25 Q12  - continue on oral amiodarone load 400mg PO Q 8 hours. Monitor TSH, LFT's, opthalmology and pulmonary function tests as outpatient   AST 10, ALT 12, baseline TSH 3.58 on 10/2   - Anticoagulation on hold, TTE shows stable pericardial effusion  - Will decide timing of ablation based on repeat echo findings and treatment of possible infection    Note not final until signed by attending       Erasmo Lundberg MD  Cardiology Fellow PGY-6  Phone: [phone]    For all New Consults  [email or web address]   Login: [employer]

## 2023-10-25 NOTE — DIETITIAN INITIAL EVALUATION ADULT - ORAL INTAKE PTA/DIET HISTORY
Of note pt with recent admission at [facility], assessed by RD with decreased appetite and PO intake. Per pt, intake has not since improved. No noted/reported micronutrient supplementation PTA. NKFA or intolerances reported. No chewing/swallowing difficulty reported.

## 2023-10-25 NOTE — DIETITIAN INITIAL EVALUATION ADULT - PERSON TAUGHT/METHOD
Provided recommendations to optimize PO and protein intake, recommended small frequent meals by ordering nutrient-dense snacks and leaving non-perishable food away from tray for later consumption during the day or between meals, to start with protein, and sips of supplement throughout the day; reviewed foods with protein and menu order procedures in hospital./verbal instruction/teach back - (Patient repeats in own words)/patient instructed/spouse instructed

## 2023-10-25 NOTE — DIETITIAN INITIAL EVALUATION ADULT - NS FNS DIET ORDER
Diet, DASH/TLC:   Sodium & Cholesterol Restricted  Consistent Carbohydrate {Evening Snack} (CSTCHOSN) (10-16-23 @ 04:15)

## 2023-10-25 NOTE — DIETITIAN NUTRITION RISK NOTIFICATION - TREATMENT: THE FOLLOWING DIET HAS BEEN RECOMMENDED
Diet, DASH/TLC:   Sodium & Cholesterol Restricted  Consistent Carbohydrate {Evening Snack} (CSTCHOSN) (10-16-23 @ 04:15) [Active]

## 2023-10-26 NOTE — PROGRESS NOTE ADULT - ASSESSMENT
====================ASSESSMENT ==============  72F PMHx COPD, HTN, PAD, CAD s/p LAD stent (last C showing 100%  of RCA), chronic leukocytosis of unclear etiol, cardiac arrest s/p left-sided ICD (6/4/2019) complicated by infection and s/p R single-chamber ICD reimplantation ([place] in 9/23/2019), CHF EF 45%, grade 2 DHF, and VFib s/p 13 AICD firings underwent VT ablation today c/b pericardial tamponade. Transferred to CICU for further monitoring. 10/21 developed AF w/ RVR.    Plan:  ====================== NEUROLOGY=====================  No active issues  - A&O x3    ==================== RESPIRATORY======================  Hx COPD  - Currently on room air sat >88%    ====================CARDIOVASCULAR==================  Pericardial Tamponade  - s/p VT ablation c/b pericardial tamponade  - pericardial drain with 40 cc drained initially, 250cc in bag upon arrival to CICU, s/p drainage removal 10/20  - TTE 10/22 with moderate pericardial effusion, not seemingly large enough to affect hemodynamics  - Stable effusion from 24th < 23rd < 22nd     - Trending CBC, stable (23rd)  - 23rd: Pt on plavix, stellate ganglion block likely not feasible at this time  - 24th: Heparin held per EP  - Per EP, no further interventions in-house this admit        AF w/ RVR  - new onset  - s/p DCCVx2 at 200J (22nd), in NSR  - 23rd: heparin gtt held per EP d/t concern of accumulating effusion  - 24th: Pt in slow VT later afternoon of 23rd, given lido IVP and amio 150 mg 2x. Lido back to 1, changed to 0.5 while rounding.     - 25th: No electrical events on quinidine, amio, lopressor. Lidocaine off     VT  - 400 amio TID PO per EP  - VT ablation aborted after tamponade  - lido d/c'd/. Will start quinidine 324 BID (23rd)    - has AICD  - replete lytes as needed to maintain K>4, Mag>2  - will need q6mo TSH / LFT, annual CXR and eye exam as outpatient (amiodarone)  - New AF w/ RVR on 10/21 PM, s/p Amio 150mg IV x1  - 24th: Pt in slow VT later afternoon of 23rd, given lido IVP and amio 150 mg 2x. Lido back to 1, changed to 0.5 while rounding.     - 25th: No events on quinidine and amio      CHF  - TTE 10/5 EF 55%, reg WMA, mild-mod MS  - Held entresto i/s/o hypotension, restart as tolerated and per EP, metoprolol restarted (24th)  - 24th: Restarted lopressor     CAD s/p stents  - Continue plavix    ===================HEMATOLOGIC/ONC ===================  clopidogrel Tablet 75 milliGRAM(s) Oral daily    ===================== RENAL =========================  Continue monitoring urine output    ==================== GASTROINTESTINAL===================  No active issues  - Monitor for BM  - DASH Diet     =======================    ENDOCRINE  =====================  No active issues  - Monitor glucose on CMP  - TSH wnl    ========================INFECTIOUS DISEASE================  Chronic leukocytosis  - 1 dose vanco given  - previously on CTX x3d   - Blood cultures negative  - E coli in urine   - reports of pus from previous groin access, ultrasound considered to r/o abscess  - Pt w/o temp for days, pt WBC downtrending. Pt site showing fungal growth in the intertriginous areas, the wound itself appears clean, no fluctuance, no suppuration. Short timeline of ablation event to 23rd, likely not abscess at this point     Opacified L lung  - i/s/o tachypnea x2 nights w/o inc o2 requirement    - Effusion vs pna vs atelectasis   - WBC improving, no temp  - Saline nebs, chest PT  - Careful monitoring w/ daily XR, trend WBC and fever curve

## 2023-10-26 NOTE — PROGRESS NOTE ADULT - ATTENDING COMMENTS
History of COPD with active smoking  Admitted with VT requiring multiple AICD shocks with course complicated by afib also requiring multiple (external) shocks  During VT ablation the patient went into tamponade and after a pericardiocentesis the ablation was aborted  She has had recurrent VT and afib thereafter  A+Ox3  VT on Quinidine, Lopressor and Amiodarone, electrically quiescent   Hemodynamics acceptable, no pressors or inotropes  Pericardial effusion is moderate and has been unchanged over the last few days  O2 sats mid to high 90s on nasal cannula  L lung is haris out on CXR, likely due to mucus plug, refractory to nebulizers - trial BIPAP but will bronch if lung remains down  DASH/diabetic diet  Normal renal function, compensated on exam - target even  H/H acceptable  After discussion with EP, Heparin drip for afib is being held due to pericardial effusion which formed after drip was started  Afebrile, no antibiotics  Sugars controlled  No central access  OOB

## 2023-10-26 NOTE — PROCEDURE NOTE - NSBRONCHPROCDETAILS_GEN_A_CORE_FT
Findings:  The bronchoscope was inserted through the vocal cords under conscious sedation. Airway evaluation revealed:      Trachea: Normal caliber    Main mackenzie: Sharp    R lung tracheobronchial tree: examined to at least the first subsegmental level with normal mucosa and anatomy and without any endobronchial lesions or significant secretions    L lung tracheobronchial tree: examined to at least the first subsegmental level with normal mucosa and anatomy and without any endobronchial lesions. There were scant secretions in the left mainstem bronchus which were suctioned.    The bronchoscope was withdrawn from the airway post evaluation. The patient tolerated the procedure well with no immediate complications. Chest x-ray is pending.    Specimens: None    Impressions: No significant secretions/plugs to explain atelectasis.

## 2023-10-26 NOTE — PROGRESS NOTE ADULT - ASSESSMENT
71 y/o F w/ PMHx COPD, HLD, HTN, PVD, ICM/CAD s/p PCI (has  of RCA), cardiac arrest s/p left-sided ICD (6/4/2019) complicated by infection and s/p R single-chamber ICD ([place] in 9/23/2019), recent admission for VT storm with adjustment of medication and NIPS now presenting for ICD shock in the setting of recurrent monomorphic VT with unsuccessful ATP.  Ablation attempted 10/20 but aborted due to hemodynamically significant pericardial effusion with drain placement.  Pericardial drain removed on 10/20/23.  Over the weekend developed new shortness of breath, recurrent VT s/p ICD shock, and afib w/ RVR s/p cardioversion 10/21. She continues to have NSVT. Currently in sinus rhythm.     - Continue low dose quinidine Q12 and Lopressor 25 Q12  - continue on oral amiodarone load 400mg PO Q 8 hours. Monitor TSH, LFT's, opthalmology and pulmonary function tests as outpatient   AST 10, ALT 12, baseline TSH 3.58 on 10/2   - Anticoagulation on hold, TTE shows stable pericardial effusion  - Plan for possible bronchoscopy today if does not respond to BIPAP given L-sided white out on CXR possibly mucus plug  - Will allow for medical optimization and discuss plan for ablation in the future     Note not final until signed by attending       Erasmo Lundberg MD  Cardiology Fellow PGY-6  Phone: [phone]    For all New Consults  [email or web address]   Login: [employer]   71 y/o F w/ PMHx COPD, HLD, HTN, PVD, ICM/CAD s/p PCI (has  of RCA), cardiac arrest s/p left-sided ICD (6/4/2019) complicated by infection and s/p R single-chamber ICD ([place] in 9/23/2019), recent admission for VT storm with adjustment of medication and NIPS now presenting for ICD shock in the setting of recurrent monomorphic VT with unsuccessful ATP.  Ablation attempted 10/20 but aborted due to hemodynamically significant pericardial effusion with drain placement.  Pericardial drain removed on 10/20/23.  Over the weekend developed new shortness of breath, recurrent VT s/p ICD shock, and afib w/ RVR s/p cardioversion 10/21. She continues to have NSVT. Currently in sinus rhythm.     - Continue low dose quinidine Q12 and Lopressor 25 Q12  - continue on oral amiodarone load 400mg PO Q 8 hours. Monitor TSH, LFT's, opthalmology and pulmonary function tests as outpatient   AST 10, ALT 12, baseline TSH 3.58 on 10/2   - Anticoagulation on hold, TTE shows stable pericardial effusion  - Plan for possible bronchoscopy today if does not respond to BIPAP given L-sided white out on CXR possibly mucus plug  - Will allow for medical optimization and discuss plan for ablation in the future     Note not final until signed by attending       Erasmo Lundberg MD  Cardiology Fellow PGY-6  Phone: [phone]    For all New Consults  [email or web address]   Login: [employer]   71 y/o F w/ PMHx COPD, HLD, HTN, PVD, ICM/CAD s/p PCI (has  of RCA), cardiac arrest s/p left-sided ICD (6/4/2019) complicated by infection and s/p R single-chamber ICD ([place] in 9/23/2019), recent admission for VT storm with adjustment of medication and NIPS now presenting for ICD shock in the setting of recurrent monomorphic VT with unsuccessful ATP.  Ablation attempted 10/20 but aborted due to hemodynamically significant pericardial effusion with drain placement.  Pericardial drain removed on 10/20/23.  Over the weekend developed new shortness of breath, recurrent VT s/p ICD shock, and afib w/ RVR s/p cardioversion 10/21. She continues to have NSVT. Currently in sinus rhythm.     - Continue low dose quinidine Q12 and Lopressor 25 Q12  - continue on oral amiodarone load 400mg PO Q 8 hours. Monitor TSH, LFT's, opthalmology and pulmonary function tests as outpatient   AST 10, ALT 12, baseline TSH 3.58 on 10/2   - Anticoagulation on hold, TTE shows stable pericardial effusion  - Plan for possible bronchoscopy today if does not respond to BIPAP given L-sided white out on CXR possibly mucus plug  - Will allow for medical optimization and discuss plan for ablation in the future     Note not final until signed by attending       Erasmo Lundberg MD  Cardiology Fellow PGY-6  Phone: [phone]    For all New Consults  [email or web address]   Login: [employer]oni

## 2023-10-26 NOTE — PROGRESS NOTE ADULT - SUBJECTIVE AND OBJECTIVE BOX
Patient seen and examined at bedside.    Overnight Events:   - No acute events overnight  - On tele SR 50s-70s    REVIEW OF SYSTEMS:  General: no fatigue/malaise, weight loss/gain.  Skin: no rashes.  Ophthalmologic: no blurred vision, no loss of vision. 	  ENT: no sore throat, rhinorrhea, sinus congestion.  Respiratory: no SOB, cough or wheeze.  CV: No chest pain. No palpitations.   Gastrointestinal:  no N/V/D, no melena/hematemesis/hematochezia.  Genitourinary: no dysuria/hesitancy or hematuria.  Musculoskeletal: no myalgias or arthralgias.  Neurological: no changes in vision or hearing, no lightheadedness/dizziness, no syncope/near syncope	  Psychiatric: no unusual stress/anxiety.   Hematology/Lymphatics: no unusual bleeding, bruising and no lymphadenopathy.  Endocrine: no unusual thirst.           Current Meds:  aMIOdarone    Tablet 200 milliGRAM(s) Oral daily  aMIOdarone    Tablet   Oral   aMIOdarone IVPB 150 milliGRAM(s) IV Intermittent once  atorvastatin 40 milliGRAM(s) Oral at bedtime  budesonide 160 MICROgram(s)/formoterol 4.5 MICROgram(s) Inhaler 2 Puff(s) Inhalation two times a day  chlorhexidine 2% Cloths 1 Application(s) Topical <User Schedule>  clopidogrel Tablet 75 milliGRAM(s) Oral daily  colchicine 0.6 milliGRAM(s) Oral daily  influenza  Vaccine (HIGH DOSE) 0.7 milliLiter(s) IntraMuscular once  melatonin 5 milliGRAM(s) Oral at bedtime  metoprolol tartrate 25 milliGRAM(s) Oral two times a day  nystatin/triamcinolone Cream 1 Application(s) Topical two times a day  quiNIDine gluconate  milliGRAM(s) Oral every 12 hours  sodium chloride 3%  Inhalation 4 milliLiter(s) Inhalation every 12 hours PRN      Vitals:  T(F): 97.4 (10-26), Max: 97.8 (10-25)  HR: 59 (10-26) (59 - 74)  BP: 139/61 (10-26) (96/46 - 153/67)  RR: 24 (10-26)  SpO2: 96% (10-26)  I&O's Summary    25 Oct 2023 07:01  -  26 Oct 2023 07:00  --------------------------------------------------------  IN: 1100 mL / OUT: 400 mL / NET: 700 mL        Physical Exam:  Appearance: No acute distress; well appearing on 2L NC  Eyes: PERRL, EOMI, pink conjunctiva  HEENT: Normal oral mucosa  Cardiovascular: RRR, S1, S2, no murmurs, rubs, or gallops; no edema; no JVD  Respiratory: Decreased breath sounds left side with crackles   Gastrointestinal: soft, non-tender, non-distended with normal bowel sounds  Musculoskeletal: No clubbing; no joint deformity   Neurologic: Non-focal  Lymphatic: No lymphadenopathy  Psychiatry: AAOx3, mood & affect appropriate  Skin: No rashes, ecchymoses, or cyanosis                          11.2   14.31 )-----------( 354      ( 26 Oct 2023 03:35 )             34.7     10-26    139  |  105  |  17  ----------------------------<  104<H>  3.7   |  24  |  0.69    Ca    8.5      26 Oct 2023 03:35  Phos  3.6     10-26  Mg     2.1     10-26    TPro  5.5<L>  /  Alb  2.2<L>  /  TBili  0.2  /  DBili  x   /  AST  20  /  ALT  15  /  AlkPhos  65  10-26    PT/INR - ( 26 Oct 2023 03:35 )   PT: 13.6 sec;   INR: 1.24 ratio         PTT - ( 26 Oct 2023 03:35 )  PTT:23.9 sec

## 2023-10-26 NOTE — PROGRESS NOTE ADULT - SUBJECTIVE AND OBJECTIVE BOX
AUSTIN LEE  MRN-[id number]  Patient is a 72y old  Female who presents with a chief complaint of AICD discharge (26 Oct 2023 07:53)    HPI:  72F c hx COPD, HTN, PAD, CAD s/p LAD stent (last C showing 100%  of RCA), chronic leukocytosis of unclear etiol, cardiac arrest s/p left-sided ICD (6/4/2019) complicated by infection and s/p R single-chamber ICD reimplantation ([place] in 9/23/2019), CHF EF 45%, grade 2 DHF, recent hospitalization for UTI (treated w/ ceftriaxone and Vanco x 3 days) and VFib s/p 13 AICD firings, pw lightheadedness and AICD firing.    Pt recently hospitalized for VFib s/p multiple aicd firing. Pt placed on quinidine, mexiletine, metoprolol. Pt reports good compliance with her medications, even though she doesn't know what the meds are. Pt states at around 3-4PM yesterday, she felt "dizzy" for a few seconds, then felt AICD shock, then had resolution of her dizziness. Denies fevers, chills, CP, SOB, URI symptoms, GI symptoms. Pt states she's never seen a hematologist for leukocytosis. Pt was supposed to f/u with EP as outpt for ablation. (16 Oct 2023 03:47)      Hospital Course:    24 HOUR EVENTS:    REVIEW OF SYSTEMS:    CONSTITUTIONAL: No weakness, fevers or chills  EYES/ENT: No visual changes;  No vertigo or throat pain   NECK: No pain or stiffness  RESPIRATORY: No cough, wheezing, hemoptysis; No shortness of breath  CARDIOVASCULAR: No chest pain or palpitations  GASTROINTESTINAL: No abdominal or epigastric pain. No nausea, vomiting, or hematemesis; No diarrhea or constipation. No melena or hematochezia.  GENITOURINARY: No dysuria, frequency or hematuria  NEUROLOGICAL: No numbness or weakness  SKIN: No itching, rashes      ICU Vital Signs Last 24 Hrs  T(C): 36.6 (26 Oct 2023 19:00), Max: 36.7 (26 Oct 2023 11:00)  T(F): 97.8 (26 Oct 2023 19:00), Max: 98 (26 Oct 2023 11:00)  HR: 57 (26 Oct 2023 21:01) (52 - 78)  BP: 137/62 (26 Oct 2023 19:00) (105/49 - 182/73)  BP(mean): 89 (26 Oct 2023 19:00) (70 - 105)  ABP: --  ABP(mean): --  RR: 24 (26 Oct 2023 21:01) (16 - 28)  SpO2: 96% (26 Oct 2023 21:01) (91% - 98%)    O2 Parameters below as of 26 Oct 2023 21:01  Patient On (Oxygen Delivery Method): nasal cannula  O2 Flow (L/min): 2          CVP(mm Hg): --  CO: --  CI: --  PA: --  PA(mean): --  PA(direct): --  PCWP: --  LA: --  RA: --  SVR: --  SVRI: --  PVR: --  PVRI: --  I&O's Summary    25 Oct 2023 07:01  -  26 Oct 2023 07:00  --------------------------------------------------------  IN: 1100 mL / OUT: 400 mL / NET: 700 mL    26 Oct 2023 07:01  -  26 Oct 2023 21:13  --------------------------------------------------------  IN: 62.8 mL / OUT: 151 mL / NET: -88.2 mL        CAPILLARY BLOOD GLUCOSE    CAPILLARY BLOOD GLUCOSE          PHYSICAL EXAM:  GENERAL: No acute distress, well-developed  HEAD:  Atraumatic, Normocephalic  EYES: EOMI, PERRLA, conjunctiva and sclera clear  NECK: Supple, no lymphadenopathy, no JVD  CHEST/LUNG: CTAB; No wheezes, rales, or rhonchi  HEART: Regular rate and rhythm. Normal S1/S2. No murmurs, rubs, or gallops  ABDOMEN: Soft, non-tender, non-distended; normal bowel sounds, no organomegaly  EXTREMITIES:  2+ peripheral pulses b/l, No clubbing, cyanosis, or edema  NEUROLOGY: A&O x 3, no focal deficits  SKIN: No rashes or lesions      ======================================================  PAST MEDICAL & SURGICAL HISTORY:  Obese      Smoker      HTN (hypertension)      HLD (hyperlipidemia)      CAD (coronary artery disease)      CHF with cardiomyopathy      History of hip surgery  ====================ASSESSMENT ==============  72F PMHx COPD, HTN, PAD, CAD s/p LAD stent (last [facility] showing 100%  of RCA), chronic leukocytosis of unclear etiol, cardiac arrest s/p left-sided ICD (6/4/2019) complicated by infection and s/p R single-chamber ICD reimplantation ([place] in 9/23/2019), CHF EF 45%, grade 2 DHF, and VFib s/p 13 AICD firings underwent VT ablation today c/b pericardial tamponade. Transferred to CICU for further monitoring. 10/21 developed AF w/ RVR.    Plan:  ====================== NEUROLOGY=====================  No active issues  - A&O x3    ==================== RESPIRATORY======================  Hx COPD  - Currently on room air sat >88%    ====================CARDIOVASCULAR==================  Pericardial Tamponade  - s/p VT ablation c/b pericardial tamponade  - pericardial drain with 40 cc drained initially, 250cc in bag upon arrival to CICU, s/p drainage removal 10/20  - TTE 10/22 with moderate pericardial effusion, not seemingly large enough to affect hemodynamics  - Stable effusion from 24th < 23rd < 22nd     - Trending CBC, stable (23rd)  - 23rd: Pt on plavix, stellate ganglion block likely not feasible at this time  - 24th: Heparin held per EP  - Per EP, no further interventions in-house this admit        AF w/ RVR  - new onset  - s/p DCCVx2 at 200J (22nd), in NSR  - 23rd: heparin gtt held per EP d/t concern of accumulating effusion  - 24th: Pt in slow VT later afternoon of 23rd, given lido IVP and amio 150 mg 2x. Lido back to 1, changed to 0.5 while rounding.     - 25th: No electrical events on quinidine, amio, lopressor. Lidocaine off     VT  - 400 amio TID PO per EP  - VT ablation aborted after tamponade  - lido d/c'd/. Will start quinidine 324 BID (23rd)    - has AICD  - replete lytes as needed to maintain K>4, Mag>2  - will need q6mo TSH / LFT, annual CXR and eye exam as outpatient (amiodarone)  - New AF w/ RVR on 10/21 PM, s/p Amio 150mg IV x1  - 24th: Pt in slow VT later afternoon of 23rd, given lido IVP and amio 150 mg 2x. Lido back to 1, changed to 0.5 while rounding.     - 25th: No events on quinidine and amio      CHF  - TTE 10/5 EF 55%, reg WMA, mild-mod MS  - Held entresto i/s/o hypotension, restart as tolerated and per EP, metoprolol restarted (24th)  - 24th: Restarted lopressor     CAD s/p stents  - Continue plavix    ===================HEMATOLOGIC/ONC ===================  clopidogrel Tablet 75 milliGRAM(s) Oral daily    ===================== RENAL =========================  Continue monitoring urine output    ==================== GASTROINTESTINAL===================  No active issues  - Monitor for BM  - DASH Diet     =======================    ENDOCRINE  =====================  No active issues  - Monitor glucose on CMP  - TSH wnl    ========================INFECTIOUS DISEASE================  Chronic leukocytosis  - 1 dose vanco given  - previously on CTX x3d   - Blood cultures negative  - E coli in urine   - reports of pus from previous groin access, ultrasound considered to r/o abscess  - Pt w/o temp for days, pt WBC downtrending. Pt site showing fungal growth in the intertriginous areas, the wound itself appears clean, no fluctuance, no suppuration. Short timeline of ablation event to 23rd, likely not abscess at this point     Opacified L lung  - i/s/o tachypnea x2 nights w/o inc o2 requirement    - Effusion vs pna vs atelectasis   - WBC improving, no temp  - Saline nebs, chest PT  - Careful monitoring w/ daily XR, trend WBC and fever curve            Patient requires continuous monitoring with bedside rhythm monitoring, pulse ox monitoring, and intermittent blood gas analysis. Care plan discussed with ICU care team. Patient remained critical and at risk for life threatening decompensation.  Patient seen, examined and plan discussed with CCU team during rounds.     I have personally provided ____ minutes of critical care time excluding time spent on separate procedures, in addition to initial critical care time provided by the CICU Attending, Dr. Brooks.    By signing my name below, I, Brandee Spencer, attest that this documentation has been prepared under the direction and in the presence of STACI Solo.  Electronically signed: Dallin Spaulding, 10-26-23 @ 21:13    I, Kamilah Astudillo , personally performed the services described in this documentation. all medical record entries made by the scribe were at my direction and in my presence. I have reviewed the chart and agree that the record reflects my personal performance and is accurate and complete  Electronically signed: STACI Solo.       AUSTIN LEE  MRN-[id number]  Patient is a 72y old  Female who presents with a chief complaint of AICD discharge (26 Oct 2023 07:53)    HPI:  72F c hx COPD, HTN, PAD, CAD s/p LAD stent (last C showing 100%  of RCA), chronic leukocytosis of unclear etiol, cardiac arrest s/p left-sided ICD (6/4/2019) complicated by infection and s/p R single-chamber ICD reimplantation ([place] in 9/23/2019), CHF EF 45%, grade 2 DHF, recent hospitalization for UTI (treated w/ ceftriaxone and Vanco x 3 days) and VFib s/p 13 AICD firings, pw lightheadedness and AICD firing.    Pt recently hospitalized for VFib s/p multiple aicd firing. Pt placed on quinidine, mexiletine, metoprolol. Pt reports good compliance with her medications, even though she doesn't know what the meds are. Pt states at around 3-4PM yesterday, she felt "dizzy" for a few seconds, then felt AICD shock, then had resolution of her dizziness. Denies fevers, chills, CP, SOB, URI symptoms, GI symptoms. Pt states she's never seen a hematologist for leukocytosis. Pt was supposed to f/u with EP as outpt for ablation. (16 Oct 2023 03:47)    24 HOUR EVENTS:  - s/p bronch today    ICU Vital Signs Last 24 Hrs  T(C): 36.6 (26 Oct 2023 19:00), Max: 36.7 (26 Oct 2023 11:00)  T(F): 97.8 (26 Oct 2023 19:00), Max: 98 (26 Oct 2023 11:00)  HR: 57 (26 Oct 2023 21:01) (52 - 78)  BP: 137/62 (26 Oct 2023 19:00) (105/49 - 182/73)  BP(mean): 89 (26 Oct 2023 19:00) (70 - 105)  RR: 24 (26 Oct 2023 21:01) (16 - 28)  SpO2: 96% (26 Oct 2023 21:01) (91% - 98%)    O2 Parameters below as of 26 Oct 2023 21:01  Patient On (Oxygen Delivery Method): nasal cannula  O2 Flow (L/min): 2    I&O's Summary    25 Oct 2023 07:01  -  26 Oct 2023 07:00  --------------------------------------------------------  IN: 1100 mL / OUT: 400 mL / NET: 700 mL    26 Oct 2023 07:01  -  26 Oct 2023 21:13  --------------------------------------------------------  IN: 62.8 mL / OUT: 151 mL / NET: -88.2 mL    PHYSICAL EXAM:  GENERAL: No acute distress, well-developed  HEAD:  Atraumatic, Normocephalic  EYES: EOMI, PERRLA, conjunctiva and sclera clear  NECK: Supple, no lymphadenopathy, no JVD  CHEST/LUNG: decreased breath sounds LLL; No wheezes, rales, or rhonchi  HEART: Regular rate and rhythm. Normal S1/S2. No murmurs, rubs, or gallops  ABDOMEN: Soft, non-tender, non-distended; normal bowel sounds, no organomegaly  EXTREMITIES:  2+ peripheral pulses b/l, No clubbing, cyanosis, or edema  NEUROLOGY: A&O x 3, no focal deficits  SKIN: No rashes or lesions    ======================================================  PAST MEDICAL & SURGICAL HISTORY:  Obese      Smoker      HTN (hypertension)      HLD (hyperlipidemia)      CAD (coronary artery disease)      CHF with cardiomyopathy      History of hip surgery    ====================ASSESSMENT ==============  72F PMHx COPD, HTN, PAD, CAD s/p LAD stent (last [facility] showing 100%  of RCA), chronic leukocytosis of unclear etiol, cardiac arrest s/p left-sided ICD (6/4/2019) complicated by infection and s/p R single-chamber ICD reimplantation ([place] in 9/23/2019), CHF EF 45%, grade 2 DHF, and VFib s/p 13 AICD firings underwent VT ablation today c/b pericardial tamponade. Transferred to CICU for further monitoring. 10/21 developed AF w/ RVR.    Plan:  ====================== NEUROLOGY=====================  No active issues  - A&O x3    ==================== RESPIRATORY======================  Hx COPD  - Currently on room air sat >88%    ====================CARDIOVASCULAR==================  Pericardial Tamponade  - s/p VT ablation c/b pericardial tamponade  - pericardial drain with 40 cc drained initially, 250cc in bag upon arrival to CICU, s/p drainage removal 10/20  - TTE 10/22 with moderate pericardial effusion, not seemingly large enough to affect hemodynamics  - Stable effusion on TTE  - Trending CBC, stable (23rd)  - Heparin gtt held per EP  - Per EP, no further interventions in-house this admit        AF w/ RVR  - new onset AF w/ RVR, s/p DCCVx2 at 200J (22nd), to NSR  - heparin gtt held  - s/p Lido gtt, d/c'd     VT  - 400 amio TID PO per EP  - VT ablation aborted after tamponade  - lido d/c'd/. Quinidine 324 BID started 10/23  - has AICD  - replete lytes as needed to maintain K>4, Mag>2  - will need q6mo TSH / LFT, annual CXR and eye exam as outpatient (amiodarone)    CHF  - TTE 10/5 EF 55%, reg WMA, mild-mod MS  - c/w lopressor, uptitrate as BP tolerates    CAD s/p stents  - Continue plavix    ===================HEMATOLOGIC/ONC ===================  H/H & plts stable  - DVT ppx: HSQ    ===================== RENAL =========================  Continue monitoring urine output    ==================== GASTROINTESTINAL===================  No active issues  - Monitor for BM  - DASH Diet     =======================    ENDOCRINE  =====================  No active issues  - Monitor glucose on CMP  - TSH wnl    ========================INFECTIOUS DISEASE================  Chronic leukocytosis  - x1 dose vanco given  - previously on CTX x3d   - Blood cultures negative, UA+    Opacified L lung  - i/s/o tachypnea x2 nights w/o inc o2 requirement    - Effusion vs pna vs atelectasis   - WBC improving, no temp  - Saline nebs, chest PT  - Careful monitoring w/ daily AM XR, trend WBC and fever curve      Patient requires continuous monitoring with bedside rhythm monitoring, pulse ox monitoring, and intermittent blood gas analysis. Care plan discussed with ICU care team. Patient remained critical and at risk for life threatening decompensation.  Patient seen, examined and plan discussed with CCU team during rounds.     I have personally provided 30 minutes of critical care time excluding time spent on separate procedures, in addition to initial critical care time provided by the CICU Attending, Dr. Brooks.    By signing my name below, I, Brandee Spencer, attest that this documentation has been prepared under the direction and in the presence of STACI Solo.  Electronically signed: Dallin Spaulding, 10-26-23 @ 21:13    Kaimlah SCHROEDER , personally performed the services described in this documentation. all medical record entries made by the scribe were at my direction and in my presence. I have reviewed the chart and agree that the record reflects my personal performance and is accurate and complete  Electronically signed: STACI Solo.       AUSTIN LEE  MRN-[id number]  Patient is a 72y old  Female who presents with a chief complaint of AICD discharge (26 Oct 2023 07:53)    HPI:  72F c hx COPD, HTN, PAD, CAD s/p LAD stent (last C showing 100%  of RCA), chronic leukocytosis of unclear etiol, cardiac arrest s/p left-sided ICD (6/4/2019) complicated by infection and s/p R single-chamber ICD reimplantation ([place] in 9/23/2019), CHF EF 45%, grade 2 DHF, recent hospitalization for UTI (treated w/ ceftriaxone and Vanco x 3 days) and VFib s/p 13 AICD firings, pw lightheadedness and AICD firing.    Pt recently hospitalized for VFib s/p multiple aicd firing. Pt placed on quinidine, mexiletine, metoprolol. Pt reports good compliance with her medications, even though she doesn't know what the meds are. Pt states at around 3-4PM yesterday, she felt "dizzy" for a few seconds, then felt AICD shock, then had resolution of her dizziness. Denies fevers, chills, CP, SOB, URI symptoms, GI symptoms. Pt states she's never seen a hematologist for leukocytosis. Pt was supposed to f/u with EP as outpt for ablation. (16 Oct 2023 03:47)    24 HOUR EVENTS:  - s/p bronch today    ICU Vital Signs Last 24 Hrs  T(C): 36.6 (26 Oct 2023 19:00), Max: 36.7 (26 Oct 2023 11:00)  T(F): 97.8 (26 Oct 2023 19:00), Max: 98 (26 Oct 2023 11:00)  HR: 57 (26 Oct 2023 21:01) (52 - 78)  BP: 137/62 (26 Oct 2023 19:00) (105/49 - 182/73)  BP(mean): 89 (26 Oct 2023 19:00) (70 - 105)  RR: 24 (26 Oct 2023 21:01) (16 - 28)  SpO2: 96% (26 Oct 2023 21:01) (91% - 98%)    O2 Parameters below as of 26 Oct 2023 21:01  Patient On (Oxygen Delivery Method): nasal cannula  O2 Flow (L/min): 2    I&O's Summary    25 Oct 2023 07:01  -  26 Oct 2023 07:00  --------------------------------------------------------  IN: 1100 mL / OUT: 400 mL / NET: 700 mL    26 Oct 2023 07:01  -  26 Oct 2023 21:13  --------------------------------------------------------  IN: 62.8 mL / OUT: 151 mL / NET: -88.2 mL    PHYSICAL EXAM:  GENERAL: No acute distress, well-developed  HEAD:  Atraumatic, Normocephalic  EYES: EOMI, PERRLA, conjunctiva and sclera clear  NECK: Supple, no lymphadenopathy, no JVD  CHEST/LUNG: decreased breath sounds LLL; No wheezes, rales, or rhonchi  HEART: Regular rate and rhythm. Normal S1/S2. No murmurs, rubs, or gallops  ABDOMEN: Soft, non-tender, non-distended; normal bowel sounds, no organomegaly  EXTREMITIES:  2+ peripheral pulses b/l, No clubbing, cyanosis, or edema  NEUROLOGY: A&O x 3, no focal deficits  SKIN: No rashes or lesions    ======================================================  PAST MEDICAL & SURGICAL HISTORY:  Obese      Smoker      HTN (hypertension)      HLD (hyperlipidemia)      CAD (coronary artery disease)      CHF with cardiomyopathy      History of hip surgery    ====================ASSESSMENT ==============  72F PMHx COPD, HTN, PAD, CAD s/p LAD stent (last [facility] showing 100%  of RCA), chronic leukocytosis of unclear etiol, cardiac arrest s/p left-sided ICD (6/4/2019) complicated by infection and s/p R single-chamber ICD reimplantation ([place] in 9/23/2019), CHF EF 45%, grade 2 DHF, and VFib s/p 13 AICD firings underwent VT ablation today c/b pericardial tamponade. Transferred to CICU for further monitoring. 10/21 developed AF w/ RVR.    Plan:  ====================== NEUROLOGY=====================  No active issues  - A&O x3    ==================== RESPIRATORY======================  Hx COPD  - Currently on room air sat >88%    ====================CARDIOVASCULAR==================  Pericardial Tamponade  - s/p VT ablation c/b pericardial tamponade  - pericardial drain with 40 cc drained initially, 250cc in bag upon arrival to CICU, s/p drainage removal 10/20  - TTE 10/22 with moderate pericardial effusion, not seemingly large enough to affect hemodynamics  - Stable effusion on TTE  - Trending CBC, stable (23rd)  - Heparin gtt held per EP  - Per EP, no further interventions in-house this admit        AF w/ RVR  - new onset AF w/ RVR, s/p DCCVx2 at 200J (22nd), to NSR  - heparin gtt held  - s/p Lido gtt, d/c'd     VT  - 400 amio TID PO per EP  - VT ablation aborted after tamponade  - lido d/c'd/. Quinidine 324 BID started 10/23  - has AICD  - replete lytes as needed to maintain K>4, Mag>2  - will need q6mo TSH / LFT, annual CXR and eye exam as outpatient (amiodarone)    CHF  - TTE 10/5 EF 55%, reg WMA, mild-mod MS  - c/w lopressor, uptitrate as BP tolerates    CAD s/p stents  - Continue plavix    ===================HEMATOLOGIC/ONC ===================  H/H & plts stable  - DVT ppx: SCDs    ===================== RENAL =========================  Continue monitoring urine output    ==================== GASTROINTESTINAL===================  No active issues  - Monitor for BM  - DASH Diet     =======================    ENDOCRINE  =====================  No active issues  - Monitor glucose on CMP  - TSH wnl    ========================INFECTIOUS DISEASE================  Chronic leukocytosis  - x1 dose vanco given  - previously on CTX x3d   - Blood cultures negative, UA+    Opacified L lung  - i/s/o tachypnea x2 nights w/o inc o2 requirement    - Effusion vs pna vs atelectasis   - WBC improving, no temp  - Saline nebs, chest PT  - Careful monitoring w/ daily AM XR, trend WBC and fever curve      Patient requires continuous monitoring with bedside rhythm monitoring, pulse ox monitoring, and intermittent blood gas analysis. Care plan discussed with ICU care team. Patient remained critical and at risk for life threatening decompensation.  Patient seen, examined and plan discussed with CCU team during rounds.     I have personally provided 30 minutes of critical care time excluding time spent on separate procedures, in addition to initial critical care time provided by the CICU Attending, Dr. Brooks.    By signing my name below, I, Brandee Spencer, attest that this documentation has been prepared under the direction and in the presence of STACI Solo.  Electronically signed: Dallin Spaulding, 10-26-23 @ 21:13    Kamilah SCHROEDER , personally performed the services described in this documentation. all medical record entries made by the scribe were at my direction and in my presence. I have reviewed the chart and agree that the record reflects my personal performance and is accurate and complete  Electronically signed: STACI Solo.

## 2023-10-26 NOTE — PROGRESS NOTE ADULT - SUBJECTIVE AND OBJECTIVE BOX
AUSTIN LEE  MRN-[id number]  Patient is a 72y old  Female who presents with a chief complaint of Pt is a 72F PMHx COPD, HTN, PAD, CAD s/p LAD stent (last [facility] showing 100%  of RCA), chronic leukocytosis of unclear etiol, cardiac arrest s/p left-sided ICD (6/4/2019) complicated by infection and s/p R single-chamber ICD reimplantation ([place] in 9/23/2019), CHF EF 45%, grade 2 DHF, and VFib s/p 13 AICD firings underwent VT ablation today c/b pericardial tamponade. Transferred to CICU for further monitoring. 10/21 developed AF w/ RVR.     (25 Oct 2023 13:43)    HPI:  72F c hx COPD, HTN, PAD, CAD s/p LAD stent (last [facility] showing 100%  of RCA), chronic leukocytosis of unclear etiol, cardiac arrest s/p left-sided ICD (6/4/2019) complicated by infection and s/p R single-chamber ICD reimplantation ([place] in 9/23/2019), CHF EF 45%, grade 2 DHF, recent hospitalization for UTI (treated w/ ceftriaxone and Vanco x 3 days) and VFib s/p 13 AICD firings, pw lightheadedness and AICD firing.    Pt recently hospitalized for VFib s/p multiple aicd firing. Pt placed on quinidine, mexiletine, metoprolol. Pt reports good compliance with her medications, even though she doesn't know what the meds are. Pt states at around 3-4PM yesterday, she felt "dizzy" for a few seconds, then felt AICD shock, then had resolution of her dizziness. Denies fevers, chills, CP, SOB, URI symptoms, GI symptoms. Pt states she's never seen a hematologist for leukocytosis. Pt was supposed to f/u with EP as outpt for ablation. (16 Oct 2023 03:47)    24 HOUR EVENTS:  Tachypneic similar to last night      REVIEW OF SYSTEMS:  CONSTITUTIONAL: No weakness, fevers or chills  EYES/ENT: No visual changes;  No vertigo or throat pain   NECK: No pain or stiffness  RESPIRATORY: No cough, wheezing, hemoptysis; No shortness of breath  CARDIOVASCULAR: No chest pain or palpitations  GASTROINTESTINAL: No abdominal or epigastric pain. No nausea, vomiting, or hematemesis; No diarrhea or constipation. No melena or hematochezia.  GENITOURINARY: No dysuria, frequency or hematuria  NEUROLOGICAL: No numbness or weakness  SKIN: No itching, rashes    ICU Vital Signs Last 24 Hrs  T(C): 36.3 (26 Oct 2023 07:00), Max: 36.6 (25 Oct 2023 23:00)  T(F): 97.4 (26 Oct 2023 07:00), Max: 97.8 (25 Oct 2023 23:00)  HR: 59 (26 Oct 2023 07:00) (59 - 74)  BP: 139/61 (26 Oct 2023 07:00) (96/46 - 153/67)  BP(mean): 88 (26 Oct 2023 07:00) (66 - 97)  ABP: --  ABP(mean): --  RR: 24 (26 Oct 2023 07:00) (18 - 28)  SpO2: 98% (26 Oct 2023 07:00) (90% - 98%)    O2 Parameters below as of 26 Oct 2023 07:00  Patient On (Oxygen Delivery Method): nasal cannula  O2 Flow (L/min): 2    PHYSICAL EXAM:  GENERAL: No acute distress, well-developed  HEAD:  Atraumatic, Normocephalic  EYES: EOMI, PERRLA, conjunctiva and sclera clear  NECK: Supple, no lymphadenopathy, no JVD  CHEST/LUNG: Diminished L chest    HEART: Regular rate and rhythm. Normal S1/S2. No murmurs, rubs, or gallops  ABDOMEN: Soft, non-tender, non-distended; normal bowel sounds, no organomegaly  EXTREMITIES:  2+ peripheral pulses b/l, No clubbing, cyanosis, or edema  NEUROLOGY: A&O x 3, no focal deficits  SKIN: No rashes or lesions    ============================I/O===========================   I&O's Detail    25 Oct 2023 07:01  -  26 Oct 2023 07:00  --------------------------------------------------------  IN:    IV PiggyBack: 60 mL    Lactated Ringers Bolus: 500 mL    Oral Fluid: 540 mL  Total IN: 1100 mL    OUT:    Voided (mL): 400 mL  Total OUT: 400 mL    Total NET: 700 mL  ============================ LABS =========================  LABS:                        11.2   14.31 )-----------( 354      ( 26 Oct 2023 03:35 )             34.7     10-26    139  |  105  |  17  ----------------------------<  104<H>  3.7   |  24  |  0.69    Ca    8.5      26 Oct 2023 03:35  Phos  3.6     10-26  Mg     2.1     10-26    TPro  5.5<L>  /  Alb  2.2<L>  /  TBili  0.2  /  DBili  x   /  AST  20  /  ALT  15  /  AlkPhos  65  10-26    PT/INR - ( 26 Oct 2023 03:35 )   PT: 13.6 sec;   INR: 1.24 ratio         PTT - ( 26 Oct 2023 03:35 )  PTT:23.9 sec  ======================Micro/Rad/Cardio=================  Telemtry: Reviewed   EKG: Reviewed  CXR: Reviewed  Culture: Reviewed   Echo: Transthoracic Echocardiogram:   Patient name: AUSTIN LEE  YOB: 1950   Age: 68 (F)   MR#: [id number]  Study Date: 8/14/2019  Location: O/PSonographer: Sherri Cadet RDCS  Study quality: Technically difficult  Referring Physician: RICCARDO WOLF MD  Blood Pressure: 140/80 mmHg  Height: 157 cm  Weight: 86 kg  BSA: 1.9 m2  Heart Rate: 60 mmHg  ------------------------------------------------------------------------  PROCEDURE: Transthoracic echocardiogram with 2-D, M-Mode  and complete spectral and color flow Doppler.  INDICATION: Atherosclerotic heart disease of native  coronary artery without angina pectoris (I25.10)  ------------------------------------------------------------------------  Dimensions:    Normal Values:  LA:     4.6    2.0 - 4.0 cm  Ao: 3.1    2.0 - 3.8 cm  SEPTUM: 1.4    0.6 - 1.2 cm  PWT:    1.3    0.6 - 1.1 cm  LVIDd:  6.4    3.0 - 5.6 cm  LVIDs:  4.3    1.8 - 4.0 cm  Derived variables:  LVMI: 219 g/m2  RWT: 0.40  Fractional short: 33 %  EF (Ahn Rule): 33 %Doppler Peak Velocity (m/sec):  AoV=1.2  ------------------------------------------------------------------------  Observations:  Mitral Valve: Mitral annular calcification and calcified  mitral leaflets with decreased diastolic opening. Mild  mitral regurgitation.  Peak mitral valve gradient equals 13  mm Hg, mean transmitral valve gradient equals 4 mm Hg,  consistent with mild mitral stenosis. Gradient measured at  a HR of 81bpm.  Aortic Valve/Aorta: Aortic valve not well visualized;  probably normal. Peak transaortic valve gradient equals 6  mm Hg, estimated aortic valve area equals 2.1 sqcm. Minimal  aortic regurgitation.  Peak left ventricular outflow tract  gradient equals 2 mm Hg.  Aortic Root: 3.1 cm.  Left Atrium: Normal left atrium.  LA volume index =31  cc/m2.  Left Ventricle: Severe segmental left ventricular systolic  dysfunction. Endocardial visualization enhanced with  intravenous injection of Ultrasonic Enhancing Agent  (Definity). The mid inferolateral wall, the basal  inferolateral wall, the basal inferior wall, the basal  anterolateral wall, the mid inferior wall, the mid  anterolateral wall, the mid inferoseptum, and the basal  inferoseptum are hypokinetic.  Moderate concentric left  ventricular hypertrophy. Moderate diastolic dysfunction  (Stage II).  Right Heart: Normal right atrium. Normal right ventricular  size and function. Normal tricuspid valve. Mild tricuspid  regurgitation. Normal pulmonic valve. Mild pulmonic  regurgitation.  Pericardium/Pleura: Normal pericardium with no pericardial  effusion.  Hemodynamic: Estimated right ventricular systolic pressure  equals 28 mm Hg, assuming right atrial pressure equals 3 mm  Hg, consistent with normal pulmonary pressures. Color  Doppler demonstrates no evidence of a patent foramen ovale.  ------------------------------------------------------------------------  Conclusions:  1. Mitral annular calcification and calcified mitral  leaflets with decreased diastolic opening. Mild mitral  regurgitation.  Peak mitral valve gradient equals 13 mm Hg,  mean transmitral valve gradient equals 4 mm Hg, consistent  with mild mitral stenosis. Gradient measured at a HR of  81bpm.  2. Aortic valve not well visualized; probably normal.  Minimal aortic regurgitation.  3. Moderate concentric left ventricular hypertrophy.  4. Severe segmental left ventricular systolic dysfunction.  Endocardial visualization enhanced with intravenous  injection of Ultrasonic Enhancing Agent (Definity). The mid  inferolateral wall, the basal  inferolateral wall, the  basal inferior wall, the basal anterolateral wall, the mid  inferior wall, the mid anterolateral wall, the mid  inferoseptum, and the basal inferoseptum are hypokinetic.  5. Moderate diastolic dysfunction (Stage II).  6. Normal right ventricular size and function.  *** No previous Echo exam.  ------------------------------------------------------------------------  Confirmed on  8/14/2019 - 16:47:43 by Dudley Marquez M.D.  ------------------------------------------------------------------------ (08-14-19 @ 13:56)  ======================================================  PAST MEDICAL & SURGICAL HISTORY:  Obese      Smoker      HTN (hypertension)      HLD (hyperlipidemia)      CAD (coronary artery disease)      CHF with cardiomyopathy      History of hip surgery

## 2023-10-27 NOTE — PROGRESS NOTE ADULT - PROBLEM SELECTOR PLAN 7
- CTM Leukocytosis, pt currently clinically not infectious appearing. - CTM Leukocytosis, pt currently clinically not infectious appearing.  cont to monitor fever curve , and for any evidence of evolving infections

## 2023-10-27 NOTE — CONSULT NOTE ADULT - SUBJECTIVE AND OBJECTIVE BOX
History of Present Illness:  HPI:  72F c hx COPD, HTN, PAD, CAD s/p LAD stent (last [facility] showing 100%  of RCA), chronic leukocytosis of unclear etiol, cardiac arrest s/p left-sided ICD (2019) complicated by infection and s/p R single-chamber ICD reimplantation ([place] in 2019), CHF EF 45%, grade 2 DHF, recent hospitalization for UTI (treated w/ ceftriaxone and Vanco x 3 days) and VFib s/p 13 AICD firings, pw lightheadedness and AICD firing.    Pt recently hospitalized for VFib s/p multiple aicd firing. Pt placed on quinidine, mexiletine, metoprolol. Pt reports good compliance with her medications, even though she doesn't know what the meds are. Pt states at around 3-4PM yesterday, she felt "dizzy" for a few seconds, then felt AICD shock, then had resolution of her dizziness. Denies fevers, chills, CP, SOB, URI symptoms, GI symptoms. Pt states she's never seen a hematologist for leukocytosis. Pt was supposed to f/u with EP as outpt for ablation. (16 Oct 2023 03:47)       Past Medical History  No pertinent past medical history    Obese    Smoker    Obesity    HTN (hypertension)    HLD (hyperlipidemia)    CAD (coronary artery disease)    CHF with cardiomyopathy        Past Surgical History  No significant past surgical history    No significant past surgical history    History of hip surgery        MEDICATIONS  (STANDING):  aMIOdarone    Tablet 200 milliGRAM(s) Oral daily  aMIOdarone    Tablet   Oral   atorvastatin 40 milliGRAM(s) Oral at bedtime  budesonide 160 MICROgram(s)/formoterol 4.5 MICROgram(s) Inhaler 2 Puff(s) Inhalation two times a day  clopidogrel Tablet 75 milliGRAM(s) Oral daily  colchicine 0.6 milliGRAM(s) Oral daily  influenza  Vaccine (HIGH DOSE) 0.7 milliLiter(s) IntraMuscular once  lactated ringers. 1000 milliLiter(s) (150 mL/Hr) IV Continuous <Continuous>  melatonin 5 milliGRAM(s) Oral at bedtime  nystatin/triamcinolone Cream 1 Application(s) Topical two times a day  quiNIDine gluconate  milliGRAM(s) Oral every 12 hours    MEDICATIONS  (PRN):  acetaminophen     Tablet .. 650 milliGRAM(s) Oral every 6 hours PRN Temp greater or equal to 38C (100.4F), Mild Pain (1 - 3)  aluminum hydroxide/magnesium hydroxide/simethicone Suspension 30 milliLiter(s) Oral every 4 hours PRN Dyspepsia  sodium chloride 3%  Inhalation 4 milliLiter(s) Inhalation every 12 hours PRN SOB      Vital Signs Last 24 Hrs  T(C): 36.7 (10-27-23 @ 20:01), Max: 36.7 (10-26-23 @ 23:00)  T(F): 98 (10-27-23 @ 20:01), Max: 98.1 (10-26-23 @ 23:00)  HR: 114 (10-27-23 @ 20:01) (59 - 114)  BP: 120/81 (10-27-23 @ 20:01) (96/60 - 159/67)  RR: 20 (10-27-23 @ 20:01) (18 - 28)  SpO2: 96% (10-27-23 @ 20:01) (92% - 98%)           Daily     Daily Weight in k.3 (27 Oct 2023 06:00)  Admit Wt: Drug Dosing Weight  Height (cm): 157.5 (21 Oct 2023 09:26)  Weight (kg): 85.6 (21 Oct 2023 09:26)  BMI (kg/m2): 34.5 (21 Oct 2023 09:26)  BSA (m2): 1.87 (21 Oct 2023 09:26)    Allergies: No Known Allergies      SOCIAL HISTORY:  Smoker: [ ] Yes  [X] No                 Pt denies  ETOH use: [ ] Yes  [X] No             Pt denies  Ilicit Drug use:  [ ] Yes  [X] No     Pt denies    FAMILY HISTORY:  Family history of Alzheimer's disease (Father)    Family history of cancer (Mother)        Review of Systems  GENERAL:  no weakness, fatigue, fevers or chills  NEURO: no dizziness, numbness, tingling or weakness  SKIN: no itching, burning, rashes, or lesions   HEENT: no visual changes;  no headache, no vertigo, no recent colds  RESPIRATORY: no shortness of breath, no cough, sputum, wheezing  CARDIOVASCULAR:  no chest pain,  or palpitations  GI: no abd pain. no N/V/D.  PERIPHERAL VASCULAR: no swelling, no tenderness, no erythema    PHYSICAL EXAM  General: Well nourished, well developed, NAD.                                              Neuro: Normal exam oriented to person/place & time with no focal motor or sensory  deficits.                    Eyes: Normal exam of conjunctiva & lids, pupils equally reactive.   ENT: Normal exam of nasal/oral mucosa with absence of cyanosis.   Neck: Normal exam of jugular veins, trachea & thyroid.   Chest: Normal lung exam with good air movement absence of wheezes, rales, or rhonchi.                                                                         CV:  Auscultation: normal S1S2, RRR   Carotids: No Bruits[X]  Abdominal Aorta: normal [X] nonpalpable[X]                                                                         GI: Normal exam of abdomen with no noted masses or tenderness. +BSx4Q                                                                                            Extremities: Normal no evidence of cyanosis or deformity, Edema: none  Lower Extremity Pulses: Right[+2DP] Left[+2DP] Varicosities[none]  SKIN : Normal exam to inspection & palpation.                                                           LABS:                        11.1   16.65 )-----------( 352      ( 27 Oct 2023 01:53 )             34.9     10    140  |  106  |  16  ----------------------------<  101<H>  4.2   |  24  |  0.62    Ca    8.5      27 Oct 2023 01:53  Phos  3.1     10-27  Mg     2.0     10-27    TPro  5.5<L>  /  Alb  2.3<L>  /  TBili  0.3  /  DBili  x   /  AST  27  /  ALT  21  /  AlkPhos  64  1027    PT/INR - ( 26 Oct 2023 03:35 )   PT: 13.6 sec;   INR: 1.24 ratio         PTT - ( 26 Oct 2023 03:35 )  PTT:23.9 sec      Cardiac Cath:    TTE / SUPA:   History of Present Illness:  HPI:  72F c hx COPD, HTN, PAD, CAD s/p LAD stent (last [facility] showing 100%  of RCA), chronic leukocytosis of unclear etiol, cardiac arrest s/p left-sided ICD (2019) complicated by infection and s/p R single-chamber ICD reimplantation ([place] in 2019), CHF EF 45%, grade 2 DHF, recent hospitalization for UTI (treated w/ ceftriaxone and Vanco x 3 days) and VFib s/p 13 AICD firings, pw lightheadedness and AICD firing.    Pt recently hospitalized for VFib s/p multiple aicd firing. Pt placed on quinidine, mexiletine, metoprolol. Pt reports good compliance with her medications, even though she doesn't know what the meds are. Pt states at around 3-4PM yesterday, she felt "dizzy" for a few seconds, then felt AICD shock, then had resolution of her dizziness. Denies fevers, chills, CP, SOB, URI symptoms, GI symptoms. Pt states she's never seen a hematologist for leukocytosis. Pt was supposed to f/u with EP as outpt for ablation. (16 Oct 2023 03:47)       Past Medical History  No pertinent past medical history    Obese    Smoker    Obesity    HTN (hypertension)    HLD (hyperlipidemia)    CAD (coronary artery disease)    CHF with cardiomyopathy        Past Surgical History  No significant past surgical history    No significant past surgical history    History of hip surgery        MEDICATIONS  (STANDING):  aMIOdarone    Tablet 200 milliGRAM(s) Oral daily  aMIOdarone    Tablet   Oral   atorvastatin 40 milliGRAM(s) Oral at bedtime  budesonide 160 MICROgram(s)/formoterol 4.5 MICROgram(s) Inhaler 2 Puff(s) Inhalation two times a day  clopidogrel Tablet 75 milliGRAM(s) Oral daily  colchicine 0.6 milliGRAM(s) Oral daily  influenza  Vaccine (HIGH DOSE) 0.7 milliLiter(s) IntraMuscular once  lactated ringers. 1000 milliLiter(s) (150 mL/Hr) IV Continuous <Continuous>  melatonin 5 milliGRAM(s) Oral at bedtime  nystatin/triamcinolone Cream 1 Application(s) Topical two times a day  quiNIDine gluconate  milliGRAM(s) Oral every 12 hours    MEDICATIONS  (PRN):  acetaminophen     Tablet .. 650 milliGRAM(s) Oral every 6 hours PRN Temp greater or equal to 38C (100.4F), Mild Pain (1 - 3)  aluminum hydroxide/magnesium hydroxide/simethicone Suspension 30 milliLiter(s) Oral every 4 hours PRN Dyspepsia  sodium chloride 3%  Inhalation 4 milliLiter(s) Inhalation every 12 hours PRN SOB      Vital Signs Last 24 Hrs  T(C): 36.7 (10-27-23 @ 20:01), Max: 36.7 (10-26-23 @ 23:00)  T(F): 98 (10-27-23 @ 20:01), Max: 98.1 (10-26-23 @ 23:00)  HR: 114 (10-27-23 @ 20:01) (59 - 114)  BP: 120/81 (10-27-23 @ 20:01) (96/60 - 159/67)  RR: 20 (10-27-23 @ 20:01) (18 - 28)  SpO2: 96% (10-27-23 @ 20:01) (92% - 98%)           Daily     Daily Weight in k.3 (27 Oct 2023 06:00)  Admit Wt: Drug Dosing Weight  Height (cm): 157.5 (21 Oct 2023 09:26)  Weight (kg): 85.6 (21 Oct 2023 09:26)  BMI (kg/m2): 34.5 (21 Oct 2023 09:26)  BSA (m2): 1.87 (21 Oct 2023 09:26)    Allergies: No Known Allergies      SOCIAL HISTORY:  Smoker: [ ] Yes  [X] No                 Pt denies  ETOH use: [ ] Yes  [X] No             Pt denies  Ilicit Drug use:  [ ] Yes  [X] No     Pt denies    FAMILY HISTORY:  Family history of Alzheimer's disease (Father)    Family history of cancer (Mother)        Review of Systems  GENERAL:  no weakness, fatigue, fevers or chills  NEURO: no dizziness, numbness, tingling or weakness  SKIN: no itching or rashes  HEENT: no visual changes;  no headache, no vertigo, no recent colds  RESPIRATORY: no shortness of breath, no cough, sputum, wheezing  CARDIOVASCULAR:  no chest pain,  or palpitations  GI: no abd pain. no N/V/D.  PERIPHERAL VASCULAR: no swelling, no tenderness, no erythema    PHYSICAL EXAM  General: Well nourished, well developed, NAD.                                              Neuro: Normal exam oriented to person/place & time with no focal motor or sensory  deficits.                    Eyes: Normal exam of conjunctiva & lids, pupils equally reactive.   ENT: Normal exam of nasal/oral mucosa with absence of cyanosis.   Neck: Normal exam of jugular veins, trachea & thyroid.   Chest: Normal lung exam with good air movement absence of wheezes, rales, or rhonchi.                                                                         CV:  Auscultation: normal S1S2, RRR   Carotids: No Bruits[X]  Abdominal Aorta: normal [X] nonpalpable[X]                                                                         GI: Normal exam of abdomen with no noted masses or tenderness. +BSx4Q                                                                                            Extremities: Normal no evidence of cyanosis or deformity, Edema: none  Lower Extremity Pulses: Right[+2DP] Left[+2DP] Varicosities[none]  SKIN : Normal exam to inspection & palpation.                                                           LABS:                        11.1   16.65 )-----------( 352      ( 27 Oct 2023 01:53 )             34.9     10-27    140  |  106  |  16  ----------------------------<  101<H>  4.2   |  24  |  0.62    Ca    8.5      27 Oct 2023 01:53  Phos  3.1     10-27  Mg     2.0     10-27    TPro  5.5<L>  /  Alb  2.3<L>  /  TBili  0.3  /  DBili  x   /  AST  27  /  ALT  21  /  AlkPhos  64  10-27    PT/INR - ( 26 Oct 2023 03:35 )   PT: 13.6 sec;   INR: 1.24 ratio         PTT - ( 26 Oct 2023 03:35 )  PTT:23.9 sec          TTE / SUPA: CONCLUSIONS:      1. Technically difficult image quality.   2. Thickened, echodense pericardium predominantly seen adjacent to the right heart. Moderate-large pericardial effusion predominantly seen inferior to the right atrium and lateral to the LV. The effusion measures up to approximately 2.2 cm inferior to the right atrium and 1.6 cm lateral to the LV. There is inversion of the right atrial free wall during atrial diastole on subcostal imaging. The IVC is nondilated and collapsible (diameter~2.0 cm). Findings are consistent with echocardiographic evidence of increased pericardial pressure/early pericardial tamponade physiology.   3. Left pleural effusion noted.   4. Compared to the transthoracic echocardiogram performed on 10/24/2023 the pericardial effusion appears larger inferior to the right atriu. There is early diastolic inversion of the right atrial free wall noted on today's study. Findings were discussed with Dr. Sabine Centeno on 10/27/2023.

## 2023-10-27 NOTE — CHART NOTE - NSCHARTNOTEFT_GEN_A_CORE
Spoke to Dr. Rg re: pt's repeat TTE   -thickened, echodense pericardium predominantly seen adjacent to the right heart. Moderate-large pericardial effusion predominantly seen inferior to the right atrium and lateral to the LV. The effusion measures up to approximately 2.2 cm inferior to the right atrium and 1.6 cm lateral to the LV. There is inversion of the right atrial free wall during atrial diastole on subcostal imaging. The IVC is nondilated and collapsible (diameter~2.0 cm).   -Findings are consistent with echocardiographic evidence of increased pericardial pressure/early pericardial tamponade physiology.    Discussed w/ CCU fellow - will re-evaluate patient if needs to return to CICU, pt's BPs 90-100s systolic when previously 120-130s systolic. Saturating 97 on 2L NC. Spoke to Dr. Rg re: pt's repeat TTE     -thickened, echodense pericardium predominantly seen adjacent to the right heart. Moderate-large pericardial effusion predominantly seen inferior to the right atrium and lateral to the LV. The effusion measures up to approximately 2.2 cm inferior to the right atrium and 1.6 cm lateral to the LV. There is inversion of the right atrial free wall during atrial diastole on subcostal imaging. The IVC is nondilated and collapsible (diameter~2.0 cm).   -Findings are consistent with echocardiographic evidence of increased pericardial pressure/early pericardial tamponade physiology.    Discussed w/ CCU fellow - will re-evaluate patient if needs to return to CICU, pt's BPs 90-100s systolic when previously 120-130s systolic. Saturating 97 on 2L NC. Spoke to Dr. Rg re: pt's repeat TTE     -thickened, echodense pericardium predominantly seen adjacent to the right heart. Moderate-large pericardial effusion predominantly seen inferior to the right atrium and lateral to the LV. The effusion measures up to approximately 2.2 cm inferior to the right atrium and 1.6 cm lateral to the LV. There is inversion of the right atrial free wall during atrial diastole on subcostal imaging. The IVC is nondilated and collapsible (diameter~2.0 cm).   -Findings are consistent with echocardiographic evidence of increased pericardial pressure/early pericardial tamponade physiology.    Discussed w/ CCU fellow - will re-evaluate patient if needs to return to CICU, pt's BPs 90-100s systolic when previously 120-130s systolic. Saturating 97 on 2L NC.    10/27 18:00 Update - Discussed w/ CCU fellow - pt will have another pericardial drain and return to CICU

## 2023-10-27 NOTE — PROGRESS NOTE ADULT - SUBJECTIVE AND OBJECTIVE BOX
Patient seen and examined at bedside.    Overnight Events:   - Went into AF this AM to 100s-120s    REVIEW OF SYSTEMS:  General: no fatigue/malaise, weight loss/gain.  Skin: no rashes.  Ophthalmologic: no blurred vision, no loss of vision. 	  ENT: no sore throat, rhinorrhea, sinus congestion.  Respiratory: no SOB, cough or wheeze.  CV: No chest pain. No palpitations.   Gastrointestinal:  no N/V/D, no melena/hematemesis/hematochezia.  Genitourinary: no dysuria/hesitancy or hematuria.  Musculoskeletal: no myalgias or arthralgias.  Neurological: no changes in vision or hearing, no lightheadedness/dizziness, no syncope/near syncope	  Psychiatric: no unusual stress/anxiety.   Hematology/Lymphatics: no unusual bleeding, bruising and no lymphadenopathy.  Endocrine: no unusual thirst.           Current Meds:  acetaminophen     Tablet .. 650 milliGRAM(s) Oral every 6 hours PRN  aluminum hydroxide/magnesium hydroxide/simethicone Suspension 30 milliLiter(s) Oral every 4 hours PRN  aMIOdarone    Tablet 200 milliGRAM(s) Oral daily  aMIOdarone    Tablet   Oral   atorvastatin 40 milliGRAM(s) Oral at bedtime  budesonide 160 MICROgram(s)/formoterol 4.5 MICROgram(s) Inhaler 2 Puff(s) Inhalation two times a day  chlorhexidine 2% Cloths 1 Application(s) Topical <User Schedule>  clopidogrel Tablet 75 milliGRAM(s) Oral daily  colchicine 0.6 milliGRAM(s) Oral daily  influenza  Vaccine (HIGH DOSE) 0.7 milliLiter(s) IntraMuscular once  melatonin 5 milliGRAM(s) Oral at bedtime  metoprolol tartrate 25 milliGRAM(s) Oral two times a day  nystatin/triamcinolone Cream 1 Application(s) Topical two times a day  quiNIDine gluconate  milliGRAM(s) Oral every 12 hours  sodium chloride 3%  Inhalation 4 milliLiter(s) Inhalation every 12 hours PRN      Vitals:  T(F): 97.6 (10-27), Max: 98.1 (10-26)  HR: 113 (10-27) (52 - 114)  BP: 110/67 (10-27) (96/60 - 182/73)  RR: 20 (10-27)  SpO2: 96% (10-27)  I&O's Summary    26 Oct 2023 07:01  -  27 Oct 2023 07:00  --------------------------------------------------------  IN: 302.8 mL / OUT: 476 mL / NET: -173.2 mL    27 Oct 2023 07:01  -  27 Oct 2023 12:15  --------------------------------------------------------  IN: 0 mL / OUT: 200 mL / NET: -200 mL        Physical Exam:  Appearance: No acute distress; well appearing on 2L NC  Eyes: PERRL, EOMI, pink conjunctiva  HEENT: Normal oral mucosa  Cardiovascular: RRR, S1, S2, no murmurs, rubs, or gallops; no edema; no JVD  Respiratory: Decreased breath sounds left side with crackles slight improved in ALBERT  Gastrointestinal: soft, non-tender, non-distended with normal bowel sounds  Musculoskeletal: No clubbing; no joint deformity   Neurologic: Non-focal  Lymphatic: No lymphadenopathy  Psychiatry: AAOx3, mood & affect appropriate  Skin: No rashes, ecchymoses, or cyanosis                          11.1   16.65 )-----------( 352      ( 27 Oct 2023 01:53 )             34.9     10-27    140  |  106  |  16  ----------------------------<  101<H>  4.2   |  24  |  0.62    Ca    8.5      27 Oct 2023 01:53  Phos  3.1     10-27  Mg     2.0     10-27    TPro  5.5<L>  /  Alb  2.3<L>  /  TBili  0.3  /  DBili  x   /  AST  27  /  ALT  21  /  AlkPhos  64  10-27    PT/INR - ( 26 Oct 2023 03:35 )   PT: 13.6 sec;   INR: 1.24 ratio         PTT - ( 26 Oct 2023 03:35 )  PTT:23.9 sec

## 2023-10-27 NOTE — PROGRESS NOTE ADULT - ASSESSMENT
Ms. Amie Kohli is a 72-yo female with PMHx of COPD, HTN, PAD, CAD s/p LAD stent w/ 100%  of RCA, Chronic leukocytosis of unknown etiology, prior cardiac arrest s/p left-sided ICD (6/4/2019) complicated by infection and s/p R. single-chamber ICD-reimplantation ([place] in 9/23/2019), CHF EF 45%, grade 2 DHF, recent hospitalization for UTI tx'd w/ CTX and Vancomycin x3 days, Vfib s/p 13 AICD firings, who presented w/ lightheadedness and AICD firing, transferred to CICU after VT ablation c/b cardiac tamponade req. pressors, readmitted to CICU after downgrade c/b VT x1 min, now downgraded on quinidine and amiodarone.

## 2023-10-27 NOTE — PROGRESS NOTE ADULT - ATTENDING COMMENTS
Echo with recurrence and expansion of pericardial effusion. Early tamponade physiology noted.  Volume support.  EP discussed repeat pericardiocentesis vs. pericardial window with CT surgery.

## 2023-10-27 NOTE — CONSULT NOTE ADULT - ASSESSMENT
72 year old patient with bilat heel DTIs  - Patient seen and evaluated  - Heel DTI noted with no open lesions, no signs of infection in feet  - Recommend z flow boots in bed at all times  - Recommend applying cavilon to heels  - Podiatry signing off at this time, reconsult as needed
71 y/o F w/ PMHx COPD, HLD, HTN, PVD, ICM/CAD s/p PCI, cardiac arrest s/p left-sided ICD (6/4/2019) complicated by infection and s/p R single-chamber ICD ([place] in 9/23/2019) presenting for ICD shock.  EP consulted for VT s/p appropriate ICD shock.    #VT s/p ICD shock  Recent admission for incessant VF requiring lido, sotalol, and ~13 ICD shocks  LHC/RHC at that time with no CAD and mildly elevated filling pressures w/ preserved CO  ICD interrogation with appropriate shock for 32 sec run of VT  - continue with home mexilitine, metoprolol, quinidine for now  - K>4, Mg>2  - continue to monitor on tele  - will discuss utility of possible VT ablation this admission in the AM  - can continue rest of home meds    Yamila Acosta MD  PGY-4, Cardiology Fellow    Please check [email or web address]: [email or web address]" for cardiology service schedule and contact information.   *Please note that all recommendations are incomplete until attending attestation*
72F PMHx COPD, HTN, PAD, CAD s/p LAD stent (last LHC showing 100%  of RCA), chronic leukocytosis of unclear etiol, cardiac arrest s/p left-sided ICD (6/4/2019) complicated by infection and s/p R single-chamber ICD reimplantation ([place] in 9/23/2019), CHF EF 45%, grade 2 DHF, and VFib s/p 13 AICD firings underwent VT ablation 10/20 c/b pericardial tamponade. Transferred to CICU for further monitoring. 10/21 developed AF w/ RVR. Pericardial drain removed  10/27 TTE w/ reaccumulation of pericardial effusion. CTS consulted to undergo subxiphoid drainage in am by .

## 2023-10-27 NOTE — PROGRESS NOTE ADULT - TIME BILLING
55 minutes spent in evaluating patient , implementing treatment , reviewing records , reviewing specialist recommendations and educating patient and

## 2023-10-27 NOTE — PROGRESS NOTE ADULT - PROBLEM SELECTOR PLAN 3
- Pt w/ - Pt w/ prior ablation c/b cardiac tamponade s/p pericardial drain in CICU   - Pending limited TTE for evaluation, performed pending result

## 2023-10-27 NOTE — PROGRESS NOTE ADULT - ASSESSMENT
73 y/o F w/ PMHx COPD, HLD, HTN, PVD, ICM/CAD s/p PCI (has  of RCA), cardiac arrest s/p left-sided ICD (6/4/2019) complicated by infection and s/p R single-chamber ICD ([place] in 9/23/2019), recent admission for VT storm with adjustment of medication and NIPS now presenting for ICD shock in the setting of recurrent monomorphic VT with unsuccessful ATP.  Ablation attempted 10/20 but aborted due to hemodynamically significant pericardial effusion with drain placement.  Pericardial drain removed on 10/20/23.  Over the weekend developed new shortness of breath, recurrent VT s/p ICD shock, and afib w/ RVR s/p cardioversion 10/21. She continues to have NSVT. Bronch negative for mucus plug. Currently in Afib.    - Give metoprolol tartrate 50mg x 1 now and increase to 50mg Q8  - Check a limited TTE to assess for pericardial effusion if stable can start low dose Eliquis 2.5mg Q12  - Continue low dose quinidine Q12   - continue on oral amiodarone 200mg daily. Monitor TSH, LFT's, opthalmology and pulmonary function tests as outpatient   AST 10, ALT 12, baseline TSH 3.58 on 10/2   - Will allow for medical optimization and discuss plan for ablation in the future     Note not final until signed by attending       Erasmo Lundberg MD  Cardiology Fellow PGY-6  Phone: [phone]    For all New Consults  [email or web address]   Login: roni   71 y/o F w/ PMHx COPD, HLD, HTN, PVD, ICM/CAD s/p PCI (has  of RCA), cardiac arrest s/p left-sided ICD (6/4/2019) complicated by infection and s/p R single-chamber ICD ([place] in 9/23/2019), recent admission for VT storm with adjustment of medication and NIPS now presenting for ICD shock in the setting of recurrent monomorphic VT with unsuccessful ATP.  Ablation attempted 10/20 but aborted due to hemodynamically significant pericardial effusion with drain placement.  Pericardial drain removed on 10/20/23.  Over the weekend developed new shortness of breath, recurrent VT s/p ICD shock, and afib w/ RVR s/p cardioversion 10/21. She continues to have NSVT. Bronch negative for mucus plug. Currently in Afib.    - Give metoprolol tartrate 50mg x 1 now and increase to 50mg Q8  - Check a limited TTE to assess for pericardial effusion if stable can start low dose Eliquis 2.5mg Q12  - Continue low dose quinidine Q12   - continue on oral amiodarone 200mg daily. Monitor TSH, LFT's, opthalmology and pulmonary function tests as outpatient   AST 10, ALT 12, baseline TSH 3.58 on 10/2   - Will allow for medical optimization and discuss plan for ablation in the future     Note not final until signed by attending       Erasmo Lundberg MD  Cardiology Fellow PGY-6  Phone: [phone]    For all New Consults  [email or web address]   Login: roni   73 y/o F w/ PMHx COPD, HLD, HTN, PVD, ICM/CAD s/p PCI (has  of RCA), cardiac arrest s/p left-sided ICD (6/4/2019) complicated by infection and s/p R single-chamber ICD ([place] in 9/23/2019), recent admission for VT storm with adjustment of medication and NIPS now presenting for ICD shock in the setting of recurrent monomorphic VT with unsuccessful ATP.  Ablation attempted 10/20 but aborted due to hemodynamically significant pericardial effusion with drain placement.  Pericardial drain removed on 10/20/23.  Over the weekend developed new shortness of breath, recurrent VT s/p ICD shock, and afib w/ RVR s/p cardioversion 10/21. She continues to have NSVT. Bronch negative for mucus plug. Currently in Afib.    - Give metoprolol tartrate 50mg x 1 now and increase to 50mg Q8  - Check a limited TTE to assess for pericardial effusion if stable can start low dose Eliquis 2.5mg Q12  - Continue low dose quinidine Q12   - continue on oral amiodarone 200mg daily. Monitor TSH, LFT's, opthalmology and pulmonary function tests as outpatient   AST 10, ALT 12, baseline TSH 3.58 on 10/2   - Will allow for medical optimization and discuss plan for ablation in the future     Note not final until signed by attending       Erasmo Lundberg MD  Cardiology Fellow PGY-6  Phone: [phone]    For all New Consults  [email or web address]   Login: roni

## 2023-10-27 NOTE — PROGRESS NOTE ADULT - SUBJECTIVE AND OBJECTIVE BOX
***************************************************************  Resendez (Ji-Cheng) [facility] PGY2  Internal Medicine   ***************************************************************    AUSTIN KOHLI  72y  MRN: [id number]    Ms. Kohli is a 73 yo F c hx COPD, HTN, PAD, CAD s/p LAD stent (last LHC showing 100%  of RCA), chronic leukocytosis of unclear etiol, cardiac arrest s/p left-sided ICD (6/4/2019) complicated by infection and s/p R single-chamber ICD reimplantation ([place] in 9/23/2019), CHF EF 45%, grade 2 DHF, recent hospitalization for UTI (treated w/ ceftriaxone and Vanco x 3 days) and VFib s/p 13 AICD firings, pw lightheadedness and AICD firing. Pt recently hospitalized for VFib s/p multiple AICD firing, placed on quinidine, mexiletine, metoprolol. Pt reports good compliance with her medications, even though she doesn't know what the meds are. Pt states at around 3-4PM prior to admit, she felt "dizzy" for a few seconds, then felt AICD shock, then had resolution of her dizziness. Denies fevers, chills, CP, SOB, URI symptoms, GI symptoms. Pt states she's never seen a hematologist for leukocytosis. Pt was supposed to f/u with EP as outpt for ablation. Admitted to CICU on arrival. Pt went for VT ablation on 10/19 which was aborted 2/2 cardiac tamponade causing hypotension requiring pressors. A pericardial drain was placed. In the CICU, pressors were weaned and the drain was removed after decreased output and a TTE showing no pericardial effusion. EP continues to follow though is unlikely to do any further procedure this admission. Patient briefly downgraded to medicine floor on 10/20, however had another episode of VT x 1 minutes, started on lidocaine gtt and gave 2g Mg, AICD shocked x1 and out of arrythmia.     In CICU, pt w/ intermittent runs of afib RVR, placed on heparin, amio, BB, heparin d/c'd d/t EP thinking effusion not being assisted by blood thinner. Lidocaine weaned as quinidine started. Electrically silent since runs of afib. C/b atelectasis, physiotherapy and PT w/o atelectatic clearance. Bronched w/ minimal secretions noted. Next XR w/ improvement and pt feeling less congested.              Subjective: no events ON. Denies fever, CP, SOB, abn pain, N/V, dysuria. Tolerating diet.      MEDICATIONS  (STANDING):  aMIOdarone    Tablet 200 milliGRAM(s) Oral daily  aMIOdarone    Tablet   Oral   atorvastatin 40 milliGRAM(s) Oral at bedtime  budesonide 160 MICROgram(s)/formoterol 4.5 MICROgram(s) Inhaler 2 Puff(s) Inhalation two times a day  chlorhexidine 2% Cloths 1 Application(s) Topical <User Schedule>  clopidogrel Tablet 75 milliGRAM(s) Oral daily  colchicine 0.6 milliGRAM(s) Oral daily  influenza  Vaccine (HIGH DOSE) 0.7 milliLiter(s) IntraMuscular once  melatonin 5 milliGRAM(s) Oral at bedtime  metoprolol tartrate 25 milliGRAM(s) Oral two times a day  nystatin/triamcinolone Cream 1 Application(s) Topical two times a day  quiNIDine gluconate  milliGRAM(s) Oral every 12 hours    MEDICATIONS  (PRN):  acetaminophen     Tablet .. 650 milliGRAM(s) Oral every 6 hours PRN Temp greater or equal to 38C (100.4F), Mild Pain (1 - 3)  aluminum hydroxide/magnesium hydroxide/simethicone Suspension 30 milliLiter(s) Oral every 4 hours PRN Dyspepsia  sodium chloride 3%  Inhalation 4 milliLiter(s) Inhalation every 12 hours PRN SOB      Objective:    Vitals: Vital Signs Last 24 Hrs  T(C): 36.4 (10-27-23 @ 10:44), Max: 36.7 (10-26-23 @ 15:00)  T(F): 97.6 (10-27-23 @ 10:44), Max: 98.1 (10-26-23 @ 23:00)  HR: 113 (10-27-23 @ 10:44) (52 - 114)  BP: 110/67 (10-27-23 @ 10:44) (96/60 - 182/73)  BP(mean): 80 (10-27-23 @ 10:00) (70 - 105)  RR: 20 (10-27-23 @ 10:44) (16 - 28)  SpO2: 96% (10-27-23 @ 10:44) (92% - 98%)            I&O's Summary    26 Oct 2023 07:01  -  27 Oct 2023 07:00  --------------------------------------------------------  IN: 302.8 mL / OUT: 476 mL / NET: -173.2 mL    27 Oct 2023 07:01  -  27 Oct 2023 12:11  --------------------------------------------------------  IN: 0 mL / OUT: 200 mL / NET: -200 mL        PHYSICAL EXAM:  GENERAL: NAD  HEAD:  Atraumatic, Normocephalic  EYES: EOMI, conjunctiva and sclera clear  CHEST/LUNG: Clear to auscultation bilaterally; No rales, rhonchi, wheezing, or rubs  HEART: Regular rate and rhythm; No murmurs, rubs, or gallops  ABDOMEN: Soft, Nontender, Nondistended;   SKIN: No rashes or lesions  NERVOUS SYSTEM:  Alert & Oriented X3, no focal deficits    LABS:  10-27    140  |  106  |  16  ----------------------------<  101<H>  4.2   |  24  |  0.62  10-26    139  |  105  |  17  ----------------------------<  104<H>  3.7   |  24  |  0.69  10-25    137  |  101  |  17  ----------------------------<  129<H>  4.2   |  25  |  0.67    Ca    8.5      27 Oct 2023 01:53  Ca    8.5      26 Oct 2023 03:35  Ca    8.7      25 Oct 2023 00:49  Phos  3.1     10-27  Mg     2.0     10-27    TPro  5.5<L>  /  Alb  2.3<L>  /  TBili  0.3  /  DBili  x   /  AST  27  /  ALT  21  /  AlkPhos  64  10-27  TPro  5.5<L>  /  Alb  2.2<L>  /  TBili  0.2  /  DBili  x   /  AST  20  /  ALT  15  /  AlkPhos  65  10-26  TPro  6.5  /  Alb  2.7<L>  /  TBili  0.3  /  DBili  x   /  AST  17  /  ALT  15  /  AlkPhos  70  10-25      PT/INR - ( 26 Oct 2023 03:35 )   PT: 13.6 sec;   INR: 1.24 ratio         PTT - ( 26 Oct 2023 03:35 )  PTT:23.9 sec              Urinalysis Basic - ( 27 Oct 2023 01:53 )    Color: x / Appearance: x / SG: x / pH: x  Gluc: 101 mg/dL / Ketone: x  / Bili: x / Urobili: x   Blood: x / Protein: x / Nitrite: x   Leuk Esterase: x / RBC: x / WBC x   Sq Epi: x / Non Sq Epi: x / Bacteria: x                              11.1   16.65 )-----------( 352      ( 27 Oct 2023 01:53 )             34.9                         11.2   14.31 )-----------( 354      ( 26 Oct 2023 03:35 )             34.7                         12.1   15.16 )-----------( 394      ( 25 Oct 2023 00:50 )             37.7     CAPILLARY BLOOD GLUCOSE          RADIOLOGY & ADDITIONAL TESTS:    Imaging Personally Reviewed:  [x ] YES  [ ] NO    Consultants involved in case:   Consultant(s) Notes Reviewed:  [ x] YES  [ ] NO:   Care Discussed with Consultants/Other Providers [x ] YES  [ ] NO         ***************************************************************  Resendez (Ji-Cheng) [facility] PGY2  Internal Medicine   ***************************************************************    AUSTIN KOHLI  72y  MRN: [id number]    Ms. Kohli is a 71 yo F c hx COPD, HTN, PAD, CAD s/p LAD stent (last LHC showing 100%  of RCA), chronic leukocytosis of unclear etiol, cardiac arrest s/p left-sided ICD (6/4/2019) complicated by infection and s/p R single-chamber ICD reimplantation ([place] in 9/23/2019), CHF EF 45%, grade 2 DHF, recent hospitalization for UTI (treated w/ ceftriaxone and Vanco x 3 days) and VFib s/p 13 AICD firings, pw lightheadedness and AICD firing. Pt recently hospitalized for VFib s/p multiple AICD firing, placed on quinidine, mexiletine, metoprolol. Pt reports good compliance with her medications, even though she doesn't know what the meds are. Pt states at around 3-4PM prior to admit, she felt "dizzy" for a few seconds, then felt AICD shock, then had resolution of her dizziness. Denies fevers, chills, CP, SOB, URI symptoms, GI symptoms. Pt states she's never seen a hematologist for leukocytosis. Pt was supposed to f/u with EP as outpt for ablation. Admitted to CICU on arrival. Pt went for VT ablation on 10/19 which was aborted 2/2 cardiac tamponade causing hypotension requiring pressors. A pericardial drain was placed. In the CICU, pressors were weaned and the drain was removed after decreased output and a TTE showing no pericardial effusion. EP continues to follow though is unlikely to do any further procedure this admission. Patient briefly downgraded to medicine floor on 10/20, however had another episode of VT x 1 minutes, started on lidocaine gtt and gave 2g Mg, AICD shocked x1 and out of arrythmia, transferred back to CTICU.     In CICU, pt w/ intermittent runs of afib RVR, placed on heparin, amio, BB, heparin d/c'd d/t EP thinking effusion not being assisted by blood thinner. Lidocaine weaned as quinidine started. Electrically silent since runs of afib. c/b atelectasis, physiotherapy and PT w/o atelectatic clearance. Bronched w/ minimal secretions noted. Next XR w/ improvement and pt feeling less congested. Patient subsequently deemed stable and transitioned to floor.    Subjective: No acute events overnight. No acute complaints - denies SOB, chest pain.     SHx: Pt lives at home w/o aide    No Medication Allergies    MEDICATIONS  (STANDING):  aMIOdarone    Tablet 200 milliGRAM(s) Oral daily  aMIOdarone    Tablet   Oral   atorvastatin 40 milliGRAM(s) Oral at bedtime  budesonide 160 MICROgram(s)/formoterol 4.5 MICROgram(s) Inhaler 2 Puff(s) Inhalation two times a day  chlorhexidine 2% Cloths 1 Application(s) Topical <User Schedule>  clopidogrel Tablet 75 milliGRAM(s) Oral daily  colchicine 0.6 milliGRAM(s) Oral daily  influenza  Vaccine (HIGH DOSE) 0.7 milliLiter(s) IntraMuscular once  melatonin 5 milliGRAM(s) Oral at bedtime  metoprolol tartrate 25 milliGRAM(s) Oral two times a day  nystatin/triamcinolone Cream 1 Application(s) Topical two times a day  quiNIDine gluconate  milliGRAM(s) Oral every 12 hours    MEDICATIONS  (PRN):  acetaminophen     Tablet .. 650 milliGRAM(s) Oral every 6 hours PRN Temp greater or equal to 38C (100.4F), Mild Pain (1 - 3)  aluminum hydroxide/magnesium hydroxide/simethicone Suspension 30 milliLiter(s) Oral every 4 hours PRN Dyspepsia  sodium chloride 3%  Inhalation 4 milliLiter(s) Inhalation every 12 hours PRN SOB      Objective:    Vitals: Vital Signs Last 24 Hrs  T(C): 36.4 (10-27-23 @ 10:44), Max: 36.7 (10-26-23 @ 15:00)  T(F): 97.6 (10-27-23 @ 10:44), Max: 98.1 (10-26-23 @ 23:00)  HR: 113 (10-27-23 @ 10:44) (52 - 114)  BP: 110/67 (10-27-23 @ 10:44) (96/60 - 182/73)  BP(mean): 80 (10-27-23 @ 10:00) (70 - 105)  RR: 20 (10-27-23 @ 10:44) (16 - 28)  SpO2: 96% (10-27-23 @ 10:44) (92% - 98%)            I&O's Summary    26 Oct 2023 07:01  -  27 Oct 2023 07:00  --------------------------------------------------------  IN: 302.8 mL / OUT: 476 mL / NET: -173.2 mL    27 Oct 2023 07:01  -  27 Oct 2023 12:11  --------------------------------------------------------  IN: 0 mL / OUT: 200 mL / NET: -200 mL        PHYSICAL EXAM:  GENERAL: NAD  HEAD:  Atraumatic, Normocephalic  EYES: EOMI, conjunctiva and sclera clear  CHEST/LUNG: Clear to auscultation bilaterally; No rales, rhonchi, wheezing, or rubs  HEART: Regular rate and rhythm; No murmurs, rubs, or gallops  ABDOMEN: Soft, Nontender, Nondistended;   SKIN: No rashes or lesions  NERVOUS SYSTEM:  Alert & Oriented X3, no focal deficits    LABS:  10-27    140  |  106  |  16  ----------------------------<  101<H>  4.2   |  24  |  0.62  10-26    139  |  105  |  17  ----------------------------<  104<H>  3.7   |  24  |  0.69  10-25    137  |  101  |  17  ----------------------------<  129<H>  4.2   |  25  |  0.67    Ca    8.5      27 Oct 2023 01:53  Ca    8.5      26 Oct 2023 03:35  Ca    8.7      25 Oct 2023 00:49  Phos  3.1     10-27  Mg     2.0     10-27    TPro  5.5<L>  /  Alb  2.3<L>  /  TBili  0.3  /  DBili  x   /  AST  27  /  ALT  21  /  AlkPhos  64  10-27  TPro  5.5<L>  /  Alb  2.2<L>  /  TBili  0.2  /  DBili  x   /  AST  20  /  ALT  15  /  AlkPhos  65  10-26  TPro  6.5  /  Alb  2.7<L>  /  TBili  0.3  /  DBili  x   /  AST  17  /  ALT  15  /  AlkPhos  70  10-25      PT/INR - ( 26 Oct 2023 03:35 )   PT: 13.6 sec;   INR: 1.24 ratio         PTT - ( 26 Oct 2023 03:35 )  PTT:23.9 sec              Urinalysis Basic - ( 27 Oct 2023 01:53 )    Color: x / Appearance: x / SG: x / pH: x  Gluc: 101 mg/dL / Ketone: x  / Bili: x / Urobili: x   Blood: x / Protein: x / Nitrite: x   Leuk Esterase: x / RBC: x / WBC x   Sq Epi: x / Non Sq Epi: x / Bacteria: x                              11.1   16.65 )-----------( 352      ( 27 Oct 2023 01:53 )             34.9                         11.2   14.31 )-----------( 354      ( 26 Oct 2023 03:35 )             34.7                         12.1   15.16 )-----------( 394      ( 25 Oct 2023 00:50 )             37.7     CAPILLARY BLOOD GLUCOSE          RADIOLOGY & ADDITIONAL TESTS:    Imaging Personally Reviewed:  [x ] YES  [ ] NO    Consultants involved in case:   Consultant(s) Notes Reviewed:  [ x] YES  [ ] NO:   Care Discussed with Consultants/Other Providers [x ] YES  [ ] NO         ***************************************************************  Resendez (Ji-Cheng) [facility] PGY2  Internal Medicine   ***************************************************************    AUSTIN KOHLI  72y  MRN: [id number]    Ms. Kohli is a 71 yo F c hx COPD, HTN, PAD, CAD s/p LAD stent (last LHC showing 100%  of RCA), chronic leukocytosis of unclear etiol, cardiac arrest s/p left-sided ICD (6/4/2019) complicated by infection and s/p R single-chamber ICD reimplantation ([place] in 9/23/2019), CHF EF 45%, grade 2 DHF, recent hospitalization for UTI (treated w/ ceftriaxone and Vanco x 3 days) and VFib s/p 13 AICD firings, pw lightheadedness and AICD firing. Pt recently hospitalized for VFib s/p multiple AICD firing, placed on quinidine, mexiletine, metoprolol. Pt reports good compliance with her medications, even though she doesn't know what the meds are. Pt states at around 3-4PM prior to admit, she felt "dizzy" for a few seconds, then felt AICD shock, then had resolution of her dizziness. Denies fevers, chills, CP, SOB, URI symptoms, GI symptoms. Pt states she's never seen a hematologist for leukocytosis. Pt was supposed to f/u with EP as outpt for ablation. Admitted to CICU on arrival. Pt went for VT ablation on 10/19 which was aborted 2/2 cardiac tamponade causing hypotension requiring pressors. A pericardial drain was placed. In the CICU, pressors were weaned and the drain was removed after decreased output and a TTE showing no pericardial effusion. EP continues to follow though is unlikely to do any further procedure this admission. Patient briefly downgraded to medicine floor on 10/20, however had another episode of VT x 1 minutes, started on lidocaine gtt and gave 2g Mg, AICD shocked x1 and out of arrythmia, transferred back to CTICU.     In CICU, pt w/ intermittent runs of afib RVR, placed on heparin, amio, BB, heparin d/c'd d/t EP thinking effusion not being assisted by blood thinner. Lidocaine weaned as quinidine started. Electrically silent since runs of afib. c/b atelectasis, physiotherapy and PT w/o atelectatic clearance. Bronched w/ minimal secretions noted. Next XR w/ improvement and pt feeling less congested. Patient subsequently deemed stable and transitioned to floor.    Subjective: No acute events overnight. No acute complaints - denies SOB, chest pain.     SHx: Pt lives at home w/o aide    No Medication Allergies    MEDICATIONS  (STANDING):  aMIOdarone    Tablet 200 milliGRAM(s) Oral daily  aMIOdarone    Tablet   Oral   atorvastatin 40 milliGRAM(s) Oral at bedtime  budesonide 160 MICROgram(s)/formoterol 4.5 MICROgram(s) Inhaler 2 Puff(s) Inhalation two times a day  chlorhexidine 2% Cloths 1 Application(s) Topical <User Schedule>  clopidogrel Tablet 75 milliGRAM(s) Oral daily  colchicine 0.6 milliGRAM(s) Oral daily  influenza  Vaccine (HIGH DOSE) 0.7 milliLiter(s) IntraMuscular once  melatonin 5 milliGRAM(s) Oral at bedtime  metoprolol tartrate 25 milliGRAM(s) Oral two times a day  nystatin/triamcinolone Cream 1 Application(s) Topical two times a day  quiNIDine gluconate  milliGRAM(s) Oral every 12 hours    MEDICATIONS  (PRN):  acetaminophen     Tablet .. 650 milliGRAM(s) Oral every 6 hours PRN Temp greater or equal to 38C (100.4F), Mild Pain (1 - 3)  aluminum hydroxide/magnesium hydroxide/simethicone Suspension 30 milliLiter(s) Oral every 4 hours PRN Dyspepsia  sodium chloride 3%  Inhalation 4 milliLiter(s) Inhalation every 12 hours PRN SOB      Objective:    Vitals: Vital Signs Last 24 Hrs  T(C): 36.4 (10-27-23 @ 10:44), Max: 36.7 (10-26-23 @ 15:00)  T(F): 97.6 (10-27-23 @ 10:44), Max: 98.1 (10-26-23 @ 23:00)  HR: 113 (10-27-23 @ 10:44) (52 - 114)  BP: 110/67 (10-27-23 @ 10:44) (96/60 - 182/73)  BP(mean): 80 (10-27-23 @ 10:00) (70 - 105)  RR: 20 (10-27-23 @ 10:44) (16 - 28)  SpO2: 96% (10-27-23 @ 10:44) (92% - 98%)            I&O's Summary    26 Oct 2023 07:01  -  27 Oct 2023 07:00  --------------------------------------------------------  IN: 302.8 mL / OUT: 476 mL / NET: -173.2 mL    27 Oct 2023 07:01  -  27 Oct 2023 12:11  --------------------------------------------------------  IN: 0 mL / OUT: 200 mL / NET: -200 mL        PHYSICAL EXAM:  GENERAL: NAD  HEAD:  Atraumatic, Normocephalic  EYES: conjunctiva and sclera clear  CHEST/LUNG: Clear to auscultation bilaterally; No rales, rhonchi, wheezing, or rubs  HEART: Regular rate and rhythm; No murmurs, rubs, or gallops  ABDOMEN: Soft, Nontender, Nondistended;   SKIN:  many ecchymotic areas on the upper and lower extremities   NERVOUS SYSTEM:  Alert & Oriented X3    LABS:  10-27    140  |  106  |  16  ----------------------------<  101<H>  4.2   |  24  |  0.62  10-26    139  |  105  |  17  ----------------------------<  104<H>  3.7   |  24  |  0.69  10-25    137  |  101  |  17  ----------------------------<  129<H>  4.2   |  25  |  0.67    Ca    8.5      27 Oct 2023 01:53  Ca    8.5      26 Oct 2023 03:35  Ca    8.7      25 Oct 2023 00:49  Phos  3.1     10-27  Mg     2.0     10-27    TPro  5.5<L>  /  Alb  2.3<L>  /  TBili  0.3  /  DBili  x   /  AST  27  /  ALT  21  /  AlkPhos  64  10-27  TPro  5.5<L>  /  Alb  2.2<L>  /  TBili  0.2  /  DBili  x   /  AST  20  /  ALT  15  /  AlkPhos  65  10-26  TPro  6.5  /  Alb  2.7<L>  /  TBili  0.3  /  DBili  x   /  AST  17  /  ALT  15  /  AlkPhos  70  10-25      PT/INR - ( 26 Oct 2023 03:35 )   PT: 13.6 sec;   INR: 1.24 ratio         PTT - ( 26 Oct 2023 03:35 )  PTT:23.9 sec              Urinalysis Basic - ( 27 Oct 2023 01:53 )    Color: x / Appearance: x / SG: x / pH: x  Gluc: 101 mg/dL / Ketone: x  / Bili: x / Urobili: x   Blood: x / Protein: x / Nitrite: x   Leuk Esterase: x / RBC: x / WBC x   Sq Epi: x / Non Sq Epi: x / Bacteria: x                              11.1   16.65 )-----------( 352      ( 27 Oct 2023 01:53 )             34.9                         11.2   14.31 )-----------( 354      ( 26 Oct 2023 03:35 )             34.7                         12.1   15.16 )-----------( 394      ( 25 Oct 2023 00:50 )             37.7     CAPILLARY BLOOD GLUCOSE          RADIOLOGY & ADDITIONAL TESTS:    Imaging Personally Reviewed:  [x ] YES  [ ] NO    Consultants involved in case:   Consultant(s) Notes Reviewed:  [ x] YES  [ ] NO:   Care Discussed with Consultants/Other Providers [x ] YES  [ ] NO

## 2023-10-27 NOTE — PROGRESS NOTE ADULT - PROBLEM SELECTOR PLAN 2
- EP following, appreciate recommendations  - Metoprolol Tartrate 25 mg BID previously, recommended transition to 50 mg x1 with 50 mg q8h metoprolol tartrate. - EP following, appreciate recommendations  - Pt currently  HR w/ Afib rhythm on telemetry  - Metoprolol Tartrate 25 mg BID previously, recommended transition to 50 mg x1 with 50 mg q8h metoprolol tartrate.  - Restart Apixaban 2.5 mg q12h pending limited TTE result given pt's pericardial effusion - EP following, appreciate recommendations  - Pt currently  HR w/ Afib rhythm on telemetry  - Metoprolol Tartrate 25 mg BID previously, recommended transition to 50 mg x1 with 50 mg q8h metoprolol tartrate.  -  will consider Restarting Apixaban 2.5 mg q12h pending limited TTE result given pt's pericardial effusion  cont to monitor in Tele

## 2023-10-27 NOTE — PROGRESS NOTE ADULT - ASSESSMENT
====================ASSESSMENT ==============  72F PMHx COPD, HTN, PAD, CAD s/p LAD stent (last LHC showing 100%  of RCA), chronic leukocytosis of unclear etiol, cardiac arrest s/p left-sided ICD (6/4/2019) complicated by infection and s/p R single-chamber ICD reimplantation ([place] in 9/23/2019), CHF EF 45%, grade 2 DHF, and VFib s/p 13 AICD firings underwent VT ablation today c/b pericardial tamponade. Transferred to CICU for further monitoring. 10/21 developed AF w/ RVR.    Plan:  ====================== NEUROLOGY=====================  No active issues  - A&O x3    ==================== RESPIRATORY======================  Hx COPD  - Currently on room air sat >88%    ====================CARDIOVASCULAR==================  Pericardial Tamponade  - s/p VT ablation c/b pericardial tamponade  - pericardial drain with 40 cc drained initially, 250cc in bag upon arrival to CICU, s/p drainage removal 10/20  - TTE 10/22 with moderate pericardial effusion, not seemingly large enough to affect hemodynamics  - Stable effusion on TTE  - Trending CBC, stable (23rd)  - Heparin gtt held per EP  - Per EP, no further interventions in-house this admit        AF w/ RVR  - new onset AF w/ RVR, s/p DCCVx2 at 200J (22nd), to NSR  - heparin gtt held  - s/p Lido gtt, d/c'd     VT  - 400 amio TID PO per EP  - VT ablation aborted after tamponade  - lido d/c'd/. Quinidine 324 BID started 10/23  - has AICD  - replete lytes as needed to maintain K>4, Mag>2  - will need q6mo TSH / LFT, annual CXR and eye exam as outpatient (amiodarone)    CHF  - TTE 10/5 EF 55%, reg WMA, mild-mod MS  - c/w lopressor, uptitrate as BP tolerates    CAD s/p stents  - Continue plavix    ===================HEMATOLOGIC/ONC ===================  H/H & plts stable  - DVT ppx: SCDs    ===================== RENAL =========================  Continue monitoring urine output    ==================== GASTROINTESTINAL===================  No active issues  - Monitor for BM  - DASH Diet     =======================    ENDOCRINE  =====================  No active issues  - Monitor glucose on CMP  - TSH wnl    ========================INFECTIOUS DISEASE================  Chronic leukocytosis  - x1 dose vanco given  - previously on CTX x3d   - Blood cultures negative, UA+    Opacified L lung  - i/s/o tachypnea x2 nights w/o inc o2 requirement    - Effusion vs pna vs atelectasis   - Saline nebs, chest PT  - Careful monitoring w/ daily AM XR, trend WBC and fever curve

## 2023-10-27 NOTE — PROGRESS NOTE ADULT - ATTENDING COMMENTS
73 yo F c hx COPD, HTN, PAD, CAD s/p LAD stent (last [facility] showing 100%  of RCA), chronic leukocytosis of unclear etiol, cardiac arrest s/p left-sided ICD (6/4/2019) complicated by infection and s/p R single-chamber ICD reimplantation ([place] in 9/23/2019), CHF EF 45%, grade 2 DHF, recent hospitalization for UTI (treated w/ ceftriaxone and Vanco x 3 days) and VFib s/p 13 AICD firings, pw lightheadedness and AICD firing. Pt recently hospitalized for VFib s/p multiple AICD firing, placed on quinidine, mexiletine, metoprolol. Pt reports good compliance with her medications, even though she doesn't know what the meds are. Pt states at around 3-4PM prior to admit, she felt "dizzy" for a few seconds, then felt AICD shock, then had resolution of her dizziness.  Ney was admitted to CICU on arrival. Pt went for VT ablation on 10/19 which was aborted 2/2 cardiac tamponade causing hypotension requiring pressors. A pericardial drain was placed. In the CICU, pressors were weaned and the drain was removed after decreased output and a TTE showing no pericardial effusion.  Patient was briefly downgraded to medicine floor on 10/20, however had another episode of VT x 1 minutes, started on lidocaine gtt and gave 2g Mg, AICD shocked x1 and was out of arrythmia.     In CICU, pt w/ intermittent runs of afib RVR, placed on heparin, amio, BB, heparin d/c'd d/t EP thinking effusion not being assisted by blood thinner. Lidocaine weaned as quinidine started. Electrically silent since runs of afib.  S/P  Bronchoscopy on 10/26 with  No significant secretions/plugs to explain atelectasis as reported.  Patient was transferred to the Tele floor on 10/27     #  VTach / was in CICU        amio TID PO per EP---> monitor TSH, LFT and PFT periodically       cont Tele Monitoring        S/P  lido        CW Quinidine 324 BID started 10/23 and Metoprolol        has AICD     Monitor and replete lytes as needed to maintain K>4, Mag>2     will need q6mo TSH / LFT, annual CXR and eye exam as outpatient (amiodarone)    MOnitor QTC and cont Tele      EP to discuss plan for ablation once feasible   # A fib with RVR In the CICU  ( New )      rate control with Metoprolol , Amio     cont with Tele      AC once ok with EP       s/p DCCVx2 at 200J (22nd), to NSR      S/P heparin gtt,  s/p Lido gtt, d/c'd, s/p amio, quinidine PO  #  Pericardial tamponade       S/P pericardial drain  placement and removal 10/20      TTE 10/22 with moderate pericardial effusion, not seemingly large enough to affect hemodynamics      Limited TTE as per EP     Rest is as per Above 73 yo F c hx COPD, HTN, PAD, CAD s/p LAD stent (last [facility] showing 100%  of RCA), chronic leukocytosis of unclear etiol, cardiac arrest s/p left-sided ICD (6/4/2019) complicated by infection and s/p R single-chamber ICD reimplantation ([place] in 9/23/2019), CHF EF 45%, grade 2 DHF, recent hospitalization for UTI (treated w/ ceftriaxone and Vanco x 3 days) and VFib s/p 13 AICD firings, pw lightheadedness and AICD firing. Pt recently hospitalized for VFib s/p multiple AICD firing, placed on quinidine, mexiletine, metoprolol. Pt reports good compliance with her medications, even though she doesn't know what the meds are. Pt states at around 3-4PM prior to admit, she felt "dizzy" for a few seconds, then felt AICD shock, then had resolution of her dizziness.  Ney was admitted to CICU on arrival. Pt went for VT ablation on 10/19 which was aborted 2/2 cardiac tamponade causing hypotension requiring pressors. A pericardial drain was placed. In the CICU, pressors were weaned and the drain was removed after decreased output and a TTE showing no pericardial effusion.  Patient was briefly downgraded to medicine floor on 10/20, however had another episode of VT x 1 minutes, started on lidocaine gtt and gave 2g Mg, AICD shocked x1 and was out of arrythmia.     In CICU, pt w/ intermittent runs of afib RVR, placed on heparin, amio, BB, heparin d/c'd d/t EP thinking effusion not being assisted by blood thinner. Lidocaine weaned as quinidine started. Electrically silent since runs of afib.  S/P  Bronchoscopy on 10/26 with  No significant secretions/plugs to explain atelectasis as reported.  Patient was transferred to the Tele floor on 10/27 .  Patient was seen with  on the floor with no complaints presented     #  VTach / was in CICU and now downgraded to the floor        amio TID PO per EP---> monitor TSH, LFT and PFT periodically       cont Tele Monitoring        S/P  lido        CW Quinidine 324 BID started 10/23 and  Metoprolol to be started as per EP team      Monitor and replete lytes as needed to maintain K>4, Mag>2     will need q6mo TSH / LFT, annual CXR and eye exam as outpatient (amiodarone)    MOnitor QTC and cont Tele      EP to discuss plan for ablation once feasible   # A fib with RVR In the CICU  ( New )      rate control with possible Metoprolol , Amio     cont with Tele      AC once ok with EP       s/p DCCVx2 at 200J (22nd), to NSR      S/P heparin gtt,  s/p Lido gtt, d/c'd, s/p amio, quinidine PO  #  Pericardial tamponade       S/P pericardial drain  placement and removal 10/20      TTE 10/22 with moderate pericardial effusion, not seemingly large enough to affect hemodynamics      Limited TTE as per EP     Rest is as per Above  Discuss with team 6 residents     Silvia Evans  Hospitalist   available on TEAMS

## 2023-10-27 NOTE — PROGRESS NOTE ADULT - SUBJECTIVE AND OBJECTIVE BOX
AUSTIN LEE  MRN-[id number]  Patient is a 72y old  Female who presents with a chief complaint of AICD discharge (26 Oct 2023 07:53)    HPI:  72F c hx COPD, HTN, PAD, CAD s/p LAD stent (last [facility] showing 100%  of RCA), chronic leukocytosis of unclear etiol, cardiac arrest s/p left-sided ICD (6/4/2019) complicated by infection and s/p R single-chamber ICD reimplantation ([place] in 9/23/2019), CHF EF 45%, grade 2 DHF, recent hospitalization for UTI (treated w/ ceftriaxone and Vanco x 3 days) and VFib s/p 13 AICD firings, pw lightheadedness and AICD firing.    Pt recently hospitalized for VFib s/p multiple aicd firing. Pt placed on quinidine, mexiletine, metoprolol. Pt reports good compliance with her medications, even though she doesn't know what the meds are. Pt states at around 3-4PM yesterday, she felt "dizzy" for a few seconds, then felt AICD shock, then had resolution of her dizziness. Denies fevers, chills, CP, SOB, URI symptoms, GI symptoms. Pt states she's never seen a hematologist for leukocytosis. Pt was supposed to f/u with EP as outpt for ablation. (16 Oct 2023 03:47)    24 HOUR EVENTS:  - s/p bronch today    ICU Vital Signs Last 24 Hrs  T(C): 36.6 (27 Oct 2023 07:00), Max: 36.7 (26 Oct 2023 11:00)  T(F): 97.8 (27 Oct 2023 07:00), Max: 98.1 (26 Oct 2023 23:00)  HR: 101 (27 Oct 2023 07:00) (52 - 114)  BP: 142/56 (27 Oct 2023 07:00) (105/49 - 182/73)  BP(mean): 81 (27 Oct 2023 07:00) (70 - 105)  ABP: --  ABP(mean): --  RR: 18 (27 Oct 2023 07:00) (16 - 28)  SpO2: 96% (27 Oct 2023 07:00) (92% - 98%)    O2 Parameters below as of 27 Oct 2023 07:00  Patient On (Oxygen Delivery Method): nasal cannula  O2 Flow (L/min): 2    I&O's Detail  26 Oct 2023 07:01  -  27 Oct 2023 07:00  --------------------------------------------------------  IN:    Dexmedetomidine: 12.8 mL    Oral Fluid: 290 mL  Total IN: 302.8 mL    OUT:    Voided (mL): 476 mL  Total OUT: 476 mL    Total NET: -173.2 mL    PHYSICAL EXAM:  GENERAL: No acute distress, well-developed  HEAD:  Atraumatic, Normocephalic  EYES: EOMI, PERRLA, conjunctiva and sclera clear  NECK: Supple, no lymphadenopathy, no JVD  CHEST/LUNG: improving breath sounds LLL; No wheezes, rales, or rhonchi  HEART: Regular rate and rhythm. Normal S1/S2. No murmurs, rubs, or gallops  ABDOMEN: Soft, non-tender, non-distended; normal bowel sounds, no organomegaly  EXTREMITIES:  2+ peripheral pulses b/l, No clubbing, cyanosis, or edema  NEUROLOGY: A&O x 3, no focal deficits  SKIN: No rashes or lesions    ======================================================  PAST MEDICAL & SURGICAL HISTORY:  Obese      Smoker      HTN (hypertension)      HLD (hyperlipidemia)      CAD (coronary artery disease)      CHF with cardiomyopathy      History of hip surgery

## 2023-10-27 NOTE — CHART NOTE - NSCHARTNOTEFT_GEN_A_CORE
CICU Transfer Note    Transfer from: CICU    Transfer to: (  ) Medicine    ( x ) Telemetry     (   ) RCU        (    ) Palliative         (   ) Stroke Unit       (  ) MICU   (   ) __________________    Accepting Physician: Eulalio Gardner     Team (MAR) or ACP service: MAR      Signout given to: Eulalio Gardner     HPI & Hospital Course:   72F c hx COPD, HTN, PAD, CAD s/p LAD stent (last [facility] showing 100%  of RCA), chronic leukocytosis of unclear etiol, cardiac arrest s/p left-sided ICD (6/4/2019) complicated by infection and s/p R single-chamber ICD reimplantation ([place] in 9/23/2019), CHF EF 45%, grade 2 DHF, recent hospitalization for UTI (treated w/ ceftriaxone and Vanco x 3 days) and VFib s/p 13 AICD firings, pw lightheadedness and AICD firing.    Pt recently hospitalized for VFib s/p multiple aicd firing. Pt placed on quinidine, mexiletine, metoprolol. Pt reports good compliance with her medications, even though she doesn't know what the meds are. Pt states at around 3-4PM yesterday, she felt "dizzy" for a few seconds, then felt AICD shock, then had resolution of her dizziness. Denies fevers, chills, CP, SOB, URI symptoms, GI symptoms. Pt states she's never seen a hematologist for leukocytosis. Pt was supposed to f/u with EP as outpt for ablation.    Admitted to CICU on arrival. Pt went for VT ablation on 10/19 which was aborted 2/2 cardiac tamponade causing hypotension requiring pressors. A pericardial drain was placed. In the CICU, pressors were weaned and the drain was removed after decreased output and a TTE showing no pericardial effusion. EP continues to follow though is unlikely to do any further procedure this admission. Patient briefly downgraded to medicine floor on 10/20, however had another episode of VT x 1 minutes, started on lidocaine gtt and gave 2g Mg, AICD shocked x1 and out of arrythmia.     In CICU, pt w/ intermittent runs of afib RVR, placed on heparin, amio, BB, heparin d/c'd d/t EP thinking effusion not being assisted by blood thinner. Lidocaine weaned as quinidine started. Electrically silent since runs of afib. C/b atelectasis, physiotherapy and PT w/o atelectatic clearance. Bronched w/ minimal secretions noted. Next XR w/ improvement and pt feeling less congested.          PAST MEDICAL & SURGICAL HISTORY:  Obese  Smoker  HTN (hypertension)  HLD (hyperlipidemia)  CAD (coronary artery disease)  CHF with cardiomyopathy  History of hip surgery    Vital Signs Last 24 Hrs  T(C): 36.6 (27 Oct 2023 07:00), Max: 36.7 (26 Oct 2023 11:00)  T(F): 97.8 (27 Oct 2023 07:00), Max: 98.1 (26 Oct 2023 23:00)  HR: 101 (27 Oct 2023 07:00) (52 - 114)  BP: 142/56 (27 Oct 2023 07:00) (105/49 - 182/73)  BP(mean): 81 (27 Oct 2023 07:00) (70 - 105)  RR: 18 (27 Oct 2023 07:00) (16 - 28)  SpO2: 96% (27 Oct 2023 07:00) (92% - 98%)    Parameters below as of 27 Oct 2023 07:00  Patient On (Oxygen Delivery Method): nasal cannula  O2 Flow (L/min): 2    I&O's Summary    26 Oct 2023 07:01  -  27 Oct 2023 07:00  --------------------------------------------------------  IN: 302.8 mL / OUT: 476 mL / NET: -173.2 mL    Allergies  No Known Allergies  Intolerances    MEDICATIONS  (STANDING):  aMIOdarone    Tablet 200 milliGRAM(s) Oral daily  aMIOdarone    Tablet   Oral   atorvastatin 40 milliGRAM(s) Oral at bedtime  budesonide 160 MICROgram(s)/formoterol 4.5 MICROgram(s) Inhaler 2 Puff(s) Inhalation two times a day  chlorhexidine 2% Cloths 1 Application(s) Topical <User Schedule>  clopidogrel Tablet 75 milliGRAM(s) Oral daily  colchicine 0.6 milliGRAM(s) Oral daily  influenza  Vaccine (HIGH DOSE) 0.7 milliLiter(s) IntraMuscular once  melatonin 5 milliGRAM(s) Oral at bedtime  metoprolol tartrate 25 milliGRAM(s) Oral two times a day  nystatin/triamcinolone Cream 1 Application(s) Topical two times a day  quiNIDine gluconate  milliGRAM(s) Oral every 12 hours    MEDICATIONS  (PRN):  sodium chloride 3%  Inhalation 4 milliLiter(s) Inhalation every 12 hours PRN SOB                    11.1   16.65 )-----------( 352      ( 27 Oct 2023 01:53 )             34.9     10-27    140  |  106  |  16  ----------------------------<  101<H>  4.2   |  24  |  0.62    Ca    8.5      27 Oct 2023 01:53  Phos  3.1     10-27  Mg     2.0     10-27    TPro  5.5<L>  /  Alb  2.3<L>  /  TBili  0.3  /  DBili  x   /  AST  27  /  ALT  21  /  AlkPhos  64  10-27    PT/INR - ( 26 Oct 2023 03:35 )   PT: 13.6 sec;   INR: 1.24 ratio         PTT - ( 26 Oct 2023 03:35 )  PTT:23.9 sec    PHYSICAL EXAM:    Constitutional: Obese, quiet. AOx3.   Eyes: Pupils b/l equal, reactive.   ENMT: Pt w/ 2 lpm o2.   Neck: No nodules felt.   Back: No skin changes   Respiratory: LLL diminished, improving   Cardiovascular: RRR  Gastrointestinal: No swelling, distention. No tpp.    Extremities:  Vascular: 2+ b/l DPs, radials   Neurological: 2-12 grossly intact. Sensation grossly intact.   Skin: no breakdown noted, some ecchymosis noted on UE b/l   Musculoskeletal: ROM in UE/LE groslly normal     ASSESSMENT & PLAN:     Assessment	  ====================ASSESSMENT ==============  72F PMHx COPD, HTN, PAD, CAD s/p LAD stent (last [facility] showing 100%  of RCA), chronic leukocytosis of unclear etiol, cardiac arrest s/p left-sided ICD (6/4/2019) complicated by infection and s/p R single-chamber ICD reimplantation ([place] in 9/23/2019), CHF EF 45%, grade 2 DHF, and VFib s/p 13 AICD firings underwent VT ablation today c/b pericardial tamponade. Transferred to CICU for further monitoring. 10/21 developed AF w/ RVR. Amio loaded, BB started, quinidine started. Ggt d/c'd, PO amio and quinidine on-boarded. No current plans for EP on this admission. C/b atelectasis s/p bronch, improving.       Plan:  ====================== NEUROLOGY=====================  No active issues  - A&O x3    ==================== RESPIRATORY======================  #COPD  - Currently on room air or 2 lpm   - Keep sat >88%    ====================CARDIOVASCULAR==================  Pericardial Tamponade  - s/p VT ablation c/b pericardial tamponade  - pericardial drain with 40 cc drained initially, 250cc in bag upon arrival to CICU, s/p drainage removal 10/20  - TTE 10/22 with moderate pericardial effusion, not seemingly large enough to affect hemodynamics  - Heparin gtt held per EP  - Per EP, no further interventions in-house this admit        AF w/ RVR  - new onset AF w/ RVR, s/p DCCVx2 at 200J (22nd), to NSR  - heparin gtt held  - s/p Lido gtt, d/c'd  - s/p amio, quinidine PO      VT  - 400 amio TID PO per EP  - VT ablation aborted after tamponade  - lido d/c'd/. Quinidine 324 BID started 10/23  - has AICD  - replete lytes as needed to maintain K>4, Mag>2  - will need q6mo TSH / LFT, annual CXR and eye exam as outpatient (amiodarone)    CHF  - TTE 10/5 EF 55%, reg WMA, mild-mod MS  - c/w lopressor, uptitrate as BP tolerates    CAD s/p stents  - Continue plavix    ===================HEMATOLOGIC/ONC ===================  H/H & plts stable  - DVT ppx: SCDs    ===================== RENAL =========================  Continue monitoring urine output    ==================== GASTROINTESTINAL===================  No active issues  - Monitor for BM  - DASH Diet     =======================    ENDOCRINE  =====================  No active issues  - Monitor glucose on CMP  - TSH wnl    ========================INFECTIOUS DISEASE================  Chronic leukocytosis  - x1 dose vanco given  - previously on CTX x3d   - Blood cultures negative, UA+    Opacified L lung  - i/s/o tachypnea x2 nights w/o inc o2 requirement    - Effusion vs pna vs atelectasis   - Saline nebs, chest PT  - Careful monitoring w/ daily AM XR, trend WBC and fever curve      FOR FOLLOW UP:  [] EP for oupt ablation  [] Cards for medical management of VT/afib  [] Endocrine outpt for amiodarone TSH  [] Monitor LFTs  [] Chest physiotherapy

## 2023-10-27 NOTE — PROGRESS NOTE ADULT - PROBLEM SELECTOR PLAN 4
- Plavix 75 mg QD - Plavix 75 mg QD  - Restart home atorvastatin 40 mg bedtime - Plavix 75 mg QD  - Continue atorvastatin 40 mg bedtime

## 2023-10-27 NOTE — CHART NOTE - NSCHARTNOTEFT_GEN_A_CORE
MAR Accept Note  Transfer to:  Tele  Accepting Attending Physician:  Dr. Tsai  Assigned Room:  T [facility]    Patient seen and examined.   Labs and data reviewed.   No findings precluding transfer of service.     HPI/CCU COURSE:   Please refer to CCU transfer note for full details. Briefly, this is a   72F c hx COPD, HTN, PAD, CAD s/p LAD stent (last [facility] showing 100%  of RCA), chronic leukocytosis of unclear etiol, cardiac arrest s/p left-sided ICD (6/4/2019) complicated by infection and s/p R single-chamber ICD reimplantation ([place] in 9/23/2019), CHF EF 45%, grade 2 DHF, recent hospitalization for UTI (treated w/ ceftriaxone and Vanco x 3 days) and VFib s/p 13 AICD firings, pw lightheadedness and AICD firing.    Pt recently hospitalized for VFib s/p multiple aicd firing. Pt placed on quinidine, mexiletine, metoprolol. Pt reports good compliance with her medications, even though she doesn't know what the meds are. Pt states at around 3-4PM yesterday, she felt "dizzy" for a few seconds, then felt AICD shock, then had resolution of her dizziness. Denies fevers, chills, CP, SOB, URI symptoms, GI symptoms. Pt states she's never seen a hematologist for leukocytosis. Pt was supposed to f/u with EP as outpt for ablation.    Admitted to CICU on arrival. Pt went for VT ablation on 10/19 which was aborted 2/2 cardiac tamponade causing hypotension requiring pressors. A pericardial drain was placed. In the CICU, pressors were weaned and the drain was removed after decreased output and a TTE showing no pericardial effusion. EP continues to follow though is unlikely to do any further procedure this admission. Patient briefly downgraded to medicine floor on 10/20, however had another episode of VT x 1 minutes, started on lidocaine gtt and gave 2g Mg, AICD shocked x1 and out of arrythmia.     In CICU, pt w/ intermittent runs of afib RVR, placed on heparin, amio, BB, heparin d/c'd d/t EP thinking effusion not being assisted by blood thinner. Lidocaine weaned as quinidine started. Electrically silent since runs of afib. C/b atelectasis, physiotherapy and PT w/o atelectatic clearance. Bronched w/ minimal secretions noted. Next XR w/ improvement and pt feeling less congested.          FOR FOLLOW-UP:  [] EP for oupt ablation  [] Cards for medical management of VT/afib  [] Endocrine outpt for amiodarone TSH  [] Monitor LFTs  [] Chest physiotherapy.    Lenin Jones MD  Internal Medicine PGY-3

## 2023-10-27 NOTE — PROGRESS NOTE ADULT - PROBLEM SELECTOR PLAN 1
- CTM Telemetry, pt currently Afib    - Continue Amiodarone Taper (400 mg q8hx12 then 200 mg QD)  - Continue Quinidine 324 mg q12h - Pt presented w/ AICD shocks with VT req. CICU admits x2 and lidocaine drip  - CTM Telemetry, pt currently Afib    - Continue Amiodarone Taper (400 mg q8hx12, 200 mg QD)  - Continue Quinidine 324 mg q12h  - EP consulted appreciate recommendations           - Continue quinidine and amiodarone, will need TSH/LFTs/Optho and PFTs outpatient           - Will f/u for plans for future ablation - Pt presented w/ AICD shocks with VT req. CICU admits x2 and lidocaine drip  - CTM Telemetry, pt currently Afib    - Continue Amiodarone Taper (400 mg q8hx12, 200 mg QD)  - Continue Quinidine 324 mg q12h  - EP consulted appreciate recommendations           - Continue quinidine and amiodarone, will need TSH/LFTs/Optho and PFTs outpatient           - Will f/u for plans for future ablation  Please Monitor QTC

## 2023-10-27 NOTE — CHART NOTE - NSCHARTNOTEFT_GEN_A_CORE
CICU Transfer Note    Transfer from: CICU    Transfer to: (  ) Medicine    (  ) Telemetry     (   ) RCU        (    ) Palliative         (   ) Stroke Unit       (  ) MICU       Accepting Physician: ______________    Signout given to: ______________    CICU COURSE:      FOR FOLLOW UP:  []   []   []     Kamilah Astudillo PA-C CICU Accept Note    Transfer from: [facility] to CICU    Accepting Physician: Dr. Unruly Zuniga      Hospital Course:     72F COPD, HTN, PAD, CAD s/p LAD stent, chronic leukocytosis (unclear etiology), cardiac arrest s/p left-sided ICD (6/2019) c/c/b infection and s/p R single-chamber ICD reimplantation ([place] in 9/2019), CHF (EF 45%),  recent hospitalization for UTI (ceftriaxone/Vanco) and VFib s/p x13 AICD firings, p/w lightheadedness & AICD firing. Pt admitted to CICU, placed on quinidine, mexiletine, metoprolol and d/c'd home.    Admitted to CICU s/p attempted VT ablation- procedure aborted 2/2 cardiac tamponade w/ hypotension requiring pressors & pericardial drain placed. In CICU, pressors were weaned and drain was removed after decreased output. Follow up TTE showing no pericardial effusion. Patient briefly downgraded to medicine floor on 10/20, however had episode of VT x 1 minute, started on lidocaine gtt & AICD shocked x1. Re-transferred to CICU.     Pt w/ intermittent runs of afib RVR- amio, BB, quinidine started. C/b atelectasis, physiotherapy and PT w/o atelectatic clearance. Bronched w/ minimal secretions noted. F/u CXR w/ improvement in addition to clinical and symptomatic improvement. Pt downgraded to floors again.    On floors, became hypotensive to SBP 90s w/ c/f further tamponade. TTE w/ enlarged effusion and early signs of cardiac tamponade. Transferred to CICU again for further monitoring pre-OR with CTS for pericardial window.     FOR FOLLOW UP:  [] NPO pMN except medications for OR w/ CTS (pericardial window)  [] Cefuroxime pre-operatively on call to OR  [] c/w Amio and Quinidine   [] pending eventual VT ablation w/ EP once pericardial effusion resolves    Kamilah Astudillo PA-C

## 2023-10-27 NOTE — PROGRESS NOTE ADULT - PROBLEM SELECTOR PLAN 9
DVT PPx: SCDs  Diet: Regular  Bowel Regimen: PRN  Code: Full  Dispo: Pt re-evaluation  Pharmacy:  PCP:   Communication:

## 2023-10-27 NOTE — PROGRESS NOTE ADULT - PROBLEM SELECTOR PLAN 5
- Pt previously on entresto 24-26 BID, briefly restarted but held due to hypotension in s/o tamponade  - f/u limited TTE and monitor on metop tartrate 50 q8h then consider restarting  - Lasix diuresis PRN, patient currently euvolemic appearing - Pt previously on entresto 24-26 BID, briefly restarted but held due to hypotension in s/o tamponade  - f/u limited TTE and monitor on metop tartrate 50 q8h then consider restarting  - Lasix diuresis PRN, patient currently euvolemic appearing----> hold if any recurrence of tamponade

## 2023-10-28 NOTE — CHART NOTE - NSCHARTNOTEFT_GEN_A_CORE
s/p pericardial effusion drainage w/ chest tube by Dr. Hillman   Report received at the bedside  ~200cc blood tinged (serosang) output, did not improve her BP  Connect CT w/ LWS x24hrs   s/p Versed 2mg IVP X1 and Fentanly 100mcg   came back to us, currently on Levo @0.15 on NRB  BP 90/40 /70  - Pain management   - Blood pressure support s/p pericardial effusion drainage w/ chest tube by Dr. Hillman   Report received at the bedside  ~200cc blood tinged (serosang) output, did not improve her BP  Connect CT w/ LWS x24hrs   s/p Versed 2mg IVP X1 and Fentanly 100mcg during procedure   came back to us, currently on Levo @0.15 on NRB  BP 90/40 /70  - Pain management   - Blood pressure support

## 2023-10-28 NOTE — BRIEF OPERATIVE NOTE - COMMENTS
No unexpected foreign bodies were apparent on preliminary review of post-op x-ray. Reviewed by Dr. Hillman

## 2023-10-28 NOTE — PROGRESS NOTE ADULT - SUBJECTIVE AND OBJECTIVE BOX
AUSTIN LEE  MRN-[id number]  Patient is a 72y old  Female who presents with a chief complaint of AICD discharge (26 Oct 2023 07:53)    HPI:  72F c hx COPD, HTN, PAD, CAD s/p LAD stent (last C showing 100%  of RCA), chronic leukocytosis of unclear etiol, cardiac arrest s/p left-sided ICD (6/4/2019) complicated by infection and s/p R single-chamber ICD reimplantation ([place] in 9/23/2019), CHF EF 45%, grade 2 DHF, recent hospitalization for UTI (treated w/ ceftriaxone and Vanco x 3 days) and VFib s/p 13 AICD firings, pw lightheadedness and AICD firing.    Pt recently hospitalized for VFib s/p multiple aicd firing. Pt placed on quinidine, mexiletine, metoprolol. Pt reports good compliance with her medications, even though she doesn't know what the meds are. Pt states at around 3-4PM yesterday, she felt "dizzy" for a few seconds, then felt AICD shock, then had resolution of her dizziness. Denies fevers, chills, CP, SOB, URI symptoms, GI symptoms. Pt states she's never seen a hematologist for leukocytosis. Pt was supposed to f/u with EP as outpt for ablation. (16 Oct 2023 03:47)    24 HOUR EVENTS:  - s/p bronch today    ICU Vital Signs Last 24 Hrs  T(C): 36.6 (27 Oct 2023 07:00), Max: 36.7 (26 Oct 2023 11:00)  T(F): 97.8 (27 Oct 2023 07:00), Max: 98.1 (26 Oct 2023 23:00)  HR: 101 (27 Oct 2023 07:00) (52 - 114)  BP: 142/56 (27 Oct 2023 07:00) (105/49 - 182/73)  BP(mean): 81 (27 Oct 2023 07:00) (70 - 105)  ABP: --  ABP(mean): --  RR: 18 (27 Oct 2023 07:00) (16 - 28)  SpO2: 96% (27 Oct 2023 07:00) (92% - 98%)    O2 Parameters below as of 27 Oct 2023 07:00  Patient On (Oxygen Delivery Method): nasal cannula  O2 Flow (L/min): 2    I&O's Detail  26 Oct 2023 07:01  -  27 Oct 2023 07:00  --------------------------------------------------------  IN:    Dexmedetomidine: 12.8 mL    Oral Fluid: 290 mL  Total IN: 302.8 mL    OUT:    Voided (mL): 476 mL  Total OUT: 476 mL    Total NET: -173.2 mL    PHYSICAL EXAM:  GENERAL: No acute distress, well-developed  HEAD:  Atraumatic, Normocephalic  EYES: EOMI, PERRLA, conjunctiva and sclera clear  NECK: Supple, no lymphadenopathy, no JVD  CHEST/LUNG: improving breath sounds LLL; No wheezes, rales, or rhonchi  HEART: Regular rate and rhythm. Normal S1/S2. No murmurs, rubs, or gallops  ABDOMEN: Soft, non-tender, non-distended; normal bowel sounds, no organomegaly  EXTREMITIES:  2+ peripheral pulses b/l, No clubbing, cyanosis, or edema  NEUROLOGY: A&O x 3, no focal deficits  SKIN: No rashes or lesions    ======================================================  PAST MEDICAL & SURGICAL HISTORY:  Obese      Smoker      HTN (hypertension)      HLD (hyperlipidemia)      CAD (coronary artery disease)      CHF with cardiomyopathy      History of hip surgery     AUSTIN LEE  MRN-[id number]  Patient is a 72y old  Female who presents with a chief complaint of AICD discharge (26 Oct 2023 07:53)    HPI:  72F c hx COPD, HTN, PAD, CAD s/p LAD stent (last LHC showing 100%  of RCA), chronic leukocytosis of unclear etiol, cardiac arrest s/p left-sided ICD (6/4/2019) complicated by infection and s/p R single-chamber ICD reimplantation ([place] in 9/23/2019), CHF EF 45%, grade 2 DHF, recent hospitalization for UTI (treated w/ ceftriaxone and Vanco x 3 days) and VFib s/p 13 AICD firings, pw lightheadedness and AICD firing.    Pt recently hospitalized for VFib s/p multiple aicd firing. Pt placed on quinidine, mexiletine, metoprolol. Pt reports good compliance with her medications, even though she doesn't know what the meds are. Pt states at around 3-4PM yesterday, she felt "dizzy" for a few seconds, then felt AICD shock, then had resolution of her dizziness. Denies fevers, chills, CP, SOB, URI symptoms, GI symptoms. Pt states she's never seen a hematologist for leukocytosis. Pt was supposed to f/u with EP as outpt for ablation. (16 Oct 2023 03:47)    24 HOUR EVENTS:  s/p pericardial effusion drainage w/ chest tube. ~200cc blood tinged (serosang) output, did not improve her BP. s/p Versed 2mg IVP X1 and Fentanly 100mcg during procedure. Pt was placed on Levo @0.15 on NRB for BP 90/40 /70.    Prior to procedure, pt denied chest pain, SOB, stated she was feeling ok.        ======================================================  PAST MEDICAL & SURGICAL HISTORY:  Obese      Smoker      HTN (hypertension)      HLD (hyperlipidemia)      CAD (coronary artery disease)      CHF with cardiomyopathy      History of hip surgery      ICU Vital Signs Last 24 Hrs  T(C): 36.4 (28 Oct 2023 10:45), Max: 37 (27 Oct 2023 22:49)  T(F): 97.5 (28 Oct 2023 10:45), Max: 98.6 (27 Oct 2023 22:49)  HR: 80 (28 Oct 2023 11:30) (72 - 127)  BP: 141/61 (28 Oct 2023 11:30) (77/51 - 169/69)  BP(mean): 88 (28 Oct 2023 11:30) (59 - 107)  ABP: --  ABP(mean): --  RR: 16 (28 Oct 2023 11:30) (16 - 26)  SpO2: 99% (28 Oct 2023 11:30) (94% - 100%)    O2 Parameters below as of 28 Oct 2023 11:30  Patient On (Oxygen Delivery Method): mask, nonrebreather    I&O's Detail  26 Oct 2023 07:01  -  27 Oct 2023 07:00  --------------------------------------------------------  IN:    Dexmedetomidine: 12.8 mL    Oral Fluid: 290 mL  Total IN: 302.8 mL    OUT:    Voided (mL): 476 mL  Total OUT: 476 mL    Total NET: -173.2 mL    PHYSICAL EXAM:  GENERAL: No acute distress, well-developed  HEAD:  Atraumatic, Normocephalic  EYES: EOMI, PERRLA, conjunctiva and sclera clear  NECK: Supple, no lymphadenopathy, no JVD  CHEST/LUNG: improving breath sounds LLL; No wheezes, rales, or rhonchi  HEART: Regular rate and rhythm. Normal S1/S2. No murmurs, rubs, or gallops  ABDOMEN: Soft, non-tender, non-distended; normal bowel sounds, no organomegaly  EXTREMITIES:  2+ peripheral pulses b/l, No clubbing, cyanosis, or edema  NEUROLOGY: A&O x 3, no focal deficits  SKIN: No rashes or lesions    CBC Full  -  ( 28 Oct 2023 00:25 )  WBC Count : 19.90 K/uL  RBC Count : 4.16 M/uL  Hemoglobin : 11.8 g/dL  Hematocrit : 36.8 %  Platelet Count - Automated : 381 K/uL  Mean Cell Volume : 88.5 fl  Mean Cell Hemoglobin : 28.4 pg  Mean Cell Hemoglobin Concentration : 32.1 gm/dL  Auto Neutrophil # : 15.41 K/uL  Auto Lymphocyte # : 2.49 K/uL  Auto Monocyte # : 1.58 K/uL  Auto Eosinophil # : 0.16 K/uL  Auto Basophil # : 0.07 K/uL  Auto Neutrophil % : 77.4 %  Auto Lymphocyte % : 12.5 %  Auto Monocyte % : 7.9 %  Auto Eosinophil % : 0.8 %  Auto Basophil % : 0.4 %    10-28    140  |  105  |  14  ----------------------------<  115<H>  3.7   |  24  |  0.65    Ca    8.4      28 Oct 2023 00:25  Phos  3.0     10-28  Mg     1.9     10-28    TPro  5.9<L>  /  Alb  2.5<L>  /  TBili  0.3  /  DBili  x   /  AST  22  /  ALT  22  /  AlkPhos  68  10-28    < from: Intra-Operative Transesophageal Echo W or WO Ultrasound Enhancing Agent (10.28.23 @ 06:00) >    --------------------------------------------------------------------------------  Limited SUPA Exam for monitoring during pericardial window    Left Ventricle:  Left ventricular ejection fraction is estimated at 45 to 50%.     Right Ventricle:  Normal right ventricular global function.     Mitral Valve:  There is trace mitral regurgitation.     Tricuspid Valve:  There is moderate tricuspid regurgitation.    Pericardial effusion visualized circumferentially around heart. About 1 cm effusion visualized along right atrial and right ventricular border. 2 cm effusion visualized anterior to posterior to left ventricle. After drainage, small amount of clot visualized in pericardial space near the RV. Effusion next to LV significant improved with <1 cm seen anterior to LV.    < end of copied text >

## 2023-10-28 NOTE — PROVIDER CONTACT NOTE (OTHER) - BACKGROUND
Dx: ICD  PMH: CHF, CAD, HLD, HTN, Smoker, Obese
Dx: Sustained Vtach s/p appropriate AICD firing
s/p pericardial window
71 YO female admitted for AICD discharge
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
## 2023-10-28 NOTE — PROVIDER CONTACT NOTE (OTHER) - ACTION/TREATMENT ORDERED:
RRT sheet for further info.
Skin care management in place. Turn and position Q2h. Purposeful rounding in progress.
Notified SHANNON Ryan. Patient pending CT Heart without Coronaries w/ IV Cont today and VT ablation tomorrow. Will continue to monitor. Patient safety maintained.
check blood pressure right UE once, olivia not correlating, resume bp left UE and follow noninvasive BP, keep olivia for now

## 2023-10-28 NOTE — PROGRESS NOTE ADULT - ASSESSMENT
====================ASSESSMENT ==============  72F PMHx COPD, HTN, PAD, CAD s/p LAD stent (last LHC showing 100%  of RCA), chronic leukocytosis of unclear etiol, cardiac arrest s/p left-sided ICD (6/4/2019) complicated by infection and s/p R single-chamber ICD reimplantation ([place] in 9/23/2019), CHF EF 45%, grade 2 DHF, and VFib s/p 13 AICD firings underwent VT ablation today c/b pericardial tamponade. Transferred to CICU for further monitoring. 10/21 developed AF w/ RVR.    Plan:  ====================== NEUROLOGY=====================  No active issues  - A&O x3    ==================== RESPIRATORY======================  Hx COPD  - Currently on room air sat >88%    ====================CARDIOVASCULAR==================  Pericardial Tamponade  - s/p VT ablation c/b pericardial tamponade  - pericardial drain with 40 cc drained initially, 250cc in bag upon arrival to CICU, s/p drainage removal 10/20  - TTE 10/22 with moderate pericardial effusion, not seemingly large enough to affect hemodynamics  - Stable effusion on TTE  - Trending CBC, stable (23rd)  - Heparin gtt held per EP  - Per EP, no further interventions in-house this admit        AF w/ RVR  - new onset AF w/ RVR, s/p DCCVx2 at 200J (22nd), to NSR  - heparin gtt held  - s/p Lido gtt, d/c'd     VT  - 400 amio TID PO per EP  - VT ablation aborted after tamponade  - lido d/c'd/. Quinidine 324 BID started 10/23  - has AICD  - replete lytes as needed to maintain K>4, Mag>2  - will need q6mo TSH / LFT, annual CXR and eye exam as outpatient (amiodarone)    CHF  - TTE 10/5 EF 55%, reg WMA, mild-mod MS  - c/w lopressor, uptitrate as BP tolerates    CAD s/p stents  - Continue plavix    ===================HEMATOLOGIC/ONC ===================  H/H & plts stable  - DVT ppx: SCDs    ===================== RENAL =========================  Continue monitoring urine output    ==================== GASTROINTESTINAL===================  No active issues  - Monitor for BM  - DASH Diet     =======================    ENDOCRINE  =====================  No active issues  - Monitor glucose on CMP  - TSH wnl    ========================INFECTIOUS DISEASE================  Chronic leukocytosis  - x1 dose vanco given  - previously on CTX x3d   - Blood cultures negative, UA+    Opacified L lung  - i/s/o tachypnea x2 nights w/o inc o2 requirement    - Effusion vs pna vs atelectasis   - Saline nebs, chest PT  - Careful monitoring w/ daily AM XR, trend WBC and fever curve   ====================ASSESSMENT ==============  72F PMHx COPD, HTN, PAD, CAD s/p LAD stent (last C showing 100%  of RCA), chronic leukocytosis of unclear etiol, cardiac arrest s/p left-sided ICD (6/4/2019) complicated by infection and s/p R single-chamber ICD reimplantation ([place] in 9/23/2019), CHF EF 45%, grade 2 DHF, and VFib s/p 13 AICD firings underwent VT ablation today c/b pericardial tamponade. Transferred to CICU for further monitoring. 10/21 developed AF w/ RVR.    Plan:  ====================== NEUROLOGY=====================  No active issues  - A&O x3    ==================== RESPIRATORY======================  Hx COPD  - Currently on room air sat >88%    Opacified L lung  - i/s/o tachypnea x2 nights w/o inc o2 requirement    - Effusion vs pna vs atelectasis   - Saline, nebs, chest PT tid  - s/p bronchoscopy w/ minimal mucus  - Careful monitoring w/ daily AM XR, trend WBC and fever curve    ====================CARDIOVASCULAR==================  Pericardial Tamponade  - s/p VT ablation c/b pericardial tamponade  - pericardial drain with 40 cc drained initially, 250cc in bag upon arrival to CICU, s/p drainage removal 10/20  - TTE 10/22 with moderate pericardial effusion, not seemingly large enough to affect hemodynamics  - Stable effusion on TTE  - Trending CBC, stable (23rd)  - Heparin gtt held per EP  - Per EP, no further interventions in-house this admit        AF w/ RVR  - new onset AF w/ RVR, s/p DCCVx2 at 200J (22nd), to NSR  - heparin gtt held  - s/p Lido gtt, d/c'd     VT  - 400 amio TID PO per EP  - VT ablation aborted after tamponade  - lido d/c'd/. Quinidine 324 BID started 10/23  - has AICD  - replete lytes as needed to maintain K>4, Mag>2  - will need q6mo TSH / LFT, annual CXR and eye exam as outpatient (amiodarone)    CHF  - TTE 10/5 EF 55%, reg WMA, mild-mod MS  - c/w lopressor, uptitrate as BP tolerates    CAD s/p stents  - Continue plavix    ===================HEMATOLOGIC/ONC ===================  H/H & plts stable  - DVT ppx: SCDs    ===================== RENAL =========================  Continue monitoring urine output    ==================== GASTROINTESTINAL===================  No active issues  - Monitor for BM  - DASH Diet     =======================    ENDOCRINE  =====================  No active issues  - Monitor glucose on CMP  - TSH wnl    ========================INFECTIOUS DISEASE================  Chronic leukocytosis  - x1 dose vanco given  - previously on CTX x3d   - Blood cultures negative, UA+    Opacified L lung  - i/s/o tachypnea x2 nights w/o inc o2 requirement    - Effusion vs pna vs atelectasis   - Saline nebs, chest PT  - Careful monitoring w/ daily AM XR, trend WBC and fever curve    Vaginal rash  -c/w nystatin bid

## 2023-10-28 NOTE — PROGRESS NOTE ADULT - ATTENDING COMMENTS
Echo with recurrence and expansion of pericardial effusion. Early tamponade physiology noted.  Volume support.  Pericardial window with CT surgery today.  Extubated.  Hemodynamic monitoring with arterial line - A-line readings are meaningfully lower than noninvasive pressures on both arms.

## 2023-10-28 NOTE — PRE PROCEDURE NOTE - PRE PROCEDURE EVALUATION
Cardiac Surgery Pre-op Note:     Surgeon: Dr. Hillman    Procedure: 10/28/23: Subxiphoid drainage of pericardial effusion    HPI:  72F c hx COPD, HTN, PAD, CAD s/p LAD stent (last [facility] showing 100%  of RCA), chronic leukocytosis of unclear etiol, cardiac arrest s/p left-sided ICD (2019) complicated by infection and s/p R single-chamber ICD reimplantation ([place] in 2019), CHF EF 45%, grade 2 DHF, recent hospitalization for UTI (treated w/ ceftriaxone and Vanco x 3 days) and VFib s/p 13 AICD firings, pw lightheadedness and AICD firing.    Pt recently hospitalized for VFib s/p multiple aicd firing. Pt placed on quinidine, mexiletine, metoprolol. Pt reports good compliance with her medications, even though she doesn't know what the meds are. Pt states at around 3-4PM yesterday, she felt "dizzy" for a few seconds, then felt AICD shock, then had resolution of her dizziness. Denies fevers, chills, CP, SOB, URI symptoms, GI symptoms. Pt states she's never seen a hematologist for leukocytosis. Pt was supposed to f/u with EP as outpt for ablation. (16 Oct 2023 03:47)      PAST MEDICAL & SURGICAL HISTORY:  Obese      Smoker      HTN (hypertension)      HLD (hyperlipidemia)      CAD (coronary artery disease)      CHF with cardiomyopathy      History of hip surgery          MEDICATIONS  (STANDING):  aMIOdarone    Tablet 200 milliGRAM(s) Oral daily  aMIOdarone    Tablet   Oral   atorvastatin 40 milliGRAM(s) Oral at bedtime  budesonide 160 MICROgram(s)/formoterol 4.5 MICROgram(s) Inhaler 2 Puff(s) Inhalation two times a day  cefuroxime  IVPB 1500 milliGRAM(s) IV Intermittent once  chlorhexidine 0.12% Liquid 30 milliLiter(s) Swish and Spit once  chlorhexidine 2% Cloths 1 Application(s) Topical <User Schedule>  clopidogrel Tablet 75 milliGRAM(s) Oral daily  influenza  Vaccine (HIGH DOSE) 0.7 milliLiter(s) IntraMuscular once  melatonin 5 milliGRAM(s) Oral at bedtime  nystatin/triamcinolone Cream 1 Application(s) Topical two times a day  quiNIDine gluconate  milliGRAM(s) Oral every 12 hours    MEDICATIONS  (PRN):      Vital Signs Last 24 Hrs  T(C): 36.8 (10-28-23 @ 03:00), Max: 37 (10-27-23 @ 22:49)  T(F): 98.2 (10-28-23 @ 03:00), Max: 98.6 (10-27-23 @ 22:49)  HR: 76 (10-28-23 @ 03:00) (61 - 127)  BP: 138/61 (10-28-23 @ 03:00) (77/51 - 142/56)  RR: 20 (10-28-23 @ 03:00) (18 - 28)  SpO2: 96% (10-28-23 @ 03:00) (92% - 98%)          ABO Interpretation: A (10-26 @ 04:03)     Daily     Daily Weight in k.3 (27 Oct 2023 06:00)  Admit Wt: Drug Dosing Weight  Height (cm): 157.5 (21 Oct 2023 09:26)  Weight (kg): 85.6 (21 Oct 2023 09:26)  BMI (kg/m2): 34.5 (21 Oct 2023 09:26)  BSA (m2): 1.87 (21 Oct 2023 09:26)    Labs:                        11.8   19.90 )-----------( 381      ( 28 Oct 2023 00:25 )             36.8     10-28    140  |  105  |  14  ----------------------------<  115<H>  3.7   |  24  |  0.65    Ca    8.4      28 Oct 2023 00:25  Phos  3.0     10-28  Mg     1.9     10-28    TPro  5.9<L>  /  Alb  2.5<L>  /  TBili  0.3  /  DBili  x   /  AST  22  /  ALT  22  /  AlkPhos  68  10    PT/INR - ( 26 Oct 2023 03:35 )   PT: 13.6 sec;   INR: 1.24 ratio         PTT - ( 26 Oct 2023 03:35 )  PTT:23.9 sec    ABO Interpretation: A (10-26 @ 04:03)        Thyroid Panel:   MRSA:  / MSSA:   Urinalysis Basic - ( 28 Oct 2023 00:25 )    Color: x / Appearance: x / SG: x / pH: x  Gluc: 115 mg/dL / Ketone: x  / Bili: x / Urobili: x   Blood: x / Protein: x / Nitrite: x   Leuk Esterase: x / RBC: x / WBC x   Sq Epi: x / Non Sq Epi: x / Bacteria: x      P2Y12    CXR:     Carotid Duplex:      PFT's:    Echocardiogram:  1. Technically difficult image quality.   2. Thickened, echodense pericardium predominantly seen adjacent to the right heart. Moderate-large pericardial effusion predominantly seen inferior to the right atrium and lateral to the LV. The effusion measures up to approximately 2.2 cm inferior to the right atrium and 1.6 cm lateral to the LV. There is inversion of the right atrial free wall during atrial diastole on subcostal imaging. The IVC is nondilated and collapsible (diameter~2.0 cm). Findings are consistent with echocardiographic evidence of increased pericardial pressure/early pericardial tamponade physiology.   3. Left pleural effusion noted.   4. Compared to the transthoracic echocardiogram performed on 10/24/2023 the pericardial effusion appears larger inferior to the right atriu. There is early diastolic inversion of the right atrial free wall noted on today's study. Findings were discussed with Dr. Sabine Centeno on 10/27/2023.      Cardiac catheterization:    PHYSICAL EXAM:  Neuro: A&Ox3, NAD  Pulm: B/L BS CTA  CV: RRR,+S1S2  Abd: Soft, NT/ND +BSX4Q  Ext: B/L LE no edema, +PP, no calf tenderness    Pt has AICD/PPM [X ] Yes  [ ] No             Brand Name:  Pre-op Beta Blocker ordered within 24 hrs of surgery (CABG ONLY)? n/a [x ] Yes  [ ] No  If not, Why?  Type & Cross  [X ] Yes  [ ] No  NPO after Midnight [x ] Yes  [ ] No  Pre-op ABX ordered, to be taped on chart:  [ x] Yes  [ ] No     Hibiclens/Peridex ordered [x ] Yes  [ ] No  Intraop on Hold: PRBCs, CXR, SUPA [x ]   Consent obtained  [ ] Yes  [ ] No

## 2023-10-28 NOTE — PRE-ANESTHESIA EVALUATION ADULT - NSANTHOSAYNRD_GEN_A_CORE
No. [place] screening performed.  STOP BANG Legend: 0-2 = LOW Risk; 3-4 = INTERMEDIATE Risk; 5-8 = HIGH Risk
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
## 2023-10-28 NOTE — PROGRESS NOTE ADULT - SUBJECTIVE AND OBJECTIVE BOX
Patient is a 72y old  Female who presents with a chief complaint of AICD discharge (28 Oct 2023 07:24)    INTERVAL HISTORY:  - S/p Pericardial window this morning with CTS    SUBJECTIVE  - Patient seen and evaluated at bedside.     MEDICATIONS:  MEDICATIONS  (STANDING):  acetaminophen     Tablet .. 650 milliGRAM(s) Oral every 6 hours  albuterol/ipratropium for Nebulization 3 milliLiter(s) Nebulizer every 6 hours  aMIOdarone    Tablet 200 milliGRAM(s) Oral daily  atorvastatin 40 milliGRAM(s) Oral at bedtime  chlorhexidine 2% Cloths 1 Application(s) Topical daily  influenza  Vaccine (HIGH DOSE) 0.7 milliLiter(s) IntraMuscular once  melatonin 5 milliGRAM(s) Oral at bedtime  nystatin Cream 1 Application(s) Topical two times a day  quiNIDine gluconate  milliGRAM(s) Oral every 12 hours  sodium chloride 3%  Inhalation 4 milliLiter(s) Inhalation every 8 hours    MEDICATIONS  (PRN):  HYDROmorphone  Injectable 0.5 milliGRAM(s) IV Push every 4 hours PRN Severe Pain (7 - 10)  oxyCODONE    IR 10 milliGRAM(s) Oral every 4 hours PRN Severe Pain (7 - 10)  oxyCODONE    IR 5 milliGRAM(s) Oral every 4 hours PRN Moderate Pain (4 - 6)      OBJECTIVE:  ICU Vital Signs Last 24 Hrs  T(C): 36.4 (28 Oct 2023 15:00), Max: 37 (27 Oct 2023 22:49)  T(F): 97.5 (28 Oct 2023 15:00), Max: 98.6 (27 Oct 2023 22:49)  HR: 73 (28 Oct 2023 19:00) (72 - 127)  BP: 119/53 (28 Oct 2023 19:00) (77/51 - 169/69)  BP(mean): 77 (28 Oct 2023 19:00) (59 - 107)  RR: 15 (28 Oct 2023 19:00) (14 - 26)  SpO2: 98% (28 Oct 2023 19:00) (94% - 100%)    O2 Parameters below as of 28 Oct 2023 19:00  Patient On (Oxygen Delivery Method): nasal cannula  O2 Flow (L/min): 3    I&O's Summary    27 Oct 2023 07:01  -  28 Oct 2023 07:00  --------------------------------------------------------  IN: 1110 mL / OUT: 550 mL / NET: 560 mL    28 Oct 2023 07:01  -  28 Oct 2023 19:29  --------------------------------------------------------  IN: 120 mL / OUT: 545 mL / NET: -425 mL      Daily Height in cm: 157.48 (28 Oct 2023 08:25)      PHYSICAL EXAM:  General: NAD, well-groomed, well-developed  Eyes: Conjunctiva and sclera clear  ENMT: Moist mucous membranes  Neck: Supple  Chest: Clear to auscultation bilaterally; no rales, rhonchi, or wheezing  Heart: Regular rate and rhythm; normal S1 and S2; no murmurs, rubs, or gallops  Abd: Soft, nontender, nondistended  Nervous System: AAOX3  Ext: no peripheral LE edema bilaterally    LABS:  ABG - ( 28 Oct 2023 15:27 )  pH, Arterial: 7.32  pH, Blood: x     /  pCO2: 45    /  pO2: 98    / HCO3: 23    / Base Excess: -3.0  /  SaO2: 96.9                          11.6   29.47 )-----------( 392      ( 28 Oct 2023 16:11 )             36.3     10-28    139  |  104  |  17  ----------------------------<  145<H>  5.2   |  22  |  0.65    Ca    8.1<L>      28 Oct 2023 16:11  Phos  3.6     10-28  Mg     2.4     10-28    TPro  5.9<L>  /  Alb  2.6<L>  /  TBili  0.3  /  DBili  x   /  AST  18  /  ALT  21  /  AlkPhos  67  10-28    LIVER FUNCTIONS - ( 28 Oct 2023 16:11 )  Alb: 2.6 g/dL / Pro: 5.9 g/dL / ALK PHOS: 67 U/L / ALT: 21 U/L / AST: 18 U/L / GGT: x             Urinalysis Basic - ( 28 Oct 2023 16:11 )    Color: x / Appearance: x / SG: x / pH: x  Gluc: 145 mg/dL / Ketone: x  / Bili: x / Urobili: x   Blood: x / Protein: x / Nitrite: x   Leuk Esterase: x / RBC: x / WBC x   Sq Epi: x / Non Sq Epi: x / Bacteria: x      ====================ASSESSMENT ==============  72F PMHx COPD, HTN, PAD, CAD s/p LAD stent (last LHC showing 100%  of RCA), chronic leukocytosis of unclear etiol, cardiac arrest s/p left-sided ICD (6/4/2019) complicated by infection and s/p R single-chamber ICD reimplantation ([place] in 9/23/2019), CHF EF 45%, grade 2 DHF, and VFib s/p 13 AICD firings underwent VT ablation today c/b pericardial tamponade. Transferred to CICU for further monitoring. 10/21 developed AF w/ RVR.    Plan:  ====================== NEUROLOGY=====================  A&Ox3  - no active issues  - continue to monitor mental status as per protocol     ==================== RESPIRATORY======================  Hx COPD  - Currently on room air sat >88%    Opacified L lung  - i/s/o tachypnea x2 nights w/o inc o2 requirement    - Effusion vs pna vs atelectasis   - Saline, nebs, chest PT tid  - s/p bronchoscopy w/ minimal mucus  - Careful monitoring w/ daily AM XR, trend WBC and fever curve  - continue to monitor SpO2 with goal >94%    ====================CARDIOVASCULAR==================  Pericardial Tamponade  - s/p aborted VT ablation c/b pericardial tamponade  - pericardial drain with 40 cc drained initially, 250cc in bag upon arrival to CICU, s/p drainage removal on 10/20  - TTE 10/22 with moderate pericardial effusion, not seemingly large enough to affect hemodynamics  - Transferred to floors, became hypotensive to SBP 90s, TTE with enlarged pericardial effusion size. Transferred back to CICU 10/27 pre OR for monitoring   - now s/p pericardial window with CTS ([facility]) on 10/28; 300cc sanguinous fluid removed intraoperatively  - monitor chest tube output    AF w/ RVR  - new onset AF w/ RVR, s/p DCCVx2 at 200J (22nd), to NSR  - heparin gtt held  - s/p Lido gtt, d/c'd     VT  - 400 amio TID PO per EP  - VT ablation aborted after tamponade  - lido d/c'd/. Quinidine 324 BID started 10/23  - has AICD  - replete lytes as needed to maintain K>4, Mag>2  - will need q6mo TSH / LFT, annual CXR and eye exam as outpatient (while on amiodarone)    CHF  - TTE 10/5 EF 55%, reg WMA, mild-mod MS  - c/w lopressor, uptitrate as BP tolerates    CAD s/p stents  - on home plavix, held s/p procedure 10/28    ===================== RENAL =========================  No acute issues  - Continue monitoring urine output, lytes, SCr/ BUN  - replete lytes prn with goal K >4 and Mg >2    =============== GASTROINTESTINAL===================  DASH Diet    ===================ENDO====================  No active issues  - Monitor glucose on CMP  - TSH wnl    ===================HEMATOLOGIC/ONC ===================  Monitor H/H and plts  - DVT PPX: SCDs    ==================INFECTIOUS DISEASE================  Afebrile  - chronic leukocytosis of unknown origin   - monitor and trend WBC and temperature curve     Dispo: Maintain in ICU s/p CTS.     Patient requires continuous monitoring with bedside rhythm monitoring, arterial line, pulse oximetry, ventilator monitoring and intermittent blood gas analysis.  Care plan discussed with ICU care team.  I have spent 35 minutes providing critical care, in addition to initial critical time provided by CICU attending Dr. Zuniga, re-evaluated multiple times during the day.    Kamilah Astudillo PA-C

## 2023-10-28 NOTE — PROVIDER CONTACT NOTE (OTHER) - ASSESSMENT
Pt A&O x 4, RA to 4LNC due to SOB noted. /87, , R 30, 92% on 4LNC, 98.9F.
pt asymptomatic, right radial site benign
Pt A&Ox4, able to make needs known. Pt asymptomatic. VSS. No s/s of bleeding. Pt denies cp, denies SOB, denies pain.
Wound assessment done and noted B/L Heel DTI, sacrum DTI

## 2023-10-29 NOTE — PROGRESS NOTE ADULT - SUBJECTIVE AND OBJECTIVE BOX
Patient is a 72y old  Female who presents with a chief complaint of AICD discharge (29 Oct 2023 12:15)      INTERVAL HISTORY:  -    SUBJECTIVE  - Patient seen and evaluated at bedside.     MEDICATIONS:  MEDICATIONS  (STANDING):  acetaminophen     Tablet .. 650 milliGRAM(s) Oral every 6 hours  aspirin enteric coated 81 milliGRAM(s) Oral daily  atorvastatin 40 milliGRAM(s) Oral at bedtime  chlorhexidine 2% Cloths 1 Application(s) Topical daily  clopidogrel Tablet 75 milliGRAM(s) Oral daily  diltiazem Infusion 10 mG/Hr (10 mL/Hr) IV Continuous <Continuous>  influenza  Vaccine (HIGH DOSE) 0.7 milliLiter(s) IntraMuscular once  melatonin 5 milliGRAM(s) Oral at bedtime  nystatin Cream 1 Application(s) Topical two times a day  quiNIDine gluconate  milliGRAM(s) Oral every 12 hours  sodium chloride 3%  Inhalation 4 milliLiter(s) Inhalation every 8 hours    MEDICATIONS  (PRN):  oxyCODONE    IR 5 milliGRAM(s) Oral every 4 hours PRN Moderate Pain (4 - 6)  oxyCODONE    IR 10 milliGRAM(s) Oral every 4 hours PRN Severe Pain (7 - 10)      OBJECTIVE:  ICU Vital Signs Last 24 Hrs  T(C): 36.6 (29 Oct 2023 14:00), Max: 36.9 (28 Oct 2023 23:00)  T(F): 97.9 (29 Oct 2023 14:00), Max: 98.4 (28 Oct 2023 23:00)  HR: 75 (29 Oct 2023 18:00) (69 - 167)  BP: 128/60 (29 Oct 2023 18:00) (92/53 - 148/89)  BP(mean): 86 (29 Oct 2023 18:00) (63 - 115)  ABP: --  ABP(mean): --  RR: 23 (29 Oct 2023 18:00) (17 - 31)  SpO2: 96% (29 Oct 2023 18:00) (94% - 100%)    O2 Parameters below as of 29 Oct 2023 17:00  Patient On (Oxygen Delivery Method): nasal cannula  O2 Flow (L/min): 2          Adult Advanced Hemodynamics Last 24 Hrs  CVP(mm Hg): --  CVP(cm H2O): --  CO: --  CI: --  PA: --  PA(mean): --  PCWP: --  SVR: --  SVRI: --  PVR: --  PVRI: --  CAPILLARY BLOOD GLUCOSE        CAPILLARY BLOOD GLUCOSE        I&O's Summary    28 Oct 2023 07:01  -  29 Oct 2023 07:00  --------------------------------------------------------  IN: 840 mL / OUT: 913 mL / NET: -73 mL    29 Oct 2023 07:01  -  29 Oct 2023 19:22  --------------------------------------------------------  IN: 500 mL / OUT: 20 mL / NET: 480 mL      Daily     Daily Weight in k.5 (29 Oct 2023 04:00)    PHYSICAL EXAM:  General: NAD, well-groomed, well-developed  Eyes: Conjunctiva and sclera clear  ENMT: Moist mucous membranes  Neck: Supple  Chest: Clear to auscultation bilaterally; no rales, rhonchi, or wheezing  Heart: Regular rate and rhythm; normal S1 and S2; no murmurs, rubs, or gallops  Abd: Soft, nontender, nondistended  Nervous System: AAOX3  Ext: no peripheral LE edema bilaterally  Lines/Tubes: IV peripheral    LABS:  ABG - ( 28 Oct 2023 15:27 )  pH, Arterial: 7.32  pH, Blood: x     /  pCO2: 45    /  pO2: 98    / HCO3: 23    / Base Excess: -3.0  /  SaO2: 96.9                                    11.5   25.54 )-----------( 378      ( 29 Oct 2023 15:20 )             35.6     10-29    137  |  106  |  24<H>  ----------------------------<  143<H>  4.3   |  23  |  0.69    Ca    8.0<L>      29 Oct 2023 15:20  Phos  2.8     10-29  Mg     2.2     10-29    TPro  5.4<L>  /  Alb  2.5<L>  /  TBili  0.2  /  DBili  x   /  AST  10  /  ALT  16  /  AlkPhos  58  10-29    LIVER FUNCTIONS - ( 29 Oct 2023 15:20 )  Alb: 2.5 g/dL / Pro: 5.4 g/dL / ALK PHOS: 58 U/L / ALT: 16 U/L / AST: 10 U/L / GGT: x                   Urinalysis Basic - ( 29 Oct 2023 15:20 )    Color: x / Appearance: x / SG: x / pH: x  Gluc: 143 mg/dL / Ketone: x  / Bili: x / Urobili: x   Blood: x / Protein: x / Nitrite: x   Leuk Esterase: x / RBC: x / WBC x   Sq Epi: x / Non Sq Epi: x / Bacteria: x      ====================ASSESSMENT ==============  72F PMHx COPD, HTN, PAD, CAD s/p LAD stent (last LHC showing 100%  of RCA), chronic leukocytosis of unclear etiol, cardiac arrest s/p left-sided ICD (2019) complicated by infection and s/p R single-chamber ICD reimplantation ([place] in 2019), CHF EF 45%, grade 2 DHF, and VFib s/p 13 AICD firings underwent VT ablation today c/b pericardial tamponade. Transferred to CICU for further monitoring. 10/21 developed AF w/ RVR. 10/28 pericardial window.     Plan:  ====================== NEUROLOGY=====================  A&Ox3  - no active issues  - continue to monitor mental status as per protocol     ==================== RESPIRATORY======================  Opacified L lung - Improving  - Noted to be tachypnea x2 nights w/o inc o2 requirement, CXR at that time showed white out L hemithorax: mucus plug vs. effusion vs. PNA  - s/p bronchoscopy w/ minimal mucus; Post bronch CXR improved, still b/l effusion but improving L lung aeration  - Careful monitoring w/ daily AM XR, trend WBC and fever curve  - Saline, chest PT tid; Nebs was held 10/29 AM because of AF w/ RVR  - continue to monitor SpO2 with goal >94%, O2 therapy PRN    ====================CARDIOVASCULAR==================  Pericardial Tamponade  - s/p aborted VT ablation c/b pericardial tamponade  - pericardial drain with 40 cc drained initially, 250cc in bag upon arrival to CICU, s/p drainage removal on 10/20  - TTE 10/22 with moderate pericardial effusion, not seemingly large enough to affect hemodynamics  - Transferred to floors, became hypotensive to SBP 90s, TTE with enlarged pericardial effusion size. Transferred back to CICU 10/27 pre OR for monitoring; now s/p pericardial window with CTS (Kady) on 10/28; 300cc sanguinous fluid removed intraoperatively, chest tube in place post-operatively  - monitor chest tube output    AF w/ RVR  - new onset AF w/ RVR, s/p DCCVx2 at 200J (), to NSR  - s/p Lido gtt, d/c'd  - Again went into AF w/ RVR on 10/29 AM to 190s-200s, given Amio 150 IV. started on Cardizem 10 gtt  - f/u EP recs  - Will discuss with EP/CTS regarding r/s AC     VT  - 400 amio TID PO per EP  - VT ablation aborted after tamponade  - lido d/c'd/. Quinidine 324 BID started 10/23  - has AICD  - replete lytes as needed to maintain K>4, Mag>2  - will need q6mo TSH / LFT, annual CXR and eye exam as outpatient (while on amiodarone)    CHF  - TTE 10/5 EF 55%, reg WMA, mild-mod MS  - Holding GDMT given critical illness     CAD s/p stents  - Plavix held iso recent bloody pericardial effusion and recent pericardial window/chest tube  - c/w DAPT, can consider d/c ASA once triple therapy r/s    ===================== RENAL =========================  No acute issues  - Continue monitoring urine output, lytes, SCr/ BUN  - replete lytes prn with goal K >4 and Mg >2    =============== GASTROINTESTINAL===================  DASH Diet    ===================ENDO====================  No active issues  - Monitor glucose on CMP  - TSH wnl    ===================HEMATOLOGIC/ONC ===================  Monitor H/H and plts  - DVT PPX: SCDs. Holding chemical/systemic DVT ppx s/p procedure    ==================INFECTIOUS DISEASE================  Afebrile  - chronic leukocytosis of unknown origin   - monitor and trend WBC and temperature curve       Dispo: Maintain in ICU while on Cardizem gtt    Patient requires continuous monitoring with bedside rhythm monitoring, arterial line, pulse oximetry, ventilator monitoring and intermittent blood gas analysis.  Care plan discussed with ICU care team.  I have spent 35 minutes providing critical care, in addition to initial critical time provided by CICU attending Dr. Recinos, re-evaluated multiple times during the day.    Kamilah Astudillo PA-C Patient is a 72y old  Female who presents with a chief complaint of AICD discharge (29 Oct 2023 12:15)      INTERVAL HISTORY:  - afib w/ rvr in 200s, started on Cardizem gtt at 10    SUBJECTIVE  - Patient seen and evaluated at bedside.     MEDICATIONS:  MEDICATIONS  (STANDING):  acetaminophen     Tablet .. 650 milliGRAM(s) Oral every 6 hours  aspirin enteric coated 81 milliGRAM(s) Oral daily  atorvastatin 40 milliGRAM(s) Oral at bedtime  chlorhexidine 2% Cloths 1 Application(s) Topical daily  clopidogrel Tablet 75 milliGRAM(s) Oral daily  diltiazem Infusion 10 mG/Hr (10 mL/Hr) IV Continuous <Continuous>  influenza  Vaccine (HIGH DOSE) 0.7 milliLiter(s) IntraMuscular once  melatonin 5 milliGRAM(s) Oral at bedtime  nystatin Cream 1 Application(s) Topical two times a day  quiNIDine gluconate  milliGRAM(s) Oral every 12 hours  sodium chloride 3%  Inhalation 4 milliLiter(s) Inhalation every 8 hours    MEDICATIONS  (PRN):  oxyCODONE    IR 5 milliGRAM(s) Oral every 4 hours PRN Moderate Pain (4 - 6)  oxyCODONE    IR 10 milliGRAM(s) Oral every 4 hours PRN Severe Pain (7 - 10)      OBJECTIVE:  ICU Vital Signs Last 24 Hrs  T(C): 36.6 (29 Oct 2023 14:00), Max: 36.9 (28 Oct 2023 23:00)  T(F): 97.9 (29 Oct 2023 14:00), Max: 98.4 (28 Oct 2023 23:00)  HR: 75 (29 Oct 2023 18:00) (69 - 167)  BP: 128/60 (29 Oct 2023 18:00) (92/53 - 148/89)  BP(mean): 86 (29 Oct 2023 18:00) (63 - 115)  ABP: --  ABP(mean): --  RR: 23 (29 Oct 2023 18:00) (17 - 31)  SpO2: 96% (29 Oct 2023 18:00) (94% - 100%)    O2 Parameters below as of 29 Oct 2023 17:00  Patient On (Oxygen Delivery Method): nasal cannula  O2 Flow (L/min): 2          Adult Advanced Hemodynamics Last 24 Hrs  CVP(mm Hg): --  CVP(cm H2O): --  CO: --  CI: --  PA: --  PA(mean): --  PCWP: --  SVR: --  SVRI: --  PVR: --  PVRI: --  CAPILLARY BLOOD GLUCOSE        CAPILLARY BLOOD GLUCOSE        I&O's Summary    28 Oct 2023 07:01  -  29 Oct 2023 07:00  --------------------------------------------------------  IN: 840 mL / OUT: 913 mL / NET: -73 mL    29 Oct 2023 07:01  -  29 Oct 2023 19:22  --------------------------------------------------------  IN: 500 mL / OUT: 20 mL / NET: 480 mL      Daily     Daily Weight in k.5 (29 Oct 2023 04:00)    PHYSICAL EXAM:  General: NAD, well-groomed, well-developed  Eyes: Conjunctiva and sclera clear  ENMT: Moist mucous membranes  Neck: Supple  Chest: Clear to auscultation bilaterally; no rales, rhonchi, or wheezing  Heart: Regular rate and rhythm; normal S1 and S2; no murmurs, rubs, or gallops  Abd: Soft, nontender, nondistended  Nervous System: AAOX3  Ext: no peripheral LE edema bilaterally  Lines/Tubes: IV peripheral    LABS:  ABG - ( 28 Oct 2023 15:27 )  pH, Arterial: 7.32  pH, Blood: x     /  pCO2: 45    /  pO2: 98    / HCO3: 23    / Base Excess: -3.0  /  SaO2: 96.9                                    11.5   25.54 )-----------( 378      ( 29 Oct 2023 15:20 )             35.6     10-29    137  |  106  |  24<H>  ----------------------------<  143<H>  4.3   |  23  |  0.69    Ca    8.0<L>      29 Oct 2023 15:20  Phos  2.8     10-29  Mg     2.2     10-29    TPro  5.4<L>  /  Alb  2.5<L>  /  TBili  0.2  /  DBili  x   /  AST  10  /  ALT  16  /  AlkPhos  58  10-29    LIVER FUNCTIONS - ( 29 Oct 2023 15:20 )  Alb: 2.5 g/dL / Pro: 5.4 g/dL / ALK PHOS: 58 U/L / ALT: 16 U/L / AST: 10 U/L / GGT: x                   Urinalysis Basic - ( 29 Oct 2023 15:20 )    Color: x / Appearance: x / SG: x / pH: x  Gluc: 143 mg/dL / Ketone: x  / Bili: x / Urobili: x   Blood: x / Protein: x / Nitrite: x   Leuk Esterase: x / RBC: x / WBC x   Sq Epi: x / Non Sq Epi: x / Bacteria: x      ====================ASSESSMENT ==============  72F PMHx COPD, HTN, PAD, CAD s/p LAD stent (last LHC showing 100%  of RCA), chronic leukocytosis of unclear etiol, cardiac arrest s/p left-sided ICD (2019) complicated by infection and s/p R single-chamber ICD reimplantation ([place] in 2019), CHF EF 45%, grade 2 DHF, and VFib s/p 13 AICD firings underwent VT ablation today c/b pericardial tamponade. Transferred to CICU for further monitoring. 10/21 developed AF w/ RVR. 10/28 pericardial window.     Plan:  ====================== NEUROLOGY=====================  A&Ox3  - no active issues  - continue to monitor mental status as per protocol     ==================== RESPIRATORY======================  Opacified L lung - Improving  - Noted to be tachypnea x2 nights w/o inc o2 requirement, CXR at that time showed white out L hemithorax: mucus plug vs. effusion vs. PNA  - s/p bronchoscopy w/ minimal mucus; Post bronch CXR improved, still b/l effusion but improving L lung aeration  - Careful monitoring w/ daily AM XR, trend WBC and fever curve  - Saline, chest PT tid; Nebs was held 10/29 AM because of AF w/ RVR  - continue to monitor SpO2 with goal >94%, O2 therapy PRN    ====================CARDIOVASCULAR==================  Pericardial Tamponade  - s/p aborted VT ablation c/b pericardial tamponade  - pericardial drain with 40 cc drained initially, 250cc in bag upon arrival to CICU, s/p drainage removal on 10/20  - TTE 10/22 with moderate pericardial effusion, not seemingly large enough to affect hemodynamics  - Transferred to floors, became hypotensive to SBP 90s, TTE with enlarged pericardial effusion size. Transferred back to CICU 10/27 pre OR for monitoring; now s/p pericardial window with CTS (Kady) on 10/28; 300cc sanguinous fluid removed intraoperatively, chest tube in place post-operatively  - monitor chest tube output    AF w/ RVR  - new onset AF w/ RVR, s/p DCCVx2 at 200J (), to NSR  - s/p Lido gtt, d/c'd  - Again went into AF w/ RVR on 10/29 AM to 190s-200s, given Amio 150 IV. started on Cardizem 10 gtt  - f/u EP recs  - Will discuss with EP/CTS regarding r/s AC     VT  - 400 amio TID PO per EP  - VT ablation aborted after tamponade  - lido d/c'd/. Quinidine 324 BID started 10/23  - has AICD  - replete lytes as needed to maintain K>4, Mag>2  - will need q6mo TSH / LFT, annual CXR and eye exam as outpatient (while on amiodarone)    CHF  - TTE 10/5 EF 55%, reg WMA, mild-mod MS  - Holding GDMT given critical illness     CAD s/p stents  - Plavix held iso recent bloody pericardial effusion and recent pericardial window/chest tube  - c/w DAPT, can consider d/c ASA once triple therapy r/s    ===================== RENAL =========================  No acute issues  - Continue monitoring urine output, lytes, SCr/ BUN  - replete lytes prn with goal K >4 and Mg >2    =============== GASTROINTESTINAL===================  DASH Diet    ===================ENDO====================  No active issues  - Monitor glucose on CMP  - TSH wnl    ===================HEMATOLOGIC/ONC ===================  Monitor H/H and plts  - DVT PPX: SCDs. Holding chemical/systemic DVT ppx s/p procedure    ==================INFECTIOUS DISEASE================  Afebrile  - chronic leukocytosis of unknown origin   - monitor and trend WBC and temperature curve       Dispo: Maintain in ICU while on Cardizem gtt    Patient requires continuous monitoring with bedside rhythm monitoring, arterial line, pulse oximetry, ventilator monitoring and intermittent blood gas analysis.  Care plan discussed with ICU care team.  I have spent 35 minutes providing critical care, in addition to initial critical time provided by CICU attending Dr. Recinos, re-evaluated multiple times during the day.    Kamilah Astudillo PA-C Patient is a 72y old  Female who presents with a chief complaint of AICD discharge (29 Oct 2023 12:15)    INTERVAL HISTORY:  - afib w/ rvr in 190s-200s this morning, started on Cardizem gtt at 10    SUBJECTIVE  - Patient seen and evaluated at bedside.     MEDICATIONS:  MEDICATIONS  (STANDING):  acetaminophen     Tablet .. 650 milliGRAM(s) Oral every 6 hours  aspirin enteric coated 81 milliGRAM(s) Oral daily  atorvastatin 40 milliGRAM(s) Oral at bedtime  chlorhexidine 2% Cloths 1 Application(s) Topical daily  clopidogrel Tablet 75 milliGRAM(s) Oral daily  diltiazem Infusion 10 mG/Hr (10 mL/Hr) IV Continuous <Continuous>  influenza  Vaccine (HIGH DOSE) 0.7 milliLiter(s) IntraMuscular once  melatonin 5 milliGRAM(s) Oral at bedtime  nystatin Cream 1 Application(s) Topical two times a day  quiNIDine gluconate  milliGRAM(s) Oral every 12 hours  sodium chloride 3%  Inhalation 4 milliLiter(s) Inhalation every 8 hours    MEDICATIONS  (PRN):  oxyCODONE    IR 5 milliGRAM(s) Oral every 4 hours PRN Moderate Pain (4 - 6)  oxyCODONE    IR 10 milliGRAM(s) Oral every 4 hours PRN Severe Pain (7 - 10)      OBJECTIVE:  ICU Vital Signs Last 24 Hrs  T(C): 36.6 (29 Oct 2023 14:00), Max: 36.9 (28 Oct 2023 23:00)  T(F): 97.9 (29 Oct 2023 14:00), Max: 98.4 (28 Oct 2023 23:00)  HR: 75 (29 Oct 2023 18:00) (69 - 167)  BP: 128/60 (29 Oct 2023 18:00) (92/53 - 148/89)  BP(mean): 86 (29 Oct 2023 18:00) (63 - 115)  RR: 23 (29 Oct 2023 18:00) (17 - 31)  SpO2: 96% (29 Oct 2023 18:00) (94% - 100%)    O2 Parameters below as of 29 Oct 2023 17:00  Patient On (Oxygen Delivery Method): nasal cannula  O2 Flow (L/min): 2      I&O's Summary    28 Oct 2023 07:01  -  29 Oct 2023 07:00  --------------------------------------------------------  IN: 840 mL / OUT: 913 mL / NET: -73 mL    29 Oct 2023 07:01  -  29 Oct 2023 19:22  --------------------------------------------------------  IN: 500 mL / OUT: 20 mL / NET: 480 mL      Daily     Daily Weight in k.5 (29 Oct 2023 04:00)    PHYSICAL EXAM:  General: NAD  Chest: dec breath sounds in lower Left lung fields  Heart: Irregular rate & rhythm  Abd: Soft, nontender, nondistended  Nervous System: AAOX3  Ext: no peripheral LE edema bilaterally    LABS:  ABG - ( 28 Oct 2023 15:27 )  pH, Arterial: 7.32  pH, Blood: x     /  pCO2: 45    /  pO2: 98    / HCO3: 23    / Base Excess: -3.0  /  SaO2: 96.9                          11.5   25.54 )-----------( 378      ( 29 Oct 2023 15:20 )             35.6     10-29    137  |  106  |  24<H>  ----------------------------<  143<H>  4.3   |  23  |  0.69    Ca    8.0<L>      29 Oct 2023 15:20  Phos  2.8     10-29  Mg     2.2     10-29    TPro  5.4<L>  /  Alb  2.5<L>  /  TBili  0.2  /  DBili  x   /  AST  10  /  ALT  16  /  AlkPhos  58  10-29    LIVER FUNCTIONS - ( 29 Oct 2023 15:20 )  Alb: 2.5 g/dL / Pro: 5.4 g/dL / ALK PHOS: 58 U/L / ALT: 16 U/L / AST: 10 U/L / GGT: x           Urinalysis Basic - ( 29 Oct 2023 15:20 )    Color: x / Appearance: x / SG: x / pH: x  Gluc: 143 mg/dL / Ketone: x  / Bili: x / Urobili: x   Blood: x / Protein: x / Nitrite: x   Leuk Esterase: x / RBC: x / WBC x   Sq Epi: x / Non Sq Epi: x / Bacteria: x    Assessment & Plan:   ====================ASSESSMENT ==============  72F PMHx COPD, HTN, PAD, CAD s/p LAD stent (last LHC showing 100%  of RCA), chronic leukocytosis of unclear etiol, cardiac arrest s/p left-sided ICD (2019) complicated by infection and s/p R single-chamber ICD reimplantation ([place] in 2019), CHF EF 45%, grade 2 DHF, and VFib s/p 13 AICD firings underwent VT ablation today c/b pericardial tamponade. Transferred to CICU for further monitoring. 10/21 developed AF w/ RVR. 10/28 pericardial window.     Plan:  ====================== NEUROLOGY=====================  A&Ox3  - no active issues  - continue to monitor mental status as per protocol     ==================== RESPIRATORY======================  Opacified L lung - Improving  - Noted to be tachypnea x2 nights w/o inc o2 requirement, CXR at that time showed white out L hemithorax: mucus plug vs. effusion vs. PNA  - s/p bronchoscopy w/ minimal mucus; Post bronch CXR improved, still b/l effusion but improving L lung aeration  - Careful monitoring w/ daily AM XR, trend WBC and fever curve  - Saline, chest PT tid; Nebs was held 10/29 AM because of AF w/ RVR  - continue to monitor SpO2 with goal >94%, O2 therapy PRN    ====================CARDIOVASCULAR==================  Pericardial Tamponade  - s/p aborted VT ablation c/b pericardial tamponade  - pericardial drain with 40 cc drained initially, 250cc in bag upon arrival to CICU, s/p drainage removal on 10/20  - TTE 10/22 with moderate pericardial effusion, not seemingly large enough to affect hemodynamics  - Transferred to floors, became hypotensive to SBP 90s, TTE with enlarged pericardial effusion size. Transferred back to CICU 10/27 pre OR for monitoring; now s/p pericardial window with CTS (Kady) on 10/28; 300cc sanguinous fluid removed intraoperatively, chest tube in place post-operatively  - monitor chest tube output  - currently on water seal    AF w/ RVR  - new onset AF w/ RVR, s/p DCCVx2 at 200J (), to NSR  - s/p Lido gtt, d/c'd  - Again went into AF w/ RVR on 10/29 AM to 190s-200s, given Amio 150 IV. started on Cardizem 10 gtt  - f/u EP recs  - Will discuss with EP/CTS regarding r/s AC, holding for now     VT  - 400 amio TID PO per EP  - VT ablation aborted after tamponade  - lido d/c'd/. Quinidine 324 BID started 10/23  - has AICD  - replete lytes as needed to maintain K>4, Mag>2  - will need q6mo TSH / LFT, annual CXR and eye exam as outpatient (while on amiodarone)    CHF  - TTE 10/5 EF 55%, reg WMA, mild-mod MS  - Holding GDMT given critical illness     CAD s/p stents  - Plavix held iso recent bloody pericardial effusion and recent pericardial window/chest tube  - c/w DAPT, can consider d/c ASA once triple therapy r/s    ===================== RENAL =========================  No acute issues  - Continue monitoring urine output, lytes, SCr/ BUN  - replete lytes prn with goal K >4 and Mg >2    =============== GASTROINTESTINAL===================  DASH Diet    ===================ENDO====================  No active issues  - Monitor glucose on CMP  - TSH wnl    ===================HEMATOLOGIC/ONC ===================  Monitor H/H and plts  - DVT PPX: SCDs. Holding chemical/systemic DVT ppx s/p procedure. F/u EP/CTS regarding restarting systemic AC.     ==================INFECTIOUS DISEASE================  Afebrile  - chronic leukocytosis of unknown origin   - monitor and trend WBC and temperature curve       Dispo: Maintain in ICU while on Cardizem gtt    Patient requires continuous monitoring with bedside rhythm monitoring, arterial line, pulse oximetry, ventilator monitoring and intermittent blood gas analysis.  Care plan discussed with ICU care team.  I have spent 35 minutes providing critical care, in addition to initial critical time provided by CICU attending Dr. Recinos, re-evaluated multiple times during the day.    Kamilah Astudillo PA-C

## 2023-10-29 NOTE — PROGRESS NOTE ADULT - ASSESSMENT
====================ASSESSMENT ==============  72F PMHx COPD, HTN, PAD, CAD s/p LAD stent (last C showing 100%  of RCA), chronic leukocytosis of unclear etiol, cardiac arrest s/p left-sided ICD (6/4/2019) complicated by infection and s/p R single-chamber ICD reimplantation ([place] in 9/23/2019), CHF EF 45%, grade 2 DHF, and VFib s/p 13 AICD firings underwent VT ablation today c/b pericardial tamponade. Transferred to CICU for further monitoring. 10/21 developed AF w/ RVR. 10/28 pericardial window.     Plan:  ====================== NEUROLOGY=====================  A&Ox3  - no active issues  - continue to monitor mental status as per protocol     ==================== RESPIRATORY======================  Hx COPD  - Currently on room air sat >88%    Opacified L lung - Improving  - i/s/o tachypnea x2 nights w/o inc o2 requirement    - CXR showed complete white out of L hemithorax: Effusion vs pna vs atelectasis - most recent interval CXR improving: b/l pleural effusion L > R  - Saline, nebs, chest PT tid  - s/p bronchoscopy w/ minimal mucus  - Careful monitoring w/ daily AM XR, trend WBC and fever curve  - continue to monitor SpO2 with goal >94%    ====================CARDIOVASCULAR==================  Pericardial Tamponade  - s/p aborted VT ablation c/b pericardial tamponade  - pericardial drain with 40 cc drained initially, 250cc in bag upon arrival to CICU, s/p drainage removal on 10/20  - TTE 10/22 with moderate pericardial effusion, not seemingly large enough to affect hemodynamics  - Transferred to floors, became hypotensive to SBP 90s, TTE with enlarged pericardial effusion size. Transferred back to CICU 10/27 pre OR for monitoring   - now s/p pericardial window with CTS (Kady) on 10/28; 300cc sanguinous fluid removed intraoperatively  - monitor chest tube output    AF w/ RVR  - new onset AF w/ RVR, s/p DCCVx2 at 200J (22nd), to NSR  - heparin gtt held iso recent pericardial window  - s/p Lido gtt, d/c'd     VT  - 400 amio TID PO per EP  - VT ablation aborted after tamponade  - lido d/c'd/. Quinidine 324 BID started 10/23  - has AICD  - replete lytes as needed to maintain K>4, Mag>2  - will need q6mo TSH / LFT, annual CXR and eye exam as outpatient (while on amiodarone)    CHF  - TTE 10/5 EF 55%, reg WMA, mild-mod MS  - c/w lopressor, uptitrate as BP tolerates    CAD s/p stents  - on home plavix, held s/p procedure 10/28    ===================== RENAL =========================  No acute issues  - Continue monitoring urine output, lytes, SCr/ BUN  - replete lytes prn with goal K >4 and Mg >2    =============== GASTROINTESTINAL===================  DASH Diet    ===================ENDO====================  No active issues  - Monitor glucose on CMP  - TSH wnl    ===================HEMATOLOGIC/ONC ===================  Monitor H/H and plts  - DVT PPX: SCDs    ==================INFECTIOUS DISEASE================  Afebrile  - chronic leukocytosis of unknown origin   - monitor and trend WBC and temperature curve  ====================ASSESSMENT ==============  72F PMHx COPD, HTN, PAD, CAD s/p LAD stent (last C showing 100%  of RCA), chronic leukocytosis of unclear etiol, cardiac arrest s/p left-sided ICD (6/4/2019) complicated by infection and s/p R single-chamber ICD reimplantation ([place] in 9/23/2019), CHF EF 45%, grade 2 DHF, and VFib s/p 13 AICD firings underwent VT ablation today c/b pericardial tamponade. Transferred to CICU for further monitoring. 10/21 developed AF w/ RVR. 10/28 pericardial window.     Plan:  ====================== NEUROLOGY=====================  A&Ox3  - no active issues  - continue to monitor mental status as per protocol     ==================== RESPIRATORY======================  Hx COPD not in exacerbation  Opacified L lung - Improving  - Noted to be tachypnea x2 nights w/o inc o2 requirement, CXR at that time showed white out L hemithorax: mucus plug vs. effusion vs. PNA  - s/p bronchoscopy w/ minimal mucus; Post bronch CXR improved, still b/l effusion but improving L lung aeration  - Careful monitoring w/ daily AM XR, trend WBC and fever curve  - Saline, nebs, chest PT tid  - continue to monitor SpO2 with goal >94%, O2 therapy PRN    ====================CARDIOVASCULAR==================  Pericardial Tamponade  - s/p aborted VT ablation c/b pericardial tamponade  - pericardial drain with 40 cc drained initially, 250cc in bag upon arrival to CICU, s/p drainage removal on 10/20  - TTE 10/22 with moderate pericardial effusion, not seemingly large enough to affect hemodynamics  - Transferred to floors, became hypotensive to SBP 90s, TTE with enlarged pericardial effusion size. Transferred back to CICU 10/27 pre OR for monitoring; now s/p pericardial window with CTS (Kady) on 10/28; 300cc sanguinous fluid removed intraoperatively, chest tube in place post-operatively  - monitor chest tube output    AF w/ RVR  - new onset AF w/ RVR, s/p DCCVx2 at 200J (22nd), to NSR  - s/p Lido gtt, d/c'd  - AC held iso recent bloody pericardial effusion and recent pericardial window/chest tube  - Will discuss with CTS regarding when to r/s AC       VT  - 400 amio TID PO per EP  - VT ablation aborted after tamponade  - lido d/c'd/. Quinidine 324 BID started 10/23  - has AICD  - replete lytes as needed to maintain K>4, Mag>2  - will need q6mo TSH / LFT, annual CXR and eye exam as outpatient (while on amiodarone)    CHF  - TTE 10/5 EF 55%, reg WMA, mild-mod MS  - Holding GDMT given critical illness     CAD s/p stents  - Plavix held iso recent bloody pericardial effusion and recent pericardial window/chest tube  - Will discuss with CTS regarding when to r/s Plavix  - c/w ASA in the interim, can consider d/c once triple therapy r/s    ===================== RENAL =========================  No acute issues  - Continue monitoring urine output, lytes, SCr/ BUN  - replete lytes prn with goal K >4 and Mg >2    =============== GASTROINTESTINAL===================  DASH Diet    ===================ENDO====================  No active issues  - Monitor glucose on CMP  - TSH wnl    ===================HEMATOLOGIC/ONC ===================  Monitor H/H and plts  - DVT PPX: SCDs    ==================INFECTIOUS DISEASE================  Afebrile  - chronic leukocytosis of unknown origin   - monitor and trend WBC and temperature curve  ====================ASSESSMENT ==============  72F PMHx COPD, HTN, PAD, CAD s/p LAD stent (last C showing 100%  of RCA), chronic leukocytosis of unclear etiol, cardiac arrest s/p left-sided ICD (6/4/2019) complicated by infection and s/p R single-chamber ICD reimplantation ([place] in 9/23/2019), CHF EF 45%, grade 2 DHF, and VFib s/p 13 AICD firings underwent VT ablation today c/b pericardial tamponade. Transferred to CICU for further monitoring. 10/21 developed AF w/ RVR. 10/28 pericardial window.     Plan:  ====================== NEUROLOGY=====================  A&Ox3  - no active issues  - continue to monitor mental status as per protocol     ==================== RESPIRATORY======================  Hx COPD not in exacerbation  Opacified L lung - Improving  - Noted to be tachypnea x2 nights w/o inc o2 requirement, CXR at that time showed white out L hemithorax: mucus plug vs. effusion vs. PNA  - s/p bronchoscopy w/ minimal mucus; Post bronch CXR improved, still b/l effusion but improving L lung aeration  - Careful monitoring w/ daily AM XR, trend WBC and fever curve  - Saline, chest PT tid; Nebs was held 10/29 AM because of AF w/ RVR  - continue to monitor SpO2 with goal >94%, O2 therapy PRN    ====================CARDIOVASCULAR==================  Pericardial Tamponade  - s/p aborted VT ablation c/b pericardial tamponade  - pericardial drain with 40 cc drained initially, 250cc in bag upon arrival to CICU, s/p drainage removal on 10/20  - TTE 10/22 with moderate pericardial effusion, not seemingly large enough to affect hemodynamics  - Transferred to floors, became hypotensive to SBP 90s, TTE with enlarged pericardial effusion size. Transferred back to CICU 10/27 pre OR for monitoring; now s/p pericardial window with CTS (Kady) on 10/28; 300cc sanguinous fluid removed intraoperatively, chest tube in place post-operatively  - monitor chest tube output    AF w/ RVR  - new onset AF w/ RVR, s/p DCCVx2 at 200J (22nd), to NSR  - s/p Lido gtt, d/c'd  - Will discuss with EP/CTS regarding r/s AC       VT  - 400 amio TID PO per EP  - VT ablation aborted after tamponade  - lido d/c'd/. Quinidine 324 BID started 10/23  - has AICD  - replete lytes as needed to maintain K>4, Mag>2  - will need q6mo TSH / LFT, annual CXR and eye exam as outpatient (while on amiodarone)    CHF  - TTE 10/5 EF 55%, reg WMA, mild-mod MS  - Holding GDMT given critical illness     CAD s/p stents  - Plavix held iso recent bloody pericardial effusion and recent pericardial window/chest tube  - c/w DAPT, can consider d/c ASA once triple therapy r/s    ===================== RENAL =========================  No acute issues  - Continue monitoring urine output, lytes, SCr/ BUN  - replete lytes prn with goal K >4 and Mg >2    =============== GASTROINTESTINAL===================  DASH Diet    ===================ENDO====================  No active issues  - Monitor glucose on CMP  - TSH wnl    ===================HEMATOLOGIC/ONC ===================  Monitor H/H and plts  - DVT PPX: SCDs    ==================INFECTIOUS DISEASE================  Afebrile  - chronic leukocytosis of unknown origin   - monitor and trend WBC and temperature curve  ====================ASSESSMENT ==============  72F PMHx COPD, HTN, PAD, CAD s/p LAD stent (last C showing 100%  of RCA), chronic leukocytosis of unclear etiol, cardiac arrest s/p left-sided ICD (6/4/2019) complicated by infection and s/p R single-chamber ICD reimplantation ([place] in 9/23/2019), CHF EF 45%, grade 2 DHF, and VFib s/p 13 AICD firings underwent VT ablation today c/b pericardial tamponade. Transferred to CICU for further monitoring. 10/21 developed AF w/ RVR. 10/28 pericardial window.     Plan:  ====================== NEUROLOGY=====================  A&Ox3  - no active issues  - continue to monitor mental status as per protocol     ==================== RESPIRATORY======================  Hx COPD not in exacerbation  Opacified L lung - Improving  - Noted to be tachypnea x2 nights w/o inc o2 requirement, CXR at that time showed white out L hemithorax: mucus plug vs. effusion vs. PNA  - s/p bronchoscopy w/ minimal mucus; Post bronch CXR improved, still b/l effusion but improving L lung aeration  - Careful monitoring w/ daily AM XR, trend WBC and fever curve  - Saline, chest PT tid; Nebs was held 10/29 AM because of AF w/ RVR  - continue to monitor SpO2 with goal >94%, O2 therapy PRN    ====================CARDIOVASCULAR==================  Pericardial Tamponade  - s/p aborted VT ablation c/b pericardial tamponade  - pericardial drain with 40 cc drained initially, 250cc in bag upon arrival to CICU, s/p drainage removal on 10/20  - TTE 10/22 with moderate pericardial effusion, not seemingly large enough to affect hemodynamics  - Transferred to floors, became hypotensive to SBP 90s, TTE with enlarged pericardial effusion size. Transferred back to CICU 10/27 pre OR for monitoring; now s/p pericardial window with CTS (Kady) on 10/28; 300cc sanguinous fluid removed intraoperatively, chest tube in place post-operatively  - monitor chest tube output    AF w/ RVR  - new onset AF w/ RVR, s/p DCCVx2 at 200J (22nd), to NSR  - s/p Lido gtt, d/c'd  - Again went into AF w/ RVR on 10/29 AM to high 100s, given Amio 150 IV  - f/u EP recs  - Will discuss with EP/CTS regarding r/s AC       VT  - 400 amio TID PO per EP  - VT ablation aborted after tamponade  - lido d/c'd/. Quinidine 324 BID started 10/23  - has AICD  - replete lytes as needed to maintain K>4, Mag>2  - will need q6mo TSH / LFT, annual CXR and eye exam as outpatient (while on amiodarone)    CHF  - TTE 10/5 EF 55%, reg WMA, mild-mod MS  - Holding GDMT given critical illness     CAD s/p stents  - Plavix held iso recent bloody pericardial effusion and recent pericardial window/chest tube  - c/w DAPT, can consider d/c ASA once triple therapy r/s    ===================== RENAL =========================  No acute issues  - Continue monitoring urine output, lytes, SCr/ BUN  - replete lytes prn with goal K >4 and Mg >2    =============== GASTROINTESTINAL===================  DASH Diet    ===================ENDO====================  No active issues  - Monitor glucose on CMP  - TSH wnl    ===================HEMATOLOGIC/ONC ===================  Monitor H/H and plts  - DVT PPX: SCDs    ==================INFECTIOUS DISEASE================  Afebrile  - chronic leukocytosis of unknown origin   - monitor and trend WBC and temperature curve

## 2023-10-29 NOTE — PROGRESS NOTE ADULT - SUBJECTIVE AND OBJECTIVE BOX
Patient seen and examined at bedside.    Overnight Events:   - Continues to have AF with tachy upto 180s     REVIEW OF SYSTEMS:  General: no fatigue/malaise, weight loss/gain.  Skin: no rashes.  Ophthalmologic: no blurred vision, no loss of vision. 	  ENT: no sore throat, rhinorrhea, sinus congestion.  Respiratory: no SOB, cough or wheeze.  CV: No chest pain. No palpitations.   Gastrointestinal:  no N/V/D, no melena/hematemesis/hematochezia.  Genitourinary: no dysuria/hesitancy or hematuria.  Musculoskeletal: no myalgias or arthralgias.  Neurological: no changes in vision or hearing, no lightheadedness/dizziness, no syncope/near syncope	  Psychiatric: no unusual stress/anxiety.   Hematology/Lymphatics: no unusual bleeding, bruising and no lymphadenopathy.  Endocrine: no unusual thirst.           Current Meds:  MEDICATIONS  (STANDING):  acetaminophen     Tablet .. 650 milliGRAM(s) Oral every 6 hours  aspirin enteric coated 81 milliGRAM(s) Oral daily  atorvastatin 40 milliGRAM(s) Oral at bedtime  chlorhexidine 2% Cloths 1 Application(s) Topical daily  clopidogrel Tablet 75 milliGRAM(s) Oral daily  diltiazem Infusion 10 mG/Hr (10 mL/Hr) IV Continuous <Continuous>  influenza  Vaccine (HIGH DOSE) 0.7 milliLiter(s) IntraMuscular once  melatonin 5 milliGRAM(s) Oral at bedtime  nystatin Cream 1 Application(s) Topical two times a day  quiNIDine gluconate  milliGRAM(s) Oral every 12 hours  sodium chloride 3%  Inhalation 4 milliLiter(s) Inhalation every 8 hours        Vitals:  ICU Vital Signs Last 24 Hrs  T(C): 36.6 (29 Oct 2023 14:00), Max: 36.9 (28 Oct 2023 23:00)  T(F): 97.9 (29 Oct 2023 14:00), Max: 98.4 (28 Oct 2023 23:00)  HR: 100 (29 Oct 2023 15:00) (73 - 167)  BP: 100/58 (29 Oct 2023 14:00) (92/53 - 148/89)  BP(mean): 75 (29 Oct 2023 14:00) (63 - 115)  ABP: --  ABP(mean): --  RR: 17 (29 Oct 2023 15:00) (14 - 31)  SpO2: 97% (29 Oct 2023 15:00) (95% - 100%)    O2 Parameters below as of 29 Oct 2023 15:00  Patient On (Oxygen Delivery Method): nasal cannula  O2 Flow (L/min): 3    Physical Exam:  Appearance: No acute distress; well appearing on 2L NC  Eyes: PERRL, EOMI, pink conjunctiva  HEENT: Normal oral mucosa  Cardiovascular: RRR, S1, S2, no murmurs, rubs, or gallops; no edema; no JVD  Respiratory: Decreased breath sounds left side with crackles slight improved in ALBERT  Gastrointestinal: soft, non-tender, non-distended with normal bowel sounds  Musculoskeletal: No clubbing; no joint deformity   Neurologic: Non-focal  Lymphatic: No lymphadenopathy  Psychiatry: AAOx3, mood & affect appropriate  Skin: No rashes, ecchymoses, or cyanosis                          11.5   25.54 )-----------( 378      ( 29 Oct 2023 15:20 )             35.6       10-29    137  |  106  |  24<H>  ----------------------------<  143<H>  4.3   |  23  |  0.69    Ca    8.0<L>      29 Oct 2023 15:20  Phos  2.8     10-29  Mg     2.2     10-29    TPro  5.4<L>  /  Alb  2.5<L>  /  TBili  0.2  /  DBili  x   /  AST  10  /  ALT  16  /  AlkPhos  58  10-29

## 2023-10-29 NOTE — PROGRESS NOTE ADULT - ATTENDING COMMENTS
71yo woman with severe COPD, HTN, PAD, CAD (100%  of RCA), cardiac arrest s/p left-sided ICD (6/4/2019) complicated by infection requiring explant and now currently with right sided single-chamber ICD reimplantation ([place] in 9/23/2019), HFmrEF 45% now admitted with recurrent/refractory VT and tamponade. Now also with AFib/RVR    Neuro: no issues  Pulm: cont home inhalers, secretion mobilization  CV: Cont amio, quinidine and start dilt gtt for AFib RVR. Holding AC per EP given recurrent pericardial effusion and recent pericardial window will discuss timing of AC re-initiation for AFib requiring DCCV last week  GI: DASH diet  Renal: aim for even/euvolemia  ID: no issues  Heme: AC discussion as above  Endo: Cont BG checks  Lines: no central access. Chest tube (10/28 -)

## 2023-10-29 NOTE — PROGRESS NOTE ADULT - ASSESSMENT
73 y/o F w/ PMHx COPD, HLD, HTN, PVD, ICM/CAD s/p PCI (has  of RCA), cardiac arrest s/p left-sided ICD (6/4/2019) complicated by infection and s/p R single-chamber ICD ([place] in 9/23/2019), recent admission for VT storm with adjustment of medication and NIPS now presenting for ICD shock in the setting of recurrent monomorphic VT with unsuccessful ATP.  Ablation attempted 10/20 but aborted due to hemodynamically significant pericardial effusion with drain placement.  Pericardial drain removed on 10/20/23.  Over the weekend developed new shortness of breath, recurrent VT s/p ICD shock, and afib w/ RVR s/p cardioversion 10/21. She continues to have NSVT. Bronch negative for mucus plug. She is now s/p pericardial window. Currently in Afib with rates upto 180s.    - Cont oral amio, quinidine, and start dilt gtt  - Will allow for medical optimization and discuss plan for ablation in the future

## 2023-10-29 NOTE — PROGRESS NOTE ADULT - SUBJECTIVE AND OBJECTIVE BOX
Patient is a 72y old  Female who presents with a chief complaint of AICD discharge (28 Oct 2023 19:27)    HPI:  72F c hx COPD, HTN, PAD, CAD s/p LAD stent (last C showing 100%  of RCA), chronic leukocytosis of unclear etiol, cardiac arrest s/p left-sided ICD (2019) complicated by infection and s/p R single-chamber ICD reimplantation ([place] in 2019), CHF EF 45%, grade 2 DHF, recent hospitalization for UTI (treated w/ ceftriaxone and Vanco x 3 days) and VFib s/p 13 AICD firings, pw lightheadedness and AICD firing.    Pt recently hospitalized for VFib s/p multiple aicd firing. Pt placed on quinidine, mexiletine, metoprolol. Pt reports good compliance with her medications, even though she doesn't know what the meds are. Pt states at around 3-4PM yesterday, she felt "dizzy" for a few seconds, then felt AICD shock, then had resolution of her dizziness. Denies fevers, chills, CP, SOB, URI symptoms, GI symptoms. Pt states she's never seen a hematologist for leukocytosis. Pt was supposed to f/u with EP as outpt for ablation. (16 Oct 2023 03:47)       INTERVAL HPI/OVERNIGHT EVENTS:   No overnight events   Afebrile, hemodynamically stable     Subjective:    ICU Vital Signs Last 24 Hrs  T(C): 36.4 (29 Oct 2023 04:00), Max: 36.9 (28 Oct 2023 23:00)  T(F): 97.5 (29 Oct 2023 04:00), Max: 98.4 (28 Oct 2023 23:00)  HR: 124 (29 Oct 2023 06:00) (72 - 151)  BP: 123/67 (29 Oct 2023 06:00) (100/64 - 169/69)  BP(mean): 87 (29 Oct 2023 06:00) (69 - 115)  ABP: --  ABP(mean): --  RR: 22 (29 Oct 2023 06:00) (14 - 24)  SpO2: 96% (29 Oct 2023 06:00) (94% - 100%)    O2 Parameters below as of 29 Oct 2023 05:13  Patient On (Oxygen Delivery Method): nasal cannula          I&O's Summary    28 Oct 2023 07:01  -  29 Oct 2023 07:00  --------------------------------------------------------  IN: 840 mL / OUT: 913 mL / NET: -73 mL          Daily Height in cm: 157.48 (28 Oct 2023 08:25)    Daily Weight in k.5 (29 Oct 2023 04:00)    Adult Advanced Hemodynamics Last 24 Hrs  CVP(mm Hg): --  CVP(cm H2O): --  CO: --  CI: --  PA: --  PA(mean): --  PCWP: --  SVR: --  SVRI: --  PVR: --  PVRI: --    EKG/Telemetry Events:    MEDICATIONS  (STANDING):  acetaminophen     Tablet .. 650 milliGRAM(s) Oral every 6 hours  aMIOdarone    Tablet 400 milliGRAM(s) Oral every 8 hours  aspirin enteric coated 81 milliGRAM(s) Oral daily  atorvastatin 40 milliGRAM(s) Oral at bedtime  chlorhexidine 2% Cloths 1 Application(s) Topical daily  influenza  Vaccine (HIGH DOSE) 0.7 milliLiter(s) IntraMuscular once  melatonin 5 milliGRAM(s) Oral at bedtime  nystatin Cream 1 Application(s) Topical two times a day  quiNIDine gluconate  milliGRAM(s) Oral every 12 hours  sodium chloride 3%  Inhalation 4 milliLiter(s) Inhalation every 8 hours    MEDICATIONS  (PRN):  HYDROmorphone  Injectable 0.5 milliGRAM(s) IV Push every 4 hours PRN Severe Pain (7 - 10)  oxyCODONE    IR 5 milliGRAM(s) Oral every 4 hours PRN Moderate Pain (4 - 6)  oxyCODONE    IR 10 milliGRAM(s) Oral every 4 hours PRN Severe Pain (7 - 10)      PHYSICAL EXAM:  GENERAL:   HEAD:  Atraumatic, Normocephalic  EYES: EOMI, PERRLA, conjunctiva and sclera clear  NECK: Supple, No JVD, Normal thyroid, no enlarged nodes  NERVOUS SYSTEM:  Alert & Awake.   CHEST/LUNG: B/L good air entry; No rales, rhonchi, or wheezing  HEART: S1S2 normal, no S3, Regular rate and rhythm; No murmurs  ABDOMEN: Soft, Nontender, Nondistended; Bowel sounds present  EXTREMITIES:  2+ Peripheral Pulses, No clubbing, cyanosis, or edema  LYMPH: No lymphadenopathy noted  SKIN: No rashes or lesions    LABS:                        11.1   29.60 )-----------( 362      ( 29 Oct 2023 00:28 )             34.3     10-29    137  |  105  |  18  ----------------------------<  123<H>  4.6   |  24  |  0.71    Ca    8.2<L>      29 Oct 2023 00:28  Phos  3.1     10-29  Mg     2.3     10-29    TPro  5.6<L>  /  Alb  2.5<L>  /  TBili  0.2  /  DBili  x   /  AST  13  /  ALT  19  /  AlkPhos  62  10-29    LIVER FUNCTIONS - ( 29 Oct 2023 00:28 )  Alb: 2.5 g/dL / Pro: 5.6 g/dL / ALK PHOS: 62 U/L / ALT: 19 U/L / AST: 13 U/L / GGT: x             CAPILLARY BLOOD GLUCOSE        ABG - ( 28 Oct 2023 15:27 )  pH, Arterial: 7.32  pH, Blood: x     /  pCO2: 45    /  pO2: 98    / HCO3: 23    / Base Excess: -3.0  /  SaO2: 96.9                    Urinalysis Basic - ( 29 Oct 2023 00:28 )    Color: x / Appearance: x / SG: x / pH: x  Gluc: 123 mg/dL / Ketone: x  / Bili: x / Urobili: x   Blood: x / Protein: x / Nitrite: x   Leuk Esterase: x / RBC: x / WBC x   Sq Epi: x / Non Sq Epi: x / Bacteria: x          RADIOLOGY & ADDITIONAL TESTS:  CXR:        Care Discussed with Consultants/Other Providers [ x] YES  [ ] NO           Patient is a 72y old  Female who presents with a chief complaint of AICD discharge (28 Oct 2023 19:27)    HPI:  72F c hx COPD, HTN, PAD, CAD s/p LAD stent (last C showing 100%  of RCA), chronic leukocytosis of unclear etiol, cardiac arrest s/p left-sided ICD (2019) complicated by infection and s/p R single-chamber ICD reimplantation ([place] in 2019), CHF EF 45%, grade 2 DHF, recent hospitalization for UTI (treated w/ ceftriaxone and Vanco x 3 days) and VFib s/p 13 AICD firings, pw lightheadedness and AICD firing.    Pt recently hospitalized for VFib s/p multiple aicd firing. Pt placed on quinidine, mexiletine, metoprolol. Pt reports good compliance with her medications, even though she doesn't know what the meds are. Pt states at around 3-4PM yesterday, she felt "dizzy" for a few seconds, then felt AICD shock, then had resolution of her dizziness. Denies fevers, chills, CP, SOB, URI symptoms, GI symptoms. Pt states she's never seen a hematologist for leukocytosis. Pt was supposed to f/u with EP as outpt for ablation. (16 Oct 2023 03:47)       INTERVAL HPI/OVERNIGHT EVENTS:   Tolerated pericardial window well, currently no complaints  Chest tube in place, drained 18cc overnight    Subjective: No acute complaints, still has wet cough however unable to produce much sputum. Otherwise, denies cp/sob/palpitations/dizziness.    ICU Vital Signs Last 24 Hrs  T(C): 36.4 (29 Oct 2023 04:00), Max: 36.9 (28 Oct 2023 23:00)  T(F): 97.5 (29 Oct 2023 04:00), Max: 98.4 (28 Oct 2023 23:00)  HR: 124 (29 Oct 2023 06:00) (72 - 151)  BP: 123/67 (29 Oct 2023 06:00) (100/64 - 169/69)  BP(mean): 87 (29 Oct 2023 06:00) (69 - 115)  ABP: --  ABP(mean): --  RR: 22 (29 Oct 2023 06:00) (14 - 24)  SpO2: 96% (29 Oct 2023 06:00) (94% - 100%)    O2 Parameters below as of 29 Oct 2023 05:13  Patient On (Oxygen Delivery Method): nasal cannula          I&O's Summary    28 Oct 2023 07:01  -  29 Oct 2023 07:00  --------------------------------------------------------  IN: 840 mL / OUT: 913 mL / NET: -73 mL          Daily Height in cm: 157.48 (28 Oct 2023 08:25)    Daily Weight in k.5 (29 Oct 2023 04:00)    Adult Advanced Hemodynamics Last 24 Hrs  CVP(mm Hg): --  CVP(cm H2O): --  CO: --  CI: --  PA: --  PA(mean): --  PCWP: --  SVR: --  SVRI: --  PVR: --  PVRI: --    EKG/Telemetry Events:    MEDICATIONS  (STANDING):  acetaminophen     Tablet .. 650 milliGRAM(s) Oral every 6 hours  aMIOdarone    Tablet 400 milliGRAM(s) Oral every 8 hours  aspirin enteric coated 81 milliGRAM(s) Oral daily  atorvastatin 40 milliGRAM(s) Oral at bedtime  chlorhexidine 2% Cloths 1 Application(s) Topical daily  influenza  Vaccine (HIGH DOSE) 0.7 milliLiter(s) IntraMuscular once  melatonin 5 milliGRAM(s) Oral at bedtime  nystatin Cream 1 Application(s) Topical two times a day  quiNIDine gluconate  milliGRAM(s) Oral every 12 hours  sodium chloride 3%  Inhalation 4 milliLiter(s) Inhalation every 8 hours    MEDICATIONS  (PRN):  HYDROmorphone  Injectable 0.5 milliGRAM(s) IV Push every 4 hours PRN Severe Pain (7 - 10)  oxyCODONE    IR 5 milliGRAM(s) Oral every 4 hours PRN Moderate Pain (4 - 6)  oxyCODONE    IR 10 milliGRAM(s) Oral every 4 hours PRN Severe Pain (7 - 10)      PHYSICAL EXAM:  GENERAL:   HEAD:  Atraumatic, Normocephalic  EYES: EOMI, PERRLA, conjunctiva and sclera clear  NECK: Supple, No JVD, Normal thyroid, no enlarged nodes  NERVOUS SYSTEM:  Alert & Awake.   CHEST/LUNG: B/L good air entry; No rales, rhonchi, or wheezing  HEART: S1S2 normal, no S3, Regular rate and rhythm; No murmurs  ABDOMEN: Soft, Nontender, Nondistended; Bowel sounds present  EXTREMITIES:  2+ Peripheral Pulses, No clubbing, cyanosis, or edema  LYMPH: No lymphadenopathy noted  SKIN: No rashes or lesions    LABS:                        11.1   29.60 )-----------( 362      ( 29 Oct 2023 00:28 )             34.3     10-29    137  |  105  |  18  ----------------------------<  123<H>  4.6   |  24  |  0.71    Ca    8.2<L>      29 Oct 2023 00:28  Phos  3.1     10-29  Mg     2.3     10-29    TPro  5.6<L>  /  Alb  2.5<L>  /  TBili  0.2  /  DBili  x   /  AST  13  /  ALT  19  /  AlkPhos  62  10-29    LIVER FUNCTIONS - ( 29 Oct 2023 00:28 )  Alb: 2.5 g/dL / Pro: 5.6 g/dL / ALK PHOS: 62 U/L / ALT: 19 U/L / AST: 13 U/L / GGT: x             CAPILLARY BLOOD GLUCOSE        ABG - ( 28 Oct 2023 15:27 )  pH, Arterial: 7.32  pH, Blood: x     /  pCO2: 45    /  pO2: 98    / HCO3: 23    / Base Excess: -3.0  /  SaO2: 96.9                    Urinalysis Basic - ( 29 Oct 2023 00:28 )    Color: x / Appearance: x / SG: x / pH: x  Gluc: 123 mg/dL / Ketone: x  / Bili: x / Urobili: x   Blood: x / Protein: x / Nitrite: x   Leuk Esterase: x / RBC: x / WBC x   Sq Epi: x / Non Sq Epi: x / Bacteria: x          RADIOLOGY & ADDITIONAL TESTS:  CXR:        Care Discussed with Consultants/Other Providers [ x] YES  [ ] NO           Patient is a 72y old  Female who presents with a chief complaint of AICD discharge (28 Oct 2023 19:27)    HPI:  72F c hx COPD, HTN, PAD, CAD s/p LAD stent (last C showing 100%  of RCA), chronic leukocytosis of unclear etiol, cardiac arrest s/p left-sided ICD (2019) complicated by infection and s/p R single-chamber ICD reimplantation ([place] in 2019), CHF EF 45%, grade 2 DHF, recent hospitalization for UTI (treated w/ ceftriaxone and Vanco x 3 days) and VFib s/p 13 AICD firings, pw lightheadedness and AICD firing.    Pt recently hospitalized for VFib s/p multiple aicd firing. Pt placed on quinidine, mexiletine, metoprolol. Pt reports good compliance with her medications, even though she doesn't know what the meds are. Pt states at around 3-4PM yesterday, she felt "dizzy" for a few seconds, then felt AICD shock, then had resolution of her dizziness. Denies fevers, chills, CP, SOB, URI symptoms, GI symptoms. Pt states she's never seen a hematologist for leukocytosis. Pt was supposed to f/u with EP as outpt for ablation. (16 Oct 2023 03:47)       INTERVAL HPI/OVERNIGHT EVENTS:   Tolerated pericardial window well, currently no complaints  Chest tube in place, drained 18cc overnight    Subjective: No acute complaints, still has wet cough however unable to produce much sputum. Otherwise, denies cp/sob/palpitations/dizziness.    ICU Vital Signs Last 24 Hrs  T(C): 36.4 (29 Oct 2023 04:00), Max: 36.9 (28 Oct 2023 23:00)  T(F): 97.5 (29 Oct 2023 04:00), Max: 98.4 (28 Oct 2023 23:00)  HR: 124 (29 Oct 2023 06:00) (72 - 151)  BP: 123/67 (29 Oct 2023 06:00) (100/64 - 169/69)  BP(mean): 87 (29 Oct 2023 06:00) (69 - 115)  ABP: --  ABP(mean): --  RR: 22 (29 Oct 2023 06:00) (14 - 24)  SpO2: 96% (29 Oct 2023 06:00) (94% - 100%)    O2 Parameters below as of 29 Oct 2023 05:13  Patient On (Oxygen Delivery Method): nasal cannula          I&O's Summary    28 Oct 2023 07:01  -  29 Oct 2023 07:00  --------------------------------------------------------  IN: 840 mL / OUT: 913 mL / NET: -73 mL          Daily Height in cm: 157.48 (28 Oct 2023 08:25)    Daily Weight in k.5 (29 Oct 2023 04:00)    Adult Advanced Hemodynamics Last 24 Hrs  CVP(mm Hg): --  CVP(cm H2O): --  CO: --  CI: --  PA: --  PA(mean): --  PCWP: --  SVR: --  SVRI: --  PVR: --  PVRI: --    EKG/Telemetry Events:    MEDICATIONS  (STANDING):  acetaminophen     Tablet .. 650 milliGRAM(s) Oral every 6 hours  aMIOdarone    Tablet 400 milliGRAM(s) Oral every 8 hours  aspirin enteric coated 81 milliGRAM(s) Oral daily  atorvastatin 40 milliGRAM(s) Oral at bedtime  chlorhexidine 2% Cloths 1 Application(s) Topical daily  influenza  Vaccine (HIGH DOSE) 0.7 milliLiter(s) IntraMuscular once  melatonin 5 milliGRAM(s) Oral at bedtime  nystatin Cream 1 Application(s) Topical two times a day  quiNIDine gluconate  milliGRAM(s) Oral every 12 hours  sodium chloride 3%  Inhalation 4 milliLiter(s) Inhalation every 8 hours    MEDICATIONS  (PRN):  HYDROmorphone  Injectable 0.5 milliGRAM(s) IV Push every 4 hours PRN Severe Pain (7 - 10)  oxyCODONE    IR 5 milliGRAM(s) Oral every 4 hours PRN Moderate Pain (4 - 6)  oxyCODONE    IR 10 milliGRAM(s) Oral every 4 hours PRN Severe Pain (7 - 10)      PHYSICAL EXAM:  GENERAL: AAOx4 NAD  HEAD:  Atraumatic, Normocephalic  EYES: EOMI, PERRLA, conjunctiva and sclera clear  NECK: Supple, No JVD, Normal thyroid, no enlarged nodes  NERVOUS SYSTEM:  Alert & Awake.   CHEST/LUNG: diminshed BS on L, R CTA  HEART: S1S2 normal, no S3, Regular rate and rhythm; No murmurs  ABDOMEN: Soft, Nontender, Nondistended; Bowel sounds present  EXTREMITIES:  2+ Peripheral Pulses, No clubbing, cyanosis, or edema  LYMPH: No lymphadenopathy noted  SKIN: No rashes or lesions    LABS:                        11.1   29.60 )-----------( 362      ( 29 Oct 2023 00:28 )             34.3     10-29    137  |  105  |  18  ----------------------------<  123<H>  4.6   |  24  |  0.71    Ca    8.2<L>      29 Oct 2023 00:28  Phos  3.1     10-29  Mg     2.3     10-29    TPro  5.6<L>  /  Alb  2.5<L>  /  TBili  0.2  /  DBili  x   /  AST  13  /  ALT  19  /  AlkPhos  62  10    LIVER FUNCTIONS - ( 29 Oct 2023 00:28 )  Alb: 2.5 g/dL / Pro: 5.6 g/dL / ALK PHOS: 62 U/L / ALT: 19 U/L / AST: 13 U/L / GGT: x             CAPILLARY BLOOD GLUCOSE        ABG - ( 28 Oct 2023 15:27 )  pH, Arterial: 7.32  pH, Blood: x     /  pCO2: 45    /  pO2: 98    / HCO3: 23    / Base Excess: -3.0  /  SaO2: 96.9                    Urinalysis Basic - ( 29 Oct 2023 00:28 )    Color: x / Appearance: x / SG: x / pH: x  Gluc: 123 mg/dL / Ketone: x  / Bili: x / Urobili: x   Blood: x / Protein: x / Nitrite: x   Leuk Esterase: x / RBC: x / WBC x   Sq Epi: x / Non Sq Epi: x / Bacteria: x          RADIOLOGY & ADDITIONAL TESTS:  CXR:        Care Discussed with Consultants/Other Providers [ x] YES  [ ] NO

## 2023-10-30 NOTE — CHART NOTE - NSCHARTNOTEFT_GEN_A_CORE
72y old  Female s/p 10/28 Subxiphoid pericardial window with Dr. Hillman and chest tube placement. Pericardial drain output 50cc/24h, removed today at bedside as per Dr. Hillman. CICU providers made aware prior to removal, pt tolerated procedure well. No further cardiac surgery intervention at this time.

## 2023-10-30 NOTE — PROGRESS NOTE ADULT - ASSESSMENT
71 y/o F w/ PMHx COPD, HLD, HTN, PVD, ICM/CAD s/p PCI (has  of RCA), cardiac arrest s/p left-sided ICD (6/4/2019) complicated by infection and s/p R single-chamber ICD ([place] in 9/23/2019), recent admission for VT storm with adjustment of medication and NIPS now presenting for ICD shock in the setting of recurrent monomorphic VT with unsuccessful ATP.  Ablation attempted 10/20 but aborted due to hemodynamically significant pericardial effusion with drain placement.  Pericardial drain removed on 10/20/23.  Over the weekend developed new shortness of breath, recurrent VT s/p ICD shock, and afib w/ RVR s/p cardioversion 10/21. She continues to have NSVT. Bronch negative for mucus plug. Currently in Afib s/p pericardial window 10/28.    - Would resume low dose metoprolol tartrate  - Continue diltiazem gtt can titrate down based on HR   - Resume amiodarone, can do Amiodarone 200mg Q12 x 3 days and then 200mg daily  - Would discuss with CTS if okay to start anticoagulation if so should start Eliquis 2.5mg Q12; would need a repeat limited TTE after resumption   - Would address if patient should be on ASA and Plavix given high bleeding risk  - Will allow for medical optimization and discuss plan for ablation in the future   - Monitor on telemetry  - Keep Mg > 2 and K > 4    Note not final until signed by attending       Erasmo Lundberg MD  Cardiology Fellow PGY-6  Phone: [phone]    For all New Consults  [email or web address]   Login: [email or web address] [email or web address] 71 y/o F w/ PMHx COPD, HLD, HTN, PVD, ICM/CAD s/p PCI (has  of RCA), cardiac arrest s/p left-sided ICD (6/4/2019) complicated by infection and s/p R single-chamber ICD ([place] in 9/23/2019), recent admission for VT storm with adjustment of medication and NIPS now presenting for ICD shock in the setting of recurrent monomorphic VT with unsuccessful ATP.  Ablation attempted 10/20 but aborted due to hemodynamically significant pericardial effusion with drain placement.  Pericardial drain removed on 10/20/23.  Over the weekend developed new shortness of breath, recurrent VT s/p ICD shock, and afib w/ RVR s/p cardioversion 10/21. She continues to have NSVT. Bronch negative for mucus plug. Currently in Afib s/p pericardial window 10/28.    - Would resume low dose metoprolol tartrate  - Continue diltiazem gtt can titrate down based on HR   - Resume amiodarone, can do Amiodarone 200mg Q12 x 3 days and then 200mg daily  - Would discuss with CTS if okay to start anticoagulation if so should start Eliquis 2.5mg Q12; would need a repeat limited TTE after resumption   - Would address if patient should be on ASA and Plavix given high bleeding risk  - Will allow for medical optimization and discuss plan for ablation in the future   - Monitor on telemetry  - Keep Mg > 2 and K > 4    Note not final until signed by attending       Erasmo Lundberg MD  Cardiology Fellow PGY-6  Phone: [phone]    For all New Consults  [email or web address]   Login: cardTRINA SOLAR [email or web address] 73 y/o F w/ PMHx COPD, HLD, HTN, PVD, ICM/CAD s/p PCI (has  of RCA), cardiac arrest s/p left-sided ICD (6/4/2019) complicated by infection and s/p R single-chamber ICD ([place] in 9/23/2019), recent admission for VT storm with adjustment of medication and NIPS now presenting for ICD shock in the setting of recurrent monomorphic VT with unsuccessful ATP.  Ablation attempted 10/20 but aborted due to hemodynamically significant pericardial effusion with drain placement.  Pericardial drain removed on 10/20/23.  Over the weekend developed new shortness of breath, recurrent VT s/p ICD shock, and afib w/ RVR s/p cardioversion 10/21. She continues to have NSVT. Bronch negative for mucus plug. Currently in Afib s/p pericardial window 10/28.    - Would resume low dose metoprolol tartrate  - Continue diltiazem gtt can titrate down based on HR   - Resume amiodarone, can do Amiodarone 200mg Q12 x 3 days and then 200mg daily  - Would discuss with CTS if okay to start anticoagulation if so should start Eliquis 2.5mg Q12; would need a repeat limited TTE after resumption   - Would address if patient should be on ASA and Plavix given high bleeding risk  - Will allow for medical optimization and discuss plan for ablation in the future   - Monitor on telemetry  - Keep Mg > 2 and K > 4    Note not final until signed by attending       Erasmo Lundberg MD  Cardiology Fellow PGY-6  Phone: [phone]    For all New Consults  [email or web address]   Login: [email or web address]

## 2023-10-30 NOTE — PROGRESS NOTE ADULT - ATTENDING COMMENTS
71yo woman with severe COPD, HTN, PAD, CAD (100%  of RCA), cardiac arrest s/p left-sided ICD (6/4/2019) complicated by infection requiring explant and now currently with right sided single-chamber ICD reimplantation ([place] in 9/23/2019), HFmrEF 45% now admitted with recurrent/refractory VT and tamponade. Now also with AFib/RVR    Neuro: no issues  Pulm: cont home inhalers, secretion mobilization  CV: Cont amio, quinidine and start dilt gtt for AFib RVR. Holding AC per EP given recurrent pericardial effusion and recent pericardial window will discuss timing of AC re-initiation for AFib requiring DCCV last week  GI: DASH diet  Renal: aim for even/euvolemia  ID: no issues  Heme: AC discussion as above  Endo: Cont BG checks  Lines: no central access. Chest tube (10/28 -) 73yo woman with severe COPD, HTN, PAD, CAD (100%  of RCA), cardiac arrest s/p left-sided ICD (6/4/2019) complicated by infection requiring explant and now currently with right sided single-chamber ICD reimplantation ([place] in 9/23/2019), HFmrEF 45% now admitted with recurrent/refractory VT and tamponade. Now also with AFib/RVR    Neuro: no issues  Pulm: cont home inhalers, secretion mobilization  CV: Cont amio, quinidine and dilt gtt for AFib RVR. Holding AC per EP given recurrent pericardial effusion and recent pericardial window will discuss timing of AC re-initiation for AFib requiring DCCV last week: as per EP discussion, will monitor on the current regiment  GI: DASH diet  Renal: aim for even/euvolemia  ID: no issues  Heme: AC discussion as above  Endo: Cont BG checks  Lines: no central access. Chest tube (10/28 -)    Patient is critically ill, requiring critical care services. Despite the current condition, the patient is at a high risk of clinical and hemodynamic demise

## 2023-10-30 NOTE — PROGRESS NOTE ADULT - SUBJECTIVE AND OBJECTIVE BOX
AUSTIN LEE  MRN-[id number]  Patient is a 72y old  Female who presents with a chief complaint of AICD discharge (30 Oct 2023 11:34)    HPI:  72F c hx COPD, HTN, PAD, CAD s/p LAD stent (last [facility] showing 100%  of RCA), chronic leukocytosis of unclear etiol, cardiac arrest s/p left-sided ICD (6/4/2019) complicated by infection and s/p R single-chamber ICD reimplantation ([place] in 9/23/2019), CHF EF 45%, grade 2 DHF, recent hospitalization for UTI (treated w/ ceftriaxone and Vanco x 3 days) and VFib s/p 13 AICD firings, pw lightheadedness and AICD firing.    Pt recently hospitalized for VFib s/p multiple aicd firing. Pt placed on quinidine, mexiletine, metoprolol. Pt reports good compliance with her medications, even though she doesn't know what the meds are. Pt states at around 3-4PM yesterday, she felt "dizzy" for a few seconds, then felt AICD shock, then had resolution of her dizziness. Denies fevers, chills, CP, SOB, URI symptoms, GI symptoms. Pt states she's never seen a hematologist for leukocytosis. Pt was supposed to f/u with EP as outpt for ablation. (16 Oct 2023 03:47)      Hospital Course:    24 HOUR EVENTS:    REVIEW OF SYSTEMS:    CONSTITUTIONAL: No weakness, fevers or chills  EYES/ENT: No visual changes;  No vertigo or throat pain   NECK: No pain or stiffness  RESPIRATORY: No cough, wheezing, hemoptysis; No shortness of breath  CARDIOVASCULAR: No chest pain or palpitations  GASTROINTESTINAL: No abdominal or epigastric pain. No nausea, vomiting, or hematemesis; No diarrhea or constipation. No melena or hematochezia.  GENITOURINARY: No dysuria, frequency or hematuria  NEUROLOGICAL: No numbness or weakness  SKIN: No itching, rashes      ICU Vital Signs Last 24 Hrs  T(C): 36.4 (30 Oct 2023 19:00), Max: 36.6 (30 Oct 2023 04:00)  T(F): 97.6 (30 Oct 2023 19:00), Max: 97.8 (30 Oct 2023 04:00)  HR: 82 (30 Oct 2023 20:00) (71 - 103)  BP: 112/54 (30 Oct 2023 20:00) (95/52 - 161/77)  BP(mean): 78 (30 Oct 2023 20:00) (68 - 103)  ABP: --  ABP(mean): --  RR: 22 (30 Oct 2023 20:00) (18 - 26)  SpO2: 96% (30 Oct 2023 20:00) (92% - 98%)    O2 Parameters below as of 30 Oct 2023 20:00  Patient On (Oxygen Delivery Method): nasal cannula  O2 Flow (L/min): 2          CVP(mm Hg): --  CO: --  CI: --  PA: --  PA(mean): --  PA(direct): --  PCWP: --  LA: --  RA: --  SVR: --  SVRI: --  PVR: --  PVRI: --  I&O's Summary    29 Oct 2023 07:01  -  30 Oct 2023 07:00  --------------------------------------------------------  IN: 620 mL / OUT: 250 mL / NET: 370 mL    30 Oct 2023 07:01  -  30 Oct 2023 21:07  --------------------------------------------------------  IN: 710 mL / OUT: 115 mL / NET: 595 mL        CAPILLARY BLOOD GLUCOSE    CAPILLARY BLOOD GLUCOSE          PHYSICAL EXAM:  GENERAL: No acute distress, well-developed  HEAD:  Atraumatic, Normocephalic  EYES: EOMI, PERRLA, conjunctiva and sclera clear  NECK: Supple, no lymphadenopathy, no JVD  CHEST/LUNG: CTAB; No wheezes, rales, or rhonchi  HEART: Regular rate and rhythm. Normal S1/S2. No murmurs, rubs, or gallops  ABDOMEN: Soft, non-tender, non-distended; normal bowel sounds, no organomegaly  EXTREMITIES:  2+ peripheral pulses b/l, No clubbing, cyanosis, or edema  NEUROLOGY: A&O x 3, no focal deficits  SKIN: No rashes or lesions    ============================I/O===========================   I&O's Detail    trial pressure equals 3 mm  Hg, consistent with normal pulmonary pressures. Color  Doppler demonstrates no evidence of a patent foramen ovale.  ------------------------------------------------------------------------  Conclusions:  1. Mitral annular calcification and calcified mitral  leaflets with decreased diastolic opening. Mild mitral  regurgitation.  Peak mitral valve gradient equals 13 mm Hg,  mean transmitral valve gradient equals 4 mm Hg, consistent  with mild mitral stenosis. Gradient measured at a HR of  81bpm.  2. Aortic valve not well visualized; probably normal.  Minimal aortic regurgitation.  7:26)    ======================================================  PAST MEDICAL & SURGICAL HISTORY:  Obese      Smoker      HTN (hypertension)      HLD (hyperlipidemia)      CAD (coronary artery disease)      CHF with cardiomyopathy      History of hip surgery  ====================ASSESSMENT ==============  72F c hx COPD, HTN, PAD, CAD s/p LAD stent (last [facility] showing 100%  of RCA), chronic leukocytosis of unclear etiol, cardiac arrest s/p left-sided ICD (6/4/2019) complicated by infection and s/p R single-chamber ICD reimplantation ([place] in 9/23/2019), CHF EF 45%, grade 2 DHF, recent hospitalization for UTI (treated w/ ceftriaxone and Vanco x 3 days) and VFib s/p 13 AICD firings, pw lightheadedness and AICD firing.    Pt recently hospitalized for VFib s/p multiple aicd firing. Pt placed on quinidine, mexiletine, metoprolol. Returning to CICU after downgrade w/ c/f worsening effusion w/ early tamponade physiology. S/p window and chest tube. Afib w RVR, dilt started     ====================== NEUROLOGY=====================  A&Ox3  - no active issues  - continue to monitor mental status as per protocol     ==================== RESPIRATORY======================  Opacified L lung - Improving  - Noted to be tachypnea x2 nights w/o inc o2 requirement, CXR at that time showed white out L hemithorax: mucus plug vs. effusion vs. PNA  - s/p bronchoscopy w/ minimal mucus; Post bronch CXR improved, still b/l effusion but improving L lung aeration  - Careful monitoring w/ daily AM XR, trend WBC and fever curve  - Saline, chest PT tid; Nebs was held 10/29 AM because of AF w/ RVR  - continue to monitor SpO2 with goal >90%, O2 therapy PRN    ====================CARDIOVASCULAR==================  Pericardial Tamponade  - s/p aborted VT ablation c/b pericardial tamponade  - pericardial drain with 40 cc drained initially, 250cc in bag upon arrival to CICU, s/p drainage removal on 10/20  - TTE 10/22 with moderate pericardial effusion, not seemingly large enough to affect hemodynamics  - Transferred to floors, became hypotensive to SBP 90s, TTE with enlarged pericardial effusion size. Transferred back to CICU 10/27 pre OR for monitoring; now s/p pericardial window with CTS (Kady) on 10/28; 300cc sanguinous fluid removed intraoperatively, chest tube in place post-operatively  - monitor chest tube output  - 30 cc ovn   - currently on water seal    AF w/ RVR  - new onset AF w/ RVR, s/p DCCVx2 at 200J (22nd), to NSR  - s/p Lido gtt, d/c'd  - Again went into AF w/ RVR on 10/29 AM to 190s-200s, given Amio 150 IV. started on Cardizem 10 gtt  - f/u EP recs  - Will discuss with EP/CTS regarding r/s AC, holding for now. Currently on ASA/plavix  - EP: amio 200 bid, x3 days, then on 2nd, switch to 200 daily. Should be off ASA/plavix, and to reach out to CTS for poss Eliquis initiation then rpt TTE       VT  - 400 amio TID PO per EP  - VT ablation aborted after tamponade  - lido d/c'd/. Quinidine 324 BID started 10/23  - has AICD  - replete lytes as needed to maintain K>4, Mag>2  - will need q6mo TSH / LFT, annual CXR and eye exam as outpatient (while on amiodarone)    CHF  - TTE 10/5 EF 55%, reg WMA, mild-mod MS  - Holding GDMT given critical illness     CAD s/p stents  - Plavix held iso recent bloody pericardial effusion and recent pericardial window/chest tube  - EP: Should be off ASA/plavix, and to reach out to CTS for poss Eliquis initiation then rpt TTE      ===================== RENAL =========================  No acute issues  - Continue monitoring urine output, lytes, SCr/ BUN  - replete lytes prn with goal K >4 and Mg >2    =============== GASTROINTESTINAL===================  DASH Diet    ===================ENDO====================  No active issues  - Monitor glucose on CMP  - TSH wnl    ===================HEMATOLOGIC/ONC ===================  Monitor H/H and plts  - DVT PPX: SCDs. Holding chemical/systemic DVT ppx s/p procedure.   - Per EP, will reach out to CTS re: Eliquis initiation      ==================INFECTIOUS DISEASE================  Afebrile  - chronic leukocytosis of unknown origin   - monitor and trend WBC and temperature curve         Patient requires continuous monitoring with bedside rhythm monitoring, pulse ox monitoring, and intermittent blood gas analysis. Care plan discussed with ICU care team. Patient remained critical and at risk for life threatening decompensation.  Patient seen, examined and plan discussed with CCU team during rounds.     I have personally provided ____ minutes of critical care time excluding time spent on separate procedures, in addition to initial critical care time provided by the CICU Attending, Dr. Wise.    By signing my name below, I, Brandee Spencer, attest that this documentation has been prepared under the direction and in the presence of STACI Rocha.  Electronically signed: Rick Spaulding, 10-30-23 @ 21:07    I, Jonathan Vega, personally performed the services described in this documentation. all medical record entries made by the rick were at my direction and in my presence. I have reviewed the chart and agree that the record reflects my personal performance and is accurate and complete  Electronically signed: STACI Rocha.       AUSTIN LEE  MRN-[id number]  Patient is a 72y old  Female who presents with a chief complaint of AICD discharge (30 Oct 2023 11:34)    HPI:  72F c hx COPD, HTN, PAD, CAD s/p LAD stent (last LHC showing 100%  of RCA), chronic leukocytosis of unclear etiol, cardiac arrest s/p left-sided ICD (6/4/2019) complicated by infection and s/p R single-chamber ICD reimplantation ([place] in 9/23/2019), CHF EF 45%, grade 2 DHF, recent hospitalization for UTI (treated w/ ceftriaxone and Vanco x 3 days) and VFib s/p 13 AICD firings, pw lightheadedness and AICD firing.    Pt recently hospitalized for VFib s/p multiple aicd firing. Pt placed on quinidine, mexiletine, metoprolol. Pt reports good compliance with her medications, even though she doesn't know what the meds are. Pt states at around 3-4PM yesterday, she felt "dizzy" for a few seconds, then felt AICD shock, then had resolution of her dizziness. Denies fevers, chills, CP, SOB, URI symptoms, GI symptoms. Pt states she's never seen a hematologist for leukocytosis. Pt was supposed to f/u with EP as outpt for ablation. (16 Oct 2023 03:47)    24 HOUR EVENTS: s/p drain removal    REVIEW OF SYSTEMS:    CONSTITUTIONAL: No weakness, fevers or chills  EYES/ENT: No visual changes;  No vertigo or throat pain   NECK: No pain or stiffness  RESPIRATORY: No cough, wheezing, hemoptysis; No shortness of breath  CARDIOVASCULAR: No chest pain or palpitations  GASTROINTESTINAL: No abdominal or epigastric pain. No nausea, vomiting, or hematemesis; No diarrhea or constipation. No melena or hematochezia.  GENITOURINARY: No dysuria, frequency or hematuria  NEUROLOGICAL: No numbness or weakness  SKIN: No itching, rashes      ICU Vital Signs Last 24 Hrs  T(C): 36.4 (30 Oct 2023 19:00), Max: 36.6 (30 Oct 2023 04:00)  T(F): 97.6 (30 Oct 2023 19:00), Max: 97.8 (30 Oct 2023 04:00)  HR: 82 (30 Oct 2023 20:00) (71 - 103)  BP: 112/54 (30 Oct 2023 20:00) (95/52 - 161/77)  BP(mean): 78 (30 Oct 2023 20:00) (68 - 103)  ABP: --  ABP(mean): --  RR: 22 (30 Oct 2023 20:00) (18 - 26)  SpO2: 96% (30 Oct 2023 20:00) (92% - 98%)    O2 Parameters below as of 30 Oct 2023 20:00  Patient On (Oxygen Delivery Method): nasal cannula  O2 Flow (L/min): 2          CVP(mm Hg): --  CO: --  CI: --  PA: --  PA(mean): --  PA(direct): --  PCWP: --  LA: --  RA: --  SVR: --  SVRI: --  PVR: --  PVRI: --  I&O's Summary    29 Oct 2023 07:01  -  30 Oct 2023 07:00  --------------------------------------------------------  IN: 620 mL / OUT: 250 mL / NET: 370 mL    30 Oct 2023 07:01  -  30 Oct 2023 21:07  --------------------------------------------------------  IN: 710 mL / OUT: 115 mL / NET: 595 mL        CAPILLARY BLOOD GLUCOSE    CAPILLARY BLOOD GLUCOSE          PHYSICAL EXAM:  GENERAL: No acute distress, well-developed  HEAD:  Atraumatic, Normocephalic  EYES: EOMI, PERRLA, conjunctiva and sclera clear  NECK: Supple, no lymphadenopathy, no JVD  CHEST/LUNG: CTAB; No wheezes, rales, or rhonchi  HEART: Regular rate and rhythm. Normal S1/S2. No murmurs, rubs, or gallops  ABDOMEN: Soft, non-tender, non-distended; normal bowel sounds, no organomegaly  EXTREMITIES:  2+ peripheral pulses b/l, No clubbing, cyanosis, or edema  NEUROLOGY: A&O x 3, no focal deficits  SKIN: No rashes or lesions    ============================I/O===========================   I&O's Detail    trial pressure equals 3 mm  Hg, consistent with normal pulmonary pressures. Color  Doppler demonstrates no evidence of a patent foramen ovale.  ------------------------------------------------------------------------  Conclusions:  1. Mitral annular calcification and calcified mitral  leaflets with decreased diastolic opening. Mild mitral  regurgitation.  Peak mitral valve gradient equals 13 mm Hg,  mean transmitral valve gradient equals 4 mm Hg, consistent  with mild mitral stenosis. Gradient measured at a HR of  81bpm.  2. Aortic valve not well visualized; probably normal.  Minimal aortic regurgitation.  7:26)    ======================================================  PAST MEDICAL & SURGICAL HISTORY:  Obese      Smoker      HTN (hypertension)      HLD (hyperlipidemia)      CAD (coronary artery disease)      CHF with cardiomyopathy      History of hip surgery  ====================ASSESSMENT ==============  72F c hx COPD, HTN, PAD, CAD s/p LAD stent (last [facility] showing 100%  of RCA), chronic leukocytosis of unclear etiol, cardiac arrest s/p left-sided ICD (6/4/2019) complicated by infection and s/p R single-chamber ICD reimplantation ([place] in 9/23/2019), CHF EF 45%, grade 2 DHF, recent hospitalization for UTI (treated w/ ceftriaxone and Vanco x 3 days) and VFib s/p 13 AICD firings, pw lightheadedness and AICD firing.    Pt recently hospitalized for VFib s/p multiple aicd firing. Pt placed on quinidine, mexiletine, metoprolol. Returning to CICU after downgrade w/ c/f worsening effusion w/ early tamponade physiology. S/p window and chest tube. Afib w RVR, dilt started     ====================== NEUROLOGY=====================  A&Ox3  - no active issues  - continue to monitor mental status as per protocol     ==================== RESPIRATORY======================  Opacified L lung - Improving  - Noted to be tachypnea x2 nights w/o inc o2 requirement, CXR at that time showed white out L hemithorax: mucus plug vs. effusion vs. PNA  - s/p bronchoscopy w/ minimal mucus; Post bronch CXR improved, still b/l effusion but improving L lung aeration  - Careful monitoring w/ daily AM XR, trend WBC and fever curve  - Saline, chest PT tid; Nebs was held 10/29 AM because of AF w/ RVR  - continue to monitor SpO2 with goal >90%, O2 therapy PRN    ====================CARDIOVASCULAR==================  Pericardial Tamponade  - s/p aborted VT ablation c/b pericardial tamponade  - pericardial drain with 40 cc drained initially, 250cc in bag upon arrival to CICU, s/p drainage removal on 10/20  - TTE 10/22 with moderate pericardial effusion, not seemingly large enough to affect hemodynamics  - Transferred to floors, became hypotensive to SBP 90s, TTE with enlarged pericardial effusion size. Transferred back to CICU 10/27 pre OR for monitoring; now s/p pericardial window with CTS (Kady) on 10/28; 300cc sanguinous fluid removed intraoperatively, chest tube in place post-operatively  - monitor chest tube output  - 30 cc ovn   - currently on water seal    AF w/ RVR  - new onset AF w/ RVR, s/p DCCVx2 at 200J (22nd), to NSR  - s/p Lido gtt, d/c'd  - Again went into AF w/ RVR on 10/29 AM to 190s-200s, given Amio 150 IV. started on Cardizem 10 gtt  - f/u EP recs  - Will discuss with EP/CTS regarding r/s AC, holding for now. Currently on ASA/plavix  - EP: amio 200 bid, x3 days, then on 2nd, switch to 200 daily. Should be off ASA/plavix, and to reach out to CTS for poss Eliquis initiation then rpt TTE       VT  - 400 amio TID PO per EP  - VT ablation aborted after tamponade  - lido d/c'd/. Quinidine 324 BID started 10/23  - has AICD  - replete lytes as needed to maintain K>4, Mag>2  - will need q6mo TSH / LFT, annual CXR and eye exam as outpatient (while on amiodarone)    CHF  - TTE 10/5 EF 55%, reg WMA, mild-mod MS  - Holding GDMT given critical illness     CAD s/p stents  - Plavix held iso recent bloody pericardial effusion and recent pericardial window/chest tube  - EP: Should be off ASA/plavix, and to reach out to CTS for poss Eliquis initiation then rpt TTE      ===================== RENAL =========================  No acute issues  - Continue monitoring urine output, lytes, SCr/ BUN  - replete lytes prn with goal K >4 and Mg >2    =============== GASTROINTESTINAL===================  DASH Diet    ===================ENDO====================  No active issues  - Monitor glucose on CMP  - TSH wnl    ===================HEMATOLOGIC/ONC ===================  Monitor H/H and plts  - DVT PPX: SCDs. Holding chemical/systemic DVT ppx s/p procedure.   - Per EP, will reach out to CTS re: Eliquis initiation      ==================INFECTIOUS DISEASE================  Afebrile  - chronic leukocytosis of unknown origin   - monitor and trend WBC and temperature curve         Patient requires continuous monitoring with bedside rhythm monitoring, pulse ox monitoring, and intermittent blood gas analysis. Care plan discussed with ICU care team. Patient remained critical and at risk for life threatening decompensation.  Patient seen, examined and plan discussed with CCU team during rounds.     I have personally provided ____ minutes of critical care time excluding time spent on separate procedures, in addition to initial critical care time provided by the CICU Attending, Dr. Wise.    By signing my name below, I, Brandee Spencer, attest that this documentation has been prepared under the direction and in the presence of STACI Rocha.  Electronically signed: Dallin Spaulding, 10-30-23 @ 21:07    I, Jonathan Vega, personally performed the services described in this documentation. all medical record entries made by the lashawnibmary were at my direction and in my presence. I have reviewed the chart and agree that the record reflects my personal performance and is accurate and complete  Electronically signed: STACI Rocha.       AUSTIN LEE  MRN-[id number]  Patient is a 72y old  Female who presents with a chief complaint of AICD discharge (30 Oct 2023 11:34)    HPI:  72F c hx COPD, HTN, PAD, CAD s/p LAD stent (last [facility] showing 100%  of RCA), chronic leukocytosis of unclear etiol, cardiac arrest s/p left-sided ICD (6/4/2019) complicated by infection and s/p R single-chamber ICD reimplantation ([place] in 9/23/2019), CHF EF 45%, grade 2 DHF, recent hospitalization for UTI (treated w/ ceftriaxone and Vanco x 3 days) and VFib s/p 13 AICD firings, pw lightheadedness and AICD firing.    Pt recently hospitalized for VFib s/p multiple aicd firing. Pt placed on quinidine, mexiletine, metoprolol. Pt reports good compliance with her medications, even though she doesn't know what the meds are. Pt states at around 3-4PM yesterday, she felt "dizzy" for a few seconds, then felt AICD shock, then had resolution of her dizziness. Denies fevers, chills, CP, SOB, URI symptoms, GI symptoms. Pt states she's never seen a hematologist for leukocytosis. Pt was supposed to f/u with EP as outpt for ablation. (16 Oct 2023 03:47)    24 HOUR EVENTS: s/p drain removal today    REVIEW OF SYSTEMS:    CONSTITUTIONAL: No weakness, fevers or chills  EYES/ENT: No visual changes;  No vertigo or throat pain   NECK: No pain or stiffness  RESPIRATORY: No cough, wheezing, hemoptysis; No shortness of breath  CARDIOVASCULAR: No chest pain or palpitations  GASTROINTESTINAL: No abdominal or epigastric pain. No nausea, vomiting, or hematemesis; No diarrhea or constipation. No melena or hematochezia.  GENITOURINARY: No dysuria, frequency or hematuria  NEUROLOGICAL: No numbness or weakness  SKIN: No itching, rashes      ICU Vital Signs Last 24 Hrs  T(C): 36.4 (30 Oct 2023 19:00), Max: 36.6 (30 Oct 2023 04:00)  T(F): 97.6 (30 Oct 2023 19:00), Max: 97.8 (30 Oct 2023 04:00)  HR: 82 (30 Oct 2023 20:00) (71 - 103)  BP: 112/54 (30 Oct 2023 20:00) (95/52 - 161/77)  BP(mean): 78 (30 Oct 2023 20:00) (68 - 103)  ABP: --  ABP(mean): --  RR: 22 (30 Oct 2023 20:00) (18 - 26)  SpO2: 96% (30 Oct 2023 20:00) (92% - 98%)    O2 Parameters below as of 30 Oct 2023 20:00  Patient On (Oxygen Delivery Method): nasal cannula  O2 Flow (L/min): 2    I&O's Summary    29 Oct 2023 07:01  -  30 Oct 2023 07:00  --------------------------------------------------------  IN: 620 mL / OUT: 250 mL / NET: 370 mL    30 Oct 2023 07:01  -  30 Oct 2023 21:07  --------------------------------------------------------  IN: 710 mL / OUT: 115 mL / NET: 595 mL        CAPILLARY BLOOD GLUCOSE    CAPILLARY BLOOD GLUCOSE    ============================I/O===========================   I&O's Detail    trial pressure equals 3 mm  Hg, consistent with normal pulmonary pressures. Color  Doppler demonstrates no evidence of a patent foramen ovale.  ------------------------------------------------------------------------  Conclusions:  1. Mitral annular calcification and calcified mitral  leaflets with decreased diastolic opening. Mild mitral  regurgitation.  Peak mitral valve gradient equals 13 mm Hg,  mean transmitral valve gradient equals 4 mm Hg, consistent  with mild mitral stenosis. Gradient measured at a HR of  81bpm.  2. Aortic valve not well visualized; probably normal.  Minimal aortic regurgitation.  7:26)    ======================================================  PAST MEDICAL & SURGICAL HISTORY:  Obese      Smoker      HTN (hypertension)      HLD (hyperlipidemia)      CAD (coronary artery disease)      CHF with cardiomyopathy      History of hip surgery  ====================ASSESSMENT ==============  72F c hx COPD, HTN, PAD, CAD s/p LAD stent (last [facility] showing 100%  of RCA), chronic leukocytosis of unclear etiol, cardiac arrest s/p left-sided ICD (6/4/2019) complicated by infection and s/p R single-chamber ICD reimplantation ([place] in 9/23/2019), CHF EF 45%, grade 2 DHF, recent hospitalization for UTI (treated w/ ceftriaxone and Vanco x 3 days) and VFib s/p 13 AICD firings, pw lightheadedness and AICD firing.    Pt recently hospitalized for VFib s/p multiple aicd firing. Pt placed on quinidine, mexiletine, metoprolol. Returning to CICU after downgrade w/ c/f worsening effusion w/ early tamponade physiology. S/p window and chest tube. Afib w RVR, dilt started     ====================== NEUROLOGY=====================  A&Ox3  - no active issues  - continue to monitor mental status as per protocol     ==================== RESPIRATORY======================  Opacified L lung - Improving  - Noted to be tachypnea x2 nights w/o inc o2 requirement, CXR at that time showed white out L hemithorax: mucus plug vs. effusion vs. PNA  - s/p bronchoscopy w/ minimal mucus; Post bronch CXR improved, still b/l effusion but improving L lung aeration  - Careful monitoring w/ daily AM XR, trend WBC and fever curve  - Saline, chest PT tid; Nebs was held 10/29 AM because of AF w/ RVR  - continue to monitor SpO2 with goal >90%, O2 therapy PRN    ====================CARDIOVASCULAR==================  Pericardial Tamponade  - s/p aborted VT ablation c/b pericardial tamponade  - pericardial drain with 40 cc drained initially, 250cc in bag upon arrival to CICU, s/p drainage removal on 10/20  - TTE 10/22 with moderate pericardial effusion, not seemingly large enough to affect hemodynamics  - Transferred to floors, became hypotensive to SBP 90s, TTE with enlarged pericardial effusion size. Transferred back to CICU 10/27 pre OR for monitoring; now s/p pericardial window with CTS (Kady) on 10/28; 300cc sanguinous fluid removed intraoperatively, chest tube in place post-operatively  - monitor chest tube output  - 30 cc ovn   - currently on water seal    AF w/ RVR  - new onset AF w/ RVR, s/p DCCVx2 at 200J (22nd), to NSR  - s/p Lido gtt, d/c'd  - Again went into AF w/ RVR on 10/29 AM to 190s-200s, given Amio 150 IV. Wean Cardizem as lina  - f/u EP recs  - Will discuss with EP/CTS regarding r/s AC, holding for now. Currently on ASA/plavix  - EP: amio 200 bid, x3 days, then on 2nd, switch to 200 daily. Should be off ASA/plavix, and to reach out to CTS for poss Eliquis initiation then rpt TTE       VT  - 400 amio TID PO per EP  - VT ablation aborted after tamponade  - lido d/c'd/. Quinidine 324 BID started 10/23  - has AICD  - replete lytes as needed to maintain K>4, Mag>2  - will need q6mo TSH / LFT, annual CXR and eye exam as outpatient (while on amiodarone)    CHF  - TTE 10/5 EF 55%, reg WMA, mild-mod MS  - Holding GDMT given critical illness     CAD s/p stents  - Plavix held iso recent bloody pericardial effusion and recent pericardial window/chest tube  - EP: Should be off ASA/plavix, and to reach out to CTS for poss Eliquis initiation then rpt TTE      ===================== RENAL =========================  No acute issues  - Continue monitoring urine output, lytes, SCr/ BUN  - replete lytes prn with goal K >4 and Mg >2    =============== GASTROINTESTINAL===================  DASH Diet    ===================ENDO====================  No active issues  - Monitor glucose on CMP  - TSH wnl    ===================HEMATOLOGIC/ONC ===================  Monitor H/H and plts  - DVT PPX: SCDs. Holding chemical/systemic DVT ppx s/p procedure.   - Per EP, will reach out to CTS re: Eliquis initiation      ==================INFECTIOUS DISEASE================  Afebrile  - chronic leukocytosis of unknown origin   - monitor and trend WBC and temperature curve         Patient requires continuous monitoring with bedside rhythm monitoring, pulse ox monitoring, and intermittent blood gas analysis. Care plan discussed with ICU care team. Patient remained critical and at risk for life threatening decompensation.  Patient seen, examined and plan discussed with CCU team during rounds.     I have personally provided 35 minutes of critical care time excluding time spent on separate procedures, in addition to initial critical care time provided by the CICU Attending, Dr. Wise.    By signing my name below, I, Brandee Spencer, attest that this documentation has been prepared under the direction and in the presence of STACI Rocha.  Electronically signed: Dallin Spaulding, 10-30-23 @ 21:07    I, Jonathan Vega, personally performed the services described in this documentation. all medical record entries made by the scribe were at my direction and in my presence. I have reviewed the chart and agree that the record reflects my personal performance and is accurate and complete  Electronically signed: STACI Rocha.

## 2023-10-30 NOTE — PROGRESS NOTE ADULT - ASSESSMENT
72F c hx COPD, HTN, PAD, CAD s/p LAD stent (last C showing 100%  of RCA), chronic leukocytosis of unclear etiol, cardiac arrest s/p left-sided ICD (6/4/2019) complicated by infection and s/p R single-chamber ICD reimplantation ([place] in 9/23/2019), CHF EF 45%, grade 2 DHF, recent hospitalization for UTI (treated w/ ceftriaxone and Vanco x 3 days) and VFib s/p 13 AICD firings, pw lightheadedness and AICD firing.    Pt recently hospitalized for VFib s/p multiple aicd firing. Pt placed on quinidine, mexiletine, metoprolol. Returning to CICU after downgrade w/ c/f worsening effusion w/ early tamponade physiology. S/p window and chest tube.     ====================== NEUROLOGY=====================  A&Ox3  - no active issues  - continue to monitor mental status as per protocol     ==================== RESPIRATORY======================  Opacified L lung - Improving  - Noted to be tachypnea x2 nights w/o inc o2 requirement, CXR at that time showed white out L hemithorax: mucus plug vs. effusion vs. PNA  - s/p bronchoscopy w/ minimal mucus; Post bronch CXR improved, still b/l effusion but improving L lung aeration  - Careful monitoring w/ daily AM XR, trend WBC and fever curve  - Saline, chest PT tid; Nebs was held 10/29 AM because of AF w/ RVR  - continue to monitor SpO2 with goal >94%, O2 therapy PRN    ====================CARDIOVASCULAR==================  Pericardial Tamponade  - s/p aborted VT ablation c/b pericardial tamponade  - pericardial drain with 40 cc drained initially, 250cc in bag upon arrival to CICU, s/p drainage removal on 10/20  - TTE 10/22 with moderate pericardial effusion, not seemingly large enough to affect hemodynamics  - Transferred to floors, became hypotensive to SBP 90s, TTE with enlarged pericardial effusion size. Transferred back to CICU 10/27 pre OR for monitoring; now s/p pericardial window with CTS (Kady) on 10/28; 300cc sanguinous fluid removed intraoperatively, chest tube in place post-operatively  - monitor chest tube output  - 30 cc ovn   - currently on water seal    AF w/ RVR  - new onset AF w/ RVR, s/p DCCVx2 at 200J (22nd), to NSR  - s/p Lido gtt, d/c'd  - Again went into AF w/ RVR on 10/29 AM to 190s-200s, given Amio 150 IV. started on Cardizem 10 gtt  - f/u EP recs  - Will discuss with EP/CTS regarding r/s AC, holding for now. Currently on ASA/plavix       VT  - 400 amio TID PO per EP  - VT ablation aborted after tamponade  - lido d/c'd/. Quinidine 324 BID started 10/23  - has AICD  - replete lytes as needed to maintain K>4, Mag>2  - will need q6mo TSH / LFT, annual CXR and eye exam as outpatient (while on amiodarone)    CHF  - TTE 10/5 EF 55%, reg WMA, mild-mod MS  - Holding GDMT given critical illness     CAD s/p stents  - Plavix held iso recent bloody pericardial effusion and recent pericardial window/chest tube  - c/w DAPT, can consider d/c ASA once triple therapy r/s    ===================== RENAL =========================  No acute issues  - Continue monitoring urine output, lytes, SCr/ BUN  - replete lytes prn with goal K >4 and Mg >2    =============== GASTROINTESTINAL===================  DASH Diet    ===================ENDO====================  No active issues  - Monitor glucose on CMP  - TSH wnl    ===================HEMATOLOGIC/ONC ===================  Monitor H/H and plts  - DVT PPX: SCDs. Holding chemical/systemic DVT ppx s/p procedure. F/u EP/CTS regarding restarting systemic AC.     ==================INFECTIOUS DISEASE================  Afebrile  - chronic leukocytosis of unknown origin   - monitor and trend WBC and temperature curve      72F c hx COPD, HTN, PAD, CAD s/p LAD stent (last [facility] showing 100%  of RCA), chronic leukocytosis of unclear etiol, cardiac arrest s/p left-sided ICD (6/4/2019) complicated by infection and s/p R single-chamber ICD reimplantation ([place] in 9/23/2019), CHF EF 45%, grade 2 DHF, recent hospitalization for UTI (treated w/ ceftriaxone and Vanco x 3 days) and VFib s/p 13 AICD firings, pw lightheadedness and AICD firing.    Pt recently hospitalized for VFib s/p multiple aicd firing. Pt placed on quinidine, mexiletine, metoprolol. Returning to CICU after downgrade w/ c/f worsening effusion w/ early tamponade physiology. S/p window and chest tube. Afib w RVR, dilt started     ====================== NEUROLOGY=====================  A&Ox3  - no active issues  - continue to monitor mental status as per protocol     ==================== RESPIRATORY======================  Opacified L lung - Improving  - Noted to be tachypnea x2 nights w/o inc o2 requirement, CXR at that time showed white out L hemithorax: mucus plug vs. effusion vs. PNA  - s/p bronchoscopy w/ minimal mucus; Post bronch CXR improved, still b/l effusion but improving L lung aeration  - Careful monitoring w/ daily AM XR, trend WBC and fever curve  - Saline, chest PT tid; Nebs was held 10/29 AM because of AF w/ RVR  - continue to monitor SpO2 with goal >90%, O2 therapy PRN    ====================CARDIOVASCULAR==================  Pericardial Tamponade  - s/p aborted VT ablation c/b pericardial tamponade  - pericardial drain with 40 cc drained initially, 250cc in bag upon arrival to CICU, s/p drainage removal on 10/20  - TTE 10/22 with moderate pericardial effusion, not seemingly large enough to affect hemodynamics  - Transferred to floors, became hypotensive to SBP 90s, TTE with enlarged pericardial effusion size. Transferred back to CICU 10/27 pre OR for monitoring; now s/p pericardial window with CTS (Kady) on 10/28; 300cc sanguinous fluid removed intraoperatively, chest tube in place post-operatively  - monitor chest tube output  - 30 cc ovn   - currently on water seal    AF w/ RVR  - new onset AF w/ RVR, s/p DCCVx2 at 200J (22nd), to NSR  - s/p Lido gtt, d/c'd  - Again went into AF w/ RVR on 10/29 AM to 190s-200s, given Amio 150 IV. started on Cardizem 10 gtt  - f/u EP recs  - Will discuss with EP/CTS regarding r/s AC, holding for now. Currently on ASA/plavix       VT  - 400 amio TID PO per EP  - VT ablation aborted after tamponade  - lido d/c'd/. Quinidine 324 BID started 10/23  - has AICD  - replete lytes as needed to maintain K>4, Mag>2  - will need q6mo TSH / LFT, annual CXR and eye exam as outpatient (while on amiodarone)    CHF  - TTE 10/5 EF 55%, reg WMA, mild-mod MS  - Holding GDMT given critical illness     CAD s/p stents  - Plavix held iso recent bloody pericardial effusion and recent pericardial window/chest tube  - c/w DAPT, can consider d/c ASA once triple therapy r/s    ===================== RENAL =========================  No acute issues  - Continue monitoring urine output, lytes, SCr/ BUN  - replete lytes prn with goal K >4 and Mg >2    =============== GASTROINTESTINAL===================  DASH Diet    ===================ENDO====================  No active issues  - Monitor glucose on CMP  - TSH wnl    ===================HEMATOLOGIC/ONC ===================  Monitor H/H and plts  - DVT PPX: SCDs. Holding chemical/systemic DVT ppx s/p procedure.   - F/u EP/CTS regarding restarting systemic AC.     ==================INFECTIOUS DISEASE================  Afebrile  - chronic leukocytosis of unknown origin   - monitor and trend WBC and temperature curve      72F c hx COPD, HTN, PAD, CAD s/p LAD stent (last [facility] showing 100%  of RCA), chronic leukocytosis of unclear etiol, cardiac arrest s/p left-sided ICD (6/4/2019) complicated by infection and s/p R single-chamber ICD reimplantation ([place] in 9/23/2019), CHF EF 45%, grade 2 DHF, recent hospitalization for UTI (treated w/ ceftriaxone and Vanco x 3 days) and VFib s/p 13 AICD firings, pw lightheadedness and AICD firing.    Pt recently hospitalized for VFib s/p multiple aicd firing. Pt placed on quinidine, mexiletine, metoprolol. Returning to CICU after downgrade w/ c/f worsening effusion w/ early tamponade physiology. S/p window and chest tube. Afib w RVR, dilt started     ====================== NEUROLOGY=====================  A&Ox3  - no active issues  - continue to monitor mental status as per protocol     ==================== RESPIRATORY======================  Opacified L lung - Improving  - Noted to be tachypnea x2 nights w/o inc o2 requirement, CXR at that time showed white out L hemithorax: mucus plug vs. effusion vs. PNA  - s/p bronchoscopy w/ minimal mucus; Post bronch CXR improved, still b/l effusion but improving L lung aeration  - Careful monitoring w/ daily AM XR, trend WBC and fever curve  - Saline, chest PT tid; Nebs was held 10/29 AM because of AF w/ RVR  - continue to monitor SpO2 with goal >90%, O2 therapy PRN    ====================CARDIOVASCULAR==================  Pericardial Tamponade  - s/p aborted VT ablation c/b pericardial tamponade  - pericardial drain with 40 cc drained initially, 250cc in bag upon arrival to CICU, s/p drainage removal on 10/20  - TTE 10/22 with moderate pericardial effusion, not seemingly large enough to affect hemodynamics  - Transferred to floors, became hypotensive to SBP 90s, TTE with enlarged pericardial effusion size. Transferred back to CICU 10/27 pre OR for monitoring; now s/p pericardial window with CTS (Kady) on 10/28; 300cc sanguinous fluid removed intraoperatively, chest tube in place post-operatively  - monitor chest tube output  - 30 cc ovn   - currently on water seal    AF w/ RVR  - new onset AF w/ RVR, s/p DCCVx2 at 200J (22nd), to NSR  - s/p Lido gtt, d/c'd  - Again went into AF w/ RVR on 10/29 AM to 190s-200s, given Amio 150 IV. started on Cardizem 10 gtt  - f/u EP recs  - Will discuss with EP/CTS regarding r/s AC, holding for now. Currently on ASA/plavix  - EP: amio 200 bid, x3 days, then on 2nd, switch to 200 daily. Should be off ASA/plavix, and to reach out to CTS for poss Eliquis initiation then rpt TTE       VT  - 400 amio TID PO per EP  - VT ablation aborted after tamponade  - lido d/c'd/. Quinidine 324 BID started 10/23  - has AICD  - replete lytes as needed to maintain K>4, Mag>2  - will need q6mo TSH / LFT, annual CXR and eye exam as outpatient (while on amiodarone)    CHF  - TTE 10/5 EF 55%, reg WMA, mild-mod MS  - Holding GDMT given critical illness     CAD s/p stents  - Plavix held iso recent bloody pericardial effusion and recent pericardial window/chest tube  - EP: Should be off ASA/plavix, and to reach out to CTS for poss Eliquis initiation then rpt TTE      ===================== RENAL =========================  No acute issues  - Continue monitoring urine output, lytes, SCr/ BUN  - replete lytes prn with goal K >4 and Mg >2    =============== GASTROINTESTINAL===================  DASH Diet    ===================ENDO====================  No active issues  - Monitor glucose on CMP  - TSH wnl    ===================HEMATOLOGIC/ONC ===================  Monitor H/H and plts  - DVT PPX: SCDs. Holding chemical/systemic DVT ppx s/p procedure.   - Per EP, will reach out to CTS re: Eliquis initiation      ==================INFECTIOUS DISEASE================  Afebrile  - chronic leukocytosis of unknown origin   - monitor and trend WBC and temperature curve

## 2023-10-30 NOTE — PROGRESS NOTE ADULT - SUBJECTIVE AND OBJECTIVE BOX
Patient seen and examined at bedside.    Overnight Events:   - AF overnight back in SR/ST 70s-100s  - Reports improvement in breathing, denies chest pain          Current Meds:  acetaminophen     Tablet .. 650 milliGRAM(s) Oral every 6 hours  aspirin enteric coated 81 milliGRAM(s) Oral daily  atorvastatin 40 milliGRAM(s) Oral at bedtime  chlorhexidine 2% Cloths 1 Application(s) Topical daily  clopidogrel Tablet 75 milliGRAM(s) Oral daily  diltiazem Infusion 10 mG/Hr IV Continuous <Continuous>  influenza  Vaccine (HIGH DOSE) 0.7 milliLiter(s) IntraMuscular once  melatonin 5 milliGRAM(s) Oral at bedtime  nystatin Cream 1 Application(s) Topical two times a day  oxyCODONE    IR 5 milliGRAM(s) Oral every 4 hours PRN  oxyCODONE    IR 10 milliGRAM(s) Oral every 4 hours PRN  quiNIDine gluconate  milliGRAM(s) Oral every 12 hours  sodium chloride 3%  Inhalation 4 milliLiter(s) Inhalation every 8 hours      Vitals:  T(F): 97.5 (10-30), Max: 97.9 (10-29)  HR: 103 (10-30) (69 - 134)  BP: 109/53 (10-30) (92/53 - 161/77)  RR: 20 (10-30)  SpO2: 93% (10-30)  I&O's Summary    29 Oct 2023 07:01  -  30 Oct 2023 07:00  --------------------------------------------------------  IN: 620 mL / OUT: 250 mL / NET: 370 mL    30 Oct 2023 07:01  -  30 Oct 2023 11:34  --------------------------------------------------------  IN: 270 mL / OUT: 0 mL / NET: 270 mL        Physical Exam:  Appearance: No acute distress; well appearing on 2L NC  Eyes: PERRL, EOMI, pink conjunctiva  HEENT: Normal oral mucosa  Cardiovascular: RRR, S1, S2, no murmurs, rubs, or gallops; no edema; no JVD +dressing central chest  Respiratory: Decreased breath sounds left side with crackles slight improved in ALBERT  Gastrointestinal: soft, non-tender, non-distended with normal bowel sounds  Musculoskeletal: No clubbing; no joint deformity   Neurologic: Non-focal  Lymphatic: No lymphadenopathy  Psychiatry: AAOx3, mood & affect appropriate  Skin: No rashes, ecchymoses, or cyanosis                          11.2   22.87 )-----------( 377      ( 30 Oct 2023 03:45 )             35.0     10-30    137  |  104  |  22  ----------------------------<  127<H>  4.4   |  24  |  0.61    Ca    8.0<L>      30 Oct 2023 03:45  Phos  2.5     10-30  Mg     2.2     10-30    TPro  5.5<L>  /  Alb  2.6<L>  /  TBili  0.2  /  DBili  x   /  AST  5<L>  /  ALT  14  /  AlkPhos  58  10-30

## 2023-10-30 NOTE — PROGRESS NOTE ADULT - SUBJECTIVE AND OBJECTIVE BOX
Patient is a 72y old  Female who presents with a chief complaint of AICD discharge (29 Oct 2023 12:15)    INTERVAL HISTORY:  - Cardizem gtt at 10 started yesterday for afib/RVR   - Amio 1x IVP noon    SUBJECTIVE  - Patient seen and evaluated at bedside.     MEDICATIONS  (STANDING):  acetaminophen     Tablet .. 650 milliGRAM(s) Oral every 6 hours  aspirin enteric coated 81 milliGRAM(s) Oral daily  atorvastatin 40 milliGRAM(s) Oral at bedtime  chlorhexidine 2% Cloths 1 Application(s) Topical daily  clopidogrel Tablet 75 milliGRAM(s) Oral daily  diltiazem Infusion 10 mG/Hr (10 mL/Hr) IV Continuous <Continuous>  influenza  Vaccine (HIGH DOSE) 0.7 milliLiter(s) IntraMuscular once  melatonin 5 milliGRAM(s) Oral at bedtime  nystatin Cream 1 Application(s) Topical two times a day  quiNIDine gluconate  milliGRAM(s) Oral every 12 hours  sodium chloride 3%  Inhalation 4 milliLiter(s) Inhalation every 8 hours    MEDICATIONS  (PRN):  oxyCODONE    IR 10 milliGRAM(s) Oral every 4 hours PRN Severe Pain (7 - 10)  oxyCODONE    IR 5 milliGRAM(s) Oral every 4 hours PRN Moderate Pain (4 - 6)    OBJECTIVE:  ICU Vital Signs Last 24 Hrs  T(C): 36.6 (30 Oct 2023 04:00), Max: 36.6 (29 Oct 2023 09:00)  T(F): 97.8 (30 Oct 2023 04:00), Max: 97.9 (29 Oct 2023 09:00)  HR: 71 (30 Oct 2023 07:00) (69 - 167)  BP: 118/56 (30 Oct 2023 07:00) (92/53 - 161/77)  BP(mean): 80 (30 Oct 2023 07:00) (63 - 97)  ABP: --  ABP(mean): --  RR: 19 (30 Oct 2023 07:00) (17 - 31)  SpO2: 95% (30 Oct 2023 07:00) (93% - 98%)  O2 Parameters below as of 30 Oct 2023 07:00  Patient On (Oxygen Delivery Method): nasal cannula  O2 Flow (L/min): 2    I&O's Detail    29 Oct 2023 07:01  -  30 Oct 2023 07:00  --------------------------------------------------------  IN:    Diltiazem: 50 mL    Diltiazem: 140 mL    Oral Fluid: 430 mL  Total IN: 620 mL    OUT:    Chest Tube (mL): 50 mL    Voided (mL): 200 mL  Total OUT: 250 mL    Total NET: 370 ml    PHYSICAL EXAM:  General: NAD  Chest: dec breath sounds in lower Left lung fields  Heart: Irregular rate & rhythm  Abd: Soft, nontender, nondistended  Nervous System: AAOX3  Ext: no peripheral LE edema bilaterally    LABS:                       11.2   22.87 )-----------( 377      ( 30 Oct 2023 03:45 )             35.0     10-30    137  |  104  |  22  ----------------------------<  127<H>  4.4   |  24  |  0.61    Ca    8.0<L>      30 Oct 2023 03:45  Phos  2.5     10-30  Mg     2.2     10-30    TPro  5.5<L>  /  Alb  2.6<L>  /  TBili  0.2  /  DBili  x   /  AST  5<L>  /  ALT  14  /  AlkPhos  58  10-30         Patient is a 72y old  Female who presents with a chief complaint of AICD discharge (29 Oct 2023 12:15)    INTERVAL HISTORY:  - Cardizem gtt at 10 started yesterday for afib/RVR   - Amio 1x IVP noon 29th    SUBJECTIVE  - Patient seen and evaluated at bedside.     MEDICATIONS  (STANDING):  acetaminophen     Tablet .. 650 milliGRAM(s) Oral every 6 hours  aspirin enteric coated 81 milliGRAM(s) Oral daily  atorvastatin 40 milliGRAM(s) Oral at bedtime  chlorhexidine 2% Cloths 1 Application(s) Topical daily  clopidogrel Tablet 75 milliGRAM(s) Oral daily  diltiazem Infusion 10 mG/Hr (10 mL/Hr) IV Continuous <Continuous>  influenza  Vaccine (HIGH DOSE) 0.7 milliLiter(s) IntraMuscular once  melatonin 5 milliGRAM(s) Oral at bedtime  nystatin Cream 1 Application(s) Topical two times a day  quiNIDine gluconate  milliGRAM(s) Oral every 12 hours  sodium chloride 3%  Inhalation 4 milliLiter(s) Inhalation every 8 hours    MEDICATIONS  (PRN):  oxyCODONE    IR 10 milliGRAM(s) Oral every 4 hours PRN Severe Pain (7 - 10)  oxyCODONE    IR 5 milliGRAM(s) Oral every 4 hours PRN Moderate Pain (4 - 6)    OBJECTIVE:  ICU Vital Signs Last 24 Hrs  T(C): 36.6 (30 Oct 2023 04:00), Max: 36.6 (29 Oct 2023 09:00)  T(F): 97.8 (30 Oct 2023 04:00), Max: 97.9 (29 Oct 2023 09:00)  HR: 71 (30 Oct 2023 07:00) (69 - 167)  BP: 118/56 (30 Oct 2023 07:00) (92/53 - 161/77)  BP(mean): 80 (30 Oct 2023 07:00) (63 - 97)  ABP: --  ABP(mean): --  RR: 19 (30 Oct 2023 07:00) (17 - 31)  SpO2: 95% (30 Oct 2023 07:00) (93% - 98%)  O2 Parameters below as of 30 Oct 2023 07:00  Patient On (Oxygen Delivery Method): nasal cannula  O2 Flow (L/min): 2    I&O's Detail    29 Oct 2023 07:01  -  30 Oct 2023 07:00  --------------------------------------------------------  IN:    Diltiazem: 50 mL    Diltiazem: 140 mL    Oral Fluid: 430 mL  Total IN: 620 mL    OUT:    Chest Tube (mL): 50 mL    Voided (mL): 200 mL  Total OUT: 250 mL    Total NET: 370 ml    PHYSICAL EXAM:  General: NAD  Chest: improving breath sounds in lower Left lung fields, still unequal   Heart: Regular rate & rhythm  Abd: Soft, nontender, nondistended  Nervous System: AAOX3  Ext: no peripheral LE edema bilaterally    LABS:                       11.2   22.87 )-----------( 377      ( 30 Oct 2023 03:45 )             35.0     10-30    137  |  104  |  22  ----------------------------<  127<H>  4.4   |  24  |  0.61    Ca    8.0<L>      30 Oct 2023 03:45  Phos  2.5     10-30  Mg     2.2     10-30    TPro  5.5<L>  /  Alb  2.6<L>  /  TBili  0.2  /  DBili  x   /  AST  5<L>  /  ALT  14  /  AlkPhos  58  10-30

## 2023-10-31 NOTE — PROGRESS NOTE ADULT - ASSESSMENT
====================ASSESSMENT ==============  72F c hx COPD, HTN, PAD, CAD s/p LAD stent (last C showing 100%  of RCA), chronic leukocytosis of unclear etiol, cardiac arrest s/p left-sided ICD (6/4/2019) complicated by infection and s/p R single-chamber ICD reimplantation ([place] in 9/23/2019), CHF EF 45%, grade 2 DHF, recent hospitalization for UTI (treated w/ ceftriaxone and Vanco x 3 days) and VFib s/p 13 AICD firings, pw lightheadedness and AICD firing.    Pt recently hospitalized for VFib s/p multiple aicd firing. Pt placed on quinidine, mexiletine, metoprolol. Returning to CICU after downgrade w/ c/f worsening effusion w/ early tamponade physiology. S/p window and chest tube, removed night of the 31st.      ====================== NEUROLOGY=====================  A&Ox3  - no active issues  - continue to monitor mental status as per protocol     ==================== RESPIRATORY======================  Opacified L lung - Improving  - Noted to be tachypnea x2 nights w/o inc o2 requirement, CXR at that time showed white out L hemithorax: mucus plug vs. effusion vs. PNA  - s/p bronchoscopy w/ minimal mucus; Post bronch CXR improved, still b/l effusion but improving L lung aeration  - Careful monitoring w/ daily AM XR, trend WBC and fever curve  - Saline, chest PT tid; Nebs was held 10/29 AM because of AF w/ RVR  - continue to monitor SpO2 with goal >90%, O2 therapy PRN    ====================CARDIOVASCULAR==================  Pericardial Tamponade  - s/p aborted VT ablation c/b pericardial tamponade  - pericardial drain with 40 cc drained initially, 250cc in bag upon arrival to CICU, s/p drainage removal on 10/20  - TTE 10/22 with moderate pericardial effusion, not seemingly large enough to affect hemodynamics  - Transferred to floors, became hypotensive to SBP 90s, TTE with enlarged pericardial effusion size. Transferred back to CICU 10/27 pre OR for monitoring; now s/p pericardial window with CTS (Kady) on 10/28; 300cc sanguinous fluid removed intraoperatively, chest tube in place post-operatively  - monitor chest tube output  - 30 cc ovn   - currently on water seal    AF w/ RVR  - new onset AF w/ RVR, s/p DCCVx2 at 200J (22nd), to NSR  - s/p Lido gtt, d/c'd  - Again went into AF w/ RVR on 10/29 AM to 190s-200s, given Amio 150 IV. Wean Cardizem as lina  - f/u EP recs  - Will discuss with EP/CTS regarding r/s AC, holding for now. Currently on ASA/plavix  - EP: amio 200 bid, x3 days, then on 2nd, switch to 200 daily. Should be off ASA/plavix, and to reach out to CTS for poss Eliquis initiation then rpt TTE       VT  - 400 amio TID PO per EP  - VT ablation aborted after tamponade  - lido d/c'd/. Quinidine 324 BID started 10/23  - has AICD  - replete lytes as needed to maintain K>4, Mag>2  - will need q6mo TSH / LFT, annual CXR and eye exam as outpatient (while on amiodarone)    CHF  - TTE 10/5 EF 55%, reg WMA, mild-mod MS  - Holding GDMT given critical illness     CAD s/p stents  - Plavix held iso recent bloody pericardial effusion and recent pericardial window/chest tube  - EP: Should be off ASA/plavix, and to reach out to CTS for poss Eliquis initiation then rpt TTE      ===================== RENAL =========================  No acute issues  - Continue monitoring urine output, lytes, SCr/ BUN  - replete lytes prn with goal K >4 and Mg >2    =============== GASTROINTESTINAL===================  DASH Diet    ===================ENDO====================  No active issues  - Monitor glucose on CMP  - TSH wnl    ===================HEMATOLOGIC/ONC ===================  Monitor H/H and plts  - DVT PPX: SCDs. Holding chemical/systemic DVT ppx s/p procedure.   - Per EP, will reach out to CTS re: Eliquis initiation      ==================INFECTIOUS DISEASE================  Afebrile  - chronic leukocytosis of unknown origin   - monitor and trend WBC and temperature curve  ====================ASSESSMENT ==============  72F c hx COPD, HTN, PAD, CAD s/p LAD stent (last C showing 100%  of RCA), chronic leukocytosis of unclear etiol, cardiac arrest s/p left-sided ICD (6/4/2019) complicated by infection and s/p R single-chamber ICD reimplantation ([place] in 9/23/2019), CHF EF 45%, grade 2 DHF, recent hospitalization for UTI (treated w/ ceftriaxone and Vanco x 3 days) and VFib s/p 13 AICD firings, pw lightheadedness and AICD firing.    Pt recently hospitalized for VFib s/p multiple aicd firing. Pt placed on quinidine, mexiletine, metoprolol. Returning to CICU after downgrade w/ c/f worsening effusion w/ early tamponade physiology. S/p window and chest tube, removed night of the 31st.      ====================== NEUROLOGY=====================  A&Ox3  - no active issues  - continue to monitor mental status as per protocol     ==================== RESPIRATORY======================  Opacified L lung - Improving  - Noted to be tachypnea x2 nights w/o inc o2 requirement, CXR at that time showed white out L hemithorax: mucus plug vs. effusion vs. PNA  - s/p bronchoscopy w/ minimal mucus; Post bronch CXR improved, still b/l effusion but improving L lung aeration  - Careful monitoring w/ daily AM XR, trend WBC and fever curve  - Saline, chest PT tid; Nebs was held 10/29 AM because of AF w/ RVR  - continue to monitor SpO2 with goal >90%, O2 therapy PRN    ====================CARDIOVASCULAR==================  Pericardial Tamponade  - s/p aborted VT ablation c/b pericardial tamponade  - pericardial drain with 40 cc drained initially, 250cc in bag upon arrival to CICU, s/p drainage removal on 10/20  - TTE 10/22 with moderate pericardial effusion, not seemingly large enough to affect hemodynamics  - Transferred to floors, became hypotensive to SBP 90s, TTE with enlarged pericardial effusion size. Transferred back to CICU 10/27 pre OR for monitoring; now s/p pericardial window with CTS (Kady) on 10/28; 300cc sanguinous fluid removed intraoperatively, chest tube in place post-operatively  - TTE 31st  - Drain removed     AF w/ RVR  - new onset AF w/ RVR, s/p DCCVx2 at 200J (22nd), to NSR  - s/p Lido gtt, d/c'd  - Again went into AF w/ RVR on 10/29 AM to 190s-200s, given Amio 150 IV. Wean Cardizem as lina  - f/u EP recs  - Will discuss with EP/CTS regarding r/s AC, holding for now. Currently on ASA/plavix  - EP: amio 200 bid, x3 days, then on 2nd, switch to 200 daily. Should be off ASA/plavix, and to reach out to CTS for poss Eliquis initiation then rpt TTE       VT  - 400 amio TID PO per EP  - VT ablation aborted after tamponade  - lido d/c'd/. Quinidine 324 BID started 10/23  - has AICD  - replete lytes as needed to maintain K>4, Mag>2  - will need q6mo TSH / LFT, annual CXR and eye exam as outpatient (while on amiodarone)    CHF  - TTE 10/5 EF 55%, reg WMA, mild-mod MS  - Holding GDMT given critical illness     CAD s/p stents  - Plavix held iso recent bloody pericardial effusion and recent pericardial window/chest tube  - EP: Should be off ASA/plavix, and to reach out to CTS for poss Eliquis initiation then rpt TTE      ===================== RENAL =========================  No acute issues  - Continue monitoring urine output, lytes, SCr/ BUN  - replete lytes prn with goal K >4 and Mg >2    =============== GASTROINTESTINAL===================  DASH Diet    ===================ENDO====================  No active issues  - Monitor glucose on CMP  - TSH wnl    ===================HEMATOLOGIC/ONC ===================  Monitor H/H and plts  - DVT PPX: SCDs. Holding chemical/systemic DVT ppx s/p procedure.   - Per EP, will reach out to CTS re: Eliquis initiation      ==================INFECTIOUS DISEASE================  Afebrile  - chronic leukocytosis of unknown origin   - monitor and trend WBC and temperature curve  ====================ASSESSMENT ==============  72F c hx COPD, HTN, PAD, CAD s/p LAD stent (last C showing 100%  of RCA), chronic leukocytosis of unclear etiol, cardiac arrest s/p left-sided ICD (6/4/2019) complicated by infection and s/p R single-chamber ICD reimplantation ([place] in 9/23/2019), CHF EF 45%, grade 2 DHF, recent hospitalization for UTI (treated w/ ceftriaxone and Vanco x 3 days) and VFib s/p 13 AICD firings, pw lightheadedness and AICD firing.    Pt recently hospitalized for VFib s/p multiple aicd firing. Pt placed on quinidine, mexiletine, metoprolol. Returning to CICU after downgrade w/ c/f worsening effusion w/ early tamponade physiology. S/p window and chest tube, removed night of the 31st.      ====================== NEUROLOGY=====================  A&Ox3  - no active issues  - continue to monitor mental status as per protocol     ==================== RESPIRATORY======================  Opacified L lung - Improving  - Noted to be tachypnea x2 nights w/o inc o2 requirement, CXR at that time showed white out L hemithorax: mucus plug vs. effusion vs. PNA  - s/p bronchoscopy w/ minimal mucus; Post bronch CXR improved, still b/l effusion but improving L lung aeration  - Careful monitoring w/ daily AM XR, trend WBC and fever curve  - Saline, chest PT tid; Nebs was held 10/29 AM because of AF w/ RVR  - continue to monitor SpO2 with goal >90%, O2 therapy PRN  - Aggressive PT and pulm hygiene     ====================CARDIOVASCULAR==================  Pericardial Tamponade  - s/p aborted VT ablation c/b pericardial tamponade  - pericardial drain with 40 cc drained initially, 250cc in bag upon arrival to CICU, s/p drainage removal on 10/20  - TTE 10/22 with moderate pericardial effusion, not seemingly large enough to affect hemodynamics  - Transferred to floors, became hypotensive to SBP 90s, TTE with enlarged pericardial effusion size. Transferred back to CICU 10/27 pre OR for monitoring; now s/p pericardial window with CTS (Kady) on 10/28; 300cc sanguinous fluid removed intraoperatively, chest tube in place post-operatively  - Plan for TTE 31st  - Drain removed, sys 110s after episodes of asx hypotension.     AF w/ RVR  - new onset AF w/ RVR, s/p DCCVx2 at 200J (22nd), to NSR  - s/p Lido gtt, d/c'd  - Again went into AF w/ RVR on 10/29 AM to 190s-200s, given Amio 150 IV. Wean Cardizem as lina  - f/u EP recs  - Will discuss with EP/CTS regarding r/s AC, holding for now. Currently on ASA/plavix  - EP: amio 200 bid, x3 days, then on 2nd, switch to 200 daily.   - Coumadin if effusion stable  - ASA   - D/c'd plavix       VT  - 400 amio TID PO per EP, since d/c'd   - VT ablation aborted after tamponade  - lido d/c'd/. Quinidine 324 BID started 10/23  - has AICD  - replete lytes as needed to maintain K>4, Mag>2  - will need q6mo TSH / LFT, annual CXR and eye exam as outpatient (while on amiodarone)    CHF  - TTE 10/5 EF 55%, reg WMA, mild-mod MS  - ASA     CAD s/p stents  - Plavix held iso recent bloody pericardial effusion and recent pericardial window/chest tube  - ASA  - Coumadin initiation     ===================== RENAL =========================  No acute issues  - Continue monitoring urine output, lytes, SCr/ BUN  - replete lytes prn with goal K >4 and Mg >2    =============== GASTROINTESTINAL===================  DASH Diet    ===================ENDO====================  No active issues  - Monitor glucose on CMP  - TSH wnl    ===================HEMATOLOGIC/ONC ===================  Monitor H/H and plts  - DVT PPX: SCDs.  - Coumadin to resume after TTE     ==================INFECTIOUS DISEASE================  Afebrile  - chronic leukocytosis of unknown origin   - monitor and trend WBC and temperature curve  ====================ASSESSMENT ==============  72F c hx COPD, HTN, PAD, CAD s/p LAD stent (last C showing 100%  of RCA), chronic leukocytosis of unclear etiol, cardiac arrest s/p left-sided ICD (6/4/2019) complicated by infection and s/p R single-chamber ICD reimplantation ([place] in 9/23/2019), CHF EF 45%, grade 2 DHF, recent hospitalization for UTI (treated w/ ceftriaxone and Vanco x 3 days) and VFib s/p 13 AICD firings, pw lightheadedness and AICD firing.    Pt recently hospitalized for VFib s/p multiple aicd firing. Pt placed on quinidine, mexiletine, metoprolol. Returning to CICU after downgrade w/ c/f worsening effusion w/ early tamponade physiology. S/p window and chest tube, removed night of the 31st.      ====================== NEUROLOGY=====================  A&Ox3  - no active issues  - continue to monitor mental status as per protocol     ==================== RESPIRATORY======================  Opacified L lung - Improving  - Noted to be tachypnea x2 nights w/o inc o2 requirement, CXR at that time showed white out L hemithorax: mucus plug vs. effusion vs. PNA  - s/p bronchoscopy w/ minimal mucus; Post bronch CXR improved, still b/l effusion but improving L lung aeration  - Careful monitoring w/ daily AM XR, trend WBC and fever curve  - Saline, chest PT tid; Nebs was held 10/29 AM because of AF w/ RVR  - continue to monitor SpO2 with goal >90%, O2 therapy PRN  - Aggressive PT OOB and pulm hygiene     ====================CARDIOVASCULAR==================  Pericardial Tamponade  - s/p aborted VT ablation c/b pericardial tamponade  - pericardial drain with 40 cc drained initially, 250cc in bag upon arrival to CICU, s/p drainage removal on 10/20  - TTE 10/22 with moderate pericardial effusion, not seemingly large enough to affect hemodynamics  - Transferred to floors, became hypotensive to SBP 90s, TTE with enlarged pericardial effusion size. Transferred back to CICU 10/27 pre OR for monitoring; now s/p pericardial window with CTS (Kady) on 10/28; 300cc sanguinous fluid removed intraoperatively, chest tube in place post-operatively  - Plan for TTE 31st  - Drain removed, sys 110s after episodes of asx hypotension.     AF w/ RVR  - new onset AF w/ RVR, s/p DCCVx2 at 200J (22nd), to NSR  - s/p Lido gtt, d/c'd  - Again went into AF w/ RVR on 10/29 AM to 190s-200s, given Amio 150 IV. Wean Cardizem as lina  - f/u EP recs  - Will discuss with EP/CTS regarding r/s AC, holding for now. Currently on ASA/plavix  - EP: amio 200 bid, x3 days, then on 2nd, switch to 200 daily.   - Coumadin if effusion stable  - ASA   - D/c'd plavix       VT  - 400 amio TID PO per EP, since d/c'd   - VT ablation aborted after tamponade  - lido d/c'd/. Quinidine 324 BID started 10/23  - has AICD  - replete lytes as needed to maintain K>4, Mag>2  - will need q6mo TSH / LFT, annual CXR and eye exam as outpatient (while on amiodarone)    CHF  - TTE 10/5 EF 55%, reg WMA, mild-mod MS  - ASA     CAD s/p stents  - Plavix held iso recent bloody pericardial effusion and recent pericardial window/chest tube  - ASA  - Coumadin initiation after echoR    ===================== RENAL =========================  No acute issues  - Continue monitoring urine output, lytes, SCr/ BUN  - replete lytes prn with goal K >4 and Mg >2    =============== GASTROINTESTINAL===================  DASH Diet    ===================ENDO====================  No active issues  - Monitor glucose on CMP  - TSH wnl    ===================HEMATOLOGIC/ONC ===================  Monitor H/H and plts  - DVT PPX: SCDs.  - Coumadin to resume after TTE     ==================INFECTIOUS DISEASE================  Afebrile  - chronic leukocytosis of unknown origin   - monitor and trend WBC and temperature curve

## 2023-10-31 NOTE — PROGRESS NOTE ADULT - SUBJECTIVE AND OBJECTIVE BOX
AUSTIN LEE  MRN-[id number]  Patient is a 72y old  Female who presents with a chief complaint of AICD discharge (31 Oct 2023 09:36)    HPI:  72F c hx COPD, HTN, PAD, CAD s/p LAD stent (last LHC showing 100%  of RCA), chronic leukocytosis of unclear etiol, cardiac arrest s/p left-sided ICD (6/4/2019) complicated by infection and s/p R single-chamber ICD reimplantation ([place] in 9/23/2019), CHF EF 45%, grade 2 DHF, recent hospitalization for UTI (treated w/ ceftriaxone and Vanco x 3 days) and VFib s/p 13 AICD firings, pw lightheadedness and AICD firing.    Pt recently hospitalized for VFib s/p multiple aicd firing. Pt placed on quinidine, mexiletine, metoprolol. Pt reports good compliance with her medications, even though she doesn't know what the meds are. Pt states at around 3-4PM yesterday, she felt "dizzy" for a few seconds, then felt AICD shock, then had resolution of her dizziness. Denies fevers, chills, CP, SOB, URI symptoms, GI symptoms. Pt states she's never seen a hematologist for leukocytosis. Pt was supposed to f/u with EP as outpt for ablation. (16 Oct 2023 03:47)    24 Hours: pt with coumadin initiated today. TTE continues to show no significant change in effusion    REVIEW OF SYSTEMS:    CONSTITUTIONAL: No weakness, fevers or chills  EYES/ENT: No visual changes;  No vertigo or throat pain   NECK: No pain or stiffness  RESPIRATORY: No cough, wheezing, hemoptysis; No shortness of breath  CARDIOVASCULAR: No chest pain or palpitations  GASTROINTESTINAL: No abdominal or epigastric pain. No nausea, vomiting, or hematemesis; No diarrhea or constipation. No melena or hematochezia.  GENITOURINARY: No dysuria, frequency or hematuria  NEUROLOGICAL: No numbness or weakness  SKIN: No itching, rashes      Physical Exam:  Vital Signs Last 24 Hrs  T(C): 36.4 (31 Oct 2023 19:00), Max: 36.9 (31 Oct 2023 03:00)  T(F): 97.6 (31 Oct 2023 19:00), Max: 98.5 (31 Oct 2023 03:00)  HR: 68 (31 Oct 2023 20:00) (58 - 91)  BP: 138/60 (31 Oct 2023 20:00) (78/52 - 146/60)  BP(mean): 86 (31 Oct 2023 20:00) (61 - 93)  RR: 23 (31 Oct 2023 20:00) (16 - 33)  SpO2: 100% (31 Oct 2023 20:00) (94% - 100%)    Parameters below as of 31 Oct 2023 20:00  Patient On (Oxygen Delivery Method): nasal cannula  O2 Flow (L/min): 2      ============================I/O===========================   I&O's Detail    30 Oct 2023 07:01  -  31 Oct 2023 07:00  --------------------------------------------------------  IN:    Diltiazem: 150 mL    Diltiazem: 15 mL    Oral Fluid: 720 mL  Total IN: 885 mL    OUT:    Chest Tube (mL): 15 mL    Voided (mL): 350 mL  Total OUT: 365 mL    Total NET: 520 mL      31 Oct 2023 07:01  -  31 Oct 2023 21:59  --------------------------------------------------------  IN:    Oral Fluid: 480 mL  Total IN: 480 mL    OUT:    Voided (mL): 100 mL  Total OUT: 100 mL    Total NET: 380 mL        ============================ LABS =========================                        10.5   18.50 )-----------( 367      ( 31 Oct 2023 00:31 )             32.6     10-31    137  |  105  |  22  ----------------------------<  125<H>  4.2   |  25  |  0.62    Ca    8.3<L>      31 Oct 2023 00:31  Phos  2.6     10-31  Mg     2.2     10-31    TPro  5.3<L>  /  Alb  2.4<L>  /  TBili  0.2  /  DBili  x   /  AST  7<L>  /  ALT  13  /  AlkPhos  57  10-31    LIVER FUNCTIONS - ( 31 Oct 2023 00:31 )  Alb: 2.4 g/dL / Pro: 5.3 g/dL / ALK PHOS: 57 U/L / ALT: 13 U/L / AST: 7 U/L / GGT: x               Urinalysis Basic - ( 31 Oct 2023 00:31 )    Color: x / Appearance: x / SG: x / pH: x  Gluc: 125 mg/dL / Ketone: x  / Bili: x / Urobili: x   Blood: x / Protein: x / Nitrite: x   Leuk Esterase: x / RBC: x / WBC x   Sq Epi: x / Non Sq Epi: x / Bacteria: x      ======================Micro/Rad/Cardio=================  Culture: Reviewed   CXR: Reviewed  Echo:Reviewed  ======================================================  PAST MEDICAL & SURGICAL HISTORY:  Obese      Smoker      HTN (hypertension)      HLD (hyperlipidemia)      CAD (coronary artery disease)      CHF with cardiomyopathy      History of hip surgery      ====================ASSESSMENT ==============  72F c hx COPD, HTN, PAD, CAD s/p LAD stent (last LHC showing 100%  of RCA), chronic leukocytosis of unclear etiol, cardiac arrest s/p left-sided ICD (6/4/2019) complicated by infection and s/p R single-chamber ICD reimplantation ([place] in 9/23/2019), CHF EF 45%, grade 2 DHF, recent hospitalization for UTI (treated w/ ceftriaxone and Vanco x 3 days) and VFib s/p 13 AICD firings, pw lightheadedness and AICD firing.    Pt recently hospitalized for VFib s/p multiple aicd firing. Pt placed on quinidine, mexiletine, metoprolol. Returning to CICU after downgrade w/ c/f worsening effusion w/ early tamponade physiology. S/p window and chest tube, removed night of the 31st.      ====================== NEUROLOGY=====================  A&Ox3  - no active issues  - continue to monitor mental status as per protocol     ==================== RESPIRATORY======================  Opacified L lung - Improving  - Noted to be tachypnea x2 nights w/o inc o2 requirement, CXR at that time showed white out L hemithorax: mucus plug vs. effusion vs. PNA  - s/p bronchoscopy w/ minimal mucus; Post bronch CXR improved, still b/l effusion but improving L lung aeration  - Careful monitoring w/ daily AM XR, trend WBC and fever curve  - Saline, chest PT tid; Nebs was held 10/29 AM because of AF w/ RVR  - continue to monitor SpO2 with goal >90%, O2 therapy PRN  - Aggressive PT OOB and pulm hygiene     ====================CARDIOVASCULAR==================  Pericardial Tamponade  - s/p aborted VT ablation c/b pericardial tamponade  - pericardial drain with 40 cc drained initially, 250cc in bag upon arrival to CICU, s/p drainage removal on 10/20  - TTE 10/22 with moderate pericardial effusion, not seemingly large enough to affect hemodynamics  - Transferred to floors, became hypotensive to SBP 90s, TTE with enlarged pericardial effusion size. Transferred back to CICU 10/27 pre OR for monitoring; now s/p pericardial window with CTS (Kady) on 10/28; 300cc sanguinous fluid removed intraoperatively, chest tube in place post-operatively  - Plan for TTE 31st  - Drain removed, sys 110s after episodes of asx hypotension.     AF w/ RVR  - new onset AF w/ RVR, s/p DCCVx2 at 200J (22nd), to NSR  - s/p Lido gtt, d/c'd  - Again went into AF w/ RVR on 10/29 AM to 190s-200s, given Amio 150 IV. Wean Cardizem as lina  - f/u EP recs  - Will discuss with EP/CTS regarding r/s AC, holding for now. Currently on ASA/plavix  - EP: amio 200 bid, x3 days, then on 2nd, switch to 200 daily.   - Coumadin if effusion stable  - ASA   - D/c'd plavix       VT  - 400 amio TID PO per EP, since d/c'd   - VT ablation aborted after tamponade  - lido d/c'd/. Quinidine 324 BID started 10/23  - has AICD  - replete lytes as needed to maintain K>4, Mag>2  - will need q6mo TSH / LFT, annual CXR and eye exam as outpatient (while on amiodarone)    CHF  - TTE 10/5 EF 55%, reg WMA, mild-mod MS  - ASA     CAD s/p stents  - Plavix held iso recent bloody pericardial effusion and recent pericardial window/chest tube  - ASA  - Coumadin initiation after echoR    ===================== RENAL =========================  No acute issues  - Continue monitoring urine output, lytes, SCr/ BUN  - replete lytes prn with goal K >4 and Mg >2    =============== GASTROINTESTINAL===================  DASH Diet    ===================ENDO====================  No active issues  - Monitor glucose on CMP  - TSH wnl    ===================HEMATOLOGIC/ONC ===================  Monitor H/H and plts  - DVT PPX: SCDs.  - Coumadin to resume after TTE     ==================INFECTIOUS DISEASE================  Afebrile  - chronic leukocytosis of unknown origin   - monitor and trend WBC and temperature curve       Patient requires continuous monitoring with bedside rhythm monitoring, arterial line, pulse oximetry, ventilator monitoring and intermittent blood gas analysis.  Care plan discussed with ICU care team.  Patient remained critical; required more than usual post op care; I have spent 35 minutes providing non-routine post op care, revaluated multiple times during the day.

## 2023-10-31 NOTE — PROGRESS NOTE ADULT - ASSESSMENT
73 y/o F w/ PMHx COPD, HLD, HTN, PVD, ICM/CAD s/p PCI (has  of RCA), cardiac arrest s/p left-sided ICD (6/4/2019) complicated by infection and s/p R single-chamber ICD ([place] in 9/23/2019), recent admission for VT storm with adjustment of medication and NIPS now presenting for ICD shock in the setting of recurrent monomorphic VT with unsuccessful ATP.  Ablation attempted 10/20 but aborted due to hemodynamically significant pericardial effusion with drain placement.  Pericardial drain removed on 10/20/23.  Over the weekend developed new shortness of breath, recurrent VT s/p ICD shock, and afib w/ RVR s/p cardioversion 10/21. She continues to have NSVT. Bronch negative for mucus plug. Currently in Afib s/p pericardial window 10/28.    - Would resume low dose metoprolol tartrate  - Continue Amiodarone 200mg Q12 x 3 days and then 200mg daily  - Start Coumadin with goal INR 2-3; would need a repeat limited TTE after resumption   - Will allow for medical optimization and discuss plan for ablation in the future   - Monitor on telemetry  - Keep Mg > 2 and K > 4    Note not final until signed by attending       Erasmo Lundberg MD  Cardiology Fellow PGY-6  Phone: [phone]    For all New Consults  [email or web address]   Login: [employer]   73 y/o F w/ PMHx COPD, HLD, HTN, PVD, ICM/CAD s/p PCI (has  of RCA), cardiac arrest s/p left-sided ICD (6/4/2019) complicated by infection and s/p R single-chamber ICD ([place] in 9/23/2019), recent admission for VT storm with adjustment of medication and NIPS now presenting for ICD shock in the setting of recurrent monomorphic VT with unsuccessful ATP.  Ablation attempted 10/20 but aborted due to hemodynamically significant pericardial effusion with drain placement.  Pericardial drain removed on 10/20/23.  Over the weekend developed new shortness of breath, recurrent VT s/p ICD shock, and afib w/ RVR s/p cardioversion 10/21. She continues to have NSVT. Bronch negative for mucus plug. Currently in Afib s/p pericardial window 10/28.    - Would resume low dose metoprolol tartrate  - Continue Amiodarone 200mg Q12 x 3 days and then 200mg daily  - Start Coumadin with goal INR 2-3; would need a repeat limited TTE after resumption   - Will allow for medical optimization and discuss plan for ablation in the future   - Monitor on telemetry  - Keep Mg > 2 and K > 4    Note not final until signed by attending       Erasmo Lundberg MD  Cardiology Fellow PGY-6  Phone: [phone]    For all New Consults  [email or web address]   Login: [employer]   73 y/o F w/ PMHx COPD, HLD, HTN, PVD, ICM/CAD s/p PCI (has  of RCA), cardiac arrest s/p left-sided ICD (6/4/2019) complicated by infection and s/p R single-chamber ICD ([place] in 9/23/2019), recent admission for VT storm with adjustment of medication and NIPS now presenting for ICD shock in the setting of recurrent monomorphic VT with unsuccessful ATP.  Ablation attempted 10/20 but aborted due to hemodynamically significant pericardial effusion with drain placement.  Pericardial drain removed on 10/20/23.  Over the weekend developed new shortness of breath, recurrent VT s/p ICD shock, and afib w/ RVR s/p cardioversion 10/21. She continues to have NSVT. Bronch negative for mucus plug. Currently in Afib s/p pericardial window 10/28.    - Would resume low dose metoprolol tartrate  - Continue Amiodarone 200mg Q12 x 3 days and then 200mg daily  - Start Coumadin with goal INR 2-3; would need a repeat limited TTE after resumption   - Will allow for medical optimization and discuss plan for ablation in the future   - Monitor on telemetry  - Keep Mg > 2 and K > 4    Note not final until signed by attending       Erasmo Lundberg MD  Cardiology Fellow PGY-6  Phone: [phone]    For all New Consults  [email or web address]   Login: [employer]

## 2023-10-31 NOTE — PROGRESS NOTE ADULT - SUBJECTIVE AND OBJECTIVE BOX
72F c hx COPD, HTN, PAD, CAD s/p LAD stent (last [facility] showing 100%  of RCA), chronic leukocytosis of unclear etiol, cardiac arrest s/p left-sided ICD (6/4/2019) complicated by infection and s/p R single-chamber ICD reimplantation ([place] in 9/23/2019), CHF EF 45%, grade 2 DHF, recent hospitalization for UTI (treated w/ ceftriaxone and Vanco x 3 days) and VFib s/p 13 AICD firings, pw lightheadedness and AICD firing.    Pt recently hospitalized for VFib s/p multiple aicd firing. Pt placed on quinidine, mexiletine, metoprolol. Pt reports good compliance with her medications, even though she doesn't know what the meds are. Pt states at around 3-4PM yesterday, she felt "dizzy" for a few seconds, then felt AICD shock, then had resolution of her dizziness. Denies fevers, chills, CP, SOB, URI symptoms, GI symptoms. Pt states she's never seen a hematologist for leukocytosis. Pt was supposed to f/u with EP as outpt for ablation. (16 Oct 2023 03:47)    24 HOUR EVENTS: s/p drain removal     REVIEW OF SYSTEMS:    CONSTITUTIONAL: No weakness, fevers or chills  EYES/ENT: No visual changes;  No vertigo or throat pain   NECK: No pain or stiffness  RESPIRATORY: No cough, wheezing, hemoptysis; No shortness of breath  CARDIOVASCULAR: No chest pain or palpitations  GASTROINTESTINAL: No abdominal or epigastric pain. No nausea, vomiting, or hematemesis; No diarrhea or constipation. No melena or hematochezia.  GENITOURINARY: No dysuria, frequency or hematuria  NEUROLOGICAL: No numbness or weakness  SKIN: No itching, rashes    ICU Vital Signs Last 24 Hrs  T(C): 36.9 (31 Oct 2023 03:00), Max: 36.9 (31 Oct 2023 03:00)  T(F): 98.5 (31 Oct 2023 03:00), Max: 98.5 (31 Oct 2023 03:00)  HR: 88 (31 Oct 2023 06:00) (73 - 103)  BP: 117/56 (31 Oct 2023 06:00) (78/52 - 156/59)  BP(mean): 69 (31 Oct 2023 05:45) (61 - 103)  ABP: --  ABP(mean): --  RR: 19 (31 Oct 2023 06:00) (16 - 33)  SpO2: 97% (31 Oct 2023 06:00) (92% - 99%)    O2 Parameters below as of 31 Oct 2023 06:00  Patient On (Oxygen Delivery Method): nasal cannula  O2 Flow (L/min): 2    ============================I/O===========================   I&O's Detail    30 Oct 2023 07:01  -  31 Oct 2023 07:00  --------------------------------------------------------  IN:    Diltiazem: 150 mL    Diltiazem: 15 mL    Oral Fluid: 720 mL  Total IN: 885 mL    OUT:    Chest Tube (mL): 15 mL    Voided (mL): 350 mL  Total OUT: 365 mL    Total NET: 520 mL  ======================================================  PAST MEDICAL & SURGICAL HISTORY:  Obese  Smoker  HTN (hypertension)  HLD (hyperlipidemia)  CAD (coronary artery disease)  CHF with cardiomyopathy  History of hip surgery       72F c hx COPD, HTN, PAD, CAD s/p LAD stent (last [facility] showing 100%  of RCA), chronic leukocytosis of unclear etiol, cardiac arrest s/p left-sided ICD (6/4/2019) complicated by infection and s/p R single-chamber ICD reimplantation ([place] in 9/23/2019), CHF EF 45%, grade 2 DHF, recent hospitalization for UTI (treated w/ ceftriaxone and Vanco x 3 days) and VFib s/p 13 AICD firings, pw lightheadedness and AICD firing.    Pt recently hospitalized for VFib s/p multiple aicd firing. Pt placed on quinidine, mexiletine, metoprolol. Pt reports good compliance with her medications, even though she doesn't know what the meds are. Pt states at around 3-4PM yesterday, she felt "dizzy" for a few seconds, then felt AICD shock, then had resolution of her dizziness. Denies fevers, chills, CP, SOB, URI symptoms, GI symptoms. Pt states she's never seen a hematologist for leukocytosis. Pt was supposed to f/u with EP as outpt for ablation. (16 Oct 2023 03:47)    24 HOUR EVENTS: s/p drain removal. BP low ovn, dilt d/c'd. Pt feels well, slept well.      REVIEW OF SYSTEMS:    CONSTITUTIONAL: No weakness, fevers or chills  EYES/ENT: No visual changes;  No vertigo or throat pain   NECK: No pain or stiffness  RESPIRATORY: No cough, wheezing, hemoptysis; No shortness of breath  CARDIOVASCULAR: No chest pain or palpitations  GASTROINTESTINAL: No abdominal or epigastric pain. No nausea, vomiting, or hematemesis; No diarrhea or constipation. No melena or hematochezia.  GENITOURINARY: No dysuria, frequency or hematuria  NEUROLOGICAL: No numbness or weakness  SKIN: No itching, rashes    ICU Vital Signs Last 24 Hrs  T(C): 36.9 (31 Oct 2023 03:00), Max: 36.9 (31 Oct 2023 03:00)  T(F): 98.5 (31 Oct 2023 03:00), Max: 98.5 (31 Oct 2023 03:00)  HR: 88 (31 Oct 2023 06:00) (73 - 103)  BP: 117/56 (31 Oct 2023 06:00) (78/52 - 156/59)  BP(mean): 69 (31 Oct 2023 05:45) (61 - 103)  ABP: --  ABP(mean): --  RR: 19 (31 Oct 2023 06:00) (16 - 33)  SpO2: 97% (31 Oct 2023 06:00) (92% - 99%)    O2 Parameters below as of 31 Oct 2023 06:00  Patient On (Oxygen Delivery Method): nasal cannula  O2 Flow (L/min): 2    Exam as stated below:   CONSTITUTIONAL: In NAD.   SKIN: Warm dry. No rashes noted.    EYES: No scleral icterus. Conjunctiva pink.  NECK: No ttp.    CARD: RRR. No murmurs.  RESP: Clear to ausculation b/l. No Crackles noted. No Wheezing noted.  ABD: Soft. Non-tender. Not distended.   MSK: No pedal edema. No calf tenderness.  NEURO: UE/LE grossly intact. Motor UE/LE sensation grossly intact. CN II-XII grossly intact.   PSYCH: Cooperative, appropriate.    ============================I/O===========================   I&O's Detail    30 Oct 2023 07:01  -  31 Oct 2023 07:00  --------------------------------------------------------  IN:    Diltiazem: 150 mL    Diltiazem: 15 mL    Oral Fluid: 720 mL  Total IN: 885 mL    OUT:    Chest Tube (mL): 15 mL    Voided (mL): 350 mL  Total OUT: 365 mL    Total NET: 520 mL  ======================================================  PAST MEDICAL & SURGICAL HISTORY:  Obese  Smoker  HTN (hypertension)  HLD (hyperlipidemia)  CAD (coronary artery disease)  CHF with cardiomyopathy  History of hip surgery

## 2023-10-31 NOTE — CHART NOTE - NSCHARTNOTEFT_GEN_A_CORE
Nutrition Follow Up Note  Patient seen for: follow up  Chart reviewed, events noted        Source: [x] Patient       [x] EMR        [] RN        [] Family at bedside       [] Other:  - If unable to interview patient: [] Trach/Vent/BiPAP  [] Disoriented/confused/inappropriate to interview    Diet Order:   Diet, Regular:   Supplement Feeding Modality:  Oral  Ensure Plus High Protein Cans or Servings Per Day:  2       Frequency:  Two Times a day (10-28-23)    Is current order appropriate/adequate? [] Yes  []  No:     PO intake:   [] >75%  Adequate    [] 50-75%  Fair       [] <50%  Poor    Nutrition-related concerns:  -   -   -     GI:  Last BM: 10/31  Bowel Regimen? [x] No    Weights:   Daily Weight in k.5 (10-), Weight in k.3 (10-), Weight in k (10-), Weight in k.9 (10-25)    Nutritionally Pertinent MEDICATIONS  (STANDING):  aMIOdarone    Tablet  atorvastatin  metoprolol tartrate  quiNIDine gluconate ER    Pertinent Labs: 10-31 @ 00:31: Na 137, BUN 22, Cr 0.62, <H>, K+ 4.2, Phos 2.6, Mg 2.2, Alk Phos 57, ALT/SGPT 13, AST/SGOT 7<L>, HbA1c --    A1C with Estimated Average Glucose Result: 6.4 % (10-29-23 @ 00:28)  A1C with Estimated Average Glucose Result: 6.7 % (23 @ 07:18)    Skin per nursing documentation: suspected DTI to sacrum per documentation  Edema: +1 b/l hand, b/l ankle and generalized edema per documentation    Estimated Needs:   [] no change since previous assessment  [] recalculated:     Previous Nutrition Diagnosis:   Nutrition Diagnosis is: [] ongoing  [] resolved [] not applicable     New Nutrition Diagnosis: [] Not applicable    Nutrition Care Plan:  [x] In Progress  [] Achieved  [] Not applicable    Nutrition Interventions:     Education Provided:       [] Yes:  [] No:     Recommendations:          Monitoring and Evaluation:   Continue to monitor nutritional intake, tolerance to diet prescription, weights, labs, skin integrity    RD remains available upon request and will follow up per protocol  Cesar Macdonald RD, CDN - contact on TEAMS. Nutrition Follow Up Note  Patient seen for: follow up  Chart reviewed, events noted        Source: [x] Patient       [x] EMR        [] RN        [] Family at bedside       [] Other:  - If unable to interview patient: [] Trach/Vent/BiPAP  [] Disoriented/confused/inappropriate to interview    Diet Order:   Diet, Regular:   Supplement Feeding Modality:  Oral  Ensure Plus High Protein Cans or Servings Per Day:  2       Frequency:  Two Times a day (10-28-23)    Is current order appropriate/adequate? [x] Yes  []  No:     PO intake:   [x] >75%  Adequate    [] 50-75%  Fair       [] <50%  Poor    Nutrition-related concerns:  - patient seen when having lunch was close to 50% through portion served at time of visit, patient reports she has been eating better and eating well overall since initial assessment  - denied chewing/swallowing impairment, nausea/vomiting or abdominal pain/discomfort  - patient reports liking Ensure Plus HP and has been taking it each day  - electrolytes currently remain WNL and blood glucose trend remains acceptable    GI:  Last BM: 10/31  Bowel Regimen? [x] No    Weights:   Daily Weight in k.5 (10-29), Weight in k.3 (10-27), Weight in k (10-), Weight in k.9 (10-25)  - overall weight trend noted, patient w/ increased edema to +2 on 10/29 versus having +1 edema on 10/27-10/25 weights which likely had influence on the weight difference x2 days, will continue to monitor weight trend    Nutritionally Pertinent MEDICATIONS  (STANDING):  aMIOdarone    Tablet  atorvastatin  metoprolol tartrate  quiNIDine gluconate ER    Pertinent Labs: 10-31 @ 00:31: Na 137, BUN 22, Cr 0.62, <H>, K+ 4.2, Phos 2.6, Mg 2.2, Alk Phos 57, ALT/SGPT 13, AST/SGOT 7<L>, HbA1c --    A1C with Estimated Average Glucose Result: 6.4 % (10-29-23 @ 00:28)  A1C with Estimated Average Glucose Result: 6.7 % (23 @ 07:18)    Skin per nursing documentation: suspected DTI to sacrum per documentation  Edema: +1 b/l hand, b/l ankle and generalized edema per documentation    Estimated Needs:   [x] no change since previous assessment  Estimated Caloric Needs: 1836-2142kcal/day (30-35kcal/kg)  Estimated Protein Needs: 86-98g/pro/day (1.4-1.6g/pro/kg)  Estimated Fluid Needs: defer to team  based on IBW 61.2kg    Previous Nutrition Diagnosis: severe chronic malnutrition, increased nutrient needs  Nutrition Diagnosis is: [x] ongoing: patient now progressing w/ increasing PO intake/appetite since last assessment    New Nutrition Diagnosis: [] Not applicable    Nutrition Care Plan:  [x] In Progress  [] Achieved  [] Not applicable    Nutrition Interventions:     Education Provided:       [] Yes:  [x] No: patient focusing on eating lunch meal when seen    Recommendations:      1. recommend addition of low sodium diet to diet order 2/2 h/o CHF  2. encourage PO intake, protein source with each meal, supplement intake as ordered as tolerated  3. continue Ensure Plus HP BID to help provide extra calories and protein  4. monitor PO intake, weight trend, electrolytes, blood glucose levels, labs, BMs, wound healing    Monitoring and Evaluation:   Continue to monitor nutritional intake, tolerance to diet prescription, weights, labs, skin integrity    RD remains available upon request and will follow up per protocol  Cesar Macdonald RD, CDN - contact on TEAMS. Nutrition Follow Up Note  Patient seen for: follow up  Chart reviewed, events noted    Per chart, patient is a 71 y/o female with PMH including COPD, HTN, PAD, CAD (s/p LAD stent), chronic leukocytosis of unclear etiology, h/o cardiac arrest (s/p L sided ICD 2019, c/b infection and s/p R single chamber ICD reimplantation at [place] on 2019), CHF w/ EF 45%, grade 2 DHF, h/o recent hospitalization for UTI (treated w/ ceftriaxone and vanco x3 days) and VFib (s/p 13 AICD firings). Patient presents to [facility] w/ lightheadedness 2/2 sustained VT s/p appropriate AICD firing.    Source: [x] Patient       [x] EMR        [] RN        [] Family at bedside       [] Other:  - If unable to interview patient: [] Trach/Vent/BiPAP  [] Disoriented/confused/inappropriate to interview    Diet Order:   Diet, Regular:   Supplement Feeding Modality:  Oral  Ensure Plus High Protein Cans or Servings Per Day:  2       Frequency:  Two Times a day (10-28-)    Is current order appropriate/adequate? [x] Yes  []  No:     PO intake:   [x] >75%  Adequate    [] 50-75%  Fair       [] <50%  Poor    Nutrition-related concerns:  - patient seen when having lunch was close to 50% through portion served at time of visit, patient reports she has been eating better and eating well overall since initial assessment  - denied chewing/swallowing impairment, nausea/vomiting or abdominal pain/discomfort  - patient reports liking Ensure Plus HP and has been taking it each day  - electrolytes currently remain WNL and blood glucose trend remains acceptable    GI:  Last BM: 10/31  Bowel Regimen? [x] No    Weights:   Daily Weight in k.5 (10-), Weight in k.3 (10-), Weight in k (10-), Weight in k.9 (10-)  - overall weight trend noted, patient w/ increased edema to +2 on 10/29 versus having +1 edema on 10/27-10/25 weights which likely had influence on the weight difference x2 days, will continue to monitor weight trend    Nutritionally Pertinent MEDICATIONS  (STANDING):  aMIOdarone    Tablet  atorvastatin  metoprolol tartrate  quiNIDine gluconate ER    Pertinent Labs: 10-31 @ 00:31: Na 137, BUN 22, Cr 0.62, <H>, K+ 4.2, Phos 2.6, Mg 2.2, Alk Phos 57, ALT/SGPT 13, AST/SGOT 7<L>, HbA1c --    A1C with Estimated Average Glucose Result: 6.4 % (10-29-23 @ 00:28)  A1C with Estimated Average Glucose Result: 6.7 % (23 @ 07:18)    Skin per nursing documentation: suspected DTI to sacrum per documentation  Edema: +1 b/l hand, b/l ankle and generalized edema per documentation    Estimated Needs:   [x] no change since previous assessment  Estimated Caloric Needs: 1836-2142kcal/day (30-35kcal/kg)  Estimated Protein Needs: 86-98g/pro/day (1.4-1.6g/pro/kg)  Estimated Fluid Needs: defer to team  based on IBW 61.2kg    Previous Nutrition Diagnosis: severe chronic malnutrition, increased nutrient needs  Nutrition Diagnosis is: [x] ongoing: patient now progressing w/ increasing PO intake/appetite since last assessment    New Nutrition Diagnosis: [] Not applicable    Nutrition Care Plan:  [x] In Progress  [] Achieved  [] Not applicable    Nutrition Interventions:     Education Provided:       [] Yes:  [x] No: patient focusing on eating lunch meal when seen    Recommendations:      1. recommend addition of low sodium diet to diet order 2/2 h/o CHF  2. encourage PO intake, protein source with each meal, supplement intake as ordered as tolerated  3. continue Ensure Plus HP BID to help provide extra calories and protein  4. monitor PO intake, weight trend, electrolytes, blood glucose levels, labs, BMs, wound healing    Monitoring and Evaluation:   Continue to monitor nutritional intake, tolerance to diet prescription, weights, labs, skin integrity    RD remains available upon request and will follow up per protocol  Cesar Macdonald, TAYLER, CDN - contact on TEAMS.

## 2023-10-31 NOTE — PROGRESS NOTE ADULT - SUBJECTIVE AND OBJECTIVE BOX
Patient seen and examined at bedside.    Overnight Events:   - No acute events overnight  - AF overnight back into SR 50s-70s  - Improves shortness of breath, no CP or palpitations         Current Meds:  acetaminophen     Tablet .. 650 milliGRAM(s) Oral every 6 hours  aMIOdarone    Tablet 200 milliGRAM(s) Oral two times a day  aspirin  chewable 81 milliGRAM(s) Oral daily  atorvastatin 40 milliGRAM(s) Oral at bedtime  chlorhexidine 2% Cloths 1 Application(s) Topical daily  influenza  Vaccine (HIGH DOSE) 0.7 milliLiter(s) IntraMuscular once  melatonin 5 milliGRAM(s) Oral at bedtime  metoprolol tartrate 12.5 milliGRAM(s) Oral two times a day  nystatin Powder 1 Application(s) Topical three times a day  oxyCODONE    IR 5 milliGRAM(s) Oral every 4 hours PRN  oxyCODONE    IR 10 milliGRAM(s) Oral every 4 hours PRN  quiNIDine gluconate  milliGRAM(s) Oral every 12 hours  sodium chloride 3%  Inhalation 4 milliLiter(s) Inhalation every 8 hours      Vitals:  T(F): 98.5 (10-31), Max: 98.5 (10-31)  HR: 63 (10-31) (58 - 103)  BP: 110/51 (10-31) (78/52 - 156/59)  RR: 20 (10-31)  SpO2: 95% (10-31)  I&O's Summary    30 Oct 2023 07:01  -  31 Oct 2023 07:00  --------------------------------------------------------  IN: 885 mL / OUT: 365 mL / NET: 520 mL        Physical Exam:  Appearance: No acute distress; well appearing on 2L NC  Eyes: PERRL, EOMI, pink conjunctiva  HEENT: Normal oral mucosa  Cardiovascular: RRR, S1, S2, no murmurs, rubs, or gallops; no edema; no JVD +dressing central chest  Respiratory: Decreased breath sounds left side with crackles slight improved in ALBERT  Gastrointestinal: soft, non-tender, non-distended with normal bowel sounds  Musculoskeletal: No clubbing; no joint deformity   Neurologic: Non-focal  Lymphatic: No lymphadenopathy  Psychiatry: AAOx3, mood & affect appropriate  Skin: No rashes, ecchymoses, or cyanosis                          10.5   18.50 )-----------( 367      ( 31 Oct 2023 00:31 )             32.6     10-31    137  |  105  |  22  ----------------------------<  125<H>  4.2   |  25  |  0.62    Ca    8.3<L>      31 Oct 2023 00:31  Phos  2.6     10-31  Mg     2.2     10-31    TPro  5.3<L>  /  Alb  2.4<L>  /  TBili  0.2  /  DBili  x   /  AST  7<L>  /  ALT  13  /  AlkPhos  57  10-31

## 2023-10-31 NOTE — PROGRESS NOTE ADULT - ATTENDING COMMENTS
71yo woman with severe COPD, HTN, PAD, CAD (100%  of RCA), cardiac arrest s/p left-sided ICD (6/4/2019) complicated by infection requiring explant and now currently with right sided single-chamber ICD reimplantation ([place] in 9/23/2019), HFmrEF 45% now admitted with recurrent/refractory VT and tamponade. Now also with AFib/RVR    Neuro: no issues  Pulm: cont home inhalers, secretion mobilization  CV: Cont amio, quinidine and dilt gtt for AFib RVR. Holding AC per EP given recurrent pericardial effusion and recent pericardial window will discuss timing of AC re-initiation for AFib requiring DCCV last week: as per EP discussion, will monitor on the current regiment: after d/w CTS and EP, will slowly restart the AC with coumadin (will monitor closely in light of the concomitant use of amiodarone)  GI: DASH diet  Renal: aim for even/euvolemia  ID: no issues  Heme: AC discussion as above  Endo: Cont BG checks  Lines: no central access. Chest tube (10/28 -)    Patient is critically ill, requiring critical care services. Despite the current condition, the patient is at a high risk of clinical and hemodynamic demise 71yo woman with severe COPD, HTN, PAD, CAD (100%  of RCA), cardiac arrest s/p left-sided ICD (6/4/2019) complicated by infection requiring explant and now currently with right sided single-chamber ICD reimplantation ([place] in 9/23/2019), HFmrEF 45% now admitted with recurrent/refractory VT and tamponade. Now also with AFib/RVR    Neuro: no issues  Pulm: cont home inhalers, secretion mobilization  CV: Cont amio, quinidine and dilt gtt for AFib RVR. Holding AC per EP given recurrent pericardial effusion and recent pericardial window will discuss timing of AC re-initiation for AFib requiring DCCV last week: as per EP discussion, will monitor on the current regiment: after d/w CTS and EP, will slowly restart the AC with coumadin (will monitor closely in light of the concomitant use of amiodarone)  GI: DASH diet  Renal: aim for even/euvolemia  ID: no issues  Heme: AC discussion as above  Endo: Cont BG checks  Lines: no central access.     Patient is critically ill, requiring critical care services. Despite the current condition, the patient is at a high risk of clinical and hemodynamic demise

## 2023-11-01 NOTE — PROGRESS NOTE ADULT - ASSESSMENT
71 y/o F w/ PMHx COPD, HLD, HTN, PVD, ICM/CAD s/p PCI (has  of RCA), cardiac arrest s/p left-sided ICD (6/4/2019) complicated by infection and s/p R single-chamber ICD ([place] in 9/23/2019), recent admission for VT storm with adjustment of medication and NIPS now presenting for ICD shock in the setting of recurrent monomorphic VT with unsuccessful ATP.  Ablation attempted 10/20 but aborted due to hemodynamically significant pericardial effusion with drain placement.  Pericardial drain removed on 10/20/23.  Over the weekend developed new shortness of breath, recurrent VT s/p ICD shock, and afib w/ RVR s/p cardioversion 10/21. She continues to have NSVT. Bronch negative for mucus plug. Currently in Afib s/p pericardial window 10/28.    - Continue low dose metoprolol tartrate  - Continue Amiodarone 200mg Q12 x 3 days and then 200mg daily should have LFTs monitored   - Continue Coumadin 2mg tonight with goal INR 2-3; would need a repeat limited TTE after resumption   - Continue low-dose Quinidine   - Will allow for medical optimization and discuss plan for ablation in the future   - Monitor on telemetry  - Keep Mg > 2 and K > 4    Note not final until signed by attending       Erasmo Lundberg MD  Cardiology Fellow PGY-6  Phone: [phone]    For all New Consults  [email or web address]   Login: [email or web address]   71 y/o F w/ PMHx COPD, HLD, HTN, PVD, ICM/CAD s/p PCI (has  of RCA), cardiac arrest s/p left-sided ICD (6/4/2019) complicated by infection and s/p R single-chamber ICD ([place] in 9/23/2019), recent admission for VT storm with adjustment of medication and NIPS now presenting for ICD shock in the setting of recurrent monomorphic VT with unsuccessful ATP.  Ablation attempted 10/20 but aborted due to hemodynamically significant pericardial effusion with drain placement.  Pericardial drain removed on 10/20/23.  Over the weekend developed new shortness of breath, recurrent VT s/p ICD shock, and afib w/ RVR s/p cardioversion 10/21. She continues to have NSVT. Bronch negative for mucus plug. Currently in Afib s/p pericardial window 10/28.    - Continue low dose metoprolol tartrate  - Continue Amiodarone 200mg Q12 x 3 days and then 200mg daily should have LFTs monitored   - Continue Coumadin 2mg tonight with goal INR 2-3; would need a repeat limited TTE after resumption   - Continue low-dose Quinidine   - Will allow for medical optimization and discuss plan for ablation in the future   - Monitor on telemetry  - Keep Mg > 2 and K > 4    Note not final until signed by attending       Erasmo Lundberg MD  Cardiology Fellow PGY-6  Phone: [phone]    For all New Consults  [email or web address]   Login: [email or web address]   73 y/o F w/ PMHx COPD, HLD, HTN, PVD, ICM/CAD s/p PCI (has  of RCA), cardiac arrest s/p left-sided ICD (6/4/2019) complicated by infection and s/p R single-chamber ICD ([place] in 9/23/2019), recent admission for VT storm with adjustment of medication and NIPS now presenting for ICD shock in the setting of recurrent monomorphic VT with unsuccessful ATP.  Ablation attempted 10/20 but aborted due to hemodynamically significant pericardial effusion with drain placement.  Pericardial drain removed on 10/20/23.  Over the weekend developed new shortness of breath, recurrent VT s/p ICD shock, and afib w/ RVR s/p cardioversion 10/21. She continues to have NSVT. Bronch negative for mucus plug. Currently in Afib s/p pericardial window 10/28.    - Continue low dose metoprolol tartrate  - Continue Amiodarone 200mg Q12 x 3 days and then 200mg daily should have LFTs monitored   - Continue Coumadin 2mg tonight with goal INR 2-3; would need a repeat limited TTE after resumption   - Continue low-dose Quinidine   - Will allow for medical optimization and discuss plan for ablation in the future   - Monitor on telemetry  - Keep Mg > 2 and K > 4    Note not final until signed by attending       Erasmo Lundberg MD  Cardiology Fellow PGY-6  Phone: [phone]    For all New Consults  [email or web address]   Login: [email or web address]

## 2023-11-01 NOTE — PROGRESS NOTE ADULT - SUBJECTIVE AND OBJECTIVE BOX
Patient seen and examined at bedside.      Overnight Events:   - No acute events overnight  - Remains in SR 60s-70s  - Improves shortness of breath, no CP or palpitations  - Ambulating today    Current Meds:  acetaminophen     Tablet .. 650 milliGRAM(s) Oral every 6 hours  aMIOdarone    Tablet 200 milliGRAM(s) Oral two times a day  aspirin  chewable 81 milliGRAM(s) Oral daily  atorvastatin 40 milliGRAM(s) Oral at bedtime  chlorhexidine 2% Cloths 1 Application(s) Topical daily  influenza  Vaccine (HIGH DOSE) 0.7 milliLiter(s) IntraMuscular once  melatonin 5 milliGRAM(s) Oral at bedtime  metoprolol tartrate 12.5 milliGRAM(s) Oral two times a day  nystatin Powder 1 Application(s) Topical three times a day  quiNIDine gluconate  milliGRAM(s) Oral every 12 hours  sodium chloride 3%  Inhalation 4 milliLiter(s) Inhalation every 8 hours  warfarin 2.5 milliGRAM(s) Oral once      Vitals:  T(F): 98 (11-01), Max: 98 (11-01)  HR: 63 (11-01) (20 - 80)  BP: 126/57 (11-01) (109/51 - 146/60)  RR: 15 (11-01)  SpO2: 98% (11-01)  I&O's Summary    31 Oct 2023 07:01  -  01 Nov 2023 07:00  --------------------------------------------------------  IN: 660 mL / OUT: 200 mL / NET: 460 mL    01 Nov 2023 07:01  -  01 Nov 2023 10:59  --------------------------------------------------------  IN: 240 mL / OUT: 0 mL / NET: 240 mL          Physical Exam:  Appearance: No acute distress; well appearing on 2L NC  Eyes: PERRL, EOMI, pink conjunctiva  HEENT: Normal oral mucosa  Cardiovascular: RRR, S1, S2, no murmurs, rubs, or gallops; no edema; no JVD +dressing central chest  Respiratory: Decreased breath sounds left side with crackles slight improved in ALBERT  Gastrointestinal: soft, non-tender, non-distended with normal bowel sounds  Musculoskeletal: No clubbing; no joint deformity   Neurologic: Non-focal  Lymphatic: No lymphadenopathy  Psychiatry: AAOx3, mood & affect appropriate  Skin: No rashes, ecchymoses, or cyanosis                          10.2   17.94 )-----------( 350      ( 01 Nov 2023 00:48 )             32.4     11-01    138  |  106  |  19  ----------------------------<  111<H>  4.3   |  23  |  0.62    Ca    8.3<L>      01 Nov 2023 00:48  Phos  3.0     11-01  Mg     2.1     11-01    TPro  5.2<L>  /  Alb  2.3<L>  /  TBili  0.2  /  DBili  x   /  AST  8<L>  /  ALT  13  /  AlkPhos  55  11-01    PT/INR - ( 01 Nov 2023 00:48 )   PT: 12.7 sec;   INR: 1.16 ratio         PTT - ( 01 Nov 2023 00:48 )  PTT:22.0 sec

## 2023-11-01 NOTE — CHART NOTE - NSCHARTNOTEFT_GEN_A_CORE
CCU Transfer Note    Transfer from: CCU    Transfer to: ( x ) Telemetry --    Accepting Physician: Dr. Jj Tsai  Team covering on floor: ACP/Resident team   Signout given to: Hospitalist and     CCU COURSE:  72F COPD, HTN, PAD, CAD s/p LAD stent, chronic leukocytosis (unclear etiology), cardiac arrest s/p left-sided ICD (6/2019) c/c/b infection and s/p R single-chamber ICD reimplantation ([place] in 9/2019), CHF (EF 45%),  recent hospitalization for UTI (ceftriaxone/Vanco) and VFib s/p x13 AICD firings, p/w lightheadedness & AICD firing. Pt admitted to CICU, placed on quinidine, mexiletine, metoprolol and d/c'd home.    Admitted to CICU s/p attempted VT ablation- procedure aborted 2/2 cardiac tamponade w/ hypotension requiring pressors & pericardial drain placed. In CICU, pressors were weaned and drain was removed after decreased output. Follow up TTE showing no pericardial effusion. Patient briefly downgraded to medicine floor on 10/20, however had episode of VT x 1 minute, started on lidocaine gtt & AICD shocked x1. Re-transferred to CICU.     Pt w/ intermittent runs of afib RVR- amio, BB, quinidine started. C/b atelectasis, physiotherapy and PT w/o atelectatic clearance. Bronched w/ minimal secretions noted. F/u CXR w/ improvement in addition to clinical and symptomatic improvement. Pt downgraded to floors again.    On floors, became hypotensive to SBP 90s w/ c/f further tamponade. TTE w/ enlarged effusion and early signs of cardiac tamponade. Transferred to CICU again for further monitoring pre-OR with CTS for pericardial window. S/p pericardial window 10/28 with drain removal 10/30. Pt continued to improve was started on coumadin 10/31 evening and was ready for downgrade to floor 11/1.    FOR FOLLOW UP:  [] d/c planning likely to rehab  [] Transition Amiodarone 200mg Q12 x 3 days and then 200mg daily (ordered)  [] trend INR for coumadin      PAST MEDICAL & SURGICAL HISTORY:  Obese      Smoker      HTN (hypertension)      HLD (hyperlipidemia)      CAD (coronary artery disease)      CHF with cardiomyopathy      History of hip surgery          Vital Signs Last 24 Hrs  T(C): 36.6 (01 Nov 2023 03:00), Max: 36.6 (01 Nov 2023 00:00)  T(F): 97.8 (01 Nov 2023 03:00), Max: 97.8 (01 Nov 2023 00:00)  HR: 68 (01 Nov 2023 06:00) (20 - 80)  BP: 122/56 (01 Nov 2023 06:00) (97/46 - 146/60)  BP(mean): 80 (01 Nov 2023 06:00) (67 - 93)  RR: 22 (01 Nov 2023 06:00) (16 - 24)  SpO2: 97% (01 Nov 2023 06:00) (94% - 100%)    Parameters below as of 01 Nov 2023 06:00  Patient On (Oxygen Delivery Method): nasal cannula  O2 Flow (L/min): 2    MEDICATIONS  (STANDING):  acetaminophen     Tablet .. 650 milliGRAM(s) Oral every 6 hours  aMIOdarone    Tablet 200 milliGRAM(s) Oral two times a day  aspirin  chewable 81 milliGRAM(s) Oral daily  atorvastatin 40 milliGRAM(s) Oral at bedtime  chlorhexidine 2% Cloths 1 Application(s) Topical daily  influenza  Vaccine (HIGH DOSE) 0.7 milliLiter(s) IntraMuscular once  melatonin 5 milliGRAM(s) Oral at bedtime  metoprolol tartrate 12.5 milliGRAM(s) Oral two times a day  nystatin Powder 1 Application(s) Topical three times a day  quiNIDine gluconate  milliGRAM(s) Oral every 12 hours  sodium chloride 3%  Inhalation 4 milliLiter(s) Inhalation every 8 hours    MEDICATIONS  (PRN):  oxyCODONE    IR 5 milliGRAM(s) Oral every 4 hours PRN Moderate Pain (4 - 6)  oxyCODONE    IR 10 milliGRAM(s) Oral every 4 hours PRN Severe Pain (7 - 10)                        10.2   17.94 )-----------( 350      ( 01 Nov 2023 00:48 )             32.4     11-01    138  |  106  |  19  ----------------------------<  111<H>  4.3   |  23  |  0.62    Ca    8.3<L>      01 Nov 2023 00:48  Phos  3.0     11-01  Mg     2.1     11-01    TPro  5.2<L>  /  Alb  2.3<L>  /  TBili  0.2  /  DBili  x   /  AST  8<L>  /  ALT  13  /  AlkPhos  55  11-01    PT/INR - ( 01 Nov 2023 00:48 )   PT: 12.7 sec;   INR: 1.16 ratio         PTT - ( 01 Nov 2023 00:48 )  PTT:22.0 sec      ASSESSMENT & PLAN:   ====================ASSESSMENT ==============  72F c hx COPD, HTN, PAD, CAD s/p LAD stent (last LHC showing 100%  of RCA), chronic leukocytosis of unclear etiol, cardiac arrest s/p left-sided ICD (6/4/2019) complicated by infection and s/p R single-chamber ICD reimplantation ([place] in 9/23/2019), CHF EF 45%, grade 2 DHF, recent hospitalization for UTI (treated w/ ceftriaxone and Vanco x 3 days) and VFib s/p 13 AICD firings, pw lightheadedness and AICD firing.    Pt recently hospitalized for VFib s/p multiple aicd firing. Pt placed on quinidine, mexiletine, metoprolol. Returning to CICU after downgrade w/ c/f worsening effusion w/ early tamponade physiology. S/p window and chest tube, removed night of the 31st.      ====================== NEUROLOGY=====================  A&Ox3  - no active issues  - continue to monitor mental status as per protocol     ==================== RESPIRATORY======================  Opacified L lung - Improving  - Noted to be tachypnea x2 nights w/o inc o2 requirement, CXR at that time showed white out L hemithorax: mucus plug vs. effusion vs. PNA  - s/p bronchoscopy w/ minimal mucus; Post bronch CXR improved, still b/l effusion but improving L lung aeration  - Careful monitoring w/ daily AM XR, trend WBC and fever curve  - Saline, chest PT tid; Nebs was held 10/29 AM because of AF w/ RVR  - continue to monitor SpO2 with goal >90%, O2 therapy PRN  - Aggressive PT OOB and pulm hygiene     ====================CARDIOVASCULAR==================  Pericardial Tamponade  - s/p aborted VT ablation c/b pericardial tamponade  - pericardial drain with 40 cc drained initially, 250cc in bag upon arrival to CICU, s/p drainage removal on 10/20  - TTE 10/22 with moderate pericardial effusion, not seemingly large enough to affect hemodynamics  - Transferred to floors, became hypotensive to SBP 90s, TTE with enlarged pericardial effusion size. Transferred back to CICU 10/27 pre OR for monitoring; now s/p pericardial window with CTS ([facility]) on 10/28; 300cc sanguinous fluid removed intraoperatively, chest tube in place post-operatively  - Drain removed 10/30, sys 110s after episodes of asx hypotension.     AF w/ RVR  - new onset AF w/ RVR, s/p DCCVx2 at 200J (22nd), to NSR  - s/p Lido gtt, d/c'd  - Again went into AF w/ RVR on 10/29 AM to 190s-200s, given Amio 150 IV. Wean Cardizem as lina  - f/u EP recs  - Will discuss with EP/CTS regarding r/s AC, holding for now. Currently on ASA/plavix  - EP: amio 200 bid, x3 days, then on 2nd, switch to 200 daily.   - Coumadin if effusion stable  - ASA   - D/c'd plavix       VT  - 400 amio TID PO per EP, since d/c'd   - VT ablation aborted after tamponade  - lido d/c'd/. Quinidine 324 BID started 10/23  - has AICD  - replete lytes as needed to maintain K>4, Mag>2  - will need q6mo TSH / LFT, annual CXR and eye exam as outpatient (while on amiodarone)    CHF  - TTE 10/5 EF 55%, reg WMA, mild-mod MS  - ASA     CAD s/p stents  - Plavix held iso recent bloody pericardial effusion and recent pericardial window/chest tube  - ASA  - Coumadin initiation after echoR    ===================== RENAL =========================  No acute issues  - Continue monitoring urine output, lytes, SCr/ BUN  - replete lytes prn with goal K >4 and Mg >2    =============== GASTROINTESTINAL===================  DASH Diet    ===================ENDO====================  No active issues  - Monitor glucose on CMP  - TSH wnl    ===================HEMATOLOGIC/ONC ===================  Monitor H/H and plts  - DVT PPX: SCDs.  - Coumadin to resume after TTE     ==================INFECTIOUS DISEASE================  Afebrile  - chronic leukocytosis of unknown origin   - monitor and trend WBC and temperature curve         Jonathan Vega, CCU PA CCU Transfer Note    Transfer from: CCU    Transfer to: ( x ) Telemetry --    Accepting Physician: Dr. Jj Tsai  Team covering on floor: ACP/Resident team   Signout given to: Hospitalist and     CCU COURSE:  72F COPD, HTN, PAD, CAD s/p LAD stent, chronic leukocytosis (unclear etiology), cardiac arrest s/p left-sided ICD (6/2019) c/c/b infection and s/p R single-chamber ICD reimplantation ([place] in 9/2019), CHF (EF 45%),  recent hospitalization for UTI (ceftriaxone/Vanco) and VFib s/p x13 AICD firings, p/w lightheadedness & AICD firing. Pt admitted to CICU, placed on quinidine, mexiletine, metoprolol and d/c'd home.    Admitted to CICU s/p attempted VT ablation- procedure aborted 2/2 cardiac tamponade w/ hypotension requiring pressors & pericardial drain placed. In CICU, pressors were weaned and drain was removed after decreased output. Follow up TTE showing no pericardial effusion. Patient briefly downgraded to medicine floor on 10/20, however had episode of VT x 1 minute, started on lidocaine gtt & AICD shocked x1. Re-transferred to CICU.     Pt w/ intermittent runs of afib RVR- amio, BB, quinidine started. C/b atelectasis, physiotherapy and PT w/o atelectatic clearance. Bronched w/ minimal secretions noted. F/u CXR w/ improvement in addition to clinical and symptomatic improvement. Pt downgraded to floors again.    On floors, became hypotensive to SBP 90s w/ c/f further tamponade. TTE w/ enlarged effusion and early signs of cardiac tamponade. Transferred to CICU again for further monitoring pre-OR with CTS for pericardial window. S/p pericardial window 10/28 with drain removal 10/30. Pt continued to improve was started on coumadin 10/31 evening and was ready for downgrade to floor 11/1.    FOR FOLLOW UP:  [] d/c planning likely to rehab  [] Pulmonary consult for pulm optimization   [] Aggressive pulm hygiene   [] Transition Amiodarone 200mg Q12 x 3 days and then 200mg daily (ordered)  [] trend INR for coumadin  [] XR today (1st)  [] Rpt echo tomorrow (2nd)    PAST MEDICAL & SURGICAL HISTORY:  Obese      Smoker      HTN (hypertension)      HLD (hyperlipidemia)      CAD (coronary artery disease)      CHF with cardiomyopathy      History of hip surgery          Vital Signs Last 24 Hrs  T(C): 36.6 (01 Nov 2023 03:00), Max: 36.6 (01 Nov 2023 00:00)  T(F): 97.8 (01 Nov 2023 03:00), Max: 97.8 (01 Nov 2023 00:00)  HR: 68 (01 Nov 2023 06:00) (20 - 80)  BP: 122/56 (01 Nov 2023 06:00) (97/46 - 146/60)  BP(mean): 80 (01 Nov 2023 06:00) (67 - 93)  RR: 22 (01 Nov 2023 06:00) (16 - 24)  SpO2: 97% (01 Nov 2023 06:00) (94% - 100%)    Parameters below as of 01 Nov 2023 06:00  Patient On (Oxygen Delivery Method): nasal cannula  O2 Flow (L/min): 2    MEDICATIONS  (STANDING):  acetaminophen     Tablet .. 650 milliGRAM(s) Oral every 6 hours  aMIOdarone    Tablet 200 milliGRAM(s) Oral two times a day  aspirin  chewable 81 milliGRAM(s) Oral daily  atorvastatin 40 milliGRAM(s) Oral at bedtime  chlorhexidine 2% Cloths 1 Application(s) Topical daily  influenza  Vaccine (HIGH DOSE) 0.7 milliLiter(s) IntraMuscular once  melatonin 5 milliGRAM(s) Oral at bedtime  metoprolol tartrate 12.5 milliGRAM(s) Oral two times a day  nystatin Powder 1 Application(s) Topical three times a day  quiNIDine gluconate  milliGRAM(s) Oral every 12 hours  sodium chloride 3%  Inhalation 4 milliLiter(s) Inhalation every 8 hours    MEDICATIONS  (PRN):  oxyCODONE    IR 5 milliGRAM(s) Oral every 4 hours PRN Moderate Pain (4 - 6)  oxyCODONE    IR 10 milliGRAM(s) Oral every 4 hours PRN Severe Pain (7 - 10)                        10.2   17.94 )-----------( 350      ( 01 Nov 2023 00:48 )             32.4     11-01    138  |  106  |  19  ----------------------------<  111<H>  4.3   |  23  |  0.62    Ca    8.3<L>      01 Nov 2023 00:48  Phos  3.0     11-01  Mg     2.1     11-01    TPro  5.2<L>  /  Alb  2.3<L>  /  TBili  0.2  /  DBili  x   /  AST  8<L>  /  ALT  13  /  AlkPhos  55  11-01    PT/INR - ( 01 Nov 2023 00:48 )   PT: 12.7 sec;   INR: 1.16 ratio         PTT - ( 01 Nov 2023 00:48 )  PTT:22.0 sec      ASSESSMENT & PLAN:   ====================ASSESSMENT ==============  72F c hx COPD, HTN, PAD, CAD s/p LAD stent (last LHC showing 100%  of RCA), chronic leukocytosis of unclear etiol, cardiac arrest s/p left-sided ICD (6/4/2019) complicated by infection and s/p R single-chamber ICD reimplantation ([place] in 9/23/2019), CHF EF 45%, grade 2 DHF, recent hospitalization for UTI (treated w/ ceftriaxone and Vanco x 3 days) and VFib s/p 13 AICD firings, pw lightheadedness and AICD firing.    Pt recently hospitalized for VFib s/p multiple aicd firing. Pt placed on quinidine, mexiletine, metoprolol. Returning to CICU after downgrade w/ c/f worsening effusion w/ early tamponade physiology. S/p window and chest tube, removed night of the 31st.      ====================== NEUROLOGY=====================  A&Ox3  - no active issues  - continue to monitor mental status as per protocol     ==================== RESPIRATORY======================  Opacified L lung - Improving  - Noted to be tachypnea x2 nights w/o inc o2 requirement, CXR at that time showed white out L hemithorax: mucus plug vs. effusion vs. PNA  - s/p bronchoscopy w/ minimal mucus; Post bronch CXR improved, still b/l effusion but improving L lung aeration  - Careful monitoring w/ daily AM XR, trend WBC and fever curve  - Saline, chest PT tid; Nebs was held 10/29 AM because of AF w/ RVR  - continue to monitor SpO2 with goal >90%, O2 therapy PRN  - Aggressive PT OOB and pulm hygiene     ====================CARDIOVASCULAR==================  Pericardial Tamponade  - s/p aborted VT ablation c/b pericardial tamponade  - pericardial drain with 40 cc drained initially, 250cc in bag upon arrival to CICU, s/p drainage removal on 10/20  - TTE 10/22 with moderate pericardial effusion, not seemingly large enough to affect hemodynamics  - Transferred to floors, became hypotensive to SBP 90s, TTE with enlarged pericardial effusion size. Transferred back to CICU 10/27 pre OR for monitoring; now s/p pericardial window with CTS ([place]) on 10/28; 300cc sanguinous fluid removed intraoperatively, chest tube in place post-operatively  - Drain removed 10/30, sys 110s after episodes of asx hypotension.     AF w/ RVR  - new onset AF w/ RVR, s/p DCCVx2 at 200J (22nd), to NSR  - s/p Lido gtt, d/c'd  - Again went into AF w/ RVR on 10/29 AM to 190s-200s, given Amio 150 IV. Wean Cardizem as lina  - f/u EP recs  - Will discuss with EP/CTS regarding r/s AC, holding for now. Currently on ASA/plavix  - EP: amio 200 bid, x3 days, then on 2nd, switch to 200 daily.   - Coumadin if effusion stable  - ASA   - D/c'd plavix       VT  - 400 amio TID PO per EP, since d/c'd   - VT ablation aborted after tamponade  - lido d/c'd/. Quinidine 324 BID started 10/23  - has AICD  - replete lytes as needed to maintain K>4, Mag>2  - will need q6mo TSH / LFT, annual CXR and eye exam as outpatient (while on amiodarone)    CHF  - TTE 10/5 EF 55%, reg WMA, mild-mod MS  - ASA     CAD s/p stents  - Plavix held iso recent bloody pericardial effusion and recent pericardial window/chest tube  - ASA  - Coumadin initiation after echoR    ===================== RENAL =========================  No acute issues  - Continue monitoring urine output, lytes, SCr/ BUN  - replete lytes prn with goal K >4 and Mg >2    =============== GASTROINTESTINAL===================  DASH Diet    ===================ENDO====================  No active issues  - Monitor glucose on CMP  - TSH wnl    ===================HEMATOLOGIC/ONC ===================  Monitor H/H and plts  - DVT PPX: SCDs.  - Coumadin to resume after TTE     ==================INFECTIOUS DISEASE================  Afebrile  - chronic leukocytosis of unknown origin   - monitor and trend WBC and temperature curve         Jonathan Vega, CCU PA

## 2023-11-01 NOTE — PROGRESS NOTE ADULT - SUBJECTIVE AND OBJECTIVE BOX
PATIENT:  AUSTIN LEE  [id number]    CHIEF COMPLAINT:  Patient is a 72y old  Female who presents with a chief complaint of AICD discharge (31 Oct 2023 21:58)      INTERVAL HISTORYOVERNIGHT EVENTS:      REVIEW OF SYSTEMS:    Constitutional:     [ ] negative [ ] fevers [ ] chills [ ] weight loss [ ] weight gain  HEENT:                  [ ] negative [ ] dry eyes [ ] eye irritation [ ] postnasal drip [ ] nasal congestion  CV:                         [ ] negative  [ ] chest pain [ ] orthopnea [ ] palpitations [ ] murmur  Resp:                     [ ] negative [ ] cough [ ] shortness of breath [ ] dyspnea [ ] wheezing [ ] sputum [ ] hemoptysis  GI:                          [ ] negative [ ] nausea [ ] vomiting [ ] diarrhea [ ] constipation [ ] abd pain [ ] dysphagia   :                        [ ] negative [ ] dysuria [ ] nocturia [ ] hematuria [ ] increased urinary frequency  Musculoskeletal: [ ] negative [ ] back pain [ ] myalgias [ ] arthralgias [ ] fracture  Skin:                       [ ] negative [ ] rash [ ] itch  Neurological:        [ ] negative [ ] headache [ ] dizziness [ ] syncope [ ] weakness [ ] numbness  Psychiatric:           [ ] negative [ ] anxiety [ ] depression  Endocrine:            [ ] negative [ ] diabetes [ ] thyroid problem  Heme/Lymph:      [ ] negative [ ] anemia [ ] bleeding problem  Allergic/Immune: [ ] negative [ ] itchy eyes [ ] nasal discharge [ ] hives [ ] angioedema    [ ] All other systems negative  [ ] Unable to assess ROS because ________.    MEDICATIONS:  MEDICATIONS  (STANDING):  acetaminophen     Tablet .. 650 milliGRAM(s) Oral every 6 hours  aMIOdarone    Tablet 200 milliGRAM(s) Oral two times a day  aspirin  chewable 81 milliGRAM(s) Oral daily  atorvastatin 40 milliGRAM(s) Oral at bedtime  chlorhexidine 2% Cloths 1 Application(s) Topical daily  influenza  Vaccine (HIGH DOSE) 0.7 milliLiter(s) IntraMuscular once  melatonin 5 milliGRAM(s) Oral at bedtime  metoprolol tartrate 12.5 milliGRAM(s) Oral two times a day  nystatin Powder 1 Application(s) Topical three times a day  quiNIDine gluconate  milliGRAM(s) Oral every 12 hours  sodium chloride 3%  Inhalation 4 milliLiter(s) Inhalation every 8 hours    MEDICATIONS  (PRN):  oxyCODONE    IR 5 milliGRAM(s) Oral every 4 hours PRN Moderate Pain (4 - 6)  oxyCODONE    IR 10 milliGRAM(s) Oral every 4 hours PRN Severe Pain (7 - 10)      ALLERGIES:  Allergies    No Known Allergies    Intolerances        OBJECTIVE:  ICU Vital Signs Last 24 Hrs  T(C): 36.6 (01 Nov 2023 03:00), Max: 36.6 (01 Nov 2023 00:00)  T(F): 97.8 (01 Nov 2023 03:00), Max: 97.8 (01 Nov 2023 00:00)  HR: 68 (01 Nov 2023 06:00) (20 - 80)  BP: 122/56 (01 Nov 2023 06:00) (99/49 - 146/60)  BP(mean): 80 (01 Nov 2023 06:00) (69 - 93)  ABP: --  ABP(mean): --  RR: 22 (01 Nov 2023 06:00) (16 - 24)  SpO2: 97% (01 Nov 2023 06:00) (94% - 100%)    O2 Parameters below as of 01 Nov 2023 06:00  Patient On (Oxygen Delivery Method): nasal cannula  O2 Flow (L/min): 2          Adult Advanced Hemodynamics Last 24 Hrs  CVP(mm Hg): --  CVP(cm H2O): --  CO: --  CI: --  PA: --  PA(mean): --  PCWP: --  SVR: --  SVRI: --  PVR: --  PVRI: --  CAPILLARY BLOOD GLUCOSE        CAPILLARY BLOOD GLUCOSE        I&O's Summary    31 Oct 2023 07:01  -  01 Nov 2023 07:00  --------------------------------------------------------  IN: 660 mL / OUT: 200 mL / NET: 460 mL      Daily     Daily     PHYSICAL EXAMINATION:  General: WN/WD NAD  HEENT: PERRLA, EOMI, moist mucous membranes  Neurology: A&Ox3, nonfocal, [place] x 4  Respiratory: CTA B/L, normal respiratory effort, no wheezes, crackles, rales  CV: RRR, S1S2, no murmurs, rubs or gallops  Abdominal: Soft, NT, ND +BS, Last BM  Extremities: No edema, + peripheral pulses  Incisions:   Tubes:    LABS:                          10.2   17.94 )-----------( 350      ( 01 Nov 2023 00:48 )             32.4     11-01    138  |  106  |  19  ----------------------------<  111<H>  4.3   |  23  |  0.62    Ca    8.3<L>      01 Nov 2023 00:48  Phos  3.0     11-01  Mg     2.1     11-01    TPro  5.2<L>  /  Alb  2.3<L>  /  TBili  0.2  /  DBili  x   /  AST  8<L>  /  ALT  13  /  AlkPhos  55  11-01    LIVER FUNCTIONS - ( 01 Nov 2023 00:48 )  Alb: 2.3 g/dL / Pro: 5.2 g/dL / ALK PHOS: 55 U/L / ALT: 13 U/L / AST: 8 U/L / GGT: x           PT/INR - ( 01 Nov 2023 00:48 )   PT: 12.7 sec;   INR: 1.16 ratio         PTT - ( 01 Nov 2023 00:48 )  PTT:22.0 sec        Urinalysis Basic - ( 01 Nov 2023 00:48 )    Color: x / Appearance: x / SG: x / pH: x  Gluc: 111 mg/dL / Ketone: x  / Bili: x / Urobili: x   Blood: x / Protein: x / Nitrite: x   Leuk Esterase: x / RBC: x / WBC x   Sq Epi: x / Non Sq Epi: x / Bacteria: x      ====================ASSESSMENT ==============  72F c hx COPD, HTN, PAD, new pAFib, CAD s/p LAD stent (last C showing 100%  of RCA), chronic leukocytosis of unclear etiol, cardiac arrest s/p left-sided ICD (6/4/2019) complicated by infection and s/p R single-chamber ICD reimplantation ([place] in 9/23/2019), CHF EF 45%, grade 2 DHF, recent hospitalization for UTI (treated w/ ceftriaxone and Vanco x 3 days) and VFib s/p 13 AICD firings, pw lightheadedness and AICD firing.    Pt recently hospitalized for VFib s/p multiple aicd firing. Pt placed on quinidine, mexiletine, metoprolol. Returning to CICU after downgrade w/ c/f worsening effusion w/ early tamponade physiology. S/p window and chest tube, removed night of the 31st.      ====================== NEUROLOGY=====================  A&Ox3  - no active issues  - continue to monitor mental status as per protocol     ==================== RESPIRATORY======================  Opacified L lung on COPD  - Noted to be tachypnea x2 nights w/o inc o2 requirement, CXR at that time showed white out L hemithorax: mucus plug vs. effusion vs. PNA  - s/p bronchoscopy w/ minimal mucus; Post bronch CXR improved, still b/l effusion but improving L lung aeration  - Careful monitoring w/ daily AM XR, trend WBC and fever curve  - Saline, chest PT tid; Nebs, Ambulation, IS  - continue to monitor SpO2 with goal >90%, O2 therapy PRN    ====================CARDIOVASCULAR==================  Cardiac Tamponade  - s/p aborted VT ablation c/b pericardial tamponade  - pericardial drain with 40 cc drained initially, 250cc in bag upon arrival to CICU, s/p drainage removal on 10/20  - TTE 10/22 with moderate pericardial effusion, not seemingly large enough to affect hemodynamics  - Transferred to floors, became hypotensive to SBP 90s, TTE with enlarged pericardial effusion size. Transferred back to CICU 10/27 pre OR for monitoring; now s/p pericardial window with CTS ([place]) on 10/28; 300cc sanguinous fluid removed intraoperatively, chest tube in place post-operatively  - Drain removed, sys 110s after episodes of asx hypotension.   - Rpt Ltd TTEs 10/30-31    AF w/ RVR  - new onset AF w/ RVR, s/p DCCVx2 at 200J (22nd), to NSR  - Again went into AF w/ RVR on 10/29 AM to 190s-200s, given Amio 150 IV  - EP: amio 200 bid, x3 days, then on 2nd, switch to 200 daily.   - BB Lopressor 12.5 BID  - Coumadin for AC     VT  - VT ablation aborted after tamponade  - s/p Amio load  - lido d/c'd. Quinidine 324 BID started 10/23  - BB Lopressor 12.5 BID  - replete lytes as needed to maintain K>4, Mag>2  - will need q6mo TSH / LFT, annual CXR and eye exam as outpatient (while on amiodarone)    CHF  - TTE 10/5 EF 55%, reg WMA, mild-mod MS  - BB Lopressor 12.5 BID - > Toprol XL   - Was on Entresto and SGLT2i, resume when clinical stability achieved    CAD s/p stents  - Plavix held iso recent bloody pericardial effusion and recent pericardial window/chest tube  - ASA, Lipitor    ===================== RENAL =========================  No acute issues  - Continue monitoring urine output, lytes, SCr/ BUN  - replete lytes prn with goal K >4 and Mg >2    =============== GASTROINTESTINAL===================  DASH Diet  - Ensure regularity of BM    ===================ENDO====================  T2DM, PreDM2  - Monitor glucose on CMP  - Reinitiate SGLT2i when stable for D/C     - TSH wnl    ===================HEMATOLOGIC/ONC ===================  CBC Stable     VTE ppx  - Coumadin to resume after TTE     ==================INFECTIOUS DISEASE================  Afebrile  - chronic leukocytosis of unknown origin   - monitor and trend WBC and temperature curve

## 2023-11-01 NOTE — PROGRESS NOTE ADULT - CRITICAL CARE ATTENDING COMMENT
71yo woman with severe COPD, HTN, PAD, CAD (100%  of RCA), cardiac arrest s/p left-sided ICD (6/4/2019) complicated by infection requiring explant and now currently with right sided single-chamber ICD reimplantation ([place] in 9/23/2019), HFmrEF 45% now admitted with recurrent/refractory VT and tamponade. Now also with AFib/RVR    Neuro: no issues  Pulm: cont home inhalers, secretion mobilization  CV: Cont amio, quinidine and dilt gtt for AFib RVR. Holding AC per EP given recurrent pericardial effusion and recent pericardial window will discuss timing of AC re-initiation for AFib requiring DCCV last week: as per EP discussion, will monitor on the current regiment: after d/w CTS and EP, will slowly restart the AC with coumadin (will monitor closely in light of the concomitant use of amiodarone)  GI: DASH diet  Renal: aim for even/euvolemia  ID: no issues  Heme: AC discussion as above  Endo: Cont BG checks  Lines: no central access.     Patient is critically ill, requiring critical care services. Despite the current condition, the patient is at a high risk of clinical and hemodynamic demise

## 2023-11-02 NOTE — PROGRESS NOTE ADULT - ATTENDING COMMENTS
Transfer from CICU. CAD, HTN, PAD, pAFib, now with VT/shocks.  VT ablation c/b pericardial effusion c/b tamponade s/p pericardiocentesis and pericardial window. Now, on med rx for arrhythmia. Advance GDMT, including cautious use of anticoagulation.

## 2023-11-02 NOTE — PROGRESS NOTE ADULT - SUBJECTIVE AND OBJECTIVE BOX
Patient seen and examined at bedside.    Overnight Events: tx to floor    REVIEW OF SYSTEMS:  CONSTITUTIONAL: No weakness, fevers or chills  EYES/ENT: No visual changes;  No dysphagia  NECK: No pain or stiffness  RESPIRATORY: No cough, wheezing, hemoptysis; No shortness of breath  CARDIOVASCULAR: No chest pain or palpitations; No lower extremity edema  GASTROINTESTINAL: No abdominal or epigastric pain. No nausea, vomiting, or hematemesis; No diarrhea or constipation. No melena or hematochezia.  BACK: No back pain  GENITOURINARY: No dysuria, frequency or hematuria  NEUROLOGICAL: No numbness or weakness  SKIN: No itching, burning, rashes, or lesions   All other review of systems is negative unless indicated above.            Current Meds:  acetaminophen     Tablet .. 650 milliGRAM(s) Oral every 6 hours  aMIOdarone    Tablet 200 milliGRAM(s) Oral daily  aspirin  chewable 81 milliGRAM(s) Oral daily  atorvastatin 40 milliGRAM(s) Oral at bedtime  chlorhexidine 2% Cloths 1 Application(s) Topical daily  influenza  Vaccine (HIGH DOSE) 0.7 milliLiter(s) IntraMuscular once  melatonin 5 milliGRAM(s) Oral at bedtime  metoprolol tartrate 12.5 milliGRAM(s) Oral two times a day  nystatin Powder 1 Application(s) Topical three times a day  quiNIDine gluconate  milliGRAM(s) Oral every 12 hours  sodium chloride 3%  Inhalation 4 milliLiter(s) Inhalation every 8 hours      Vitals:  T(F): 97.6 (11-02), Max: 97.6 (11-01)  HR: 68 (11-02) (63 - 97)  BP: 112/63 (11-02) (112/63 - 136/60)  RR: 18 (11-02)  SpO2: 100% (11-02)  I&O's Summary    01 Nov 2023 07:01  -  02 Nov 2023 07:00  --------------------------------------------------------  IN: 480 mL / OUT: 0 mL / NET: 480 mL    02 Nov 2023 07:01  -  02 Nov 2023 10:59  --------------------------------------------------------  IN: 240 mL / OUT: 0 mL / NET: 240 mL        Physical Exam:  General: WN/WD NAD  HEENT: PERRLA, EOMI, moist mucous membranes  Neurology: A&Ox3, nonfocal  Respiratory: CTA B/L, normal respiratory effort, no wheezes, crackles, rales  CV: RRR, S1S2, no murmurs, rubs or gallops  Abdominal: Soft, NT, ND  Extremities: No edema,                         10.4   14.10 )-----------( 411      ( 02 Nov 2023 09:48 )             33.9     11-02    141  |  107  |  14  ----------------------------<  131<H>  4.2   |  26  |  0.54    Ca    8.3<L>      02 Nov 2023 09:48  Phos  3.0     11-01  Mg     2.0     11-02    TPro  5.2<L>  /  Alb  2.3<L>  /  TBili  0.2  /  DBili  x   /  AST  8<L>  /  ALT  13  /  AlkPhos  55  11-01    PT/INR - ( 02 Nov 2023 09:48 )   PT: 12.1 sec;   INR: 1.16 ratio         PTT - ( 02 Nov 2023 09:48 )  PTT:27.1 sec

## 2023-11-02 NOTE — PROGRESS NOTE ADULT - ASSESSMENT
71 y/o F w/ PMHx COPD, HLD, HTN, PVD, ICM/CAD s/p PCI (has  of RCA), cardiac arrest s/p left-sided ICD (6/4/2019) complicated by infection and s/p R single-chamber ICD ([place] in 9/23/2019), recent admission for VT storm with adjustment of medication and NIPS now presenting for ICD shock in the setting of recurrent monomorphic VT with unsuccessful ATP.  Ablation attempted 10/20 but aborted due to hemodynamically significant pericardial effusion with drain placement.  Pericardial drain removed on 10/20/23.  Over the weekend developed new shortness of breath, recurrent VT s/p ICD shock, and afib w/ RVR s/p cardioversion 10/21. She continues to have NSVT. Bronch negative for mucus plug. Currently in Afib s/p pericardial window 10/28.    - Continue low dose metoprolol tartrate  - Continue Amiodarone 200mg Q12 x 3 days and then 200mg daily should have LFTs monitored   - Continue Coumadin  tonight with goal INR 2-3; would need a repeat limited TTE after resumption   - Continue low-dose Quinidine   - Wean off O2 as tolerated consider pulmonary evaluation   - Will allow for medical optimization and discuss plan for ablation in the future   - Monitor on telemetry  - Keep Mg > 2 and K > 4    Note not final until signed by attending       Erasmo Lundberg MD  Cardiology Fellow PGY-6  Phone: [phone]    For all New Consults  [email or web address]   Login: roni   71 y/o F w/ PMHx COPD, HLD, HTN, PVD, ICM/CAD s/p PCI (has  of RCA), cardiac arrest s/p left-sided ICD (6/4/2019) complicated by infection and s/p R single-chamber ICD ([place] in 9/23/2019), recent admission for VT storm with adjustment of medication and NIPS now presenting for ICD shock in the setting of recurrent monomorphic VT with unsuccessful ATP.  Ablation attempted 10/20 but aborted due to hemodynamically significant pericardial effusion with drain placement.  Pericardial drain removed on 10/20/23.  Over the weekend developed new shortness of breath, recurrent VT s/p ICD shock, and afib w/ RVR s/p cardioversion 10/21. She continues to have NSVT. Bronch negative for mucus plug. Currently in Afib s/p pericardial window 10/28.    - Continue low dose metoprolol tartrate  - Continue Amiodarone 200mg Q12 x 3 days and then 200mg daily should have LFTs monitored   - Continue Coumadin  tonight with goal INR 2-3; would need a repeat limited TTE after resumption   - Continue low-dose Quinidine   - Wean off O2 as tolerated consider pulmonary evaluation   - Will allow for medical optimization and discuss plan for ablation in the future   - Monitor on telemetry  - Keep Mg > 2 and K > 4    Note not final until signed by attending       Erasmo Lundberg MD  Cardiology Fellow PGY-6  Phone: [phone]    For all New Consults  [email or web address]   Login: roni   71 y/o F w/ PMHx COPD, HLD, HTN, PVD, ICM/CAD s/p PCI (has  of RCA), cardiac arrest s/p left-sided ICD (6/4/2019) complicated by infection and s/p R single-chamber ICD ([place] in 9/23/2019), recent admission for VT storm with adjustment of medication and NIPS now presenting for ICD shock in the setting of recurrent monomorphic VT with unsuccessful ATP.  Ablation attempted 10/20 but aborted due to hemodynamically significant pericardial effusion with drain placement.  Pericardial drain removed on 10/20/23.  Over the weekend developed new shortness of breath, recurrent VT s/p ICD shock, and afib w/ RVR s/p cardioversion 10/21. She continues to have NSVT. Bronch negative for mucus plug. Currently in Afib s/p pericardial window 10/28.    - Continue low dose metoprolol tartrate  - Continue Amiodarone 200mg Q12 x 3 days and then 200mg daily should have LFTs monitored   - Continue Coumadin  tonight with goal INR 2-3; would need a repeat limited TTE after resumption   - Continue low-dose Quinidine   - Wean off O2 as tolerated consider pulmonary evaluation   - Will allow for medical optimization and discuss plan for ablation in the future   - Monitor on telemetry  - Keep Mg > 2 and K > 4    Note not final until signed by attending       Erasmo Lundberg MD  Cardiology Fellow PGY-6  Phone: [phone]    For all New Consults  [email or web address]   Login: roni

## 2023-11-02 NOTE — PROGRESS NOTE ADULT - ASSESSMENT
72F with COPD, HTN, PAD, new pAFib, CAD s/p LAD stent (last C showing 100%  of RCA), chronic leukocytosis of unclear etiol, cardiac arrest s/p left-sided ICD (6/4/2019) complicated by infection and s/p R single-chamber ICD reimplantation ([place] in 9/23/2019), HFrEF (EF 45%), presented initially with VFib s/p multiple ICD shocks. Was admitted to CICU, underwent VT ablation which was aborted due to pericardial effusion c/b tamponade s/p pericardiocentesis and pericardial window. Course also c/b new Afib. TTE 10/31 with moderate pericardial effusion adjacent to posterior LV.     Recs:  -c/w ASA 81mg, atorva 40  -c/w warfarin for afib, goal INR 2-3  -c/w amiodarone 200mg daily, metop 12.5mg BID  -c/w quinidine 324mg BID  -will need GDMT slow reintroduction, may also need diuresis  -consider pulm eval for b/l pleural effusions  -f/u repeat TTE today to monitor pericardial effusion   72F with COPD, HTN, PAD, new pAFib, CAD s/p LAD stent (last LHC showing 100%  of RCA), chronic leukocytosis of unclear etiol, cardiac arrest s/p left-sided ICD (6/4/2019) complicated by infection and s/p R single-chamber ICD reimplantation ([place] in 9/23/2019), HFrEF (EF 45%), presented initially with VFib s/p multiple ICD shocks. Was admitted to CICU, underwent VT ablation which was aborted due to pericardial effusion c/b tamponade s/p pericardiocentesis and pericardial window. Course also c/b new Afib. TTE 10/31 with moderate pericardial effusion adjacent to posterior LV.     Recs:  -c/w ASA 81mg, atorva 40  -c/w warfarin for afib, goal INR 2-3  -c/w amiodarone 200mg daily, metop 12.5mg BID  -c/w quinidine 324mg BID  -will need GDMT slow reintroduction, may also need diuresis once effusion is stabilized  -consider pulm eval for b/l pleural effusions  -f/u repeat TTE today to monitor pericardial effusion

## 2023-11-02 NOTE — PROGRESS NOTE ADULT - SUBJECTIVE AND OBJECTIVE BOX
Patient seen and examined at bedside.    Overnight Events:   - Transferred out of CICU to medical floors  - Feeling well   - Denies CP/SOB  - On tele currently SR 90-110s converted into sinus after last night AF 50-80s no ectopy      Current Meds:  acetaminophen     Tablet .. 650 milliGRAM(s) Oral every 6 hours  aMIOdarone    Tablet 200 milliGRAM(s) Oral daily  aspirin  chewable 81 milliGRAM(s) Oral daily  atorvastatin 40 milliGRAM(s) Oral at bedtime  chlorhexidine 2% Cloths 1 Application(s) Topical daily  influenza  Vaccine (HIGH DOSE) 0.7 milliLiter(s) IntraMuscular once  melatonin 5 milliGRAM(s) Oral at bedtime  metoprolol tartrate 12.5 milliGRAM(s) Oral two times a day  nystatin Powder 1 Application(s) Topical three times a day  quiNIDine gluconate  milliGRAM(s) Oral every 12 hours  sodium chloride 3%  Inhalation 4 milliLiter(s) Inhalation every 8 hours      Vitals:  T(F): 97.6 (11-02), Max: 97.6 (11-01)  HR: 68 (11-02) (63 - 97)  BP: 112/63 (11-02) (112/63 - 136/60)  RR: 18 (11-02)  SpO2: 100% (11-02)  I&O's Summary    01 Nov 2023 07:01  -  02 Nov 2023 07:00  --------------------------------------------------------  IN: 480 mL / OUT: 0 mL / NET: 480 mL    02 Nov 2023 07:01  -  02 Nov 2023 10:27  --------------------------------------------------------  IN: 240 mL / OUT: 0 mL / NET: 240 mL        Physical Exam:  Appearance: No acute distress; well appearing on 2L NC  Eyes: PERRL, EOMI, pink conjunctiva  HEENT: Normal oral mucosa  Cardiovascular: RRR, S1, S2, no murmurs, rubs, or gallops; no edema; no JVD   Respiratory: Decreased breath sounds left side with crackles slight improved in ALBERT  Gastrointestinal: soft, non-tender, non-distended with normal bowel sounds  Musculoskeletal: No clubbing; no joint deformity   Neurologic: Non-focal  Lymphatic: No lymphadenopathy  Psychiatry: AAOx3, mood & affect appropriate  Skin: No rashes, ecchymoses, or cyanosis                          10.4   14.10 )-----------( 411      ( 02 Nov 2023 09:48 )             33.9     11-02    141  |  107  |  14  ----------------------------<  131<H>  4.2   |  26  |  0.54    Ca    8.3<L>      02 Nov 2023 09:48  Phos  3.0     11-01  Mg     2.0     11-02    TPro  5.2<L>  /  Alb  2.3<L>  /  TBili  0.2  /  DBili  x   /  AST  8<L>  /  ALT  13  /  AlkPhos  55  11-01    PT/INR - ( 02 Nov 2023 09:48 )   PT: 12.1 sec;   INR: 1.16 ratio         PTT - ( 02 Nov 2023 09:48 )  PTT:27.1 sec

## 2023-11-03 NOTE — PROGRESS NOTE ADULT - SUBJECTIVE AND OBJECTIVE BOX
Patient seen and examined at bedside.    Overnight Events:   - No acute events overnight  - Off supplemental O2  - Reports feeling better   - SR 50-70s converted from Afib around 11PM          Current Meds:  acetaminophen     Tablet .. 650 milliGRAM(s) Oral every 6 hours  aMIOdarone    Tablet 200 milliGRAM(s) Oral daily  aspirin  chewable 81 milliGRAM(s) Oral daily  atorvastatin 40 milliGRAM(s) Oral at bedtime  chlorhexidine 2% Cloths 1 Application(s) Topical daily  influenza  Vaccine (HIGH DOSE) 0.7 milliLiter(s) IntraMuscular once  melatonin 5 milliGRAM(s) Oral at bedtime  metoprolol tartrate 12.5 milliGRAM(s) Oral two times a day  nystatin Powder 1 Application(s) Topical three times a day  quiNIDine gluconate  milliGRAM(s) Oral every 12 hours  sodium chloride 3%  Inhalation 4 milliLiter(s) Inhalation every 8 hours      Vitals:  T(F): 97.7 (11-03), Max: 98 (11-02)  HR: 98 (11-03) (72 - 98)  BP: 114/62 (11-03) (114/62 - 136/65)  RR: 18 (11-03)  SpO2: 95% (11-03)  I&O's Summary    02 Nov 2023 07:01  -  03 Nov 2023 07:00  --------------------------------------------------------  IN: 840 mL / OUT: 850 mL / NET: -10 mL        Physical Exam:  Appearance: No acute distress; well appearing  Eyes: PERRL, EOMI, pink conjunctiva  HEENT: Normal oral mucosa  Cardiovascular: RRR, S1, S2, no murmurs, rubs, or gallops; no edema; no JVD  Respiratory: RLL crackles decreased breath sounds on the left   Gastrointestinal: soft, non-tender, non-distended with normal bowel sounds  Musculoskeletal: No clubbing; no joint deformity   Neurologic: Non-focal  Lymphatic: No lymphadenopathy  Psychiatry: AAOx3, mood & affect appropriate  Skin: No rashes, ecchymoses, or cyanosis                          10.9   17.29 )-----------( 443      ( 03 Nov 2023 06:38 )             34.7     11-03    142  |  106  |  14  ----------------------------<  117<H>  4.0   |  27  |  0.50    Ca    8.6      03 Nov 2023 06:39  Mg     2.0     11-02      PT/INR - ( 02 Nov 2023 09:48 )   PT: 12.1 sec;   INR: 1.16 ratio         PTT - ( 02 Nov 2023 09:48 )  PTT:27.1 sec

## 2023-11-03 NOTE — PROGRESS NOTE ADULT - SUBJECTIVE AND OBJECTIVE BOX
PGY-1 Chyna Ferguson  TEAM 1  Progress Note    Patient is a 72y old  Female who presents with a chief complaint of AICD discharge (03 Nov 2023 08:38)      SUBJECTIVE / OVERNIGHT EVENTS: Pt transferred to Resident Teams Service  OVN:   Labs and Imaging Reviewed        MEDICATIONS  (STANDING):  acetaminophen     Tablet .. 650 milliGRAM(s) Oral every 6 hours  aMIOdarone    Tablet 200 milliGRAM(s) Oral daily  aspirin  chewable 81 milliGRAM(s) Oral daily  atorvastatin 40 milliGRAM(s) Oral at bedtime  chlorhexidine 2% Cloths 1 Application(s) Topical daily  influenza  Vaccine (HIGH DOSE) 0.7 milliLiter(s) IntraMuscular once  melatonin 5 milliGRAM(s) Oral at bedtime  metoprolol tartrate 12.5 milliGRAM(s) Oral two times a day  nystatin Powder 1 Application(s) Topical three times a day  quiNIDine gluconate  milliGRAM(s) Oral every 12 hours  sodium chloride 3%  Inhalation 4 milliLiter(s) Inhalation every 8 hours    MEDICATIONS  (PRN):  aluminum hydroxide/magnesium hydroxide/simethicone Suspension 30 milliLiter(s) Oral every 4 hours PRN Dyspepsia  ondansetron Injectable 4 milliGRAM(s) IV Push every 8 hours PRN Nausea and/or Vomiting      Vital Signs Last 24 Hrs  T(C): 36.5 (03 Nov 2023 08:15), Max: 36.7 (02 Nov 2023 13:00)  T(F): 97.7 (03 Nov 2023 08:15), Max: 98 (02 Nov 2023 13:00)  HR: 84 (03 Nov 2023 08:15) (72 - 98)  BP: 137/71 (03 Nov 2023 08:15) (114/62 - 137/71)  BP(mean): --  RR: 20 (03 Nov 2023 08:15) (18 - 20)  SpO2: 99% (03 Nov 2023 08:15) (95% - 99%)    Parameters below as of 03 Nov 2023 08:15  Patient On (Oxygen Delivery Method): nasal cannula  O2 Flow (L/min): 2    CAPILLARY BLOOD GLUCOSE        I&O's Summary    02 Nov 2023 07:01  -  03 Nov 2023 07:00  --------------------------------------------------------  IN: 840 mL / OUT: 850 mL / NET: -10 mL    03 Nov 2023 07:01  -  03 Nov 2023 11:36  --------------------------------------------------------  IN: 300 mL / OUT: 0 mL / NET: 300 mL        PHYSICAL EXAM:  GENERAL: NAD  HEAD:  Atraumatic, Normocephalic  EYES: EOMI  CHEST/LUNG: Clear to auscultation bilaterally; No wheeze  HEART: Regular rate and rhythm; No murmurs, rubs, or gallops  ABDOMEN: Soft, Nontender, Nondistended; Bowel sounds present  EXTREMITIES:  2+ Peripheral Pulses, No clubbing, cyanosis, or edema  PSYCH: AAOx3  NEUROLOGY: non-focal  SKIN: No rashes or lesions    LABS:                        10.9   17.29 )-----------( 443      ( 03 Nov 2023 06:38 )             34.7     11-03    142  |  106  |  14  ----------------------------<  117<H>  4.0   |  27  |  0.50    Ca    8.6      03 Nov 2023 06:39  Mg     2.0     11-02      PT/INR - ( 02 Nov 2023 09:48 )   PT: 12.1 sec;   INR: 1.16 ratio         PTT - ( 02 Nov 2023 09:48 )  PTT:27.1 sec      Urinalysis Basic - ( 03 Nov 2023 06:39 )    Color: x / Appearance: x / SG: x / pH: x  Gluc: 117 mg/dL / Ketone: x  / Bili: x / Urobili: x   Blood: x / Protein: x / Nitrite: x   Leuk Esterase: x / RBC: x / WBC x   Sq Epi: x / Non Sq Epi: x / Bacteria: x        IMAGING:      Consultant(s) Notes Reviewed     Care Discussed with Consultants/Other Providers:   PGY-1 Chyna Ferguson  TEAM 1  Progress Note    Patient is a 72y old  Female who presents with a chief complaint of AICD discharge (03 Nov 2023 08:38)      SUBJECTIVE / OVERNIGHT EVENTS: Pt transferred to Resident Teams Service  Labs and Imaging Reviewed  Chart reviewed  Patient seen and examined bedside. Pt alert awake resting in chair. Patient feeling about the same since day prior. Notes she recently saw the heart doctor. Overall disinterested in interview/conversation. Patient notes that she has decreased appetite over last few days, food is not appealing here. Also noticed for last two days she has experienced burning sensation after in back of throat after evening meds. No other acute complaints or concerns.          MEDICATIONS  (STANDING):  acetaminophen     Tablet .. 650 milliGRAM(s) Oral every 6 hours  aMIOdarone    Tablet 200 milliGRAM(s) Oral daily  aspirin  chewable 81 milliGRAM(s) Oral daily  atorvastatin 40 milliGRAM(s) Oral at bedtime  chlorhexidine 2% Cloths 1 Application(s) Topical daily  influenza  Vaccine (HIGH DOSE) 0.7 milliLiter(s) IntraMuscular once  melatonin 5 milliGRAM(s) Oral at bedtime  metoprolol tartrate 12.5 milliGRAM(s) Oral two times a day  nystatin Powder 1 Application(s) Topical three times a day  quiNIDine gluconate  milliGRAM(s) Oral every 12 hours  sodium chloride 3%  Inhalation 4 milliLiter(s) Inhalation every 8 hours    MEDICATIONS  (PRN):  aluminum hydroxide/magnesium hydroxide/simethicone Suspension 30 milliLiter(s) Oral every 4 hours PRN Dyspepsia  ondansetron Injectable 4 milliGRAM(s) IV Push every 8 hours PRN Nausea and/or Vomiting      Vital Signs Last 24 Hrs  T(C): 36.5 (03 Nov 2023 08:15), Max: 36.7 (02 Nov 2023 13:00)  T(F): 97.7 (03 Nov 2023 08:15), Max: 98 (02 Nov 2023 13:00)  HR: 84 (03 Nov 2023 08:15) (72 - 98)  BP: 137/71 (03 Nov 2023 08:15) (114/62 - 137/71)  BP(mean): --  RR: 20 (03 Nov 2023 08:15) (18 - 20)  SpO2: 99% (03 Nov 2023 08:15) (95% - 99%)    Parameters below as of 03 Nov 2023 08:15  Patient On (Oxygen Delivery Method): nasal cannula  O2 Flow (L/min): 2    CAPILLARY BLOOD GLUCOSE        I&O's Summary    02 Nov 2023 07:01  -  03 Nov 2023 07:00  --------------------------------------------------------  IN: 840 mL / OUT: 850 mL / NET: -10 mL    03 Nov 2023 07:01  -  03 Nov 2023 11:36  --------------------------------------------------------  IN: 300 mL / OUT: 0 mL / NET: 300 mL        PHYSICAL EXAM:  GENERAL: NAD  HEAD:  Atraumatic, Normocephalic  CHEST/LUNG: B/l crackles R>L, no wheezes  HEART: Regular rate and rhythm; No murmurs, rubs, or gallops  ABDOMEN: Soft, Nontender, Nondistended; Bowel sounds present  EXTREMITIES:  2+ Peripheral Pulses, +2 edema to b/l mid calf, L medial calf red contiguous patchy rash  PSYCH: AAOx3  NEUROLOGY: non-focal      LABS:                        10.9   17.29 )-----------( 443      ( 03 Nov 2023 06:38 )             34.7     11-03    142  |  106  |  14  ----------------------------<  117<H>  4.0   |  27  |  0.50    Ca    8.6      03 Nov 2023 06:39  Mg     2.0     11-02      PT/INR - ( 02 Nov 2023 09:48 )   PT: 12.1 sec;   INR: 1.16 ratio         PTT - ( 02 Nov 2023 09:48 )  PTT:27.1 sec      Urinalysis Basic - ( 03 Nov 2023 06:39 )    Color: x / Appearance: x / SG: x / pH: x  Gluc: 117 mg/dL / Ketone: x  / Bili: x / Urobili: x   Blood: x / Protein: x / Nitrite: x   Leuk Esterase: x / RBC: x / WBC x   Sq Epi: x / Non Sq Epi: x / Bacteria: x    IMAGING:  < from: Xray Chest 1 View- PORTABLE-Urgent (Xray Chest 1 View- PORTABLE-Urgent .) (11.01.23 @ 11:03) >  PROCEDURE DATE:  11/01/2023          INTERPRETATION:  CLINICAL INFORMATION: Left lung whiteout on prior x-ray   with improvement status post bronchoscopy. Follow-up.    TECHNIQUE: Frontal view of the chest.    COMPARISON: Multiple prior chest x-rays with most recent dated 10/31/2023    FINDINGS:  Support Devices/Implanted Hardware: Right chest wall AICD.  Heart: Cardiomegaly.  Pulmonary: No focal consolidations. Left greater than right bilateral   pleural effusions, slightly increased from prior. No pneumothorax.  Bones: No acute bony pathology.    IMPRESSION:  Left greater than right bilateral pleural effusions, slightly increased   from prior.    --- End of Report ---    < end of copied text >      Consultant(s) Notes Reviewed     Care Discussed with Consultants/Other Providers

## 2023-11-03 NOTE — PROGRESS NOTE ADULT - PROBLEM SELECTOR PLAN 1
B/l Crackles on physical exam  VT; has AICD in place  Serial Echo's EF estimated 45-50%  CAD s/p Stents    Plan:  -Cardiology and Electrophysiology following   -Coumadin 2.5mg QD goal INR 2-3  -ASA   -Amiodarone   -GDMT B/l Crackles on physical exam  VT; has AICD in place  Serial Echo's EF estimated 45-50%  CAD s/p Stents    Plan:  -Cardiology and Electrophysiology following   -Coumadin 2.5mg QD goal INR 2-3  -c/w ASA 81mg, atorva 40  -c/w amiodarone 200mg daily, metop 12.5mg BID  -c/w quinidine 324mg BID  -c/w Lasix 40mg IV push QD, consider PO 11/4 if improving   -GDMT: Slow introduction per cardiology  -Plan for repeat echo on 11/5

## 2023-11-03 NOTE — PROGRESS NOTE ADULT - ATTENDING COMMENTS
72F c hx COPD, HTN, PAD, CAD s/p LAD stent (last [facility] showing 100%  of RCA), chronic leukocytosis of unclear etiol, cardiac arrest s/p left-sided ICD (6/4/2019) complicated by infection and s/p R single-chamber ICD reimplantation ([place] in 9/23/2019), CHF EF 45%, grade 2 DHF, recent hospitalization for UTI (treated w/ ceftriaxone and Vanco x 3 days) and VFib s/p 13 AICD firings, pw lightheadedness and AICD firing. Hospital course significant for attempted VT ablation aborted 2/2 cardiac tamponade w/hypotension, s/p drain. Subsequently devolved repeat tamponade w/ pericardial window on 10/28. Transferred to [facility] for continued management.     Pt feeling better, weaned off o2, hemodynamically stable. PT recommending GUICHO but pt wishes to go home.    VT- has AICD, ablation not pursued, continue antiarrhythmics per EP  PAF- new onset, nonvalvular however EP would like coumadin as opposed to DOAC, and also recommends no bridge given bleed risk.  Pericardial/pleural effusions- monito CXR q2-3 days while on diuresis to ensure improving; no evidence of tamponade at this time. Repeat TTE  once therapeutic on coumadin to ensure no worsening pericardial effusion/hemopericardium.  Leukocytosis- chronic, low supsicion for active infeciton although pt also noted to have pulm nodule in 2015 and had since not followed up, so potential inflammatory/malignant etiology- she had repeat CT heart non-coronary with IV contrast this hospitalization; will have radiology compare the scans and offer impression on whether further workup warranted for malignancy. Otherwise, pt can f/u as OP.    Dispo: likely home with home PT per pt preference early next week  plan d/w and agreed on by pt and  at bedside.     The necessity of the time spent during the encounter on this date of service was due to:   - Ordering, reviewing, and interpreting labs, testing, and imaging.  - Independently obtaining a review of systems and performing a physical exam  - Reviewing prior hospitalization and where necessary, outpatient records.  - Counselling and educating patient and family regarding interpretation of aforementioned items and plan of care.    Time-based billing (NON-critical care). Total minutes spent: 57

## 2023-11-03 NOTE — PROGRESS NOTE ADULT - PROBLEM SELECTOR PLAN 6
Fluids: None indicated  Electrolytes: Replete K > 4, Mg > 2, Phos > 3  Nutrition: Diet DASH with high protein supplement  PPX  ---VTE: COumadin, goal INR 2-3  ---GI: n/a  ---Resp: n/a  Access: PIV  Code Status: Full Code  Dispo: pending clinical improvement, PT eval Fluids: None indicated  Electrolytes: Replete K > 4, Mg > 2, Phos > 3  Nutrition: Diet DASH with high protein supplement  PPX  ---VTE: Coumadin, goal INR 2-3  ---GI: n/a  ---Resp: n/a  Access: PIV  Code Status: Full Code  Dispo: pending clinical improvement, PT eval

## 2023-11-03 NOTE — PROGRESS NOTE ADULT - ASSESSMENT
72F c hx COPD, HTN, PAD, CAD s/p LAD stent (last [facility] showing 100%  of RCA), chronic leukocytosis of unclear etiol, cardiac arrest s/p left-sided ICD (6/4/2019) complicated by infection and s/p R single-chamber ICD reimplantation ([place] in 9/23/2019), CHF EF 45%, grade 2 DHF, recent hospitalization for UTI (treated w/ ceftriaxone and Vanco x 3 days) and VFib s/p 13 AICD firings, pw lightheadedness and AICD firing. Hospital course significant for attempted VT ablation aborted 2/2 cardiac tamponade w/hypotension, s/p drain. Subsequently devolved repeat tamponade w/ pericardial window on 10/28. Transferred to [facility] for continued management.

## 2023-11-03 NOTE — PROGRESS NOTE ADULT - PROBLEM SELECTOR PLAN 4
AF w/ RVR  - new onset AF w/ RVR, s/p DCCVx2 at 200J (22nd), to NSR    Plan:  - in NSR on evaluation, 4 hour episode of AFIB w/ RVR HR Ranging 120-140s  - EP Following, appreciate recs as above

## 2023-11-03 NOTE — PROGRESS NOTE ADULT - PROBLEM SELECTOR PLAN 2
Opacified L lung identified during admission  s/p bronchoscopy w/ minimal mucus; Post bronch CXR improved, still b/l effusion but improving L lung aeration    Plan  -Aggressive PT OOB, pulm hygiene, Chest PT TID Opacified L lung identified during admission  s/p bronchoscopy w/ minimal mucus; Post bronch CXR improved, still b/l effusion but improving L lung aeration    Plan  -Aggressive PT OOB, pulm hygiene, Chest PT TID  -Consider pulmonology consult if lung exam fails to improve  -Plan for repeat CXR on 11/5 Opacified L lung identified during admission  s/p bronchoscopy w/ minimal mucus; Post bronch CXR improved, still b/l effusion but improving L lung aeration    Plan  -Aggressive PT OOB, pulm hygiene, Chest PT TID  -Consider pulmonology consult if lung exam fails to improve  -Plan for repeat CXR on 11/5  -Plan for repeat Chest CT on 11/5 given if effusions do not get better and history of pulm nodule on 2015 CT Chest

## 2023-11-03 NOTE — PROGRESS NOTE ADULT - PROBLEM SELECTOR PLAN 5
Pericardial Tamponade; s/p pericardial window (s/p pericardial window with CTS ([place]) on 10/28; 300cc)  Recent echo with trace effusion, no hemodynamic compromise     Plan:  -Monitor with serial echos and vitals   -Cardiology following

## 2023-11-03 NOTE — PROGRESS NOTE ADULT - PROBLEM SELECTOR PLAN 3
Persistent Leukocytosis Since August 2023 ranging 14-29k  Several blood and urine cultures negative  UA Oct 20th 10-50k Ecoli and normal urogenital klever   Blood Cultures Oct 19th Negative  7 Day Course of Cefazolin Completed 10/28    Plan: Persistent Leukocytosis Since August 2023 ranging 14-29k  Several blood and urine cultures negative  UA Oct 20th 10-50k Ecoli and normal urogenital klever   Blood Cultures Oct 19th Negative  7 Day Course of Cefazolin Completed 10/28    Plan:  -Monitor with daily CBC  -provide heme referral on d/c for OP workup Persistent Leukocytosis Since August 2023 ranging 14-29k  Several blood and urine cultures negative  UA Oct 20th 10-50k Ecoli and normal urogenital klever   Blood Cultures Oct 19th Negative  7 Day Course of Ceftriaxone Completed 10/28    Plan:  -Monitor with daily CBC  -provide heme referral on d/c for OP workup

## 2023-11-03 NOTE — PROGRESS NOTE ADULT - ASSESSMENT
73 y/o F w/ PMHx COPD, HLD, HTN, PVD, ICM/CAD s/p PCI (has  of RCA), cardiac arrest s/p left-sided ICD (6/4/2019) complicated by infection and s/p R single-chamber ICD ([place] in 9/23/2019), recent admission for VT storm with adjustment of medication and NIPS now presenting for ICD shock in the setting of recurrent monomorphic VT with unsuccessful ATP.  Ablation attempted 10/20 but aborted due to hemodynamically significant pericardial effusion with drain placement.  Pericardial drain removed on 10/20/23.  Over the weekend developed new shortness of breath, recurrent VT s/p ICD shock, and afib w/ RVR s/p cardioversion 10/21. She continues to have NSVT. Bronch negative for mucus plug. Currently in Afib s/p pericardial window 10/28.    - Continue low dose metoprolol tartrate  - Continue Amiodarone 200mg daily   - Continue Coumadin  tonight with goal INR 2-3; would need a repeat limited TTE after resumption   - Continue low-dose Quinidine   - Agree with IV diuresis   - Will allow for medical optimization and discuss plan for ablation in the future   - Monitor on telemetry  - Keep Mg > 2 and K > 4    Note not final until signed by attending       Erasmo Lundberg MD  Cardiology Fellow PGY-6  Phone: [phone]    For all New Consults  [email or web address]   Login: [email or web address]     71 y/o F w/ PMHx COPD, HLD, HTN, PVD, ICM/CAD s/p PCI (has  of RCA), cardiac arrest s/p left-sided ICD (6/4/2019) complicated by infection and s/p R single-chamber ICD ([place] in 9/23/2019), recent admission for VT storm with adjustment of medication and NIPS now presenting for ICD shock in the setting of recurrent monomorphic VT with unsuccessful ATP.  Ablation attempted 10/20 but aborted due to hemodynamically significant pericardial effusion with drain placement.  Pericardial drain removed on 10/20/23.  Over the weekend developed new shortness of breath, recurrent VT s/p ICD shock, and afib w/ RVR s/p cardioversion 10/21. She continues to have NSVT. Bronch negative for mucus plug. Currently in Afib s/p pericardial window 10/28.    - Continue low dose metoprolol tartrate  - Continue Amiodarone 200mg daily   - Continue Coumadin  tonight with goal INR 2-3; would need a repeat limited TTE after resumption   - Continue low-dose Quinidine   - Agree with IV diuresis   - Will allow for medical optimization and discuss plan for ablation in the future   - Monitor on telemetry  - Keep Mg > 2 and K > 4    Note not final until signed by attending       Erasmo Lundberg MD  Cardiology Fellow PGY-6  Phone: [phone]    For all New Consults  [email or web address]   Login: [email or web address]     71 y/o F w/ PMHx COPD, HLD, HTN, PVD, ICM/CAD s/p PCI (has  of RCA), cardiac arrest s/p left-sided ICD (6/4/2019) complicated by infection and s/p R single-chamber ICD ([place] in 9/23/2019), recent admission for VT storm with adjustment of medication and NIPS now presenting for ICD shock in the setting of recurrent monomorphic VT with unsuccessful ATP.  Ablation attempted 10/20 but aborted due to hemodynamically significant pericardial effusion with drain placement.  Pericardial drain removed on 10/20/23.  Over the weekend developed new shortness of breath, recurrent VT s/p ICD shock, and afib w/ RVR s/p cardioversion 10/21. She continues to have NSVT. Bronch negative for mucus plug. Currently in Afib s/p pericardial window 10/28.    - Continue low dose metoprolol tartrate  - Continue Amiodarone 200mg daily   - Continue Coumadin  tonight with goal INR 2-3; would need a repeat limited TTE after resumption   - Continue low-dose Quinidine   - Agree with IV diuresis   - Will allow for medical optimization and discuss plan for ablation in the future   - Monitor on telemetry  - Keep Mg > 2 and K > 4    Note not final until signed by attending       Erasmo Lundberg MD  Cardiology Fellow PGY-6  Phone: [phone]    For all New Consults  [email or web address]   Login: [employer]

## 2023-11-03 NOTE — PROGRESS NOTE ADULT - SUBJECTIVE AND OBJECTIVE BOX
Patient seen and examined at bedside.    Overnight Events: none    REVIEW OF SYSTEMS:  CONSTITUTIONAL: No weakness, fevers or chills  EYES/ENT: No visual changes;  No dysphagia  NECK: No pain or stiffness  RESPIRATORY: No cough, wheezing, hemoptysis; No shortness of breath  CARDIOVASCULAR: No chest pain or palpitations; No lower extremity edema  GASTROINTESTINAL: No abdominal or epigastric pain. No nausea, vomiting, or hematemesis; No diarrhea or constipation. No melena or hematochezia.  BACK: No back pain  GENITOURINARY: No dysuria, frequency or hematuria  NEUROLOGICAL: No numbness or weakness  SKIN: No itching, burning, rashes, or lesions   All other review of systems is negative unless indicated above.            Current Meds:  acetaminophen     Tablet .. 650 milliGRAM(s) Oral every 6 hours  aMIOdarone    Tablet 200 milliGRAM(s) Oral daily  aspirin  chewable 81 milliGRAM(s) Oral daily  atorvastatin 40 milliGRAM(s) Oral at bedtime  chlorhexidine 2% Cloths 1 Application(s) Topical daily  influenza  Vaccine (HIGH DOSE) 0.7 milliLiter(s) IntraMuscular once  melatonin 5 milliGRAM(s) Oral at bedtime  metoprolol tartrate 12.5 milliGRAM(s) Oral two times a day  nystatin Powder 1 Application(s) Topical three times a day  quiNIDine gluconate  milliGRAM(s) Oral every 12 hours  sodium chloride 3%  Inhalation 4 milliLiter(s) Inhalation every 8 hours      Vitals:  T(F): 97.7 (11-03), Max: 98 (11-02)  HR: 98 (11-03) (72 - 98)  BP: 114/62 (11-03) (114/62 - 136/65)  RR: 18 (11-03)  SpO2: 95% (11-03)  I&O's Summary    02 Nov 2023 07:01  -  03 Nov 2023 07:00  --------------------------------------------------------  IN: 840 mL / OUT: 850 mL / NET: -10 mL        Physical Exam:  GEN: NAD  HEENT: EOMI, clear sclera  PULM: CTA b/l, no wheeze  CV: RRR S1 S2, no murmur, no JVD  ABD: S, NT, ND  EXT: WWP, no edema  PSYCH: normal affect  SKIN: No rash                          10.9   17.29 )-----------( 443      ( 03 Nov 2023 06:38 )             34.7     11-03    142  |  106  |  14  ----------------------------<  117<H>  4.0   |  27  |  0.50    Ca    8.6      03 Nov 2023 06:39  Mg     2.0     11-02      PT/INR - ( 02 Nov 2023 09:48 )   PT: 12.1 sec;   INR: 1.16 ratio         PTT - ( 02 Nov 2023 09:48 )  PTT:27.1 sec   9

## 2023-11-03 NOTE — PROGRESS NOTE ADULT - ASSESSMENT
72F with COPD, HTN, PAD, new pAFib, CAD s/p LAD stent (last [facility] showing 100%  of RCA), chronic leukocytosis of unclear etiol, cardiac arrest s/p left-sided ICD (6/4/2019) complicated by infection and s/p R single-chamber ICD reimplantation ([place] in 9/23/2019), HFrEF (EF 45%), presented initially with VFib s/p multiple ICD shocks. Was admitted to CICU, underwent VT ablation which was aborted due to pericardial effusion c/b tamponade s/p pericardiocentesis and pericardial window. Course also c/b new Afib. TTE 10/31 with moderate pericardial effusion adjacent to posterior LV, repeat 11/2 with trace effusion.     Recs:  -c/w ASA 81mg, atorva 40  -c/w warfarin for afib, goal INR 2-3  -c/w amio 200, metop 12.5mg BID  -c/w quinidine 324mg BID  -consider pulm eval for b/l pleural effusions  -start lasix 40mg IV daily  -GDMT: start losartan 25mg    recs not final     72F with COPD, HTN, PAD, new pAFib, CAD s/p LAD stent (last C showing 100%  of RCA), chronic leukocytosis of unclear etiol, cardiac arrest s/p left-sided ICD (6/4/2019) complicated by infection and s/p R single-chamber ICD reimplantation ([place] in 9/23/2019), HFrEF (EF 45%), presented initially with VFib s/p multiple ICD shocks. Was admitted to CICU, underwent VT ablation which was aborted due to pericardial effusion c/b tamponade s/p pericardiocentesis and pericardial window. Course also c/b new Afib. TTE 10/31 with moderate pericardial effusion adjacent to posterior LV, repeat 11/2 with trace effusion.     Recs:  -c/w ASA 81mg, atorva 40  -c/w warfarin for afib, goal INR 2-3  -c/w amio 200, metop 12.5mg BID  -c/w quinidine 324mg BID  -consider pulm eval for b/l pleural effusions  -start lasix 40mg IV daily  -GDMT: start losartan 25mg

## 2023-11-04 NOTE — PROGRESS NOTE ADULT - SUBJECTIVE AND OBJECTIVE BOX
PGY-1 Chyna Ferguson  TEAM  Progress Note    Patient is a 72y old  Female who presents with a chief complaint of AICD discharge (04 Nov 2023 05:42)      SUBJECTIVE / OVERNIGHT EVENTS:  OVN: Pt went into Afib RVR to 140s for approximately 1 hr. Spontaneously converted, no interventions.   Labs Reviewed.   Patient seen and examined bedside.  Pt alert and awake at bedside. Patient noting she continues to have swelling of her legs as well as one-two episodes of small loose bowel movements. Otherwise feels about the same as the day piror. Denies CP, SOB, N/V/C.    MEDICATIONS  (STANDING):  acetaminophen     Tablet .. 650 milliGRAM(s) Oral every 6 hours  aMIOdarone    Tablet 200 milliGRAM(s) Oral daily  aspirin  chewable 81 milliGRAM(s) Oral daily  atorvastatin 40 milliGRAM(s) Oral at bedtime  chlorhexidine 2% Cloths 1 Application(s) Topical daily  furosemide   Injectable 40 milliGRAM(s) IV Push daily  influenza  Vaccine (HIGH DOSE) 0.7 milliLiter(s) IntraMuscular once  losartan 25 milliGRAM(s) Oral daily  magnesium sulfate  IVPB 2 Gram(s) IV Intermittent once  melatonin 5 milliGRAM(s) Oral at bedtime  metoprolol tartrate 12.5 milliGRAM(s) Oral two times a day  nystatin Powder 1 Application(s) Topical three times a day  quiNIDine gluconate  milliGRAM(s) Oral every 12 hours  sodium chloride 3%  Inhalation 4 milliLiter(s) Inhalation every 8 hours    MEDICATIONS  (PRN):  aluminum hydroxide/magnesium hydroxide/simethicone Suspension 30 milliLiter(s) Oral every 4 hours PRN Dyspepsia  ondansetron Injectable 4 milliGRAM(s) IV Push every 8 hours PRN Nausea and/or Vomiting      Vital Signs Last 24 Hrs  T(C): 36.6 (04 Nov 2023 05:10), Max: 36.7 (03 Nov 2023 20:17)  T(F): 97.8 (04 Nov 2023 05:10), Max: 98.1 (03 Nov 2023 20:17)  HR: 107 (04 Nov 2023 05:10) (68 - 107)  BP: 113/64 (04 Nov 2023 05:10) (102/68 - 132/70)  BP(mean): 87 (03 Nov 2023 20:17) (87 - 87)  RR: 18 (04 Nov 2023 05:10) (18 - 18)  SpO2: 93% (04 Nov 2023 05:10) (92% - 94%)    Parameters below as of 04 Nov 2023 05:10  Patient On (Oxygen Delivery Method): room air      CAPILLARY BLOOD GLUCOSE        I&O's Summary    03 Nov 2023 07:01  -  04 Nov 2023 07:00  --------------------------------------------------------  IN: 920 mL / OUT: 1400 mL / NET: -480 mL        PHYSICAL EXAM:  GENERAL: NAD  HEAD:  Atraumatic, Normocephalic  CHEST/LUNG: B/l crackles R>L, no wheezes  HEART: Regular rate and rhythm; No murmurs, rubs, or gallops  ABDOMEN: Soft, Nontender, Nondistended; Bowel sounds present  EXTREMITIES:  2+ Peripheral Pulses, +2 edema to b/l mid calf, L medial calf red contiguous patchy rash  PSYCH: AAOx3  NEUROLOGY: non-focal    LABS:                        11.0   14.71 )-----------( 391      ( 04 Nov 2023 06:17 )             34.9     11-04    142  |  102  |  17  ----------------------------<  101<H>  4.3   |  29  |  0.55    Ca    8.4      04 Nov 2023 06:18  Phos  2.9     11-04  Mg     1.8     11-04      PT/INR - ( 04 Nov 2023 06:18 )   PT: 11.9 sec;   INR: 1.08 ratio         PTT - ( 04 Nov 2023 06:18 )  PTT:20.2 sec      Urinalysis Basic - ( 04 Nov 2023 06:18 )    Color: x / Appearance: x / SG: x / pH: x  Gluc: 101 mg/dL / Ketone: x  / Bili: x / Urobili: x   Blood: x / Protein: x / Nitrite: x   Leuk Esterase: x / RBC: x / WBC x   Sq Epi: x / Non Sq Epi: x / Bacteria: x        IMAGING:      Consultant(s) Notes Reviewed     Care Discussed with Consultants/Other Providers:

## 2023-11-04 NOTE — PROGRESS NOTE ADULT - ASSESSMENT
72F c hx COPD, HTN, PAD, CAD s/p LAD stent (last [facility] showing 100%  of RCA), chronic leukocytosis of unclear etiol, cardiac arrest s/p left-sided ICD (6/4/2019) complicated by infection and s/p R single-chamber ICD reimplantation ([place] in 9/23/2019), CHF EF 45%, grade 2 DHF, recent hospitalization for UTI (treated w/ ceftriaxone and Vanco x 3 days) and VFib s/p 13 AICD firings, pw lightheadedness and AICD firing. Hospital course significant for attempted VT ablation aborted 2/2 cardiac tamponade w/hypotension, s/p drain. Subsequently devolved repeat tamponade w/ pericardial window on 10/28. Transferred to [facility] for continued management.

## 2023-11-04 NOTE — PROGRESS NOTE ADULT - ATTENDING COMMENTS
Continue diuresis with close monitoring of volume status, renal function, and electrolytes.    A total of 35 minutes was spent on this patient encounter.

## 2023-11-04 NOTE — CHART NOTE - NSCHARTNOTEFT_GEN_A_CORE
73 y/o F w/ PMHx COPD, HLD, HTN, PVD, ICM/CAD s/p PCI (has  of RCA), cardiac arrest s/p left-sided ICD (6/4/2019) complicated by infection and s/p R single-chamber ICD ([place] in 9/23/2019), recent admission for VT storm with adjustment of medication and NIPS now presenting for ICD shock in the setting of recurrent monomorphic VT with unsuccessful ATP.  Ablation attempted 10/20 but aborted due to hemodynamically significant pericardial effusion with drain placement.  Pericardial drain removed on 10/20/23.  Following that, she developed new shortness of breath, recurrent VT s/p ICD shock, and AF w/ RVR s/p cardioversion 10/21. She continues to have NSVT and frequent AF w/ RVR to 130's on tele. S/p pericardial window 10/28 for effusion.     Overnight/this AM she is having pAF to 130's.     - Continue low dose Metoprolol tartrate  - Continue Amiodarone 200mg daily   - Continue Coumadin dosing with goal INR 2-3; would need a repeat limited TTE once closer to therapeutic or for any changes in hemodynamics while on AC   - Continue low-dose Quinidine   - Will allow for medical optimization and discuss plan for ablation in the future   - Monitor on telemetry in meantime  - Keep Mg > 2 and K > 4    EP will continue to follow 73 y/o F w/ PMHx COPD, HLD, HTN, PVD, ICM/CAD s/p PCI (has  of RCA), cardiac arrest s/p left-sided ICD (6/4/2019) complicated by infection and s/p R single-chamber ICD ([place] in 9/23/2019), recent admission for VT storm with adjustment of medication and NIPS now presenting for ICD shock in the setting of recurrent monomorphic VT with unsuccessful ATP.  Ablation attempted 10/20 but aborted due to hemodynamically significant pericardial effusion with drain placement.  Pericardial drain removed on 10/20/23.  Following that, she developed new shortness of breath, recurrent VT s/p ICD shock, and AF w/ RVR s/p cardioversion 10/21. She continues to have NSVT and frequent AF w/ RVR to 130's on tele. S/p pericardial window 10/28 for effusion.     Overnight/this AM she is having pAF to 130's.     - Continue low dose Metoprolol tartrate  - Continue Amiodarone 200mg daily   - Continue Coumadin dosing with goal INR 2-3; would need a repeat limited TTE once closer to therapeutic or for any changes in hemodynamics while on AC   - Continue Quinidine   - Will allow for medical optimization and discuss plan for ablation in the future   - Monitor on telemetry in meantime  - Keep Mg > 2 and K > 4    EP will continue to follow

## 2023-11-04 NOTE — PROGRESS NOTE ADULT - PROBLEM SELECTOR PLAN 3
Persistent Leukocytosis Since August 2023 ranging 14-29k  Several blood and urine cultures negative  UA Oct 20th 10-50k Ecoli and normal urogenital klever   Blood Cultures Oct 19th Negative  7 Day Course of Ceftriaxone Completed 10/28    Plan:  -Monitor with daily CBC  -provide heme referral on d/c for OP workup

## 2023-11-04 NOTE — PROGRESS NOTE ADULT - ATTENDING COMMENTS
72F c hx COPD, HTN, PAD, CAD s/p LAD stent (last [facility] showing 100%  of RCA), chronic leukocytosis of unclear etiol, cardiac arrest s/p left-sided ICD (6/4/2019) complicated by infection and s/p R single-chamber ICD reimplantation ([place] in 9/23/2019), CHF EF 45%, grade 2 DHF, recent hospitalization for UTI (treated w/ ceftriaxone and Vanco x 3 days) and VFib s/p 13 AICD firings, pw lightheadedness and AICD firing. Hospital course significant for attempted VT ablation aborted 2/2 cardiac tamponade w/hypotension, s/p drain. Subsequently devolved repeat tamponade w/ pericardial window on 10/28. Transferred to [facility] for continued management.     Pt feeling better, weaned off o2, hemodynamically stable. Continue IV lasix. PT recommending GUICHO but pt wishes to go home.    VT- has AICD, ablation not pursued, continue antiarrhythmics per EP  PAF- new onset, nonvalvular however EP would like coumadin as opposed to DOAC, and also recommends no bridge given bleed risk. Starting with warfarin 2.5mg QD. If INR still subtherapeutic tomorrow morning will increase the dose.  Pericardial/pleural effusions- monitor CXR q2-3 days while on diuresis to ensure improving; no evidence of tamponade at this time. Repeat TTE  once therapeutic on coumadin to ensure no worsening pericardial effusion/hemopericardium.  Leukocytosis- chronic, low supsicion for active infeciton although pt also noted to have pulm nodule in 2015 and had since not followed up, so potential inflammatory/malignant etiology- she had repeat CT heart non-coronary with IV contrast this hospitalization; will have radiology compare the scans and offer impression on whether further workup warranted for malignancy. Otherwise, pt can f/u as OP.    Dispo: likely home with home PT per pt preference early next week

## 2023-11-04 NOTE — PROGRESS NOTE ADULT - ASSESSMENT
72F with COPD, HTN, PAD, new pAFib, CAD s/p LAD stent (last C showing 100%  of RCA), chronic leukocytosis of unclear etiol, cardiac arrest s/p left-sided ICD (6/4/2019) complicated by infection and s/p R single-chamber ICD reimplantation ([place] in 9/23/2019), HFrEF (EF 45%), presented initially with VFib s/p multiple ICD shocks. Was admitted to CICU, underwent VT ablation which was aborted due to pericardial effusion c/b tamponade s/p pericardiocentesis and pericardial window. Course also c/b new Afib. TTE 10/31 with moderate pericardial effusion adjacent to posterior LV, repeat 11/2 with trace effusion.     Recs:  -c/w ASA 81mg, atorva 40  -c/w warfarin for afib, goal INR 2-3  -c/w amio 200, metop 12.5mg BID  -c/w quinidine 324mg BID  -consider pulm eval for b/l pleural effusions  -start lasix 40mg IV daily  -GDMT: start losartan 25mg     72F with COPD, HTN, PAD, new pAFib, CAD s/p LAD stent (last C showing 100%  of RCA), chronic leukocytosis of unclear etiol, cardiac arrest s/p left-sided ICD (6/4/2019) complicated by infection and s/p R single-chamber ICD reimplantation ([place] in 9/23/2019), HFrEF (EF 45%), presented initially with VFib s/p multiple ICD shocks. Was admitted to CICU, underwent VT ablation which was aborted due to pericardial effusion c/b tamponade s/p pericardiocentesis and pericardial window. Course also c/b new Afib. TTE 10/31 with moderate pericardial effusion adjacent to posterior LV, repeat 11/2 with trace effusion.     Recs:  -c/w ASA 81mg, atorva 40 qd  -c/w warfarin for afib, goal INR 2-3  -c/w amio 200, metop 12.5mg BID  -c/w quinidine 324mg BID  -consider pulm eval for b/l pleural effusions  -would increase lasix 40mg IV to bid, for goal NN 1-2L   -GDMT: continue losartan 25mg qd

## 2023-11-04 NOTE — PROGRESS NOTE ADULT - PROBLEM SELECTOR PLAN 1
B/l Crackles on physical exam  VT; has AICD in place  Serial Echo's EF estimated 45-50%  CAD s/p Stents    Plan:  -Cardiology and Electrophysiology following   -Coumadin 2.5mg QD goal INR 2-3  -c/w ASA 81mg, atorvastatin 40  -c/w amiodarone 200mg daily, metoprolol 12.5mg BID  -c/w quinidine 324mg BID  -c/w Lasix 40mg IV push QD, consider PO 11/4 if improving   -GDMT: Slow introduction per cardiology  -Plan for repeat echo on 11/5 B/l Crackles on physical exam  VT; has AICD in place  Serial Echo's EF estimated 45-50%  CAD s/p Stents    Plan:  -Cardiology and Electrophysiology following   -c/w Coumadin 2.5mg QHS goal INR 2-3  -c/w ASA 81mg, atorvastatin 40  -c/w amiodarone 200mg daily, metoprolol 12.5mg BID  -c/w quinidine 324mg BID  -c/w Lasix 40mg IV push QD, plan for extra 40mg IV at 15:00 given persistent edema  -GDMT: Slow introduction per cardiology  -Plan for repeat echo on 11/5

## 2023-11-04 NOTE — PROGRESS NOTE ADULT - PROBLEM SELECTOR PLAN 2
Opacified L lung identified during admission  s/p bronchoscopy w/ minimal mucus; Post bronch CXR improved, still b/l effusion but improving L lung aeration    Plan  -Aggressive PT OOB, pulm hygiene, Chest PT TID  -Consider pulmonology consult if lung exam fails to improve  -Plan for repeat CXR on 11/5  -Plan for repeat Chest CT on 11/5 given if effusions do not get better and history of pulm nodule on 2015 CT Chest

## 2023-11-04 NOTE — PROGRESS NOTE ADULT - PROBLEM SELECTOR PLAN 6
Fluids: None indicated  Electrolytes: Replete K > 4, Mg > 2, Phos > 3  Nutrition: Diet DASH with high protein supplement  PPX  ---VTE: Coumadin, goal INR 2-3  ---GI: n/a  ---Resp: n/a  Access: PIV  Code Status: Full Code  Dispo: pending clinical improvement, PT eval

## 2023-11-05 NOTE — PROGRESS NOTE ADULT - ATTENDING COMMENTS
72F c hx COPD, HTN, PAD, CAD s/p LAD stent (last [facility] showing 100%  of RCA), chronic leukocytosis of unclear etiol, cardiac arrest s/p left-sided ICD (6/4/2019) complicated by infection and s/p R single-chamber ICD reimplantation ([place] in 9/23/2019), CHF EF 45%, grade 2 DHF, recent hospitalization for UTI (treated w/ ceftriaxone and Vanco x 3 days) and VFib s/p 13 AICD firings, pw lightheadedness and AICD firing. Hospital course significant for attempted VT ablation aborted 2/2 cardiac tamponade w/hypotension, s/p drain. Subsequently devolved repeat tamponade w/ pericardial window on 10/28. Transferred to [facility] for continued management.     Pt feeling better, weaned off o2, hemodynamically stable. Continue IV lasix. PT recommending GUICHO but pt wishes to go home.    -VT- has AICD, ablation not pursued, continue antiarrhythmics per EP  -PAF- new onset, nonvalvular however EP would like coumadin as opposed to DOAC, and also recommends no bridge given bleed risk. Starting with warfarin 2.5mg QD. If INR still subtherapeutic tomorrow morning will increase the dose.  -Pericardial/pleural effusions- monitor CXR q2-3 days while on diuresis to ensure improving; no evidence of tamponade at this time. Repeat TTE  once therapeutic on coumadin to ensure no worsening pericardial effusion/hemopericardium.  -Leukocytosis- chronic, low suspicion for active infection although pt also noted to have pulm nodule in 2015 and had since not followed up, so potential inflammatory/malignant etiology- she had repeat CT heart non-coronary with IV contrast this hospitalization; will have radiology compare the scans and offer impression on whether further workup warranted for malignancy. Otherwise, pt can f/u as OP.    Dispo: likely home with home PT per pt preference next week

## 2023-11-05 NOTE — PROGRESS NOTE ADULT - ASSESSMENT
72F with COPD, HTN, PAD, new pAFib, CAD s/p LAD stent (last C showing 100%  of RCA), chronic leukocytosis of unclear etiol, cardiac arrest s/p left-sided ICD (6/4/2019) complicated by infection and s/p R single-chamber ICD reimplantation ([place] in 9/23/2019), HFrEF (EF 45%), presented initially with VFib s/p multiple ICD shocks. Was admitted to CICU, underwent VT ablation which was aborted due to pericardial effusion c/b tamponade s/p pericardiocentesis and pericardial window. Course also c/b new Afib. TTE 10/31 with moderate pericardial effusion adjacent to posterior LV, repeat 11/2 with trace effusion. Now in NSR.     Recs:  -c/w ASA 81mg, atorva 40 qd  -c/w warfarin for afib, goal INR 2-3  -c/w amio 200, metop 12.5mg BID  -c/w quinidine 324mg BID  -consider pulm eval for b/l pleural effusions  -lasix 40mg IV to bid, for goal NN 1-2L   -GDMT: continue losartan 25mg qd 72F with COPD, HTN, PAD, new pAFib, CAD s/p LAD stent (last C showing 100%  of RCA), chronic leukocytosis of unclear etiol, cardiac arrest s/p left-sided ICD (6/4/2019) complicated by infection and s/p R single-chamber ICD reimplantation ([place] in 9/23/2019), HFrEF (EF 45%), presented initially with VFib s/p multiple ICD shocks. Was admitted to CICU, underwent VT ablation which was aborted due to pericardial effusion c/b tamponade s/p pericardiocentesis and pericardial window. Course also c/b new Afib. TTE 10/31 with moderate pericardial effusion adjacent to posterior LV, repeat 11/2 with trace effusion. Now in NSR.     Recs:  -c/w ASA 81mg, atorva 40 qd  -c/w warfarin for afib, goal INR 2-3  -c/w amio 200, metop 12.5mg BID. Change change to 25mg XL  -c/w quinidine 324mg BID  -consider pulm eval for b/l pleural effusions  -lasix 40mg IV bid, for goal NN 1L  -GDMT: continue losartan 25mg qd 72F with COPD, HTN, PAD, new pAFib, CAD s/p LAD stent (last C showing 100%  of RCA), chronic leukocytosis of unclear etiol, cardiac arrest s/p left-sided ICD (6/4/2019) complicated by infection and s/p R single-chamber ICD reimplantation ([place] in 9/23/2019), HFrEF (EF 45%), presented initially with VFib s/p multiple ICD shocks. Was admitted to CICU, underwent VT ablation which was aborted due to pericardial effusion c/b tamponade s/p pericardiocentesis and pericardial window. Course also c/b new Afib. TTE 10/31 with moderate pericardial effusion adjacent to posterior LV, repeat 11/2 with trace effusion. Now in NSR.     Recs:  -c/w ASA 81mg, atorva 40 qd  -c/w warfarin for afib, goal INR 2-3  -c/w amio 200, metop 12.5mg BID. Change change to 25mg XL  -c/w quinidine 324mg BID  -consider pulm eval for b/l pleural effusions  -lasix 40mg IV bid today can transition to 40mg daily tm.   -GDMT: continue losartan 25mg qd

## 2023-11-05 NOTE — PROGRESS NOTE ADULT - PROBLEM SELECTOR PLAN 1
B/l Crackles on physical exam  VT; has AICD in place  Serial Echo's EF estimated 45-50%  CAD s/p Stents    Plan:  -Cardiology and Electrophysiology following   -c/w Coumadin 2.5mg QHS goal INR 2-3  -c/w ASA 81mg, atorvastatin 40  -c/w amiodarone 200mg daily, metoprolol 12.5mg BID  -c/w quinidine 324mg BID  -c/w Lasix 40mg IV push QD, plan for extra 40mg IV at 15:00 given persistent edema  -GDMT: Slow introduction per cardiology  -Plan for repeat echo on 11/5 B/l Crackles on physical exam  VT; has AICD in place  Serial Echo's EF estimated 45-50%  CAD s/p Stents    Plan:  -Cardiology and Electrophysiology following; appreciate recs  -c/w Coumadin per INR for goal INR 2-3  -c/w ASA 81mg, atorvastatin 40  -c/w amiodarone 200mg daily, metoprolol 12.5mg BID  -c/w quinidine 324mg BID  -increase Lasix to 40mg IV BID for goal net negative 1-2L  -GDMT: Slow introduction per cardiology  -f/u repeat TTE (ordered for 11/5)  -monitor strict I/Os, daily weights B/l Crackles on physical exam  VT; has AICD in place  Serial Echo's EF estimated 45-50%  CAD s/p Stents    Plan:  -Cardiology and Electrophysiology following; appreciate recs  -repeat TTE (11/5) unchanged compared to prior (11/2)  -c/w Coumadin per INR for goal INR 2-3  -c/w ASA 81mg, atorvastatin 40  -c/w amiodarone 200mg daily, metoprolol 12.5mg BID  -c/w quinidine 324mg BID  -increase Lasix to 40mg IV BID for goal net negative 1-2L  -GDMT: Slow introduction per cardiology  -monitor strict I/Os, daily weights

## 2023-11-05 NOTE — PROGRESS NOTE ADULT - ASSESSMENT
72F c hx COPD, HTN, PAD, CAD s/p LAD stent (last [facility] showing 100%  of RCA), chronic leukocytosis of unclear etiol, cardiac arrest s/p left-sided ICD (6/4/2019) complicated by infection and s/p R single-chamber ICD reimplantation ([place] in 9/23/2019), CHF EF 45%, grade 2 DHF, recent hospitalization for UTI (treated w/ ceftriaxone and Vanco x 3 days) and VFib s/p 13 AICD firings, pw lightheadedness and AICD firing. Hospital course significant for attempted VT ablation aborted 2/2 cardiac tamponade w/hypotension, s/p drain. Subsequently devolved repeat tamponade w/ pericardial window on 10/28. Transferred to [facility] for continued management.

## 2023-11-05 NOTE — PROGRESS NOTE ADULT - PROBLEM SELECTOR PLAN 2
Opacified L lung identified during admission  s/p bronchoscopy w/ minimal mucus; Post bronch CXR improved, still b/l effusion but improving L lung aeration    Plan  -Aggressive PT OOB, pulm hygiene, Chest PT TID  -Consider pulmonology consult if lung exam fails to improve  -Plan for repeat CXR on 11/5  -Plan for repeat Chest CT on 11/5 given if effusions do not get better and history of pulm nodule on 2015 CT Chest Opacified L lung identified during admission  s/p bronchoscopy w/ minimal mucus; Post bronch CXR improved, still b/l effusion but improving L lung aeration    Plan  -Aggressive PT OOB, pulm hygiene, Chest PT TID  -Consider pulmonology consult if lung exam fails to improve  -f/u CXR (ordered for 11/5); if no improvement, will plan for repeat Chest CT (also given history of pulm nodule on 2015 CT Chest) Opacified L lung identified during admission  s/p bronchoscopy w/ minimal mucus; Post bronch CXR improved, still b/l effusion but improving L lung aeration    Plan  -Aggressive PT OOB, pulm hygiene, Chest PT TID  -Consider pulmonology consult if lung exam fails to improve  -f/u CXR (ordered for 11/5); if no improvement, will plan for potential Chest CT (also given history of pulm nodule on 2015 CT Chest)

## 2023-11-05 NOTE — PROGRESS NOTE ADULT - SUBJECTIVE AND OBJECTIVE BOX
*******************************  Abby Campos MD (PGY-2)  Internal Medicine  Contact via [employer] TEAMS  [facility] Pager: [phone]  [facility] Pager: [phone]  *******************************    PROGRESS NOTE:     Patient is a 72y old  Female who presents with a chief complaint of AICD discharge (04 Nov 2023 08:30)    INTERVAL EVENTS: No acute overnight events.     SUBJECTIVE: Patient seen and examined at bedside. This morning, the patient is comfortable and doing well. No acute complaints. Denies fevers, chills, N/V/D, chest pain, SOB, abdominal pain.    MEDICATIONS  (STANDING):  acetaminophen     Tablet .. 650 milliGRAM(s) Oral every 6 hours  aMIOdarone    Tablet 200 milliGRAM(s) Oral daily  aspirin  chewable 81 milliGRAM(s) Oral daily  atorvastatin 40 milliGRAM(s) Oral at bedtime  chlorhexidine 2% Cloths 1 Application(s) Topical daily  furosemide   Injectable 40 milliGRAM(s) IV Push daily  influenza  Vaccine (HIGH DOSE) 0.7 milliLiter(s) IntraMuscular once  losartan 25 milliGRAM(s) Oral daily  melatonin 5 milliGRAM(s) Oral at bedtime  metoprolol tartrate 12.5 milliGRAM(s) Oral two times a day  nystatin Powder 1 Application(s) Topical three times a day  quiNIDine gluconate  milliGRAM(s) Oral every 12 hours  sodium chloride 3%  Inhalation 4 milliLiter(s) Inhalation every 8 hours    MEDICATIONS  (PRN):  aluminum hydroxide/magnesium hydroxide/simethicone Suspension 30 milliLiter(s) Oral every 4 hours PRN Dyspepsia  ondansetron Injectable 4 milliGRAM(s) IV Push every 8 hours PRN Nausea and/or Vomiting    CAPILLARY BLOOD GLUCOSE    I&O's Summary    03 Nov 2023 07:01  -  04 Nov 2023 07:00  --------------------------------------------------------  IN: 920 mL / OUT: 1400 mL / NET: -480 mL    04 Nov 2023 08:01  -  05 Nov 2023 06:51  --------------------------------------------------------  IN: 720 mL / OUT: 1100 mL / NET: -380 mL    PHYSICAL EXAM:  Vital Signs Last 24 Hrs  T(C): 36.6 (05 Nov 2023 06:07), Max: 36.6 (05 Nov 2023 06:07)  T(F): 97.8 (05 Nov 2023 06:07), Max: 97.8 (05 Nov 2023 06:07)  HR: 66 (05 Nov 2023 06:07) (66 - 108)  BP: 152/76 (05 Nov 2023 06:07) (100/67 - 152/76)  BP(mean): --  RR: 18 (05 Nov 2023 06:07) (18 - 18)  SpO2: 92% (05 Nov 2023 06:07) (92% - 95%)    Parameters below as of 05 Nov 2023 06:07  Patient On (Oxygen Delivery Method): room air    GENERAL: NAD  HEAD:  Atraumatic, Normocephalic  CHEST/LUNG: B/l crackles R>L, no wheezes  HEART: S1, S2, Regular rate and rhythm; No murmurs, rubs, or gallops  ABDOMEN: Soft, Nontender, Nondistended; Bowel sounds present  EXTREMITIES:  2+ Peripheral Pulses, +2 edema to b/l mid calf, L medial calf red contiguous patchy rash  PSYCH: AAOx3  NEUROLOGY: non-focal    LABS:                        11.0   14.71 )-----------( 391      ( 04 Nov 2023 06:17 )             34.9     11-04    142  |  102  |  17  ----------------------------<  101<H>  4.3   |  29  |  0.55    Ca    8.4      04 Nov 2023 06:18  Phos  2.9     11-04  Mg     1.8     11-04    PT/INR - ( 04 Nov 2023 06:18 )   PT: 11.9 sec;   INR: 1.08 ratio    PTT - ( 04 Nov 2023 06:18 )  PTT:20.2 sec    Urinalysis Basic - ( 04 Nov 2023 06:18 )    Color: x / Appearance: x / SG: x / pH: x  Gluc: 101 mg/dL / Ketone: x  / Bili: x / Urobili: x   Blood: x / Protein: x / Nitrite: x   Leuk Esterase: x / RBC: x / WBC x   Sq Epi: x / Non Sq Epi: x / Bacteria: x    RADIOLOGY & ADDITIONAL TESTS:  Results Reviewed:   Imaging Personally Reviewed:  Electrocardiogram Personally Reviewed:  Tele: *******************************  Abby Campos MD (PGY-2)  Internal Medicine  Contact via [employer] TEAMS  [facility] Pager: [phone]  [facility] Pager: [phone]  *******************************    PROGRESS NOTE:     Patient is a 72y old  Female who presents with a chief complaint of AICD discharge (04 Nov 2023 08:30)    INTERVAL EVENTS: No acute overnight events.     SUBJECTIVE: Patient seen and examined at bedside. This morning, the patient is comfortable and doing well. No acute complaints, states her breathing and LE edema is improving Denies fevers, chills, N/V/D, chest pain, SOB, abdominal pain.    MEDICATIONS  (STANDING):  acetaminophen     Tablet .. 650 milliGRAM(s) Oral every 6 hours  aMIOdarone    Tablet 200 milliGRAM(s) Oral daily  aspirin  chewable 81 milliGRAM(s) Oral daily  atorvastatin 40 milliGRAM(s) Oral at bedtime  chlorhexidine 2% Cloths 1 Application(s) Topical daily  furosemide   Injectable 40 milliGRAM(s) IV Push daily  influenza  Vaccine (HIGH DOSE) 0.7 milliLiter(s) IntraMuscular once  losartan 25 milliGRAM(s) Oral daily  melatonin 5 milliGRAM(s) Oral at bedtime  metoprolol tartrate 12.5 milliGRAM(s) Oral two times a day  nystatin Powder 1 Application(s) Topical three times a day  quiNIDine gluconate  milliGRAM(s) Oral every 12 hours  sodium chloride 3%  Inhalation 4 milliLiter(s) Inhalation every 8 hours    MEDICATIONS  (PRN):  aluminum hydroxide/magnesium hydroxide/simethicone Suspension 30 milliLiter(s) Oral every 4 hours PRN Dyspepsia  ondansetron Injectable 4 milliGRAM(s) IV Push every 8 hours PRN Nausea and/or Vomiting    CAPILLARY BLOOD GLUCOSE    I&O's Summary    03 Nov 2023 07:01  -  04 Nov 2023 07:00  --------------------------------------------------------  IN: 920 mL / OUT: 1400 mL / NET: -480 mL    04 Nov 2023 08:01  -  05 Nov 2023 06:51  --------------------------------------------------------  IN: 720 mL / OUT: 1100 mL / NET: -380 mL    PHYSICAL EXAM:  Vital Signs Last 24 Hrs  T(C): 36.6 (05 Nov 2023 06:07), Max: 36.6 (05 Nov 2023 06:07)  T(F): 97.8 (05 Nov 2023 06:07), Max: 97.8 (05 Nov 2023 06:07)  HR: 66 (05 Nov 2023 06:07) (66 - 108)  BP: 152/76 (05 Nov 2023 06:07) (100/67 - 152/76)  BP(mean): --  RR: 18 (05 Nov 2023 06:07) (18 - 18)  SpO2: 92% (05 Nov 2023 06:07) (92% - 95%)    Parameters below as of 05 Nov 2023 06:07  Patient On (Oxygen Delivery Method): room air    GENERAL: NAD  HEAD:  Atraumatic, Normocephalic  CHEST/LUNG: B/l crackles R>L, no wheezes  HEART: S1, S2, Regular rate and rhythm; No murmurs, rubs, or gallops  ABDOMEN: Soft, Nontender, Nondistended; Bowel sounds present  EXTREMITIES:  2+ Peripheral Pulses, +2 edema to b/l mid calf, L medial calf red contiguous patchy rash, improving  PSYCH: AAOx3  NEUROLOGY: non-focal    LABS:                        11.0   14.71 )-----------( 391      ( 04 Nov 2023 06:17 )             34.9     11-04    142  |  102  |  17  ----------------------------<  101<H>  4.3   |  29  |  0.55    Ca    8.4      04 Nov 2023 06:18  Phos  2.9     11-04  Mg     1.8     11-04    PT/INR - ( 04 Nov 2023 06:18 )   PT: 11.9 sec;   INR: 1.08 ratio    PTT - ( 04 Nov 2023 06:18 )  PTT:20.2 sec    Urinalysis Basic - ( 04 Nov 2023 06:18 )    Color: x / Appearance: x / SG: x / pH: x  Gluc: 101 mg/dL / Ketone: x  / Bili: x / Urobili: x   Blood: x / Protein: x / Nitrite: x   Leuk Esterase: x / RBC: x / WBC x   Sq Epi: x / Non Sq Epi: x / Bacteria: x    RADIOLOGY & ADDITIONAL TESTS:  Results Reviewed:   Imaging Personally Reviewed:  Electrocardiogram Personally Reviewed:  Tele: *******************************  Abby Campos MD (PGY-2)  Internal Medicine  Contact via [employer] TEAMS  [facility] Pager: [phone]  [facility] Pager: [phone]  *******************************    PROGRESS NOTE:     Patient is a 72y old  Female who presents with a chief complaint of AICD discharge (04 Nov 2023 08:30)    INTERVAL EVENTS: No acute overnight events.     SUBJECTIVE: Patient seen and examined at bedside. This morning, the patient is comfortable and doing well. No acute complaints, states her breathing and LE edema is improving Denies fevers, chills, N/V/D, chest pain, SOB, abdominal pain.    MEDICATIONS  (STANDING):  acetaminophen     Tablet .. 650 milliGRAM(s) Oral every 6 hours  aMIOdarone    Tablet 200 milliGRAM(s) Oral daily  aspirin  chewable 81 milliGRAM(s) Oral daily  atorvastatin 40 milliGRAM(s) Oral at bedtime  chlorhexidine 2% Cloths 1 Application(s) Topical daily  furosemide   Injectable 40 milliGRAM(s) IV Push daily  influenza  Vaccine (HIGH DOSE) 0.7 milliLiter(s) IntraMuscular once  losartan 25 milliGRAM(s) Oral daily  melatonin 5 milliGRAM(s) Oral at bedtime  metoprolol tartrate 12.5 milliGRAM(s) Oral two times a day  nystatin Powder 1 Application(s) Topical three times a day  quiNIDine gluconate  milliGRAM(s) Oral every 12 hours  sodium chloride 3%  Inhalation 4 milliLiter(s) Inhalation every 8 hours    MEDICATIONS  (PRN):  aluminum hydroxide/magnesium hydroxide/simethicone Suspension 30 milliLiter(s) Oral every 4 hours PRN Dyspepsia  ondansetron Injectable 4 milliGRAM(s) IV Push every 8 hours PRN Nausea and/or Vomiting    CAPILLARY BLOOD GLUCOSE    I&O's Summary    03 Nov 2023 07:01  -  04 Nov 2023 07:00  --------------------------------------------------------  IN: 920 mL / OUT: 1400 mL / NET: -480 mL    04 Nov 2023 08:01  -  05 Nov 2023 06:51  --------------------------------------------------------  IN: 720 mL / OUT: 1100 mL / NET: -380 mL    PHYSICAL EXAM:  Vital Signs Last 24 Hrs  T(C): 36.6 (05 Nov 2023 06:07), Max: 36.6 (05 Nov 2023 06:07)  T(F): 97.8 (05 Nov 2023 06:07), Max: 97.8 (05 Nov 2023 06:07)  HR: 66 (05 Nov 2023 06:07) (66 - 108)  BP: 152/76 (05 Nov 2023 06:07) (100/67 - 152/76)  BP(mean): --  RR: 18 (05 Nov 2023 06:07) (18 - 18)  SpO2: 92% (05 Nov 2023 06:07) (92% - 95%)    Parameters below as of 05 Nov 2023 06:07  Patient On (Oxygen Delivery Method): room air    GENERAL: NAD  HEAD:  Atraumatic, Normocephalic  CHEST/LUNG: B/l crackles R>L, no wheezes  HEART: S1, S2, Regular rate and rhythm; No murmurs, rubs, or gallops  ABDOMEN: Soft, Nontender, Nondistended; Bowel sounds present  EXTREMITIES:  2+ Peripheral Pulses, +2 edema to b/l mid calf, L medial calf red contiguous patchy rash, improving  PSYCH: AAOx3  NEUROLOGY: non-focal    LABS:                        10.4   11.65 )-----------( 389      ( 05 Nov 2023 10:25 )             32.2        11-04    142  |  102  |  17  ----------------------------<  101<H>  4.3   |  29  |  0.55    Ca    8.4      04 Nov 2023 06:18  Phos  2.9     11-04  Mg     1.8     11-04    PT/INR - ( 04 Nov 2023 06:18 )   PT: 11.9 sec;   INR: 1.08 ratio    PTT - ( 04 Nov 2023 06:18 )  PTT:20.2 sec    Urinalysis Basic - ( 04 Nov 2023 06:18 )    Color: x / Appearance: x / SG: x / pH: x  Gluc: 101 mg/dL / Ketone: x  / Bili: x / Urobili: x   Blood: x / Protein: x / Nitrite: x   Leuk Esterase: x / RBC: x / WBC x   Sq Epi: x / Non Sq Epi: x / Bacteria: x    RADIOLOGY & ADDITIONAL TESTS:  Results Reviewed:   Imaging Personally Reviewed:  Electrocardiogram Personally Reviewed:  Tele: *******************************  Abby Campos MD (PGY-2)  Internal Medicine  Contact via [employer] TEAMS  [facility] Pager: [phone]  [facility] Pager: [phone]  *******************************    PROGRESS NOTE:     Patient is a 72y old  Female who presents with a chief complaint of AICD discharge (04 Nov 2023 08:30)    INTERVAL EVENTS: No acute overnight events.     SUBJECTIVE: Patient seen and examined at bedside. This morning, the patient is comfortable and doing well. No acute complaints, states her breathing and LE edema is improving Denies fevers, chills, N/V/D, chest pain, SOB, abdominal pain.    MEDICATIONS  (STANDING):  acetaminophen     Tablet .. 650 milliGRAM(s) Oral every 6 hours  aMIOdarone    Tablet 200 milliGRAM(s) Oral daily  aspirin  chewable 81 milliGRAM(s) Oral daily  atorvastatin 40 milliGRAM(s) Oral at bedtime  chlorhexidine 2% Cloths 1 Application(s) Topical daily  furosemide   Injectable 40 milliGRAM(s) IV Push daily  influenza  Vaccine (HIGH DOSE) 0.7 milliLiter(s) IntraMuscular once  losartan 25 milliGRAM(s) Oral daily  melatonin 5 milliGRAM(s) Oral at bedtime  metoprolol tartrate 12.5 milliGRAM(s) Oral two times a day  nystatin Powder 1 Application(s) Topical three times a day  quiNIDine gluconate  milliGRAM(s) Oral every 12 hours  sodium chloride 3%  Inhalation 4 milliLiter(s) Inhalation every 8 hours    MEDICATIONS  (PRN):  aluminum hydroxide/magnesium hydroxide/simethicone Suspension 30 milliLiter(s) Oral every 4 hours PRN Dyspepsia  ondansetron Injectable 4 milliGRAM(s) IV Push every 8 hours PRN Nausea and/or Vomiting    CAPILLARY BLOOD GLUCOSE    I&O's Summary    03 Nov 2023 07:01  -  04 Nov 2023 07:00  --------------------------------------------------------  IN: 920 mL / OUT: 1400 mL / NET: -480 mL    04 Nov 2023 08:01  -  05 Nov 2023 06:51  --------------------------------------------------------  IN: 720 mL / OUT: 1100 mL / NET: -380 mL    PHYSICAL EXAM:  Vital Signs Last 24 Hrs  T(C): 36.6 (05 Nov 2023 06:07), Max: 36.6 (05 Nov 2023 06:07)  T(F): 97.8 (05 Nov 2023 06:07), Max: 97.8 (05 Nov 2023 06:07)  HR: 66 (05 Nov 2023 06:07) (66 - 108)  BP: 152/76 (05 Nov 2023 06:07) (100/67 - 152/76)  BP(mean): --  RR: 18 (05 Nov 2023 06:07) (18 - 18)  SpO2: 92% (05 Nov 2023 06:07) (92% - 95%)    Parameters below as of 05 Nov 2023 06:07  Patient On (Oxygen Delivery Method): room air    GENERAL: NAD  HEAD:  Atraumatic, Normocephalic  CHEST/LUNG: B/l crackles R>L, no wheezes  HEART: S1, S2, Regular rate and rhythm; No murmurs, rubs, or gallops  ABDOMEN: Soft, Nontender, Nondistended; Bowel sounds present  EXTREMITIES:  2+ Peripheral Pulses, +2 edema to b/l mid calf, L medial calf red contiguous patchy rash, improving  PSYCH: AAOx3  NEUROLOGY: non-focal    LABS:                        10.4   11.65 )-----------( 389      ( 05 Nov 2023 10:25 )             32.2        11-05    141  |  100  |  18  ----------------------------<  149<H>  3.8   |  29  |  0.66    Ca    8.2<L>      05 Nov 2023 10:25  Phos  3.1     11-05  Mg     1.7     11-05    TPro  5.3<L>  /  Alb  2.6<L>  /  TBili  0.3  /  DBili  x   /  AST  13  /  ALT  12  /  AlkPhos  56  11-05    PT/INR - ( 05 Nov 2023 10:25 )   PT: 13.5 sec;   INR: 1.23 ratio    PTT - ( 04 Nov 2023 06:18 )  PTT:20.2 sec    Urinalysis Basic - ( 04 Nov 2023 06:18 )    Color: x / Appearance: x / SG: x / pH: x  Gluc: 101 mg/dL / Ketone: x  / Bili: x / Urobili: x   Blood: x / Protein: x / Nitrite: x   Leuk Esterase: x / RBC: x / WBC x   Sq Epi: x / Non Sq Epi: x / Bacteria: x    RADIOLOGY & ADDITIONAL TESTS:  Results Reviewed:   Imaging Personally Reviewed:  Electrocardiogram Personally Reviewed:  Tele: *******************************  Abby Campos MD (PGY-2)  Internal Medicine  Contact via [employer] TEAMS  [facility] Pager: [phone]  [facility] Pager: [phone]  *******************************    PROGRESS NOTE:     Patient is a 72y old  Female who presents with a chief complaint of AICD discharge (04 Nov 2023 08:30)    INTERVAL EVENTS: No acute overnight events.     SUBJECTIVE: Patient seen and examined at bedside. This morning, the patient is comfortable and doing well. No acute complaints, states her breathing and LE edema is improving Denies fevers, chills, N/V/D, chest pain, SOB, abdominal pain.    MEDICATIONS  (STANDING):  acetaminophen     Tablet .. 650 milliGRAM(s) Oral every 6 hours  aMIOdarone    Tablet 200 milliGRAM(s) Oral daily  aspirin  chewable 81 milliGRAM(s) Oral daily  atorvastatin 40 milliGRAM(s) Oral at bedtime  chlorhexidine 2% Cloths 1 Application(s) Topical daily  furosemide   Injectable 40 milliGRAM(s) IV Push daily  influenza  Vaccine (HIGH DOSE) 0.7 milliLiter(s) IntraMuscular once  losartan 25 milliGRAM(s) Oral daily  melatonin 5 milliGRAM(s) Oral at bedtime  metoprolol tartrate 12.5 milliGRAM(s) Oral two times a day  nystatin Powder 1 Application(s) Topical three times a day  quiNIDine gluconate  milliGRAM(s) Oral every 12 hours  sodium chloride 3%  Inhalation 4 milliLiter(s) Inhalation every 8 hours    MEDICATIONS  (PRN):  aluminum hydroxide/magnesium hydroxide/simethicone Suspension 30 milliLiter(s) Oral every 4 hours PRN Dyspepsia  ondansetron Injectable 4 milliGRAM(s) IV Push every 8 hours PRN Nausea and/or Vomiting    CAPILLARY BLOOD GLUCOSE    I&O's Summary    03 Nov 2023 07:01  -  04 Nov 2023 07:00  --------------------------------------------------------  IN: 920 mL / OUT: 1400 mL / NET: -480 mL    04 Nov 2023 08:01  -  05 Nov 2023 06:51  --------------------------------------------------------  IN: 720 mL / OUT: 1100 mL / NET: -380 mL    PHYSICAL EXAM:  Vital Signs Last 24 Hrs  T(C): 36.6 (05 Nov 2023 06:07), Max: 36.6 (05 Nov 2023 06:07)  T(F): 97.8 (05 Nov 2023 06:07), Max: 97.8 (05 Nov 2023 06:07)  HR: 66 (05 Nov 2023 06:07) (66 - 108)  BP: 152/76 (05 Nov 2023 06:07) (100/67 - 152/76)  BP(mean): --  RR: 18 (05 Nov 2023 06:07) (18 - 18)  SpO2: 92% (05 Nov 2023 06:07) (92% - 95%)    Parameters below as of 05 Nov 2023 06:07  Patient On (Oxygen Delivery Method): room air    GENERAL: NAD  HEAD:  Atraumatic, Normocephalic  CHEST/LUNG: B/l crackles R>L, no wheezes  HEART: S1, S2, Regular rate and rhythm; No murmurs, rubs, or gallops  ABDOMEN: Soft, Nontender, Nondistended; Bowel sounds present  EXTREMITIES:  2+ Peripheral Pulses, +2 edema to b/l mid calf, L medial calf red contiguous patchy rash, improving  PSYCH: AAOx3  NEUROLOGY: non-focal    LABS:                        10.4   11.65 )-----------( 389      ( 05 Nov 2023 10:25 )             32.2        11-05    141  |  100  |  18  ----------------------------<  149<H>  3.8   |  29  |  0.66    Ca    8.2<L>      05 Nov 2023 10:25  Phos  3.1     11-05  Mg     1.7     11-05    TPro  5.3<L>  /  Alb  2.6<L>  /  TBili  0.3  /  DBili  x   /  AST  13  /  ALT  12  /  AlkPhos  56  11-05    PT/INR - ( 05 Nov 2023 10:25 )   PT: 13.5 sec;   INR: 1.23 ratio    PTT - ( 04 Nov 2023 06:18 )  PTT:20.2 sec    Urinalysis Basic - ( 04 Nov 2023 06:18 )    Color: x / Appearance: x / SG: x / pH: x  Gluc: 101 mg/dL / Ketone: x  / Bili: x / Urobili: x   Blood: x / Protein: x / Nitrite: x   Leuk Esterase: x / RBC: x / WBC x   Sq Epi: x / Non Sq Epi: x / Bacteria: x    RADIOLOGY & ADDITIONAL TESTS:  Results Reviewed:   < from: TTE W or WO Ultrasound Enhancing Agent (11.05.23 @ 09:16) >  CONCLUSIONS:      1. Left ventricular endocardium is not well visualized; however, the left ventricular systolic function appears grossly normal.   2. Normal right ventricular cavity size and systolic function.   3. Small bilateral pleural effusion noted.   4. Compared to the transthoracic echocardiogram performed on 11/2/2023 there have been no significant interval changes.    Imaging Personally Reviewed:  Electrocardiogram Personally Reviewed:  Tele:

## 2023-11-05 NOTE — PROGRESS NOTE ADULT - SUBJECTIVE AND OBJECTIVE BOX
Patient seen and examined at bedside.    Overnight Events:  NAEON.   Tele: NSR  No concerns or complaints this am.     Review of Systems:  REVIEW OF SYSTEMS:  CONSTITUTIONAL: No weakness, fevers or chills  EYES/ENT: No visual changes;  No dysphagia  NECK: No pain or stiffness  RESPIRATORY: No cough, wheezing, hemoptysis; No shortness of breath  CARDIOVASCULAR: No chest pain or palpitations; No lower extremity edema  GASTROINTESTINAL: No abdominal or epigastric pain. No nausea, vomiting  BACK: No back pain  GENITOURINARY: No dysuria, frequency or hematuria  NEUROLOGICAL: No numbness or weakness  SKIN: No itching, burning, rashes, or lesions   All other review of systems is negative unless indicated above.        Current Meds:  acetaminophen     Tablet .. 650 milliGRAM(s) Oral every 6 hours  aluminum hydroxide/magnesium hydroxide/simethicone Suspension 30 milliLiter(s) Oral every 4 hours PRN  aMIOdarone    Tablet 200 milliGRAM(s) Oral daily  aspirin  chewable 81 milliGRAM(s) Oral daily  atorvastatin 40 milliGRAM(s) Oral at bedtime  chlorhexidine 2% Cloths 1 Application(s) Topical daily  furosemide   Injectable 40 milliGRAM(s) IV Push two times a day  influenza  Vaccine (HIGH DOSE) 0.7 milliLiter(s) IntraMuscular once  losartan 25 milliGRAM(s) Oral daily  melatonin 5 milliGRAM(s) Oral at bedtime  metoprolol tartrate 12.5 milliGRAM(s) Oral two times a day  nystatin Powder 1 Application(s) Topical three times a day  ondansetron Injectable 4 milliGRAM(s) IV Push every 8 hours PRN  quiNIDine gluconate  milliGRAM(s) Oral every 12 hours  sodium chloride 3%  Inhalation 4 milliLiter(s) Inhalation every 8 hours      PAST MEDICAL & SURGICAL HISTORY:  Obese      Smoker      HTN (hypertension)      HLD (hyperlipidemia)      CAD (coronary artery disease)      CHF with cardiomyopathy      History of hip surgery          Vitals:  T(F): 97.8 (11-05), Max: 97.8 (11-05)  HR: 66 (11-05) (66 - 108)  BP: 152/76 (11-05) (100/67 - 152/76)  RR: 18 (11-05)  SpO2: 92% (11-05)  I&O's Summary    04 Nov 2023 08:01  -  05 Nov 2023 07:00  --------------------------------------------------------  IN: 720 mL / OUT: 1100 mL / NET: -380 mL        Physical Exam:  Appearance: No acute distress; well appearing  Eyes: PERRL, EOMI, pink conjunctiva  HENT: Normal oral mucosa  Cardiovascular: RRR, S1, S2, no murmurs, no edema; no JVD  Respiratory: Clear to auscultation bilaterally  Gastrointestinal: soft, non-tender, non-distended with normal bowel sounds  Musculoskeletal: No clubbing; no joint deformity   Neurologic: Non-focal  Lymphatic: No lymphadenopathy  Psychiatry: AAOx3, mood & affect appropriate  Skin: No rashes, ecchymoses, or cyanosis                          11.0   14.71 )-----------( 391      ( 04 Nov 2023 06:17 )             34.9     11-04    142  |  102  |  17  ----------------------------<  101<H>  4.3   |  29  |  0.55    Ca    8.4      04 Nov 2023 06:18  Phos  2.9     11-04  Mg     1.8     11-04      PT/INR - ( 04 Nov 2023 06:18 )   PT: 11.9 sec;   INR: 1.08 ratio         PTT - ( 04 Nov 2023 06:18 )  PTT:20.2 sec                 Patient seen and examined at bedside.    Overnight Events:  NAEON.   Tele: NSR  No concerns or complaints this am.     Review of Systems:  REVIEW OF SYSTEMS:  CONSTITUTIONAL: No weakness, fevers or chills  EYES/ENT: No visual changes;  No dysphagia  NECK: No pain or stiffness  RESPIRATORY: No cough, wheezing, hemoptysis; No shortness of breath  CARDIOVASCULAR: No chest pain or palpitations; No lower extremity edema  GASTROINTESTINAL: No abdominal or epigastric pain. No nausea, vomiting  BACK: No back pain  GENITOURINARY: No dysuria, frequency or hematuria  NEUROLOGICAL: No numbness or weakness  SKIN: No itching, burning, rashes, or lesions   All other review of systems is negative unless indicated above.      Current Meds:  acetaminophen     Tablet .. 650 milliGRAM(s) Oral every 6 hours  aluminum hydroxide/magnesium hydroxide/simethicone Suspension 30 milliLiter(s) Oral every 4 hours PRN  aMIOdarone    Tablet 200 milliGRAM(s) Oral daily  aspirin  chewable 81 milliGRAM(s) Oral daily  atorvastatin 40 milliGRAM(s) Oral at bedtime  chlorhexidine 2% Cloths 1 Application(s) Topical daily  furosemide   Injectable 40 milliGRAM(s) IV Push two times a day  influenza  Vaccine (HIGH DOSE) 0.7 milliLiter(s) IntraMuscular once  losartan 25 milliGRAM(s) Oral daily  melatonin 5 milliGRAM(s) Oral at bedtime  metoprolol tartrate 12.5 milliGRAM(s) Oral two times a day  nystatin Powder 1 Application(s) Topical three times a day  ondansetron Injectable 4 milliGRAM(s) IV Push every 8 hours PRN  quiNIDine gluconate  milliGRAM(s) Oral every 12 hours  sodium chloride 3%  Inhalation 4 milliLiter(s) Inhalation every 8 hours  warfarin    PAST MEDICAL & SURGICAL HISTORY:  Obese      Smoker      HTN (hypertension)      HLD (hyperlipidemia)      CAD (coronary artery disease)      CHF with cardiomyopathy      History of hip surgery          Vitals:  T(F): 97.8 (11-05), Max: 97.8 (11-05)  HR: 66 (11-05) (66 - 108)  BP: 152/76 (11-05) (100/67 - 152/76)  RR: 18 (11-05)  SpO2: 92% (11-05)  I&O's Summary    04 Nov 2023 08:01  -  05 Nov 2023 07:00  --------------------------------------------------------  IN: 720 mL / OUT: 1100 mL / NET: -380 mL        Physical Exam:  Appearance: No acute distress; well appearing  Eyes: PERRL, EOMI, pink conjunctiva  HENT: Normal oral mucosa  Cardiovascular: RRR, S1, S2, no murmurs, no edema; no JVD  Respiratory: Clear to auscultation bilaterally  Gastrointestinal: soft, non-tender, non-distended with normal bowel sounds  Musculoskeletal: No clubbing; no joint deformity   Neurologic: Non-focal  Lymphatic: No lymphadenopathy  Psychiatry: AAOx3, mood & affect appropriate  Skin: No rashes, ecchymoses, or cyanosis                          11.0   14.71 )-----------( 391      ( 04 Nov 2023 06:17 )             34.9     11-04    142  |  102  |  17  ----------------------------<  101<H>  4.3   |  29  |  0.55    Ca    8.4      04 Nov 2023 06:18  Phos  2.9     11-04  Mg     1.8     11-04      PT/INR - ( 04 Nov 2023 06:18 )   PT: 11.9 sec;   INR: 1.08 ratio         PTT - ( 04 Nov 2023 06:18 )  PTT:20.2 sec

## 2023-11-05 NOTE — PROGRESS NOTE ADULT - ATTENDING COMMENTS
Ms. Kohli appears to be approaching euvolemia.     Transition to daily diuretic dose tomorrow    K>4 and Mg >2    A total of 35 minutes was spent on this patient encounter.

## 2023-11-06 NOTE — PROGRESS NOTE ADULT - PROBLEM SELECTOR PLAN 4
new onset Afib F w/ RVR, s/p DCCVx2 10/22. Now in NSR on telemetry   - continue anti-arrhythmics as above per EP  - continue to dose warfarin for goal INR 2-3, still not therapeutic   -- per cards, do NOT bridge warfarin due to bleeding risk

## 2023-11-06 NOTE — PROGRESS NOTE ADULT - SUBJECTIVE AND OBJECTIVE BOX
Patient seen and examined at bedside.    Overnight Events:   - No acute events overnight  - On tele SB/SR 50-70s  - Off supplemental O2  - Denies CP/SOB LH/dizziness palpitations    Current Meds:  acetaminophen     Tablet .. 650 milliGRAM(s) Oral every 6 hours  aluminum hydroxide/magnesium hydroxide/simethicone Suspension 30 milliLiter(s) Oral every 4 hours PRN  aMIOdarone    Tablet 200 milliGRAM(s) Oral daily  aspirin  chewable 81 milliGRAM(s) Oral daily  atorvastatin 40 milliGRAM(s) Oral at bedtime  chlorhexidine 2% Cloths 1 Application(s) Topical daily  furosemide   Injectable 40 milliGRAM(s) IV Push two times a day  influenza  Vaccine (HIGH DOSE) 0.7 milliLiter(s) IntraMuscular once  losartan 25 milliGRAM(s) Oral daily  melatonin 5 milliGRAM(s) Oral at bedtime  metoprolol tartrate 12.5 milliGRAM(s) Oral two times a day  nystatin Powder 1 Application(s) Topical three times a day  ondansetron Injectable 4 milliGRAM(s) IV Push every 8 hours PRN  quiNIDine gluconate  milliGRAM(s) Oral every 12 hours  sodium chloride 3%  Inhalation 4 milliLiter(s) Inhalation every 8 hours      Vitals:  T(F): 97.8 (11-06), Max: 98.7 (11-06)  HR: 76 (11-06) (64 - 88)  BP: 122/54 (11-06) (110/50 - 154/78)  RR: 18 (11-06)  SpO2: 91% (11-06)  I&O's Summary    05 Nov 2023 07:01  -  06 Nov 2023 07:00  --------------------------------------------------------  IN: 540 mL / OUT: 1600 mL / NET: -1060 mL    06 Nov 2023 07:01  -  06 Nov 2023 14:48  --------------------------------------------------------  IN: 440 mL / OUT: 800 mL / NET: -360 mL        Physical Exam:  Appearance: No acute distress; well appearing  Eyes: PERRL, EOMI, pink conjunctiva  HEENT: Normal oral mucosa  Cardiovascular: RRR, S1, S2, no murmurs, rubs, or gallops; no edema; no JVD  Respiratory: Decreased breath sounds at the bases but improved lung sounds on left side   Gastrointestinal: soft, non-tender, non-distended with normal bowel sounds  Musculoskeletal: No clubbing; no joint deformity   Neurologic: Non-focal  Lymphatic: No lymphadenopathy  Psychiatry: AAOx3, mood & affect appropriate  Skin: No rashes, ecchymoses, or cyanosis                          11.1   11.56 )-----------( 442      ( 06 Nov 2023 10:36 )             34.9     11-06    142  |  98  |  17  ----------------------------<  162<H>  3.8   |  30  |  0.78    Ca    8.5      06 Nov 2023 10:36  Phos  3.1     11-06  Mg     1.8     11-06    TPro  5.3<L>  /  Alb  2.6<L>  /  TBili  0.3  /  DBili  x   /  AST  13  /  ALT  12  /  AlkPhos  56  11-05    PT/INR - ( 06 Nov 2023 10:36 )   PT: 14.7 sec;   INR: 1.41 ratio

## 2023-11-06 NOTE — PROGRESS NOTE ADULT - ATTENDING SUPERVISION STATEMENT
Fellow
Resident
Fellow
Resident
Resident/Fellow
Resident
Fellow
Resident
Resident/Fellow
Resident

## 2023-11-06 NOTE — PROGRESS NOTE ADULT - ATTENDING COMMENTS
72F c hx COPD, HTN, PAD, CAD s/p LAD stent (last [facility] showing 100%  of RCA), chronic leukocytosis of unclear etiol, cardiac arrest s/p left-sided ICD (6/4/2019) complicated by infection and s/p R single-chamber ICD reimplantation ([place] in 9/23/2019), CHF EF 45%, grade 2 DHF, recent hospitalization for UTI (treated w/ ceftriaxone and Vanco x 3 days) and VFib s/p 13 AICD firings, pw lightheadedness and AICD firing. Hospital course significant for attempted VT ablation aborted 2/2 cardiac tamponade w/hypotension, s/p drain. Subsequently devolved repeat tamponade w/ pericardial window on 10/28. Transferred to [facility] for continued management.     Pt feeling better, weaned off o2, hemodynamically stable. PT recommending GUICHO but pt wishes to go home.    VT- has AICD, ablation not pursued, continue antiarrhythmics per EP  PAF- new onset, nonvalvular however EP would like coumadin as opposed to DOAC, and also recommends no bridge given bleed risk.  Pericardial/pleural effusions- monito CXR q2-3 days while on diuresis to ensure improving; lasix increased to 40mg iv BID for now- no evidence of tamponade at this time. Repeat TTE  once therapeutic on coumadin to ensure no worsening pericardial effusion/hemopericardium.  Leukocytosis- chronic, low supsicion for active infeciton although pt also noted to have pulm nodule in 2015 and had since not followed up, so potential inflammatory/malignant etiology- she had repeat CT heart non-coronary with IV contrast this hospitalization; will have radiology compare the scans and offer impression on whether further workup warranted for malignancy. Otherwise, pt can f/u as OP.    Dispo: likely home with home PT per pt preference hopefully by end of this week  plan d/w and agreed on by pt    The necessity of the time spent during the encounter on this date of service was due to:   - Ordering, reviewing, and interpreting labs, testing, and imaging.  - Independently obtaining a review of systems and performing a physical exam  - Reviewing prior hospitalization and where necessary, outpatient records.  - Counselling and educating patient and family regarding interpretation of aforementioned items and plan of care.    Time-based billing (NON-critical care). Total minutes spent: 57

## 2023-11-06 NOTE — PROGRESS NOTE ADULT - PROBLEM SELECTOR PLAN 1
h/o CAD s/p stents, CHF with LVEF 45-50%. Being treated for ADHF, volume status overall improving   - c/w ASA 81mg, atorvastatin 40, metoprolol 12.5mg BID  - lasix IV, titrate for goal net negative 1-2L  - strict I/Os, daily weights

## 2023-11-06 NOTE — PROGRESS NOTE ADULT - PROBLEM SELECTOR PLAN 5
Opacified L lung identified during admission; L pleural effusion. Saturating well/breathing comfortably on RA   - s/p bronchoscopy w/ minimal mucus; Post bronch CXR improved, still b/l effusion but improving L lung aeration  - continue with diuretics as above

## 2023-11-06 NOTE — PROGRESS NOTE ADULT - ASSESSMENT
72F c hx COPD, HTN, PAD, CAD s/p LAD stent (last [facility] showing 100%  of RCA), chronic leukocytosis of unclear etiol, cardiac arrest s/p left-sided ICD (6/4/2019) complicated by infection and s/p R single-chamber ICD reimplantation ([place] in 9/23/2019), CHF EF 45%, grade 2 DHF, recent hospitalization for UTI (treated w/ ceftriaxone and Vanco x 3 days) and VFib s/p 13 AICD firings, pw lightheadedness and AICD firing. Hospital course significant for attempted VT ablation aborted 2/2 cardiac tamponade w/hypotension, s/p drain. Subsequently devolved repeat tamponade w/ pericardial window on 10/28. Transferred to [facility] for continued management.

## 2023-11-06 NOTE — PROGRESS NOTE ADULT - SUBJECTIVE AND OBJECTIVE BOX
Venkatesh Aguilera PGY3  Internal Medicine  Available on Teams     Progress Note  10-16-23 (21d)  Patient is a 72y old  Female who presents with a chief complaint of AICD discharge (06 Nov 2023 07:27)    Subjective / Interval 24 Events :  - No acute events overnight.  - Pt seen and examined at bedside.     Additional ROS (if any): see above, otherwise negative     MEDICATIONS  (STANDING):  acetaminophen     Tablet .. 650 milliGRAM(s) Oral every 6 hours  aMIOdarone    Tablet 200 milliGRAM(s) Oral daily  aspirin  chewable 81 milliGRAM(s) Oral daily  atorvastatin 40 milliGRAM(s) Oral at bedtime  chlorhexidine 2% Cloths 1 Application(s) Topical daily  furosemide   Injectable 40 milliGRAM(s) IV Push two times a day  influenza  Vaccine (HIGH DOSE) 0.7 milliLiter(s) IntraMuscular once  losartan 25 milliGRAM(s) Oral daily  melatonin 5 milliGRAM(s) Oral at bedtime  metoprolol tartrate 12.5 milliGRAM(s) Oral two times a day  nystatin Powder 1 Application(s) Topical three times a day  quiNIDine gluconate  milliGRAM(s) Oral every 12 hours  sodium chloride 3%  Inhalation 4 milliLiter(s) Inhalation every 8 hours    MEDICATIONS  (PRN):  aluminum hydroxide/magnesium hydroxide/simethicone Suspension 30 milliLiter(s) Oral every 4 hours PRN Dyspepsia  ondansetron Injectable 4 milliGRAM(s) IV Push every 8 hours PRN Nausea and/or Vomiting      CAPILLARY BLOOD GLUCOSE      I&O's Summary    05 Nov 2023 07:01  -  06 Nov 2023 07:00  --------------------------------------------------------  IN: 540 mL / OUT: 1600 mL / NET: -1060 mL    06 Nov 2023 07:01  -  06 Nov 2023 09:29  --------------------------------------------------------  IN: 120 mL / OUT: 0 mL / NET: 120 mL        PHYSICAL EXAM:  Vital Signs Last 24 Hrs  T(C): 37.1 (06 Nov 2023 05:58), Max: 37.1 (06 Nov 2023 05:58)  T(F): 98.7 (06 Nov 2023 05:58), Max: 98.7 (06 Nov 2023 05:58)  HR: 66 (06 Nov 2023 05:58) (62 - 88)  BP: 154/78 (06 Nov 2023 05:58) (133/67 - 154/78)  BP(mean): --  RR: 18 (06 Nov 2023 05:58) (18 - 18)  SpO2: 92% (06 Nov 2023 05:58) (92% - 98%)    Parameters below as of 06 Nov 2023 05:58  Patient On (Oxygen Delivery Method): room air      General: NAD, non-toxic appearing   HEENT: EOMi, no scleral icterus  CV: RRR, normal S1 and S2  Lungs: normal respiratory effort on RA, CTAB  Abd: soft, nontender, nondistended  Ext: 2+ BLE pitting edema, warm, well perfused  Pysch: AAOx3, appropriate affect   Neuro: grossly non-focal, moving all extremities spontaneously   Skin: no rashes or lesions       LABS:                          10.4   11.65 )-----------( 389      ( 05 Nov 2023 10:25 )             32.2       WBC Trend: 11.65<--, 14.71<--, 17.29<--  Hb Trend: 10.4<--, 11.0<--, 10.9<--, 10.4<--, 10.2<--    11-05    141  |  100  |  18  ----------------------------<  149<H>  3.8   |  29  |  0.66    Ca    8.2<L>      05 Nov 2023 10:25  Phos  3.1     11-05  Mg     1.7     11-05    TPro  5.3<L>  /  Alb  2.6<L>  /  TBili  0.3  /  DBili  x   /  AST  13  /  ALT  12  /  AlkPhos  56  11-05    PT/INR - ( 05 Nov 2023 10:25 )   PT: 13.5 sec;   INR: 1.23 ratio               Urinalysis Basic - ( 05 Nov 2023 10:25 )    Color: x / Appearance: x / SG: x / pH: x  Gluc: 149 mg/dL / Ketone: x  / Bili: x / Urobili: x   Blood: x / Protein: x / Nitrite: x   Leuk Esterase: x / RBC: x / WBC x   Sq Epi: x / Non Sq Epi: x / Bacteria: x        RADIOLOGY & ADDITIONAL TESTS: Reviewed  - No new images Venkatesh Aguilera PGY3  Internal Medicine  Available on Teams     Progress Note  10-16-23 (21d)  Patient is a 72y old  Female who presents with a chief complaint of AICD discharge (06 Nov 2023 07:27)    Subjective / Interval 24 Events :  - Yesterday, lasix increased to 40mg IV BID, net negative 1L over past 24h.   - No acute events overnight. No events on telemetry, NSR  - Pt seen and examined at bedside this AM. Feels well without any complaints. No chest pain, SOB, palpitations.     Additional ROS (if any): see above, otherwise negative     MEDICATIONS  (STANDING):  acetaminophen     Tablet .. 650 milliGRAM(s) Oral every 6 hours  aMIOdarone    Tablet 200 milliGRAM(s) Oral daily  aspirin  chewable 81 milliGRAM(s) Oral daily  atorvastatin 40 milliGRAM(s) Oral at bedtime  chlorhexidine 2% Cloths 1 Application(s) Topical daily  furosemide   Injectable 40 milliGRAM(s) IV Push two times a day  influenza  Vaccine (HIGH DOSE) 0.7 milliLiter(s) IntraMuscular once  losartan 25 milliGRAM(s) Oral daily  melatonin 5 milliGRAM(s) Oral at bedtime  metoprolol tartrate 12.5 milliGRAM(s) Oral two times a day  nystatin Powder 1 Application(s) Topical three times a day  quiNIDine gluconate  milliGRAM(s) Oral every 12 hours  sodium chloride 3%  Inhalation 4 milliLiter(s) Inhalation every 8 hours    MEDICATIONS  (PRN):  aluminum hydroxide/magnesium hydroxide/simethicone Suspension 30 milliLiter(s) Oral every 4 hours PRN Dyspepsia  ondansetron Injectable 4 milliGRAM(s) IV Push every 8 hours PRN Nausea and/or Vomiting      CAPILLARY BLOOD GLUCOSE      I&O's Summary    05 Nov 2023 07:01  -  06 Nov 2023 07:00  --------------------------------------------------------  IN: 540 mL / OUT: 1600 mL / NET: -1060 mL    06 Nov 2023 07:01  -  06 Nov 2023 09:29  --------------------------------------------------------  IN: 120 mL / OUT: 0 mL / NET: 120 mL        PHYSICAL EXAM:  Vital Signs Last 24 Hrs  T(C): 37.1 (06 Nov 2023 05:58), Max: 37.1 (06 Nov 2023 05:58)  T(F): 98.7 (06 Nov 2023 05:58), Max: 98.7 (06 Nov 2023 05:58)  HR: 66 (06 Nov 2023 05:58) (62 - 88)  BP: 154/78 (06 Nov 2023 05:58) (133/67 - 154/78)  BP(mean): --  RR: 18 (06 Nov 2023 05:58) (18 - 18)  SpO2: 92% (06 Nov 2023 05:58) (92% - 98%)    Parameters below as of 06 Nov 2023 05:58  Patient On (Oxygen Delivery Method): room air      General: NAD, non-toxic appearing   HEENT: EOMi, no scleral icterus  CV: RRR, normal S1 and S2  Lungs: normal respiratory effort on RA, mild crackles at R base posteriorly   Abd: soft, nontender, nondistended  Ext: no edema, warm, well perfused  Pysch: AAOx3, appropriate affect   Neuro: grossly non-focal, moving all extremities spontaneously   Skin: no rashes or lesions       LABS:                          10.4   11.65 )-----------( 389      ( 05 Nov 2023 10:25 )             32.2       WBC Trend: 11.65<--, 14.71<--, 17.29<--  Hb Trend: 10.4<--, 11.0<--, 10.9<--, 10.4<--, 10.2<--    11-05    141  |  100  |  18  ----------------------------<  149<H>  3.8   |  29  |  0.66    Ca    8.2<L>      05 Nov 2023 10:25  Phos  3.1     11-05  Mg     1.7     11-05    TPro  5.3<L>  /  Alb  2.6<L>  /  TBili  0.3  /  DBili  x   /  AST  13  /  ALT  12  /  AlkPhos  56  11-05    PT/INR - ( 05 Nov 2023 10:25 )   PT: 13.5 sec;   INR: 1.23 ratio               Urinalysis Basic - ( 05 Nov 2023 10:25 )    Color: x / Appearance: x / SG: x / pH: x  Gluc: 149 mg/dL / Ketone: x  / Bili: x / Urobili: x   Blood: x / Protein: x / Nitrite: x   Leuk Esterase: x / RBC: x / WBC x   Sq Epi: x / Non Sq Epi: x / Bacteria: x        RADIOLOGY & ADDITIONAL TESTS: Reviewed  - No new images

## 2023-11-06 NOTE — PROGRESS NOTE ADULT - PROBLEM SELECTOR PLAN 6
Persistent leukocytosis since August 2023 ranging 14-29k  - no evidence of active infection or sepsis at this time. s/p 7d of CTX for PNA, completed 10/28   - continue to monitor   - outpatient hematology follow up

## 2023-11-06 NOTE — PROGRESS NOTE ADULT - ASSESSMENT
73 y/o F w/ PMHx COPD, HLD, HTN, PVD, ICM/CAD s/p PCI (has  of RCA), cardiac arrest s/p left-sided ICD (6/4/2019) complicated by infection and s/p R single-chamber ICD ([place] in 9/23/2019), recent admission for VT storm with adjustment of medication and NIPS now presenting for ICD shock in the setting of recurrent monomorphic VT with unsuccessful ATP.  Ablation attempted 10/20 but aborted due to hemodynamically significant pericardial effusion with drain placement.  Pericardial drain removed on 10/20/23.  Over the weekend developed new shortness of breath, recurrent VT s/p ICD shock, and afib w/ RVR s/p cardioversion 10/21. She continues to have NSVT. Bronch negative for mucus plug. Currently in Afib s/p pericardial window 10/28.    - Continue low dose metoprolol tartrate  - Continue Amiodarone 200mg daily   - Would give Coumadin 4mg  tonight with goal INR 2-3; would need a repeat limited TTE after resumption   - Continue low-dose Quinidine   - Agree with switching to PO from IV Lasix  - Will allow for medical optimization and discuss plan for ablation in the future   - Monitor on telemetry  - Keep Mg > 2 and K > 4    Note not final until signed by attending       Erasmo Lundberg MD  Cardiology Fellow PGY-6  Phone: [phone]    For all New Consults  [email or web address]   Login: Authy     73 y/o F w/ PMHx COPD, HLD, HTN, PVD, ICM/CAD s/p PCI (has  of RCA), cardiac arrest s/p left-sided ICD (6/4/2019) complicated by infection and s/p R single-chamber ICD ([place] in 9/23/2019), recent admission for VT storm with adjustment of medication and NIPS now presenting for ICD shock in the setting of recurrent monomorphic VT with unsuccessful ATP.  Ablation attempted 10/20 but aborted due to hemodynamically significant pericardial effusion with drain placement.  Pericardial drain removed on 10/20/23.  Over the weekend developed new shortness of breath, recurrent VT s/p ICD shock, and afib w/ RVR s/p cardioversion 10/21. She continues to have NSVT. Bronch negative for mucus plug. Currently in Afib s/p pericardial window 10/28.    - Continue low dose metoprolol tartrate  - Continue Amiodarone 200mg daily   - Would give Coumadin 4mg  tonight with goal INR 2-3; would need a repeat limited TTE after resumption   - Continue low-dose Quinidine   - Agree with switching to PO from IV Lasix  - Will allow for medical optimization and discuss plan for ablation in the future   - Monitor on telemetry  - Keep Mg > 2 and K > 4    Note not final until signed by attending       Erasmo Lundberg MD  Cardiology Fellow PGY-6  Phone: [phone]    For all New Consults  [email or web address]   Login: [place]     71 y/o F w/ PMHx COPD, HLD, HTN, PVD, ICM/CAD s/p PCI (has  of RCA), cardiac arrest s/p left-sided ICD (6/4/2019) complicated by infection and s/p R single-chamber ICD ([place] in 9/23/2019), recent admission for VT storm with adjustment of medication and NIPS now presenting for ICD shock in the setting of recurrent monomorphic VT with unsuccessful ATP.  Ablation attempted 10/20 but aborted due to hemodynamically significant pericardial effusion with drain placement.  Pericardial drain removed on 10/20/23.  Over the weekend developed new shortness of breath, recurrent VT s/p ICD shock, and afib w/ RVR s/p cardioversion 10/21. She continues to have NSVT. Bronch negative for mucus plug. Currently in Afib s/p pericardial window 10/28.    - Continue low dose metoprolol tartrate  - Continue Amiodarone 200mg daily   - Would give Coumadin 4mg  tonight with goal INR 2-3; would need a repeat limited TTE after resumption   - Continue low-dose Quinidine   - Agree with switching to PO from IV Lasix  - Will allow for medical optimization and discuss plan for ablation in the future   - Monitor on telemetry  - Keep Mg > 2 and K > 4    Note not final until signed by attending       Erasmo Lundberg MD  Cardiology Fellow PGY-6  Phone: [phone]    For all New Consults  [email or web address]   Login: [place]

## 2023-11-06 NOTE — PROGRESS NOTE ADULT - PROBLEM SELECTOR PLAN 7
DVT ppx: warfarin   Dispo: pending therapeutic warfarin, likely home. Evaluated by PT, no skilled PT needs

## 2023-11-06 NOTE — PROGRESS NOTE ADULT - PROBLEM SELECTOR PLAN 2
h/o recurrent VT s/p AICD, initially presented for firing x13, with failed ablation attempt on 10/20 secondary to hypotension due to cardiac tamponade  - continue amiodarone, quinidine, and lopressor per EP  - without any further VT on telemetry

## 2023-11-06 NOTE — PROGRESS NOTE ADULT - PROBLEM SELECTOR PLAN 3
s/p pericardial drain placement requiring pericardial window 10/28 with drain removal 10/30. Now hemodynamically stable  - repeat serial TTE's (10/30, 10/31, 11/2, 11/5) stable, w/o echocardiographic evidence of tamponade

## 2023-11-07 NOTE — PROGRESS NOTE ADULT - PROBLEM SELECTOR PLAN 2
h/o recurrent VT s/p AICD, initially presented for firing x13, with failed ablation attempt on 10/20 secondary to hypotension due to cardiac tamponade  - continue amiodarone, quinidine, and lopressor per EP  - without any further VT on telemetry  - AICD pacing threshold changed from 40 to 35 per EP

## 2023-11-07 NOTE — PROGRESS NOTE ADULT - PROBLEM SELECTOR PLAN 7
DVT ppx: warfarin   Dispo: pending therapeutic warfarin, likely home. Evaluated by PT, rec for GUICHO but pt opting to go home with HPT    anticipate dc by end of week.   plan d/w and agreed on by pt

## 2023-11-07 NOTE — PROGRESS NOTE ADULT - ASSESSMENT
73 y/o F w/ PMHx COPD, HLD, HTN, PVD, ICM/CAD s/p PCI (has  of RCA), cardiac arrest s/p left-sided ICD (6/4/2019) complicated by infection and s/p R single-chamber ICD ([place] in 9/23/2019), recent admission for VT storm with adjustment of medication and NIPS now presenting for ICD shock in the setting of recurrent monomorphic VT with unsuccessful ATP.  Ablation attempted 10/20 but aborted due to hemodynamically significant pericardial effusion with drain placement.  Pericardial drain removed on 10/20/23.  Over the weekend developed new shortness of breath, recurrent VT s/p ICD shock, and afib w/ RVR s/p cardioversion 10/21. She continues to have NSVT. Bronch negative for mucus plug. Currently in Afib s/p pericardial window 10/28.    - Switch to metoprolol succinate 12.5mg daily dosed in the morning  - LRL reduced from 40 to 35 to decrease pacing  - Continue Amiodarone 200mg daily   - Check INR this afternoon if INR relatively unchanged since yesterday would increase Coumadin dose for tonight (given 4mg last night); goal INR 2-3  - Would need a repeat limited TTE after resumption   - Continue low-dose Quinidine   - Agree with switching to PO from IV Lasix  - Will allow for medical optimization and discuss plan for ablation in the future   - Monitor on telemetry  - Keep Mg > 2 and K > 4    Note not final until signed by attending       Erasmo Lundberg MD  Cardiology Fellow PGY-6  Phone: [phone]    For all New Consults  [email or web address]   Login: roni 73 y/o F w/ PMHx COPD, HLD, HTN, PVD, ICM/CAD s/p PCI (has  of RCA), cardiac arrest s/p left-sided ICD (6/4/2019) complicated by infection and s/p R single-chamber ICD ([place] in 9/23/2019), recent admission for VT storm with adjustment of medication and NIPS now presenting for ICD shock in the setting of recurrent monomorphic VT with unsuccessful ATP.  Ablation attempted 10/20 but aborted due to hemodynamically significant pericardial effusion with drain placement.  Pericardial drain removed on 10/20/23.  Over the weekend developed new shortness of breath, recurrent VT s/p ICD shock, and afib w/ RVR s/p cardioversion 10/21. She continues to have NSVT. Bronch negative for mucus plug. Currently in Afib s/p pericardial window 10/28.    - Switch to metoprolol succinate 12.5mg daily dosed in the morning  - LRL reduced from 40 to 35 to decrease pacing  - Continue Amiodarone 200mg daily   - Check INR this afternoon if INR relatively unchanged since yesterday would increase Coumadin dose for tonight (given 4mg last night); goal INR 2-3  - Would need a repeat limited TTE after resumption   - Continue low-dose Quinidine   - Agree with switching to PO from IV Lasix  - Will allow for medical optimization and discuss plan for ablation in the future   - Monitor on telemetry  - Keep Mg > 2 and K > 4    Note not final until signed by attending       Erasmo Lundberg MD  Cardiology Fellow PGY-6  Phone: [phone]    For all New Consults  [email or web address]   Login: roni 71 y/o F w/ PMHx COPD, HLD, HTN, PVD, ICM/CAD s/p PCI (has  of RCA), cardiac arrest s/p left-sided ICD (6/4/2019) complicated by infection and s/p R single-chamber ICD ([place] in 9/23/2019), recent admission for VT storm with adjustment of medication and NIPS now presenting for ICD shock in the setting of recurrent monomorphic VT with unsuccessful ATP.  Ablation attempted 10/20 but aborted due to hemodynamically significant pericardial effusion with drain placement.  Pericardial drain removed on 10/20/23.  Over the weekend developed new shortness of breath, recurrent VT s/p ICD shock, and afib w/ RVR s/p cardioversion 10/21. She continues to have NSVT. Bronch negative for mucus plug. Currently in Afib s/p pericardial window 10/28.    - Switch to metoprolol succinate 12.5mg daily dosed in the morning  - LRL reduced from 40 to 35 to decrease pacing  - Continue Amiodarone 200mg daily   - Check INR this afternoon if INR relatively unchanged since yesterday would increase Coumadin dose for tonight (given 4mg last night); goal INR 2-3  - Would need a repeat limited TTE after resumption   - Continue low-dose Quinidine   - Agree with switching to PO from IV Lasix  - Will allow for medical optimization and discuss plan for ablation in the future   - Monitor on telemetry  - Keep Mg > 2 and K > 4    Note not final until signed by attending       Erasmo Lundberg MD  Cardiology Fellow PGY-6  Phone: [phone]    For all New Consults  [email or web address]   Login: roni

## 2023-11-07 NOTE — PROGRESS NOTE ADULT - PROBLEM SELECTOR PLAN 4
new onset Afib F w/ RVR, s/p DCCVx2 10/22. Now in NSR on telemetry   - continue anti-arrhythmics as above per EP  - continue to dose warfarin for goal INR 2-3, still not therapeutic- will recheck midday INR and if no significant rise will increase dose tonight from 4 to 5mg  -- per cards, do NOT bridge warfarin due to bleeding risk

## 2023-11-07 NOTE — PROGRESS NOTE ADULT - SUBJECTIVE AND OBJECTIVE BOX
Jerome Nuñez MD  Division of Hospital Medicine  Available on MS teams until 7pm  If no response or off-hours, page [phone]  -------------------------------------    Patient is a 72y old  Female who presents with a chief complaint of AICD discharge (07 Nov 2023 10:25)      SUBJECTIVE / OVERNIGHT EVENTS: none acute  ADDITIONAL REVIEW OF SYSTEMS: v paced 40-50's on telemetry, otherwise no acute events. Feels well, breathign comfortably, no aches/pains, no fevers/chills.     MEDICATIONS  (STANDING):  acetaminophen     Tablet .. 650 milliGRAM(s) Oral every 6 hours  aMIOdarone    Tablet 200 milliGRAM(s) Oral daily  aspirin  chewable 81 milliGRAM(s) Oral daily  atorvastatin 40 milliGRAM(s) Oral at bedtime  chlorhexidine 2% Cloths 1 Application(s) Topical daily  furosemide   Injectable 40 milliGRAM(s) IV Push two times a day  influenza  Vaccine (HIGH DOSE) 0.7 milliLiter(s) IntraMuscular once  losartan 25 milliGRAM(s) Oral daily  melatonin 5 milliGRAM(s) Oral at bedtime  nystatin Powder 1 Application(s) Topical three times a day  quiNIDine gluconate  milliGRAM(s) Oral every 12 hours  sodium chloride 3%  Inhalation 4 milliLiter(s) Inhalation every 8 hours  warfarin 4 milliGRAM(s) Oral once    MEDICATIONS  (PRN):  aluminum hydroxide/magnesium hydroxide/simethicone Suspension 30 milliLiter(s) Oral every 4 hours PRN Dyspepsia  ondansetron Injectable 4 milliGRAM(s) IV Push every 8 hours PRN Nausea and/or Vomiting      CAPILLARY BLOOD GLUCOSE        I&O's Summary    06 Nov 2023 07:01  -  07 Nov 2023 07:00  --------------------------------------------------------  IN: 590 mL / OUT: 2100 mL / NET: -1510 mL    07 Nov 2023 07:01  -  07 Nov 2023 16:07  --------------------------------------------------------  IN: 360 mL / OUT: 0 mL / NET: 360 mL        PHYSICAL EXAM:  Vital Signs Last 24 Hrs  T(C): 36.8 (07 Nov 2023 12:35), Max: 36.8 (07 Nov 2023 04:55)  T(F): 98.2 (07 Nov 2023 12:35), Max: 98.2 (07 Nov 2023 04:55)  HR: 63 (07 Nov 2023 12:35) (40 - 70)  BP: 108/48 (07 Nov 2023 12:35) (105/65 - 141/72)  BP(mean): 77 (07 Nov 2023 07:40) (77 - 77)  RR: 18 (07 Nov 2023 12:35) (18 - 18)  SpO2: 92% (07 Nov 2023 12:35) (91% - 92%)    Parameters below as of 07 Nov 2023 12:35  Patient On (Oxygen Delivery Method): room air      CONSTITUTIONAL: NAD  EYES: PERRLA; conjunctiva and sclera clear  ENMT: MMM  NECK: Supple  RESPIRATORY: Normal respiratory effort; CTAB  CARDIOVASCULAR: RRR, no JVD, no peripheral edema   ABDOMEN: Nontender to palpation, normoactive BS, no guarding/rigidity  MUSCLOSKELETAL:  no clubbing/cyanosis, no joint swelling or tenderness to palpation  PSYCH: A+O x 3, affect normal  NEUROLOGY: CN 2-12 are intact and symmetric; no gross sensory or motor deficits  SKIN: No rashes; no palpable lesions    LABS:                        10.5   11.26 )-----------( 381      ( 07 Nov 2023 07:05 )             33.0     11-07    141  |  99  |  16  ----------------------------<  92  3.5   |  30  |  0.76    Ca    8.1<L>      07 Nov 2023 07:04  Phos  3.1     11-06  Mg     1.8     11-06      PT/INR - ( 07 Nov 2023 07:05 )   PT: 15.5 sec;   INR: 1.49 ratio               Urinalysis Basic - ( 07 Nov 2023 07:04 )    Color: x / Appearance: x / SG: x / pH: x  Gluc: 92 mg/dL / Ketone: x  / Bili: x / Urobili: x   Blood: x / Protein: x / Nitrite: x   Leuk Esterase: x / RBC: x / WBC x   Sq Epi: x / Non Sq Epi: x / Bacteria: x          RADIOLOGY & ADDITIONAL TESTS:  Results Reviewed:   Imaging Personally Reviewed:  Electrocardiogram Personally Reviewed:    COORDINATION OF CARE:  Care Discussed with Consultants/Other Providers [Y/N]: EP  Prior or Outpatient Records Reviewed [Y/N]:

## 2023-11-07 NOTE — PROGRESS NOTE ADULT - PROBLEM SELECTOR PLAN 1
h/o CAD s/p stents, CHF with LVEF 45-50%. Being treated for ADHF, volume status overall improving   - c/w ASA 81mg, atorvastatin 40, metoprolol 12.5mg BID  - lasix IV, titrate for goal net negative 1-2L, can transition to PO on discharge pending clearance from EP/cards  - strict I/Os, daily weights

## 2023-11-07 NOTE — PROGRESS NOTE ADULT - SUBJECTIVE AND OBJECTIVE BOX
Patient seen and examined at bedside.    Overnight Events:   - Patient felt out of breath overnight on tele occassionally V-paced at 40 (LRL set at 40)  - On tele SB/SR 40-60s  - Denies CP dizziness/LH       Current Meds:  acetaminophen     Tablet .. 650 milliGRAM(s) Oral every 6 hours  aluminum hydroxide/magnesium hydroxide/simethicone Suspension 30 milliLiter(s) Oral every 4 hours PRN  aMIOdarone    Tablet 200 milliGRAM(s) Oral daily  aspirin  chewable 81 milliGRAM(s) Oral daily  atorvastatin 40 milliGRAM(s) Oral at bedtime  chlorhexidine 2% Cloths 1 Application(s) Topical daily  furosemide   Injectable 40 milliGRAM(s) IV Push two times a day  influenza  Vaccine (HIGH DOSE) 0.7 milliLiter(s) IntraMuscular once  losartan 25 milliGRAM(s) Oral daily  melatonin 5 milliGRAM(s) Oral at bedtime  metoprolol tartrate 12.5 milliGRAM(s) Oral two times a day  nystatin Powder 1 Application(s) Topical three times a day  ondansetron Injectable 4 milliGRAM(s) IV Push every 8 hours PRN  quiNIDine gluconate  milliGRAM(s) Oral every 12 hours  sodium chloride 3%  Inhalation 4 milliLiter(s) Inhalation every 8 hours  warfarin 4 milliGRAM(s) Oral once      Vitals:  T(F): 98.2 (11-07), Max: 98.2 (11-07)  HR: 40 (11-07) (40 - 88)  BP: 128/51 (11-07) (110/50 - 141/72)  RR: 18 (11-07)  SpO2: 92% (11-07)  I&O's Summary    06 Nov 2023 07:01  -  07 Nov 2023 07:00  --------------------------------------------------------  IN: 590 mL / OUT: 2100 mL / NET: -1510 mL    07 Nov 2023 07:01  -  07 Nov 2023 10:25  --------------------------------------------------------  IN: 120 mL / OUT: 0 mL / NET: 120 mL        Physical Exam:  Appearance: No acute distress; well appearing  Eyes: PERRL, EOMI, pink conjunctiva  HEENT: Normal oral mucosa  Cardiovascular: RRR, S1, S2, no murmurs, rubs, or gallops; no edema; no JVD  Respiratory: Decreased breath sounds at the bases but improved lung sounds on left side   Gastrointestinal: soft, non-tender, non-distended with normal bowel sounds  Musculoskeletal: No clubbing; no joint deformity   Neurologic: Non-focal  Lymphatic: No lymphadenopathy  Psychiatry: AAOx3, mood & affect appropriate  Skin: No rashes, ecchymoses, or cyanosis                        10.5   11.26 )-----------( 381      ( 07 Nov 2023 07:05 )             33.0     11-07    141  |  99  |  16  ----------------------------<  92  3.5   |  30  |  0.76    Ca    8.1<L>      07 Nov 2023 07:04  Phos  3.1     11-06  Mg     1.8     11-06      PT/INR - ( 07 Nov 2023 07:05 )   PT: 15.5 sec;   INR: 1.49 ratio

## 2023-11-07 NOTE — PROGRESS NOTE ADULT - ASSESSMENT
72F c hx COPD, HTN, PAD, CAD s/p LAD stent (last LHC showing 100%  of RCA), chronic leukocytosis of unclear etiol, cardiac arrest s/p left-sided ICD (6/4/2019) complicated by infection and s/p R single-chamber ICD reimplantation ([place] in 9/23/2019), CHF EF 45%, grade 2 DHF, recent hospitalization for UTI (treated w/ ceftriaxone and Vanco x 3 days) and VFib s/p 13 AICD firings, pw lightheadedness and AICD firing. Hospital course significant for attempted VT ablation aborted 2/2 cardiac tamponade w/hypotension, s/p drain. Subsequently devolved repeat tamponade w/ pericardial window on 10/28. Transferred to [facility] for continued management and coumadinizing in preparation for discharge.

## 2023-11-07 NOTE — PROGRESS NOTE ADULT - NSPROGADDITIONALINFOA_GEN_ALL_CORE
The necessity of the time spent during the encounter on this date of service was due to:   - Ordering, reviewing, and interpreting labs, testing, and imaging.  - Independently obtaining a review of systems and performing a physical exam  - Reviewing prior hospitalization and where necessary, outpatient records.  - Counselling and educating patient and family regarding interpretation of aforementioned items and plan of care.    Time-based billing (NON-critical care). Total minutes spent: 57

## 2023-11-07 NOTE — PROCEDURE NOTE - ADDITIONAL PROCEDURE DETAILS
Changed LRL from 40 to 35  Also switching from metoprolol tartrate to succinate 12.5mg in the AM
Estimated battery life 6.2 years (4.8-7.7)

## 2023-11-08 NOTE — PROGRESS NOTE ADULT - PROBLEM SELECTOR PLAN 1
- acute on chronic systolic HF now resolved; euvolemic; continue to monitor fluid status  h/o CAD s/p stents, HFmrEF with LVEF 45-50%  - c/w ASA 81mg, atorvastatin 40, metoprolol 12.5mg BID  - switch to Lasix PO 40mg BID; now euvolemic  - strict I/Os, daily weights

## 2023-11-08 NOTE — PROGRESS NOTE ADULT - PROBLEM SELECTOR PLAN 3
s/p pericardial drain placement requiring pericardial window 10/28 with drain removal 10/30. Now hemodynamically stable  - repeat serial TTE's (10/30, 10/31, 11/2, 11/5) stable, w/o echocardiographic evidence of tamponade  - pending repeat TTE 11/8

## 2023-11-08 NOTE — PROGRESS NOTE ADULT - PROBLEM SELECTOR PLAN 2
h/o recurrent VT s/p AICD, initially presented for firing x13, with failed ablation attempt on 10/20 secondary to hypotension due to cardiac tamponade  - continue amiodarone, quinidine, and lopressor per EP  - without any further VT on telemetry  - AICD pacing threshold changed from 40 to 35 per EP  - outpatient plan for consideration of ablation

## 2023-11-08 NOTE — PROGRESS NOTE ADULT - PROBLEM SELECTOR PLAN 6
Persistent leukocytosis since August 2023 ranging 14-29k  - no evidence of active infection or sepsis at this time. s/p 7d of CTX for PNA, completed 10/28   - continue to monitor ; stable   - outpatient hematology follow up

## 2023-11-08 NOTE — PROGRESS NOTE ADULT - SUBJECTIVE AND OBJECTIVE BOX
Patient is a 72y old  Female who presents with a chief complaint of AICD discharge (08 Nov 2023 09:43)      SUBJECTIVE / OVERNIGHT EVENTS:    feels well. a lot better but tired. denies chest pain, sob, palpitations    tele SR/SB 40s-60s    ROS:  14 point ROS negative in detail except stated as above    MEDICATIONS  (STANDING):  acetaminophen     Tablet .. 650 milliGRAM(s) Oral every 6 hours  aMIOdarone    Tablet 200 milliGRAM(s) Oral daily  aspirin  chewable 81 milliGRAM(s) Oral daily  atorvastatin 40 milliGRAM(s) Oral at bedtime  chlorhexidine 2% Cloths 1 Application(s) Topical daily  furosemide    Tablet 40 milliGRAM(s) Oral every 12 hours  influenza  Vaccine (HIGH DOSE) 0.7 milliLiter(s) IntraMuscular once  losartan 25 milliGRAM(s) Oral daily  melatonin 5 milliGRAM(s) Oral at bedtime  metoprolol succinate ER 12.5 milliGRAM(s) Oral daily  nystatin Powder 1 Application(s) Topical three times a day  quiNIDine gluconate  milliGRAM(s) Oral every 12 hours  sodium chloride 3%  Inhalation 4 milliLiter(s) Inhalation every 8 hours  warfarin 5 milliGRAM(s) Oral once    MEDICATIONS  (PRN):  aluminum hydroxide/magnesium hydroxide/simethicone Suspension 30 milliLiter(s) Oral every 4 hours PRN Dyspepsia  ondansetron Injectable 4 milliGRAM(s) IV Push every 8 hours PRN Nausea and/or Vomiting      CAPILLARY BLOOD GLUCOSE        I&O's Summary    07 Nov 2023 07:01  -  08 Nov 2023 07:00  --------------------------------------------------------  IN: 480 mL / OUT: 500 mL / NET: -20 mL    08 Nov 2023 07:01  -  08 Nov 2023 11:09  --------------------------------------------------------  IN: 400 mL / OUT: 0 mL / NET: 400 mL        PHYSICAL EXAM:  Vital Signs Last 24 Hrs  T(C): 36.6 (08 Nov 2023 04:23), Max: 36.8 (07 Nov 2023 12:35)  T(F): 97.9 (08 Nov 2023 04:23), Max: 98.2 (07 Nov 2023 12:35)  HR: 85 (08 Nov 2023 04:23) (59 - 85)  BP: 145/73 (08 Nov 2023 04:23) (105/65 - 146/69)  BP(mean): --  RR: 18 (08 Nov 2023 04:23) (18 - 18)  SpO2: 98% (08 Nov 2023 04:23) (92% - 98%)    Parameters below as of 08 Nov 2023 04:23  Patient On (Oxygen Delivery Method): room air      CONSTITUTIONAL: NAD  EYES: PERRLA; conjunctiva and sclera clear  ENMT: MM  NECK: Supple  RESPIRATORY: Normal respiratory effort; CTAB  CARDIOVASCULAR: RRR, no JVD, no peripheral edema   ABDOMEN: Nontender to palpation, normoactive BS, no guarding/rigidity  MUSCLOSKELETAL:  no clubbing/cyanosis, no joint swelling or tenderness to palpation  PSYCH: A+O x 3, affect normal  NEUROLOGY: CN 2-12 are intact and symmetric; no gross sensory or motor deficits  SKIN: No rashes; no palpable lesions  LABS:                        10.5   11.26 )-----------( 381      ( 07 Nov 2023 07:05 )             33.0     11-07    141  |  99  |  16  ----------------------------<  92  3.5   |  30  |  0.76    Ca    8.1<L>      07 Nov 2023 07:04      PT/INR - ( 08 Nov 2023 06:54 )   PT: 17.8 sec;   INR: 1.64 ratio         PTT - ( 08 Nov 2023 06:54 )  PTT:29.5 sec      Urinalysis Basic - ( 07 Nov 2023 07:04 )    Color: x / Appearance: x / SG: x / pH: x  Gluc: 92 mg/dL / Ketone: x  / Bili: x / Urobili: x   Blood: x / Protein: x / Nitrite: x   Leuk Esterase: x / RBC: x / WBC x   Sq Epi: x / Non Sq Epi: x / Bacteria: x        RADIOLOGY & ADDITIONAL TESTS:    Imaging Personally Reviewed:    Consultant(s) Notes Reviewed:      Care Discussed with Consultants/Other Providers:  bautista Garvin

## 2023-11-08 NOTE — PROGRESS NOTE ADULT - PROBLEM SELECTOR PLAN 4
new onset Afib F w/ RVR, s/p DCCVx2 10/22. Now in NSR on telemetry   - continue anti-arrhythmics as above per EP  - continue to dose warfarin for goal INR 2-3, still not therapeutic- coumadin 5mg tonight  -- per cards, do NOT bridge warfarin due to bleeding risk

## 2023-11-08 NOTE — PROGRESS NOTE ADULT - PROBLEM SELECTOR PLAN 7
DVT ppx: warfarin   Dispo: pending therapeutic warfarin, repeat TTE  home. Evaluated by PT, rec for GUICHO but pt opting to go home with HPT    called  [facility] [phone] x 2 no answer and option for VM

## 2023-11-08 NOTE — PROGRESS NOTE ADULT - ASSESSMENT
72F c hx COPD, HTN, PAD, CAD s/p LAD stent (last LHC showing 100%  of RCA), chronic leukocytosis of unclear etiol, cardiac arrest s/p left-sided ICD (6/4/2019) complicated by infection and s/p R single-chamber ICD reimplantation ([place] in 9/23/2019), CHF EF 45%, grade 2 DHF, recent hospitalization for UTI (treated w/ ceftriaxone and Vanco x 3 days) and VFib s/p 13 AICD firings, pw lightheadedness and AICD firing. Hospital course significant for attempted VT ablation aborted 2/2 cardiac tamponade w/hypotension, s/p drain. Subsequently devolved repeat tamponade w/ pericardial window on 10/28 now pending repeat TTE

## 2023-11-08 NOTE — PROGRESS NOTE ADULT - PROBLEM SELECTOR PROBLEM 7
Prophylactic measure
Candidal dermatitis
Prophylactic measure
Leukocytosis
Prophylactic measure
Leukocytosis

## 2023-11-08 NOTE — PROGRESS NOTE ADULT - PROBLEM SELECTOR PROBLEM 1
Chronic systolic congestive heart failure
Ventricular tachycardia
Ventricular tachycardia
Chronic systolic congestive heart failure
Ventricular tachycardia
Ventricular tachycardia
Chronic systolic congestive heart failure
Ventricular tachycardia

## 2023-11-08 NOTE — PROGRESS NOTE ADULT - ASSESSMENT
73 y/o F w/ PMHx COPD, HLD, HTN, PVD, ICM/CAD s/p PCI (has  of RCA), cardiac arrest s/p left-sided ICD (6/4/2019) complicated by infection and s/p R single-chamber ICD ([place] in 9/23/2019), recent admission for VT storm with adjustment of medication and NIPS now presenting for ICD shock in the setting of recurrent monomorphic VT with unsuccessful ATP.  Ablation attempted 10/20 but aborted due to hemodynamically significant pericardial effusion with drain placement.  Pericardial drain removed on 10/20/23.  Over the weekend developed new shortness of breath, recurrent VT s/p ICD shock, and afib w/ RVR s/p cardioversion 10/21. She continues to have NSVT. Bronch negative for mucus plug. Currently in Afib s/p pericardial window 10/28.    - Continue metoprolol succinate 12.5mg daily in AM  - LRL reduced to 35   - Continue Amiodarone 200mg daily   - Continue dosing coumadin INR goal 2-3;  - Check repeat TTE today  - Continue low-dose Quinidine   - Agree with switching to PO from IV Lasix  - Will allow for medical optimization and discuss plan for ablation in the future   - Once INR therapeutic and limited TTE shows no significant pericardial effusion can be discharged from EP standpoint   - Monitor on telemetry  - Keep Mg > 2 and K > 4    Note not final until signed by attending       Erasmo Lundberg MD  Cardiology Fellow PGY-6  Phone: [phone]    For all New Consults  [email or web address]   Login: roni   71 y/o F w/ PMHx COPD, HLD, HTN, PVD, ICM/CAD s/p PCI (has  of RCA), cardiac arrest s/p left-sided ICD (6/4/2019) complicated by infection and s/p R single-chamber ICD ([place] in 9/23/2019), recent admission for VT storm with adjustment of medication and NIPS now presenting for ICD shock in the setting of recurrent monomorphic VT with unsuccessful ATP.  Ablation attempted 10/20 but aborted due to hemodynamically significant pericardial effusion with drain placement.  Pericardial drain removed on 10/20/23.  Over the weekend developed new shortness of breath, recurrent VT s/p ICD shock, and afib w/ RVR s/p cardioversion 10/21. She continues to have NSVT. Bronch negative for mucus plug. Currently in Afib s/p pericardial window 10/28.    - Continue metoprolol succinate 12.5mg daily in AM  - LRL reduced to 35   - Continue Amiodarone 200mg daily   - Continue dosing coumadin INR goal 2-3;  - Check repeat TTE today  - Continue low-dose Quinidine   - Agree with switching to PO from IV Lasix  - Will allow for medical optimization and discuss plan for ablation in the future   - Once INR therapeutic and limited TTE shows no significant pericardial effusion can be discharged from EP standpoint   - Monitor on telemetry  - Keep Mg > 2 and K > 4    Note not final until signed by attending       Erasmo Lundberg MD  Cardiology Fellow PGY-6  Phone: [phone]    For all New Consults  [email or web address]   Login: roni   73 y/o F w/ PMHx COPD, HLD, HTN, PVD, ICM/CAD s/p PCI (has  of RCA), cardiac arrest s/p left-sided ICD (6/4/2019) complicated by infection and s/p R single-chamber ICD ([place] in 9/23/2019), recent admission for VT storm with adjustment of medication and NIPS now presenting for ICD shock in the setting of recurrent monomorphic VT with unsuccessful ATP.  Ablation attempted 10/20 but aborted due to hemodynamically significant pericardial effusion with drain placement.  Pericardial drain removed on 10/20/23.  Over the weekend developed new shortness of breath, recurrent VT s/p ICD shock, and afib w/ RVR s/p cardioversion 10/21. She continues to have NSVT. Bronch negative for mucus plug. Currently in Afib s/p pericardial window 10/28.    - Continue metoprolol succinate 12.5mg daily in AM  - LRL reduced to 35   - Continue Amiodarone 200mg daily   - Continue dosing coumadin INR goal 2-3;  - Check repeat TTE today  - Continue low-dose Quinidine   - Agree with switching to PO from IV Lasix  - Will allow for medical optimization and discuss plan for ablation in the future   - Once INR therapeutic and limited TTE shows no significant pericardial effusion can be discharged from EP standpoint   - Monitor on telemetry  - Keep Mg > 2 and K > 4    Note not final until signed by attending       Erasmo Lundberg MD  Cardiology Fellow PGY-6  Phone: [phone]    For all New Consults  [email or web address]   Login: roni

## 2023-11-08 NOTE — PROGRESS NOTE ADULT - SUBJECTIVE AND OBJECTIVE BOX
Patient seen and examined at bedside.    Overnight Events:   - No acute events overnight  - On tele SB/SR 40-60s  - Denies shortness of breath this AM or overnight       Current Meds:  acetaminophen     Tablet .. 650 milliGRAM(s) Oral every 6 hours  aluminum hydroxide/magnesium hydroxide/simethicone Suspension 30 milliLiter(s) Oral every 4 hours PRN  aMIOdarone    Tablet 200 milliGRAM(s) Oral daily  aspirin  chewable 81 milliGRAM(s) Oral daily  atorvastatin 40 milliGRAM(s) Oral at bedtime  chlorhexidine 2% Cloths 1 Application(s) Topical daily  furosemide   Injectable 40 milliGRAM(s) IV Push two times a day  influenza  Vaccine (HIGH DOSE) 0.7 milliLiter(s) IntraMuscular once  losartan 25 milliGRAM(s) Oral daily  melatonin 5 milliGRAM(s) Oral at bedtime  metoprolol succinate ER 12.5 milliGRAM(s) Oral daily  nystatin Powder 1 Application(s) Topical three times a day  ondansetron Injectable 4 milliGRAM(s) IV Push every 8 hours PRN  quiNIDine gluconate  milliGRAM(s) Oral every 12 hours  sodium chloride 3%  Inhalation 4 milliLiter(s) Inhalation every 8 hours      Vitals:  T(F): 97.9 (11-08), Max: 98.2 (11-07)  HR: 85 (11-08) (59 - 85)  BP: 145/73 (11-08) (105/65 - 146/69)  RR: 18 (11-08)  SpO2: 98% (11-08)  I&O's Summary    07 Nov 2023 07:01  -  08 Nov 2023 07:00  --------------------------------------------------------  IN: 480 mL / OUT: 500 mL / NET: -20 mL    08 Nov 2023 07:01  -  08 Nov 2023 09:43  --------------------------------------------------------  IN: 400 mL / OUT: 0 mL / NET: 400 mL        Physical Exam:  Appearance: No acute distress; well appearing  Eyes: PERRL, EOMI, pink conjunctiva  HEENT: Normal oral mucosa  Cardiovascular: RRR, S1, S2, no murmurs, rubs, or gallops; no edema; no JVD  Respiratory: Decreased breath sounds at the bases but improved lung sounds on left side   Gastrointestinal: soft, non-tender, non-distended with normal bowel sounds  Musculoskeletal: No clubbing; no joint deformity   Neurologic: Non-focal  Lymphatic: No lymphadenopathy  Psychiatry: AAOx3, mood & affect appropriate  Skin: No rashes, ecchymoses, or cyanosis                          10.5   11.26 )-----------( 381      ( 07 Nov 2023 07:05 )             33.0     11-07    141  |  99  |  16  ----------------------------<  92  3.5   |  30  |  0.76    Ca    8.1<L>      07 Nov 2023 07:04  Phos  3.1     11-06  Mg     1.8     11-06      PT/INR - ( 08 Nov 2023 06:54 )   PT: 17.8 sec;   INR: 1.64 ratio         PTT - ( 08 Nov 2023 06:54 )  PTT:29.5 sec

## 2023-11-09 NOTE — PROGRESS NOTE ADULT - SUBJECTIVE AND OBJECTIVE BOX
24H hour events: Patient resting comfortably in chair.  Denies c/o CP, palpitations, dizziness or SOB.  States she wants to go home today.     MEDICATIONS:  aMIOdarone    Tablet 200 milliGRAM(s) Oral daily  aspirin  chewable 81 milliGRAM(s) Oral daily  furosemide    Tablet 40 milliGRAM(s) Oral every 12 hours  losartan 25 milliGRAM(s) Oral daily  metoprolol succinate ER 12.5 milliGRAM(s) Oral daily  quiNIDine gluconate  milliGRAM(s) Oral every 12 hours      sodium chloride 3%  Inhalation 4 milliLiter(s) Inhalation every 8 hours    acetaminophen     Tablet .. 650 milliGRAM(s) Oral every 6 hours  melatonin 5 milliGRAM(s) Oral at bedtime  ondansetron Injectable 4 milliGRAM(s) IV Push every 8 hours PRN    aluminum hydroxide/magnesium hydroxide/simethicone Suspension 30 milliLiter(s) Oral every 4 hours PRN    atorvastatin 40 milliGRAM(s) Oral at bedtime    chlorhexidine 2% Cloths 1 Application(s) Topical daily  influenza  Vaccine (HIGH DOSE) 0.7 milliLiter(s) IntraMuscular once  nystatin Powder 1 Application(s) Topical three times a day      REVIEW OF SYSTEMS:  Complete 12point ROS negative.    PHYSICAL EXAM:  T(C): 36.6 (11-09-23 @ 10:50), Max: 36.9 (11-08-23 @ 12:54)  HR: 69 (11-09-23 @ 10:50) (64 - 72)  BP: 136/67 (11-09-23 @ 10:50) (128/55 - 146/75)  RR: 18 (11-09-23 @ 10:50) (18 - 18)  SpO2: 90% (11-09-23 @ 10:50) (90% - 95%)    08 Nov 2023 07:01  -  09 Nov 2023 07:00  --------------------------------------------------------  IN: 558 mL / OUT: 1000 mL / NET: -442 mL    09 Nov 2023 07:01  -  09 Nov 2023 11:17  --------------------------------------------------------  IN: 240 mL / OUT: 0 mL / NET: 240 mL      Appearance: Normal	  HEENT:   Normal oral mucosa, PERRL, EOMI	  Cardiovascular: Normal S1 S2, regular. No JVD, No murmurs, No edema  Respiratory: Lungs clear to auscultation	  Psychiatry: A & O x 3, Mood & affect appropriate  Gastrointestinal:  Soft, Non-tender, + BS	  Skin: No rashes, No ecchymoses, No cyanosis	  Extremities: Normal range of motion, No clubbing, cyanosis or edema  Vascular: Peripheral pulses palpable 2+ bilaterally      LABS:	 	    CBC Full  -  ( 09 Nov 2023 05:22 )  WBC Count : 12.52 K/uL  Hemoglobin : 11.0 g/dL  Hematocrit : 35.1 %  Platelet Count - Automated : 329 K/uL  Mean Cell Volume : 91.2 fl  Mean Cell Hemoglobin : 28.6 pg  Mean Cell Hemoglobin Concentration : 31.3 gm/dL  Auto Neutrophil # : x  Auto Lymphocyte # : x  Auto Monocyte # : x  Auto Eosinophil # : x  Auto Basophil # : x  Auto Neutrophil % : x  Auto Lymphocyte % : x  Auto Monocyte % : x  Auto Eosinophil % : x  Auto Basophil % : x    11-09    140  |  100  |  18  ----------------------------<  103<H>  3.5   |  30  |  0.85    Ca    8.5      09 Nov 2023 05:22        TELEMETRY: 	 NSR 50's- 80's

## 2023-11-09 NOTE — DISCHARGE NOTE NURSING/CASE MANAGEMENT/SOCIAL WORK - NSSCNAMETXT_GEN_ALL_CORE
A Nurse From [facility] Will Contact You To Schedule A Visit 1 Day Post Discharge

## 2023-11-09 NOTE — DISCHARGE NOTE NURSING/CASE MANAGEMENT/SOCIAL WORK - NSDCPEWEB_GEN_ALL_CORE
[facility] for Tobacco Control website --- [email or web address] website --- [email or web address]

## 2023-11-09 NOTE — CHART NOTE - NSCHARTNOTEFT_GEN_A_CORE
patient feeling well. tele reviewed. a fib rates controlled. repeat limited TTE with stable trace pericardial effusion. INR therapeutic. EP clearance much appreciated. dc home w PT today. coumadin 2mg tonight. needs clinic appt which is being set up/ d/w patient and , [place] over the phone. referred to PCP Dr. Blas- they will call them for appt. EP follow up opt as well cards.

## 2023-11-09 NOTE — DISCHARGE NOTE NURSING/CASE MANAGEMENT/SOCIAL WORK - PATIENT PORTAL LINK FT
You can access the FollowMyHealth Patient Portal offered by [facility] by registering at the following website: [email or web address]. By joining [facility]’s FollowMyHealth portal, you will also be able to view your health information using other applications (apps) compatible with our system.

## 2023-11-09 NOTE — DISCHARGE NOTE NURSING/CASE MANAGEMENT/SOCIAL WORK - NSDCPEEMAIL_GEN_ALL_CORE
[facility] for Tobacco Control email [email or web address]

## 2023-11-09 NOTE — DISCHARGE NOTE NURSING/CASE MANAGEMENT/SOCIAL WORK - NSDCFUADDAPPT_GEN_ALL_CORE_FT
APPTS ARE READY TO BE MADE: [X] YES    Best Family or Patient Contact (if needed):    Additional Information about above appointments (if needed):    1: Dr. Wise for INR checks (pt has appt scheduled in Jan, needs sooner appt for f/u)  2:  3:

## 2023-11-09 NOTE — PROGRESS NOTE ADULT - ASSESSMENT
73 y/o F w/ PMHx COPD, HLD, HTN, PVD, ICM/CAD s/p PCI (has  of RCA), cardiac arrest s/p left-sided ICD (6/4/2019) complicated by infection and s/p R single-chamber ICD ([place] in 9/23/2019), recent admission for VT storm with adjustment of medication and NIPS now presenting for ICD shock in the setting of recurrent monomorphic VT with unsuccessful ATP.  Ablation attempted 10/20 but aborted due to hemodynamically significant pericardial effusion with drain placement.  Pericardial drain removed on 10/20/23.  Post ablation attemt developed recurrent VT s/p ICD shock, and afib w/ RVR s/p cardioversion 10/21.  LRL of device reduced to 35bpm.     - Continue metoprolol succinate 12.5mg daily in AM  - Continue Amiodarone 200mg daily   - Continue dosing coumadin INR goal 2-3;  - repeat TTE done on 11/8 with trace pericardial effusion   - Continue on Quinidine   - Will allow for medical optimization and discuss plan for ablation in the future   - INR today 2.26, cleared by EP for discharge planning as discussed with Medicine ACP  Coumadin 2mg PO x 1 today  - Out patient follow up with Dr. Barb Mcelroy and Dr. Hurt   - Keep Mg > 2 and K > 4    L. Sang [facility]  [phone]  71 y/o F w/ PMHx COPD, HLD, HTN, PVD, ICM/CAD s/p PCI (has  of RCA), cardiac arrest s/p left-sided ICD (6/4/2019) complicated by infection and s/p R single-chamber ICD ([place] in 9/23/2019), recent admission for VT storm with adjustment of medication and NIPS now presenting for ICD shock in the setting of recurrent monomorphic VT with unsuccessful ATP.  Ablation attempted 10/20 but aborted due to hemodynamically significant pericardial effusion with drain placement.  Pericardial drain removed on 10/20/23.  Post ablation attemt developed recurrent VT s/p ICD shock, and afib w/ RVR s/p cardioversion 10/21.  LRL of device reduced to 35bpm.     - Continue metoprolol succinate 12.5mg daily in AM  - Continue Amiodarone 200mg daily   - Continue dosing coumadin INR goal 2-3;  - repeat TTE done on 11/8 with trace pericardial effusion   - Continue on Quinidine   - Will allow for medical optimization and discuss plan for ablation in the future   - INR today 2.26, cleared by EP for discharge planning as discussed with Medicine ACP  Coumadin 2mg PO x 1 today  - Out patient follow up with Dr. Barb Mcelroy and Dr. Hurt   - Keep Mg > 2 and K > 4    L. Sang [facility]  [phone]  73 y/o F w/ PMHx COPD, HLD, HTN, PVD, ICM/CAD s/p PCI (has  of RCA), cardiac arrest s/p left-sided ICD (6/4/2019) complicated by infection and s/p R single-chamber ICD ([place] in 9/23/2019), recent admission for VT storm with adjustment of medication and NIPS now presenting for ICD shock in the setting of recurrent monomorphic VT with unsuccessful ATP.  Ablation attempted 10/20 but aborted due to hemodynamically significant pericardial effusion with drain placement.  Pericardial drain removed on 10/20/23.  Post ablation attemt developed recurrent VT s/p ICD shock, and afib w/ RVR s/p cardioversion 10/21.  LRL of device reduced to 35bpm.     - Continue metoprolol succinate 12.5mg daily in AM  - Continue Amiodarone 200mg daily   - Continue dosing coumadin INR goal 2-3;  - repeat TTE done on 11/8 with trace pericardial effusion   - Continue on Quinidine   - Will allow for medical optimization and discuss plan for ablation in the future   - INR today 2.26, cleared by EP for discharge planning as discussed with Medicine ACP  Coumadin 2mg PO x 1 today  - Out patient follow up with Dr. Barb Mcelroy and Dr. Hurt   - Keep Mg > 2 and K > 4    L. Sang [facility]  [phone]  71 y/o F w/ PMHx COPD, HLD, HTN, PVD, ICM/CAD s/p PCI (has  of RCA), cardiac arrest s/p left-sided ICD (6/4/2019) complicated by infection and s/p R single-chamber ICD ([place] in 9/23/2019), recent admission for VT storm with adjustment of medication and NIPS now presenting for ICD shock in the setting of recurrent monomorphic VT with unsuccessful ATP.  Ablation attempted 10/20 but aborted due to hemodynamically significant pericardial effusion with drain placement.  Pericardial drain removed on 10/20/23.  Post ablation attemt developed recurrent VT s/p ICD shock, and afib w/ RVR s/p cardioversion 10/21.  LRL of device reduced to 35bpm.     - Continue metoprolol succinate 12.5mg daily in AM  - Continue Amiodarone 200mg daily   - Continue dosing coumadin INR goal 2-3;  - repeat TTE done on 11/8 with trace pericardial effusion   - Continue on Quinidine   - Will allow for medical optimization and discuss plan for ablation in the future   - INR today 2.26, cleared by EP for discharge planning as discussed with Medicine ACP  Coumadin 2mg PO x 1 today.  Patient will need out patient INR check tomorrow 11/10.   - Out patient follow up with Dr. Barb Mcelroy and Dr. Hurt.  EP Clinic will call patient with follow up appointment for 4-6 weeks.   - Keep Mg > 2 and K > 4    L. Mahendraeotafilatoya [facility]  [phone]  71 y/o F w/ PMHx COPD, HLD, HTN, PVD, ICM/CAD s/p PCI (has  of RCA), cardiac arrest s/p left-sided ICD (6/4/2019) complicated by infection and s/p R single-chamber ICD ([place] in 9/23/2019), recent admission for VT storm with adjustment of medication and NIPS now presenting for ICD shock in the setting of recurrent monomorphic VT with unsuccessful ATP.  Ablation attempted 10/20 but aborted due to hemodynamically significant pericardial effusion with drain placement.  Pericardial drain removed on 10/20/23.  Post ablation attemt developed recurrent VT s/p ICD shock, and afib w/ RVR s/p cardioversion 10/21.  LRL of device reduced to 35bpm.     - Continue metoprolol succinate 12.5mg daily in AM  - Continue Amiodarone 200mg daily   - Continue dosing coumadin INR goal 2-3;  - repeat TTE done on 11/8 with trace pericardial effusion   - Continue on Quinidine   - Will allow for medical optimization and discuss plan for ablation in the future   - INR today 2.26, cleared by EP for discharge planning as discussed with Medicine ACP  Coumadin 2mg PO x 1 today.  Patient will need out patient INR check tomorrow 11/10.   - Out patient follow up with Dr. Barb Mcelroy and Dr. Hurt.  EP Clinic will call patient with follow up appointment for 4-6 weeks.   - Keep Mg > 2 and K > 4    L. Mahendraeotafilatoya [facility]  [phone]  71 y/o F w/ PMHx COPD, HLD, HTN, PVD, ICM/CAD s/p PCI (has  of RCA), cardiac arrest s/p left-sided ICD (6/4/2019) complicated by infection and s/p R single-chamber ICD ([place] in 9/23/2019), recent admission for VT storm with adjustment of medication and NIPS now presenting for ICD shock in the setting of recurrent monomorphic VT with unsuccessful ATP.  Ablation attempted 10/20 but aborted due to hemodynamically significant pericardial effusion with drain placement.  Pericardial drain removed on 10/20/23.  Post ablation attemt developed recurrent VT s/p ICD shock, and afib w/ RVR s/p cardioversion 10/21.  LRL of device reduced to 35bpm.     - Continue metoprolol succinate 12.5mg daily in AM  - Continue Amiodarone 200mg daily   - Continue dosing coumadin INR goal 2-3;  - repeat TTE done on 11/8 with trace pericardial effusion   - Continue on Quinidine   - Will allow for medical optimization and discuss plan for ablation in the future   - INR today 2.26, cleared by EP for discharge planning as discussed with Medicine ACP  Coumadin 2mg PO x 1 today.  Patient will need out patient INR check tomorrow 11/10.   - Out patient follow up with Dr. Barb Mcelroy and Dr. Hurt.  EP Clinic will call patient with follow up appointment for 4-6 weeks.   - Keep Mg > 2 and K > 4    L. Mahendraeotafilatoya [facility]  [phone]

## 2023-11-09 NOTE — DISCHARGE NOTE NURSING/CASE MANAGEMENT/SOCIAL WORK - NSDCVIVACCINE_GEN_ALL_CORE_FT
influenza, injectable, quadrivalent, preservative free; 24-Jan-2015 06:54; Gay, Auto-process (IS); [employer]; [id number]; IntraMuscular; Deltoid Left.; 0.5 milliLiter(s); VIS (VIS Published: 19-Aug-2014, VIS Presented: 24-Jan-2015);

## 2023-11-09 NOTE — PROGRESS NOTE ADULT - NUTRITIONAL ASSESSMENT
This patient has been assessed with a concern for Malnutrition and has been determined to have a diagnosis/diagnoses of Severe protein-calorie malnutrition.    This patient is being managed with:   Diet Regular-  DASH/TLC {Sodium & Cholesterol Restricted} (DASH)  Supplement Feeding Modality:  Oral  Ensure Plus High Protein Cans or Servings Per Day:  2       Frequency:  Two Times a day  Entered: Nov  3 2023  1:29PM  
This patient has been assessed with a concern for Malnutrition and has been determined to have a diagnosis/diagnoses of Severe protein-calorie malnutrition.    This patient is being managed with:   Diet Regular-  DASH/TLC {Sodium & Cholesterol Restricted} (DASH)  Supplement Feeding Modality:  Oral  Ensure Plus High Protein Cans or Servings Per Day:  2       Frequency:  Two Times a day  Entered: Nov  3 2023  1:29PM  
This patient has been assessed with a concern for Malnutrition and has been determined to have a diagnosis/diagnoses of Severe protein-calorie malnutrition.    This patient is being managed with:   Diet Regular-  Supplement Feeding Modality:  Oral  Ensure Plus High Protein Cans or Servings Per Day:  2       Frequency:  Two Times a day  Entered: Oct 25 2023  2:36PM  
This patient has been assessed with a concern for Malnutrition and has been determined to have a diagnosis/diagnoses of Severe protein-calorie malnutrition.    This patient is being managed with:   Diet Regular-  Supplement Feeding Modality:  Oral  Ensure Plus High Protein Cans or Servings Per Day:  2       Frequency:  Two Times a day  Entered: Oct 28 2023 11:36AM  
This patient has been assessed with a concern for Malnutrition and has been determined to have a diagnosis/diagnoses of Severe protein-calorie malnutrition.    This patient is being managed with:   Diet Regular-  Supplement Feeding Modality:  Oral  Ensure Plus High Protein Cans or Servings Per Day:  2       Frequency:  Two Times a day  Entered: Oct 25 2023  2:36PM  
This patient has been assessed with a concern for Malnutrition and has been determined to have a diagnosis/diagnoses of Severe protein-calorie malnutrition.    This patient is being managed with:   Diet Regular-  Supplement Feeding Modality:  Oral  Ensure Plus High Protein Cans or Servings Per Day:  2       Frequency:  Two Times a day  Entered: Oct 28 2023 11:36AM  
This patient has been assessed with a concern for Malnutrition and has been determined to have a diagnosis/diagnoses of Severe protein-calorie malnutrition.    This patient is being managed with:   Diet NPO after Midnight-     NPO Start Date: 27-Oct-2023   NPO Start Time: 23:59  Except Medications     Special Instructions for Nursing:  Except Medications  Entered: Oct 27 2023  9:46PM    Diet Regular-  Supplement Feeding Modality:  Oral  Ensure Plus High Protein Cans or Servings Per Day:  2       Frequency:  Two Times a day  Entered: Oct 25 2023  2:36PM  
This patient has been assessed with a concern for Malnutrition and has been determined to have a diagnosis/diagnoses of Severe protein-calorie malnutrition.    This patient is being managed with:   Diet Regular-  Supplement Feeding Modality:  Oral  Ensure Plus High Protein Cans or Servings Per Day:  2       Frequency:  Two Times a day  Entered: Oct 28 2023 11:36AM  
This patient has been assessed with a concern for Malnutrition and has been determined to have a diagnosis/diagnoses of Severe protein-calorie malnutrition.    This patient is being managed with:   Diet Regular-  DASH/TLC {Sodium & Cholesterol Restricted} (DASH)  Supplement Feeding Modality:  Oral  Ensure Plus High Protein Cans or Servings Per Day:  2       Frequency:  Two Times a day  Entered: Nov  3 2023  1:29PM  
This patient has been assessed with a concern for Malnutrition and has been determined to have a diagnosis/diagnoses of Severe protein-calorie malnutrition.    This patient is being managed with:   Diet Regular-  Supplement Feeding Modality:  Oral  Ensure Plus High Protein Cans or Servings Per Day:  2       Frequency:  Two Times a day  Entered: Oct 25 2023  2:36PM  
This patient has been assessed with a concern for Malnutrition and has been determined to have a diagnosis/diagnoses of Severe protein-calorie malnutrition.    This patient is being managed with:   Diet Regular-  Supplement Feeding Modality:  Oral  Ensure Plus High Protein Cans or Servings Per Day:  2       Frequency:  Two Times a day  Entered: Oct 28 2023 11:36AM  
This patient has been assessed with a concern for Malnutrition and has been determined to have a diagnosis/diagnoses of Severe protein-calorie malnutrition.    This patient is being managed with:   Diet Regular-  DASH/TLC {Sodium & Cholesterol Restricted} (DASH)  Supplement Feeding Modality:  Oral  Ensure Plus High Protein Cans or Servings Per Day:  2       Frequency:  Two Times a day  Entered: Nov  3 2023  1:29PM  
This patient has been assessed with a concern for Malnutrition and has been determined to have a diagnosis/diagnoses of Severe protein-calorie malnutrition.    This patient is being managed with:   Diet Regular-  DASH/TLC {Sodium & Cholesterol Restricted} (DASH)  Supplement Feeding Modality:  Oral  Ensure Plus High Protein Cans or Servings Per Day:  2       Frequency:  Two Times a day  Entered: Nov  3 2023  1:29PM  
This patient has been assessed with a concern for Malnutrition and has been determined to have a diagnosis/diagnoses of Severe protein-calorie malnutrition.    This patient is being managed with:   Diet Regular-  Supplement Feeding Modality:  Oral  Ensure Plus High Protein Cans or Servings Per Day:  2       Frequency:  Two Times a day  Entered: Oct 28 2023 11:36AM  
This patient has been assessed with a concern for Malnutrition and has been determined to have a diagnosis/diagnoses of Severe protein-calorie malnutrition.    This patient is being managed with:   Diet Regular-  DASH/TLC {Sodium & Cholesterol Restricted} (DASH)  Supplement Feeding Modality:  Oral  Ensure Plus High Protein Cans or Servings Per Day:  2       Frequency:  Two Times a day  Entered: Nov  3 2023  1:29PM

## 2023-11-15 PROBLEM — R21 RASH: Status: ACTIVE | Noted: 2023-01-01

## 2023-11-17 PROBLEM — R53.81 PHYSICAL DECONDITIONING: Status: ACTIVE | Noted: 2023-01-01

## 2023-11-17 PROBLEM — Z92.89 HISTORY OF RECENT HOSPITALIZATION: Status: ACTIVE | Noted: 2023-01-01

## 2023-11-21 PROBLEM — R79.89 ELEVATED TSH: Status: ACTIVE | Noted: 2023-01-01

## 2023-11-25 NOTE — ED PROVIDER NOTE - CLINICAL SUMMARY MEDICAL DECISION MAKING FREE TEXT BOX
72-year-old female with extensive cardiac history including V-fib status post ablation, pericardial effusion and tamponade status post pericardial window, A-fib on rate control and antiarrhythmics with an ICD currently on Coumadin presenting with complaint of 2 days of epigastric abdominal discomfort decreased p.o. intake, decreased appetite and 1 episode of nonbloody diarrhea today.  On exam her vital signs are notable for mild tachypnea with a respiratory rate of 22 oh though she is not hypoxic or reporting any shortness of breath.  She does endorse that she feels anxious.  She denies any active abdominal pain though indicates her pain was located in the epigastric area.  Her abdomen is nontender on palpation without any peritoneal signs or CVA tenderness.  Her rhythm on the monitor is normal sinus her EKG is nonactionable. Ddx includes but is not limited to: ACS, 72-year-old female with extensive cardiac history including V-fib status post ablation, pericardial effusion and tamponade status post pericardial window, A-fib on rate control and antiarrhythmics with an ICD currently on Coumadin presenting with complaint of 2 days of epigastric abdominal discomfort decreased p.o. intake, decreased appetite and 1 episode of nonbloody diarrhea today.  On exam her vital signs are notable for mild tachypnea with a respiratory rate of 22  although she is not hypoxic or reporting any shortness of breath.  She does endorse that she feels anxious.  She denies any active abdominal pain though indicates her pain was located in the epigastric area.  Her abdomen is nontender on palpation without any peritoneal signs or CVA tenderness. No LE edema noted. Her rhythm on the monitor is normal sinus her EKG is non-actionable. Ddx includes but is not limited to: ACS, pancreatitis, mesenteric ischemia, gastritis, PUD, diverticulitis, CHF exac. PMH indicates cardiomyopathy- will defer IVF for now. Labs,  CTAP, EKG, pain control PRN- currently reports pain free. Denys WALLIS.

## 2023-11-25 NOTE — ED ADULT NURSE NOTE - NSFALLRISKINTERV_ED_ALL_ED

## 2023-11-25 NOTE — ED ADULT NURSE NOTE - OBJECTIVE STATEMENT
72 y.o F AAO4 ambulatory with cane presents to the ED c.o mid sternal chest pain, abd pain and Diarreah x1 day. Pt has an extensive cardiac PMH of HTN, HLD, Cardiac stent, ICD defibrillator, Afib on warfarin. Pt denies any bloody stool or vomiting but states shes been feeling un well the past couple of days. Pt states she is not SOB but has increased work of breathing not sure if it is from anxiety or medical condition. Pt denies SOB, HA, vision changes,, fevers chills, abdominal pain. Upon assessment, abdomen soft and nontender, +strong peripheral pulses, moving all extremities without difficulty. Pt on CM NSR.

## 2023-11-25 NOTE — ED PROVIDER NOTE - OBJECTIVE STATEMENT
72F with COPD, HTN, PAD, CAD s/p LAD stent, chronic leukocytosis of unclear etiol, cardiac arrest s/p left-sided ICD (6/4/2019) complicated by infection and s/p R single-chamber ICD reimplantation ([place] in 9/23/2019), HFrEF (EF 45%), recent hospital stay for VF sp ablation c/b pericardial effusion and tamponade s/p pericardiocentesis and pericardial window and AFib, most recently in sinus here now w c/o 2 days of upper abd pain, dec po intake, and episode of nb diarrhea today. Denies cp, sob, tom, syncope, weakness, numbness. +R shoulder pain. No current abd pain.

## 2023-11-25 NOTE — ED ADULT TRIAGE NOTE - GLASGOW COMA SCALE: BEST VERBAL RESPONSE, MLM
Leonardo Middleton Patient Age: 10 year old  MESSAGE:   Mom calling states she dropped off forms last week for patient's school.  Mom is looking to  forms, because school starts tomorrow 8/14/19.  Please call for update.    Message confirmed with caller. .     WEIGHT AND HEIGHT:   Wt Readings from Last 1 Encounters:   06/25/19 27.2 kg (60 lb) (26 %, Z= -0.64)*     * Growth percentiles are based on CDC (Boys, 2-20 Years) data.     Ht Readings from Last 1 Encounters:   06/25/19 4' 3.75\" (1.314 m) (23 %, Z= -0.74)*     * Growth percentiles are based on CDC (Boys, 2-20 Years) data.     BMI Readings from Last 1 Encounters:   06/25/19 15.75 kg/m² (36 %, Z= -0.36)*     * Growth percentiles are based on CDC (Boys, 2-20 Years) data.       ALLERGIES:  Peanut   (food or med) and Tree nuts    (food)  Current Outpatient Medications   Medication   • EPINEPHrine 0.15 MG/0.15ML Solution Auto-injector   • fluticasone-vilanterol (BREO ELLIPTA) 100-25 MCG/INH inhaler   • olopatadine (PATANOL) 0.1 % ophthalmic solution   • albuterol (VENTOLIN) (2.5 MG/3ML) 0.083% nebulizer solution   • fluticasone-salmeterol (ADVAIR HFA) 115-21 MCG/ACT inhaler   • montelukast (SINGULAIR) 5 MG chewable tablet   • albuterol 108 (90 Base) MCG/ACT inhaler   • fluticasone-salmeterol (AIRDUO RESPICLICK) 113-14 MCG/ACT inhaler   • hydroCORTisone (CORTIZONE) 2.5 % cream   • hydroCORTisone (CORTIZONE) 2.5 % cream   • predniSONE (DELTASONE) 10 MG tablet   • Spacer/Aero-Holding Chambers (AEROCHAMBER) Misc   • triamcinolone (ARISTOCORT) 0.1 % ointment   • tacrolimus (PROTOPIC) 0.03 % ointment     No current facility-administered medications for this visit.      PHARMACY to use: [facility]          Pharmacy preference(s) on file:   [facility]/pharmacy #[id number] - [place] - [address] AT [place] [address] [address] [address]  Phone: [phone] Fax: [phone]      CALL BACK INFO: Ok to leave response (including medical information) on  answering machine  ROUTING: Patient's physician/staff        [facility]         INS: Payor: LAYTON/GALILEO / Plan: [id number] / Product Type: POS MISC   PATIENT ADDRESS:  [address] [place] [id number]     (V5) oriented

## 2023-11-25 NOTE — ED ADULT TRIAGE NOTE - CHIEF COMPLAINT QUOTE
chest pain/SOB that started this morning. Pain radiating to the right shoulder. Pt has a defibrillator, unsure if it fired.

## 2023-11-25 NOTE — ED PROVIDER NOTE - PHYSICAL EXAMINATION
Gen: WNWD uncomfortable appearing F   HEENT: NCAT EOMI   Neck: supple  CV: RRR, no murmur  Lung: Dec BS at L base, R side CTA, no wrr   Abd: +BS soft NTND no grr no cvat   Ext: wwp, palp DP pulses BL , FROMx4, no R shoulder pain w mvmt or restricted ROM, no cce  Neuro: Awake alert CN grossly intact, sensation intact, motor 5/5 throughout

## 2023-11-25 NOTE — ED PROVIDER NOTE - PROGRESS NOTE DETAILS
Dr. Nieves:  D/w rads, likely perf gastric ulcer w free air. Consulting surgery, GI. Dr. Nieves:  CT w concern for free air. Attempting to reach ou to radiology to expedite read. Surgery at bedside.

## 2023-11-26 NOTE — CONSULT NOTE ADULT - SUBJECTIVE AND OBJECTIVE BOX
HISTORY OF PRESENT ILLNESS:  AUSTIN LEE is a 72yoF with COPD, HTN, PAD, CAD s/p LAD stent, chronic leukocytosis of unclear etiology, cardiac arrest s/p left-sided ICD (6/4/2019) complicated by infection and s/p R single-chamber ICD reimplantation ([place] in 9/23/2019), HFrEF (EF 45%), recent hospital stay (discharged 11/9) for VF sp ablation c/b pericardial effusion and tamponade s/p pericardiocentesis and pericardial window and AFib, most recently in sinus here now with 1 day of abdominal pain. Patient states pain started in the AM while she was using the restroom with associated diarrhea. CT c/f perforated gastric ulcer. UGI series without evidence of contrast extravasation. Admitted to SICU for close monitoring.     PAST MEDICAL HISTORY: No pertinent past medical history    Obese    Smoker    Obesity    HTN (hypertension)    HLD (hyperlipidemia)    CAD (coronary artery disease)    CHF with cardiomyopathy        PAST SURGICAL HISTORY: No significant past surgical history    No significant past surgical history    History of hip surgery        FAMILY HISTORY: Family history of Alzheimer's disease (Father)    Family history of cancer (Mother)        SOCIAL HISTORY:  · Substance use	No     Tobacco Screening:  · Core Measure Site	No    CODE STATUS: Full    HOME MEDICATIONS:  * Patient Currently Takes Medications as of 09-Nov-2023 12:21 documented in Structured Notes  · 	warfarin 1 mg oral tablet: 2 tab(s) orally once a day  · 	losartan 25 mg oral tablet: 1 tab(s) orally once a day  · 	aspirin 81 mg oral tablet, chewable: 1 tab(s) orally once a day  · 	Toprol-XL 25 mg oral tablet, extended release: 0.5 tab(s) orally once a day  · 	furosemide 40 mg oral tablet: 1 tab(s) orally every 12 hours  · 	quiNIDine 324 mg oral tablet, extended release: 1 tab(s) orally every 12 hours  · 	amiodarone 200 mg oral tablet: 1 tab(s) orally once a day  · 	melatonin 5 mg oral tablet: 1 tab(s) orally once a day (at bedtime)  · 	aluminum hydroxide-magnesium hydroxide 200 mg-200 mg/5 mL oral suspension: 30 milliliter(s) orally every 4 hours As needed Dyspepsia  · 	acetaminophen 325 mg oral tablet: 2 tab(s) orally every 6 hours  · 	nystatin 100,000 units/g topical powder: Apply topically to affected area 3 times a day  · 	Multiple Vitamins oral tablet: 1 tab(s) orally once a day  · 	dapagliflozin 10 mg oral tablet: 1 tab(s) orally once a day  · 	D3 50 mcg (2000 intl units) oral capsule: 1 cap(s) orally  · 	atorvastatin 40 mg oral tablet: 1 tab(s) orally once a day (at bedtime)  · 	Symbicort 160 mcg-4.5 mcg/inh inhalation aerosol: 2 puff(s) inhaled once a day    .    ALLERGIES: No Known Allergies      VITAL SIGNS:  ICU Vital Signs Last 24 Hrs  T(C): 37.2 (26 Nov 2023 03:06), Max: 37.2 (26 Nov 2023 03:06)  T(F): 99 (26 Nov 2023 03:06), Max: 99 (26 Nov 2023 03:06)  HR: 73 (26 Nov 2023 03:06) (69 - 81)  BP: 157/96 (26 Nov 2023 03:06) (120/47 - 160/78)  BP(mean): 113 (26 Nov 2023 03:06) (69 - 113)  ABP: --  ABP(mean): --  RR: 27 (26 Nov 2023 03:06) (22 - 27)  SpO2: 95% (26 Nov 2023 03:06) (95% - 95%)    O2 Parameters below as of 26 Nov 2023 03:06  Patient On (Oxygen Delivery Method): room air            NEURO  Exam: AOx3. NAD. Follows commands. Moves all extremities. Strength and sensation intact.   Meds:HYDROmorphone  Injectable 0.25 milliGRAM(s) IV Push every 3 hours PRN Severe Pain (7 - 10)      RESPIRATORY  Mechanical Ventilation:     Exam: CTA b/l. No wheezing, rales, rhonchi appreciated.   Meds:    CARDIOVASCULAR  VBG - ( 25 Nov 2023 20:40 )  pH: 7.45  /  pCO2: 42    /  pO2: 60    / HCO3: 29    / Base Excess: 4.7   /  SaO2: 90.1   Lactate: 1.9              Exam: S1S2. No murmurs, rubs, gallops appreciated.   Cardiac Rhythm: NSR rate 62  Meds:    GI/NUTRITION  Exam: Soft, non distended, non-tender.  Diet: NPO  Meds:    GENITOURINARY/RENAL  Meds:lactated ringers. 1000 milliLiter(s) IV Continuous <Continuous>      Weight (kg): 81.2 (11-25 @ 19:49)  11-25    137  |  102  |  16  ----------------------------<  128<H>  4.2   |  22  |  0.83    Ca    9.0      25 Nov 2023 20:47  Mg     2.2     11-25    TPro  7.1  /  Alb  3.4  /  TBili  0.6  /  DBili  x   /  AST  13  /  ALT  9<L>  /  AlkPhos  91  11-25    [ ] Mazariegos catheter, indication: urine output monitoring in critically ill patient    HEMATOLOGIC  [ ] VTE Prophylaxis:  enoxaparin Injectable 40 milliGRAM(s) SubCutaneous <User Schedule>                          12.8   18.06 )-----------( 325      ( 25 Nov 2023 20:47 )             40.0     PT/INR - ( 25 Nov 2023 20:47 )   PT: 14.5 sec;   INR: 1.33 ratio         PTT - ( 25 Nov 2023 20:47 )  PTT:29.2 sec  Transfusion: [ ] PRBC	[ ] Platelets	[ ] FFP	[ ] Cryoprecipitate      INFECTIOUS DISEASES  Meds:influenza  Vaccine (HIGH DOSE) 0.7 milliLiter(s) IntraMuscular once    RECENT CULTURES:      ENDOCRINE  Meds:  CAPILLARY BLOOD GLUCOSE          PATIENT CARE ACCESS DEVICES:  [ X ] Peripheral IV  [ ] Central Venous Line	[ ] R	[ ] L	[ ] IJ	[ ] Fem	[ ] SC	Placed:   [ ] Arterial Line		[ ] R	[ ] L	[ ] Fem	[ ] Rad	[ ] Ax	Placed:   [ ] PICC:					[ ] Mediport  [ ] Urinary Catheter, Date Placed:   [x] Necessity of urinary, arterial, and venous catheters discussed    OTHER MEDICATIONS: chlorhexidine 2% Cloths 1 Application(s) Topical <User Schedule>      IMAGING STUDIES:

## 2023-11-26 NOTE — H&P ADULT - HISTORY OF PRESENT ILLNESS
72F with COPD, HTN, PAD, CAD s/p LAD stent, chronic leukocytosis of unclear etiol, cardiac arrest s/p left-sided ICD (6/4/2019) complicated by infection and s/p R single-chamber ICD reimplantation ([place] in 9/23/2019), HFrEF (EF 45%), recent hospital stay for VF sp ablation c/b pericardial effusion and tamponade s/p pericardiocentesis and pericardial window and AFib, most recently in sinus here now with 1 day of abdominal pain. Pt states pain started in the AM while she was using the restroom. She states pain was a 10/10 in severity; however, it gradually improved. Pt endorses associated diarrhea. In the evening, the pain became increasingly severe which prompted her to come to the ED. Patient denies fever, chills, chest pain, SOB, nausea, vomiting. Pt denies chronic NSAID use.     In the ED, patient AF, HDS. Labs remarkable for WBC 18 with a left shift. CT c/f perforated gastric ulcer. When evaluated by surgical team, patient states pain is significantly improved. 1/10 in severity.

## 2023-11-26 NOTE — H&P ADULT - NSHPPHYSICALEXAM_GEN_ALL_CORE
GENERAL: NAD, lying in bed comfortably  HEENT: NC/AT  CV/LUNGS: Unlabored respirations.   ABDOMEN: Soft, tender to palpation in epigastrium. No rebound tenderness.   EXTREMITIES: 2+ peripheral pulses bilaterally. Warm and well perfused   NERVOUS SYSTEM:  A&Ox3, no focal deficits   SKIN: No rashes or lesions

## 2023-11-26 NOTE — PHYSICAL THERAPY INITIAL EVALUATION ADULT - PLANNED THERAPY INTERVENTIONS, PT EVAL
Stair Training - GOAL: Pt will negotiate one flight of stairs +HR in 4 weeks./balance training/bed mobility training/gait training/transfer training

## 2023-11-26 NOTE — CONSULT NOTE ADULT - ASSESSMENT
ASSESSMENT: 72yFemale presents with abdominal pain, CT c/f perforated gastric ulcer. UGI series without evidence of contrast extravasation. Admitted to SICU for close monitoring.     PLAN:   Neurologic:  - AO x 4  - Dilaudid PRN for pain    Respiratory:  - Saturation stable on RA  - Continue home Symbicort    Cardiovascular:  - Hemodynamically stable  - Lactate negative  - Sig cardiac history on Toprol XL, quinidine, amiodarone. Holding while NPO  - Holding Losartan for HTN    Gastrointestinal/Nutrition:  - Strict NPO  - Serial abdominal exams  - Protonix 40mg BID  - Send H. Pylori    Genitourinary/Renal:  - LR at 75 cc/hr while NPO  - No farr for now    Hematologic:  - Holding home Coumadin, last INR 1.33  - Home ASA for history of stents, holding while NPO  - Lovenox for chemical VTE ppx  - Mechanical VTE ppx with SCDs    Infectious Disease:  - Empiric abx coverage with Zosyn  - Send BCx  - ?Diflucan - drug interaction with quinidine. Discuss with ID    Endocrine:  - History of DM on dapagliflozin, start ISS    Disposition: SICU

## 2023-11-26 NOTE — PATIENT PROFILE ADULT - FALL HARM RISK - UNIVERSAL INTERVENTIONS
Bed in lowest position, wheels locked, appropriate side rails in place/Call bell, personal items and telephone in reach/Instruct patient to call for assistance before getting out of bed or chair/Non-slip footwear when patient is out of bed/[place] to call system/Physically safe environment - no spills, clutter or unnecessary equipment/Purposeful Proactive Rounding/Room/bathroom lighting operational, light cord in reach

## 2023-11-26 NOTE — H&P ADULT - ASSESSMENT
ASSESSMENT: 72F with COPD, HTN, PAD, CAD s/p LAD stent, chronic leukocytosis of unclear etiol, cardiac arrest s/p left-sided ICD (6/4/2019) complicated by infection and s/p R single-chamber ICD reimplantation ([place] in 9/23/2019), HFrEF (EF 45%), recent hospital stay for VF sp ablation c/b pericardial effusion and tamponade s/p pericardiocentesis and pericardial window and AFib, most recently in sinus here now with 1 day of abdominal pain. In the ED, patient AF, HDS. Labs remarkable for WBC 18 with a left shift. CT c/f perforated gastric ulcer. UGI series demonstrating no evidence of contrast extravasation.     PLAN:  -Admit to surgery  -NPO, IVF  -Protonix BID   -Cardiology consult in AM   -Holding Coumadin   -Send H. Pylori studies   -Appreciate excellent SICU care  -Seen and discussed with Dr. Patel     Trauma/ ACS, [id number]

## 2023-11-26 NOTE — OCCUPATIONAL THERAPY INITIAL EVALUATION ADULT - PERTINENT HX OF CURRENT PROBLEM, REHAB EVAL
72F with COPD, HTN, PAD, CAD s/p LAD stent, chronic leukocytosis of unclear etiology, cardiac arrest s/p left-sided ICD (6/4/2019) complicated by infection and s/p R single-chamber ICD reimplantation ([place] in 9/23/2019), HFrEF (EF 45%), recent hospital stay (discharged 11/9) for VF sp ablation c/b pericardial effusion and tamponade s/p pericardiocentesis and pericardial window and AFib, most recently in sinus here now with 1 day of abdominal pain. Patient states pain started in the AM while she was using the restroom with associated diarrhea. CT c/f perforated gastric ulcer. UGI series without evidence of contrast extravasation. Admitted to SICU for close monitoring.

## 2023-11-26 NOTE — H&P ADULT - NSHPLABSRESULTS_GEN_ALL_CORE
12.8   18.06 )-----------( 325      ( 25 Nov 2023 20:47 )             40.0     11-25    137  |  102  |  16  ----------------------------<  128<H>  4.2   |  22  |  0.83    Ca    9.0      25 Nov 2023 20:47  Mg     2.2     11-25    TPro  7.1  /  Alb  3.4  /  TBili  0.6  /  DBili  x   /  AST  13  /  ALT  9<L>  /  AlkPhos  91  11-25    PT/INR - ( 25 Nov 2023 20:47 )   PT: 14.5 sec;   INR: 1.33 ratio         PTT - ( 25 Nov 2023 20:47 )  PTT:29.2 sec  CAPILLARY BLOOD GLUCOSE      < from: CT Chest w/ IV Cont (11.25.23 @ 22:05) >    FINDINGS:  CHEST:  LUNGS AND LARGE AIRWAYS: No endobronchial lesions. Left lower lobe   compressive atelectasis adjacent to pleural effusion. Trace right basilar   dependent atelectasis.  PLEURA: Small left pleural effusion.  VESSELS: Atherosclerotic changes of the aorta and coronary arteries.  HEART: Right chest wall AICD with lead terminating in the right   ventricle. Trace pericardial effusion.  MEDIASTINUM AND AIDE: No lymphadenopathy.  CHEST WALL AND LOWER NECK: Cardiac device.    ABDOMEN AND PELVIS:  LIVER: Within normal limits.  BILE DUCTS: Normal caliber.  GALLBLADDER: Within normal limits.  SPLEEN: Within normal limits.  PANCREAS: Within normal limits.  ADRENALS: Within normal limits.  KIDNEYS/URETERS: No hydronephrosis. Left renal cysts and right   subcentimeter hypodense lesion, too small to characterize. Symmetric   bilateral renal enhancement.    BLADDER: Within normal limits.  REPRODUCTIVE ORGANS: Fibroid uterus. Adnexa within normal limits.    BOWEL: Gastric underdistention limits evaluation. Diverticulosis of the   descending and sigmoid colon. No peridiverticular inflammatory change is   seen. No bowel obstruction. Appendix is normal.  PERITONEUM: Moderate volume pneumoperitoneum concentrated in the upper   anterior abdomen with air also seen in the lesser sac.  VESSELS: Extensive therosclerotic changes.  RETROPERITONEUM/LYMPH NODES: No lymphadenopathy.  ABDOMINAL WALL: Within normal limits.  BONES: Degenerative changes. Left hip rochelle and screw.    IMPRESSION:  Moderate volume pneumoperitoneum with indeterminant site of perforation.   Gastric underdistention limits evaluation, however, given the   distribution of free air, suspect perforated gastric ulcer. Surgical   consult is recommended.    Small left pleural effusion.    < end of copied text >    < from: Xray UGI Single Contrast Study (11.26.23 @ 02:00) >    FINDINGS:    AICD lead is partially visualized, tip overlying the region of the right   ventricle.    Contrast passed through the gastric esophageal junction into a   normal-appearing stomach. The stomach demonstrated normal distensibility   and contour without evidence of extraluminal contrast leak. The duodenal   bulb and sweepare unremarkable. No duodenal ulcers are identified.    A post fluoroscopy abdominal radiograph demonstrates normal mucosal   pattern of the small bowel without extrinsic or intrinsic lesions.      IMPRESSION:    No evidence of contrast extravasation.    < end of copied text >

## 2023-11-26 NOTE — PHYSICAL THERAPY INITIAL EVALUATION ADULT - BALANCE TRAINING, PT EVAL
GOAL: Pt will maintain good dynamic standing balance for 10mins to facilitate hygiene activities in 4  weeks.

## 2023-11-26 NOTE — PHYSICAL THERAPY INITIAL EVALUATION ADULT - ADDITIONAL COMMENTS
Pt lives with her  in a PH +3 steps to enter +HR, +first floor set up. Pt has a tub shower. Pt owns a RW which she has been using since her recent d/c from hospital (~2 weeks ago). Pt was independent with ADLs.

## 2023-11-26 NOTE — H&P ADULT - ATTENDING COMMENTS
Pt is a 72 year old female with a medical history significant for CAD (s/p LAD stent), Afib, cardiac arrest (s/p AICD), HFrEF (EF 45%) HTN, PAD, COPD, and a recent hospital stay for VF sp ablation c/b pericardial effusion and tamponade s/p pericardiocentesis and pericardial window who presents to [facility] with 1 day of abdominal pain. Pt reports pain started in the AM while she was using the restroom. She reports the pain was a 10/10 in severity; however, it gradually improved. Abdominal pain was associated with diarrhea. In the evening, In the ED, her pain has steadily improved. Pain is a 1/10 without peritonitis.    WBC = 18   INR = 1.33  BNP = 3973  Lactate =1.4    CT abd = Free air in the upper abdomen    Case d/w ED attending and radiology team. Given improvement in sx and gastric perf as the most likely etiology, will obtain UGI series with SB follow through to eval for foregut perf.    UGI series: No extravasation of contrast    A/p  Suspect Gastric perforation with auto sealing  Pt is high risk for surgical intervention  Continue PPI  Continue ABX  Continue serial abdominal exams  Continue ICU monitorining  I spent 70 min in the E/M of this patient.

## 2023-11-26 NOTE — OCCUPATIONAL THERAPY INITIAL EVALUATION ADULT - LIVES WITH, PROFILE
Pt lives in  +3 [facility] and first floor setup. Pt lives with spouse. Pt reports independence with all aspects of self care and functional mobility without AD PTA.

## 2023-11-26 NOTE — PHYSICAL THERAPY INITIAL EVALUATION ADULT - LIVES WITH, PROFILE
HR=70 bpm, EKNQ=788/80 mmhg, SpO2=98.0 %, Resp=13 B/min, EtCO2=35 mmHg, Apnea=7 Seconds, Pain=0, Patt=10, Bush=2 spouse

## 2023-11-26 NOTE — PHYSICAL THERAPY INITIAL EVALUATION ADULT - PERTINENT HX OF CURRENT PROBLEM, REHAB EVAL
Pt is a 73 y/o female admitted with abdominal pain x 1 day.  PMH: COPD, HTN, PAD, CAD s/p LAD stent, chronic leukocytosis of unclear etiol, cardiac arrest s/p left-sided ICD (6/4/2019) complicated by infection and s/p R single-chamber ICD reimplantation ([place] in 9/23/2019), HFrEF (EF 45%).     CT Chest demonstrates Moderate volume pneumoperitoneum with indeterminant site of perforation. Gastric underdistention limits evaluation, however, given the distribution of free air, suspect perforated gastric ulcer. Surgical consult is recommended. Small left pleural effusion.     Hospital course: In the ED, patient AF, HDS. Labs remarkable for WBC 18 with a left shift. CT c/f perforated gastric ulcer. When evaluated by surgical team, patient states pain is significantly improved. 1/10 in severity. UGI series without evidence of contrast extravasation. Admitted to SICU for close monitoring. Pt is a 73 y/o female admitted with abdominal pain x 1 day.  PMH: COPD, HTN, PAD, CAD s/p LAD stent, chronic leukocytosis of unclear etiology, cardiac arrest s/p left-sided ICD (6/4/2019) complicated by infection and s/p R single-chamber ICD reimplantation ([place] in 9/23/2019), HFrEF (EF 45%).     CT Chest demonstrates Moderate volume pneumoperitoneum with indeterminant site of perforation. Gastric underdistention limits evaluation, however, given the distribution of free air, suspect perforated gastric ulcer. Surgical consult is recommended. Small left pleural effusion.     Hospital course: In the ED, patient AF, HDS. Labs remarkable for WBC 18 with a left shift. CT c/f perforated gastric ulcer. When evaluated by surgical team, patient states pain is significantly improved. 1/10 in severity. UGI series without evidence of contrast extravasation. Admitted to SICU for close monitoring.

## 2023-11-27 NOTE — CONSULT NOTE ADULT - ASSESSMENT
Impression:    Sacral/bilateral Buttocks deep tissue injury present on admission  Incontinence of bowel and bladder  Incontinence Dermatitis    Recommend:  1.) topical therapy: sacral/buttock injury - cleanse with incontinence cleanser, pat dry, apply Yomaira ointment BID and PRN for incontinent episodes  2.) Incontinence Management - incontinence cleanser, pads, pericare BID, continue external female urinary catheter  3.) Maintain on an alternating air with low air loss surface  4.) Turn and reposition Q 2 hours  5.) Nutrition optimization  6.) Offload heels/feet with complete cair air fluidized boots/kurtis lock pillow; ensure that the soles of the feet are not resting on the foot board of the bed.  7.) Chair cushion for chair sitting    Care as per medicine. Will not actively follow but will remain available. Please recall for new issues or deterioration.  Upon discharge [facility] [address] [place] [address] [phone]  Thank you for this consult  Seen and discussed with clinical nurse  Brooke Gallegos, SHANNON-C, CWOCN via Teams

## 2023-11-27 NOTE — PROGRESS NOTE ADULT - ATTENDING COMMENTS
seen and examined 11-27-23 @ 1130    NPO w/o nausea or vomiting    SpO2 = 94% on room air  soft / NT / ND    WBC = 13    CXR (11/27) - pneumoperitoneum is more prominent than 11/25 CXR, but this is most likely secondary to upright positioning today      perforated gastric ulcer  -already sealed on 11/26 UGI series  -start clears and advance diet as tolerated  -Zosyn (11/26 - ?)  -PPI  -send stool H pylori antigen  -switch ASA -> Plavix (CAD s/p LAD stent)      I reviewed her labs and radiologic images.  care coordinated with SICU team

## 2023-11-27 NOTE — PROGRESS NOTE ADULT - ASSESSMENT
ASSESSMENT: 72F with COPD, HTN, PAD, CAD s/p LAD stent, chronic leukocytosis of unclear etiol, cardiac arrest s/p left-sided ICD (6/4/2019) complicated by infection and s/p R single-chamber ICD reimplantation ([place] in 9/23/2019), HFrEF (EF 45%), recent hospital stay for VF sp ablation c/b pericardial effusion and tamponade s/p pericardiocentesis and pericardial window and AFib, most recently in sinus here now with 1 day of abdominal pain. In the ED, patient AF, HDS. Labs remarkable for WBC 18 with a left shift. CT c/f perforated gastric ulcer. UGI series demonstrating no evidence of contrast extravasation. Admitted to SICU for close monitoring.     PLAN:  - appreciate care per SICU  -NPO, IVF  -Protonix BID   -Cardiology consult in AM   -Holding Coumadin   -f/u H. Pylori studies   -Dispo- Home PT

## 2023-11-27 NOTE — PROGRESS NOTE ADULT - ATTENDING COMMENTS
72yFemale presents with abdominal pain, CT c/f perforated gastric ulcer and pneumoperitoneum seen on CT A/P . UGI series without evidence of contrast extravasation.  Awake and alert, denies abd pain  Passing gas and belching, abd exam soft.  ?Clears  Gentle hydration, renal function wnl  Abx Zosyn 7 day course, all cultures neg  ISS  Pharm DVT ppx with Lovenox. Hb Pl stable 72yFemale presents with abdominal pain, CT c/f perforated gastric ulcer and pneumoperitoneum seen on CT A/P . UGI series without evidence of contrast extravasation.    Awake and alert, denies abd pain  Will start her home cardiac meds  Tolerating clears, will advance diet  DC IVF, renal function wnl, replace K . Start home dose Lasix  Abx Zosyn 7 day course, all cultures neg  ISS  Restart Coumadin with h/o a fib

## 2023-11-27 NOTE — CONSULT NOTE ADULT - ASSESSMENT
71 y/o F with PMH of COPD, HLD, HTN, PVD, HFrEF (EF 45%), CAD s/p LAD stent (has  of RCA), cardiac arrest s/p left-sided ICD (6/4/2019) complicated by infection and s/p R single-chamber ICD ([place] in 9/23/2019), recent admissions for recurrent VT storm and ICD shock with unsuccessful ablation c/b cardiac tamponade s/p pericardial drain now presenting with abdominal pain found to have free air in upper abdomen on CT A/P however negative UGI. Cardiology consulted for recs on antiplatelet therapy.      #CAD s/p LAD stent, hx of  of RCA w/ collaterals  -Pt currently on aspirin at home, possibly contributing to development of gastric ulcer leading to pt's current presentation  -c/w home atorvastatin 40 mg daily      #HFrEF, likely ICM  EF 45-50% on SUPA 10/2023  -monitor SBPs, can restart home toprol 25 mg and losartan 25 mg when feasible  -monitor fluid status, currently euvolemic on exam; pt reportedly on lasix 40 mg q12 PO at home, would continue once BPs improve and pt requires fluid removal.   -per med rec on farxiga at home, holding while inpatient    #Hx of VF, AFib  -would continue pt's home dose of amiodarone and quinidine  -holding home warfarin in setting of active bleed      Lalo Mahan MD MPH  PGY-3, Internal Medicine    ** Recommendations are not final until note is cosigned by attending. ** 73 y/o F with PMH of COPD, HLD, HTN, PVD, HFrEF (EF 45%), CAD s/p LAD stent (has  of RCA), cardiac arrest s/p left-sided ICD (6/4/2019) complicated by infection and s/p R single-chamber ICD ([place] in 9/23/2019), recent admissions for recurrent VT storm and ICD shock with unsuccessful ablation c/b cardiac tamponade s/p pericardial drain now presenting with abdominal pain found to have free air in upper abdomen on CT A/P however negative UGI. Cardiology consulted for recs on antiplatelet therapy.      #CAD s/p LAD stent, hx of  of RCA w/ collaterals  -Pt currently on aspirin at home, possibly contributing to development of gastric ulcer leading to pt's current presentation  -if concerned for gastric ulcer and aspirin worsening its development, can switch to plavix monotherapy, however plavix does carry a higher risk of bleeding than aspirin  -c/w home atorvastatin 40 mg daily      #HFrEF, likely ICM  EF 45-50% on SUPA 10/2023  -monitor SBPs, can restart home toprol 25 mg and losartan 25 mg when feasible  -monitor fluid status, currently euvolemic on exam; pt reportedly on lasix 40 mg q12 PO at home, would continue once BPs improve and pt requires fluid removal.   -per med rec on farxiga at home, holding while inpatient    #Hx of VF, AFib  -would continue pt's home dose of amiodarone and quinidine  -holding home warfarin in setting of active bleed      Lalo Mahan MD MPH  PGY-3, Internal Medicine    ** Recommendations are not final until note is cosigned by attending. ** 71 y/o F with PMH of COPD, HLD, HTN, PVD, HFrEF (EF 45%), CAD s/p LAD stent in 2015 & has  of RCA.  Also, hx. of cardiac arrest s/p left-sided ICD (6/4/2019) complicated by infection and s/p R single-chamber ICD ([place] in 9/23/2019).  Recent admission (9/203) for recurrent VT storm and ICD shocks with unsuccessful ablation c/b cardiac tamponade s/p pericardial window.  Now presenting with abdominal pain, found to have free air in upper abdomen on CT A/P, however negative UGI barium study.   Cardiology consulted for recommendations on antiplatelet therapy, ?switch to Plavix for ASA in setting of apparent GI perforation.      REC:  #CAD s/p LAD stent, hx of  of RCA w/ collaterals  - Pt currently on aspirin at home, possibly contributing to development of ?gastric ulcer/perf, leading to pt's current presentation  - if concerned for possible gastric ulcer/perf and aspirin worsening its development, can switch to Plavix monotherapy, however, Plavix does carry a higher risk of bleeding than aspirin  - c/w home atorvastatin 40 mg daily      #HFrEF, likely ICM  EF 45-50% on SUPA 10/2023  - monitor SBPs, can restart home Toprol XL 25 mg qd and losartan 25 mg qd when feasible  - monitor fluid status, currently euvolemic on exam; pt reportedly on Lasix 40 mg q12 PO at home, would continue once BPs improve and pt requires fluid removal.   - per med rec on farxiga at home, holding while inpatient    #Hx of VF, AFib  - would continue pt's home dosage of amiodarone and quinidine  - holding home warfarin in setting of active bleed      Lalo Mahan MD MPH  PGY-3, Internal Medicine    Plan discussed with cardiology fellow and resident.  Patient seen and examined.  Hx., exam and labs as above.  I agree with the assessment and recommendations, which I have reviewed and edited where appropriate.  Gunner Tijerina M.D.  Cardiology Attending, Consult Service    For Cardiology consults and questions, all Cardiology service information can be found 24/7 on [email or web address] - use password: [email or web address] to log in.

## 2023-11-27 NOTE — CONSULT NOTE ADULT - SUBJECTIVE AND OBJECTIVE BOX
Wound Surgery Consult Note:    HPI:  72F with COPD, HTN, PAD, CAD s/p LAD stent, chronic leukocytosis of unclear etiol, cardiac arrest s/p left-sided ICD (6/4/2019) complicated by infection and s/p R single-chamber ICD reimplantation ([place] in 9/23/2019), HFrEF (EF 45%), recent hospital stay for VF sp ablation c/b pericardial effusion and tamponade s/p pericardiocentesis and pericardial window and AFib, most recently in sinus here now with 1 day of abdominal pain. Pt states pain started in the AM while she was using the restroom. She states pain was a 10/10 in severity; however, it gradually improved. Pt endorses associated diarrhea. In the evening, the pain became increasingly severe which prompted her to come to the ED. Patient denies fever, chills, chest pain, SOB, nausea, vomiting. Pt denies chronic NSAID use.     In the ED, patient AF, HDS. Labs remarkable for WBC 18 with a left shift. CT c/f perforated gastric ulcer. When evaluated by surgical team, patient states pain is significantly improved. 1/10 in severity.  (26 Nov 2023 03:22)    Request for wound care consult for sacral/bilateral buttocks skin breakdown received from nursing. Ms. Kohli was encountered on an alternating air with low air loss surface.  Her extreme immobility, inactivity, as well as poor nutritional status all contribute to her high risk for pressure injury development and hinder healing. Identification of the initial signs of deep tissue damage at the level of the skin within 72 hours of admission make this an injury that occurred prior to admission. Principles of pressure injury prevention and treatment including but not limited to offloading and turning and repositioning review with patient.     PAST MEDICAL & SURGICAL HISTORY:  Obese  Smoker  HTN (hypertension)  HLD (hyperlipidemia)  CAD (coronary artery disease)  CHF with cardiomyopathy  History of hip surgery    REVIEW OF SYSTEMS:    Constitutional:        [x] negative [ ] fevers [ ] chills [ ] weight loss [ ] weight gain  [ ] fatigue  HEENT:                  [x ] negative [ ] dry eyes [ ] eye irritation [ ] postnasal drip [ ] nasal congestion   CV:                         [x ] negative  [ ] chest pain [ ] orthopnea [ ] palpitations [ ] tachycardia  Resp:                     [ x] negative [ ] cough [ ] shortness of breath [ ] dyspnea [ ] wheezing [ ] sputum [ ] hemoptysis  GI:                          [ ] negative [ ] nausea [ ] vomiting [x ] diarrhea [ ] constipation [x ] abd pain [ ] dysphagia [ ] incontinent of bowel  :                        [x ] negative [ ] dysuria [ ] nocturia [ ] hematuria [ ] increased urinary frequency [ ] incontinent of urine  Musculoskeletal:     [ ] negative [ ] back pain [ ] myalgias [ ] arthralgias [ ] fracture [x ] ambulatory  [ ] non-ambulatory  Skin:                       [ ] negative [ ] rash [ ] itch [ ] wound [x ] skin discoloration  Neurological:        [x ] negative [ ] headache [ ] dizziness [ ] syncope [ ] weakness [ ] numbness  Psychiatric:           [x ] negative [ ] anxiety [ ] depression  Endocrine:            [x ] negative [ ] diabetes [ ] thyroid problem  Heme/Lymph:       [x ] negative [ ] anemia [ ] bleeding problem  Allergic/Immune:   [x ] negative [ ] itchy eyes [ ] nasal discharge [ ] hives [ ] angioedema    [x ] All other systems negative    MEDICATIONS  (STANDING):  budesonide 160 MICROgram(s)/formoterol 4.5 MICROgram(s) Inhaler 2 Puff(s) Inhalation two times a day  chlorhexidine 2% Cloths 1 Application(s) Topical <User Schedule>  dextrose 5% + sodium chloride 0.45%. 1000 milliLiter(s) (25 mL/Hr) IV Continuous <Continuous>  enoxaparin Injectable 40 milliGRAM(s) SubCutaneous <User Schedule>  influenza  Vaccine (HIGH DOSE) 0.7 milliLiter(s) IntraMuscular once  insulin lispro (ADMELOG) corrective regimen sliding scale   SubCutaneous every 6 hours  pantoprazole  Injectable 40 milliGRAM(s) IV Push every 12 hours  piperacillin/tazobactam IVPB.. 3.375 Gram(s) IV Intermittent every 8 hours  potassium chloride  10 mEq/100 mL IVPB 10 milliEquivalent(s) IV Intermittent every 1 hour    MEDICATIONS  (PRN):  acetaminophen   IVPB .. 1000 milliGRAM(s) IV Intermittent every 6 hours PRN Mild Pain (1 - 3)  HYDROmorphone  Injectable 0.25 milliGRAM(s) IV Push every 3 hours PRN Severe Pain (7 - 10)    Allergies    No Known Allergies    Intolerances    SOCIAL HISTORY:  ; current smoker    FAMILY HISTORY:  Family history of Alzheimer's disease (Father)    Family history of cancer (Mother)    Vital Signs Last 24 Hrs  T(C): 36.8 (27 Nov 2023 07:00), Max: 37.2 (26 Nov 2023 20:00)  T(F): 98.2 (27 Nov 2023 07:00), Max: 99 (26 Nov 2023 20:00)  HR: 75 (27 Nov 2023 11:00) (58 - 77)  BP: 156/67 (27 Nov 2023 11:00) (104/53 - 161/68)  BP(mean): 96 (27 Nov 2023 11:00) (71 - 98)  RR: 27 (27 Nov 2023 11:00) (19 - 31)  SpO2: 93% (27 Nov 2023 11:00) (90% - 98%)    Parameters below as of 27 Nov 2023 07:00  Patient On (Oxygen Delivery Method): nasal cannula  O2 Flow (L/min): 2    PHYSICAL EXAM:  General: alert, WN  Ophthamology: clear sclera, no drainage  ENMT: neck supple, mucosal membranes moist  Respiratory: BL chest rise equal, no accessory muscle use  : draining clear yellow urine  GI: abd ND/NT  Neurology: verbal, following commands  Vascular: BLE edema equal  MSK: no contractions  Psych: calm, appropriate  Skin: Sacrum/bilateral buttocks with deep maroon discolored intact skin in and around the gluteal cleft, L 5cm x W 5cm X D none, no drainage  No erythema, induration, fluctuance, increase warmth, tenderness    LABS:  11-26    143  |  103  |  15  ----------------------------<  100<H>  3.5   |  26  |  0.90    Ca    9.0      26 Nov 2023 07:18  Phos  3.7     11-26  Mg     2.5     11-26    TPro  7.1  /  Alb  3.4  /  TBili  0.6  /  DBili  x   /  AST  13  /  ALT  9<L>  /  AlkPhos  91  11-25                          12.4   15.45 )-----------( 274      ( 26 Nov 2023 07:18 )             38.8     PT/INR - ( 25 Nov 2023 20:47 )   PT: 14.5 sec;   INR: 1.33 ratio         PTT - ( 25 Nov 2023 20:47 )  PTT:29.2 sec  Urinalysis Basic - ( 26 Nov 2023 07:18 )    Color: x / Appearance: x / SG: x / pH: x  Gluc: 100 mg/dL / Ketone: x  / Bili: x / Urobili: x   Blood: x / Protein: x / Nitrite: x   Leuk Esterase: x / RBC: x / WBC x   Sq Epi: x / Non Sq Epi: x / Bacteria: x

## 2023-11-27 NOTE — CONSULT NOTE ADULT - SUBJECTIVE AND OBJECTIVE BOX
Cardiology Consult Note   Lalo Mahan, PGY3  Internal Medicine Resident on Cardiology Consult Service   [phone] - [facility] [id number]    Patient is a 72y old  Female who presents with a chief complaint of     HPI:  72F with COPD, HTN, PAD, CAD s/p LAD stent, chronic leukocytosis of unclear etiol, cardiac arrest s/p left-sided ICD (6/4/2019) complicated by infection and s/p R single-chamber ICD reimplantation ([place] in 9/23/2019), HFrEF (EF 45%), recent hospital stay for VF sp ablation c/b pericardial effusion and tamponade s/p pericardiocentesis and pericardial window and AFib, most recently in sinus here now with 1 day of abdominal pain. Pt states pain started in the AM while she was using the restroom. She states pain was a 10/10 in severity; however, it gradually improved. Pt endorses associated diarrhea. In the evening, the pain became increasingly severe which prompted her to come to the ED. Patient denies fever, chills, chest pain, SOB, nausea, vomiting. Pt denies chronic NSAID use.     In the ED, patient AF, HDS. Labs remarkable for WBC 18 with a left shift. CT c/f perforated gastric ulcer. When evaluated by surgical team, patient states pain is significantly improved. 1/10 in severity.  (26 Nov 2023 03:22)      PAST MEDICAL & SURGICAL HISTORY:  Obese      Smoker      HTN (hypertension)      HLD (hyperlipidemia)      CAD (coronary artery disease)      CHF with cardiomyopathy      History of hip surgery             MEDICATIONS  (STANDING):  budesonide 160 MICROgram(s)/formoterol 4.5 MICROgram(s) Inhaler 2 Puff(s) Inhalation two times a day  chlorhexidine 2% Cloths 1 Application(s) Topical <User Schedule>  enoxaparin Injectable 40 milliGRAM(s) SubCutaneous <User Schedule>  influenza  Vaccine (HIGH DOSE) 0.7 milliLiter(s) IntraMuscular once  insulin lispro (ADMELOG) corrective regimen sliding scale   SubCutaneous every 6 hours  pantoprazole  Injectable 40 milliGRAM(s) IV Push every 12 hours  piperacillin/tazobactam IVPB.. 3.375 Gram(s) IV Intermittent every 8 hours  potassium chloride  10 mEq/100 mL IVPB 10 milliEquivalent(s) IV Intermittent every 1 hour    MEDICATIONS  (PRN):  acetaminophen   IVPB .. 1000 milliGRAM(s) IV Intermittent every 6 hours PRN Mild Pain (1 - 3)  HYDROmorphone  Injectable 0.25 milliGRAM(s) IV Push every 3 hours PRN Severe Pain (7 - 10)      FAMILY HISTORY:  Family history of Alzheimer's disease (Father)    Family history of cancer (Mother)        SOCIAL HISTORY:    Vital Signs Last 24 Hrs  T(C): 37 (27 Nov 2023 11:00), Max: 37.2 (26 Nov 2023 20:00)  T(F): 98.6 (27 Nov 2023 11:00), Max: 99 (26 Nov 2023 20:00)  HR: 71 (27 Nov 2023 12:00) (58 - 75)  BP: 137/61 (27 Nov 2023 12:00) (104/53 - 161/68)  BP(mean): 88 (27 Nov 2023 12:00) (73 - 98)  RR: 21 (27 Nov 2023 12:00) (19 - 31)  SpO2: 91% (27 Nov 2023 12:00) (91% - 98%)    Parameters below as of 27 Nov 2023 07:00  Patient On (Oxygen Delivery Method): nasal cannula  O2 Flow (L/min): 2      General: No acute distress.  HEENT: NCAT.  PERRL.  EOMI.  No scleral icterus or injection.  Moist MM.  No oropharyngeal exudates.    Neck: Supple.  Full ROM.  No JVD.  No thyromegaly. No lymphadenopathy.   Heart: RRR.  Normal S1 and S2.  No murmurs, rubs, or gallops.   Lungs: CTAB. No wheezes, crackles, or rhonchi.    Abdomen: BS+, soft, NT/ND.  No organomegaly.  Skin: Warm and dry.  No rashes.  Extremities: No edema, clubbing, or cyanosis.  2+ peripheral pulses b/l.  Musculoskeletal: No deformities.  No spinal or paraspinal tenderness.  Neuro: A&Ox3.  CN II-XII intact.  5/5 strength in UE and LE b/l.  Tactile sensation intact in UE and LE b/l.  Cerebellar function intact         LABS:                        11.7   13.67 )-----------( 301      ( 27 Nov 2023 12:00 )             37.5     11-27    141  |  103  |  15  ----------------------------<  93  3.3<L>   |  27  |  0.91    Ca    8.7      27 Nov 2023 12:00  Phos  3.7     11-27  Mg     2.4     11-27    TPro  7.1  /  Alb  3.4  /  TBili  0.6  /  DBili  x   /  AST  13  /  ALT  9<L>  /  AlkPhos  91  11-25        PT/INR - ( 25 Nov 2023 20:47 )   PT: 14.5 sec;   INR: 1.33 ratio         PTT - ( 25 Nov 2023 20:47 )  PTT:29.2 sec  Urinalysis Basic - ( 27 Nov 2023 12:00 )    Color: x / Appearance: x / SG: x / pH: x  Gluc: 93 mg/dL / Ketone: x  / Bili: x / Urobili: x   Blood: x / Protein: x / Nitrite: x   Leuk Esterase: x / RBC: x / WBC x   Sq Epi: x / Non Sq Epi: x / Bacteria: x      I&O's Summary    26 Nov 2023 07:01 - 27 Nov 2023 07:00  --------------------------------------------------------  IN: 1075 mL / OUT: 600 mL / NET: 475 mL    27 Nov 2023 07:01  -  27 Nov 2023 14:22  --------------------------------------------------------  IN: 400 mL / OUT: 50 mL / NET: 350 mL      BNP  RADIOLOGY & ADDITIONAL STUDIES: Cardiology Consult Note   Lalo Mahan, PGY3  Internal Medicine Resident on Cardiology Consult Service   [phone] - [id number]      HPI:  71 yo F with hx. of COPD, HTN, PAD, CAD s/p LAD stent and chronic leukocytosis of unclear etiology.  Cardiac  hx. also includes cardiac arrest s/p placement of a left-sided ICD (2019), complicated by infection, and s/p R single-chamber ICD reimplantation ([place] in 2019) as well as HFrEF (EF 45%).  Hx. of recent hospital stay for VF, s/p ablation, c/b pericardial effusion and tamponade requiring pericardiocentesis and pericardial window.  Hx. also of A. Fib, most recently in sinus.  Here now with 1 day of abdominal pain. Pt states pain started in the AM while she was using the restroom. She states pain was a 10/10 in severity; however, it gradually improved. Pt reports associated diarrhea. In the evening, the pain became increasingly severe, which prompted her to come to the ED. Patient denies fever, chills, chest pain, SOB, nausea, vomiting. Pt denies chronic NSAID use.     In the ED, patient Afebrile and HDS. Labs remarkable for WBC 18 with a left shift. CT c/f perforated gastric ulcer. When evaluated by surgical team, patient states pain had significantly improved to 1/10 severity.      PAST MEDICAL & SURGICAL HISTORY:  Obese  Smoker  HTN (hypertension)  HLD (hyperlipidemia)  CAD (coronary artery disease)  CHF with cardiomyopathy  Ventricular arrhythmias and AICD as above  PAF  History of hip surgery       MEDICATIONS  (STANDING):  budesonide 160 MICROgram(s)/formoterol 4.5 MICROgram(s) Inhaler 2 Puff(s) Inhalation two times a day  chlorhexidine 2% Cloths 1 Application(s) Topical <User Schedule>  enoxaparin Injectable 40 milliGRAM(s) SubCutaneous <User Schedule>  influenza  Vaccine (HIGH DOSE) 0.7 milliLiter(s) IntraMuscular once  insulin lispro (ADMELOG) corrective regimen sliding scale   SubCutaneous every 6 hours  pantoprazole  Injectable 40 milliGRAM(s) IV Push every 12 hours  piperacillin/tazobactam IVPB.. 3.375 Gram(s) IV Intermittent every 8 hours  potassium chloride  10 mEq/100 mL IVPB 10 milliEquivalent(s) IV Intermittent every 1 hour  MEDICATIONS  (PRN):  acetaminophen   IVPB .. 1000 milliGRAM(s) IV Intermittent every 6 hours PRN Mild Pain (1 - 3)  HYDROmorphone  Injectable 0.25 milliGRAM(s) IV Push every 3 hours PRN Severe Pain (7 - 10)      FAMILY HISTORY:  Family history of Alzheimer's disease (Father)  Family history of cancer (Mother)      ALLERGIES:   No known allergies      SOCIAL HISTORY:   No ETOH.   50 pack year cigarette smoker, s/c 2023.   Lives with .      ROS:  General: no fatigue/malaise, weight loss/gain.  Skin: no rashes.  Eyes: no blurred vision, no loss of vision. 	  ENT: no sore throat, rhinorrhea, sinus congestion.  Respiratory: no SOB, cough or wheeze.  Cardiovascular: no chest pain, dyspnea, palpitations, orthopnea or paroxysmal nocturnal dyspnea.   Genitourinary: no dysuria/hesitancy or hematuria.  Musculoskeletal: no myalgias or arthralgias.  Neurological: no changes in vision or hearing, no lightheadedness/dizziness, no syncope/near syncope	  Psychiatric: no unusual stress/anxiety.   Hematology/Lymphatics: no unusual bleeding, bruising and no lymphadenopathy.  All others negative except as stated above and in HPI.       Vital Signs Last 24 Hrs  T(C): 37 (2023 11:00), Max: 37.2 (2023 20:00)  T(F): 98.6 (2023 11:00), Max: 99 (2023 20:00)  HR: 71 (2023 12:00) (58 - 75)  BP: 137/61 (2023 12:00) (104/53 - 161/68)  BP(mean): 88 (2023 12:00) (73 - 98)  RR: 21 (2023 12:00) (19 - 31)  SpO2: 91% (2023 12:00) (91% - 98%)      General: No acute distress.  HEENT: NCAT.  PERRL.  EOMI.  No scleral icterus or injection.  Moist MM.  No oropharyngeal exudates.    Neck: Supple.  Full ROM.  No JVD.  No thyromegaly. No lymphadenopathy.   Heart: RRR.  Normal S1 and S2.  No murmurs, rubs, or gallops.   Lungs: CTAB. No wheezes, crackles, or rhonchi.    Abdomen: BS+, soft, NT/ND.  No organomegaly.  Skin: Warm and dry.  No rashes.  Extremities: No edema, clubbing, or cyanosis.  2+ peripheral pulses b/l.  Musculoskeletal: No deformities.  No spinal or paraspinal tenderness.  Neuro: A&Ox3.  CN II-XII intact.  5/5 strength in UE and LE b/l.  Tactile sensation intact in UE and LE b/l.  Cerebellar function intact       EKG on :  NSR at 73 bpm.  LBBB with associated ST/T abn.  No significant change.      LABS:                      11.7   13.67 )-----------( 301      ( 2023 12:00 )             37.5       141  |  103  |  15  ----------------------------<  93  3.3<L>   |  27  |  0.91    Ca    8.7      2023 12:00  Phos  3.7       Mg     2.4         TPro  7.1  /  Alb  3.4  /  TBili  0.6  /  DBili  x   /  AST  13  /  ALT  9<L>  /  AlkPhos  91      PT/INR - ( 2023 20:47 )   PT: 14.5 sec;   INR: 1.33 ratio    PTT - ( 2023 20:47 )  PTT:29.2 sec      I&O's Summary  2023 07:  -  2023 07:00  --------------------------------------------------------  IN: 1075 mL / OUT: 600 mL / NET: 475 mL    2023 07:  -  2023 14:22  --------------------------------------------------------  IN: 400 mL / OUT: 50 mL / NET: 350 mL      Xray Chest 1 View- PORTABLE-Urgent (Xray Chest 1 View- PORTABLE-Urgent .) (23 @ 10:38)   IMPRESSION:  Diaphragmatic air likely represents pneumoperitoneum. Interval improvement of pneumomediastinum. Moderate left-sided pleural effusion with likely superimposed basilar atelectasis.  The above findings were discussed with STACI Martínez, by Nelson Schneider M.D. on 2023 11:03 AM. Provider confirmed understanding.    NELSON SCHNEIDER MD; Resident Radiologist  This document has been electronically signed.  VIANEY BALL MD; Attending Radiologist  This document has been electronically signed. 2023 11:06AM        Xray UGI Single Contrast Study (23 @ 02:00)   IMPRESSION:  No evidence of contrast extravasation.    OMKAR BRIONES MD; Resident Radiologist  This document has been electronically signed.  DEREK GOODMAN MD; Attending Radiologist  This document has been electronically signed. 2023  2:44AM      Intra-Operative Transesophageal Echo W or WO Ultrasound Enhancing Agent (10.28.23 @ 06:00)   Procedure: Intraoperative diagnostic transesophageal echocardiogram performed with 2D imaging. Color flow Doppler was performed.   --------------------------------------------------------------------------------  CPT:               OR ECHO TRANSESOPH W/O CON - 24369.m  Indication(s):     Other pericardial effusion (noninflammatory) - I31.39  Ordering Location: [facility]   --------------------------------------------------------------------------------  Limited SUPA Exam for monitoring during pericardial window    Left Ventricle:  Left ventricular ejection fraction is estimated at 45 to 50%.     Right Ventricle:  Normal right ventricular global function.     Mitral Valve:  There is trace mitral regurgitation.     Tricuspid Valve:  There is moderate tricuspid regurgitation.    Pericardial effusion visualized circumferentially around heart. About 1 cm effusion visualized along right atrial and right ventricular border. 2 cm effusion visualized anterior to posterior to left ventricle. After drainage, small amount of clot visualized in pericardial space near the RV. Effusion next to LV significant improved with <1 cm seen anterior to LV.     Electronically signed by Manuel Rolle on 10/28/2023 at 10:00:18 AM      CARDIAC CATHETERIZATION:   Study Date:     2023   Name:           AUSTIN LEE   :            1950   (72 years)     Cath Lab Report    Diagnostic Cardiologist:       Yaneth Guevara MD     Procedures Performed   Procedures:      1.    Arterial Access - Right Femoral   2.    Venous Access - Right Femoral   3.    Diagnostic Coronary Angiography   4.    RHC   5.    Ultrasound Guided Access         Diagnostic Conclusions:   Patent LAD stent. Mildly elevated filling pressures with preserved cardiac output.    Recommendations:   1. Monitor access site.    2. Continue medical management of CAD. No obvious reason for multiple episodes of VF and subsequent ICD shocks.    General Impressions:   Hemodynamic:           Hemodynamic Impressions   General Impressions: Hemodynamics are notable for mildly elevated biventricular filling pressures with preserved cardiac output.      RA 8mmHg   RV 45/12mmHg   PA 39/24/mean 31mmHg   PCWP 19mmHg   CO 6.83 L/min, CI 3.67 L/min/m2     Coronary Angiography   The coronary circulation is right dominant.      LM   Distal left main: There is a 20 % stenosis.      LAD   Left anterior descending artery: Mid LAD stent patent.      CX   Mid circumflex: There is a 30 % stenosis.      RCA   Right coronary artery: Angiography shows complete occlusion.  of the proximal RCA with left to right collaterals filling the rPDA. . There is a 100 % stenosis.

## 2023-11-28 NOTE — DIETITIAN INITIAL EVALUATION ADULT - NS FNS WEIGHT CHANGE REASON
Weight history per HIE and past RD notes:  93.4kg (7/8/23), 87.3kg (8/29/23), 86.2kg (9/14/23), 87.5kg (10/7/23), 88.7kg (11/2/23)  - Fairly stable weight history in 2023  - Current dosing wt 81.2kg (11/25/23) indicates recent/acute weight loss x 1 month/unintentional Weight history per HIE and past RD notes:  93.4kg (7/8/23), 87.3kg (8/29/23), 86.2kg (9/14/23), 87.5kg (10/7/23), 88.7kg (11/2/23)  - Fairly stable weight history in 2023  - Current dosing wt 81.2kg (11/25/23) indicates possible recent/acute 10 pound weight loss x 1 month  - Pt denies weight loss; confirms  pounds/unintentional

## 2023-11-28 NOTE — PROGRESS NOTE ADULT - ASSESSMENT
72F with COPD, HTN, PAD, CAD s/p LAD stent, chronic leukocytosis of unclear etiol, cardiac arrest s/p left-sided ICD (6/4/2019) complicated by infection and s/p R single-chamber ICD reimplantation ([place] in 9/23/2019), HFrEF (EF 45%), recent hospital stay for VF sp ablation c/b pericardial effusion and tamponade s/p pericardiocentesis and pericardial window and AFib, most recently in sinus here now with 1 day of abdominal pain. In the ED, patient AF, HDS. Labs remarkable for WBC 18 with a left shift. CT c/f perforated gastric ulcer. UGI series demonstrating no evidence of contrast extravasation. Admitted to SICU for close monitoring.     PLAN:  - appreciate care per SICU  - clear liquid diet   - Protonix BID   - Cardiology consult: can switch to plavix instead of aspirin   - Holding Coumadin   - f/u H. Pylori studies   - Dispo- Home PT    ACS  [id number]

## 2023-11-28 NOTE — DIETITIAN INITIAL EVALUATION ADULT - OTHER INFO
Nutrition Status:  - CAD, CHF  - GI: CT c/f perforated gastric ulcer  - Supplementation: KCL for hypokalemia  - Pressure Injury present on admission   Nutrition Status:  - CAD, CHF; home Coumadin on hold  - GI: CT c/f perforated gastric ulcer; already sealed on 11/26 UGI series  - Supplementation: KCL for hypokalemia  - Pressure Injury present on admission

## 2023-11-28 NOTE — DIETITIAN INITIAL EVALUATION ADULT - ENERGY INTAKE
[meal rounds pending] Tolerating clear liquids thus far. Diet advanced to DASH 11/28; pt reports she tolerated lunch (chicken) with only mild GI discomfort.

## 2023-11-28 NOTE — DIETITIAN INITIAL EVALUATION ADULT - REASON INDICATOR FOR ASSESSMENT
Nutrition Assessment warranted for length of stay on SICU  Nutrition Consult for Pressure Injury > Stage 2 received and appreciated.   Information obtained from: medical record, 8ICU Interdisciplinary Rounds  Nutrition Assessment warranted for length of stay on SICU  Nutrition Consult for Pressure Injury > Stage 2 received and appreciated.   Information obtained from: patient, medical record, 8ICU Interdisciplinary Rounds

## 2023-11-28 NOTE — DIETITIAN INITIAL EVALUATION ADULT - REASON
Pt appears appropriately developed with no overt signs of muscle or fat depletion; no apparent malnutrition risk factors.

## 2023-11-28 NOTE — DIETITIAN INITIAL EVALUATION ADULT - PERTINENT MEDS FT
MEDICATIONS  (STANDING):  budesonide 160 MICROgram(s)/formoterol 4.5 MICROgram(s) Inhaler 2 Puff(s) Inhalation two times a day  chlorhexidine 2% Cloths 1 Application(s) Topical <User Schedule>  enoxaparin Injectable 40 milliGRAM(s) SubCutaneous <User Schedule>  influenza  Vaccine (HIGH DOSE) 0.7 milliLiter(s) IntraMuscular once  pantoprazole  Injectable 40 milliGRAM(s) IV Push every 12 hours  piperacillin/tazobactam IVPB.. 3.375 Gram(s) IV Intermittent every 8 hours  potassium chloride   Powder 40 milliEquivalent(s) Oral once    MEDICATIONS  (PRN):  acetaminophen     Tablet .. 650 milliGRAM(s) Oral every 6 hours PRN Mild Pain (1 - 3)

## 2023-11-28 NOTE — DIETITIAN INITIAL EVALUATION ADULT - NS FNS DIET ORDER
Diet, Clear Liquid (11-27-23 @ 12:11)   Diet, DASH/TLC:   Sodium & Cholesterol Restricted (11-28-23 @ 11:01) [Active]

## 2023-11-28 NOTE — PROGRESS NOTE ADULT - ASSESSMENT
ASSESSMENT: 72yFemale presents with abdominal pain, CT c/f perforated gastric ulcer. UGI series without evidence of contrast extravasation. Admitted to SICU for close monitoring.     PLAN:   Neurologic:  - AO x 4  - Dilaudid, Tylenol PRN for pain    Respiratory:  - Saturation stable on RA  - Continue home Symbicort    Cardiovascular:  - Hemodynamically stable  - Lactate negative  - Sig cardiac history on Toprol XL, quinidine, amiodarone. Holding while NPO  - Holding Losartan for HTN    Gastrointestinal/Nutrition:  - Strict NPO  - Serial abdominal exams  - Protonix 40mg BID  - Send H. Pylori    Genitourinary/Renal:  - LR at 75 cc/hr while NPO  - No farr for now    Hematologic:  - Holding home Coumadin, last INR 1.33  - Home ASA for history of stents, holding while NPO  - Lovenox for chemical VTE ppx  - Mechanical VTE ppx with SCDs    Infectious Disease:  - Empiric abx coverage with Zosyn  - BCx 11/26 NGTD    Endocrine:  - History of DM on dapagliflozin, euglycemic    Disposition: SICU   ASSESSMENT: 72yFemale presents with abdominal pain, CT c/f perforated gastric ulcer. UGI series without evidence of contrast extravasation. Admitted to SICU for close monitoring.     PLAN:   Neurologic:  - AO x 4  - Dilaudid, Tylenol PRN for pain    Respiratory:  - Saturation stable on RA  - Continue home Symbicort    Cardiovascular:  - Hemodynamically stable  - Lactate negative  - Sig cardiac history on Toprol XL, quinidine, amiodarone. Holding while NPO  - Holding Losartan for HTN    Gastrointestinal/Nutrition:  - Advaced to clears  - Serial abdominal exams  - Protonix 40mg BID  - Send H. Pylori    Genitourinary/Renal:  - LR at 75 cc/hr while NPO  - No farr for now    Hematologic:  - Holding home Coumadin, last INR 1.33  - Home ASA for history of stents, holding while NPO  - Lovenox for chemical VTE ppx  - Mechanical VTE ppx with SCDs    Infectious Disease:  - Empiric abx coverage with Zosyn  - BCx 11/26 NGTD    Endocrine:  - History of DM on dapagliflozin, euglycemic    Disposition: SICU

## 2023-11-28 NOTE — PROGRESS NOTE ADULT - NS ATTEND AMEND GEN_ALL_CORE FT
seen and examined 11-28-23 @ 0926    tolerating clears w/o nausea or vomiting    SpO2 = 97% on 1 lpm nasal cannula  soft / NT / ND    WBC = 11    CXR (11/27) - pneumoperitoneum is more prominent than 11/25 CXR, but this is most likely secondary to upright positioning today      perforated gastric ulcer  -already sealed on 11/26 UGI series  -regular diet  -Zosyn (11/26 - 12/3)  -PPI  -send stool H pylori antigen  -switch ASA -> Plavix (CAD s/p LAD stent)  -ok to restart coumadin for afib      I reviewed her labs.  care coordinated with SICU team seen and examined 11-28-23 @ 0926    tolerating clears w/o nausea or vomiting    SpO2 = 97% on 1 lpm nasal cannula  soft / NT / ND    WBC = 11    CXR (11/27) - pneumoperitoneum is more prominent than 11/25 CXR, but this is most likely secondary to upright positioning today      perforated gastric ulcer  -already sealed on 11/26 UGI series  -regular diet  -Zosyn (11/26 - 12/3)  -PPI  -send stool H pylori antigen  -switch ASA -> Plavix (CAD s/p LAD stent)  -ok to restart coumadin for afib    I discussed the need for outpatient EGD in 6 weeks with the patient to assure complete healing of ulcer and rule out malignancy.      I reviewed her labs.  care coordinated with SICU team

## 2023-11-29 NOTE — DISCHARGE NOTE NURSING/CASE MANAGEMENT/SOCIAL WORK - NSDCPEEMAIL_GEN_ALL_CORE
[facility] for Tobacco Control email [email or web address]

## 2023-11-29 NOTE — DISCHARGE NOTE NURSING/CASE MANAGEMENT/SOCIAL WORK - PATIENT PORTAL LINK FT
You can access the FollowMyHealth Patient Portal offered by [facility] by registering at the following website: [email or web address]. By joining [facility]’s FollowMyHealth portal, you will also be able to view your health information using other applications (apps) compatible with our system.

## 2023-11-29 NOTE — PROGRESS NOTE ADULT - ASSESSMENT
72F with COPD, HTN, PAD, CAD s/p LAD stent, chronic leukocytosis of unclear etiol, cardiac arrest s/p left-sided ICD (6/4/2019) complicated by infection and s/p R single-chamber ICD reimplantation ([place] in 9/23/2019), HFrEF (EF 45%), recent hospital stay for VF sp ablation c/b pericardial effusion and tamponade s/p pericardiocentesis and pericardial window and AFib, most recently in sinus here now with 1 day of abdominal pain. In the ED, patient AF, HDS. Labs remarkable for WBC 18 with a left shift. CT c/f perforated gastric ulcer. UGI series demonstrating no evidence of contrast extravasation The patient was admitted to SICU for close monitoring. Patient was transferred to floor on 11/29/23    PLAN:  -Zosyn (11/26 - 12/3)  - diet: regular  - Protonix BID   - Cardiology consult:  Plavix (CAD s/p LAD stent) and coumadin for afib - confirm coumadin is ordered  - f/u H. Pylori studies   - outpatient EGD in 6 weeks with the patient to assure complete healing of ulcer and rule out malignancy.  - Dispo- Home PT    ACS  [id number]

## 2023-11-29 NOTE — DISCHARGE NOTE NURSING/CASE MANAGEMENT/SOCIAL WORK - NSDCVIVACCINE_GEN_ALL_CORE_FT
influenza, injectable, quadrivalent, preservative free; 24-Jan-2015 06:54; [place], Auto-process (IS); [employer]; [id number]; IntraMuscular; Deltoid Left.; 0.5 milliLiter(s); VIS (VIS Published: 19-Aug-2014, VIS Presented: 24-Jan-2015);

## 2023-11-29 NOTE — DISCHARGE NOTE PROVIDER - CARE PROVIDER_API CALL
Colin Baer  Surgery  [address] [place] [address], [facility] [address]  [place] [address]  Phone: [phone]  Fax: [phone]  Follow Up Time:    Colin Baer  Surgery  [address] [place] [address], [facility] [address]  [place] [address]  Phone: [phone]  Fax: [phone]  Follow Up Time:    Colin Baer  Surgery  [address] [place] [address], [facility] [address]  [place] [address]  Phone: [phone]  Fax: [phone]  Follow Up Time:

## 2023-11-29 NOTE — DISCHARGE NOTE PROVIDER - NSFOLLOWUPCLINICS_GEN_ALL_ED_FT
Gastroenterology at [facility]  Gastroenterology  [address] [place] [address]  Phone: [phone]  Fax:      Gastroenterology at [facility]  Gastroenterology  [address] [place] [address]  Phone: [phone]  Fax:      Gastroenterology at [facility]  Gastroenterology  [address] [place] [address]  Phone: [phone]  Fax:

## 2023-11-29 NOTE — DISCHARGE NOTE PROVIDER - NSDCMRMEDTOKEN_GEN_ALL_CORE_FT
acetaminophen 325 mg oral tablet: 2 tab(s) orally every 6 hours  amiodarone 200 mg oral tablet: 1 tab(s) orally once a day  aspirin 81 mg oral tablet, chewable: 1 tab(s) orally once a day  atorvastatin 40 mg oral tablet: 1 tab(s) orally once a day (at bedtime)  D3 50 mcg (2000 intl units) oral capsule: 1 cap(s) orally  dapagliflozin 10 mg oral tablet: 1 tab(s) orally once a day  furosemide 40 mg oral tablet: 1 tab(s) orally every 12 hours  losartan 25 mg oral tablet: 1 tab(s) orally once a day  melatonin 5 mg oral tablet: 1 tab(s) orally once a day (at bedtime)  Multiple Vitamins oral tablet: 1 tab(s) orally once a day  nystatin 100,000 units/g topical powder: Apply topically to affected area 3 times a day  quiNIDine 324 mg oral tablet, extended release: 1 tab(s) orally every 12 hours  Symbicort 160 mcg-4.5 mcg/inh inhalation aerosol: 2 puff(s) inhaled once a day  Toprol-XL 25 mg oral tablet, extended release: 0.5 tab(s) orally once a day  warfarin 1 mg oral tablet: 2 tab(s) orally once a day   acetaminophen 325 mg oral tablet: 2 tab(s) orally every 6 hours  amiodarone 200 mg oral tablet: 1 tab(s) orally once a day  amoxicillin-clavulanate 875 mg-125 mg oral tablet: 875 milligram(s) orally 2 times a day  atorvastatin 40 mg oral tablet: 1 tab(s) orally once a day (at bedtime)  clopidogrel 75 mg oral tablet: 1 tab(s) orally once a day  D3 50 mcg (2000 intl units) oral capsule: 1 cap(s) orally once a day  dapagliflozin 10 mg oral tablet: 1 tab(s) orally once a day  furosemide 40 mg oral tablet: 1 tab(s) orally every 12 hours  losartan 25 mg oral tablet: 1 tab(s) orally once a day  melatonin 5 mg oral tablet: 1 tab(s) orally once a day (at bedtime)  Multiple Vitamins oral tablet: 1 tab(s) orally once a day  nystatin 100,000 units/g topical powder: Apply topically to affected area 3 times a day  pantoprazole 40 mg oral delayed release tablet: 1 tab(s) orally 2 times a day  quiNIDine 324 mg oral tablet, extended release: 1 tab(s) orally every 12 hours  Symbicort 160 mcg-4.5 mcg/inh inhalation aerosol: 2 puff(s) inhaled once a day  Toprol-XL 25 mg oral tablet, extended release: 0.5 tab(s) orally once a day  warfarin 1 mg oral tablet: 2 tab(s) orally once a day

## 2023-11-29 NOTE — DISCHARGE NOTE NURSING/CASE MANAGEMENT/SOCIAL WORK - NSDCPEWEB_GEN_ALL_CORE
[facility] for Tobacco Control website --- [email or web address] website --- [email or web address]

## 2023-11-29 NOTE — DISCHARGE NOTE PROVIDER - NSDCFUSCHEDAPPT_GEN_ALL_CORE_FT
Campbell Hurt  [facility]  ELECTROPH [address]  Scheduled Appointment: 12/21/2023    Maryc Wise  [facility]  CARDIOLOGY [address]  Scheduled Appointment: 01/12/2024     Campbell Hurt  [facility]  ELECTROPH [address]  Scheduled Appointment: 12/21/2023    Marcy Wise  [facility]  CARDIOLOGY [address]  Scheduled Appointment: 01/12/2024     Campbell Hurt  [facility]  ELECTROPH [address]  Scheduled Appointment: 12/21/2023    Marcy Wise  [facility]  CARDIOLOGY [address]  Scheduled Appointment: 01/12/2024

## 2023-11-29 NOTE — PROGRESS NOTE ADULT - SUBJECTIVE AND OBJECTIVE BOX
SURGERY DAILY PROGRESS NOTE:     24 HOUR EVENTS:  - Diet advanced to clears     SUBJECTIVE/ROS: Patient seen and evaluated on AM rounds.   Reports non-bloody bowel movements. Tolerating clears.     OBJECTIVE:    Vital Signs Last 24 Hrs  T(C): 36.6 (28 Nov 2023 04:00), Max: 37 (27 Nov 2023 11:00)  T(F): 97.9 (28 Nov 2023 04:00), Max: 98.6 (27 Nov 2023 11:00)  HR: 77 (28 Nov 2023 05:56) (64 - 111)  BP: 123/59 (28 Nov 2023 04:00) (96/65 - 156/67)  BP(mean): 85 (28 Nov 2023 04:00) (68 - 96)  RR: 20 (28 Nov 2023 04:00) (17 - 32)  SpO2: 98% (28 Nov 2023 05:56) (91% - 99%)    Parameters below as of 28 Nov 2023 05:56  Patient On (Oxygen Delivery Method): nasal cannula      I&O's Detail    27 Nov 2023 07:01  -  28 Nov 2023 07:00  --------------------------------------------------------  IN:    dextrose 5% + sodium chloride 0.45%: 125 mL    IV PiggyBack: 100 mL    IV PiggyBack: 600 mL    Oral Fluid: 100 mL  Total IN: 925 mL    OUT:    Voided (mL): 350 mL  Total OUT: 350 mL    Total NET: 575 mL        Daily     Daily   MEDICATIONS  (STANDING):  budesonide 160 MICROgram(s)/formoterol 4.5 MICROgram(s) Inhaler 2 Puff(s) Inhalation two times a day  chlorhexidine 2% Cloths 1 Application(s) Topical <User Schedule>  enoxaparin Injectable 40 milliGRAM(s) SubCutaneous <User Schedule>  influenza  Vaccine (HIGH DOSE) 0.7 milliLiter(s) IntraMuscular once  pantoprazole  Injectable 40 milliGRAM(s) IV Push every 12 hours  piperacillin/tazobactam IVPB.. 3.375 Gram(s) IV Intermittent every 8 hours  potassium chloride   Powder 40 milliEquivalent(s) Oral once    MEDICATIONS  (PRN):  acetaminophen   IVPB .. 1000 milliGRAM(s) IV Intermittent every 6 hours PRN Mild Pain (1 - 3)  HYDROmorphone  Injectable 0.25 milliGRAM(s) IV Push every 3 hours PRN Severe Pain (7 - 10)      LABS:                        10.5   11.45 )-----------( 275      ( 28 Nov 2023 06:23 )             33.2     11-28    141  |  105  |  13  ----------------------------<  100<H>  3.3<L>   |  27  |  0.95    Ca    8.6      28 Nov 2023 06:23  Phos  3.3     11-28  Mg     2.3     11-28        Urinalysis Basic - ( 28 Nov 2023 06:23 )    Color: x / Appearance: x / SG: x / pH: x  Gluc: 100 mg/dL / Ketone: x  / Bili: x / Urobili: x   Blood: x / Protein: x / Nitrite: x   Leuk Esterase: x / RBC: x / WBC x   Sq Epi: x / Non Sq Epi: x / Bacteria: x        Physical Exam:  General Appearance: Appears well, NAD  Respiratory: No labored breathing  Abdomen: Soft, nontender, obese           
Surgery Progress Note    SUBJECTIVE: Pt seen and examined at bedside. Patient comfortable and in no-apparent distress. No nausea, vomiting, diarrhea. Pain is controlled. +Gas/+BM. Tolerating diet.    Vital Signs Last 24 Hrs  T(C): 37 (29 Nov 2023 04:17), Max: 37.1 (28 Nov 2023 11:00)  T(F): 98.6 (29 Nov 2023 04:17), Max: 98.8 (29 Nov 2023 02:30)  HR: 76 (29 Nov 2023 04:17) (65 - 89)  BP: 145/78 (29 Nov 2023 04:17) (90/50 - 165/66)  BP(mean): 94 (29 Nov 2023 00:00) (66 - 96)  RR: 18 (29 Nov 2023 04:17) (18 - 28)  SpO2: 92% (29 Nov 2023 04:17) (91% - 99%)    Parameters below as of 29 Nov 2023 04:17  Patient On (Oxygen Delivery Method): room air        Physical Exam:  General Appearance: Appears well, NAD  Respiratory: No labored breathing  CV: Pulse regularly present  Abdomen: Soft, nontender    LABS:                        10.5   11.45 )-----------( 275      ( 28 Nov 2023 06:23 )             33.2     11-28    141  |  105  |  13  ----------------------------<  100<H>  3.3<L>   |  27  |  0.95    Ca    8.6      28 Nov 2023 06:23  Phos  3.3     11-28  Mg     2.3     11-28        Urinalysis Basic - ( 28 Nov 2023 06:23 )    Color: x / Appearance: x / SG: x / pH: x  Gluc: 100 mg/dL / Ketone: x  / Bili: x / Urobili: x   Blood: x / Protein: x / Nitrite: x   Leuk Esterase: x / RBC: x / WBC x   Sq Epi: x / Non Sq Epi: x / Bacteria: x        INs and OUTs:    11-27-23 @ 07:01  -  11-28-23 @ 07:00  --------------------------------------------------------  IN: 925 mL / OUT: 350 mL / NET: 575 mL    11-28-23 @ 07:01  -  11-29-23 @ 06:18  --------------------------------------------------------  IN: 855 mL / OUT: 500 mL / NET: 355 mL    
24 HOUR EVENTS:  - Diet advanced to clears     SUBJECTIVE/ROS:  [ X ] A ten-point review of systems was otherwise negative except as noted.  [ ] Due to altered mental status/intubation, subjective information were not able to be obtained from the patient. History was obtained, to the extent possible, from review of the chart and collateral sources of information.      NEURO  Exam: AOx3. NAD. Follows commands. Moves all extremities. Strength and sensation intact.   Meds: acetaminophen   IVPB .. 1000 milliGRAM(s) IV Intermittent every 6 hours PRN Mild Pain (1 - 3)  HYDROmorphone  Injectable 0.25 milliGRAM(s) IV Push every 3 hours PRN Severe Pain (7 - 10)    [x] Adequacy of sedation and pain control has been assessed and adjusted      RESPIRATORY  RR: 23 (11-28-23 @ 00:00) (17 - 32)  SpO2: 97% (11-28-23 @ 00:00) (91% - 99%)  Wt(kg): --  Exam: CTA b/l. No murmurs, rubs, gallops appreciated.   Mechanical Ventilation:     [ ] Extubation Readiness Assessed  Meds: budesonide 160 MICROgram(s)/formoterol 4.5 MICROgram(s) Inhaler 2 Puff(s) Inhalation two times a day        CARDIOVASCULAR  HR: 69 (11-28-23 @ 00:00) (60 - 111)  BP: 122/57 (11-28-23 @ 00:00) (96/65 - 161/68)  BP(mean): 82 (11-28-23 @ 00:00) (68 - 98)  ABP: --  ABP(mean): --  Wt(kg): --  CVP(cm H2O): --  VBG - ( 27 Nov 2023 11:50 )  pH: 7.38  /  pCO2: 51    /  pO2: 45    / HCO3: 30    / Base Excess: 4.1   /  SaO2: 68.9   Lactate: 1.0                Exam: S1S2. No murmurs, rubs, gallops appreciated.  Cardiac Rhythm: NSR rate 63  Meds:       GI/NUTRITION  Exam: Soft, non-distended, non-tender.   Diet: Clears  Meds: pantoprazole  Injectable 40 milliGRAM(s) IV Push every 12 hours      GENITOURINARY  I&O's Detail    11-26 @ 07:01 - 11-27 @ 07:00  --------------------------------------------------------  IN:    dextrose 5% + sodium chloride 0.45%: 550 mL    IV PiggyBack: 100 mL    IV PiggyBack: 275 mL    Lactated Ringers: 150 mL  Total IN: 1075 mL    OUT:    Voided (mL): 600 mL  Total OUT: 600 mL    Total NET: 475 mL      11-27 @ 07:01 - 11-28 @ 01:59  --------------------------------------------------------  IN:    dextrose 5% + sodium chloride 0.45%: 125 mL    IV PiggyBack: 100 mL    IV PiggyBack: 550 mL    Oral Fluid: 100 mL  Total IN: 875 mL    OUT:    Voided (mL): 175 mL  Total OUT: 175 mL    Total NET: 700 mL          11-27    141  |  103  |  15  ----------------------------<  93  3.3<L>   |  27  |  0.91    Ca    8.7      27 Nov 2023 12:00  Phos  3.7     11-27  Mg     2.4     11-27      [ ] Mazariegos catheter, indication: N/A  Meds:       HEMATOLOGIC  Meds: enoxaparin Injectable 40 milliGRAM(s) SubCutaneous <User Schedule>    [x] VTE Prophylaxis                        11.7   13.67 )-----------( 301      ( 27 Nov 2023 12:00 )             37.5       Transfusion     [ ] PRBC   [ ] Platelets   [ ] FFP   [ ] Cryoprecipitate      INFECTIOUS DISEASES  T(C): 36.6 (11-28-23 @ 00:00), Max: 37 (11-27-23 @ 04:00)  Wt(kg): --  WBC Count: 13.67 K/uL (11-27 @ 12:00)    Recent Cultures:  Specimen Source: .Blood Blood-Peripheral, 11-26 @ 07:17; Results   No growth at 24 hours; Gram Stain: --; Organism: --    Meds: influenza  Vaccine (HIGH DOSE) 0.7 milliLiter(s) IntraMuscular once  piperacillin/tazobactam IVPB.. 3.375 Gram(s) IV Intermittent every 8 hours        ENDOCRINE  Capillary Blood Glucose    Meds:       ACCESS DEVICES:  [ X ] Peripheral IV  [ ] Central Venous Line	[ ] R	[ ] L	[ ] IJ	[ ] Fem	[ ] SC	Placed:   [ ] Arterial Line		[ ] R	[ ] L	[ ] Fem	[ ] Rad	[ ] Ax	Placed:   [ ] PICC:					[ ] Mediport  [ ] Urinary Catheter, Date Placed:   [ ] Necessity of urinary, arterial, and venous catheters discussed    OTHER MEDICATIONS:  chlorhexidine 2% Cloths 1 Application(s) Topical <User Schedule>      CODE STATUS:     IMAGING:
24 HOUR EVENTS:  - Lasix 20 at noon with good urinary response  - D5N5   - Holding coumadin    SUBJECTIVE/ROS:  [X] A ten-point review of systems was otherwise negative except as noted.    NEURO  Exam: AOx3; no focal deficits. resting comfortably    Meds: acetaminophen   IVPB .. 1000 milliGRAM(s) IV Intermittent every 6 hours PRN Mild Pain (1 - 3)  HYDROmorphone  Injectable 0.25 milliGRAM(s) IV Push every 3 hours PRN Severe Pain (7 - 10)    [x] Adequacy of sedation and pain control has been assessed and adjusted      RESPIRATORY  RR: 26 (11-27-23 @ 02:00) (18 - 31)  SpO2: 95% (11-27-23 @ 02:00) (90% - 98%)  Wt(kg): --  Exam: Patient breathing comfortably on RA. no tachypnea. Satting 95  Mechanical Ventilation:     [ ] Extubation Readiness Assessed  Meds: budesonide 160 MICROgram(s)/formoterol 4.5 MICROgram(s) Inhaler 2 Puff(s) Inhalation two times a day        CARDIOVASCULAR  HR: 66 (11-27-23 @ 02:00) (58 - 77)  BP: 135/60 (11-27-23 @ 02:00) (104/53 - 157/96)  BP(mean): 87 (11-27-23 @ 02:00) (71 - 113)  ABP: --  ABP(mean): --  Wt(kg): --  CVP(cm H2O): --  VBG - ( 26 Nov 2023 06:36 )  pH: 7.36  /  pCO2: 58    /  pO2: 44    / HCO3: 33    / Base Excess: 5.7   /  SaO2: 63.9   Lactate: 1.4          Exam: S1S2. regular rhythm  Cardiac Rhythm: NSR at 58bpm. Occasional PVCs  Meds:       GI/NUTRITION  Exam: Soft, non-distended, no ttp  Diet: NPO  Meds: pantoprazole  Injectable 40 milliGRAM(s) IV Push every 12 hours      GENITOURINARY  I&O's Detail    11-25 @ 07:01  -  11-26 @ 07:00  --------------------------------------------------------  IN:    IV PiggyBack: 50 mL    Lactated Ringers: 300 mL  Total IN: 350 mL    OUT:  Total OUT: 0 mL    Total NET: 350 mL      11-26 @ 07:01  -  11-27 @ 02:24  --------------------------------------------------------  IN:    dextrose 5% + sodium chloride 0.45%: 425 mL    IV PiggyBack: 100 mL    IV PiggyBack: 250 mL    Lactated Ringers: 150 mL  Total IN: 925 mL    OUT:    Voided (mL): 500 mL  Total OUT: 500 mL    Total NET: 425 mL          11-26    143  |  103  |  15  ----------------------------<  100<H>  3.5   |  26  |  0.90    Ca    9.0      26 Nov 2023 07:18  Phos  3.7     11-26  Mg     2.5     11-26    TPro  7.1  /  Alb  3.4  /  TBili  0.6  /  DBili  x   /  AST  13  /  ALT  9<L>  /  AlkPhos  91  11-25    [ ] Mazariegos catheter, indication: N/A  Meds: dextrose 5% + sodium chloride 0.45%. 1000 milliLiter(s) IV Continuous <Continuous>        HEMATOLOGIC  Meds: enoxaparin Injectable 40 milliGRAM(s) SubCutaneous <User Schedule>    [x] VTE Prophylaxis                        12.4   15.45 )-----------( 274      ( 26 Nov 2023 07:18 )             38.8     PT/INR - ( 25 Nov 2023 20:47 )   PT: 14.5 sec;   INR: 1.33 ratio         PTT - ( 25 Nov 2023 20:47 )  PTT:29.2 sec  Transfusion     [ ] PRBC   [ ] Platelets   [ ] FFP   [ ] Cryoprecipitate      INFECTIOUS DISEASES  T(C): 37.1 (11-27-23 @ 00:00), Max: 37.2 (11-26-23 @ 03:06)  Wt(kg): --  WBC Count: 15.45 K/uL (11-26 @ 07:18)    Recent Cultures:    Meds: influenza  Vaccine (HIGH DOSE) 0.7 milliLiter(s) IntraMuscular once  piperacillin/tazobactam IVPB.. 3.375 Gram(s) IV Intermittent every 8 hours        ENDOCRINE  Capillary Blood Glucose    Meds: insulin lispro (ADMELOG) corrective regimen sliding scale   SubCutaneous every 6 hours        ACCESS DEVICES:  [ X ] Peripheral IV  [ ] Central Venous Line	[ ] R	[ ] L	[ ] IJ	[ ] Fem	[ ] SC	Placed:   [ ] Arterial Line		[ ] R	[ ] L	[ ] Fem	[ ] Rad	[ ] Ax	Placed:   [ ] PICC:					[ ] Mediport  [ ] Urinary Catheter, Date Placed:   [x] Necessity of urinary, arterial, and venous catheters discussed    OTHER MEDICATIONS:  chlorhexidine 2% Cloths 1 Application(s) Topical <User Schedule>      CODE STATUS:     IMAGING:
Surgery Progress Note    SUBJECTIVE: Pt seen and examined at bedside. Patient comfortable and in no-apparent distress.       Vital Signs Last 24 Hrs  T(C): 36.8 (27 Nov 2023 07:00), Max: 37.2 (26 Nov 2023 20:00)  T(F): 98.2 (27 Nov 2023 07:00), Max: 99 (26 Nov 2023 20:00)  HR: 72 (27 Nov 2023 08:00) (58 - 77)  BP: 154/67 (27 Nov 2023 08:00) (104/53 - 161/68)  BP(mean): 96 (27 Nov 2023 08:00) (71 - 98)  RR: 21 (27 Nov 2023 08:00) (19 - 31)  SpO2: 93% (27 Nov 2023 08:00) (90% - 98%)    Parameters below as of 27 Nov 2023 07:00  Patient On (Oxygen Delivery Method): nasal cannula  O2 Flow (L/min): 2      Physical Exam:  General Appearance: Appears well, NAD  Respiratory: No labored breathing  CV: Pulse regularly present  Abdomen: Soft, nontender, ***incision c/d/i    LABS:                        12.4   15.45 )-----------( 274      ( 26 Nov 2023 07:18 )             38.8     11-26    143  |  103  |  15  ----------------------------<  100<H>  3.5   |  26  |  0.90    Ca    9.0      26 Nov 2023 07:18  Phos  3.7     11-26  Mg     2.5     11-26    TPro  7.1  /  Alb  3.4  /  TBili  0.6  /  DBili  x   /  AST  13  /  ALT  9<L>  /  AlkPhos  91  11-25    PT/INR - ( 25 Nov 2023 20:47 )   PT: 14.5 sec;   INR: 1.33 ratio         PTT - ( 25 Nov 2023 20:47 )  PTT:29.2 sec  Urinalysis Basic - ( 26 Nov 2023 07:18 )    Color: x / Appearance: x / SG: x / pH: x  Gluc: 100 mg/dL / Ketone: x  / Bili: x / Urobili: x   Blood: x / Protein: x / Nitrite: x   Leuk Esterase: x / RBC: x / WBC x   Sq Epi: x / Non Sq Epi: x / Bacteria: x        INs and OUTs:    11-26-23 @ 07:01  -  11-27-23 @ 07:00  --------------------------------------------------------  IN: 1075 mL / OUT: 600 mL / NET: 475 mL    11-27-23 @ 07:01  -  11-27-23 @ 08:33  --------------------------------------------------------  IN: 50 mL / OUT: 0 mL / NET: 50 mL

## 2023-11-29 NOTE — DISCHARGE NOTE PROVIDER - NSDCHOSPICE_GEN_A_CORE
Discharged instructions given. All questions answered. Acknowledge understanding. Waiting for ride. Discharge to car via wheelchair. No

## 2023-11-29 NOTE — PROGRESS NOTE ADULT - ATTENDING COMMENTS
seen and examined 11-29-23 @ 0945    tolerating regular diet w/o nausea or vomiting    soft / NT / ND    WBC = 9      perforated gastric ulcer  -already sealed on 11/26 UGI series  -Zosyn (11/26 - 11/29)  -discharge home today  -Rx PPI  -Rx Plavix (she was on ASA for CAD s/p PCI, but this could have caused gastric ulcer)  -send stool H pylori antigen if she has a BM prior to discharge home  -needs EGD in 6 weeks to assure complete healing of ulcer and rule out malignancy.    afib  -coumadin was restarted on 11/28      I reviewed her labs.

## 2023-11-29 NOTE — DISCHARGE NOTE PROVIDER - NSDCCPCAREPLAN_GEN_ALL_CORE_FT
PRINCIPAL DISCHARGE DIAGNOSIS  Diagnosis: Perforated gastric ulcer  Assessment and Plan of Treatment: You are now tolerating regular diet.    Please complete your antibiotics as prescribed (augmentin 1 tab twice a day for 14 days).  Please continue to take pantoprazole 1 tab twice a day.   Please call to make an appointment with Gastroenterology.  You will require an upper endoscopy in approximately 6 weeks.   Please follow up in [facility] clinic 1-2 weeks.  It may be with any of the [facility] surgeons.    If you develop severe abdominal pain/bloating, nausea, vomiting, inability to pass gas or have bowel movements, report to the emergency room to be evaluated.      SECONDARY DISCHARGE DIAGNOSES  Diagnosis: Atrial fibrillation  Assessment and Plan of Treatment: Continue your regular home warfarin dose.   Atrial fibrillation is a common heart rhythm problem which increases the risk of stroke and heat attack.  It helps if you control your blood pressure, avoid alcohol, cut down on caffeine, get treatment for your thyroid if it is overactive, and perform moderate exercise in consultation with your Primary Care Provider.  Call your doctor if you experience chest tightness/pain, lightheadedness, loss of consciousness, shortness of breath (especially with exercise), feel your heart racing or beating unusually, frequent or abnormal bleeding.  It is important to take all your heart medications as prescribed.      Diagnosis: CAD (coronary artery disease)  Assessment and Plan of Treatment: Your aspirin has been stopped as it may have contributed to your ulcer.  You were switched to plavix.  Please take once a day. A prescription was sent to the pharmacy.   Please follow up with your regular cardiologist.

## 2023-11-29 NOTE — DISCHARGE NOTE NURSING/CASE MANAGEMENT/SOCIAL WORK - NSDCPEFALRISK_GEN_ALL_CORE
For information on Fall & Injury Prevention, visit: [email or web address] OR  [email or web address] OR  https://www.cdc.gov/steadi/patient.html

## 2023-11-29 NOTE — DISCHARGE NOTE PROVIDER - PROVIDER TOKENS
PROVIDER:[TOKEN:[[id number]]] PROVIDER:[TOKEN:[[id number]]] PROVIDER:[TOKEN:[[id number]]]

## 2023-11-29 NOTE — DISCHARGE NOTE PROVIDER - CARE PROVIDERS DIRECT ADDRESSES
,[email or web address] ,[email or web address]

## 2023-11-29 NOTE — DISCHARGE NOTE PROVIDER - HOSPITAL COURSE
72F with COPD, HTN, PAD, CAD s/p LAD stent, chronic leukocytosis of unclear etiol, cardiac arrest s/p left-sided ICD (6/4/2019) complicated by infection and s/p R single-chamber ICD reimplantation ([place] in 9/23/2019), HFrEF (EF 45%), recent hospital stay for VF sp ablation c/b pericardial effusion and tamponade s/p pericardiocentesis and pericardial window and AFib, most recently in sinus here now with 1 day of abdominal pain. Pt states pain started in the AM while she was using the restroom. She states pain was a 10/10 in severity; however, it gradually improved. Pt endorses associated diarrhea. In the evening, the pain became increasingly severe which prompted her to come to the ED. Patient denies fever, chills, chest pain, SOB, nausea, vomiting. Pt denies chronic NSAID use.     In the ED, patient AF, HDS. Labs remarkable for WBC 18 with a left shift. CT c/f perforated gastric ulcer. When evaluated by surgical team, patient states pain is significantly improved. 1/10 in severity.     Patient was admitted to ACS service for further management.    72F with COPD, HTN, PAD, CAD s/p LAD stent, chronic leukocytosis of unclear etiol, cardiac arrest s/p left-sided ICD (6/4/2019) complicated by infection and s/p R single-chamber ICD reimplantation ([place] in 9/23/2019), HFrEF (EF 45%), recent hospital stay for VF sp ablation c/b pericardial effusion and tamponade s/p pericardiocentesis and pericardial window and AFib, most recently in sinus here now with 1 day of abdominal pain. Pt states pain started in the AM while she was using the restroom. She states pain was a 10/10 in severity; however, it gradually improved. Pt endorses associated diarrhea. In the evening, the pain became increasingly severe which prompted her to come to the ED. Patient denies fever, chills, chest pain, SOB, nausea, vomiting. Pt denies chronic NSAID use.     In the ED, patient AF, HDS. Labs remarkable for WBC 18 with a left shift. CT c/f perforated gastric ulcer. When evaluated by surgical team, patient states pain is significantly improved. 1/10 in severity.     Patient was admitted to ACS service for further management.    72F with COPD, HTN, PAD, CAD s/p LAD stent, chronic leukocytosis of unclear etiol, cardiac arrest s/p left-sided ICD (6/4/2019) complicated by infection and s/p R single-chamber ICD reimplantation ([place] in 9/23/2019), HFrEF (EF 45%), recent hospital stay for VF sp ablation c/b pericardial effusion and tamponade s/p pericardiocentesis and pericardial window and AFib, most recently in sinus here now with 1 day of abdominal pain. Pt states pain started in the AM while she was using the restroom. She states pain was a 10/10 in severity; however, it gradually improved. Pt endorses associated diarrhea. In the evening, the pain became increasingly severe which prompted her to come to the ED. Patient denies fever, chills, chest pain, SOB, nausea, vomiting. Pt denies chronic NSAID use.     In the ED, patient AF, HDS. Labs remarkable for WBC 18 with a left shift. CT c/f perforated gastric ulcer. When evaluated by surgical team, patient states pain is significantly improved. 1/10 in severity.     Patient was admitted to ACS service for further management.    72F with COPD, HTN, PAD, CAD s/p LAD stent, chronic leukocytosis of unclear etiol, cardiac arrest s/p left-sided ICD (6/4/2019) complicated by infection and s/p R single-chamber ICD reimplantation ([place] in 9/23/2019), HFrEF (EF 45%), recent hospital stay for VF sp ablation c/b pericardial effusion and tamponade s/p pericardiocentesis and pericardial window and AFib, most recently in sinus here now with 1 day of abdominal pain. Pt states pain started in the AM while she was using the restroom. She states pain was a 10/10 in severity; however, it gradually improved. Pt endorses associated diarrhea. In the evening, the pain became increasingly severe which prompted her to come to the ED. Patient denies fever, chills, chest pain, SOB, nausea, vomiting. Pt denies chronic NSAID use.     In the ED, patient AF, HDS. Labs remarkable for WBC 18 with a left shift. CT c/f perforated gastric ulcer. When evaluated by surgical team, patient states pain is significantly improved. 1/10 in severity.     Patient was admitted to ACS service for further management. Patient was transferred to SICU for hemodynamic.  Patient remained stable.  Cardiology was consulted for alternative to ASA.  Patient was started on plavix.  Diet was advanced as tolerated.     At time of discharge, patient was tolerating regular diet, pain was controlled, and patient was having bowel function.  She is to follow up outpatient with gastroenterology for endoscopy in 6 weeks.  She is also to follow up in ACS clinic in 1-2 weeks for h. pylori results.  She will complete her course of antibiotics with augmentin BID x 14 days.    72F with COPD, HTN, PAD, CAD s/p LAD stent, chronic leukocytosis of unclear etiol, cardiac arrest s/p left-sided ICD (6/4/2019) complicated by infection and s/p R single-chamber ICD reimplantation ([place] in 9/23/2019), HFrEF (EF 45%), recent hospital stay for VF sp ablation c/b pericardial effusion and tamponade s/p pericardiocentesis and pericardial window and AFib, most recently in sinus here now with 1 day of abdominal pain. Pt states pain started in the AM while she was using the restroom. She states pain was a 10/10 in severity; however, it gradually improved. Pt endorses associated diarrhea. In the evening, the pain became increasingly severe which prompted her to come to the ED. Patient denies fever, chills, chest pain, SOB, nausea, vomiting. Pt denies chronic NSAID use.     In the ED, patient AF, HDS. Labs remarkable for WBC 18 with a left shift. CT c/f perforated gastric ulcer. When evaluated by surgical team, patient states pain is significantly improved. 1/10 in severity.     Patient was admitted to ACS service for further management. Patient was transferred to SICU for hemodynamic.  Patient remained stable.  Cardiology was consulted for alternative to ASA.  Patient was started on plavix.  Diet was advanced as tolerated.     At time of discharge, patient was tolerating regular diet, pain was controlled, and patient was having bowel function.  She is to follow up outpatient with gastroenterology for endoscopy in 6 weeks.  She is also to follow up in ACS clinic in 1-2 weeks for h. pylori results.  She will complete her course of antibiotics with augmentin BID x 14 days.    72F with COPD, HTN, PAD, CAD s/p LAD stent, chronic leukocytosis of unclear etiol, cardiac arrest s/p left-sided ICD (6/4/2019) complicated by infection and s/p R single-chamber ICD reimplantation ([place] in 9/23/2019), HFrEF (EF 45%), recent hospital stay for VF sp ablation c/b pericardial effusion and tamponade s/p pericardiocentesis and pericardial window and AFib, most recently in sinus here now with 1 day of abdominal pain. Pt states pain started in the AM while she was using the restroom. She states pain was a 10/10 in severity; however, it gradually improved. Pt endorses associated diarrhea. In the evening, the pain became increasingly severe which prompted her to come to the ED. Patient denies fever, chills, chest pain, SOB, nausea, vomiting. Pt denies chronic NSAID use.     In the ED, patient AF, HDS. Labs remarkable for WBC 18 with a left shift. CT c/f perforated gastric ulcer. When evaluated by surgical team, patient states pain is significantly improved. 1/10 in severity.     Patient was admitted to ACS service for further management. Patient was transferred to SICU for hemodynamic.  Patient remained stable.  Cardiology was consulted for alternative to ASA.  Patient was started on plavix.  Diet was advanced as tolerated.     At time of discharge, patient was tolerating regular diet, pain was controlled, and patient was having bowel function.  She is to follow up outpatient with gastroenterology for endoscopy in 6 weeks.  She is also to follow up in ACS clinic in 1-2 weeks for h. pylori results.  She will complete her course of antibiotics with augmentin BID x 14 days.

## 2023-11-29 NOTE — CHART NOTE - NSCHARTNOTEFT_GEN_A_CORE
SICU Transfer Note    Transfer from: SICU  Transfer to: Floor  Accepting physican: Dr. johns     SICU COURSE: Gastric perf, on protonix 40 BID, holding home ASA, back on home warfarin, on zosyn, continue home meds for afib.       ASSESSMENT & PLAN: KatieyFemale presents with abdominal pain, CT c/f perforated gastric ulcer. UGI series without evidence of contrast extravasation. Admitted to SICU for close monitoring.  Now to floor.       For Follow-Up: warfarin needs to be re-ordered       Vital Signs Last 24 Hrs  T(C): 37.1 (29 Nov 2023 02:30), Max: 37.1 (28 Nov 2023 11:00)  T(F): 98.8 (29 Nov 2023 02:30), Max: 98.8 (29 Nov 2023 02:30)  HR: 74 (29 Nov 2023 02:30) (63 - 89)  BP: 158/72 (29 Nov 2023 02:30) (90/50 - 165/66)  BP(mean): 94 (29 Nov 2023 00:00) (66 - 96)  RR: 18 (29 Nov 2023 02:30) (18 - 28)  SpO2: 93% (29 Nov 2023 02:30) (91% - 99%)    Parameters below as of 29 Nov 2023 02:30  Patient On (Oxygen Delivery Method): room air      I&O's Summary    27 Nov 2023 07:01  -  28 Nov 2023 07:00  --------------------------------------------------------  IN: 925 mL / OUT: 350 mL / NET: 575 mL    28 Nov 2023 07:01  -  29 Nov 2023 03:11  --------------------------------------------------------  IN: 615 mL / OUT: 500 mL / NET: 115 mL          MEDICATIONS  (STANDING):  aMIOdarone    Tablet 200 milliGRAM(s) Oral daily  atorvastatin 40 milliGRAM(s) Oral at bedtime  budesonide 160 MICROgram(s)/formoterol 4.5 MICROgram(s) Inhaler 2 Puff(s) Inhalation two times a day  chlorhexidine 2% Cloths 1 Application(s) Topical <User Schedule>  cholecalciferol 2000 Unit(s) Oral daily  clopidogrel Tablet 75 milliGRAM(s) Oral daily  furosemide    Tablet 40 milliGRAM(s) Oral every 12 hours  influenza  Vaccine (HIGH DOSE) 0.7 milliLiter(s) IntraMuscular once  losartan 25 milliGRAM(s) Oral daily  metoprolol tartrate 12.5 milliGRAM(s) Oral every 12 hours  multivitamin 1 Tablet(s) Oral daily  nystatin Powder 1 Application(s) Topical two times a day  pantoprazole  Injectable 40 milliGRAM(s) IV Push every 12 hours  piperacillin/tazobactam IVPB.. 3.375 Gram(s) IV Intermittent every 8 hours  quiNIDine gluconate  milliGRAM(s) Oral every 12 hours    MEDICATIONS  (PRN):  acetaminophen     Tablet .. 650 milliGRAM(s) Oral every 6 hours PRN Mild Pain (1 - 3)  melatonin 5 milliGRAM(s) Oral at bedtime PRN Insomnia        LABS                                            10.5                  Neurophils% (auto):   x      (11-28 @ 06:23):    11.45)-----------(275          Lymphocytes% (auto):  x                                             33.2                   Eosinphils% (auto):   x        Manual%: Neutrophils x    ; Lymphocytes x    ; Eosinophils x    ; Bands%: x    ; Blasts x                                    141    |  105    |  13                  Calcium: 8.6   / iCa: x      (11-28 @ 06:23)    ----------------------------<  100       Magnesium: 2.3                              3.3     |  27     |  0.95             Phosphorous: 3.3

## 2023-11-30 NOTE — ED PROVIDER NOTE - RAPID ASSESSMENT
72-year-old female recently discharged from the hospital after being worked up for an ulcer presenting today after her AICD fired twice.  Patient was at rest when both episodes happened.  Patient denies feeling any chest pain palpitations or shortness of breath today.  Patient denies cough fevers or chills.  Currently is without complaints.  Patient spoke with EP Dr. Queen that recommended she come to the emergency department immediately.  Patient was rapidly assessed via a telemedicine and/or role of Quick Triage Doctor; a limited history, physical exam and assessment was performed. The patient will be seen and further evaluated in the main emergency department. The remainder of care and evaluation will be conducted by the primary emergency medicine team. Receiving team will follow up on labs, imaging and serially reassess patient as indicated. All further decisions regarding patient care, evaluation and disposition are at the discretion of the receiving primary emergency department team.

## 2023-11-30 NOTE — CONSULT NOTE ADULT - ASSESSMENT
73 y/o F w/ PMHx COPD, pAF on Coumadin, HLD, HTN, PVD, ICM/CAD s/p PCI (has  of RCA), cardiac arrest s/p left-sided ICD (6/4/2019) complicated by infection and s/p R single-chamber ICD ([place] in 9/23/2019), recent admission for VT storm with adjustment of medication and NIPS now presenting for ICD shock in the setting of recurrent monomorphic VT with unsuccessful ATP.  Ablation attempted 10/20 but aborted due to hemodynamically significant pericardial effusion with drain placement.  Pericardial drain removed on 10/20/23.  Over the weekend developed new shortness of breath, recurrent VT s/p ICD shock, and afib w/ RVR s/p cardioversion 10/21 s/p pericardial window 10/28. Pt was discharged on Quinidine 324mg BID, Amiodarone 200mg qD, and Metoprolol. Pt had recent admission 11/25 for perforated gastric ulcer with pneumoperitoneum - sealed 11/26 on UGI series. ASA was switched to Plavix but her home medications (including Amiodarone, metoprolol, Quinidine, and Coumadin) were held in setting of her being NPO. Today pt had ICD shock for VT, advised to come to ED for further eval.     #ICM/CAD s/p PCI  #MMVT s/p ICD shock  #pAF on Coumadin  #perforated gastric ulcer    - Pt still in waiting room, pending labs.   - Remote transmission reviewed; today pt had episode of MMVT s/p multiple failed ATPs (accelerated rhythm to ~230bpm) s/p 1 failed ICD shock and 1 ICD shock which converted rhythm. Other brief episodes of NSVT.   - Likely in setting of recent admission for perforated ulcer requiring NPO. Her Amiodarone, metoprolol, and Quinidine was held. Coumadin also held on admission. Pt is not back on all meds.   - Please ensure her Quinidine and Metoprolol are NOT HELD.   - Please increase Amiodarone to 200mg BID.   - Please admit to CDU/observation overnight.   - Continue Coumadin, on discharge her INR was subtherapeutic.   - Please gets labs, K ~3.3-3.5 on prior admission.   - Keep on tele. Keep K>4, Mg>2  - Discussed with EP attending.    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  Ablation attempted 10/20 but aborted due to hemodynamically significant pericardial effusion with drain placement.  Pericardial drain removed on 10/20/23.  Over the weekend developed new shortness of breath, recurrent VT s/p ICD shock, and afib w/ RVR s/p cardioversion 10/21 s/p pericardial window 10/28. Pt was discharged on Quinidine 324mg BID, Amiodarone 200mg qD, and Metoprolol. Pt had recent admission 11/25 for perforated gastric ulcer with pneumoperitoneum - sealed 11/26 on UGI series. ASA was switched to Plavix but her home medications (including Amiodarone, metoprolol, Quinidine, and Coumadin) were held in setting of her being NPO. Today pt had ICD shock for VT, advised to come to ED for further eval.     #ICM/CAD s/p PCI  #MMVT s/p ICD shock  #pAF on Coumadin  #perforated gastric ulcer    - Pt still in waiting room, pending labs.   - Remote transmission reviewed; today pt had episode of MMVT s/p multiple failed ATPs (accelerated rhythm to ~230bpm) s/p 1 failed ICD shock and 1 ICD shock which converted rhythm. Other brief episodes of NSVT.   - Likely in setting of recent admission for perforated ulcer requiring NPO. Her Amiodarone, metoprolol, and Quinidine was held. Coumadin also held on admission. Pt is not back on all meds.   - Please ensure her Quinidine and Metoprolol are NOT HELD.   - Please increase Amiodarone to 200mg BID.   - Please admit to CDU/observation overnight.   - Continue Coumadin, on discharge her INR was subtherapeutic.   - Please gets labs, K ~3.3-3.5 on prior admission.   - Keep on tele. Keep K>4, Mg>2  - Discussed with EP attending.    73 y/o F w/ PMHx COPD, pAF on Coumadin, HLD, HTN, PVD, ICM/CAD s/p PCI (has  of RCA), cardiac arrest s/p left-sided ICD (6/4/2019) complicated by infection and s/p R single-chamber ICD ([place] in 9/23/2019), recent admission for VT storm with adjustment of medication and NIPS now presenting for ICD shock in the setting of recurrent monomorphic VT with unsuccessful ATP.  Ablation attempted 10/20 but aborted due to hemodynamically significant pericardial effusion with drain placement.  Pericardial drain removed on 10/20/23.  Over the weekend developed new shortness of breath, recurrent VT s/p ICD shock, and afib w/ RVR s/p cardioversion 10/21 s/p pericardial window 10/28. Pt was discharged on Quinidine 324mg BID, Amiodarone 200mg qD, and Metoprolol. Pt had recent admission 11/25 for perforated gastric ulcer with pneumoperitoneum - sealed 11/26 on UGI series. ASA was switched to Plavix but her home medications (including Amiodarone, metoprolol, Quinidine, and Coumadin) were held in setting of her being NPO. Today pt had ICD shock for VT, advised to come to ED for further eval.     #ICM/CAD s/p PCI  #MMVT s/p ICD shock  #pAF on Coumadin  #perforated gastric ulcer    - Pt still in waiting room, pending labs.   - Remote transmission reviewed; today pt had episode of MMVT s/p multiple failed ATPs (accelerated rhythm to ~230bpm) s/p 1 failed ICD shock and 1 ICD shock which converted rhythm. Other brief episodes of NSVT.   - Likely in setting of recent admission for perforated ulcer requiring NPO. Her Amiodarone, metoprolol, and Quinidine was held. Coumadin also held on admission. Pt is not back on all meds.   - Please ensure her Quinidine and Metoprolol are NOT HELD.   - Please increase Amiodarone to 200mg BID.   - Please admit to CDU/observation overnight.   - Continue Coumadin, on discharge her INR was subtherapeutic.   - Please gets labs, K ~3.3-3.5 on prior admission.   - Keep on tele. Keep K>4, Mg>2  - Discussed with EP attending.    73 y/o F w/ PMHx COPD, pAF on Coumadin, HLD, HTN, PVD, ICM/CAD s/p PCI (has  of RCA), cardiac arrest s/p left-sided ICD (6/4/2019) complicated by infection and s/p R single-chamber ICD ([place] in 9/23/2019), recent admission for VT storm with adjustment of medication and NIPS now presenting for ICD shock in the setting of recurrent monomorphic VT with unsuccessful ATP.  Ablation attempted 10/20 but aborted due to hemodynamically significant pericardial effusion with drain placement.  Pericardial drain removed on 10/20/23.  Over the weekend developed new shortness of breath, recurrent VT s/p ICD shock, and afib w/ RVR s/p cardioversion 10/21 s/p pericardial window 10/28. Pt was discharged on Quinidine 324mg BID, Amiodarone 200mg qD, and Metoprolol. Pt had recent admission 11/25 for perforated gastric ulcer with pneumoperitoneum - sealed 11/26 on UGI series. ASA was switched to Plavix but her home medications (including Amiodarone, metoprolol, Quinidine, and Coumadin) were held in setting of her being NPO. Today pt had ICD shock for VT, advised to come to ED for further eval.     #ICM/CAD s/p PCI  #MMVT s/p ICD shock  #pAF on Coumadin  #perforated gastric ulcer    - Pt still in waiting room, pending labs.   - Remote transmission reviewed; today pt had episode of MMVT s/p multiple failed ATPs (accelerated rhythm to ~230bpm) s/p 1 failed ICD shock and 1 ICD shock which converted rhythm. Other brief episodes of NSVT.   - Likely in setting of recent admission for perforated ulcer requiring NPO. Her Amiodarone, metoprolol, and Quinidine was held. Coumadin also held on admission. Pt is not back on all meds.   - Please ensure her Quinidine and Metoprolol are NOT HELD.   - Please increase Amiodarone to 200mg BID.   - From EP standpoint, pt is ok for admission to CDU/observation overnight as long as no other acute medical/surgical issues noted.   - Continue Coumadin, on discharge her INR was subtherapeutic.   - Please gets labs, K ~3.3-3.5 on prior admission.   - Keep on tele. Keep K>4, Mg>2  - Discussed with EP attending.    71 y/o F w/ PMHx COPD, pAF on Coumadin, HLD, HTN, PVD, ICM/CAD s/p PCI (has  of RCA), cardiac arrest s/p left-sided ICD (6/4/2019) complicated by infection and s/p R single-chamber ICD ([place] in 9/23/2019), recent admission for VT storm with adjustment of medication and NIPS now presenting for ICD shock in the setting of recurrent monomorphic VT with unsuccessful ATP.  Ablation attempted 10/20 but aborted due to hemodynamically significant pericardial effusion with drain placement.  Pericardial drain removed on 10/20/23.  Over the weekend developed new shortness of breath, recurrent VT s/p ICD shock, and afib w/ RVR s/p cardioversion 10/21 s/p pericardial window 10/28. Pt was discharged on Quinidine 324mg BID, Amiodarone 200mg qD, and Metoprolol. Pt had recent admission 11/25 for perforated gastric ulcer with pneumoperitoneum - sealed 11/26 on UGI series. ASA was switched to Plavix but her home medications (including Amiodarone, metoprolol, Quinidine, and Coumadin) were held in setting of her being NPO. Today pt had ICD shock for VT, advised to come to ED for further eval.     #ICM/CAD s/p PCI  #MMVT s/p ICD shock  #pAF on Coumadin  #perforated gastric ulcer    - Pt still in waiting room, pending labs.   - Remote transmission reviewed; today pt had episode of MMVT s/p multiple failed ATPs (accelerated rhythm to ~230bpm) s/p 1 failed ICD shock and 1 ICD shock which converted rhythm. Other brief episodes of NSVT.   - Likely in setting of recent admission for perforated ulcer requiring NPO. Her Amiodarone, metoprolol, and Quinidine was held. Coumadin also held on admission. Pt is not back on all meds.   - Please ensure her Quinidine and Metoprolol are NOT HELD.   - Please increase Amiodarone to 200mg BID.   - From EP standpoint, pt is ok for admission to CDU/observation overnight as long as no other acute medical/surgical issues noted.   - Continue Coumadin, on discharge her INR was subtherapeutic.   - Please gets labs, K ~3.3-3.5 on prior admission.   - Keep on tele. Keep K>4, Mg>2  - Discussed with EP attending.    71 y/o F w/ PMHx COPD, pAF on Coumadin, HLD, HTN, PVD, ICM/CAD s/p PCI (has  of RCA), cardiac arrest s/p left-sided ICD (6/4/2019) complicated by infection and s/p R single-chamber ICD ([place] in 9/23/2019), recent admission for VT storm with adjustment of medication and NIPS now presenting for ICD shock in the setting of recurrent monomorphic VT with unsuccessful ATP.  Ablation attempted 10/20 but aborted due to hemodynamically significant pericardial effusion with drain placement.  Pericardial drain removed on 10/20/23.  Over the weekend developed new shortness of breath, recurrent VT s/p ICD shock, and afib w/ RVR s/p cardioversion 10/21 s/p pericardial window 10/28. Pt was discharged on Quinidine 324mg BID, Amiodarone 200mg qD, and Metoprolol. Pt had recent admission 11/25 for perforated gastric ulcer with pneumoperitoneum - sealed 11/26 on UGI series. ASA was switched to Plavix but her home medications (including Amiodarone, metoprolol, Quinidine, and Coumadin) were held in setting of her being NPO. Today pt had ICD shock for VT, advised to come to ED for further eval.     #ICM/CAD s/p PCI  #MMVT s/p ICD shock  #pAF on Coumadin  #perforated gastric ulcer    - Pt still in waiting room, pending labs.   - Remote transmission reviewed; today pt had episode of MMVT s/p multiple failed ATPs (accelerated rhythm to ~230bpm) s/p 1 failed ICD shock and 1 ICD shock which converted rhythm. Other brief episodes of NSVT.   - Likely in setting of recent admission for perforated ulcer requiring NPO. Her Amiodarone, metoprolol, and Quinidine was held. Coumadin also held on admission. Pt is not back on all meds.   - Please ensure her Quinidine and Metoprolol are NOT HELD.   - Please increase Amiodarone to 200mg BID.   - From EP standpoint, pt is ok for admission to CDU/observation overnight as long as no other acute medical/surgical issues noted.   - Continue Coumadin, on discharge her INR was subtherapeutic.   - Please gets labs, K ~3.3-3.5 on prior admission.   - Keep on tele. Keep K>4, Mg>2  - Discussed with EP attending.    71 y/o F w/ PMHx COPD, pAF on Coumadin, HLD, HTN, PVD, ICM/CAD s/p PCI (has  of RCA), cardiac arrest s/p left-sided ICD (6/4/2019) complicated by infection and s/p R single-chamber ICD ([place] in 9/23/2019), recent admission for VT storm with adjustment of medication and NIPS now presenting for ICD shock in the setting of recurrent monomorphic VT with unsuccessful ATP.  Ablation attempted 10/20 but aborted due to hemodynamically significant pericardial effusion with drain placement.  Pericardial drain removed on 10/20/23.  Over the weekend developed new shortness of breath, recurrent VT s/p ICD shock, and afib w/ RVR s/p cardioversion 10/21 s/p pericardial window 10/28. Pt was discharged on Quinidine 324mg BID, Amiodarone 200mg qD, and Metoprolol. Pt had recent admission 11/25 for perforated gastric ulcer with pneumoperitoneum - sealed 11/26 on UGI series. ASA was switched to Plavix but her home medications (including Amiodarone, metoprolol, Quinidine, and Coumadin) were held in setting of her being NPO. Today pt had ICD shock for VT, advised to come to ED for further eval.     #ICM/CAD s/p PCI  #MMVT s/p ICD shock  #pAF on Coumadin  #perforated gastric ulcer    - Pt still in waiting room, pending labs.   - Remote transmission reviewed; today pt had episode of MMVT s/p multiple failed ATPs (accelerated rhythm to ~230bpm) s/p 1 failed ICD shock and 1 ICD shock which converted rhythm. Other brief episodes of NSVT.   - Likely in setting of recent admission for perforated ulcer requiring NPO. Her Amiodarone, metoprolol, and Quinidine was held. Coumadin also held on admission. Pt is now back on all meds.   - Please ensure her Quinidine and Metoprolol are NOT HELD.   - Please increase Amiodarone to 200mg BID.   - From EP standpoint, pt is ok for admission to CDU/observation overnight as long as no other acute medical/surgical issues noted.   - Continue Coumadin, on discharge her INR was subtherapeutic.   - Please gets labs, K ~3.3-3.5 on prior admission.   - Keep on tele. Keep K>4, Mg>2  - Discussed with EP attending.    71 y/o F w/ PMHx COPD, pAF on Coumadin, HLD, HTN, PVD, ICM/CAD s/p PCI (has  of RCA), cardiac arrest s/p left-sided ICD (6/4/2019) complicated by infection and s/p R single-chamber ICD ([place] in 9/23/2019), recent admission for VT storm with adjustment of medication and NIPS now presenting for ICD shock in the setting of recurrent monomorphic VT with unsuccessful ATP.  Ablation attempted 10/20 but aborted due to hemodynamically significant pericardial effusion with drain placement.  Pericardial drain removed on 10/20/23.  Over the weekend developed new shortness of breath, recurrent VT s/p ICD shock, and afib w/ RVR s/p cardioversion 10/21 s/p pericardial window 10/28. Pt was discharged on Quinidine 324mg BID, Amiodarone 200mg qD, and Metoprolol. Pt had recent admission 11/25 for perforated gastric ulcer with pneumoperitoneum - sealed 11/26 on UGI series. ASA was switched to Plavix but her home medications (including Amiodarone, metoprolol, Quinidine, and Coumadin) were held in setting of her being NPO. Today pt had ICD shock for VT, advised to come to ED for further eval.     #ICM/CAD s/p PCI  #MMVT s/p ICD shock  #pAF on Coumadin  #perforated gastric ulcer    - Pt still in waiting room, pending labs.   - Remote transmission reviewed; today pt had episode of MMVT s/p multiple failed ATPs (accelerated rhythm to ~230bpm) s/p 1 failed ICD shock and 1 ICD shock which converted rhythm. Other brief episodes of NSVT.   - Likely in setting of recent admission for perforated ulcer requiring NPO. Her Amiodarone, metoprolol, and Quinidine was held. Coumadin also held on admission. Pt is now back on all meds.   - Please ensure her Quinidine and Metoprolol are NOT HELD.   - Please increase Amiodarone to 200mg BID.   - From EP standpoint, pt is ok for admission to CDU/observation overnight as long as no other acute medical/surgical issues noted.   - Continue Coumadin, on discharge her INR was subtherapeutic.   - Please gets labs, K ~3.3-3.5 on prior admission.   - Keep on tele. Keep K>4, Mg>2  - Discussed with EP attending.    73 y/o F w/ PMHx COPD, pAF on Coumadin, HLD, HTN, PVD, ICM/CAD s/p PCI (has  of RCA), cardiac arrest s/p left-sided ICD (6/4/2019) complicated by infection and s/p R single-chamber ICD ([place] in 9/23/2019), recent admission for VT storm with adjustment of medication and NIPS now presenting for ICD shock in the setting of recurrent monomorphic VT with unsuccessful ATP.  Ablation attempted 10/20 but aborted due to hemodynamically significant pericardial effusion with drain placement.  Pericardial drain removed on 10/20/23.  Over the weekend developed new shortness of breath, recurrent VT s/p ICD shock, and afib w/ RVR s/p cardioversion 10/21 s/p pericardial window 10/28. Pt was discharged on Quinidine 324mg BID, Amiodarone 200mg qD, and Metoprolol. Pt had recent admission 11/25 for perforated gastric ulcer with pneumoperitoneum - sealed 11/26 on UGI series. ASA was switched to Plavix but her home medications (including Amiodarone, metoprolol, Quinidine, and Coumadin) were held in setting of her being NPO. Today pt had ICD shock for VT, advised to come to ED for further eval.     #ICM/CAD s/p PCI  #MMVT s/p ICD shock  #pAF on Coumadin  #perforated gastric ulcer    - Pt still in waiting room, pending labs.   - Remote transmission reviewed; today pt had episode of MMVT s/p multiple failed ATPs (accelerated rhythm to ~230bpm) s/p 1 failed ICD shock and 1 ICD shock which converted rhythm. Other brief episodes of NSVT.   - Likely in setting of recent admission for perforated ulcer requiring NPO. Her Amiodarone, metoprolol, and Quinidine was held. Coumadin also held on admission. Pt is now back on all meds.   - Please ensure her Quinidine and Metoprolol are NOT HELD.   - Please increase Amiodarone to 200mg BID.   - From EP standpoint, pt is ok for admission to CDU/observation overnight as long as no other acute medical/surgical issues noted.   - Continue Coumadin, on discharge her INR was subtherapeutic.   - Please gets labs, K ~3.3-3.5 on prior admission.   - Keep on tele. Keep K>4, Mg>2  - Discussed with EP attending.    73 y/o F w/ PMHx COPD, pAF on Coumadin, HLD, HTN, PVD, ICM/CAD s/p PCI (has  of RCA), cardiac arrest s/p left-sided ICD (6/4/2019) complicated by infection and s/p R single-chamber ICD ([place] in 9/23/2019), recent admission for VT storm with adjustment of medication and NIPS now presenting for ICD shock in the setting of recurrent monomorphic VT with unsuccessful ATP.  Ablation attempted 10/20 but aborted due to hemodynamically significant pericardial effusion with drain placement.  Pericardial drain removed on 10/20/23.  Over the weekend developed new shortness of breath, recurrent VT s/p ICD shock, and afib w/ RVR s/p cardioversion 10/21 s/p pericardial window 10/28. Pt was discharged on Quinidine 324mg BID, Amiodarone 200mg qD, and Metoprolol. Pt had recent admission 11/25 for perforated gastric ulcer with pneumoperitoneum - sealed 11/26 on UGI series. ASA was switched to Plavix but her home medications (including Amiodarone, metoprolol, Quinidine, and Coumadin) were held in setting of her being NPO. Today pt had ICD shock for VT, advised to come to ED for further eval.     #ICM/CAD s/p PCI  #MMVT s/p ICD shock  #pAF on Coumadin  #perforated gastric ulcer    - Pt still in waiting room, pending labs.   - Remote transmission reviewed; today pt had episode of MMVT s/p multiple failed ATPs (accelerated rhythm to ~230bpm) s/p 1 failed ICD shock and 1 ICD shock which converted rhythm. Other brief episodes of NSVT.   - Likely in setting of recent admission for perforated ulcer requiring NPO. Her Amiodarone, metoprolol, and Quinidine was held. Coumadin also held on admission. Pt is now back on all meds.   - Please ensure her Quinidine and Metoprolol are NOT HELD.   - Please increase Amiodarone to 200mg BID.   - From EP standpoint, pt is ok for admission to CDU/observation overnight as long as no other acute medical/surgical issues noted.   - Continue Coumadin, on discharge her INR was subtherapeutic.   - Please gets labs, K ~3.3-3.5 on prior admission.   - Keep on tele. Keep K>4, Mg>2  - Discussed with EP attending.    73 y/o F w/ PMHx COPD, pAF on Coumadin, HLD, HTN, PVD, ICM/CAD s/p PCI (has  of RCA), cardiac arrest s/p left-sided ICD (6/4/2019) complicated by infection and s/p R single-chamber ICD ([place] in 9/23/2019), recent admission for VT storm with adjustment of medication and NIPS now presenting for ICD shock in the setting of recurrent monomorphic VT with unsuccessful ATP.  Ablation attempted 10/20 but aborted due to hemodynamically significant pericardial effusion with drain placement.  Pericardial drain removed on 10/20/23.  Over the weekend developed new shortness of breath, recurrent VT s/p ICD shock, and afib w/ RVR s/p cardioversion 10/21 s/p pericardial window 10/28. Pt was discharged on Quinidine 324mg BID, Amiodarone 200mg qD, and Metoprolol. Pt had recent admission 11/25 for perforated gastric ulcer with pneumoperitoneum - sealed 11/26 on UGI series. ASA was switched to Plavix but her home medications (including Amiodarone, metoprolol, Quinidine, and Coumadin) were held in setting of her being NPO. Today pt had ICD shock for VT, advised to come to ED for further eval.     #ICM/CAD s/p PCI  #MMVT s/p ICD shock  #pAF on Coumadin  #perforated gastric ulcer    - Pt still in waiting room, pending labs.   - Remote transmission reviewed; today pt had episode of MMVT s/p multiple failed ATPs (accelerated rhythm to ~230bpm) s/p 1 failed ICD shock and 1 ICD shock which converted rhythm. Other brief episodes of NSVT.   - Likely in setting of recent admission for perforated ulcer requiring NPO. Her Amiodarone, metoprolol, and Quinidine was held. Coumadin also held on admission. Pt is now back on all meds.   - Please ensure her Quinidine and Metoprolol are NOT HELD.   - Please increase Amiodarone to 200mg BID.   - From EP standpoint, pt is ok for admission to CDU/observation overnight as long as no other acute medical/surgical issues noted.   - Continue Coumadin, on discharge her INR was subtherapeutic.   - Please gets labs, K ~3.3-3.5 on prior admission.   - Keep on tele. Keep K>4, Mg>2  - Discussed with EP attending.    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  Ablation attempted 10/20 but aborted due to hemodynamically significant pericardial effusion with drain placement.  Pericardial drain removed on 10/20/23.  Over the weekend developed new shortness of breath, recurrent VT s/p ICD shock, and afib w/ RVR s/p cardioversion 10/21 s/p pericardial window 10/28. Pt was discharged on Quinidine 324mg BID, Amiodarone 200mg qD, and Metoprolol. Pt had recent admission 11/25 for perforated gastric ulcer with pneumoperitoneum - sealed 11/26 on UGI series. ASA was switched to Plavix but her home medications (including Amiodarone, metoprolol, Quinidine, and Coumadin) were held in setting of her being NPO. Today pt had ICD shock for VT, advised to come to ED for further eval.     #ICM/CAD s/p PCI  #MMVT s/p ICD shock  #pAF on Coumadin  #perforated gastric ulcer    - Pt still in waiting room, pending labs.   - Remote transmission reviewed; today pt had episode of MMVT s/p multiple failed ATPs (accelerated rhythm to ~230bpm) s/p 1 failed ICD shock and 1 ICD shock which converted rhythm. Other brief episodes of NSVT.   - Likely in setting of recent admission for perforated ulcer requiring NPO. Her Amiodarone, metoprolol, and Quinidine was held. Coumadin also held on admission. Pt is now back on all meds.   - Please ensure her Quinidine and Metoprolol are NOT HELD.   - Please increase Amiodarone to 200mg BID.   - From EP standpoint, pt is ok for admission to CDU/observation overnight as long as no other acute medical/surgical issues noted.   - Continue Coumadin, on discharge her INR was subtherapeutic.   - Please gets labs, K ~3.3-3.5 on prior admission.   - Keep on tele. Keep K>4, Mg>2  - Discussed with EP attending.    73 y/o F w/ PMHx COPD, pAF on Coumadin, HLD, HTN, PVD, ICM/CAD s/p PCI (has  of RCA), cardiac arrest s/p left-sided ICD (6/4/2019) complicated by infection and s/p R single-chamber ICD ([place] in 9/23/2019), recent admission for VT storm with adjustment of medication and NIPS now presenting for ICD shock in the setting of recurrent monomorphic VT with unsuccessful ATP.  Ablation attempted 10/20 but aborted due to hemodynamically significant pericardial effusion with drain placement.  Pericardial drain removed on 10/20/23.  Over the weekend developed new shortness of breath, recurrent VT s/p ICD shock, and afib w/ RVR s/p cardioversion 10/21 s/p pericardial window 10/28. Pt was discharged on Quinidine 324mg BID, Amiodarone 200mg qD, and Metoprolol. Pt had recent admission 11/25 for perforated gastric ulcer with pneumoperitoneum - sealed 11/26 on UGI series. ASA was switched to Plavix but her home medications (including Amiodarone, metoprolol, Quinidine, and Coumadin) were held in setting of her being NPO. Today pt had ICD shock for VT, advised to come to ED for further eval.     #ICM/CAD s/p PCI  #MMVT s/p ICD shock  #pAF on Coumadin  #perforated gastric ulcer    - Pt still in waiting room, pending labs.   - Remote transmission reviewed; today pt had episode of MMVT s/p multiple failed ATPs (accelerated rhythm to ~230bpm) s/p 1 failed ICD shock and 1 ICD shock which converted rhythm. Other brief episodes of NSVT.   - Likely in setting of recent admission for perforated ulcer requiring NPO. Her Amiodarone, metoprolol, and Quinidine was held. Coumadin also held on admission. Pt is now back on all meds.   - Please ensure her Quinidine TID and Metoprolol are NOT HELD.   - Please increase Amiodarone to 200mg BID.   - From EP standpoint, pt is ok for admission to CDU/observation overnight as long as no other acute medical/surgical issues noted.   - Continue Coumadin, on discharge her INR was subtherapeutic.   - Please gets labs, K ~3.3-3.5 on prior admission.   - Keep on tele. Keep K>4, Mg>2  - Discussed with EP attending.    71 y/o F w/ PMHx COPD, pAF on Coumadin, HLD, HTN, PVD, ICM/CAD s/p PCI (has  of RCA), cardiac arrest s/p left-sided ICD (6/4/2019) complicated by infection and s/p R single-chamber ICD ([place] in 9/23/2019), recent admission for VT storm with adjustment of medication and NIPS now presenting for ICD shock in the setting of recurrent monomorphic VT with unsuccessful ATP.  Ablation attempted 10/20 but aborted due to hemodynamically significant pericardial effusion with drain placement.  Pericardial drain removed on 10/20/23.  Over the weekend developed new shortness of breath, recurrent VT s/p ICD shock, and afib w/ RVR s/p cardioversion 10/21 s/p pericardial window 10/28. Pt was discharged on Quinidine 324mg BID, Amiodarone 200mg qD, and Metoprolol. Pt had recent admission 11/25 for perforated gastric ulcer with pneumoperitoneum - sealed 11/26 on UGI series. ASA was switched to Plavix but her home medications (including Amiodarone, metoprolol, Quinidine, and Coumadin) were held in setting of her being NPO. Today pt had ICD shock for VT, advised to come to ED for further eval.     #ICM/CAD s/p PCI  #MMVT s/p ICD shock  #pAF on Coumadin  #perforated gastric ulcer    - Pt still in waiting room, pending labs.   - Remote transmission reviewed; today pt had episode of MMVT s/p multiple failed ATPs (accelerated rhythm to ~230bpm) s/p 1 failed ICD shock and 1 ICD shock which converted rhythm. Other brief episodes of NSVT.   - Likely in setting of recent admission for perforated ulcer requiring NPO. Her Amiodarone, metoprolol, and Quinidine was held. Coumadin also held on admission. Pt is now back on all meds.   - Please ensure her Quinidine TID and Metoprolol are NOT HELD.   - Please increase Amiodarone to 200mg BID.   - From EP standpoint, pt is ok for admission to CDU/observation overnight as long as no other acute medical/surgical issues noted.   - Continue Coumadin, on discharge her INR was subtherapeutic.   - Please gets labs, K ~3.3-3.5 on prior admission.   - Keep on tele. Keep K>4, Mg>2  - Discussed with EP attending.    73 y/o F w/ PMHx COPD, pAF on Coumadin, HLD, HTN, PVD, ICM/CAD s/p PCI (has  of RCA), cardiac arrest s/p left-sided ICD (6/4/2019) complicated by infection and s/p R single-chamber ICD ([place] in 9/23/2019), recent admission for VT storm with adjustment of medication and NIPS now presenting for ICD shock in the setting of recurrent monomorphic VT with unsuccessful ATP.  Ablation attempted 10/20 but aborted due to hemodynamically significant pericardial effusion with drain placement.  Pericardial drain removed on 10/20/23.  Over the weekend developed new shortness of breath, recurrent VT s/p ICD shock, and afib w/ RVR s/p cardioversion 10/21 s/p pericardial window 10/28. Pt was discharged on Quinidine 324mg BID, Amiodarone 200mg qD, and Metoprolol. Pt had recent admission 11/25 for perforated gastric ulcer with pneumoperitoneum - sealed 11/26 on UGI series. ASA was switched to Plavix but her home medications (including Amiodarone, metoprolol, Quinidine, and Coumadin) were held in setting of her being NPO. Today pt had ICD shock for VT, advised to come to ED for further eval.     #ICM/CAD s/p PCI  #MMVT s/p ICD shock  #pAF on Coumadin  #perforated gastric ulcer    - Pt still in waiting room, pending labs.   - Remote transmission reviewed; today pt had episode of MMVT s/p multiple failed ATPs (accelerated rhythm to ~230bpm) s/p 1 failed ICD shock and 1 ICD shock which converted rhythm. Other brief episodes of NSVT.   - Likely in setting of recent admission for perforated ulcer requiring NPO. Her Amiodarone, metoprolol, and Quinidine was held. Coumadin also held on admission. Pt is now back on all meds.   - Please ensure her Quinidine TID and Metoprolol are NOT HELD.   - Please increase Amiodarone to 200mg BID.   - From EP standpoint, pt is ok for admission to CDU/observation overnight as long as no other acute medical/surgical issues noted.   - Continue Coumadin, on discharge her INR was subtherapeutic.   - Please gets labs, K ~3.3-3.5 on prior admission.   - Keep on tele. Keep K>4, Mg>2  - Discussed with EP attending.

## 2023-11-30 NOTE — CONSULT NOTE ADULT - SUBJECTIVE AND OBJECTIVE BOX
CHIEF COMPLAINT: ICD shock    HISTORY OF PRESENT ILLNESS:  71 y/o F w/ PMHx COPD, HLD, HTN, PVD, ICM/CAD s/p PCI (has  of RCA), cardiac arrest s/p left-sided ICD (6/4/2019) complicated by infection and s/p R single-chamber ICD ([place] in 9/23/2019), recent admission for VT storm with adjustment of medication and NIPS now presenting for ICD shock in the setting of recurrent monomorphic VT with unsuccessful ATP.  Ablation attempted 10/20 but aborted due to hemodynamically significant pericardial effusion with drain placement.  Pericardial drain removed on 10/20/23.  Over the weekend developed new shortness of breath, recurrent VT s/p ICD shock, and afib w/ RVR s/p cardioversion 10/21 s/p pericardial window 10/28. Pt was discharged on Quinidine 324mg BID, Amiodarone 200mg qD, and Metoprolol. Pt had recent admission 11/25 for perforated gastric ulcer with pneumoperitoneum - sealed 11/26 on UGI series. ASA was switched to Plavix but her home medications (including Amiodarone, metoprolol, Quinidine, and Coumadin) were held in setting of her being NPO. Pt was discharged w/ outpatient 6 week EGD scheduled. Today she states she was sitting in her chair when all of a sudden she started to feel dizzy; denies chest pain/palpitation/SOB. She does not recall ICD shocks however  states he came in and found her with her eyes wide open and her extremities "shaking" with some SOB. Pt started responding after ~30-40s seconds but does not recall anything other than dizziness. Remote interrogation with ICD shock for VT s/p multiple failed ATPs; she was advised to report to the ED. States she is taking all her home medications as directed since discharge. Reports abdominal pain is improved, still has mild diarrhea.       Allergies    No Known Allergies    Intolerances    	    MEDICATIONS:          PAST MEDICAL & SURGICAL HISTORY:  Obese      Smoker      HTN (hypertension)      HLD (hyperlipidemia)      CAD (coronary artery disease)      CHF with cardiomyopathy      History of hip surgery        FAMILY HISTORY:  Family history of Alzheimer's disease (Father)    Family history of cancer (Mother)      REVIEW OF SYSTEMS:  See HPI. Otherwise, 12 point ROS done and otherwise negative.    PHYSICAL EXAM:  T(C): 36.7 (11-30-23 @ 15:49), Max: 36.7 (11-30-23 @ 15:49)  HR: 76 (11-30-23 @ 15:49) (76 - 76)  BP: 131/52 (11-30-23 @ 15:49) (131/52 - 131/52)  RR: 18 (11-30-23 @ 15:49) (18 - 18)  SpO2: 94% (11-30-23 @ 15:49) (94% - 94%)  Wt(kg): --  I&O's Summary      Physical exam not done: in waiting area    LABS:	 	    CBC Full  -  ( 29 Nov 2023 10:54 )  WBC Count : 9.85 K/uL  Hemoglobin : 11.8 g/dL  Hematocrit : 37.1 %  Platelet Count - Automated : 291 K/uL  Mean Cell Volume : 91.2 fl  Mean Cell Hemoglobin : 29.0 pg  Mean Cell Hemoglobin Concentration : 31.8 gm/dL    11-29    141  |  103  |  10  ----------------------------<  116<H>  3.7   |  27  |  0.98    Ca    8.5      29 Nov 2023 10:54  Phos  2.4     11-29  Mg     2.1     11-29    PREVIOUS DIAGNOSTIC TESTING:    [ ] Echocardiogram: < from: TTE Limited W or WO Ultrasound Enhancing Agent (11.08.23 @ 11:26) >  CONCLUSIONS:      1. Compared to the transthoracic echocardiogram performed on 11/5/2023 there have been no significant interval changes.   2. Trace pericardial effusion noted adjacent to the anterior right ventricle with no evidence of hemodynamic compromise.    ________________________________________________________________________________________  FINDINGS:     Pericardium:  There is a trace pericardial effusion noted adjacent to the anterior right ventricle with no evidence of hemodynamic compromise.     Systemic Veins:  The inferior vena cava is normal in size measuring 1.75 cm in diameter, (normal <2.1cm) with normal inspiratory collapse (normal >50%) consistent with normal right atrial pressure (~3, range 0-5mmHg).    < end of copied text >  < from: TTE W or WO Ultrasound Enhancing Agent (11.05.23 @ 09:16) >  ________________________________________________________________________________________  FINDINGS:     Left Ventricle:  Unable to evaluate the ejection fraction. Left ventricular endocardium is not well visualized; however, the left ventricular systolic function appears grossly normal.     Right Ventricle:  The right ventricular cavity is normal in size and normal systolic function. A device lead is visualized in the right ventricle.     Aortic Valve:  The aortic valve appears trileaflet. There is no evidence of aortic regurgitation.     Mitral Valve:  There is mild posterior calcification of the mitral valve annulus. There is no evidence of mitral regurgitation.     Tricuspid Valve:  There is trace tricuspid regurgitation.     Pulmonic Valve:  There is mild pulmonic regurgitation.     Pericardium:  There is a trace pericardial effusion with no evidence of hemodynamic compromise.     Pleura:  Small bilateral pleural effusion noted.    < end of copied text >    [ ]  Catheterization:  < from: Cardiac Catheterization (09.30.23 @ 15:04) >  Diagnostic Findings:     Coronary Angiography   The coronary circulation is right dominant.      LM   Distal left main: There is a 20 % stenosis.      LAD   Left anterior descending artery: Mid LAD stent patent.      CX   Mid circumflex: There is a 30 % stenosis.      RCA   Right coronary artery: Angiography shows complete occlusion.  of  the proximal RCA with left to right collaterals filling the  rPDA. . There is a 100 % stenosis.      < end of copied text >     CHIEF COMPLAINT: ICD shock    HISTORY OF PRESENT ILLNESS:  73 y/o F w/ PMHx COPD, HLD, HTN, PVD, ICM/CAD s/p PCI (has  of RCA), cardiac arrest s/p left-sided ICD (6/4/2019) complicated by infection and s/p R single-chamber ICD ([place] in 9/23/2019), recent admission for VT storm with adjustment of medication and NIPS now presenting for ICD shock in the setting of recurrent monomorphic VT with unsuccessful ATP.  Ablation attempted 10/20 but aborted due to hemodynamically significant pericardial effusion with drain placement.  Pericardial drain removed on 10/20/23.  Over the weekend developed new shortness of breath, recurrent VT s/p ICD shock, and afib w/ RVR s/p cardioversion 10/21 s/p pericardial window 10/28. Pt was discharged on Quinidine 324mg BID, Amiodarone 200mg qD, and Metoprolol. Pt had recent admission 11/25 for perforated gastric ulcer with pneumoperitoneum - sealed 11/26 on UGI series. ASA was switched to Plavix but her home medications (including Amiodarone, metoprolol, Quinidine, and Coumadin) were held in setting of her being NPO. Pt was discharged w/ outpatient 6 week EGD scheduled. Today she states she was sitting in her chair when all of a sudden she started to feel dizzy; denies chest pain/palpitation/SOB. She does not recall ICD shocks however  states he came in and found her with her eyes wide open and her extremities "shaking" with some SOB. Pt started responding after ~30-40s seconds but does not recall anything other than dizziness. Remote interrogation with ICD shock for VT s/p multiple failed ATPs; she was advised to report to the ED. States she is taking all her home medications as directed since discharge. Reports abdominal pain is improved, still has mild diarrhea.       Allergies    No Known Allergies    Intolerances    	    MEDICATIONS:          PAST MEDICAL & SURGICAL HISTORY:  Obese      Smoker      HTN (hypertension)      HLD (hyperlipidemia)      CAD (coronary artery disease)      CHF with cardiomyopathy      History of hip surgery        FAMILY HISTORY:  Family history of Alzheimer's disease (Father)    Family history of cancer (Mother)      REVIEW OF SYSTEMS:  See HPI. Otherwise, 12 point ROS done and otherwise negative.    PHYSICAL EXAM:  T(C): 36.7 (11-30-23 @ 15:49), Max: 36.7 (11-30-23 @ 15:49)  HR: 76 (11-30-23 @ 15:49) (76 - 76)  BP: 131/52 (11-30-23 @ 15:49) (131/52 - 131/52)  RR: 18 (11-30-23 @ 15:49) (18 - 18)  SpO2: 94% (11-30-23 @ 15:49) (94% - 94%)  Wt(kg): --  I&O's Summary      Physical exam not done: in waiting area    LABS:	 	    CBC Full  -  ( 29 Nov 2023 10:54 )  WBC Count : 9.85 K/uL  Hemoglobin : 11.8 g/dL  Hematocrit : 37.1 %  Platelet Count - Automated : 291 K/uL  Mean Cell Volume : 91.2 fl  Mean Cell Hemoglobin : 29.0 pg  Mean Cell Hemoglobin Concentration : 31.8 gm/dL    11-29    141  |  103  |  10  ----------------------------<  116<H>  3.7   |  27  |  0.98    Ca    8.5      29 Nov 2023 10:54  Phos  2.4     11-29  Mg     2.1     11-29    PREVIOUS DIAGNOSTIC TESTING:    [ ] Echocardiogram: < from: TTE Limited W or WO Ultrasound Enhancing Agent (11.08.23 @ 11:26) >  CONCLUSIONS:      1. Compared to the transthoracic echocardiogram performed on 11/5/2023 there have been no significant interval changes.   2. Trace pericardial effusion noted adjacent to the anterior right ventricle with no evidence of hemodynamic compromise.    ________________________________________________________________________________________  FINDINGS:     Pericardium:  There is a trace pericardial effusion noted adjacent to the anterior right ventricle with no evidence of hemodynamic compromise.     Systemic Veins:  The inferior vena cava is normal in size measuring 1.75 cm in diameter, (normal <2.1cm) with normal inspiratory collapse (normal >50%) consistent with normal right atrial pressure (~3, range 0-5mmHg).    < end of copied text >  < from: TTE W or WO Ultrasound Enhancing Agent (11.05.23 @ 09:16) >  ________________________________________________________________________________________  FINDINGS:     Left Ventricle:  Unable to evaluate the ejection fraction. Left ventricular endocardium is not well visualized; however, the left ventricular systolic function appears grossly normal.     Right Ventricle:  The right ventricular cavity is normal in size and normal systolic function. A device lead is visualized in the right ventricle.     Aortic Valve:  The aortic valve appears trileaflet. There is no evidence of aortic regurgitation.     Mitral Valve:  There is mild posterior calcification of the mitral valve annulus. There is no evidence of mitral regurgitation.     Tricuspid Valve:  There is trace tricuspid regurgitation.     Pulmonic Valve:  There is mild pulmonic regurgitation.     Pericardium:  There is a trace pericardial effusion with no evidence of hemodynamic compromise.     Pleura:  Small bilateral pleural effusion noted.    < end of copied text >    [ ]  Catheterization:  < from: Cardiac Catheterization (09.30.23 @ 15:04) >  Diagnostic Findings:     Coronary Angiography   The coronary circulation is right dominant.      LM   Distal left main: There is a 20 % stenosis.      LAD   Left anterior descending artery: Mid LAD stent patent.      CX   Mid circumflex: There is a 30 % stenosis.      RCA   Right coronary artery: Angiography shows complete occlusion.  of  the proximal RCA with left to right collaterals filling the  rPDA. . There is a 100 % stenosis.      < end of copied text >     CHIEF COMPLAINT: ICD shock    HISTORY OF PRESENT ILLNESS:  71 y/o F w/ PMHx COPD, HLD, HTN, PVD, ICM/CAD s/p PCI (has  of RCA), cardiac arrest s/p left-sided ICD (6/4/2019) complicated by infection and s/p R single-chamber ICD ([place] in 9/23/2019), recent admission for VT storm with adjustment of medication and NIPS now presenting for ICD shock in the setting of recurrent monomorphic VT with unsuccessful ATP.  Ablation attempted 10/20 but aborted due to hemodynamically significant pericardial effusion with drain placement.  Pericardial drain removed on 10/20/23.  Over the weekend developed new shortness of breath, recurrent VT s/p ICD shock, and afib w/ RVR s/p cardioversion 10/21 s/p pericardial window 10/28. Pt was discharged on Quinidine 324mg BID, Amiodarone 200mg qD, and Metoprolol. Pt had recent admission 11/25 for perforated gastric ulcer with pneumoperitoneum - sealed 11/26 on UGI series. ASA was switched to Plavix but her home medications (including Amiodarone, metoprolol, Quinidine, and Coumadin) were held in setting of her being NPO. Pt was discharged w/ outpatient 6 week EGD scheduled. Today she states she was sitting in her chair when all of a sudden she started to feel dizzy; denies chest pain/palpitation/SOB. She does not recall ICD shocks however  states he came in and found her with her eyes wide open and her extremities "shaking" with some SOB. Pt started responding after ~30-40s seconds but does not recall anything other than dizziness. Remote interrogation with ICD shock for VT s/p multiple failed ATPs; she was advised to report to the ED. States she is taking all her home medications as directed since discharge. Reports abdominal pain is improved, still has mild diarrhea.       Allergies    No Known Allergies    Intolerances    	    MEDICATIONS:          PAST MEDICAL & SURGICAL HISTORY:  Obese      Smoker      HTN (hypertension)      HLD (hyperlipidemia)      CAD (coronary artery disease)      CHF with cardiomyopathy      History of hip surgery        FAMILY HISTORY:  Family history of Alzheimer's disease (Father)    Family history of cancer (Mother)      REVIEW OF SYSTEMS:  See HPI. Otherwise, 12 point ROS done and otherwise negative.    PHYSICAL EXAM:  T(C): 36.7 (11-30-23 @ 15:49), Max: 36.7 (11-30-23 @ 15:49)  HR: 76 (11-30-23 @ 15:49) (76 - 76)  BP: 131/52 (11-30-23 @ 15:49) (131/52 - 131/52)  RR: 18 (11-30-23 @ 15:49) (18 - 18)  SpO2: 94% (11-30-23 @ 15:49) (94% - 94%)  Wt(kg): --  I&O's Summary      Physical exam not done: in waiting area    LABS:	 	    CBC Full  -  ( 29 Nov 2023 10:54 )  WBC Count : 9.85 K/uL  Hemoglobin : 11.8 g/dL  Hematocrit : 37.1 %  Platelet Count - Automated : 291 K/uL  Mean Cell Volume : 91.2 fl  Mean Cell Hemoglobin : 29.0 pg  Mean Cell Hemoglobin Concentration : 31.8 gm/dL    11-29    141  |  103  |  10  ----------------------------<  116<H>  3.7   |  27  |  0.98    Ca    8.5      29 Nov 2023 10:54  Phos  2.4     11-29  Mg     2.1     11-29    PREVIOUS DIAGNOSTIC TESTING:    [ ] Echocardiogram: < from: TTE Limited W or WO Ultrasound Enhancing Agent (11.08.23 @ 11:26) >  CONCLUSIONS:      1. Compared to the transthoracic echocardiogram performed on 11/5/2023 there have been no significant interval changes.   2. Trace pericardial effusion noted adjacent to the anterior right ventricle with no evidence of hemodynamic compromise.    ________________________________________________________________________________________  FINDINGS:     Pericardium:  There is a trace pericardial effusion noted adjacent to the anterior right ventricle with no evidence of hemodynamic compromise.     Systemic Veins:  The inferior vena cava is normal in size measuring 1.75 cm in diameter, (normal <2.1cm) with normal inspiratory collapse (normal >50%) consistent with normal right atrial pressure (~3, range 0-5mmHg).    < end of copied text >  < from: TTE W or WO Ultrasound Enhancing Agent (11.05.23 @ 09:16) >  ________________________________________________________________________________________  FINDINGS:     Left Ventricle:  Unable to evaluate the ejection fraction. Left ventricular endocardium is not well visualized; however, the left ventricular systolic function appears grossly normal.     Right Ventricle:  The right ventricular cavity is normal in size and normal systolic function. A device lead is visualized in the right ventricle.     Aortic Valve:  The aortic valve appears trileaflet. There is no evidence of aortic regurgitation.     Mitral Valve:  There is mild posterior calcification of the mitral valve annulus. There is no evidence of mitral regurgitation.     Tricuspid Valve:  There is trace tricuspid regurgitation.     Pulmonic Valve:  There is mild pulmonic regurgitation.     Pericardium:  There is a trace pericardial effusion with no evidence of hemodynamic compromise.     Pleura:  Small bilateral pleural effusion noted.    < end of copied text >    [ ]  Catheterization:  < from: Cardiac Catheterization (09.30.23 @ 15:04) >  Diagnostic Findings:     Coronary Angiography   The coronary circulation is right dominant.      LM   Distal left main: There is a 20 % stenosis.      LAD   Left anterior descending artery: Mid LAD stent patent.      CX   Mid circumflex: There is a 30 % stenosis.      RCA   Right coronary artery: Angiography shows complete occlusion.  of  the proximal RCA with left to right collaterals filling the  rPDA. . There is a 100 % stenosis.      < end of copied text >

## 2023-11-30 NOTE — ED ADULT NURSE NOTE - OBJECTIVE STATEMENT
71 YO female    via walk in presenting with complaints of  AICD firing.  pt reports AICD fired twice.  Patient was at rest when both episodes happened.  Patient denies feeling any chest pain palpitations or shortness of breath today.  Patient denies cough fevers or chills.  Pt Axox4, gross neuro respirations even, & non-labored Abdomen soft, non-tender, non-distended. Skin warm, dry, and intact. Pt placed in position of comfort. . Bed in lowest position, wheels locked, appropriate side rails raised. Pt denies needs at this time. pt on [facility] karishma, NSR

## 2023-11-30 NOTE — ED PROVIDER NOTE - CLINICAL SUMMARY MEDICAL DECISION MAKING FREE TEXT BOX
Farrukh Ngo, PGY2 -   This is a 72 yea rold female with pmh of COPD, HLD, HTN, PVD, ICM/CAD s/p PCI (has  of RCA), cardiac arrest s/p left-sided ICD (6/4/2019) complicated by infection and s/p R single-chamber ICD ([place] in 9/23/2019) presenting today after AICD fired twice today. Was at rest when episodes happened. Unsure if she remembers the episode, however  at bedside noticed. Denies any complaints at this time including fever/chills, chest pain, shortness of breath, n/v/d, weakness. Dr. Hurt's patient for EP. EP saw patient in the waiting room, no acute interventions, on interrogation "Remote transmission reviewed; today pt had episode of MMVT s/p multiple failed ATPs (accelerated rhythm to ~230bpm) s/p 1 failed ICD shock and 1 ICD shock which converted rhythm. Other brief episodes of NSVT" for  however recommend resuming her regular medications with slight dose change including Quinidine 324mg TID, Metoprol XL 12.5mg qDay, Amiodarone 200mg BID, and Warfarin 1mg BID. Recommends observing overnight in CDU for evaluation in the morning also. EKG without acute ischemic findings. Troponin 29. BNP slightly more elevated than her usual to 5000s (usually in 3000s). Vitals wnl. non focal exam at this time. Communicated with CDU for observation. Patient amenable. Will order the medication, recheck potassium for hemolysis to 4.0.

## 2023-11-30 NOTE — ED CDU PROVIDER INITIAL DAY NOTE - DETAILS
Dizziness/ICD fire  -Tele monitoring  -Continue home medications (quinidine TID, Metoprolol and amiodarone 200 mg (given))  -Repeat labs in AM  -EP following  Case d/w ED attending. Dizziness/ICD shock  -Tele monitoring  -Continue home medications (quinidine TID, Metoprolol and amiodarone 200 mg (given))  -Repeat labs in AM  -EP following  Case d/w ED attending.

## 2023-11-30 NOTE — ED PROVIDER NOTE - PHYSICAL EXAMINATION
General: NAD  HEENT: NCAT.   Cardiac: RRR, 2+ radial pulses  Chest: CTA  Abdomen: soft, non-distended, no ttp, no rebound or guarding  Extremities: slight peripheral edema, no calf tenderness, or leg size discrepancies  Skin: no rashes  Neuro: AAOx3, motor and sensory grossly intact.   Psych: mood and affect appropriate

## 2023-11-30 NOTE — ED PROVIDER NOTE - CHIEF COMPLAINT
The patient is a 72y Female complaining of  The patient is a 72y Female complaining of AICD firing today

## 2023-11-30 NOTE — ED PROVIDER NOTE - ATTENDING CONTRIBUTION TO CARE
72-year-old female with history of AICD follows with Dr. Wise and rosa maria presents to the emergency department with an episode of dizziness.  Patient does not recall being shocked however headset at bedside states he came in and found patient with her eyes wide open extremity shaking with some shortness of breath.  Patient did not respond for approximately 30 seconds patient was unsure what happened she has no memory of it.  Patient with history of VT with ICD shock and was referred to the hospital.  Patient with mild diarrhea she stopped taking her home medication after a recent hospital stay.  Patient to take quinidine and metoprolol as well as increase her amiodarone.  Patient recommended to go to observation for further management of this dysrhythmia.  Patient pending potassium.  Will maintain on telemetry monitoring.

## 2023-11-30 NOTE — ED CDU PROVIDER INITIAL DAY NOTE - OBJECTIVE STATEMENT
73 yo female with pmh of COPD, HLD, HTN, PVD, ICM/CAD s/p PCI (has  of RCA), cardiac arrest s/p left-sided ICD (6/4/2019) complicated by infection and s/p R single-chamber ICD ([place] in 9/23/2019), recent admission for VT storm w/ ablation attempted 10/20 but aborted due to hemodynamically significant pericardial effusion with drain placement and subsequent removal on 10/20/23 c/b recurrent VT s/p ICD shock, and afib w/ RVR s/p cardioversion 10/21 s/p pericardial window 10/28, discharged on Quinidine 324mg BID, Amiodarone 200mg qD, and Metoprolol, re-admitted on 11/25 for perforated gastric ulcer with pneumoperitoneum - sealed 11/26 on UGI series (home medications (including Amiodarone, metoprolol, Quinidine, and Coumadin) were held in setting of her being NPO) presented to ED after AICD fired twice today. Patient was at rest when episodes happened, felt dizzy then doesn't remember.  came in, found patient with eyes open, arms/legs shaking and short of breath. Remote tele advised ICD shocked and pt advised to come to ED. Denies abd pain, fever/chills, chest pain, n/v/d, bowel/bladder incontinence. 73 yo female with pmh of COPD, HLD, HTN, PVD, ICM/CAD s/p PCI (has  of RCA), cardiac arrest s/p left-sided ICD (6/4/2019) complicated by infection and s/p R single-chamber ICD ([place] in 9/23/2019), recent admission for VT storm w/ ablation attempted 10/20 but aborted due to hemodynamically significant pericardial effusion with drain placement and subsequent removal on 10/20/23 c/b recurrent VT s/p ICD shock, and afib w/ RVR s/p cardioversion 10/21 s/p pericardial window 10/28, discharged on Quinidine 324mg BID, Amiodarone 200mg qD, and Metoprolol, re-admitted on 11/25 for perforated gastric ulcer with pneumoperitoneum - sealed 11/26 on UGI series (home medications (including Amiodarone, metoprolol, Quinidine, and Coumadin) were held in setting of her being NPO) presented to ED after AICD fired twice today. Patient was at rest when episodes happened, felt dizzy then doesn't remember.  came in, found patient with eyes open, arms/legs shaking and short of breath. Remote tele advised ICD shocked and pt advised to come to ED. Denies abd pain, fever/chills, chest pain, n/v/d, bowel/bladder incontinence. 71 yo female with pmh of COPD, HLD, HTN, PVD, ICM/CAD s/p PCI (has  of RCA), cardiac arrest s/p left-sided ICD (6/4/2019) complicated by infection and s/p R single-chamber ICD ([place] in 9/23/2019), recent admission for VT storm w/ ablation attempted 10/20 but aborted due to hemodynamically significant pericardial effusion with drain placement and subsequent removal on 10/20/23 c/b recurrent VT s/p ICD shock, and afib w/ RVR s/p cardioversion 10/21 s/p pericardial window 10/28, discharged on Quinidine 324mg BID, Amiodarone 200mg qD, and Metoprolol, re-admitted on 11/25 for perforated gastric ulcer with pneumoperitoneum - sealed 11/26 on UGI series (home medications (including Amiodarone, metoprolol, Quinidine, and Coumadin) were held in setting of her being NPO) presented to ED after AICD fired twice today. Patient was at rest when episodes happened, felt dizzy then doesn't remember.  came in, found patient with eyes open, arms/legs shaking and short of breath. Remote tele advised ICD shocked and pt advised to come to ED. Denies abd pain, fever/chills, chest pain, n/v/d, bowel/bladder incontinence. 71 yo female with pmh of COPD, HLD, HTN, PVD, ICM/CAD s/p PCI (has  of RCA), cardiac arrest s/p left-sided ICD (6/4/2019) complicated by infection and s/p R single-chamber ICD ([place] in 9/23/2019), recent admission for VT storm w/ ablation attempted 10/20 but aborted due to hemodynamically significant pericardial effusion with drain placement and subsequent removal on 10/20/23 c/b recurrent VT s/p ICD shock, and afib w/ RVR s/p cardioversion 10/21 s/p pericardial window 10/28, discharged on Quinidine 324mg BID, Amiodarone 200mg qD, and Metoprolol, re-admitted on 11/25 for perforated gastric ulcer with pneumoperitoneum - sealed 11/26 on UGI series (home medications (including Amiodarone, metoprolol, Quinidine, and Coumadin) were held in setting of her being NPO) presented to ED after AICD fired twice today. Patient was at rest when episodes happened, felt dizzy then doesn't remember.  came in, found patient with eyes open, arms/legs shaking and short of breath. Remote tele advised ICD shocked and pt advised to come to ED. Denies abd pain, fever/chills, chest pain, n/v/d, bowel/bladder incontinence.    In ED labs not acutely actionable. ED team d/w EP who evaluated pt, recommended resuming home meds of quinidine (increasing to TID, increasing amiodarone to 200 mg BID, metoprolol 12.5 mg and observe overnight in CDU. Case d/w ED attending. 73 yo female with pmh of COPD, HLD, HTN, PVD, ICM/CAD s/p PCI (has  of RCA), cardiac arrest s/p left-sided ICD (6/4/2019) complicated by infection and s/p R single-chamber ICD ([place] in 9/23/2019), recent admission for VT storm w/ ablation attempted 10/20 but aborted due to hemodynamically significant pericardial effusion with drain placement and subsequent removal on 10/20/23 c/b recurrent VT s/p ICD shock, and afib w/ RVR s/p cardioversion 10/21 s/p pericardial window 10/28, discharged on Quinidine 324mg BID, Amiodarone 200mg qD, and Metoprolol, re-admitted on 11/25 for perforated gastric ulcer with pneumoperitoneum - sealed 11/26 on UGI series (home medications (including Amiodarone, metoprolol, Quinidine, and Coumadin) were held in setting of her being NPO) presented to ED after AICD fired twice today. Patient was at rest when episodes happened, felt dizzy then doesn't remember.  came in, found patient with eyes open, arms/legs shaking and short of breath. Remote tele advised ICD shocked and pt advised to come to ED. Denies abd pain, fever/chills, chest pain, n/v/d, bowel/bladder incontinence.    In ED labs not acutely actionable. ED team d/w EP who evaluated pt, recommended resuming home meds of quinidine (increasing to TID, increasing amiodarone to 200 mg BID, metoprolol 12.5 mg and observe overnight in CDU. Case d/w ED attending. 73 yo female with pmh of COPD, HLD, HTN, PVD, ICM/CAD s/p PCI (has  of RCA), cardiac arrest s/p left-sided ICD (6/4/2019) complicated by infection and s/p R single-chamber ICD ([place] in 9/23/2019), recent admission for VT storm w/ ablation attempted 10/20 but aborted due to hemodynamically significant pericardial effusion with drain placement and subsequent removal on 10/20/23 c/b recurrent VT s/p ICD shock, and afib w/ RVR s/p cardioversion 10/21 s/p pericardial window 10/28, discharged on Quinidine 324mg BID, Amiodarone 200mg qD, and Metoprolol, re-admitted on 11/25 for perforated gastric ulcer with pneumoperitoneum - sealed 11/26 on UGI series (home medications (including Amiodarone, metoprolol, Quinidine, and Coumadin) were held in setting of her being NPO) presented to ED after AICD fired twice today. Patient was at rest when episodes happened, felt dizzy then doesn't remember.  came in, found patient with eyes open, arms/legs shaking and short of breath. Remote tele advised ICD shocked and pt advised to come to ED. Denies abd pain, fever/chills, chest pain, n/v/d, bowel/bladder incontinence.    In ED labs not acutely actionable. ED team d/w EP who evaluated pt, recommended resuming home meds of quinidine (increasing to TID, increasing amiodarone to 200 mg BID, metoprolol 12.5 mg and observe overnight in CDU. Case d/w ED attending.

## 2023-12-01 NOTE — ED CDU PROVIDER DISPOSITION NOTE - CLINICAL COURSE
71 yo female with pmh of COPD, HLD, HTN, PVD, ICM/CAD s/p PCI (has  of RCA), cardiac arrest s/p left-sided ICD (6/4/2019) complicated by infection and s/p R single-chamber ICD ([place] in 9/23/2019), recent admission for VT storm w/ ablation attempted 10/20 but aborted due to hemodynamically significant pericardial effusion with drain placement and subsequent removal on 10/20/23 c/b recurrent VT s/p ICD shock, and afib w/ RVR s/p cardioversion 10/21 s/p pericardial window 10/28, discharged on Quinidine 324mg BID, Amiodarone 200mg qD, and Metoprolol, re-admitted on 11/25 for perforated gastric ulcer with pneumoperitoneum - sealed 11/26 on UGI series (home medications (including Amiodarone, metoprolol, Quinidine, and Coumadin) were held in setting of her being NPO) presented to ED after AICD fired twice. Patient was at rest when episodes happened, felt dizzy then doesn't remember.  found patient with eyes open, arms/legs shaking and short of breath. Remote tele advised ICD shocked and pt advised to come to ED. Denies abd pain, fever/chills, chest pain, n/v/d, bowel/bladder incontinence.  ED Course: Labs not acutely actionable. ED team d/w EP who evaluated pt, recommended resuming home meds of quinidine (increasing to TID, increasing amiodarone to 200 mg BID, metoprolol 12.5 mg and observe overnight in CDU.  In CDU _____________________. 73 yo female with pmh of COPD, HLD, HTN, PVD, ICM/CAD s/p PCI (has  of RCA), cardiac arrest s/p left-sided ICD (6/4/2019) complicated by infection and s/p R single-chamber ICD ([place] in 9/23/2019), recent admission for VT storm w/ ablation attempted 10/20 but aborted due to hemodynamically significant pericardial effusion with drain placement and subsequent removal on 10/20/23 c/b recurrent VT s/p ICD shock, and afib w/ RVR s/p cardioversion 10/21 s/p pericardial window 10/28, discharged on Quinidine 324mg BID, Amiodarone 200mg qD, and Metoprolol, re-admitted on 11/25 for perforated gastric ulcer with pneumoperitoneum - sealed 11/26 on UGI series (home medications (including Amiodarone, metoprolol, Quinidine, and Coumadin) were held in setting of her being NPO) presented to ED after AICD fired twice. Patient was at rest when episodes happened, felt dizzy then doesn't remember.  found patient with eyes open, arms/legs shaking and short of breath. Remote tele advised ICD shocked and pt advised to come to ED. Denies abd pain, fever/chills, chest pain, n/v/d, bowel/bladder incontinence.  ED Course: Labs not acutely actionable. ED team d/w EP who evaluated pt, recommended resuming home meds of quinidine (increasing to TID, increasing amiodarone to 200 mg BID, metoprolol 12.5 mg and observe overnight in CDU.  In CDU _____________________. 71 yo female with pmh of COPD, HLD, HTN, PVD, ICM/CAD s/p PCI (has  of RCA), cardiac arrest s/p left-sided ICD (6/4/2019) complicated by infection and s/p R single-chamber ICD ([place] in 9/23/2019), recent admission for VT storm w/ ablation attempted 10/20 but aborted due to hemodynamically significant pericardial effusion with drain placement and subsequent removal on 10/20/23 c/b recurrent VT s/p ICD shock, and afib w/ RVR s/p cardioversion 10/21 s/p pericardial window 10/28, discharged on Quinidine 324mg BID, Amiodarone 200mg qD, and Metoprolol, re-admitted on 11/25 for perforated gastric ulcer with pneumoperitoneum - sealed 11/26 on UGI series (home medications (including Amiodarone, metoprolol, Quinidine, and Coumadin) were held in setting of her being NPO) presented to ED after AICD fired twice. Patient was at rest when episodes happened, felt dizzy then doesn't remember.  found patient with eyes open, arms/legs shaking and short of breath. Remote tele advised ICD shocked and pt advised to come to ED. Denies abd pain, fever/chills, chest pain, n/v/d, bowel/bladder incontinence.  ED Course: Labs not acutely actionable. ED team d/w EP who evaluated pt, recommended resuming home meds of quinidine (increasing to TID, increasing amiodarone to 200 mg BID, metoprolol 12.5 mg and observe overnight in CDU.  In CDU _____________________. 73 yo female with pmh of COPD, HLD, HTN, PVD, ICM/CAD s/p PCI (has  of RCA), cardiac arrest s/p left-sided ICD (6/4/2019) complicated by infection and s/p R single-chamber ICD ([place] in 9/23/2019), recent admission for VT storm w/ ablation attempted 10/20 but aborted due to hemodynamically significant pericardial effusion with drain placement and subsequent removal on 10/20/23 c/b recurrent VT s/p ICD shock, and afib w/ RVR s/p cardioversion 10/21 s/p pericardial window 10/28, discharged on Quinidine 324mg BID, Amiodarone 200mg qD, and Metoprolol, re-admitted on 11/25 for perforated gastric ulcer with pneumoperitoneum - sealed 11/26 on UGI series (home medications (including Amiodarone, metoprolol, Quinidine, and Coumadin) were held in setting of her being NPO) presented to ED after AICD fired twice. Patient was at rest when episodes happened, felt dizzy then doesn't remember.  found patient with eyes open, arms/legs shaking and short of breath. Remote tele advised ICD shocked and pt advised to come to ED. Denies abd pain, fever/chills, chest pain, n/v/d, bowel/bladder incontinence.  ED Course: Labs not acutely actionable. ED team d/w EP who evaluated pt, recommended resuming home meds of quinidine (increasing to TID, increasing amiodarone to 200 mg BID, metoprolol 12.5 mg and observe overnight in CDU. symptoms improved. no events on telemetry. pt was seen by EP team and discharged home with outpt followup. 71 yo female with pmh of COPD, HLD, HTN, PVD, ICM/CAD s/p PCI (has  of RCA), cardiac arrest s/p left-sided ICD (6/4/2019) complicated by infection and s/p R single-chamber ICD ([place] in 9/23/2019), recent admission for VT storm w/ ablation attempted 10/20 but aborted due to hemodynamically significant pericardial effusion with drain placement and subsequent removal on 10/20/23 c/b recurrent VT s/p ICD shock, and afib w/ RVR s/p cardioversion 10/21 s/p pericardial window 10/28, discharged on Quinidine 324mg BID, Amiodarone 200mg qD, and Metoprolol, re-admitted on 11/25 for perforated gastric ulcer with pneumoperitoneum - sealed 11/26 on UGI series (home medications (including Amiodarone, metoprolol, Quinidine, and Coumadin) were held in setting of her being NPO) presented to ED after AICD fired twice. Patient was at rest when episodes happened, felt dizzy then doesn't remember.  found patient with eyes open, arms/legs shaking and short of breath. Remote tele advised ICD shocked and pt advised to come to ED. Denies abd pain, fever/chills, chest pain, n/v/d, bowel/bladder incontinence.  ED Course: Labs not acutely actionable. ED team d/w EP who evaluated pt, recommended resuming home meds of quinidine (increasing to TID, increasing amiodarone to 200 mg BID, metoprolol 12.5 mg and observe overnight in CDU. symptoms improved. no events on telemetry. pt was seen by EP team and discharged home with outpt followup. 73 yo female with pmh of COPD, HLD, HTN, PVD, ICM/CAD s/p PCI (has  of RCA), cardiac arrest s/p left-sided ICD (6/4/2019) complicated by infection and s/p R single-chamber ICD ([place] in 9/23/2019), recent admission for VT storm w/ ablation attempted 10/20 but aborted due to hemodynamically significant pericardial effusion with drain placement and subsequent removal on 10/20/23 c/b recurrent VT s/p ICD shock, and afib w/ RVR s/p cardioversion 10/21 s/p pericardial window 10/28, discharged on Quinidine 324mg BID, Amiodarone 200mg qD, and Metoprolol, re-admitted on 11/25 for perforated gastric ulcer with pneumoperitoneum - sealed 11/26 on UGI series (home medications (including Amiodarone, metoprolol, Quinidine, and Coumadin) were held in setting of her being NPO) presented to ED after AICD fired twice. Patient was at rest when episodes happened, felt dizzy then doesn't remember.  found patient with eyes open, arms/legs shaking and short of breath. Remote tele advised ICD shocked and pt advised to come to ED. Denies abd pain, fever/chills, chest pain, n/v/d, bowel/bladder incontinence.  ED Course: Labs not acutely actionable. ED team d/w EP who evaluated pt, recommended resuming home meds of quinidine (increasing to TID, increasing amiodarone to 200 mg BID, metoprolol 12.5 mg and observe overnight in CDU. symptoms improved. no events on telemetry. pt was seen by EP team and discharged home with outpt followup.

## 2023-12-01 NOTE — ED CDU PROVIDER SUBSEQUENT DAY NOTE - HISTORY
Patient seen at bedside in NAD.  VSS.  Patient resting comfortably without complaints. At this time sinus rhythm on tele, occasional PVCs. Pt ambulated to bathroom w/ cane, felt well during ambulation, no cp, dizziness, lightheadedness. After re-connecting tele it alarmed for vtach, though appeared artifact on tele as pt was moving around in bed. Called war room, appeared to be sinus w/ PVCs to them. Pt feeling well. Will continue to monitor. - Speedy Fraire PA-C Patient seen at bedside in NAD.  VSS.  Patient resting comfortably without complaints. At this time sinus rhythm on tele, occasional PVCs. Pt ambulated to bathroom w/ cane, felt well during ambulation, no cp, dizziness, lightheadedness. After re-connecting tele it alarmed for vtach, though appeared artifact on tele as pt was moving around in bed. Called war room, appeared to be sinus w/ PVCs to them. Pt feeling well. Will continue to monitor. - Speedy Farire PA-C

## 2023-12-01 NOTE — ED CDU PROVIDER SUBSEQUENT DAY NOTE - NEUROLOGICAL, MLM
Alert and oriented, no focal deficits, no motor or sensory deficits. Ambulates with cane w/o difficulty.

## 2023-12-01 NOTE — ED ADULT NURSE REASSESSMENT NOTE - NS ED NURSE REASSESS COMMENT FT1
0700 Report received from RAYSHAWN Fernando  Pt AAOx4, NAD, resp nonlabored, skin warm/dry, resting comfortably in bed. Pt denies headache, dizziness, chest pain, palpitations, SOB, abd pain, n/v/d, urinary symptoms, fevers, chills, weakness at this time. Pt awaiting for results. Safety maintained with call bell within reach.

## 2023-12-01 NOTE — ED CDU PROVIDER DISPOSITION NOTE - ATTENDING APP SHARED VISIT CONTRIBUTION OF CARE
CLAUDETTE: I , Dr Nathalia Nieves have seen and evaluated the patient. Results of labs, tests, imaging as well as consult notes have been reviewed. The patient reports that she feels well. No Ventricular arrhythmias on tele overnight. Pt devi SANDS. Maral HOPEL. Med adjustments per EP team. The patient is stable for discharge home and will follow up with their primary physician and EP. CLAUDETTE: I , Dr Nathalia Nieves have seen and evaluated the patient. Results of labs, tests, imaging as well as consult notes have been reviewed. The patient reports that she feels well. No Ventricular arrhythmias on tele overnight. Pt dvei SANDS. Maral HOPEL. Med adjustments per EP team. The patient is stable for discharge home and will follow up with their primary physician and EP.

## 2023-12-01 NOTE — PROGRESS NOTE ADULT - SUBJECTIVE AND OBJECTIVE BOX
24H hour events: Pt monitored in CDU overnight, feeling well without acute complaints. NSR on telemetry without any ventricular arrhythmias    MEDICATIONS:  aMIOdarone    Tablet 200 milliGRAM(s) Oral every 12 hours  furosemide    Tablet 40 milliGRAM(s) Oral two times a day  metoprolol succinate ER 12.5 milliGRAM(s) Oral daily  quiNIDine gluconate  milliGRAM(s) Oral three times a day  pantoprazole    Tablet 40 milliGRAM(s) Oral two times a day  atorvastatin 40 milliGRAM(s) Oral at bedtime    REVIEW OF SYSTEMS:  See HPI, otherwise ROS negative.    PHYSICAL EXAM:  T(C): 36.9 (12-01-23 @ 07:26), Max: 37.1 (11-30-23 @ 22:57)  HR: 65 (12-01-23 @ 08:45) (65 - 76)  BP: 122/81 (12-01-23 @ 08:45) (122/81 - 167/70)  RR: 18 (12-01-23 @ 08:45) (16 - 20)  SpO2: 95% (12-01-23 @ 08:45) (93% - 98%)  Wt(kg): --  I&O's Summary      Appearance: Alert. NAD	  HEENT:   NC/AT	  Cardiovascular: +S1S2 RRR no m/g/r  Respiratory: CTA B/L	  Psychiatry: A & O x 3, Mood & affect appropriate  Gastrointestinal:  Soft  Skin: No rashes	  Neurologic: Non-focal  Extremities: No edema BLE      LABS:	 	    CBC Full  -  ( 30 Nov 2023 17:20 )  WBC Count : 10.63 K/uL  Hemoglobin : 12.4 g/dL  Hematocrit : 37.8 %  Platelet Count - Automated : 263 K/uL  Mean Cell Volume : 90.6 fl  Mean Cell Hemoglobin : 29.7 pg  Mean Cell Hemoglobin Concentration : 32.8 gm/dL  Auto Neutrophil # : 7.89 K/uL  Auto Lymphocyte # : 1.35 K/uL  Auto Monocyte # : 0.92 K/uL  Auto Eosinophil # : 0.36 K/uL  Auto Basophil # : 0.06 K/uL  Auto Neutrophil % : 74.1 %  Auto Lymphocyte % : 12.7 %  Auto Monocyte % : 8.7 %  Auto Eosinophil % : 3.4 %  Auto Basophil % : 0.6 %    12-01    138  |  103  |  10  ----------------------------<  100<H>  3.7   |  25  |  0.88  11-30    139  |  102  |  12  ----------------------------<  121<H>  3.7   |  25  |  0.89    Ca    8.4      01 Dec 2023 05:11  Ca    8.9      30 Nov 2023 20:40  Phos  2.6     11-30  Mg     2.0     12-01  Mg     2.1     11-30    TPro  6.4  /  Alb  3.0<L>  /  TBili  0.3  /  DBili  x   /  AST  16  /  ALT  11  /  AlkPhos  68  12-01  TPro  7.2  /  Alb  3.3  /  TBili  0.3  /  DBili  x   /  AST  25  /  ALT  10  /  AlkPhos  72  11-30      proBNP: Pro-Brain Natriuretic Peptide (11.30.23 @ 17:20)    Pro-Brain Natriuretic Peptide: 5222 pg/mL        CARDIAC MARKERS: Troponin WNL in ED x 2 	      PREVIOUS DIAGNOSTIC TESTING:    [ ] Echocardiogram: < from: TTE Limited W or WO Ultrasound Enhancing Agent (11.08.23 @ 11:26) >  CONCLUSIONS:      1. Compared to the transthoracic echocardiogram performed on 11/5/2023 there have been no significant interval changes.   2. Trace pericardial effusion noted adjacent to the anterior right ventricle with no evidence of hemodynamic compromise.    ________________________________________________________________________________________  FINDINGS:     Pericardium:  There is a trace pericardial effusion noted adjacent to the anterior right ventricle with no evidence of hemodynamic compromise.     Systemic Veins:  The inferior vena cava is normal in size measuring 1.75 cm in diameter, (normal <2.1cm) with normal inspiratory collapse (normal >50%) consistent with normal right atrial pressure (~3, range 0-5mmHg).    < end of copied text >  < from: TTE W or WO Ultrasound Enhancing Agent (11.05.23 @ 09:16) >  ________________________________________________________________________________________  FINDINGS:     Left Ventricle:  Unable to evaluate the ejection fraction. Left ventricular endocardium is not well visualized; however, the left ventricular systolic function appears grossly normal.     Right Ventricle:  The right ventricular cavity is normal in size and normal systolic function. A device lead is visualized in the right ventricle.     Aortic Valve:  The aortic valve appears trileaflet. There is no evidence of aortic regurgitation.     Mitral Valve:  There is mild posterior calcification of the mitral valve annulus. There is no evidence of mitral regurgitation.     Tricuspid Valve:  There is trace tricuspid regurgitation.     Pulmonic Valve:  There is mild pulmonic regurgitation.     Pericardium:  There is a trace pericardial effusion with no evidence of hemodynamic compromise.     Pleura:  Small bilateral pleural effusion noted.    < end of copied text >    [ ]  Catheterization:  < from: Cardiac Catheterization (09.30.23 @ 15:04) >  Diagnostic Findings:     Coronary Angiography   The coronary circulation is right dominant.      LM   Distal left main: There is a 20 % stenosis.      LAD   Left anterior descending artery: Mid LAD stent patent.      CX   Mid circumflex: There is a 30 % stenosis.      RCA   Right coronary artery: Angiography shows complete occlusion.  of  the proximal RCA with left to right collaterals filling the  rPDA. . There is a 100 % stenosis.      < end of copied text >

## 2023-12-01 NOTE — ED CDU PROVIDER DISPOSITION NOTE - PATIENT PORTAL LINK FT
You can access the FollowMyHealth Patient Portal offered by [facility] by registering at the following website: [email or web address]. By joining [employer]’s FollowMyHealth portal, you will also be able to view your health information using other applications (apps) compatible with our system. You can access the FollowMyHealth Patient Portal offered by [facility] by registering at the following website: [email or web address]. By joining [employer]’s FollowMyHealth portal, you will also be able to view your health information using other applications (apps) compatible with our system. You can access the FollowMyHealth Patient Portal offered by [facility] by registering at the following website: [email or web address]. By joining [employer] [facility]s FollowMy[employer] portal, you will also be able to view your health information using other applications (apps) compatible with our system.

## 2023-12-01 NOTE — ED CDU PROVIDER DISPOSITION NOTE - NSFOLLOWUPINSTRUCTIONS_ED_ALL_ED_FT
Follow up with your primary care doctor in 2-3 days. Additionally recommend follow up with your electrophysiologist Dr. Hurt within 1 week for re-evaluation.    Please bring a copy of your results with you to your appointments.     As per the Electrophysiology team ___________________.    Continue other current home medications as previously prescribed.    Return to the Emergency Department immediately if you develop any new/worsening symptoms including chest pain, dizziness, weakness, vomiting, abdominal pain, ICD shock or any other concerning symptoms.     **See attached paperwork for more information. Follow up with your primary care doctor in 2-3 days. Additionally follow up with your electrophysiologist Dr. Hurt within 1 week for re-evaluation.    Please bring a copy of your results with you to your appointments.     As per the Electrophysiology team continue your current home medications except change the amiodarone to two times per day instead of one.    Return to the Emergency Department immediately if you develop any new/worsening symptoms including chest pain, dizziness, weakness, vomiting, abdominal pain, ICD shock or any other concerning symptoms.     **See attached paperwork for more information. Follow up with your primary care doctor in 2-3 days. Additionally follow up with your electrophysiologist Dr. Hurt within 1 week for re-evaluation.    Please bring a copy of your results with you to your appointments.     As per the Electrophysiology team continue your current home medications EXCEPT change the AMIODARONE to 2 times per day instead of one and change the QUINIDINE to 3 times per day instead of two.    Return to the Emergency Department immediately if you develop any new/worsening symptoms including chest pain, dizziness, weakness, vomiting, abdominal pain, ICD shock or any other concerning symptoms.     **See attached paperwork for more information.

## 2023-12-01 NOTE — ED CDU PROVIDER SUBSEQUENT DAY NOTE - PROGRESS NOTE DETAILS
Patient seen at bedside in NAD.  VSS from previous.  Patient resting comfortably without complaints. Sinus rhythm on tele at the moment. Pt has felt well in CDU. No cp, dizziness, weakness. Has not reported any adverse events. Ambulating to bathroom w/ cane assistance w/o symptoms. Plan for EP re-eval in AM. - Speedy Fraire PA-C Pt comfortable. No complaints. Vital signs stable. ambulatory. tolerating po. seen by EP who states pt can be discharged home on quinidine 3x/day instead of 2 times and amiodarone 2x/day instead of one. discussed with Dr. Nieves, pt stable for dc home. -Michelle Kumar PA-C

## 2023-12-01 NOTE — PROGRESS NOTE ADULT - ASSESSMENT
· Assessment	  73 y/o F w/ PMHx COPD, pAF on Coumadin, HLD, HTN, PVD, ICM/CAD s/p PCI (has  of RCA), cardiac arrest s/p left-sided ICD (6/4/2019) complicated by infection and s/p R single-chamber ICD ([place] in 9/23/2019), recent admission for VT storm with adjustment of medication and NIPS, prior ICD shock in the setting of recurrent monomorphic VT with unsuccessful ATP.  Ablation attempted 10/20 but aborted due to hemodynamically significant pericardial effusion with drain placement.  Pericardial drain removed on 10/20/23.  Course further c/b recurrent VT s/p ICD shock, and afib w/ RVR s/p cardioversion 10/21 s/p pericardial window 10/28. Pt was discharged on Quinidine 324mg BID, Amiodarone 200mg qD, and Metoprolol. Pt had recent admission 11/25 for perforated gastric ulcer with pneumoperitoneum - sealed 11/26 on UGI series. ASA was switched to Plavix but her home medications (including Amiodarone, metoprolol, Quinidine, and Coumadin) were held in setting of her being NPO. Today pt had ICD shock for VT, advised to come to ED for further eval.     - Remote transmission reviewed; 11/30/23 pt had episode of MMVT s/p multiple failed ATPs (accelerated rhythm to ~230bpm) s/p 1 failed ICD shock and 1 ICD shock which converted rhythm. Other brief episodes of NSVT.   - Likely in setting of recent admission for perforated ulcer requiring NPO. Her Amiodarone, metoprolol, and Quinidine were held. Coumadin also held on admission. Pt is now back on all meds. Labs reviewed without acute abnormalities.     No further ventricular arrhythmias after resumption of home meds in overnight monitoring unit 11/30-12/1.     #ICM/CAD s/p PCI  #MMVT s/p ICD shock  #pAF on Coumadin  #perforated gastric ulcer      - Continue home Quinidine (pt states she was taking this TID) and Toprol XL  - Increase Amiodarone from 200mg daily to 200mg BID.   - Continue Coumadin, on discharge her INR was subtherapeutic.    - Ok for discharge from CDU from EP standpoint  - Recommend close outpatient EP follow up with Dr. Hurt.    · Assessment	  71 y/o F w/ PMHx COPD, pAF on Coumadin, HLD, HTN, PVD, ICM/CAD s/p PCI (has  of RCA), cardiac arrest s/p left-sided ICD (6/4/2019) complicated by infection and s/p R single-chamber ICD ([place] in 9/23/2019), recent admission for VT storm with adjustment of medication and NIPS, prior ICD shock in the setting of recurrent monomorphic VT with unsuccessful ATP.  Ablation attempted 10/20 but aborted due to hemodynamically significant pericardial effusion with drain placement.  Pericardial drain removed on 10/20/23.  Course further c/b recurrent VT s/p ICD shock, and afib w/ RVR s/p cardioversion 10/21 s/p pericardial window 10/28. Pt was discharged on Quinidine 324mg BID, Amiodarone 200mg qD, and Metoprolol. Pt had recent admission 11/25 for perforated gastric ulcer with pneumoperitoneum - sealed 11/26 on UGI series. ASA was switched to Plavix but her home medications (including Amiodarone, metoprolol, Quinidine, and Coumadin) were held in setting of her being NPO. Today pt had ICD shock for VT, advised to come to ED for further eval.     - Remote transmission reviewed; 11/30/23 pt had episode of MMVT s/p multiple failed ATPs (accelerated rhythm to ~230bpm) s/p 1 failed ICD shock and 1 ICD shock which converted rhythm. Other brief episodes of NSVT.   - Likely in setting of recent admission for perforated ulcer requiring NPO. Her Amiodarone, metoprolol, and Quinidine were held. Coumadin also held on admission. Pt is now back on all meds. Labs reviewed without acute abnormalities.     No further ventricular arrhythmias after resumption of home meds in overnight monitoring unit 11/30-12/1.     #ICM/CAD s/p PCI  #MMVT s/p ICD shock  #pAF on Coumadin  #perforated gastric ulcer      - Continue home Quinidine (pt states she was taking this TID) and Toprol XL  - Increase Amiodarone from 200mg daily to 200mg BID.   - Continue Coumadin, on discharge her INR was subtherapeutic.    - Ok for discharge from CDU from EP standpoint  - Recommend close outpatient EP follow up with Dr. Hurt.    · Assessment	  71 y/o F w/ PMHx COPD, pAF on Coumadin, HLD, HTN, PVD, ICM/CAD s/p PCI (has  of RCA), cardiac arrest s/p left-sided ICD (6/4/2019) complicated by infection and s/p R single-chamber ICD ([place] in 9/23/2019), recent admission for VT storm with adjustment of medication and NIPS, prior ICD shock in the setting of recurrent monomorphic VT with unsuccessful ATP.  Ablation attempted 10/20 but aborted due to hemodynamically significant pericardial effusion with drain placement.  Pericardial drain removed on 10/20/23.  Course further c/b recurrent VT s/p ICD shock, and afib w/ RVR s/p cardioversion 10/21 s/p pericardial window 10/28. Pt was discharged on Quinidine 324mg BID, Amiodarone 200mg qD, and Metoprolol. Pt had recent admission 11/25 for perforated gastric ulcer with pneumoperitoneum - sealed 11/26 on UGI series. ASA was switched to Plavix but her home medications (including Amiodarone, metoprolol, Quinidine, and Coumadin) were held in setting of her being NPO. Today pt had ICD shock for VT, advised to come to ED for further eval.     - Remote transmission reviewed; 11/30/23 pt had episode of MMVT s/p multiple failed ATPs (accelerated rhythm to ~230bpm) s/p 1 failed ICD shock and 1 ICD shock which converted rhythm. Other brief episodes of NSVT.   - Likely in setting of recent admission for perforated ulcer requiring NPO. Her Amiodarone, metoprolol, and Quinidine were held. Coumadin also held on admission. Pt is now back on all meds. Labs reviewed without acute abnormalities.     No further ventricular arrhythmias after resumption of home meds in overnight monitoring unit 11/30-12/1.     #ICM/CAD s/p PCI  #MMVT s/p ICD shock  #pAF on Coumadin  #perforated gastric ulcer      - Continue home Quinidine (pt states she was taking this TID) and Toprol XL  - Increase Amiodarone from 200mg daily to 200mg BID.   - Continue Coumadin, on discharge her INR was subtherapeutic.    - Ok for discharge from CDU from EP standpoint  - Recommend close outpatient EP follow up with Dr. Hurt.

## 2023-12-06 NOTE — ED PROVIDER NOTE - EYES, MLM
Refill Request     Last Seen: Last Seen Department: 11/27/2023  Last Seen by PCP: 11/27/2023    Last Written: 11/6/23      Next Appointment:   Future Appointments   Date Time Provider [address] [place] [address]   5/28/2024  8:30 AM DO kp Cortez           Requested Prescriptions     Pending Prescriptions Disp Refills    atorvastatin (LIPITOR) 40 MG tablet 30 tablet 0     Sig: TAKE ONE TABLET BY MOUTH ONCE NIGHTLY    hydroCHLOROthiazide (HYDRODIURIL) 25 MG tablet 30 tablet 0     Sig: Take 1 tablet by mouth daily    metoprolol succinate (TOPROL XL) 25 MG extended release tablet 30 tablet 0     Sig: Take 1 tablet by mouth daily
Clear bilaterally, pupils equal, round and reactive to light.

## 2024-01-01 ENCOUNTER — LABORATORY RESULT (OUTPATIENT)
Age: 74
End: 2024-01-01

## 2024-01-01 ENCOUNTER — APPOINTMENT (OUTPATIENT)
Dept: ELECTROPHYSIOLOGY | Facility: HOSPITAL | Age: 74
End: 2024-01-01
Payer: MEDICARE

## 2024-01-01 ENCOUNTER — TRANSCRIPTION ENCOUNTER (OUTPATIENT)
Age: 74
End: 2024-01-01

## 2024-01-01 ENCOUNTER — NON-APPOINTMENT (OUTPATIENT)
Age: 74
End: 2024-01-01

## 2024-01-01 ENCOUNTER — APPOINTMENT (OUTPATIENT)
Dept: CARDIOLOGY | Facility: CLINIC | Age: 74
End: 2024-01-01
Payer: MEDICARE

## 2024-01-01 ENCOUNTER — RESULT REVIEW (OUTPATIENT)
Age: 74
End: 2024-01-01

## 2024-01-01 ENCOUNTER — INPATIENT (INPATIENT)
Facility: HOSPITAL | Age: 74
LOS: 3 days | Discharge: HOME HEALTH | DRG: 273 | End: 2024-02-15
Attending: INTERNAL MEDICINE | Admitting: INTERNAL MEDICINE
Payer: MEDICARE

## 2024-01-01 ENCOUNTER — APPOINTMENT (OUTPATIENT)
Dept: GASTROENTEROLOGY | Facility: CLINIC | Age: 74
End: 2024-01-01
Payer: MEDICARE

## 2024-01-01 ENCOUNTER — INPATIENT (INPATIENT)
Facility: HOSPITAL | Age: 74
LOS: 10 days | DRG: 690 | End: 2024-05-18
Attending: SURGERY | Admitting: INTERNAL MEDICINE
Payer: MEDICARE

## 2024-01-01 ENCOUNTER — INPATIENT (INPATIENT)
Facility: HOSPITAL | Age: 74
LOS: 4 days | Discharge: HOME HEALTH | DRG: 813 | End: 2024-02-02
Attending: INTERNAL MEDICINE | Admitting: INTERNAL MEDICINE
Payer: MEDICARE

## 2024-01-01 ENCOUNTER — EMERGENCY (EMERGENCY)
Facility: HOSPITAL | Age: 74
LOS: 1 days | Discharge: HOME | End: 2024-01-01
Attending: STUDENT IN AN ORGANIZED HEALTH CARE EDUCATION/TRAINING PROGRAM
Payer: MEDICARE

## 2024-01-01 ENCOUNTER — APPOINTMENT (OUTPATIENT)
Dept: GASTROENTEROLOGY | Facility: CLINIC | Age: 74
End: 2024-01-01

## 2024-01-01 VITALS
OXYGEN SATURATION: 94 % | SYSTOLIC BLOOD PRESSURE: 137 MMHG | RESPIRATION RATE: 18 BRPM | HEART RATE: 64 BPM | TEMPERATURE: 98 F | DIASTOLIC BLOOD PRESSURE: 69 MMHG

## 2024-01-01 VITALS
HEART RATE: 77 BPM | RESPIRATION RATE: 16 BRPM | OXYGEN SATURATION: 99 % | SYSTOLIC BLOOD PRESSURE: 137 MMHG | DIASTOLIC BLOOD PRESSURE: 74 MMHG | HEIGHT: 62 IN | WEIGHT: 175.93 LBS | TEMPERATURE: 98 F

## 2024-01-01 VITALS
HEART RATE: 74 BPM | DIASTOLIC BLOOD PRESSURE: 67 MMHG | HEIGHT: 62 IN | OXYGEN SATURATION: 98 % | WEIGHT: 167 LBS | BODY MASS INDEX: 30.73 KG/M2 | SYSTOLIC BLOOD PRESSURE: 110 MMHG

## 2024-01-01 VITALS — SYSTOLIC BLOOD PRESSURE: 132 MMHG | DIASTOLIC BLOOD PRESSURE: 68 MMHG

## 2024-01-01 VITALS
RESPIRATION RATE: 16 BRPM | WEIGHT: 160.06 LBS | HEART RATE: 78 BPM | SYSTOLIC BLOOD PRESSURE: 98 MMHG | HEIGHT: 62 IN | DIASTOLIC BLOOD PRESSURE: 65 MMHG | OXYGEN SATURATION: 97 % | TEMPERATURE: 98 F

## 2024-01-01 VITALS
RESPIRATION RATE: 18 BRPM | OXYGEN SATURATION: 96 % | DIASTOLIC BLOOD PRESSURE: 79 MMHG | HEART RATE: 78 BPM | TEMPERATURE: 98 F | SYSTOLIC BLOOD PRESSURE: 133 MMHG

## 2024-01-01 VITALS
SYSTOLIC BLOOD PRESSURE: 116 MMHG | RESPIRATION RATE: 15 BRPM | TEMPERATURE: 98 F | HEIGHT: 62 IN | DIASTOLIC BLOOD PRESSURE: 74 MMHG | WEIGHT: 166.89 LBS | HEART RATE: 80 BPM | OXYGEN SATURATION: 97 %

## 2024-01-01 VITALS
DIASTOLIC BLOOD PRESSURE: 78 MMHG | TEMPERATURE: 98 F | SYSTOLIC BLOOD PRESSURE: 132 MMHG | HEART RATE: 65 BPM | OXYGEN SATURATION: 96 % | RESPIRATION RATE: 18 BRPM

## 2024-01-01 VITALS
SYSTOLIC BLOOD PRESSURE: 122 MMHG | HEART RATE: 68 BPM | TEMPERATURE: 98 F | HEIGHT: 62 IN | OXYGEN SATURATION: 95 % | WEIGHT: 169.98 LBS | RESPIRATION RATE: 18 BRPM | DIASTOLIC BLOOD PRESSURE: 71 MMHG

## 2024-01-01 VITALS
HEIGHT: 62 IN | OXYGEN SATURATION: 93 % | SYSTOLIC BLOOD PRESSURE: 129 MMHG | WEIGHT: 178 LBS | BODY MASS INDEX: 32.76 KG/M2 | HEART RATE: 65 BPM | DIASTOLIC BLOOD PRESSURE: 63 MMHG

## 2024-01-01 DIAGNOSIS — F17.200 NICOTINE DEPENDENCE, UNSPECIFIED, UNCOMPLICATED: ICD-10-CM

## 2024-01-01 DIAGNOSIS — T45.511A POISONING BY ANTICOAGULANTS, ACCIDENTAL (UNINTENTIONAL), INITIAL ENCOUNTER: ICD-10-CM

## 2024-01-01 DIAGNOSIS — D72.829 ELEVATED WHITE BLOOD CELL COUNT, UNSPECIFIED: ICD-10-CM

## 2024-01-01 DIAGNOSIS — I10 ESSENTIAL (PRIMARY) HYPERTENSION: ICD-10-CM

## 2024-01-01 DIAGNOSIS — E78.5 HYPERLIPIDEMIA, UNSPECIFIED: ICD-10-CM

## 2024-01-01 DIAGNOSIS — I49.9 CARDIAC ARRHYTHMIA, UNSPECIFIED: ICD-10-CM

## 2024-01-01 DIAGNOSIS — I25.10 ATHEROSCLEROTIC HEART DISEASE OF NATIVE CORONARY ARTERY W/OUT ANGINA PECTORIS: ICD-10-CM

## 2024-01-01 DIAGNOSIS — I47.29 OTHER VENTRICULAR TACHYCARDIA: ICD-10-CM

## 2024-01-01 DIAGNOSIS — I47.20 VENTRICULAR TACHYCARDIA, UNSPECIFIED: ICD-10-CM

## 2024-01-01 DIAGNOSIS — I25.10 ATHEROSCLEROTIC HEART DISEASE OF NATIVE CORONARY ARTERY WITHOUT ANGINA PECTORIS: ICD-10-CM

## 2024-01-01 DIAGNOSIS — Z98.61 CORONARY ANGIOPLASTY STATUS: ICD-10-CM

## 2024-01-01 DIAGNOSIS — E87.6 HYPOKALEMIA: ICD-10-CM

## 2024-01-01 DIAGNOSIS — I48.21 PERMANENT ATRIAL FIBRILLATION: ICD-10-CM

## 2024-01-01 DIAGNOSIS — Z98.890 OTHER SPECIFIED POSTPROCEDURAL STATES: Chronic | ICD-10-CM

## 2024-01-01 DIAGNOSIS — I48.91 UNSPECIFIED ATRIAL FIBRILLATION: ICD-10-CM

## 2024-01-01 DIAGNOSIS — Z45.02 ENCOUNTER FOR ADJUSTMENT AND MANAGEMENT OF AUTOMATIC IMPLANTABLE CARDIAC DEFIBRILLATOR: ICD-10-CM

## 2024-01-01 DIAGNOSIS — K92.2 GASTROINTESTINAL HEMORRHAGE, UNSPECIFIED: ICD-10-CM

## 2024-01-01 DIAGNOSIS — Z95.810 PRESENCE OF AUTOMATIC (IMPLANTABLE) CARDIAC DEFIBRILLATOR: ICD-10-CM

## 2024-01-01 DIAGNOSIS — I46.9 CARDIAC ARREST, CAUSE UNSPECIFIED: ICD-10-CM

## 2024-01-01 DIAGNOSIS — Z01.818 ENCOUNTER FOR OTHER PREPROCEDURAL EXAMINATION: ICD-10-CM

## 2024-01-01 DIAGNOSIS — K25.4 CHRONIC OR UNSPECIFIED GASTRIC ULCER WITH HEMORRHAGE: ICD-10-CM

## 2024-01-01 DIAGNOSIS — N39.0 URINARY TRACT INFECTION, SITE NOT SPECIFIED: ICD-10-CM

## 2024-01-01 DIAGNOSIS — K92.1 MELENA: ICD-10-CM

## 2024-01-01 DIAGNOSIS — I50.9 HEART FAILURE, UNSPECIFIED: ICD-10-CM

## 2024-01-01 LAB
-  AMOXICILLIN/CLAVULANIC ACID: SIGNIFICANT CHANGE UP
-  AMOXICILLIN/CLAVULANIC ACID: SIGNIFICANT CHANGE UP
-  AMPICILLIN/SULBACTAM: SIGNIFICANT CHANGE UP
-  AMPICILLIN/SULBACTAM: SIGNIFICANT CHANGE UP
-  AMPICILLIN: SIGNIFICANT CHANGE UP
-  AZTREONAM: SIGNIFICANT CHANGE UP
-  AZTREONAM: SIGNIFICANT CHANGE UP
-  BLOOD PCR PANEL: SIGNIFICANT CHANGE UP
-  CEFAZOLIN: SIGNIFICANT CHANGE UP
-  CEFAZOLIN: SIGNIFICANT CHANGE UP
-  CEFEPIME: SIGNIFICANT CHANGE UP
-  CEFEPIME: SIGNIFICANT CHANGE UP
-  CEFOXITIN: SIGNIFICANT CHANGE UP
-  CEFOXITIN: SIGNIFICANT CHANGE UP
-  CEFTRIAXONE: SIGNIFICANT CHANGE UP
-  CEFTRIAXONE: SIGNIFICANT CHANGE UP
-  CEFUROXIME: SIGNIFICANT CHANGE UP
-  CEFUROXIME: SIGNIFICANT CHANGE UP
-  CIPROFLOXACIN: SIGNIFICANT CHANGE UP
-  CIPROFLOXACIN: SIGNIFICANT CHANGE UP
-  COAGULASE NEGATIVE STAPHYLOCOCCUS: SIGNIFICANT CHANGE UP
-  ERTAPENEM: SIGNIFICANT CHANGE UP
-  ERTAPENEM: SIGNIFICANT CHANGE UP
-  GENTAMICIN SYNERGY: SIGNIFICANT CHANGE UP
-  GENTAMICIN: SIGNIFICANT CHANGE UP
-  GENTAMICIN: SIGNIFICANT CHANGE UP
-  IMIPENEM: SIGNIFICANT CHANGE UP
-  IMIPENEM: SIGNIFICANT CHANGE UP
-  LEVOFLOXACIN: SIGNIFICANT CHANGE UP
-  LEVOFLOXACIN: SIGNIFICANT CHANGE UP
-  MEROPENEM: SIGNIFICANT CHANGE UP
-  MEROPENEM: SIGNIFICANT CHANGE UP
-  NITROFURANTOIN: SIGNIFICANT CHANGE UP
-  NITROFURANTOIN: SIGNIFICANT CHANGE UP
-  PIPERACILLIN/TAZOBACTAM: SIGNIFICANT CHANGE UP
-  PIPERACILLIN/TAZOBACTAM: SIGNIFICANT CHANGE UP
-  STREPTOMYCIN SYNERGY: SIGNIFICANT CHANGE UP
-  TOBRAMYCIN: SIGNIFICANT CHANGE UP
-  TOBRAMYCIN: SIGNIFICANT CHANGE UP
-  TRIMETHOPRIM/SULFAMETHOXAZOLE: SIGNIFICANT CHANGE UP
-  TRIMETHOPRIM/SULFAMETHOXAZOLE: SIGNIFICANT CHANGE UP
-  VANCOMYCIN: SIGNIFICANT CHANGE UP
A1C WITH ESTIMATED AVERAGE GLUCOSE RESULT: 5.9 % — HIGH (ref 4–5.6)
ADD ON TEST-SPECIMEN IN LAB: SIGNIFICANT CHANGE UP
ALBUMIN SERPL ELPH-MCNC: 2.5 G/DL — LOW (ref 3.3–5)
ALBUMIN SERPL ELPH-MCNC: 2.6 G/DL — LOW (ref 3.3–5)
ALBUMIN SERPL ELPH-MCNC: 2.7 G/DL — LOW (ref 3.3–5)
ALBUMIN SERPL ELPH-MCNC: 2.8 G/DL — LOW (ref 3.3–5)
ALBUMIN SERPL ELPH-MCNC: 2.9 G/DL — LOW (ref 3.3–5)
ALBUMIN SERPL ELPH-MCNC: 2.9 G/DL — LOW (ref 3.3–5)
ALBUMIN SERPL ELPH-MCNC: 3 G/DL — LOW (ref 3.3–5)
ALBUMIN SERPL ELPH-MCNC: 3 G/DL — LOW (ref 3.3–5)
ALBUMIN SERPL ELPH-MCNC: 3.1 G/DL — LOW (ref 3.3–5)
ALBUMIN SERPL ELPH-MCNC: 3.3 G/DL — SIGNIFICANT CHANGE UP (ref 3.3–5)
ALBUMIN SERPL ELPH-MCNC: 3.3 G/DL — SIGNIFICANT CHANGE UP (ref 3.3–5)
ALBUMIN SERPL ELPH-MCNC: 3.4 G/DL
ALBUMIN SERPL ELPH-MCNC: 3.5 G/DL
ALP BLD-CCNC: 79 U/L
ALP BLD-CCNC: 81 U/L
ALP SERPL-CCNC: 148 U/L — HIGH (ref 40–120)
ALP SERPL-CCNC: 159 U/L — HIGH (ref 40–120)
ALP SERPL-CCNC: 210 U/L — HIGH (ref 40–120)
ALP SERPL-CCNC: 59 U/L — SIGNIFICANT CHANGE UP (ref 40–120)
ALP SERPL-CCNC: 60 U/L — SIGNIFICANT CHANGE UP (ref 40–120)
ALP SERPL-CCNC: 65 U/L — SIGNIFICANT CHANGE UP (ref 40–120)
ALP SERPL-CCNC: 66 U/L — SIGNIFICANT CHANGE UP (ref 40–120)
ALP SERPL-CCNC: 67 U/L — SIGNIFICANT CHANGE UP (ref 40–120)
ALP SERPL-CCNC: 68 U/L — SIGNIFICANT CHANGE UP (ref 40–120)
ALP SERPL-CCNC: 69 U/L — SIGNIFICANT CHANGE UP (ref 40–120)
ALP SERPL-CCNC: 70 U/L — SIGNIFICANT CHANGE UP (ref 40–120)
ALP SERPL-CCNC: 72 U/L — SIGNIFICANT CHANGE UP (ref 40–120)
ALP SERPL-CCNC: 75 U/L — SIGNIFICANT CHANGE UP (ref 40–120)
ALT FLD-CCNC: 10 U/L — SIGNIFICANT CHANGE UP (ref 10–45)
ALT FLD-CCNC: 13 U/L — SIGNIFICANT CHANGE UP (ref 10–45)
ALT FLD-CCNC: 14 U/L — SIGNIFICANT CHANGE UP (ref 10–45)
ALT FLD-CCNC: 15 U/L — SIGNIFICANT CHANGE UP (ref 10–45)
ALT FLD-CCNC: 17 U/L — SIGNIFICANT CHANGE UP (ref 10–45)
ALT FLD-CCNC: 1773 U/L — HIGH (ref 10–45)
ALT FLD-CCNC: 18 U/L — SIGNIFICANT CHANGE UP (ref 10–45)
ALT FLD-CCNC: 20 U/L — SIGNIFICANT CHANGE UP (ref 10–45)
ALT FLD-CCNC: 21 U/L — SIGNIFICANT CHANGE UP (ref 10–45)
ALT FLD-CCNC: 2671 U/L — HIGH (ref 10–45)
ALT FLD-CCNC: 549 U/L — HIGH (ref 10–45)
ALT FLD-CCNC: 6 U/L — LOW (ref 10–45)
ALT FLD-CCNC: 8 U/L — LOW (ref 10–45)
ALT SERPL-CCNC: 29 U/L
ALT SERPL-CCNC: 7 U/L
ANION GAP SERPL CALC-SCNC: 10 MMOL/L — SIGNIFICANT CHANGE UP (ref 5–17)
ANION GAP SERPL CALC-SCNC: 11 MMOL/L — SIGNIFICANT CHANGE UP (ref 5–17)
ANION GAP SERPL CALC-SCNC: 12 MMOL/L — SIGNIFICANT CHANGE UP (ref 5–17)
ANION GAP SERPL CALC-SCNC: 13 MMOL/L — SIGNIFICANT CHANGE UP (ref 5–17)
ANION GAP SERPL CALC-SCNC: 13 MMOL/L — SIGNIFICANT CHANGE UP (ref 5–17)
ANION GAP SERPL CALC-SCNC: 15 MMOL/L — SIGNIFICANT CHANGE UP (ref 5–17)
ANION GAP SERPL CALC-SCNC: 15 MMOL/L — SIGNIFICANT CHANGE UP (ref 5–17)
ANION GAP SERPL CALC-SCNC: 16 MMOL/L
ANION GAP SERPL CALC-SCNC: 19 MMOL/L — HIGH (ref 5–17)
ANION GAP SERPL CALC-SCNC: 20 MMOL/L
ANION GAP SERPL CALC-SCNC: 23 MMOL/L — HIGH (ref 5–17)
ANION GAP SERPL CALC-SCNC: 29 MMOL/L — HIGH (ref 5–17)
ANION GAP SERPL CALC-SCNC: 31 MMOL/L — HIGH (ref 5–17)
ANION GAP SERPL CALC-SCNC: 37 MMOL/L — HIGH (ref 5–17)
ANION GAP SERPL CALC-SCNC: 42 MMOL/L — HIGH (ref 5–17)
ANION GAP SERPL CALC-SCNC: 8 MMOL/L — SIGNIFICANT CHANGE UP (ref 5–17)
ANION GAP SERPL CALC-SCNC: 9 MMOL/L — SIGNIFICANT CHANGE UP (ref 5–17)
ANION GAP SERPL CALC-SCNC: 9 MMOL/L — SIGNIFICANT CHANGE UP (ref 5–17)
ANISOCYTOSIS BLD QL: SLIGHT — SIGNIFICANT CHANGE UP
APPEARANCE UR: ABNORMAL
APTT BLD: 27.8 SEC — SIGNIFICANT CHANGE UP (ref 24.5–35.6)
APTT BLD: 33.4 SEC — SIGNIFICANT CHANGE UP (ref 24.5–35.6)
APTT BLD: 39.3 SEC — HIGH (ref 24.5–35.6)
APTT BLD: 46.1 SEC — HIGH (ref 24.5–35.6)
APTT BLD: 46.5 SEC — HIGH (ref 24.5–35.6)
APTT BLD: 54.2 SEC — HIGH (ref 24.5–35.6)
APTT BLD: 55.7 SEC — HIGH (ref 24.5–35.6)
APTT BLD: 57.2 SEC — HIGH (ref 24.5–35.6)
APTT BLD: 64.2 SEC — HIGH (ref 24.5–35.6)
APTT BLD: 87.6 SEC — HIGH (ref 24.5–35.6)
AST SERPL-CCNC: 10 U/L — SIGNIFICANT CHANGE UP (ref 10–40)
AST SERPL-CCNC: 1002 U/L — HIGH (ref 10–40)
AST SERPL-CCNC: 11 U/L — SIGNIFICANT CHANGE UP (ref 10–40)
AST SERPL-CCNC: 13 U/L
AST SERPL-CCNC: 14 U/L — SIGNIFICANT CHANGE UP (ref 10–40)
AST SERPL-CCNC: 16 U/L — SIGNIFICANT CHANGE UP (ref 10–40)
AST SERPL-CCNC: 17 U/L — SIGNIFICANT CHANGE UP (ref 10–40)
AST SERPL-CCNC: 18 U/L — SIGNIFICANT CHANGE UP (ref 10–40)
AST SERPL-CCNC: 20 U/L — SIGNIFICANT CHANGE UP (ref 10–40)
AST SERPL-CCNC: 25 U/L
AST SERPL-CCNC: 25 U/L — SIGNIFICANT CHANGE UP (ref 10–40)
AST SERPL-CCNC: 26 U/L — SIGNIFICANT CHANGE UP (ref 10–40)
AST SERPL-CCNC: 3618 U/L — HIGH (ref 10–40)
AST SERPL-CCNC: 65 U/L — HIGH (ref 10–40)
AST SERPL-CCNC: 6675 U/L — HIGH (ref 10–40)
BACTERIA # UR AUTO: ABNORMAL /HPF
BACTERIA # UR AUTO: ABNORMAL /HPF
BACTERIA # UR AUTO: NEGATIVE /HPF — SIGNIFICANT CHANGE UP
BASE EXCESS BLDA CALC-SCNC: -20.6 MMOL/L — LOW (ref -2–3)
BASE EXCESS BLDMV CALC-SCNC: -14.9 MMOL/L — LOW (ref -3–3)
BASE EXCESS BLDV CALC-SCNC: -20.7 MMOL/L — LOW (ref -2–3)
BASE EXCESS BLDV CALC-SCNC: 0.2 MMOL/L — SIGNIFICANT CHANGE UP (ref -2–3)
BASE EXCESS BLDV CALC-SCNC: 0.3 MMOL/L — SIGNIFICANT CHANGE UP (ref -2–3)
BASE EXCESS BLDV CALC-SCNC: 0.8 MMOL/L — SIGNIFICANT CHANGE UP (ref -2–3)
BASE EXCESS BLDV CALC-SCNC: 3.2 MMOL/L — HIGH (ref -2–3)
BASE EXCESS BLDV CALC-SCNC: 4 MMOL/L — HIGH (ref -2–3)
BASOPHILS # BLD AUTO: 0 K/UL — SIGNIFICANT CHANGE UP (ref 0–0.2)
BASOPHILS # BLD AUTO: 0.03 K/UL — SIGNIFICANT CHANGE UP (ref 0–0.2)
BASOPHILS # BLD AUTO: 0.04 K/UL — SIGNIFICANT CHANGE UP (ref 0–0.2)
BASOPHILS # BLD AUTO: 0.04 K/UL — SIGNIFICANT CHANGE UP (ref 0–0.2)
BASOPHILS # BLD AUTO: 0.05 K/UL — SIGNIFICANT CHANGE UP (ref 0–0.2)
BASOPHILS # BLD AUTO: 0.07 K/UL — SIGNIFICANT CHANGE UP (ref 0–0.2)
BASOPHILS # BLD AUTO: 0.1 K/UL — SIGNIFICANT CHANGE UP (ref 0–0.2)
BASOPHILS NFR BLD AUTO: 0 % — SIGNIFICANT CHANGE UP (ref 0–2)
BASOPHILS NFR BLD AUTO: 0.3 % — SIGNIFICANT CHANGE UP (ref 0–2)
BASOPHILS NFR BLD AUTO: 0.4 % — SIGNIFICANT CHANGE UP (ref 0–2)
BCR/ABL BY RT - PCR QUANTITATIVE: SIGNIFICANT CHANGE UP
BILIRUB DIRECT SERPL-MCNC: <0.1 MG/DL — SIGNIFICANT CHANGE UP (ref 0–0.3)
BILIRUB INDIRECT FLD-MCNC: >0.3 MG/DL — SIGNIFICANT CHANGE UP (ref 0.2–1)
BILIRUB SERPL-MCNC: 0.1 MG/DL — LOW (ref 0.2–1.2)
BILIRUB SERPL-MCNC: 0.2 MG/DL — SIGNIFICANT CHANGE UP (ref 0.2–1.2)
BILIRUB SERPL-MCNC: 0.3 MG/DL
BILIRUB SERPL-MCNC: 0.3 MG/DL — SIGNIFICANT CHANGE UP (ref 0.2–1.2)
BILIRUB SERPL-MCNC: 0.3 MG/DL — SIGNIFICANT CHANGE UP (ref 0.2–1.2)
BILIRUB SERPL-MCNC: 0.4 MG/DL
BILIRUB SERPL-MCNC: 0.4 MG/DL — SIGNIFICANT CHANGE UP (ref 0.2–1.2)
BILIRUB SERPL-MCNC: 0.7 MG/DL — SIGNIFICANT CHANGE UP (ref 0.2–1.2)
BILIRUB SERPL-MCNC: 0.9 MG/DL — SIGNIFICANT CHANGE UP (ref 0.2–1.2)
BILIRUB SERPL-MCNC: 2 MG/DL — HIGH (ref 0.2–1.2)
BILIRUB SERPL-MCNC: 2.3 MG/DL — HIGH (ref 0.2–1.2)
BILIRUB SERPL-MCNC: 2.4 MG/DL — HIGH (ref 0.2–1.2)
BILIRUB UR-MCNC: ABNORMAL
BILIRUB UR-MCNC: NEGATIVE — SIGNIFICANT CHANGE UP
BILIRUB UR-MCNC: NEGATIVE — SIGNIFICANT CHANGE UP
BLD GP AB SCN SERPL QL: NEGATIVE — SIGNIFICANT CHANGE UP
BUN SERPL-MCNC: 10 MG/DL — SIGNIFICANT CHANGE UP (ref 7–23)
BUN SERPL-MCNC: 10 MG/DL — SIGNIFICANT CHANGE UP (ref 7–23)
BUN SERPL-MCNC: 11 MG/DL — SIGNIFICANT CHANGE UP (ref 7–23)
BUN SERPL-MCNC: 13 MG/DL — SIGNIFICANT CHANGE UP (ref 7–23)
BUN SERPL-MCNC: 13 MG/DL — SIGNIFICANT CHANGE UP (ref 7–23)
BUN SERPL-MCNC: 14 MG/DL
BUN SERPL-MCNC: 14 MG/DL — SIGNIFICANT CHANGE UP (ref 7–23)
BUN SERPL-MCNC: 16 MG/DL — SIGNIFICANT CHANGE UP (ref 7–23)
BUN SERPL-MCNC: 17 MG/DL
BUN SERPL-MCNC: 17 MG/DL — SIGNIFICANT CHANGE UP (ref 7–23)
BUN SERPL-MCNC: 17 MG/DL — SIGNIFICANT CHANGE UP (ref 7–23)
BUN SERPL-MCNC: 18 MG/DL — SIGNIFICANT CHANGE UP (ref 7–23)
BUN SERPL-MCNC: 19 MG/DL — SIGNIFICANT CHANGE UP (ref 7–23)
BUN SERPL-MCNC: 24 MG/DL — HIGH (ref 7–23)
BUN SERPL-MCNC: 25 MG/DL — HIGH (ref 7–23)
BUN SERPL-MCNC: 28 MG/DL — HIGH (ref 7–23)
BUN SERPL-MCNC: 33 MG/DL — HIGH (ref 7–23)
BUN SERPL-MCNC: 38 MG/DL — HIGH (ref 7–23)
BUN SERPL-MCNC: 40 MG/DL — HIGH (ref 7–23)
BUN SERPL-MCNC: 40 MG/DL — HIGH (ref 7–23)
BUN SERPL-MCNC: 45 MG/DL — HIGH (ref 7–23)
BUN SERPL-MCNC: 47 MG/DL — HIGH (ref 7–23)
BURR CELLS BLD QL SMEAR: PRESENT — SIGNIFICANT CHANGE UP
C DIFF GDH STL QL: NEGATIVE — SIGNIFICANT CHANGE UP
C DIFF GDH STL QL: SIGNIFICANT CHANGE UP
CA-I SERPL-SCNC: 1.12 MMOL/L — LOW (ref 1.15–1.33)
CA-I SERPL-SCNC: 1.13 MMOL/L — LOW (ref 1.15–1.33)
CA-I SERPL-SCNC: 1.15 MMOL/L — SIGNIFICANT CHANGE UP (ref 1.15–1.33)
CA-I SERPL-SCNC: 1.17 MMOL/L — SIGNIFICANT CHANGE UP (ref 1.15–1.33)
CA-I SERPL-SCNC: 1.19 MMOL/L — SIGNIFICANT CHANGE UP (ref 1.15–1.33)
CA-I SERPL-SCNC: 1.21 MMOL/L — SIGNIFICANT CHANGE UP (ref 1.15–1.33)
CALCIUM SERPL-MCNC: 8 MG/DL — LOW (ref 8.4–10.5)
CALCIUM SERPL-MCNC: 8.1 MG/DL — LOW (ref 8.4–10.5)
CALCIUM SERPL-MCNC: 8.1 MG/DL — LOW (ref 8.4–10.5)
CALCIUM SERPL-MCNC: 8.2 MG/DL — LOW (ref 8.4–10.5)
CALCIUM SERPL-MCNC: 8.2 MG/DL — LOW (ref 8.4–10.5)
CALCIUM SERPL-MCNC: 8.3 MG/DL — LOW (ref 8.4–10.5)
CALCIUM SERPL-MCNC: 8.4 MG/DL — SIGNIFICANT CHANGE UP (ref 8.4–10.5)
CALCIUM SERPL-MCNC: 8.4 MG/DL — SIGNIFICANT CHANGE UP (ref 8.4–10.5)
CALCIUM SERPL-MCNC: 8.5 MG/DL — SIGNIFICANT CHANGE UP (ref 8.4–10.5)
CALCIUM SERPL-MCNC: 8.6 MG/DL — SIGNIFICANT CHANGE UP (ref 8.4–10.5)
CALCIUM SERPL-MCNC: 8.7 MG/DL — SIGNIFICANT CHANGE UP (ref 8.4–10.5)
CALCIUM SERPL-MCNC: 8.8 MG/DL — SIGNIFICANT CHANGE UP (ref 8.4–10.5)
CALCIUM SERPL-MCNC: 8.9 MG/DL
CALCIUM SERPL-MCNC: 8.9 MG/DL — SIGNIFICANT CHANGE UP (ref 8.4–10.5)
CALCIUM SERPL-MCNC: 9.1 MG/DL
CALCIUM SERPL-MCNC: 9.1 MG/DL — SIGNIFICANT CHANGE UP (ref 8.4–10.5)
CALCIUM SERPL-MCNC: 9.1 MG/DL — SIGNIFICANT CHANGE UP (ref 8.4–10.5)
CALCIUM SERPL-MCNC: 9.2 MG/DL — SIGNIFICANT CHANGE UP (ref 8.4–10.5)
CALCIUM SERPL-MCNC: 9.2 MG/DL — SIGNIFICANT CHANGE UP (ref 8.4–10.5)
CALCIUM SERPL-MCNC: 9.3 MG/DL — SIGNIFICANT CHANGE UP (ref 8.4–10.5)
CALCIUM SERPL-MCNC: 9.4 MG/DL — SIGNIFICANT CHANGE UP (ref 8.4–10.5)
CAST: 0 /LPF — SIGNIFICANT CHANGE UP (ref 0–4)
CAST: 0 /LPF — SIGNIFICANT CHANGE UP (ref 0–4)
CHLORIDE BLDV-SCNC: 102 MMOL/L — SIGNIFICANT CHANGE UP (ref 96–108)
CHLORIDE BLDV-SCNC: 103 MMOL/L — SIGNIFICANT CHANGE UP (ref 96–108)
CHLORIDE BLDV-SCNC: 105 MMOL/L — SIGNIFICANT CHANGE UP (ref 96–108)
CHLORIDE BLDV-SCNC: 105 MMOL/L — SIGNIFICANT CHANGE UP (ref 96–108)
CHLORIDE BLDV-SCNC: 107 MMOL/L — SIGNIFICANT CHANGE UP (ref 96–108)
CHLORIDE BLDV-SCNC: 97 MMOL/L — SIGNIFICANT CHANGE UP (ref 96–108)
CHLORIDE SERPL-SCNC: 100 MMOL/L
CHLORIDE SERPL-SCNC: 101 MMOL/L
CHLORIDE SERPL-SCNC: 101 MMOL/L — SIGNIFICANT CHANGE UP (ref 96–108)
CHLORIDE SERPL-SCNC: 101 MMOL/L — SIGNIFICANT CHANGE UP (ref 96–108)
CHLORIDE SERPL-SCNC: 102 MMOL/L — SIGNIFICANT CHANGE UP (ref 96–108)
CHLORIDE SERPL-SCNC: 103 MMOL/L — SIGNIFICANT CHANGE UP (ref 96–108)
CHLORIDE SERPL-SCNC: 103 MMOL/L — SIGNIFICANT CHANGE UP (ref 96–108)
CHLORIDE SERPL-SCNC: 104 MMOL/L — SIGNIFICANT CHANGE UP (ref 96–108)
CHLORIDE SERPL-SCNC: 105 MMOL/L — SIGNIFICANT CHANGE UP (ref 96–108)
CHLORIDE SERPL-SCNC: 106 MMOL/L — SIGNIFICANT CHANGE UP (ref 96–108)
CHLORIDE SERPL-SCNC: 107 MMOL/L — SIGNIFICANT CHANGE UP (ref 96–108)
CHLORIDE SERPL-SCNC: 107 MMOL/L — SIGNIFICANT CHANGE UP (ref 96–108)
CHLORIDE SERPL-SCNC: 108 MMOL/L — SIGNIFICANT CHANGE UP (ref 96–108)
CHLORIDE SERPL-SCNC: 109 MMOL/L — HIGH (ref 96–108)
CHLORIDE SERPL-SCNC: 95 MMOL/L — LOW (ref 96–108)
CHLORIDE SERPL-SCNC: 98 MMOL/L — SIGNIFICANT CHANGE UP (ref 96–108)
CHLORIDE SERPL-SCNC: 98 MMOL/L — SIGNIFICANT CHANGE UP (ref 96–108)
CHOLEST SERPL-MCNC: 89 MG/DL — SIGNIFICANT CHANGE UP
CK MB CFR SERPL CALC: 2 NG/ML — SIGNIFICANT CHANGE UP (ref 0–3.8)
CK SERPL-CCNC: 63 U/L — SIGNIFICANT CHANGE UP (ref 25–170)
CO2 BLDA-SCNC: 7 MMOL/L — LOW (ref 19–24)
CO2 BLDMV-SCNC: 15 MMOL/L — LOW (ref 21–29)
CO2 BLDV-SCNC: 26 MMOL/L — SIGNIFICANT CHANGE UP (ref 22–26)
CO2 BLDV-SCNC: 26 MMOL/L — SIGNIFICANT CHANGE UP (ref 22–26)
CO2 BLDV-SCNC: 27 MMOL/L — HIGH (ref 22–26)
CO2 BLDV-SCNC: 29 MMOL/L — HIGH (ref 22–26)
CO2 BLDV-SCNC: 30 MMOL/L — HIGH (ref 22–26)
CO2 BLDV-SCNC: 9 MMOL/L — LOW (ref 22–26)
CO2 SERPL-SCNC: 10 MMOL/L — CRITICAL LOW (ref 22–31)
CO2 SERPL-SCNC: 10 MMOL/L — CRITICAL LOW (ref 22–31)
CO2 SERPL-SCNC: 15 MMOL/L — LOW (ref 22–31)
CO2 SERPL-SCNC: 19 MMOL/L — LOW (ref 22–31)
CO2 SERPL-SCNC: 20 MMOL/L — LOW (ref 22–31)
CO2 SERPL-SCNC: 21 MMOL/L — LOW (ref 22–31)
CO2 SERPL-SCNC: 22 MMOL/L — SIGNIFICANT CHANGE UP (ref 22–31)
CO2 SERPL-SCNC: 22 MMOL/L — SIGNIFICANT CHANGE UP (ref 22–31)
CO2 SERPL-SCNC: 23 MMOL/L
CO2 SERPL-SCNC: 23 MMOL/L — SIGNIFICANT CHANGE UP (ref 22–31)
CO2 SERPL-SCNC: 24 MMOL/L — SIGNIFICANT CHANGE UP (ref 22–31)
CO2 SERPL-SCNC: 25 MMOL/L
CO2 SERPL-SCNC: 25 MMOL/L — SIGNIFICANT CHANGE UP (ref 22–31)
CO2 SERPL-SCNC: 25 MMOL/L — SIGNIFICANT CHANGE UP (ref 22–31)
CO2 SERPL-SCNC: 26 MMOL/L — SIGNIFICANT CHANGE UP (ref 22–31)
CO2 SERPL-SCNC: 27 MMOL/L — SIGNIFICANT CHANGE UP (ref 22–31)
CO2 SERPL-SCNC: 28 MMOL/L — SIGNIFICANT CHANGE UP (ref 22–31)
CO2 SERPL-SCNC: <10 MMOL/L — CRITICAL LOW (ref 22–31)
CO2 SERPL-SCNC: <10 MMOL/L — CRITICAL LOW (ref 22–31)
COLOR SPEC: SIGNIFICANT CHANGE UP
COLOR SPEC: SIGNIFICANT CHANGE UP
COLOR SPEC: YELLOW — SIGNIFICANT CHANGE UP
COMMENT - URINE: SIGNIFICANT CHANGE UP
CREAT SERPL-MCNC: 0.75 MG/DL — SIGNIFICANT CHANGE UP (ref 0.5–1.3)
CREAT SERPL-MCNC: 0.79 MG/DL — SIGNIFICANT CHANGE UP (ref 0.5–1.3)
CREAT SERPL-MCNC: 0.8 MG/DL — SIGNIFICANT CHANGE UP (ref 0.5–1.3)
CREAT SERPL-MCNC: 0.84 MG/DL — SIGNIFICANT CHANGE UP (ref 0.5–1.3)
CREAT SERPL-MCNC: 0.85 MG/DL — SIGNIFICANT CHANGE UP (ref 0.5–1.3)
CREAT SERPL-MCNC: 0.87 MG/DL — SIGNIFICANT CHANGE UP (ref 0.5–1.3)
CREAT SERPL-MCNC: 0.89 MG/DL — SIGNIFICANT CHANGE UP (ref 0.5–1.3)
CREAT SERPL-MCNC: 0.91 MG/DL — SIGNIFICANT CHANGE UP (ref 0.5–1.3)
CREAT SERPL-MCNC: 0.92 MG/DL — SIGNIFICANT CHANGE UP (ref 0.5–1.3)
CREAT SERPL-MCNC: 0.94 MG/DL — SIGNIFICANT CHANGE UP (ref 0.5–1.3)
CREAT SERPL-MCNC: 0.97 MG/DL
CREAT SERPL-MCNC: 0.97 MG/DL — SIGNIFICANT CHANGE UP (ref 0.5–1.3)
CREAT SERPL-MCNC: 0.97 MG/DL — SIGNIFICANT CHANGE UP (ref 0.5–1.3)
CREAT SERPL-MCNC: 1 MG/DL — SIGNIFICANT CHANGE UP (ref 0.5–1.3)
CREAT SERPL-MCNC: 1.05 MG/DL — SIGNIFICANT CHANGE UP (ref 0.5–1.3)
CREAT SERPL-MCNC: 1.06 MG/DL — SIGNIFICANT CHANGE UP (ref 0.5–1.3)
CREAT SERPL-MCNC: 1.08 MG/DL — SIGNIFICANT CHANGE UP (ref 0.5–1.3)
CREAT SERPL-MCNC: 1.09 MG/DL — SIGNIFICANT CHANGE UP (ref 0.5–1.3)
CREAT SERPL-MCNC: 1.1 MG/DL — SIGNIFICANT CHANGE UP (ref 0.5–1.3)
CREAT SERPL-MCNC: 1.11 MG/DL
CREAT SERPL-MCNC: 1.15 MG/DL — SIGNIFICANT CHANGE UP (ref 0.5–1.3)
CREAT SERPL-MCNC: 1.18 MG/DL — SIGNIFICANT CHANGE UP (ref 0.5–1.3)
CREAT SERPL-MCNC: 1.41 MG/DL — HIGH (ref 0.5–1.3)
CREAT SERPL-MCNC: 1.96 MG/DL — HIGH (ref 0.5–1.3)
CREAT SERPL-MCNC: 2.03 MG/DL — HIGH (ref 0.5–1.3)
CREAT SERPL-MCNC: 2.07 MG/DL — HIGH (ref 0.5–1.3)
CULTURE RESULTS: ABNORMAL
CULTURE RESULTS: SIGNIFICANT CHANGE UP
DACRYOCYTES BLD QL SMEAR: SLIGHT — SIGNIFICANT CHANGE UP
DIFF PNL FLD: ABNORMAL
DIFF PNL FLD: ABNORMAL
DIFF PNL FLD: NEGATIVE — SIGNIFICANT CHANGE UP
EGFR: 25 ML/MIN/1.73M2 — LOW
EGFR: 26 ML/MIN/1.73M2 — LOW
EGFR: 27 ML/MIN/1.73M2 — LOW
EGFR: 40 ML/MIN/1.73M2 — LOW
EGFR: 49 ML/MIN/1.73M2 — LOW
EGFR: 51 ML/MIN/1.73M2 — LOW
EGFR: 53 ML/MIN/1.73M2
EGFR: 53 ML/MIN/1.73M2 — LOW
EGFR: 54 ML/MIN/1.73M2 — LOW
EGFR: 55 ML/MIN/1.73M2 — LOW
EGFR: 56 ML/MIN/1.73M2 — LOW
EGFR: 56 ML/MIN/1.73M2 — LOW
EGFR: 60 ML/MIN/1.73M2 — SIGNIFICANT CHANGE UP
EGFR: 62 ML/MIN/1.73M2
EGFR: 62 ML/MIN/1.73M2 — SIGNIFICANT CHANGE UP
EGFR: 62 ML/MIN/1.73M2 — SIGNIFICANT CHANGE UP
EGFR: 64 ML/MIN/1.73M2 — SIGNIFICANT CHANGE UP
EGFR: 66 ML/MIN/1.73M2 — SIGNIFICANT CHANGE UP
EGFR: 67 ML/MIN/1.73M2 — SIGNIFICANT CHANGE UP
EGFR: 69 ML/MIN/1.73M2 — SIGNIFICANT CHANGE UP
EGFR: 71 ML/MIN/1.73M2 — SIGNIFICANT CHANGE UP
EGFR: 73 ML/MIN/1.73M2 — SIGNIFICANT CHANGE UP
EGFR: 74 ML/MIN/1.73M2 — SIGNIFICANT CHANGE UP
EGFR: 78 ML/MIN/1.73M2 — SIGNIFICANT CHANGE UP
EGFR: 79 ML/MIN/1.73M2 — SIGNIFICANT CHANGE UP
EGFR: 85 ML/MIN/1.73M2 — SIGNIFICANT CHANGE UP
ELLIPTOCYTES BLD QL SMEAR: SLIGHT — SIGNIFICANT CHANGE UP
ELLIPTOCYTES BLD QL SMEAR: SLIGHT — SIGNIFICANT CHANGE UP
EOSINOPHIL # BLD AUTO: 0 K/UL — SIGNIFICANT CHANGE UP (ref 0–0.5)
EOSINOPHIL # BLD AUTO: 0.04 K/UL — SIGNIFICANT CHANGE UP (ref 0–0.5)
EOSINOPHIL # BLD AUTO: 0.11 K/UL — SIGNIFICANT CHANGE UP (ref 0–0.5)
EOSINOPHIL # BLD AUTO: 0.17 K/UL — SIGNIFICANT CHANGE UP (ref 0–0.5)
EOSINOPHIL # BLD AUTO: 0.23 K/UL — SIGNIFICANT CHANGE UP (ref 0–0.5)
EOSINOPHIL # BLD AUTO: 0.24 K/UL — SIGNIFICANT CHANGE UP (ref 0–0.5)
EOSINOPHIL # BLD AUTO: 0.27 K/UL — SIGNIFICANT CHANGE UP (ref 0–0.5)
EOSINOPHIL NFR BLD AUTO: 0 % — SIGNIFICANT CHANGE UP (ref 0–6)
EOSINOPHIL NFR BLD AUTO: 0.1 % — SIGNIFICANT CHANGE UP (ref 0–6)
EOSINOPHIL NFR BLD AUTO: 0.9 % — SIGNIFICANT CHANGE UP (ref 0–6)
EOSINOPHIL NFR BLD AUTO: 0.9 % — SIGNIFICANT CHANGE UP (ref 0–6)
EOSINOPHIL NFR BLD AUTO: 1 % — SIGNIFICANT CHANGE UP (ref 0–6)
EOSINOPHIL NFR BLD AUTO: 1.5 % — SIGNIFICANT CHANGE UP (ref 0–6)
EOSINOPHIL NFR BLD AUTO: 2 % — SIGNIFICANT CHANGE UP (ref 0–6)
EPI CELLS # UR: PRESENT
ESTIMATED AVERAGE GLUCOSE: 123 MG/DL — HIGH (ref 68–114)
FERRITIN SERPL-MCNC: 87 NG/ML — SIGNIFICANT CHANGE UP (ref 13–330)
FOLATE SERPL-MCNC: >20 NG/ML — SIGNIFICANT CHANGE UP
GAS PNL BLDA: SIGNIFICANT CHANGE UP
GAS PNL BLDMV: SIGNIFICANT CHANGE UP
GAS PNL BLDV: 131 MMOL/L — LOW (ref 136–145)
GAS PNL BLDV: 135 MMOL/L — LOW (ref 136–145)
GAS PNL BLDV: 136 MMOL/L — SIGNIFICANT CHANGE UP (ref 136–145)
GAS PNL BLDV: 136 MMOL/L — SIGNIFICANT CHANGE UP (ref 136–145)
GAS PNL BLDV: 137 MMOL/L — SIGNIFICANT CHANGE UP (ref 136–145)
GAS PNL BLDV: 138 MMOL/L — SIGNIFICANT CHANGE UP (ref 136–145)
GAS PNL BLDV: SIGNIFICANT CHANGE UP
GI PCR PANEL: SIGNIFICANT CHANGE UP
GI PCR PANEL: SIGNIFICANT CHANGE UP
GIANT PLATELETS BLD QL SMEAR: PRESENT — SIGNIFICANT CHANGE UP
GIANT PLATELETS BLD QL SMEAR: PRESENT — SIGNIFICANT CHANGE UP
GLUCOSE BLDC GLUCOMTR-MCNC: 141 MG/DL — HIGH (ref 70–99)
GLUCOSE BLDC GLUCOMTR-MCNC: 181 MG/DL — HIGH (ref 70–99)
GLUCOSE BLDC GLUCOMTR-MCNC: 199 MG/DL — HIGH (ref 70–99)
GLUCOSE BLDC GLUCOMTR-MCNC: 26 MG/DL — CRITICAL LOW (ref 70–99)
GLUCOSE BLDC GLUCOMTR-MCNC: 319 MG/DL — HIGH (ref 70–99)
GLUCOSE BLDC GLUCOMTR-MCNC: 45 MG/DL — CRITICAL LOW (ref 70–99)
GLUCOSE BLDC GLUCOMTR-MCNC: 45 MG/DL — CRITICAL LOW (ref 70–99)
GLUCOSE BLDV-MCNC: 107 MG/DL — HIGH (ref 70–99)
GLUCOSE BLDV-MCNC: 114 MG/DL — HIGH (ref 70–99)
GLUCOSE BLDV-MCNC: 118 MG/DL — HIGH (ref 70–99)
GLUCOSE BLDV-MCNC: 87 MG/DL — SIGNIFICANT CHANGE UP (ref 70–99)
GLUCOSE BLDV-MCNC: 95 MG/DL — SIGNIFICANT CHANGE UP (ref 70–99)
GLUCOSE BLDV-MCNC: 96 MG/DL — SIGNIFICANT CHANGE UP (ref 70–99)
GLUCOSE SERPL-MCNC: 100 MG/DL — HIGH (ref 70–99)
GLUCOSE SERPL-MCNC: 100 MG/DL — HIGH (ref 70–99)
GLUCOSE SERPL-MCNC: 102 MG/DL — HIGH (ref 70–99)
GLUCOSE SERPL-MCNC: 103 MG/DL — HIGH (ref 70–99)
GLUCOSE SERPL-MCNC: 106 MG/DL — HIGH (ref 70–99)
GLUCOSE SERPL-MCNC: 106 MG/DL — HIGH (ref 70–99)
GLUCOSE SERPL-MCNC: 111 MG/DL — HIGH (ref 70–99)
GLUCOSE SERPL-MCNC: 111 MG/DL — HIGH (ref 70–99)
GLUCOSE SERPL-MCNC: 113 MG/DL — HIGH (ref 70–99)
GLUCOSE SERPL-MCNC: 117 MG/DL — HIGH (ref 70–99)
GLUCOSE SERPL-MCNC: 117 MG/DL — HIGH (ref 70–99)
GLUCOSE SERPL-MCNC: 123 MG/DL
GLUCOSE SERPL-MCNC: 123 MG/DL — HIGH (ref 70–99)
GLUCOSE SERPL-MCNC: 124 MG/DL
GLUCOSE SERPL-MCNC: 124 MG/DL — HIGH (ref 70–99)
GLUCOSE SERPL-MCNC: 130 MG/DL — HIGH (ref 70–99)
GLUCOSE SERPL-MCNC: 136 MG/DL — HIGH (ref 70–99)
GLUCOSE SERPL-MCNC: 148 MG/DL — HIGH (ref 70–99)
GLUCOSE SERPL-MCNC: 153 MG/DL — HIGH (ref 70–99)
GLUCOSE SERPL-MCNC: 187 MG/DL — HIGH (ref 70–99)
GLUCOSE SERPL-MCNC: 80 MG/DL — SIGNIFICANT CHANGE UP (ref 70–99)
GLUCOSE SERPL-MCNC: 85 MG/DL — SIGNIFICANT CHANGE UP (ref 70–99)
GLUCOSE SERPL-MCNC: 91 MG/DL — SIGNIFICANT CHANGE UP (ref 70–99)
GLUCOSE SERPL-MCNC: 96 MG/DL — SIGNIFICANT CHANGE UP (ref 70–99)
GLUCOSE SERPL-MCNC: 96 MG/DL — SIGNIFICANT CHANGE UP (ref 70–99)
GLUCOSE SERPL-MCNC: 97 MG/DL — SIGNIFICANT CHANGE UP (ref 70–99)
GLUCOSE UR QL: 250 MG/DL
GLUCOSE UR QL: NEGATIVE MG/DL — SIGNIFICANT CHANGE UP
GLUCOSE UR QL: NEGATIVE MG/DL — SIGNIFICANT CHANGE UP
GRAM STN FLD: ABNORMAL
GRAM STN FLD: ABNORMAL
HCO3 BLDA-SCNC: 7 MMOL/L — CRITICAL LOW (ref 21–28)
HCO3 BLDMV-SCNC: 14 MMOL/L — LOW (ref 20–28)
HCO3 BLDV-SCNC: 25 MMOL/L — SIGNIFICANT CHANGE UP (ref 22–29)
HCO3 BLDV-SCNC: 28 MMOL/L — SIGNIFICANT CHANGE UP (ref 22–29)
HCO3 BLDV-SCNC: 29 MMOL/L — SIGNIFICANT CHANGE UP (ref 22–29)
HCO3 BLDV-SCNC: 8 MMOL/L — CRITICAL LOW (ref 22–29)
HCT VFR BLD CALC: 22.9 % — LOW (ref 34.5–45)
HCT VFR BLD CALC: 25 % — LOW (ref 34.5–45)
HCT VFR BLD CALC: 25.1 % — LOW (ref 34.5–45)
HCT VFR BLD CALC: 26 % — LOW (ref 34.5–45)
HCT VFR BLD CALC: 26 % — LOW (ref 34.5–45)
HCT VFR BLD CALC: 26.1 % — LOW (ref 34.5–45)
HCT VFR BLD CALC: 26.2 % — LOW (ref 34.5–45)
HCT VFR BLD CALC: 26.5 % — LOW (ref 34.5–45)
HCT VFR BLD CALC: 26.8 % — LOW (ref 34.5–45)
HCT VFR BLD CALC: 27.5 % — LOW (ref 34.5–45)
HCT VFR BLD CALC: 27.8 % — LOW (ref 34.5–45)
HCT VFR BLD CALC: 28.4 % — LOW (ref 34.5–45)
HCT VFR BLD CALC: 28.7 % — LOW (ref 34.5–45)
HCT VFR BLD CALC: 29.8 % — LOW (ref 34.5–45)
HCT VFR BLD CALC: 30.2 % — LOW (ref 34.5–45)
HCT VFR BLD CALC: 30.3 % — LOW (ref 34.5–45)
HCT VFR BLD CALC: 30.4 % — LOW (ref 34.5–45)
HCT VFR BLD CALC: 30.5 % — LOW (ref 34.5–45)
HCT VFR BLD CALC: 30.6 % — LOW (ref 34.5–45)
HCT VFR BLD CALC: 30.7 % — LOW (ref 34.5–45)
HCT VFR BLD CALC: 30.8 % — LOW (ref 34.5–45)
HCT VFR BLD CALC: 30.8 % — LOW (ref 34.5–45)
HCT VFR BLD CALC: 31.1 % — LOW (ref 34.5–45)
HCT VFR BLD CALC: 31.2 % — LOW (ref 34.5–45)
HCT VFR BLD CALC: 31.4 % — LOW (ref 34.5–45)
HCT VFR BLD CALC: 31.5 % — LOW (ref 34.5–45)
HCT VFR BLD CALC: 31.8 % — LOW (ref 34.5–45)
HCT VFR BLD CALC: 31.8 % — LOW (ref 34.5–45)
HCT VFR BLD CALC: 36.7 % — SIGNIFICANT CHANGE UP (ref 34.5–45)
HCT VFR BLD CALC: 37.6 %
HCT VFR BLD CALC: 37.6 % — SIGNIFICANT CHANGE UP (ref 34.5–45)
HCT VFR BLDA CALC: 27 % — LOW (ref 34.5–46.5)
HCT VFR BLDA CALC: 29 % — LOW (ref 34.5–46.5)
HCT VFR BLDA CALC: 30 % — LOW (ref 34.5–46.5)
HCT VFR BLDA CALC: 32 % — LOW (ref 34.5–46.5)
HCT VFR BLDA CALC: 35 % — SIGNIFICANT CHANGE UP (ref 34.5–46.5)
HCT VFR BLDA CALC: 36 % — SIGNIFICANT CHANGE UP (ref 34.5–46.5)
HDLC SERPL-MCNC: 39 MG/DL — LOW
HGB BLD CALC-MCNC: 10 G/DL — LOW (ref 11.7–16.1)
HGB BLD CALC-MCNC: 10.5 G/DL — LOW (ref 11.7–16.1)
HGB BLD CALC-MCNC: 11.7 G/DL — SIGNIFICANT CHANGE UP (ref 11.7–16.1)
HGB BLD CALC-MCNC: 12.1 G/DL — SIGNIFICANT CHANGE UP (ref 11.7–16.1)
HGB BLD CALC-MCNC: 9.1 G/DL — LOW (ref 11.7–16.1)
HGB BLD CALC-MCNC: 9.6 G/DL — LOW (ref 11.7–16.1)
HGB BLD-MCNC: 10 G/DL — LOW (ref 11.5–15.5)
HGB BLD-MCNC: 11.1 G/DL — LOW (ref 11.5–15.5)
HGB BLD-MCNC: 11.3 G/DL
HGB BLD-MCNC: 11.7 G/DL — SIGNIFICANT CHANGE UP (ref 11.5–15.5)
HGB BLD-MCNC: 7 G/DL — CRITICAL LOW (ref 11.5–15.5)
HGB BLD-MCNC: 7.5 G/DL — LOW (ref 11.5–15.5)
HGB BLD-MCNC: 7.7 G/DL — LOW (ref 11.5–15.5)
HGB BLD-MCNC: 7.9 G/DL — LOW (ref 11.5–15.5)
HGB BLD-MCNC: 8 G/DL — LOW (ref 11.5–15.5)
HGB BLD-MCNC: 8.1 G/DL — LOW (ref 11.5–15.5)
HGB BLD-MCNC: 8.2 G/DL — LOW (ref 11.5–15.5)
HGB BLD-MCNC: 8.3 G/DL — LOW (ref 11.5–15.5)
HGB BLD-MCNC: 8.4 G/DL — LOW (ref 11.5–15.5)
HGB BLD-MCNC: 8.6 G/DL — LOW (ref 11.5–15.5)
HGB BLD-MCNC: 8.7 G/DL — LOW (ref 11.5–15.5)
HGB BLD-MCNC: 8.8 G/DL — LOW (ref 11.5–15.5)
HGB BLD-MCNC: 9 G/DL — LOW (ref 11.5–15.5)
HGB BLD-MCNC: 9.1 G/DL — LOW (ref 11.5–15.5)
HGB BLD-MCNC: 9.4 G/DL — LOW (ref 11.5–15.5)
HGB BLD-MCNC: 9.5 G/DL — LOW (ref 11.5–15.5)
HGB BLD-MCNC: 9.6 G/DL — LOW (ref 11.5–15.5)
HGB BLD-MCNC: 9.6 G/DL — LOW (ref 11.5–15.5)
HGB BLD-MCNC: 9.7 G/DL — LOW (ref 11.5–15.5)
HGB BLD-MCNC: 9.9 G/DL — LOW (ref 11.5–15.5)
HOROWITZ INDEX BLDA+IHG-RTO: 21 — SIGNIFICANT CHANGE UP
HOROWITZ INDEX BLDMV+IHG-RTO: 60 — SIGNIFICANT CHANGE UP
HOROWITZ INDEX BLDV+IHG-RTO: 100 — SIGNIFICANT CHANGE UP
IMM GRANULOCYTES NFR BLD AUTO: 0.6 % — SIGNIFICANT CHANGE UP (ref 0–0.9)
IMM GRANULOCYTES NFR BLD AUTO: 1 % — HIGH (ref 0–0.9)
IMM GRANULOCYTES NFR BLD AUTO: 10.1 % — HIGH (ref 0–0.9)
INR BLD: 1.23 RATIO — HIGH (ref 0.85–1.18)
INR BLD: 1.29 RATIO — HIGH (ref 0.85–1.18)
INR BLD: 1.4 RATIO — HIGH (ref 0.85–1.18)
INR BLD: 1.43 RATIO — HIGH (ref 0.85–1.18)
INR BLD: 1.67 RATIO — HIGH (ref 0.85–1.18)
INR BLD: 1.75 RATIO — HIGH (ref 0.85–1.18)
INR BLD: 10.07 RATIO — CRITICAL HIGH (ref 0.85–1.18)
INR BLD: 2.27 RATIO — HIGH (ref 0.85–1.18)
INR BLD: 3.64 RATIO — HIGH (ref 0.85–1.18)
INR BLD: 4.17 RATIO — HIGH (ref 0.85–1.18)
INR BLD: 4.32 RATIO — HIGH (ref 0.85–1.18)
INR PPP: 2 RATIO
IRON SATN MFR SERPL: 38 % — SIGNIFICANT CHANGE UP (ref 14–50)
IRON SATN MFR SERPL: 95 UG/DL — SIGNIFICANT CHANGE UP (ref 30–160)
JAK2 P.V617F BLD/T QL: SIGNIFICANT CHANGE UP
KETONES UR-MCNC: ABNORMAL MG/DL
KETONES UR-MCNC: ABNORMAL MG/DL
KETONES UR-MCNC: NEGATIVE MG/DL — SIGNIFICANT CHANGE UP
LACTATE BLDV-MCNC: 1.6 MMOL/L — SIGNIFICANT CHANGE UP (ref 0.5–2)
LACTATE BLDV-MCNC: 2 MMOL/L — SIGNIFICANT CHANGE UP (ref 0.5–2)
LACTATE BLDV-MCNC: 2.5 MMOL/L — HIGH (ref 0.5–2)
LACTATE BLDV-MCNC: 2.5 MMOL/L — HIGH (ref 0.5–2)
LACTATE BLDV-MCNC: 2.7 MMOL/L — HIGH (ref 0.5–2)
LACTATE BLDV-MCNC: >16 MMOL/L — CRITICAL HIGH (ref 0.5–2)
LACTATE SERPL-SCNC: 1.3 MMOL/L — SIGNIFICANT CHANGE UP (ref 0.5–2)
LACTATE SERPL-SCNC: 1.5 MMOL/L — SIGNIFICANT CHANGE UP (ref 0.5–2)
LEUKOCYTE ESTERASE UR-ACNC: ABNORMAL
LEUKOCYTE ESTERASE UR-ACNC: ABNORMAL
LEUKOCYTE ESTERASE UR-ACNC: NEGATIVE — SIGNIFICANT CHANGE UP
LIDOCAIN IGE QN: 32 U/L — SIGNIFICANT CHANGE UP (ref 7–60)
LIPID PNL WITH DIRECT LDL SERPL: 35 MG/DL — SIGNIFICANT CHANGE UP
LYMPHOCYTES # BLD AUTO: 0 % — LOW (ref 13–44)
LYMPHOCYTES # BLD AUTO: 0 K/UL — LOW (ref 1–3.3)
LYMPHOCYTES # BLD AUTO: 1.2 K/UL — SIGNIFICANT CHANGE UP (ref 1–3.3)
LYMPHOCYTES # BLD AUTO: 1.33 K/UL — SIGNIFICANT CHANGE UP (ref 1–3.3)
LYMPHOCYTES # BLD AUTO: 1.37 K/UL — SIGNIFICANT CHANGE UP (ref 1–3.3)
LYMPHOCYTES # BLD AUTO: 10.3 % — LOW (ref 13–44)
LYMPHOCYTES # BLD AUTO: 11 % — LOW (ref 13–44)
LYMPHOCYTES # BLD AUTO: 13.3 % — SIGNIFICANT CHANGE UP (ref 13–44)
LYMPHOCYTES # BLD AUTO: 19.5 % — SIGNIFICANT CHANGE UP (ref 13–44)
LYMPHOCYTES # BLD AUTO: 2.22 K/UL — SIGNIFICANT CHANGE UP (ref 1–3.3)
LYMPHOCYTES # BLD AUTO: 2.23 K/UL — SIGNIFICANT CHANGE UP (ref 1–3.3)
LYMPHOCYTES # BLD AUTO: 2.86 K/UL — SIGNIFICANT CHANGE UP (ref 1–3.3)
LYMPHOCYTES # BLD AUTO: 27.6 % — SIGNIFICANT CHANGE UP (ref 13–44)
LYMPHOCYTES # BLD AUTO: 3.25 K/UL — SIGNIFICANT CHANGE UP (ref 1–3.3)
LYMPHOCYTES # BLD AUTO: 3.82 K/UL — HIGH (ref 1–3.3)
LYMPHOCYTES # BLD AUTO: 33.5 % — SIGNIFICANT CHANGE UP (ref 13–44)
LYMPHOCYTES # BLD AUTO: 4.07 K/UL — HIGH (ref 1–3.3)
LYMPHOCYTES # BLD AUTO: 4.5 % — LOW (ref 13–44)
LYMPHOCYTES # BLD AUTO: 5.3 % — LOW (ref 13–44)
LYMPHOCYTES # BLD AUTO: 9.6 % — LOW (ref 13–44)
MACROCYTES BLD QL: SLIGHT — SIGNIFICANT CHANGE UP
MACROCYTES BLD QL: SLIGHT — SIGNIFICANT CHANGE UP
MAGNESIUM SERPL-MCNC: 1.8 MG/DL — SIGNIFICANT CHANGE UP (ref 1.6–2.6)
MAGNESIUM SERPL-MCNC: 1.8 MG/DL — SIGNIFICANT CHANGE UP (ref 1.6–2.6)
MAGNESIUM SERPL-MCNC: 1.9 MG/DL — SIGNIFICANT CHANGE UP (ref 1.6–2.6)
MAGNESIUM SERPL-MCNC: 2 MG/DL — SIGNIFICANT CHANGE UP (ref 1.6–2.6)
MAGNESIUM SERPL-MCNC: 2.1 MG/DL — SIGNIFICANT CHANGE UP (ref 1.6–2.6)
MAGNESIUM SERPL-MCNC: 2.2 MG/DL — SIGNIFICANT CHANGE UP (ref 1.6–2.6)
MAGNESIUM SERPL-MCNC: 2.2 MG/DL — SIGNIFICANT CHANGE UP (ref 1.6–2.6)
MAGNESIUM SERPL-MCNC: 2.3 MG/DL — SIGNIFICANT CHANGE UP (ref 1.6–2.6)
MAGNESIUM SERPL-MCNC: 2.3 MG/DL — SIGNIFICANT CHANGE UP (ref 1.6–2.6)
MAGNESIUM SERPL-MCNC: 2.4 MG/DL — SIGNIFICANT CHANGE UP (ref 1.6–2.6)
MAGNESIUM SERPL-MCNC: 2.5 MG/DL — SIGNIFICANT CHANGE UP (ref 1.6–2.6)
MAGNESIUM SERPL-MCNC: 2.5 MG/DL — SIGNIFICANT CHANGE UP (ref 1.6–2.6)
MAGNESIUM SERPL-MCNC: 2.8 MG/DL — HIGH (ref 1.6–2.6)
MANUAL SMEAR VERIFICATION: SIGNIFICANT CHANGE UP
MCHC RBC-ENTMCNC: 26 PG — LOW (ref 27–34)
MCHC RBC-ENTMCNC: 26.1 PG — LOW (ref 27–34)
MCHC RBC-ENTMCNC: 26.4 PG — LOW (ref 27–34)
MCHC RBC-ENTMCNC: 26.5 PG — LOW (ref 27–34)
MCHC RBC-ENTMCNC: 26.5 PG — LOW (ref 27–34)
MCHC RBC-ENTMCNC: 26.6 PG — LOW (ref 27–34)
MCHC RBC-ENTMCNC: 26.7 PG — LOW (ref 27–34)
MCHC RBC-ENTMCNC: 26.8 PG — LOW (ref 27–34)
MCHC RBC-ENTMCNC: 26.8 PG — LOW (ref 27–34)
MCHC RBC-ENTMCNC: 26.9 PG — LOW (ref 27–34)
MCHC RBC-ENTMCNC: 27.1 PG — SIGNIFICANT CHANGE UP (ref 27–34)
MCHC RBC-ENTMCNC: 27.1 PG — SIGNIFICANT CHANGE UP (ref 27–34)
MCHC RBC-ENTMCNC: 27.4 PG
MCHC RBC-ENTMCNC: 28 PG — SIGNIFICANT CHANGE UP (ref 27–34)
MCHC RBC-ENTMCNC: 28.2 PG — SIGNIFICANT CHANGE UP (ref 27–34)
MCHC RBC-ENTMCNC: 28.2 PG — SIGNIFICANT CHANGE UP (ref 27–34)
MCHC RBC-ENTMCNC: 28.3 PG — SIGNIFICANT CHANGE UP (ref 27–34)
MCHC RBC-ENTMCNC: 28.4 PG — SIGNIFICANT CHANGE UP (ref 27–34)
MCHC RBC-ENTMCNC: 28.5 PG — SIGNIFICANT CHANGE UP (ref 27–34)
MCHC RBC-ENTMCNC: 28.9 PG — SIGNIFICANT CHANGE UP (ref 27–34)
MCHC RBC-ENTMCNC: 29.6 GM/DL — LOW (ref 32–36)
MCHC RBC-ENTMCNC: 30 GM/DL — LOW (ref 32–36)
MCHC RBC-ENTMCNC: 30.1 GM/DL
MCHC RBC-ENTMCNC: 30.1 GM/DL — LOW (ref 32–36)
MCHC RBC-ENTMCNC: 30.2 GM/DL — LOW (ref 32–36)
MCHC RBC-ENTMCNC: 30.3 GM/DL — LOW (ref 32–36)
MCHC RBC-ENTMCNC: 30.4 GM/DL — LOW (ref 32–36)
MCHC RBC-ENTMCNC: 30.6 GM/DL — LOW (ref 32–36)
MCHC RBC-ENTMCNC: 30.9 GM/DL — LOW (ref 32–36)
MCHC RBC-ENTMCNC: 30.9 GM/DL — LOW (ref 32–36)
MCHC RBC-ENTMCNC: 31 GM/DL — LOW (ref 32–36)
MCHC RBC-ENTMCNC: 31.1 GM/DL — LOW (ref 32–36)
MCHC RBC-ENTMCNC: 31.1 GM/DL — LOW (ref 32–36)
MCHC RBC-ENTMCNC: 31.2 GM/DL — LOW (ref 32–36)
MCHC RBC-ENTMCNC: 31.3 GM/DL — LOW (ref 32–36)
MCHC RBC-ENTMCNC: 31.3 GM/DL — LOW (ref 32–36)
MCHC RBC-ENTMCNC: 31.4 GM/DL — LOW (ref 32–36)
MCHC RBC-ENTMCNC: 31.5 GM/DL — LOW (ref 32–36)
MCHC RBC-ENTMCNC: 31.7 GM/DL — LOW (ref 32–36)
MCHC RBC-ENTMCNC: 31.7 GM/DL — LOW (ref 32–36)
MCHC RBC-ENTMCNC: 31.8 GM/DL — LOW (ref 32–36)
MCHC RBC-ENTMCNC: 31.9 GM/DL — LOW (ref 32–36)
MCHC RBC-ENTMCNC: 32 GM/DL — SIGNIFICANT CHANGE UP (ref 32–36)
MCHC RBC-ENTMCNC: 32.1 GM/DL — SIGNIFICANT CHANGE UP (ref 32–36)
MCV RBC AUTO: 84 FL — SIGNIFICANT CHANGE UP (ref 80–100)
MCV RBC AUTO: 84.4 FL — SIGNIFICANT CHANGE UP (ref 80–100)
MCV RBC AUTO: 84.5 FL — SIGNIFICANT CHANGE UP (ref 80–100)
MCV RBC AUTO: 84.5 FL — SIGNIFICANT CHANGE UP (ref 80–100)
MCV RBC AUTO: 85.1 FL — SIGNIFICANT CHANGE UP (ref 80–100)
MCV RBC AUTO: 85.2 FL — SIGNIFICANT CHANGE UP (ref 80–100)
MCV RBC AUTO: 85.3 FL — SIGNIFICANT CHANGE UP (ref 80–100)
MCV RBC AUTO: 85.3 FL — SIGNIFICANT CHANGE UP (ref 80–100)
MCV RBC AUTO: 85.6 FL — SIGNIFICANT CHANGE UP (ref 80–100)
MCV RBC AUTO: 85.7 FL — SIGNIFICANT CHANGE UP (ref 80–100)
MCV RBC AUTO: 85.8 FL — SIGNIFICANT CHANGE UP (ref 80–100)
MCV RBC AUTO: 85.9 FL — SIGNIFICANT CHANGE UP (ref 80–100)
MCV RBC AUTO: 88.2 FL — SIGNIFICANT CHANGE UP (ref 80–100)
MCV RBC AUTO: 88.5 FL — SIGNIFICANT CHANGE UP (ref 80–100)
MCV RBC AUTO: 88.7 FL — SIGNIFICANT CHANGE UP (ref 80–100)
MCV RBC AUTO: 88.8 FL — SIGNIFICANT CHANGE UP (ref 80–100)
MCV RBC AUTO: 88.9 FL — SIGNIFICANT CHANGE UP (ref 80–100)
MCV RBC AUTO: 89 FL — SIGNIFICANT CHANGE UP (ref 80–100)
MCV RBC AUTO: 89.1 FL — SIGNIFICANT CHANGE UP (ref 80–100)
MCV RBC AUTO: 89.3 FL — SIGNIFICANT CHANGE UP (ref 80–100)
MCV RBC AUTO: 89.7 FL — SIGNIFICANT CHANGE UP (ref 80–100)
MCV RBC AUTO: 90.3 FL — SIGNIFICANT CHANGE UP (ref 80–100)
MCV RBC AUTO: 90.6 FL — SIGNIFICANT CHANGE UP (ref 80–100)
MCV RBC AUTO: 90.7 FL — SIGNIFICANT CHANGE UP (ref 80–100)
MCV RBC AUTO: 91 FL
MCV RBC AUTO: 91 FL — SIGNIFICANT CHANGE UP (ref 80–100)
MCV RBC AUTO: 91.3 FL — SIGNIFICANT CHANGE UP (ref 80–100)
MCV RBC AUTO: 91.3 FL — SIGNIFICANT CHANGE UP (ref 80–100)
MCV RBC AUTO: 91.4 FL — SIGNIFICANT CHANGE UP (ref 80–100)
MCV RBC AUTO: 91.7 FL — SIGNIFICANT CHANGE UP (ref 80–100)
MCV RBC AUTO: 92.9 FL — SIGNIFICANT CHANGE UP (ref 80–100)
METAMYELOCYTES # FLD: 2 % — HIGH (ref 0–0)
METAMYELOCYTES # FLD: 2.5 % — HIGH (ref 0–0)
METHOD TYPE: SIGNIFICANT CHANGE UP
MICROCYTES BLD QL: SLIGHT — SIGNIFICANT CHANGE UP
MONOCYTES # BLD AUTO: 0.61 K/UL — SIGNIFICANT CHANGE UP (ref 0–0.9)
MONOCYTES # BLD AUTO: 0.72 K/UL — SIGNIFICANT CHANGE UP (ref 0–0.9)
MONOCYTES # BLD AUTO: 0.75 K/UL — SIGNIFICANT CHANGE UP (ref 0–0.9)
MONOCYTES # BLD AUTO: 0.83 K/UL — SIGNIFICANT CHANGE UP (ref 0–0.9)
MONOCYTES # BLD AUTO: 0.88 K/UL — SIGNIFICANT CHANGE UP (ref 0–0.9)
MONOCYTES # BLD AUTO: 0.9 K/UL — SIGNIFICANT CHANGE UP (ref 0–0.9)
MONOCYTES # BLD AUTO: 1.55 K/UL — HIGH (ref 0–0.9)
MONOCYTES # BLD AUTO: 1.85 K/UL — HIGH (ref 0–0.9)
MONOCYTES # BLD AUTO: 2.26 K/UL — HIGH (ref 0–0.9)
MONOCYTES # BLD AUTO: 3.27 K/UL — HIGH (ref 0–0.9)
MONOCYTES NFR BLD AUTO: 3.5 % — SIGNIFICANT CHANGE UP (ref 2–14)
MONOCYTES NFR BLD AUTO: 4.3 % — SIGNIFICANT CHANGE UP (ref 2–14)
MONOCYTES NFR BLD AUTO: 5 % — SIGNIFICANT CHANGE UP (ref 2–14)
MONOCYTES NFR BLD AUTO: 5.2 % — SIGNIFICANT CHANGE UP (ref 2–14)
MONOCYTES NFR BLD AUTO: 5.2 % — SIGNIFICANT CHANGE UP (ref 2–14)
MONOCYTES NFR BLD AUTO: 5.4 % — SIGNIFICANT CHANGE UP (ref 2–14)
MONOCYTES NFR BLD AUTO: 6.3 % — SIGNIFICANT CHANGE UP (ref 2–14)
MONOCYTES NFR BLD AUTO: 6.6 % — SIGNIFICANT CHANGE UP (ref 2–14)
MONOCYTES NFR BLD AUTO: 7.1 % — SIGNIFICANT CHANGE UP (ref 2–14)
MONOCYTES NFR BLD AUTO: 7.5 % — SIGNIFICANT CHANGE UP (ref 2–14)
MRSA PCR RESULT.: SIGNIFICANT CHANGE UP
NEUTROPHILS # BLD AUTO: 13.46 K/UL — HIGH (ref 1.8–7.4)
NEUTROPHILS # BLD AUTO: 22.68 K/UL — HIGH (ref 1.8–7.4)
NEUTROPHILS # BLD AUTO: 23.3 K/UL — HIGH (ref 1.8–7.4)
NEUTROPHILS # BLD AUTO: 25.1 K/UL — HIGH (ref 1.8–7.4)
NEUTROPHILS # BLD AUTO: 30.34 K/UL — HIGH (ref 1.8–7.4)
NEUTROPHILS # BLD AUTO: 6.55 K/UL — SIGNIFICANT CHANGE UP (ref 1.8–7.4)
NEUTROPHILS # BLD AUTO: 7.34 K/UL — SIGNIFICANT CHANGE UP (ref 1.8–7.4)
NEUTROPHILS # BLD AUTO: 70.79 K/UL — HIGH (ref 1.8–7.4)
NEUTROPHILS # BLD AUTO: 8.18 K/UL — HIGH (ref 1.8–7.4)
NEUTROPHILS # BLD AUTO: 9.71 K/UL — HIGH (ref 1.8–7.4)
NEUTROPHILS NFR BLD AUTO: 57.4 % — SIGNIFICANT CHANGE UP (ref 43–77)
NEUTROPHILS NFR BLD AUTO: 62.4 % — SIGNIFICANT CHANGE UP (ref 43–77)
NEUTROPHILS NFR BLD AUTO: 71.8 % — SIGNIFICANT CHANGE UP (ref 43–77)
NEUTROPHILS NFR BLD AUTO: 73 % — SIGNIFICANT CHANGE UP (ref 43–77)
NEUTROPHILS NFR BLD AUTO: 77.5 % — HIGH (ref 43–77)
NEUTROPHILS NFR BLD AUTO: 80.3 % — HIGH (ref 43–77)
NEUTROPHILS NFR BLD AUTO: 83.6 % — HIGH (ref 43–77)
NEUTROPHILS NFR BLD AUTO: 84.3 % — HIGH (ref 43–77)
NEUTROPHILS NFR BLD AUTO: 85.5 % — HIGH (ref 43–77)
NEUTROPHILS NFR BLD AUTO: 90.3 % — HIGH (ref 43–77)
NEUTS BAND # BLD: 7.7 % — SIGNIFICANT CHANGE UP (ref 0–8)
NEUTS BAND # BLD: 9 % — HIGH (ref 0–8)
NITRITE UR-MCNC: NEGATIVE — SIGNIFICANT CHANGE UP
NITRITE UR-MCNC: NEGATIVE — SIGNIFICANT CHANGE UP
NITRITE UR-MCNC: POSITIVE
NON HDL CHOLESTEROL: 51 MG/DL — SIGNIFICANT CHANGE UP
NRBC # BLD: 0 /100 WBCS — SIGNIFICANT CHANGE UP (ref 0–0)
NRBC # BLD: 5 /100 WBCS — HIGH (ref 0–0)
NRBC # BLD: 9 /100 WBCS — HIGH (ref 0–0)
NT-PROBNP SERPL-SCNC: 1693 PG/ML — HIGH (ref 0–300)
NT-PROBNP SERPL-SCNC: 2627 PG/ML — HIGH (ref 0–300)
NT-PROBNP SERPL-SCNC: 2910 PG/ML — HIGH (ref 0–300)
O2 CT VFR BLD CALC: 75 MMHG — HIGH (ref 30–65)
OB PNL STL: NEGATIVE — SIGNIFICANT CHANGE UP
ORGANISM # SPEC MICROSCOPIC CNT: ABNORMAL
OTHER CELLS CSF MANUAL: 14.2 ML/DL — LOW (ref 18–22)
OVALOCYTES BLD QL SMEAR: SLIGHT — SIGNIFICANT CHANGE UP
OVALOCYTES BLD QL SMEAR: SLIGHT — SIGNIFICANT CHANGE UP
PCO2 BLDA: 20 MMHG — LOW (ref 32–45)
PCO2 BLDMV: 43 MMHG — SIGNIFICANT CHANGE UP (ref 30–65)
PCO2 BLDV: 29 MMHG — LOW (ref 39–42)
PCO2 BLDV: 38 MMHG — LOW (ref 39–42)
PCO2 BLDV: 39 MMHG — SIGNIFICANT CHANGE UP (ref 39–42)
PCO2 BLDV: 42 MMHG — SIGNIFICANT CHANGE UP (ref 39–42)
PCO2 BLDV: 42 MMHG — SIGNIFICANT CHANGE UP (ref 39–42)
PCO2 BLDV: 47 MMHG — HIGH (ref 39–42)
PH BLDA: 7.13 — CRITICAL LOW (ref 7.35–7.45)
PH BLDMV: 7.12 — CRITICAL LOW (ref 7.32–7.45)
PH BLDV: 7.06 — CRITICAL LOW (ref 7.32–7.43)
PH BLDV: 7.39 — SIGNIFICANT CHANGE UP (ref 7.32–7.43)
PH BLDV: 7.42 — SIGNIFICANT CHANGE UP (ref 7.32–7.43)
PH BLDV: 7.42 — SIGNIFICANT CHANGE UP (ref 7.32–7.43)
PH BLDV: 7.43 — SIGNIFICANT CHANGE UP (ref 7.32–7.43)
PH BLDV: 7.45 — HIGH (ref 7.32–7.43)
PH UR: 5 — SIGNIFICANT CHANGE UP (ref 5–8)
PH UR: 5.5 — SIGNIFICANT CHANGE UP (ref 5–8)
PH UR: 6 — SIGNIFICANT CHANGE UP (ref 5–8)
PHOSPHATE SERPL-MCNC: 2.4 MG/DL — LOW (ref 2.5–4.5)
PHOSPHATE SERPL-MCNC: 2.4 MG/DL — LOW (ref 2.5–4.5)
PHOSPHATE SERPL-MCNC: 3.3 MG/DL — SIGNIFICANT CHANGE UP (ref 2.5–4.5)
PHOSPHATE SERPL-MCNC: 3.5 MG/DL — SIGNIFICANT CHANGE UP (ref 2.5–4.5)
PHOSPHATE SERPL-MCNC: 4.3 MG/DL — SIGNIFICANT CHANGE UP (ref 2.5–4.5)
PHOSPHATE SERPL-MCNC: 7.4 MG/DL — HIGH (ref 2.5–4.5)
PHOSPHATE SERPL-MCNC: 8.5 MG/DL — HIGH (ref 2.5–4.5)
PHOSPHATE SERPL-MCNC: 9.1 MG/DL — HIGH (ref 2.5–4.5)
PLAT MORPH BLD: ABNORMAL
PLAT MORPH BLD: NORMAL — SIGNIFICANT CHANGE UP
PLATELET # BLD AUTO: 175 K/UL — SIGNIFICANT CHANGE UP (ref 150–400)
PLATELET # BLD AUTO: 254 K/UL — SIGNIFICANT CHANGE UP (ref 150–400)
PLATELET # BLD AUTO: 269 K/UL — SIGNIFICANT CHANGE UP (ref 150–400)
PLATELET # BLD AUTO: 283 K/UL — SIGNIFICANT CHANGE UP (ref 150–400)
PLATELET # BLD AUTO: 293 K/UL — SIGNIFICANT CHANGE UP (ref 150–400)
PLATELET # BLD AUTO: 293 K/UL — SIGNIFICANT CHANGE UP (ref 150–400)
PLATELET # BLD AUTO: 299 K/UL — SIGNIFICANT CHANGE UP (ref 150–400)
PLATELET # BLD AUTO: 300 K/UL — SIGNIFICANT CHANGE UP (ref 150–400)
PLATELET # BLD AUTO: 303 K/UL — SIGNIFICANT CHANGE UP (ref 150–400)
PLATELET # BLD AUTO: 304 K/UL — SIGNIFICANT CHANGE UP (ref 150–400)
PLATELET # BLD AUTO: 311 K/UL — SIGNIFICANT CHANGE UP (ref 150–400)
PLATELET # BLD AUTO: 318 K/UL — SIGNIFICANT CHANGE UP (ref 150–400)
PLATELET # BLD AUTO: 321 K/UL — SIGNIFICANT CHANGE UP (ref 150–400)
PLATELET # BLD AUTO: 324 K/UL — SIGNIFICANT CHANGE UP (ref 150–400)
PLATELET # BLD AUTO: 327 K/UL — SIGNIFICANT CHANGE UP (ref 150–400)
PLATELET # BLD AUTO: 335 K/UL — SIGNIFICANT CHANGE UP (ref 150–400)
PLATELET # BLD AUTO: 335 K/UL — SIGNIFICANT CHANGE UP (ref 150–400)
PLATELET # BLD AUTO: 337 K/UL — SIGNIFICANT CHANGE UP (ref 150–400)
PLATELET # BLD AUTO: 344 K/UL — SIGNIFICANT CHANGE UP (ref 150–400)
PLATELET # BLD AUTO: 345 K/UL
PLATELET # BLD AUTO: 354 K/UL — SIGNIFICANT CHANGE UP (ref 150–400)
PLATELET # BLD AUTO: 356 K/UL — SIGNIFICANT CHANGE UP (ref 150–400)
PLATELET # BLD AUTO: 356 K/UL — SIGNIFICANT CHANGE UP (ref 150–400)
PLATELET # BLD AUTO: 405 K/UL — HIGH (ref 150–400)
PLATELET # BLD AUTO: 438 K/UL — HIGH (ref 150–400)
PLATELET # BLD AUTO: 439 K/UL — HIGH (ref 150–400)
PLATELET # BLD AUTO: 465 K/UL — HIGH (ref 150–400)
PLATELET # BLD AUTO: 465 K/UL — HIGH (ref 150–400)
PLATELET # BLD AUTO: 478 K/UL — HIGH (ref 150–400)
PLATELET # BLD AUTO: 496 K/UL — HIGH (ref 150–400)
PLATELET # BLD AUTO: SIGNIFICANT CHANGE UP K/UL (ref 150–400)
PLATELET CLUMP BLD QL SMEAR: ABNORMAL
PO2 BLDA: 51 MMHG — LOW (ref 83–108)
PO2 BLDV: 32 MMHG — SIGNIFICANT CHANGE UP (ref 25–45)
PO2 BLDV: 45 MMHG — SIGNIFICANT CHANGE UP (ref 25–45)
PO2 BLDV: 50 MMHG — HIGH (ref 25–45)
PO2 BLDV: 51 MMHG — HIGH (ref 25–45)
PO2 BLDV: 54 MMHG — HIGH (ref 25–45)
PO2 BLDV: 58 MMHG — HIGH (ref 25–45)
POIKILOCYTOSIS BLD QL AUTO: SIGNIFICANT CHANGE UP
POIKILOCYTOSIS BLD QL AUTO: SIGNIFICANT CHANGE UP
POIKILOCYTOSIS BLD QL AUTO: SLIGHT — SIGNIFICANT CHANGE UP
POLYCHROMASIA BLD QL SMEAR: SLIGHT — SIGNIFICANT CHANGE UP
POTASSIUM BLDV-SCNC: 2.7 MMOL/L — CRITICAL LOW (ref 3.5–5.1)
POTASSIUM BLDV-SCNC: 2.9 MMOL/L — CRITICAL LOW (ref 3.5–5.1)
POTASSIUM BLDV-SCNC: 3.3 MMOL/L — LOW (ref 3.5–5.1)
POTASSIUM BLDV-SCNC: 3.7 MMOL/L — SIGNIFICANT CHANGE UP (ref 3.5–5.1)
POTASSIUM BLDV-SCNC: 3.8 MMOL/L — SIGNIFICANT CHANGE UP (ref 3.5–5.1)
POTASSIUM BLDV-SCNC: 4.9 MMOL/L — SIGNIFICANT CHANGE UP (ref 3.5–5.1)
POTASSIUM SERPL-MCNC: 2.5 MMOL/L — CRITICAL LOW (ref 3.5–5.3)
POTASSIUM SERPL-MCNC: 2.9 MMOL/L — CRITICAL LOW (ref 3.5–5.3)
POTASSIUM SERPL-MCNC: 3.2 MMOL/L — LOW (ref 3.5–5.3)
POTASSIUM SERPL-MCNC: 3.3 MMOL/L — LOW (ref 3.5–5.3)
POTASSIUM SERPL-MCNC: 3.4 MMOL/L — LOW (ref 3.5–5.3)
POTASSIUM SERPL-MCNC: 3.5 MMOL/L — SIGNIFICANT CHANGE UP (ref 3.5–5.3)
POTASSIUM SERPL-MCNC: 3.7 MMOL/L — SIGNIFICANT CHANGE UP (ref 3.5–5.3)
POTASSIUM SERPL-MCNC: 3.7 MMOL/L — SIGNIFICANT CHANGE UP (ref 3.5–5.3)
POTASSIUM SERPL-MCNC: 3.8 MMOL/L — SIGNIFICANT CHANGE UP (ref 3.5–5.3)
POTASSIUM SERPL-MCNC: 3.8 MMOL/L — SIGNIFICANT CHANGE UP (ref 3.5–5.3)
POTASSIUM SERPL-MCNC: 4 MMOL/L — SIGNIFICANT CHANGE UP (ref 3.5–5.3)
POTASSIUM SERPL-MCNC: 4.2 MMOL/L — SIGNIFICANT CHANGE UP (ref 3.5–5.3)
POTASSIUM SERPL-MCNC: 4.3 MMOL/L — SIGNIFICANT CHANGE UP (ref 3.5–5.3)
POTASSIUM SERPL-MCNC: 4.3 MMOL/L — SIGNIFICANT CHANGE UP (ref 3.5–5.3)
POTASSIUM SERPL-MCNC: 4.7 MMOL/L — SIGNIFICANT CHANGE UP (ref 3.5–5.3)
POTASSIUM SERPL-MCNC: 4.7 MMOL/L — SIGNIFICANT CHANGE UP (ref 3.5–5.3)
POTASSIUM SERPL-MCNC: 4.8 MMOL/L — SIGNIFICANT CHANGE UP (ref 3.5–5.3)
POTASSIUM SERPL-MCNC: 5.1 MMOL/L — SIGNIFICANT CHANGE UP (ref 3.5–5.3)
POTASSIUM SERPL-MCNC: 5.3 MMOL/L — SIGNIFICANT CHANGE UP (ref 3.5–5.3)
POTASSIUM SERPL-SCNC: 2.5 MMOL/L — CRITICAL LOW (ref 3.5–5.3)
POTASSIUM SERPL-SCNC: 2.9 MMOL/L — CRITICAL LOW (ref 3.5–5.3)
POTASSIUM SERPL-SCNC: 3.2 MMOL/L — LOW (ref 3.5–5.3)
POTASSIUM SERPL-SCNC: 3.3 MMOL/L — LOW (ref 3.5–5.3)
POTASSIUM SERPL-SCNC: 3.4 MMOL/L — LOW (ref 3.5–5.3)
POTASSIUM SERPL-SCNC: 3.5 MMOL/L — SIGNIFICANT CHANGE UP (ref 3.5–5.3)
POTASSIUM SERPL-SCNC: 3.7 MMOL/L — SIGNIFICANT CHANGE UP (ref 3.5–5.3)
POTASSIUM SERPL-SCNC: 3.7 MMOL/L — SIGNIFICANT CHANGE UP (ref 3.5–5.3)
POTASSIUM SERPL-SCNC: 3.8 MMOL/L — SIGNIFICANT CHANGE UP (ref 3.5–5.3)
POTASSIUM SERPL-SCNC: 3.8 MMOL/L — SIGNIFICANT CHANGE UP (ref 3.5–5.3)
POTASSIUM SERPL-SCNC: 3.9 MMOL/L
POTASSIUM SERPL-SCNC: 4 MMOL/L
POTASSIUM SERPL-SCNC: 4 MMOL/L — SIGNIFICANT CHANGE UP (ref 3.5–5.3)
POTASSIUM SERPL-SCNC: 4.2 MMOL/L — SIGNIFICANT CHANGE UP (ref 3.5–5.3)
POTASSIUM SERPL-SCNC: 4.3 MMOL/L — SIGNIFICANT CHANGE UP (ref 3.5–5.3)
POTASSIUM SERPL-SCNC: 4.3 MMOL/L — SIGNIFICANT CHANGE UP (ref 3.5–5.3)
POTASSIUM SERPL-SCNC: 4.7 MMOL/L — SIGNIFICANT CHANGE UP (ref 3.5–5.3)
POTASSIUM SERPL-SCNC: 4.7 MMOL/L — SIGNIFICANT CHANGE UP (ref 3.5–5.3)
POTASSIUM SERPL-SCNC: 4.8 MMOL/L — SIGNIFICANT CHANGE UP (ref 3.5–5.3)
POTASSIUM SERPL-SCNC: 5.1 MMOL/L — SIGNIFICANT CHANGE UP (ref 3.5–5.3)
POTASSIUM SERPL-SCNC: 5.3 MMOL/L — SIGNIFICANT CHANGE UP (ref 3.5–5.3)
PROCALCITONIN SERPL-MCNC: 0.1 NG/ML — SIGNIFICANT CHANGE UP (ref 0.02–0.1)
PROT SERPL-MCNC: 4.2 G/DL — LOW (ref 6–8.3)
PROT SERPL-MCNC: 4.5 G/DL — LOW (ref 6–8.3)
PROT SERPL-MCNC: 5.3 G/DL — LOW (ref 6–8.3)
PROT SERPL-MCNC: 5.4 G/DL — LOW (ref 6–8.3)
PROT SERPL-MCNC: 5.7 G/DL — LOW (ref 6–8.3)
PROT SERPL-MCNC: 5.8 G/DL — LOW (ref 6–8.3)
PROT SERPL-MCNC: 5.9 G/DL — LOW (ref 6–8.3)
PROT SERPL-MCNC: 5.9 G/DL — LOW (ref 6–8.3)
PROT SERPL-MCNC: 6.1 G/DL — SIGNIFICANT CHANGE UP (ref 6–8.3)
PROT SERPL-MCNC: 6.2 G/DL — SIGNIFICANT CHANGE UP (ref 6–8.3)
PROT SERPL-MCNC: 6.3 G/DL
PROT SERPL-MCNC: 6.4 G/DL — SIGNIFICANT CHANGE UP (ref 6–8.3)
PROT SERPL-MCNC: 6.4 G/DL — SIGNIFICANT CHANGE UP (ref 6–8.3)
PROT SERPL-MCNC: 6.8 G/DL
PROT SERPL-MCNC: 6.8 G/DL — SIGNIFICANT CHANGE UP (ref 6–8.3)
PROT UR-MCNC: 30 MG/DL
PROTHROM AB SERPL-ACNC: 13.4 SEC — HIGH (ref 9.5–13)
PROTHROM AB SERPL-ACNC: 13.5 SEC — HIGH (ref 9.5–13)
PROTHROM AB SERPL-ACNC: 14.6 SEC — HIGH (ref 9.5–13)
PROTHROM AB SERPL-ACNC: 15.6 SEC — HIGH (ref 9.5–13)
PROTHROM AB SERPL-ACNC: 18.1 SEC — HIGH (ref 9.5–13)
PROTHROM AB SERPL-ACNC: 18.9 SEC — HIGH (ref 9.5–13)
PROTHROM AB SERPL-ACNC: 23.3 SEC — HIGH (ref 9.5–13)
PROTHROM AB SERPL-ACNC: 38.6 SEC — HIGH (ref 9.5–13)
PROTHROM AB SERPL-ACNC: 44 SEC — HIGH (ref 9.5–13)
PROTHROM AB SERPL-ACNC: 45.5 SEC — HIGH (ref 9.5–13)
PROTHROM AB SERPL-ACNC: 98.9 SEC — HIGH (ref 9.5–13)
PT BLD: 22.2 SEC
RAPIDTEG MAXIMUM AMPLITUDE: 70.6 MM — HIGH (ref 52–70)
RBC # BLD: 2.58 M/UL — LOW (ref 3.8–5.2)
RBC # BLD: 2.77 M/UL — LOW (ref 3.8–5.2)
RBC # BLD: 2.91 M/UL — LOW (ref 3.8–5.2)
RBC # BLD: 2.97 M/UL — LOW (ref 3.8–5.2)
RBC # BLD: 3.03 M/UL — LOW (ref 3.8–5.2)
RBC # BLD: 3.04 M/UL — LOW (ref 3.8–5.2)
RBC # BLD: 3.1 M/UL — LOW (ref 3.8–5.2)
RBC # BLD: 3.11 M/UL — LOW (ref 3.8–5.2)
RBC # BLD: 3.11 M/UL — LOW (ref 3.8–5.2)
RBC # BLD: 3.12 M/UL — LOW (ref 3.8–5.2)
RBC # BLD: 3.15 M/UL — LOW (ref 3.8–5.2)
RBC # BLD: 3.25 M/UL — LOW (ref 3.8–5.2)
RBC # BLD: 3.25 M/UL — LOW (ref 3.8–5.2)
RBC # BLD: 3.26 M/UL — LOW (ref 3.8–5.2)
RBC # BLD: 3.35 M/UL — LOW (ref 3.8–5.2)
RBC # BLD: 3.35 M/UL — LOW (ref 3.8–5.2)
RBC # BLD: 3.36 M/UL — LOW (ref 3.8–5.2)
RBC # BLD: 3.4 M/UL — LOW (ref 3.8–5.2)
RBC # BLD: 3.43 M/UL — LOW (ref 3.8–5.2)
RBC # BLD: 3.5 M/UL — LOW (ref 3.8–5.2)
RBC # BLD: 3.51 M/UL — LOW (ref 3.8–5.2)
RBC # BLD: 3.54 M/UL — LOW (ref 3.8–5.2)
RBC # BLD: 3.55 M/UL — LOW (ref 3.8–5.2)
RBC # BLD: 3.57 M/UL — LOW (ref 3.8–5.2)
RBC # BLD: 3.59 M/UL — LOW (ref 3.8–5.2)
RBC # BLD: 3.6 M/UL — LOW (ref 3.8–5.2)
RBC # BLD: 3.61 M/UL — LOW (ref 3.8–5.2)
RBC # BLD: 3.62 M/UL — LOW (ref 3.8–5.2)
RBC # BLD: 4.12 M/UL — SIGNIFICANT CHANGE UP (ref 3.8–5.2)
RBC # BLD: 4.13 M/UL
RBC # BLD: 4.16 M/UL — SIGNIFICANT CHANGE UP (ref 3.8–5.2)
RBC # FLD: 14.7 % — HIGH (ref 10.3–14.5)
RBC # FLD: 14.7 % — HIGH (ref 10.3–14.5)
RBC # FLD: 14.8 % — HIGH (ref 10.3–14.5)
RBC # FLD: 14.9 % — HIGH (ref 10.3–14.5)
RBC # FLD: 15 % — HIGH (ref 10.3–14.5)
RBC # FLD: 15 % — HIGH (ref 10.3–14.5)
RBC # FLD: 15.1 % — HIGH (ref 10.3–14.5)
RBC # FLD: 15.2 %
RBC # FLD: 15.3 % — HIGH (ref 10.3–14.5)
RBC # FLD: 15.4 % — HIGH (ref 10.3–14.5)
RBC # FLD: 15.5 % — HIGH (ref 10.3–14.5)
RBC # FLD: 15.6 % — HIGH (ref 10.3–14.5)
RBC # FLD: 15.9 % — HIGH (ref 10.3–14.5)
RBC BLD AUTO: ABNORMAL
RBC BLD AUTO: SIGNIFICANT CHANGE UP
RBC CASTS # UR COMP ASSIST: 10 /HPF — HIGH (ref 0–4)
RBC CASTS # UR COMP ASSIST: 181 /HPF — HIGH (ref 0–4)
RBC CASTS # UR COMP ASSIST: 21 /HPF — HIGH (ref 0–4)
REVIEW: SIGNIFICANT CHANGE UP
REVIEW: SIGNIFICANT CHANGE UP
RH IG SCN BLD-IMP: POSITIVE — SIGNIFICANT CHANGE UP
S AUREUS DNA NOSE QL NAA+PROBE: SIGNIFICANT CHANGE UP
SAO2 % BLDA: 73.8 % — LOW (ref 94–98)
SAO2 % BLDMV: 95.5 — HIGH (ref 60–90)
SAO2 % BLDV: 49.6 % — LOW (ref 67–88)
SAO2 % BLDV: 77.3 % — SIGNIFICANT CHANGE UP (ref 67–88)
SAO2 % BLDV: 79.1 % — SIGNIFICANT CHANGE UP (ref 67–88)
SAO2 % BLDV: 79.8 % — SIGNIFICANT CHANGE UP (ref 67–88)
SAO2 % BLDV: 85.7 % — SIGNIFICANT CHANGE UP (ref 67–88)
SAO2 % BLDV: 87.1 % — SIGNIFICANT CHANGE UP (ref 67–88)
SODIUM SERPL-SCNC: 133 MMOL/L — LOW (ref 135–145)
SODIUM SERPL-SCNC: 133 MMOL/L — LOW (ref 135–145)
SODIUM SERPL-SCNC: 136 MMOL/L — SIGNIFICANT CHANGE UP (ref 135–145)
SODIUM SERPL-SCNC: 136 MMOL/L — SIGNIFICANT CHANGE UP (ref 135–145)
SODIUM SERPL-SCNC: 137 MMOL/L — SIGNIFICANT CHANGE UP (ref 135–145)
SODIUM SERPL-SCNC: 138 MMOL/L — SIGNIFICANT CHANGE UP (ref 135–145)
SODIUM SERPL-SCNC: 139 MMOL/L — SIGNIFICANT CHANGE UP (ref 135–145)
SODIUM SERPL-SCNC: 140 MMOL/L — SIGNIFICANT CHANGE UP (ref 135–145)
SODIUM SERPL-SCNC: 141 MMOL/L
SODIUM SERPL-SCNC: 142 MMOL/L — SIGNIFICANT CHANGE UP (ref 135–145)
SODIUM SERPL-SCNC: 143 MMOL/L
SODIUM SERPL-SCNC: 143 MMOL/L — SIGNIFICANT CHANGE UP (ref 135–145)
SODIUM SERPL-SCNC: 145 MMOL/L — SIGNIFICANT CHANGE UP (ref 135–145)
SODIUM SERPL-SCNC: 146 MMOL/L — HIGH (ref 135–145)
SODIUM SERPL-SCNC: 147 MMOL/L — HIGH (ref 135–145)
SP GR SPEC: 1.03 — SIGNIFICANT CHANGE UP (ref 1–1.03)
SP GR SPEC: >1.03 — HIGH (ref 1–1.03)
SP GR SPEC: >1.03 — HIGH (ref 1–1.03)
SPECIMEN SOURCE: SIGNIFICANT CHANGE UP
SPHEROCYTES BLD QL SMEAR: SLIGHT — SIGNIFICANT CHANGE UP
SQUAMOUS # UR AUTO: 14 /HPF — HIGH (ref 0–5)
SQUAMOUS # UR AUTO: 8 /HPF — HIGH (ref 0–5)
SURGICAL PATHOLOGY STUDY: SIGNIFICANT CHANGE UP
T3 SERPL-MCNC: 130 NG/DL — SIGNIFICANT CHANGE UP (ref 80–200)
T4 FREE SERPL-MCNC: 1 NG/DL — SIGNIFICANT CHANGE UP (ref 0.9–1.8)
T4 FREE SERPL-MCNC: 1.6 NG/DL — SIGNIFICANT CHANGE UP (ref 0.9–1.8)
TARGETS BLD QL SMEAR: SLIGHT — SIGNIFICANT CHANGE UP
TEG FUNCTIONAL FIBRINOGEN: 46.4 MM — HIGH (ref 15–32)
TEG LY30 (LYSIS): 0.2 % — SIGNIFICANT CHANGE UP (ref 0–2.6)
TEG REACTION TIME: 15.8 MIN — HIGH (ref 4.6–9.1)
TIBC SERPL-MCNC: 249 UG/DL — SIGNIFICANT CHANGE UP (ref 220–430)
TM INTERPRETATION: SIGNIFICANT CHANGE UP
TRIGL SERPL-MCNC: 73 MG/DL — SIGNIFICANT CHANGE UP
TROPONIN T, HIGH SENSITIVITY RESULT: 25 NG/L — SIGNIFICANT CHANGE UP (ref 0–51)
TROPONIN T, HIGH SENSITIVITY RESULT: 31 NG/L — SIGNIFICANT CHANGE UP (ref 0–51)
TROPONIN T, HIGH SENSITIVITY RESULT: 35 NG/L — SIGNIFICANT CHANGE UP (ref 0–51)
TROPONIN T, HIGH SENSITIVITY RESULT: 36 NG/L — SIGNIFICANT CHANGE UP (ref 0–51)
TROPONIN T, HIGH SENSITIVITY RESULT: 37 NG/L — SIGNIFICANT CHANGE UP (ref 0–51)
TROPONIN T, HIGH SENSITIVITY RESULT: 46 NG/L — SIGNIFICANT CHANGE UP (ref 0–51)
TSH SERPL-ACNC: 11 UIU/ML
TSH SERPL-MCNC: 11.4 UIU/ML — HIGH (ref 0.27–4.2)
TSH SERPL-MCNC: 14.6 UIU/ML — HIGH (ref 0.27–4.2)
TSH SERPL-MCNC: 5.85 UIU/ML — HIGH (ref 0.27–4.2)
UIBC SERPL-MCNC: 154 UG/DL — SIGNIFICANT CHANGE UP (ref 110–370)
UROBILINOGEN FLD QL: 0.2 MG/DL — SIGNIFICANT CHANGE UP (ref 0.2–1)
UROBILINOGEN FLD QL: 0.2 MG/DL — SIGNIFICANT CHANGE UP (ref 0.2–1)
UROBILINOGEN FLD QL: 1 MG/DL — SIGNIFICANT CHANGE UP (ref 0.2–1)
VIT B12 SERPL-MCNC: 796 PG/ML — SIGNIFICANT CHANGE UP (ref 232–1245)
WBC # BLD: 11.38 K/UL — HIGH (ref 3.8–10.5)
WBC # BLD: 11.41 K/UL — HIGH (ref 3.8–10.5)
WBC # BLD: 11.49 K/UL — HIGH (ref 3.8–10.5)
WBC # BLD: 11.62 K/UL — HIGH (ref 3.8–10.5)
WBC # BLD: 11.77 K/UL — HIGH (ref 3.8–10.5)
WBC # BLD: 13.01 K/UL — HIGH (ref 3.8–10.5)
WBC # BLD: 13.07 K/UL — HIGH (ref 3.8–10.5)
WBC # BLD: 13.14 K/UL — HIGH (ref 3.8–10.5)
WBC # BLD: 13.82 K/UL — HIGH (ref 3.8–10.5)
WBC # BLD: 15.1 K/UL — HIGH (ref 3.8–10.5)
WBC # BLD: 15.24 K/UL — HIGH (ref 3.8–10.5)
WBC # BLD: 16.6 K/UL — HIGH (ref 3.8–10.5)
WBC # BLD: 16.76 K/UL — HIGH (ref 3.8–10.5)
WBC # BLD: 16.88 K/UL — HIGH (ref 3.8–10.5)
WBC # BLD: 16.92 K/UL — HIGH (ref 3.8–10.5)
WBC # BLD: 17.5 K/UL — HIGH (ref 3.8–10.5)
WBC # BLD: 17.98 K/UL — HIGH (ref 3.8–10.5)
WBC # BLD: 18.7 K/UL — HIGH (ref 3.8–10.5)
WBC # BLD: 21.55 K/UL — HIGH (ref 3.8–10.5)
WBC # BLD: 21.72 K/UL — HIGH (ref 3.8–10.5)
WBC # BLD: 21.75 K/UL — HIGH (ref 3.8–10.5)
WBC # BLD: 22.11 K/UL — HIGH (ref 3.8–10.5)
WBC # BLD: 25.12 K/UL — HIGH (ref 3.8–10.5)
WBC # BLD: 29.77 K/UL — HIGH (ref 3.8–10.5)
WBC # BLD: 30.11 K/UL — HIGH (ref 3.8–10.5)
WBC # BLD: 37 K/UL — HIGH (ref 3.8–10.5)
WBC # BLD: 47.27 K/UL — CRITICAL HIGH (ref 3.8–10.5)
WBC # BLD: 50.27 K/UL — CRITICAL HIGH (ref 3.8–10.5)
WBC # BLD: 70.81 K/UL — CRITICAL HIGH (ref 3.8–10.5)
WBC # BLD: 75.95 K/UL — CRITICAL HIGH (ref 3.8–10.5)
WBC # FLD AUTO: 10.6 K/UL
WBC # FLD AUTO: 11.38 K/UL — HIGH (ref 3.8–10.5)
WBC # FLD AUTO: 11.41 K/UL — HIGH (ref 3.8–10.5)
WBC # FLD AUTO: 11.49 K/UL — HIGH (ref 3.8–10.5)
WBC # FLD AUTO: 11.62 K/UL — HIGH (ref 3.8–10.5)
WBC # FLD AUTO: 11.77 K/UL — HIGH (ref 3.8–10.5)
WBC # FLD AUTO: 13.01 K/UL — HIGH (ref 3.8–10.5)
WBC # FLD AUTO: 13.07 K/UL — HIGH (ref 3.8–10.5)
WBC # FLD AUTO: 13.14 K/UL — HIGH (ref 3.8–10.5)
WBC # FLD AUTO: 13.82 K/UL — HIGH (ref 3.8–10.5)
WBC # FLD AUTO: 15.1 K/UL — HIGH (ref 3.8–10.5)
WBC # FLD AUTO: 15.24 K/UL — HIGH (ref 3.8–10.5)
WBC # FLD AUTO: 16.6 K/UL — HIGH (ref 3.8–10.5)
WBC # FLD AUTO: 16.76 K/UL — HIGH (ref 3.8–10.5)
WBC # FLD AUTO: 16.88 K/UL — HIGH (ref 3.8–10.5)
WBC # FLD AUTO: 16.92 K/UL — HIGH (ref 3.8–10.5)
WBC # FLD AUTO: 17.5 K/UL — HIGH (ref 3.8–10.5)
WBC # FLD AUTO: 17.98 K/UL — HIGH (ref 3.8–10.5)
WBC # FLD AUTO: 18.7 K/UL — HIGH (ref 3.8–10.5)
WBC # FLD AUTO: 21.55 K/UL — HIGH (ref 3.8–10.5)
WBC # FLD AUTO: 21.72 K/UL — HIGH (ref 3.8–10.5)
WBC # FLD AUTO: 21.75 K/UL — HIGH (ref 3.8–10.5)
WBC # FLD AUTO: 22.11 K/UL — HIGH (ref 3.8–10.5)
WBC # FLD AUTO: 25.12 K/UL — HIGH (ref 3.8–10.5)
WBC # FLD AUTO: 29.77 K/UL — HIGH (ref 3.8–10.5)
WBC # FLD AUTO: 30.11 K/UL — HIGH (ref 3.8–10.5)
WBC # FLD AUTO: 37 K/UL — HIGH (ref 3.8–10.5)
WBC # FLD AUTO: 47.27 K/UL — CRITICAL HIGH (ref 3.8–10.5)
WBC # FLD AUTO: 50.27 K/UL — CRITICAL HIGH (ref 3.8–10.5)
WBC # FLD AUTO: 70.81 K/UL — CRITICAL HIGH (ref 3.8–10.5)
WBC # FLD AUTO: 75.95 K/UL — CRITICAL HIGH (ref 3.8–10.5)
WBC UR QL: 1 /HPF — SIGNIFICANT CHANGE UP (ref 0–5)
WBC UR QL: 10 /HPF — HIGH (ref 0–5)
WBC UR QL: 81 /HPF — HIGH (ref 0–5)
YEAST-LIKE CELLS: PRESENT

## 2024-01-01 PROCEDURE — 84132 ASSAY OF SERUM POTASSIUM: CPT

## 2024-01-01 PROCEDURE — 82947 ASSAY GLUCOSE BLOOD QUANT: CPT

## 2024-01-01 PROCEDURE — 93005 ELECTROCARDIOGRAM TRACING: CPT

## 2024-01-01 PROCEDURE — 85014 HEMATOCRIT: CPT

## 2024-01-01 PROCEDURE — 71275 CT ANGIOGRAPHY CHEST: CPT | Mod: 26,MA

## 2024-01-01 PROCEDURE — 93621 COMP EP EVL L PAC&REC C SINS: CPT

## 2024-01-01 PROCEDURE — 83735 ASSAY OF MAGNESIUM: CPT

## 2024-01-01 PROCEDURE — 83550 IRON BINDING TEST: CPT

## 2024-01-01 PROCEDURE — 80053 COMPREHEN METABOLIC PANEL: CPT

## 2024-01-01 PROCEDURE — 85025 COMPLETE CBC W/AUTO DIFF WBC: CPT

## 2024-01-01 PROCEDURE — 71250 CT THORAX DX C-: CPT

## 2024-01-01 PROCEDURE — 99223 1ST HOSP IP/OBS HIGH 75: CPT

## 2024-01-01 PROCEDURE — 74177 CT ABD & PELVIS W/CONTRAST: CPT | Mod: 26

## 2024-01-01 PROCEDURE — C1894: CPT

## 2024-01-01 PROCEDURE — 99232 SBSQ HOSP IP/OBS MODERATE 35: CPT

## 2024-01-01 PROCEDURE — 99443: CPT | Mod: 93

## 2024-01-01 PROCEDURE — 82746 ASSAY OF FOLIC ACID SERUM: CPT

## 2024-01-01 PROCEDURE — 87077 CULTURE AEROBIC IDENTIFY: CPT

## 2024-01-01 PROCEDURE — 71045 X-RAY EXAM CHEST 1 VIEW: CPT

## 2024-01-01 PROCEDURE — 83036 HEMOGLOBIN GLYCOSYLATED A1C: CPT

## 2024-01-01 PROCEDURE — 96376 TX/PRO/DX INJ SAME DRUG ADON: CPT

## 2024-01-01 PROCEDURE — 70450 CT HEAD/BRAIN W/O DYE: CPT | Mod: MC

## 2024-01-01 PROCEDURE — 85027 COMPLETE CBC AUTOMATED: CPT

## 2024-01-01 PROCEDURE — 71260 CT THORAX DX C+: CPT | Mod: 26

## 2024-01-01 PROCEDURE — 88291 CYTO/MOLECULAR REPORT: CPT | Mod: 59

## 2024-01-01 PROCEDURE — 93662 INTRACARDIAC ECG (ICE): CPT | Mod: 26

## 2024-01-01 PROCEDURE — 82435 ASSAY OF BLOOD CHLORIDE: CPT

## 2024-01-01 PROCEDURE — 71250 CT THORAX DX C-: CPT | Mod: 26

## 2024-01-01 PROCEDURE — 97116 GAIT TRAINING THERAPY: CPT

## 2024-01-01 PROCEDURE — 97161 PT EVAL LOW COMPLEX 20 MIN: CPT

## 2024-01-01 PROCEDURE — 99285 EMERGENCY DEPT VISIT HI MDM: CPT | Mod: GC

## 2024-01-01 PROCEDURE — 87150 DNA/RNA AMPLIFIED PROBE: CPT

## 2024-01-01 PROCEDURE — C8929: CPT

## 2024-01-01 PROCEDURE — 88184 FLOWCYTOMETRY/ TC 1 MARKER: CPT

## 2024-01-01 PROCEDURE — 99214 OFFICE O/P EST MOD 30 MIN: CPT

## 2024-01-01 PROCEDURE — 81206 BCR/ABL1 GENE MAJOR BP: CPT

## 2024-01-01 PROCEDURE — 88185 FLOWCYTOMETRY/TC ADD-ON: CPT

## 2024-01-01 PROCEDURE — 36415 COLL VENOUS BLD VENIPUNCTURE: CPT

## 2024-01-01 PROCEDURE — 87040 BLOOD CULTURE FOR BACTERIA: CPT

## 2024-01-01 PROCEDURE — 71045 X-RAY EXAM CHEST 1 VIEW: CPT | Mod: 26

## 2024-01-01 PROCEDURE — 85396 CLOTTING ASSAY WHOLE BLOOD: CPT

## 2024-01-01 PROCEDURE — 93000 ELECTROCARDIOGRAM COMPLETE: CPT

## 2024-01-01 PROCEDURE — 82803 BLOOD GASES ANY COMBINATION: CPT

## 2024-01-01 PROCEDURE — 85610 PROTHROMBIN TIME: CPT

## 2024-01-01 PROCEDURE — 84480 ASSAY TRIIODOTHYRONINE (T3): CPT

## 2024-01-01 PROCEDURE — 87205 SMEAR GRAM STAIN: CPT

## 2024-01-01 PROCEDURE — 70450 CT HEAD/BRAIN W/O DYE: CPT | Mod: 26,MC

## 2024-01-01 PROCEDURE — 86923 COMPATIBILITY TEST ELECTRIC: CPT

## 2024-01-01 PROCEDURE — 84443 ASSAY THYROID STIM HORMONE: CPT

## 2024-01-01 PROCEDURE — 87186 SC STD MICRODIL/AGAR DIL: CPT

## 2024-01-01 PROCEDURE — 99285 EMERGENCY DEPT VISIT HI MDM: CPT

## 2024-01-01 PROCEDURE — 99291 CRITICAL CARE FIRST HOUR: CPT | Mod: 25

## 2024-01-01 PROCEDURE — 86850 RBC ANTIBODY SCREEN: CPT

## 2024-01-01 PROCEDURE — 84484 ASSAY OF TROPONIN QUANT: CPT

## 2024-01-01 PROCEDURE — 93306 TTE W/DOPPLER COMPLETE: CPT

## 2024-01-01 PROCEDURE — 36620 INSERTION CATHETER ARTERY: CPT | Mod: GC

## 2024-01-01 PROCEDURE — 93462 L HRT CATH TRNSPTL PUNCTURE: CPT

## 2024-01-01 PROCEDURE — 93654 COMPRE EP EVAL TX VT: CPT

## 2024-01-01 PROCEDURE — 87086 URINE CULTURE/COLONY COUNT: CPT

## 2024-01-01 PROCEDURE — 72125 CT NECK SPINE W/O DYE: CPT | Mod: MC

## 2024-01-01 PROCEDURE — P9016: CPT

## 2024-01-01 PROCEDURE — 85520 HEPARIN ASSAY: CPT

## 2024-01-01 PROCEDURE — 71260 CT THORAX DX C+: CPT | Mod: MC

## 2024-01-01 PROCEDURE — 96375 TX/PRO/DX INJ NEW DRUG ADDON: CPT

## 2024-01-01 PROCEDURE — 84145 PROCALCITONIN (PCT): CPT

## 2024-01-01 PROCEDURE — 93662 INTRACARDIAC ECG (ICE): CPT

## 2024-01-01 PROCEDURE — 82330 ASSAY OF CALCIUM: CPT

## 2024-01-01 PROCEDURE — 99291 CRITICAL CARE FIRST HOUR: CPT

## 2024-01-01 PROCEDURE — 93308 TTE F-UP OR LMTD: CPT

## 2024-01-01 PROCEDURE — 99292 CRITICAL CARE ADDL 30 MIN: CPT | Mod: FS

## 2024-01-01 PROCEDURE — 97162 PT EVAL MOD COMPLEX 30 MIN: CPT

## 2024-01-01 PROCEDURE — 85730 THROMBOPLASTIN TIME PARTIAL: CPT

## 2024-01-01 PROCEDURE — 93010 ELECTROCARDIOGRAM REPORT: CPT

## 2024-01-01 PROCEDURE — 87641 MR-STAPH DNA AMP PROBE: CPT

## 2024-01-01 PROCEDURE — 87507 IADNA-DNA/RNA PROBE TQ 12-25: CPT

## 2024-01-01 PROCEDURE — C1732: CPT

## 2024-01-01 PROCEDURE — 76705 ECHO EXAM OF ABDOMEN: CPT

## 2024-01-01 PROCEDURE — 80061 LIPID PANEL: CPT

## 2024-01-01 PROCEDURE — 74174 CTA ABD&PLVS W/CONTRAST: CPT | Mod: 26,MA

## 2024-01-01 PROCEDURE — 96365 THER/PROPH/DIAG IV INF INIT: CPT

## 2024-01-01 PROCEDURE — 84439 ASSAY OF FREE THYROXINE: CPT

## 2024-01-01 PROCEDURE — 84295 ASSAY OF SERUM SODIUM: CPT

## 2024-01-01 PROCEDURE — 82565 ASSAY OF CREATININE: CPT

## 2024-01-01 PROCEDURE — 82607 VITAMIN B-12: CPT

## 2024-01-01 PROCEDURE — 86901 BLOOD TYPING SEROLOGIC RH(D): CPT

## 2024-01-01 PROCEDURE — 93282 PRGRMG EVAL IMPLANTABLE DFB: CPT | Mod: 26

## 2024-01-01 PROCEDURE — 83605 ASSAY OF LACTIC ACID: CPT

## 2024-01-01 PROCEDURE — 43239 EGD BIOPSY SINGLE/MULTIPLE: CPT | Mod: GC

## 2024-01-01 PROCEDURE — 96374 THER/PROPH/DIAG INJ IV PUSH: CPT

## 2024-01-01 PROCEDURE — 87324 CLOSTRIDIUM AG IA: CPT

## 2024-01-01 PROCEDURE — 86900 BLOOD TYPING SEROLOGIC ABO: CPT

## 2024-01-01 PROCEDURE — 97110 THERAPEUTIC EXERCISES: CPT

## 2024-01-01 PROCEDURE — 88305 TISSUE EXAM BY PATHOLOGIST: CPT | Mod: 26

## 2024-01-01 PROCEDURE — C1766: CPT

## 2024-01-01 PROCEDURE — 83880 ASSAY OF NATRIURETIC PEPTIDE: CPT

## 2024-01-01 PROCEDURE — 99232 SBSQ HOSP IP/OBS MODERATE 35: CPT | Mod: FS

## 2024-01-01 PROCEDURE — P9045: CPT

## 2024-01-01 PROCEDURE — 81001 URINALYSIS AUTO W/SCOPE: CPT

## 2024-01-01 PROCEDURE — 81270 JAK2 GENE: CPT

## 2024-01-01 PROCEDURE — 81207 BCR/ABL1 GENE MINOR BP: CPT

## 2024-01-01 PROCEDURE — 49000 EXPLORATION OF ABDOMEN: CPT

## 2024-01-01 PROCEDURE — 88264 CHROMOSOME ANALYSIS 20-25: CPT

## 2024-01-01 PROCEDURE — 99223 1ST HOSP IP/OBS HIGH 75: CPT | Mod: GC

## 2024-01-01 PROCEDURE — 97530 THERAPEUTIC ACTIVITIES: CPT

## 2024-01-01 PROCEDURE — G0452: CPT | Mod: 26

## 2024-01-01 PROCEDURE — 88271 CYTOGENETICS DNA PROBE: CPT

## 2024-01-01 PROCEDURE — P9011: CPT

## 2024-01-01 PROCEDURE — 73502 X-RAY EXAM HIP UNI 2-3 VIEWS: CPT

## 2024-01-01 PROCEDURE — 85018 HEMOGLOBIN: CPT

## 2024-01-01 PROCEDURE — 93306 TTE W/DOPPLER COMPLETE: CPT | Mod: 26

## 2024-01-01 PROCEDURE — 83690 ASSAY OF LIPASE: CPT

## 2024-01-01 PROCEDURE — 87640 STAPH A DNA AMP PROBE: CPT

## 2024-01-01 PROCEDURE — 99233 SBSQ HOSP IP/OBS HIGH 50: CPT | Mod: GC

## 2024-01-01 PROCEDURE — 80076 HEPATIC FUNCTION PANEL: CPT

## 2024-01-01 PROCEDURE — 94640 AIRWAY INHALATION TREATMENT: CPT

## 2024-01-01 PROCEDURE — 96368 THER/DIAG CONCURRENT INF: CPT

## 2024-01-01 PROCEDURE — 99284 EMERGENCY DEPT VISIT MOD MDM: CPT | Mod: FS

## 2024-01-01 PROCEDURE — 73502 X-RAY EXAM HIP UNI 2-3 VIEWS: CPT | Mod: 26,LT

## 2024-01-01 PROCEDURE — 88189 FLOWCYTOMETRY/READ 16 & >: CPT

## 2024-01-01 PROCEDURE — 88280 CHROMOSOME KARYOTYPE STUDY: CPT

## 2024-01-01 PROCEDURE — 87045 FECES CULTURE AEROBIC BACT: CPT

## 2024-01-01 PROCEDURE — 99222 1ST HOSP IP/OBS MODERATE 55: CPT

## 2024-01-01 PROCEDURE — 80048 BASIC METABOLIC PNL TOTAL CA: CPT

## 2024-01-01 PROCEDURE — 71260 CT THORAX DX C+: CPT | Mod: 26,MC

## 2024-01-01 PROCEDURE — 88305 TISSUE EXAM BY PATHOLOGIST: CPT

## 2024-01-01 PROCEDURE — 88275 CYTOGENETICS 100-300: CPT

## 2024-01-01 PROCEDURE — 87046 STOOL CULTR AEROBIC BACT EA: CPT

## 2024-01-01 PROCEDURE — 73552 X-RAY EXAM OF FEMUR 2/>: CPT | Mod: 26,LT

## 2024-01-01 PROCEDURE — 99292 CRITICAL CARE ADDL 30 MIN: CPT

## 2024-01-01 PROCEDURE — 99285 EMERGENCY DEPT VISIT HI MDM: CPT | Mod: 25

## 2024-01-01 PROCEDURE — 99233 SBSQ HOSP IP/OBS HIGH 50: CPT

## 2024-01-01 PROCEDURE — 87449 NOS EACH ORGANISM AG IA: CPT

## 2024-01-01 PROCEDURE — 71275 CT ANGIOGRAPHY CHEST: CPT | Mod: MA

## 2024-01-01 PROCEDURE — 36556 INSERT NON-TUNNEL CV CATH: CPT | Mod: GC

## 2024-01-01 PROCEDURE — 76705 ECHO EXAM OF ABDOMEN: CPT | Mod: 26

## 2024-01-01 PROCEDURE — 99284 EMERGENCY DEPT VISIT MOD MDM: CPT | Mod: 25

## 2024-01-01 PROCEDURE — 74177 CT ABD & PELVIS W/CONTRAST: CPT | Mod: 26,MC

## 2024-01-01 PROCEDURE — 93621 COMP EP EVL L PAC&REC C SINS: CPT | Mod: 26

## 2024-01-01 PROCEDURE — 82728 ASSAY OF FERRITIN: CPT

## 2024-01-01 PROCEDURE — 83540 ASSAY OF IRON: CPT

## 2024-01-01 PROCEDURE — 99232 SBSQ HOSP IP/OBS MODERATE 35: CPT | Mod: GC,25

## 2024-01-01 PROCEDURE — C1889: CPT

## 2024-01-01 PROCEDURE — 86985 SPLIT BLOOD OR PRODUCTS: CPT

## 2024-01-01 PROCEDURE — 84100 ASSAY OF PHOSPHORUS: CPT

## 2024-01-01 PROCEDURE — 82550 ASSAY OF CK (CPK): CPT

## 2024-01-01 PROCEDURE — 94002 VENT MGMT INPAT INIT DAY: CPT

## 2024-01-01 PROCEDURE — 74174 CTA ABD&PLVS W/CONTRAST: CPT | Mod: MA

## 2024-01-01 PROCEDURE — 74177 CT ABD & PELVIS W/CONTRAST: CPT | Mod: MC

## 2024-01-01 PROCEDURE — C1759: CPT

## 2024-01-01 PROCEDURE — 82272 OCCULT BLD FECES 1-3 TESTS: CPT

## 2024-01-01 PROCEDURE — 82962 GLUCOSE BLOOD TEST: CPT

## 2024-01-01 PROCEDURE — 72125 CT NECK SPINE W/O DYE: CPT | Mod: 26,MC

## 2024-01-01 PROCEDURE — 73552 X-RAY EXAM OF FEMUR 2/>: CPT

## 2024-01-01 PROCEDURE — P9059: CPT

## 2024-01-01 PROCEDURE — C1730: CPT

## 2024-01-01 PROCEDURE — 99221 1ST HOSP IP/OBS SF/LOW 40: CPT

## 2024-01-01 PROCEDURE — 93308 TTE F-UP OR LMTD: CPT | Mod: 26

## 2024-01-01 PROCEDURE — 82553 CREATINE MB FRACTION: CPT

## 2024-01-01 PROCEDURE — 88237 TISSUE CULTURE BONE MARROW: CPT

## 2024-01-01 RX ORDER — METOPROLOL TARTRATE 50 MG
25 TABLET ORAL DAILY
Refills: 0 | Status: DISCONTINUED | OUTPATIENT
Start: 2024-01-01 | End: 2024-01-01

## 2024-01-01 RX ORDER — PANTOPRAZOLE SODIUM 20 MG/1
40 TABLET, DELAYED RELEASE ORAL
Refills: 0 | Status: DISCONTINUED | OUTPATIENT
Start: 2024-01-01 | End: 2024-01-01

## 2024-01-01 RX ORDER — POLYETHYLENE GLYCOL 3350 17 G/17G
17 POWDER, FOR SOLUTION ORAL DAILY
Refills: 0 | Status: DISCONTINUED | OUTPATIENT
Start: 2024-01-01 | End: 2024-01-01

## 2024-01-01 RX ORDER — APIXABAN 2.5 MG/1
2.5 TABLET, FILM COATED ORAL EVERY 12 HOURS
Refills: 0 | Status: DISCONTINUED | OUTPATIENT
Start: 2024-01-01 | End: 2024-01-01

## 2024-01-01 RX ORDER — VANCOMYCIN HCL 1 G
100 VIAL (EA) INTRAVENOUS EVERY 12 HOURS
Refills: 0 | Status: DISCONTINUED | OUTPATIENT
Start: 2024-01-01 | End: 2024-01-01

## 2024-01-01 RX ORDER — FUROSEMIDE 40 MG
40 TABLET ORAL ONCE
Refills: 0 | Status: COMPLETED | OUTPATIENT
Start: 2024-01-01 | End: 2024-01-01

## 2024-01-01 RX ORDER — ATORVASTATIN CALCIUM 40 MG/1
40 TABLET, FILM COATED ORAL
Qty: 90 | Refills: 3 | Status: ACTIVE | COMMUNITY
Start: 2020-12-10 | End: 1900-01-01

## 2024-01-01 RX ORDER — ATORVASTATIN CALCIUM 80 MG/1
40 TABLET, FILM COATED ORAL AT BEDTIME
Refills: 0 | Status: DISCONTINUED | OUTPATIENT
Start: 2024-01-01 | End: 2024-01-01

## 2024-01-01 RX ORDER — HEPARIN SODIUM 5000 [USP'U]/ML
6500 INJECTION INTRAVENOUS; SUBCUTANEOUS ONCE
Refills: 0 | Status: DISCONTINUED | OUTPATIENT
Start: 2024-01-01 | End: 2024-01-01

## 2024-01-01 RX ORDER — MEROPENEM 1 G/30ML
2000 INJECTION INTRAVENOUS EVERY 12 HOURS
Refills: 0 | Status: DISCONTINUED | OUTPATIENT
Start: 2024-01-01 | End: 2024-01-01

## 2024-01-01 RX ORDER — FUROSEMIDE 40 MG
1 TABLET ORAL
Refills: 0 | DISCHARGE
Start: 2024-01-01

## 2024-01-01 RX ORDER — NYSTATIN CREAM 100000 [USP'U]/G
1 CREAM TOPICAL
Refills: 0 | Status: DISCONTINUED | OUTPATIENT
Start: 2024-01-01 | End: 2024-01-01

## 2024-01-01 RX ORDER — ACETAMINOPHEN 500 MG
975 TABLET ORAL ONCE
Refills: 0 | Status: COMPLETED | OUTPATIENT
Start: 2024-01-01 | End: 2024-01-01

## 2024-01-01 RX ORDER — LOPERAMIDE HCL 2 MG
2 TABLET ORAL THREE TIMES A DAY
Refills: 0 | Status: DISCONTINUED | OUTPATIENT
Start: 2024-01-01 | End: 2024-01-01

## 2024-01-01 RX ORDER — SODIUM CHLORIDE 9 MG/ML
1000 INJECTION INTRAMUSCULAR; INTRAVENOUS; SUBCUTANEOUS
Refills: 0 | Status: DISCONTINUED | OUTPATIENT
Start: 2024-01-01 | End: 2024-01-01

## 2024-01-01 RX ORDER — LIDOCAINE HCL 20 MG/ML
1 VIAL (ML) INJECTION
Qty: 2 | Refills: 0 | Status: DISCONTINUED | OUTPATIENT
Start: 2024-01-01 | End: 2024-01-01

## 2024-01-01 RX ORDER — MAGNESIUM SULFATE 500 MG/ML
2 VIAL (ML) INJECTION ONCE
Refills: 0 | Status: COMPLETED | OUTPATIENT
Start: 2024-01-01 | End: 2024-01-01

## 2024-01-01 RX ORDER — FUROSEMIDE 40 MG
40 TABLET ORAL DAILY
Refills: 0 | Status: DISCONTINUED | OUTPATIENT
Start: 2024-01-01 | End: 2024-01-01

## 2024-01-01 RX ORDER — MEROPENEM 1 G/30ML
500 INJECTION INTRAVENOUS EVERY 8 HOURS
Refills: 0 | Status: DISCONTINUED | OUTPATIENT
Start: 2024-01-01 | End: 2024-01-01

## 2024-01-01 RX ORDER — MEROPENEM 1 G/30ML
1000 INJECTION INTRAVENOUS EVERY 8 HOURS
Refills: 0 | Status: DISCONTINUED | OUTPATIENT
Start: 2024-01-01 | End: 2024-01-01

## 2024-01-01 RX ORDER — DEXTROSE 50 % IN WATER 50 %
50 SYRINGE (ML) INTRAVENOUS ONCE
Refills: 0 | Status: DISCONTINUED | OUTPATIENT
Start: 2024-01-01 | End: 2024-01-01

## 2024-01-01 RX ORDER — NYSTATIN CREAM 100000 [USP'U]/G
1 CREAM TOPICAL THREE TIMES A DAY
Refills: 0 | Status: DISCONTINUED | OUTPATIENT
Start: 2024-01-01 | End: 2024-01-01

## 2024-01-01 RX ORDER — ACETAMINOPHEN 500 MG
650 TABLET ORAL EVERY 6 HOURS
Refills: 0 | Status: DISCONTINUED | OUTPATIENT
Start: 2024-01-01 | End: 2024-01-01

## 2024-01-01 RX ORDER — ONDANSETRON 8 MG/1
4 TABLET, FILM COATED ORAL EVERY 8 HOURS
Refills: 0 | Status: DISCONTINUED | OUTPATIENT
Start: 2024-01-01 | End: 2024-01-01

## 2024-01-01 RX ORDER — CEFTRIAXONE 500 MG/1
1000 INJECTION, POWDER, FOR SOLUTION INTRAMUSCULAR; INTRAVENOUS ONCE
Refills: 0 | Status: COMPLETED | OUTPATIENT
Start: 2024-01-01 | End: 2024-01-01

## 2024-01-01 RX ORDER — POTASSIUM CHLORIDE 20 MEQ
40 PACKET (EA) ORAL ONCE
Refills: 0 | Status: DISCONTINUED | OUTPATIENT
Start: 2024-01-01 | End: 2024-01-01

## 2024-01-01 RX ORDER — SOTALOL HCL 120 MG
40 TABLET ORAL
Qty: 0 | Refills: 0 | DISCHARGE
Start: 2024-01-01

## 2024-01-01 RX ORDER — POTASSIUM CHLORIDE 20 MEQ
40 PACKET (EA) ORAL ONCE
Refills: 0 | Status: COMPLETED | OUTPATIENT
Start: 2024-01-01 | End: 2024-01-01

## 2024-01-01 RX ORDER — ACETAMINOPHEN 500 MG
1000 TABLET ORAL ONCE
Refills: 0 | Status: COMPLETED | OUTPATIENT
Start: 2024-01-01 | End: 2024-01-01

## 2024-01-01 RX ORDER — SODIUM BICARBONATE 1 MEQ/ML
0.21 SYRINGE (ML) INTRAVENOUS
Qty: 150 | Refills: 0 | Status: DISCONTINUED | OUTPATIENT
Start: 2024-01-01 | End: 2024-01-01

## 2024-01-01 RX ORDER — PANTOPRAZOLE SODIUM 20 MG/1
1 TABLET, DELAYED RELEASE ORAL
Qty: 60 | Refills: 0
Start: 2024-01-01 | End: 2024-01-01

## 2024-01-01 RX ORDER — POTASSIUM PHOSPHATE, MONOBASIC POTASSIUM PHOSPHATE, DIBASIC 236; 224 MG/ML; MG/ML
15 INJECTION, SOLUTION INTRAVENOUS ONCE
Refills: 0 | Status: COMPLETED | OUTPATIENT
Start: 2024-01-01 | End: 2024-01-01

## 2024-01-01 RX ORDER — POTASSIUM CHLORIDE 20 MEQ
20 PACKET (EA) ORAL ONCE
Refills: 0 | Status: DISCONTINUED | OUTPATIENT
Start: 2024-01-01 | End: 2024-01-01

## 2024-01-01 RX ORDER — DEXMEDETOMIDINE HYDROCHLORIDE IN 0.9% SODIUM CHLORIDE 4 UG/ML
0.5 INJECTION INTRAVENOUS
Qty: 200 | Refills: 0 | Status: DISCONTINUED | OUTPATIENT
Start: 2024-01-01 | End: 2024-01-01

## 2024-01-01 RX ORDER — APIXABAN 5 MG/1
5 TABLET, FILM COATED ORAL
Qty: 180 | Refills: 3 | Status: ACTIVE | COMMUNITY
Start: 2024-01-01 | End: 1900-01-01

## 2024-01-01 RX ORDER — AMIODARONE HYDROCHLORIDE 400 MG/1
200 TABLET ORAL DAILY
Refills: 0 | Status: DISCONTINUED | OUTPATIENT
Start: 2024-01-01 | End: 2024-01-01

## 2024-01-01 RX ORDER — QUINIDINE SULFATE 200 MG
1 TABLET ORAL
Refills: 0 | DISCHARGE

## 2024-01-01 RX ORDER — AMIODARONE HYDROCHLORIDE 200 MG/1
200 TABLET ORAL DAILY
Qty: 90 | Refills: 3 | Status: ACTIVE | COMMUNITY
Start: 1900-01-01 | End: 1900-01-01

## 2024-01-01 RX ORDER — QUINIDINE SULFATE 200 MG
324 TABLET ORAL EVERY 8 HOURS
Refills: 0 | Status: DISCONTINUED | OUTPATIENT
Start: 2024-01-01 | End: 2024-01-01

## 2024-01-01 RX ORDER — METOPROLOL TARTRATE 50 MG
1 TABLET ORAL
Qty: 30 | Refills: 0
Start: 2024-01-01 | End: 2024-01-01

## 2024-01-01 RX ORDER — POTASSIUM CHLORIDE 20 MEQ
20 PACKET (EA) ORAL
Refills: 0 | Status: COMPLETED | OUTPATIENT
Start: 2024-01-01 | End: 2024-01-01

## 2024-01-01 RX ORDER — ATORVASTATIN CALCIUM 80 MG/1
1 TABLET, FILM COATED ORAL
Refills: 0 | DISCHARGE

## 2024-01-01 RX ORDER — AMIODARONE HYDROCHLORIDE 400 MG/1
TABLET ORAL
Refills: 0 | Status: DISCONTINUED | OUTPATIENT
Start: 2024-01-01 | End: 2024-01-01

## 2024-01-01 RX ORDER — MORPHINE SULFATE 50 MG/1
2 CAPSULE, EXTENDED RELEASE ORAL ONCE
Refills: 0 | Status: DISCONTINUED | OUTPATIENT
Start: 2024-01-01 | End: 2024-01-01

## 2024-01-01 RX ORDER — PIPERACILLIN AND TAZOBACTAM 4; .5 G/20ML; G/20ML
3.38 INJECTION, POWDER, LYOPHILIZED, FOR SOLUTION INTRAVENOUS ONCE
Refills: 0 | Status: COMPLETED | OUTPATIENT
Start: 2024-01-01 | End: 2024-01-01

## 2024-01-01 RX ORDER — PIPERACILLIN AND TAZOBACTAM 4; .5 G/20ML; G/20ML
3.38 INJECTION, POWDER, LYOPHILIZED, FOR SOLUTION INTRAVENOUS EVERY 8 HOURS
Refills: 0 | Status: DISCONTINUED | OUTPATIENT
Start: 2024-01-01 | End: 2024-01-01

## 2024-01-01 RX ORDER — POTASSIUM CHLORIDE 20 MEQ
40 PACKET (EA) ORAL EVERY 4 HOURS
Refills: 0 | Status: COMPLETED | OUTPATIENT
Start: 2024-01-01 | End: 2024-01-01

## 2024-01-01 RX ORDER — WARFARIN 3 MG/1
3 TABLET ORAL
Qty: 90 | Refills: 2 | Status: DISCONTINUED | COMMUNITY
Start: 2023-01-01 | End: 2024-01-01

## 2024-01-01 RX ORDER — NOREPINEPHRINE BITARTRATE/D5W 8 MG/250ML
0.15 PLASTIC BAG, INJECTION (ML) INTRAVENOUS
Qty: 8 | Refills: 0 | Status: DISCONTINUED | OUTPATIENT
Start: 2024-01-01 | End: 2024-01-01

## 2024-01-01 RX ORDER — AMIODARONE HYDROCHLORIDE 400 MG/1
150 TABLET ORAL ONCE
Refills: 0 | Status: COMPLETED | OUTPATIENT
Start: 2024-01-01 | End: 2024-01-01

## 2024-01-01 RX ORDER — FUROSEMIDE 40 MG/1
40 TABLET ORAL
Qty: 180 | Refills: 3 | Status: DISCONTINUED | COMMUNITY
Start: 2021-12-16 | End: 2024-01-01

## 2024-01-01 RX ORDER — BUDESONIDE AND FORMOTEROL FUMARATE DIHYDRATE 160; 4.5 UG/1; UG/1
2 AEROSOL RESPIRATORY (INHALATION)
Refills: 0 | Status: DISCONTINUED | OUTPATIENT
Start: 2024-01-01 | End: 2024-01-01

## 2024-01-01 RX ORDER — INFLUENZA VIRUS VACCINE 15; 15; 15; 15 UG/.5ML; UG/.5ML; UG/.5ML; UG/.5ML
0.7 SUSPENSION INTRAMUSCULAR ONCE
Refills: 0 | Status: DISCONTINUED | OUTPATIENT
Start: 2024-01-01 | End: 2024-01-01

## 2024-01-01 RX ORDER — POTASSIUM CHLORIDE 20 MEQ
20 PACKET (EA) ORAL
Refills: 0 | Status: DISCONTINUED | OUTPATIENT
Start: 2024-01-01 | End: 2024-01-01

## 2024-01-01 RX ORDER — SODIUM CHLORIDE 9 MG/ML
500 INJECTION INTRAMUSCULAR; INTRAVENOUS; SUBCUTANEOUS
Refills: 0 | Status: DISCONTINUED | OUTPATIENT
Start: 2024-01-01 | End: 2024-01-01

## 2024-01-01 RX ORDER — LANOLIN ALCOHOL/MO/W.PET/CERES
3 CREAM (GRAM) TOPICAL AT BEDTIME
Refills: 0 | Status: DISCONTINUED | OUTPATIENT
Start: 2024-01-01 | End: 2024-01-01

## 2024-01-01 RX ORDER — PANTOPRAZOLE SODIUM 20 MG/1
40 TABLET, DELAYED RELEASE ORAL ONCE
Refills: 0 | Status: COMPLETED | OUTPATIENT
Start: 2024-01-01 | End: 2024-01-01

## 2024-01-01 RX ORDER — PROTHROMBIN COMPLEX CONCENTRATE (HUMAN) 25.5; 16.5; 24; 22; 22; 26 [IU]/ML; [IU]/ML; [IU]/ML; [IU]/ML; [IU]/ML; [IU]/ML
1500 POWDER, FOR SOLUTION INTRAVENOUS ONCE
Refills: 0 | Status: COMPLETED | OUTPATIENT
Start: 2024-01-01 | End: 2024-01-01

## 2024-01-01 RX ORDER — SODIUM CHLORIDE 9 MG/ML
1000 INJECTION, SOLUTION INTRAVENOUS ONCE
Refills: 0 | Status: COMPLETED | OUTPATIENT
Start: 2024-01-01 | End: 2024-01-01

## 2024-01-01 RX ORDER — HEPARIN SODIUM 5000 [USP'U]/ML
1000 INJECTION INTRAVENOUS; SUBCUTANEOUS
Qty: 25000 | Refills: 0 | Status: DISCONTINUED | OUTPATIENT
Start: 2024-01-01 | End: 2024-01-01

## 2024-01-01 RX ORDER — AMIODARONE HYDROCHLORIDE 400 MG/1
1 TABLET ORAL
Qty: 30 | Refills: 0
Start: 2024-01-01 | End: 2024-01-01

## 2024-01-01 RX ORDER — CHLORHEXIDINE GLUCONATE 213 G/1000ML
1 SOLUTION TOPICAL DAILY
Refills: 0 | Status: DISCONTINUED | OUTPATIENT
Start: 2024-01-01 | End: 2024-01-01

## 2024-01-01 RX ORDER — MAGNESIUM SULFATE 500 MG/ML
1 VIAL (ML) INJECTION ONCE
Refills: 0 | Status: COMPLETED | OUTPATIENT
Start: 2024-01-01 | End: 2024-01-01

## 2024-01-01 RX ORDER — SODIUM BICARBONATE 1 MEQ/ML
50 SYRINGE (ML) INTRAVENOUS ONCE
Refills: 0 | Status: COMPLETED | OUTPATIENT
Start: 2024-01-01 | End: 2024-01-01

## 2024-01-01 RX ORDER — PANTOPRAZOLE SODIUM 20 MG/1
1 TABLET, DELAYED RELEASE ORAL
Qty: 120 | Refills: 0
Start: 2024-01-01 | End: 2024-01-01

## 2024-01-01 RX ORDER — VANCOMYCIN HCL 1 G
1250 VIAL (EA) INTRAVENOUS EVERY 24 HOURS
Refills: 0 | Status: DISCONTINUED | OUTPATIENT
Start: 2024-01-01 | End: 2024-01-01

## 2024-01-01 RX ORDER — APIXABAN 2.5 MG/1
5 TABLET, FILM COATED ORAL EVERY 12 HOURS
Refills: 0 | Status: DISCONTINUED | OUTPATIENT
Start: 2024-01-01 | End: 2024-01-01

## 2024-01-01 RX ORDER — POLYETHYLENE GLYCOL 3350 17 G/17G
17 POWDER, FOR SOLUTION ORAL
Qty: 0 | Refills: 0 | DISCHARGE
Start: 2024-01-01

## 2024-01-01 RX ORDER — BUDESONIDE AND FORMOTEROL FUMARATE DIHYDRATE 160; 4.5 UG/1; UG/1
2 AEROSOL RESPIRATORY (INHALATION)
Refills: 0 | DISCHARGE

## 2024-01-01 RX ORDER — POTASSIUM CHLORIDE 20 MEQ
20 PACKET (EA) ORAL ONCE
Refills: 0 | Status: COMPLETED | OUTPATIENT
Start: 2024-01-01 | End: 2024-01-01

## 2024-01-01 RX ORDER — HEPARIN SODIUM 5000 [USP'U]/ML
INJECTION INTRAVENOUS; SUBCUTANEOUS
Qty: 25000 | Refills: 0 | Status: DISCONTINUED | OUTPATIENT
Start: 2024-01-01 | End: 2024-01-01

## 2024-01-01 RX ORDER — SUCRALFATE 1 G
1 TABLET ORAL
Refills: 0 | Status: DISCONTINUED | OUTPATIENT
Start: 2024-01-01 | End: 2024-01-01

## 2024-01-01 RX ORDER — PANTOPRAZOLE SODIUM 20 MG/1
80 TABLET, DELAYED RELEASE ORAL ONCE
Refills: 0 | Status: COMPLETED | OUTPATIENT
Start: 2024-01-01 | End: 2024-01-01

## 2024-01-01 RX ORDER — IPRATROPIUM/ALBUTEROL SULFATE 18-103MCG
3 AEROSOL WITH ADAPTER (GRAM) INHALATION ONCE
Refills: 0 | Status: COMPLETED | OUTPATIENT
Start: 2024-01-01 | End: 2024-01-01

## 2024-01-01 RX ORDER — MAGNESIUM SULFATE 500 MG/ML
2 VIAL (ML) INJECTION ONCE
Refills: 0 | Status: DISCONTINUED | OUTPATIENT
Start: 2024-01-01 | End: 2024-01-01

## 2024-01-01 RX ORDER — OXYCODONE HYDROCHLORIDE 5 MG/1
5 TABLET ORAL ONCE
Refills: 0 | Status: DISCONTINUED | OUTPATIENT
Start: 2024-01-01 | End: 2024-01-01

## 2024-01-01 RX ORDER — FUROSEMIDE 40 MG
1 TABLET ORAL
Qty: 30 | Refills: 0
Start: 2024-01-01 | End: 2024-01-01

## 2024-01-01 RX ORDER — CHLORHEXIDINE GLUCONATE 213 G/1000ML
1 SOLUTION TOPICAL
Refills: 0 | Status: DISCONTINUED | OUTPATIENT
Start: 2024-01-01 | End: 2024-01-01

## 2024-01-01 RX ORDER — DAPAGLIFLOZIN 10 MG/1
1 TABLET, FILM COATED ORAL
Refills: 0 | DISCHARGE

## 2024-01-01 RX ORDER — ACETAMINOPHEN 500 MG
650 TABLET ORAL ONCE
Refills: 0 | Status: COMPLETED | OUTPATIENT
Start: 2024-01-01 | End: 2024-01-01

## 2024-01-01 RX ORDER — NYSTATIN CREAM 100000 [USP'U]/G
1 CREAM TOPICAL
Qty: 5 | Refills: 0
Start: 2024-01-01

## 2024-01-01 RX ORDER — POTASSIUM CHLORIDE 20 MEQ
10 PACKET (EA) ORAL
Refills: 0 | Status: COMPLETED | OUTPATIENT
Start: 2024-01-01 | End: 2024-01-01

## 2024-01-01 RX ORDER — FUROSEMIDE 40 MG/1
40 TABLET ORAL DAILY
Refills: 0 | Status: ACTIVE | COMMUNITY

## 2024-01-01 RX ORDER — QUINIDINE SULFATE TABLET 200 MG/1
200 TABLET ORAL
Qty: 360 | Refills: 3 | Status: DISCONTINUED | COMMUNITY
Start: 2024-01-01 | End: 2024-01-01

## 2024-01-01 RX ORDER — FAMOTIDINE 10 MG/ML
20 INJECTION INTRAVENOUS ONCE
Refills: 0 | Status: COMPLETED | OUTPATIENT
Start: 2024-01-01 | End: 2024-01-01

## 2024-01-01 RX ORDER — ONDANSETRON 8 MG/1
4 TABLET, FILM COATED ORAL ONCE
Refills: 0 | Status: COMPLETED | OUTPATIENT
Start: 2024-01-01 | End: 2024-01-01

## 2024-01-01 RX ORDER — AMIODARONE HYDROCHLORIDE 400 MG/1
200 TABLET ORAL THREE TIMES A DAY
Refills: 0 | Status: DISCONTINUED | OUTPATIENT
Start: 2024-01-01 | End: 2024-01-01

## 2024-01-01 RX ORDER — LOSARTAN POTASSIUM 25 MG/1
25 TABLET, FILM COATED ORAL DAILY
Qty: 90 | Refills: 1 | Status: DISCONTINUED | COMMUNITY
End: 2024-01-01

## 2024-01-01 RX ORDER — HEPARIN SODIUM 5000 [USP'U]/ML
3000 INJECTION INTRAVENOUS; SUBCUTANEOUS EVERY 6 HOURS
Refills: 0 | Status: DISCONTINUED | OUTPATIENT
Start: 2024-01-01 | End: 2024-01-01

## 2024-01-01 RX ORDER — DAPAGLIFLOZIN 10 MG/1
10 TABLET, FILM COATED ORAL
Qty: 90 | Refills: 3 | Status: DISCONTINUED | COMMUNITY
Start: 2023-01-01 | End: 2024-01-01

## 2024-01-01 RX ORDER — SODIUM CHLORIDE 9 MG/ML
1000 INJECTION INTRAMUSCULAR; INTRAVENOUS; SUBCUTANEOUS ONCE
Refills: 0 | Status: COMPLETED | OUTPATIENT
Start: 2024-01-01 | End: 2024-01-01

## 2024-01-01 RX ORDER — WARFARIN SODIUM 2.5 MG/1
4 TABLET ORAL
Refills: 0 | DISCHARGE

## 2024-01-01 RX ORDER — VASOPRESSIN 20 [USP'U]/ML
0.03 INJECTION INTRAVENOUS
Qty: 40 | Refills: 0 | Status: DISCONTINUED | OUTPATIENT
Start: 2024-01-01 | End: 2024-01-01

## 2024-01-01 RX ORDER — SUCRALFATE 1 G
10 TABLET ORAL
Qty: 0 | Refills: 0 | DISCHARGE
Start: 2024-01-01

## 2024-01-01 RX ORDER — VANCOMYCIN HCL 1 G
125 VIAL (EA) INTRAVENOUS EVERY 6 HOURS
Refills: 0 | Status: DISCONTINUED | OUTPATIENT
Start: 2024-01-01 | End: 2024-01-01

## 2024-01-01 RX ORDER — APIXABAN 2.5 MG/1
5 TABLET, FILM COATED ORAL
Refills: 0 | Status: DISCONTINUED | OUTPATIENT
Start: 2024-01-01 | End: 2024-01-01

## 2024-01-01 RX ORDER — SOTALOL HCL 120 MG
40 TABLET ORAL EVERY 12 HOURS
Refills: 0 | Status: DISCONTINUED | OUTPATIENT
Start: 2024-01-01 | End: 2024-01-01

## 2024-01-01 RX ORDER — PANTOPRAZOLE 40 MG/1
40 TABLET, DELAYED RELEASE ORAL DAILY
Qty: 30 | Refills: 0 | Status: ACTIVE | COMMUNITY

## 2024-01-01 RX ORDER — HYDROMORPHONE HYDROCHLORIDE 2 MG/ML
0.5 INJECTION INTRAMUSCULAR; INTRAVENOUS; SUBCUTANEOUS
Refills: 0 | Status: DISCONTINUED | OUTPATIENT
Start: 2024-01-01 | End: 2024-01-01

## 2024-01-01 RX ORDER — APIXABAN 2.5 MG/1
1 TABLET, FILM COATED ORAL
Qty: 60 | Refills: 0
Start: 2024-01-01 | End: 2024-01-01

## 2024-01-01 RX ORDER — VANCOMYCIN HCL 1 G
1000 VIAL (EA) INTRAVENOUS ONCE
Refills: 0 | Status: COMPLETED | OUTPATIENT
Start: 2024-01-01 | End: 2024-01-01

## 2024-01-01 RX ORDER — METOPROLOL SUCCINATE 25 MG/1
25 TABLET, EXTENDED RELEASE ORAL DAILY
Qty: 90 | Refills: 3 | Status: ACTIVE | COMMUNITY

## 2024-01-01 RX ORDER — SODIUM CHLORIDE 9 MG/ML
500 INJECTION INTRAMUSCULAR; INTRAVENOUS; SUBCUTANEOUS ONCE
Refills: 0 | Status: COMPLETED | OUTPATIENT
Start: 2024-01-01 | End: 2024-01-01

## 2024-01-01 RX ORDER — LOSARTAN POTASSIUM 100 MG/1
1 TABLET, FILM COATED ORAL
Qty: 0 | Refills: 0 | DISCHARGE
Start: 2024-01-01

## 2024-01-01 RX ORDER — PHYTONADIONE (VIT K1) 5 MG
5 TABLET ORAL ONCE
Refills: 0 | Status: COMPLETED | OUTPATIENT
Start: 2024-01-01 | End: 2024-01-01

## 2024-01-01 RX ORDER — ATORVASTATIN CALCIUM 80 MG/1
1 TABLET, FILM COATED ORAL
Qty: 30 | Refills: 0
Start: 2024-01-01 | End: 2024-01-01

## 2024-01-01 RX ORDER — CHOLECALCIFEROL (VITAMIN D3) 125 MCG
1 CAPSULE ORAL
Qty: 0 | Refills: 0 | DISCHARGE

## 2024-01-01 RX ORDER — PROTHROMBIN COMPLEX CONCENTRATE (HUMAN) 25.5; 16.5; 24; 22; 22; 26 [IU]/ML; [IU]/ML; [IU]/ML; [IU]/ML; [IU]/ML; [IU]/ML
3500 POWDER, FOR SOLUTION INTRAVENOUS ONCE
Refills: 0 | Status: COMPLETED | OUTPATIENT
Start: 2024-01-01 | End: 2024-01-01

## 2024-01-01 RX ORDER — WARFARIN 1 MG/1
1 TABLET ORAL
Qty: 180 | Refills: 3 | Status: DISCONTINUED | COMMUNITY
Start: 2023-01-01 | End: 2024-01-01

## 2024-01-01 RX ORDER — NOREPINEPHRINE BITARTRATE/D5W 8 MG/250ML
0.05 PLASTIC BAG, INJECTION (ML) INTRAVENOUS
Qty: 8 | Refills: 0 | Status: DISCONTINUED | OUTPATIENT
Start: 2024-01-01 | End: 2024-01-01

## 2024-01-01 RX ORDER — METOPROLOL SUCCINATE 25 MG/1
25 TABLET, EXTENDED RELEASE ORAL DAILY
Qty: 45 | Refills: 3 | Status: DISCONTINUED | COMMUNITY
Start: 2023-01-01 | End: 2024-01-01

## 2024-01-01 RX ORDER — PANTOPRAZOLE 20 MG/1
20 TABLET, DELAYED RELEASE ORAL DAILY
Qty: 90 | Refills: 1 | Status: DISCONTINUED | COMMUNITY
Start: 2024-01-01 | End: 2024-01-01

## 2024-01-01 RX ORDER — QUINIDINE GLUONATE 324 MG/1
324 TABLET, EXTENDED RELEASE ORAL EVERY 8 HOURS
Refills: 0 | Status: DISCONTINUED | COMMUNITY
End: 2024-01-01

## 2024-01-01 RX ORDER — POLYETHYLENE GLYCOL 3350 17 G/17G
17 POWDER, FOR SOLUTION ORAL
Qty: 1 | Refills: 0
Start: 2024-01-01 | End: 2024-01-01

## 2024-01-01 RX ORDER — HEPARIN SODIUM 5000 [USP'U]/ML
6500 INJECTION INTRAVENOUS; SUBCUTANEOUS EVERY 6 HOURS
Refills: 0 | Status: DISCONTINUED | OUTPATIENT
Start: 2024-01-01 | End: 2024-01-01

## 2024-01-01 RX ORDER — ROBINUL 0.2 MG/ML
0.2 INJECTION INTRAMUSCULAR; INTRAVENOUS EVERY 4 HOURS
Refills: 0 | Status: DISCONTINUED | OUTPATIENT
Start: 2024-01-01 | End: 2024-01-01

## 2024-01-01 RX ORDER — DEXTROSE 50 % IN WATER 50 %
50 SYRINGE (ML) INTRAVENOUS ONCE
Refills: 0 | Status: COMPLETED | OUTPATIENT
Start: 2024-01-01 | End: 2024-01-01

## 2024-01-01 RX ORDER — CHLORHEXIDINE GLUCONATE 213 G/1000ML
15 SOLUTION TOPICAL EVERY 12 HOURS
Refills: 0 | Status: DISCONTINUED | OUTPATIENT
Start: 2024-01-01 | End: 2024-01-01

## 2024-01-01 RX ADMIN — OXYCODONE HYDROCHLORIDE 5 MILLIGRAM(S): 5 TABLET ORAL at 10:07

## 2024-01-01 RX ADMIN — PANTOPRAZOLE SODIUM 40 MILLIGRAM(S): 20 TABLET, DELAYED RELEASE ORAL at 05:28

## 2024-01-01 RX ADMIN — PIPERACILLIN AND TAZOBACTAM 25 GRAM(S): 4; .5 INJECTION, POWDER, LYOPHILIZED, FOR SOLUTION INTRAVENOUS at 05:12

## 2024-01-01 RX ADMIN — PANTOPRAZOLE SODIUM 40 MILLIGRAM(S): 20 TABLET, DELAYED RELEASE ORAL at 05:30

## 2024-01-01 RX ADMIN — Medication 1 GRAM(S): at 17:54

## 2024-01-01 RX ADMIN — Medication 1 GRAM(S): at 05:57

## 2024-01-01 RX ADMIN — SODIUM CHLORIDE 1000 MILLILITER(S): 9 INJECTION INTRAMUSCULAR; INTRAVENOUS; SUBCUTANEOUS at 11:04

## 2024-01-01 RX ADMIN — Medication 40 MILLIGRAM(S): at 05:27

## 2024-01-01 RX ADMIN — Medication 1 GRAM(S): at 00:11

## 2024-01-01 RX ADMIN — Medication 324 MILLIGRAM(S): at 15:48

## 2024-01-01 RX ADMIN — BUDESONIDE AND FORMOTEROL FUMARATE DIHYDRATE 2 PUFF(S): 160; 4.5 AEROSOL RESPIRATORY (INHALATION) at 18:12

## 2024-01-01 RX ADMIN — PANTOPRAZOLE SODIUM 40 MILLIGRAM(S): 20 TABLET, DELAYED RELEASE ORAL at 05:05

## 2024-01-01 RX ADMIN — BUDESONIDE AND FORMOTEROL FUMARATE DIHYDRATE 2 PUFF(S): 160; 4.5 AEROSOL RESPIRATORY (INHALATION) at 18:27

## 2024-01-01 RX ADMIN — PANTOPRAZOLE SODIUM 40 MILLIGRAM(S): 20 TABLET, DELAYED RELEASE ORAL at 06:12

## 2024-01-01 RX ADMIN — APIXABAN 5 MILLIGRAM(S): 2.5 TABLET, FILM COATED ORAL at 05:38

## 2024-01-01 RX ADMIN — Medication 40 MILLIGRAM(S): at 05:56

## 2024-01-01 RX ADMIN — Medication 1 GRAM(S): at 17:17

## 2024-01-01 RX ADMIN — PANTOPRAZOLE SODIUM 40 MILLIGRAM(S): 20 TABLET, DELAYED RELEASE ORAL at 05:24

## 2024-01-01 RX ADMIN — APIXABAN 5 MILLIGRAM(S): 2.5 TABLET, FILM COATED ORAL at 05:10

## 2024-01-01 RX ADMIN — BUDESONIDE AND FORMOTEROL FUMARATE DIHYDRATE 2 PUFF(S): 160; 4.5 AEROSOL RESPIRATORY (INHALATION) at 05:24

## 2024-01-01 RX ADMIN — BUDESONIDE AND FORMOTEROL FUMARATE DIHYDRATE 2 PUFF(S): 160; 4.5 AEROSOL RESPIRATORY (INHALATION) at 05:57

## 2024-01-01 RX ADMIN — ATORVASTATIN CALCIUM 40 MILLIGRAM(S): 80 TABLET, FILM COATED ORAL at 21:21

## 2024-01-01 RX ADMIN — Medication 25 GRAM(S): at 09:43

## 2024-01-01 RX ADMIN — Medication 100 MILLIGRAM(S): at 07:23

## 2024-01-01 RX ADMIN — Medication 40 MILLIGRAM(S): at 05:30

## 2024-01-01 RX ADMIN — APIXABAN 5 MILLIGRAM(S): 2.5 TABLET, FILM COATED ORAL at 05:29

## 2024-01-01 RX ADMIN — NYSTATIN CREAM 1 APPLICATION(S): 100000 CREAM TOPICAL at 22:26

## 2024-01-01 RX ADMIN — Medication 25 MILLIGRAM(S): at 06:12

## 2024-01-01 RX ADMIN — Medication 40 MILLIEQUIVALENT(S): at 11:21

## 2024-01-01 RX ADMIN — PANTOPRAZOLE SODIUM 40 MILLIGRAM(S): 20 TABLET, DELAYED RELEASE ORAL at 17:25

## 2024-01-01 RX ADMIN — Medication 1 GRAM(S): at 18:11

## 2024-01-01 RX ADMIN — BUDESONIDE AND FORMOTEROL FUMARATE DIHYDRATE 2 PUFF(S): 160; 4.5 AEROSOL RESPIRATORY (INHALATION) at 17:18

## 2024-01-01 RX ADMIN — Medication 1 GRAM(S): at 23:32

## 2024-01-01 RX ADMIN — PANTOPRAZOLE SODIUM 40 MILLIGRAM(S): 20 TABLET, DELAYED RELEASE ORAL at 06:32

## 2024-01-01 RX ADMIN — SODIUM CHLORIDE 1000 MILLILITER(S): 9 INJECTION INTRAMUSCULAR; INTRAVENOUS; SUBCUTANEOUS at 10:20

## 2024-01-01 RX ADMIN — BUDESONIDE AND FORMOTEROL FUMARATE DIHYDRATE 2 PUFF(S): 160; 4.5 AEROSOL RESPIRATORY (INHALATION) at 05:15

## 2024-01-01 RX ADMIN — BUDESONIDE AND FORMOTEROL FUMARATE DIHYDRATE 2 PUFF(S): 160; 4.5 AEROSOL RESPIRATORY (INHALATION) at 17:20

## 2024-01-01 RX ADMIN — Medication 40 MILLIGRAM(S): at 05:37

## 2024-01-01 RX ADMIN — Medication 20 MILLIEQUIVALENT(S): at 15:48

## 2024-01-01 RX ADMIN — Medication 1000 MILLIGRAM(S): at 17:44

## 2024-01-01 RX ADMIN — Medication 40 MILLIGRAM(S): at 05:31

## 2024-01-01 RX ADMIN — Medication 40 MILLIGRAM(S): at 05:24

## 2024-01-01 RX ADMIN — Medication 1 GRAM(S): at 13:45

## 2024-01-01 RX ADMIN — Medication 40 MILLIEQUIVALENT(S): at 10:53

## 2024-01-01 RX ADMIN — BUDESONIDE AND FORMOTEROL FUMARATE DIHYDRATE 2 PUFF(S): 160; 4.5 AEROSOL RESPIRATORY (INHALATION) at 05:12

## 2024-01-01 RX ADMIN — Medication 40 MILLIGRAM(S): at 18:12

## 2024-01-01 RX ADMIN — CHLORHEXIDINE GLUCONATE 1 APPLICATION(S): 213 SOLUTION TOPICAL at 13:10

## 2024-01-01 RX ADMIN — Medication 975 MILLIGRAM(S): at 12:18

## 2024-01-01 RX ADMIN — PIPERACILLIN AND TAZOBACTAM 25 GRAM(S): 4; .5 INJECTION, POWDER, LYOPHILIZED, FOR SOLUTION INTRAVENOUS at 21:30

## 2024-01-01 RX ADMIN — BUDESONIDE AND FORMOTEROL FUMARATE DIHYDRATE 2 PUFF(S): 160; 4.5 AEROSOL RESPIRATORY (INHALATION) at 06:33

## 2024-01-01 RX ADMIN — PANTOPRAZOLE SODIUM 40 MILLIGRAM(S): 20 TABLET, DELAYED RELEASE ORAL at 17:56

## 2024-01-01 RX ADMIN — Medication 400 MILLIGRAM(S): at 11:46

## 2024-01-01 RX ADMIN — ATORVASTATIN CALCIUM 40 MILLIGRAM(S): 80 TABLET, FILM COATED ORAL at 22:22

## 2024-01-01 RX ADMIN — PIPERACILLIN AND TAZOBACTAM 25 GRAM(S): 4; .5 INJECTION, POWDER, LYOPHILIZED, FOR SOLUTION INTRAVENOUS at 06:31

## 2024-01-01 RX ADMIN — Medication 7.5 MG/MIN: at 21:30

## 2024-01-01 RX ADMIN — Medication 40 MILLIGRAM(S): at 18:26

## 2024-01-01 RX ADMIN — Medication 40 MILLIGRAM(S): at 17:37

## 2024-01-01 RX ADMIN — ATORVASTATIN CALCIUM 40 MILLIGRAM(S): 80 TABLET, FILM COATED ORAL at 21:40

## 2024-01-01 RX ADMIN — NYSTATIN CREAM 1 APPLICATION(S): 100000 CREAM TOPICAL at 13:09

## 2024-01-01 RX ADMIN — ATORVASTATIN CALCIUM 40 MILLIGRAM(S): 80 TABLET, FILM COATED ORAL at 21:53

## 2024-01-01 RX ADMIN — BUDESONIDE AND FORMOTEROL FUMARATE DIHYDRATE 2 PUFF(S): 160; 4.5 AEROSOL RESPIRATORY (INHALATION) at 05:04

## 2024-01-01 RX ADMIN — NYSTATIN CREAM 1 APPLICATION(S): 100000 CREAM TOPICAL at 13:34

## 2024-01-01 RX ADMIN — PANTOPRAZOLE SODIUM 40 MILLIGRAM(S): 20 TABLET, DELAYED RELEASE ORAL at 17:13

## 2024-01-01 RX ADMIN — BUDESONIDE AND FORMOTEROL FUMARATE DIHYDRATE 2 PUFF(S): 160; 4.5 AEROSOL RESPIRATORY (INHALATION) at 05:11

## 2024-01-01 RX ADMIN — AMIODARONE HYDROCHLORIDE 200 MILLIGRAM(S): 400 TABLET ORAL at 05:05

## 2024-01-01 RX ADMIN — BUDESONIDE AND FORMOTEROL FUMARATE DIHYDRATE 2 PUFF(S): 160; 4.5 AEROSOL RESPIRATORY (INHALATION) at 17:55

## 2024-01-01 RX ADMIN — Medication 40 MILLIGRAM(S): at 06:20

## 2024-01-01 RX ADMIN — APIXABAN 5 MILLIGRAM(S): 2.5 TABLET, FILM COATED ORAL at 17:18

## 2024-01-01 RX ADMIN — Medication 20.4 MICROGRAM(S)/KG/MIN: at 20:36

## 2024-01-01 RX ADMIN — ATORVASTATIN CALCIUM 40 MILLIGRAM(S): 80 TABLET, FILM COATED ORAL at 21:34

## 2024-01-01 RX ADMIN — Medication 1 GRAM(S): at 23:16

## 2024-01-01 RX ADMIN — Medication 1 GRAM(S): at 05:37

## 2024-01-01 RX ADMIN — OXYCODONE HYDROCHLORIDE 5 MILLIGRAM(S): 5 TABLET ORAL at 09:01

## 2024-01-01 RX ADMIN — BUDESONIDE AND FORMOTEROL FUMARATE DIHYDRATE 2 PUFF(S): 160; 4.5 AEROSOL RESPIRATORY (INHALATION) at 06:20

## 2024-01-01 RX ADMIN — NYSTATIN CREAM 1 APPLICATION(S): 100000 CREAM TOPICAL at 22:24

## 2024-01-01 RX ADMIN — Medication 166.67 MILLIGRAM(S): at 19:49

## 2024-01-01 RX ADMIN — APIXABAN 2.5 MILLIGRAM(S): 2.5 TABLET, FILM COATED ORAL at 05:16

## 2024-01-01 RX ADMIN — Medication 101 MILLIGRAM(S): at 16:44

## 2024-01-01 RX ADMIN — NYSTATIN CREAM 1 APPLICATION(S): 100000 CREAM TOPICAL at 14:10

## 2024-01-01 RX ADMIN — NYSTATIN CREAM 1 APPLICATION(S): 100000 CREAM TOPICAL at 05:05

## 2024-01-01 RX ADMIN — Medication 40 MILLIEQUIVALENT(S): at 06:51

## 2024-01-01 RX ADMIN — Medication 25 MILLIGRAM(S): at 05:05

## 2024-01-01 RX ADMIN — APIXABAN 5 MILLIGRAM(S): 2.5 TABLET, FILM COATED ORAL at 05:27

## 2024-01-01 RX ADMIN — PANTOPRAZOLE SODIUM 40 MILLIGRAM(S): 20 TABLET, DELAYED RELEASE ORAL at 17:54

## 2024-01-01 RX ADMIN — Medication 2 MILLIGRAM(S): at 18:14

## 2024-01-01 RX ADMIN — ATORVASTATIN CALCIUM 40 MILLIGRAM(S): 80 TABLET, FILM COATED ORAL at 21:47

## 2024-01-01 RX ADMIN — Medication 6.81 MICROGRAM(S)/KG/MIN: at 16:47

## 2024-01-01 RX ADMIN — OXYCODONE HYDROCHLORIDE 5 MILLIGRAM(S): 5 TABLET ORAL at 00:58

## 2024-01-01 RX ADMIN — NYSTATIN CREAM 1 APPLICATION(S): 100000 CREAM TOPICAL at 22:08

## 2024-01-01 RX ADMIN — Medication 1 GRAM(S): at 23:05

## 2024-01-01 RX ADMIN — PANTOPRAZOLE SODIUM 40 MILLIGRAM(S): 20 TABLET, DELAYED RELEASE ORAL at 17:33

## 2024-01-01 RX ADMIN — PIPERACILLIN AND TAZOBACTAM 25 GRAM(S): 4; .5 INJECTION, POWDER, LYOPHILIZED, FOR SOLUTION INTRAVENOUS at 22:03

## 2024-01-01 RX ADMIN — APIXABAN 5 MILLIGRAM(S): 2.5 TABLET, FILM COATED ORAL at 17:13

## 2024-01-01 RX ADMIN — Medication 1 GRAM(S): at 05:29

## 2024-01-01 RX ADMIN — Medication 324 MILLIGRAM(S): at 22:08

## 2024-01-01 RX ADMIN — AMIODARONE HYDROCHLORIDE 200 MILLIGRAM(S): 400 TABLET ORAL at 05:29

## 2024-01-01 RX ADMIN — Medication 324 MILLIGRAM(S): at 06:13

## 2024-01-01 RX ADMIN — Medication 25 MILLIGRAM(S): at 05:27

## 2024-01-01 RX ADMIN — Medication 20 MILLIEQUIVALENT(S): at 13:33

## 2024-01-01 RX ADMIN — BUDESONIDE AND FORMOTEROL FUMARATE DIHYDRATE 2 PUFF(S): 160; 4.5 AEROSOL RESPIRATORY (INHALATION) at 17:34

## 2024-01-01 RX ADMIN — Medication 40 MILLIGRAM(S): at 05:14

## 2024-01-01 RX ADMIN — Medication 100 GRAM(S): at 09:38

## 2024-01-01 RX ADMIN — Medication 25 GRAM(S): at 01:55

## 2024-01-01 RX ADMIN — PIPERACILLIN AND TAZOBACTAM 3.38 GRAM(S): 4; .5 INJECTION, POWDER, LYOPHILIZED, FOR SOLUTION INTRAVENOUS at 11:15

## 2024-01-01 RX ADMIN — Medication 1 GRAM(S): at 05:11

## 2024-01-01 RX ADMIN — PANTOPRAZOLE SODIUM 40 MILLIGRAM(S): 20 TABLET, DELAYED RELEASE ORAL at 18:26

## 2024-01-01 RX ADMIN — Medication 40 MILLIGRAM(S): at 05:28

## 2024-01-01 RX ADMIN — Medication 25 MILLIGRAM(S): at 06:00

## 2024-01-01 RX ADMIN — Medication 25 MILLIGRAM(S): at 06:31

## 2024-01-01 RX ADMIN — Medication 50 MILLIEQUIVALENT(S): at 20:35

## 2024-01-01 RX ADMIN — BUDESONIDE AND FORMOTEROL FUMARATE DIHYDRATE 2 PUFF(S): 160; 4.5 AEROSOL RESPIRATORY (INHALATION) at 06:00

## 2024-01-01 RX ADMIN — PANTOPRAZOLE SODIUM 40 MILLIGRAM(S): 20 TABLET, DELAYED RELEASE ORAL at 18:11

## 2024-01-01 RX ADMIN — APIXABAN 5 MILLIGRAM(S): 2.5 TABLET, FILM COATED ORAL at 18:37

## 2024-01-01 RX ADMIN — PANTOPRAZOLE SODIUM 40 MILLIGRAM(S): 20 TABLET, DELAYED RELEASE ORAL at 06:27

## 2024-01-01 RX ADMIN — APIXABAN 5 MILLIGRAM(S): 2.5 TABLET, FILM COATED ORAL at 05:26

## 2024-01-01 RX ADMIN — ATORVASTATIN CALCIUM 40 MILLIGRAM(S): 80 TABLET, FILM COATED ORAL at 22:07

## 2024-01-01 RX ADMIN — BUDESONIDE AND FORMOTEROL FUMARATE DIHYDRATE 2 PUFF(S): 160; 4.5 AEROSOL RESPIRATORY (INHALATION) at 05:38

## 2024-01-01 RX ADMIN — Medication 324 MILLIGRAM(S): at 22:24

## 2024-01-01 RX ADMIN — ROBINUL 0.2 MILLIGRAM(S): 0.2 INJECTION INTRAMUSCULAR; INTRAVENOUS at 00:30

## 2024-01-01 RX ADMIN — Medication 1 GRAM(S): at 00:31

## 2024-01-01 RX ADMIN — Medication 50 MILLIEQUIVALENT(S): at 16:12

## 2024-01-01 RX ADMIN — Medication 40 MILLIGRAM(S): at 13:33

## 2024-01-01 RX ADMIN — HEPARIN SODIUM 11 UNIT(S)/HR: 5000 INJECTION INTRAVENOUS; SUBCUTANEOUS at 20:47

## 2024-01-01 RX ADMIN — Medication 40 MILLIGRAM(S): at 17:56

## 2024-01-01 RX ADMIN — Medication 2 MILLIGRAM(S): at 18:25

## 2024-01-01 RX ADMIN — APIXABAN 2.5 MILLIGRAM(S): 2.5 TABLET, FILM COATED ORAL at 17:17

## 2024-01-01 RX ADMIN — Medication 20 MILLIEQUIVALENT(S): at 09:38

## 2024-01-01 RX ADMIN — Medication 40 MILLIGRAM(S): at 18:37

## 2024-01-01 RX ADMIN — FAMOTIDINE 20 MILLIGRAM(S): 10 INJECTION INTRAVENOUS at 17:22

## 2024-01-01 RX ADMIN — NYSTATIN CREAM 1 APPLICATION(S): 100000 CREAM TOPICAL at 13:22

## 2024-01-01 RX ADMIN — BUDESONIDE AND FORMOTEROL FUMARATE DIHYDRATE 2 PUFF(S): 160; 4.5 AEROSOL RESPIRATORY (INHALATION) at 17:12

## 2024-01-01 RX ADMIN — POTASSIUM PHOSPHATE, MONOBASIC POTASSIUM PHOSPHATE, DIBASIC 83.33 MILLIMOLE(S): 236; 224 INJECTION, SOLUTION INTRAVENOUS at 17:38

## 2024-01-01 RX ADMIN — Medication 7.5 MG/MIN: at 10:53

## 2024-01-01 RX ADMIN — Medication 40 MILLIEQUIVALENT(S): at 00:11

## 2024-01-01 RX ADMIN — Medication 40 MILLIGRAM(S): at 05:57

## 2024-01-01 RX ADMIN — PIPERACILLIN AND TAZOBACTAM 25 GRAM(S): 4; .5 INJECTION, POWDER, LYOPHILIZED, FOR SOLUTION INTRAVENOUS at 17:55

## 2024-01-01 RX ADMIN — Medication 650 MILLIGRAM(S): at 20:49

## 2024-01-01 RX ADMIN — Medication 40 MILLIGRAM(S): at 05:10

## 2024-01-01 RX ADMIN — NYSTATIN CREAM 1 APPLICATION(S): 100000 CREAM TOPICAL at 06:13

## 2024-01-01 RX ADMIN — Medication 324 MILLIGRAM(S): at 13:22

## 2024-01-01 RX ADMIN — HEPARIN SODIUM 11 UNIT(S)/HR: 5000 INJECTION INTRAVENOUS; SUBCUTANEOUS at 21:30

## 2024-01-01 RX ADMIN — PANTOPRAZOLE SODIUM 80 MILLIGRAM(S): 20 TABLET, DELAYED RELEASE ORAL at 16:43

## 2024-01-01 RX ADMIN — Medication 1 GRAM(S): at 12:24

## 2024-01-01 RX ADMIN — PIPERACILLIN AND TAZOBACTAM 200 GRAM(S): 4; .5 INJECTION, POWDER, LYOPHILIZED, FOR SOLUTION INTRAVENOUS at 10:50

## 2024-01-01 RX ADMIN — BUDESONIDE AND FORMOTEROL FUMARATE DIHYDRATE 2 PUFF(S): 160; 4.5 AEROSOL RESPIRATORY (INHALATION) at 05:28

## 2024-01-01 RX ADMIN — BUDESONIDE AND FORMOTEROL FUMARATE DIHYDRATE 2 PUFF(S): 160; 4.5 AEROSOL RESPIRATORY (INHALATION) at 06:12

## 2024-01-01 RX ADMIN — Medication 324 MILLIGRAM(S): at 14:10

## 2024-01-01 RX ADMIN — HEPARIN SODIUM 12 UNIT(S)/HR: 5000 INJECTION INTRAVENOUS; SUBCUTANEOUS at 06:16

## 2024-01-01 RX ADMIN — Medication 1 TABLET(S): at 09:26

## 2024-01-01 RX ADMIN — Medication 1000 MILLIGRAM(S): at 12:16

## 2024-01-01 RX ADMIN — PANTOPRAZOLE SODIUM 40 MILLIGRAM(S): 20 TABLET, DELAYED RELEASE ORAL at 12:26

## 2024-01-01 RX ADMIN — Medication 324 MILLIGRAM(S): at 21:47

## 2024-01-01 RX ADMIN — AMIODARONE HYDROCHLORIDE 200 MILLIGRAM(S): 400 TABLET ORAL at 05:42

## 2024-01-01 RX ADMIN — PIPERACILLIN AND TAZOBACTAM 25 GRAM(S): 4; .5 INJECTION, POWDER, LYOPHILIZED, FOR SOLUTION INTRAVENOUS at 14:41

## 2024-01-01 RX ADMIN — Medication 324 MILLIGRAM(S): at 13:11

## 2024-01-01 RX ADMIN — ATORVASTATIN CALCIUM 40 MILLIGRAM(S): 80 TABLET, FILM COATED ORAL at 22:08

## 2024-01-01 RX ADMIN — Medication 1 TABLET(S): at 13:33

## 2024-01-01 RX ADMIN — PANTOPRAZOLE SODIUM 40 MILLIGRAM(S): 20 TABLET, DELAYED RELEASE ORAL at 17:17

## 2024-01-01 RX ADMIN — PANTOPRAZOLE SODIUM 40 MILLIGRAM(S): 20 TABLET, DELAYED RELEASE ORAL at 06:01

## 2024-01-01 RX ADMIN — PIPERACILLIN AND TAZOBACTAM 200 GRAM(S): 4; .5 INJECTION, POWDER, LYOPHILIZED, FOR SOLUTION INTRAVENOUS at 10:42

## 2024-01-01 RX ADMIN — Medication 25 MILLIGRAM(S): at 05:42

## 2024-01-01 RX ADMIN — Medication 1000 MILLIGRAM(S): at 22:40

## 2024-01-01 RX ADMIN — CHLORHEXIDINE GLUCONATE 1 APPLICATION(S): 213 SOLUTION TOPICAL at 12:03

## 2024-01-01 RX ADMIN — APIXABAN 5 MILLIGRAM(S): 2.5 TABLET, FILM COATED ORAL at 09:26

## 2024-01-01 RX ADMIN — NYSTATIN CREAM 1 APPLICATION(S): 100000 CREAM TOPICAL at 06:33

## 2024-01-01 RX ADMIN — Medication 50 MILLIEQUIVALENT(S): at 18:41

## 2024-01-01 RX ADMIN — BUDESONIDE AND FORMOTEROL FUMARATE DIHYDRATE 2 PUFF(S): 160; 4.5 AEROSOL RESPIRATORY (INHALATION) at 18:37

## 2024-01-01 RX ADMIN — Medication 1 TABLET(S): at 13:11

## 2024-01-01 RX ADMIN — Medication 250 MILLIGRAM(S): at 16:12

## 2024-01-01 RX ADMIN — Medication 1000 MILLIGRAM(S): at 19:30

## 2024-01-01 RX ADMIN — Medication 324 MILLIGRAM(S): at 05:30

## 2024-01-01 RX ADMIN — Medication 20 MILLIEQUIVALENT(S): at 15:12

## 2024-01-01 RX ADMIN — BUDESONIDE AND FORMOTEROL FUMARATE DIHYDRATE 2 PUFF(S): 160; 4.5 AEROSOL RESPIRATORY (INHALATION) at 17:44

## 2024-01-01 RX ADMIN — BUDESONIDE AND FORMOTEROL FUMARATE DIHYDRATE 2 PUFF(S): 160; 4.5 AEROSOL RESPIRATORY (INHALATION) at 05:53

## 2024-01-01 RX ADMIN — Medication 650 MILLIGRAM(S): at 21:19

## 2024-01-01 RX ADMIN — ATORVASTATIN CALCIUM 40 MILLIGRAM(S): 80 TABLET, FILM COATED ORAL at 21:30

## 2024-01-01 RX ADMIN — Medication 40 MILLIGRAM(S): at 17:12

## 2024-01-01 RX ADMIN — BUDESONIDE AND FORMOTEROL FUMARATE DIHYDRATE 2 PUFF(S): 160; 4.5 AEROSOL RESPIRATORY (INHALATION) at 17:09

## 2024-01-01 RX ADMIN — Medication 20 MILLIEQUIVALENT(S): at 12:31

## 2024-01-01 RX ADMIN — ONDANSETRON 4 MILLIGRAM(S): 8 TABLET, FILM COATED ORAL at 16:42

## 2024-01-01 RX ADMIN — AMIODARONE HYDROCHLORIDE 200 MILLIGRAM(S): 400 TABLET ORAL at 17:55

## 2024-01-01 RX ADMIN — ATORVASTATIN CALCIUM 40 MILLIGRAM(S): 80 TABLET, FILM COATED ORAL at 21:07

## 2024-01-01 RX ADMIN — BUDESONIDE AND FORMOTEROL FUMARATE DIHYDRATE 2 PUFF(S): 160; 4.5 AEROSOL RESPIRATORY (INHALATION) at 05:30

## 2024-01-01 RX ADMIN — Medication 400 MILLIGRAM(S): at 17:23

## 2024-01-01 RX ADMIN — Medication 1 GRAM(S): at 05:26

## 2024-01-01 RX ADMIN — Medication 2 MILLIGRAM(S): at 21:30

## 2024-01-01 RX ADMIN — Medication 100 MEQ/KG/HR: at 20:46

## 2024-01-01 RX ADMIN — APIXABAN 5 MILLIGRAM(S): 2.5 TABLET, FILM COATED ORAL at 06:32

## 2024-01-01 RX ADMIN — APIXABAN 5 MILLIGRAM(S): 2.5 TABLET, FILM COATED ORAL at 06:31

## 2024-01-01 RX ADMIN — BUDESONIDE AND FORMOTEROL FUMARATE DIHYDRATE 2 PUFF(S): 160; 4.5 AEROSOL RESPIRATORY (INHALATION) at 05:43

## 2024-01-01 RX ADMIN — OXYCODONE HYDROCHLORIDE 5 MILLIGRAM(S): 5 TABLET ORAL at 01:30

## 2024-01-01 RX ADMIN — Medication 7.5 MG/MIN: at 19:33

## 2024-01-01 RX ADMIN — BUDESONIDE AND FORMOTEROL FUMARATE DIHYDRATE 2 PUFF(S): 160; 4.5 AEROSOL RESPIRATORY (INHALATION) at 18:26

## 2024-01-01 RX ADMIN — HEPARIN SODIUM 10 UNIT(S)/HR: 5000 INJECTION INTRAVENOUS; SUBCUTANEOUS at 19:34

## 2024-01-01 RX ADMIN — PANTOPRAZOLE SODIUM 40 MILLIGRAM(S): 20 TABLET, DELAYED RELEASE ORAL at 05:57

## 2024-01-01 RX ADMIN — APIXABAN 5 MILLIGRAM(S): 2.5 TABLET, FILM COATED ORAL at 18:12

## 2024-01-01 RX ADMIN — Medication 2 MILLIGRAM(S): at 09:05

## 2024-01-01 RX ADMIN — AMIODARONE HYDROCHLORIDE 200 MILLIGRAM(S): 400 TABLET ORAL at 06:20

## 2024-01-01 RX ADMIN — PANTOPRAZOLE SODIUM 40 MILLIGRAM(S): 20 TABLET, DELAYED RELEASE ORAL at 18:37

## 2024-01-01 RX ADMIN — PANTOPRAZOLE SODIUM 40 MILLIGRAM(S): 20 TABLET, DELAYED RELEASE ORAL at 05:11

## 2024-01-01 RX ADMIN — Medication 1 TABLET(S): at 13:28

## 2024-01-01 RX ADMIN — Medication 1 GRAM(S): at 17:55

## 2024-01-01 RX ADMIN — Medication 1 GRAM(S): at 18:26

## 2024-01-01 RX ADMIN — Medication 25 GRAM(S): at 09:58

## 2024-01-01 RX ADMIN — ATORVASTATIN CALCIUM 40 MILLIGRAM(S): 80 TABLET, FILM COATED ORAL at 21:04

## 2024-01-01 RX ADMIN — ATORVASTATIN CALCIUM 40 MILLIGRAM(S): 80 TABLET, FILM COATED ORAL at 22:03

## 2024-01-01 RX ADMIN — APIXABAN 5 MILLIGRAM(S): 2.5 TABLET, FILM COATED ORAL at 17:48

## 2024-01-01 RX ADMIN — Medication 25 MILLIGRAM(S): at 12:26

## 2024-01-01 RX ADMIN — AMIODARONE HYDROCHLORIDE 200 MILLIGRAM(S): 400 TABLET ORAL at 06:00

## 2024-01-01 RX ADMIN — Medication 324 MILLIGRAM(S): at 05:42

## 2024-01-01 RX ADMIN — ATORVASTATIN CALCIUM 40 MILLIGRAM(S): 80 TABLET, FILM COATED ORAL at 21:28

## 2024-01-01 RX ADMIN — VASOPRESSIN 4.5 UNIT(S)/MIN: 20 INJECTION INTRAVENOUS at 20:37

## 2024-01-01 RX ADMIN — BUDESONIDE AND FORMOTEROL FUMARATE DIHYDRATE 2 PUFF(S): 160; 4.5 AEROSOL RESPIRATORY (INHALATION) at 17:19

## 2024-01-01 RX ADMIN — Medication 40 MILLIEQUIVALENT(S): at 18:25

## 2024-01-01 RX ADMIN — Medication 40 MILLIGRAM(S): at 06:31

## 2024-01-01 RX ADMIN — NYSTATIN CREAM 1 APPLICATION(S): 100000 CREAM TOPICAL at 17:18

## 2024-01-01 RX ADMIN — APIXABAN 5 MILLIGRAM(S): 2.5 TABLET, FILM COATED ORAL at 17:57

## 2024-01-01 RX ADMIN — Medication 40 MILLIEQUIVALENT(S): at 22:03

## 2024-01-01 RX ADMIN — Medication 20 MILLIEQUIVALENT(S): at 17:17

## 2024-01-01 RX ADMIN — SODIUM CHLORIDE 1000 MILLILITER(S): 9 INJECTION, SOLUTION INTRAVENOUS at 15:30

## 2024-01-01 RX ADMIN — Medication 2 MILLIGRAM(S): at 02:01

## 2024-01-01 RX ADMIN — Medication 100 GRAM(S): at 04:37

## 2024-01-01 RX ADMIN — Medication 1 GRAM(S): at 06:32

## 2024-01-01 RX ADMIN — SODIUM CHLORIDE 500 MILLILITER(S): 9 INJECTION INTRAMUSCULAR; INTRAVENOUS; SUBCUTANEOUS at 05:40

## 2024-01-01 RX ADMIN — Medication 40 MILLIGRAM(S): at 17:33

## 2024-01-01 RX ADMIN — BUDESONIDE AND FORMOTEROL FUMARATE DIHYDRATE 2 PUFF(S): 160; 4.5 AEROSOL RESPIRATORY (INHALATION) at 17:37

## 2024-01-01 RX ADMIN — BUDESONIDE AND FORMOTEROL FUMARATE DIHYDRATE 2 PUFF(S): 160; 4.5 AEROSOL RESPIRATORY (INHALATION) at 17:52

## 2024-01-01 RX ADMIN — Medication 1 GRAM(S): at 11:44

## 2024-01-01 RX ADMIN — Medication 2 MILLIGRAM(S): at 06:23

## 2024-01-01 RX ADMIN — NYSTATIN CREAM 1 APPLICATION(S): 100000 CREAM TOPICAL at 05:29

## 2024-01-01 RX ADMIN — MORPHINE SULFATE 2 MILLIGRAM(S): 50 CAPSULE, EXTENDED RELEASE ORAL at 00:30

## 2024-01-01 RX ADMIN — ATORVASTATIN CALCIUM 40 MILLIGRAM(S): 80 TABLET, FILM COATED ORAL at 21:26

## 2024-01-01 RX ADMIN — ATORVASTATIN CALCIUM 40 MILLIGRAM(S): 80 TABLET, FILM COATED ORAL at 22:20

## 2024-01-01 RX ADMIN — Medication 1 GRAM(S): at 23:36

## 2024-01-01 RX ADMIN — Medication 40 MILLIEQUIVALENT(S): at 09:42

## 2024-01-01 RX ADMIN — Medication 100 MILLIEQUIVALENT(S): at 06:49

## 2024-01-01 RX ADMIN — Medication 40 MILLIEQUIVALENT(S): at 08:53

## 2024-01-01 RX ADMIN — Medication 40 MILLIGRAM(S): at 12:02

## 2024-01-01 RX ADMIN — HEPARIN SODIUM 10 UNIT(S)/HR: 5000 INJECTION INTRAVENOUS; SUBCUTANEOUS at 12:26

## 2024-01-01 RX ADMIN — Medication 1 GRAM(S): at 12:45

## 2024-01-01 RX ADMIN — DEXMEDETOMIDINE HYDROCHLORIDE IN 0.9% SODIUM CHLORIDE 9.08 MICROGRAM(S)/KG/HR: 4 INJECTION INTRAVENOUS at 20:36

## 2024-01-01 RX ADMIN — Medication 1 GRAM(S): at 17:13

## 2024-01-01 RX ADMIN — NYSTATIN CREAM 1 APPLICATION(S): 100000 CREAM TOPICAL at 05:43

## 2024-01-01 RX ADMIN — Medication 40 MILLIGRAM(S): at 17:51

## 2024-01-01 RX ADMIN — Medication 40 MILLIEQUIVALENT(S): at 02:59

## 2024-01-01 RX ADMIN — ATORVASTATIN CALCIUM 40 MILLIGRAM(S): 80 TABLET, FILM COATED ORAL at 22:24

## 2024-01-01 RX ADMIN — Medication 50 MILLILITER(S): at 22:37

## 2024-01-01 RX ADMIN — PROTHROMBIN COMPLEX CONCENTRATE (HUMAN) 400 INTERNATIONAL UNIT(S): 25.5; 16.5; 24; 22; 22; 26 POWDER, FOR SOLUTION INTRAVENOUS at 18:27

## 2024-01-01 RX ADMIN — Medication 1 GRAM(S): at 05:15

## 2024-01-01 RX ADMIN — Medication 40 MILLIGRAM(S): at 17:18

## 2024-01-01 RX ADMIN — PIPERACILLIN AND TAZOBACTAM 25 GRAM(S): 4; .5 INJECTION, POWDER, LYOPHILIZED, FOR SOLUTION INTRAVENOUS at 22:04

## 2024-01-01 RX ADMIN — PROTHROMBIN COMPLEX CONCENTRATE (HUMAN) 400 INTERNATIONAL UNIT(S): 25.5; 16.5; 24; 22; 22; 26 POWDER, FOR SOLUTION INTRAVENOUS at 16:15

## 2024-01-01 RX ADMIN — Medication 1 TABLET(S): at 13:22

## 2024-01-01 RX ADMIN — Medication 40 MILLIGRAM(S): at 06:32

## 2024-01-01 RX ADMIN — APIXABAN 5 MILLIGRAM(S): 2.5 TABLET, FILM COATED ORAL at 22:24

## 2024-01-01 RX ADMIN — APIXABAN 5 MILLIGRAM(S): 2.5 TABLET, FILM COATED ORAL at 18:26

## 2024-01-01 RX ADMIN — CEFTRIAXONE 100 MILLIGRAM(S): 500 INJECTION, POWDER, FOR SOLUTION INTRAMUSCULAR; INTRAVENOUS at 06:49

## 2024-01-01 RX ADMIN — PANTOPRAZOLE SODIUM 40 MILLIGRAM(S): 20 TABLET, DELAYED RELEASE ORAL at 05:37

## 2024-01-01 RX ADMIN — NYSTATIN CREAM 1 APPLICATION(S): 100000 CREAM TOPICAL at 13:27

## 2024-01-01 RX ADMIN — PANTOPRAZOLE SODIUM 40 MILLIGRAM(S): 20 TABLET, DELAYED RELEASE ORAL at 06:31

## 2024-01-01 RX ADMIN — Medication 2 GRAM(S): at 11:58

## 2024-01-01 RX ADMIN — Medication 1 GRAM(S): at 18:37

## 2024-01-01 RX ADMIN — Medication 400 MILLIGRAM(S): at 17:12

## 2024-01-01 RX ADMIN — CHLORHEXIDINE GLUCONATE 1 APPLICATION(S): 213 SOLUTION TOPICAL at 11:43

## 2024-01-01 RX ADMIN — BUDESONIDE AND FORMOTEROL FUMARATE DIHYDRATE 2 PUFF(S): 160; 4.5 AEROSOL RESPIRATORY (INHALATION) at 17:25

## 2024-01-01 RX ADMIN — Medication 1 GRAM(S): at 12:58

## 2024-01-01 RX ADMIN — Medication 40 MILLIEQUIVALENT(S): at 11:42

## 2024-01-01 RX ADMIN — SODIUM CHLORIDE 100 MILLILITER(S): 9 INJECTION INTRAMUSCULAR; INTRAVENOUS; SUBCUTANEOUS at 18:15

## 2024-01-01 RX ADMIN — PANTOPRAZOLE SODIUM 40 MILLIGRAM(S): 20 TABLET, DELAYED RELEASE ORAL at 05:14

## 2024-01-01 RX ADMIN — AMIODARONE HYDROCHLORIDE 600 MILLIGRAM(S): 400 TABLET ORAL at 02:58

## 2024-01-01 RX ADMIN — PANTOPRAZOLE SODIUM 40 MILLIGRAM(S): 20 TABLET, DELAYED RELEASE ORAL at 06:20

## 2024-01-01 RX ADMIN — Medication 100 MILLIEQUIVALENT(S): at 08:40

## 2024-01-01 RX ADMIN — Medication 40 MILLIGRAM(S): at 05:29

## 2024-01-01 RX ADMIN — BUDESONIDE AND FORMOTEROL FUMARATE DIHYDRATE 2 PUFF(S): 160; 4.5 AEROSOL RESPIRATORY (INHALATION) at 05:05

## 2024-01-01 RX ADMIN — AMIODARONE HYDROCHLORIDE 200 MILLIGRAM(S): 400 TABLET ORAL at 06:12

## 2024-01-01 RX ADMIN — Medication 400 MILLIGRAM(S): at 22:05

## 2024-01-01 RX ADMIN — NYSTATIN CREAM 1 APPLICATION(S): 100000 CREAM TOPICAL at 21:47

## 2024-01-01 RX ADMIN — ATORVASTATIN CALCIUM 40 MILLIGRAM(S): 80 TABLET, FILM COATED ORAL at 21:29

## 2024-01-01 RX ADMIN — Medication 25 MILLIGRAM(S): at 05:29

## 2024-01-01 RX ADMIN — Medication 1 GRAM(S): at 12:04

## 2024-01-01 RX ADMIN — BUDESONIDE AND FORMOTEROL FUMARATE DIHYDRATE 2 PUFF(S): 160; 4.5 AEROSOL RESPIRATORY (INHALATION) at 17:45

## 2024-01-01 RX ADMIN — Medication 100 MILLIEQUIVALENT(S): at 05:38

## 2024-01-01 NOTE — PATIENT PROFILE ADULT - DO YOU FEEL UNSAFE AT HOME, WORK, OR SCHOOL?
Caller: CHAY CASTANEDA    Relationship: Mother    Best call back number: [phone]     What is the best time to reach you: ANY    Who are you requesting to speak with (clinical staff, provider,  specific staff member): CLINICAL    Do you know the name of the person who called: MOM    What was the call regarding: PHARMACY ADVISED PEPCID CAN'T BE PICKED UP TIL 10-23-24. MOM STATES WILL BE OUT BY THIS WEEKEND DUE TO BEING ADVISED UP DOSAGE. PLEASE CALL AND ADVISE WHAT CAN BE DONE    Is it okay if the provider responds through [facility]: YES OR CALL BACK     
  Caller: CHAY CASTANEDA    Relationship: Mother    Best call back number: [phone]    What is the best time to reach you: ANY    Who are you requesting to speak with (clinical staff, provider,  specific staff member): NONE SPECIFIED     What was the call regarding:     PATIENT'S MOTHER WOULD LIKE TO CHECK ON THE STATUS OF THIS REQUEST. PATIENT'S MOTHER STATES SHE HAS BEEN GIVING PATIENT MORE OF THIS MEDICATION SINCE HE GAINED WEIGHT, AND NEEDS A NEW PRESCRIPTION WITH THE INCREASED DOSAGE.     Is it okay if the provider responds through MyChart: YES    
no

## 2024-01-02 PROBLEM — K25.5 PERFORATED GASTRIC ULCER: Status: ACTIVE | Noted: 2024-01-01

## 2024-01-02 PROBLEM — I50.21 ACUTE SYSTOLIC CONGESTIVE HEART FAILURE: Status: ACTIVE | Noted: 2023-01-01

## 2024-01-02 PROBLEM — Z79.01 LONG TERM (CURRENT) USE OF ANTICOAGULANTS: Status: ACTIVE | Noted: 2023-01-01

## 2024-01-02 NOTE — HISTORY OF PRESENT ILLNESS
[FreeTextEntry1] : Amie is doing reasonably well since her last visit and denies any shocks or dizzy spells. Her last episode of VT requiring treatment was 12/10 when she had been off her antiarrhythmic medications for 2 days, due to a perforated gastric ulcer which did not require surgical intervention, but none since then. This episode was pace terminated. She is definitely recovering from her prolonged hospitalization after she experienced cardiac tamponade after an attempted VT ablation by one of my colleagues which eventually required a pericardial window, She has some PAF during that hospital stay but none recently per her device ([employer] AF MRI VR). She is on warfarin. She continues on amiodarone and quinidine without any side effects.

## 2024-01-02 NOTE — DISCUSSION/SUMMARY
[FreeTextEntry1] : I will leave her as is for now and consider an attempt at VT ablation in 3-4 months to let her completely recover from her prolonged hospitalization. She was sent home on protonix 40 mg  bid, though this is not listed on her med sheet here but she states that she is taking it. ECG shows isnus with an IVCD of 142 ms LBBB type but not classic. Device interrogation aside from the episodes mentioned above is within normal. [EKG obtained to assist in diagnosis and management of assessed problem(s)] : EKG obtained to assist in diagnosis and management of assessed problem(s)

## 2024-01-02 NOTE — PHYSICAL EXAM
[Well Developed] : well developed [Well Nourished] : well nourished [No Acute Distress] : no acute distress [Normal Conjunctiva] : normal conjunctiva [Normal Venous Pressure] : normal venous pressure [No Carotid Bruit] : no carotid bruit [Normal S1, S2] : normal S1, S2 [No Murmur] : no murmur [No Rub] : no rub [No Gallop] : no gallop [Clear Lung Fields] : clear lung fields [Good Air Entry] : good air entry [No Respiratory Distress] : no respiratory distress  [Soft] : abdomen soft [Non Tender] : non-tender [No Masses/organomegaly] : no masses/organomegaly [Normal Bowel Sounds] : normal bowel sounds [Normal Gait] : normal gait [No Edema] : no edema [No Cyanosis] : no cyanosis [No Clubbing] : no clubbing [No Varicosities] : no varicosities [No Rash] : no rash [No Skin Lesions] : no skin lesions [Moves all extremities] : moves all extremities [No Focal Deficits] : no focal deficits [Normal Speech] : normal speech [Alert and Oriented] : alert and oriented [Normal memory] : normal memory [de-identified] : mildly reduced right carotid upstroke

## 2024-01-12 NOTE — REASON FOR VISIT
[Arrhythmia/ECG Abnorrmalities] : arrhythmia/ECG abnormalities [Hyperlipidemia] : hyperlipidemia [Hypertension] : hypertension [Coronary Artery Disease] : coronary artery disease [Other: ____] : [unfilled] [Spouse] : spouse [Other: _____] : [unfilled]

## 2024-01-16 NOTE — CARDIOLOGY SUMMARY
[___] : [unfilled] [LVEF ___%] : LVEF [unfilled]% [de-identified] : Subsequently underwent nuclear study which showed only a fixed inferoapical anteroapical defect. [de-identified] : ------------------------------------------------------------------------------------  Echo in 2/2022 demonstrated mild LAE, EF 40-45%, moderate segmental LV systolic dysfunction with hypokinesis of the inferior wall, posterior-lateral wall, basal anterior-ventricular septum and apex, mild concentric LVH. [de-identified] : Cath at [facility]: Patient had elevated troponins which prompted a repeat cardiac catheterization. Report states that the patient had a proximal RCA total obstruction and was being filled by collaterals from the mid LAD. The LAD shows a patent stent and a 50% mid LAD lesion. She also had moderate disease of the left circumflex artery.

## 2024-01-16 NOTE — HISTORY OF PRESENT ILLNESS
[FreeTextEntry1] : Patient presents n for follow up following recent hospitalization s/p VT storm and HF.  Ms. Kohli is 72 y/o F w/ PMHx COPD, HLD, HTN, PVD, ICM/CAD s/p PCI (has  of RCA), cardiac arrest s/p left-sided ICD (6/4/2019) complicated by infection and s/p Rsingle-chamber ICD - [employer] [place] in 9/23/2019), presents in s/p recent admission to [facility] on 10/16/23 and discharged on 11/9/23 for VT storm in the setting of HF. Patients presented with ICD shock in the setting of recurrent monomorphic VT with unsuccessful ATP.  VT Ablation was attempted on 10/20/23 but aborted due to hemodynamically significant pericardial effusion with drain placement.  The pericardial drain was removed on 10/20/23.  Post attempted ablation, patient developed recurrent VT followed by ICD shock. Furthermore, she was found to be in afib w/ RVR and underwent cardioversion 10/21/23. LRL of the device was reduced to 35bpm. Repeat TTE done on 11/8 with trace pericardial effusion   # VT storm : Her last episode of VT requiring treatment was 12/10 when she had been off her antiarrhythmic medications for 2 days, due to a perforated gastric ulcer which did not require surgical intervention, but none since then. This episode was pace terminated. She is definitely recovering from her prolonged hospitalization after she experienced cardiac tamponade after an attempted VT ablation by one of my colleagues which eventually required a pericardial window, She has some PAF during that hospital stay but none recently per her device ([employer] AF MRI VR). She is on warfarin. She continues on amiodarone and quinidine without any side effects. Suppressed with AAD therapy  S/p Amio load while inpatient  Continue Amiodarone 200 mg QD  - Continue metoprolol succinate 25mg daily in AM - plan for VT ablation on 2/9/2024   # PAF : s/p SUPA / CV 10/21/23 Continue Coumadin ( maintain INR 2-3 )  Will discuss transition to Eliquis ( pending complete resolution of pericardial effusion) Continue Quinidine 200 mg q6hrs ( for AF suppression )   #  CAD -  The LAD shows a patent stent and a 50% mid LAD lesion. She also had moderate disease of the left circumflex artery. Subsequently underwent nuclear study which showed only a fixed inferoapical anteroapical defect. Continue Plavix 75 mg QD and Metoprolol Succ 25 mg QD   # Ischemic CMP- S/p ICD  Continue Metoprolol Succ 25 mg QD   c/w Lasix 40 mg daily check daily weights   # LBBB -   ms  TTE (11/9/2023 ) Normal LVEF  # HLD- Continue Atorvastatin 40 mg QD   #  Continue Farxiga

## 2024-01-16 NOTE — DISCUSSION/SUMMARY
[Coronary Artery Disease] : coronary artery disease [Hyperlipidemia] : hyperlipidemia [Hypertension] : hypertension [Stable] : stable [Patient] : the patient [___ Month(s)] : in [unfilled] month(s) [EKG obtained to assist in diagnosis and management of assessed problem(s)] : EKG obtained to assist in diagnosis and management of assessed problem(s) [FreeTextEntry1] : Ms. Kohli is 72 y/o F w/ PMHx COPD, HLD, HTN, PVD, ICM/CAD s/p PCI (has  of RCA), cardiac arrest s/p left-sided ICD (6/4/2019) complicated by infection and s/p Rsingle-chamber ICD - [facility] [place] in 9/23/2019), presents in s/p recent admission to [facility] on 10/16/23 and discharged on 11/9/23 for VT storm in the setting of HF. Patients presented with ICD shock in the setting of recurrent monomorphic VT with unsuccessful ATP.  VT Ablation was attempted on 10/20/23 but aborted due to hemodynamically significant pericardial effusion with drain placement.  The pericardial drain was removed on 10/20/23.  Post attempted ablation, patient developed recurrent VT followed by ICD shock. Furthermore, she was found to be in afib w/ RVR and underwent cardioversion 10/21/23. LRL of the device was reduced to 35bpm. Repeat TTE done on 11/8 with trace pericardial effusion   # VT storm : Her last episode of VT requiring treatment was 12/10 when she had been off her antiarrhythmic medications for 2 days, due to a perforated gastric ulcer which did not require surgical intervention, but none since then. This episode was pace terminated. She is definitely recovering from her prolonged hospitalization after she experienced cardiac tamponade after an attempted VT ablation by one of my colleagues which eventually required a pericardial window, She has some PAF during that hospital stay but none recently per her device (Medtronic AF MRI VR). She is on warfarin. She continues on amiodarone and quinidine without any side effects. Suppressed with AAD therapy  S/p Amio load while inpatient  Continue Amiodarone 200 mg QD  - Continue metoprolol succinate 25mg daily in AM - plan for VT ablation on 2/9/2024   # PAF : s/p SUPA / CV 10/21/23 Continue Coumadin ( maintain INR 2-3 )  Will discuss transition to Eliquis ( pending complete resolution of pericardial effusion) Continue Quinidine 200 mg q6hrs ( for AF suppression )   #  CAD -  The LAD shows a patent stent and a 50% mid LAD lesion. She also had moderate disease of the left circumflex artery. Subsequently underwent nuclear study which showed only a fixed inferoapical anteroapical defect. Continue Plavix 75 mg QD and Metoprolol Succ 25 mg QD   # Ischemic CMP- S/p ICD  Continue Metoprolol Succ 25 mg QD   c/w Lasix 40 mg daily check daily weights   # LBBB -   ms  TTE (11/9/2023 ) Normal LVEF  # HLD- Continue Atorvastatin 40 mg QD   #  Continue Farxiga   # VT storm : Likely in the setting of Volume overload (s/p Diuresis)  Suppressed with AAD therapy  S/p Amio load while inpatient  Continue Amiodarone 200 mg QD  - Continue metoprolol succinate 25mg daily in AM   # PAF : s/p SUPA / CV 10/21/23 Continue Coumadin ( maintain INR 2-3 )  Will discuss transition to Eliquis ( pending complete resolution of pericardial effusion) Continue Quinidine 324 mg QD ( for AF suppression )   #  CAD -  The LAD shows a patent stent and a 50% mid LAD lesion. She also had moderate disease of the left circumflex artery. Subsequently underwent nuclear study which showed only a fixed inferoapical anteroapical defect. Continue Plavix 75 mg QD and Metoprolol Succ 25 mg QD   # Ischemic CMP- S/p ICD  Continue Metoprolol Succ 25 mg QD   c/w Lasix 40 mg daily check daily weights   # LBBB -   ms  TTE (11/9/2023 ) Normal LVEF  # HLD- Continue Atorvastatin 40 mg QD   #  Continue Farxiga  Addendum : Noted TSH 9.9 - Sent request for endocrine eval. ( Harsh Smith) . TY

## 2024-01-28 NOTE — ED ADULT NURSE NOTE - OBJECTIVE STATEMENT
72 y/o F/M PMHx CHF, HTN and CAD on  antiplatelet and [facility]. Pt states. A&Ox4. Denies HA, dizziness, blurry vision. Respirations spontaneous and unlabored. Denies SOB, dyspnea, cough, CP, palpitations. EKG done. On CM, NSR. Denies abd pain, N/V/D/C. Abd soft NT/ND. LMP/BM. Denies urinary symptoms. Denies fever, chills. No sick contacts. Skin intact, warm, dry, normal for race. G L/R . Ambulates at baseline.      lderly female on antiplatelet and anticoagulant meds presents with melena and exertional lightheadness x3 days with known prior GI ulcer. given cardiac hx eval acs/bnp and prepare to transfuse at Hb8 given pt is symptomatic at this time with no conjunctival pallor. no active bleeding at time of interview. protonix, H/H, lytes and admission. given ugib and known ulcer, hold off on additional advanced imaging at this time. screening CXR upright for perforation though low clinical suspicion, and to eval for alt cause for resting sob in the setting of chills 72 y/o F with PMHx CHF, HTN and CAD on  antiplatelet and anticoagulant meds with c/o melena and exertional lightheadedness x3. Pt states she vomited today x1 and had 3 bowel movements thus far. pt also complains of epigastric pain. pt is A&Ox4 able to follow all commands. Pulse, motor, sensation present and equal in all 4 extremities. Denies HA, dizziness, blurry vision. Respirations spontaneous and unlabored on room air.  Denies SOB, dyspnea, cough, CP, palpitations. EKG done. Abd soft NT/ND.  Denies urinary symptoms. Denies fever, chills. No sick contacts. Skin intact, warm, dry, normal for race. Ambulates with wheelchair at baseline.

## 2024-01-28 NOTE — H&P ADULT - NSHPREVIEWOFSYSTEMS_GEN_ALL_CORE
Gen: no loss of wt no loss of appetite  ENT: no dizziness no hearing loss  Ophth: no blurring of vision no loss of vision  Resp: No cough no sputum production  CVS: No chest pain no palpitations no orthopnea  GI: no nausea, vomiting or diarrhea see above HPI   : no dysuria, hematuria  Endo: no polyuria no excessive sweating  Neuro: no weakness no paresthesias  Heme: No petechiae no easy bruising  Msk: No joint pain no swelling  Skin: No rash no itching

## 2024-01-28 NOTE — ED ADULT NURSE NOTE - NSFALLRISKINTERV_ED_ALL_ED

## 2024-01-28 NOTE — CONSULT NOTE ADULT - ASSESSMENT
73 year old female with PMH A fib on Plavix and coumadin, ? perforated gastric ulcer s/p sx repair, presents with melena and exertional lightheadedness x 3 days. Found to have rectal bleeding x 1 in ED.    Assessment  #Melena  #Supratherpeutic INR  #Hx of ?perforated gastric ulcer  - No know EGD or colonoscopy prior  - Black liquid stool on rectal exam: UGIB vs. small bowel bleed vs. R colonic bleed  - Receiving Kcentra in ED  - Hemodynamically stable with stable H&H    Recommendations  - No urgent indication for luminal evaluation while correcting for INR  - Will need eventual EGD +/- colonoscopy when patient is optimized  - Monitor for GIB  - Continue with PPI drip for now  - Transfuse for Hgb < 7 or symptomatic anemia/active bleeding  - Keep active type and screen and 2 large bore IV  - If patient becomes hemodynamically stable, would obtain CTA to localize bleeding source  - Will need cardiac clearance prior to endoscopic evaluation    Note incomplete until finalized by attending signature/attestation.    Madalyn Finley  GI/Hepatology Fellow    MONDAY-FRIDAY 8AM-5PM:  Pager# [phone] ([facility]) or [phone] ([facility])    NON-URGENT CONSULTS:  Please email [email or web address]  AT NIGHT AND ON WEEKENDS:  Contact on-call GI fellow via answering service ([phone]) from 5pm-8am and on weekends/holidays     73 year old female with PMH A fib on Plavix and coumadin, ? perforated gastric ulcer s/p sx repair, presents with melena and exertional lightheadedness x 3 days. Found to have rectal bleeding x 1 in ED.    Assessment  #Melena  #Supratherpeutic INR  #Hx of ?perforated gastric ulcer  - No know EGD or colonoscopy prior  - Black liquid stool on rectal exam: UGIB vs. small bowel bleed vs. R colonic bleed  - Receiving Kcentra in ED  - Hemodynamically stable with stable H&H    Recommendations  - No urgent indication for luminal evaluation while correcting for INR  - Will need eventual EGD +/- colonoscopy when patient is optimized  - Monitor for GIB  - Continue with PPI drip for now  - Transfuse for Hgb < 7 or symptomatic anemia/active bleeding  - Keep active type and screen and 2 large bore IV  - If patient becomes hemodynamically unstable, would obtain CTA to localize bleeding source  - Will need cardiac clearance prior to endoscopic evaluation    Note incomplete until finalized by attending signature/attestation.    Madalyn Finley  GI/Hepatology Fellow    MONDAY-FRIDAY 8AM-5PM:  Pager# [phone] ([facility]) or [phone] ([facility])    NON-URGENT CONSULTS:  Please email [email or web address]  AT NIGHT AND ON WEEKENDS:  Contact on-call GI fellow via answering service ([phone]) from 5pm-8am and on weekends/holidays     73 year old female with COPD, HTN, PAD, CAD s/p LAD stent, chronic leukocytosis of unclear etiol, cardiac arrest s/p left-sided ICD (6/4/2019) complicated by infection and s/p R single-chamber ICD reimplantation ([place] in 9/23/2019), HFrEF (EF 45%), recent hospital stay for VF sp ablation c/b pericardial effusion and tamponade s/p pericardiocentesis and pericardial window and AFib on Plavix and coumadin, ? perforated gastric ulcer s/p sx repair, presents with melena and exertional lightheadedness x 3 days. Found to have rectal bleeding x 1 in ED.     Assessment  #Melena  #Supratherpeutic INR  #Hx of ?perforated gastric ulcer  #Extensive cardiac hx  - No know EGD or colonoscopy prior  - Black liquid stool on rectal exam: UGIB vs. small bowel bleed vs. R colonic bleed  - Receiving Kcentra in ED  - Hemodynamically stable with stable H&H    Recommendations  - No urgent indication for luminal evaluation while correcting for INR  - Will need eventual EGD +/- colonoscopy when patient is optimized  - Monitor for GIB  - Continue with PPI drip for now  - Transfuse for Hgb < 7 or symptomatic anemia/active bleeding  - Keep active type and screen and 2 large bore IV  - If patient becomes hemodynamically unstable, would obtain CTA to localize bleeding source  - Will need cardiac clearance prior to endoscopic evaluation    Note incomplete until finalized by attending signature/attestation.    Madalyn Finley  GI/Hepatology Fellow    MONDAY-FRIDAY 8AM-5PM:  Pager# [phone] ([facility]) or [phone] ([facility])    NON-URGENT CONSULTS:  Please email [email or web address]  AT NIGHT AND ON WEEKENDS:  Contact on-call GI fellow via answering service ([phone]) from 5pm-8am and on weekends/holidays

## 2024-01-28 NOTE — H&P ADULT - PROBLEM SELECTOR PLAN 1
likely secondary to increased INR possibly contributed to PUD  IV pantoprazole BID  GI consultation in progress  continue to monitor hemoglobin   stat CBC now  transfusion if hemoglobin < 9 likely secondary to increased INR possibly contributed to PUD  IV pantoprazole BID  GI consultation in progress  continue to monitor hemoglobin   stat CBC repeat hemoglobin 10 no transfusion at present   no need for urgent EGD  transfusion if hemoglobin < 9

## 2024-01-28 NOTE — H&P ADULT - PROBLEM SELECTOR PLAN 4
continue statin hold all anticoagulation  patient was supposed to have ablation 1/30  will discussed with EP in AM

## 2024-01-28 NOTE — H&P ADULT - PROBLEM SELECTOR PLAN 6
BP soft side  hold all BP meds except metoprolol with hold parameters chest pain free  hold clopidogrel

## 2024-01-28 NOTE — ED PROVIDER NOTE - CARE PLAN
Principal Discharge DX:	Upper GI bleed   1 Principal Discharge DX:	Upper GI bleed  Secondary Diagnosis:	Supratherapeutic INR

## 2024-01-28 NOTE — ED PROVIDER NOTE - CLINICAL SUMMARY MEDICAL DECISION MAKING FREE TEXT BOX
elderly female on antiplatelet and anticoagulant meds presents with melena and exertional lightheadness x3 days with known prior GI ulcer. given cardiac hx eval acs/bnp and prepare to transfuse at Hb8 given pt is symptomatic at this time with no conjunctival pallor. no active bleeding at time of interview. protonix, H/H, lytes and admission. given ugib and known ulcer, hold off on additional advanced imaging at this time. screening CXR upright for perforation though low clinical suspicion, and to eval for alt cause for resting sob in the setting of chills. elderly female on plavix and coumadin (dose doubled ~5d ago from 6 to 12mg) presents with melena and exertional lightheadness x3 days with known prior GI ulcer. given cardiac hx eval acs/bnp and prepare to transfuse at Hb8 given pt is symptomatic at this time with no conjunctival pallor. no active bleeding at time of interview. protonix, H/H, lytes and admission. given ugib and known ulcer, hold off on additional advanced imaging at this time. screening CXR upright for perforation though low clinical suspicion, and to eval for alt cause for resting sob in the setting of chills.

## 2024-01-28 NOTE — H&P ADULT - ASSESSMENT
73 year old female with PMH A fib on Plavix and coumadin presents with melena and exertional lightheadedness  x 3 days with known prior GI ulcer clinical presentation consistent with likely upper GI bleed

## 2024-01-28 NOTE — ED PROCEDURE NOTE - ATTENDING CONTRIBUTION TO CARE
I, Jonathan Lai, was present for the supervision of the described procedure, and provided assistance in its completion as needed.

## 2024-01-28 NOTE — ED PROVIDER NOTE - ATTENDING CONTRIBUTION TO CARE
Hx: pt on Plavix and coumadin presents with  at bedside with black loose stool, dry heaves, nausea, malaise.  No syncope.  Prior h/o ulcers.      Vitals reviewed, Head Atraumatic, PERRL, EOMI, moist mucous membranes, neck supple, no signs of meningitis, chest clear to ausculation, cardiac regular rate and rhythym, Abdomen soft and nontender, Extremities well perfused and without edema, Skin intact, Psych normal mood, Neuro nonfocal.     MDM: GIB concern, check cbc r/o anemia or leukocytosis, check bmp to r/o metabolic derangement and lyte imbalance, type, at high risk for critical GIB, will need inpatient for serial H&H, PPI, and possible transfusion if anemic and possible GI intervention if worsening bleed.

## 2024-01-28 NOTE — ED ADULT TRIAGE NOTE - NS ED NURSE DIRECT TO ROOM YN
"   History and Physical      Patient Name: Frederick Weems   Patient ID: [id number]   Sex: Male   YOB: 1982    Primary Care Provider: Garfield Viveros MD   Referring Provider: Garfield Viveros MD    Visit Date: 2021    Provider: Emily Bazan MD   [place] [facility]   Location Address: [address] [place] [address]  [id number]   Location Phone: [phone]          Chief Complaint  · Outpatient History & Physical / Surgical Orders      History Of Present Illness  [facility]  Outpatient History and Physical Surgical Orders    Which Facility: [facility] Date: 2021   Patient's Name: Frederick Weems YOB: 1982   Chief complaint/history present illness: urinary retention, hydronephrosis   Current Medication List: metoprolol succinate 50 mg oral tablet extended release 24 hr   Allergies: NO KNOWN DRUG ALLERGIES and PENICILLINS   Significant past medical: HBP (high blood pressure), High blood pressure, and Prostate Disorder   Past Surgical History: *I have had no surgeries and *No Past Surgical History   Examination of heart and lungs: Breath sounds normal, no distress, Abdomen soft, non-tender, BSx4 are positive, and Regular rate, no chest retractions         Allergy List    Allergies Reconciled  Vitals  Date Time BP Position Site L\R Cuff Size HR RR TEMP (F) WT  HT  BMI kg/m2 BSA m2 O2 Sat FR L/min FiO2 HC       2021 02:13 PM       12  144lbs 6oz 5'  10\" 20.72 1.8                 Results       [facility]      PACS RADIOLOGY REPORT    Patient: FREDERICK WEEMS Acct: #[id number] Report: #[id number]    UNIT #: [id number]  DOS: 03/15/21 1415  INSURANCE:SELF PAY INSURANCE ORDER #:[id number]  LOCATION:[facility]   : 1982    PROVIDERS  ADMITTING:   ATTENDING: EMILY BAZAN  FAMILY:  NONE,MD ORDERING:  EMILY BAZAN   OTHER:  DICTATING:  Aleksandr Marks" No MD TOPETE #:[phone]   EXAM:ABDPELWO - CT ABDOMEN PELVIS wo CONTRAST  REASON FOR EXAM:  URINARY RENTENION,LYDIA,PROSTETITIS  REASON FOR VISIT:  URINARY RENTENION,LYDIA,PROSTETITIS    *******Signed******     PROCEDURE: CT ABDOMEN PELVIS WITHOUT CONTRAST     COMPARISON: [facility], CT, ABD PEL W/O CONTRAST, 8/31/2016, 11:10.     INDICATIONS: URINARY RENTENION,ACUTE KIDNEY INJURY, PROSTATITIS     TECHNIQUE: CT images were created without intravenous contrast.       PROTOCOL:   Standard imaging protocol performed      RADIATION:   DLP: 653mGy*cm    Automated exposure control was utilized to minimize radiation dose.      FINDINGS:   The lung bases are clear bilaterally.     The liver, gallbladder, spleen, pancreas and adrenal glands appear unremarkable.  Mild right and   mild-to-moderate left hydronephrosis is noted.  Severe distal hydroureter is noted bilaterally.    The urinary bladder demonstrates diffuse, marked wall thickening.  A similar appearance was noted   on the 8/31/2016 exam.  A Jennings catheter has been placed in good position.  The prostate and   seminal vesicles appear within normal limits.  No adenopathy or free fluid is seen in the abdomen   or pelvis.     No acute bowel abnormality is seen.  The appendix appears normal.  The lack of oral contrast limits   evaluation of the bowel.     No focal osseous lesion is seen.     CONCLUSION:   1. Diffuse marked urinary bladder wall thickening likely secondary to cystitis.  Suggest   correlation with cystoscopy.  2. Evidence of obstruction at the ureterovesicle junctions bilaterally with mild right and   mild-to-moderate left hydronephrosis.  The hydronephrosis is new since 8/31/2016.            Aleksandr Marks M.D.         Electronically Signed and Approved By: Aleksandr Marks M.D. on 3/15/2021 at 15:12                     Until signed, this is an unconfirmed preliminary report that may contain  errors and is subject to change.                 WURRO:  D:03/15/21 1512         Assessment  · Pre-Surgical Orders     V72.84  · Urinary retention     788.20/R33.9  · Hydronephrosis     591/N13.30  · LYDIA (acute kidney injury)     584.9/N17.9    Problems Reconciled  Plan  · Orders  o General Urology Surgery Order ([facility]) - V72.84, 788.20/R33.9, 591/N13.30 - 03/19/2021  o CBC (39317) - 788.20/R33.9, 591/N13.30, 584.9/N17.9 - 03/19/2021  o BMP ([id number]) - 591/N13.30, 584.9/N17.9 - 03/19/2021  · Medications  o Medications have been Reconciled  o Transition of Care or Provider Policy  · Instructions  o Pre-Operative Orders: Sign permit for cystoscopy, bilateral retrograde, possible bilateral stent placement, possible bladder biopsy  o Outpatient   o Case status is Urgent; no covid test necessary  o labs in preop  o Levaquin 500 mg IV OCTOR.  o RISK AND BENEFITS:  o Possible risks/complications, benefits and alternatives to surgical or invasive procedure have been explained to patient and/or legal guardian.  o Electronically Identified Patient Education Materials Provided Electronically            Electronically Signed by: Emily Miller MD -Author on March 18, 2021 04:44:02 PM

## 2024-01-28 NOTE — ED PROVIDER NOTE - PROGRESS NOTE DETAILS
Cynthia Ferraro ([place]rig[place]) PGY3 received critical value call from lab for INR 10. pt and family aware. VitK and kcentra ordered. 1 bloody bowel movement in ED. Cynthia Ferraro ([place]) PGY3 pt endorsed to Dr. Nuñez for admission. accepts to his service.

## 2024-01-28 NOTE — H&P ADULT - PROBLEM SELECTOR PLAN 3
hold all anticoagulation  patient was supposed to have ablation 1/30  will discussed with EP in AM s/p Vit K and K centra in Emergency Department  continue to follow INR

## 2024-01-28 NOTE — ED PROVIDER NOTE - PHYSICAL EXAMINATION
General: non-toxic, NAD  HEENT: NCAT, PERRL, oropharyngeal AW patent, no conjunctival pallor   Cardiac: RRR, no murmurs, weak LE peripheral pulses bilaterally. brisk cap refill   Resp: CTAB, sob with conversation  Abdomen: soft, non-distended, bowel sounds present, +epigastric ttp, no rebound or guarding. no organomegaly  Extremities: no peripheral edema, calf tenderness, or leg size discrepancies  Skin: LE hemosiderin deposits. cheeks sanguine.   Neuro: AAOx4, 5+motor, sensation grossly intact  Psych: mood and affect appropriate

## 2024-01-28 NOTE — H&P ADULT - NSHPPHYSICALEXAM_GEN_ALL_CORE
PHYSICAL EXAM: vital signs noted on [place]  in no apparent distress  HEENT: MALLIKA EOMI  Neck: Supple, no JVD, no thyromegaly  Lungs: no wheeze, no crackles  CVS: S1 S2 no M/R/G  Abdomen: no tenderness, no organomegaly, BS present  Neuro: AO x 3 no focal weakness, no sensory abnormalities  Psych: appropriate affect  Skin: warm, dry  Ext: no cyanosis or clubbing, no edema  Msk: no joint swelling or deformities  Back: no CVA tenderness, no kyphosis/scoliosis

## 2024-01-28 NOTE — H&P ADULT - HISTORY OF PRESENT ILLNESS
73 year old female with PMH A fib on plavix and coumadin presents with melena and exertional lightheadedness  x 3 days with known prior GI ulcer. She and her  state that her coumadin dose was doubled by outpatient cardiology attending from 6 to 12 mg and then she started developing these symptoms. No hematemesis. She is asymptomatic at this time with no conjunctival pallor. no active bleeding. As per Emergency Department staff one episode of black stool in the Emergency Department . 73 year old female with PMH A fib on Plavix and coumadin presents with melena and exertional lightheadedness  x 3 days with known prior GI ulcer. She and her  state that her coumadin dose was doubled by outpatient cardiology attending from 6 to 12 mg and then she started developing these symptoms. No hematemesis. She is asymptomatic at this time with no conjunctival pallor. no active bleeding. As per Emergency Department staff one episode of black stool in the Emergency Department .

## 2024-01-28 NOTE — H&P ADULT - NSHPADDITIONALINFOADULT_GEN_ALL_CORE
discussed with patient in detail, expresses understanding of treatment plans.  discussed with  over the phone

## 2024-01-28 NOTE — CONSULT NOTE ADULT - SUBJECTIVE AND OBJECTIVE BOX
HPI:  AUSTIN LEE is a 73 year old female with PMH A fib on Plavix and coumadin, ? perforated gastric ulcer s/p sx repair, presents with melena and exertional lightheadedness x 3 days. Found to have rectal bleeding x 1 in ED.  Reported to have coumadin dose doubled by outpatient cardiology from 2-3 mg to 10 mg and then she started developing burning epigastric pain with eating. No N/V or hematemesis. Had a normal brown soft BM yesterday. No recent episodes of GIB.  After came to ED, had 1 BM that was dark and loose. No BRBPR.     Denied chest pain, dizziness, SOB at rest or abdominal pain at this time.     PMHX/PSHX:    No pertinent past medical history    Obese    Smoker    Obesity    HTN (hypertension)    HLD (hyperlipidemia)    CAD (coronary artery disease)    CHF with cardiomyopathy    No significant past surgical history    No significant past surgical history    History of hip surgery      Allergies:  No Known Allergies      Home Medications: reviewed  Hospital Medications:  aMIOdarone    Tablet 200 milliGRAM(s) Oral daily  atorvastatin 40 milliGRAM(s) Oral at bedtime  budesonide 160 MICROgram(s)/formoterol 4.5 MICROgram(s) Inhaler 2 Puff(s) Inhalation two times a day  metoprolol succinate ER 25 milliGRAM(s) Oral daily  multivitamin 1 Tablet(s) Oral daily  pantoprazole  Injectable 40 milliGRAM(s) IV Push two times a day      Social History:   Tobacco: denies  Alcohol: denies  Recreational drugs: denies    Family history:    Family history of Alzheimer's disease (Father)    Family history of cancer (Mother)    Denies family history of GI-related cancers    PHYSICAL EXAM:   Vital Signs:  Vital Signs Last 24 Hrs  T(C): 36.9 (28 Jan 2024 19:50), Max: 36.9 (28 Jan 2024 12:02)  T(F): 98.4 (28 Jan 2024 19:50), Max: 98.4 (28 Jan 2024 12:02)  HR: 69 (28 Jan 2024 19:50) (69 - 77)  BP: 111/56 (28 Jan 2024 19:50) (111/56 - 137/74)  BP(mean): 75 (28 Jan 2024 19:50) (75 - 75)  RR: 17 (28 Jan 2024 19:50) (16 - 17)  SpO2: 94% (28 Jan 2024 19:50) (94% - 99%)    Parameters below as of 28 Jan 2024 19:50  Patient On (Oxygen Delivery Method): room air      Daily Height in cm: 157.48 (28 Jan 2024 12:02)    Daily     GENERAL: no acute distress  NEURO: alert and oriented x 3  HEENT: NCAT, no conjunctival pallor appreciated; anicteric sclera  CHEST: no respiratory distress, no accessory muscle use  CARDIAC: regular rate, +S1/S2  ABDOMEN: soft, nontender, no rebound or guarding; rectal exam performed with RN: Normal rectal tone and coordination; No hard stool in the rectum; Black liquid stool on gloved finger.   EXTREMITIES: warm, well perfused  SKIN: no lesions noted    LABS: reviewed                        11.7   16.76 )-----------( 354      ( 28 Jan 2024 14:51 )             37.6     01-28    143  |  105  |  38<H>  ----------------------------<  106<H>  4.0   |  23  |  1.08    Ca    9.1      28 Jan 2024 14:51  Mg     2.1     01-28    TPro  6.8  /  Alb  3.3  /  TBili  0.3  /  DBili  x   /  AST  20  /  ALT  18  /  AlkPhos  68  01-28    LIVER FUNCTIONS - ( 28 Jan 2024 14:51 )  Alb: 3.3 g/dL / Pro: 6.8 g/dL / ALK PHOS: 68 U/L / ALT: 18 U/L / AST: 20 U/L / GGT: x                    HPI:  AUSTIN LEE is a 73 year old female with COPD, HTN, PAD, CAD s/p LAD stent, chronic leukocytosis of unclear etiol, cardiac arrest s/p left-sided ICD (6/4/2019) complicated by infection and s/p R single-chamber ICD reimplantation ([place] in 9/23/2019), HFrEF (EF 45%), recent hospital stay for VF sp ablation c/b pericardial effusion and tamponade s/p pericardiocentesis and pericardial window and AFib on Plavix and coumadin, ? perforated gastric ulcer s/p sx repair, presents with melena and exertional lightheadedness x 3 days. Found to have rectal bleeding x 1 in ED.  Reported to have coumadin dose doubled by outpatient cardiology from 2-3 mg to 10 mg and then she started developing burning epigastric pain with eating. No N/V or hematemesis. Had a normal brown soft BM yesterday. No recent episodes of GIB.  After came to ED, had 1 BM that was dark and loose. No BRBPR.     Denied chest pain, dizziness, SOB at rest or abdominal pain at this time.     PMHX/PSHX:    No pertinent past medical history    Obese    Smoker    Obesity    HTN (hypertension)    HLD (hyperlipidemia)    CAD (coronary artery disease)    CHF with cardiomyopathy    No significant past surgical history    No significant past surgical history    History of hip surgery      Allergies:  No Known Allergies      Home Medications: reviewed  Hospital Medications:  aMIOdarone    Tablet 200 milliGRAM(s) Oral daily  atorvastatin 40 milliGRAM(s) Oral at bedtime  budesonide 160 MICROgram(s)/formoterol 4.5 MICROgram(s) Inhaler 2 Puff(s) Inhalation two times a day  metoprolol succinate ER 25 milliGRAM(s) Oral daily  multivitamin 1 Tablet(s) Oral daily  pantoprazole  Injectable 40 milliGRAM(s) IV Push two times a day      Social History:   Tobacco: denies  Alcohol: denies  Recreational drugs: denies    Family history:    Family history of Alzheimer's disease (Father)    Family history of cancer (Mother)    Denies family history of GI-related cancers    PHYSICAL EXAM:   Vital Signs:  Vital Signs Last 24 Hrs  T(C): 36.9 (28 Jan 2024 19:50), Max: 36.9 (28 Jan 2024 12:02)  T(F): 98.4 (28 Jan 2024 19:50), Max: 98.4 (28 Jan 2024 12:02)  HR: 69 (28 Jan 2024 19:50) (69 - 77)  BP: 111/56 (28 Jan 2024 19:50) (111/56 - 137/74)  BP(mean): 75 (28 Jan 2024 19:50) (75 - 75)  RR: 17 (28 Jan 2024 19:50) (16 - 17)  SpO2: 94% (28 Jan 2024 19:50) (94% - 99%)    Parameters below as of 28 Jan 2024 19:50  Patient On (Oxygen Delivery Method): room air      Daily Height in cm: 157.48 (28 Jan 2024 12:02)    Daily     GENERAL: no acute distress  NEURO: alert and oriented x 3  HEENT: NCAT, no conjunctival pallor appreciated; anicteric sclera  CHEST: no respiratory distress, no accessory muscle use  CARDIAC: regular rate, +S1/S2  ABDOMEN: soft, nontender, no rebound or guarding; rectal exam performed with RN: Normal rectal tone and coordination; No hard stool in the rectum; Black liquid stool on gloved finger.   EXTREMITIES: warm, well perfused  SKIN: no lesions noted    LABS: reviewed                        11.7   16.76 )-----------( 354      ( 28 Jan 2024 14:51 )             37.6     01-28    143  |  105  |  38<H>  ----------------------------<  106<H>  4.0   |  23  |  1.08    Ca    9.1      28 Jan 2024 14:51  Mg     2.1     01-28    TPro  6.8  /  Alb  3.3  /  TBili  0.3  /  DBili  x   /  AST  20  /  ALT  18  /  AlkPhos  68  01-28    LIVER FUNCTIONS - ( 28 Jan 2024 14:51 )  Alb: 3.3 g/dL / Pro: 6.8 g/dL / ALK PHOS: 68 U/L / ALT: 18 U/L / AST: 20 U/L / GGT: x

## 2024-01-28 NOTE — H&P ADULT - PROBLEM SELECTOR PLAN 2
s/p Vit K and K centra in Emergency Department  continue to follow INR unclear etiology  CXR left LL atelectasis  will order CT chest non-con to r/o pneumonia   hold antibiotics

## 2024-01-29 NOTE — CHART NOTE - NSCHARTNOTEFT_GEN_A_CORE
Alerted by nurse patient feeling dizzy with abdominal pain after eating. Patient seen and assessed at bedside, A&Ox3,  tearful but in NAD. Patient states she had dinner and shortly after drinking Giner-yudelka she started feeling sharp upper abdominal pain rated 6/10, with associated dizziness. Denies any radiation. Patient describes dizziness as lightheadedness. Patient states she has had dizziness in the past and usually resolve on their own within a few seconds. Patient currently states all her symptoms are resolved at the moment. Denies chest pain, palpitations, headaches, blurred vision, NVDC.     Vital Signs Last 24 Hrs  T(C): 36.7 (29 Jan 2024 21:46), Max: 37.1 (29 Jan 2024 08:30)  T(F): 98 (29 Jan 2024 21:46), Max: 98.8 (29 Jan 2024 08:30)  HR: 77 (29 Jan 2024 21:46) (60 - 77)  BP: 145/56 (29 Jan 2024 21:46) (107/48 - 145/56)  BP(mean): --  RR: 18 (29 Jan 2024 21:46) (18 - 18)  SpO2: 94% (29 Jan 2024 21:46) (93% - 100%)    Parameters below as of 29 Jan 2024 21:46  Patient On (Oxygen Delivery Method): room air    Physical Exam:  General: WN/WD NAD  Neurology: A&Ox3, nonfocal, UPTON x 4  Head:  Normocephalic, atraumatic  Respiratory: CTA B/L  CV: RRR, S1S2, no murmur  Abdominal: Soft, NT, ND no palpable mass  MSK: No edema, + peripheral pulses, FROM all 4 extremity    Labs:                      9.6    15.24 )-----------( 300      ( 29 Jan 2024 22:44 )             30.5     01-28    143  |  105  |  38<H>  ----------------------------<  106<H>  4.0   |  23  |  1.08    Ca    9.1      28 Jan 2024 14:51  Mg     2.1     01-28    TPro  6.8  /  Alb  3.3  /  TBili  0.3  /  DBili  x   /  AST  20  /  ALT  18  /  AlkPhos  68  01-28      HPI:  73 year old female with PMH A fib on Plavix and coumadin presents with melena and exertional lightheadedness  x 3 days with known prior GI ulcer. She and her  state that her coumadin dose was doubled by outpatient cardiology attending from 6 to 12 mg and then she started developing these symptoms. No hematemesis. She is asymptomatic at this time with no conjunctival pallor. no active bleeding. As per Emergency Department staff one episode of black stool in the Emergency Department . (28 Jan 2024 20:44)    Patient now presenting with dizziness    Assessment & Plan:  Dizziness likely 2/2 pain   >Tylenol ordered for pain, with subsequent resolution of abdominal pain and dizziness. Will continue to monitor and assess pain.  >EKG obtained showing NSR, unchanged from previous EKG  >Ordered POCT glucose finger stick   >Ordered CBC, BMP due to symptomatic presentation and history of GIB as well as left sided ICD, however VSS. Will replete as needed.   >Can consider CT head if symptoms persist or re-occur however, nonfocal neuro exam   >Will endorse primary team in the [facility] PA-C  Department of Medicine

## 2024-01-29 NOTE — CHART NOTE - NSCHARTNOTEFT_GEN_A_CORE
Chart reviewed.    Patient seen in AM, no further episodes of GIB and no BM since yesterday.   VSS. INR is 1.83 after Kcentra correction. H&H relatively stable.   Benign abdominal exam.    Plan:  - Appreciate cardiac clearance prior to endoscopic evaluation  - May plan for EGD in next 1-2 days given recent Plavix dosing  - Inpatient vs. outpatient colonoscopy pending clinical course  - Diet as tolerated for now  - Continue high dose PPI, BID dosing is acceptable Chart reviewed.    Patient seen in AM, no further episodes of GIB and no BM since yesterday.   VSS. INR is 1.83 after Kcentra correction. H&H relatively stable.   Benign abdominal exam.    Plan:  - Appreciate cardiac clearance prior to endoscopic evaluation  - May plan for EGD in next 1-2 days given recent Plavix dosing  - Inpatient vs. outpatient colonoscopy pending clinical course  - Diet as tolerated for now  - Continue high dose PPI, BID dosing is acceptable    GI attending progress note    As above.    Patient seen and examined on 1/29/2024 in the afternoon.    Impression:    #1.  GI bleed melena and acute blood loss anemia    #2.  Supratherapeutic INR 10, corrected with Kcentra    #3.  CAD status post stent , history of cardiac arrest AICD in 2019 heart failure with reduced ejection fraction of 45%, ventricular fibrillation status post ablation    #4.  Atrial fibrillation on Coumadin    Recommendations:    #1.  Follow CBC/INR    #2.  Protonix 40 mg IV BID    #3.  Cardiology risk stratification and optimization     #4.  Hold anticoagulation and Plavix with cardiology permission.      #5.  N.p.o. past midnight 1/29/2024 for upper endoscopy 1/30/2024

## 2024-01-29 NOTE — PATIENT PROFILE ADULT - FALL HARM RISK - UNIVERSAL INTERVENTIONS
Bed in lowest position, wheels locked, appropriate side rails in place/Call bell, personal items and telephone in reach/Instruct patient to call for assistance before getting out of bed or chair/Non-slip footwear when patient is out of bed/[place] to call system/Physically safe environment - no spills, clutter or unnecessary equipment/Purposeful Proactive Rounding/Room/bathroom lighting operational, light cord in reach

## 2024-01-30 NOTE — CONSULT NOTE ADULT - REASON FOR ADMISSION
black stool and supra-therapeutic INR

## 2024-01-30 NOTE — CONSULT NOTE ADULT - SUBJECTIVE AND OBJECTIVE BOX
CHIEF COMPLAINT:    HISTORY OF PRESENT ILLNESS:      Allergies    No Known Allergies    Intolerances    	    MEDICATIONS:  aMIOdarone    Tablet 200 milliGRAM(s) Oral daily  metoprolol succinate ER 25 milliGRAM(s) Oral daily      budesonide 160 MICROgram(s)/formoterol 4.5 MICROgram(s) Inhaler 2 Puff(s) Inhalation two times a day      pantoprazole  Injectable 40 milliGRAM(s) IV Push two times a day    atorvastatin 40 milliGRAM(s) Oral at bedtime    influenza  Vaccine (HIGH DOSE) 0.7 milliLiter(s) IntraMuscular once  multivitamin 1 Tablet(s) Oral daily  nystatin Powder 1 Application(s) Topical three times a day  potassium chloride    Tablet ER 20 milliEquivalent(s) Oral every 2 hours      PAST MEDICAL & SURGICAL HISTORY:  Obese      Smoker      HTN (hypertension)      HLD (hyperlipidemia)      CAD (coronary artery disease)      CHF with cardiomyopathy      History of hip surgery          FAMILY HISTORY:  Family history of Alzheimer's disease (Father)    Family history of cancer (Mother)        SOCIAL HISTORY:    [ ] Non-smoker  [ ] Smoker  [ ] Alcohol      REVIEW OF SYSTEMS:  See HPI. Otherwise, 12 point ROS done and otherwise negative.    PHYSICAL EXAM:  T(C): 36.8 (01-30-24 @ 12:39), Max: 36.8 (01-30-24 @ 12:39)  HR: 56 (01-30-24 @ 12:39) (56 - 77)  BP: 148/77 (01-30-24 @ 12:39) (107/48 - 148/77)  RR: 18 (01-30-24 @ 12:39) (18 - 18)  SpO2: 97% (01-30-24 @ 12:39) (94% - 98%)  Wt(kg): --  I&O's Summary    29 Jan 2024 07:01  -  30 Jan 2024 07:00  --------------------------------------------------------  IN: 490 mL / OUT: 900 mL / NET: -410 mL        Appearance: Normal	  HEENT: PERRL, EOMI	  Cardiovascular: Normal S1 S2, No JVD, No murmurs, No edema  Respiratory: Lungs clear to auscultation	  Psychiatry: A & O x 3, Mood & affect appropriate  Gastrointestinal:  Soft, Non-tender, + BS	  Skin: No rashes, No ecchymoses, No cyanosis	  Neurologic: Grossly intact  Extremities: No clubbing, cyanosis or edema  Vascular: Peripheral pulses palpable 2+ bilaterally        LABS:	 	    CBC Full  -  ( 30 Jan 2024 12:08 )  WBC Count : 13.07 K/uL  Hemoglobin : 9.5 g/dL  Hematocrit : 30.4 %  Platelet Count - Automated : 299 K/uL  Mean Cell Volume : 90.7 fl  Mean Cell Hemoglobin : 28.4 pg  Mean Cell Hemoglobin Concentration : 31.3 gm/dL  Auto Neutrophil # : x  Auto Lymphocyte # : x  Auto Monocyte # : x  Auto Eosinophil # : x  Auto Basophil # : x  Auto Neutrophil % : x  Auto Lymphocyte % : x  Auto Monocyte % : x  Auto Eosinophil % : x  Auto Basophil % : x    01-30    146<H>  |  107  |  25<H>  ----------------------------<  102<H>  3.5   |  26  |  0.97  01-29    143  |  106  |  28<H>  ----------------------------<  124<H>  3.5   |  25  |  1.10    Ca    8.9      30 Jan 2024 08:00  Ca    8.9      29 Jan 2024 22:44  Mg     2.1     01-28    TPro  6.8  /  Alb  3.3  /  TBili  0.3  /  DBili  x   /  AST  20  /  ALT  18  /  AlkPhos  68  01-28      proBNP:   Lipid Profile:   HgA1c:   TSH:       CARDIAC MARKERS:                TELEMETRY: 	    ECG:  	  RADIOLOGY:  OTHER: 	    PREVIOUS DIAGNOSTIC TESTING:    [ ] Echocardiogram:  [ ]  Catheterization:  [ ] Stress Test:  	  	  ASSESSMENT/PLAN: 	     CHIEF COMPLAINT:    HISTORY OF PRESENT ILLNESS:  72 y/o female w/ PMHx COPD, paroxysmal AF (on Coumadin), HLD, HTN, PVD, ICM/CAD s/p PCI (has  of RCA), cardiac arrest s/p left-sided ICD (6/4/2019) complicated by infection and s/p R sided [employer] single-chamber ICD ([place] in 9/23/2019), prior admissions for VT storm with adjustment of medication and NIPS (on amiodarone 200mg QD and quinidine gluconate ER 325mg Q8hr), prior ICD shock in the setting of recurrent monomorphic VT with unsuccessful ATP, VT ablation attempted 10/20/23 but aborted due to hemodynamically significant pericardial effusion with drain placement. Course further c/b recurrent VT s/p ICD shock, and afib w/ RVR s/p cardioversion 10/21/23 s/p pericardial window 10/28/23. Pt had recent admission 11/25/23 for perforated gastric ulcer with pneumoperitoneum - sealed 11/26 on UGI series. Recurrent VT resulted in ICD shock 11/2023 due to her anti-arrhymic drugs (AAD )were held in setting of her being NPO. Now presents with black stool and supra-therapeutic INR (10) in the setting of coumadin dose doubled. EP consulted for bacteremia growing staphylococcus 1 out of 4 bottles from 1/28/24. Hospital course complicated by patient had an episode of syncope last night while on [facility] (non tele floor), ICD interrogation showed patient had recurrent VT treated with ATPs and ICD shock x 1. Of note, quinidine was held since admission.     Allergies    No Known Allergies    Intolerances    	    MEDICATIONS:  aMIOdarone    Tablet 200 milliGRAM(s) Oral daily  metoprolol succinate ER 25 milliGRAM(s) Oral daily  budesonide 160 MICROgram(s)/formoterol 4.5 MICROgram(s) Inhaler 2 Puff(s) Inhalation two times a day  pantoprazole  Injectable 40 milliGRAM(s) IV Push two times a day  atorvastatin 40 milliGRAM(s) Oral at bedtime  influenza  Vaccine (HIGH DOSE) 0.7 milliLiter(s) IntraMuscular once  multivitamin 1 Tablet(s) Oral daily  nystatin Powder 1 Application(s) Topical three times a day  potassium chloride    Tablet ER 20 milliEquivalent(s) Oral every 2 hours      PAST MEDICAL & SURGICAL HISTORY:  Obese      Smoker      HTN (hypertension)      HLD (hyperlipidemia)      CAD (coronary artery disease)      CHF with cardiomyopathy      History of hip surgery          FAMILY HISTORY:  Family history of Alzheimer's disease (Father)    Family history of cancer (Mother)        SOCIAL HISTORY:            REVIEW OF SYSTEMS:  See HPI. Otherwise, 12 point ROS done and otherwise negative.    PHYSICAL EXAM:  T(C): 36.8 (01-30-24 @ 12:39), Max: 36.8 (01-30-24 @ 12:39)  HR: 56 (01-30-24 @ 12:39) (56 - 77)  BP: 148/77 (01-30-24 @ 12:39) (107/48 - 148/77)  RR: 18 (01-30-24 @ 12:39) (18 - 18)  SpO2: 97% (01-30-24 @ 12:39) (94% - 98%)  Wt(kg): --  I&O's Summary    29 Jan 2024 07:01  -  30 Jan 2024 07:00  --------------------------------------------------------  IN: 490 mL / OUT: 900 mL / NET: -410 mL      Appearance: Normal	  HEENT: PERRL, EOMI	  Cardiovascular: Normal S1 S2, RRR, No JVD, No murmurs  Respiratory: Lungs clear to auscultation	  Psychiatry: A & O x 3, Mood & affect appropriate  Gastrointestinal:  Soft, Non-tender, + BS	  Skin: + rash noted on under breasts and groin areas. B/L arm with ecchymosis and scabs   Neurologic: Grossly intact  Extremities: No clubbing, cyanosis or edema  Vascular: Peripheral pulses palpable 2+ bilaterally        LABS:	 	    CBC Full  -  ( 30 Jan 2024 12:08 )  WBC Count : 13.07 K/uL  Hemoglobin : 9.5 g/dL  Hematocrit : 30.4 %  Platelet Count - Automated : 299 K/uL  Mean Cell Volume : 90.7 fl  Mean Cell Hemoglobin : 28.4 pg  Mean Cell Hemoglobin Concentration : 31.3 gm/dL  Auto Neutrophil # : x  Auto Lymphocyte # : x  Auto Monocyte # : x  Auto Eosinophil # : x  Auto Basophil # : x  Auto Neutrophil % : x  Auto Lymphocyte % : x  Auto Monocyte % : x  Auto Eosinophil % : x  Auto Basophil % : x    01-30    146<H>  |  107  |  25<H>  ----------------------------<  102<H>  3.5   |  26  |  0.97  01-29    143  |  106  |  28<H>  ----------------------------<  124<H>  3.5   |  25  |  1.10    Ca    8.9      30 Jan 2024 08:00  Ca    8.9      29 Jan 2024 22:44  Mg     2.1     01-28    TPro  6.8  /  Alb  3.3  /  TBili  0.3  /  DBili  x   /  AST  20  /  ALT  18  /  AlkPhos  68  01-28    Thyroid Stimulating Hormone, Serum in AM (01.29.24 @ 07:33)    Thyroid Stimulating Hormone, Serum: 11.40 uIU/mL    Free Thyroxine, Serum in AM (01.30.24 @ 09:12)    Free Thyroxine, Serum: 1.0 ng/dL      TELEMETRY: SR in the 60's  	    ECG (1/29/23): SR at 68 bpm   	    < from: TTE Limited W or WO Ultrasound Enhancing Agent (11.08.23 @ 11:26) >  CONCLUSIONS:      1. Compared to the transthoracic echocardiogram performed on 11/5/2023 there have been no significant interval changes.   2. Trace pericardial effusion noted adjacent to the anterior right ventricle with no evidence of hemodynamic compromise.    ________________________________________________________________________________________  FINDINGS:     Pericardium:  There is a trace pericardial effusion noted adjacent to the anterior right ventricle with no evidence of hemodynamic compromise.     Systemic Veins:  The inferior vena cava is normal in size measuring 1.75 cm in diameter, (normal <2.1cm) with normal inspiratory collapse (normal >50%) consistent with normal right atrial pressure (~3, range 0-5mmHg).  ____________________________________________________________________  Quantitative Data:     Tricuspid Valve Measurements:     RA Pressure: 3 mmHg    < end of copied text >

## 2024-01-30 NOTE — ADVANCED PRACTICE NURSE CONSULT - ASSESSMENT
The pt was encountered on [facility] with her  at the bedside. A VersaCare P 500 support surface is in use and the pt is able to turn herself in the bed. As she was a/w skin changes a nutrition consult is pending for further evaluation.  Upon assessment, staff have applied a Pure-wick catheter to divert urine from the skin. on the sacrum and b/l buttocks is a deep tissue injury measuring 9cm x9cm x0cm. the skin is intact, non-blanchable. Pt reports that she sits for long periods during the day . the  declined to see the skin. I provided education about a deep tissue injury, bedsores and the interventions in place to prevent them from occurring.  the b/l heels present with blanchable erythema- this maybe the beginning of a deep tissue injury. The skin is dry -will recommend Sween 24 to moisturize and off-loading with the Neo-Lock cushion as pt reports that " I dont like the boots."

## 2024-01-30 NOTE — ADVANCED PRACTICE NURSE CONSULT - REASON FOR CONSULT
Requested by staff to assess skin status: sacrum. PMH is noted:  Reason for Admission: black stool and supra-therapeutic INR  History of Present Illness:   73 year old female with PMH A fib on Plavix and coumadin presents with melena and exertional lightheadedness  x 3 days with known prior GI ulcer. She and her  state that her coumadin dose was doubled by outpatient cardiology attending from 6 to 12 mg and then she started developing these symptoms. No hematemesis. She is asymptomatic at this time with no conjunctival pallor. no active bleeding. As per Emergency Department staff one episode of black stool in the Emergency Department .

## 2024-01-30 NOTE — CONSULT NOTE ADULT - SUBJECTIVE AND OBJECTIVE BOX
Patient seen and evaluated at bedside    Chief Complaint: Melena, lightheadedness     HPI:  "73 year old female with PMH A fib on Plavix and coumadin presents with melena and exertional lightheadedness  x 3 days with known prior GI ulcer. She and her  state that her coumadin dose was doubled by outpatient cardiology attending from 6 to 12 mg and then she started developing these symptoms. No hematemesis. She is asymptomatic at this time with no conjunctival pallor. no active bleeding. As per Emergency Department staff one episode of black stool in the Emergency Department." (28 Jan 2024 20:44)    Patient reports her coumadin dosage was increased early last week after her INR was found to be subtherapeutic. She reports progressive development of nausea, vomiting and lightheadedness. Patient denies any chest pain but reports epigastric pain that she describes as "gassy" especially after ingesting any food or liquid. She reports decreased PO intake over past 4 days. She further reports episode of dizziness that occurred yesterday morning and night. Patient describes a room spinning sensation that spontaneously resolved. At home, patient reports being able to do all activities of daily living by herself. She states she does not use a walker at home and is able to walk up two flights of stairs. She states she started to use a cane when her symptoms began to prevent any falls.       PMHx:   No pertinent past medical history    Obese    Smoker    Obesity    HTN (hypertension)    HLD (hyperlipidemia)    CAD (coronary artery disease)    CHF with cardiomyopathy      PSHx:   No significant past surgical history    No significant past surgical history    History of hip surgery      Allergies:  No Known Allergies      Home Meds:    Current Medications:   aMIOdarone    Tablet 200 milliGRAM(s) Oral daily  atorvastatin 40 milliGRAM(s) Oral at bedtime  budesonide 160 MICROgram(s)/formoterol 4.5 MICROgram(s) Inhaler 2 Puff(s) Inhalation two times a day  influenza  Vaccine (HIGH DOSE) 0.7 milliLiter(s) IntraMuscular once  metoprolol succinate ER 25 milliGRAM(s) Oral daily  multivitamin 1 Tablet(s) Oral daily  nystatin Powder 1 Application(s) Topical three times a day  pantoprazole  Injectable 40 milliGRAM(s) IV Push two times a day      FAMILY HISTORY:  Family history of Alzheimer's disease (Father)    Family history of cancer (Mother)        Social History:  Smoking History:  Alcohol Use:  Drug Use:    REVIEW OF SYSTEMS:  CONSTITUTIONAL: Positive for weakness and lightheadedness, No fevers or chills  EYES/ENT: No visual changes;  No dysphagia  NECK: No pain or stiffness  RESPIRATORY: No cough, wheezing, hemoptysis; No shortness of breath  CARDIOVASCULAR: No chest pain or palpitations; No lower extremity edema  GASTROINTESTINAL: No abdominal or epigastric pain. Positive for nausea, vomiting and melena   BACK: No back pain  GENITOURINARY: No dysuria, frequency or hematuria  NEUROLOGICAL: No numbness or weakness  SKIN: No itching, burning, rashes, or lesions   All other review of systems is negative unless indicated above.    Physical Exam:  T(F): 97.8 (01-30), Max: 98.1 (01-29)  HR: 62 (01-30) (61 - 77)  BP: 126/71 (01-30) (107/48 - 145/56)  RR: 18 (01-30)  SpO2: 95% (01-30)  GENERAL: No acute distress, well-developed, appears anxious   HEAD:  Atraumatic, Normocephalic  ENT: Neck supple, No JVD, moist mucosa  CHEST/LUNG: Clear to auscultation bilaterally  HEART: Regular rate and rhythm; No murmur  ABDOMEN: Soft, Nontender, Nondistended  EXTREMITIES: Bruising present on all extremities   PSYCH: Nl behavior, nl affect      Cardiovascular Diagnostic Testing:    ECG: Personally reviewed:    Echo: Personally reviewed:    Stress Testing:    Cath:    Imaging:    CXR: Personally reviewed    Labs: Personally reviewed                        9.6    15.24 )-----------( 300      ( 29 Jan 2024 22:44 )             30.5     01-30    146<H>  |  107  |  25<H>  ----------------------------<  102<H>  3.5   |  26  |  0.97    Ca    8.9      30 Jan 2024 08:00  Mg     2.1     01-28    TPro  6.8  /  Alb  3.3  /  TBili  0.3  /  DBili  x   /  AST  20  /  ALT  18  /  AlkPhos  68  01-28    PT/INR - ( 30 Jan 2024 10:34 )   PT: 14.6 sec;   INR: 1.40 ratio         PTT - ( 28 Jan 2024 14:51 )  PTT:46.5 sec    CARDIAC MARKERS ( 28 Jan 2024 14:51 )  25 ng/L / x     / x     / x     / x     / x              Thyroid Stimulating Hormone, Serum: 11.40 uIU/mL (01-29 @ 07:33)

## 2024-01-30 NOTE — CONSULT NOTE ADULT - ASSESSMENT
Ms. Kohli is 72 y/o F w/ PMHx COPD, HLD, HTN, PVD, ICM/CAD s/p PCI (has  of RCA), cardiac arrest s/p left-sided ICD (6/4/2019) complicated by infection and s/p Rsingle-chamber ICD - MDT [facility] in 9/23/2019) who presented to the ED with complaints of lightheadedness and melena.     Cardiology consulted for risk stratification prior to endoscopy     Outpatient Cardiologist: Dr. Mcelroy  TTE (11/9/2023 ) Normal LVEF  EKG on my read (1/28/24) - Sinus Rhythm at 69, LBBB, , no ischemic changes    #VT Storm   # Ischemic CMP- S/p ICD   Patient admitted to [facility] on 10/16/23 for VT storm iso of HF. VT Ablation was attempted on 10/20/23 but aborted due to hemodynamically significant pericardial effusion with drain placement, eventually requiring pericardial window. Post attempted ablation, patient developed recurrent VT followed by ICD shock. Furthermore, she was found to be in afib w/ RVR and underwent cardioversion 10/21/23. LRL of the device was reduced to 35bpm.   Last episode of VT requiring treatment was 12/10/23 when she had been off her antiarrhythmic medications for 2 days, due to a perforated gastric ulcer, episode was pace terminated.   Has a medtronic AF MRI VR  On amiodarone 200mg qd and quinidine 200mg q6  On metop succ 25 qdaily  Planned for VT ablation on 2/9/2024    # PAF   On Coumadin, with goal INR 2-3   Supratherapeutic on admission at 10, reversed with most recent INR 1.23    # CAD   Cardiac Cath/PCI: Cath at [facility] (1/22/15): Patient had elevated troponin which prompted a repeat cardiac catheterization. Report states that the patient had a proximal RCA total obstruction and was being filled by collaterals from the mid LAD. The LAD shows a patent stent and a 50% mid LAD lesion. She also had moderate disease of the left circumflex artery. Subsequently underwent nuclear study which showed only a fixed inferoapical anteroapical defect.  On Plavix     # HLD  On statin    RCRI:  High risk surgery: Y [ ] / N [X]  Hx of ischemic heart dz: Y [X] / N [ ]  Hx of CHF: Y [X] / N [ ]  Hx of CVA: Y [ ] / N [X]  DM with insulin: Y [ ] / N [X]  CKD w/ Cr > 2: Y [ ] / N [X]  Total Score: 2 points Class III Risk 10.1% 30-day risk of death, MI, or cardiac arrest    Hx of valvular disease: Y [ ] / N [X]  Mc perioperative risk of MI, cardiac arrest: 1.3 % risk of myocardial infarction or cardiac arrest, intraoperatively or up to 30 days post-op    Able to walk >4 METS without any anginal symptoms  Appears euvolemic on exam, lungs are clear to auscultation, no LE edema, No JVD  Patient is medically optimized for procedure. No further cardiac workup is necessary.     Ms. Kohli is 74 y/o F w/ PMHx COPD, HLD, HTN, PVD, ICM/CAD s/p PCI (has  of RCA), cardiac arrest s/p left-sided ICD (6/4/2019) complicated by infection and s/p R single-chamber ICD - MDT [facility] in 9/23/2019) who presented to the ED with complaints of lightheadedness and melena.     Cardiology consulted for risk stratification prior to endoscopy     Outpatient Cardiologist: Dr. Mcelroy  TTE (11/9/2023 ) Normal LVEF  EKG on my read (1/28/24) - Sinus Rhythm at 69, , no ischemic changes    #VT Storm   # Ischemic CMP- S/p ICD   Patient admitted to [facility] on 10/16/23 for VT storm iso of HF. VT Ablation was attempted on 10/20/23 but aborted due to hemodynamically significant pericardial effusion with drain placement, eventually requiring pericardial window. Post attempted ablation, patient developed recurrent VT followed by ICD shock. Furthermore, she was found to be in afib w/ RVR and underwent cardioversion 10/21/23. LRL of the device was reduced to 35bpm.   Last episode of VT requiring treatment was 12/10/23 when she had been off her antiarrhythmic medications for 2 days, due to a perforated gastric ulcer, episode was pace terminated.   Has a [employer] AF MRI VR  On amiodarone 200mg qd, quinidine 200mg q6, metop succ 25 qdaily  -C/w amiodarone and metop succ   -Planned for VT ablation on 2/9/2024    # PAF   On Coumadin, with goal INR 2-3   Supratherapeutic on admission at 10, reversed with most recent INR 1.23  -Consider d/c on DOAC    # CAD   Cardiac Cath/PCI: Cath at [facility] (1/22/15): Patient had elevated troponin which prompted a repeat cardiac catheterization. Report states that the patient had a proximal RCA total obstruction and was being filled by collaterals from the mid LAD. The LAD shows a patent stent and a 50% mid LAD lesion. She also had moderate disease of the left circumflex artery. Subsequently underwent nuclear study which showed only a fixed inferoapical anteroapical defect.  -Hold Plavix iso of GI bleed     # HLD  -C/w statin    RCRI:  High risk surgery: Y [ ] / N [X]  Hx of ischemic heart dz: Y [X] / N [ ]  Hx of CHF: Y [X] / N [ ]  Hx of CVA: Y [ ] / N [X]  DM with insulin: Y [ ] / N [X]  CKD w/ Cr > 2: Y [ ] / N [X]  Total Score: 2 points Class III Risk 10.1% 30-day risk of death, MI, or cardiac arrest    Hx of valvular disease: Y [ ] / N [X]  Bentley perioperative risk of MI, cardiac arrest: 1.3 % risk of myocardial infarction or cardiac arrest, intraoperatively or up to 30 days post-op    Able to walk >4 METS without any anginal symptoms  Appears euvolemic on exam, lungs are clear to auscultation, no LE edema, No JVD    Patient is at elevated cardiac risk for urgent procedure. No further cardiac workup is necessary.     Ms. Kohli is 72 y/o F w/ PMHx COPD, HLD, HTN, PVD, ICM/CAD s/p PCI (has  of RCA), cardiac arrest s/p left-sided ICD (6/4/2019) complicated by infection and s/p R single-chamber ICD - MDT [facility] in 9/23/2019) who presented to the ED with complaints of lightheadedness and melena.     Cardiology consulted for risk stratification prior to endoscopy     Outpatient Cardiologist: Dr. Mcelroy  TTE (11/9/2023 ) Normal LVEF  EKG on my read (1/28/24) - Sinus Rhythm at 69, LBBB, , no ischemic changes    #VT Storm   # Ischemic CMP- S/p ICD   Patient admitted to [facility] on 10/16/23 for VT storm iso of HF. VT Ablation was attempted on 10/20/23 but aborted due to hemodynamically significant pericardial effusion with drain placement, eventually requiring pericardial window. Post attempted ablation, patient developed recurrent VT followed by ICD shock. Furthermore, she was found to be in afib w/ RVR and underwent cardioversion 10/21/23. LRL of the device was reduced to 35bpm.   Last episode of VT requiring treatment was 12/10/23 when she had been off her antiarrhythmic medications for 2 days, due to a perforated gastric ulcer, episode was pace terminated.   Has a [employer] AF MRI VR  On amiodarone 200mg qd, quinidine 200mg q6, metop succ 25 qdaily  -C/w amiodarone and metop succ   -Planned for future VT ablation     # PAF   On Coumadin, with goal INR 2-3   Supratherapeutic on admission at 10, reversed with most recent INR 1.23  -Consider d/c on DOAC    # CAD   Cardiac Cath/PCI: Cath at [facility] (1/22/15): Patient had elevated troponin which prompted a repeat cardiac catheterization. Report states that the patient had a proximal RCA total obstruction and was being filled by collaterals from the mid LAD. The LAD shows a patent stent and a 50% mid LAD lesion. She also had moderate disease of the left circumflex artery. Subsequently underwent nuclear study which showed only a fixed inferoapical anteroapical defect.  -Hold Plavix iso of GI bleed     # HLD  -C/w statin    RCRI:  High risk surgery: Y [ ] / N [X]  Hx of ischemic heart dz: Y [X] / N [ ]  Hx of CHF: Y [X] / N [ ]  Hx of CVA: Y [ ] / N [X]  DM with insulin: Y [ ] / N [X]  CKD w/ Cr > 2: Y [ ] / N [X]  Total Score: 2 points Class III Risk 10.1% 30-day risk of death, MI, or cardiac arrest    Hx of valvular disease: Y [ ] / N [X]  Bentley perioperative risk of MI, cardiac arrest: 1.3 % risk of myocardial infarction or cardiac arrest, intraoperatively or up to 30 days post-op    Able to walk >4 METS without any anginal symptoms  Appears euvolemic on exam, lungs are clear to auscultation, no LE edema, No JVD    Patient is at elevated cardiac risk for urgent procedure. No further cardiac workup is necessary.     Ms. Kohli is 72 y/o F w/ PMHx COPD, HLD, HTN, PVD, ICM/CAD s/p PCI (has  of RCA), cardiac arrest s/p left-sided ICD (6/4/2019) complicated by infection and s/p R single-chamber ICD - MDT [facility] in 9/23/2019) who presented to the ED with complaints of lightheadedness and melena.     Cardiology consulted for risk stratification prior to endoscopy     Outpatient Cardiologist: Dr. Mcelroy  TTE (11/9/2023 ) Normal LVEF  EKG on my read (1/28/24) - Sinus Rhythm at 69, LBBB, , no ischemic changes    #VT Storm   # Ischemic CMP- S/p ICD   Patient admitted to [facility] on 10/16/23 for VT storm iso of HF. VT Ablation was attempted on 10/20/23 but aborted due to hemodynamically significant pericardial effusion with drain placement, eventually requiring pericardial window. Post attempted ablation, patient developed recurrent VT followed by ICD shock. Furthermore, she was found to be in afib w/ RVR and underwent cardioversion 10/21/23. LRL of the device was reduced to 35bpm.   Last episode of VT requiring treatment was 12/10/23 when she had been off her antiarrhythmic medications for 2 days, due to a perforated gastric ulcer, episode was pace terminated.   Has a [employer] AF MRI VR  On amiodarone 200mg qd, quinidine 200mg q6, metop succ 25 qdaily  -C/w amiodarone, quinidine and metop succ   -Planned for future VT ablation with Dr. Hurt     # PAF   On Coumadin, with goal INR 2-3   Supratherapeutic on admission at 10, reversed with most recent INR 1.23  -Holding home coumadin iso of GI bleed  -Consider d/c on DOAC    # CAD   Cardiac Cath/PCI: Cath at [facility] (1/22/15): Patient had elevated troponin which prompted a repeat cardiac catheterization. Report states that the patient had a proximal RCA total obstruction and was being filled by collaterals from the mid LAD. The LAD shows a patent stent and a 50% mid LAD lesion. She also had moderate disease of the left circumflex artery. Subsequently underwent nuclear study which showed only a fixed inferoapical anteroapical defect.  -Hold Plavix iso of GI bleed     # HLD  -C/w statin    RCRI:  High risk surgery: Y [ ] / N [X]  Hx of ischemic heart dz: Y [X] / N [ ]  Hx of CHF: Y [X] / N [ ]  Hx of CVA: Y [ ] / N [X]  DM with insulin: Y [ ] / N [X]  CKD w/ Cr > 2: Y [ ] / N [X]  Total Score: 2 points Class III Risk 10.1% 30-day risk of death, MI, or cardiac arrest    Hx of valvular disease: Y [ ] / N [X]  Bentley perioperative risk of MI, cardiac arrest: 1.3 % risk of myocardial infarction or cardiac arrest, intraoperatively or up to 30 days post-op    Able to walk >4 METS without any anginal symptoms  Appears euvolemic on exam, lungs are clear to auscultation, no LE edema, No JVD    Patient is at elevated cardiac risk for urgent procedure. No further cardiac workup is necessary.     Ms. Kohli is 74 y/o F w/ PMHx COPD, HLD, HTN, PVD, ICM/CAD s/p PCI (has  of RCA), cardiac arrest s/p left-sided ICD (6/4/2019) complicated by infection and s/p R single-chamber ICD - MDT [facility] in 9/23/2019) who presented to the ED with complaints of lightheadedness and melena.     Cardiology consulted for risk stratification prior to endoscopy     Outpatient Cardiologist: Dr. Mcelroy  TTE (11/9/2023 ) Normal LVEF  EKG on my read (1/28/24) - Sinus Rhythm at 69, LBBB, , no ischemic changes    #VT Storm   # Ischemic CMP- S/p ICD   Patient admitted to [facility] on 10/16/23 for VT storm iso of HF. VT Ablation was attempted on 10/20/23 but aborted due to hemodynamically significant pericardial effusion with drain placement, eventually requiring pericardial window. Post attempted ablation, patient developed recurrent VT followed by ICD shock. Furthermore, she was found to be in afib w/ RVR and underwent cardioversion 10/21/23. LRL of the device was reduced to 35bpm.   Last episode of VT requiring treatment was 12/10/23 when she had been off her antiarrhythmic medications for 2 days, due to a perforated gastric ulcer, episode was pace terminated.   Has a [employer]tronic AF MRI VR  On amiodarone 200mg qd, quinidine 200mg q6, metop succ 25 qdaily  -C/w amiodarone, quinidine and metop succ   -Planned for future VT ablation with Dr. Hurt     # PAF   On Coumadin, with goal INR 2-3   Supratherapeutic on admission at 10, reversed with most recent INR 1.23  -Holding home coumadin iso of GI bleed  -Consider d/c on DOAC    # CAD   Cardiac Cath/PCI: Cath at [facility] (1/22/15): Patient had elevated troponin which prompted a repeat cardiac catheterization. Report states that the patient had a proximal RCA total obstruction and was being filled by collaterals from the mid LAD. The LAD shows a patent stent and a 50% mid LAD lesion. She also had moderate disease of the left circumflex artery. Subsequently underwent nuclear study which showed only a fixed inferoapical anteroapical defect.  -Hold Plavix iso of GI bleed     # HLD  -C/w statin    RCRI:  High risk surgery: Y [ ] / N [X]  Hx of ischemic heart dz: Y [X] / N [ ]  Hx of CHF: Y [X] / N [ ]  Hx of CVA: Y [ ] / N [X]  DM with insulin: Y [ ] / N [X]  CKD w/ Cr > 2: Y [ ] / N [X]  Total Score: 2 points Class III Risk 10.1% 30-day risk of death, MI, or cardiac arrest    Hx of valvular disease: Y [ ] / N [X]  Bentley perioperative risk of MI, cardiac arrest: 1.3 % risk of myocardial infarction or cardiac arrest, intraoperatively or up to 30 days post-op    Able to walk >4 METS without any anginal symptoms  Appears euvolemic on exam, lungs are clear to auscultation, no LE edema, No JVD    Patient is at elevated cardiac risk for urgent procedure. No further cardiac workup is necessary.    This patient was seen and examined personally by me and the plan was discussed with the fellow and/or resident above. Amendments were made as necessary to the above. Agree with the excellent note and plan above. 73F w COPD, HL, HTN, PVD, CHF, CAD s/p PCI, cardiac arrest s/p ICD c/b infection s/p R single-chamber ICD here with LH and melena. + VT Storm.  Future VT ablation pending; multiple VT episodes on interrogation terminated by [facility]. Supratherapeutic INR, reversed. Holding plavix and coumadin; cont amio, quinidine, metoprolol. High risk for procedure however GI workup urgent and benefit outweighs risk of further cardiac intervention, which would additionally warrant a/c and dapt. EP recs re: VT and positive blood culture.    Jefferson Lakhani MD, MPhil, [facility]  Cardiologist, [facility]  ; [facility] and [facility]  Email: [email or web address] Cardiology and Cardiovascular Surgery on-service contact/call information, go to [email or web address] and use "[email or web address]" to login.  Outpatient Cardiology appointments, call [phone] to arrange with a colleague; I do not have outpatient Cardiology clinic.

## 2024-01-30 NOTE — PROVIDER CONTACT NOTE (CRITICAL VALUE NOTIFICATION) - SITUATION
[facility] labs called regarding blood culture results from 1/28. Results show growth in aerobic bottle and gram positive cocci in pairs.

## 2024-01-30 NOTE — CONSULT NOTE ADULT - SUBJECTIVE AND OBJECTIVE BOX
"HPI:  73 year old female with PMH A fib on Plavix and coumadin presents with melena and exertional lightheadedness  x 3 days with known prior GI ulcer. She and her  state that her coumadin dose was doubled by outpatient cardiology attending from 6 to 12 mg and then she started developing these symptoms. No hematemesis. She is asymptomatic at this time with no conjunctival pallor. no active bleeding. As per Emergency Department staff one episode of black stool in the Emergency Department . (28 Jan 2024 20:44)"    Above reviewed. 74 yo F A Fib, coumadin, with melena  No cough/sob  No abd pain, no diarrhea  No dysuria, no pyuria  No pain at IV/blood draw site  No joint pain  No other symptoms  ID called for eval leukocytosis    PAST MEDICAL & SURGICAL HISTORY:  Obese    Smoker    HTN (hypertension)      HLD (hyperlipidemia)      CAD (coronary artery disease)      CHF with cardiomyopathy      History of hip surgery    Allergies    No Known Allergies    Intolerances    ANTIMICROBIALS:      OTHER MEDS:  aMIOdarone    Tablet 200 milliGRAM(s) Oral daily  atorvastatin 40 milliGRAM(s) Oral at bedtime  budesonide 160 MICROgram(s)/formoterol 4.5 MICROgram(s) Inhaler 2 Puff(s) Inhalation two times a day  influenza  Vaccine (HIGH DOSE) 0.7 milliLiter(s) IntraMuscular once  metoprolol succinate ER 25 milliGRAM(s) Oral daily  multivitamin 1 Tablet(s) Oral daily  nystatin Powder 1 Application(s) Topical three times a day  pantoprazole  Injectable 40 milliGRAM(s) IV Push two times a day  potassium chloride    Tablet ER 20 milliEquivalent(s) Oral every 2 hours  quiNIDine gluconate  milliGRAM(s) Oral every 8 hours    SOCIAL HISTORY: No tobacco, no alcohol, no illicit drugs    FAMILY HISTORY:  Family history of Alzheimer's disease (Father)    Family history of cancer (Mother)    Drug Dosing Weight  Height (cm): 157.5 (28 Jan 2024 12:02)  Weight (kg): 79.8 (28 Jan 2024 12:02)  BMI (kg/m2): 32.2 (28 Jan 2024 12:02)  BSA (m2): 1.81 (28 Jan 2024 12:02)    PE:    Vital Signs Last 24 Hrs  T(C): 36.8 (30 Jan 2024 12:39), Max: 36.8 (30 Jan 2024 12:39)  T(F): 98.2 (30 Jan 2024 12:39), Max: 98.2 (30 Jan 2024 12:39)  HR: 56 (30 Jan 2024 12:39) (56 - 77)  BP: 148/77 (30 Jan 2024 12:39) (110/48 - 148/77)  RR: 18 (30 Jan 2024 12:39) (18 - 18)  SpO2: 97% (30 Jan 2024 12:39) (94% - 98%)    Gen: AOx3, NAD, non-toxic, pleasant  CV: S1+S2 normal, nontachycardic  Resp: Clear bilat, no resp distress, no crackles/wheezes  Abd: Soft, nontender, +BS  Ext: No LE edema, no wounds  : No Mazariegos  IV/Skin: No thrombophlebitis  Msk: No low back pain, no arthralgias, no joint swelling  Neuro: No sensory deficits, no motor deficits    LABS:                        9.5    13.07 )-----------( 299      ( 30 Jan 2024 12:08 )             30.4     01-30    146<H>  |  107  |  25<H>  ----------------------------<  102<H>  3.5   |  26  |  0.97    Ca    8.9      30 Jan 2024 08:00  Mg     2.1     01-28    TPro  6.8  /  Alb  3.3  /  TBili  0.3  /  DBili  x   /  AST  20  /  ALT  18  /  AlkPhos  68  01-28    Urinalysis Basic - ( 30 Jan 2024 08:00 )    Color: x / Appearance: x / SG: x / pH: x  Gluc: 102 mg/dL / Ketone: x  / Bili: x / Urobili: x   Blood: x / Protein: x / Nitrite: x   Leuk Esterase: x / RBC: x / WBC x   Sq Epi: x / Non Sq Epi: x / Bacteria: x    MICROBIOLOGY:    .Blood Blood-Peripheral  01-28-24   No growth at 24 hours  --  --    .Blood Blood-Peripheral  01-28-24   Growth in aerobic bottle: Gram positive cocci in pairs  Direct identification is available within approximately 3-5  hours either by Blood Panel Multiplexed PCR or Direct  MALDI-TOF. Details: [email or web address]  --  Blood Culture PCR    (otherwise reviewed)    RADIOLOGY:    1/28 CT    IMPRESSION:.    Small left pleural effusion and left basilar atelectasis.    No convincing evidence of pneumonia.

## 2024-01-30 NOTE — ADVANCED PRACTICE NURSE CONSULT - RECOMMEDATIONS
Will recommend the followin. Sacrum, b/l buttocks: continue to monitor, routine pericare with Yomaira  2. B/l heels : continue to monitor, moisturize daily with Sween, off-load with a cushion  3. Continue to encourage mobility, T&P  4. Seat cushion when OOB to chair  5. nutrition support as pt condition allows  Tx plan discussed with RN

## 2024-01-30 NOTE — PROGRESS NOTE ADULT - ASSESSMENT
73 year old female with COPD, HTN, PAD, CAD s/p LAD stent, chronic leukocytosis of unclear etiol, cardiac arrest s/p left-sided ICD (6/4/2019) complicated by infection and s/p R single-chamber ICD reimplantation ([place] in 9/23/2019), HFrEF (EF 45%), recent hospital stay for VF sp ablation c/b pericardial effusion and tamponade s/p pericardiocentesis and pericardial window and AFib on Plavix and coumadin, ? perforated gastric ulcer s/p sx repair, presents with melena and exertional lightheadedness x 3 days. Found to have rectal bleeding x 1 in ED.     #Melena, resolved   #Supratherapeutic INR, reversed   #Hx of ?perforated gastric ulcer  #Extensive cardiac disease   - Black liquid stool on rectal exam: UGIB vs. small bowel bleed vs. R colonic bleed i/s/o INR > 10, at risk of oozing from anywhere along the GI tract  - S/p Kcentra upon arrival with improvement in INR < 2  - Hemodynamically stable with stable H&H and no further episodes of overt GI bleeding  - No known EGD or colonoscopy prior    Recommendations  - Appreciate Cardiology input, management recs and risk stratification prior to endoscopy  - NPO after midnight for EGD Wednesday  - IV PPI BID  - Trend vitals and monitor for clinical signs of bleeding  - Trend CBC and transfuse for Hgb < 7 g/dL  - Continuous telemetry monitoring   - Remainder per primary    Lalo David MD  Gastroenterology/Hepatology Fellow    MONDAY-FRIDAY 8AM-5PM:  Pager# [phone] ([facility]) or [phone] ([facility])    NON-URGENT CONSULTS:  Please email [email or web address]  AT NIGHT AND ON WEEKENDS:  Contact on-call GI fellow via answering service ([phone]) from 5pm-8am and on weekends/holidays

## 2024-01-30 NOTE — PROCEDURE NOTE - ADDITIONAL PROCEDURE DETAILS
1) Indication for interrogation: Bacteremia, s/p syncope last night   2) Presenting rhythm: SR in the 60's  3) Measured data WNL, normal ICD function, Pt is not pacemaker dependent, V pace <0.1% since 12/21/2023  4) OptiVol fluid index above threshold since 9/13/2023 and is ongoing, this is indicative of fluid accumulation.   5) Stored data revealed 6 ventricular high rate episodes last night (1/29/24) between 6:12pm-10:11pm, EGMs consistent with monomorhpic VT with average ventricular rate 171-250 bpm, 4 episodes treated and terminated with ATPs, one episode treated and terminated with ICD shock at 35J. Patient also had PAF on 1/10/24 lasting ~86 minutes.   6) See EP consult note.     SOO Comer, NP-C  [phone]

## 2024-01-30 NOTE — CONSULT NOTE ADULT - ASSESSMENT
72 y/o female w/ PMHx COPD, paroxysmal AF (on Coumadin), HLD, HTN, PVD, ICM/CAD s/p PCI (has  of RCA), cardiac arrest s/p left-sided ICD (6/4/2019) complicated by infection and s/p R sided Medtronic single-chamber ICD ([place] in 9/23/2019), prior admissions for VT storm with adjustment of medication and NIPS (on amiodarone 200mg QD and quinidine gluconate ER 325mg Q8hr), prior ICD shock in the setting of recurrent monomorphic VT with unsuccessful ATP, VT ablation attempted 10/20/23 but aborted due to hemodynamically significant pericardial effusion with drain placement. Course further c/b recurrent VT s/p ICD shock, and afib w/ RVR s/p cardioversion 10/21/23 s/p pericardial window 10/28/23. Pt had recent admission 11/25/23 for perforated gastric ulcer with pneumoperitoneum - sealed 11/26 on UGI series. Recurrent VT resulted in ICD shock 11/2023 due to her anti-arrhymic drugs (AAD ) were held in setting of her being NPO. Now presents with black stool and supra-therapeutic INR (10) in the setting of coumadin dose doubled, s/p KCENTRA. EP consulted for bacteremia growing staphylococcus 1 out of 4 bottles from 1/28/24. Hospital course complicated by patient had an episode of syncope last night while on [facility] (non tele floor), ICD interrogation showed patient had recurrent VT treated with ATPs and ICD shock x 1. Of note, quinidine was held since admission. Patient is now transferred to [facility] on telemetry.    1) Bacteremia (staphylococcus)   -BCx from 1/28 growing staphylococcus 1 out of 4 bottles, repeat BCx sent 1/30 with result pending   -Leukocytosis, WBC 15.24 today  -Please obtain ID consult   -Will d/w [facility] attending regarding ICD extraction, will need S-ICD reimplant given 2nd prevention ICD     2) Recurrent VT in the setting of holding AAD  -s/p ICD therapy (ATPs and shock x 1) last night for VT w/ syncope   -Tele: SR in the 60's  -Please resume quinidine gluconate ER 325mg Q8hr STAT  -Continue amiodarone 200mg QD  -She is scheduled for outpatient VT ablation on 2/13/24 with Dr. Hurt (will keep on schedule for now)   -Aggressive supplement to maintain K >4.0, Mg>1.0  -Continue telemetry monitor     3) GIB in the setting of supra-therapeutic INR   -s/p KCENTRA for INR 10, INR today 1.40  -NPO after MN for upper EGD tomorrow    Above discussed with Medicine ACP.     SOO Comer, NP-C  [phone] 74 y/o female w/ PMHx COPD, paroxysmal AF (on Coumadin), HLD, HTN, PVD, ICM/CAD s/p PCI (has  of RCA), cardiac arrest s/p left-sided ICD (6/4/2019) complicated by infection and s/p R sided Medtronic single-chamber ICD ([place] in 9/23/2019), prior admissions for VT storm with adjustment of medication and NIPS (on amiodarone 200mg QD and quinidine gluconate ER 325mg Q8hr), prior ICD shock in the setting of recurrent monomorphic VT with unsuccessful ATP, VT ablation attempted 10/20/23 but aborted due to hemodynamically significant pericardial effusion with drain placement. Course further c/b recurrent VT s/p ICD shock, and afib w/ RVR s/p cardioversion 10/21/23 s/p pericardial window 10/28/23. Pt had recent admission 11/25/23 for perforated gastric ulcer with pneumoperitoneum - sealed 11/26 on UGI series. Recurrent VT resulted in ICD shock 11/2023 due to her anti-arrhymic drugs (AAD ) were held in setting of her being NPO. Now presents with black stool and supra-therapeutic INR (10) in the setting of coumadin dose doubled, s/p KCENTRA. EP consulted for bacteremia growing staphylococcus 1 out of 4 bottles from 1/28/24. Hospital course complicated by patient had an episode of syncope last night while on [facility] (non tele floor), ICD interrogation showed patient had recurrent VT treated with ATPs and ICD shock x 1. Of note, quinidine was held since admission. Patient is now transferred to [facility] on telemetry.    1) Bacteremia (staphylococcus)   -BCx from 1/28 growing staphylococcus 1 out of 4 bottles, repeat BCx sent 1/30 with result pending   -Leukocytosis, WBC 15.24 today  -Please obtain ID consult   -Will wait for speciation and ID input     2) Recurrent VT in the setting of holding AAD  -s/p ICD therapy (ATPs and shock x 1) last night for VT w/ syncope   -Tele: SR in the 60's  -Please resume quinidine gluconate ER 325mg Q8hr STAT  -Continue amiodarone 200mg QD  -She is scheduled for outpatient VT ablation on 2/13/24 with Dr. Hurt (will keep on schedule for now)   -Aggressive supplement to maintain K >4.0, Mg>1.0  -Continue telemetry monitor     3) GIB in the setting of supra-therapeutic INR   -s/p KCENTRA for INR 10, INR today 1.40  -NPO after MN for upper EGD tomorrow    Above discussed with Medicine ACP.     SOO Comer, NP-C  [phone]

## 2024-01-30 NOTE — CONSULT NOTE ADULT - ASSESSMENT
74 yo F A Fib, coumadin, with melena  No fever, has leukocytosis  Melena  CT without pneumonia  BCX 1/4 CoNS--likely contam (note presence of defibrillator and R knee hardware, however likely contam in setting low grade BCX/lack of systemic signs infection)  WBC reactive to melena/GIB process   Overall, Leukocytosis, GIB, positive culture finding  - Monitor off abx  - F/U pending repeat BCX  - If fevers/signs acute infection, start Vanco/Cefepime empiric  - Monitor for alternate signs infection  - Care for GIB per team  - Trend WBC to normal    Lalo Chapman MD  Contact on TEAMS messaging from 9am - 5pm  From 5pm-9am, on weekends, or if no response call [phone]

## 2024-01-31 NOTE — DIETITIAN INITIAL EVALUATION ADULT - PROBLEM SELECTOR PLAN 1
likely secondary to increased INR possibly contributed to PUD  IV pantoprazole BID  GI consultation in progress  continue to monitor hemoglobin   stat CBC repeat hemoglobin 10 no transfusion at present   no need for urgent EGD  transfusion if hemoglobin < 9

## 2024-01-31 NOTE — DIETITIAN INITIAL EVALUATION ADULT - NS FNS DIET ORDER
Diet, NPO after Midnight:      NPO Start Date: 30-Jan-2024,   NPO Start Time: 23:59  Except Medications (01-31-24 @ 02:43)

## 2024-01-31 NOTE — PROGRESS NOTE ADULT - ASSESSMENT
74 y/o female w/ PMHx COPD, paroxysmal AF (on Coumadin), HLD, HTN, PVD, ICM/CAD s/p PCI (has  of RCA), cardiac arrest s/p left-sided ICD (6/4/2019) complicated by infection and s/p R sided Medtronic single-chamber ICD ([place] in 9/23/2019), prior admissions for VT storm with adjustment of medication and NIPS (on amiodarone 200mg QD and quinidine gluconate ER 325mg Q8hr), prior ICD shock in the setting of recurrent monomorphic VT with unsuccessful ATP, VT ablation attempted 10/20/23 but aborted due to hemodynamically significant pericardial effusion with drain placement. Course further c/b recurrent VT s/p ICD shock, and afib w/ RVR s/p cardioversion 10/21/23 s/p pericardial window 10/28/23. Pt had recent admission 11/25/23 for perforated gastric ulcer with pneumoperitoneum - sealed 11/26 on UGI series. Recurrent VT resulted in ICD shock 11/2023 due to her anti-arrhymic drugs (AAD ) were held in setting of her being NPO. Now presents with black stool and supra-therapeutic INR (10) in the setting of coumadin dose doubled, s/p KCENTRA. EP consulted for bacteremia growing Coagulase negative Staphylococcus 1 out of 4 bottles from 1/28/24. Hospital course complicated by patient had an episode of syncope evening of 1/29 while on [facility] (non tele floor), ICD interrogation showed patient had recurrent VT treated with ATPs and ICD shock x 1. Of note, quinidine was held since admission. Patient is now transferred to [facility] on telemetry.    1) Bacteremia (staphylococcus)   -BCx from 1/28 growing Coagulase negative Staphylococcus 1 out of 4 bottles, repeat BCx sent 1/30 with result pending   -Leukocytosis, WBC 13.14 today  -ID consult appreciated, may be a contaminant  -Patient would be at high risk without the device given VT epsiodes.    2) Recurrent VT in the setting of holding AAD  -s/p ICD therapy (ATPs and shock x 1) on 1/29 evening for monomorphic VT with VHR up to 250's w/ syncope   -Tele: SR in the 50-60's  -Continue quinidine gluconate ER 325mg Q8hr  -Continue amiodarone 200mg QD  -She is scheduled for outpatient VT ablation on 2/13/24 with Dr. Hurt (will keep on schedule for now)   -Aggressive supplement to maintain K >4.0, Mg>1.0  -Continue telemetry monitor     3) GIB in the setting of supra-therapeutic INR   -s/p KCENTRA for INR 10, INR 1.40 on 1/30  -NPO after MN for upper EGD today    SOO Comer, NP-C  [phone]         74 y/o female w/ PMHx COPD, paroxysmal AF (on Coumadin), HLD, HTN, PVD, ICM/CAD s/p PCI (has  of RCA), cardiac arrest s/p left-sided ICD (6/4/2019) complicated by infection and s/p R sided Medtronic single-chamber ICD ([place] in 9/23/2019), prior admissions for VT storm with adjustment of medication and NIPS (on amiodarone 200mg QD and quinidine gluconate ER 325mg Q8hr), prior ICD shock in the setting of recurrent monomorphic VT with unsuccessful ATP, VT ablation attempted 10/20/23 but aborted due to hemodynamically significant pericardial effusion with drain placement. Course further c/b recurrent VT s/p ICD shock, and afib w/ RVR s/p cardioversion 10/21/23 s/p pericardial window 10/28/23. Pt had recent admission 11/25/23 for perforated gastric ulcer with pneumoperitoneum - sealed 11/26 on UGI series. Recurrent VT resulted in ICD shock 11/2023 due to her anti-arrhymic drugs (AAD ) were held in setting of her being NPO. Now presents with black stool and supra-therapeutic INR (10) in the setting of coumadin dose doubled, s/p KCENTRA. EP consulted for bacteremia growing Coagulase negative Staphylococcus 1 out of 4 bottles from 1/28/24. Hospital course complicated by patient had an episode of syncope evening of 1/29 while on [facility] (non tele floor), ICD interrogation showed patient had recurrent VT treated with ATPs and ICD shock x 1. Of note, quinidine was held since admission. Patient is now transferred to [facility] on telemetry.    1) Bacteremia (staphylococcus)   -Leukocytosis, WBC 13.14 today  -BCx from 1/28 growing Coagulase negative Staphylococcus 1 out of 4 bottles, repeat BCx 1/30 without abx negative to date  -ID consult appreciated, likely contaminant    2) Recurrent VT in the setting of holding AAD  -s/p ICD therapy (ATPs and shock x 1) on 1/29 evening for monomorphic VT with VHR up to 250's w/ syncope   -Tele: SR in the 50-60's  -Continue quinidine gluconate ER 325mg Q8hr  -Continue amiodarone 200mg QD  -She is scheduled for outpatient VT ablation on 2/13/24 with Dr. Hurt (will keep on schedule for now)   -Aggressive supplement to maintain K >4.0, Mg>1.0  -Continue telemetry monitor     3) GIB in the setting of supra-therapeutic INR   -s/p KCENTRA for INR 10, INR 1.40 on 1/30  -NPO after MN for upper EGD today    Addendum: Repeat BCx no growth to date. EP will sign off, reconsult as needed. Discussed with Dr. Molina.     SOO Comer, NP-C  [phone]

## 2024-01-31 NOTE — PROGRESS NOTE ADULT - ASSESSMENT
72 yo F A Fib, coumadin, with melena  No fever, has leukocytosis  Melena  CT without pneumonia  BCX 1/4 CoNS--likely contam (note presence of defibrillator and R knee hardware, however likely contam in setting low grade BCX/lack of systemic signs infection)  F/U BCX NGTD (without receiving abx)  Suspect WBC reactive to melena/GIB process   Overall, Leukocytosis, GIB, positive culture finding  - Monitor off abx  - F/U pending repeat BCX--NGTD  - Monitor for alternate signs infection  - Care for GIB per team  - Trend WBC to normal  - If worsening signs infection/WBC remains unexplained, would check CT A/P to evaluate for occult GI process    Signing off. Please call with further questions or change in status.    Lalo Chapman MD  Contact on TEAMS messaging from 9am - 5pm  From 5pm-9am, on weekends, or if no response call [phone]

## 2024-01-31 NOTE — PROGRESS NOTE ADULT - NS ATTEND AMEND GEN_ALL_CORE FT
seen and agree  please make sure the patient stays on telemetry and receives her antiarrhythmic drugs

## 2024-01-31 NOTE — DIETITIAN INITIAL EVALUATION ADULT - PROBLEM SELECTOR PLAN 5
Gerald Spencer called has an appointment today but this afternoon she just started having painful urination. Wants to know if it's from the cath that was placed? Please call back. continue statin

## 2024-01-31 NOTE — DIETITIAN INITIAL EVALUATION ADULT - ORAL INTAKE PTA/DIET HISTORY
cereal for breakfast, 1/2 turkey sandwich for lunch,  prepares meals-pt could not be specific. snacks on pizza rolls

## 2024-01-31 NOTE — PRE PROCEDURE NOTE - PRE PROCEDURE EVALUATION
Attending Physician:  Dr Gaming                       Procedure: EGD    Indication for Procedure: GIB   ________________________________________________________  PAST MEDICAL & SURGICAL HISTORY:  Obese      Smoker      HTN (hypertension)      HLD (hyperlipidemia)      CAD (coronary artery disease)      CHF with cardiomyopathy      History of hip surgery        ALLERGIES:  No Known Allergies    HOME MEDICATIONS:  atorvastatin 40 mg oral tablet: 1 tab(s) orally once a day (at bedtime)  D3 50 mcg (2000 intl units) oral capsule: 1 cap(s) orally once a day  Multiple Vitamins oral tablet: 1 tab(s) orally once a day  nystatin 100,000 units/g topical powder: Apply topically to affected area once a day  nystatin 100,000 units/g topical powder: Apply topically to affected area once a day  quiNIDine 200 mg oral tablet: 1 tab(s) orally 4 times a day  Symbicort 160 mcg-4.5 mcg/inh inhalation aerosol: 2 puff(s) inhaled 2 times a day  warfarin 3 mg oral tablet: 4 tab(s) orally once a day (at bedtime) NOTE: directions as per patient, pharmacy has 1mg and 3mg filled 1 tab daily.    AICD/PPM: [ ] yes   [X] no    PERTINENT LAB DATA:                        9.5    13.14 )-----------( 303      ( 31 Jan 2024 07:14 )             30.7     01-31    143  |  109<H>  |  19  ----------------------------<  96  4.7   |  24  |  0.94    Ca    8.7      31 Jan 2024 07:15  Mg     2.3     01-31      PT/INR - ( 30 Jan 2024 10:34 )   PT: 14.6 sec;   INR: 1.40 ratio                     PHYSICAL EXAMINATION:    T(C): 36.8  HR: 60  BP: 118/64  RR: 18  SpO2: 94%    Constitutional: NAD    Neck:  No JVD  Respiratory: No respiratory distress   Cardiovascular: RRR  Gastrointestinal: BS+, soft, NT/ND  Extremities: No peripheral edema  Neurological: A/O x 3, no focal deficits        COMMENTS:    The patient is a suitable candidate for the planned procedure unless box checked [ ]  No, explain:

## 2024-01-31 NOTE — DIETITIAN INITIAL EVALUATION ADULT - PERTINENT LABORATORY DATA
01-31    143  |  109<H>  |  19  ----------------------------<  96  4.7   |  24  |  0.94    Ca    8.7      31 Jan 2024 07:15  Mg     2.3     01-31    A1C with Estimated Average Glucose Result: 6.4 % (10-29-23 @ 00:28)  A1C with Estimated Average Glucose Result: 6.7 % (08-28-23 @ 07:18)

## 2024-01-31 NOTE — PROGRESS NOTE ADULT - ASSESSMENT
Ms. Kohli is 72 y/o F w/ PMHx COPD, HLD, HTN, PVD, ICM/CAD s/p PCI (has  of RCA), cardiac arrest s/p left-sided ICD (6/4/2019) complicated by infection and s/p R single-chamber ICD - MD[facility] in 9/23/2019) who presented to the ED with complaints of lightheadedness and melena.     Cardiology consulted for risk stratification prior to endoscopy     Outpatient Cardiologist: Dr. Mcelroy  TTE (11/9/2023 ) Normal LVEF  EKG on my read (1/28/24) - Sinus Rhythm at 69, LBBB, , no ischemic changes    #VT Storm   #Ischemic CMP- S/p ICD   Patient admitted to [facility] on 10/16/23 for VT storm iso of HF. VT Ablation was attempted on 10/20/23 but aborted due to hemodynamically significant pericardial effusion with drain placement, eventually requiring pericardial window. Post attempted ablation, patient developed recurrent VT followed by ICD shock. Furthermore, she was found to be in afib w/ RVR and underwent cardioversion 10/21/23. LRL of the device was reduced to 35bpm. Had episode of VT requiring treatment 12/10/23 when she had been off her antiarrhythmic medications for 2 days, due to a perforated gastric ulcer, episode was pace terminated.   Has a 99.cotronic AF MRI VR  On amiodarone 200mg qd, quinidine 200mg q6, metop succ 25 qdaily  -C/w amiodarone, quinidine and metop succ   -Planned for future VT ablation with Dr. Hurt   -EP following, per interrogation (1/30/24) pt with 4 episodes monomorphic VT terminated with ATPs, one episode terminated with ICD shock consistent with patient's dizziness episode (1/29/24)       # PAF   On Coumadin, with goal INR 2-3   Supratherapeutic on admission at 10, reversed with most recent INR 1.23  -Holding home coumadin iso of GI bleed  -Consider d/c on DOAC    # CAD   Cardiac Cath/PCI: Cath at [facility] (1/22/15): Patient had elevated troponin which prompted a repeat cardiac catheterization. Report states that the patient had a proximal RCA total obstruction and was being filled by collaterals from the mid LAD. The LAD shows a patent stent and a 50% mid LAD lesion. She also had moderate disease of the left circumflex artery. Subsequently underwent nuclear study which showed only a fixed inferoapical anteroapical defect.  -Hold Plavix iso of GI bleed     # HLD  -C/w statin    RCRI:  High risk surgery: Y [ ] / N [X]  Hx of ischemic heart dz: Y [X] / N [ ]  Hx of CHF: Y [X] / N [ ]  Hx of CVA: Y [ ] / N [X]  DM with insulin: Y [ ] / N [X]  CKD w/ Cr > 2: Y [ ] / N [X]  Total Score: 2 points Class III Risk 10.1% 30-day risk of death, MI, or cardiac arrest    Hx of valvular disease: Y [ ] / N [X]  Bentley perioperative risk of MI, cardiac arrest: 1.3 % risk of myocardial infarction or cardiac arrest, intraoperatively or up to 30 days post-op    Able to walk >4 METS without any anginal symptoms  Appears euvolemic on exam, lungs are clear to auscultation, no LE edema, No JVD    Patient is at elevated cardiac risk for urgent procedure. No further cardiac workup is necessary. Ms. Kohli is 74 y/o F w/ PMHx COPD, HLD, HTN, PVD, ICM/CAD s/p PCI (has  of RCA), cardiac arrest s/p left-sided ICD (6/4/2019) complicated by infection and s/p R single-chamber ICD - [facility] in 9/23/2019) who presented to the ED with complaints of lightheadedness and melena.     Cardiology consulted for risk stratification prior to endoscopy     Outpatient Cardiologist: Dr. Mcelroy  TTE (11/9/2023 ) Normal LVEF  EKG on my read (1/28/24) - Sinus Rhythm at 69, LBBB, , no ischemic changes    #VT Storm   #Ischemic CMP- S/p ICD   Patient admitted to [facility] on 10/16/23 for VT storm iso of HF. VT Ablation was attempted on 10/20/23 but aborted due to hemodynamically significant pericardial effusion with drain placement, eventually requiring pericardial window. Post attempted ablation, patient developed recurrent VT followed by ICD shock. Furthermore, she was found to be in afib w/ RVR and underwent cardioversion 10/21/23. LRL of the device was reduced to 35bpm. Had episode of VT requiring treatment 12/10/23 when she had been off her antiarrhythmic medications for 2 days, due to a perforated gastric ulcer, episode was pace terminated.   Has a On[employer] AF MRI VR  On amiodarone 200mg qd, quinidine 200mg q6, metop succ 25 qdaily  -C/w amiodarone, quinidine and metop succ   -Planned for future VT ablation with Dr. Hurt   -EP following, per interrogation (1/30/24) pt with 4 episodes monomorphic VT terminated with ATPs, one episode terminated with ICD shock consistent with patient's dizziness episode (1/29/24)       # PAF   On Coumadin, with goal INR 2-3   Supratherapeutic on admission at 10, reversed with most recent INR 1.23  -Holding home coumadin iso of GI bleed  -Consider d/c on DOAC    # CAD   Cardiac Cath/PCI: Cath at [facility] (1/22/15): Patient had elevated troponin which prompted a repeat cardiac catheterization. Report states that the patient had a proximal RCA total obstruction and was being filled by collaterals from the mid LAD. The LAD shows a patent stent and a 50% mid LAD lesion. She also had moderate disease of the left circumflex artery. Subsequently underwent nuclear study which showed only a fixed inferoapical anteroapical defect.  -Hold Plavix iso of GI bleed     # HLD  -C/w statin    RCRI:  High risk surgery: Y [ ] / N [X]  Hx of ischemic heart dz: Y [X] / N [ ]  Hx of CHF: Y [X] / N [ ]  Hx of CVA: Y [ ] / N [X]  DM with insulin: Y [ ] / N [X]  CKD w/ Cr > 2: Y [ ] / N [X]  Total Score: 2 points Class III Risk 10.1% 30-day risk of death, MI, or cardiac arrest    Hx of valvular disease: Y [ ] / N [X]  Bentley perioperative risk of MI, cardiac arrest: 1.3 % risk of myocardial infarction or cardiac arrest, intraoperatively or up to 30 days post-op    Able to walk >4 METS without any anginal symptoms  Appears euvolemic on exam, lungs are clear to auscultation, no LE edema, No JVD    Patient is at elevated cardiac risk for urgent procedure. No further cardiac workup is necessary.    This patient was seen and examined personally by me and the plan was discussed with the fellow and/or resident above. Amendments were made as necessary to the above. Agree with the excellent note and plan above. EGD pending.    Jefferson Lakhani MD, MPhil, [facility]  Cardiologist, [facility]  ; Leah [facility] and [facility] [place]  Email: [email or web address] Cardiology and Cardiovascular Surgery on-service contact/call information, go to [email or web address] and use "[place] [employer]" to login.  Outpatient Cardiology appointments, call [phone] to arrange with a colleague; I do not have outpatient Cardiology clinic.

## 2024-01-31 NOTE — DIETITIAN INITIAL EVALUATION ADULT - PERTINENT MEDS FT
MEDICATIONS  (STANDING):  aMIOdarone    Tablet 200 milliGRAM(s) Oral daily  atorvastatin 40 milliGRAM(s) Oral at bedtime  budesonide 160 MICROgram(s)/formoterol 4.5 MICROgram(s) Inhaler 2 Puff(s) Inhalation two times a day  influenza  Vaccine (HIGH DOSE) 0.7 milliLiter(s) IntraMuscular once  metoprolol succinate ER 25 milliGRAM(s) Oral daily  multivitamin 1 Tablet(s) Oral daily  nystatin Powder 1 Application(s) Topical three times a day  pantoprazole  Injectable 40 milliGRAM(s) IV Push two times a day  quiNIDine gluconate  milliGRAM(s) Oral every 8 hours    MEDICATIONS  (PRN):

## 2024-01-31 NOTE — DIETITIAN INITIAL EVALUATION ADULT - PROBLEM SELECTOR PLAN 2
unclear etiology  CXR left LL atelectasis  will order CT chest non-con to r/o pneumonia   hold antibiotics

## 2024-01-31 NOTE — CHART NOTE - NSCHARTNOTEFT_GEN_A_CORE
Called by Endoscopy RN for hypotension    Pt with nausea and not feeling well.  Pt without fever, abdominal pain, chest pain, dyspnea, dizziness.  No cough or concern for aspiration during procedure.    Vitals:  HR 40's, SBP 60-70  Tired-appearing  RRR  Decreased breath sounds at bases.  Abd soft, ND, NT, +BS    EKG obtained and reviewed by anesthesiologist Dr. Patel and myself, sinus bradycardia, no obvious ischemic changes.    Impression:    Bradycardia and hypotension not responsive to IV fluid bolus of 1L, responsive to small dose of ephredrine.  HR baseline (mid 50's) and normotensive well after ephedrine effect dissipated.    Pacemaker noted to be set at 35 per report.  Possible vasovagal effect from gas insufflation during procedure, now resolved with time and observation.    Recommendations:    Return to floor for continued care on telemetry   Discussed with Medicine ACP   Clear liquid diet tonight  Cardiology/EPS followup.  See Endoscopy Report for further details.

## 2024-02-01 NOTE — PROGRESS NOTE ADULT - ATTENDING COMMENTS
s/p EGD yesterday with diffuse erosions/ulceration throughout  in setting of elevated INR - this is likely source of melena  Okay with a/c    c/w bid ppi x 8 weeks, then given age/need for a/c likely benefits from daily low dose ppi for ppx  path pending for concern of ? atrophic gastritis  may require outpatient EGD based on path - should establish gi care as outpatient  GI to s/o, call with ?
Agree with above. Discussed with EP at bedside, as well as . Plan for EGD tomorrow, ICD being interrogated now. Resume quinidine per EP. Cardiac clearance appreciated. Continue PPI in the meantime.

## 2024-02-01 NOTE — PROGRESS NOTE ADULT - ASSESSMENT
Ms. Kohli is 72 y/o F w/ PMHx COPD, HLD, HTN, PVD, ICM/CAD s/p PCI (has  of RCA), cardiac arrest s/p left-sided ICD (6/4/2019) complicated by infection and s/p R single-chamber ICD - MDT [facility] in 9/23/2019) who presented to the ED with complaints of lightheadedness and melena.     Cardiology consulted for risk stratification prior to endoscopy     Outpatient Cardiologist: Dr. Mcelroy  TTE (11/9/2023 ) Normal LVEF  EKG on my read (1/28/24) - Sinus Rhythm at 69, LBBB, , no ischemic changes    #VT Storm   #Ischemic CMP- S/p ICD   Patient admitted to [facility] on 10/16/23 for VT storm iso of HF. VT Ablation was attempted on 10/20/23 but aborted due to hemodynamically significant pericardial effusion with drain placement, eventually requiring pericardial window. Post attempted ablation, patient developed recurrent VT followed by ICD shock. Furthermore, she was found to be in afib w/ RVR and underwent cardioversion 10/21/23. LRL of the device was reduced to 35bpm. Had episode of VT requiring treatment 12/10/23 when she had been off her antiarrhythmic medications for 2 days, due to a perforated gastric ulcer, episode was pace terminated.   Has a [employer]onic AF MRI VR  On amiodarone 200mg qd, quinidine 200mg q6, metop succ 25 qdaily  -C/w amiodarone, quinidine and metop succ   -Planned for future VT ablation with Dr. Hurt   -EP following, per interrogation (1/30/24) pt with 4 episodes monomorphic VT terminated with ATPs, one episode terminated with ICD shock consistent with patient's dizziness episode (1/29/24)       # PAF   On Coumadin, with goal INR 2-3   Supratherapeutic on admission at 10, reversed with most recent INR 1.23  -Holding home coumadin iso of GI bleed  -Would discharge patient on DOAC unless there is contraindication     # CAD   Cardiac Cath/PCI: Cath at [facility] (1/22/15): Patient had elevated troponin which prompted a repeat cardiac catheterization. Report states that the patient had a proximal RCA total obstruction and was being filled by collaterals from the mid LAD. The LAD shows a patent stent and a 50% mid LAD lesion. She also had moderate disease of the left circumflex artery. Subsequently underwent nuclear study which showed only a fixed inferoapical anteroapical defect.  -Resume home plavix     # HLD  -C/w statin    RCRI:  High risk surgery: Y [ ] / N [X]  Hx of ischemic heart dz: Y [X] / N [ ]  Hx of CHF: Y [X] / N [ ]  Hx of CVA: Y [ ] / N [X]  DM with insulin: Y [ ] / N [X]  CKD w/ Cr > 2: Y [ ] / N [X]  Total Score: 2 points Class III Risk 10.1% 30-day risk of death, MI, or cardiac arrest    Hx of valvular disease: Y [ ] / N [X]  Bentley perioperative risk of MI, cardiac arrest: 1.3 % risk of myocardial infarction or cardiac arrest, intraoperatively or up to 30 days post-op    Able to walk >4 METS without any anginal symptoms  Appears euvolemic on exam, lungs are clear to auscultation, no LE edema, No JVD    Patient is at elevated cardiac risk for urgent procedure. No further cardiac workup is necessary. Ms. Kohli is 74 y/o F w/ PMHx COPD, HLD, HTN, PVD, ICM/CAD s/p PCI (has  of RCA), cardiac arrest s/p left-sided ICD (6/4/2019) complicated by infection and s/p R single-chamber ICD - MDT [facility] in 9/23/2019) who presented to the ED with complaints of lightheadedness and melena.     Cardiology consulted for risk stratification prior to endoscopy     Outpatient Cardiologist: Dr. Mcelroy  TTE (11/9/2023 ) Normal LVEF  EKG on my read (1/28/24) - Sinus Rhythm at 69, LBBB, , no ischemic changes    #VT Storm   #Ischemic CMP- S/p ICD   Patient admitted to [facility] on 10/16/23 for VT storm iso of HF. VT Ablation was attempted on 10/20/23 but aborted due to hemodynamically significant pericardial effusion with drain placement, eventually requiring pericardial window. Post attempted ablation, patient developed recurrent VT followed by ICD shock. Furthermore, she was found to be in afib w/ RVR and underwent cardioversion 10/21/23. LRL of the device was reduced to 35bpm. Had episode of VT requiring treatment 12/10/23 when she had been off her antiarrhythmic medications for 2 days, due to a perforated gastric ulcer, episode was pace terminated.   Has a Wilocitytronic AF MRI VR  On amiodarone 200mg qd, quinidine 200mg q6, metop succ 25 qdaily  -C/w amiodarone, quinidine and metop succ   -Planned for future VT ablation with Dr. Hurt   -EP following, per interrogation (1/30/24) pt with 4 episodes monomorphic VT terminated with ATPs, one episode terminated with ICD shock consistent with patient's dizziness episode (1/29/24)       # PAF   On Coumadin, with goal INR 2-3   Supratherapeutic on admission at 10, reversed with most recent INR 1.23  -Holding home coumadin iso of GI bleed  -Would discharge patient on DOAC unless there is contraindication     # CAD   Cardiac Cath/PCI: Cath at [facility] (1/22/15): Patient had elevated troponin which prompted a repeat cardiac catheterization. Report states that the patient had a proximal RCA total obstruction and was being filled by collaterals from the mid LAD. The LAD shows a patent stent and a 50% mid LAD lesion. She also had moderate disease of the left circumflex artery. Subsequently underwent nuclear study which showed only a fixed inferoapical anteroapical defect.  -Resume home plavix     # HLD  -C/w statin    RCRI:  High risk surgery: Y [ ] / N [X]  Hx of ischemic heart dz: Y [X] / N [ ]  Hx of CHF: Y [X] / N [ ]  Hx of CVA: Y [ ] / N [X]  DM with insulin: Y [ ] / N [X]  CKD w/ Cr > 2: Y [ ] / N [X]  Total Score: 2 points Class III Risk 10.1% 30-day risk of death, MI, or cardiac arrest    Hx of valvular disease: Y [ ] / N [X]  Bentley perioperative risk of MI, cardiac arrest: 1.3 % risk of myocardial infarction or cardiac arrest, intraoperatively or up to 30 days post-op    Able to walk >4 METS without any anginal symptoms  Appears euvolemic on exam, lungs are clear to auscultation, no LE edema, No JVD    Patient is at elevated cardiac risk for urgent procedure. No further cardiac workup is necessary.    This patient was seen and examined personally by me and the plan was discussed with the fellow and/or resident above. Amendments were made as necessary to the above. Agree with the excellent note and plan above. Switch to DOAC for dc, restart plavix, Hb stable.     Jefferson Lakhani MD, MPhil, [facility]  Cardiologist, [facility]  ; Leah [facility]  Email: [email or web address] Cardiology and Cardiovascular Surgery on-service contact/call information, go to [email or web address] and use "[employer] [place]" to login.  Outpatient Cardiology appointments, call [phone] to arrange with a colleague; I do not have outpatient Cardiology clinic.

## 2024-02-01 NOTE — PROGRESS NOTE ADULT - ASSESSMENT
73 year old female with COPD, HTN, PAD, CAD s/p LAD stent, chronic leukocytosis of unclear etiol, cardiac arrest s/p left-sided ICD (6/4/2019) complicated by infection and s/p R single-chamber ICD reimplantation ([place] in 9/23/2019), HFrEF (EF 45%), recent hospital stay for VF sp ablation c/b pericardial effusion and tamponade s/p pericardiocentesis and pericardial window and AFib on Plavix and coumadin, ? perforated gastric ulcer s/p sx repair, presents with melena and exertional lightheadedness x 3 days. Found to have rectal bleeding x 1 in ED.     #Melena, resolved   #Supratherapeutic INR, reversed   #Hx of ?perforated gastric ulcer  #Extensive cardiac disease   - Black liquid stool on rectal exam upon admission; i/s/o INR > 10, at risk of oozing from anywhere along the GI tract  - S/p Kcentra upon arrival with improvement in INR < 2  - Hemodynamically stable with stable H&H and no further episodes of overt GI bleeding  - No known EGD or colonoscopy prior  - Now status post EGD on 1/30 with no active bleeding, sliding hiatal hernia, non-bleeding erosive gastropathy, suspected Hany erosions, diffuse gastric mucosal atrophy and gastritis with multiple clean-based superficial gastric ulcers ([place]ergus Class III). Mapping biopsies were performed. Otherwise normal esophagus and duodenum.   - Patient feels well without ongoing bleeding. Hgb remains stable.     Recommendations  - Oral PPI BID for 8 weeks and daily thereafter for life given plans for anticoagulation and need for GI prophylaxis   - Await pathology results  - May resume anticoagulation   - Repeat upper endoscopy in 2-3 months to check healing.  - Trend vitals and monitor for clinical signs of bleeding  - Remainder per primary    Follow up in Gastroenterology Clinic: [phone] ([facility]ulty [facility] at [address] [facility]) or [phone] ([facility] at [address] [place] [address]) or [phone] ([facility] at [address] [place] [address])  or [place] Office: [phone] ([address] [place] [address]. [address] [place], [place], [address])    Lalo David MD  Gastroenterology/Hepatology Fellow    MONDAY-FRIDAY 8AM-5PM:  Pager# [phone] ([facility]) or [phone] ([facility])    NON-URGENT CONSULTS:  Please email [email or web address]  AT NIGHT AND ON WEEKENDS:  Contact on-call GI fellow via answering service ([phone]) from 5pm-8am and on weekends/holidays

## 2024-02-02 NOTE — PROGRESS NOTE ADULT - REASON FOR ADMISSION
black stool and supra-therapeutic INR

## 2024-02-02 NOTE — PROGRESS NOTE ADULT - PROBLEM SELECTOR PLAN 4
EP help appreciated  VT ablation scheduled for 2/9/24
EP help appreciated  VT ablation scheduled for 2/9/24  continue quinidine and Tele monitoring  dtart apixaban today
EP help appreciated  VT ablation scheduled for 2/9/24  quinidine and Tele monitoring
hold all anticoagulation  patient was supposed to have ablation 1/30  will d/w EP
EP help appreciated  VT ablation scheduled for 2/9/24  quinidine and Tele monitoring

## 2024-02-02 NOTE — PROVIDER CONTACT NOTE (OTHER) - ACTION/TREATMENT ORDERED:
Provider notified . No further actions being made at this time . IV was removed and no IV was given as per patients request.
Provider assessed and saw the patient bedside. Stat labs ordered, EKG ordered.

## 2024-02-02 NOTE — PROGRESS NOTE ADULT - PROVIDER SPECIALTY LIST ADULT
Infectious Disease
Cardiology
Gastroenterology
Internal Medicine
Cardiology
Cardiology
Electrophysiology
Gastroenterology
Internal Medicine

## 2024-02-02 NOTE — PROGRESS NOTE ADULT - PROBLEM SELECTOR PLAN 2
unclear etiology but seems to be spontaneously res'  ID help appreciated  no intervention at this time  repeat blood cultures negative so far
repeat cultures negative  continue to monitor off antibiotics  ID help appreciated
unclear etiology but seems to be spontaneously res'  ID help appreciated  GPC in blood cultures likely procurement contaminant   repeat blood cultures sent  no antibiotics per ID  CT no pneumonia
unclear etiology  CT chest non-con not convincing re pneumonia   continue to hold antibiotics
repeat cultures negative  continue to monitor off antibiotics  ID help very much appreciated

## 2024-02-02 NOTE — DISCHARGE NOTE NURSING/CASE MANAGEMENT/SOCIAL WORK - NSDCPEFALRISK_GEN_ALL_CORE
For information on Fall & Injury Prevention, visit: [email or web address] OR  [email or web address] OR  https://www.cdc.gov/steadi/patient.html

## 2024-02-02 NOTE — DISCHARGE NOTE NURSING/CASE MANAGEMENT/SOCIAL WORK - NSDCVIVACCINE_GEN_ALL_CORE_FT
influenza, injectable, quadrivalent, preservative free; 24-Jan-2015 06:54; [place], Auto-process (IS); [employer]; [id number]; IntraMuscular; Deltoid Left.; 0.5 milliLiter(s); VIS (VIS Published: 19-Aug-2014, VIS Presented: 24-Jan-2015);

## 2024-02-02 NOTE — PROGRESS NOTE ADULT - PROBLEM SELECTOR PLAN 3
INR now normal  resume warfarin vs DOAC post EGD if cleared by GI
discontinue warfarin indefinitely   continue apixaban   discussed with cardiology attending
discontinue warfarin indefinitely   tart apixaban today  discussed with cardiology attending
resolved INR 1.3  will continue to monitor   off all anticoagulation
INR now normal  redose warfarin post EGD if cleared by GI

## 2024-02-02 NOTE — PHYSICAL THERAPY INITIAL EVALUATION ADULT - PLANNED THERAPY INTERVENTIONS, PT EVAL
GOAL: Pt will negotiate up/down 3 steps with one handrail ascending using LRAD independently in 4 weeks/balance training/gait training/strengthening

## 2024-02-02 NOTE — PROGRESS NOTE ADULT - PROBLEM SELECTOR PLAN 7
acceptable  continue to monitor on metoprolol
acceptable  continue to monitor on metoprolol
BP soft side  continue to hold all BP meds except metoprolol
acceptable  continue to monitor on metoprolol
hold all BP meds except metoprolol with hold parameters

## 2024-02-02 NOTE — PROVIDER CONTACT NOTE (OTHER) - REASON
Refusing reinsertion of a new IV
Pt c/o dizziness and pt stiffened up and rolled her eyes back while PCA was taking vitals.

## 2024-02-02 NOTE — PHARMACOTHERAPY INTERVENTION NOTE - COMMENTS
Counseled patient and patient's  on the following discharge medications names (brand/generic), indication, and possible side effects:    Discharge Medications:  amiodarone 200 mg oral tablet: 1 tab(s) orally once a day  apixaban 5 mg oral tablet: 1 tab(s) orally every 12 hours  atorvastatin 40 mg oral tablet: 1 tab(s) orally once a day (at bedtime)  D3 50 mcg (2000 intl units) oral capsule: 1 cap(s) orally once a day  Farxiga 10 mg oral tablet: 1 tab(s) orally once a day  Lasix 40 mg oral tablet: 1 tab(s) orally 2 times a day  losartan 25 mg oral tablet: 1 tab(s) orally once a day  metoprolol succinate 25 mg oral tablet, extended release: 1 tab(s) orally once a day  Multiple Vitamins oral tablet: 1 tab(s) orally once a day  nystatin 100,000 units/g topical powder: Apply topically to affected area once a day  Protonix 40 mg oral delayed release tablet: 1 tab(s) orally 2 times a day  quiNIDine 200 mg oral tablet: 1 tab(s) orally 4 times a day  Symbicort 160 mcg-4.5 mcg/inh inhalation aerosol: 2 puff(s) inhaled 2 times a day    Counseled patient on Eliquis (brand/generic), indication, directions for use (1 tablet (5mg) twice daily) and possible side effects (bleeding). Counseled patient on taking acetaminophen over the counter if needed and to avoid NSAID medications like Advil or Aleve since they can increase bleeding risk. Patient was provided with a medication card for their new medication. Patient questions and concerns were answered and addressed. Patient demonstrated understanding. Patient understood importance of compliance and to follow up with cardiologist once discharged.    Confirmed with [facility] that Eliquis is covered with monthly copay of $135.52. First month free trial coupon applied. Provided patient with Eliquis Patient Assistance Program application form. Patient and her  understand they need to make an appointment with the cardiologist to complete the form and bring all required materials from page 1 of the form.     Provided medication cards. Patient questions and concerns were answered and addressed. Patient demonstrated understanding.    Caryl Gaffney PharmD  Transitions of Care Pharmacist  Available on Microsoft Teams  Spectra #[phone]

## 2024-02-02 NOTE — DISCHARGE NOTE NURSING/CASE MANAGEMENT/SOCIAL WORK - NSDCPEEMAIL_GEN_ALL_CORE
[facility] for Tobacco Control email [email or web address]

## 2024-02-02 NOTE — PROGRESS NOTE ADULT - PROBLEM SELECTOR PLAN 1
likely secondary to increased INR possibly contributed to PUD  now likely resolved  hemoglobin stable ~ 9 to 10  EGD today
likely resolved now   continue IV pantoprazole BID  GI consultation in progress  continue to monitor hemoglobin   GI follow up   transfusion if hemoglobin < 9
likely secondary to increased INR possibly contributed to PUD  now resolved  hemoglobin stable ~ 9 to 10  no active bleeding   received apixaban overnight  discharge on 5 BID   discontinue warfarin   and patient educated re s/s of GI bleeding and to NOT continue to take warfarin
likely secondary to increased INR possibly contributed to PUD  now likely resolved  GI follow up appreciated  will have EGD tomorrow   NPO post midnight  hemoglobin stabilized at 9 - 10
likely secondary to increased INR possibly contributed to PUD  now resolved  hemoglobin stable ~ 9 to 10  EGD noted  no active bleeding   start DOAC tonight

## 2024-02-02 NOTE — DISCHARGE NOTE PROVIDER - NSFOLLOWUPCLINICS_GEN_ALL_ED_FT
[facility]  Gastroenterology  [address] [place] [id number]  Phone: [phone]  Fax:   Follow Up Time: 2 weeks

## 2024-02-02 NOTE — DISCHARGE NOTE PROVIDER - NSDCFUADDAPPT_GEN_ALL_CORE_FT
APPTS ARE READY TO BE MADE: [x] YES    Best Family or Patient Contact (if needed):    Additional Information about above appointments (if needed):    1: Follow up in Gastroenterology Clinic: [phone] ([facility] at [address] [place] [address]) or [phone] ([facility] at [address] [place] [address]) or [phone] ([facility] at [address] [place] [address])  or [place] [facility]: [phone] ([address] [place] [address]. [facility] [place], [place], [address])    2:   3:     Other comments or requests:                APPTS ARE READY TO BE MADE: [x] YES    Best Family or Patient Contact (if needed):    Additional Information about above appointments (if needed):    1: Follow up in Gastroenterology Clinic: [phone] ([facility] at [address] [place] [address]) or [phone] ([facility] at [address] [place] [address]) or [phone] ([facility] at [address] [place] [address])  or [place] Office: [phone] ([address] [place] [address]. [place], [address])    2:   3:     Other comments or requests:   Patient was provided with follow up request details and was advised to call to schedule follow up within specified time frame. No scheduling assistance is needed at this time.

## 2024-02-02 NOTE — PROGRESS NOTE ADULT - PROBLEM SELECTOR PLAN 6
chest pain free  continue to hold clopidogrel til after EGD
chest pain free  continue to hold clopidogrel til after EGD
chest pain free  hold clopidogrel
chest pain free  continue to hold clopidogrel til after EGD
chest pain free  continue to hold clopidogrel til after EGD

## 2024-02-02 NOTE — PROGRESS NOTE ADULT - NSPROGADDITIONALINFOA_GEN_ALL_CORE
discharge planning tomorrow if OK
stable for discharge  with home PT
discussed with patient in detail, expresses understanding of treatment plans.  discussed with covering ACP

## 2024-02-02 NOTE — DISCHARGE NOTE PROVIDER - HOSPITAL COURSE
see abiove HPI:  73 year old female with PMH A fib on Plavix and coumadin presents with melena and exertional lightheadedness  x 3 days with known prior GI ulcer. She and her  state that her coumadin dose was doubled by outpatient cardiology attending from 6 to 12 mg and then she started developing these symptoms. No hematemesis. She is asymptomatic at this time with no conjunctival pallor. no active bleeding. As per Emergency Department staff one episode of black stool in the Emergency Department . (28 Jan 2024 20:44)    Hospital Course:  Pt admitted with Melena in the setting of supratherapeutic INR. GI consulted.  Black liquid stool on rectal exam upon admission; i/s/o INR > 10, at risk of oozing from anywhere along the GI tract.  S/p Kcentra upon arrival with improvement in INR < 2.  Hemodynamically stable with stable H&H and no further episodes of overt GI bleeding. started PPI bid.  Now status post EGD on 1/30 with no active bleeding, sliding hiatal hernia, non-bleeding erosive gastropathy, suspected Hany erosions, diffuse gastric mucosal atrophy and gastritis with multiple clean-based superficial gastric ulcers ([place] Class III). Mapping biopsies were performed. Otherwise normal esophagus and duodenum.    Patient feels well without ongoing bleeding. Hgb remains stable. Recommended for  Oral PPI BID for 8 weeks and daily thereafter for life given plans for anticoagulation and need for GI prophylaxis . .   Cardiology consulted for risk stratification prior to endoscopy.  h/o ischemic CMP- S/p ICD on  amiodarone 200mg qd, quinidine 200mg q6, metop succ 25 qdaily. EPS following, per interrogation (1/30/24) pt with 4 episodes monomorphic VT terminated with ATPs, one episode terminated with ICD shock consistent with patient's dizziness episode (1/29/24)   Planned for future VT ablation with Dr. Hurt . for PAF restarted AC with apixaban once GI cleared. Resumed plavix for CAD                       Important Medication Changes and Reason:    Active or Pending Issues Requiring Follow-up:    Advanced Directives:   [ ] Full code  [ ] DNR  [ ] Hospice    Discharge Diagnoses:             HPI:  73 year old female with PMH A fib on Plavix and coumadin presents with melena and exertional lightheadedness  x 3 days with known prior GI ulcer. She and her  state that her coumadin dose was doubled by outpatient cardiology attending from 6 to 12 mg and then she started developing these symptoms. No hematemesis. She is asymptomatic at this time with no conjunctival pallor. no active bleeding. As per Emergency Department staff one episode of black stool in the Emergency Department . (28 Jan 2024 20:44)    Hospital Course:  Pt admitted with Melena in the setting of supratherapeutic INR. GI consulted.  Black liquid stool on rectal exam upon admission; i/s/o INR > 10, at risk of oozing from anywhere along the GI tract.  S/p Kcentra upon arrival with improvement in INR < 2.  Hemodynamically stable with stable H&H and no further episodes of overt GI bleeding. started PPI bid.  Now status post EGD on 1/30 with no active bleeding, sliding hiatal hernia, non-bleeding erosive gastropathy, suspected Hany erosions, diffuse gastric mucosal atrophy and gastritis with multiple clean-based superficial gastric ulcers ([place] Class III). Mapping biopsies were performed. Otherwise normal esophagus and duodenum.    Patient feels well without ongoing bleeding. Hgb remains stable. Recommended for  Oral PPI BID for 8 weeks and daily thereafter for life given plans for anticoagulation and need for GI prophylaxis . Needs go follow up for pathology results and possible repeat EGD .   Cardiology consulted for risk stratification prior to endoscopy.  h/o ischemic CMP- S/p ICD on  amiodarone 200mg qd, quinidine 200mg q6, metop succ 25 qdaily. EPS following, per interrogation (1/30/24) pt with 4 episodes monomorphic VT terminated with ATPs, one episode terminated with ICD shock consistent with patient's dizziness episode (1/29/24)   Planned for future VT ablation with Dr. Hurt . for PAF restarted AC with apixaban once GI cleared. Resumed plavix for CAD. Pt with ischemic CMP> currently euvolemic. Restarted cardiac meds. CBC remains stable.   Medically cleared for discharge Home with HOme care with cardiology, EPS< GI follow up      Important Medication Changes and Reason:   > OFF coumadin, now on ELiquis for afib  Active or Pending Issues Requiring Follow-up:  > GI follow up for path results  > CArdiology follow up for heart failure  > EPS follow up for ablation  Advanced Directives:   [ x] Full code  [ ] DNR  [ ] Hospice    Discharge Diagnoses:  Melena  supratherapeutic INR  ischemic CMP  PAfib  VT

## 2024-02-02 NOTE — DISCHARGE NOTE PROVIDER - NSDCFUSCHEDAPPT_GEN_ALL_CORE_FT
Campbell Hurt  [facility] PreAdmits  Scheduled Appointment: 02/13/2024    [facility]  ELECTROPH [address]  Scheduled Appointment: 03/06/2024    Marcy Wise  [facility]  CARDIOLOGY [address]  Scheduled Appointment: 03/15/2024    Campbell Hurt  [facility]  ELECTROPH [address]  Scheduled Appointment: 03/28/2024

## 2024-02-02 NOTE — PROGRESS NOTE ADULT - SUBJECTIVE AND OBJECTIVE BOX
Interval Events:   s/p EGD with atrophic gastritis, multiple linear ulcerations and shallow ulcerations with a clean base and no active bleeding, sliding hiatal hernia (please see report for full details)  Patient feels well this AM without clinical signs of bleeding. Denies abd pain, N/V  Hgb stable at 9.4 g/dL    Hospital Medications:  aMIOdarone    Tablet 200 milliGRAM(s) Oral daily  atorvastatin 40 milliGRAM(s) Oral at bedtime  budesonide 160 MICROgram(s)/formoterol 4.5 MICROgram(s) Inhaler 2 Puff(s) Inhalation two times a day  influenza  Vaccine (HIGH DOSE) 0.7 milliLiter(s) IntraMuscular once  metoprolol succinate ER 25 milliGRAM(s) Oral daily  multivitamin 1 Tablet(s) Oral daily  nystatin Powder 1 Application(s) Topical three times a day  pantoprazole  Injectable 40 milliGRAM(s) IV Push two times a day  quiNIDine gluconate  milliGRAM(s) Oral every 8 hours  sodium chloride 0.9%. 500 milliLiter(s) IV Continuous <Continuous>    ROS: See above.    PHYSICAL EXAM:   Vital Signs:  Vital Signs Last 24 Hrs  T(C): 36.8 (01 Feb 2024 04:41), Max: 36.8 (31 Jan 2024 11:10)  T(F): 98.2 (01 Feb 2024 04:41), Max: 98.2 (31 Jan 2024 11:10)  HR: 58 (01 Feb 2024 04:41) (43 - 60)  BP: 137/68 (01 Feb 2024 04:41) (63/39 - 138/95)  BP(mean): 82 (31 Jan 2024 11:10) (82 - 82)  RR: 18 (01 Feb 2024 04:41) (18 - 30)  SpO2: 93% (01 Feb 2024 04:41) (92% - 98%)    Parameters below as of 01 Feb 2024 04:41  Patient On (Oxygen Delivery Method): room air       Daily     GENERAL:  NAD, resting comfortably in bed  HEENT:  sclera anicteric  CHEST:  Normal Effort  HEART:  HDS  ABDOMEN:  Soft, non-tender, non-distended  SKIN:  Warm & Dry. No jaundice  NEURO:  Alert, conversant, no focal deficit    LABS:                        9.4    13.82 )-----------( 311      ( 01 Feb 2024 07:22 )             29.8     Mean Cell Volume: 91.7 fl (02-01-24 @ 07:22)    02-01    140  |  108  |  18  ----------------------------<  80  4.3   |  23  |  0.85    Ca    8.3<L>      01 Feb 2024 07:21  Mg     2.3     02-01          Urinalysis Basic - ( 01 Feb 2024 07:21 )    Color: x / Appearance: x / SG: x / pH: x  Gluc: 80 mg/dL / Ketone: x  / Bili: x / Urobili: x   Blood: x / Protein: x / Nitrite: x   Leuk Esterase: x / RBC: x / WBC x   Sq Epi: x / Non Sq Epi: x / Bacteria: x                              9.4    13.82 )-----------( 311      ( 01 Feb 2024 07:22 )             29.8                         9.5    13.14 )-----------( 303      ( 31 Jan 2024 07:14 )             30.7                         9.5    13.07 )-----------( 299      ( 30 Jan 2024 12:08 )             30.4                         9.6    15.24 )-----------( 300      ( 29 Jan 2024 22:44 )             30.5     < from: Upper Endoscopy (01.31.24 @ 17:44) >  Impression:          - No bleeding or clot visualized.            - Normal esophagus.                       - Sliding hiatal hernia.                       - Non-bleeding erosive gastropathy, suspected Hany erosions..                       - Diffuse gastric mucosal atrophy and gastritis with multiple superficial                        gastric ulcers (Reeves Class III). Mapping biopsies were performed.                       - Normal examined duodenum.    < end of copied text >  
Patient seen and examined at bedside.    Overnight Events: No acute events overnight. Patient transferred to Tele floor yesterday. No events on Tele, sinus 60s. Patient denies any chest pain, palpitations or shortness of breath this morning. Patient states she has not had another dizziness episode since 2 days ago. 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 Plan for EGD today.     REVIEW OF SYSTEMS:  CONSTITUTIONAL: Positive for weakness and lightheadedness, No fevers or chills  EYES/ENT: No visual changes;  No dysphagia  NECK: No pain or stiffness  RESPIRATORY: No cough, wheezing, hemoptysis; No shortness of breath  CARDIOVASCULAR: No chest pain or palpitations; No lower extremity edema  GASTROINTESTINAL: No abdominal or epigastric pain. Positive for nausea, vomiting and melena   BACK: No back pain  GENITOURINARY: No dysuria, frequency or hematuria  NEUROLOGICAL: No numbness or weakness  SKIN: No itching, burning, rashes, or lesions   All other review of systems is negative unless indicated above.    Current Meds:  aMIOdarone    Tablet 200 milliGRAM(s) Oral daily  atorvastatin 40 milliGRAM(s) Oral at bedtime  budesonide 160 MICROgram(s)/formoterol 4.5 MICROgram(s) Inhaler 2 Puff(s) Inhalation two times a day  influenza  Vaccine (HIGH DOSE) 0.7 milliLiter(s) IntraMuscular once  metoprolol succinate ER 25 milliGRAM(s) Oral daily  multivitamin 1 Tablet(s) Oral daily  nystatin Powder 1 Application(s) Topical three times a day  pantoprazole  Injectable 40 milliGRAM(s) IV Push two times a day  quiNIDine gluconate  milliGRAM(s) Oral every 8 hours      Vitals:  T(F): 97.9 (01-31), Max: 99.1 (01-30)  HR: 59 (01-31) (56 - 59)  BP: 136/67 (01-31) (133/65 - 148/77)  RR: 18 (01-31)  SpO2: 95% (01-31)  I&O's Summary    30 Jan 2024 07:01  -  31 Jan 2024 07:00  --------------------------------------------------------  IN: 180 mL / OUT: 200 mL / NET: -20 mL        Physical Exam:  GENERAL: No acute distress, well-developed, appears anxious   HEAD:  Atraumatic, Normocephalic  ENT: Neck supple, No JVD, moist mucosa  CHEST/LUNG: Clear to auscultation bilaterally  HEART: Regular rate and rhythm; No murmur  ABDOMEN: Soft, Nontender, Nondistended  EXTREMITIES: Bruising present on all extremities   PSYCH: Nl behavior, nl affect                          9.5    13.14 )-----------( 303      ( 31 Jan 2024 07:14 )             30.7     01-31    143  |  109<H>  |  19  ----------------------------<  96  4.7   |  24  |  0.94    Ca    8.7      31 Jan 2024 07:15  Mg     2.3     01-31      PT/INR - ( 30 Jan 2024 10:34 )   PT: 14.6 sec;   INR: 1.40 ratio      New ECG(s): Personally reviewed    Echo:    Stress Testing:     Cath:    New Imaging:    Interpretation of Telemetry:  
CC: F/U for Positive culture finding    Saw/spoke to patient. No fevers, no chills. No new complaints.    Allergies  No Known Allergies    ANTIMICROBIALS:      PE:    Vital Signs Last 24 Hrs  T(C): 36.8 (31 Jan 2024 11:10), Max: 37.3 (30 Jan 2024 16:39)  T(F): 98.2 (31 Jan 2024 11:10), Max: 99.1 (30 Jan 2024 16:39)  HR: 60 (31 Jan 2024 11:10) (56 - 60)  BP: 118/64 (31 Jan 2024 11:10) (118/64 - 139/72)  BP(mean): 82 (31 Jan 2024 11:10) (82 - 82)  RR: 18 (31 Jan 2024 11:10) (18 - 18)  SpO2: 94% (31 Jan 2024 11:10) (91% - 95%)    Gen: AOx3, NAD, non-toxic  CV: Nontachycardic  Resp: Breathing comfortably, RA  Abd: Soft, nontender  IV/Skin: No thrombophlebitis    LABS:                        9.5    13.14 )-----------( 303      ( 31 Jan 2024 07:14 )             30.7     01-31    143  |  109<H>  |  19  ----------------------------<  96  4.7   |  24  |  0.94    Ca    8.7      31 Jan 2024 07:15  Mg     2.3     01-31    Urinalysis Basic - ( 31 Jan 2024 07:15 )    Color: x / Appearance: x / SG: x / pH: x  Gluc: 96 mg/dL / Ketone: x  / Bili: x / Urobili: x   Blood: x / Protein: x / Nitrite: x   Leuk Esterase: x / RBC: x / WBC x   Sq Epi: x / Non Sq Epi: x / Bacteria: x    MICROBIOLOGY:    .Blood Blood-Peripheral  01-30-24   No growth at 24 hours  --  --    .Blood Blood-Peripheral  01-28-24   No growth at 48 Hours  --  --    .Blood Blood-Peripheral  01-28-24   Growth in aerobic bottle: Gram positive cocci in pairs  Direct identification is available within approximately 3-5  hours either by Blood Panel Multiplexed PCR or Direct  MALDI-TOF. Details: [email or web address]  --  Blood Culture PCR    RADIOLOGY:    1/28 CT    IMPRESSION:.    Small left pleural effusion and left basilar atelectasis.    No convincing evidence of pneumonia.  
Interval Events:   Pt w episode of post-prandial dizziness and abdominal pain yesterday with spontaneous resolution, feeling better today  No further episodes of GI bleeding. Stools are brown  Abd soft, NT  Hgb stable >9 g/dL, INR 1.4    Hospital Medications:  aMIOdarone    Tablet 200 milliGRAM(s) Oral daily  atorvastatin 40 milliGRAM(s) Oral at bedtime  budesonide 160 MICROgram(s)/formoterol 4.5 MICROgram(s) Inhaler 2 Puff(s) Inhalation two times a day  influenza  Vaccine (HIGH DOSE) 0.7 milliLiter(s) IntraMuscular once  metoprolol succinate ER 25 milliGRAM(s) Oral daily  multivitamin 1 Tablet(s) Oral daily  nystatin Powder 1 Application(s) Topical three times a day  pantoprazole  Injectable 40 milliGRAM(s) IV Push two times a day  potassium chloride    Tablet ER 20 milliEquivalent(s) Oral every 2 hours  quiNIDine gluconate  milliGRAM(s) Oral every 8 hours    ROS: See above.    PHYSICAL EXAM:   Vital Signs:  Vital Signs Last 24 Hrs  T(C): 36.8 (30 Jan 2024 12:39), Max: 36.8 (30 Jan 2024 12:39)  T(F): 98.2 (30 Jan 2024 12:39), Max: 98.2 (30 Jan 2024 12:39)  HR: 56 (30 Jan 2024 12:39) (56 - 77)  BP: 148/77 (30 Jan 2024 12:39) (110/48 - 148/77)  BP(mean): --  RR: 18 (30 Jan 2024 12:39) (18 - 18)  SpO2: 97% (30 Jan 2024 12:39) (94% - 98%)    Parameters below as of 30 Jan 2024 12:39  Patient On (Oxygen Delivery Method): room air    Daily     GENERAL:  NAD, resting comfortably in bed  HEENT:  sclera anicteric  CHEST:  Normal Effort  HEART:  HDS  ABDOMEN:  Soft, non-tender, non-distended  SKIN:  Warm & Dry. No jaundice  NEURO:  Alert, no focal deficit    LABS:                        9.5    13.07 )-----------( 299      ( 30 Jan 2024 12:08 )             30.4     Mean Cell Volume: 90.7 fl (01-30-24 @ 12:08)    01-30    146<H>  |  107  |  25<H>  ----------------------------<  102<H>  3.5   |  26  |  0.97    Ca    8.9      30 Jan 2024 08:00  Mg     2.1     01-28    TPro  6.8  /  Alb  3.3  /  TBili  0.3  /  DBili  x   /  AST  20  /  ALT  18  /  AlkPhos  68  01-28    LIVER FUNCTIONS - ( 28 Jan 2024 14:51 )  Alb: 3.3 g/dL / Pro: 6.8 g/dL / ALK PHOS: 68 U/L / ALT: 18 U/L / AST: 20 U/L / GGT: x           PT/INR - ( 30 Jan 2024 10:34 )   PT: 14.6 sec;   INR: 1.40 ratio         PTT - ( 28 Jan 2024 14:51 )  PTT:46.5 sec  Urinalysis Basic - ( 30 Jan 2024 08:00 )    Color: x / Appearance: x / SG: x / pH: x  Gluc: 102 mg/dL / Ketone: x  / Bili: x / Urobili: x   Blood: x / Protein: x / Nitrite: x   Leuk Esterase: x / RBC: x / WBC x   Sq Epi: x / Non Sq Epi: x / Bacteria: x                          9.5    13.07 )-----------( 299      ( 30 Jan 2024 12:08 )             30.4                         9.6    15.24 )-----------( 300      ( 29 Jan 2024 22:44 )             30.5                         10.0   15.10 )-----------( 293      ( 29 Jan 2024 07:32 )             31.8                         10.0   17.50 )-----------( 293      ( 28 Jan 2024 21:40 )             31.8                         11.7   16.76 )-----------( 354      ( 28 Jan 2024 14:51 )             37.6     
Patient seen and examined at bedside.    Overnight Events: Patient s/p EGD that showed no active bleeding, clots but multiple superficial gastric ulcers, erosive gastropathy. After EGD, patient hypotensive and julisa not responsive to fluids but did respond to ephedrine. Patient has remained HDS since. Patient endorses drinking liquids this morning with no difficulty. She denies any nausea, vomiting, chest pain or shortness of breath.     Tele: 9 beats NSVT ovn and few PVCs     REVIEW OF SYSTEMS:  As above.     Current Meds:  aMIOdarone    Tablet 200 milliGRAM(s) Oral daily  atorvastatin 40 milliGRAM(s) Oral at bedtime  budesonide 160 MICROgram(s)/formoterol 4.5 MICROgram(s) Inhaler 2 Puff(s) Inhalation two times a day  influenza  Vaccine (HIGH DOSE) 0.7 milliLiter(s) IntraMuscular once  metoprolol succinate ER 25 milliGRAM(s) Oral daily  multivitamin 1 Tablet(s) Oral daily  nystatin Powder 1 Application(s) Topical three times a day  pantoprazole  Injectable 40 milliGRAM(s) IV Push two times a day  quiNIDine gluconate  milliGRAM(s) Oral every 8 hours  sodium chloride 0.9%. 500 milliLiter(s) IV Continuous <Continuous>      Vitals:  T(F): 98.2 (02-01), Max: 98.2 (01-31)  HR: 58 (02-01) (43 - 60)  BP: 137/68 (02-01) (63/39 - 138/95)  RR: 18 (02-01)  SpO2: 93% (02-01)  I&O's Summary    31 Jan 2024 07:01  -  01 Feb 2024 07:00  --------------------------------------------------------  IN: 280 mL / OUT: 200 mL / NET: 80 mL    Physical Exam:  GENERAL: No acute distress, well-developed, appears anxious   HEAD:  Atraumatic, Normocephalic  ENT: Neck supple, No JVD, moist mucosa  CHEST/LUNG: Clear to auscultation bilaterally  HEART: Regular rate and rhythm; No murmur  ABDOMEN: Soft, Nontender, Nondistended  EXTREMITIES: Bruising present on all extremities   PSYCH: Nl behavior, nl affect                          9.4    13.82 )-----------( 311      ( 01 Feb 2024 07:22 )             29.8     02-01    140  |  108  |  18  ----------------------------<  80  4.3   |  23  |  0.85    Ca    8.3<L>      01 Feb 2024 07:21  Mg     2.3     02-01      PT/INR - ( 30 Jan 2024 10:34 )   PT: 14.6 sec;   INR: 1.40 ratio            New ECG(s): Personally reviewed    Echo:    Stress Testing:     Cath:    New Imaging:    Interpretation of Telemetry:  
24H hour events: Pt without complaint, no acute events overnight, Tele: SB/SR 50-60's, PVCs and one episode of NSVT yesterday evening     MEDICATIONS:  aMIOdarone    Tablet 200 milliGRAM(s) Oral daily  metoprolol succinate ER 25 milliGRAM(s) Oral daily  quiNIDine gluconate  milliGRAM(s) Oral every 8 hours  budesonide 160 MICROgram(s)/formoterol 4.5 MICROgram(s) Inhaler 2 Puff(s) Inhalation two times a day  pantoprazole  Injectable 40 milliGRAM(s) IV Push two times a day  atorvastatin 40 milliGRAM(s) Oral at bedtime  influenza  Vaccine (HIGH DOSE) 0.7 milliLiter(s) IntraMuscular once  multivitamin 1 Tablet(s) Oral daily  nystatin Powder 1 Application(s) Topical three times a day      REVIEW OF SYSTEMS:  Complete 12 point ROS negative.    PHYSICAL EXAM:  T(C): 36.6 (01-31-24 @ 04:21), Max: 37.3 (01-30-24 @ 16:39)  HR: 59 (01-31-24 @ 04:21) (56 - 59)  BP: 136/67 (01-31-24 @ 04:21) (133/65 - 148/77)  RR: 18 (01-31-24 @ 04:21) (18 - 18)  SpO2: 95% (01-31-24 @ 04:21) (91% - 97%)  Wt(kg): --  I&O's Summary    30 Jan 2024 07:01  -  31 Jan 2024 07:00  --------------------------------------------------------  IN: 180 mL / OUT: 200 mL / NET: -20 mL        Appearance: Normal	  HEENT: PERRL, EOMI	  Cardiovascular: Normal S1 S2, No JVD, No murmurs  Respiratory: Lungs clear to auscultation	  Psychiatry: A & O x 3, Mood & affect appropriate  Gastrointestinal: Soft, Non-tender, + BS	  Skin: No rashes, No ecchymoses, No cyanosis	  Neurologic: Grossly intact  Extremities: No clubbing, cyanosis or edema  Vascular: Peripheral pulses palpable 2+ bilaterally        LABS:	 	    CBC Full  -  ( 31 Jan 2024 07:14 )  WBC Count : 13.14 K/uL  Hemoglobin : 9.5 g/dL  Hematocrit : 30.7 %  Platelet Count - Automated : 303 K/uL  Mean Cell Volume : 91.4 fl  Mean Cell Hemoglobin : 28.3 pg  Mean Cell Hemoglobin Concentration : 30.9 gm/dL  Auto Neutrophil # : x  Auto Lymphocyte # : x  Auto Monocyte # : x  Auto Eosinophil # : x  Auto Basophil # : x  Auto Neutrophil % : x  Auto Lymphocyte % : x  Auto Monocyte % : x  Auto Eosinophil % : x  Auto Basophil % : x    01-31    143  |  109<H>  |  19  ----------------------------<  96  4.7   |  24  |  0.94  01-30    146<H>  |  107  |  25<H>  ----------------------------<  102<H>  3.5   |  26  |  0.97    Ca    8.7      31 Jan 2024 07:15  Ca    8.9      30 Jan 2024 08:00  Mg     2.3     01-31    Thyroid Stimulating Hormone, Serum in AM (01.29.24 @ 07:33)    Thyroid Stimulating Hormone, Serum: 11.40 uIU/mL    Free Thyroxine, Serum in AM (01.30.24 @ 09:12)    Free Thyroxine, Serum: 1.0 ng/dL            TELEMETRY: SB/SR 50-60's, PVCs and one episode of NSVT yesterday evening  	      < from: TTE Limited W or WO Ultrasound Enhancing Agent (11.08.23 @ 11:26) >  CONCLUSIONS:      1. Compared to the transthoracic echocardiogram performed on 11/5/2023 there have been no significant interval changes.   2. Trace pericardial effusion noted adjacent to the anterior right ventricle with no evidence of hemodynamic compromise.    ________________________________________________________________________________________  FINDINGS:     Pericardium:  There is a trace pericardial effusion noted adjacent to the anterior right ventricle with no evidence of hemodynamic compromise.     Systemic Veins:  The inferior vena cava is normal in size measuring 1.75 cm in diameter, (normal <2.1cm) with normal inspiratory collapse (normal >50%) consistent with normal right atrial pressure (~3, range 0-5mmHg).  ____________________________________________________________________  Quantitative Data:     Tricuspid Valve Measurements:     RA Pressure: 3 mmHg    < end of copied text >    
Patient seen and examined at bedside.    Overnight Events:   NAEO   No events on telemetry   Occ PVCs on telemetry. SR 60s  Denies chest pain, SOB, nausea, vomiting, orthopnea, and LE swelling    REVIEW OF SYSTEMS:  as above    Current Meds:  aMIOdarone    Tablet 200 milliGRAM(s) Oral daily  apixaban 5 milliGRAM(s) Oral every 12 hours  atorvastatin 40 milliGRAM(s) Oral at bedtime  budesonide 160 MICROgram(s)/formoterol 4.5 MICROgram(s) Inhaler 2 Puff(s) Inhalation two times a day  influenza  Vaccine (HIGH DOSE) 0.7 milliLiter(s) IntraMuscular once  metoprolol succinate ER 25 milliGRAM(s) Oral daily  multivitamin 1 Tablet(s) Oral daily  nystatin Powder 1 Application(s) Topical three times a day  pantoprazole  Injectable 40 milliGRAM(s) IV Push two times a day  quiNIDine gluconate  milliGRAM(s) Oral every 8 hours  sodium chloride 0.9%. 500 milliLiter(s) IV Continuous <Continuous>      Vitals:  T(F): 98 (02-02), Max: 98.3 (02-01)  HR: 58 (02-02) (58 - 64)  BP: 139/70 (02-02) (136/71 - 141/66)  RR: 18 (02-02)  SpO2: 95% (02-02)  I&O's Summary    01 Feb 2024 07:01  -  02 Feb 2024 07:00  --------------------------------------------------------  IN: 520 mL / OUT: 200 mL / NET: 320 mL        Physical Exam:  GENERAL: No acute distress, well-developed, appears anxious   ENT:  No JVD, moist mucosa  CHEST/LUNG: CTAB  HEART: Regular rate and rhythm; No murmur  ABDOMEN: Soft, Nontender, Nondistended  EXTREMITIES: Bruising present on all extremities   PSYCH: Nl behavior, nl affect                          9.1    13.01 )-----------( 318      ( 02 Feb 2024 06:58 )             30.2     02-01    140  |  108  |  18  ----------------------------<  80  4.3   |  23  |  0.85    Ca    8.3<L>      01 Feb 2024 07:21  Mg     2.3     02-01      
Patient is a 73y old  Female who presents with a chief complaint of black stool and supra-therapeutic INR (30 Jan 2024 14:42)      DATE OF SERVICE: 01-30-24 @ 15:08    SUBJECTIVE / OVERNIGHT EVENTS: overnight events noted    ROS:  Resp: No cough no sputum production  CVS: No chest pain no palpitations no orthopnea  GI: no N/V/D  : no dysuria, no hematuria  + dizziness overnight now resolved         MEDICATIONS  (STANDING):  aMIOdarone    Tablet 200 milliGRAM(s) Oral daily  atorvastatin 40 milliGRAM(s) Oral at bedtime  budesonide 160 MICROgram(s)/formoterol 4.5 MICROgram(s) Inhaler 2 Puff(s) Inhalation two times a day  influenza  Vaccine (HIGH DOSE) 0.7 milliLiter(s) IntraMuscular once  metoprolol succinate ER 25 milliGRAM(s) Oral daily  multivitamin 1 Tablet(s) Oral daily  nystatin Powder 1 Application(s) Topical three times a day  pantoprazole  Injectable 40 milliGRAM(s) IV Push two times a day  potassium chloride    Tablet ER 20 milliEquivalent(s) Oral every 2 hours  quiNIDine gluconate  milliGRAM(s) Oral every 8 hours    MEDICATIONS  (PRN):        CAPILLARY BLOOD GLUCOSE      POCT Blood Glucose.: 141 mg/dL (29 Jan 2024 21:12)    I&O's Summary    29 Jan 2024 07:01  -  30 Jan 2024 07:00  --------------------------------------------------------  IN: 490 mL / OUT: 900 mL / NET: -410 mL        Vital Signs Last 24 Hrs  T(C): 36.8 (30 Jan 2024 12:39), Max: 36.8 (30 Jan 2024 12:39)  T(F): 98.2 (30 Jan 2024 12:39), Max: 98.2 (30 Jan 2024 12:39)  HR: 56 (30 Jan 2024 12:39) (56 - 77)  BP: 148/77 (30 Jan 2024 12:39) (110/48 - 148/77)  BP(mean): --  RR: 18 (30 Jan 2024 12:39) (18 - 18)  SpO2: 97% (30 Jan 2024 12:39) (94% - 98%)    PHYSICAL EXAM:  EYES: EOMI, PERRLA  NECK: Supple, No JVD  CHEST/LUNG: clear  HEART: S1 S2; no murmurs  ABDOMEN: Soft, Nontender  EXTREMITIES: no edema  NEUROLOGY: AO x 3 non-focal  SKIN: No rashes or lesions    LABS:                        9.5    13.07 )-----------( 299      ( 30 Jan 2024 12:08 )             30.4     01-30    146<H>  |  107  |  25<H>  ----------------------------<  102<H>  3.5   |  26  |  0.97    Ca    8.9      30 Jan 2024 08:00      PT/INR - ( 30 Jan 2024 10:34 )   PT: 14.6 sec;   INR: 1.40 ratio               Urinalysis Basic - ( 30 Jan 2024 08:00 )    Color: x / Appearance: x / SG: x / pH: x  Gluc: 102 mg/dL / Ketone: x  / Bili: x / Urobili: x   Blood: x / Protein: x / Nitrite: x   Leuk Esterase: x / RBC: x / WBC x   Sq Epi: x / Non Sq Epi: x / Bacteria: x          All consultant(s) notes reviewed and care discussed with other providers        Contact Number, Dr Nuñez [phone]
Patient is a 73y old  Female who presents with a chief complaint of black stool and supra-therapeutic INR (01 Feb 2024 10:43)      DATE OF SERVICE: 02-01-24 @ 15:39    SUBJECTIVE / OVERNIGHT EVENTS: overnight events noted    ROS:  Resp: No cough no sputum production  CVS: No chest pain no palpitations no orthopnea  GI: no N/V/D  : no dysuria, no hematuria  "I feel much better"         MEDICATIONS  (STANDING):  aMIOdarone    Tablet 200 milliGRAM(s) Oral daily  atorvastatin 40 milliGRAM(s) Oral at bedtime  budesonide 160 MICROgram(s)/formoterol 4.5 MICROgram(s) Inhaler 2 Puff(s) Inhalation two times a day  influenza  Vaccine (HIGH DOSE) 0.7 milliLiter(s) IntraMuscular once  metoprolol succinate ER 25 milliGRAM(s) Oral daily  multivitamin 1 Tablet(s) Oral daily  nystatin Powder 1 Application(s) Topical three times a day  pantoprazole  Injectable 40 milliGRAM(s) IV Push two times a day  quiNIDine gluconate  milliGRAM(s) Oral every 8 hours  sodium chloride 0.9%. 500 milliLiter(s) (30 mL/Hr) IV Continuous <Continuous>    MEDICATIONS  (PRN):        CAPILLARY BLOOD GLUCOSE        I&O's Summary    31 Jan 2024 07:01  -  01 Feb 2024 07:00  --------------------------------------------------------  IN: 280 mL / OUT: 200 mL / NET: 80 mL        Vital Signs Last 24 Hrs  T(C): 36.7 (01 Feb 2024 11:46), Max: 36.8 (31 Jan 2024 21:40)  T(F): 98 (01 Feb 2024 11:46), Max: 98.2 (31 Jan 2024 21:40)  HR: 64 (01 Feb 2024 11:46) (43 - 64)  BP: 141/66 (01 Feb 2024 11:46) (63/39 - 141/66)  BP(mean): 91 (01 Feb 2024 11:46) (91 - 91)  RR: 18 (01 Feb 2024 11:46) (18 - 30)  SpO2: 94% (01 Feb 2024 11:46) (92% - 98%)    PHYSICAL EXAM:  EYES: EOMI, PERRLA  NECK: Supple, No JVD  CHEST/LUNG: clear   HEART: S1 S2; no murmurs   ABDOMEN: Soft, Nontender  EXTREMITIES:   no edema  NEUROLOGY: AO x 3 non-focal      LABS:                        9.4    13.82 )-----------( 311      ( 01 Feb 2024 07:22 )             29.8     02-01    140  |  108  |  18  ----------------------------<  80  4.3   |  23  |  0.85    Ca    8.3<L>      01 Feb 2024 07:21  Mg     2.3     02-01            Urinalysis Basic - ( 01 Feb 2024 07:21 )    Color: x / Appearance: x / SG: x / pH: x  Gluc: 80 mg/dL / Ketone: x  / Bili: x / Urobili: x   Blood: x / Protein: x / Nitrite: x   Leuk Esterase: x / RBC: x / WBC x   Sq Epi: x / Non Sq Epi: x / Bacteria: x          All consultant(s) notes reviewed and care discussed with other providers        Contact Number, Dr Nuñez [phone]
Patient is a 73y old  Female who presents with a chief complaint of black stool and supra-therapeutic INR (02 Feb 2024 11:04)      DATE OF SERVICE: 02-02-24 @ 18:27    SUBJECTIVE / OVERNIGHT EVENTS: overnight events noted    ROS:  Resp: No cough no sputum production  CVS: No chest pain no palpitations no orthopnea  GI: no N/V/D  "I feel much better" "I want to go home"    at bedside         MEDICATIONS  (STANDING):  aMIOdarone    Tablet 200 milliGRAM(s) Oral daily  apixaban 5 milliGRAM(s) Oral every 12 hours  atorvastatin 40 milliGRAM(s) Oral at bedtime  budesonide 160 MICROgram(s)/formoterol 4.5 MICROgram(s) Inhaler 2 Puff(s) Inhalation two times a day  influenza  Vaccine (HIGH DOSE) 0.7 milliLiter(s) IntraMuscular once  metoprolol succinate ER 25 milliGRAM(s) Oral daily  multivitamin 1 Tablet(s) Oral daily  nystatin Powder 1 Application(s) Topical three times a day  pantoprazole  Injectable 40 milliGRAM(s) IV Push two times a day  quiNIDine gluconate  milliGRAM(s) Oral every 8 hours  sodium chloride 0.9%. 500 milliLiter(s) (30 mL/Hr) IV Continuous <Continuous>    MEDICATIONS  (PRN):        CAPILLARY BLOOD GLUCOSE        I&O's Summary    01 Feb 2024 07:01  -  02 Feb 2024 07:00  --------------------------------------------------------  IN: 520 mL / OUT: 200 mL / NET: 320 mL        Vital Signs Last 24 Hrs  T(C): 36.5 (02 Feb 2024 14:30), Max: 36.8 (01 Feb 2024 20:42)  T(F): 97.7 (02 Feb 2024 14:30), Max: 98.3 (01 Feb 2024 20:42)  HR: 65 (02 Feb 2024 14:30) (58 - 65)  BP: 132/78 (02 Feb 2024 14:30) (132/78 - 139/70)  BP(mean): --  RR: 18 (02 Feb 2024 14:30) (18 - 18)  SpO2: 96% (02 Feb 2024 14:30) (95% - 98%)    PHYSICAL EXAM:  EYES: EOMI, PERRLA  NECK: Supple, No JVD  CHEST/LUNG: clear   HEART: S1 S2; no murmurs   ABDOMEN: Soft, Nontender  EXTREMITIES:   no edema  NEUROLOGY: AO x 3 non-focal    LABS:                        9.1    13.01 )-----------( 318      ( 02 Feb 2024 06:58 )             30.2     02-01    140  |  108  |  18  ----------------------------<  80  4.3   |  23  |  0.85    Ca    8.3<L>      01 Feb 2024 07:21  Mg     2.3     02-01            Urinalysis Basic - ( 01 Feb 2024 07:21 )    Color: x / Appearance: x / SG: x / pH: x  Gluc: 80 mg/dL / Ketone: x  / Bili: x / Urobili: x   Blood: x / Protein: x / Nitrite: x   Leuk Esterase: x / RBC: x / WBC x   Sq Epi: x / Non Sq Epi: x / Bacteria: x          All consultant(s) notes reviewed and care discussed with other providers        Contact Number, Dr Nuñez [phone]
Patient is a 73y old  Female who presents with a chief complaint of Gastrointestinal hemorrhage     (31 Jan 2024 13:13)      DATE OF SERVICE: 01-31-24 @ 14:36    SUBJECTIVE / OVERNIGHT EVENTS: overnight events noted    ROS:  Resp: No cough no sputum production  CVS: No chest pain no palpitations no orthopnea  GI: no N/V/D  : no dysuria, no hematuria          MEDICATIONS  (STANDING):  aMIOdarone    Tablet 200 milliGRAM(s) Oral daily  atorvastatin 40 milliGRAM(s) Oral at bedtime  budesonide 160 MICROgram(s)/formoterol 4.5 MICROgram(s) Inhaler 2 Puff(s) Inhalation two times a day  influenza  Vaccine (HIGH DOSE) 0.7 milliLiter(s) IntraMuscular once  metoprolol succinate ER 25 milliGRAM(s) Oral daily  multivitamin 1 Tablet(s) Oral daily  nystatin Powder 1 Application(s) Topical three times a day  pantoprazole  Injectable 40 milliGRAM(s) IV Push two times a day  quiNIDine gluconate  milliGRAM(s) Oral every 8 hours    MEDICATIONS  (PRN):        CAPILLARY BLOOD GLUCOSE        I&O's Summary    30 Jan 2024 07:01  -  31 Jan 2024 07:00  --------------------------------------------------------  IN: 180 mL / OUT: 200 mL / NET: -20 mL        Vital Signs Last 24 Hrs  T(C): 36.8 (31 Jan 2024 11:10), Max: 37.3 (30 Jan 2024 16:39)  T(F): 98.2 (31 Jan 2024 11:10), Max: 99.1 (30 Jan 2024 16:39)  HR: 60 (31 Jan 2024 11:10) (56 - 60)  BP: 118/64 (31 Jan 2024 11:10) (118/64 - 139/72)  BP(mean): 82 (31 Jan 2024 11:10) (82 - 82)  RR: 18 (31 Jan 2024 11:10) (18 - 18)  SpO2: 94% (31 Jan 2024 11:10) (91% - 95%)    PHYSICAL EXAM:  GENERAL: in no apparent distress  HEAD:  Atraumatic, Normocephalic  EYES: EOMI, PERRLA, sclera clear  NECK: Supple, No JVD  CHEST/LUNG: clear b/l, no wheeze  HEART: S1 S2; no murmurs appreciated  ABDOMEN: Soft, Nontender, Bowel sounds present  EXTREMITIES:  No clubbing or cyanosis,  no edema  NEUROLOGY: AO x 3 non-focal  SKIN: No rashes or lesions    LABS:                        9.5    13.14 )-----------( 303      ( 31 Jan 2024 07:14 )             30.7     01-31    143  |  109<H>  |  19  ----------------------------<  96  4.7   |  24  |  0.94    Ca    8.7      31 Jan 2024 07:15  Mg     2.3     01-31      PT/INR - ( 30 Jan 2024 10:34 )   PT: 14.6 sec;   INR: 1.40 ratio               Urinalysis Basic - ( 31 Jan 2024 07:15 )    Color: x / Appearance: x / SG: x / pH: x  Gluc: 96 mg/dL / Ketone: x  / Bili: x / Urobili: x   Blood: x / Protein: x / Nitrite: x   Leuk Esterase: x / RBC: x / WBC x   Sq Epi: x / Non Sq Epi: x / Bacteria: x          All consultant(s) notes reviewed and care discussed with other providers        Contact Number, Dr Nuñez [phone]
Patient is a 73y old  Female who presents with a chief complaint of black stool and supra-therapeutic INR (28 Jan 2024 21:41)      DATE OF SERVICE: 01-29-24 @ 15:03    SUBJECTIVE / OVERNIGHT EVENTS: overnight events noted    ROS:  Resp: No cough no sputum production  CVS: No chest pain no palpitations no orthopnea  GI: no N/V/D  "I feel much better"   no BM x 24 hrs         MEDICATIONS  (STANDING):  aMIOdarone    Tablet 200 milliGRAM(s) Oral daily  atorvastatin 40 milliGRAM(s) Oral at bedtime  budesonide 160 MICROgram(s)/formoterol 4.5 MICROgram(s) Inhaler 2 Puff(s) Inhalation two times a day  influenza  Vaccine (HIGH DOSE) 0.7 milliLiter(s) IntraMuscular once  metoprolol succinate ER 25 milliGRAM(s) Oral daily  multivitamin 1 Tablet(s) Oral daily  nystatin Powder 1 Application(s) Topical three times a day  pantoprazole  Injectable 40 milliGRAM(s) IV Push two times a day    MEDICATIONS  (PRN):        CAPILLARY BLOOD GLUCOSE        I&O's Summary    28 Jan 2024 07:01  -  29 Jan 2024 07:00  --------------------------------------------------------  IN: 0 mL / OUT: 250 mL / NET: -250 mL    29 Jan 2024 07:01  -  29 Jan 2024 15:03  --------------------------------------------------------  IN: 490 mL / OUT: 300 mL / NET: 190 mL        Vital Signs Last 24 Hrs  T(C): 36.7 (29 Jan 2024 13:11), Max: 37.1 (29 Jan 2024 08:30)  T(F): 98.1 (29 Jan 2024 13:11), Max: 98.8 (29 Jan 2024 08:30)  HR: 64 (29 Jan 2024 13:11) (60 - 69)  BP: 107/48 (29 Jan 2024 13:11) (107/48 - 133/72)  BP(mean): 75 (28 Jan 2024 19:50) (75 - 75)  RR: 18 (29 Jan 2024 13:11) (17 - 18)  SpO2: 95% (29 Jan 2024 13:11) (93% - 100%)    PHYSICAL EXAM:  EYES: EOMI, PERRLA  NECK: Supple, No JVD  CHEST/LUNG: clear  HEART: S1 S2; no murmurs  ABDOMEN: Soft, Nontender  EXTREMITIES: no edema  NEUROLOGY: AO x 3 non-focal  SKIN: No rashes or lesions    LABS:                        10.0   15.10 )-----------( 293      ( 29 Jan 2024 07:32 )             31.8     01-28    143  |  105  |  38<H>  ----------------------------<  106<H>  4.0   |  23  |  1.08    Ca    9.1      28 Jan 2024 14:51  Mg     2.1     01-28    TPro  6.8  /  Alb  3.3  /  TBili  0.3  /  DBili  x   /  AST  20  /  ALT  18  /  AlkPhos  68  01-28    PT/INR - ( 29 Jan 2024 07:32 )   PT: 13.5 sec;   INR: 1.23 ratio         PTT - ( 28 Jan 2024 14:51 )  PTT:46.5 sec      Urinalysis Basic - ( 28 Jan 2024 14:51 )    Color: x / Appearance: x / SG: x / pH: x  Gluc: 106 mg/dL / Ketone: x  / Bili: x / Urobili: x   Blood: x / Protein: x / Nitrite: x   Leuk Esterase: x / RBC: x / WBC x   Sq Epi: x / Non Sq Epi: x / Bacteria: x          All consultant(s) notes reviewed and care discussed with other providers        Contact Number, Dr Nuñez [phone]

## 2024-02-02 NOTE — DISCHARGE NOTE NURSING/CASE MANAGEMENT/SOCIAL WORK - NSDCPEWEB_GEN_ALL_CORE
[facility] for Tobacco Control website --- [email or web address] website --- [email or web address]

## 2024-02-02 NOTE — DISCHARGE NOTE NURSING/CASE MANAGEMENT/SOCIAL WORK - PATIENT PORTAL LINK FT
You can access the FollowMyHealth Patient Portal offered by [facility] by registering at the following website: [email or web address]. By joining [facility]’s FollowMyHealth portal, you will also be able to view your health information using other applications (apps) compatible with our system.

## 2024-02-02 NOTE — PROVIDER CONTACT NOTE (OTHER) - ASSESSMENT
Pt is anxious and worried. Pt denies pain, sob, or difficulty breathing. All vss, blood glucose 141.
AO x 4. VVS. No reports of pain or discomfort.

## 2024-02-02 NOTE — PROVIDER CONTACT NOTE (OTHER) - SITUATION
Pt Refusing reinsertion of a new IV after orginial IV was removed due to the line not being patent.
Pt c/o dizziness and pt stiffened up and rolled her eyes back while PCA was taking vitals.

## 2024-02-02 NOTE — DISCHARGE NOTE NURSING/CASE MANAGEMENT/SOCIAL WORK - NSDCFUADDAPPT_GEN_ALL_CORE_FT
APPTS ARE READY TO BE MADE: [x] YES    Best Family or Patient Contact (if needed):    Additional Information about above appointments (if needed):    1: Follow up in Gastroenterology Clinic: [phone] ([facility] at [address] [place] [address]) or [phone] ([facility] at [address] [place] [address]) or [phone] ([facility] at [address] [place] [address])  or [place] Office: [phone] ([address] [place] [address]. [facility] [place], [address])    2:   3:     Other comments or requests:

## 2024-02-02 NOTE — DISCHARGE NOTE PROVIDER - NSDCCPCAREPLAN_GEN_ALL_CORE_FT
PRINCIPAL DISCHARGE DIAGNOSIS  Diagnosis: Melena  Assessment and Plan of Treatment: you had an EGD showing  diffuse erosions/ulceration throughout  in setting of elevated INR - this is likely source of melena  continue protonix 2x daily  for 2 months  Follow up with GI specialist for pathology results      SECONDARY DISCHARGE DIAGNOSES  Diagnosis: Supratherapeutic INR  Assessment and Plan of Treatment: you were switched to ELiquis for AFib    Diagnosis: CAD (coronary artery disease)  Assessment and Plan of Treatment: continue plavix  follow up with cardiology    Diagnosis: Ischemic cardiomyopathy  Assessment and Plan of Treatment: Weigh yourself daily.  If you gain 3lbs in 3 days, or 5lbs in a week call your Health Care Provider.  Do not eat or drink foods containing more than 2000mg of salt (sodium) in your diet every day.  Call your Health Care Provider if you have any swelling or increased swelling in your feet, ankles, and/or stomach.  Take all of your medication as directed.  If you become dizzy call your Health Care Provider.      Diagnosis: Paroxysmal atrial fibrillation  Assessment and Plan of Treatment: continue eliquis  Follow up with cardiology    Diagnosis: Ventricular tachycardia  Assessment and Plan of Treatment: continue AMiodarone and quinidine  follow up with Dr Hurt for ablation

## 2024-02-02 NOTE — PROVIDER CONTACT NOTE (OTHER) - BACKGROUND
Pt came in for n/v, and diarrhea. Pt admitted for upper GI bleed and increased INR.
73 elderly female  hx COPD, HTN, PAD, CAD s/p LAD stent s/p R single-chamber ICD reimplantation overdose Coumadin presenting with gastric ulcer

## 2024-02-02 NOTE — PROGRESS NOTE ADULT - ASSESSMENT
Ms. 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     Cardiology consulted for risk stratification prior to endoscopy     Outpatient Cardiologist: Dr. Mcelroy  TTE (11/9/2023 ) Normal LVEF  EKG on my read (1/28/24) - Sinus Rhythm at 69, LBBB, , no ischemic changes    #VT Storm   #Ischemic CMP- S/p ICD   Patient admitted to [facility] on 10/16/23 for VT storm iso of HF. VT Ablation was attempted on 10/20/23 but aborted due to hemodynamically significant pericardial effusion with drain placement, eventually requiring pericardial window. Post attempted ablation, patient developed recurrent VT followed by ICD shock. Furthermore, she was found to be in afib w/ RVR and underwent cardioversion 10/21/23. LRL of the device was reduced to 35bpm. Had episode of VT requiring treatment 12/10/23 when she had been off her antiarrhythmic medications for 2 days, due to a perforated gastric ulcer, episode was pace terminated.   Has a [employer] AF MRI VR  On amiodarone 200mg qd, quinidine 200mg q6, metop succ 25 qdaily  -C/w amiodarone, quinidine and metop succ   -Planned for future VT ablation with Dr. Hurt   -EP following, per interrogation (1/30/24) pt with 4 episodes monomorphic VT terminated with ATPs, one episode terminated with ICD shock consistent with patient's dizziness episode (1/29/24)       # PAF   On Coumadin, with goal INR 2-3   Supratherapeutic on admission at 10, reversed with most recent INR 1.23  -discontinue warfarin   -continue with apixaban 5mg BID   -follow up with Dr. Wise outpatient    # CAD   Cardiac Cath/PCI: Cath at [facility] (1/22/15): Patient had elevated troponin which prompted a repeat cardiac catheterization. Report states that the patient had a proximal RCA total obstruction and was being filled by collaterals from the mid LAD. The LAD shows a patent stent and a 50% mid LAD lesion. She also had moderate disease of the left circumflex artery. Subsequently underwent nuclear study which showed only a fixed inferoapical anteroapical defect.  -Resume home plavix     # HLD  -C/w statin    Please see attending attestation for final recommendations.     Shabbir Pritchett MD  Cardiology Fellow   Ms. 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     Cardiology consulted for risk stratification prior to endoscopy     Outpatient Cardiologist: Dr. Mcelroy  TTE (11/9/2023 ) Normal LVEF  EKG on my read (1/28/24) - Sinus Rhythm at 69, LBBB, , no ischemic changes    #VT Storm   #Ischemic CMP- S/p ICD   Patient admitted to [facility] on 10/16/23 for VT storm iso of HF. VT Ablation was attempted on 10/20/23 but aborted due to hemodynamically significant pericardial effusion with drain placement, eventually requiring pericardial window. Post attempted ablation, patient developed recurrent VT followed by ICD shock. Furthermore, she was found to be in afib w/ RVR and underwent cardioversion 10/21/23. LRL of the device was reduced to 35bpm. Had episode of VT requiring treatment 12/10/23 when she had been off her antiarrhythmic medications for 2 days, due to a perforated gastric ulcer, episode was pace terminated.   Has a 10-20 [employer] AF MRI VR  On amiodarone 200mg qd, quinidine 200mg q6, metop succ 25 qdaily  -C/w amiodarone, quinidine and metop succ   -Planned for future VT ablation with Dr. Hurt   -EP following, per interrogation (1/30/24) pt with 4 episodes monomorphic VT terminated with ATPs, one episode terminated with ICD shock consistent with patient's dizziness episode (1/29/24)       # PAF   On Coumadin, with goal INR 2-3   Supratherapeutic on admission at 10, reversed with most recent INR 1.23  -discontinue warfarin   -continue with apixaban 5mg BID   -follow up with Dr. Wise outpatient    # CAD   Cardiac Cath/PCI: Cath at [facility] (1/22/15): Patient had elevated troponin which prompted a repeat cardiac catheterization. Report states that the patient had a proximal RCA total obstruction and was being filled by collaterals from the mid LAD. The LAD shows a patent stent and a 50% mid LAD lesion. She also had moderate disease of the left circumflex artery. Subsequently underwent nuclear study which showed only a fixed inferoapical anteroapical defect.  -Resume home plavix     # HLD  -C/w statin    Please see attending attestation for final recommendations.     Shabbir Pritchett MD  Cardiology Fellow    This patient was seen and examined personally by me and the plan was discussed with the fellow and/or resident above. Amendments were made as necessary to the above. Agree with the excellent note and plan above. Apixaban started.     Jefferson Lakhani MD, MPhil, [facility]  Cardiologist, [facility]  ; [facility]  Email: [email or web address] Card[facility] and Cardiovascular [facility] on-service contact/call information, go to [email or web address] and use "[employer]" to login.  Outpatient Cardiology appointments, call [phone] to arrange with a colleague; I do not have outpatient Cardiology clinic.

## 2024-02-02 NOTE — DISCHARGE NOTE PROVIDER - NSDCMRMEDTOKEN_GEN_ALL_CORE_FT
amiodarone 200 mg oral tablet: 1 tab(s) orally once a day  apixaban 5 mg oral tablet: 1 tab(s) orally every 12 hours  atorvastatin 40 mg oral tablet: 1 tab(s) orally once a day (at bedtime)  D3 50 mcg (2000 intl units) oral capsule: 1 cap(s) orally once a day  Farxiga 10 mg oral tablet: 1 tab(s) orally once a day  Lasix 40 mg oral tablet: 1 tab(s) orally 2 times a day  losartan 25 mg oral tablet: 1 tab(s) orally once a day  metoprolol succinate 25 mg oral tablet, extended release: 1 tab(s) orally once a day  Multiple Vitamins oral tablet: 1 tab(s) orally once a day  nystatin 100,000 units/g topical powder: Apply topically to affected area once a day  Protonix 40 mg oral delayed release tablet: 1 tab(s) orally 2 times a day  quiNIDine 200 mg oral tablet: 1 tab(s) orally 4 times a day  Symbicort 160 mcg-4.5 mcg/inh inhalation aerosol: 2 puff(s) inhaled 2 times a day

## 2024-02-03 PROBLEM — Z01.818 PRE-OP EVALUATION: Status: ACTIVE | Noted: 2024-01-01

## 2024-02-11 NOTE — H&P ADULT - ATTENDING COMMENTS
Admitted with multiple shocks for VT in the setting of hypokalemia  A+Ox3  Hemodynamics acceptable, no pressors or inotropes  Multiple episodes of  VT - hold outpatient Quinidine and Amiodarone, start Lidocaine infusion  Consult EP, possible ablation in the coming days  Check TTE   O2 sats mid to high 90s on room air  DASH diet, diarrhea yesterday  Normal renal function, hypokalemic ? due to diarrhea - replete K aggressively  H/H low but acceptable on Heparin drip   Afebrile, no antibiotics  Sugars controlled  No central access

## 2024-02-11 NOTE — CONSULT NOTE ADULT - ASSESSMENT
73-year-old female with history of CAD, CHF, hypertension, hyperlipidemia, atrial fibrillation, VT s/p ICD, on amiodarone, who presents with defibrillator firing.  Patient with recent history of supratherapeutic INR C/B GI bleed during recent admission, and had monomorphic VT s/p ICD shock, with planned future VT ablation by Dr. Hurt, discharged 2/2/2024 on Eliquis; presenting after three episodes of VT at home found to be hypokalemic in the ED admitted for likely ablation.

## 2024-02-11 NOTE — ED PROVIDER NOTE - PROGRESS NOTE DETAILS
Vahid, PGY3: Patient signed out to my care. Patient was pending repeat potassium->2.9 prior to admission. Spoke with Dr. Nuñez who accepted for tele admission.

## 2024-02-11 NOTE — CONSULT NOTE ADULT - ATTENDING COMMENTS
Replete K (2.5 on presentation)  hold quinidine and amio given plans for ablation  IV lidocaine for now

## 2024-02-11 NOTE — CONSULT NOTE ADULT - SUBJECTIVE AND OBJECTIVE BOX
Cardiology Consult Note   [Please check [email or web address] password: [email or web address]" for cardiology service schedule and contact information]    History of Present Illness:   Ms. Kohli is a 74 yo woman w/ hx of CAD s/p PCI ( of RCA) w/ iCMY (pEF), prev L chest wall ICD placed in 2019 c/b infx and removed s/p R chest wall medtronic single chamber ICD in 2019 w/ recurrent VT (w/ attempted VT ablation that was aborted iso pericardial effusion s/p drain and pericardial window), AFib s/p DCCV on apixaban, COPD, HTN, HLD, PVD who presented to the ED w/ ICD shocks.    Pt was recently hospitalized for GI ulcer / GIB and underwent EGD w/ no areas of bleeding but erosive gastropathy. During the admission she had ICD shocks that were thought 2/2 to holding home anti arrhythmics iso procedures. During her last admission her AC was switched from warfarin to apixaban and her quinidine was increased to 325mg q8 from 200mg q6, although it appears she was dc'ed on 200mg q6. She was doing well since DC in Jan 2024, feeling okay w/o chest pain, palpitations, LE swelling, CHO (walks w/ a walker). In advance of her ablation she is holding her home dapagliflozin since this past Wednesday. On Wednesday a home VNA found the pt to be hypotensive w/ BP in the 70s/50s. Her cardiologist was called who advised she stop her losartan 25 qd and switch her lasix 40mg BID to qd.     Her  noted that around midnight this past evening she suddenly clutched her chest and then became slightly groggy, similar to prev times shes had ICD shocks. The pt then reported feeling an additional shock. He brought her to the ED where she reported feeling okay w/o chest pain or SOB. In the ED she was HDS. EKG in the ED showing NSR w/ LBBB, similar to prior. Labs showed mild leukocytosis, stable anemia, K 2.5, Ca 8.3, Mg 1.9, neg HS-trop, BNP 2910 (similar to prior). CXR showed a small L pleural effusion. In the ED she was given 150mg of IV amio.    HOME CARDIAC MEDS:  Amio 200mg qd  Quinidine 325mg q8h  Apixaban 5mg BID  atorva 40mg  Dapagliflozin 10mg  Losartan 25mg qd  Lasix 40mg qd  Metop succ 25mg qd      PMHx: reviewed, see below  SOCIAL HISTORY:  unchanged    MEDICATIONS:    REVIEW OF SYSTEMS:  CV: chest pain (-), palpitation (-), orthopnea (-), PND (-), edema (-)  PULM: SOB (-), cough (-), wheezing (-), hemoptysis (-).   CONST: fever (-), chills (-) or fatigue (-)  GI: abdominal distension (-), abdominal pain (-) , nausea/vomiting (-), hematemesis, (-), melena (-), hematochezia (-)  : dysuria (-), frequency (-), hematuria (-).   NEURO: numbness (-), weakness (-), dizziness (-)  SKIN: itching (-), rash (-)  HEENT:  visual changes (-); vertigo or throat pain (-);  neck stiffness (-)     All other review of systems is negative unless indicated above.   -------------------------------------------------------------------------------------------  VITALS:  T(C): 36.4 (02-11-24 @ 01:38), Max: 36.4 (02-11-24 @ 01:38)  HR: 68 (02-11-24 @ 01:38) (68 - 68)  BP: 122/71 (02-11-24 @ 01:38) (122/71 - 122/71)  RR: 18 (02-11-24 @ 01:38) (18 - 18)  SpO2: 95% (02-11-24 @ 01:38) (95% - 95%)  Wt(kg): --  I&O's Summary      PHYSICAL EXAM:  GEN: NAD, sitting in bed  HEENT: NCAT, EOMI  CV: RRR, Ns1/s2, no m/r/g, JVP not elevated  RESP: CTA B/l, no w/r/r  ABD: soft, nt, nd  EXT: warm, 2+ pulses, no edema, ecchymosed LE skin  SKIN: no rashes  NEURO: AAOx3, no focal deficits  PSYCH: Calm, cooperative    -------------------------------------------------------------------------------------------  LABS:                          10.0   11.38 )-----------( 344      ( 11 Feb 2024 02:40 )             31.5           PT/INR - ( 11 Feb 2024 02:40 )   PT: 23.3 sec;   INR: 2.27 ratio         PTT - ( 11 Feb 2024 02:40 )  PTT:33.4 sec            -------------------------------------------------------------------------------------------  Meds:    -------------------------------------------------------------------------------------------  Cardiovascular Diagnostic Testing:      -------------------------------------------------------------------------------------------  Assessment and Plan:   Ms. Kohli is a 74 yo woman w/ hx of CAD s/p PCI ( of RCA) w/ iCMY (pEF), prev L chest wall ICD placed in 2019 c/b infx and removed s/p R chest wall medtronic single chamber ICD in 2019 w/ recurrent VT (w/ attempted VT ablation that was aborted iso pericardial effusion s/p drain and pericardial window), AFib s/p DCCV on apixaban, COPD, HTN, HLD, PVD who presented to the ED w/ ICD shocks.    #ICD Shocks  #CAD / iCMY  #VT  #AFib  #HTN  #HLD  Pt w/ know VT w/ ICD w/ recurrent VT and ICD shocks, on amio 200 qd and quinidine 325mg q8 (although Rx'ed for 200 q6) who presented w/ ICD shocks found to have 5 runs of VT in the past 2d, 1 of which was terminated by ATP the other 4 terminated by ICD shocks. Pt found to be in NSR w/ LBBB (chronic), w/ hypokalemia and no signs of gross volume overload. Suspect that hypokalemia is contributing to presentation as no other signs of ADHF (although pt does have small pleural effusion on CXR), new ischemia or other electrolye/metabolic derangements. Hgb is now improved from prior and pt reports not missing any home medications. Pt given amio bolus in the ED. Plan to admit and monitor    Recommendations:  - admit to medicine, telemety  - replete K to >4, give 60mEq po potassium and 40mE1 IV potassium, then likely r/p  - please get a TTE  - will discuss plan for VT ablation while admitted  - hold home lasix and losartan until K has been replete then can likely resume to maintain net neutral fluid balance  - continue amio 200mg qd  - continue quinidine 325mg q8  - continue home apixaban 5mg BID  - continue home metop succ 25mg qd  - continue home atorva 40  - hold home dapagliflozin  - monitor on tele  - K >4; Mg >2    *** Recommendations are preliminary until cosigned by the attending.    Danie Serrano MD  Cardiology Fellow    For all New Consults and Questions:  [email or web address]   Login: [email or web address]  
DATE OF SERVICE: 02-11-24 @ 15:38    Patient is a 73y old  Female who presents with a chief complaint of hypokalemia and AICD discharge     HPI:    72F with COPD, HTN, PAD, CAD s/p LAD stent, cardiac arrest s/p left-sided ICD (6/4/2019) complicated by infection and s/p R single-chamber ICD reimplantation ([place] in 9/23/2019), HFrEF (EF 45%), recent hospital stay for VF sp ablation c/b pericardial effusion and tamponade s/p pericardiocentesis and pericardial window and AFib, presenting with defibrillator firing. The patient was discharge from my service a few days prior for outpatient  VT ablation after d/w the EP service last admission. During that admission she was transitioned to apixaban from warfarin per cardiology recommendations. The patient had 3 episodes of defibrillation this evening between 7:30 PM until 12:30 AM.  Patient with dizziness prior to the episodes, but denies syncope.  Reports that she has been compliant with her medications, and has been feeling well prior, aside from 1 episode of loose stool yesterday. Denies any increased coughing, fevers, chills, chest pain, SOB, increased LE swelling.       PAST MEDICAL & SURGICAL HISTORY:  Obese      Smoker      HTN (hypertension)      HLD (hyperlipidemia)      CAD (coronary artery disease)      CHF with cardiomyopathy      History of hip surgery          Review of Systems:   CONSTITUTIONAL: No fever, weight loss, or fatigue  EYES: No eye pain, visual disturbances, or discharge  ENMT:  No difficulty hearing, tinnitus, vertigo; No sinus or throat pain  NECK: No pain or stiffness  RESPIRATORY: No cough, wheezing, chills or hemoptysis; No shortness of breath  CARDIOVASCULAR: see above HPI chest pain, palpitations, dizziness, leg swelling or sob  GASTROINTESTINAL: No abdominal pain. No nausea, vomiting, diarrhea or constipation  GENITOURINARY: No dysuria, frequency, hematuria, or incontinence  NEUROLOGICAL: No headaches, memory loss, loss of strength, numbness, or tremors  SKIN: No itching, burning, rashes, or lesions       Allergies    No Known Allergies    Intolerances        Social History: smoker  no IVDA  no ETOH abuse   lives with family     FAMILY HISTORY:  Family history of Alzheimer's disease (Father)    Family history of cancer (Mother)        MEDICATIONS  (STANDING):  atorvastatin 40 milliGRAM(s) Oral at bedtime  budesonide 160 MICROgram(s)/formoterol 4.5 MICROgram(s) Inhaler 2 Puff(s) Inhalation two times a day  chlorhexidine 2% Cloths 1 Application(s) Topical daily  heparin  Infusion 1000 Unit(s)/Hr (10 mL/Hr) IV Continuous <Continuous>  lidocaine   Infusion 1 mG/Min (7.5 mL/Hr) IV Continuous <Continuous>  metoprolol succinate ER 25 milliGRAM(s) Oral daily  pantoprazole    Tablet 40 milliGRAM(s) Oral before breakfast    MEDICATIONS  (PRN):      CAPILLARY BLOOD GLUCOSE        I&O's Summary    11 Feb 2024 07:01  -  11 Feb 2024 15:38  --------------------------------------------------------  IN: 260 mL / OUT: 200 mL / NET: 60 mL        24hrs Vital:  T(C): 36.7 (02-11-24 @ 09:24), Max: 36.7 (02-11-24 @ 09:24)  HR: 60 (02-11-24 @ 15:33) (50 - 68)  BP: 147/64 (02-11-24 @ 15:33) (99/76 - 149/63)  RR: 16 (02-11-24 @ 14:00) (13 - 32)  SpO2: 94% (02-11-24 @ 15:33) (90% - 100%)    PHYSICAL EXAM:  GENERAL: NAD, well-developed  HEAD:  Atraumatic, Normocephalic  EYES: EOMI, PERRLA, conjunctiva and sclera clear  NECK: Supple, No JVD  CHEST/LUNG: Clear to auscultation bilaterally; No wheeze  HEART: S1S2; No rubs, or gallops, no murmurs  ABDOMEN: Soft, Nontender; Bowel sounds present  EXTREMITIES:  + Peripheral Pulses, No clubbing or cyanosis, no edema  PSYCH: AO x 3,   NEUROLOGY: Alert, no focal motor or sensory deficits  SKIN: No rashes or lesions    LABS:                        10.0   11.38 )-----------( 344      ( 11 Feb 2024 02:40 )             31.5     02-11    139  |  101  |  10  ----------------------------<  100<H>  2.9<LL>   |  28  |  0.89    Ca    8.1<L>      11 Feb 2024 07:40  Phos  2.4     02-11  Mg     1.9     02-11    TPro  6.1  /  Alb  3.1<L>  /  TBili  0.4  /  DBili  <0.1  /  AST  26  /  ALT  21  /  AlkPhos  70  02-11    PT/INR - ( 11 Feb 2024 02:40 )   PT: 23.3 sec;   INR: 2.27 ratio         PTT - ( 11 Feb 2024 02:40 )  PTT:33.4 sec      Urinalysis Basic - ( 11 Feb 2024 07:40 )    Color: x / Appearance: x / SG: x / pH: x  Gluc: 100 mg/dL / Ketone: x  / Bili: x / Urobili: x   Blood: x / Protein: x / Nitrite: x   Leuk Esterase: x / RBC: x / WBC x   Sq Epi: x / Non Sq Epi: x / Bacteria: x        RADIOLOGY & ADDITIONAL TESTS:    Consultant(s) Notes Reviewed:      Care Discussed with Consultants/Other Providers:

## 2024-02-11 NOTE — PROCEDURE NOTE - ADDITIONAL PROCEDURE DETAILS
Interrogation for recent ICD shock   Remaining battery: 5.1y  Presented in NSR w/ rate in the 50-60s  ICD functioning appropriately w/ Vpaced <0.1%    Data reveals 4x ICD shocks (one on 2/8/24, one on 2/10/24 and two on 2/11/2024); EGM consistent w/ monomorphic VT w/ rate 150-230s, each episode unsuccessfully treated w/ ATP before shock; Additionally 1 episode of VT successfully terminated by ATP.

## 2024-02-11 NOTE — PHYSICAL THERAPY INITIAL EVALUATION ADULT - GENERAL OBSERVATIONS, REHAB EVAL
Patient received semi-supine, NAD, + 2L supplemental O2, IV, hard wire monitoring. Patient received semi-supine, NAD, + 2L supplemental O2, IV, hard wire monitoring. 2/12 Pt received OOB in chair ok for PT per RN Aru, +ICU Monitoring lines, +IV.

## 2024-02-11 NOTE — ED ADULT NURSE NOTE - OBJECTIVE STATEMENT
PT is a 74yo F coming from home c/o AICD firing. As per pt  at bedside, pt was sleeping and received shock from AICD, had eyes roling to back of head and pain. Pt denies any chest pain or feeling shock. Endorses dizziness and lightheadedness at home.  PMH of CHF, CAD, HLD, HTN, COPD (no home o2). Upon arrival to ED, pt hypotensive, pt placed on cardiac monitor, EKG performed in ED, pt placed on o2. PT A,Ox4, ambulatory with walker at baseline. Respirations even and unlabored, abd soft, nondistended and nontender, skin warm, dry and intact, UPTON. Denies HA, CP, n/v/d, fever, chills and urinary symptoms. Stretcher locked in lowest position, appropriate side rails up for safety, pt instructed to call for RN if anything needed.

## 2024-02-11 NOTE — PHYSICAL THERAPY INITIAL EVALUATION ADULT - RISK REDUCTION/PREVENTION, PT EVAL
Date:July 7, 2017      Patient was self referred, no letter generated. Do not send.        [facility] Health Information       risk factors

## 2024-02-11 NOTE — PATIENT PROFILE ADULT - FUNCTIONAL ASSESSMENT - BASIC MOBILITY 6.
4-calculated by average/Not able to assess (calculate score using [facility] averaging method)

## 2024-02-11 NOTE — CONSULT NOTE ADULT - PROBLEM SELECTOR RECOMMENDATION 7
extensive counselling done at bedside re cessation of smoking   patient understands dangers of continued smoking

## 2024-02-11 NOTE — PHYSICAL THERAPY INITIAL EVALUATION ADULT - IMPAIRMENTS CONTRIBUTING IMPAIRED BED MOBILITY, REHAB EVAL
Vaccine Information Statement(s) was given today. This has been reviewed, questions answered, and verbal consent given by Patient for injection(s) and administration of Influenza (Inactivated).    1. Does the patient have a moderate to severe fever?  No  2. Has the patient had a serious reaction to a flu shot before?   No  3. Has the patient ever had [place] Syndrome within 6 weeks of a previous flu shot?  No  4. Is the patient less that 6 months of age?  No    Patient is eligible to receive the vaccine based on all questions being answered as 'No'.    Patient tolerated without incident. See immunization grid for documentation.        
decreased strength

## 2024-02-11 NOTE — H&P ADULT - HISTORY OF PRESENT ILLNESS
73-year-old female with history of CAD, CHF, hypertension, hyperlipidemia, atrial fibrillation, VT s/p ICD, on amiodarone, who presents with defibrillator firing.  Patient with recent history of supratherapeutic INR C/B GI bleed during recent admission, and had monomorphic VT s/p ICD shock, with planned future VT ablation by Dr. Hurt, discharged 2/2/2024 on Eliquis.  Patient states she had 3 episodes of defibrillation this evening between 7:30 PM until 12:30 AM.  Patient with dizziness prior to the episodes, but denies syncope.  Reports that she has been compliant with her medications, and has been feeling well prior, aside from 1 episode of loose stool yesterday.   73-year-old female with history of CAD, CHF, hypertension, hyperlipidemia, atrial fibrillation, VT s/p ICD, on amiodarone, who presents with defibrillator firing.  Patient with recent history of supratherapeutic INR C/B GI bleed during recent admission, and had monomorphic VT s/p ICD shock, with planned future VT ablation by Dr. Hurt, discharged 2/2/2024 on Eliquis.  Patient states she had 3 episodes of defibrillation this evening between 7:30 PM until 12:30 AM.  Patient with dizziness prior to the episodes, but denies syncope.  Reports that she has been compliant with her medications, and has been feeling well prior, aside from 1 episode of loose stool yesterday.    In the ED patient was found to be hypokalemic and was given repletion. Electrophysiology was consulted and device interrogation showed:   "Interrogation for recent ICD shock   Remaining battery: 5.1y  Presented in NSR w/ rate in the 50-60s  ICD functioning appropriately w/ Vpaced <0.1%  Data reveals 4x ICD shocks (one on 2/8/24, one on 2/10/24 and two on 2/11/2024); EGM consistent w/ monomorphic VT w/ rate 150-230s, each episode unsuccessfully treated w/ ATP before shock; Additionally 1 episode of VT successfully terminated by ATP."    Ultimately, EP recommended admission to the CICU on lidocaine gtt with plan for ablation procedure.    72F with COPD, HTN, PAD, CAD s/p LAD stent, cardiac arrest s/p left-sided ICD (6/4/2019) complicated by infection and s/p R single-chamber ICD reimplantation ([place] in 9/23/2019), HFrEF (EF 45%), recent hospital stay for VF sp ablation c/b pericardial effusion and tamponade s/p pericardiocentesis and pericardial window and AFib, presenting with defibrillator firing.     Patient with recent history of supratherapeutic INR C/B GI bleed during recent admission, and had monomorphic VT s/p ICD shock, with planned future VT ablation by Dr. Hurt, discharged 2/2/2024 on Eliquis.  Patient states she had 3 episodes of defibrillation this evening between 7:30 PM until 12:30 AM.  Patient with dizziness prior to the episodes, but denies syncope.  Reports that she has been compliant with her medications, and has been feeling well prior, aside from 1 episode of loose stool yesterday. Denies any increased coughing, fevers, chills, chest pain, SOB, increased LE swelling.     In the ED patient was found to be hypokalemic and was given repletion. Electrophysiology was consulted and device interrogation showed:   "Interrogation for recent ICD shock   Remaining battery: 5.1y  Presented in NSR w/ rate in the 50-60s  ICD functioning appropriately w/ Vpaced <0.1%  Data reveals 4x ICD shocks (one on 2/8/24, one on 2/10/24 and two on 2/11/2024); EGM consistent w/ monomorphic VT w/ rate 150-230s, each episode unsuccessfully treated w/ ATP before shock; Additionally 1 episode of VT successfully terminated by ATP."    Ultimately, EP recommended admission to the CICU on lidocaine gtt with plan for ablation procedure. Patient notes that she was originally scheduled to come here on Tuesday for ablation procedure.

## 2024-02-11 NOTE — ED ADULT NURSE NOTE - SEPSIS REFERENCE DATA CRITERIA 2
Abnormal Lactate: > 2 3 y/o M with no pertinent PMHx presents to the ED BIB mother for vomiting and fever. As per mother, pt vomited once today, had chills, and only had a yogurt and refused to eat anything else. IUTD. Has a pediatrician. No recent travel. pt does not go to .

## 2024-02-11 NOTE — PROCEDURE NOTE - INTERROGATION NOTE: THRESHOLD (V)
Plan: Start Claritin 10 mg once a day to help with ear congestion. Do a Valsalva to equalize pressure.    See us back in three months for blood pressure check.     A copy of [facility] would be appreciated.            0.75

## 2024-02-11 NOTE — ED PROVIDER NOTE - ATTENDING CONTRIBUTION TO CARE
Stephen Rollins MD, Emergency Medicine Attending Physician:     HPI: 73-year-old female with history of CAD, CHF, hypertension, hyperlipidemia, atrial fibrillation, VT s/p ICD, on amiodarone, who presents with defibrillator firing.  Patient with recent history of supratherapeutic INR C/B GI bleed during recent admission, and had monomorphic VT s/p ICD shock, with planned future VT ablation by Dr. Hurt, discharged 2/2/2024 on Eliquis.  Patient states she had 3 episodes of defibrillation this evening between 7:30 PM until 12:30 AM.  Patient with dizziness prior to the episodes, but denies syncope.  Reports that she has been compliant with her medications, and has been feeling well prior, aside from 1 episode of loose stool yesterday.    ROS: denies trauma, fevers, chills, cough, chest pain, shortness of breath, abdominal pain, nausea, vomiting, syncope, leg pain/swelling, paresthesia, numbness, or weakness.    Exam:  GEN: In no acute distress, AAOx3  HENT: NCAT, no stridor  EYES: No icterus  RESP: No respiratory distress, lungs with scant bibasilar rales  CV: No tachycardia, normal perfusion  ABD: Abdomen soft, non-tender and non-distended, no rebound, no guarding, no rigidity. No CVA tenderness.  NEURO: No focal neuro deficits    MDM: Will keep patient on cardiac monitor, obtain EKG and troponin to evaluate for possible cardiac abnormality including ACS and arrhythmia.  Will obtain chest x-ray to evaluate for acute cardiopulmonary pathology.  Will obtain labs to evaluate for hematologic disorder, metabolic derangements, hepatic and renal function, and screen for coagulopathy.  Concern for defibrillator firing, concern for ventricular tachyarrhythmia.  Cardiology consulted, recommended for amiodarone.    My independent interpretation of the EKG shows:  Normal sinus rhythm with rate of 60 bpm, normal axis, LBBB, QTc 532, negative Sgarbossa criteria.    Critical Care Billing: defibrillator firing  Upon my evaluation, this patient had a high probability of imminent or life-threatening deterioration, which required my direct attention, intervention, and personal management.  The patient has a  medical condition that impairs one or more vital organ systems.  Frequent personal assessment and adjustment of medical interventions was performed.      I have personally provided 35 minutes of critical care time exclusive of time spent on separately billable procedures. Time includes review of laboratory data, radiology results, discussion with consultants, patient and family; monitoring for potential decompensation, as well as time spent retrieving data and reviewing the chart and documenting the visit. Interventions were performed as documented above. Stephen Rollins MD, Emergency Medicine Attending Physician:     HPI: 73-year-old female with history of CAD, CHF, hypertension, hyperlipidemia, atrial fibrillation, VT s/p ICD, on amiodarone, who presents with defibrillator firing.  Patient with recent history of supratherapeutic INR C/B GI bleed during recent admission, and had monomorphic VT s/p ICD shock, with planned future VT ablation by Dr. Hurt, discharged 2/2/2024 on Eliquis.  Patient states she had 3 episodes of defibrillation this evening between 7:30 PM until 12:30 AM.  Patient with dizziness prior to the episodes, but denies syncope.  Reports that she has been compliant with her medications, and has been feeling well prior, aside from 1 episode of loose stool yesterday.    ROS: denies trauma, fevers, chills, cough, chest pain, shortness of breath, abdominal pain, nausea, vomiting, syncope, leg pain/swelling, paresthesia, numbness, or weakness.    Exam:  GEN: In no acute distress, AAOx3  HENT: NCAT, no stridor  EYES: No icterus  RESP: No respiratory distress, lungs with scant bibasilar rales  CV: No tachycardia, normal perfusion  ABD: Abdomen soft, non-tender and non-distended, no rebound, no guarding, no rigidity. No CVA tenderness.  NEURO: No focal neuro deficits    MDM: Will keep patient on cardiac monitor, obtain EKG and troponin to evaluate for possible cardiac abnormality including ACS and arrhythmia.  Will obtain chest x-ray to evaluate for acute cardiopulmonary pathology.  Will obtain labs to evaluate for hematologic disorder, metabolic derangements, hepatic and renal function, and screen for coagulopathy.  Concern for defibrillator firing, concern for ventricular tachyarrhythmia.  Cardiology consulted, recommended for amiodarone.    My independent interpretation of the EKG shows:  Normal sinus rhythm with rate of 60 bpm, normal axis, LBBB, QTc 532, negative Sgarbossa criteria.    Labs reviewed, patient with mild leukocytosis, anemia above baseline, INR at 2.27.  Significant hypokalemia of 2.5, and mild hypomagnesemia of 1.9, will replete electrolytes.  Troponin of indeterminate range of 35, repeat of 31.  proBNP elevated at 2910.    Around 3 AM, patient had severe right-sided back pain with dizziness.  During that time, telemetry showed NSR, no VT or shocks.  Blood pressure differential noted in bilateral upper extremity.  Stat CTA obtained.    Patient with findings concerning for UTI and CT with no aortic dissection, but showed increased groundglass opacities in the left upper lobe.  Patient was started on empiric antibiotics.    We discussed with cardiologist who recommended telemetry admission rather than CCU.    The patient will need to be admitted to the hospital for continued evaluation and management.  Discussed with the accepting physician regarding the initial presentation, diagnostic studies, treatments given in the ED, and current plan of care.  I, Dr. Stephen Rollins, spoke directly to the admitting attending physician, Dr. Jaswinder Nuñez, who accepted the patient to their service, after potassium with improvement to 2.9 on VBG..  The patient was accepted by and endorsed to the medicine team with cardiology consultation..    Critical Care Billing: defibrillator firing  Upon my evaluation, this patient had a high probability of imminent or life-threatening deterioration, which required my direct attention, intervention, and personal management.  The patient has a  medical condition that impairs one or more vital organ systems.  Frequent personal assessment and adjustment of medical interventions was performed.      I have personally provided 65 minutes of critical care time exclusive of time spent on separately billable procedures. Time includes review of laboratory data, radiology results, discussion with consultants, patient and family; monitoring for potential decompensation, as well as time spent retrieving data and reviewing the chart and documenting the visit. Interventions were performed as documented above.

## 2024-02-11 NOTE — H&P ADULT - NSHPLABSRESULTS_GEN_ALL_CORE
10.0   11.38 )-----------( 344      ( 11 Feb 2024 02:40 )             31.5       02-11    139  |  101  |  10  ----------------------------<  100<H>  2.9<LL>   |  28  |  0.89    Ca    8.1<L>      11 Feb 2024 07:40  Phos  2.4     02-11  Mg     1.9     02-11    TPro  6.4  /  Alb  3.3  /  TBili  0.3  /  DBili  x   /  AST  17  /  ALT  20  /  AlkPhos  72  02-11              Urinalysis Basic - ( 11 Feb 2024 07:40 )    Color: x / Appearance: x / SG: x / pH: x  Gluc: 100 mg/dL / Ketone: x  / Bili: x / Urobili: x   Blood: x / Protein: x / Nitrite: x   Leuk Esterase: x / RBC: x / WBC x   Sq Epi: x / Non Sq Epi: x / Bacteria: x        PT/INR - ( 11 Feb 2024 02:40 )   PT: 23.3 sec;   INR: 2.27 ratio         PTT - ( 11 Feb 2024 02:40 )  PTT:33.4 sec    Lactate Trend            CAPILLARY BLOOD GLUCOSE            Culture Results:   No growth at 5 days (01-30 @ 10:34)  Culture Results:   No growth at 5 days (01-28 @ 22:46)  Culture Results:   Growth in aerobic bottle: Staphylococcus haemolyticus  Isolation of Coagulase negative Staphylococcus from single blood culture  sets may represent  contamination. Contact the Microbiology Department at [phone] if  susceptibility testing is  clinically indicated.  Direct identification is available within approximately 3-5  hours either by Blood Panel Multiplexed PCR or Direct  MALDI-TOF. Details: [email or web address] (01-28 @ 22:30)

## 2024-02-11 NOTE — PHYSICAL THERAPY INITIAL EVALUATION ADULT - ADDITIONAL COMMENTS
Pt lives with spouse in private home with 3-4 steps to enter, no stairs in side. PTA, pt was independent with all mobility with use of rolling walker.  Patient also has cane, rollator, commode, shower chair.

## 2024-02-11 NOTE — H&P ADULT - NSHPPHYSICALEXAM_GEN_ALL_CORE
Constitutional: Well nourished, well developed, appears stated age.   Eyes: PERRL, EOMI. No scleral icterus  HENT: Moist mucous membranes. No posterior oropharyngeal erythema.   Neck: Supple. No goiter. No C-spine TTP. No meningismus  CV: RRR, no m/r/g. Well perfused extremities.   RESP: Lungs CTAB, normal respiratory effort  GI: Soft, non-tender, no masses appreciated  MSK: Moving all four extremities. No obvious deformity  Skin: Warm, dry, no rashes  Neuro: Alert and oriented. CNII-XII grossly intact. Normal strength and sensation of UEs and LEs  Psych: Appropriate mood and affect Constitutional: Well nourished, well developed, appears stated age.   Eyes: PERRL, EOMI. No scleral icterus  HENT: Moist mucous membranes.   Neck: Supple. No goiter.   CV: RRR, no m/r/g. Well perfused extremities.   RESP: Lungs CTAB, normal respiratory effort  GI: Soft, non-tender, no masses appreciated  MSK: Moving all four extremities. No obvious deformity  Skin: Warm, dry, no rashes  Neuro: Alert and oriented. Normal strength and sensation of UEs and LEs  Psych: Appropriate mood and affect

## 2024-02-11 NOTE — PROGRESS NOTE ADULT - SUBJECTIVE AND OBJECTIVE BOX
AUSTIN LEE  MRN-[id number]  Patient is a 73y old  Female who presents with a chief complaint of VT shocks (2024 15:37)    HPI:  72F with COPD, HTN, PAD, CAD s/p LAD stent, cardiac arrest s/p left-sided ICD (2019) complicated by infection and s/p R single-chamber ICD reimplantation ([facility] in 2019), HFrEF (EF 45%), recent hospital stay for VF sp ablation c/b pericardial effusion and tamponade s/p pericardiocentesis and pericardial window and AFib, presenting with defibrillator firing.     Patient with recent history of supratherapeutic INR C/B GI bleed during recent admission, and had monomorphic VT s/p ICD shock, with planned future VT ablation by Dr. Hurt, discharged 2024 on Eliquis.  Patient states she had 3 episodes of defibrillation this evening between 7:30 PM until 12:30 AM.  Patient with dizziness prior to the episodes, but denies syncope.  Reports that she has been compliant with her medications, and has been feeling well prior, aside from 1 episode of loose stool yesterday. Denies any increased coughing, fevers, chills, chest pain, SOB, increased LE swelling.     In the ED patient was found to be hypokalemic and was given repletion. Electrophysiology was consulted and device interrogation showed:   "Interrogation for recent ICD shock   Remaining battery: 5.1y  Presented in NSR w/ rate in the 50-60s  ICD functioning appropriately w/ Vpaced <0.1%  Data reveals 4x ICD shocks (one on 24, one on 2/10/24 and two on 2024); EGM consistent w/ monomorphic VT w/ rate 150-230s, each episode unsuccessfully treated w/ ATP before shock; Additionally 1 episode of VT successfully terminated by ATP."    Ultimately, EP recommended admission to the CICU on lidocaine gtt with plan for ablation procedure. Patient notes that she was originally scheduled to come here on Tuesday for ablation procedure.    (2024 08:59)      Hospital Course:    24 HOUR EVENTS:    REVIEW OF SYSTEMS:    CONSTITUTIONAL: No weakness, fevers or chills  EYES/ENT: No visual changes;  No vertigo or throat pain   NECK: No pain or stiffness  RESPIRATORY: No cough, wheezing, hemoptysis; No shortness of breath  CARDIOVASCULAR: No chest pain or palpitations  GASTROINTESTINAL: No abdominal or epigastric pain. No nausea, vomiting, or hematemesis; No diarrhea or constipation. No melena or hematochezia.  GENITOURINARY: No dysuria, frequency or hematuria  NEUROLOGICAL: No numbness or weakness  SKIN: No itching, rashes      ICU Vital Signs Last 24 Hrs  T(C): 36.7 (2024 09:24), Max: 36.7 (2024 09:24)  T(F): 98 (2024 09:24), Max: 98 (2024 09:24)  HR: 64 (:00) (50 - 68)  BP: 144/63 (2024 19:00) (99/76 - 149/63)  BP(mean): 90 (:) (43 - 96)  ABP: --  ABP(mean): --  RR: 21 (2024 20:00) (13 - 32)  SpO2: 93% (:00) (90% - 100%)    O2 Parameters below as of :00  Patient On (Oxygen Delivery Method): nasal cannula  O2 Flow (L/min): 2          CVP(mm Hg): --  CO: --  CI: --  PA: --  PA(mean): --  PA(direct): --  PCWP: --  LA: --  RA: --  SVR: --  SVRI: --  PVR: --  PVRI: --  I&O's Summary    2024 07:01  -  2024 20:42  --------------------------------------------------------  IN: 1002.5 mL / OUT: 480 mL / NET: 522.5 mL        CAPILLARY BLOOD GLUCOSE    CAPILLARY BLOOD GLUCOSE          PHYSICAL EXAM:  GENERAL: No acute distress, well-developed  HEAD:  Atraumatic, Normocephalic  EYES: EOMI, PERRLA, conjunctiva and sclera clear  NECK: Supple, no lymphadenopathy, no JVD  CHEST/LUNG: CTAB; No wheezes, rales, or rhonchi  HEART: Regular rate and rhythm. Normal S1/S2. No murmurs, rubs, or gallops  ABDOMEN: Soft, non-tender, non-distended; normal bowel sounds, no organomegaly  EXTREMITIES:  2+ peripheral pulses b/l, No clubbing, cyanosis, or edema  NEUROLOGY: A&O x 3, no focal deficits  SKIN: No rashes or lesions    ============================I/O===========================   I&O's Detail    2024 07:01  -  2024 20:42  --------------------------------------------------------  IN:    Heparin: 60 mL    IV PiggyBack: 225 mL    Lidocaine: 67.5 mL    Oral Fluid: 650 mL  Total IN: 1002.5 mL    OUT:    Voided (mL): 480 mL  Total OUT: 480 mL    Total NET: 522.5 mL        ============================ LABS =========================                        10.0   11.38 )-----------( 344      ( 2024 02:40 )             31.5     02-11    138  |  106  |  11  ----------------------------<  113<H>  3.8   |  24  |  1.09    Ca    8.1<L>      2024 16:47  Phos  2.4     -  Mg     2.1     -    TPro  5.8<L>  /  Alb  2.8<L>  /  TBili  0.2  /  DBili  x   /  AST  16  /  ALT  17  /  AlkPhos  67      Troponin T, High Sensitivity Result: 31 ng/L (24 @ 04:53)  Troponin T, High Sensitivity Result: 35 ng/L (24 @ 02:40)              LIVER FUNCTIONS - ( 2024 16:47 )  Alb: 2.8 g/dL / Pro: 5.8 g/dL / ALK PHOS: 67 U/L / ALT: 17 U/L / AST: 16 U/L / GGT: x           PT/INR - ( 2024 20:20 )   PT: 18.9 sec;   INR: 1.75 ratio         PTT - ( 2024 20:20 )  PTT:54.2 sec    Blood Gas Venous - Lactate: 2.5 mmol/L (24 @ 06:17)  Blood Gas Venous - Lactate: 2.5 mmol/L (24 @ 02:33)    Urinalysis Basic - ( 2024 16:47 )    Color: x / Appearance: x / SG: x / pH: x  Gluc: 113 mg/dL / Ketone: x  / Bili: x / Urobili: x   Blood: x / Protein: x / Nitrite: x   Leuk Esterase: x / RBC: x / WBC x   Sq Epi: x / Non Sq Epi: x / Bacteria: x      ======================Micro/Rad/Cardio=================  Telemtry: Reviewed   EKG: Reviewed  CXR: Reviewed  Culture: Reviewed   Echo: Transthoracic Echocardiogram:   Patient name: AUSTIN LEE  YOB: 1950   Age: 68 (F)   MR#: [id number]  Study Date: 2019  Location: O/PSonographer: Sherri Cadet RDCS  Study quality: Technically difficult  Referring Physician: RICCARDO WOLF MD  Blood Pressure: 140/80 mmHg  Height: 157 cm  Weight: 86 kg  BSA: 1.9 m2  Heart Rate: 60 mmHg  ------------------------------------------------------------------------  PROCEDURE: Transthoracic echocardiogram with 2-D, M-Mode  and complete spectral and color flow Doppler.  INDICATION: Atherosclerotic heart disease of native  coronary artery without angina pectoris (I25.10)  ------------------------------------------------------------------------  Dimensions:    Normal Values:  LA:     4.6    2.0 - 4.0 cm  Ao: 3.1    2.0 - 3.8 cm  SEPTUM: 1.4    0.6 - 1.2 cm  PWT:    1.3    0.6 - 1.1 cm  LVIDd:  6.4    3.0 - 5.6 cm  LVIDs:  4.3    1.8 - 4.0 cm  Derived variables:  LVMI: 219 g/m2  RWT: 0.40  Fractional short: 33 %  EF (Ahn Rule): 33 %Doppler Peak Velocity (m/sec):  AoV=1.2  ------------------------------------------------------------------------  Observations:  Mitral Valve: Mitral annular calcification and calcified  mitral leaflets with decreased diastolic opening. Mild  mitral regurgitation.  Peak mitral valve gradient equals 13  mm Hg, mean transmitral valve gradient equals 4 mm Hg,  consistent with mild mitral stenosis. Gradient measured at  a HR of 81bpm.  Aortic Valve/Aorta: Aortic valve not well visualized;  probably normal. Peak transaortic valve gradient equals 6  mm Hg, estimated aortic valve area equals 2.1 sqcm. Minimal  aortic regurgitation.  Peak left ventricular outflow tract  gradient equals 2 mm Hg.  Aortic Root: 3.1 cm.  Left Atrium: Normal left atrium.  LA volume index =31  cc/m2.  Left Ventricle: Severe segmental left ventricular systolic  dysfunction. Endocardial visualization enhanced with  intravenous injection of Ultrasonic Enhancing Agent  (Definity). The mid inferolateral wall, the basal  inferolateral wall, the basal inferior wall, the basal  anterolateral wall, the mid inferior wall, the mid  anterolateral wall, the mid inferoseptum, and the basal  inferoseptum are hypokinetic.  Moderate concentric left  ventricular hypertrophy. Moderate diastolic dysfunction  (Stage II).  Right Heart: Normal right atrium. Normal right ventricular  size and function. Normal tricuspid valve. Mild tricuspid  regurgitation. Normal pulmonic valve. Mild pulmonic  regurgitation.  Pericardium/Pleura: Normal pericardium with no pericardial  effusion.  Hemodynamic: Estimated right ventricular systolic pressure  equals 28 mm Hg, assuming right atrial pressure equals 3 mm  Hg, consistent with normal pulmonary pressures. Color  Doppler demonstrates no evidence of a patent foramen ovale.  ------------------------------------------------------------------------  Conclusions:  1. Mitral annular calcification and calcified mitral  leaflets with decreased diastolic opening. Mild mitral  regurgitation.  Peak mitral valve gradient equals 13 mm Hg,  mean transmitral valve gradient equals 4 mm Hg, consistent  with mild mitral stenosis. Gradient measured at a HR of  81bpm.  2. Aortic valve not well visualized; probably normal.  Minimal aortic regurgitation.  3. Moderate concentric left ventricular hypertrophy.  4. Severe segmental left ventricular systolic dysfunction.  Endocardial visualization enhanced with intravenous  injection of Ultrasonic Enhancing Agent (Definity). The mid  inferolateral wall, the basal  inferolateral wall, the  basal inferior wall, the basal anterolateral wall, the mid  inferior wall, the mid anterolateral wall, the mid  inferoseptum, and the basal inferoseptum are hypokinetic.  5. Moderate diastolic dysfunction (Stage II).  6. Normal right ventricular size and function.  *** No previous Echo exam.  ------------------------------------------------------------------------  Confirmed on  2019 - 16:47:43 by Dudley Marquez M.D.  ------------------------------------------------------------------------ (19 @ 13:56)    Cath: Cardiac Cath Lab - Adult:   [facility]  Department of Cardiology  [address] [place] [address]  [phone]  Cath Lab Report -- Comprehensive Report  Patient: AUSTIN LEE  Study date: 2015  Account number: [id number]  MR number: [id number]  : 1950  Gender: Female  Race: W  Case Physician(s):  Mo Izquierdo M.D.  Fellow:  СЕРГЕЙ Boogie M.D.  Referring Physician:  INDICATIONS: Angina/MI: unstable angina.  HISTORY: The patient has a history of coronary artery disease. The patient  has hypertension and medication-treated dyslipidemia.  PROCEDURE:  --  Intervention on mid LAD: drug-eluting stent.  TECHNIQUE: The risks and alternatives of the procedures and conscious  sedation were explained to the patient and informed consent was obtained.  Cardiac catheterization performed urgently. Coronary intervention  performed electively.    Pressures:  -HR: 85  Pressures:  - Rhythm:  Pressures:  -- Aortic Pressure (S/D/M): 210/77/128  Pressures:  -- Left Ventricle (s/edp): 209/25/--  Outputs:  Baseline/ Room Air  Outputs:  -- CALCULATIONS[age]  Outputs:  -- CALCULATIONS: Body SurfaceArea: 2.03  Outputs:  -- CALCULATIONS: Height in cm: 160.00  Outputs:  -- CALCULATIONS: Sex: Female  Outputs:  -- CALCULATIONS: Weight in k.10  Outputs:  -- OUTPUTS: O2 consumption: 272.02  Outputs:  -- OUTPUTS: Vo2 Indexed: 133.79 (01-22-15 @ 17:26)    ======================================================  PAST MEDICAL & SURGICAL HISTORY:  Obese      Smoker      HTN (hypertension)      HLD (hyperlipidemia)      CAD (coronary artery disease)      CHF with cardiomyopathy      History of hip surgery  ====================ASSESSMENT ==============  73-year-old female with history of CAD, CHF, hypertension, hyperlipidemia, atrial fibrillation, VT s/p ICD, on amiodarone, who presents with defibrillator firing.  Patient with recent history of supratherapeutic INR C/B GI bleed during recent admission, and had monomorphic VT s/p ICD shock, with planned future VT ablation by Dr. Hurt, discharged 2024 on [facility]; presenting after three episodes of VT at home found to be hypokalemic in the ED admitted for likely ablation.     Plan:  ====================== NEUROLOGY=====================  A&Ox3  - continue to monitor mental status as per protocol     ==================== RESPIRATORY======================  COPD  - does not use home O2  - budesonide inhaler BID  - albuterol PRN  - continue to monitor SpO2 with goal 88-94%    ====================CARDIOVASCULAR==================  VT with ICD  - originally planned for ablation on  as outpatient  - 4 shocks for VT at home on device interrogation (1x 2/8, 2x 2/10, 1x 2/11)  - lidocaine gtt @ 2  - hold home amiodarone and quinidine  - tentative ablation on     Afib  - transitioned from warfarin to apixaban during recent admission  - full AC with heparin gtt iso upcoming procedure    CAD  CHF  - hx of LAD stent,  of RCA; C/RHC 23 showing patent stent   - TTE 10/23: EF 55%, regional wall motion abnormalities.   - home meds: lasix 40 mg daily, dapagliflozin 10mg daily, metop XL 25 daily  - hold lasix iso hypokalemia  - holding dapagliflozin this week prior to procedure  - cont metop    HTN  - recently stopped losartan as outpatient  - monitor BP in ICU    HLD  - atorvastatin 40 daily    ===================HEMATOLOGIC/ONC ===================  CBC wnl   - Monitor H/H and plts  - DVT ppx: full AC for afib    ===================== RENAL =========================  SCr wnl   - Continue monitoring urine output, lytes, SCr/ BUN  - replete lytes prn with goal K >4 and Mg >2    Hypokalemia  - potassium repletion prn; hold lasix    ==================== GASTROINTESTINAL===================  Regular Diet as tolerated   - Monitor for regular BM while inpatient, bowel reg as needed     =======================    ENDOCRINE  =====================  No active issues  - Monitor BG daily on CMP with goal <180.    ========================INFECTIOUS DISEASE================  Afebrile, No leukocytosis   - monitor and trend WBC and temperature curve    Patient requires continuous monitoring with bedside rhythm monitoring, pulse ox monitoring, and intermittent blood gas analysis. Care plan discussed with ICU care team. Patient remained critical and at risk for life threatening decompensation.  Patient seen, examined and plan discussed with CCU team during rounds.     I have personally provided ____ minutes of critical care time excluding time spent on separate procedures, in addition to initial critical care time provided by the CICU Attending, Dr. Brooks.     By signing my name below, I, Brandee Spencer, attest that this documentation has been prepared under the direction and in the presence of Sindy Dahl NP.   Electronically signed: Dallin Spaulding, 24 @ 20:42    I, Sindy Dahl , personally performed the services described in this documentation. all medical record entries made by the lashawnibe were at my direction and in my presence. I have reviewed the chart and agree that the record reflects my personal performance and is accurate and complete  Electronically signed: Sindy Dahl NP.

## 2024-02-11 NOTE — ED ADULT NURSE NOTE - NSFALLHARMRISKINTERV_ED_ALL_ED

## 2024-02-11 NOTE — H&P ADULT - ASSESSMENT
73-year-old female with history of CAD, CHF, hypertension, hyperlipidemia, atrial fibrillation, VT s/p ICD, on amiodarone, who presents with defibrillator firing.  Patient with recent history of supratherapeutic INR C/B GI bleed during recent admission, and had monomorphic VT s/p ICD shock, with planned future VT ablation by Dr. Hurt, discharged 2/2/2024 on Eliquis.  Patient states she had 3 episodes of defibrillation this evening between 7:30 PM until 12:30 AM.  Patient with dizziness prior to the episodes, but denies syncope.  Reports that she has been compliant with her medications, and has been feeling well prior, aside from 1 episode of loose stool yesterday.       73-year-old female with history of CAD, CHF, hypertension, hyperlipidemia, atrial fibrillation, VT s/p ICD, on amiodarone, who presents with defibrillator firing.  Patient with recent history of supratherapeutic INR C/B GI bleed during recent admission, and had monomorphic VT s/p ICD shock, with planned future VT ablation by Dr. Hurt, discharged 2/2/2024 on Eliquis; presenting after three episodes of VT at home found to be hypokalemic in the ED admitted for likely ablation.     Plan:  ====================== NEUROLOGY=====================  A&Ox3  - continue to monitor mental status as per protocol     ==================== RESPIRATORY======================  Comfortable on RA  - continue to monitor SpO2 with goal >94%    ====================CARDIOVASCULAR==================  VT with ICD  -     Afib  -     CAD  -     HTN  - c/w home meds:     HLD  -     ===================HEMATOLOGIC/ONC ===================  CBC wnl   - Monitor H/H and plts  - DVT PPX:     ===================== RENAL =========================  SCr wnl   - Continue monitoring urine output, lytes, SCr/ BUN  - replete lytes prn with goal K >4 and Mg >2    Hypokalemia  ***    ==================== GASTROINTESTINAL===================  Regular Diet as tolerated   - Monitor for regular BM while inpatient, bowel reg as needed     Diarrhea  ***    =======================    ENDOCRINE  =====================  A1C ***  - pre-DM education  - Monitor BG daily on CMP with goal <180.    ========================INFECTIOUS DISEASE================  Afebrile, No leukocytosis   - monitor and trend WBC and temperature curve 73-year-old female with history of CAD, CHF, hypertension, hyperlipidemia, atrial fibrillation, VT s/p ICD, on amiodarone, who presents with defibrillator firing.  Patient with recent history of supratherapeutic INR C/B GI bleed during recent admission, and had monomorphic VT s/p ICD shock, with planned future VT ablation by Dr. Hurt, discharged 2/2/2024 on Eliquis; presenting after three episodes of VT at home found to be hypokalemic in the ED admitted for likely ablation.     Plan:  ====================== NEUROLOGY=====================  A&Ox3  - continue to monitor mental status as per protocol     ==================== RESPIRATORY======================  Comfortable on RA  - continue to monitor SpO2 with goal >94%    ====================CARDIOVASCULAR==================  VT with ICD  -     Afib  - transitioned from warfarin to apixaban during recent admission  - full AC with heparin gtt     CAD  - home meds: ***  - continue ***    HTN  - c/w home meds: ***    HLD  - ***    ===================HEMATOLOGIC/ONC ===================  CBC wnl   - Monitor H/H and plts  - DVT PPX:     ===================== RENAL =========================  SCr wnl   - Continue monitoring urine output, lytes, SCr/ BUN  - replete lytes prn with goal K >4 and Mg >2    Hypokalemia  - potassium     ==================== GASTROINTESTINAL===================  Regular Diet as tolerated   - Monitor for regular BM while inpatient, bowel reg as needed     Diarrhea  ***    =======================    ENDOCRINE  =====================  A1C ***  - pre-DM education  - Monitor BG daily on CMP with goal <180.    ========================INFECTIOUS DISEASE================  Afebrile, No leukocytosis   - monitor and trend WBC and temperature curve 73-year-old female with history of CAD, CHF, hypertension, hyperlipidemia, atrial fibrillation, VT s/p ICD, on amiodarone, who presents with defibrillator firing.  Patient with recent history of supratherapeutic INR C/B GI bleed during recent admission, and had monomorphic VT s/p ICD shock, with planned future VT ablation by Dr. Hurt, discharged 2/2/2024 on Eliquis; presenting after three episodes of VT at home found to be hypokalemic in the ED admitted for likely ablation.     Plan:  ====================== NEUROLOGY=====================  A&Ox3  - continue to monitor mental status as per protocol     ==================== RESPIRATORY======================  COPD  - does not use home O2  - budesonide inhaler BID  - albuterol PRN  - continue to monitor SpO2 with goal 88-94%    ====================CARDIOVASCULAR==================  VT with ICD  - originally planned for ablation on 2/13 as outpatient  - 4 shocks for VT at home on device interrogation  - lidocaine gtt @ 2  - hold home amiodarone and quinidine  - tentative ablation on tuesday    Afib  - transitioned from warfarin to apixaban during recent admission  - full AC with heparin gtt     CAD  CHF  - home meds: lasix 40 mg daily, dapagliflozin 40*, metop XL 25 daily  - hold lasix iso hypokalemia  - holding dapagliflozin this week prior to procedure  - cont metop    HTN  - recently stopped losartan as outpatient  - monitor BP in ICU    HLD  - atorvastatin 40 daily    ===================HEMATOLOGIC/ONC ===================  CBC wnl   - Monitor H/H and plts  - DVT ppx: full AC for afib    ===================== RENAL =========================  SCr wnl   - Continue monitoring urine output, lytes, SCr/ BUN  - replete lytes prn with goal K >4 and Mg >2    Hypokalemia  - potassium repletion prn; hold lasix    ==================== GASTROINTESTINAL===================  Regular Diet as tolerated   - Monitor for regular BM while inpatient, bowel reg as needed     =======================    ENDOCRINE  =====================  No active issues  - Monitor BG daily on CMP with goal <180.    ========================INFECTIOUS DISEASE================  Afebrile, No leukocytosis   - monitor and trend WBC and temperature curve 73-year-old female with history of CAD, CHF, hypertension, hyperlipidemia, atrial fibrillation, VT s/p ICD, on amiodarone, who presents with defibrillator firing.  Patient with recent history of supratherapeutic INR C/B GI bleed during recent admission, and had monomorphic VT s/p ICD shock, with planned future VT ablation by Dr. Hurt, discharged 2/2/2024 on Eliquis; presenting after three episodes of VT at home found to be hypokalemic in the ED admitted for likely ablation.     Plan:  ====================== NEUROLOGY=====================  A&Ox3  - continue to monitor mental status as per protocol     ==================== RESPIRATORY======================  COPD  - does not use home O2  - budesonide inhaler BID  - albuterol PRN  - continue to monitor SpO2 with goal 88-94%    ====================CARDIOVASCULAR==================  VT with ICD  - originally planned for ablation on 2/13 as outpatient  - 4 shocks for VT at home on device interrogation (1x 2/8, 2x 2/10, 1x 2/11)  - lidocaine gtt @ 2  - hold home amiodarone and quinidine  - tentative ablation on Tuesday 2/13    Afib  - transitioned from warfarin to apixaban during recent admission  - full AC with heparin gtt iso upcoming procedure    CAD  CHF  - hx of LAD stent,  of RCA; C/RHC 9/30/23 showing patent stent   - TTE 10/23: EF 55%, regional wall motion abnormalities.   - home meds: lasix 40 mg daily, dapagliflozin 40*, metop XL 25 daily  - hold lasix iso hypokalemia  - holding dapagliflozin this week prior to procedure  - cont metop    HTN  - recently stopped losartan as outpatient  - monitor BP in ICU    HLD  - atorvastatin 40 daily    ===================HEMATOLOGIC/ONC ===================  CBC wnl   - Monitor H/H and plts  - DVT ppx: full AC for afib    ===================== RENAL =========================  SCr wnl   - Continue monitoring urine output, lytes, SCr/ BUN  - replete lytes prn with goal K >4 and Mg >2    Hypokalemia  - potassium repletion prn; hold lasix    ==================== GASTROINTESTINAL===================  Regular Diet as tolerated   - Monitor for regular BM while inpatient, bowel reg as needed     =======================    ENDOCRINE  =====================  No active issues  - Monitor BG daily on CMP with goal <180.    ========================INFECTIOUS DISEASE================  Afebrile, No leukocytosis   - monitor and trend WBC and temperature curve 73-year-old female with history of CAD, CHF, hypertension, hyperlipidemia, atrial fibrillation, VT s/p ICD, on amiodarone, who presents with defibrillator firing.  Patient with recent history of supratherapeutic INR C/B GI bleed during recent admission, and had monomorphic VT s/p ICD shock, with planned future VT ablation by Dr. Hurt, discharged 2/2/2024 on Eliquis; presenting after three episodes of VT at home found to be hypokalemic in the ED admitted for likely ablation.     Plan:  ====================== NEUROLOGY=====================  A&Ox3  - continue to monitor mental status as per protocol     ==================== RESPIRATORY======================  COPD  - does not use home O2  - budesonide inhaler BID  - albuterol PRN  - continue to monitor SpO2 with goal 88-94%    ====================CARDIOVASCULAR==================  VT with ICD  - originally planned for ablation on 2/13 as outpatient  - 4 shocks for VT at home on device interrogation (1x 2/8, 2x 2/10, 1x 2/11)  - lidocaine gtt @ 2  - hold home amiodarone and quinidine  - tentative ablation on Tuesday 2/13    Afib  - transitioned from warfarin to apixaban during recent admission  - full AC with heparin gtt iso upcoming procedure    CAD  CHF  - hx of LAD stent,  of RCA; C/RHC 9/30/23 showing patent stent   - TTE 10/23: EF 55%, regional wall motion abnormalities.   - home meds: lasix 40 mg daily, dapagliflozin 10mg daily, metop XL 25 daily  - hold lasix iso hypokalemia  - holding dapagliflozin this week prior to procedure  - cont metop    HTN  - recently stopped losartan as outpatient  - monitor BP in ICU    HLD  - atorvastatin 40 daily    ===================HEMATOLOGIC/ONC ===================  CBC wnl   - Monitor H/H and plts  - DVT ppx: full AC for afib    ===================== RENAL =========================  SCr wnl   - Continue monitoring urine output, lytes, SCr/ BUN  - replete lytes prn with goal K >4 and Mg >2    Hypokalemia  - potassium repletion prn; hold lasix    ==================== GASTROINTESTINAL===================  Regular Diet as tolerated   - Monitor for regular BM while inpatient, bowel reg as needed     =======================    ENDOCRINE  =====================  No active issues  - Monitor BG daily on CMP with goal <180.    ========================INFECTIOUS DISEASE================  Afebrile, No leukocytosis   - monitor and trend WBC and temperature curve

## 2024-02-12 NOTE — PROGRESS NOTE ADULT - SUBJECTIVE AND OBJECTIVE BOX
Patient is a 73y old  Female who presents with a chief complaint of VT shocks (11 Feb 2024 15:37)      DATE OF SERVICE: 02-12-24 @ 17:33    SUBJECTIVE / OVERNIGHT EVENTS: overnight events noted    ROS:  Resp: No cough no sputum production  CVS: No chest pain no palpitations no orthopnea  GI: no N/V/D          MEDICATIONS  (STANDING):  atorvastatin 40 milliGRAM(s) Oral at bedtime  budesonide 160 MICROgram(s)/formoterol 4.5 MICROgram(s) Inhaler 2 Puff(s) Inhalation two times a day  chlorhexidine 2% Cloths 1 Application(s) Topical daily  heparin  Infusion 1000 Unit(s)/Hr (11 mL/Hr) IV Continuous <Continuous>  lidocaine   Infusion 1 mG/Min (7.5 mL/Hr) IV Continuous <Continuous>  metoprolol succinate ER 25 milliGRAM(s) Oral daily  pantoprazole    Tablet 40 milliGRAM(s) Oral before breakfast    MEDICATIONS  (PRN):        CAPILLARY BLOOD GLUCOSE        I&O's Summary    11 Feb 2024 07:01  -  12 Feb 2024 07:00  --------------------------------------------------------  IN: 1470.5 mL / OUT: 630 mL / NET: 840.5 mL    12 Feb 2024 07:01  -  12 Feb 2024 17:33  --------------------------------------------------------  IN: 802.5 mL / OUT: 350 mL / NET: 452.5 mL        Vital Signs Last 24 Hrs  T(C): 36.7 (12 Feb 2024 14:00), Max: 36.7 (11 Feb 2024 19:00)  T(F): 98 (12 Feb 2024 14:00), Max: 98 (11 Feb 2024 19:00)  HR: 55 (12 Feb 2024 17:15) (52 - 64)  BP: 136/56 (12 Feb 2024 17:15) (92/43 - 144/64)  BP(mean): 80 (12 Feb 2024 17:15) (62 - 92)  RR: 19 (12 Feb 2024 17:15) (16 - 33)  SpO2: 98% (12 Feb 2024 17:15) (91% - 100%)    PHYSICAL EXAM:  EYES: EOMI, PERRLA  NECK: Supple, No JVD  CHEST/LUNG: clear   HEART: S1 S2; no murmurs   ABDOMEN: Soft, Nontender  EXTREMITIES:   no edema  NEUROLOGY: AO x 3 non-focal    LABS:                        9.7    11.77 )-----------( 321      ( 12 Feb 2024 03:59 )             31.2     02-12    139  |  106  |  14  ----------------------------<  117<H>  4.2   |  23  |  1.06    Ca    8.2<L>      12 Feb 2024 03:59  Phos  3.5     02-12  Mg     2.2     02-12    TPro  5.7<L>  /  Alb  2.9<L>  /  TBili  0.2  /  DBili  x   /  AST  14  /  ALT  15  /  AlkPhos  65  02-12    PT/INR - ( 12 Feb 2024 04:51 )   PT: 15.6 sec;   INR: 1.43 ratio         PTT - ( 12 Feb 2024 13:32 )  PTT:87.6 sec      Urinalysis Basic - ( 12 Feb 2024 03:59 )    Color: x / Appearance: x / SG: x / pH: x  Gluc: 117 mg/dL / Ketone: x  / Bili: x / Urobili: x   Blood: x / Protein: x / Nitrite: x   Leuk Esterase: x / RBC: x / WBC x   Sq Epi: x / Non Sq Epi: x / Bacteria: x          All consultant(s) notes reviewed and care discussed with other providers        Contact Number, Dr Nuñez [phone]

## 2024-02-12 NOTE — PROGRESS NOTE ADULT - SUBJECTIVE AND OBJECTIVE BOX
ID: 72 yo woman w/ hx of CAD s/p PCI LM-LAD and  of RCA w/ ICM HFpEF (58%), prev L chest wall ICD placed in 2019 c/b infx and removed s/p R chest wall medtronic single chamber ICD in 2019 w/ recurrent VT (w/ attempted VT ablation that was aborted iso pericardial effusion s/p drain and pericardial window), AFib s/p DCCV on apixaban, COPD, HTN, HLD, PVD who presented to the ED w/ ICD shocks.    Patient seen and examined at bedside.    Overnight Events: NAEON, No further ectopy on lidocaine gtt. Feeling well denies FC NV CP SOB. Eager to get her VT ablation tomorrow.    Telemetry: Intermittent V pacing, no further VT    Review of Systems: Negative except as per HPI     Current Meds:  atorvastatin 40 milliGRAM(s) Oral at bedtime  budesonide 160 MICROgram(s)/formoterol 4.5 MICROgram(s) Inhaler 2 Puff(s) Inhalation two times a day  chlorhexidine 2% Cloths 1 Application(s) Topical daily  heparin  Infusion 1000 Unit(s)/Hr IV Continuous <Continuous>  lidocaine   Infusion 1 mG/Min IV Continuous <Continuous>  metoprolol succinate ER 25 milliGRAM(s) Oral daily  pantoprazole    Tablet 40 milliGRAM(s) Oral before breakfast      Vitals:  T(F): 97.7 (), Max: 98 ()  HR: 56 () (50 - 67)  BP: 108/51 () (92/43 - 149/63)  RR: 20 ()  SpO2: 99% ()  I&O's Summary    2024 07:01  -  2024 07:00  --------------------------------------------------------  IN: 1470.5 mL / OUT: 630 mL / NET: 840.5 mL    2024 07:01  -  2024 10:59  --------------------------------------------------------  IN: 278 mL / OUT: 0 mL / NET: 278 mL        Physical Exam:  Appearance: No acute distress; well appearing  Eyes: PERRL, EOMI, pink conjunctiva  HEENT: Normal oral mucosa  Cardiovascular: RRR, S1, S2, no murmurs, rubs, or gallops; no edema; no JVD  Respiratory: Clear to auscultation bilaterally  Gastrointestinal: soft, non-tender, non-distended with normal bowel sounds  Musculoskeletal: No clubbing; no joint deformity   Neurologic: Non-focal  Lymphatic: No lymphadenopathy  Psychiatry: AAOx3, mood & affect appropriate  Skin: No rashes, ecchymoses, or cyanosis                          9.7    11.77 )-----------( 321      ( 2024 03:59 )             31.2     02-12    139  |  106  |  14  ----------------------------<  117<H>  4.2   |  23  |  1.06    Ca    8.2<L>      2024 03:59  Phos  3.5     02-12  Mg     2.2     02-12    TPro  5.7<L>  /  Alb  2.9<L>  /  TBili  0.2  /  DBili  x   /  AST  14  /  ALT  15  /  AlkPhos  65  02-12    PT/INR - ( 2024 04:51 )   PT: 15.6 sec;   INR: 1.43 ratio         PTT - ( 2024 04:51 )  PTT:39.3 sec  CARDIAC MARKERS ( 2024 04:53 )  31 ng/L / x     / x     / x     / x     / x      CARDIAC MARKERS ( 2024 02:40 )  35 ng/L / x     / x     / x     / x     / x        Total Cholesterol: 89  LDL: --  HDL: 39  T    New ECG(s): Personally reviewed     TTE  CONCLUSIONS:      1. Left ventricular cavity is moderately dilated. Left ventricular systolic function is normal with an ejection fraction of 58 % by Ahn's method of disks. Regional wall motion abnormalities present.   2. Basal inferoseptal segment, mid inferolateral segment, and basal inferior segment are abnormal.   3. Mildly enlarged right ventricular cavity size, wall thickness, and normal systolic function. Tricuspid annular plane systolic excursion (TAPSE) is 2.3 cm (normal >=1.7 cm).   4. The left atrium is severely dilated.   5. The right atrium is severely dilated.   6. No pericardial effusion seen.   7. Compared to the transthoracic echocardiogram performed on 10/5/2023, there have been no significant interval changes.    CTA AP  IMPRESSION:  No aortic dissection or aortic intramural hematoma.    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    Increased groundglass opacity in the periphery of the left upper lobe   which may be infectious or inflammatory in etiology.    --- End of Report ---      A/P  72 yo woman w/ hx of CAD s/p PCI ( of RCA) w/ iCMY (pEF), prev L chest wall ICD placed in 2019 c/b infx and removed s/p R chest wall medtronic single chamber ICD in 2019 w/ recurrent VT (w/ attempted VT ablation that was aborted iso pericardial effusion s/p drain and pericardial window), AFib s/p DCCV on apixaban, COPD, HTN, HLD, PVD who presented to the ED w/ ICD shocks.    #ICD Shocks  #CAD / iCMY  #VT  #AFib  #HTN  #HLD  Pt w/ know VT w/ ICD w/ recurrent VT and ICD shocks, on amio 200 qd and quinidine 325mg q8 (although Rx'ed for 200 q6) who presented w/ ICD shocks found to have 5 runs of VT in the past 2d, 1 of which was terminated by ATP the other 4 terminated by ICD shocks. Pt found to be in NSR w/ LBBB (chronic), w/ hypokalemia and no signs of gross volume overload. Suspect that hypokalemia is contributing to presentation as no other signs of ADHF (although pt does have small pleural effusion on CXR), new ischemia or other electrolye/metabolic derangements. Hgb is now improved from prior and pt reports not missing any home medications. Pt given amio bolus in the ED. Plan to admit and monitor with possible VT ablation    Recommendations:  - TTE completed no changes from prior  - Cont lido gtt for now, may hold prior to ablation    > NPO after midnight today for  VT ablation  - Holding home quinidine and off amio  - Continue home metop succ 25mg qd  - Continue home atorva 40  - Hold home dapagliflozin  - Maintain in CCU while on lido  - K >4; Mg >2    Jc Dunn PGY5  Cardiology Fellow    COCO Hurt ID: 74 yo woman w/ hx of CAD s/p PCI LM-LAD and  of RCA w/ ICM HFpEF (58%), prev L chest wall ICD placed in 2019 c/b infx and removed s/p R chest wall medtronic single chamber ICD in 2019 w/ recurrent VT (w/ attempted VT ablation that was aborted iso pericardial effusion s/p drain and pericardial window), AFib s/p DCCV on apixaban, COPD, HTN, HLD, PVD who presented to the ED w/ ICD shocks.    Patient seen and examined at bedside.    Overnight Events: NAEON, No further ectopy on lidocaine gtt. Feeling well denies FC NV CP SOB. Eager to get her VT ablation tomorrow.    Telemetry: Intermittent V pacing, no further VT    Review of Systems: Negative except as per HPI     Current Meds:  atorvastatin 40 milliGRAM(s) Oral at bedtime  budesonide 160 MICROgram(s)/formoterol 4.5 MICROgram(s) Inhaler 2 Puff(s) Inhalation two times a day  chlorhexidine 2% Cloths 1 Application(s) Topical daily  heparin  Infusion 1000 Unit(s)/Hr IV Continuous <Continuous>  lidocaine   Infusion 1 mG/Min IV Continuous <Continuous>  metoprolol succinate ER 25 milliGRAM(s) Oral daily  pantoprazole    Tablet 40 milliGRAM(s) Oral before breakfast      Vitals:  T(F): 97.7 (), Max: 98 ()  HR: 56 () (50 - 67)  BP: 108/51 () (92/43 - 149/63)  RR: 20 ()  SpO2: 99% ()  I&O's Summary    2024 07:01  -  2024 07:00  --------------------------------------------------------  IN: 1470.5 mL / OUT: 630 mL / NET: 840.5 mL    2024 07:01  -  2024 10:59  --------------------------------------------------------  IN: 278 mL / OUT: 0 mL / NET: 278 mL        Physical Exam:  Appearance: No acute distress; well appearing  Eyes: PERRL, EOMI, pink conjunctiva  HEENT: Normal oral mucosa  Cardiovascular: RRR, S1, S2, no murmurs, rubs, or gallops; no edema; no JVD  Respiratory: Clear to auscultation bilaterally  Gastrointestinal: soft, non-tender, non-distended with normal bowel sounds  Musculoskeletal: No clubbing; no joint deformity   Neurologic: Non-focal  Lymphatic: No lymphadenopathy  Psychiatry: AAOx3, mood & affect appropriate  Skin: No rashes, ecchymoses, or cyanosis                          9.7    11.77 )-----------( 321      ( 2024 03:59 )             31.2     02-12    139  |  106  |  14  ----------------------------<  117<H>  4.2   |  23  |  1.06    Ca    8.2<L>      2024 03:59  Phos  3.5     02-12  Mg     2.2     02-12    TPro  5.7<L>  /  Alb  2.9<L>  /  TBili  0.2  /  DBili  x   /  AST  14  /  ALT  15  /  AlkPhos  65  02-12    PT/INR - ( 2024 04:51 )   PT: 15.6 sec;   INR: 1.43 ratio         PTT - ( 2024 04:51 )  PTT:39.3 sec  CARDIAC MARKERS ( 2024 04:53 )  31 ng/L / x     / x     / x     / x     / x      CARDIAC MARKERS ( 2024 02:40 )  35 ng/L / x     / x     / x     / x     / x        Total Cholesterol: 89  LDL: --  HDL: 39  T    New ECG(s): Personally reviewed     TTE  CONCLUSIONS:      1. Left ventricular cavity is moderately dilated. Left ventricular systolic function is normal with an ejection fraction of 58 % by Ahn's method of disks. Regional wall motion abnormalities present.   2. Basal inferoseptal segment, mid inferolateral segment, and basal inferior segment are abnormal.   3. Mildly enlarged right ventricular cavity size, wall thickness, and normal systolic function. Tricuspid annular plane systolic excursion (TAPSE) is 2.3 cm (normal >=1.7 cm).   4. The left atrium is severely dilated.   5. The right atrium is severely dilated.   6. No pericardial effusion seen.   7. Compared to the transthoracic echocardiogram performed on 10/5/2023, there have been no significant interval changes.    CTA AP  IMPRESSION:  No aortic dissection or aortic intramural hematoma.    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    Increased groundglass opacity in the periphery of the left upper lobe   which may be infectious or inflammatory in etiology.    --- End of Report ---      A/P  74 yo woman w/ hx of CAD s/p PCI ( of RCA) w/ iCMY (pEF), prev L chest wall ICD placed in 2019 c/b infx and removed s/p R chest wall medtronic single chamber ICD in 2019 w/ recurrent VT (w/ attempted VT ablation that was aborted iso pericardial effusion s/p drain and pericardial window), AFib s/p DCCV on apixaban, COPD, HTN, HLD, PVD who presented to the ED w/ ICD shocks.    #ICD Shocks  #CAD / iCMY  #VT  #AFib  #HTN  #HLD  Pt w/ know VT w/ ICD w/ recurrent VT and ICD shocks, on amio 200 qd and quinidine 325mg q8 (although Rx'ed for 200 q6) who presented w/ ICD shocks found to have 5 runs of VT in the past 2d, 1 of which was terminated by ATP the other 4 terminated by ICD shocks. Pt found to be in NSR w/ LBBB (chronic), w/ hypokalemia and no signs of gross volume overload. Suspect that hypokalemia is contributing to presentation as no other signs of ADHF (although pt does have small pleural effusion on CXR), new ischemia or other electrolye/metabolic derangements. Hgb is now improved from prior and pt reports not missing any home medications. Pt given amio bolus in the ED. Plan to admit and monitor with possible VT ablation    Recommendations:  - TTE completed no changes from prior  - Cont lido gtt for now, may hold 10 hours prior to ablation    > NPO after midnight today for 2/13 VT ablation  - Holding home quinidine and off amio  - Continue home metop succ 25mg qd  - Continue home atorva 40  - Hold home dapagliflozin  - F/U type and screen and hold hep at 5 am  - Maintain in CCU while on lido  - K >4; Mg >2    Jc Dunn PGY5  Cardiology Fellow    COCO Hurt

## 2024-02-12 NOTE — DISCHARGE NOTE PROVIDER - NSDCCPCAREPLAN_GEN_ALL_CORE_FT
PRINCIPAL DISCHARGE DIAGNOSIS  Diagnosis: Shockable heart rhythm detected by automated external defibrillator  Assessment and Plan of Treatment: You were admitted to Cardiac ICU required  lidocaine gtt,   - You had a VT Ablation 2/13 by Dr. Hurt  - Started amio 200 mg daily  - Discontinue home quinidine indefinitely, should not be necessary  - Discontinue Farxiga  - Continue home metoprolol succinate 25mg daily  - Continue home atorvastatin 40mg at bedtime  - Follow up with Electrophysiologist for further management - May need ICD  upgrade to CRTD or other options. EP will determine need for this in the outpatient setting  Seek medical help urgently for recurrence of ICD shock     PRINCIPAL DISCHARGE DIAGNOSIS  Diagnosis: Shockable heart rhythm detected by automated external defibrillator  Assessment and Plan of Treatment: You were admitted to Cardiac ICU required  lidocaine gtt,   - You had a VT Ablation 2/13 by Dr. Hurt  - Started amio 200 mg daily  - Discontinue home quinidine indefinitely, should not be necessary  - Discontinue Farxiga  - Continue home metoprolol succinate 25mg daily  - Continue home atorvastatin 40mg at bedtime  - Follow up with Electrophysiologist for further management - May need ICD  upgrade to CRTD or other options. EP will determine need for this in the outpatient setting  Seek medical help urgently for recurrence of ICD shock      SECONDARY DISCHARGE DIAGNOSES  Diagnosis: Hypokalemia  Assessment and Plan of Treatment: Hypokalemia  Supplemented potassium level      Diagnosis: Chronic atrial fibrillation  Assessment and Plan of Treatment: Continue Eliquis adn metoprolol    Diagnosis: Chronic diastolic heart failure  Assessment and Plan of Treatment: Chronic dCHF -  TTE 10/23: EF 55%, regional wall motion abnormalities Echo shows - No changes    Diagnosis: HTN (hypertension)  Assessment and Plan of Treatment: recently stopped losartan as outpatient  Follow up with PMD - Dr. Isaac in 1 week for management of blood pressure     PRINCIPAL DISCHARGE DIAGNOSIS  Diagnosis: Shockable heart rhythm detected by automated external defibrillator  Assessment and Plan of Treatment: You were admitted to Cardiac ICU required  lidocaine gtt,   - You had a VT Ablation 2/13 by Dr. Hurt  - Started amio 200 mg daily  - Discontinue home quinidine indefinitely, should not be necessary  - Discontinue Farxiga  - Continue home metoprolol succinate 25mg daily  - Continue home atorvastatin 40mg at bedtime  - Follow up with Electrophysiologist for further management - May need ICD  upgrade to CRTD or other options. EP will determine need for this in the outpatient setting  Seek medical help urgently for recurrence of ICD shock      SECONDARY DISCHARGE DIAGNOSES  Diagnosis: Hypokalemia  Assessment and Plan of Treatment: Hypokalemia  Supplemented potassium   Follow up with PMD in 3-4 days for repeat blood work      Diagnosis: Chronic atrial fibrillation  Assessment and Plan of Treatment: Continue Eliquis and metoprolol  Atrial fibrillation is the most common heart rhythm problem.  The condition puts you at risk for has stroke and heart attack  Continue Eliquis - blood thinners to prevent possible clot and stroke complications.   Monitor for any signs of bleeding and avoid injury while on this medication  If any bleeding persistent and symptomatic stop the medication and notify your doctor immediately.  It helps if you control your blood pressure, not drink more than 1-2 alcohol drinks per day, cut down on caffeine, getting treatment for over active thyroid gland, and get regular exercise  Call your doctor if you feel your heart racing or beating unusually, chest tightness or pain, lightheaded, faint, shortness of breath especially with exercise  It is important to take your heart medication as prescribed      Diagnosis: Chronic diastolic heart failure  Assessment and Plan of Treatment: Chronic dCHF -  TTE 10/23: EF 55%, regional wall motion abnormalities Echo shows - No changes.   - Continue Lasix, metoprolol  - Follow up with cardiologist in 1 week  Weigh yourself daily.  If you gain 3lbs in 3 days, or 5lbs in a week call your Health Care Provider.  Do not eat or drink foods containing more than 2000mg of salt (sodium) in your diet every day.  Call your Health Care Provider if you have any swelling or increased swelling in your feet, ankles, and/or stomach.  Take all of your medication as directed.  If you become dizzy call your Health Care Provider.      Diagnosis: HTN (hypertension)  Assessment and Plan of Treatment: recently stopped losartan as outpatient  Follow up with PMD - Dr. Isaac in 1 week for management of blood pressure

## 2024-02-12 NOTE — PROGRESS NOTE ADULT - SUBJECTIVE AND OBJECTIVE BOX
INTERVAL HPI/OVERNIGHT EVENTS:    SUBJECTIVE: Patient seen and examined at bedside. Intubated, sedated, ROS cannot be obtained.       VITAL SIGNS:  ICU Vital Signs Last 24 Hrs  T(C): 36.6 (12 Feb 2024 03:00), Max: 36.7 (11 Feb 2024 09:24)  T(F): 97.8 (12 Feb 2024 03:00), Max: 98 (11 Feb 2024 09:24)  HR: 52 (12 Feb 2024 07:00) (50 - 67)  BP: 119/52 (12 Feb 2024 07:00) (92/43 - 149/63)  BP(mean): 75 (12 Feb 2024 07:00) (62 - 96)  ABP: --  ABP(mean): --  RR: 20 (12 Feb 2024 07:00) (16 - 32)  SpO2: 98% (12 Feb 2024 07:00) (90% - 100%)    O2 Parameters below as of 12 Feb 2024 07:00  Patient On (Oxygen Delivery Method): nasal cannula  O2 Flow (L/min): 2          Plateau pressure:   P/F ratio:     02-11 @ 07:01  -  02-12 @ 07:00  --------------------------------------------------------  IN: 1451 mL / OUT: 630 mL / NET: 821 mL      CAPILLARY BLOOD GLUCOSE        ECG:    PHYSICAL EXAMINATION:  General: Comfortable, no acute distress, cooperative with exam.  HEENT: PERRLA, EOMI, moist mucous membranes.  Respiratory: CTAB, normal respiratory effort, no coughing, wheezes, crackles, or rales.  CV: RRR, S1S2, no murmurs, rubs or gallops. No JVD. Distal pulses intact.  Abdominal: Soft, nontender, nondistended, no rebound or guarding, normal bowel sounds.  Neurology: AOx3, no focal neuro defects, UPTON x 4.  Extremities: No pitting edema, + Peripheral pulses.  Incisions:   Tubes:    MEDICATIONS:  MEDICATIONS  (STANDING):  atorvastatin 40 milliGRAM(s) Oral at bedtime  budesonide 160 MICROgram(s)/formoterol 4.5 MICROgram(s) Inhaler 2 Puff(s) Inhalation two times a day  chlorhexidine 2% Cloths 1 Application(s) Topical daily  heparin  Infusion 1000 Unit(s)/Hr (12 mL/Hr) IV Continuous <Continuous>  lidocaine   Infusion 1 mG/Min (7.5 mL/Hr) IV Continuous <Continuous>  metoprolol succinate ER 25 milliGRAM(s) Oral daily  pantoprazole    Tablet 40 milliGRAM(s) Oral before breakfast    MEDICATIONS  (PRN):      ALLERGIES:  Allergies    No Known Allergies    Intolerances        LABS:                        9.7    11.77 )-----------( 321      ( 12 Feb 2024 03:59 )             31.2     02-12    139  |  106  |  14  ----------------------------<  117<H>  4.2   |  23  |  1.06    Ca    8.2<L>      12 Feb 2024 03:59  Phos  3.5     02-12  Mg     2.2     02-12    TPro  5.7<L>  /  Alb  2.9<L>  /  TBili  0.2  /  DBili  x   /  AST  14  /  ALT  15  /  AlkPhos  65  02-12    PT/INR - ( 12 Feb 2024 04:51 )   PT: 15.6 sec;   INR: 1.43 ratio         PTT - ( 12 Feb 2024 04:51 )  PTT:39.3 sec  Urinalysis Basic - ( 12 Feb 2024 03:59 )    Color: x / Appearance: x / SG: x / pH: x  Gluc: 117 mg/dL / Ketone: x  / Bili: x / Urobili: x   Blood: x / Protein: x / Nitrite: x   Leuk Esterase: x / RBC: x / WBC x   Sq Epi: x / Non Sq Epi: x / Bacteria: x        RADIOLOGY & ADDITIONAL TESTS: Reviewed.   INTERVAL HPI/OVERNIGHT EVENTS:    SUBJECTIVE: Patient seen and examined at bedside. Feeling well. No difficulty breathing, LE swelling, diarrhea, NV, chills/fevers.       VITAL SIGNS:  ICU Vital Signs Last 24 Hrs  T(C): 36.6 (12 Feb 2024 03:00), Max: 36.7 (11 Feb 2024 09:24)  T(F): 97.8 (12 Feb 2024 03:00), Max: 98 (11 Feb 2024 09:24)  HR: 52 (12 Feb 2024 07:00) (50 - 67)  BP: 119/52 (12 Feb 2024 07:00) (92/43 - 149/63)  BP(mean): 75 (12 Feb 2024 07:00) (62 - 96)  ABP: --  ABP(mean): --  RR: 20 (12 Feb 2024 07:00) (16 - 32)  SpO2: 98% (12 Feb 2024 07:00) (90% - 100%)    O2 Parameters below as of 12 Feb 2024 07:00  Patient On (Oxygen Delivery Method): nasal cannula  O2 Flow (L/min): 2          Plateau pressure:   P/F ratio:     02-11 @ 07:01  -  02-12 @ 07:00  --------------------------------------------------------  IN: 1451 mL / OUT: 630 mL / NET: 821 mL      CAPILLARY BLOOD GLUCOSE        ECG:    PHYSICAL EXAMINATION:  General: Comfortable, no acute distress, cooperative with exam.  HEENT: PERRLA, EOMI, moist mucous membranes.  Respiratory: CTAB, normal respiratory effort, mild wheeze, rhonchi in bases bilaterally.   CV: RRR, S1S2, no murmurs, rubs or gallops. No JVD. Distal pulses intact.  Abdominal: Soft, nontender, nondistended, no rebound or guarding, normal bowel sounds.  Neurology: AOx3, no focal neuro defects, UPTON x 4.  Extremities: No pitting edema, + Peripheral pulses.    MEDICATIONS:  MEDICATIONS  (STANDING):  atorvastatin 40 milliGRAM(s) Oral at bedtime  budesonide 160 MICROgram(s)/formoterol 4.5 MICROgram(s) Inhaler 2 Puff(s) Inhalation two times a day  chlorhexidine 2% Cloths 1 Application(s) Topical daily  heparin  Infusion 1000 Unit(s)/Hr (12 mL/Hr) IV Continuous <Continuous>  lidocaine   Infusion 1 mG/Min (7.5 mL/Hr) IV Continuous <Continuous>  metoprolol succinate ER 25 milliGRAM(s) Oral daily  pantoprazole    Tablet 40 milliGRAM(s) Oral before breakfast    MEDICATIONS  (PRN):      ALLERGIES:  Allergies    No Known Allergies    Intolerances        LABS:                        9.7    11.77 )-----------( 321      ( 12 Feb 2024 03:59 )             31.2     02-12    139  |  106  |  14  ----------------------------<  117<H>  4.2   |  23  |  1.06    Ca    8.2<L>      12 Feb 2024 03:59  Phos  3.5     02-12  Mg     2.2     02-12    TPro  5.7<L>  /  Alb  2.9<L>  /  TBili  0.2  /  DBili  x   /  AST  14  /  ALT  15  /  AlkPhos  65  02-12    PT/INR - ( 12 Feb 2024 04:51 )   PT: 15.6 sec;   INR: 1.43 ratio         PTT - ( 12 Feb 2024 04:51 )  PTT:39.3 sec  Urinalysis Basic - ( 12 Feb 2024 03:59 )    Color: x / Appearance: x / SG: x / pH: x  Gluc: 117 mg/dL / Ketone: x  / Bili: x / Urobili: x   Blood: x / Protein: x / Nitrite: x   Leuk Esterase: x / RBC: x / WBC x   Sq Epi: x / Non Sq Epi: x / Bacteria: x        RADIOLOGY & ADDITIONAL TESTS: Reviewed.

## 2024-02-12 NOTE — DISCHARGE NOTE PROVIDER - NSDCFUADDAPPT_GEN_ALL_CORE_FT
APPTS ARE READY TO BE MADE: [x] YES    Best Family or Patient Contact (if needed):    Additional Information about above appointments (if needed):    1:   2:   3:     Other comments or requests:    APPTS ARE READY TO BE MADE: [x] YES    Best Family or Patient Contact (if needed):    Additional Information about above appointments (if needed):    1:   2:   3:     Other comments or requests:     Prior to outreaching the patient, it was visible that the patient has secured a follow up appointment which was not scheduled by our team. Scheduled with Dr. Gaming 3/1 9:30am [address] [place] [address]    Prior to outreaching the patient, it was visible that the patient has secured a follow up appointment which was not scheduled by our team. Patient is scheduled with Dr. Hurt 3/6/24 11:00am [address] [place] [address]

## 2024-02-12 NOTE — PROGRESS NOTE ADULT - SUBJECTIVE AND OBJECTIVE BOX
AUSTIN LEE  MRN-[id number]  Patient is a 73y old  Female who presents with a chief complaint of AICD (2024 17:30)    HPI: 72F with COPD, HTN, PAD, CAD s/p LAD stent, cardiac arrest s/p left-sided ICD (2019) complicated by infection and s/p R single-chamber ICD reimplantation ([place] in 2019), HFrEF (EF 45%), recent hospital stay for VF sp ablation c/b pericardial effusion and tamponade s/p pericardiocentesis and pericardial window and AFib, presenting with defibrillator firing.     Patient with recent history of supratherapeutic INR C/B GI bleed during recent admission, and had monomorphic VT s/p ICD shock, with planned future VT ablation by Dr. Hurt, discharged 2024 on Eliquis.  Patient states she had 3 episodes of defibrillation this evening between 7:30 PM until 12:30 AM.  Patient with dizziness prior to the episodes, but denies syncope.  Reports that she has been compliant with her medications, and has been feeling well prior, aside from 1 episode of loose stool yesterday. Denies any increased coughing, fevers, chills, chest pain, SOB, increased LE swelling.     In the ED patient was found to be hypokalemic and was given repletion. Electrophysiology was consulted and device interrogation showed:   "Interrogation for recent ICD shock   Remaining battery: 5.1y  Presented in NSR w/ rate in the 50-60s  ICD functioning appropriately w/ Vpaced <0.1%  Data reveals 4x ICD shocks (one on 24, one on 2/10/24 and two on 2024); EGM consistent w/ monomorphic VT w/ rate 150-230s, each episode unsuccessfully treated w/ ATP before shock; Additionally 1 episode of VT successfully terminated by ATP."    Ultimately, EP recommended admission to the CICU on lidocaine gtt with plan for ablation procedure. Patient notes that she was originally scheduled to come here on Tuesday for ablation procedure.    (2024 08:59)    24 HOUR EVENTS:    REVIEW OF SYSTEMS:  CONSTITUTIONAL: No weakness, fevers or chills  EYES/ENT: No visual changes;  No vertigo or throat pain   NECK: No pain or stiffness  RESPIRATORY: No cough, wheezing, hemoptysis; No shortness of breath  CARDIOVASCULAR: No chest pain or palpitations  GASTROINTESTINAL: No abdominal or epigastric pain  No nausea, vomiting, or hematemesis; No diarrhea or constipation. No melena or hematochezia.  GENITOURINARY: No dysuria, frequency or hematuria  NEUROLOGICAL: No numbness or weakness  SKIN: No itching, rashes      ICU Vital Signs Last 24 Hrs  T(C): 36.7 (2024 14:00), Max: 36.7 (2024 14:00)  T(F): 98 (2024 14:00), Max: 98 (2024 14:00)  HR: 65 (2024 18:33) (52 - 68)  BP: 135/60 (2024 18:00) (92/43 - 144/64)  BP(mean): 87 (2024 18:00) (62 - 92)  RR: 28 (2024 18:00) (16 - 33)  SpO2: 96% (2024 18:33) (92% - 100%)    O2 Parameters below as of 2024 18:33  Patient On (Oxygen Delivery Method): room air      I&O's Summary    2024 07:01  -  2024 07:00  --------------------------------------------------------  IN: 1470.5 mL / OUT: 630 mL / NET: 840.5 mL    2024 07:01  -  2024 19:12  --------------------------------------------------------  IN: 802.5 mL / OUT: 350 mL / NET: 452.5 mL        PHYSICAL EXAM:  GENERAL: No acute distress, well-developed  HEAD:  Atraumatic, Normocephalic  EYES: EOMI, PERRLA, conjunctiva and sclera clear  NECK: Supple, no lymphadenopathy, no JVD  CHEST/LUNG: CTAB; No wheezes, rales, or rhonchi  HEART: Regular rate and rhythm. Normal S1/S2. No murmurs, rubs, or gallops  ABDOMEN: Soft, non-tender, non-distended; normal bowel sounds, no organomegaly  EXTREMITIES:  2+ peripheral pulses b/l, No clubbing, cyanosis, or edema  NEUROLOGY: A&O x 3, no focal deficits  SKIN: No rashes or lesions  ============================I/O===========================   I&O's Detail    2024 07:01  -  2024 07:00  --------------------------------------------------------  IN:    Heparin: 193 mL    IV PiggyBack: 350 mL    Lidocaine: 157.5 mL    Oral Fluid: 770 mL  Total IN: 1470.5 mL    OUT:    Voided (mL): 630 mL  Total OUT: 630 mL    Total NET: 840.5 mL      2024 07:01  -  2024 19:12  --------------------------------------------------------  IN:    Heparin: 105 mL    Lidocaine: 67.5 mL    Oral Fluid: 630 mL  Total IN: 802.5 mL    OUT:    Blood Loss (mL): 350 mL  Total OUT: 350 mL    Total NET: 452.5 mL  ============================ LABS =========================                    9.7    11.77 )-----------( 321      ( 2024 03:59 )             31.2         139  |  106  |  14  ----------------------------<  117<H>  4.2   |  23  |  1.06    Ca    8.2<L>      2024 03:59  Phos  3.5       Mg     2.2         TPro  5.7<L>  /  Alb  2.9<L>  /  TBili  0.2  /  DBili  x   /  AST  14  /  ALT  15  /  AlkPhos  65      Troponin T, High Sensitivity Result: 31 ng/L (24 @ 04:53)  Troponin T, High Sensitivity Result: 35 ng/L (24 @ 02:40)    LIVER FUNCTIONS - ( 2024 03:59 )  Alb: 2.9 g/dL / Pro: 5.7 g/dL / ALK PHOS: 65 U/L / ALT: 15 U/L / AST: 14 U/L / GGT: x           PT/INR - ( 2024 04:51 )   PT: 15.6 sec;   INR: 1.43 ratio    PTT - ( 2024 13:32 )  PTT:87.6 sec    Lactate, Blood: 1.3 mmol/L (24 @ 03:59)  Blood Gas Venous - Lactate: 2.5 mmol/L (24 @ 06:17)  Blood Gas Venous - Lactate: 2.5 mmol/L (24 @ 02:33)    Urinalysis Basic - ( 2024 03:59 )    Color: x / Appearance: x / SG: x / pH: x  Gluc: 117 mg/dL / Ketone: x  / Bili: x / Urobili: x   Blood: x / Protein: x / Nitrite: x   Leuk Esterase: x / RBC: x / WBC x   Sq Epi: x / Non Sq Epi: x / Bacteria: x  ======================Micro/Rad/Cardio=================  Telemetry: Reviewed   EKG: Reviewed  CXR: Reviewed  Culture: Reviewed   Echo: Transthoracic Echocardiogram:   Patient name: AUSTIN LEE  YOB: 1950   Age: 68 (F)   MR#: [id number]  Study Date: 2019  Location: O/PSonographer: Sherri Cadet RODGER  Study quality: Technically difficult  Referring Physician: RICCARDO WOLF MD  Blood Pressure: 140/80 mmHg  Height: 157 cm  Weight: 86 kg  BSA: 1.9 m2  Heart Rate: 60 mmHg  ------------------------------------------------------------------------  PROCEDURE: Transthoracic echocardiogram with 2-D, M-Mode  and complete spectral and color flow Doppler.  INDICATION: Atherosclerotic heart disease of native  coronary artery without angina pectoris (I25.10)  ------------------------------------------------------------------------  Dimensions:    Normal Values:  LA:     4.6    2.0 - 4.0 cm  Ao: 3.1    2.0 - 3.8 cm  SEPTUM: 1.4    0.6 - 1.2 cm  PWT:    1.3    0.6 - 1.1 cm  LVIDd:  6.4    3.0 - 5.6 cm  LVIDs:  4.3    1.8 - 4.0 cm  Derived variables:  LVMI: 219 g/m2  RWT: 0.40  Fractional short: 33 %  EF (Ahn Rule): 33 %Doppler Peak Velocity (m/sec):  AoV=1.2  ------------------------------------------------------------------------  Observations:  Mitral Valve: Mitral annular calcification and calcified  mitral leaflets with decreased diastolic opening. Mild  mitral regurgitation.  Peak mitral valve gradient equals 13  mm Hg, mean transmitral valve gradient equals 4 mm Hg,  consistent with mild mitral stenosis. Gradient measured at  a HR of 81bpm.  Aortic Valve/Aorta: Aortic valve not well visualized;  probably normal. Peak transaortic valve gradient equals 6  mm Hg, estimated aortic valve area equals 2.1 sqcm. Minimal  aortic regurgitation.  Peak left ventricular outflow tract  gradient equals 2 mm Hg.  Aortic Root: 3.1 cm.  Left Atrium: Normal left atrium.  LA volume index =31  cc/m2.  Left Ventricle: Severe segmental left ventricular systolic  dysfunction. Endocardial visualization enhanced with  intravenous injection of Ultrasonic Enhancing Agent  (Definity). The mid inferolateral wall, the basal  inferolateral wall, the basal inferior wall, the basal  anterolateral wall, the mid inferior wall, the mid  anterolateral wall, the mid inferoseptum, and the basal  inferoseptum are hypokinetic.  Moderate concentric left  ventricular hypertrophy. Moderate diastolic dysfunction  (Stage II).  Right Heart: Normal right atrium. Normal right ventricular  size and function. Normal tricuspid valve. Mild tricuspid  regurgitation. Normal pulmonic valve. Mild pulmonic  regurgitation.  Pericardium/Pleura: Normal pericardium with no pericardial  effusion.  Hemodynamic: Estimated right ventricular systolic pressure  equals 28 mm Hg, assuming right atrial pressure equals 3 mm  Hg, consistent with normal pulmonary pressures. Color  Doppler demonstrates no evidence of a patent foramen ovale.  ------------------------------------------------------------------------  Conclusions:  1. Mitral annular calcification and calcified mitral  leaflets with decreased diastolic opening. Mild mitral  regurgitation.  Peak mitral valve gradient equals 13 mm Hg,  mean transmitral valve gradient equals 4 mm Hg, consistent  with mild mitral stenosis. Gradient measured at a HR of  81bpm.  2. Aortic valve not well visualized; probably normal.  Minimal aortic regurgitation.  3. Moderate concentric left ventricular hypertrophy.  4. Severe segmental left ventricular systolic dysfunction.  Endocardial visualization enhanced with intravenous  injection of Ultrasonic Enhancing Agent (Definity). The mid  inferolateral wall, the basal  inferolateral wall, the  basal inferior wall, the basal anterolateral wall, the mid  inferior wall, the mid anterolateral wall, the mid  inferoseptum, and the basal inferoseptum are hypokinetic.  5. Moderate diastolic dysfunction (Stage II).  6. Normal right ventricular size and function.    Cath: Cardiac Cath Lab - Adult:   [facility]  Department of Cardiology  [address] [place] [address] [phone]  Cath Lab Report -- Comprehensive Report  Patient: AUSTIN LEE  Study date: 2015  Account number: [id number]  MR number: [id number]  : 1950  Gender: Female  Race: W  Case Physician(s):  Mo Izquierdo M.D.  Fellow:  СЕРГЕЙ Boogie M.D.  Referring Physician:  INDICATIONS: Angina/MI: unstable angina.  HISTORY: The patient has a history of coronary artery disease. The patient  has hypertension and medication-treated dyslipidemia.  PROCEDURE:  --  Intervention on mid LAD: drug-eluting stent.  TECHNIQUE: The risks and alternatives of the procedures and conscious  sedation were explained to the patient and informed consent was obtained.  Cardiac catheterization performed urgently. Coronary intervention  performed electively.  Local anesthetic given. Right radial artery access. A PRELUDE KIT was  inserted in the vessel. Right femoral artery access. A 6FR SHEATH PINNACLE  was inserted in the vessel. RADIATION EXPOSURE: 6.9 min. A successful  drug-eluting stent was performed on the 80 % lesion in the mid LAD.  Following intervention there was a 1 % residual stenosis. According to the  ACC/AHA classification system, this lesion was a type B2 lesion. There was  VELASQUEZ 3 flow after the procedure. Vessel setup was performed. A 6FR JL4  LAUNCHER guiding catheter was used to intubate the vessel. Vessel setup  was performed. A JASE [id number] wire was used to cross the lesion. A 3.50  X 20 PROMUS PREMIER drug-eluting stent was placed across the lesion and  deployed at a maximum inflation pressure of 20 gianna. Balloon angioplasty  was performed, using a 4.00 X 12 EUPHORA balloon, with 1 inflations and a  maximum inflation pressure of 12 gianna.  CONTRAST GIVEN: Omnipaque 85 ml.  MEDICATIONS GIVEN: Midazolam, 0.5 mg, IV. Fentanyl, 25 mcg, IV. Verapamil  (Isoptin, Calan, Covera), 2.5 mg, IA. Nitroglycerin, 300 mcg,  intracoronary. Labetalol (Trandate), 20 mg, IV. Heparin, 8000 units, IA.  CORONARY VESSELS:  LAD:   --  Mid LAD: There was a 80 % stenosis.  COMPLICATIONS: There were no complications.  INTERVENTIONAL RECOMMENDATIONS: ASA and Plavix for 1 year  Prepared and signed by  Mo Izquierdo M.D.  Signed 2015 11:02:43  HEMODYNAMIC TABLES  Pressures:  Intervention  Pressures:  - HR: 102  Pressures:  - Rhythm:  Pressures:  -- Aortic Pressure (S/D/M): 183/67/114  Outputs:  Baseline/ Room Air  Outputs:  -- CALCULATIONS[age]  Outputs:  -- CALCULATIONS: Body Surface Area: 2.03  Outputs:  -- CALCULATIONS: Height in cm: 160.00  Outputs:  -- CALCULATIONS: Sex: Female  Outputs:  -- CALCULATIONS: Weight in k.10 (01-23-15 @ 18:24)  Cardiac Cath Lab - Adult:   [facility]  Department of Cardiology  [address] [place] [address]  [phone]  Cath Lab Report -- Comprehensive Report  Patient: AUSTIN LEE  Study date: 2015  Account number: [id number]  MR number: [id number]  : 1950  Gender: Female  Race: W  Case Physician(s):  Mo Izquierdo M.D.  Fellow:  Sanjuana Campos M.D.  Referring Physician:  Alok Price M.D.  INDICATIONS: Angina/MI: unstable angina.  HISTORY: There was no prior cardiac history. The patient has hypertension,  medication-treated dyslipidemia, and morbid obesity.  PROCEDURE:  --  Left heart catheterization with ventriculography.  --  Left coronary angiography.  --  Right coronary angiography.  TECHNIQUE: The risks and alternatives of the procedures and conscious  sedation were explained to the patient and informed consent was obtained.  Cardiac catheterization performed electively.  Local anesthetic given. Right femoral artery access. A 5FR SHEATH PINNACLE  was inserted in the vessel. Left heart catheterization. Ventriculography  was performed. A 5FR  EXPO catheter was utilized. Left coronary  artery angiography. The vessel was injected utilizing a 5FR FL4.0 EXPO  catheter. Right coronary artery angiography. The vessel was injected  utilizing a 5FR FR4.0 EXPO catheter. RADIATION EXPOSURE: 1.6 min.  CONTRAST GIVEN: Visipaque 102 ml.  MEDICATIONS GIVEN: Fentanyl, 25 mcg, IV. Midazolam, 1 mg, IV. Labetalol  (Trandate), 20 mg, IV. Nitroglycerin, 300 mcg, IA. Labetalol (Trandate),  20 mg, IV.  VENTRICLES: Global left ventricular function was moderately depressed. EF  estimated was 40 %.  CORONARY VESSELS: The coronary circulation is right dominant.  LM:   --  Distal left main: There was a 20 % stenosis.  LAD:--  Proximal LAD: There was a 80 % stenosis.  CX:   --  Distal circumflex: Angiography showed mild atherosclerosis with  no flow limiting lesions.  RCA:   --  Mid RCA: There was a 100 % stenosis.  COMPLICATIONS: There were no complications.  DIAGNOSTIC RECOMMENDATIONS: Plan PCI of the LAD tomorrow. Pt with right  groin hematoma which is reason for delay in PCI  Prepared and signed by  Mo Izquierdo M.D.  Signed 2015 11:00:32  HEMODYNAMIC TABLES  Pressures:  Baseline/ Room Air  Pressures:  -HR: 85  Pressures:  - Rhythm:  Pressures:  -- Aortic Pressure (S/D/M): 210/77/128  Pressures:  -- Left Ventricle (s/edp): 209/25/--  Outputs:  Baseline/ Room Air  Outputs:  -- CALCULATIONS[age]  Outputs:  -- CALCULATIONS: Body SurfaceArea: 2.03  Outputs:  -- CALCULATIONS: Height in cm: 160.00  Outputs:  -- CALCULATIONS: Sex: Female  Outputs:  -- CALCULATIONS: Weight in k.10  Outputs:  -- OUTPUTS: O2 consumption: 272.02  Outputs:  -- OUTPUTS: Vo2 Indexed: 133.79 (01-22-15 @ 17:26)    ======================================================  PAST MEDICAL & SURGICAL HISTORY:  Obese      Smoker      HTN (hypertension)      HLD (hyperlipidemia)      CAD (coronary artery disease)      CHF with cardiomyopathy      History of hip surgery    A/P: 73-year-old female with history of CAD, CHF, hypertension, hyperlipidemia, atrial fibrillation, VT s/p ICD, on amiodarone, who presents with defibrillator firing.  Patient with recent history of supratherapeutic INR C/B GI bleed during recent admission, and had monomorphic VT s/p ICD shock, with planned future VT ablation by Dr. Hurt, discharged 2024 on Eliquis; presenting after three episodes of VT at home found to be hypokalemic in the ED admitted for likely ablation.     Plan:  ====================== NEUROLOGY=====================  A&Ox3  - continue to monitor mental status as per protocol     ==================== RESPIRATORY======================  COPD  - does not use home O2  - budesonide inhaler BID  - albuterol PRN  - continue to monitor SpO2 with goal 88-94%    ====================CARDIOVASCULAR==================  VT with ICD  - originally planned for ablation on  as outpatient  - 4 shocks for VT at home on device interrogation (1x 2/8, 2x 2/10, 1x 2/11)  - lidocaine gtt @ 1  - hold home amiodarone and quinidine  - NPO after MN: VT ablation, hold Hep gtt 5AM    Afib  - transitioned from warfarin to apixaban during recent admission  - full AC with heparin gtt iso upcoming procedure  - hold for ablation    CAD  CHF  - hx of LAD stent,  of RCA; [facility]/C 23 showing patent stent   - TTE 10/23: EF 55%, regional wall motion abnormalities.   - home meds: lasix 40 mg daily, dapagliflozin 10mg daily, metop XL 25 daily  - held lasix iso hypokalemia  - holding dapagliflozin this week prior to procedure  - cont metop  - increased RR and rhonchi -> lasix 40 mg IV x1    HTN  - recently stopped losartan as outpatient  - monitor BP in ICU    HLD  - atorvastatin 40 daily    ===================HEMATOLOGIC/ONC ===================  CBC wnl   - Monitor H/H and plts  - DVT ppx: full AC for afib    ===================== RENAL =========================  SCr wnl   - Continue monitoring urine output, lytes, SCr/ BUN  - replete lytes prn with goal K >4 and Mg >2    Hypokalemia  - potassium repletion prn    ==================== GASTROINTESTINAL===================  Regular Diet as tolerated   - Monitor for regular BM while inpatient, bowel reg as needed     =======================    ENDOCRINE  =====================  Elevated TSH  -TSH 14 increased from prior at 11  -on amiodarone as outpatient  -f/u T3, free T4    - Monitor BG daily on CMP with goal <180.    ========================INFECTIOUS DISEASE================  Afebrile, chronic leukocytosis  - monitor and trend WBC and temperature curve    ======================= DISPOSITION  =====================  [X] Critical   [ ] Guarded    [ ] Stable    [X] Maintain in CICU  [ ] Downgrade to Telemetry  [ ] Discharge Home    Patient requires continuous monitoring with bedside rhythm monitoring, pulse ox monitoring, and intermittent blood gas analysis. Care plan discussed with ICU care team. Patient remained critical and at risk for life threatening decompensation.  Patient seen, examined and plan discussed with CCU team during rounds.     I have personally provided 30 minutes of critical care time excluding time spent on separate procedures, in addition to initial critical care time provided by the CICU Attending, Dr. Todd Dahl [facility] [phone]

## 2024-02-12 NOTE — DISCHARGE NOTE PROVIDER - CARE PROVIDERS DIRECT ADDRESSES
[email or web address]

## 2024-02-12 NOTE — DISCHARGE NOTE PROVIDER - NSDCMRMEDTOKEN_GEN_ALL_CORE_FT
amiodarone 200 mg oral tablet: 1 tab(s) orally once a day  apixaban 5 mg oral tablet: 1 tab(s) orally every 12 hours  atorvastatin 40 mg oral tablet: 1 tab(s) orally once a day (at bedtime)  D3 50 mcg (2000 intl units) oral capsule: 1 cap(s) orally once a day  Farxiga 10 mg oral tablet: 1 tab(s) orally once a day  Lasix 40 mg oral tablet: 1 tab(s) orally once a day  metoprolol succinate 25 mg oral tablet, extended release: 1 tab(s) orally once a day  nystatin 100,000 units/g topical powder: Apply topically to affected area once a day  Protonix 40 mg oral delayed release tablet: 1 tab(s) orally 2 times a day  quiNIDine 200 mg oral tablet: 1 tab(s) orally 4 times a day  Symbicort 160 mcg-4.5 mcg/inh inhalation aerosol: 2 puff(s) inhaled 2 times a day   amiodarone 200 mg oral tablet: 1 tab(s) orally once a day  apixaban 5 mg oral tablet: 1 tab(s) orally every 12 hours  atorvastatin 40 mg oral tablet: 1 tab(s) orally once a day (at bedtime)  D3 50 mcg (2000 intl units) oral capsule: 1 cap(s) orally once a day  Lasix 40 mg oral tablet: 1 tab(s) orally once a day  metoprolol succinate 25 mg oral tablet, extended release: 1 tab(s) orally once a day  nystatin 100,000 units/g topical powder: Apply topically to affected area once a day  Protonix 40 mg oral delayed release tablet: 1 tab(s) orally 2 times a day  Symbicort 160 mcg-4.5 mcg/inh inhalation aerosol: 2 puff(s) inhaled 2 times a day   amiodarone 200 mg oral tablet: 1 tab(s) orally once a day  apixaban 5 mg oral tablet: 1 tab(s) orally every 12 hours  atorvastatin 40 mg oral tablet: 1 tab(s) orally once a day (at bedtime)  D3 50 mcg (2000 intl units) oral capsule: 1 cap(s) orally once a day  Lasix 40 mg oral tablet: 1 tab(s) orally once a day  metoprolol succinate 25 mg oral tablet, extended release: 1 tab(s) orally once a day  nystatin 100,000 units/g topical powder: Apply topically to affected area once a day bilateral groins  Protonix 40 mg oral delayed release tablet: 1 tab(s) orally once a day  Symbicort 160 mcg-4.5 mcg/inh inhalation aerosol: 2 puff(s) inhaled 2 times a day   amiodarone 200 mg oral tablet: 1 tab(s) orally once a day  apixaban 5 mg oral tablet: 1 tab(s) orally every 12 hours  atorvastatin 40 mg oral tablet: 1 tab(s) orally once a day (at bedtime)  D3 50 mcg (2000 intl units) oral capsule: 1 cap(s) orally once a day  Lasix 40 mg oral tablet: 1 tab(s) orally once a day  metoprolol succinate 25 mg oral tablet, extended release: 1 tab(s) orally once a day  MiraLax oral powder for reconstitution: 17 gram(s) orally once a day as needed for  constipation  nystatin 100,000 units/g topical powder: Apply topically to affected area once a day bilateral groins  Protonix 40 mg oral delayed release tablet: 1 tab(s) orally once a day  Symbicort 160 mcg-4.5 mcg/inh inhalation aerosol: 2 puff(s) inhaled 2 times a day

## 2024-02-12 NOTE — DISCHARGE NOTE PROVIDER - PROVIDER TOKENS
PROVIDER:[TOKEN:[[id number]],FOLLOWUP:[1 week]],PROVIDER:[TOKEN:[[id number]]] PROVIDER:[TOKEN:[[id number]],FOLLOWUP:[1 week]],PROVIDER:[TOKEN:[[id number]],SCHEDULEDAPPT:[03/06/2024]]

## 2024-02-12 NOTE — DISCHARGE NOTE PROVIDER - CARE PROVIDER_API CALL
Jc Isaac  Internal Medicine  [address] [facility], [address]  [place] [address]  Phone: [phone]  Fax: [phone]  Follow Up Time: 1 week    Campbell Hurt  Cardiac Electrophysiology  [address], [address] [place] [address]  Phone: [phone]  Fax: [phone]  Follow Up Time:    Jc Isaac  Internal Medicine  [address] [facility], [address]  [place] [address]  Phone: [phone]  Fax: [phone]  Follow Up Time: 1 week    Campbell Hurt  Cardiac Electrophysiology  [address], [address] [place] [address]  Phone: [phone]  Fax: [phone]  Scheduled Appointment: 03/06/2024

## 2024-02-12 NOTE — DISCHARGE NOTE PROVIDER - HOSPITAL COURSE
72F with COPD, HTN, PAD, CAD s/p LAD stent, cardiac arrest s/p left-sided ICD (6/4/2019) complicated by infection and s/p R single-chamber ICD reimplantation ([place] in 9/23/2019), HFrEF (EF 45%), recent hospital stay for VF sp ablation c/b pericardial effusion and tamponade s/p pericardiocentesis and pericardial window and AFib, presenting with defibrillator firing.     Patient with recent history of supratherapeutic INR C/B GI bleed during recent admission, and had monomorphic VT s/p ICD shock, with planned future VT ablation by Dr. Hurt, discharged 2/2/2024 on Eliquis.  Patient states she had 3 episodes of defibrillation this evening between 7:30 PM until 12:30 AM.  Patient with dizziness prior to the episodes, but denies syncope.  Reports that she has been compliant with her medications, and has been feeling well prior, aside from 1 episode of loose stool yesterday. Denies any increased coughing, fevers, chills, chest pain, SOB, increased LE swelling.     In the ED patient was found to be hypokalemic and was given repletion. Electrophysiology was consulted and device interrogation showed:   "Interrogation for recent ICD shock   Remaining battery: 5.1y  Presented in NSR w/ rate in the 50-60s  ICD functioning appropriately w/ Vpaced <0.1%  Data reveals 4x ICD shocks (one on 2/8/24, one on 2/10/24 and two on 2/11/2024); EGM consistent w/ monomorphic VT w/ rate 150-230s, each episode unsuccessfully treated w/ ATP before shock; Additionally 1 episode of VT successfully terminated by ATP."    Ultimately, EP recommended admission to the CICU on lidocaine gtt with plan for ablation procedure. Patient notes that she was originally scheduled to come here on Tuesday for ablation procedure.    72F with COPD, HTN, PAD, CAD s/p LAD stent, cardiac arrest s/p left-sided ICD (6/4/2019) complicated by infection and s/p R single-chamber ICD reimplantation ([place] in 9/23/2019), HFrEF (EF 45%), recent hospital stay for VF sp ablation c/b pericardial effusion and tamponade s/p pericardiocentesis and pericardial window and AFib, presenting with defibrillator firing.     Patient with recent history of supratherapeutic INR C/B GI bleed during recent admission, and had monomorphic VT s/p ICD shock, with planned future VT ablation by Dr. Hurt, discharged 2/2/2024 on Eliquis.  Patient states she had 3 episodes of defibrillation this evening between 7:30 PM until 12:30 AM.  Patient with dizziness prior to the episodes, but denies syncope.  Reports that she has been compliant with her medications, and has been feeling well prior, aside from 1 episode of loose stool yesterday. Denies any increased coughing, fevers, chills, chest pain, SOB, increased LE swelling.     In the ED patient was found to be hypokalemic and was given repletion. Electrophysiology was consulted and device interrogation showed:   "Interrogation for recent ICD shock   Remaining battery: 5.1y  Presented in NSR w/ rate in the 50-60s  ICD functioning appropriately w/ Vpaced <0.1%  Data reveals 4x ICD shocks (one on 2/8/24, one on 2/10/24 and two on 2/11/2024); EGM consistent w/ monomorphic VT w/ rate 150-230s, each episode unsuccessfully treated w/ ATP before shock; Additionally 1 episode of VT successfully terminated by ATP."    Hospital Course:  VT with ICD Shocks  # Pt w/ know VT w/ ICD w/ recurrent VT and ICD shocks, on amio 200 qd and quinidine 325mg q8 (although Rx'ed for 200 q6) who presented w/ ICD shocks found to have 5 runs of VT in the past 2d, 1 of which was terminated by ATP the other 4 terminated by ICD shocks. Pt found to be in NSR w/ LBBB (chronic), w/ hypokalemia and no signs of gross volume overload. Suspect that hypokalemia is contributing to presentation as no other signs of ADHF (although pt does have small pleural effusion on CXR), new ischemia or other electrolye/metabolic derangements. Pt given amio bolus in the ED. TTE showed no changes from prior   - Admitted to Cardiac ICU, S/p lidocaine gtt, S/p VT Ablation 2/13 with Licha   - Start amio 200 QDay (2/15)  - Hold home quinidine indefinitely, should not be necessary  - Hold home dapagliflozin   - Continue home metop succ 25mg qd  - Continue home atorva 40  - May benefit from device upgrade to CRTD vs LBBB pacing w/defib coil in the setting of sinus bradycardia requiring uptitration of BB and IVCD. EP will determine need for this in the outpatient setting    Cardiac arrhythmia - VT with ICD shock g[place] in the setting of Hypokalemia  - 4 shocks for VT at home on device interrogation (1x 2/8, 2x 2/10, 1x 2/11) -   S/p CCU, s/p Lidocaine drip, S/p VT Ablation 2/13 with Licha,   - D/C home quinidine and Farxiga         Hypokalemia         Afib - transitioned from warfarin to apixaban during recent admission          Chronic dCHF -  TTE 10/23: EF 55%, regional wall motion abnormalities   TTE - No change          HTN - recently stopped losartan as outpatient      - Recommend aggressive incentive spirometer use, PT/OT eval    72F with COPD, HTN, PAD, CAD s/p LAD stent, cardiac arrest s/p left-sided ICD (6/4/2019) complicated by infection and s/p R single-chamber ICD reimplantation ([place] in 9/23/2019), HFrEF (EF 45%), recent hospital stay for VF sp ablation c/b pericardial effusion and tamponade s/p pericardiocentesis and pericardial window and AFib, presenting with defibrillator firing.     Patient with recent history of supratherapeutic INR C/B GI bleed during recent admission, and had monomorphic VT s/p ICD shock, with planned future VT ablation by Dr. Hurt, discharged 2/2/2024 on Eliquis.  Patient states she had 3 episodes of defibrillation this evening between 7:30 PM until 12:30 AM.  Patient with dizziness prior to the episodes, but denies syncope.  Reports that she has been compliant with her medications, and has been feeling well prior, aside from 1 episode of loose stool yesterday. Denies any increased coughing, fevers, chills, chest pain, SOB, increased LE swelling.     In the ED patient was found to be hypokalemic and was given repletion. Electrophysiology was consulted and device interrogation showed:   "Interrogation for recent ICD shock   Remaining battery: 5.1y  Presented in NSR w/ rate in the 50-60s  ICD functioning appropriately w/ Vpaced <0.1%  Data reveals 4x ICD shocks (one on 2/8/24, one on 2/10/24 and two on 2/11/2024); EGM consistent w/ monomorphic VT w/ rate 150-230s, each episode unsuccessfully treated w/ ATP before shock; Additionally 1 episode of VT successfully terminated by ATP."    Hospital Course:  VT with ICD Shocks  # Pt w/ know VT w/ ICD w/ recurrent VT and ICD shocks, on amio 200 qd and quinidine 325mg q8 (although Rx'ed for 200 q6) who presented w/ ICD shocks found to have 5 runs of VT in the past 2d, 1 of which was terminated by ATP the other 4 terminated by ICD shocks. Pt found to be in NSR w/ LBBB (chronic), w/ hypokalemia and no signs of gross volume overload. Suspect that hypokalemia is contributing to presentation as no other signs of ADHF (although pt does have small pleural effusion on CXR), new ischemia or other electrolyte/metabolic derangements. Pt given amio bolus in the ED. TTE showed no changes from prior   - Admitted to Cardiac ICU, S/p lidocaine gtt, S/p VT Ablation 2/13 with Licha   - Start amio 200 QDay (2/15)  - Hold home quinidine indefinitely, should not be necessary  - Hold home dapagliflozin   - Continue home metop succ 25mg qd  - Continue home atorva 40  - May benefit from device upgrade to CRTD vs LBBB pacing w/defib coil in the setting of sinus bradycardia requiring uptitration of BB and IVCD. EP will determine need for this in the outpatient setting    Cleared by EP for outpatient follow up   Disp/Discharge/med rec d/w Dr. Nuñez 72F with COPD, HTN, PAD, CAD s/p LAD stent, cardiac arrest s/p left-sided ICD (6/4/2019) complicated by infection and s/p R single-chamber ICD reimplantation ([place] in 9/23/2019), HFrEF (EF 45%), recent hospital stay for VF sp ablation c/b pericardial effusion and tamponade s/p pericardiocentesis and pericardial window and AFib, presenting with defibrillator firing.     Patient with recent history of supratherapeutic INR C/B GI bleed during recent admission, and had monomorphic VT s/p ICD shock, with planned future VT ablation by Dr. Hurt, discharged 2/2/2024 on Eliquis.  Patient states she had 3 episodes of defibrillation this evening between 7:30 PM until 12:30 AM.  Patient with dizziness prior to the episodes, but denies syncope.  Reports that she has been compliant with her medications, and has been feeling well prior, aside from 1 episode of loose stool yesterday. Denies any increased coughing, fevers, chills, chest pain, SOB, increased LE swelling.     In the ED patient was found to be hypokalemic and was given repletion. Electrophysiology was consulted and device interrogation showed:   "Interrogation for recent ICD shock   Remaining battery: 5.1y  Presented in NSR w/ rate in the 50-60s  ICD functioning appropriately w/ Vpaced <0.1%  Data reveals 4x ICD shocks (one on 2/8/24, one on 2/10/24 and two on 2/11/2024); EGM consistent w/ monomorphic VT w/ rate 150-230s, each episode unsuccessfully treated w/ ATP before shock; Additionally 1 episode of VT successfully terminated by ATP."    Hospital Course:  #  VT with ICD Shocks  Pt w/ known VT w/ ICD w/ recurrent VT and ICD shocks, on amio 200 qd and quinidine 325mg q8 (although Rx'ed for 200 q6) who presented w/ ICD shocks found to have 5 runs of VT in the past 2d, 1 of which was terminated by ATP the other 4 terminated by ICD shocks. Pt found to be in NSR w/ LBBB (chronic), w/ hypokalemia and no signs of gross volume overload. Suspect that hypokalemia is contributing to presentation as no other signs of ADHF (although pt does have small pleural effusion on CXR), new ischemia or other electrolyte/metabolic derangements. Pt given amio bolus in the ED. TTE showed no changes from prior   - Admitted to Cardiac ICU, S/p lidocaine gtt, S/p VT Ablation 2/13 with Licha   - Start amio 200 QDay (2/15)  - Hold home quinidine indefinitely, should not be necessary  - Hold home dapagliflozin   - Continue home metop succ 25mg qd  - Continue home atorva 40  - May benefit from device upgrade to CRTD vs LBBB pacing w/defib coil in the setting of sinus bradycardia requiring uptitration of BB and IVCD. EP will determine need for this in the outpatient setting    Cleared by EP for outpatient follow up   Disp/Discharge/med rec d/w Dr. Nuñez

## 2024-02-12 NOTE — PROGRESS NOTE ADULT - ASSESSMENT
====================ASSESSMENT ==============  73-year-old female with history of CAD, CHF, hypertension, hyperlipidemia, atrial fibrillation, VT s/p ICD, on amiodarone, who presents with defibrillator firing.  Patient with recent history of supratherapeutic INR C/B GI bleed during recent admission, and had monomorphic VT s/p ICD shock, with planned future VT ablation by Dr. Hurt, discharged 2/2/2024 on Eliquis; presenting after three episodes of VT at home found to be hypokalemic in the ED admitted for likely ablation.     Plan:  ====================== NEUROLOGY=====================  A&Ox3  - continue to monitor mental status as per protocol     ==================== RESPIRATORY======================  COPD  - does not use home O2  - budesonide inhaler BID  - albuterol PRN  - continue to monitor SpO2 with goal 88-94%    ====================CARDIOVASCULAR==================  VT with ICD  - originally planned for ablation on 2/13 as outpatient  - 4 shocks for VT at home on device interrogation (1x 2/8, 2x 2/10, 1x 2/11)  - lidocaine gtt @ 2  - hold home amiodarone and quinidine  - tentative ablation on Tuesday 2/13    Afib  - transitioned from warfarin to apixaban during recent admission  - full AC with heparin gtt iso upcoming procedure    CAD  CHF  - hx of LAD stent,  of RCA; [facility]/C 9/30/23 showing patent stent   - TTE 10/23: EF 55%, regional wall motion abnormalities.   - home meds: lasix 40 mg daily, dapagliflozin 10mg daily, metop XL 25 daily  - hold lasix iso hypokalemia  - holding dapagliflozin this week prior to procedure  - cont metop    HTN  - recently stopped losartan as outpatient  - monitor BP in ICU    HLD  - atorvastatin 40 daily    ===================HEMATOLOGIC/ONC ===================  CBC wnl   - Monitor H/H and plts  - DVT ppx: full AC for afib    ===================== RENAL =========================  SCr wnl   - Continue monitoring urine output, lytes, SCr/ BUN  - replete lytes prn with goal K >4 and Mg >2    Hypokalemia  - potassium repletion prn; hold lasix    ==================== GASTROINTESTINAL===================  Regular Diet as tolerated   - Monitor for regular BM while inpatient, bowel reg as needed     =======================    ENDOCRINE  =====================  No active issues  - Monitor BG daily on CMP with goal <180.    ========================INFECTIOUS DISEASE================  Afebrile, No leukocytosis   - monitor and trend WBC and temperature curve   ====================ASSESSMENT ==============  73-year-old female with history of CAD, CHF, hypertension, hyperlipidemia, atrial fibrillation, VT s/p ICD, on amiodarone, who presents with defibrillator firing.  Patient with recent history of supratherapeutic INR C/B GI bleed during recent admission, and had monomorphic VT s/p ICD shock, with planned future VT ablation by Dr. Hurt, discharged 2/2/2024 on Eliquis; presenting after three episodes of VT at home found to be hypokalemic in the ED admitted for likely ablation.     Plan:  ====================== NEUROLOGY=====================  A&Ox3  - continue to monitor mental status as per protocol     ==================== RESPIRATORY======================  COPD  - does not use home O2  - budesonide inhaler BID  - albuterol PRN  - continue to monitor SpO2 with goal 88-94%    ====================CARDIOVASCULAR==================  VT with ICD  - originally planned for ablation on 2/13 as outpatient  - 4 shocks for VT at home on device interrogation (1x 2/8, 2x 2/10, 1x 2/11)  - lidocaine gtt @ 1  - hold home amiodarone and quinidine  - tentative ablation on Tuesday 2/13    Afib  - transitioned from warfarin to apixaban during recent admission  - full AC with heparin gtt iso upcoming procedure  - hold for ablation    CAD  CHF  - hx of LAD stent,  of RCA; [facility]/C 9/30/23 showing patent stent   - TTE 10/23: EF 55%, regional wall motion abnormalities.   - home meds: lasix 40 mg daily, dapagliflozin 10mg daily, metop XL 25 daily  - held lasix iso hypokalemia  - holding dapagliflozin this week prior to procedure  - cont metop  - increased RR and rhonchi -> lasix 40 mg IV x1    HTN  - recently stopped losartan as outpatient  - monitor BP in ICU    HLD  - atorvastatin 40 daily    ===================HEMATOLOGIC/ONC ===================  CBC wnl   - Monitor H/H and plts  - DVT ppx: full AC for afib    ===================== RENAL =========================  SCr wnl   - Continue monitoring urine output, lytes, SCr/ BUN  - replete lytes prn with goal K >4 and Mg >2    Hypokalemia  - potassium repletion prn    ==================== GASTROINTESTINAL===================  Regular Diet as tolerated   - Monitor for regular BM while inpatient, bowel reg as needed     =======================    ENDOCRINE  =====================  No active issues  - Monitor BG daily on CMP with goal <180.    ========================INFECTIOUS DISEASE================  Afebrile, chronic leukocytosis  - monitor and trend WBC and temperature curve   ====================ASSESSMENT ==============  73-year-old female with history of CAD, CHF, hypertension, hyperlipidemia, atrial fibrillation, VT s/p ICD, on amiodarone, who presents with defibrillator firing.  Patient with recent history of supratherapeutic INR C/B GI bleed during recent admission, and had monomorphic VT s/p ICD shock, with planned future VT ablation by Dr. Hurt, discharged 2/2/2024 on Eliquis; presenting after three episodes of VT at home found to be hypokalemic in the ED admitted for likely ablation.     Plan:  ====================== NEUROLOGY=====================  A&Ox3  - continue to monitor mental status as per protocol     ==================== RESPIRATORY======================  COPD  - does not use home O2  - budesonide inhaler BID  - albuterol PRN  - continue to monitor SpO2 with goal 88-94%    ====================CARDIOVASCULAR==================  VT with ICD  - originally planned for ablation on 2/13 as outpatient  - 4 shocks for VT at home on device interrogation (1x 2/8, 2x 2/10, 1x 2/11)  - lidocaine gtt @ 1  - hold home amiodarone and quinidine  - tentative ablation on Tuesday 2/13    Afib  - transitioned from warfarin to apixaban during recent admission  - full AC with heparin gtt iso upcoming procedure  - hold for ablation    CAD  CHF  - hx of LAD stent,  of RCA; [facility]/C 9/30/23 showing patent stent   - TTE 10/23: EF 55%, regional wall motion abnormalities.   - home meds: lasix 40 mg daily, dapagliflozin 10mg daily, metop XL 25 daily  - held lasix iso hypokalemia  - holding dapagliflozin this week prior to procedure  - cont metop  - increased RR and rhonchi -> lasix 40 mg IV x1    HTN  - recently stopped losartan as outpatient  - monitor BP in ICU    HLD  - atorvastatin 40 daily    ===================HEMATOLOGIC/ONC ===================  CBC wnl   - Monitor H/H and plts  - DVT ppx: full AC for afib    ===================== RENAL =========================  SCr wnl   - Continue monitoring urine output, lytes, SCr/ BUN  - replete lytes prn with goal K >4 and Mg >2    Hypokalemia  - potassium repletion prn    ==================== GASTROINTESTINAL===================  Regular Diet as tolerated   - Monitor for regular BM while inpatient, bowel reg as needed     =======================    ENDOCRINE  =====================  Elevated TSH  -TSH 14 increased from prior at 11  -on amiodarone as outpatient  -f/u T3, free T4        - Monitor BG daily on CMP with goal <180.    ========================INFECTIOUS DISEASE================  Afebrile, chronic leukocytosis  - monitor and trend WBC and temperature curve

## 2024-02-12 NOTE — DISCHARGE NOTE PROVIDER - NSDCFUSCHEDAPPT_GEN_ALL_CORE_FT
Campbell Hurt  [facility] PreAdmits  Scheduled Appointment: 02/13/2024    Ioana Gaming  [facility] Physician [facility]  GASTRO [address]  Scheduled Appointment: 03/01/2024    [facility]  ELECTROPH [address]  Scheduled Appointment: 03/06/2024    Marcy Wise  [facility]  CARDIOLOGY [address]  Scheduled Appointment: 03/15/2024    Campbell Hurt  [facility]  ELECTROPH [address]  Scheduled Appointment: 03/28/2024     Ioana Gaming  [facility]  [address]STRO [address]  Scheduled Appointment: 03/01/2024    [facility]  ELECTROPH [address]  Scheduled Appointment: 03/06/2024    Marcy Wise  [facility]  CARDIOLOGY [address]  Scheduled Appointment: 03/15/2024    Campbell Hurt  [facility]  ELECTROPH [address]  Scheduled Appointment: 03/28/2024

## 2024-02-13 NOTE — PROGRESS NOTE ADULT - SUBJECTIVE AND OBJECTIVE BOX
AUSTIN LEE  MRN-[id number]  Patient is a 73y old  Female who presents with a chief complaint of Cardiac arrhythmia    HPI: 72F with COPD, HTN, PAD, CAD s/p LAD stent, cardiac arrest s/p left-sided ICD (2019) complicated by infection and s/p R single-chamber ICD reimplantation ([place] in 2019), HFrEF (EF 45%), recent hospital stay for VF sp ablation c/b pericardial effusion and tamponade s/p pericardiocentesis and pericardial window and AFib, presenting with defibrillator firing.     Patient with recent history of supratherapeutic INR C/B GI bleed during recent admission, and had monomorphic VT s/p ICD shock, with planned future VT ablation by Dr. Hurt, discharged 2024 on Eliquis.  Patient states she had 3 episodes of defibrillation this evening between 7:30 PM until 12:30 AM.  Patient with dizziness prior to the episodes, but denies syncope.  Reports that she has been compliant with her medications, and has been feeling well prior, aside from 1 episode of loose stool yesterday. Denies any increased coughing, fevers, chills, chest pain, SOB, increased LE swelling.     In the ED patient was found to be hypokalemic and was given repletion. Electrophysiology was consulted and device interrogation showed:   "Interrogation for recent ICD shock   Remaining battery: 5.1y  Presented in NSR w/ rate in the 50-60s  ICD functioning appropriately w/ Vpaced <0.1%  Data reveals 4x ICD shocks (one on 24, one on 2/10/24 and two on 2024); EGM consistent w/ monomorphic VT w/ rate 150-230s, each episode unsuccessfully treated w/ ATP before shock; Additionally 1 episode of VT successfully terminated by ATP."    Ultimately, EP recommended admission to the CICU on lidocaine gtt with plan for ablation procedure. Patient notes that she was originally scheduled to come here on Tuesday for ablation procedure.    (2024 08:59)    REVIEW OF SYSTEMS:  CONSTITUTIONAL: No weakness, fevers or chills  EYES/ENT: No visual changes;  No vertigo or throat pain   NECK: No pain or stiffness  RESPIRATORY: No cough, wheezing, hemoptysis; No shortness of breath  CARDIOVASCULAR: No chest pain or palpitations  GASTROINTESTINAL: No abdominal or epigastric pain. No nausea, vomiting, or hematemesis; No diarrhea or constipation. No melena or hematochezia.  GENITOURINARY: No dysuria, frequency or hematuria  NEUROLOGICAL: No numbness or weakness  SKIN: No itching, rashes    ICU Vital Signs Last 24 Hrs  T(C): 36.7 (2024 15:00), Max: 36.7 (2024 19:00)  T(F): 98.1 (2024 15:00), Max: 98.1 (2024 08:00)  HR: 62 (2024 18:26) (51 - 65)  BP: 129/56 (2024 18:00) (111/51 - 154/66)  BP(mean): 81 (2024 18:) (72 - 96)  ABP: 107/45 (2024 18:00) (67/33 - 107/45)  ABP(mean): 69 (2024 18:00) (47 - 69)  RR: 28 (2024 18:00) (18 - 28)  SpO2: 95% (:) (90% - 100%)    O2 Parameters below as of 2024 18:26  Patient On (Oxygen Delivery Method): room air      I&O's Summary    2024 07:01  -  2024 07:00  --------------------------------------------------------  IN: 910 mL / OUT: 1050 mL / NET: -140 mL    2024 07:01  -  2024 18:55  --------------------------------------------------------  IN: 207.5 mL / OUT: 1300 mL / NET: -1092.5 mL      PHYSICAL EXAM:  GENERAL: No acute distress, well-developed  HEAD:  Atraumatic, Normocephalic  EYES: EOMI, PERRLA, conjunctiva and sclera clear  NECK: Supple, no lymphadenopathy, no JVD  CHEST/LUNG: CTAB; No wheezes, rales, or rhonchi  HEART: Regular rate and rhythm. Normal S1/S2. No murmurs, rubs, or gallops  ABDOMEN: Soft, non-tender, non-distended; normal bowel sounds, no organomegaly  EXTREMITIES:  2+ peripheral pulses b/l, No clubbing, cyanosis, or edema  NEUROLOGY: A&O x 3, no focal deficits  SKIN: No rashes or lesions  ============================I/O===========================   I&O's Detail    2024 07:01  -  2024 07:00  --------------------------------------------------------  IN:    Heparin: 160 mL    Lidocaine: 120 mL    Oral Fluid: 630 mL  Total IN: 910 mL    OUT:    Blood Loss (mL): 350 mL    Voided (mL): 700 mL  Total OUT: 1050 mL    Total NET: -140 mL      2024 07:01  -  2024 18:55  --------------------------------------------------------  IN:    Lidocaine: 7.5 mL    Oral Fluid: 200 mL  Total IN: 207.5 mL    OUT:    Indwelling Catheter - Urethral (mL): 1300 mL  Total OUT: 1300 mL    Total NET: -1092.5 mL    ============================ LABS =========================                        9.9    11.41 )-----------( 324      ( 2024 00:47 )             31.1         137  |  104  |  18  ----------------------------<  111<H>  4.2   |  22  |  1.15    Ca    8.3<L>      2024 00:47  Phos  3.3       Mg     2.0         TPro  5.9<L>  /  Alb  3.0<L>  /  TBili  0.2  /  DBili  x   /  AST  11  /  ALT  13  /  AlkPhos  66      Troponin T, High Sensitivity Result: 31 ng/L (24 @ 04:53)  Troponin T, High Sensitivity Result: 35 ng/L (24 @ 02:40)      LIVER FUNCTIONS - ( 2024 00:47 )  Alb: 3.0 g/dL / Pro: 5.9 g/dL / ALK PHOS: 66 U/L / ALT: 13 U/L / AST: 11 U/L / GGT: x           PT/INR - ( 2024 00:48 )   PT: 13.4 sec;   INR: 1.29 ratio    PTT - ( 2024 00:48 )  PTT:64.2 sec    Lactate, Blood: 1.3 mmol/L (24 @ 03:59)  Blood Gas Venous - Lactate: 2.5 mmol/L (24 @ 06:17)  Blood Gas Venous - Lactate: 2.5 mmol/L (24 @ 02:33)    Urinalysis Basic - ( 2024 00:47 )    Color: x / Appearance: x / SG: x / pH: x  Gluc: 111 mg/dL / Ketone: x  / Bili: x / Urobili: x   Blood: x / Protein: x / Nitrite: x   Leuk Esterase: x / RBC: x / WBC x   Sq Epi: x / Non Sq Epi: x / Bacteria: x  ======================Micro/Rad/Cardio=================  Telemetry: Reviewed   EKG: Reviewed  CXR: Reviewed  Culture: Reviewed   Echo: Transthoracic Echocardiogram:   Patient name: AUSTIN LEE  YOB: 1950   Age: 68 (F)   MR#: [id number]  Study Date: 2019  Location: O/PSonographer: Sherri Cadet RDCS  Study quality: Technically difficult  Referring Physician: RICCARDO WOLF MD  Blood Pressure: 140/80 mmHg  Height: 157 cm  Weight: 86 kg  BSA: 1.9 m2  Heart Rate: 60 mmHg  ------------------------------------------------------------------------  PROCEDURE: Transthoracic echocardiogram with 2-D, M-Mode  and complete spectral and color flow Doppler.  INDICATION: Atherosclerotic heart disease of native  coronary artery without angina pectoris (I25.10)  ------------------------------------------------------------------------  Dimensions:    Normal Values:  LA:     4.6    2.0 - 4.0 cm  Ao: 3.1    2.0 - 3.8 cm  SEPTUM: 1.4    0.6 - 1.2 cm  PWT:    1.3    0.6 - 1.1 cm  LVIDd:  6.4    3.0 - 5.6 cm  LVIDs:  4.3    1.8 - 4.0 cm  Derived variables:  LVMI: 219 g/m2  RWT: 0.40  Fractional short: 33 %  EF (Ahn Rule): 33 %Doppler Peak Velocity (m/sec):  AoV=1.2  ------------------------------------------------------------------------  Observations:  Mitral Valve: Mitral annular calcification and calcified  mitral leaflets with decreased diastolic opening. Mild  mitral regurgitation.  Peak mitral valve gradient equals 13  mm Hg, mean transmitral valve gradient equals 4 mm Hg,  consistent with mild mitral stenosis. Gradient measured at  a HR of 81bpm.  Aortic Valve/Aorta: Aortic valve not well visualized;  probably normal. Peak transaortic valve gradient equals 6  mm Hg, estimated aortic valve area equals 2.1 sqcm. Minimal  aortic regurgitation.  Peak left ventricular outflow tract  gradient equals 2 mm Hg.  Aortic Root: 3.1 cm.  Left Atrium: Normal left atrium.  LA volume index =31  cc/m2.  Left Ventricle: Severe segmental left ventricular systolic  dysfunction. Endocardial visualization enhanced with  intravenous injection of Ultrasonic Enhancing Agent  (Definity). The mid inferolateral wall, the basal  inferolateral wall, the basal inferior wall, the basal  anterolateral wall, the mid inferior wall, the mid  anterolateral wall, the mid inferoseptum, and the basal  inferoseptum are hypokinetic.  Moderate concentric left  ventricular hypertrophy. Moderate diastolic dysfunction  (Stage II).  Right Heart: Normal right atrium. Normal right ventricular  size and function. Normal tricuspid valve. Mild tricuspid  regurgitation. Normal pulmonic valve. Mild pulmonic  regurgitation.  Pericardium/Pleura: Normal pericardium with no pericardial  effusion.  Hemodynamic: Estimated right ventricular systolic pressure  equals 28 mm Hg, assuming right atrial pressure equals 3 mm  Hg, consistent with normal pulmonary pressures. Color  Doppler demonstrates no evidence of a patent foramen ovale.  ------------------------------------------------------------------------  Conclusions:  1. Mitral annular calcification and calcified mitral  leaflets with decreased diastolic opening. Mild mitral  regurgitation.  Peak mitral valve gradient equals 13 mm Hg,  mean transmitral valve gradient equals 4 mm Hg, consistent  with mild mitral stenosis. Gradient measured at a HR of  81bpm.  2. Aortic valve not well visualized; probably normal.  Minimal aortic regurgitation.  3. Moderate concentric left ventricular hypertrophy.  4. Severe segmental left ventricular systolic dysfunction.  Endocardial visualization enhanced with intravenous  injection of Ultrasonic Enhancing Agent (Definity). The mid  inferolateral wall, the basal  inferolateral wall, the  basal inferior wall, the basal anterolateral wall, the mid  inferior wall, the mid anterolateral wall, the mid  inferoseptum, and the basal inferoseptum are hypokinetic.  5. Moderate diastolic dysfunction (Stage II).  6. Normal right ventricular size and function.    Cath: Cardiac Cath Lab - Adult:   [facility]  Department of Cardiology  [address] [place] [address]  [phone]  Cath Lab Report -- Comprehensive Report  Patient: AUSTIN LEE  Study date: 2015  Account number: [id number]  MR number: [id number]  : 1950  Gender: Female  Race: W  Case Physician(s):  Mo Izquierdo M.D.  Fellow:  СЕРГЕЙ Boogie M.D.  Referring Physician:  INDICATIONS: Angina/MI: unstable angina.  HISTORY: The patient has a history of coronary artery disease. The patient  has hypertension and medication-treated dyslipidemia.  PROCEDURE:  --  Intervention on mid LAD: drug-eluting stent.  TECHNIQUE: The risks and alternatives of the procedures and conscious  sedation were explained to the patient and informed consent was obtained.  Cardiac catheterization performed urgently. Coronary intervention  performed electively.  Local anesthetic given. Right radial artery access. A PRELUDE KIT was  inserted in the vessel. Right femoral artery access. A 6FR SHEATH PINNACLE  was inserted in the vessel. RADIATION EXPOSURE: 6.9 min. A successful  drug-eluting stent was performed on the 80 % lesion in the mid LAD.  Following intervention there was a 1 % residual stenosis. According to the  ACC/AHA classification system, this lesion was a type B2 lesion. There was  VELASQUEZ 3 flow after the procedure. Vessel setup was performed. A 6FR JL4  LAUNCHER guiding catheter was used to intubate the vessel. Vessel setup  was performed. A JASE [id number] wire was used to cross the lesion. A 3.50  X 20 PROMUS PREMIER drug-eluting stent was placed across the lesion and  deployed at a maximum inflation pressure of 20 gianna. Balloon angioplasty  was performed, using a 4.00 X 12 EUPHORA balloon, with 1 inflations and a  maximum inflation pressure of 12 gianna.  CONTRAST GIVEN: Omnipaque 85 ml.  MEDICATIONS GIVEN: Midazolam, 0.5 mg, IV. Fentanyl, 25 mcg, IV. Verapamil  (Isoptin, Calan, Covera), 2.5 mg, IA. Nitroglycerin, 300 mcg,  intracoronary. Labetalol (Trandate), 20 mg, IV. Heparin, 8000 units, IA.  CORONARY VESSELS:  LAD:   --  Mid LAD: There was a 80 % stenosis.  COMPLICATIONS: There were no complications.  INTERVENTIONAL RECOMMENDATIONS: ASA and Plavix for 1 year  Prepared and signed by  Mo Izquierdo M.D.  Signed 2015 11:02:43  HEMODYNAMIC TABLES  Pressures:  Intervention  Pressures:  - HR: 102  Pressures:  - Rhythm:  Pressures:  -- Aortic Pressure (S/D/M): 183/67/114  Outputs:  Baseline/ Room Air  Outputs:  -- CALCULATIONS[age]  Outputs:  -- CALCULATIONS: Body Surface Area: 2.03  Outputs:  -- CALCULATIONS: Height in cm: 160.00  Outputs:  -- CALCULATIONS: Sex: Female  Outputs:  -- CALCULATIONS: Weight in k.10 (01-23-15 @ 18:24)  Cardiac Cath Lab - Adult:   [facility]  Department of Cardiology  [address] [place] [address]  [phone]  Cath Lab Report -- Comprehensive Report  Patient: AUSTIN LEE  Study date: 2015  Account number: [id number]  MR number: [id number]  : 1950  Gender: Female  Race: W  Case Physician(s):  Mo Izquierdo M.D.  Fellow:  Sanjuana Campos M.D.  Referring Physician:  lAok Price M.D.  INDICATIONS: Angina/MI: unstable angina.  HISTORY: There was no prior cardiac history. The patient has hypertension,  medication-treated dyslipidemia, and morbid obesity.  PROCEDURE:  --  Left heart catheterization with ventriculography.  --  Left coronary angiography.  --  Right coronary angiography.  TECHNIQUE: The risks and alternatives of the procedures and conscious  sedation were explained to the patient and informed consent was obtained.  Cardiac catheterization performed electively.  Local anesthetic given. Right femoral artery access. A 5FR SHEATH PINNACLE  was inserted in the vessel. Left heart catheterization. Ventriculography  was performed. A 5FR  EXPO catheter was utilized. Left coronary  artery angiography. The vessel was injected utilizing a 5FR FL4.0 EXPO  catheter. Right coronary artery angiography. The vessel was injected  utilizing a 5FR FR4.0 EXPO catheter. RADIATION EXPOSURE: 1.6 min.  CONTRAST GIVEN: Visipaque 102 ml.  MEDICATIONS GIVEN: Fentanyl, 25 mcg, IV. Midazolam, 1 mg, IV. Labetalol  (Trandate), 20 mg, IV. Nitroglycerin, 300 mcg, IA. Labetalol (Trandate),  20 mg, IV.  VENTRICLES: Global left ventricular function was moderately depressed. EF  estimated was 40 %.  CORONARY VESSELS: The coronary circulation is right dominant.  LM:   --  Distal left main: There was a 20 % stenosis.  LAD:--  Proximal LAD: There was a 80 % stenosis.  CX:   --  Distal circumflex: Angiography showed mild atherosclerosis with  no flow limiting lesions.  RCA:   --  Mid RCA: There was a 100 % stenosis.  COMPLICATIONS: There were no complications.  DIAGNOSTIC RECOMMENDATIONS: Plan PCI of the LAD tomorrow. Pt with right  groin hematoma which is reason for delay in PCI  Prepared and signed by  Mo Izquierdo M.D.  Signed 2015 11:00:32  HEMODYNAMIC TABLES  Pressures:  Baseline/ Room Air  Pressures:  -HR: 85  Pressures:  - Rhythm:  Pressures:  -- Aortic Pressure (S/D/M): 210/77/128  Pressures:  -- Left Ventricle (s/edp): 209/25/--  Outputs:  Baseline/ Room Air  Outputs:  -- CALCULATIONS[age]  Outputs:  -- CALCULATIONS: Body SurfaceArea: 2.03  Outputs:  -- CALCULATIONS: Height in cm: 160.00  Outputs:  -- CALCULATIONS: Sex: Female  Outputs:  -- CALCULATIONS: Weight in k.10  Outputs:  -- OUTPUTS: O2 consumption: 272.02  Outputs:  -- OUTPUTS: Vo2 Indexed: 133.79 (01-22-15 @ 17:26)  ======================================================  PAST MEDICAL & SURGICAL HISTORY:  Obese      Smoker      HTN (hypertension)      HLD (hyperlipidemia)      CAD (coronary artery disease)      CHF with cardiomyopathy      History of hip surgery    A/P: A/P: 73-year-old female with history of CAD, CHF, hypertension, hyperlipidemia, atrial fibrillation, VT s/p ICD, on amiodarone, who presents with defibrillator firing.  Patient with recent history of supratherapeutic INR C/B GI bleed during recent admission, and had monomorphic VT s/p ICD shock, with planned future VT ablation by Dr. Hurt, discharged 2024 on Eliquis; presenting after three episodes of VT at home found to be hypokalemic in the ED admitted for likely ablation.     Plan:  ====================== NEUROLOGY=====================  A&Ox3  - continue to monitor mental status as per protocol     ==================== RESPIRATORY======================  COPD  - does not use home O2  - budesonide inhaler BID  - albuterol PRN  - continue to monitor SpO2 with goal 88-94%    ====================CARDIOVASCULAR==================  VT with ICD  - originally planned for ablation on  as outpatient  - 4 shocks for VT at home on device interrogation (1x 2/8, 2x 2/10, 1x 2/11)  - lidocaine gtt @ 1  - hold home amiodarone and quinidine  - NPO after MN: VT ablation, hold Hep gtt 5AM    Afib  - transitioned from warfarin to apixaban during recent admission  - full AC with heparin gtt iso upcoming procedure  - hold for ablation    CAD  CHF  - hx of LAD stent,  of RCA; C/RHC 23 showing patent stent   - TTE 10/23: EF 55%, regional wall motion abnormalities.   - home meds: lasix 40 mg daily, dapagliflozin 10mg daily, metop XL 25 daily  - held lasix iso hypokalemia  - holding dapagliflozin this week prior to procedure  - cont metop  - increased RR and rhonchi -> lasix 40 mg IV x1    HTN  - recently stopped losartan as outpatient  - monitor BP in ICU    HLD  - atorvastatin 40 daily    ===================HEMATOLOGIC/ONC ===================  CBC wnl   - Monitor H/H and plts  - DVT ppx: full AC for afib    ===================== RENAL =========================  SCr wnl   - Continue monitoring urine output, lytes, SCr/ BUN  - replete lytes prn with goal K >4 and Mg >2    Hypokalemia  - potassium repletion prn    ==================== GASTROINTESTINAL===================  Regular Diet as tolerated   - Monitor for regular BM while inpatient, bowel reg as needed     =======================    ENDOCRINE  =====================  Elevated TSH  -TSH 14 increased from prior at 11  -on amiodarone as outpatient  -f/u T3, free T4    - Monitor BG daily on CMP with goal <180.    ========================INFECTIOUS DISEASE================  Afebrile, chronic leukocytosis  - monitor and trend WBC and temperature curve    ======================= DISPOSITION  =====================  [X] Critical   [ ] Guarded    [ ] Stable    [X] Maintain in CICU  [ ] Downgrade to Telemetry  [ ] Discharge Home    Patient requires continuous monitoring with bedside rhythm monitoring, pulse ox monitoring, and intermittent blood gas analysis. Care plan discussed with ICU care team. Patient remained critical and at risk for life threatening decompensation.  Patient seen, examined and plan discussed with CCU team during rounds.     I have personally provided 30 minutes of critical care time excluding time spent on separate procedures, in addition to initial critical care time provided by the CICU Attending, Dr. Todd Dahl [facility] [phone]

## 2024-02-13 NOTE — DIETITIAN INITIAL EVALUATION ADULT - REASON INDICATOR FOR ASSESSMENT
Pt seen for length of stay assessment in CICU.  Source of information: medical record, pt. Chart reviewed, events noted.

## 2024-02-13 NOTE — DIETITIAN INITIAL EVALUATION ADULT - PERTINENT LABORATORY DATA
02-13    137  |  104  |  18  ----------------------------<  111<H>  4.2   |  22  |  1.15    Ca    8.3<L>      13 Feb 2024 00:47  Phos  3.3     02-13  Mg     2.0     02-13    TPro  5.9<L>  /  Alb  3.0<L>  /  TBili  0.2  /  DBili  x   /  AST  11  /  ALT  13  /  AlkPhos  66  02-13  A1C with Estimated Average Glucose Result: 5.9 % (02-12-24 @ 03:59)  A1C with Estimated Average Glucose Result: 6.4 % (10-29-23 @ 00:28)  A1C with Estimated Average Glucose Result: 6.7 % (08-28-23 @ 07:18)

## 2024-02-13 NOTE — DIETITIAN INITIAL EVALUATION ADULT - ORAL INTAKE PTA/DIET HISTORY
Pt reports decreased PO intake for 1 month PTA, but also in general since last year due to not feeling well in setting of her cardiac conditions. Denies following any therapeutic diet PTA.  Confirms NKFA.

## 2024-02-13 NOTE — DIETITIAN INITIAL EVALUATION ADULT - NS FNS DIET ORDER
Diet, NPO after Midnight:      NPO Start Date: 12-Feb-2024,   NPO Start Time: 23:59  Except Medications (02-12-24 @ 11:21) [Active]  Diet, DASH/TLC:   Sodium & Cholesterol Restricted (02-11-24 @ 09:01) [Active]

## 2024-02-13 NOTE — PROGRESS NOTE ADULT - SUBJECTIVE AND OBJECTIVE BOX
ID: 72 yo woman w/ hx of CAD s/p PCI LM-LAD and  of RCA w/ ICM HFpEF (58%), prev L chest wall ICD placed in 2019 c/b infx and removed s/p R chest wall medtronic single chamber ICD in 2019 w/ recurrent VT (w/ attempted VT ablation that was aborted iso pericardial effusion s/p drain and pericardial window), AFib s/p DCCV on apixaban, COPD, HTN, HLD, PVD who presented to the ED w/ ICD shocks.    Patient seen and examined at bedside.    Overnight Events: NAEON. Pt pending VT ablation today. Lido was still running at 8 am, still ok for case per Dr. Hurt     Telemetry: NSR IVCD    Review of Systems: Negative except as per HPI     Current Meds:  atorvastatin 40 milliGRAM(s) Oral at bedtime  budesonide 160 MICROgram(s)/formoterol 4.5 MICROgram(s) Inhaler 2 Puff(s) Inhalation two times a day  chlorhexidine 2% Cloths 1 Application(s) Topical daily  metoprolol succinate ER 25 milliGRAM(s) Oral daily  pantoprazole    Tablet 40 milliGRAM(s) Oral before breakfast      Vitals:  T(F): 98.1 (02-13), Max: 98.1 (02-13)  HR: 56 (02-13) (51 - 68)  BP: 154/66 (02-13) (107/61 - 154/66)  RR: 21 (02-13)  SpO2: 93% (02-13)  I&O's Summary    12 Feb 2024 07:01  -  13 Feb 2024 07:00  --------------------------------------------------------  IN: 910 mL / OUT: 1050 mL / NET: -140 mL    13 Feb 2024 07:01  -  13 Feb 2024 10:07  --------------------------------------------------------  IN: 7.5 mL / OUT: 0 mL / NET: 7.5 mL        Physical Exam:  Appearance: No acute distress; well appearing  Eyes: PERRL, EOMI, pink conjunctiva  HEENT: Normal oral mucosa  Cardiovascular: RRR, S1, S2, no murmurs, rubs, or gallops; no edema; no JVD  Respiratory: Clear to auscultation bilaterally  Gastrointestinal: soft, non-tender, non-distended with normal bowel sounds  Musculoskeletal: No clubbing; no joint deformity   Neurologic: Non-focal  Lymphatic: No lymphadenopathy  Psychiatry: AAOx3, mood & affect appropriate  Skin: No rashes, ecchymoses, or cyanosis                          9.9    11.41 )-----------( 324      ( 13 Feb 2024 00:47 )             31.1     02-13    137  |  104  |  18  ----------------------------<  111<H>  4.2   |  22  |  1.15    Ca    8.3<L>      13 Feb 2024 00:47  Phos  3.3     02-13  Mg     2.0     02-13    TPro  5.9<L>  /  Alb  3.0<L>  /  TBili  0.2  /  DBili  x   /  AST  11  /  ALT  13  /  AlkPhos  66  02-13    PT/INR - ( 13 Feb 2024 00:48 )   PT: 13.4 sec;   INR: 1.29 ratio         PTT - ( 13 Feb 2024 00:48 )  PTT:64.2 sec  CARDIAC MARKERS ( 11 Feb 2024 04:53 )  31 ng/L / x     / x     / x     / x     / x      CARDIAC MARKERS ( 11 Feb 2024 02:40 )  35 ng/L / x     / x     / x     / x     / x          2/11 TTE  CONCLUSIONS:      1. Left ventricular cavity is moderately dilated. Left ventricular systolic function is normal with an ejection fraction of 58 % by Ahn's method of disks. Regional wall motion abnormalities present.   2. Basal inferoseptal segment, mid inferolateral segment, and basal inferior segment are abnormal.   3. Mildly enlarged right ventricular cavity size, wall thickness, and normal systolic function. Tricuspid annular plane systolic excursion (TAPSE) is 2.3 cm (normal >=1.7 cm).   4. The left atrium is severely dilated.   5. The right atrium is severely dilated.   6. No pericardial effusion seen.   7. Compared to the transthoracic echocardiogram performed on 10/5/2023, there have been no significant interval changes.    CTA AP  IMPRESSION:  No aortic dissection or aortic intramural hematoma.    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    Increased groundglass opacity in the periphery of the left upper lobe   which may be infectious or inflammatory in etiology.    --- End of Report ---      A/P  72 yo woman w/ hx of CAD s/p PCI ( of RCA) w/ iCMY (pEF), prev L chest wall ICD placed in 2019 c/b infx and removed s/p R chest wall medtronic single chamber ICD in 2019 w/ recurrent VT (w/ attempted VT ablation that was aborted iso pericardial effusion s/p drain and pericardial window), AFib s/p DCCV on apixaban, COPD, HTN, HLD, PVD who presented to the ED w/ ICD shocks.    #ICD Shocks  #CAD / iCMY  #VT  #AFib  #HTN  #HLD  Pt w/ know VT w/ ICD w/ recurrent VT and ICD shocks, on amio 200 qd and quinidine 325mg q8 (although Rx'ed for 200 q6) who presented w/ ICD shocks found to have 5 runs of VT in the past 2d, 1 of which was terminated by ATP the other 4 terminated by ICD shocks. Pt found to be in NSR w/ LBBB (chronic), w/ hypokalemia and no signs of gross volume overload. Suspect that hypokalemia is contributing to presentation as no other signs of ADHF (although pt does have small pleural effusion on CXR), new ischemia or other electrolye/metabolic derangements. Hgb is now improved from prior and pt reports not missing any home medications. Pt given amio bolus in the ED. Plan to admit and monitor with possible VT ablation    Recommendations:  - TTE completed no changes from prior  - S/p lido gtt    > VT Ablation today   - Holding home quinidine and off amio  - Continue home metop succ 25mg qd  - Continue home atorva 40  - Hold home dapagliflozin  - K >4; Mg >2      Jc Dunn PGY5  Cardiology Fellow    [facility]

## 2024-02-13 NOTE — DIETITIAN INITIAL EVALUATION ADULT - ADD RECOMMEND
1) Continue DASH diet.   2) Recommend Ensure Plus High Protein 1x/day to help optimize protein-energy intake.  3) Monitor PO intake, GI tolerance, skin integrity, labs, weight, and bowel movement regularity.   4) Honor food preferences as feasible. Assist with meals PRN and encourage PO intake.  5) RD remains available upon request and will follow-up per protocol.

## 2024-02-13 NOTE — PROGRESS NOTE ADULT - SUBJECTIVE AND OBJECTIVE BOX
Patient is a 73y old  Female who presents with a chief complaint of AICD discharge     DATE OF SERVICE: 02-13-24     SUBJECTIVE / OVERNIGHT EVENTS: overnight events noted    ROS:  Resp: No cough no sputum production  CVS: No chest pain no palpitations no orthopnea  GI: no N/V/D  : no dysuria, no hematuria        MEDICATIONS  (STANDING):  apixaban 5 milliGRAM(s) Oral two times a day  atorvastatin 40 milliGRAM(s) Oral at bedtime  budesonide 160 MICROgram(s)/formoterol 4.5 MICROgram(s) Inhaler 2 Puff(s) Inhalation two times a day  chlorhexidine 2% Cloths 1 Application(s) Topical daily  metoprolol succinate ER 25 milliGRAM(s) Oral daily  nystatin Powder 1 Application(s) Topical two times a day  pantoprazole    Tablet 40 milliGRAM(s) Oral before breakfast    MEDICATIONS  (PRN):  acetaminophen     Tablet .. 650 milliGRAM(s) Oral every 6 hours PRN Temp greater or equal to 38C (100.4F), Mild Pain (1 - 3)  aluminum hydroxide/magnesium hydroxide/simethicone Suspension 30 milliLiter(s) Oral every 4 hours PRN Dyspepsia  melatonin 3 milliGRAM(s) Oral at bedtime PRN Insomnia  ondansetron Injectable 4 milliGRAM(s) IV Push every 8 hours PRN Nausea and/or Vomiting        Vitals:  T(F): 98.1 (02-13), Max: 98.1 (02-13)  HR: 56 (02-13) (51 - 68)  BP: 154/66 (02-13) (107/61 - 154/66)  RR: 21 (02-13)  SpO2: 93% (02-13)  I&O's Summary    12 Feb 2024 07:01  -  13 Feb 2024 07:00  --------------------------------------------------------  IN: 910 mL / OUT: 1050 mL / NET: -140 mL    13 Feb 2024 07:01  -  13 Feb 2024 10:07  --------------------------------------------------------  IN: 7.5 mL / OUT: 0 mL / NET: 7.5 mL      PHYSICAL EXAM:  EYES: EOMI, PERRLA  NECK: Supple, No JVD  CHEST/LUNG: clear   HEART: S1 S2; no murmurs   ABDOMEN: Soft, Nontender  EXTREMITIES:   no edema  NEUROLOGY: AO x 3 non-focal                          9.9    11.41 )-----------( 324      ( 13 Feb 2024 00:47 )             31.1     02-13    137  |  104  |  18  ----------------------------<  111<H>  4.2   |  22  |  1.15    Ca    8.3<L>      13 Feb 2024 00:47  Phos  3.3     02-13  Mg     2.0     02-13    TPro  5.9<L>  /  Alb  3.0<L>  /  TBili  0.2  /  DBili  x   /  AST  11  /  ALT  13  /  AlkPhos  66  02-13    PT/INR - ( 13 Feb 2024 00:48 )   PT: 13.4 sec;   INR: 1.29 ratio         PTT - ( 13 Feb 2024 00:48 )  PTT:64.2 sec  CARDIAC MARKERS ( 11 Feb 2024 04:53 )  31 ng/L / x     / x     / x     / x     / x      CARDIAC MARKERS ( 11 Feb 2024 02:40 )  35 ng/L / x     / x     / x     / x     / x              All consultant(s) notes reviewed and care discussed with other providers        Contact Number, Dr Nuñez [phone]

## 2024-02-13 NOTE — DIETITIAN INITIAL EVALUATION ADULT - PERSON TAUGHT/METHOD
Discussed importance of adequate protein-energy consumption to meet estimated nutrient needs. Encouraged intake of meals as tolerated. Encouraged intake of protein-rich foods first at mealtimes to help preserve lean muscle mass. Reviewed food choices that are high in protein. Also encouraged pt to limit overall sodium intake upon discharge. Discussed sources of sodium in the diet and encouraged intake of low sodium foods. Pt noted with good comprehension and made aware RD remains available for further questions/concerns./verbal instruction/patient instructed

## 2024-02-13 NOTE — DIETITIAN INITIAL EVALUATION ADULT - REASON FOR ADMISSION
Cardiac arrhythmia    Per chart, pt is a 73-year-old female with history of CAD, CHF, hypertension, hyperlipidemia, atrial fibrillation, VT s/p ICD, on amiodarone, who presents with defibrillator firing.  Patient with recent history of supratherapeutic INR C/B GI bleed during recent admission, and had monomorphic VT s/p ICD shock, with planned future VT ablation by Dr. Hurt, discharged 2/2/2024 on Eliquis; presenting after three episodes of VT at home found to be hypokalemic in the ED admitted for likely ablation.

## 2024-02-13 NOTE — DIETITIAN INITIAL EVALUATION ADULT - OTHER INFO
Reports  lbs. Endorses wt loss in the past year due to reduced appetite in setting of her cardiac conditions.  Dosing wt: 172.4 lbs (02-12)  Wt history per chart: 175.8 lbs (01-28), 179 lbs (11/30/23), 188 lbs (10/28/23), 190 lbs (09/14/23), 206 lbs (07/08/23). 8.3% wt loss x 3.5 months - clinicaly significant. RD to continue to monitor weight trends as able/available.

## 2024-02-13 NOTE — PRE-ANESTHESIA EVALUATION ADULT - NSANTHPMHFT_GEN_ALL_CORE
72F with COPD, HTN, PAD, CAD s/p LAD stent, cardiac arrest s/p left-sided ICD (6/4/2019) complicated by infection and s/p R single-chamber ICD reimplantation ([facility] in 9/23/2019), HFrEF (EF 45%), recent hospital stay for VF sp ablation c/b pericardial effusion and tamponade s/p pericardiocentesis and pericardial window and AFib, presenting with defibrillator firing.

## 2024-02-13 NOTE — PROGRESS NOTE ADULT - ASSESSMENT
73-year-old female with history of CAD, CHF, hypertension, hyperlipidemia, atrial fibrillation, VT s/p ICD, on amiodarone, who presents with defibrillator firing.  Patient with recent history of supratherapeutic INR C/B GI bleed during recent admission, and had monomorphic VT s/p ICD shock, with planned future VT ablation by Dr. Hutr, discharged 2/2/2024 on Eliquis; presenting after three episodes of VT at home found to be hypokalemic in the ED admitted for likely ablation.     Plan:  ====================== NEUROLOGY=====================  A&Ox3  - continue to monitor mental status as per protocol     ==================== RESPIRATORY======================  COPD  - does not use home O2  - budesonide inhaler BID  - albuterol PRN  - continue to monitor SpO2 with goal 88-94%    ====================CARDIOVASCULAR==================  VT with ICD  - originally planned for ablation on 2/13 as outpatient  - 4 shocks for VT at home on device interrogation (1x 2/8, 2x 2/10, 1x 2/11)  - lidocaine gtt @ 1  - hold home amiodarone and quinidine  - NPO after MN: VT ablation, hold Hep gtt 5AM    Afib  - transitioned from warfarin to apixaban during recent admission  - full AC with heparin gtt iso upcoming procedure  - hold for ablation    CAD  CHF  - hx of LAD stent,  of RCA; [facility]/RHC 9/30/23 showing patent stent   - TTE 10/23: EF 55%, regional wall motion abnormalities.   - home meds: lasix 40 mg daily, dapagliflozin 10mg daily, metop XL 25 daily  - held lasix iso hypokalemia  - holding dapagliflozin this week prior to procedure  - cont metop  - increased RR and rhonchi -> lasix 40 mg IV x1    HTN  - recently stopped losartan as outpatient  - monitor BP in ICU    HLD  - atorvastatin 40 daily    ===================HEMATOLOGIC/ONC ===================  CBC wnl   - Monitor H/H and plts  - DVT ppx: full AC for afib    ===================== RENAL =========================  SCr wnl   - Continue monitoring urine output, lytes, SCr/ BUN  - replete lytes prn with goal K >4 and Mg >2    Hypokalemia  - potassium repletion prn    ==================== GASTROINTESTINAL===================  Regular Diet as tolerated   - Monitor for regular BM while inpatient, bowel reg as needed     =======================    ENDOCRINE  =====================  Elevated TSH  -TSH 14 increased from prior at 11  -on amiodarone as outpatient  -f/u T3, free T4    - Monitor BG daily on CMP with goal <180.    ========================INFECTIOUS DISEASE================  Afebrile, chronic leukocytosis  - monitor and trend WBC and temperature curve    ======================= DISPOSITION  =====================  [X] Critical   [ ] Guarded    [ ] Stable    [X] Maintain in CICU  [ ] Downgrade to Telemetry  [ ] Discharge Home   73-year-old female with history of CAD, CHF, hypertension, hyperlipidemia, atrial fibrillation, VT s/p ICD, on amiodarone, who presents with defibrillator firing.  Patient with recent history of supratherapeutic INR C/B GI bleed during recent admission, and had monomorphic VT s/p ICD shock, with planned future VT ablation by Dr. Hurt, discharged 2/2/2024 on Eliquis; presenting after three episodes of VT at home found to be hypokalemic in the ED admitted for likely ablation.     Plan:  ====================== NEUROLOGY=====================  A&Ox3  - continue to monitor mental status as per protocol     ==================== RESPIRATORY======================  COPD  - does not use home O2  - budesonide inhaler BID  - albuterol PRN  - continue to monitor SpO2 with goal 88-94%    ====================CARDIOVASCULAR==================  VT with ICD  - originally planned for ablation on 2/13 as outpatient  - 4 shocks for VT at home on device interrogation (1x 2/8, 2x 2/10, 1x 2/11)  - lidocaine gtt @ 1  - hold home amiodarone and quinidine  - VT ablation today, hold Hep gtt 5AM    Afib  - transitioned from warfarin to apixaban during recent admission  - full AC with heparin gtt iso upcoming procedure  - hold for ablation    CAD  CHF  - hx of LAD stent,  of RCA; C/RHC 9/30/23 showing patent stent   - TTE 10/23: EF 55%, regional wall motion abnormalities.   - home meds: lasix 40 mg daily, dapagliflozin 10mg daily, metop XL 25 daily  - held lasix iso hypokalemia  - holding dapagliflozin this week prior to procedure  - cont metop  - increased RR and rhonchi -> lasix 40 mg IV x1    HTN  - recently stopped losartan as outpatient  - monitor BP in ICU    HLD  - atorvastatin 40 daily    ===================HEMATOLOGIC/ONC ===================  CBC wnl   - Monitor H/H and plts  - DVT ppx: full AC for afib    ===================== RENAL =========================  SCr wnl   - Continue monitoring urine output, lytes, SCr/ BUN  - replete lytes prn with goal K >4 and Mg >2    Hypokalemia  - potassium repletion prn    ==================== GASTROINTESTINAL===================  Regular Diet as tolerated   - Monitor for regular BM while inpatient, bowel reg as needed     =======================    ENDOCRINE  =====================  Elevated TSH  -TSH 14 increased from prior at 11  -on amiodarone as outpatient  -T3 and T4 WNL      - Monitor BG daily on CMP with goal <180.    ========================INFECTIOUS DISEASE================  Afebrile, chronic leukocytosis  - monitor and trend WBC and temperature curve    ======================= DISPOSITION  =====================  [X] Critical   [ ] Guarded    [ ] Stable    [X] Maintain in CICU  [ ] Downgrade to Telemetry  [ ] Discharge Home   73-year-old female with history of CAD, CHF, hypertension, hyperlipidemia, atrial fibrillation, VT s/p ICD, on amiodarone, who presents with defibrillator firing.  Patient with recent history of supratherapeutic INR C/B GI bleed during recent admission, and had monomorphic VT s/p ICD shock, with planned future VT ablation by Dr. Hurt, discharged 2/2/2024 on Eliquis; presenting after three episodes of VT at home found to be hypokalemic in the ED admitted for likely ablation.     Plan:  ====================== NEUROLOGY=====================  A&Ox3  - continue to monitor mental status as per protocol     ==================== RESPIRATORY======================  COPD  - does not use home O2  - budesonide inhaler BID  - albuterol PRN  - continue to monitor SpO2 with goal 88-94%    ====================CARDIOVASCULAR==================  VT with ICD  - originally planned for ablation on 2/13 as outpatient  - 4 shocks for VT at home on device interrogation (1x 2/8, 2x 2/10, 1x 2/11)  - lidocaine gtt @ 1 -> d/c  - hold home amiodarone and quinidine  - VT ablation today, hold Hep gtt 5AM    Afib  - transitioned from warfarin to apixaban during recent admission  - full AC with heparin gtt iso upcoming procedure  - hold for ablation    CAD  CHF  - hx of LAD stent,  of RCA; C/RHC 9/30/23 showing patent stent   - TTE 10/23: EF 55%, regional wall motion abnormalities.   - home meds: lasix 40 mg daily, dapagliflozin 10mg daily, metop XL 25 daily  - held lasix iso hypokalemia  - holding dapagliflozin this week prior to procedure  - cont metop  - increased RR and rhonchi -> lasix 40 mg IV x1    HTN  - recently stopped losartan as outpatient  - monitor BP in ICU    HLD  - atorvastatin 40 daily    ===================HEMATOLOGIC/ONC ===================  CBC wnl   - Monitor H/H and plts  - DVT ppx: full AC for afib    ===================== RENAL =========================  SCr wnl   - Continue monitoring urine output, lytes, SCr/ BUN  - replete lytes prn with goal K >4 and Mg >2    Hypokalemia  - potassium repletion prn    ==================== GASTROINTESTINAL===================  Regular Diet as tolerated   - Monitor for regular BM while inpatient, bowel reg as needed     =======================    ENDOCRINE  =====================  Elevated TSH  -TSH 14 increased from prior at 11  -on amiodarone as outpatient  -T3 and T4 WNL      - Monitor BG daily on CMP with goal <180.    ========================INFECTIOUS DISEASE================  Afebrile, chronic leukocytosis  - monitor and trend WBC and temperature curve    ======================= DISPOSITION  =====================  [X] Critical   [ ] Guarded    [ ] Stable    [X] Maintain in CICU  [ ] Downgrade to Telemetry  [ ] Discharge Home

## 2024-02-13 NOTE — PROGRESS NOTE ADULT - SUBJECTIVE AND OBJECTIVE BOX
INTERVAL HPI/OVERNIGHT EVENTS:    SUBJECTIVE: Patient seen and examined at bedside. Intubated, sedated, ROS cannot be obtained.       VITAL SIGNS:  ICU Vital Signs Last 24 Hrs  T(C): 36.7 (12 Feb 2024 23:00), Max: 36.7 (12 Feb 2024 14:00)  T(F): 98 (12 Feb 2024 23:00), Max: 98 (12 Feb 2024 14:00)  HR: 59 (13 Feb 2024 06:00) (51 - 68)  BP: 144/62 (13 Feb 2024 06:00) (97/44 - 148/65)  BP(mean): 89 (13 Feb 2024 06:00) (63 - 94)  ABP: --  ABP(mean): --  RR: 20 (13 Feb 2024 06:00) (17 - 33)  SpO2: 95% (13 Feb 2024 06:00) (90% - 99%)    O2 Parameters below as of 13 Feb 2024 06:00  Patient On (Oxygen Delivery Method): room air            Plateau pressure:   P/F ratio:     02-11 @ 07:01  -  02-12 @ 07:00  --------------------------------------------------------  IN: 1470.5 mL / OUT: 630 mL / NET: 840.5 mL    02-12 @ 07:01 - 02-13 @ 06:52  --------------------------------------------------------  IN: 910 mL / OUT: 1050 mL / NET: -140 mL      CAPILLARY BLOOD GLUCOSE        ECG:    PHYSICAL EXAMINATION:  General: Comfortable, no acute distress, cooperative with exam.  HEENT: PERRLA, EOMI, moist mucous membranes.  Respiratory: CTAB, normal respiratory effort, no coughing, wheezes, crackles, or rales.  CV: RRR, S1S2, no murmurs, rubs or gallops. No JVD. Distal pulses intact.  Abdominal: Soft, nontender, nondistended, no rebound or guarding, normal bowel sounds.  Neurology: AOx3, no focal neuro defects, UPTON x 4.  Extremities: No pitting edema, + Peripheral pulses.  Incisions:   Tubes:    MEDICATIONS:  MEDICATIONS  (STANDING):  atorvastatin 40 milliGRAM(s) Oral at bedtime  budesonide 160 MICROgram(s)/formoterol 4.5 MICROgram(s) Inhaler 2 Puff(s) Inhalation two times a day  chlorhexidine 2% Cloths 1 Application(s) Topical daily  lidocaine   Infusion 1 mG/Min (7.5 mL/Hr) IV Continuous <Continuous>  metoprolol succinate ER 25 milliGRAM(s) Oral daily  pantoprazole    Tablet 40 milliGRAM(s) Oral before breakfast    MEDICATIONS  (PRN):      ALLERGIES:  Allergies    No Known Allergies    Intolerances        LABS:                        9.9    11.41 )-----------( 324      ( 13 Feb 2024 00:47 )             31.1     02-13    137  |  104  |  18  ----------------------------<  111<H>  4.2   |  22  |  1.15    Ca    8.3<L>      13 Feb 2024 00:47  Phos  3.3     02-13  Mg     2.0     02-13    TPro  5.9<L>  /  Alb  3.0<L>  /  TBili  0.2  /  DBili  x   /  AST  11  /  ALT  13  /  AlkPhos  66  02-13    PT/INR - ( 13 Feb 2024 00:48 )   PT: 13.4 sec;   INR: 1.29 ratio         PTT - ( 13 Feb 2024 00:48 )  PTT:64.2 sec  Urinalysis Basic - ( 13 Feb 2024 00:47 )    Color: x / Appearance: x / SG: x / pH: x  Gluc: 111 mg/dL / Ketone: x  / Bili: x / Urobili: x   Blood: x / Protein: x / Nitrite: x   Leuk Esterase: x / RBC: x / WBC x   Sq Epi: x / Non Sq Epi: x / Bacteria: x        RADIOLOGY & ADDITIONAL TESTS: Reviewed.   INTERVAL HPI/OVERNIGHT EVENTS:    SUBJECTIVE: Patient seen and examined at bedside. **      VITAL SIGNS:  ICU Vital Signs Last 24 Hrs  T(C): 36.7 (12 Feb 2024 23:00), Max: 36.7 (12 Feb 2024 14:00)  T(F): 98 (12 Feb 2024 23:00), Max: 98 (12 Feb 2024 14:00)  HR: 59 (13 Feb 2024 06:00) (51 - 68)  BP: 144/62 (13 Feb 2024 06:00) (97/44 - 148/65)  BP(mean): 89 (13 Feb 2024 06:00) (63 - 94)  ABP: --  ABP(mean): --  RR: 20 (13 Feb 2024 06:00) (17 - 33)  SpO2: 95% (13 Feb 2024 06:00) (90% - 99%)    O2 Parameters below as of 13 Feb 2024 06:00  Patient On (Oxygen Delivery Method): room air            Plateau pressure:   P/F ratio:     02-11 @ 07:01  -  02-12 @ 07:00  --------------------------------------------------------  IN: 1470.5 mL / OUT: 630 mL / NET: 840.5 mL    02-12 @ 07:01  -  02-13 @ 06:52  --------------------------------------------------------  IN: 910 mL / OUT: 1050 mL / NET: -140 mL      CAPILLARY BLOOD GLUCOSE        ECG:    PHYSICAL EXAMINATION:  General: Comfortable, no acute distress, cooperative with exam.  HEENT: PERRLA, EOMI, moist mucous membranes.  Respiratory: CTAB, normal respiratory effort, no coughing, wheezes, crackles, or rales.  CV: RRR, S1S2, no murmurs, rubs or gallops. No JVD. Distal pulses intact.  Abdominal: Soft, nontender, nondistended, no rebound or guarding, normal bowel sounds.  Neurology: AOx3, no focal neuro defects, UPTON x 4.  Extremities: No pitting edema, + Peripheral pulses.  Incisions:   Tubes:    MEDICATIONS:  MEDICATIONS  (STANDING):  atorvastatin 40 milliGRAM(s) Oral at bedtime  budesonide 160 MICROgram(s)/formoterol 4.5 MICROgram(s) Inhaler 2 Puff(s) Inhalation two times a day  chlorhexidine 2% Cloths 1 Application(s) Topical daily  lidocaine   Infusion 1 mG/Min (7.5 mL/Hr) IV Continuous <Continuous>  metoprolol succinate ER 25 milliGRAM(s) Oral daily  pantoprazole    Tablet 40 milliGRAM(s) Oral before breakfast    MEDICATIONS  (PRN):      ALLERGIES:  Allergies    No Known Allergies    Intolerances        LABS:                        9.9    11.41 )-----------( 324      ( 13 Feb 2024 00:47 )             31.1     02-13    137  |  104  |  18  ----------------------------<  111<H>  4.2   |  22  |  1.15    Ca    8.3<L>      13 Feb 2024 00:47  Phos  3.3     02-13  Mg     2.0     02-13    TPro  5.9<L>  /  Alb  3.0<L>  /  TBili  0.2  /  DBili  x   /  AST  11  /  ALT  13  /  AlkPhos  66  02-13    PT/INR - ( 13 Feb 2024 00:48 )   PT: 13.4 sec;   INR: 1.29 ratio         PTT - ( 13 Feb 2024 00:48 )  PTT:64.2 sec  Urinalysis Basic - ( 13 Feb 2024 00:47 )    Color: x / Appearance: x / SG: x / pH: x  Gluc: 111 mg/dL / Ketone: x  / Bili: x / Urobili: x   Blood: x / Protein: x / Nitrite: x   Leuk Esterase: x / RBC: x / WBC x   Sq Epi: x / Non Sq Epi: x / Bacteria: x        RADIOLOGY & ADDITIONAL TESTS: Reviewed.   INTERVAL HPI/OVERNIGHT EVENTS:    SUBJECTIVE: Patient seen and examined at bedside. Nervous for her procedure. Otherwise feeling well.       VITAL SIGNS:  ICU Vital Signs Last 24 Hrs  T(C): 36.7 (12 Feb 2024 23:00), Max: 36.7 (12 Feb 2024 14:00)  T(F): 98 (12 Feb 2024 23:00), Max: 98 (12 Feb 2024 14:00)  HR: 59 (13 Feb 2024 06:00) (51 - 68)  BP: 144/62 (13 Feb 2024 06:00) (97/44 - 148/65)  BP(mean): 89 (13 Feb 2024 06:00) (63 - 94)  ABP: --  ABP(mean): --  RR: 20 (13 Feb 2024 06:00) (17 - 33)  SpO2: 95% (13 Feb 2024 06:00) (90% - 99%)    O2 Parameters below as of 13 Feb 2024 06:00  Patient On (Oxygen Delivery Method): room air            Plateau pressure:   P/F ratio:     02-11 @ 07:01  -  02-12 @ 07:00  --------------------------------------------------------  IN: 1470.5 mL / OUT: 630 mL / NET: 840.5 mL    02-12 @ 07:01 - 02-13 @ 06:52  --------------------------------------------------------  IN: 910 mL / OUT: 1050 mL / NET: -140 mL      CAPILLARY BLOOD GLUCOSE        ECG:    PHYSICAL EXAMINATION:  General: Comfortable, no acute distress, cooperative with exam.  HEENT: PERRLA, EOMI, moist mucous membranes.  Respiratory: CTAB, normal respiratory effort, no coughing, wheezes, crackles, or rales.  CV: RRR, S1S2, no murmurs, rubs or gallops. No JVD. Distal pulses intact.  Abdominal: Soft, nontender, nondistended, no rebound or guarding, normal bowel sounds.  Neurology: AOx3, no focal neuro defects, UPTON x 4.  Extremities: Trace pitting edema, + Peripheral pulses.      MEDICATIONS:  MEDICATIONS  (STANDING):  atorvastatin 40 milliGRAM(s) Oral at bedtime  budesonide 160 MICROgram(s)/formoterol 4.5 MICROgram(s) Inhaler 2 Puff(s) Inhalation two times a day  chlorhexidine 2% Cloths 1 Application(s) Topical daily  lidocaine   Infusion 1 mG/Min (7.5 mL/Hr) IV Continuous <Continuous>  metoprolol succinate ER 25 milliGRAM(s) Oral daily  pantoprazole    Tablet 40 milliGRAM(s) Oral before breakfast    MEDICATIONS  (PRN):      ALLERGIES:  Allergies    No Known Allergies    Intolerances        LABS:                        9.9    11.41 )-----------( 324      ( 13 Feb 2024 00:47 )             31.1     02-13    137  |  104  |  18  ----------------------------<  111<H>  4.2   |  22  |  1.15    Ca    8.3<L>      13 Feb 2024 00:47  Phos  3.3     02-13  Mg     2.0     02-13    TPro  5.9<L>  /  Alb  3.0<L>  /  TBili  0.2  /  DBili  x   /  AST  11  /  ALT  13  /  AlkPhos  66  02-13    PT/INR - ( 13 Feb 2024 00:48 )   PT: 13.4 sec;   INR: 1.29 ratio         PTT - ( 13 Feb 2024 00:48 )  PTT:64.2 sec  Urinalysis Basic - ( 13 Feb 2024 00:47 )    Color: x / Appearance: x / SG: x / pH: x  Gluc: 111 mg/dL / Ketone: x  / Bili: x / Urobili: x   Blood: x / Protein: x / Nitrite: x   Leuk Esterase: x / RBC: x / WBC x   Sq Epi: x / Non Sq Epi: x / Bacteria: x        RADIOLOGY & ADDITIONAL TESTS: Reviewed.

## 2024-02-13 NOTE — DIETITIAN INITIAL EVALUATION ADULT - PERTINENT MEDS FT
MEDICATIONS  (STANDING):  atorvastatin 40 milliGRAM(s) Oral at bedtime  budesonide 160 MICROgram(s)/formoterol 4.5 MICROgram(s) Inhaler 2 Puff(s) Inhalation two times a day  chlorhexidine 2% Cloths 1 Application(s) Topical daily  metoprolol succinate ER 25 milliGRAM(s) Oral daily  pantoprazole    Tablet 40 milliGRAM(s) Oral before breakfast    MEDICATIONS  (PRN):

## 2024-02-13 NOTE — DIETITIAN INITIAL EVALUATION ADULT - NSFNSGIIOFT_GEN_A_CORE
Denies nausea, vomiting, constipation, diarrhea. Reports last BM 2 days ago. Pt not currently ordered for bowel regimen.

## 2024-02-14 NOTE — PROGRESS NOTE ADULT - SUBJECTIVE AND OBJECTIVE BOX
INTERVAL HPI/OVERNIGHT EVENTS:    SUBJECTIVE: Patient seen and examined at bedside. Intubated, sedated, ROS cannot be obtained.       VITAL SIGNS:  ICU Vital Signs Last 24 Hrs  T(C): 36.7 (14 Feb 2024 03:00), Max: 36.7 (13 Feb 2024 08:00)  T(F): 98 (14 Feb 2024 03:00), Max: 98.1 (13 Feb 2024 08:00)  HR: 55 (14 Feb 2024 06:00) (16 - 62)  BP: 109/53 (14 Feb 2024 06:00) (98/45 - 154/66)  BP(mean): 77 (14 Feb 2024 06:00) (63 - 96)  ABP: 84/36 (13 Feb 2024 19:00) (67/33 - 107/45)  ABP(mean): 55 (13 Feb 2024 19:00) (47 - 69)  RR: 19 (14 Feb 2024 06:00) (16 - 28)  SpO2: 92% (14 Feb 2024 06:00) (90% - 100%)    O2 Parameters below as of 14 Feb 2024 06:00  Patient On (Oxygen Delivery Method): room air            Plateau pressure:   P/F ratio:     02-12 @ 07:01  -  02-13 @ 07:00  --------------------------------------------------------  IN: 910 mL / OUT: 1050 mL / NET: -140 mL    02-13 @ 07:01  -  02-14 @ 06:50  --------------------------------------------------------  IN: 647.5 mL / OUT: 2250 mL / NET: -1602.5 mL      CAPILLARY BLOOD GLUCOSE        ECG:    PHYSICAL EXAMINATION:  General: Comfortable, no acute distress, cooperative with exam.  HEENT: PERRLA, EOMI, moist mucous membranes.  Respiratory: CTAB, normal respiratory effort, no coughing, wheezes, crackles, or rales.  CV: RRR, S1S2, no murmurs, rubs or gallops. No JVD. Distal pulses intact.  Abdominal: Soft, nontender, nondistended, no rebound or guarding, normal bowel sounds.  Neurology: AOx3, no focal neuro defects, UPTON x 4.  Extremities: No pitting edema, + Peripheral pulses.  Incisions:   Tubes:    MEDICATIONS:  MEDICATIONS  (STANDING):  apixaban 5 milliGRAM(s) Oral two times a day  atorvastatin 40 milliGRAM(s) Oral at bedtime  budesonide 160 MICROgram(s)/formoterol 4.5 MICROgram(s) Inhaler 2 Puff(s) Inhalation two times a day  chlorhexidine 2% Cloths 1 Application(s) Topical daily  metoprolol succinate ER 25 milliGRAM(s) Oral daily  pantoprazole    Tablet 40 milliGRAM(s) Oral before breakfast    MEDICATIONS  (PRN):      ALLERGIES:  Allergies    No Known Allergies    Intolerances        LABS:                        8.8    11.62 )-----------( 269      ( 14 Feb 2024 01:11 )             27.5     02-14    140  |  106  |  18  ----------------------------<  136<H>  4.3   |  24  |  1.05    Ca    8.4      14 Feb 2024 01:11  Phos  4.3     02-14  Mg     1.9     02-14    TPro  5.4<L>  /  Alb  2.7<L>  /  TBili  0.2  /  DBili  x   /  AST  65<H>  /  ALT  14  /  AlkPhos  59  02-14    PT/INR - ( 13 Feb 2024 00:48 )   PT: 13.4 sec;   INR: 1.29 ratio         PTT - ( 13 Feb 2024 00:48 )  PTT:64.2 sec  Urinalysis Basic - ( 14 Feb 2024 01:11 )    Color: x / Appearance: x / SG: x / pH: x  Gluc: 136 mg/dL / Ketone: x  / Bili: x / Urobili: x   Blood: x / Protein: x / Nitrite: x   Leuk Esterase: x / RBC: x / WBC x   Sq Epi: x / Non Sq Epi: x / Bacteria: x        RADIOLOGY & ADDITIONAL TESTS: Reviewed.   INTERVAL HPI/OVERNIGHT EVENTS:    SUBJECTIVE: Patient seen and examined at bedside. Feeling well: no CP, SOB, LE swelling/pain, cough, bleeding from right wrist.       VITAL SIGNS:  ICU Vital Signs Last 24 Hrs  T(C): 36.7 (14 Feb 2024 03:00), Max: 36.7 (13 Feb 2024 08:00)  T(F): 98 (14 Feb 2024 03:00), Max: 98.1 (13 Feb 2024 08:00)  HR: 55 (14 Feb 2024 06:00) (16 - 62)  BP: 109/53 (14 Feb 2024 06:00) (98/45 - 154/66)  BP(mean): 77 (14 Feb 2024 06:00) (63 - 96)  ABP: 84/36 (13 Feb 2024 19:00) (67/33 - 107/45)  ABP(mean): 55 (13 Feb 2024 19:00) (47 - 69)  RR: 19 (14 Feb 2024 06:00) (16 - 28)  SpO2: 92% (14 Feb 2024 06:00) (90% - 100%)    O2 Parameters below as of 14 Feb 2024 06:00  Patient On (Oxygen Delivery Method): room air            Plateau pressure:   P/F ratio:     02-12 @ 07:01  -  02-13 @ 07:00  --------------------------------------------------------  IN: 910 mL / OUT: 1050 mL / NET: -140 mL    02-13 @ 07:01  -  02-14 @ 06:50  --------------------------------------------------------  IN: 647.5 mL / OUT: 2250 mL / NET: -1602.5 mL      CAPILLARY BLOOD GLUCOSE        ECG:    PHYSICAL EXAMINATION:  General: Comfortable, no acute distress, cooperative with exam.  HEENT: PERRLA, EOMI, moist mucous membranes.  Respiratory: CTAB, normal respiratory effort, no coughing, wheezes, crackles, or rales.  CV: RRR, S1S2, no murmurs, rubs or gallops. No JVD. Distal pulses intact.  Abdominal: Soft, nontender, nondistended, no rebound or guarding, normal bowel sounds.  Neurology: AOx3, no focal neuro defects, UPTON x 4.  Extremities: No pitting edema, + Peripheral pulses.  Incisions:   Tubes:    MEDICATIONS:  MEDICATIONS  (STANDING):  apixaban 5 milliGRAM(s) Oral two times a day  atorvastatin 40 milliGRAM(s) Oral at bedtime  budesonide 160 MICROgram(s)/formoterol 4.5 MICROgram(s) Inhaler 2 Puff(s) Inhalation two times a day  chlorhexidine 2% Cloths 1 Application(s) Topical daily  metoprolol succinate ER 25 milliGRAM(s) Oral daily  pantoprazole    Tablet 40 milliGRAM(s) Oral before breakfast    MEDICATIONS  (PRN):      ALLERGIES:  Allergies    No Known Allergies    Intolerances        LABS:                        8.8    11.62 )-----------( 269      ( 14 Feb 2024 01:11 )             27.5     02-14    140  |  106  |  18  ----------------------------<  136<H>  4.3   |  24  |  1.05    Ca    8.4      14 Feb 2024 01:11  Phos  4.3     02-14  Mg     1.9     02-14    TPro  5.4<L>  /  Alb  2.7<L>  /  TBili  0.2  /  DBili  x   /  AST  65<H>  /  ALT  14  /  AlkPhos  59  02-14    PT/INR - ( 13 Feb 2024 00:48 )   PT: 13.4 sec;   INR: 1.29 ratio         PTT - ( 13 Feb 2024 00:48 )  PTT:64.2 sec  Urinalysis Basic - ( 14 Feb 2024 01:11 )    Color: x / Appearance: x / SG: x / pH: x  Gluc: 136 mg/dL / Ketone: x  / Bili: x / Urobili: x   Blood: x / Protein: x / Nitrite: x   Leuk Esterase: x / RBC: x / WBC x   Sq Epi: x / Non Sq Epi: x / Bacteria: x        RADIOLOGY & ADDITIONAL TESTS: Reviewed.

## 2024-02-14 NOTE — CHART NOTE - NSCHARTNOTEFT_GEN_A_CORE
[facility]  [facility]    Transfer from: MICU  Transfer to:  (  ) Medicine    ( X ) Telemetry    (  ) RCU    (  ) Palliative    (  ) Stroke Unit    (  ) _______________  Accepting Physician: Dr. Lisa      [facility]    72F with COPD, HTN, PAD, CAD s/p LAD stent, cardiac arrest s/p left-sided ICD (6/4/2019) complicated by infection and s/p R single-chamber ICD reimplantation ([place] in 9/23/2019), HFrEF (EF 45%), recent hospital stay for VF sp ablation c/b pericardial effusion and tamponade s/p pericardiocentesis and pericardial window and AFib, presenting with defibrillator firing.     Patient with recent history of supratherapeutic INR C/B GI bleed during recent admission, and had monomorphic VT s/p ICD shock, with planned future VT ablation by Dr. Hurt, discharged 2/2/2024 on Eliquis.  Patient states she had 3 episodes of defibrillation this evening between 7:30 PM until 12:30 AM.  Patient with dizziness prior to the episodes, but denies syncope.  Reports that she has been compliant with her medications, and has been feeling well prior, aside from 1 episode of loose stool yesterday. Denies any increased coughing, fevers, chills, chest pain, SOB, increased LE swelling.     In the ED patient was found to be hypokalemic and was given repletion. Electrophysiology was consulted and device interrogation showed:   "Interrogation for recent ICD shock   Remaining battery: 5.1y  Presented in NSR w/ rate in the 50-60s  ICD functioning appropriately w/ Vpaced <0.1%  Data reveals 4x ICD shocks (one on 2/8/24, one on 2/10/24 and two on 2/11/2024); EGM consistent w/ monomorphic VT w/ rate 150-230s, each episode unsuccessfully treated w/ ATP before shock; Additionally 1 episode of VT successfully terminated by ATP."    Ultimately, EP recommended admission to the CICU on lidocaine gtt with plan for ablation procedure. Patient notes that she was originally scheduled to come here on Tuesday for ablation procedure.     On 2/13 patient underwent ablation procedure without complications. Currently she is pending possible upgrade from ICD to CRT-D.     To-Do:    [ ] f/u EP recs  [ ] restart home medications pending possible procedure    OBJECTIVE --  Vital Signs Last 24 Hrs  T(C): 36.7 (14 Feb 2024 08:00), Max: 36.7 (13 Feb 2024 15:00)  T(F): 98.1 (14 Feb 2024 08:00), Max: 98.1 (13 Feb 2024 15:00)  HR: 55 (14 Feb 2024 11:00) (16 - 67)  BP: 106/51 (14 Feb 2024 11:00) (98/45 - 134/56)  BP(mean): 74 (14 Feb 2024 11:00) (63 - 81)  RR: 18 (14 Feb 2024 11:00) (16 - 32)  SpO2: 96% (14 Feb 2024 11:00) (87% - 100%)    Parameters below as of 14 Feb 2024 07:30  Patient On (Oxygen Delivery Method): room air        I&O's Summary    13 Feb 2024 07:01  -  14 Feb 2024 07:00  --------------------------------------------------------  IN: 647.5 mL / OUT: 2250 mL / NET: -1602.5 mL    14 Feb 2024 07:01  -  14 Feb 2024 11:56  --------------------------------------------------------  IN: 120 mL / OUT: 0 mL / NET: 120 mL        MEDICATIONS  (STANDING):  apixaban 5 milliGRAM(s) Oral two times a day  atorvastatin 40 milliGRAM(s) Oral at bedtime  budesonide 160 MICROgram(s)/formoterol 4.5 MICROgram(s) Inhaler 2 Puff(s) Inhalation two times a day  chlorhexidine 2% Cloths 1 Application(s) Topical daily  furosemide   Injectable 40 milliGRAM(s) IV Push once  metoprolol succinate ER 25 milliGRAM(s) Oral daily  pantoprazole    Tablet 40 milliGRAM(s) Oral before breakfast    MEDICATIONS  (PRN):        LABS                                            8.8                   Neurophils% (auto):   83.6   (02-14 @ 01:11):    11.62)-----------(269          Lymphocytes% (auto):  10.3                                          27.5                   Eosinphils% (auto):   0.0      Manual%: Neutrophils x    ; Lymphocytes x    ; Eosinophils x    ; Bands%: x    ; Blasts x                                    140    |  106    |  18                  Calcium: 8.4   / iCa: x      (02-14 @ 01:11)    ----------------------------<  136       Magnesium: 1.9                              4.3     |  24     |  1.05             Phosphorous: 4.3      TPro  5.4    /  Alb  2.7    /  TBili  0.2    /  DBili  x      /  AST  65     /  ALT  14     /  AlkPhos  59     14 Feb 2024 01:11            ASSESSMENT & PLAN:   73-year-old female with history of CAD, CHF, hypertension, hyperlipidemia, atrial fibrillation, VT s/p ICD, on amiodarone, who presents with defibrillator firing.  Patient with recent history of supratherapeutic INR C/B GI bleed during recent admission, and had monomorphic VT s/p ICD shock, with planned future VT ablation by Dr. Hurt, discharged 2/2/2024 on [facility]; presenting after three episodes of VT at home found to be hypokalemic in the ED admitted for likely ablation.     Plan:  ====================== NEUROLOGY=====================  A&Ox3  - continue to monitor mental status as per protocol     ==================== RESPIRATORY======================  COPD  - does not use home O2  - budesonide inhaler BID  - albuterol PRN  - continue to monitor SpO2 with goal 88-94%    ====================CARDIOVASCULAR==================  VT with ICD  - originally planned for ablation on 2/13 as outpatient  - 4 shocks for VT at home on device interrogation (1x 2/8, 2x 2/10, 1x 2/11)  - hold home amiodarone and quinidine  - s/p VT ablation 2/13; restarted on apixaban  - now holding apixaban pending possible ICD upgrade to CRT-D    Afib  - transitioned from warfarin to apixaban during recent admission  - apixaban restarted    CAD  CHF  - hx of LAD stent,  of RCA; LHC/RHC 9/30/23 showing patent stent   - TTE 10/23: EF 55%, regional wall motion abnormalities.   - home meds: lasix 40 mg daily, dapagliflozin 10mg daily, metop XL 25 daily  - held lasix iso hypokalemia  - holding dapagliflozin this week prior to procedure -> restart 2/14 pending possible ICD upgrade  - cont metop  - restarting home lasix    HTN  - recently stopped losartan as outpatient  - monitor BP in ICU    HLD  - atorvastatin 40 daily    ===================HEMATOLOGIC/ONC ===================  CBC wnl   - Monitor H/H and plts  - DVT ppx: full AC for afib    ===================== RENAL =========================  SCr wnl   - Continue monitoring urine output, lytes, SCr/ BUN  - replete lytes prn with goal K >4 and Mg >2    Hypokalemia  - potassium repletion prn    ==================== GASTROINTESTINAL===================  Regular Diet as tolerated   - Monitor for regular BM while inpatient, bowel reg as needed     =======================    ENDOCRINE  =====================  Elevated TSH  -TSH 14 increased from prior at 11  -on amiodarone as outpatient -> d/c amiodarone and quinidine**?  -T3 and T4 WNL    - Monitor BG daily on CMP with goal <180.    ========================INFECTIOUS DISEASE================  Afebrile, chronic leukocytosis  - monitor and trend WBC and temperature curve    ======================= DISPOSITION  =====================  [X] Critical   [ ] Guarded    [ ] Stable    [] Maintain in CICU  [ X] Downgrade to Telemetry  [ ] Discharge Home      [facility]  [facility]    Transfer from: [facility]  Transfer to:  (  ) Medicine    ( X ) Telemetry    (  ) RCU    (  ) Palliative    (  ) Stroke Unit    (  ) _______________  Accepting Physician: Dr. Lisa      [facility]    72F with COPD, HTN, PAD, CAD s/p LAD stent, cardiac arrest s/p left-sided ICD (6/4/2019) complicated by infection and s/p R single-chamber ICD reimplantation ([place] in 9/23/2019), HFrEF (EF 45%), recent hospital stay for VF sp ablation c/b pericardial effusion and tamponade s/p pericardiocentesis and pericardial window and AFib, presenting with defibrillator firing.     Patient with recent history of supratherapeutic INR C/B GI bleed during recent admission, and had monomorphic VT s/p ICD shock, with planned future VT ablation by Dr. Hurt, discharged 2/2/2024 on Eliquis.  Patient states she had 3 episodes of defibrillation this evening between 7:30 PM until 12:30 AM.  Patient with dizziness prior to the episodes, but denies syncope.  Reports that she has been compliant with her medications, and has been feeling well prior, aside from 1 episode of loose stool yesterday. Denies any increased coughing, fevers, chills, chest pain, SOB, increased LE swelling.     In the ED patient was found to be hypokalemic and was given repletion. Electrophysiology was consulted and device interrogation showed:   "Interrogation for recent ICD shock   Remaining battery: 5.1y  Presented in NSR w/ rate in the 50-60s  ICD functioning appropriately w/ Vpaced <0.1%  Data reveals 4x ICD shocks (one on 2/8/24, one on 2/10/24 and two on 2/11/2024); EGM consistent w/ monomorphic VT w/ rate 150-230s, each episode unsuccessfully treated w/ ATP before shock; Additionally 1 episode of VT successfully terminated by ATP."    Ultimately, EP recommended admission to the CICU on lidocaine gtt with plan for ablation procedure. Patient notes that she was originally scheduled to come here on Tuesday for ablation procedure.     On 2/13 patient underwent ablation procedure without complications. Currently she is pending possible upgrade from ICD to CRT-D.     To-Do:    [ ] f/u EP recs  [ ] restart home medications pending possible procedure    OBJECTIVE --  Vital Signs Last 24 Hrs  T(C): 36.7 (14 Feb 2024 08:00), Max: 36.7 (13 Feb 2024 15:00)  T(F): 98.1 (14 Feb 2024 08:00), Max: 98.1 (13 Feb 2024 15:00)  HR: 55 (14 Feb 2024 11:00) (16 - 67)  BP: 106/51 (14 Feb 2024 11:00) (98/45 - 134/56)  BP(mean): 74 (14 Feb 2024 11:00) (63 - 81)  RR: 18 (14 Feb 2024 11:00) (16 - 32)  SpO2: 96% (14 Feb 2024 11:00) (87% - 100%)    Parameters below as of 14 Feb 2024 07:30  Patient On (Oxygen Delivery Method): room air        I&O's Summary    13 Feb 2024 07:01  -  14 Feb 2024 07:00  --------------------------------------------------------  IN: 647.5 mL / OUT: 2250 mL / NET: -1602.5 mL    14 Feb 2024 07:01  -  14 Feb 2024 11:56  --------------------------------------------------------  IN: 120 mL / OUT: 0 mL / NET: 120 mL        MEDICATIONS  (STANDING):  apixaban 5 milliGRAM(s) Oral two times a day  atorvastatin 40 milliGRAM(s) Oral at bedtime  budesonide 160 MICROgram(s)/formoterol 4.5 MICROgram(s) Inhaler 2 Puff(s) Inhalation two times a day  chlorhexidine 2% Cloths 1 Application(s) Topical daily  furosemide   Injectable 40 milliGRAM(s) IV Push once  metoprolol succinate ER 25 milliGRAM(s) Oral daily  pantoprazole    Tablet 40 milliGRAM(s) Oral before breakfast    MEDICATIONS  (PRN):        LABS                                            8.8                   Neurophils% (auto):   83.6   (02-14 @ 01:11):    11.62)-----------(269          Lymphocytes% (auto):  10.3                                          27.5                   Eosinphils% (auto):   0.0      Manual%: Neutrophils x    ; Lymphocytes x    ; Eosinophils x    ; Bands%: x    ; Blasts x                                    140    |  106    |  18                  Calcium: 8.4   / iCa: x      (02-14 @ 01:11)    ----------------------------<  136       Magnesium: 1.9                              4.3     |  24     |  1.05             Phosphorous: 4.3      TPro  5.4    /  Alb  2.7    /  TBili  0.2    /  DBili  x      /  AST  65     /  ALT  14     /  AlkPhos  59     14 Feb 2024 01:11            ASSESSMENT & PLAN:   73-year-old female with history of CAD, CHF, hypertension, hyperlipidemia, atrial fibrillation, VT s/p ICD, on amiodarone, who presents with defibrillator firing.  Patient with recent history of supratherapeutic INR C/B GI bleed during recent admission, and had monomorphic VT s/p ICD shock, with planned future VT ablation by Dr. Hurt, discharged 2/2/2024 on [facility]; presenting after three episodes of VT at home found to be hypokalemic in the ED admitted for likely ablation.     Plan:  ====================== NEUROLOGY=====================  A&Ox3  - continue to monitor mental status as per protocol     ==================== RESPIRATORY======================  COPD  - does not use home O2  - budesonide inhaler BID  - albuterol PRN  - continue to monitor SpO2 with goal 88-94%    ====================CARDIOVASCULAR==================  VT with ICD  - originally planned for ablation on 2/13 as outpatient  - 4 shocks for VT at home on device interrogation (1x 2/8, 2x 2/10, 1x 2/11)  - hold home amiodarone and quinidine  - s/p VT ablation 2/13; restarted on apixaban  - now holding apixaban pending possible ICD upgrade to CRT-D    Afib  - transitioned from warfarin to apixaban during recent admission  - apixaban restarted (now holding as above)    CAD  CHF  - hx of LAD stent,  of RCA; LHC/RHC 9/30/23 showing patent stent   - TTE 10/23: EF 55%, regional wall motion abnormalities.   - home meds: lasix 40 mg daily, dapagliflozin 10mg daily, metop XL 25 daily  - held lasix iso hypokalemia  - holding dapagliflozin this week prior to procedure -> restart 2/14 pending possible ICD upgrade  - cont metop  - restarting home lasix    HTN  - recently stopped losartan as outpatient  - monitor BP in ICU    HLD  - atorvastatin 40 daily    ===================HEMATOLOGIC/ONC ===================  CBC wnl   - Monitor H/H and plts  - DVT ppx: full AC for afib    ===================== RENAL =========================  SCr wnl   - Continue monitoring urine output, lytes, SCr/ BUN  - replete lytes prn with goal K >4 and Mg >2    Hypokalemia  - potassium repletion prn    ==================== GASTROINTESTINAL===================  Regular Diet as tolerated   - Monitor for regular BM while inpatient, bowel reg as needed     =======================    ENDOCRINE  =====================  Elevated TSH  -TSH 14 increased from prior at 11  -on amiodarone as outpatient  -T3 and T4 WNL    - Monitor BG daily on CMP with goal <180.    ========================INFECTIOUS DISEASE================  Afebrile, chronic leukocytosis  - monitor and trend WBC and temperature curve    ======================= DISPOSITION  =====================  [X] Critical   [ ] Guarded    [ ] Stable    [] Maintain in CICU  [ X] Downgrade to Telemetry  [ ] Discharge Home      [facility]  [facility]    Transfer from: MICU  Transfer to:  (  ) Medicine    ( X ) Telemetry    (  ) RCU    (  ) Palliative    (  ) Stroke Unit    (  ) _______________  Accepting Physician: Dr. Nuñez      [facility] COURSE    72F with COPD, HTN, PAD, CAD s/p LAD stent, cardiac arrest s/p left-sided ICD (6/4/2019) complicated by infection and s/p R single-chamber ICD reimplantation ([place] in 9/23/2019), HFrEF (EF 45%), recent hospital stay for VF sp ablation c/b pericardial effusion and tamponade s/p pericardiocentesis and pericardial window and AFib, presenting with defibrillator firing.     Patient with recent history of supratherapeutic INR C/B GI bleed during recent admission, and had monomorphic VT s/p ICD shock, with planned future VT ablation by Dr. Hurt, discharged 2/2/2024 on Eliquis.  Patient states she had 3 episodes of defibrillation this evening between 7:30 PM until 12:30 AM.  Patient with dizziness prior to the episodes, but denies syncope.  Reports that she has been compliant with her medications, and has been feeling well prior, aside from 1 episode of loose stool yesterday. Denies any increased coughing, fevers, chills, chest pain, SOB, increased LE swelling.     In the ED patient was found to be hypokalemic and was given repletion. Electrophysiology was consulted and device interrogation showed:   "Interrogation for recent ICD shock   Remaining battery: 5.1y  Presented in NSR w/ rate in the 50-60s  ICD functioning appropriately w/ Vpaced <0.1%  Data reveals 4x ICD shocks (one on 2/8/24, one on 2/10/24 and two on 2/11/2024); EGM consistent w/ monomorphic VT w/ rate 150-230s, each episode unsuccessfully treated w/ ATP before shock; Additionally 1 episode of VT successfully terminated by ATP."    Ultimately, EP recommended admission to the CICU on lidocaine gtt with plan for ablation procedure. Patient notes that she was originally scheduled to come here on Tuesday for ablation procedure.     On 2/13 patient underwent ablation procedure without complications. Currently she is pending possible upgrade from ICD to CRT-D.     To-Do:    [ ] f/u EP recs  [ ] restart home medications pending possible procedure    OBJECTIVE --  Vital Signs Last 24 Hrs  T(C): 36.7 (14 Feb 2024 08:00), Max: 36.7 (13 Feb 2024 15:00)  T(F): 98.1 (14 Feb 2024 08:00), Max: 98.1 (13 Feb 2024 15:00)  HR: 55 (14 Feb 2024 11:00) (16 - 67)  BP: 106/51 (14 Feb 2024 11:00) (98/45 - 134/56)  BP(mean): 74 (14 Feb 2024 11:00) (63 - 81)  RR: 18 (14 Feb 2024 11:00) (16 - 32)  SpO2: 96% (14 Feb 2024 11:00) (87% - 100%)    Parameters below as of 14 Feb 2024 07:30  Patient On (Oxygen Delivery Method): room air        I&O's Summary    13 Feb 2024 07:01  -  14 Feb 2024 07:00  --------------------------------------------------------  IN: 647.5 mL / OUT: 2250 mL / NET: -1602.5 mL    14 Feb 2024 07:01  -  14 Feb 2024 11:56  --------------------------------------------------------  IN: 120 mL / OUT: 0 mL / NET: 120 mL        MEDICATIONS  (STANDING):  apixaban 5 milliGRAM(s) Oral two times a day  atorvastatin 40 milliGRAM(s) Oral at bedtime  budesonide 160 MICROgram(s)/formoterol 4.5 MICROgram(s) Inhaler 2 Puff(s) Inhalation two times a day  chlorhexidine 2% Cloths 1 Application(s) Topical daily  furosemide   Injectable 40 milliGRAM(s) IV Push once  metoprolol succinate ER 25 milliGRAM(s) Oral daily  pantoprazole    Tablet 40 milliGRAM(s) Oral before breakfast    MEDICATIONS  (PRN):        LABS                                            8.8                   Neurophils% (auto):   83.6   (02-14 @ 01:11):    11.62)-----------(269          Lymphocytes% (auto):  10.3                                          27.5                   Eosinphils% (auto):   0.0      Manual%: Neutrophils x    ; Lymphocytes x    ; Eosinophils x    ; Bands%: x    ; Blasts x                                    140    |  106    |  18                  Calcium: 8.4   / iCa: x      (02-14 @ 01:11)    ----------------------------<  136       Magnesium: 1.9                              4.3     |  24     |  1.05             Phosphorous: 4.3      TPro  5.4    /  Alb  2.7    /  TBili  0.2    /  DBili  x      /  AST  65     /  ALT  14     /  AlkPhos  59     14 Feb 2024 01:11            ASSESSMENT & PLAN:   73-year-old female with history of CAD, CHF, hypertension, hyperlipidemia, atrial fibrillation, VT s/p ICD, on amiodarone, who presents with defibrillator firing.  Patient with recent history of supratherapeutic INR C/B GI bleed during recent admission, and had monomorphic VT s/p ICD shock, with planned future VT ablation by Dr. Hurt, discharged 2/2/2024 on [facility]; presenting after three episodes of VT at home found to be hypokalemic in the ED admitted for likely ablation.     Plan:  ====================== NEUROLOGY=====================  A&Ox3  - continue to monitor mental status as per protocol     ==================== RESPIRATORY======================  COPD  - does not use home O2  - budesonide inhaler BID  - albuterol PRN  - continue to monitor SpO2 with goal 88-94%    ====================CARDIOVASCULAR==================  VT with ICD  - originally planned for ablation on 2/13 as outpatient  - 4 shocks for VT at home on device interrogation (1x 2/8, 2x 2/10, 1x 2/11)  - hold home amiodarone and quinidine  - s/p VT ablation 2/13; restarted on apixaban  - now holding apixaban pending possible ICD upgrade to CRT-D    Afib  - transitioned from warfarin to apixaban during recent admission  - apixaban restarted (now holding as above)    CAD  CHF  - hx of LAD stent,  of RCA; LHC/RHC 9/30/23 showing patent stent   - TTE 10/23: EF 55%, regional wall motion abnormalities.   - home meds: lasix 40 mg daily, dapagliflozin 10mg daily, metop XL 25 daily  - held lasix iso hypokalemia  - holding dapagliflozin this week prior to procedure -> restart 2/14 pending possible ICD upgrade  - cont metop  - restarting home lasix    HTN  - recently stopped losartan as outpatient  - monitor BP in ICU    HLD  - atorvastatin 40 daily    ===================HEMATOLOGIC/ONC ===================  CBC wnl   - Monitor H/H and plts  - DVT ppx: full AC for afib    ===================== RENAL =========================  SCr wnl   - Continue monitoring urine output, lytes, SCr/ BUN  - replete lytes prn with goal K >4 and Mg >2    Hypokalemia  - potassium repletion prn    ==================== GASTROINTESTINAL===================  Regular Diet as tolerated   - Monitor for regular BM while inpatient, bowel reg as needed     =======================    ENDOCRINE  =====================  Elevated TSH  -TSH 14 increased from prior at 11  -on amiodarone as outpatient  -T3 and T4 WNL    - Monitor BG daily on CMP with goal <180.    ========================INFECTIOUS DISEASE================  Afebrile, chronic leukocytosis  - monitor and trend WBC and temperature curve    ======================= DISPOSITION  =====================  [X] Critical   [ ] Guarded    [ ] Stable    [] Maintain in CICU  [ X] Downgrade to Telemetry  [ ] Discharge Home      For Follow-Up:

## 2024-02-14 NOTE — PROGRESS NOTE ADULT - ASSESSMENT
73-year-old female with history of CAD, CHF, hypertension, hyperlipidemia, atrial fibrillation, VT s/p ICD, on amiodarone, who presents with defibrillator firing.  Patient with recent history of supratherapeutic INR C/B GI bleed during recent admission, and had monomorphic VT s/p ICD shock, with planned future VT ablation by Dr. Hurt, discharged 2/2/2024 on Eliquis; presenting after three episodes of VT at home found to be hypokalemic in the ED admitted for likely ablation.     Plan:  ====================== NEUROLOGY=====================  A&Ox3  - continue to monitor mental status as per protocol     ==================== RESPIRATORY======================  COPD  - does not use home O2  - budesonide inhaler BID  - albuterol PRN  - continue to monitor SpO2 with goal 88-94%    ====================CARDIOVASCULAR==================  VT with ICD  - originally planned for ablation on 2/13 as outpatient  - 4 shocks for VT at home on device interrogation (1x 2/8, 2x 2/10, 1x 2/11)  - lidocaine gtt @ 1  - hold home amiodarone and quinidine  - NPO after MN: VT ablation, hold Hep gtt 5AM    Afib  - transitioned from warfarin to apixaban during recent admission  - full AC with heparin gtt iso upcoming procedure  - hold for ablation    CAD  CHF  - hx of LAD stent,  of RCA; [facility] 9/30/23 showing patent stent   - TTE 10/23: EF 55%, regional wall motion abnormalities.   - home meds: lasix 40 mg daily, dapagliflozin 10mg daily, metop XL 25 daily  - held lasix iso hypokalemia  - holding dapagliflozin this week prior to procedure  - cont metop  - increased RR and rhonchi -> lasix 40 mg IV x1    HTN  - recently stopped losartan as outpatient  - monitor BP in ICU    HLD  - atorvastatin 40 daily    ===================HEMATOLOGIC/ONC ===================  CBC wnl   - Monitor H/H and plts  - DVT ppx: full AC for afib    ===================== RENAL =========================  SCr wnl   - Continue monitoring urine output, lytes, SCr/ BUN  - replete lytes prn with goal K >4 and Mg >2    Hypokalemia  - potassium repletion prn    ==================== GASTROINTESTINAL===================  Regular Diet as tolerated   - Monitor for regular BM while inpatient, bowel reg as needed     =======================    ENDOCRINE  =====================  Elevated TSH  -TSH 14 increased from prior at 11  -on amiodarone as outpatient  -f/u T3, free T4    - Monitor BG daily on CMP with goal <180.    ========================INFECTIOUS DISEASE================  Afebrile, chronic leukocytosis  - monitor and trend WBC and temperature curve    ======================= DISPOSITION  =====================  [X] Critical   [ ] Guarded    [ ] Stable    [X] Maintain in CICU  [ ] Downgrade to Telemetry  [ ] Discharge Home   73-year-old female with history of CAD, CHF, hypertension, hyperlipidemia, atrial fibrillation, VT s/p ICD, on amiodarone, who presents with defibrillator firing.  Patient with recent history of supratherapeutic INR C/B GI bleed during recent admission, and had monomorphic VT s/p ICD shock, with planned future VT ablation by Dr. Hurt, discharged 2/2/2024 on Eliquis; presenting after three episodes of VT at home found to be hypokalemic in the ED admitted for likely ablation.     Plan:  ====================== NEUROLOGY=====================  A&Ox3  - continue to monitor mental status as per protocol     ==================== RESPIRATORY======================  COPD  - does not use home O2  - budesonide inhaler BID  - albuterol PRN  - continue to monitor SpO2 with goal 88-94%    ====================CARDIOVASCULAR==================  VT with ICD  - originally planned for ablation on 2/13 as outpatient  - 4 shocks for VT at home on device interrogation (1x 2/8, 2x 2/10, 1x 2/11)  - hold home amiodarone and quinidine  - s/p VT ablation 2/13; restarted on apixaban    Afib  - transitioned from warfarin to apixaban during recent admission  - apixaban restarted    CAD  CHF  - hx of LAD stent,  of RCA; [facility] 9/30/23 showing patent stent   - TTE 10/23: EF 55%, regional wall motion abnormalities.   - home meds: lasix 40 mg daily, dapagliflozin 10mg daily, metop XL 25 daily  - held lasix iso hypokalemia  - holding dapagliflozin this week prior to procedure -> restart 2/14  - cont metop  - restart lasix?*    HTN  - recently stopped losartan as outpatient  - monitor BP in ICU    HLD  - atorvastatin 40 daily    ===================HEMATOLOGIC/ONC ===================  CBC wnl   - Monitor H/H and plts  - DVT ppx: full AC for afib    ===================== RENAL =========================  SCr wnl   - Continue monitoring urine output, lytes, SCr/ BUN  - replete lytes prn with goal K >4 and Mg >2    Hypokalemia  - potassium repletion prn    ==================== GASTROINTESTINAL===================  Regular Diet as tolerated   - Monitor for regular BM while inpatient, bowel reg as needed     =======================    ENDOCRINE  =====================  Elevated TSH  -TSH 14 increased from prior at 11  -on amiodarone as outpatient -> d/c amiodarone and quinidine**?  -T3 and T4 WNL    - Monitor BG daily on CMP with goal <180.    ========================INFECTIOUS DISEASE================  Afebrile, chronic leukocytosis  - monitor and trend WBC and temperature curve    ======================= DISPOSITION  =====================  [X] Critical   [ ] Guarded    [ ] Stable    [X] Maintain in CICU  [ ] Downgrade to Telemetry  [ ] Discharge Home   73-year-old female with history of CAD, CHF, hypertension, hyperlipidemia, atrial fibrillation, VT s/p ICD, on amiodarone, who presents with defibrillator firing.  Patient with recent history of supratherapeutic INR C/B GI bleed during recent admission, and had monomorphic VT s/p ICD shock, with planned future VT ablation by Dr. Hutr, discharged 2/2/2024 on Eliquis; presenting after three episodes of VT at home found to be hypokalemic in the ED admitted for likely ablation.     Plan:  ====================== NEUROLOGY=====================  A&Ox3  - continue to monitor mental status as per protocol     ==================== RESPIRATORY======================  COPD  - does not use home O2  - budesonide inhaler BID  - albuterol PRN  - continue to monitor SpO2 with goal 88-94%    ====================CARDIOVASCULAR==================  VT with ICD  - originally planned for ablation on 2/13 as outpatient  - 4 shocks for VT at home on device interrogation (1x 2/8, 2x 2/10, 1x 2/11)  - hold home amiodarone and quinidine  - s/p VT ablation 2/13; restarted on apixaban (now holding pending ICD upgrade)    Afib  - transitioned from warfarin to apixaban during recent admission  - apixaban restarted (holding as above)    CAD  CHF  - hx of LAD stent,  of RCA; [facility]/C 9/30/23 showing patent stent   - TTE 10/23: EF 55%, regional wall motion abnormalities.   - home meds: lasix 40 mg daily, dapagliflozin 10mg daily, metop XL 25 daily  - held lasix iso hypokalemia  - holding dapagliflozin this week prior to procedure -> restart 2/14 pending possible procedure  - cont metop  - restart home lasix     HTN  - recently stopped losartan as outpatient  - monitor BP in ICU    HLD  - atorvastatin 40 daily    ===================HEMATOLOGIC/ONC ===================  CBC wnl   - Monitor H/H and plts  - DVT ppx: full AC for afib    ===================== RENAL =========================  SCr wnl   - Continue monitoring urine output, lytes, SCr/ BUN  - replete lytes prn with goal K >4 and Mg >2    Hypokalemia  - potassium repletion prn    ==================== GASTROINTESTINAL===================  Regular Diet as tolerated   - Monitor for regular BM while inpatient, bowel reg as needed     =======================    ENDOCRINE  =====================  Elevated TSH  -TSH 14 increased from prior at 11  -on amiodarone as outpatient  -T3 and T4 WNL    - Monitor BG daily on CMP with goal <180.    ========================INFECTIOUS DISEASE================  Afebrile, chronic leukocytosis  - monitor and trend WBC and temperature curve    ======================= DISPOSITION  =====================  [] Critical   [ ] Guarded    [x ] Stable    [] Maintain in CICU  [ X] Downgrade to Telemetry  [ ] Discharge Home

## 2024-02-14 NOTE — PROGRESS NOTE ADULT - SUBJECTIVE AND OBJECTIVE BOX
Patient is a 73y old  Female who presents with a chief complaint of VT (13 Feb 2024 18:55)      DATE OF SERVICE: 02-14-24 @ 16:26    SUBJECTIVE / OVERNIGHT EVENTS: overnight events noted    ROS:  Resp: No cough no sputum production  CVS: No chest pain no palpitations no orthopnea  GI: no N/V/D  : no dysuria, no hematuria  "I feel fine'         MEDICATIONS  (STANDING):  apixaban 5 milliGRAM(s) Oral two times a day  atorvastatin 40 milliGRAM(s) Oral at bedtime  budesonide 160 MICROgram(s)/formoterol 4.5 MICROgram(s) Inhaler 2 Puff(s) Inhalation two times a day  chlorhexidine 2% Cloths 1 Application(s) Topical daily  metoprolol succinate ER 25 milliGRAM(s) Oral daily  nystatin Powder 1 Application(s) Topical two times a day  pantoprazole    Tablet 40 milliGRAM(s) Oral before breakfast    MEDICATIONS  (PRN):  acetaminophen     Tablet .. 650 milliGRAM(s) Oral every 6 hours PRN Temp greater or equal to 38C (100.4F), Mild Pain (1 - 3)  aluminum hydroxide/magnesium hydroxide/simethicone Suspension 30 milliLiter(s) Oral every 4 hours PRN Dyspepsia  melatonin 3 milliGRAM(s) Oral at bedtime PRN Insomnia  ondansetron Injectable 4 milliGRAM(s) IV Push every 8 hours PRN Nausea and/or Vomiting        CAPILLARY BLOOD GLUCOSE        I&O's Summary    13 Feb 2024 07:01  -  14 Feb 2024 07:00  --------------------------------------------------------  IN: 647.5 mL / OUT: 2250 mL / NET: -1602.5 mL    14 Feb 2024 07:01  -  14 Feb 2024 16:26  --------------------------------------------------------  IN: 120 mL / OUT: 200 mL / NET: -80 mL        Vital Signs Last 24 Hrs  T(C): 37.2 (14 Feb 2024 15:46), Max: 37.2 (14 Feb 2024 15:46)  T(F): 98.9 (14 Feb 2024 15:46), Max: 98.9 (14 Feb 2024 15:46)  HR: 61 (14 Feb 2024 15:46) (16 - 67)  BP: 139/70 (14 Feb 2024 15:46) (98/45 - 139/70)  BP(mean): 77 (14 Feb 2024 13:00) (63 - 81)  RR: 18 (14 Feb 2024 15:46) (16 - 32)  SpO2: 96% (14 Feb 2024 15:46) (87% - 100%)    PHYSICAL EXAM:  NECK: Supple, No JVD  CHEST/LUNG: clear   HEART: S1 S2; no murmurs   ABDOMEN: Soft, Nontender  EXTREMITIES:   no edema  NEUROLOGY: AO x 3 non-focal    LABS:                        8.8    11.62 )-----------( 269      ( 14 Feb 2024 01:11 )             27.5     02-14    140  |  106  |  18  ----------------------------<  136<H>  4.3   |  24  |  1.05    Ca    8.4      14 Feb 2024 01:11  Phos  4.3     02-14  Mg     1.9     02-14    TPro  5.4<L>  /  Alb  2.7<L>  /  TBili  0.2  /  DBili  x   /  AST  65<H>  /  ALT  14  /  AlkPhos  59  02-14    PT/INR - ( 13 Feb 2024 00:48 )   PT: 13.4 sec;   INR: 1.29 ratio         PTT - ( 13 Feb 2024 00:48 )  PTT:64.2 sec      Urinalysis Basic - ( 14 Feb 2024 01:11 )    Color: x / Appearance: x / SG: x / pH: x  Gluc: 136 mg/dL / Ketone: x  / Bili: x / Urobili: x   Blood: x / Protein: x / Nitrite: x   Leuk Esterase: x / RBC: x / WBC x   Sq Epi: x / Non Sq Epi: x / Bacteria: x          All consultant(s) notes reviewed and care discussed with other providers        Contact Number, Dr Nuñez [phone]

## 2024-02-14 NOTE — PROGRESS NOTE ADULT - SUBJECTIVE AND OBJECTIVE BOX
ID: 72 yo woman w/ hx of CAD s/p PCI ( of RCA) w/ iCMY (pEF), prev L chest wall ICD placed in 2019 c/b infx and removed s/p R chest wall medtronic single chamber ICD in 2019 w/ recurrent VT (w/ attempted VT ablation that was aborted iso pericardial effusion s/p drain and pericardial window), AFib s/p DCCV on apixaban, COPD, HTN, HLD, PVD who presented to the ED w/ ICD shocks.    Patient seen and examined at bedside.    Overnight Events: NAEON, Successful VT ablation yesterday with Dr. Hurt. Patient feeling well today denies FC NV CP SOB.     Telemetry: NSR IVCD    Review of Systems: Negative except as per HPI     Current Meds:  apixaban 5 milliGRAM(s) Oral two times a day  atorvastatin 40 milliGRAM(s) Oral at bedtime  budesonide 160 MICROgram(s)/formoterol 4.5 MICROgram(s) Inhaler 2 Puff(s) Inhalation two times a day  chlorhexidine 2% Cloths 1 Application(s) Topical daily  metoprolol succinate ER 25 milliGRAM(s) Oral daily  pantoprazole    Tablet 40 milliGRAM(s) Oral before breakfast      Vitals:  T(F): 98 (02-14), Max: 98.1 (02-13)  HR: 67 (02-14) (16 - 67)  BP: 113/53 (02-14) (98/45 - 134/56)  RR: 32 (02-14)  SpO2: 87% (02-14)  I&O's Summary    13 Feb 2024 07:01  -  14 Feb 2024 07:00  --------------------------------------------------------  IN: 647.5 mL / OUT: 2250 mL / NET: -1602.5 mL    14 Feb 2024 07:01  -  14 Feb 2024 09:40  --------------------------------------------------------  IN: 120 mL / OUT: 0 mL / NET: 120 mL    Physical Exam:  Appearance: No acute distress; well appearing  Eyes: PERRL, EOMI, pink conjunctiva  HEENT: Normal oral mucosa  Cardiovascular: RRR, S1, S2, no murmurs, rubs, or gallops; no edema; no JVD  Respiratory: Clear to auscultation bilaterally  Gastrointestinal: soft, non-tender, non-distended with normal bowel sounds  Musculoskeletal: No clubbing; no joint deformity; RFA site c/d/i no hematoma   Neurologic: Non-focal  Lymphatic: No lymphadenopathy  Psychiatry: AAOx3, mood & affect appropriate  Skin: No rashes, ecchymoses, or cyanosis                          8.8    11.62 )-----------( 269      ( 14 Feb 2024 01:11 )             27.5     02-14    140  |  106  |  18  ----------------------------<  136<H>  4.3   |  24  |  1.05    Ca    8.4      14 Feb 2024 01:11  Phos  4.3     02-14  Mg     1.9     02-14    TPro  5.4<L>  /  Alb  2.7<L>  /  TBili  0.2  /  DBili  x   /  AST  65<H>  /  ALT  14  /  AlkPhos  59  02-14    PT/INR - ( 13 Feb 2024 00:48 )   PT: 13.4 sec;   INR: 1.29 ratio    PTT - ( 13 Feb 2024 00:48 )  PTT:64.2 sec    CARDIAC MARKERS ( 11 Feb 2024 04:53 )  31 ng/L / x     / x     / x     / x     / x      CARDIAC MARKERS ( 11 Feb 2024 02:40 )  35 ng/L / x     / x     / x     / x     / x        New ECG(s): Personally reviewed    A/P  72 yo woman w/ hx of CAD s/p PCI ( of RCA) w/ iCMY (pEF), prev L chest wall ICD placed in 2019 c/b infx and removed s/p R chest wall medtronic single chamber ICD in 2019 w/ recurrent VT (w/ attempted VT ablation that was aborted iso pericardial effusion s/p drain and pericardial window), AFib s/p DCCV on apixaban, COPD, HTN, HLD, PVD who presented to the ED w/ ICD shocks.    #ICD Shocks  #CAD / iCMY  #VT  #AFib  #HTN  #HLD  Pt w/ know VT w/ ICD w/ recurrent VT and ICD shocks, on amio 200 qd and quinidine 325mg q8 (although Rx'ed for 200 q6) who presented w/ ICD shocks found to have 5 runs of VT in the past 2d, 1 of which was terminated by ATP the other 4 terminated by ICD shocks. Pt found to be in NSR w/ LBBB (chronic), w/ hypokalemia and no signs of gross volume overload. Suspect that hypokalemia is contributing to presentation as no other signs of ADHF (although pt does have small pleural effusion on CXR), new ischemia or other electrolye/metabolic derangements. Hgb is now improved from prior and pt reports not missing any home medications. Pt given amio bolus in the ED. Plan to admit and monitor with possible VT ablation    Recommendations:  - TTE completed no changes from prior  - S/p lido gtt  - S/p VT Ablation 2/13 with Licha   - Holding home quinidine and off amio post ablation   - Continue home metop succ 25mg qd  - Continue home atorva 40  - May benefit from device upgrade iso IVCD  - Hold home dapagliflozin  - K >4; Mg >2      Jc Dunn PGY5  Cardiology Fellow    COCO Hurt  ID: 72 yo woman w/ hx of CAD s/p PCI ( of RCA) w/ iCMY (pEF), prev L chest wall ICD placed in 2019 c/b infx and removed s/p R chest wall medtronic single chamber ICD in 2019 w/ recurrent VT (w/ attempted VT ablation that was aborted iso pericardial effusion s/p drain and pericardial window), AFib s/p DCCV on apixaban, COPD, HTN, HLD, PVD who presented to the ED w/ ICD shocks.    Patient seen and examined at bedside.    Overnight Events: NAEON, Successful VT ablation yesterday with Dr. Hurt. Patient feeling well today denies FC NV CP SOB.     Telemetry: NSR IVCD    Review of Systems: Negative except as per HPI     Current Meds:  apixaban 5 milliGRAM(s) Oral two times a day  atorvastatin 40 milliGRAM(s) Oral at bedtime  budesonide 160 MICROgram(s)/formoterol 4.5 MICROgram(s) Inhaler 2 Puff(s) Inhalation two times a day  chlorhexidine 2% Cloths 1 Application(s) Topical daily  metoprolol succinate ER 25 milliGRAM(s) Oral daily  pantoprazole    Tablet 40 milliGRAM(s) Oral before breakfast      Vitals:  T(F): 98 (02-14), Max: 98.1 (02-13)  HR: 67 (02-14) (16 - 67)  BP: 113/53 (02-14) (98/45 - 134/56)  RR: 32 (02-14)  SpO2: 87% (02-14)  I&O's Summary    13 Feb 2024 07:01  -  14 Feb 2024 07:00  --------------------------------------------------------  IN: 647.5 mL / OUT: 2250 mL / NET: -1602.5 mL    14 Feb 2024 07:01  -  14 Feb 2024 09:40  --------------------------------------------------------  IN: 120 mL / OUT: 0 mL / NET: 120 mL    Physical Exam:  Appearance: No acute distress; well appearing  Eyes: PERRL, EOMI, pink conjunctiva  HEENT: Normal oral mucosa  Cardiovascular: RRR, S1, S2, no murmurs, rubs, or gallops; no edema; no JVD  Respiratory: Clear to auscultation bilaterally  Gastrointestinal: soft, non-tender, non-distended with normal bowel sounds  Musculoskeletal: No clubbing; no joint deformity; RFA site c/d/i no hematoma   Neurologic: Non-focal  Lymphatic: No lymphadenopathy  Psychiatry: AAOx3, mood & affect appropriate  Skin: No rashes, ecchymoses, or cyanosis                          8.8    11.62 )-----------( 269      ( 14 Feb 2024 01:11 )             27.5     02-14    140  |  106  |  18  ----------------------------<  136<H>  4.3   |  24  |  1.05    Ca    8.4      14 Feb 2024 01:11  Phos  4.3     02-14  Mg     1.9     02-14    TPro  5.4<L>  /  Alb  2.7<L>  /  TBili  0.2  /  DBili  x   /  AST  65<H>  /  ALT  14  /  AlkPhos  59  02-14    PT/INR - ( 13 Feb 2024 00:48 )   PT: 13.4 sec;   INR: 1.29 ratio    PTT - ( 13 Feb 2024 00:48 )  PTT:64.2 sec    CARDIAC MARKERS ( 11 Feb 2024 04:53 )  31 ng/L / x     / x     / x     / x     / x      CARDIAC MARKERS ( 11 Feb 2024 02:40 )  35 ng/L / x     / x     / x     / x     / x        New ECG(s): Personally reviewed    A/P  72 yo woman w/ hx of CAD s/p PCI ( of RCA) w/ iCMY (pEF), prev L chest wall ICD placed in 2019 c/b infx and removed s/p R chest wall medtronic single chamber ICD in 2019 w/ recurrent VT (w/ attempted VT ablation that was aborted iso pericardial effusion s/p drain and pericardial window), AFib s/p DCCV on apixaban, COPD, HTN, HLD, PVD who presented to the ED w/ ICD shocks.    #ICD Shocks  #CAD / iCMY  #VT  #AFib  #HTN  #HLD  Pt w/ know VT w/ ICD w/ recurrent VT and ICD shocks, on amio 200 qd and quinidine 325mg q8 (although Rx'ed for 200 q6) who presented w/ ICD shocks found to have 5 runs of VT in the past 2d, 1 of which was terminated by ATP the other 4 terminated by ICD shocks. Pt found to be in NSR w/ LBBB (chronic), w/ hypokalemia and no signs of gross volume overload. Suspect that hypokalemia is contributing to presentation as no other signs of ADHF (although pt does have small pleural effusion on CXR), new ischemia or other electrolye/metabolic derangements. Hgb is now improved from prior and pt reports not missing any home medications. Pt given amio bolus in the ED. Plan to admit and monitor with possible VT ablation    Recommendations:  - TTE completed no changes from prior  - S/p lido gtt  - S/p VT Ablation 2/13 with Licha   - Off home quinidine and off amio post ablation   - Continue home metop succ 25mg qd  - Continue home atorva 40  - May benefit from device upgrade to CRTD vs LBBB pacing w/defib coil in the setting of sinus bradycardia requiring uptitration of BB and IVCD     > Will watch in the hospital for 24 hours post ablation, if she has sinus arrhythmias we may do the device upgrade this admission if she feels well and is stable this can be re-evaluated in the outpatient setting   - Hold home dapagliflozin  - K >4; Mg >2      Jc Dunn PGY5  Cardiology Fellow    COCO Hurt

## 2024-02-14 NOTE — PROVIDER CONTACT NOTE (OTHER) - ACTION/TREATMENT ORDERED:
wound care consult ordered,pressure ulcer protocol followed,T&P Q2H,WILL continue to monitor pt closely.

## 2024-02-14 NOTE — PROGRESS NOTE ADULT - ATTENDING COMMENTS
Admitted with multiple shocks for VT in the setting of hypokalemia  A+Ox3  Hemodynamics acceptable, no pressors or inotropes  Multiple episodes of  VT - continue Lidocaine infusion, hold outpatient Quinidine and Amiodarone  Electrically quiescent on Lidocaine infusion  Consult EP, possible ablation tomorrow  TTE with preserved LVEF; no longer has a pericardial effusion  O2 sats mid to high 90s on room air  DASH diet, diarrhea yesterday  Normal renal function, compensated on exam - target even  H/H low but acceptable on Heparin drip   Afebrile, no antibiotics  Sugars controlled  No central access
Mrs. Kohli has had no VT overnight post ablation. NO AA at this time and on low dose metoprolol. I had considered upgrade to a dual CRT due to QRS widening; however, QRS looks to be 122 ms today and sinus rates in the low 60's. OK to be discharged from CCU and if she does well through tomorrow, will discharge to home. May consider low dose amiodarone if she has NSVT
Admitted with multiple shocks for VT in the setting of hypokalemia  A+Ox3  Hemodynamics acceptable, no pressors or inotropes  Multiple episodes of  VT - continue Lidocaine infusion, hold outpatient Quinidine and Amiodarone  Electrically quiescent on Lidocaine infusion  EP following, for VT ablation today  TTE with preserved LVEF; no longer has a pericardial effusion  O2 sats mid to high 90s on room air  DASH diet  Normal renal function, compensated on exam - target even  H/H low but acceptable on Heparin drip   Afebrile, no antibiotics  Sugars controlled  No central access
Admitted with multiple shocks for VT in the setting of hypokalemia  A+Ox3  Hemodynamics acceptable, no pressors or inotropes  Multiple episodes of VT s/p ablation yesterday   Off all anti-arrhythmics s/p ablation and electrically quiescent   F/u EP recs  TTE with preserved LVEF; no longer has a pericardial effusion  O2 sats mid to high 90s on room air  DASH diet  Normal renal function, compensated on exam - target even  H/H low but acceptable on Heparin drip   Afebrile, no antibiotics  Sugars controlled  No central access  OOB/ambulate

## 2024-02-15 NOTE — ADVANCED PRACTICE NURSE CONSULT - RECOMMEDATIONS
Impression   urinary incontinence  kaur rectal incontinence dermatitis .  bilateral sacrum/buttock deep tissue injury, present on admission.     Recommendations   1. Bilateral  , sacral / buttocks  deep tissue injury       kaur rectal incontinence dermatis    Topical therapy- sacral/bilateral buttocks- cleanse w/incontinent cleanser, pat dry & apply el cream  twice daily & prn soiling .  Monitor  for changes .  2. Right and left heel  Elevate heels; apply Complete Cair air fluidized boots; ensure that the soles of the feet are not resting on the foot board of the bed.  3  Incontinent management - incontinent cleanser, pads,  kaur care  BID  4. Maintain on an alternating air with low air loss surface   5. Turn & reposition every 2 hr; Use positioning pillow to turn and reposition, soft pillow between bony prominences; continue measures to decrease friction/shear/pressure.  6. Nutrition optimization.  7. Place  waffle cushion when out of bed to chair .   plan of care reviewed with covering staff RN

## 2024-02-15 NOTE — DISCHARGE NOTE NURSING/CASE MANAGEMENT/SOCIAL WORK - [email or web address]_ALL_CORE
[facility] for Tobacco Control email [email or web address]

## 2024-02-15 NOTE — PROGRESS NOTE ADULT - PROBLEM SELECTOR PLAN 2
management per EP  VT ablation tomorrow
management per EP  VT ablation done  discussed with EP attending  no immediate plans for AICD upgrade yet
management per EP  VT ablation done  cleared for discharge by EP
management per EP  VT ablation today

## 2024-02-15 NOTE — PROGRESS NOTE ADULT - PROBLEM SELECTOR PLAN 1
resolved  continue to monitor   no intervention at this time
resolved  no intervention at this time
resolved  continue to monitor   no intervention at this time
resolved  no intervention at this time

## 2024-02-15 NOTE — ADVANCED PRACTICE NURSE CONSULT - ASSESSMENT
arrived unit patient sitting in recliner.  Patient is  alert and oriented and gave consent to skin consult. Assistance was  provided by RN to stand to view her skin, incontinence  of urine  was apparent and kaur care was provided.    patient  at the bedside. stated "his wife spends a lot time prior hospital sitting in chair".  Kaur rectal area with erythema and indurated consistent with incontinence dermatitis.  bilateral sacrum/ buttock there is  non- blanchable deep red  discoloration and hyperpigmentation  noted to measure approximately 5cm x 5cm x 0cm.  This  is consistent with deep tissue injury that is  present on admission.   patient  was educated  on the importance  for turning  and positioning  every 2 hours. the  use of waffle cushion when out of bed  to chair,  and to shift his  Weight every 2 hours while in chair. The importance a keeping his skin clean and dry and  to offload BILATERAL FEET /HEELS ,  and optimal nutrition. Patient verbalized understanding by teach back. arrived unit patient sitting in recliner.  Patient is  alert and oriented and gave consent to skin consult. Assistance was  provided by RN to stand to view her skin, incontinence  of urine  was apparent and kaur care was provided.    patient  at the bedside. stated "his wife spends a lot time prior hospital sitting in chair".  Kaur rectal area with erythema and indurated consistent with incontinence dermatitis.  bilateral sacrum/ buttock there is  non- blanchable deep red  discoloration and hyperpigmentation  noted to measure approximately 5cm x 5cm x 0cm.  This  is consistent with deep tissue injury that is  present on admission.   patient  was educated  on the importance  for turning  and positioning  every 2 hours. the  use of waffle cushion when out of bed  to chair,  and to shift his  Weight every 2 hours while in chair. The importance a keeping her skin clean and dry and  to offload BILATERAL FEET /HEELS ,  and optimal nutrition. Patient verbalized understanding by teach back.

## 2024-02-15 NOTE — DISCHARGE NOTE NURSING/CASE MANAGEMENT/SOCIAL WORK - NSDCVIVACCINE_GEN_ALL_CORE_FT
influenza, injectable, quadrivalent, preservative free; 24-Jan-2015 06:54; [place], Auto-process (IS); [employer]; [id number]; IntraMuscular; Deltoid Left.; 0.5 milliLiter(s); VIS (VIS Published: 19-Aug-2014, VIS Presented: 24-Jan-2015);

## 2024-02-15 NOTE — ADVANCED PRACTICE NURSE CONSULT - REASON FOR CONSULT
Consult to assess patient skin initiated by RN DEEP TISSUE INJURY ON SACRUM PRESENT ON ADMISSION.     History of Present Illness:  History of Present Illness:   72F with COPD, HTN, PAD, CAD s/p LAD stent, cardiac arrest s/p left-sided ICD (6/4/2019) complicated by infection and s/p R single-chamber ICD reimplantation ([facility] in 9/23/2019), HFrEF (EF 45%), recent hospital stay for VF sp ablation c/b pericardial effusion and tamponade s/p pericardiocentesis and pericardial window and AFib, presenting with defibrillator firing.     Patient with recent history of supratherapeutic INR C/B GI bleed during recent admission, and had monomorphic VT s/p ICD shock, with planned future VT ablation by Dr. Hurt, discharged 2/2/2024 on Eliquis.  Patient states she had 3 episodes of defibrillation this evening between 7:30 PM until 12:30 AM.  Patient with dizziness prior to the episodes, but denies syncope.  Reports that she has been compliant with her medications, and has been feeling well prior, aside from 1 episode of loose stool yesterday. Denies any increased coughing, fevers, chills, chest pain, SOB, increased LE swelling.     In the ED patient was found to be hypokalemic and was given repletion. Electrophysiology was consulted and device interrogation showed:   "Interrogation for recent ICD shock   Remaining battery: 5.1y  Presented in NSR w/ rate in the 50-60s  ICD functioning appropriately w/ Vpaced <0.1%  Data reveals 4x ICD shocks (one on 2/8/24, one on 2/10/24 and two on 2/11/2024); EGM consistent w/ monomorphic VT w/ rate 150-230s, each episode unsuccessfully treated w/ ATP before shock; Additionally 1 episode of VT successfully terminated by ATP."    Ultimately, EP recommended admission to the CICU on lidocaine gtt with plan for ablation procedure. Patient notes that she was originally scheduled to come here on Tuesday for ablation procedure

## 2024-02-15 NOTE — PROGRESS NOTE ADULT - NUTRITIONAL ASSESSMENT
This patient has been assessed with a concern for Malnutrition and has been determined to have a diagnosis/diagnoses of Severe protein-calorie malnutrition.    This patient is being managed with:   Diet DASH/TLC-  Sodium & Cholesterol Restricted  Entered: Feb 11 2024  9:01AM  

## 2024-02-15 NOTE — PROGRESS NOTE ADULT - SUBJECTIVE AND OBJECTIVE BOX
ID: 72 yo woman w/ hx of CAD s/p PCI ( of RCA) w/ iCMY (pEF), prev L chest wall ICD placed in 2019 c/b infx and removed s/p R chest wall medtronic single chamber ICD in 2019 w/ recurrent VT (w/ attempted VT ablation that was aborted iso pericardial effusion s/p drain and pericardial window), AFib s/p DCCV on apixaban, COPD, HTN, HLD, PVD who presented to the ED w/ ICD shocks.    Patient seen and examined at bedside.    Overnight Events: NAEON, feeling well and eager to go home. Pt noting focal L calf pain (no erythema swelling or homans sign on my exam). I recommended she walk and see if it gets better. We can consider duplex US but she is already on eliquis thus a DVT would not . Pt otherwise stable no further EP needs, pending PT/OT eval.     Telemetry: NSR IVCD    Review of Systems: Negative except as per HPI     Current Meds:  acetaminophen     Tablet .. 650 milliGRAM(s) Oral every 6 hours PRN  aluminum hydroxide/magnesium hydroxide/simethicone Suspension 30 milliLiter(s) Oral every 4 hours PRN  apixaban 5 milliGRAM(s) Oral two times a day  atorvastatin 40 milliGRAM(s) Oral at bedtime  budesonide 160 MICROgram(s)/formoterol 4.5 MICROgram(s) Inhaler 2 Puff(s) Inhalation two times a day  chlorhexidine 2% Cloths 1 Application(s) Topical daily  furosemide    Tablet 40 milliGRAM(s) Oral daily  melatonin 3 milliGRAM(s) Oral at bedtime PRN  metoprolol succinate ER 25 milliGRAM(s) Oral daily  nystatin Powder 1 Application(s) Topical two times a day  ondansetron Injectable 4 milliGRAM(s) IV Push every 8 hours PRN  pantoprazole    Tablet 40 milliGRAM(s) Oral before breakfast      Vitals:  T(F): 98.3 (02-15), Max: 99 (02-14)  HR: 61 (02-15) (55 - 61)  BP: 138/72 (02-15) (106/51 - 155/67)  RR: 18 (02-15)  SpO2: 95% (02-15)  I&O's Summary    14 Feb 2024 07:01  -  15 Feb 2024 07:00  --------------------------------------------------------  IN: 120 mL / OUT: 350 mL / NET: -230 mL        Physical Exam:  Appearance: No acute distress; well appearing  Eyes: PERRL, EOMI, pink conjunctiva  HEENT: Normal oral mucosa  Cardiovascular: RRR, S1, S2, no murmurs, rubs, or gallops; no edema; no JVD  Respiratory: Mild crackles c/w atelectasis   Gastrointestinal: soft, non-tender, non-distended with normal bowel sounds  Musculoskeletal: No clubbing; no joint deformity, LLE soft with no TTP no erythema or edema   Neurologic: Non-focal  Lymphatic: No lymphadenopathy  Psychiatry: AAOx3, mood & affect appropriate  Skin: No rashes, ecchymoses, or cyanosis                          8.7    11.49 )-----------( 254      ( 15 Feb 2024 07:12 )             28.4     02-15    139  |  105  |  17  ----------------------------<  91  4.2   |  25  |  1.00    Ca    8.4      15 Feb 2024 07:12  Phos  4.3     02-14  Mg     2.1     02-15    TPro  5.3<L>  /  Alb  2.6<L>  /  TBili  0.1<L>  /  DBili  x   /  AST  25  /  ALT  10  /  AlkPhos  60  02-15      CARDIAC MARKERS ( 11 Feb 2024 04:53 )  31 ng/L / x     / x     / x     / x     / x      CARDIAC MARKERS ( 11 Feb 2024 02:40 )  35 ng/L / x     / x     / x     / x     / x        New ECG(s): Personally reviewed    A/P    72 yo woman w/ hx of CAD s/p PCI ( of RCA) w/ iCMY (pEF), prev L chest wall ICD placed in 2019 c/b infx and removed s/p R chest wall medtronic single chamber ICD in 2019 w/ recurrent VT (w/ attempted VT ablation that was aborted iso pericardial effusion s/p drain and pericardial window), AFib s/p DCCV on apixaban, COPD, HTN, HLD, PVD who presented to the ED w/ ICD shocks.    #ICD Shocks  #CAD / iCMY  #VT  #AFib  #HTN  #HLD  Pt w/ know VT w/ ICD w/ recurrent VT and ICD shocks, on amio 200 qd and quinidine 325mg q8 (although Rx'ed for 200 q6) who presented w/ ICD shocks found to have 5 runs of VT in the past 2d, 1 of which was terminated by ATP the other 4 terminated by ICD shocks. Pt found to be in NSR w/ LBBB (chronic), w/ hypokalemia and no signs of gross volume overload. Suspect that hypokalemia is contributing to presentation as no other signs of ADHF (although pt does have small pleural effusion on CXR), new ischemia or other electrolye/metabolic derangements. Hgb is now improved from prior and pt reports not missing any home medications. Pt given amio bolus in the ED. Plan to admit and monitor with possible VT ablation    Recommendations:  - TTE completed no changes from prior  - S/p lido gtt  - S/p VT Ablation 2/13 with Licha   - Start amio 200 QDay (2/15)  - Hold home quinidine indefinitely, should not be necessary   - Continue home metop succ 25mg qd  - Continue home atorva 40  - May benefit from device upgrade to CRTD vs LBBB pacing w/defib coil in the setting of sinus bradycardia requiring uptitration of BB and IVCD     > Will determine need for this in the outpatient setting  - Hold home dapagliflozin  - Recommend aggressive incentive spirometer use, PT/OT nithya Dunn PGY5  Cardiology Fellow    COCO Hurt

## 2024-02-15 NOTE — PROGRESS NOTE ADULT - PROBLEM SELECTOR PLAN 6
acceptable  continue to monitor

## 2024-02-15 NOTE — PROGRESS NOTE ADULT - SUBJECTIVE AND OBJECTIVE BOX
Patient is a 73y old  Female who presents with a chief complaint of AICD discharge (14 Feb 2024 16:12)      DATE OF SERVICE: 02-15-24 @ 16:17    SUBJECTIVE / OVERNIGHT EVENTS: overnight events noted    ROS:  Resp: No cough no sputum production  CVS: No chest pain no palpitations no orthopnea  GI: no N/V/D  "I want to go home"       MEDICATIONS  (STANDING):  apixaban 5 milliGRAM(s) Oral two times a day  atorvastatin 40 milliGRAM(s) Oral at bedtime  budesonide 160 MICROgram(s)/formoterol 4.5 MICROgram(s) Inhaler 2 Puff(s) Inhalation two times a day  chlorhexidine 2% Cloths 1 Application(s) Topical daily  furosemide    Tablet 40 milliGRAM(s) Oral daily  metoprolol succinate ER 25 milliGRAM(s) Oral daily  nystatin Powder 1 Application(s) Topical two times a day  pantoprazole    Tablet 40 milliGRAM(s) Oral before breakfast    MEDICATIONS  (PRN):  acetaminophen     Tablet .. 650 milliGRAM(s) Oral every 6 hours PRN Temp greater or equal to 38C (100.4F), Mild Pain (1 - 3)  aluminum hydroxide/magnesium hydroxide/simethicone Suspension 30 milliLiter(s) Oral every 4 hours PRN Dyspepsia  melatonin 3 milliGRAM(s) Oral at bedtime PRN Insomnia  ondansetron Injectable 4 milliGRAM(s) IV Push every 8 hours PRN Nausea and/or Vomiting        CAPILLARY BLOOD GLUCOSE        I&O's Summary    14 Feb 2024 07:01  -  15 Feb 2024 07:00  --------------------------------------------------------  IN: 120 mL / OUT: 350 mL / NET: -230 mL    15 Feb 2024 07:01  -  15 Feb 2024 16:17  --------------------------------------------------------  IN: 480 mL / OUT: 1700 mL / NET: -1220 mL        Vital Signs Last 24 Hrs  T(C): 37.1 (15 Feb 2024 12:04), Max: 37.2 (14 Feb 2024 21:17)  T(F): 98.7 (15 Feb 2024 12:04), Max: 99 (14 Feb 2024 21:17)  HR: 64 (15 Feb 2024 12:04) (58 - 64)  BP: 136/72 (15 Feb 2024 12:04) (136/72 - 155/67)  BP(mean): 96 (14 Feb 2024 21:17) (96 - 96)  RR: 18 (15 Feb 2024 12:04) (18 - 18)  SpO2: 99% (15 Feb 2024 12:04) (94% - 99%)    PHYSICAL EXAM:  NECK: Supple, No JVD  CHEST/LUNG: clear   HEART: S1 S2; no murmurs   ABDOMEN: Soft, Nontender  EXTREMITIES:   no edema  NEUROLOGY: AO x 3 non-focal    LABS:                        8.7    11.49 )-----------( 254      ( 15 Feb 2024 07:12 )             28.4     02-15    139  |  105  |  17  ----------------------------<  91  4.2   |  25  |  1.00    Ca    8.4      15 Feb 2024 07:12  Phos  4.3     02-14  Mg     2.1     02-15    TPro  5.3<L>  /  Alb  2.6<L>  /  TBili  0.1<L>  /  DBili  x   /  AST  25  /  ALT  10  /  AlkPhos  60  02-15          Urinalysis Basic - ( 15 Feb 2024 07:12 )    Color: x / Appearance: x / SG: x / pH: x  Gluc: 91 mg/dL / Ketone: x  / Bili: x / Urobili: x   Blood: x / Protein: x / Nitrite: x   Leuk Esterase: x / RBC: x / WBC x   Sq Epi: x / Non Sq Epi: x / Bacteria: x          All consultant(s) notes reviewed and care discussed with other providers        Contact Number, Dr Nuñez [phone]

## 2024-02-15 NOTE — PHARMACOTHERAPY INTERVENTION NOTE - COMMENTS
Counseled patient and son at bedside  on the following inpatient/discharge medications names (brand/generic), indication, and possible side effects:  amiodarone 200 mg oral tablet: 1 tab(s) orally once a day  apixaban 5 mg oral tablet: 1 tab(s) orally every 12 hours  atorvastatin 40 mg oral tablet: 1 tab(s) orally once a day (at bedtime)  Lasix 40 mg oral tablet: 1 tab(s) orally once a day (previously on twice a day)  metoprolol succinate 25 mg oral tablet, extended release: 1 tab(s) orally once a day  nystatin 100,000 units/g topical powder: Apply topically to affected area once a day bilateral groins  Protonix 40 mg oral delayed release tablet: 1 tab(s) orally once a day  Symbicort 160 mcg-4.5 mcg/inh inhalation aerosol: 2 puff(s) inhaled 2 times a day    Do not take losartan or quinidine.     Patient was provided with a medication card for their new medication. Patient questions and concerns were answered and addressed. Patient demonstrated understanding.    Adriana Pierce PharmD, BCPS  Clinical Pharmacy Specialist  [employer] (preferred) or [phone]

## 2024-02-15 NOTE — DISCHARGE NOTE NURSING/CASE MANAGEMENT/SOCIAL WORK - PATIENT PORTAL LINK FT
You can access the FollowMyHealth Patient Portal offered by [facility] by registering at the following website: [email or web address]. By joining [facility]s FollowMyHealth portal, you will also be able to view your health information using other applications (apps) compatible with our system.

## 2024-02-28 NOTE — PHYSICAL THERAPY INITIAL EVALUATION ADULT - DIAGNOSIS, PT EVAL
--------------  CONTINUED STAY REVIEW    Payor: [employer]  Subscriber #:  [id number]  Authorization Number: [id number] / [id number]    Admit date: 2/10/24  Admit time:  9:20 PM    Admitting Physician: Edy Foote DO  Attending Physician:  Edy Foote DO  Primary Care Physician: Ernesto De Dios    REVIEW DOCUMENTATION:    2-27-24      admitted last week for right lower extremity femoral to popliteal bypass who presented with bleeding from surgical site. Taken for emergent OR on 2/10/24, noted to be grossly infected, started on vancomycin/meropenem. Bcx with Klebsiella. BG elevated on admit, started on insulin gtt, endo consulted, now off insulin gtt. Kept intubated post op, pulm following. Polymicrobial infection on meropenum, IV vanco and now fluconazole.  Now S/P AKA on right on 2/22/24.       PAD sp RLE fem-pop bypass last week c/b bleeding fm surg site fm wound dehiscence  - s/p recent fem pop bypass 1/24/24 with Dr. Murrieta  - has been on ASA/plavix- held on admit  - Hg down to 6.0, s/p 3 units pRBC 2/10- watching close now, hgb stable currently no signs of bleeding   - vascular surgery consulted, s/p emergent OR 2/10, removal of infected graft, s/p debridement, bone patch angioplasty   - sp further debridement 2/13 and placement of wound vac  - back to OR 2/16   - s/p right AKA  2/22/24  - Pain meds PRN, gabapentin   - lasix PRN   - Some coolness and decreased pulse on left leg , CTA with occluded stent in femoral artery, discussed with vascular no further intervention at this time, left heel with dark discoloration, decreased doppler pulses on left foot       Septic shock (now improved) fm post op wound infection, polymicrobial   - continue IV vancomycin and meropenem add fluconazole , ID consult following   - Leukocytosis down trending   - Bcx with klebsiella,   - 2/10 wound cx and 2/13 poly microbial  - abx adjustment per ID f/u cx results  - ID notes appreciated- tenative plan for 6 weeks abx  from from final surgical plans- final abx tbd pending cx data and surgical plans   - Now S/P right AKA on 2/22 final determination of treatment will need to be sorted per ID   - Keep joseph due to large leg wound      Metabolic acidosis- resolved   OMEGA on CKD3- at baseline now   Hypernatremia, resolved   - likely 2/2 LA from blood loss +/- dka on admit  - pH 7.24 on admit, LA 7.6 on admit, now normalized  - likely multifactorial, 2/2 LA, blood loss, ?DKA, CKD  - cr 1.7 on admit, previously 1.2 on discharge last week  - monitor Cr and UOP- improved   - renal consulted, s/p bicarb gtt, now on D5W low rate   - Stable, Monitor  - discussed with nephrology      Acute blood loss anemia from surgical site   Chronic anemia  Sickle cell anemia  - Stable currently, trend CBC   - outpatient f/u  - Hg 8.9 on discharge last week  - Hg 6.0 on admission, s/p 4 units pRBC, last unit 2/23/34  - monitor hgb     DM2, hyperglycemia better now   - A1c 7.9   - Hold PO medications  - BG 500s in ED, pH 7.2  - insulin gtt ordered in ED  - increased Degludec 17 at bedtime and aspart 10 units with meals , endo following      Respiratory failure resolved   COPD  - remained intubated post op both fm aspiration event and metabolic acidosis  - pulm following  - extubated 2/14 pm  - Now on 1L NC - wean as able   - RRT called 2/26/24 with ABG showing hypoxemia   - enforced wearing O2    - continue home MDI      Hypovolemic / septic Shock/ Hypotension- improved  Hx HTN  - BP stable now, hydral 100 Q8 hours   - Amlodipine 10 d, Coreg 25 BID   - Holding home lisinopril  - BP ok, watch and consider resuming pending BP       ACP  - CODE- FULL   - POA- daughter and son     FN:  - IVF: complete   - Diet: Carb controlled diet      DVT Prophy: Hold for now due to bleeding   Lines: PIV      RRT called 2/26/24 for lethargy with ABG showing hypoxemia   More awake and oriented    Pain is better   Decreased gabapentin and doing ok  Tolerating diet so far but not  eating much    Exam   Gen: more awake, NAD  Pulm: Lungs clear, normal respiratory effort, 1 L NC   CV: Heart with regular rate and rhythm  Abd: Abdomen soft, nontender, nondistended  Ext: right AKA with VAC in place   : [place]         Vascular Surgery Progress Note     No acute events overnight. Patient remains lethargic although she is arousable and oriented. She is able to recognize me. VAC in place on the right leg, holding good suction. Labs and vitals reviewed. Left heel with DTI, no skin breakdown. Recommend strict heel offloading on the left given patient with known PAD and not currently stable for intervention on the left. Will plan VAC change tomorrow with wound care. May need to go back to the OR on Friday for washout.        Recent Labs   Lab 02/25/24  0758 02/26/24  0531 02/27/24  0359   RBC 2.91* 2.80* 2.76*   HGB 8.2* 8.2* 7.8*   HCT 25.9* 25.0* 24.8*   MCV 89.0 89.3 89.9   MCH 28.2 29.3 28.3   MCHC 31.7 32.8 31.5   RDW 16.5* 16.3* 16.9*   NEPRELIM 17.83* 14.03* 12.70*   WBC 20.3* 16.2* 14.6*   .0 270.0 262.0                  Recent Labs   Lab 02/25/24  0758 02/26/24  0531 02/27/24  0359   * 125* 127*   BUN 27* 26* 24*   CREATSERUM 1.13* 1.11* 1.10*   EGFRCR 54* 55* 55*   CA 7.2* 7.2* 7.0*    142 144   K 4.3 4.4 5.0   * 115* 116*   CO2 25.0 24.0 24.0               MEDICATIONS ADMINISTERED IN LAST 1 DAY:  carvedilol (Coreg) tab 25 mg       Date Action Dose Route User    2/28/2024 0850 Given 25 mg Oral Arline Geiger, RN    2/27/2024 2127 Given 25 mg Oral Cornelia Cormier, JESSIE          fluconazole (Diflucan) tab 200 mg       Date Action Dose Route User    2/28/2024 0851 Given 200 mg Oral Arline Geiger, JESSIE          fluticasone furoate-vilanterol (Breo Ellipta) 200-25 MCG/ACT inhaler 1 puff       Date Action Dose Route User    2/28/2024 0851 Given 1 puff Inhalation Arline Geiger, RN    2/27/2024 1009 Given 1 puff Inhalation Arline Geiger, RN           gabapentin (Neurontin) cap 100 mg       Date Action Dose Route User    2/28/2024 0850 Given 100 mg Oral Arline Geiger RN    2/27/2024 2127 Given 100 mg Oral Cornelia Cormier RN    2/27/2024 1746 Given 100 mg Oral Anika Medrano RN          hydrALAZINE (Apresoline) tab 100 mg       Date Action Dose Route User    2/28/2024 0034 Given 100 mg Oral Cornelia Cormier RN          HYDROcodone-acetaminophen (Norco)  MG per tab 1 tablet       Date Action Dose Route User    2/28/2024 0619 Given by Other 1 tablet Oral Cornelia Cormier RN          insulin aspart (NovoLOG) 100 Units/mL FlexPen 1-5 Units       Date Action Dose Route User    2/28/2024 0857 Given 3 Units Subcutaneous (Right Upper Arm) Arline Geiger RN    2/27/2024 1801 Given 1 Units Subcutaneous (Right Upper Arm) Anika Medrano RN    2/27/2024 1537 Given 2 Units Subcutaneous (Right Upper Arm) Anika Medrano RN    2/27/2024 1008 Given 2 Units Subcutaneous (Right Upper Arm) Arline Geiger RN          magnesium oxide (Mag-Ox) tab 400 mg       Date Action Dose Route User    2/28/2024 0850 Given 400 mg Oral Arline Geiger RN          meropenem (Merrem) 500 mg in sodium chloride 0.9% 100 mL IVPB-MBP       Date Action Dose Route User    2/28/2024 0519 New Bag 500 mg Intravenous Cornelia Cormier RN    2/27/2024 1749 New Bag 500 mg Intravenous Anika Medrano RN          pantoprazole (Protonix) DR tab 40 mg       Date Action Dose Route User    2/28/2024 0518 Given 40 mg Oral Cornelia Cormier RN          rosuvastatin (Crestor) tab 40 mg       Date Action Dose Route User    2/27/2024 2128 Given 40 mg Oral Cornelia Cormier RN          vancomycin (Vancocin) 750 mg in sodium chloride 0.9% 250 mL IVPB-ADDV       Date Action Dose Route User    2/28/2024 0847 New Bag 750 mg Intravenous Wiktorowicz, Arline, RN          vancomycin (Vancocin) cap 125 mg       Date Action Dose Route User    2/28/2024 0850 Given 125 mg Oral Arilne Geiger,  RN          cholecalciferol (VITAMIN D3) tab 2,000 Units       Date Action Dose Route User    2/28/2024 0850 Given 2,000 Units Oral Arline Geiger RN            Vitals (last day)       Date/Time Temp Pulse Resp BP SpO2 Weight O2 Device O2 Flow Rate (L/min) [facility]    02/28/24 0843 98.6 °F (37 °C) 65 15 105/44 94 % -- Nasal cannula 2 L/min     02/28/24 0617 -- 66 18 106/50 95 % -- Nasal cannula 2 L/min     02/28/24 0456 98.4 °F (36.9 °C) -- 18 114/53 93 % 163 lb 3.2 oz Nasal cannula 2 L/min     02/28/24 0028 98.1 °F (36.7 °C) -- 18 111/52 93 % -- Nasal cannula 2 L/min     02/27/24 2127 99 °F (37.2 °C) -- 18 104/46 94 % -- Nasal cannula 2 L/min     02/27/24 1745 99.1 °F (37.3 °C) 68 18 103/54 94 % -- Nasal cannula 2 L/min LN    02/27/24 1536 98.7 °F (37.1 °C) 66 18 115/88 99 % -- Nasal cannula 2 L/min LN    02/27/24 0840 99.4 °F (37.4 °C) 64 16 103/78 96 % -- Nasal cannula 2 L/min MW    02/27/24 0547 -- -- -- -- -- 166 lb 11.2 oz -- -- NR    02/27/24 0338 98.7 °F (37.1 °C) -- 18 115/56 98 % -- Nasal cannula 2 L/min J           Decreased functional mobility/capacity secondary to impairments listed below

## 2024-03-12 NOTE — REASON FOR VISIT
[Arrhythmia/ECG Abnorrmalities] : arrhythmia/ECG abnormalities [Hypertension] : hypertension [Hyperlipidemia] : hyperlipidemia [Coronary Artery Disease] : coronary artery disease [Other: ____] : [unfilled] [Spouse] : spouse [Other: _____] : [unfilled]

## 2024-03-12 NOTE — PHYSICAL EXAM
[Well Developed] : well developed [No Acute Distress] : no acute distress [Well Nourished] : well nourished [Normal Conjunctiva] : normal conjunctiva [Normal Venous Pressure] : normal venous pressure [Normal S1, S2] : normal S1, S2 [No Carotid Bruit] : no carotid bruit [No Murmur] : no murmur [No Gallop] : no gallop [No Rub] : no rub [Clear Lung Fields] : clear lung fields [Good Air Entry] : good air entry [No Respiratory Distress] : no respiratory distress  [Soft] : abdomen soft [Non Tender] : non-tender [No Masses/organomegaly] : no masses/organomegaly [Normal Bowel Sounds] : normal bowel sounds [No Edema] : no edema [Normal Gait] : normal gait [No Cyanosis] : no cyanosis [No Clubbing] : no clubbing [No Varicosities] : no varicosities [No Rash] : no rash [No Skin Lesions] : no skin lesions [Moves all extremities] : moves all extremities [No Focal Deficits] : no focal deficits [Normal Speech] : normal speech [Alert and Oriented] : alert and oriented [Normal memory] : normal memory

## 2024-03-15 NOTE — HISTORY OF PRESENT ILLNESS
[FreeTextEntry1] : Patient presents n for follow up following recent hospitalization s/p VT storm and HF.  Ms. Kohli is 72 y/o F w/ PMHx COPD, HLD, HTN, PVD, ICM/CAD s/p PCI (has  of RCA), cardiac arrest s/p left-sided ICD (6/4/2019) complicated by infection and s/p Rsingle-chamber ICD - MDT [place] in 9/23/2019), presents in s/p recent admission to [facility] on 10/16/23 and discharged on 11/9/23 for VT storm in the setting of HF. Patients presented with ICD shock in the setting of recurrent monomorphic VT with unsuccessful ATP.  VT Ablation was attempted on 10/20/23 but aborted due to hemodynamically significant pericardial effusion with drain placement.  The pericardial drain was removed on 10/20/23.  Post attempted ablation, patient developed recurrent VT followed by ICD shock. Furthermore, she was found to be in afib w/ RVR and underwent cardioversion 10/21/23. LRL of the device was reduced to 35bpm. Repeat TTE done on 11/8 with trace pericardial effusion  interval note 3/15/24 s/p VT ablation on 2/13/24 - discharged home 2/15/24 since discharge feeling well discharged home off of the quinidine and farxiga   # VT storm : Her last episode of VT requiring treatment was 12/10 when she had been off her antiarrhythmic medications for 2 days, due to a perforated gastric ulcer which did not require surgical intervention, but none since then. This episode was pace terminated. She is definitely recovering from her prolonged hospitalization after she experienced cardiac tamponade after an attempted VT ablation by one of my colleagues which eventually required a pericardial window, She has some PAF during that hospital stay but none recently per her device (Medtronic AF MRI VR). She is on warfarin. She continues on amiodarone and quinidine without any side effects. Suppressed with AAD therapy  S/p Amio load while inpatient  Continue Amiodarone 200 mg QD  - Continue metoprolol succinate 25mg daily in AM - s/p VT ablation on 2/13/24 - will refer for cardiac rehab   # PAF : s/p SUPA / CV 10/21/23 Continue Coumadin ( maintain INR 2-3 )  Will discuss transition to Eliquis ( pending complete resolution of pericardial effusion) Continue Quinidine 200 mg q6hrs ( for AF suppression )   #  CAD -  The LAD shows a patent stent and a 50% mid LAD lesion. She also had moderate disease of the left circumflex artery. Subsequently underwent nuclear study which showed only a fixed inferoapical anteroapical defect. Continue Plavix 75 mg QD and Metoprolol Succ 25 mg QD   # Ischemic CMP- S/p ICD  Continue Metoprolol Succ 25 mg QD   c/w Lasix 40 mg daily check daily weights   # LBBB -   ms  TTE (11/9/2023 ) Normal LVEF  # HLD- Continue Atorvastatin 40 mg QD

## 2024-03-15 NOTE — DISCUSSION/SUMMARY
[Coronary Artery Disease] : coronary artery disease [Hyperlipidemia] : hyperlipidemia [Hypertension] : hypertension [Stable] : stable [Patient] : the patient [___ Month(s)] : in [unfilled] month(s) [FreeTextEntry1] : Patient presents n for follow up following recent hospitalization s/p VT storm and HF.  Ms. Kohli is 74 y/o F w/ PMHx COPD, HLD, HTN, PVD, ICM/CAD s/p PCI (has  of RCA), cardiac arrest s/p left-sided ICD (6/4/2019) complicated by infection and s/p Rsingle-chamber ICD - MDT [place] in 9/23/2019), presents in s/p recent admission to [facility] on 10/16/23 and discharged on 11/9/23 for VT storm in the setting of HF. Patients presented with ICD shock in the setting of recurrent monomorphic VT with unsuccessful ATP.  VT Ablation was attempted on 10/20/23 but aborted due to hemodynamically significant pericardial effusion with drain placement.  The pericardial drain was removed on 10/20/23.  Post attempted ablation, patient developed recurrent VT followed by ICD shock. Furthermore, she was found to be in afib w/ RVR and underwent cardioversion 10/21/23. LRL of the device was reduced to 35bpm. Repeat TTE done on 11/8 with trace pericardial effusion  interval note 3/15/24 s/p VT ablation on 2/13/24 - discharged home 2/15/24 since discharge feeling well discharged home off of the quinidine and farxiga   # VT storm : Her last episode of VT requiring treatment was 12/10 when she had been off her antiarrhythmic medications for 2 days, due to a perforated gastric ulcer which did not require surgical intervention, but none since then. This episode was pace terminated. She is definitely recovering from her prolonged hospitalization after she experienced cardiac tamponade after an attempted VT ablation by one of my colleagues which eventually required a pericardial window, She has some PAF during that hospital stay but none recently per her device (Medtronic AF MRI VR). She is on warfarin. She continues on amiodarone and quinidine without any side effects. Suppressed with AAD therapy  S/p Amio load while inpatient  Continue Amiodarone 200 mg QD  - Continue metoprolol succinate 25mg daily in AM - s/p VT ablation on 2/13/24 - will refer for cardiac rehab   # PAF : s/p SUPA / CV 10/21/23 Continue Coumadin ( maintain INR 2-3 )  Will discuss transition to Eliquis ( pending complete resolution of pericardial effusion) Continue Quinidine 200 mg q6hrs ( for AF suppression )   #  CAD -  The LAD shows a patent stent and a 50% mid LAD lesion. She also had moderate disease of the left circumflex artery. Subsequently underwent nuclear study which showed only a fixed inferoapical anteroapical defect. Continue Plavix 75 mg QD and Metoprolol Succ 25 mg QD   # Ischemic CMP- S/p ICD  Continue Metoprolol Succ 25 mg QD   c/w Lasix 40 mg daily check daily weights   # LBBB -   ms  TTE (11/9/2023 ) Normal LVEF  # HLD- Continue Atorvastatin 40 mg QD   [EKG obtained to assist in diagnosis and management of assessed problem(s)] : EKG obtained to assist in diagnosis and management of assessed problem(s)

## 2024-03-15 NOTE — CARDIOLOGY SUMMARY
[LVEF ___%] : LVEF [unfilled]% [___] : [unfilled] [de-identified] : Subsequently underwent nuclear study which showed only a fixed inferoapical anteroapical defect. [de-identified] : Cath at [facility]: Patient had elevated troponins which prompted a repeat cardiac catheterization. Report states that the patient had a proximal RCA total obstruction and was being filled by collaterals from the mid LAD. The LAD shows a patent stent and a 50% mid LAD lesion. She also had moderate disease of the left circumflex artery.  [de-identified] : ------------------------------------------------------------------------------------  Echo in 2/2022 demonstrated mild LAE, EF 40-45%, moderate segmental LV systolic dysfunction with hypokinesis of the inferior wall, posterior-lateral wall, basal anterior-ventricular septum and apex, mild concentric LVH.

## 2024-04-03 PROBLEM — I46.9 CARDIAC ARREST: Status: ACTIVE | Noted: 2019-08-14

## 2024-04-03 PROBLEM — Z95.810 ICD (IMPLANTABLE CARDIOVERTER-DEFIBRILLATOR) IN PLACE: Status: ACTIVE | Noted: 2024-01-01

## 2024-04-03 PROBLEM — I47.29 PAROXYSMAL VENTRICULAR TACHYCARDIA: Status: ACTIVE | Noted: 2023-01-01

## 2024-04-03 PROBLEM — I47.20 SUSTAINED VENTRICULAR TACHYCARDIA: Status: ACTIVE | Noted: 2024-01-01

## 2024-04-03 PROBLEM — I48.91 ATRIAL FIBRILLATION, UNSPECIFIED TYPE: Status: ACTIVE | Noted: 2023-01-01

## 2024-04-03 NOTE — DISCUSSION/SUMMARY
[FreeTextEntry1] : Amie Kohli is a 73 year old woman with an ischemic myopathy (presrved EF), ICD extraction and replacement in 2019 (single chamber) recurrent VT aborted ablation for effusion, readmitted recently with ICD shocks. She is now status post VT ablation without recurrent arrhythmia. She will continue on amiodarone for now and we will obtain LFT, PFT, TSH, and CXR for monitoring. She will continue amiodarone for 3 months at which point we can consider transition to a different antiarrhythmic although sinus bradycardia may be a limiting factor. We will also obtain an echocardiogram today. We will consider biventricular upgrade at the time of her generator change in 3 years but if her ejection fraction is decreasing we will consider performing this sooner. Her optivol is also persistently elevated following her first ablation due to pericardial window procedure and was reset today.   [EKG obtained to assist in diagnosis and management of assessed problem(s)] : EKG obtained to assist in diagnosis and management of assessed problem(s)

## 2024-04-03 NOTE — END OF VISIT
[FreeTextEntry3] : I personally performed the history and physical exam and formulated the medical decision making in this patient. I was responsible for more than 50% of the work of this encounter.

## 2024-04-03 NOTE — HISTORY OF PRESENT ILLNESS
[FreeTextEntry1] : Amie Kohli is a 73 year old woman with an ischemic myopathy (presrved EF), ICD extraction and replacement in 2019 (single chamber) reucrrent VT aborted ablation for effusion, readmitted recently with ICD shocks. She is now status post VT ablation, several morphologies successfully ablated in the mid / lateral LV as well as more apically. She is feeling much better since her procedure without recurrent ICD shocks or significant ongoing symptoms. Her device interrogation reveals one episode of non sustained VT not requiring therapies during which she was asymptomatic. She continues on amiodarone without side effects. Her EKG today reveals sinus rhythm with a stable L bundle type IVCD.

## 2024-04-03 NOTE — PHYSICAL EXAM
[Well Developed] : well developed [Well Nourished] : well nourished [No Acute Distress] : no acute distress [Normal Conjunctiva] : normal conjunctiva [Normal Venous Pressure] : normal venous pressure [No Carotid Bruit] : no carotid bruit [Normal S1, S2] : normal S1, S2 [No Murmur] : no murmur [No Rub] : no rub [No Gallop] : no gallop [Clear Lung Fields] : clear lung fields [Good Air Entry] : good air entry [No Respiratory Distress] : no respiratory distress  [Soft] : abdomen soft [Non Tender] : non-tender [No Masses/organomegaly] : no masses/organomegaly [Normal Bowel Sounds] : normal bowel sounds [Normal Gait] : normal gait [No Edema] : no edema [No Cyanosis] : no cyanosis [No Clubbing] : no clubbing [No Varicosities] : no varicosities [No Rash] : no rash [No Skin Lesions] : no skin lesions [Moves all extremities] : moves all extremities [No Focal Deficits] : no focal deficits [Normal Speech] : normal speech [Normal memory] : normal memory [Alert and Oriented] : alert and oriented [de-identified] : mildly reduced right carotid upstroke

## 2024-04-06 PROBLEM — K25.4 GASTRIC ULCER WITH HEMORRHAGE, UNSPECIFIED CHRONICITY: Status: ACTIVE | Noted: 2024-01-01

## 2024-04-06 NOTE — PLAN
[FreeTextEntry1] : Plan: Pantoprazole 20 mg daily ordered, to start after 40 mg supply finished. Calcium supplementation, pt will discuss formulation with pharmacist. Prefers to defer EGD unless symptomatic, which is reasonable. Please rerefer to GI if necessary, no appointment made.

## 2024-04-06 NOTE — HISTORY OF PRESENT ILLNESS
[FreeTextEntry1] : PMD/primary cardiologist:  Dr. JONATHAN Wise  Pt doing well f/u gastric ulcer bleed hospitalization. in setting of Eliquis with INR > 10 Had EGD/biopsy. No H. pylori or metaplasia. On Pantoprazole 40 mg daily. Vit D3 No calcium supplement  Subsequently hospitalized for defibrillator activation. Had cardiac EPS ablation

## 2024-04-11 NOTE — ED PROVIDER NOTE - WR ORDER NAME 1
[FreeTextEntry1] : Presenting as consult from Dr. Louise for right hip ultrasound-guided corticosteroid injection for right hip osteoarthritis.  His exam and x-rays are consistent with osteoarthritis.  However, he currently has an A1c of 9.4 and has had several sugars over the last weeks reportedly above 200.  Advised the patient that this would be unsafe for us to proceed given that corticosteroids will increase his blood sugar and can cause harm to his body. - Recommended close follow-up with PCP or endocrinology to obtain glucose control.  Advised that if sugar is below 200 for over a week and A1c lowers to below 8.5 he would be a better candidate for an injection - Home exercise plan provided.  Physical therapy prescription offered which patient declined.   - Follow-up with Dr. Louise in 1 month as requested at his last visit. 
Xray Chest 2 Views PA/Lat

## 2024-04-26 NOTE — ED PROVIDER NOTE - PROGRESS NOTE DETAILS
Patient reports that she is feeling much better s/p Tylenol.  Patient ambulating with cane at her baseline.  X-ray negative for acute fracture/dislocation.  Hardware in place.  Will DC home with close outpatient orthopedic follow-up.  Strict return precautions provided. -Himanshu Valerio PA-C

## 2024-04-26 NOTE — ED PROVIDER NOTE - SPECIALTY CARE
[facility] Cardiology Consult Note      ASSESSMENT and PLAN:       Active Hospital Problems    Diagnosis    • Essential hypertension      Blood pressure is elevated.  Change the propranolol to metoprolol for better blood pressure control.  Mention patient to monitor blood pressure at home.  Low-salt diet.     • SOB (shortness of breath)      Patient currently denies any shortness of breath.  Patient did mention about shortness of breath.  We will do echocardiogram to rule any structural abnormality.  Clinically no heart failure     • Persistent atrial fibrillation (CMS/HCC)      Currently remain in atrial fibrillation.  Unknown duration.  According to the history patient has been having symptoms for last 2 weeks but patient denies any symptoms at this point  \Her chest Vascor is 4.  I explained about anticoagulation option including warfarin and newer agents Eliquis or Xarelto.  Patient is okay to take Eliquis.  If is expensive then will switch warfarin.  2D echocardiogram for rule out any structural abnormality.          Change propanolol to Toprol-XL for better heart rate control.  Starting Eliquis 2.5 g twice a day because of her age and her body weight is under 60 kg.  Patient understand about the risk and benefit anticoagulation.  Possible discharge plan today          History and Physical:    .Paloma Booker is a 81 year old female with past medical history significant for anemia, anxiety, CKD,  HTN, who presented to the ED with complaints of new onset A-fib as seen on EKG at primary care office. Patient reports that she has been feeling anxious over the past 2 weeks due to moving her residence.  She states that she has had intermittent chest discomfort and shortness of breath when she would go up the stairs. Her daughters convinced her to see her primary care doctor today with noticed irregular rhythm.  ECG showed new onset atrial fibrillation. Upon presentation to the ED, her A-Fib was confirmed, she was admitted  for further treatment and evaluation. Of note, patient resting quietly in bed, easily awakened.  Patient denies chest pain, nausea, vomiting, diarrhea, headaches or shortness of breath at this time.  Patient denies any chest pain.  She says that she never had a chest pain she never had any palpitation.  History and then primary notes mention about chest pain or shortness of breath  Currently patient is in atrial fibrillation.    Review Of Symptoms:    Review of Systems   Constitutional: Negative for appetite change and fatigue.   HENT: Negative for congestion and rhinorrhea.    Eyes: Negative for pain and discharge.   Respiratory: Negative for cough, chest tightness and wheezing.    Cardiovascular: Negative for palpitations.   Gastrointestinal: Negative for abdominal distention, abdominal pain and diarrhea.   Endocrine: Negative for cold intolerance and heat intolerance.   Genitourinary: Negative for dysuria.   Musculoskeletal: Negative for arthralgias and joint swelling.   Skin: Negative for pallor and rash.   Allergic/Immunologic: Negative.    Neurological: Negative for dizziness, speech difficulty and light-headedness.   Hematological: Does not bruise/bleed easily.   Psychiatric/Behavioral: Negative for behavioral problems and sleep disturbance. The patient is nervous/anxious.            Family History   Problem Relation Age of Onset   • Heart disease Mother    • Hypertension Mother    • Kidney disease Mother    • Patient is unaware of any medical problems Father      Social History     Substance and Sexual Activity   Alcohol Use Yes   • Alcohol/week: 4.0 standard drinks   • Types: 4 Glasses of wine per week    Comment: social     Social History     Tobacco Use   Smoking Status Former Smoker   • Years: 10.00   • Quit date: 1970   • Years since quittin.3   Smokeless Tobacco Never Used     History   Drug Use No     Past Medical History:   Diagnosis Date   • Anemia    • Anxiety    • Chronic kidney disease     • Congestive cardiac failure (CMS/HCC)    • Essential (primary) hypertension    • Hx of rheumatic fever    • Malignant neoplasm (CMS/HCC)    • Migraine    • Osteoarthritis      Current Facility-Administered Medications   Medication Dose Route Frequency Provider Last Rate Last Admin   • amoxicillin-clavulanate (AUGMENTIN) 875-125 MG tablet 1 tablet  1 tablet Oral 2 times per day Terese Paz CNP       • metoPROLOL succinate (TOPROL-XL) ER tablet 50 mg  50 mg Oral BID Nesha Mchugh MD       • brimonidine (ALPHAGAN) 0.2 % ophthalmic solution 1 drop  1 drop Both Eyes BID Laina Narendra, CNP   1 drop at 12/17/21 2059   • [START ON 12/19/2021] celecoxib (CeleBREX) capsule 100 mg  100 mg Oral Q72H Laina Mackey CNP       • lisinopril (ZESTRIL) tablet 5 mg  5 mg Oral Daily Laina Mackey CNP       • latanoprost (XALATAN) 0.005 % ophthalmic solution 1 drop  1 drop Both Eyes Nightly Laina Narendra, CNP   1 drop at 12/17/21 2100   • enoxaparin (LOVENOX) injection 60 mg  1 mg/kg Subcutaneous Q12H Laina Narendra, CNP   60 mg at 12/17/21 2100   • timolol (TIMOPTIC) 0.5 % ophthalmic solution 1 drop  1 drop Both Eyes BID Laina Mackey, CNP   1 drop at 12/17/21 2059         Physical Exam:    Visit Vitals  BP (!) 163/84 (BP Location: RUE - Right upper extremity, Patient Position: Supine)   Pulse 71   Temp 99.7 °F (37.6 °C) (Temporal)   Resp 16   Ht 5' 4\" (1.626 m)   Wt 57.3 kg (126 lb 5.2 oz)   SpO2 98%   BMI 21.68 kg/m²       Physical Exam  HENT:      Head: Normocephalic and atraumatic.   Eyes:      Pupils: Pupils are equal, round, and reactive to light.   Neck:      Thyroid: No thyromegaly.   Cardiovascular:      Rate and Rhythm: Rhythm irregular.      Pulses:           Dorsalis pedis pulses are 2+ on the right side and 2+ on the left side.        Posterior tibial pulses are 2+ on the right side and 2+ on the left side.      Heart sounds: Gallop present. S4 sounds present.    Pulmonary:      Effort: Pulmonary effort is  normal.      Breath sounds: No wheezing or rales.   Abdominal:      Palpations: There is no hepatomegaly.   Musculoskeletal:      Cervical back: Normal range of motion and neck supple.   Skin:     General: Skin is warm and dry.   Neurological:      Mental Status: She is alert and oriented to person, place, and time.   Psychiatric:         Mood and Affect: Mood and affect normal.          Imaging  LAST EKG:  No results found for this or any previous visit (from the past 4464 hour(s)).      GAVIN RUIZ MD [facility]   Orthopedic Surgery

## 2024-04-26 NOTE — ED PROVIDER NOTE - CARE PROVIDER_API CALL
Magdiel Santo  Orthopaedic Surgery  [address], [address]  [place] [address]  Phone: [phone]  Fax: [phone]  Follow Up Time:

## 2024-04-26 NOTE — ED PROVIDER NOTE - ATTENDING APP SHARED VISIT CONTRIBUTION OF CARE
72F with COPD, HTN, PAD, CAD s/p LAD stent, cardiac arrest s/p left-sided ICD (6/4/2019) complicated by infection and s/p R single-chamber ICD reimplantation ([place] in 9/23/2019), HFrEF (EF 45%), recent hospital stay for VF sp ablation c/b pericardial effusion and tamponade s/p pericardiocentesis and pericardial window and AFib, presenting with L lateral leg pain around her incision site. pt w/ no fevers, no chills, no nausea no vomiting reports pian started on monday and has been worsening yesterday went on a long walk, pt w/ no trauma to the area, no meds taken today for symtpoms.   I have reviewed the triage vital signs. Const: no acute distress, Well-developed, Eyes: no conjunctival injection and no scleral icterus ENMT: Moist mucus membranes, CVS: +S1/S2, radial/DP pulse 2+ bilaterally  RESP: Unlabored respiratory effort, Clear to auscultation bilaterally GI: Nontender/Nondistended soft abdomen, no CVA tenderness MSK: Extremities w/o deformity or ttp, Psych: Awake, Alert, & Orientedx3;  Appropriate mood and affect, cooperative  Pt w/ indentation on the L lateral leg w/ no surrounding eryuthema, no ecchysmosis, pt w/ 2+ DP pulse b/l extremities warm  Plan for xray imaging and reassessment will check for ability to ambulate

## 2024-04-26 NOTE — ED ADULT TRIAGE NOTE - PATIENT ON (OXYGEN DELIVERY METHOD)
Expected Date Of Service: 05/13/2019
Billing Type: Third-Party Bill
Bill For Surgical Tray: no
room air

## 2024-04-26 NOTE — ED PROVIDER NOTE - OBJECTIVE STATEMENT
73-year-old female with past medical history of COPD, hypertension, CAD s/p stent C/B cardiac arrest s/p AICD, HFrEF, A-fib presenting with left leg pain.  Patient reports that she has had left-sided hip/lateral thigh pain for the past 1 to 2 days.  Patient denies trauma.  Patient states that she tries to walk daily, but pain acutely worsened after going for a long walk yesterday.  Patient otherwise denies weakness, numbness, erythema, rash, fevers.

## 2024-04-26 NOTE — ED PROVIDER NOTE - PATIENT PORTAL LINK FT
You can access the FollowMyHealth Patient Portal offered by [facility] by registering at the following website: [email or web address]. By joining [facility]’s FollowMyHealth portal, you will also be able to view your health information using other applications (apps) compatible with our system.

## 2024-04-26 NOTE — ED ADULT NURSE REASSESSMENT NOTE - NS ED NURSE REASSESS COMMENT FT1
Initial nursing note was not completed by primary RN. Break coverage RN discharged patient. No nursing note present at this time.

## 2024-04-26 NOTE — ED PROVIDER NOTE - NSFOLLOWUPINSTRUCTIONS_ED_ALL_ED_FT
Continue current home medications.  Rest and ice left leg.  Take Tylenol 1000 mg every 6-8 hours as needed for pain.  Follow-up with your primary care provider in 2 to 3 days.  Bring copy of your results with you to appointment.  Follow-up with orthopedics within 1 to 2 weeks.  Someone will contact you in the next few days to help schedule appointment.  Return to the ER for worsening pain, redness, swelling, fevers, difficulty walking or any other concerning symptoms.

## 2024-05-06 NOTE — PROGRESS NOTE ADULT - PROBLEM SELECTOR PROBLEM 4
Permanent atrial fibrillation
atraumatic right hip and right leg pain for one month  pt under the care of an ortho MD

## 2024-05-07 NOTE — ED ADULT NURSE NOTE - NSFALLRISKINTERV_ED_ALL_ED

## 2024-05-07 NOTE — ED PROVIDER NOTE - CARE PLAN
Principal Discharge DX:	Acute UTI  Secondary Diagnosis:	Near-syncope or syncope  Secondary Diagnosis:	Fall at home   1

## 2024-05-07 NOTE — CONSULT NOTE ADULT - ASSESSMENT
73f with copd, former smoker, atrial fib, cad, hfref, hyperlipid, hypertension recurrent vt , s/p ablation 2/13,  icd, presents with witnessed fall.   hypotension - improved  left thigh hematoma  nausea vomiting diarrhea  leukocytosis - r/o infection v reactive     plan  afebrile  nontoxic  exam nonfocal  ct scan chest - no pna  ct abd no abscess, no diverticulitis , no free air  no gu symptoms  ua unimpressive   abd exam is benign    for any further diarrhea - check gi pcr /cdiff  trend h/h   monitor wbc  check icd    monitor off antibx      73f with anemia  copd, former smoker, atrial fib, cad, hfref, hyperlipid, hypertension recurrent vt , s/p ablation 2/13,  icd, presents with witnessed fall.   hypotension - improved  left thigh hematoma  nausea vomiting diarrhea  leukocytosis - r/o infection v reactive     plan  afebrile  nontoxic  exam nonfocal  ct scan chest - no pna  ct abd no abscess, no diverticulitis , no free air  no gu symptoms  ua unimpressive   abd exam is benign    for any further diarrhea - check gi pcr /cdiff  trend h/h   monitor wbc  check icd  lle keep elevated and local care serial exam     monitor off antibx

## 2024-05-07 NOTE — ED PROVIDER NOTE - PROGRESS NOTE DETAILS
Emergency Medicine / Medical Toxicology Attending MD Simon:    Patient is clinically improved, official CT reads back, only abnormality hematoma with possible active bleeding in the soft tissue overlying the lateral left hip.  Trauma surgery consulted, repeat CBC now. Medical decision making:  Mechanical fall possibly due to hypotension, versus vertigo, versus unsteady gait.    Hemodynamically within normal limits at this time.    Differential diagnosis includes gastrointestinal bleeding with near syncope, vertigo, potential infection,    Rhythm strips have been reviewed patient does have approximately 30 beat run of wide-complex rhythm that is polymorphic in appearance.    ECG directly visualized by me, normal sinus rhythm, rate 83, , , QTc 512.  Patient has old interventricular conduction delays with left bundle branch morphology more pronounced on this EKG.

## 2024-05-07 NOTE — CONSULT NOTE ADULT - ASSESSMENT
73F w/ a PMHx of CAD, CHF, HTN, and L ORIF (5-6 years ago) presents after a ground level fall. CT shows a left thigh hematoma.    Recommendations:  - No acute surgical intervention  - low concern for active bleed  - Trend cbc q6  - ACE wrap leg until determined to not have active bleed  - recommend holding dvt ppx and AC/AP until no active bleed  - syncope w/u  - pain control for chronic thigh/hip pain  - Monitor patient symptoms and LLE exam    Discussed w/ Trauma surgeon, Dr. John Vazquez, PGY2  ACS/Trauma [id number]

## 2024-05-07 NOTE — ED PROVIDER NOTE - WR ORDER STATUS 2
I can try sending a new prescription. It will not be filled until it is due - August 17th.      It may not be approved by insurance for the capsules as it will be an increased number of capsules to meet the dosing.    Resulted

## 2024-05-07 NOTE — PROCEDURE NOTE - ADDITIONAL PROCEDURE DETAILS
1) Indication for interrogation: presyncope/unwitnessed fall  2) Presenting rhythm: SR at 70-80's, PVCs  3) Measured data WNL, normal ICD function, Pt is not pacemaker dependent, V pace <0.1%  4) OptiVol fluid index WNL  5) Stored data revealed 10 episodes of monomorphic VT with average -200 bpm this morning between 7:36am-8:59am (duration 2-16 seconds), 4 of the 10 episodes were treated and terminated with one round of ATP, no ICD shock noted.   6) See EP consult note     SAury Comer, NP-C  [phone]

## 2024-05-07 NOTE — ED PROVIDER NOTE - ATTENDING CONTRIBUTION TO CARE
ECG directly visualized by me, normal sinus rhythm, rate 83, , , QTc 512.  Patient has old interventricular conduction delays with left bundle branch morphology more pronounced on this EKG. Emergency Medicine Attending MD Simon:  patient seen and evaluated with the resident.  I was present for key portions of the History & Physical, and I agree with the Impression & Plan.    Patient is a 73-year-old female brought in by EMS from home after witnessed fall.  Patient was attempting to walk with walker, endorsed that she felt "woozy" and fell over.  She struck her left costal margin on the edge of her dresser.  Denies head trauma.  Patient was walking with her walker when she fell    Medical history significant for coronary artery disease, on Plavix.    Per EMS, patient was hypotensive 80s over 40s, responded to 1 L normal saline.  EMS also identified potential run of polymorphic V. tach on the monitor.      Vital signs: Blood pressure 98/65, respirations 16, heart rate 78, temperature 98.1, O2 sat 97% on room air  General: Elderly female, chronically ill-appearing, no acute distress awake and alert neck: Supple  No midline tenderness to palpation  PERRL, EOMI.    Resp:  No distress, CTA B.    Cardiac RRR, no RMG.    Abdomen:  soft, nondistended, nontender; no R/G.  Ext: no deformities, left hip with overlying ecchymosis that is tender to palpation.  Lower extremities are equal in length full range of motion without pain  Neuro:  A&Ox4 appears non focal.  Moving all 4 extremities equally  Skin:  thin skin, otherwise arm and dry as visualized, no rash.     Psych:  Normal affect and mood.    Medical decision making:  Mechanical fall possibly due to hypotension, versus vertigo, versus unsteady gait.    Hemodynamically within normal limits at this time.    Differential diagnosis includes gastrointestinal bleeding with near syncope, vertigo, potential infection,    Rhythm strips have been reviewed patient does have approximately 30 beat run of wide-complex rhythm that is polymorphic in appearance.    ECG directly visualized by me, normal sinus rhythm, rate 83, , , QTc 512.  Patient has old interventricular conduction delays with left bundle branch morphology more pronounced on this EKG.

## 2024-05-07 NOTE — H&P ADULT - HISTORY OF PRESENT ILLNESS
Patient is a 73-year-old female brought in by EMS from home after witnessed fall.  Patient was attempting to walk with walker, endorsed that she felt "woozy" and fell over.  She struck her left costal margin on the edge of her dresser.  Denies head trauma.Patient was walking with her walker when she fell  Medical history significant for coronary artery disease, on Plavix.  Per EMS, patient was hypotensive 80s over 40s, responded to 1 L normal saline.  EMS also identified potential run of polymorphic V. tach on the monitor.

## 2024-05-07 NOTE — CONSULT NOTE ADULT - NS ATTEND AMEND GEN_ALL_CORE FT
Patient seen and examined in ED. Syncope was not due to VT which occurred one hour later. She has some abdominal process that has led to decreased po intake and anorexia. She fell due to orthostasis and responded to fluids. VT was pace terminated and likely caused by her being off amiodarone (due to elevated TSH) and hypokalemia and low Mg. Will replete and start amiodarone for a day and if stable switch to sotalol. Will also give propranolol tonight. She may need GI workup due to abdominal symptoms. Possible UTI as well.

## 2024-05-07 NOTE — ED PROVIDER NOTE - CLINICAL SUMMARY MEDICAL DECISION MAKING FREE TEXT BOX
Medical decision making:  Mechanical fall possibly due to hypotension, versus vertigo, versus unsteady gait.    Hemodynamically within normal limits at this time.    Differential diagnosis includes gastrointestinal bleeding with near syncope, vertigo, potential infection,    Rhythm strips have been reviewed patient does have approximately 30 beat run of wide-complex rhythm that is polymorphic in appearance.    ECG directly visualized by me, normal sinus rhythm, rate 83, , , QTc 512.  Patient has old interventricular conduction delays with left bundle branch morphology more pronounced on this EKG.

## 2024-05-07 NOTE — CONSULT NOTE ADULT - SUBJECTIVE AND OBJECTIVE BOX
TRAUMA ACTIVATION LEVEL: CONSULT    MECHANISM OF INJURY:      [x] Blunt  	[] MVC	[x] Ground level Fall	[] Pedestrian Struck	[] Motorcycle accident   GCS: 15	E: 4	V: 5	M: 6    73y Female brought in by EMS from home after witnessed fall.  Patient was attempting to walk with walker, endorsed that she felt "woozy" and fell over.  She struck her left costal margin on the edge of her dresser.  Denies head trauma. Patient was walking with her walker when she fell. Medical history significant for coronary artery disease, on Plavix. Per EMS, patient was hypotensive 80s over 40s, responded to 1 L normal saline.  EMS also identified potential run of polymorphic V. tach on the monitor.    Trauma surgery consulted after CT showed a left thigh intramuscular hematoma with questionable active bleed. Patient was seen and examined in ED. Denies motor/sensory deficits. Reports L thigh pain for past couple weeks, likely due to arthritis or chronic thigh pain from past femur surgery. Physical exam notable for small ecchymosis on Left thigh    Primary Survey  A - Airway intact  B - breathing unlabored, 99% on room air  C - HR and BP wnl  D - GCS 15  E - Exposure obtained; no other apparent injuries      Secondary survey  GENERAL: NAD, well-groomed, well-developed  HEAD:  Atraumatic, Normocephalic  EYES: EOMI, PERRLA, conjunctiva and sclera clear  HEENT: Neck supple, No JVD  NERVOUS SYSTEM: AOX3, motor and sensation grossly intact in b/l UE and b/l LE  CHEST/LUNG: Clear to auscultation bilaterally; No rales, rhonchi, wheezing, or rubs  HEART: Regular rate and rhythm; No murmurs, rubs, or gallops. No LE edema  ABDOMEN: Soft, Nontender, Nondistended; Bowel sounds present  EXTREMITIES:  3x7cm ecchymosis noted on lateral/posterior Left thigh (soft, nontender), 2+ Peripheral Pulses, No clubbing, cyanosis, full ROM, no motor/sensory deficits      PMH  Obese    Smoker    Obesity    HTN (hypertension)    HLD (hyperlipidemia)    CAD (coronary artery disease)    CHF with cardiomyopathy      PSH  History of hip surgery      MEDS  MEDICATIONS  (STANDING):  aMIOdarone    Tablet 200 milliGRAM(s) Oral daily  atorvastatin 40 milliGRAM(s) Oral at bedtime  budesonide 160 MICROgram(s)/formoterol 4.5 MICROgram(s) Inhaler 2 Puff(s) Inhalation two times a day  furosemide    Tablet 40 milliGRAM(s) Oral daily  metoprolol succinate ER 25 milliGRAM(s) Oral daily  pantoprazole    Tablet 40 milliGRAM(s) Oral before breakfast  piperacillin/tazobactam IVPB.. 3.375 Gram(s) IV Intermittent every 8 hours    MEDICATIONS  (PRN):  polyethylene glycol 3350 17 Gram(s) Oral daily PRN for constipation      ALLERGIES  Allergies    No Known Allergies    Intolerances        SOCIAL     LABS                        9.0    25.12 )-----------( 283      ( 07 May 2024 09:51 )             28.7     05-07    142  |  104  |  14  ----------------------------<  85  3.2<L>   |  19<L>  |  0.87    Ca    8.0<L>      07 May 2024 09:51    TPro  6.4  /  Alb  3.0<L>  /  TBili  0.7  /  DBili  x   /  AST  18  /  ALT  6<L>  /  AlkPhos  69  05-07    PT/INR - ( 07 May 2024 09:51 )   PT: 18.1 sec;   INR: 1.67 ratio         PTT - ( 07 May 2024 09:51 )  PTT:27.8 sec  Urinalysis Basic - ( 07 May 2024 13:11 )    Color: Dark Yellow / Appearance: Turbid / SG: >1.030 / pH: x  Gluc: x / Ketone: Trace mg/dL  / Bili: Negative / Urobili: 1.0 mg/dL   Blood: x / Protein: 30 mg/dL / Nitrite: Positive   Leuk Esterase: Moderate / RBC: 10 /HPF / WBC 10 /HPF   Sq Epi: x / Non Sq Epi: x / Bacteria: Moderate /HPF      IMAGING    < from: CT Abdomen and Pelvis w/ IV Cont (05.07.24 @ 12:56) >  FINDINGS:  CHEST:  LUNGS AND LARGE AIRWAYS: Patent central airways. Unchanged right upper   lobe 3 mm nodule (301-64).  PLEURA: No pleural effusion.  VESSELS: Coronary artery and aortic calcifications.  HEART: Cardiomegaly. No pericardial effusion. Right chestwall cardiac   device with single lead terminating in the right ventricle. Aortic   valvular and mitral calcifications.  MEDIASTINUM AND AIDE: No lymphadenopathy.  CHEST WALL AND LOWER NECK: Within normal limits.    ABDOMEN AND PELVIS:  LIVER: Withinnormal limits.  BILE DUCTS: Normal caliber.  GALLBLADDER: Within normal limits.  SPLEEN: Within normal limits.  PANCREAS: Within normal limits.  ADRENALS: Within normal limits.  KIDNEYS/URETERS: No hydronephrosis. Unchanged left cyst. Additional   bilateral hypodensities too small to characterize.    BLADDER: Within normal limits.  REPRODUCTIVE ORGANS: Small fibroid.    BOWEL: No bowel obstruction. Appendix is normal.  PERITONEUM: No ascites.  VESSELS: Atherosclerotic changes.  RETROPERITONEUM/LYMPH NODES: No lymphadenopathy.  ABDOMINAL WALL: Within normal limits.  BONES: No acute fractures or dislocations. A few chronic rib fractures.   Left femoral ORIF. There is intramuscular hematoma in the left thigh with   question of active bleeding. There is soft tissue contusion in the left   thigh and hip.    IMPRESSION:  Left thigh intramuscular hematoma with question of active bleeding.

## 2024-05-07 NOTE — ED PROVIDER NOTE - WR ORDER NAME 1
FAMILY HISTORY:  Family history of acute myocardial infarction, father in 90s    
Xray Chest 1 View- PORTABLE-Urgent

## 2024-05-07 NOTE — CHART NOTE - NSCHARTNOTEFT_GEN_A_CORE
Notified by RN that patient c/o chest pain after taking medications   Patient seen and evaluated at bedside, reported chest discomfort in epigastric region after taking pills, likely potassium pills   patient further reported that patient is subsiding  VAS 1, no associated symptoms SOB, dizziness, lightheadedness, abdominal pain, dysuria, fever     Vital Signs Last 24 Hrs  T(C): 36.9 (07 May 2024 18:30), Max: 36.9 (07 May 2024 18:30)  T(F): 98.4 (07 May 2024 18:30), Max: 98.4 (07 May 2024 18:30)  HR: 79 (07 May 2024 18:30) (68 - 79)  BP: 116/49 (07 May 2024 18:30) (98/65 - 125/49)  BP(mean): 68 (07 May 2024 15:34) (68 - 68)  RR: 18 (07 May 2024 18:30) (16 - 19)  SpO2: 97% (07 May 2024 18:30) (97% - 99%)    STAT EKG - NSR  HR 64 - non-ischemic ,no significant changes from previous ,  STAT Troponin  Monitor Electrolytes - supplement as needed  Monitor on tele   Protonix 40 mg IV   Cont Cardiac meds   F/u with Attending in diana Rich ANP-C  Department of Medicine  [id number] Notified by RN that patient c/o chest pain after taking medications   Patient seen and evaluated at bedside, reported chest discomfort in epigastric region after taking potassium pills .  patient further reported that chest pain  is subsiding  VAS 1, no associated symptoms SOB, dizziness, lightheadedness, abdominal pain, dysuria, fever     Vital Signs Last 24 Hrs  T(C): 36.9 (07 May 2024 18:30), Max: 36.9 (07 May 2024 18:30)  T(F): 98.4 (07 May 2024 18:30), Max: 98.4 (07 May 2024 18:30)  HR: 79 (07 May 2024 18:30) (68 - 79)  BP: 116/49 (07 May 2024 18:30) (98/65 - 125/49)  BP(mean): 68 (07 May 2024 15:34) (68 - 68)  RR: 18 (07 May 2024 18:30) (16 - 19)  SpO2: 97% (07 May 2024 18:30) (97% - 99%)    STAT EKG - NSR  HR 64 - non-ischemic ,no significant changes from previous ,  STAT Troponin  Monitor Electrolytes - supplement as needed  Monitor on tele   Protonix 40 mg IV   Cont Cardiac meds   F/u with Attending in diana Rich ANP-C  Department of Medicine  [id number]

## 2024-05-07 NOTE — CONSULT NOTE ADULT - SUBJECTIVE AND OBJECTIVE BOX
[facility], Division of Infectious Diseases   ELVIS Coles S. Shah, Y. Patel, G. Casimir  [phone]   weekends and after hours [phone]    AUSTIN LEE  73y, Female  [id number]    HPI--  HPI:  Patient is a 73-year-old female brought in by EMS from home after witnessed fall.  Patient was attempting to walk with walker, endorsed that she felt "woozy" and fell over.  She struck her left costal margin on the edge of her dresser.  Denies head trauma.Patient was walking with her walker when she fell  Medical history significant for coronary artery disease, on Plavix.  Per EMS, patient was hypotensive 80s over 40s, responded to 1 L normal saline.  EMS also identified potential run of polymorphic V. tach on the monitor.   (07 May 2024 15:30)      PMH/PSH--  No pertinent past medical history    Obese    Smoker    Obesity    HTN (hypertension)    HLD (hyperlipidemia)    CAD (coronary artery disease)    CHF with cardiomyopathy    No significant past surgical history    No significant past surgical history    History of hip surgery        Allergies--  No Known Allergies      Medications--  Antibiotics: piperacillin/tazobactam IVPB.. 3.375 Gram(s) IV Intermittent every 8 hours    Immunologic:   Other: aMIOdarone    Tablet  atorvastatin  budesonide 160 MICROgram(s)/formoterol 4.5 MICROgram(s) Inhaler  furosemide    Tablet  metoprolol succinate ER  pantoprazole    Tablet  polyethylene glycol 3350 PRN      Social History--  EtOH: denies ***  Tobacco: denies ***  Drug Use: denies ***    Family/Marital History--  Family history of Alzheimer's disease (Father)    Family history of cancer (Mother)          Travel/Environmental/Occupational History:  *** inserth T/E/O Hx ***    Review of Systems:  REVIEW OF SYSTEMS  General: no fever, no chills, no wt loss	  Ophthalmologic: no blurry vision  Respiratory and Thorax: no cough, no dyspnea  Cardiovascular: no chest pain, no palpitations  Gastrointestinal:  no nausea, no vomiting, diarrhea  Genitourinary: no dysuria, no urgency, no frequency	  Musculoskeletal: no myalgias	  Neurological:  no headache	    Physical Exam--  Vital Signs: T(F): 97.5 (05-07-24 @ 15:34), Max: 98.3 (05-07-24 @ 11:02)  HR: 77 (05-07-24 @ 15:34)  BP: 125/49 (05-07-24 @ 15:34)  RR: 16 (05-07-24 @ 15:34)  SpO2: 99% (05-07-24 @ 15:34)  Wt(kg): --  General: Nontoxic-appearing Female in no acute distress.  HEENT: AT/NC. PERRL. EOMI. Anicteric. Conjunctiva pink and moist. Oropharynx clear. Dentition fair.  Neck: Not rigid. No sense of mass.  Nodes: None palpable.  Lungs: Clear bilaterally without rales, wheezing or rhonchi  Heart: Regular rate and rhythm. No Murmur. No rub. No gallop. No palpable thrill.  Abdomen: Bowel sounds present and normoactive. Soft. Nondistended. Nontender. No sense of mass. No organomegaly.  Back: No spinal tenderness. No costovertebral angle tenderness.   Extremities: No cyanosis or clubbing. No edema.   Skin: Warm. Dry. Good turgor. No rash. No vasculitic stigmata.  Psychiatric: Appropriate affect and mood for situation.         Laboratory & Imaging Data--  CBC                        9.0    25.12 )-----------( 283      ( 07 May 2024 09:51 )             28.7       Chemistries  05-07    142  |  104  |  14  ----------------------------<  85  3.2<L>   |  19<L>  |  0.87    Ca    8.0<L>      07 May 2024 09:51    TPro  6.4  /  Alb  3.0<L>  /  TBili  0.7  /  DBili  x   /  AST  18  /  ALT  6<L>  /  AlkPhos  69  05-07      Culture Data           [facility], Division of Infectious Diseases   ELVIS Coles S. Shah, Y. Patel, G. Casimir  [phone]   weekends and after hours [phone]    AUSTIN LEE  73y, Female  [id number]      HPI:   73f with copd, former smoker, atrial fib, cad, hfref, hyperlipid, hypertension recurrent vt , s/p ablation 2/13,  icd, presents with witnessed fall.    Patient was attempting to walk with walker, endorsed that she felt "woozy" and fell over.  She struck her left costal margin on the edge of her dresser.   pt states she hasnt felt well for the last 2 weeks with nausea associated with episodes of vomiting  and loose stool 3 yesterday and 1 today,   no fever, no cough, no sick contacts  no recent antibx, no sob, no headache, no chest pain, no dysuria, no urgency no frequency   she was admitted in 2/2024 for icd firing    PMH/PSH--  Obese  Smoker  HTN (hypertension)  HLD (hyperlipidemia)  CAD (coronary artery disease)  CHF with cardiomyopathy  History of hip surgery        Allergies--  No Known Allergies      Medications--  Antibiotics: piperacillin/tazobactam IVPB.. 3.375 Gram(s) IV Intermittent every 8 hours    Immunologic:   Other: aMIOdarone    Tablet  atorvastatin  budesonide 160 MICROgram(s)/formoterol 4.5 MICROgram(s) Inhaler  furosemide    Tablet  metoprolol succinate ER  pantoprazole    Tablet  polyethylene glycol 3350 PRN      Social History--  EtOH: denies ***  Tobacco: former   Drug Use: denies ***    Family/Marital History--  Family history of Alzheimer's disease (Father)  Family history of cancer (Mother)        Travel/Environmental/Occupational History:  retired     Review of Systems:  REVIEW OF SYSTEMS  General: no fever, no chills, no wt loss	  Ophthalmologic: no blurry vision  Respiratory and Thorax: no cough, no dyspnea  Cardiovascular: no chest pain, no palpitations  Gastrointestinal:  + nausea, + vomiting, + diarrhea  Genitourinary: no dysuria, no urgency, no frequency	  Musculoskeletal: no myalgias	  Neurological:  no headache	    Physical Exam--  Vital Signs: T(F): 97.5 (05-07-24 @ 15:34), Max: 98.3 (05-07-24 @ 11:02)  HR: 77 (05-07-24 @ 15:34)  BP: 125/49 (05-07-24 @ 15:34)  RR: 16 (05-07-24 @ 15:34)  SpO2: 99% (05-07-24 @ 15:34)  Wt(kg): --  General: Nontoxic-appearing Female in no acute distress.  HEENT: AT/NC. .  Neck: Not rigid. No sense of mass.  Nodes: None palpable.  Lungs: Clear bilaterally without rales, wheezing or rhonchi  Heart: Regular rate and rhythm.   Abdomen: Bowel sounds present and normoactive. Soft. Nondistended. Nontender. .  Back: No spinal tenderness. No costovertebral angle tenderness.   Extremities: No cyanosis or clubbing.left thigh wrapped   Skin: Warm. Dry. Good turgor. No rash. No vasculitic stigmata.  Psychiatric: Appropriate affect and mood for situation.         Laboratory & Imaging Data--  CBC                        9.0    25.12 )-----------( 283      ( 07 May 2024 09:51 )             28.7       Chemistries  05-07    142  |  104  |  14  ----------------------------<  85  3.2<L>   |  19<L>  |  0.87    Ca    8.0<L>      07 May 2024 09:51    TPro  6.4  /  Alb  3.0<L>  /  TBili  0.7  /  DBili  x   /  AST  18  /  ALT  6<L>  /  AlkPhos  69  05-07      Culture Data      < from: CT Abdomen and Pelvis w/ IV Cont (05.07.24 @ 12:56) >  ACC: [id number] EXAM:  CT ABDOMEN AND PELVIS IC   ORDERED BY: CADENCE MIDDLETON     PROCEDURE DATE:  05/07/2024          INTERPRETATION:  CLINICAL INFORMATION: Mechanical fall, left-sided rib   pain, left upper quadrant tenderness palpation.    COMPARISON: CT chest abdomen pelvis 2/11/2024    CONTRAST/COMPLICATIONS:  IV Contrast: Omnipaque 350 (accession [id number]), IV contrast documented   in unlinked concurrent exam (accession [id number])  90 cc administered   10   cc discarded  Oral Contrast: NONE  Complications: None reported at time of study completion    PROCEDURE:  CT of the Chest, Abdomen and Pelvis was performed.  Sagittal and coronal reformats were performed.    FINDINGS:  CHEST:  LUNGS AND LARGE AIRWAYS: Patent central airways. Unchanged right upper   lobe 3 mm nodule (301-64).  PLEURA: No pleural effusion.  VESSELS: Coronary artery and aortic calcifications.  HEART: Cardiomegaly. No pericardial effusion. Right chestwall cardiac   device with single lead terminating in the right ventricle. Aortic   valvular and mitral calcifications.  MEDIASTINUM AND AIDE: No lymphadenopathy.  CHEST WALL AND LOWER NECK: Within normal limits.    ABDOMEN AND PELVIS:  LIVER: Withinnormal limits.  BILE DUCTS: Normal caliber.  GALLBLADDER: Within normal limits.  SPLEEN: Within normal limits.  PANCREAS: Within normal limits.  ADRENALS: Within normal limits.  KIDNEYS/URETERS: No hydronephrosis. Unchanged left cyst. Additional   bilateral hypodensities too small to characterize.    BLADDER: Within normal limits.  REPRODUCTIVE ORGANS: Small fibroid.    BOWEL: No bowel obstruction. Appendix is normal.  PERITONEUM: No ascites.  VESSELS: Atherosclerotic changes.  RETROPERITONEUM/LYMPH NODES: No lymphadenopathy.  ABDOMINAL WALL: Within normal limits.  BONES: No acute fractures or dislocations. A few chronic rib fractures.   Left femoral ORIF. There is intramuscular hematoma in the left thigh with   question of active bleeding. There is soft tissue contusion in the left   thigh and hip.    IMPRESSION:  Left thigh intramuscular hematoma with question of active bleeding.    < end of copied text >  < from: CT Cervical Spine No Cont (05.07.24 @ 12:53) >    ACC: [id number] EXAM:  CT CERVICAL SPINE   ORDERED BY: CADENCE MIDDLETON     ACC: [id number] EXAM:  CT BRAIN   ORDERED BY: CADENCE MIDDLETON     PROCEDURE DATE:  05/07/2024          INTERPRETATION:  INDICATIONS:  Trauma code    CT brain:  TECHNIQUE:  Serial axial images were obtained from the skull base to the   vertex without intravenous contrast.    COMPARISON EXAMINATION: None.    FINDINGS:  Ventricles and sulci: Age-appropriate involutional change  Intra-axial:  Microvascular ischemic changes involving the   periventricular and subcortical white matter  Extra-axial:  No mass or collection is seen. Incidental fat in the falx  Visualized sinuses:  Clear.  Visualized mastoids:  Clear.  Calvarium:  Normal.  Miscellaneous: Bilateral cataract surgery    CT cervical spine:  TECHNIQUE:  Axial images were obtained using multislice helical   technique.  Reformatted coronal and sagittal images were performed.    COMPARISON EXAMINATION:  None.    FINDINGS:  Vertebral bodies:  Normal.  Alignment:  No subluxations.  Intervertebral disc spaces: Normal.  Miscellaneous:  None.    IMPRESSION:    BRAIN CT :No intracranial hemorrhage  CERVICAL SPINE CT: No acute fracture or traumatic subluxation    < end of copied text >      Urine Microscopic-Add On (NC) (05.07.24 @ 13:11)   Red Blood Cell - Urine: 10 /HPF  White Blood Cell - Urine: 10 /HPF  Bacteria: Moderate /HPF  Squamous Epithelial Cells: Present  Comment - Urine: moderate budding yeast like cells

## 2024-05-07 NOTE — H&P ADULT - NEUROLOGICAL
Principal Discharge DX:	Foreign body in pharynx   responds to pain/responds to verbal commands negative

## 2024-05-07 NOTE — CONSULT NOTE ADULT - SUBJECTIVE AND OBJECTIVE BOX
CHIEF COMPLAINT:    HISTORY OF PRESENT ILLNESS:  72 y/o female w/ PMHx COPD, paroxysmal AF (on eliquis), HLD, HTN, PVD, ICM/CAD s/p PCI (has  of RCA), cardiac arrest s/p left-sided ICD (6/4/2019) complicated by infection and s/p R sided Medtronic single-chamber ICD ([place] in 9/23/2019), perforated gastric ulcer with pneumoperitoneum - sealed 11/26/23 on UGI series, prior admissions for VT storm with adjustment of medication and NIPS (on amiodarone 200mg QD and quinidine gluconate ER 325mg Q8hr), prior ICD shock in the setting of recurrent monomorphic VT with unsuccessful ATP, VT ablation attempted 10/20/23 but aborted due to hemodynamically significant pericardial effusion with drain placement, recurrent VT s/p ICD shock, and afib w/ RVR s/p cardioversion 10/21/23 s/p pericardial window 10/28/23, admitted 2/2024 with ICD shocks for recurrent VT and underwent VT ablation on 2/13/24 by Dr. Hurt who now brought in by EMS from home today after witnessed fall.  Patient states she got out of bed around 6:30am this morning, was attempting to walk with walker, endorsed that she felt acute onset of "woozy", room spinning and fell over.  She struck her left costal margin on the edge of her dresser. Denies head trauma. Per EMS, patient was hypotensive 80s over 40s, responded to 1 L normal saline.  EMS also identified potential run of polymorphic V. tach on the monitor. ICD interrogation revealed 10 episodes of monomorphic VT with average -200 bpm between 7:36am-8:59am (duration 2-16 seconds), 4 of the 10 episodes were treated and terminated with one round of ATP, no ICD shock noted. Patient reports she has been c/o nausea/not eating much in the past 2 weeks but denies vomiting.      CT showed left thigh intramuscular hematoma. Labs showed hypokalemia with K of 3.2. Magnesium level was not drawn but was given 2grams of Magnesium this morning.     Allergies    No Known Allergies    Intolerances    	    MEDICATIONS:  aMIOdarone    Tablet 200 milliGRAM(s) Oral once a day  furosemide    Tablet 40 milliGRAM(s) Oral daily  metoprolol succinate 25mg QD  piperacillin/tazobactam IVPB.. 3.375 Gram(s) IV Intermittent every 8 hours  budesonide 160 MICROgram(s)/formoterol 4.5 MICROgram(s) Inhaler 2 Puff(s) Inhalation two times a day  pantoprazole    Tablet 40 milliGRAM(s) Oral before breakfast  polyethylene glycol 3350 17 Gram(s) Oral daily PRN  atorvastatin 40 milliGRAM(s) Oral at bedtime  potassium chloride    Tablet ER 40 milliEquivalent(s) Oral every 4 hours  potassium chloride  20 mEq/100 mL IVPB 20 milliEquivalent(s) IV Intermittent once      PAST MEDICAL & SURGICAL HISTORY:  Obese      Smoker      HTN (hypertension)      HLD (hyperlipidemia)      CAD (coronary artery disease)      CHF with cardiomyopathy      History of hip surgery          FAMILY HISTORY:  Family history of Alzheimer's disease (Father)    Family history of cancer (Mother)      SOCIAL HISTORY:          REVIEW OF SYSTEMS:  See HPI. Otherwise, 12 point ROS done and otherwise negative.    PHYSICAL EXAM:  T(C): 36.4 (05-07-24 @ 15:34), Max: 36.8 (05-07-24 @ 11:02)  HR: 77 (05-07-24 @ 15:34) (68 - 78)  BP: 125/49 (05-07-24 @ 15:34) (98/65 - 125/49)  RR: 16 (05-07-24 @ 15:34) (16 - 19)  SpO2: 99% (05-07-24 @ 15:34) (97% - 99%)  Wt(kg): --  I&O's Summary      Appearance: Normal	  HEENT: PERRL, EOMI	  Cardiovascular: Normal S1 S2, No JVD, No murmurs  Respiratory: Lungs clear to auscultation	  Psychiatry: A & O x 3, Mood & affect appropriate  Gastrointestinal: Soft, Non-tender, + BS	  Skin: No rashes, No ecchymoses, No cyanosis	  Neurologic: Grossly intact  Extremities: No clubbing, cyanosis or edema. Left thigh wrapped in ACE bandage.   Vascular: Peripheral pulses palpable 2+ bilaterally        LABS:	 	    CBC Full  -  ( 07 May 2024 09:51 )  WBC Count : 25.12 K/uL  Hemoglobin : 9.0 g/dL  Hematocrit : 28.7 %  Platelet Count - Automated : 283 K/uL  Mean Cell Volume : 85.7 fl  Mean Cell Hemoglobin : 26.9 pg  Mean Cell Hemoglobin Concentration : 31.4 gm/dL  Auto Neutrophil # : 22.68 K/uL  Auto Lymphocyte # : 1.33 K/uL  Auto Monocyte # : 0.88 K/uL  Auto Eosinophil # : 0.23 K/uL  Auto Basophil # : 0.00 K/uL  Auto Neutrophil % : 90.3 %  Auto Lymphocyte % : 5.3 %  Auto Monocyte % : 3.5 %  Auto Eosinophil % : 0.9 %  Auto Basophil % : 0.0 %    05-07    142  |  104  |  14  ----------------------------<  85  3.2<L>   |  19<L>  |  0.87    Ca    8.0<L>      07 May 2024 09:51    TPro  6.4  /  Alb  3.0<L>  /  TBili  0.7  /  DBili  x   /  AST  18  /  ALT  6<L>  /  AlkPhos  69  05-07    proBNP: 2627    Outpatient lab from 3/28/24: TSH 11.0, T4 1.3    TELEMETRY: SR 70-80's, frequent PVC (same morphology)    	    ECG: NSR at 83 bpm, IVCD, QRSd 144ms, QT/QTc 400/470ms	    Outpatient TTE (3/28/24): EF 40-45%, septum thicken (1.6cm), mod dilated LA, normal RA, mild MR, mild TR, no pericardial effusion.

## 2024-05-07 NOTE — H&P ADULT - ASSESSMENT
The patient is a 73y Female complaining of syncope.    Syncope likely from VT:    Tele  EP eval appreciated  On Amio  TTE    Lt thigh hematoma:    Trauma sx eval appreciated  Wrap  Serial CBC    DVT prophylaxis:    Venodyne due to Lt thigh hematoma

## 2024-05-07 NOTE — CONSULT NOTE ADULT - ASSESSMENT
72 y/o female w/ PMHx COPD, paroxysmal AF (on eliquis), HLD, HTN, PVD, ICM/CAD s/p PCI (has  of RCA), cardiac arrest s/p left-sided ICD (6/4/2019) complicated by infection and s/p R sided Medtronic single-chamber ICD ([place] in 9/23/2019), perforated gastric ulcer with pneumoperitoneum - sealed 11/26/23 on UGI series, prior admissions for VT storm with adjustment of medication and NIPS, prior ICD shock in the setting of recurrent monomorphic VT with unsuccessful ATP, VT ablation attempted 10/20/23 but aborted due to hemodynamically significant pericardial effusion with drain placement, recurrent VT s/p ICD shock, and afib w/ RVR s/p cardioversion 10/21/23 s/p pericardial window 10/28/23, admitted 2/2024 with ICD shocks for recurrent VT and underwent VT ablation on 2/13/24 by Dr. Hurt (off quinidine post ablation, on amio) who now brought in by EMS from home today after witnessed fall w/ presyncope w/ left thigh intramuscular hematoma. ICD interrogation revealed 10 episodes of monomorphic VT with average -200 bpm between 7:36am-8:59am (duration 2-16 seconds), 4 of the 10 episodes were treated and terminated with one round of ATP, no ICD shock noted.      Dx: Recurrent VT s/p ICD therapy    Recommendation:  - Increase amiodarone to 200mg TID to 10 gram load  - Stop metoprolol  - Start inderal 20mg TID  - Currently off AC due to left thigh intramuscular hematoma  - Supplement to maintain K >4.0 and Mg >2.0  - Continue telemetry monitor  - Plan discussed with Dr. Hurt and Medicine ACP.     SOO Comer NP-C  [phone]

## 2024-05-07 NOTE — ED ADULT NURSE NOTE - OBJECTIVE STATEMENT
Patient  is  alert  an d oriented x4. Color is  good  an d skin warm  to touch.  She  is  c/o generalized weakness, nausea, abdominal pain, dizziness, vomiting and diarrhea. Patient  had  a syncopal episode  this  morning.  Bruises  noted to her  left  outer thigh.  No swelling, deformity or  external rotation of  left  leg  observed.

## 2024-05-07 NOTE — ED PROVIDER NOTE - OBJECTIVE STATEMENT
Patient is a 73-year-old female brought in by EMS from home after witnessed fall.  Patient was attempting to walk with walker, endorsed that she felt "woozy" and fell over.  She struck her left costal margin on the edge of her dresser.  Denies head trauma.  Patient was walking with her walker when she fell    Medical history significant for coronary artery disease, on Plavix.    Per EMS, patient was hypotensive 80s over 40s, responded to 1 L normal saline.  EMS also identified potential run of polymorphic V. tach on the monitor.

## 2024-05-08 NOTE — PROGRESS NOTE ADULT - SUBJECTIVE AND OBJECTIVE BOX
Patient is a 73y old  Female who presents with a chief complaint of The patient is a 73y Female complaining of syncope. (08 May 2024 11:56)      SUBJECTIVE / OVERNIGHT EVENTS:    Events noted.  CONSTITUTIONAL: No fever,  or fatigue  RESPIRATORY: No cough, wheezing,  No shortness of breath  CARDIOVASCULAR: No chest pain, palpitations, dizziness, or leg swelling  GASTROINTESTINAL: No abdominal or epigastric pain.       MEDICATIONS  (STANDING):  atorvastatin 40 milliGRAM(s) Oral at bedtime  budesonide 160 MICROgram(s)/formoterol 4.5 MICROgram(s) Inhaler 2 Puff(s) Inhalation two times a day  furosemide    Tablet 40 milliGRAM(s) Oral daily  pantoprazole    Tablet 40 milliGRAM(s) Oral before breakfast  propranolol 20 milliGRAM(s) Oral three times a day  sotalol 40 milliGRAM(s) Oral every 12 hours    MEDICATIONS  (PRN):  polyethylene glycol 3350 17 Gram(s) Oral daily PRN for constipation        CAPILLARY BLOOD GLUCOSE        I&O's Summary      T(C): 36.8 (05-08-24 @ 15:48), Max: 36.8 (05-08-24 @ 12:42)  HR: 63 (05-08-24 @ 15:48) (62 - 76)  BP: 140/73 (05-08-24 @ 15:48) (103/62 - 140/73)  RR: 18 (05-08-24 @ 15:48) (18 - 18)  SpO2: 97% (05-08-24 @ 15:48) (92% - 97%)    PHYSICAL EXAM:    NECK: Supple, No JVD  CHEST/LUNG: Clear to auscultation bilaterally; No wheezing.  HEART: Regular rate and rhythm; No murmurs, rubs, or gallops  ABDOMEN: Soft, Nontender, Nondistended; Bowel sounds present  EXTREMITIES:   No edema  NEUROLOGY: AAO      LABS:                        8.2    21.72 )-----------( 335      ( 08 May 2024 12:55 )             26.8     05-08    140  |  107  |  13  ----------------------------<  106<H>  4.0   |  21<L>  |  0.91    Ca    8.6      08 May 2024 07:18  Mg     2.5     05-08    TPro  6.4  /  Alb  3.0<L>  /  TBili  0.7  /  DBili  x   /  AST  18  /  ALT  6<L>  /  AlkPhos  69  05-07    PT/INR - ( 07 May 2024 09:51 )   PT: 18.1 sec;   INR: 1.67 ratio         PTT - ( 07 May 2024 09:51 )  PTT:27.8 sec      Urinalysis Basic - ( 08 May 2024 07:18 )    Color: x / Appearance: x / SG: x / pH: x  Gluc: 106 mg/dL / Ketone: x  / Bili: x / Urobili: x   Blood: x / Protein: x / Nitrite: x   Leuk Esterase: x / RBC: x / WBC x   Sq Epi: x / Non Sq Epi: x / Bacteria: x      CAPILLARY BLOOD GLUCOSE            RADIOLOGY & ADDITIONAL TESTS:    Imaging Personally Reviewed:    Consultant(s) Notes Reviewed:      Care Discussed with Consultants/Other Providers:    Hermilo Dangelo MD, CMD, FACP    [id number] [place] [address]  Office Tel: [phone]  Cell: [phone]

## 2024-05-08 NOTE — PHYSICAL THERAPY INITIAL EVALUATION ADULT - ADDITIONAL COMMENTS
Patient lives in pvt house with spouse, who is available to assist at all times. 3  steps to enter. No steps inside.  Patient ambulated with rolling walker independent. pt owns rw, w/c, bed side comode, shower chair at home.

## 2024-05-08 NOTE — CONSULT NOTE ADULT - ASSESSMENT
EKG: Stable LBBB    Echo:   2/2024   1. Left ventricular cavity is moderately dilated. Left ventricular systolic function is normal with an ejection fraction of 58 % by Ahn's method of disks. Regional wall motion abnormalities present.   2. Basal inferoseptal segment, mid inferolateral segment, and basal inferior segment are abnormal.   3. Mildly enlarged right ventricular cavity size, wall thickness, and normal systolic function. Tricuspid annular plane systolic excursion (TAPSE) is 2.3 cm (normal >=1.7 cm).   4. The left atrium is severely dilated.   5. The right atrium is severely dilated.   6. No pericardial effusion seen.   7. Compared to the transthoracic echocardiogram performed on 10/5/2023, there have been no significant interval changes.    Cath Report:   2015  CORONARY VESSELS: The coronary circulation is right dominant.  LM:   --  Distal left main: There was a 20 % stenosis.  LAD:--  Proximal LAD: There was a 80 % stenosis.  CX:   --  Distal circumflex: Angiography showed mild atherosclerosis with  no flow limiting lesions.  RCA:   --  Mid RCA: There was a 100 % stenosis.  COMPLICATIONS: There were no complications.  DIAGNOSTIC RECOMMENDATIONS: Plan PCI of the LAD tomorrow. Pt with right groin hematoma which is reason for delay in PCI    Interpretation of Telemetry: Sinus rhythm, HR 60s    73F PMHx COPD, paroxysmal AF on AC, HLD, HTN, PVD, ICM/CAD s/p PCI, cardiac arrest s/p left-sided ICD (6/4/2019) complicated by infection and s/p R sided Medtronic single-chamber ICD ([place], 9/23/2019), perforated gastric ulcer c/b pneumoperitoneum - sealed 11/26/23 on UGI series, prior admissions for VT storm with adjustment of medication and NIPS, VT ablation attempted 10/20/23 but aborted 2/2 pericardial effusion with s/p drain placement s/p pericardial window presenting after witnessed fall. Patient reporting presyncopal symptoms when she went to the bathroom leading to fall. ICD interrogation showing multiple episodes VT ~1hr after fall. Trops, electrolytes grossly wnl. Likely vasovagal event given proximity to urination urge. Still given history would evaluate with TTE and obtain orthostatics.     Recommendations:   - TTE  - orthostatic VS  - antiarrhythmics per EP    All recommendations pending attending attestation.     Sindy Klein MD  PGY-4, Cardiology  Available on TEAMS    For all new consults  [email or web address]  Login: [email or web address]

## 2024-05-08 NOTE — PROGRESS NOTE ADULT - ASSESSMENT
The patient is a 73y Female complaining of syncope.    Syncope likely from VT:    Tele  Cardio eval appreciated  On Sotalol  TTE    Lt thigh hematoma:    Trauma sx eval appreciated  Hb stable    Leukocytosis:    ID f/up noted    DVT prophylaxis:    Venodyne due to Lt thigh hematoma

## 2024-05-08 NOTE — PROGRESS NOTE ADULT - ASSESSMENT
73F w/ a PMHx of CAD, CHF, HTN, and L ORIF (5-6 years ago) presents after a ground level fall. CT shows a left thigh hematoma.    Recommendations:  - No acute surgical intervention  - Continue trending cbc q6  - ACE wrap leg  - recommend holding dvt ppx and AC/AP until no active bleed  - syncope w/u  - pain control for chronic thigh/hip pain  - Monitor patient symptoms and LLE exam    Trauma Surgery  [id number] 73F w/ a PMHx of CAD, CHF, HTN, and L ORIF (5-6 years ago) presents after a ground level fall. CT shows a left thigh hematoma.    Recommendations:  - No acute surgical intervention  - Continue trending cbc q6  - ACE wrap leg and recommend patient lay on Left lateral thigh  - recommend holding dvt ppx and AC/AP until no active bleed  - syncope w/u  - pain control for chronic thigh/hip pain  - Monitor patient symptoms and LLE exam    Trauma Surgery  [id number] 73F w/ a PMHx of CAD, CHF, HTN, and L ORIF (5-6 years ago) presents after a ground level fall. CT shows a left thigh hematoma.    Recommendations:  - No acute surgical intervention  - Hgb stable  - ACE wrap leg and recommend patient lay on Left lateral thigh  - recommend holding dvt ppx and AC/AP until no active bleed  - syncope w/u  - pain control for chronic thigh/hip pain  - Monitor patient symptoms and LLE exam  - trauma surgery to sign off, call back with questions or concerns    Trauma Surgery  [id number]

## 2024-05-08 NOTE — PATIENT PROFILE ADULT - NSPROPTRIGHTBILLOFRIGHTS_GEN_A_NUR
[place] documentation:

I have reviewed and agree with all interventions, assessments performed and documented by 
Marizol GONZALEZ.



[place] Medication Administration:

For this medication-pass time frame, all medication were reviewed, dispensed, administered 
and documented per hospital policy by Marizol GONZALEZ. patient

## 2024-05-08 NOTE — PATIENT PROFILE ADULT - HISTORY OF COVID-19 VACCINATION
Yes Performing Laboratory: [id number] Expected Date Of Service: 02/21/2023 Billing Type: Third-Party Bill Bill For Surgical Tray: no Lab Facility: [id number]

## 2024-05-08 NOTE — PROGRESS NOTE ADULT - ASSESSMENT
72 y/o female w/ PMHx COPD, paroxysmal AF (on eliquis), HLD, HTN, PVD, ICM/CAD s/p PCI (has  of RCA), cardiac arrest s/p left-sided ICD (6/4/2019) complicated by infection and s/p R sided Medtronic single-chamber ICD ([place] in 9/23/2019), perforated gastric ulcer with pneumoperitoneum - sealed 11/26/23 on UGI series, prior admissions for VT storm with adjustment of medication and NIPS, prior ICD shock in the setting of recurrent monomorphic VT with unsuccessful ATP, VT ablation attempted 10/20/23 but aborted due to hemodynamically significant pericardial effusion with drain placement, recurrent VT s/p ICD shock, and afib w/ RVR s/p cardioversion 10/21/23 s/p pericardial window 10/28/23, admitted 2/2024 with ICD shocks for recurrent VT and underwent VT ablation on 2/13/24 by Dr. Hurt (off quinidine post ablation, on amio) who now brought in by EMS from home today after witnessed fall w/ presyncope w/ left thigh intramuscular hematoma. ICD interrogation revealed 10 episodes of monomorphic VT with average -200 bpm between 7:36am-8:59am (duration 2-16 seconds), 4 of the 10 episodes were treated and terminated with one round of ATP, no ICD shock noted.      Dx: Recurrent VT s/p ICD therapy    Recommendation:  - Increase amiodarone to 200mg TID to 10 gram load  - Stop metoprolol  - Start inderal 20mg TID  - Currently off AC due to left thigh intramuscular hematoma  - Supplement to maintain K >4.0 and Mg >2.0  - Continue telemetry monitor  - Plan discussed with Dr. Hurt and Medicine ACP.    72 y/o female w/ PMHx COPD, paroxysmal AF (on eliquis), HLD, HTN, PVD, ICM/CAD s/p PCI (has  of RCA), cardiac arrest s/p left-sided ICD (6/4/2019) complicated by infection and s/p R sided Medtronic single-chamber ICD ([place] in 9/23/2019), perforated gastric ulcer with pneumoperitoneum - sealed 11/26/23 on UGI series, prior admissions for VT storm with adjustment of medication and NIPS, prior ICD shock in the setting of recurrent monomorphic VT with unsuccessful ATP, VT ablation attempted 10/20/23 but aborted due to hemodynamically significant pericardial effusion with drain placement, recurrent VT s/p ICD shock, and afib w/ RVR s/p cardioversion 10/21/23 s/p pericardial window 10/28/23, admitted 2/2024 with ICD shocks for recurrent VT and underwent VT ablation on 2/13/24 by Dr. Hurt (off quinidine post ablation, on amio) who now brought in by EMS from home on 5/7 after witnessed fall due to hypotension (BP 80/40's, responded well to 1L NS) with left thigh intramuscular hematoma. ICD interrogation revealed 10 episodes of monomorphic VT with average -200 bpm between 7:36am-8:59am (duration 2-16 seconds), 4 of the 10 episodes were treated and terminated with one round of ATP, no ICD shock noted.      Dx: Recurrent VT s/p ICD therapy    Recommendation:  - Her fall was related to dehydration and not due to VT as VT occurred ~1 hour after she fell.   - Spoke to her  today, amiodarone was discontinued on 4/4/24 due to TSH of 11.0  - Please repeat TSH today  - Stop amiodarone  - Start sotalol 40mg Q12hr (1st dose tonight). Obtain ECG 2 hours after each dose for initial 5 doses. Hold sotalol for QTc >500ms. (baseline QTc 470ms in the setting of IVCD, manually measured)   - Continue inderal 20mg TID for now, may have to adjust dose once there is sotalol on board.   - Currently off AC due to left thigh intramuscular hematoma  - Supplement to maintain K >4.0 and Mg >2.0  - Continue telemetry monitor  - Recommend DVT prophylaxis with sequential compression device, please check with trauma team if contraindicated due to left thigh intramuscular hematoma.  - Plan discussed with Dr. Hurt and Medicine ACP.     SOO Comer NP-C  [phone]

## 2024-05-08 NOTE — PHYSICAL THERAPY INITIAL EVALUATION ADULT - PERTINENT HX OF CURRENT PROBLEM, REHAB EVAL
Pt is 73F admitted 5/7/24 PMHx brought in by EMS from home after witnessed fall.  Patient was attempting to walk with walker, endorsed that she felt "woozy" and fell over.  She struck her left costal margin on the edge of her dresser.  Denies head trauma.Patient was walking with her walker when she fell. Per EMS, patient was hypotensive 80s over 40s, responded to 1 L normal saline.  EMS also identified potential run of polymorphic V. tach on the monitor.      CT ABDOMEN/PELVIS: Left thigh intramuscular hematoma with question of active bleeding. BRAIN CT :No intracranial hemorrhage. CERVICAL SPINE CT: No acute fracture or traumatic subluxation. XRAY CHEST: clear lungs. XRAY HIP WITH PELVIS: No evidence of acute fracture or dislocation. Pt is 73F admitted 5/7/24 PMHx brought in by EMS from home after witnessed fall.  Patient was attempting to walk with walker, endorsed that she felt "woozy" and fell over.  She struck her left costal margin on the edge of her dresser.  Denies head trauma.Patient was walking with her walker when she fell. Per EMS, patient was hypotensive 80s over 40s, responded to 1 L normal saline.  EMS also identified potential run of polymorphic V. tach on the monitor.      CT ABDOMEN/PELVIS: Left thigh intramuscular hematoma with question of active bleeding. BRAIN CT :No intracranial hemorrhage. CERVICAL SPINE CT: No acute fracture or traumatic subluxation. XRAY CHEST: clear lungs. XRAY HIP WITH PELVIS: No evidence of acute fracture or dislocation.  Per IM note 5/9- L thigh hematoma- stable.

## 2024-05-08 NOTE — CONSULT NOTE ADULT - SUBJECTIVE AND OBJECTIVE BOX
CARDIOLOGY FELLOW CONSULT NOTE    HPI:  73F PMHx COPD, paroxysmal AF on AC, HLD, HTN, PVD, ICM/CAD s/p PCI, cardiac arrest s/p left-sided ICD (6/4/2019) complicated by infection and s/p R sided Medtronic single-chamber ICD ([place], 9/23/2019), perforated gastric ulcer c/b pneumoperitoneum - sealed 11/26/23 on UGI series, prior admissions for VT storm with adjustment of medication and NIPS, VT ablation attempted 10/20/23 but aborted 2/2 pericardial effusion with s/p drain placement s/p pericardial window presenting after witnessed fall. Patient states she got out of bed around 6:30am, was attempting to walk with walker, endorsed that she felt acute onset of "woozy", room spinning and fell over.  She struck her left costal margin on the edge of her dresser. Denies head trauma. Per EMS, patient was hypotensive 80s over 40s, responded to 1 L normal saline.  EMS also identified potential run of polymorphic V. tach on the monitor. ICD interrogation revealed 10 episodes of monomorphic VT with average -200 bpm between 7:36am-8:59am (duration 2-16 seconds), 4 of the 10 episodes were treated and terminated with one round of ATP, no ICD shock noted. Patient reports she has been c/o nausea/not eating much in the past 2 weeks but denies vomiting.      CT showed left thigh intramuscular hematoma.       PMHx:     Obese    Smoker    Obesity    HTN (hypertension)    HLD (hyperlipidemia)    CAD (coronary artery disease)    CHF with cardiomyopathy        PSHx:   Pericaridal Window    History of hip surgery        Allergies:  No Known Allergies      Home Meds:  · 	MiraLax oral powder for reconstitution: 17 gram(s) orally once a day as needed for  constipation  · 	amiodarone 200 mg oral tablet: 1 tab(s) orally once a day  · 	apixaban 5 mg oral tablet: 1 tab(s) orally every 12 hours  · 	Lasix 40 mg oral tablet: 1 tab(s) orally once a day  · 	metoprolol succinate 25 mg oral tablet, extended release: 1 tab(s) orally once a day  · 	Protonix 40 mg oral delayed release tablet: 1 tab(s) orally once a day  · 	nystatin 100,000 units/g topical powder: Apply topically to affected area once a day bilateral groins  · 	atorvastatin 40 mg oral tablet: 1 tab(s) orally once a day (at bedtime)  · 	Symbicort 160 mcg-4.5 mcg/inh inhalation aerosol: 2 puff(s) inhaled 2 times a day  · 	D3 50 mcg (2000 intl units) oral capsule: 1 cap(s) orally once a day      Current Medications:   atorvastatin 40 milliGRAM(s) Oral at bedtime  budesonide 160 MICROgram(s)/formoterol 4.5 MICROgram(s) Inhaler 2 Puff(s) Inhalation two times a day  furosemide    Tablet 40 milliGRAM(s) Oral daily  pantoprazole    Tablet 40 milliGRAM(s) Oral before breakfast  polyethylene glycol 3350 17 Gram(s) Oral daily PRN  propranolol 20 milliGRAM(s) Oral three times a day      FAMILY HISTORY:  Family history of Alzheimer's disease (Father)    Family history of cancer (Mother)        Social History: Unchanged    REVIEW OF SYSTEMS:  CONSTITUTIONAL: No weakness, fevers or chills  EYES/ENT: No visual changes;  No dysphagia  NECK: No pain or stiffness  RESPIRATORY: No cough, wheezing, hemoptysis; No shortness of breath  CARDIOVASCULAR: No chest pain or palpitations; No lower extremity edema  GASTROINTESTINAL: No abdominal or epigastric pain. No nausea, vomiting, or hematemesis; No diarrhea or constipation. No melena or hematochezia.  BACK: No back pain  GENITOURINARY: No dysuria, frequency or hematuria  NEUROLOGICAL: No numbness or weakness  SKIN: No itching, burning, rashes, or lesions   All other review of systems is negative unless indicated above.    Physical Exam:  T(F): 98.1 (05-08), Max: 98.4 (05-07)  HR: 76 (05-08) (67 - 79)  BP: 109/53 (05-08) (109/53 - 136/73)  RR: 18 (05-08)  SpO2: 92% (05-08)  GEN: comfortable appearing, lying in bed in NAD  HEENT: NCAT, MMM  CV: Regular S1, S2, no m/r/g  RESP: CTAB  ABD: Soft, NTND, +BS  EXT: No LE edema, WWP, pulses palpable throughout  NEURO: No focal deficits, AOx3  SKIN:  No rashes    Labs: Personally reviewed                        7.9    21.55 )-----------( 335      ( 08 May 2024 07:18 )             26.0     05-08    140  |  107  |  13  ----------------------------<  106<H>  4.0   |  21<L>  |  0.91    Ca    8.6      08 May 2024 07:18  Mg     2.5     05-08    TPro  6.4  /  Alb  3.0<L>  /  TBili  0.7  /  DBili  x   /  AST  18  /  ALT  6<L>  /  AlkPhos  69  05-07    PT/INR - ( 07 May 2024 09:51 )   PT: 18.1 sec;   INR: 1.67 ratio         PTT - ( 07 May 2024 09:51 )  PTT:27.8 sec    CARDIAC MARKERS ( 07 May 2024 20:59 )  37 ng/L / x     / x     / x     / x     / x      CARDIAC MARKERS ( 07 May 2024 09:51 )  36 ng/L / x     / x     / x     / x     / x

## 2024-05-08 NOTE — PROGRESS NOTE ADULT - SUBJECTIVE AND OBJECTIVE BOX
Overnight events:   - Hgb/Hct downtrending (9/28.7 -> 8.3/26.5 -> 7.9/26)    SUBJECTIVE:  Patient was seen and examined on AM rounds. Denies new complaints.    OBJECTIVE:  Vital Signs Last 24 Hrs  T(C): 36.7 (08 May 2024 04:17), Max: 36.9 (07 May 2024 18:30)  T(F): 98.1 (08 May 2024 04:17), Max: 98.4 (07 May 2024 18:30)  HR: 76 (08 May 2024 04:17) (67 - 79)  BP: 109/53 (08 May 2024 04:17) (109/53 - 136/73)  BP(mean): 68 (07 May 2024 15:34) (68 - 68)  RR: 18 (08 May 2024 04:17) (16 - 18)  SpO2: 92% (08 May 2024 04:17) (92% - 99%)    Parameters below as of 08 May 2024 04:17  Patient On (Oxygen Delivery Method): room air    Physical Examination:  GEN: NAD, resting quietly, AOx3  PULM: symmetric chest rise bilaterally, no increased WOB  ABD: soft, nontender, nondistended, no rebound or guarding  EXTR: proximal and lateral LLE w/ unchanged central area of ecchymosis, but with more surrounding petechiae, still nontender, and soft, motor/sensation intact      LABS:                        7.9    21.55 )-----------( 335      ( 08 May 2024 07:18 )             26.0       05-08    140  |  107  |  13  ----------------------------<  106<H>  4.0   |  21<L>  |  0.91    Ca    8.6      08 May 2024 07:18  Mg     2.5     05-08    TPro  6.4  /  Alb  3.0<L>  /  TBili  0.7  /  DBili  x   /  AST  18  /  ALT  6<L>  /  AlkPhos  69  05-07       Overnight events:   - Hgb/Hct downtrending (9/28.7 -> 8.3/26.5 -> 7.9/26-> 8.2/26.8)    SUBJECTIVE:  Patient was seen and examined on AM rounds. Denies new complaints.    OBJECTIVE:  Vital Signs Last 24 Hrs  T(C): 36.7 (08 May 2024 04:17), Max: 36.9 (07 May 2024 18:30)  T(F): 98.1 (08 May 2024 04:17), Max: 98.4 (07 May 2024 18:30)  HR: 76 (08 May 2024 04:17) (67 - 79)  BP: 109/53 (08 May 2024 04:17) (109/53 - 136/73)  BP(mean): 68 (07 May 2024 15:34) (68 - 68)  RR: 18 (08 May 2024 04:17) (16 - 18)  SpO2: 92% (08 May 2024 04:17) (92% - 99%)    Parameters below as of 08 May 2024 04:17  Patient On (Oxygen Delivery Method): room air    Physical Examination:  GEN: NAD, resting quietly, AOx3  PULM: symmetric chest rise bilaterally, no increased WOB  ABD: soft, nontender, nondistended, no rebound or guarding  EXTR: proximal and lateral LLE w/ unchanged central area of ecchymosis, but with more surrounding petechiae, still nontender, and soft, motor/sensation intact      LABS:                        7.9    21.55 )-----------( 335      ( 08 May 2024 07:18 )             26.0       05-08    140  |  107  |  13  ----------------------------<  106<H>  4.0   |  21<L>  |  0.91    Ca    8.6      08 May 2024 07:18  Mg     2.5     05-08    TPro  6.4  /  Alb  3.0<L>  /  TBili  0.7  /  DBili  x   /  AST  18  /  ALT  6<L>  /  AlkPhos  69  05-07

## 2024-05-08 NOTE — PROGRESS NOTE ADULT - SUBJECTIVE AND OBJECTIVE BOX
24H hour events: Pt without complaint, no acute events overnight, Tele: SR in the 60-70's, occasional PVC (same morphology)     MEDICATIONS:  amiodarone 200 milliGRAM(s) Oral three times a day  furosemide    Tablet 40 milliGRAM(s) Oral daily  propranolol 20 milliGRAM(s) Oral three times a day  budesonide 160 MICROgram(s)/formoterol 4.5 MICROgram(s) Inhaler 2 Puff(s) Inhalation two times a day  pantoprazole    Tablet 40 milliGRAM(s) Oral before breakfast  polyethylene glycol 3350 17 Gram(s) Oral daily PRN  atorvastatin 40 milliGRAM(s) Oral at bedtime      REVIEW OF SYSTEMS:  Complete 12 point ROS negative.    PHYSICAL EXAM:  T(C): 36.7 (05-08-24 @ 04:17), Max: 36.9 (05-07-24 @ 18:30)  HR: 76 (05-08-24 @ 04:17) (67 - 79)  BP: 109/53 (05-08-24 @ 04:17) (109/53 - 136/73)  RR: 18 (05-08-24 @ 04:17) (16 - 18)  SpO2: 92% (05-08-24 @ 04:17) (92% - 99%)  Wt(kg): --  I&O's Summary      Appearance: Normal	  HEENT: PERRL, EOMI	  Cardiovascular: Normal S1 S2, RRR, No JVD, No murmurs  Respiratory: Lungs clear to auscultation	  Psychiatry: A & O x 3, Mood & affect appropriate  Gastrointestinal: Soft, Non-tender, + BS	  Skin: + ecchymosis on left hip area   Neurologic: Grossly intact  Extremities: No clubbing, cyanosis or edema. Left upper leg wrapped in ACE bandage  Vascular: Peripheral pulses palpable 2+ bilaterally        LABS:	 	    CBC Full  -  ( 08 May 2024 07:18 )  WBC Count : 21.55 K/uL  Hemoglobin : 7.9 g/dL  Hematocrit : 26.0 %  Platelet Count - Automated : 335 K/uL  Mean Cell Volume : 85.8 fl  Mean Cell Hemoglobin : 26.1 pg  Mean Cell Hemoglobin Concentration : 30.4 gm/dL  Auto Neutrophil # : x  Auto Lymphocyte # : x  Auto Monocyte # : x  Auto Eosinophil # : x  Auto Basophil # : x  Auto Neutrophil % : x  Auto Lymphocyte % : x  Auto Monocyte % : x  Auto Eosinophil % : x  Auto Basophil % : x    05-08    140  |  107  |  13  ----------------------------<  106<H>  4.0   |  21<L>  |  0.91  05-07    142  |  104  |  14  ----------------------------<  85  3.2<L>   |  19<L>  |  0.87    Ca    8.6      08 May 2024 07:18  Ca    8.0<L>      07 May 2024 09:51  Mg     2.5     05-08  Mg     1.8     05-07    TPro  6.4  /  Alb  3.0<L>  /  TBili  0.7  /  DBili  x   /  AST  18  /  ALT  6<L>  /  AlkPhos  69  05-07    TSH (3/28/24): 11.0  T4 (3/28/24): 1.3      TELEMETRY: SR in the 60-70's, occasional PVC (same morphology)  	      Outpatient TTE (3/28/24): EF 40-45%, septum thicken (1.6cm), mod dilated LA, normal RA, mild MR, mild TR, no pericardial effusion.

## 2024-05-09 NOTE — PROGRESS NOTE ADULT - ASSESSMENT
74 y/o female w/ PMHx COPD, paroxysmal AF (on eliquis), HLD, HTN, PVD, ICM/CAD s/p PCI (has  of RCA), cardiac arrest s/p left-sided ICD (6/4/2019) complicated by infection and s/p R sided Medtronic single-chamber ICD ([place] in 9/23/2019), perforated gastric ulcer with pneumoperitoneum - sealed 11/26/23 on UGI series, prior admissions for VT storm with adjustment of medication and NIPS, prior ICD shock in the setting of recurrent monomorphic VT with unsuccessful ATP, VT ablation attempted 10/20/23 but aborted due to hemodynamically significant pericardial effusion with drain placement, recurrent VT s/p ICD shock, and afib w/ RVR s/p cardioversion 10/21/23 s/p pericardial window 10/28/23, admitted 2/2024 with ICD shocks for recurrent VT and underwent VT ablation on 2/13/24 by Dr. Hurt (off quinidine post ablation, on amio) who now brought in by EMS from home today after witnessed fall w/ presyncope w/ left thigh intramuscular hematoma. ICD interrogation revealed 10 episodes of monomorphic VT with average -200 bpm between 7:36am-8:59am (duration 2-16 seconds), 4 of the 10 episodes were treated and terminated with one round of ATP, no ICD shock noted.      Dx: Recurrent VT s/p ICD therapy    Recommendation:  - Her fall was related to dehydration and not due to VT as VT occurred ~1 hour after she fell.   - Amiodarone was discontinued on 4/4/24 due to TSH of 11. Repeat TSH 5.85  - Continue sotalol 40mg Q12hr. Obtain ECG 2 hours after each dose for initial 5 doses. Baseline QTc 470ms in the setting of IVCD. QTc after second dose measuring ~515 ms. Per Dr. Hurt, OK to continue current dosing given patient has an ICD. Continue to obtain ECG 2 hours post administration of 1st five doses for careful Qtc monitoring  - D/C propranolol  - Currently off AC due to left thigh intramuscular hematoma  - Supplement to maintain K >4.0 and Mg >2.0  - Continue telemetry monitor  - Recommend DVT prophylaxis with sequential compression device, please check with trauma team if contraindicated due to left thigh intramuscular hematoma.    Plan discussed with Dr. Hurt.

## 2024-05-09 NOTE — PHARMACOTHERAPY INTERVENTION NOTE - COMMENTS
Confirmed home medications with patient and pharmacy, updated in Outpatient Medication Review.    Home Medications:  amiodarone 200 mg oral tablet: 1 tab(s) orally once a day  apixaban 5 mg oral tablet: 1 tab(s) orally every 12 hours  atorvastatin 40 mg oral tablet: 1 tab(s) orally once a day (at bedtime)  D3 50 mcg (2000 intl units) oral capsule: 1 cap(s) orally once a day  Lasix 40 mg oral tablet: 1 tab(s) orally once a day  metoprolol succinate 25 mg oral tablet, extended release: 1 tab(s) orally once a day  MiraLax oral powder for reconstitution: 17 gram(s) orally once a day as needed for  constipation  nystatin 100,000 units/g topical powder: Apply topically to affected area once a day bilateral groins  Protonix 40 mg oral delayed release tablet: 1 tab(s) orally once a day  Symbicort 160 mcg-4.5 mcg/inh inhalation aerosol: 2 puff(s) inhaled 2 times a day    Caryl Gaffney PharmD  Transitions of Care Pharmacist  Available on [employer] Teams  Spectra #[phone]

## 2024-05-09 NOTE — PROGRESS NOTE ADULT - ASSESSMENT
73f with anemia  copd, former smoker, atrial fib, cad, hfref, hyperlipid, hypertension recurrent vt , s/p ablation 2/13,  icd, presents with witnessed fall.   hypotension - improved  left thigh hematoma  nausea vomiting diarrhea  leukocytosis - r/o infection v reactive     plan  wbc trending down  afebrile  nontoxic  exam nonfocal  ct scan chest - no pna  ct abd no abscess, no diverticulitis , no free air  no gu symptoms  ua unimpressive   urine cx ecoli - colonized as pt without symptoms   blood cx neg to date     monitor wbc- down  check icd  lle keep elevated and local care serial exam and trend h/h    infectious work up negative to date   monitor off antibx

## 2024-05-09 NOTE — DISCHARGE NOTE NURSING/CASE MANAGEMENT/SOCIAL WORK - NSDCPEEMAIL_GEN_ALL_CORE
[facility] for Tobacco Control email [email or web address]

## 2024-05-09 NOTE — DISCHARGE NOTE NURSING/CASE MANAGEMENT/SOCIAL WORK - NSDCVIVACCINE_GEN_ALL_CORE_FT
influenza, injectable, quadrivalent, preservative free; 24-Jan-2015 06:54; [place], Auto-process (IS); [employer]; [id number]; IntraMuscular; Deltoid Left.; 0.5 milliLiter(s); VIS (VIS Published: 19-Aug-2014, VIS Presented: 24-Jan-2015);

## 2024-05-09 NOTE — PROGRESS NOTE ADULT - SUBJECTIVE AND OBJECTIVE BOX
24H hour events: No acute events overnight. Currently on sotalol    MEDICATIONS:  furosemide    Tablet 40 milliGRAM(s) Oral daily  sotalol 40 milliGRAM(s) Oral every 12 hours  budesonide 160 MICROgram(s)/formoterol 4.5 MICROgram(s) Inhaler 2 Puff(s) Inhalation two times a day  pantoprazole    Tablet 40 milliGRAM(s) Oral before breakfast  polyethylene glycol 3350 17 Gram(s) Oral daily PRN  atorvastatin 40 milliGRAM(s) Oral at bedtime    REVIEW OF SYSTEMS:  See HPI, otherwise ROS negative.    PHYSICAL EXAM:  T(C): 36.9 (05-09-24 @ 05:14), Max: 37.5 (05-08-24 @ 20:33)  HR: 63 (05-09-24 @ 05:14) (57 - 66)  BP: 123/66 (05-09-24 @ 05:14) (103/62 - 140/73)  RR: 18 (05-09-24 @ 05:14) (18 - 18)  SpO2: 96% (05-09-24 @ 05:14) (92% - 97%)  Wt(kg): --  I&O's Summary    08 May 2024 07:01  -  09 May 2024 07:00  --------------------------------------------------------  IN: 120 mL / OUT: 200 mL / NET: -80 mL    Appearance: Alert. NAD	  HEENT:   NC/AT	  Cardiovascular: +S1S2  Respiratory: CTA B/L	  Psychiatry: A & O x 3  Gastrointestinal:  Soft, NT  Neurologic: Non-focal  Extremities: No edema BLE  Vascular: Peripheral pulses palpable 2+ bilaterally    LABS:	 	  CBC Full  -  ( 09 May 2024 06:54 )  WBC Count : 16.88 K/uL  Hemoglobin : 7.7 g/dL  Hematocrit : 26.0 %  Platelet Count - Automated : 304 K/uL  Mean Cell Volume : 89.3 fl  Mean Cell Hemoglobin : 26.5 pg  Mean Cell Hemoglobin Concentration : 29.6 gm/dL  Auto Neutrophil # : x  Auto Lymphocyte # : x  Auto Monocyte # : x  Auto Eosinophil # : x  Auto Basophil # : x  Auto Neutrophil % : x  Auto Lymphocyte % : x  Auto Monocyte % : x  Auto Eosinophil % : x  Auto Basophil % : x    05-09    138  |  105  |  13  ----------------------------<  96  3.5   |  21<L>  |  0.85  05-08    140  |  107  |  13  ----------------------------<  106<H>  4.0   |  21<L>  |  0.91    Ca    8.5      09 May 2024 06:53  Ca    8.6      08 May 2024 07:18  Mg     2.5     05-08  Mg     1.8     05-07    TPro  6.4  /  Alb  3.0<L>  /  TBili  0.7  /  DBili  x   /  AST  18  /  ALT  6<L>  /  AlkPhos  69  05-07    TSH: 5.85    TELEMETRY: SR 60s with occasional PVCs    Outpatient TTE (3/28/24): EF 40-45%, septum thicken (1.6cm), mod dilated LA, normal RA, mild MR, mild TR, no pericardial effusion.

## 2024-05-09 NOTE — DISCHARGE NOTE NURSING/CASE MANAGEMENT/SOCIAL WORK - NSDCPEWEB_GEN_ALL_CORE
Pt notified via [address] [facility] for Tobacco Control website --- [email or web address] --- [email or web address]

## 2024-05-09 NOTE — PROGRESS NOTE ADULT - ASSESSMENT
The patient is a 73y Female complaining of syncope.    Syncope likely from VT:    Tele  Cardio eval appreciated  On Sotalol  TTE    Lt thigh hematoma:    Hb stable    Leukocytosis:    ID f/up noted    Epi pain:    GI eval   RQU sono    DVT prophylaxis:    Venodyne due to Lt thigh hematoma     The patient is a 73y Female complaining of syncope.    Syncope likely from VT:    Tele  Cardio eval appreciated  On Sotalol  TTE: Limited/No Pericardial effusion    Lt thigh hematoma:    Hb stable    Leukocytosis:    ID f/up noted    Epi pain:    GI eval   RQU sono    DVT prophylaxis:    Venodyne due to Lt thigh hematoma

## 2024-05-09 NOTE — PROGRESS NOTE ADULT - SUBJECTIVE AND OBJECTIVE BOX
Patient is a 73y old  Female who presents with a chief complaint of The patient is a 73y Female complaining of syncope. (09 May 2024 15:28)      SUBJECTIVE / OVERNIGHT EVENTS:    Events noted.  CONSTITUTIONAL: No fever,  or fatigue  RESPIRATORY: No cough, wheezing,  No shortness of breath  CARDIOVASCULAR: No chest pain, palpitations, dizziness, or leg swelling  GASTROINTESTINAL: Epigastric pain  NEUROLOGICAL: No headache    MEDICATIONS  (STANDING):  atorvastatin 40 milliGRAM(s) Oral at bedtime  budesonide 160 MICROgram(s)/formoterol 4.5 MICROgram(s) Inhaler 2 Puff(s) Inhalation two times a day  furosemide    Tablet 40 milliGRAM(s) Oral daily  pantoprazole    Tablet 40 milliGRAM(s) Oral before breakfast  sotalol 40 milliGRAM(s) Oral every 12 hours    MEDICATIONS  (PRN):  polyethylene glycol 3350 17 Gram(s) Oral daily PRN for constipation        CAPILLARY BLOOD GLUCOSE        I&O's Summary    08 May 2024 07:01  -  09 May 2024 07:00  --------------------------------------------------------  IN: 120 mL / OUT: 200 mL / NET: -80 mL    09 May 2024 07:01  -  10 May 2024 00:20  --------------------------------------------------------  IN: 600 mL / OUT: 850 mL / NET: -250 mL        T(C): 36.7 (05-09-24 @ 21:42), Max: 36.9 (05-09-24 @ 05:14)  HR: 66 (05-09-24 @ 21:42) (63 - 66)  BP: 132/68 (05-09-24 @ 21:42) (107/44 - 132/68)  RR: 18 (05-09-24 @ 21:42) (18 - 18)  SpO2: 98% (05-09-24 @ 21:42) (96% - 98%)    PHYSICAL EXAM:    NECK: Supple, No JVD  CHEST/LUNG: Clear to auscultation bilaterally; No wheezing.  HEART: Regular rate and rhythm; No murmurs, rubs, or gallops  ABDOMEN: Soft, Nontender, Mild tenderness in epi  EXTREMITIES:   No edema  NEUROLOGY: AAO X 3      LABS:                        8.4    17.98 )-----------( 337      ( 09 May 2024 16:46 )             26.2     05-09    138  |  105  |  13  ----------------------------<  96  3.5   |  21<L>  |  0.85    Ca    8.5      09 May 2024 06:53  Mg     2.0     05-09            Urinalysis Basic - ( 09 May 2024 06:53 )    Color: x / Appearance: x / SG: x / pH: x  Gluc: 96 mg/dL / Ketone: x  / Bili: x / Urobili: x   Blood: x / Protein: x / Nitrite: x   Leuk Esterase: x / RBC: x / WBC x   Sq Epi: x / Non Sq Epi: x / Bacteria: x      CAPILLARY BLOOD GLUCOSE            RADIOLOGY & ADDITIONAL TESTS:    Imaging Personally Reviewed:    Consultant(s) Notes Reviewed:      Care Discussed with Consultants/Other Providers:    Hermilo Dangelo MD, CMD, FACP    [address] [place] [address]  Office Tel: [phone]  Cell: [phone]

## 2024-05-09 NOTE — PROGRESS NOTE ADULT - SUBJECTIVE AND OBJECTIVE BOX
[facility], Division of Infectious Diseases  ELVIS Coles Y. Patel, S. Shah, G. Amor  [phone]  after hours and weekends [phone]    Name: AUSTIN LEE  Age: 73y  Gender: Female  MRN: [id number]    Interval History--  Notes reviewed  c/o dyspepsia  no cough, no diarrhea, no vomiting  no dysuria, no sob,       Allergies    No Known Allergies    Intolerances        Medications--  Antibiotics:    Immunologic:    Other:  atorvastatin  budesonide 160 MICROgram(s)/formoterol 4.5 MICROgram(s) Inhaler  furosemide    Tablet  pantoprazole    Tablet  polyethylene glycol 3350 PRN  sotalol      Review of Systems--  A 10-point review of systems was obtained.     Pertinent positives and negatives--  Constitutional: No fevers. No Chills. No Rigors.   Cardiovascular: No chest pain. No palpitations.  Respiratory: No shortness of breath. No cough.  Gastrointestinal: + nausea or vomiting. No diarrhea or constipation.   Psychiatric: Pleasant. Appropriate affect.    Review of systems otherwise negative except as previously noted.    Physical Examination--  Vital Signs: T(F): 97.8 (05-09-24 @ 11:52), Max: 99.5 (05-08-24 @ 20:33)  HR: 63 (05-09-24 @ 11:52)  BP: 107/44 (05-09-24 @ 11:52)  RR: 18 (05-09-24 @ 11:52)  SpO2: 98% (05-09-24 @ 11:52)  Wt(kg): --  General: Nontoxic-appearing Female in no acute distress.  HEENT: AT/NC.   Neck: Not rigid. No sense of mass.  Nodes: None palpable.  Lungs: Clear bilaterally without rales, wheezing or rhonchi  Heart: Regular rate and rhythm.a1a2  Abdomen: Bowel sounds present and normoactive. Soft. Nondistended. Nontender  Extremities: No cyanosis or clubbing. trace edema.   Skin: Warm. Dry. Good turgor. No rash. No vasculitic stigmata.  Psychiatric: Appropriate affect and mood for situation.         Laboratory Studies--  CBC                        7.7    16.88 )-----------( 304      ( 09 May 2024 06:54 )             26.0       Chemistries  05-09    138  |  105  |  13  ----------------------------<  96  3.5   |  21<L>  |  0.85    Ca    8.5      09 May 2024 06:53  Mg     2.0     05-09        Culture Data    Culture - Urine (collected 07 May 2024 13:11)  Source: Clean Catch Clean Catch (Midstream)  Preliminary Report (08 May 2024 15:29):    50,000 - 99,000 CFU/mL Escherichia coli    Culture - Blood (collected 07 May 2024 10:38)  Source: .Blood Blood-Venous  Preliminary Report (09 May 2024 15:01):    No growth at 48 Hours    Culture - Blood (collected 07 May 2024 09:30)  Source: .Blood Blood-Peripheral  Preliminary Report (09 May 2024 15:01):    No growth at 48 Hours

## 2024-05-09 NOTE — DISCHARGE NOTE NURSING/CASE MANAGEMENT/SOCIAL WORK - PATIENT PORTAL LINK FT
Patient is a 40 y.o. female presenting with female genitourinary complaint. The history is provided by the patient. Vaginal Pain    This is a new problem. The current episode started more than 2 days ago. The problem occurs constantly. The problem has not changed since onset. She is not pregnant. Associated symptoms include genital burning and genital itching. Pertinent negatives include no fever, no abdominal swelling, no abdominal pain, no diarrhea, no nausea, no vomiting, no dysuria, no frequency and no genital lesions. Past Medical History:   Diagnosis Date    Gastrointestinal disorder     GERD    Other ill-defined conditions(799.89)     anemia       Past Surgical History:   Procedure Laterality Date    HX  SECTION           No family history on file. Social History     Social History    Marital status: SINGLE     Spouse name: N/A    Number of children: N/A    Years of education: N/A     Occupational History    Not on file. Social History Main Topics    Smoking status: Never Smoker    Smokeless tobacco: Not on file    Alcohol use No    Drug use: No    Sexual activity: Not on file     Other Topics Concern    Not on file     Social History Narrative         ALLERGIES: Zithromax [azithromycin]    Review of Systems   Constitutional: Negative for chills and fever. Gastrointestinal: Negative for abdominal pain, diarrhea, nausea and vomiting. Genitourinary: Negative for dysuria and frequency. Vitals:    17 0839   BP: 161/85   Pulse: (!) 110   Resp: 15   Temp: 98.2 °F (36.8 °C)   SpO2: 99%            Physical Exam   Constitutional: She appears well-developed and well-nourished. No distress. Abdominal: Soft. There is no tenderness. Genitourinary: There is no lesion on the right labia. There is no lesion on the left labia. There is erythema in the vagina. No bleeding in the vagina. No foreign body in the vagina. Vaginal discharge found.    Nursing note and vitals reviewed.        MDM  Number of Diagnoses or Management Options  Diagnosis management comments: Pelvic examination to assess for vaginal infection    Risk of Complications, Morbidity, and/or Mortality  Presenting problems: low  Diagnostic procedures: minimal  Management options: low    Patient Progress  Patient progress: stable    ED Course       Procedures      Results Include:    Recent Results (from the past 24 hour(s))   WET PREP    Collection Time: 12/24/17  8:55 AM   Result Value Ref Range    Special Requests: NO SPECIAL REQUESTS      Wet prep FEW  CLUE CELLS PRESENT        Wet prep NO YEAST SEEN      Wet prep NO TRICHOMONAS SEEN      Wet prep 0 TO 3 WBC'S PER HPF You can access the FollowMyHealth Patient Portal offered by [facility] by registering at the following website: [email or web address]. By joining [facility]’s FollowMyHealth portal, you will also be able to view your health information using other applications (apps) compatible with our system.

## 2024-05-10 NOTE — DIETITIAN INITIAL EVALUATION ADULT - PERTINENT MEDS FT
MEDICATIONS  (STANDING):  atorvastatin 40 milliGRAM(s) Oral at bedtime  budesonide 160 MICROgram(s)/formoterol 4.5 MICROgram(s) Inhaler 2 Puff(s) Inhalation two times a day  furosemide    Tablet 40 milliGRAM(s) Oral daily  pantoprazole    Tablet 40 milliGRAM(s) Oral before breakfast  potassium chloride    Tablet ER 20 milliEquivalent(s) Oral every 2 hours  sotalol 40 milliGRAM(s) Oral every 12 hours    MEDICATIONS  (PRN):  polyethylene glycol 3350 17 Gram(s) Oral daily PRN for constipation

## 2024-05-10 NOTE — DIETITIAN INITIAL EVALUATION ADULT - OTHER CALCULATIONS
-- Defer fluid needs to medical team  -- Estimated calorie/protein needs are based on upper IBW of 121 pounds

## 2024-05-10 NOTE — DIETITIAN INITIAL EVALUATION ADULT - ADD RECOMMEND
1. Continue DASH therapeutic dietary restrictions   2. Encourage adequate PO intakes as tolerated. Honor food preferences as appropriate and available. Staff to provide assistance with feeding during meal times as warranted by patient  3. RD will add mighty shakes 2x/day to augment PO intakes   4. Monitor PO intake, GI tolerance, skin integrity and labs. RD remains available if needed, pt is aware.

## 2024-05-10 NOTE — PROGRESS NOTE ADULT - ASSESSMENT
73f with anemia  copd, former smoker, atrial fib, cad, hfref, hyperlipid, hypertension recurrent vt , s/p ablation 2/13,  icd, presents with witnessed fall.   hypotension - improved  left thigh hematoma   acute anemia   nausea vomiting diarrhea- resolved   leukocytosis - r/o infection v likely reactive     plan  wbc stable   afebrile  nontoxic  exam nonfocal  ct scan chest - no pna  ct abd no abscess, no diverticulitis , no free air  no gu symptoms  ua unimpressive   urine cx ecoli - colonized as pt without symptoms   blood cx neg to date     monitor wbc  check icd  lle keep elevated and local care serial exam and trend h/h    infectious work up negative to date   monitor off antibx

## 2024-05-10 NOTE — DIETITIAN INITIAL EVALUATION ADULT - PERTINENT LABORATORY DATA
05-10    139  |  106  |  14  ----------------------------<  103<H>  3.7   |  21<L>  |  0.79    Ca    8.2<L>      10 May 2024 07:13  Mg     1.9     05-10    A1C with Estimated Average Glucose Result: 5.9 % (02-12-24 @ 03:59)  A1C with Estimated Average Glucose Result: 6.4 % (10-29-23 @ 00:28)  A1C with Estimated Average Glucose Result: 6.7 % (08-28-23 @ 07:18)

## 2024-05-10 NOTE — PROGRESS NOTE ADULT - SUBJECTIVE AND OBJECTIVE BOX
Patient is a 73y old  Female who presents with a chief complaint of The patient is a 73y Female complaining of syncope. (10 May 2024 14:53)      SUBJECTIVE / OVERNIGHT EVENTS:    Events noted.  CONSTITUTIONAL: No fever,  or fatigue  RESPIRATORY: No cough, wheezing,  No shortness of breath  CARDIOVASCULAR: No chest pain, palpitations, dizziness, or leg swelling  GASTROINTESTINAL: No abdominal or epigastric pain. No nausea, vomiting.  NEUROLOGICAL: No headache    MEDICATIONS  (STANDING):  atorvastatin 40 milliGRAM(s) Oral at bedtime  budesonide 160 MICROgram(s)/formoterol 4.5 MICROgram(s) Inhaler 2 Puff(s) Inhalation two times a day  furosemide    Tablet 40 milliGRAM(s) Oral daily  pantoprazole    Tablet 40 milliGRAM(s) Oral two times a day  sotalol 40 milliGRAM(s) Oral every 12 hours  sucralfate suspension 1 Gram(s) Oral four times a day    MEDICATIONS  (PRN):  polyethylene glycol 3350 17 Gram(s) Oral daily PRN for constipation        CAPILLARY BLOOD GLUCOSE        I&O's Summary    09 May 2024 07:01  -  10 May 2024 07:00  --------------------------------------------------------  IN: 600 mL / OUT: 1150 mL / NET: -550 mL    10 May 2024 07:01  -  10 May 2024 19:20  --------------------------------------------------------  IN: 0 mL / OUT: 500 mL / NET: -500 mL        T(C): 37.2 (05-10-24 @ 11:36), Max: 37.2 (05-10-24 @ 05:16)  HR: 79 (05-10-24 @ 15:11) (59 - 79)  BP: 118/52 (05-10-24 @ 15:11) (101/48 - 132/68)  RR: 18 (05-10-24 @ 15:11) (18 - 18)  SpO2: 96% (05-10-24 @ 15:11) (95% - 98%)    PHYSICAL EXAM:  GENERAL: NAD  NECK: Supple, No JVD  CHEST/LUNG: Clear to auscultation bilaterally; No wheezing.  HEART: Regular rate and rhythm; No murmurs, rubs, or gallops  ABDOMEN: Soft, Nontender, Nondistended; Bowel sounds present  EXTREMITIES:   No edema  NEUROLOGY: AAO X 3      LABS:                        8.0    16.60 )-----------( 356      ( 10 May 2024 07:13 )             25.1     05-10    139  |  106  |  14  ----------------------------<  103<H>  3.7   |  21<L>  |  0.79    Ca    8.2<L>      10 May 2024 07:13  Mg     1.9     05-10            Urinalysis Basic - ( 10 May 2024 07:13 )    Color: x / Appearance: x / SG: x / pH: x  Gluc: 103 mg/dL / Ketone: x  / Bili: x / Urobili: x   Blood: x / Protein: x / Nitrite: x   Leuk Esterase: x / RBC: x / WBC x   Sq Epi: x / Non Sq Epi: x / Bacteria: x      CAPILLARY BLOOD GLUCOSE            RADIOLOGY & ADDITIONAL TESTS:    Imaging Personally Reviewed:    Consultant(s) Notes Reviewed:      Care Discussed with Consultants/Other Providers:    Hermilo Dangelo MD, CMD, FACP    [phone] [place] [address]  Office Tel: [phone]  Cell: [phone]

## 2024-05-10 NOTE — PROGRESS NOTE ADULT - ASSESSMENT
72 y/o female w/ PMHx COPD, paroxysmal AF (on eliquis), HLD, HTN, PVD, ICM/CAD s/p PCI (has  of RCA), cardiac arrest s/p left-sided ICD (6/4/2019) complicated by infection and s/p R sided Medtronic single-chamber ICD ([place] in 9/23/2019), perforated gastric ulcer with pneumoperitoneum - sealed 11/26/23 on UGI series, prior admissions for VT storm with adjustment of medication and NIPS, prior ICD shock in the setting of recurrent monomorphic VT with unsuccessful ATP, VT ablation attempted 10/20/23 but aborted due to hemodynamically significant pericardial effusion with drain placement, recurrent VT s/p ICD shock, and afib w/ RVR s/p cardioversion 10/21/23 s/p pericardial window 10/28/23, admitted 2/2024 with ICD shocks for recurrent VT and underwent VT ablation on 2/13/24 by Dr. Hurt (off quinidine post ablation, on amio) who now brought in by EMS from home after witnessed fall w/ presyncope w/ left thigh intramuscular hematoma. ICD interrogation revealed 10 episodes of monomorphic VT with average -200 bpm between 7:36am-8:59am (duration 2-16 seconds), 4 of the 10 episodes were treated and terminated with one round of ATP, no ICD shock noted.      Dx: Recurrent VT s/p ICD therapy    Recommendation:  - Her fall was related to dehydration and not due to VT as VT occurred ~1 hour after she fell.   - Amiodarone was discontinued on 4/4/24 due to TSH of 11. Repeat TSH 5.85  - Continue sotalol 40mg Q12hr. Obtain ECG 2 hours after each dose for initial 5 doses. Baseline QTc 470ms in the setting of IVCD. QTc after 4th dose measuring ~529 ms. Per Dr. Hurt, OK to continue current dosing given patient has an ICD. Continue to obtain ECG 2 hours post administration of 1st five doses for careful Qtc monitoring  - Propranolol d/c'd on 5/9 as her SR are in the high 50-60's  - Currently off AC due to left thigh intramuscular hematoma  - Supplement to maintain K >4.0 and Mg >2.0  - Continue telemetry monitor  - Still has epigastric tenderness and it is worse post meals. She has had UGI bleeding before. While most of anemia may be related to thigh hematoma, given leukocytosis and pain, f/u Abd US.  - Plan discussed with Dr. Hurt.    SOO Comer, NP-C  [id number]

## 2024-05-10 NOTE — CONSULT NOTE ADULT - SUBJECTIVE AND OBJECTIVE BOX
[facility]    Roger Cristina Westbrook NP    [address] [place] [address] [phone]      Chief Complaint:  Patient is a 73y old  Female who presents with a chief complaint of Chart Reviewed, Events Noted  " Pt is a 73y Female complaining of syncope."   (10 May 2024 13:37)      HPI:73F PMHx COPD, paroxysmal AF on AC, HLD, HTN, PVD, ICM/CAD s/p PCI, cardiac arrest s/p left-sided ICD (2019) complicated by infection and s/p R sided Medtronic single-chamber ICD ([place], 2019), perforated gastric ulcer c/b pneumoperitoneum - sealed 23 on UGI series, prior admissions for VT storm with adjustment of medication and NIPS, VT ablation attempted 10/20/23 but aborted 2/2 pericardial effusion with s/p drain placement s/p pericardial window presenting after witnessed fall. Patient states she got out of bed around 6:30am, was attempting to walk with walker, endorsed that she felt acute onset of "woozy", room spinning and fell over.    Allergies:  No Known Allergies      Medications:  atorvastatin 40 milliGRAM(s) Oral at bedtime  budesonide 160 MICROgram(s)/formoterol 4.5 MICROgram(s) Inhaler 2 Puff(s) Inhalation two times a day  furosemide    Tablet 40 milliGRAM(s) Oral daily  pantoprazole    Tablet 40 milliGRAM(s) Oral two times a day  polyethylene glycol 3350 17 Gram(s) Oral daily PRN  potassium chloride    Tablet ER 20 milliEquivalent(s) Oral every 2 hours  sotalol 40 milliGRAM(s) Oral every 12 hours  sucralfate suspension 1 Gram(s) Oral four times a day      PMHX/PSHX:  No pertinent past medical history    Obese    Smoker    Obesity    HTN (hypertension)    HLD (hyperlipidemia)    CAD (coronary artery disease)    CHF with cardiomyopathy    No significant past surgical history    No significant past surgical history    History of hip surgery        Family history:  Family history of Alzheimer's disease (Father)    Family history of cancer (Mother)        Social History:     ROS:     General:  No wt loss, fevers, chills, night sweats, fatigue,   Eyes:  Good vision, no reported pain  ENT:  No sore throat, pain, runny nose, dysphagia  CV:  No pain, palpitations, hypo/hypertension  Resp:  No dyspnea, cough, tachypnea, wheezing  GI:  No pain, No nausea, No vomiting, No diarrhea, No constipation, No weight loss, No fever, No pruritis, No rectal bleeding, No tarry stools, No dysphagia,  :  No pain, bleeding, incontinence, nocturia  Muscle:  No pain, weakness  Neuro:  No weakness, tingling, memory problems  Psych:  No fatigue, insomnia, mood problems, depression  Endocrine:  No polyuria, polydipsia, cold/heat intolerance  Heme:  No petechiae, ecchymosis, easy bruisability  Skin:  No rash, tattoos, scars, edema      PHYSICAL EXAM:   Vital Signs:  Vital Signs Last 24 Hrs  T(C): 37.2 (10 May 2024 11:36), Max: 37.2 (10 May 2024 05:16)  T(F): 98.9 (10 May 2024 11:36), Max: 99 (10 May 2024 05:16)  HR: 59 (10 May 2024 11:36) (59 - 66)  BP: 101/48 (10 May 2024 11:36) (101/48 - 132/68)  BP(mean): --  RR: 18 (10 May 2024 11:36) (18 - 18)  SpO2: 95% (10 May 2024 11:36) (95% - 98%)    Parameters below as of 10 May 2024 11:36  Patient On (Oxygen Delivery Method): room air      Daily     Daily Weight in k.1 (10 May 2024 05:16)    GENERAL:  Appears stated age, well-groomed, well-nourished, no distress  HEENT:  NC/AT,  conjunctivae clear and pink, no thyromegaly, nodules, adenopathy, no JVD, sclera -anicteric  CHEST:  Full & symmetric excursion, no increased effort, breath sounds clear  HEART:  Regular rhythm, S1, S2, no murmur/rub/S3/S4, no abdominal bruit, no edema  ABDOMEN:  Soft, non-tender, non-distended, normoactive bowel sounds,  no masses ,no hepato-splenomegaly, no signs of chronic liver disease  EXTEREMITIES:  no cyanosis,clubbing or edema  SKIN:  No rash/erythema/ecchymoses/petechiae/wounds/abscess/warm/dry  NEURO:  Alert, oriented, no asterixis, no tremor, no encephalopathy    LABS:                        8.0    16.60 )-----------( 356      ( 10 May 2024 07:13 )             25.1     05-10    139  |  106  |  14  ----------------------------<  103<H>  3.7   |  21<L>  |  0.79    Ca    8.2<L>      10 May 2024 07:13  Mg     1.9     05-10          Urinalysis Basic - ( 10 May 2024 07:13 )    Color: x / Appearance: x / SG: x / pH: x  Gluc: 103 mg/dL / Ketone: x  / Bili: x / Urobili: x   Blood: x / Protein: x / Nitrite: x   Leuk Esterase: x / RBC: x / WBC x   Sq Epi: x / Non Sq Epi: x / Bacteria: x          Imaging:

## 2024-05-10 NOTE — CONSULT NOTE ADULT - ASSESSMENT
abdominal pain  h/o PUD    suspected gastritis vs pud  cont diet as lina  increase proton pump inhibitor bid  add carafate  monitor GI fn  u/s noted  will follow

## 2024-05-10 NOTE — PROGRESS NOTE ADULT - SUBJECTIVE AND OBJECTIVE BOX
24H hour events: Pt without new complaint, still reports epigastric tenderness, no acute events overnight, Tele: SR in the high 50-60's, occasional PVCs     MEDICATIONS:  furosemide    Tablet 40 milliGRAM(s) Oral daily  sotalol 40 milliGRAM(s) Oral every 12 hours  budesonide 160 MICROgram(s)/formoterol 4.5 MICROgram(s) Inhaler 2 Puff(s) Inhalation two times a day  pantoprazole    Tablet 40 milliGRAM(s) Oral before breakfast  polyethylene glycol 3350 17 Gram(s) Oral daily PRN  atorvastatin 40 milliGRAM(s) Oral at bedtime  potassium chloride    Tablet ER 20 milliEquivalent(s) Oral every 2 hours      REVIEW OF SYSTEMS:  Complete 12 point ROS negative.    PHYSICAL EXAM:  T(C): 37.2 (05-10-24 @ 05:16), Max: 37.2 (05-10-24 @ 05:16)  HR: 66 (05-10-24 @ 05:16) (66 - 66)  BP: 121/49 (05-10-24 @ 05:16) (116/62 - 132/68)  RR: 18 (05-10-24 @ 05:16) (18 - 18)  SpO2: 95% (05-10-24 @ 05:16) (95% - 98%)  Wt(kg): --  I&O's Summary    09 May 2024 07:01  -  10 May 2024 07:00  --------------------------------------------------------  IN: 600 mL / OUT: 1150 mL / NET: -550 mL        Appearance: Normal	  HEENT: PERRL, EOMI	  Cardiovascular: Normal S1 S2, No JVD, No murmurs  Respiratory: Lungs clear to auscultation	  Psychiatry: A & O x 3, Mood & affect appropriate  Gastrointestinal: Soft, Non-tender, + BS	  Skin: No rashes, No ecchymoses, No cyanosis	  Neurologic: Grossly intact  Extremities: No clubbing, cyanosis or edema. Left thigh wrapped in ACE bandage.   Vascular: Peripheral pulses palpable 2+ bilaterally        LABS:	 	    CBC Full  -  ( 10 May 2024 07:13 )  WBC Count : 16.60 K/uL  Hemoglobin : 8.0 g/dL  Hematocrit : 25.1 %  Platelet Count - Automated : 356 K/uL  Mean Cell Volume : 84.5 fl  Mean Cell Hemoglobin : 26.9 pg  Mean Cell Hemoglobin Concentration : 31.9 gm/dL  Auto Neutrophil # : x  Auto Lymphocyte # : x  Auto Monocyte # : x  Auto Eosinophil # : x  Auto Basophil # : x  Auto Neutrophil % : x  Auto Lymphocyte % : x  Auto Monocyte % : x  Auto Eosinophil % : x  Auto Basophil % : x    05-10    139  |  106  |  14  ----------------------------<  103<H>  3.7   |  21<L>  |  0.79  05-09    138  |  105  |  13  ----------------------------<  96  3.5   |  21<L>  |  0.85    Ca    8.2<L>      10 May 2024 07:13  Ca    8.5      09 May 2024 06:53  Mg     1.9     05-10  Mg     2.0     05-09    Thyroid Stimulating Hormone, Serum (05.08.24 @ 12:55)    Thyroid Stimulating Hormone, Serum: 5.85 uIU/mL      TELEMETRY: SR in the high 50-60's, occasional PVCs  	      ECG (5/10 at 7:47am): SR at 62bpm, IVCD, QRSd 122ms, QT/QTc 520/529ms    < from: TTE W or WO Ultrasound Enhancing Agent (02.11.24 @ 12:20) >  CONCLUSIONS:      1. Left ventricular cavity is moderately dilated. Left ventricular systolic function is normal with an ejection fraction of 58 % by Ahn's method of disks. Regional wall motion abnormalities present.   2. Basal inferoseptal segment, mid inferolateral segment, and basal inferior segment are abnormal.   3. Mildly enlarged right ventricular cavity size, wall thickness, and normal systolic function. Tricuspid annular plane systolic excursion (TAPSE) is 2.3 cm (normal >=1.7 cm).   4. The left atrium is severely dilated.   5. The right atrium is severely dilated.   6. No pericardial effusion seen.   7. Compared to the transthoracic echocardiogram performed on 10/5/2023, there have been no significant interval changes.    ________________________________________________________________________________________  FINDINGS:     Left Ventricle:  After obtaining consent, Definity ultrasound enhancing agent was given for enhanced left ventricular opacification and improved delineation of the left ventricular endocardial borders. The left ventricular cavity is moderately dilated. Left ventricular systolic function is normal with a calculated ejection fraction of 58 % by the Ahn's biplane method of disks. There are regional wall motion abnormalities present. Elevated filling pressure. Analysis of left ventricular diastolic function and filling pressure is made challenging by the presence of mitral annular calcification. Mild left ventricular hypertrophy. There is increased LV mass and eccentric hypertrophy.  LV Wall Scoring: The basal inferoseptal segment, mid inferolateral segment, and  basal inferior segment are hypokinetic. All remaining scored segments are  normal.          Right Ventricle:  The right ventricular cavity is mildly enlarged in size, normal wall thickness and normal systolic function. Tricuspid annular plane systolic excursion (TAPSE) is 2.3 cm (normal >=1.7 cm). Tricuspid annular tissue Doppler S' is 11.1 cm/s (normal >10 cm/s). A device lead is visualized in the right heart.     Left Atrium:  The left atrium is severely dilated with anindexed volume of 65.02 ml/m².     Right Atrium:  The right atrium is severely dilated with an indexed volume of 50.28 ml/m².     Interatrial Septum:  The interatrial septum appears intact.     Aortic Valve:  The aortic valve appears trileaflet. There is fibrocalcific aortic valve sclerosis without stenosis. There is no evidence of aortic regurgitation.     Mitral Valve:  Structurally normal mitral valve with normal leaflet excursion. There is calcification of the mitral valve annulus. There is mild leaflet calcification. There is trace mitral regurgitation.     Tricuspid Valve:  Structurally normal tricuspid valve with normal leaflet excursion. There is mild tricuspid regurgitation. Estimated pulmonary artery systolic pressure is 31 mmHg.     Pulmonic Valve:  Structurally normal pulmonic valve with normal leaflet excursion. There is trace pulmonic regurgitation.     Aorta:  The aortic annulus and aortic root appear normal in size.     Pericardium:  No pericardial effusion seen.     Systemic Veins:  The inferior vena cava is normal in size measuring 1.68 cm in diameter, (normal <2.1cm) with normal inspiratory collapse (normal >50%) consistent with normal right atrial pressure (~3, range 0-5mmHg).  ____________________________________________________________________  QUANTITATIVE DATA:  Left Ventricle Measurements: (Indexed to BSA)     IVSd (2D):   1.0 cm  LVPWd (2D):  0.7 cm  LVIDd (2D):  5.8 cm  LV Mass:     189 g  106.1 g/m²  LV Vol d, MOD A2C: 151.0 ml 84.64 ml/m²  LV Vol d, MOD A4C: 131.0 ml 73.43 ml/m²  LV Vol d, MOD BP:  142.3 ml 79.77 ml/m²  LV Vol s, MOD A2C: 59.9 ml  33.58 ml/m²  LV Vol s, MOD A4C: 57.2 ml  32.06 ml/m²  LV Vol s, MOD BP:  59.5 ml  33.33 ml/m²  LVOT SV MOD BP:    82.8 ml  LV EF% MOD BP:     58 %     MVE Vmax:    1.34 m/s  MV A Vmax:    1.14 m/s  MV E/A:       1.18  e' lateral:   5.50 cm/s  e' medial:    3.85 cm/s  E/e' lateral: 24.36  E/e' medial:  34.81  E/e' Average: 28.66    Aorta Measurements: (normal range) (Indexed to BSA)     Sinuses of Valsalva: 3.10 cm (2.7 - 3.3 cm)  Ao Asc prox:         3.00 cm       Left Atrium Measurements: (Indexed to BSA)  LA Diam 2D:        3.90 cm  LA Vol s, MOD A4C: 107.00 ml.  LA Vol s, MOD A2C: 123.00 ml.  LA Vol s, MOD BP:  116.00 ml  65.02 ml/m²    Right Ventricle Measurements: Right Atrial Measurements:     TAPSE:           2.3 cm       RA Vol:       89.70 ml  RV Base (RVID1): 4.4 cm       RA Vol Index: 50.28 ml/m²  RV Mid (RVID2):  3.3 cm       LVOT / RVOT/ Qp/Qs Data: (Indexed to BSA)  LVOT Diameter: 2.00 cm  LVOT Vmax:     1.02 m/s  LVOT VTI:      24.50 cm  LVOT SV:       77.0 ml  43.15 ml/m²    Mitral Valve Measurements:     MV Vmax:        1.39 m/s  MV VTI, mallika     0.511 m  MV Mean Grad:   3.00 mmHg  MV Peak Grad:   7.7 mmHg  MV E Vmax:1.3 m/s  MV A Vmax:      1.1 m/s  MV E/A:         1.2  MV Gradient HR: 92 bpm       Tricuspid Valve Measurements:     TR Vmax:          2.7 m/s  TR Peak Gradient: 28.3 mmHg  RA Pressure:      3 mmHg  PASP:             31 mmHg      < end of copied text >    Limited Echo (5/8/24): No pericardial effusion

## 2024-05-10 NOTE — DIETITIAN INITIAL EVALUATION ADULT - PHYSCIAL ASSESSMENT
Weights:  - Source: Patient   - UBW: 165-167 pounds     Current Admission Weights:  - Dosing weight: 72.6 kg/ 160 pounds (5/7/24)  - Daily weight: 76.1 kg/ 169 pounds  (05-10), 76.7 kg/ 167.7 pounds (05-09)    Weight History per Previous RD documentation:  - 172.4 pounds (2/12/24), 179 pounds (11/30/23)    Weight Change:  - No significant noted weight changes noted X 3 months or 6 months     IBW:  110 pounds   %IBW: 154%

## 2024-05-10 NOTE — PROGRESS NOTE ADULT - SUBJECTIVE AND OBJECTIVE BOX
[facility], Division of Infectious Diseases  ELVIS Coles Y. Patel, S. Shah, G. Amor  [phone]  after hours and weekends [phone]    Name: AUSTIN LEE  Age: 73y  Gender: Female  MRN: [id number]    Interval History--  Notes reviewed  no vomiting, no diarrhea  no gu complaints       Allergies    No Known Allergies    Intolerances        Medications--  Antibiotics:    Immunologic:    Other:  atorvastatin  budesonide 160 MICROgram(s)/formoterol 4.5 MICROgram(s) Inhaler  furosemide    Tablet  pantoprazole    Tablet  polyethylene glycol 3350 PRN  potassium chloride    Tablet ER  sotalol      Review of Systems--  A 10-point review of systems was obtained.     Pertinent positives and negatives--  Constitutional: No fevers. No Chills. No Rigors.   Cardiovascular: No chest pain. No palpitations.  Respiratory: No shortness of breath. No cough.  Gastrointestinal: No nausea or vomiting. No diarrhea or constipation.   Psychiatric: Pleasant. Appropriate affect.    Review of systems otherwise negative except as previously noted.    Physical Examination--  Vital Signs: T(F): 99 (05-10-24 @ 05:16), Max: 99 (05-10-24 @ 05:16)  HR: 66 (05-10-24 @ 05:16)  BP: 121/49 (05-10-24 @ 05:16)  RR: 18 (05-10-24 @ 05:16)  SpO2: 95% (05-10-24 @ 05:16)  Wt(kg): --  General: Nontoxic-appearing Female in no acute distress.  HEENT: AT/Nc  Neck: Not rigid. No sense of mass.  Nodes: None palpable.  Lungs: Clear bilaterally without rales, wheezing or rhonchi  Heart: Regular rate and rhythm.   Abdomen: Bowel sounds present and normoactive. Soft. Nondistended. Nontender.   Back: No spinal tenderness. No costovertebral angle tenderness.   Extremities: No cyanosis or clubbing. trace edema.   Skin: Warm. Dry. Good turgor. No rash. No vasculitic stigmata.  Psychiatric: Appropriate affect and mood for situation.         Laboratory Studies--  CBC                        8.0    16.60 )-----------( 356      ( 10 May 2024 07:13 )             25.1       Chemistries  05-10    139  |  106  |  14  ----------------------------<  103<H>  3.7   |  21<L>  |  0.79    Ca    8.2<L>      10 May 2024 07:13  Mg     1.9     05-10        Culture Data    Culture - Urine (collected 07 May 2024 13:11)  Source: Clean Catch Clean Catch (Midstream)  Final Report (10 May 2024 07:33):    50,000 - 99,000 CFU/mL Escherichia coli  Organism: Escherichia coli (10 May 2024 07:33)  Organism: Escherichia coli (10 May 2024 07:33)    Culture - Blood (collected 07 May 2024 10:38)  Source: .Blood Blood-Venous  Preliminary Report (09 May 2024 15:01):    No growth at 48 Hours    Culture - Blood (collected 07 May 2024 09:30)  Source: .Blood Blood-Peripheral  Preliminary Report (09 May 2024 15:01):    No growth at 48 Hours

## 2024-05-10 NOTE — PROGRESS NOTE ADULT - ASSESSMENT
The patient is a 73y Female complaining of syncope.    Syncope likely from VT:    Tele  Cardio eval appreciated  On Sotalol  TTE: Limited/No Pericardial effusion    Lt thigh hematoma:    Hb stable    Leukocytosis:    ID f/up noted    Epi pain:    GI eval  appreciated  RUQ  sono: No gall stones  PPI bid  [place] fate    DVT prophylaxis:    Venodyne due to Lt thigh hematoma

## 2024-05-10 NOTE — DIETITIAN INITIAL EVALUATION ADULT - REASON INDICATOR FOR ASSESSMENT
Stage 2 Pressure Injury or greater  Information obtained from: Review of pt's current medical record, interview with pt in her assigned room on [facility]

## 2024-05-10 NOTE — DIETITIAN INITIAL EVALUATION ADULT - NSFNSPHYEXAMSKINFT_GEN_A_CORE
-- Consult for Stage 2 Pressure Injury or greater; no documented pressure injuries noted in flow sheets; no nursing wound care note in chart

## 2024-05-10 NOTE — DIETITIAN INITIAL EVALUATION ADULT - ORAL INTAKE PTA/DIET HISTORY
Pt confirms no known food allergies/intolerances. Reports having a "so-so" appetite and PO intakes at home. Did not follow any specific dietary restrictions. Pt reports having some nausea & diarrhea. Denies difficulty chewing/swallowing. Denies any vitamin/mineral use at home, pt reports not taking oral nutritional supplement at home as well.

## 2024-05-11 NOTE — PROGRESS NOTE ADULT - SUBJECTIVE AND OBJECTIVE BOX
Patient is a 73y old  Female who presents with a chief complaint of The patient is a 73y Female complaining of syncope. (11 May 2024 13:10)      SUBJECTIVE / OVERNIGHT EVENTS:    Events noted.  CONSTITUTIONAL: No fever,  or fatigue  RESPIRATORY: No cough, wheezing,  No shortness of breath  CARDIOVASCULAR: No chest pain, palpitations, dizziness, or leg swelling  GASTROINTESTINAL:  LBM      MEDICATIONS  (STANDING):  apixaban 2.5 milliGRAM(s) Oral every 12 hours  atorvastatin 40 milliGRAM(s) Oral at bedtime  budesonide 160 MICROgram(s)/formoterol 4.5 MICROgram(s) Inhaler 2 Puff(s) Inhalation two times a day  furosemide    Tablet 40 milliGRAM(s) Oral daily  pantoprazole    Tablet 40 milliGRAM(s) Oral two times a day  sotalol 40 milliGRAM(s) Oral every 12 hours  sucralfate suspension 1 Gram(s) Oral four times a day    MEDICATIONS  (PRN):  polyethylene glycol 3350 17 Gram(s) Oral daily PRN for constipation        CAPILLARY BLOOD GLUCOSE        I&O's Summary    10 May 2024 07:01  -  11 May 2024 07:00  --------------------------------------------------------  IN: 0 mL / OUT: 700 mL / NET: -700 mL    11 May 2024 07:01  -  11 May 2024 21:28  --------------------------------------------------------  IN: 700 mL / OUT: 200 mL / NET: 500 mL        T(C): 36.7 (05-11-24 @ 21:04), Max: 37.2 (05-11-24 @ 04:27)  HR: 67 (05-11-24 @ 21:04) (63 - 67)  BP: 165/68 (05-11-24 @ 21:04) (116/54 - 165/68)  RR: 18 (05-11-24 @ 21:04) (18 - 18)  SpO2: 95% (05-11-24 @ 21:04) (95% - 97%)    PHYSICAL EXAM:    NECK: Supple, No JVD  CHEST/LUNG: Clear to auscultation bilaterally; No wheezing.  HEART: Regular rate and rhythm; No murmurs, rubs, or gallops  ABDOMEN: Soft, Nontender, Nondistended; Bowel sounds present  EXTREMITIES:   No edema  NEUROLOGY: AAO X 3      LABS:                        8.6    16.92 )-----------( 438      ( 11 May 2024 07:08 )             27.8     05-11    138  |  103  |  17  ----------------------------<  111<H>  4.0   |  23  |  0.84    Ca    8.8      11 May 2024 07:08  Mg     2.2     05-11            Urinalysis Basic - ( 11 May 2024 07:08 )    Color: x / Appearance: x / SG: x / pH: x  Gluc: 111 mg/dL / Ketone: x  / Bili: x / Urobili: x   Blood: x / Protein: x / Nitrite: x   Leuk Esterase: x / RBC: x / WBC x   Sq Epi: x / Non Sq Epi: x / Bacteria: x      CAPILLARY BLOOD GLUCOSE            RADIOLOGY & ADDITIONAL TESTS:    Imaging Personally Reviewed:    Consultant(s) Notes Reviewed:      Care Discussed with Consultants/Other Providers:    Hermilo Dangelo MD, CMD, FACP    [phone] [place] [address]  Office Tel: [phone]  Cell: [phone]

## 2024-05-11 NOTE — PROGRESS NOTE ADULT - ASSESSMENT
74 y/o female w/ PMHx COPD, paroxysmal AF (on eliquis), HLD, HTN, PVD, ICM/CAD s/p PCI (has  of RCA), cardiac arrest s/p left-sided ICD (6/4/2019) complicated by infection and s/p R sided [employer] single-chamber ICD ([place] in 9/23/2019), perforated gastric ulcer with pneumoperitoneum - sealed 11/26/23 on UGI series, prior admissions for VT storm with adjustment of medication and NIPS, prior ICD shock in the setting of recurrent monomorphic VT with unsuccessful ATP, VT ablation attempted 10/20/23 but aborted due to hemodynamically significant pericardial effusion with drain placement, recurrent VT s/p ICD shock, and afib w/ RVR s/p cardioversion 10/21/23 s/p pericardial window 10/28/23, admitted 2/2024 with ICD shocks for recurrent VT and underwent VT ablation on 2/13/24 by Dr. Hurt (off quinidine post ablation, on amio) who now brought in by EMS from home after witnessed fall w/ presyncope w/ left thigh intramuscular hematoma. ICD interrogation revealed 10 episodes of monomorphic VT with average -200 bpm between 7:36am-8:59am (duration 2-16 seconds), 4 of the 10 episodes were treated and terminated with one round of ATP, no ICD shock noted.      Dx: Recurrent VT s/p ICD therapy    Recommendation:  - Her fall was related to dehydration and not due to VT as VT occurred ~1 hour after she fell.   - Amiodarone was discontinued on 4/4/24 due to TSH of 11. Repeat TSH 5.85  - Continue sotalol 40mg Q12hr. QTCs have been stable around 500s. Ok to continue per EP attendings as patient has an ICD  - Propranolol d/c'd on 5/9 as her SR are in the high 50-60's  - Currently off AC due to left thigh intramuscular hematoma  - Supplement to maintain K >4.0 and Mg >2.0  - Continue telemetry monitor  - GI recs appreciated. Pt's GI complaints likely PUD vs gastritis. On PPI    Plan discussed with Dr. Ovalle

## 2024-05-11 NOTE — PROGRESS NOTE ADULT - ASSESSMENT
The patient is a 73y Female complaining of syncope.    Syncope likely from VT:    Tele  Cardio f/up appreciated  On Sotalol  TTE: Limited/No Pericardial effusion    Lt thigh hematoma:    Hb stable    Leukocytosis:    LBM  Will monitor    Epi pain:    GI f/up appreciated  RUQ  sono: No gall stones  PPI bid  [place] fate    DVT prophylaxis:    Eliquis

## 2024-05-11 NOTE — CHART NOTE - NSCHARTNOTEFT_GEN_A_CORE
[id number]  AUSTIN LEE    Briefly, this is a 73yoF PMHx CAD, CHF, HTN, and L ORIF (5-6 years ago) who presented after a ground level fall. ICD interrogation revealed 10 episodes of monomorphic VT, patient was started on sotalol per EP recommendations this admission.   EKG 2 hours post evening dose of Sotalol reviewed, noted with QTc 490ms. Discussed with overnight cardiology fellow Dr. Shabbir Pritchett. Continue with Sotalol 40mg PO Q12h, follow up cardiology recommendations in AM. Continue to monitor on telemetry.         Ronald Hester PA-C  Dept of Medicine  [id number]

## 2024-05-11 NOTE — CHART NOTE - NSCHARTNOTEFT_GEN_A_CORE
CC: NSVT on telemetry      HPI:  Notified by RN at 21:02 of episode of 12 beats NSVT on telemetry at occurred at 19:49, patient was in bed at the time of the episode and asymptomatic. Patient seen and assessed at bedside, she is alert, awake, NAD. She denied headache, dizziness, chest pain, palpitations, SOB, nausea, or vomiting. She endorsed ongoing abdominal discomfort that was present prior to admission as well as loose stool that started approximately 2-3 days ago. Patient also reported decreased appetite.         ROS:  CONSTITUTIONAL:  No fever, chills, rigors  CARDIOVASCULAR:  No chest pain or palpitations  RESPIRATORY:   No SOB, cough, wheezing  GASTROINTESTINAL:  (+) abd pain; NO N/V  NEUROLOGIC:  No HA, visual disturbances  SKIN: No rashes        PAST MEDICAL & SURGICAL HISTORY:  Obese  Smoker  HTN (hypertension)  HLD (hyperlipidemia)  CAD (coronary artery disease)  CHF with cardiomyopathy  History of hip surgery          Vital Signs Last 24 Hrs  T(C): 36.7 (11 May 2024 21:04), Max: 37.2 (11 May 2024 04:27)  T(F): 98.1 (11 May 2024 21:04), Max: 98.9 (11 May 2024 04:27)  HR: 67 (11 May 2024 21:04) (63 - 67)  BP: 165/68 (11 May 2024 21:04) (116/54 - 165/68)  RR: 18 (11 May 2024 21:04) (18 - 18)  SpO2: 95% (11 May 2024 21:04) (95% - 97%)    Parameters below as of 11 May 2024 21:04  Patient On (Oxygen Delivery Method): room air          Physical Exam:  General: WN/WD female laying in bed, NAD, AOx3, nontoxic appearing  Head:  NC/AT  CV: RRR, S1S2   Respiratory: CTA B/L, nonlabored on room air  Abdominal: (+) mild tenderness to palpation of epigastrium, (+) bowel sounds x4. Soft abdomen, no guarding or rebound tenderness  MSK: + peripheral pulses, FROM all 4 extremity  Skin: (+) warm, dry   Psych: Appropriate affect       Labs:                        8.6    16.92 )-----------( 438      ( 11 May 2024 07:08 )             27.8     05-11    138  |  103  |  17  ----------------------------<  111<H>  4.0   |  23  |  0.84    Ca    8.8      11 May 2024 07:08  Mg     2.2     05-11              Radiology:  < from: US Abdomen Upper Quadrant Right (05.10.24 @ 09:34) >  No cholelithiasis or sonographic evidence of acute cholecystitis.  < end of copied text >    < from: CT Abdomen and Pelvis w/ IV Cont (05.07.24 @ 12:56) >  Left thigh intramuscular hematoma with question of active bleeding.  < end of copied text >          Assessment & Plan:  73yoF PMHx CAD, CHF, HTN, and L ORIF (5-6 years ago) presents after a ground level fall. CT shows a left thigh hematoma.        #NSVT on telemetry- r/o electrolyte derangements  -  -  -Will continue to closely monitor patient/vitals  -Will discuss with day team, attending to follow     #Abdominal discomfort likely secondary to known       #Loose stools ?secondary to sotalol      Ronald Hester PA-C  Dept of Medicine  [id number]      Time-based billing (NON-critical care).   ____ minutes spent on total encounter. The necessity of the time spent during the encounter on this date of service was due to:   Need to interview and examine patient and family, coordinate care with hospitalist, place orders, document, personally review labs, and review prior medical records. CC: NSVT on telemetry      HPI:  Notified by RN at 21:02 of episode of 12 beats NSVT on telemetry at occurred at 19:49, patient was in bed at the time of the episode and asymptomatic. Patient seen and assessed at bedside, she is alert, awake, NAD. She denied headache, dizziness, chest pain, palpitations, SOB, nausea, or vomiting. She endorsed ongoing abdominal discomfort that was present prior to admission as well as loose stool that started approximately 2-3 days ago. Patient also reported decreased appetite.         ROS:  CONSTITUTIONAL:  No fever, chills, rigors  CARDIOVASCULAR:  No chest pain or palpitations  RESPIRATORY:   No SOB, cough, wheezing  GASTROINTESTINAL:  (+) abd pain; NO N/V  NEUROLOGIC:  No HA, visual disturbances  SKIN: No rashes        PAST MEDICAL & SURGICAL HISTORY:  Obese  Smoker  HTN (hypertension)  HLD (hyperlipidemia)  CAD (coronary artery disease)  CHF with cardiomyopathy  History of hip surgery          Vital Signs Last 24 Hrs  T(C): 36.7 (11 May 2024 21:04), Max: 37.2 (11 May 2024 04:27)  T(F): 98.1 (11 May 2024 21:04), Max: 98.9 (11 May 2024 04:27)  HR: 67 (11 May 2024 21:04) (63 - 67)  BP: 165/68 (11 May 2024 21:04) (116/54 - 165/68)  RR: 18 (11 May 2024 21:04) (18 - 18)  SpO2: 95% (11 May 2024 21:04) (95% - 97%)    Parameters below as of 11 May 2024 21:04  Patient On (Oxygen Delivery Method): room air          Physical Exam:  General: WN/WD female laying in bed, NAD, AOx3, nontoxic appearing  Head:  NC/AT  CV: RRR, S1S2   Respiratory: CTA B/L, nonlabored on room air  Abdominal: (+) mild tenderness to palpation of epigastrium, (+) bowel sounds x4. Soft abdomen, no guarding or rebound tenderness  MSK: + peripheral pulses, FROM all 4 extremity  Skin: (+) warm, dry   Psych: Appropriate affect       Labs:                        8.6    16.92 )-----------( 438      ( 11 May 2024 07:08 )             27.8     05-11    138  |  103  |  17  ----------------------------<  111<H>  4.0   |  23  |  0.84    Ca    8.8      11 May 2024 07:08  Mg     2.2     05-11              Radiology:  < from: US Abdomen Upper Quadrant Right (05.10.24 @ 09:34) >  No cholelithiasis or sonographic evidence of acute cholecystitis.  < end of copied text >    < from: CT Abdomen and Pelvis w/ IV Cont (05.07.24 @ 12:56) >  Left thigh intramuscular hematoma with question of active bleeding.  < end of copied text >          Assessment & Plan:  73yoF PMHx CAD, CHF, HTN, and L ORIF (5-6 years ago) presents after a ground level fall. CT shows a left thigh hematoma.  Patient now presenting acutely with episode of 12 beats WCT on telemetry. Patient also with complaints of epigastric pain/discomfort and loose stools.         #NSVT on telemetry- r/o electrolyte derangements  -Vital signs hemodynamically stable, patient is asymptomatic  -Telemetry record reviewed, noted with 12 beats WCT  -Continue to monitor on telemetry  -STAT BMP and Mg ordered   -Keep K >4.0, Mg >2.0  -Continue with Sotalol 40mg PO BID  -The above was discussed and reviewed with cardiology fellow Dr. Bertrand, recommended to follow up labs and continue with Sotalol  -Follow up EP recommendations in AM  -Will continue to closely monitor patient/vitals  -Will discuss with day team, attending to follow       #Abdominal discomfort likely secondary gastritis vs PUD  -Physical exam with mild tenderness to palpation of epigastrium  -EGD (1/31/24) reviewed, noted with gastritis and multiple superficial gastric ulcers  -Continue with Carafate 1g QID and Pantoprazole 40mg PO BID  -RUQ US (5/10) reviewed, no evidence of acute cholecystitis  -Patient recommended to have repeat EGD 2-3 months post EGD in 01/2024  -Follow up GI recommendations in AM  -Monitor for worsening of pain      #Loose stools ?secondary to sotalol  -Patient endorsing loose stools for 2-3 days  -GI PCR pending collection  -Discussed with patient to alert staff for any increased frequency of loose stools  -Monitor for any electrolyte derangements  -GI follow up in AM  -Plan of care discussed with patient's  Mason via patient's cellphone      Ronald Hester PA-C  Dept of Medicine  [id number]      Time-based billing (NON-critical care).   46 minutes spent on total encounter. The necessity of the time spent during the encounter on this date of service was due to:   Need to interview and examine patient and family, coordinate care with RN, place orders, document, personally review labs, and review prior medical records. CC: NSVT on telemetry      HPI:  Notified by RN at 21:02 of episode of 12 beats NSVT on telemetry at occurred at 19:49, patient was in bed at the time of the episode and asymptomatic. Patient seen and assessed at bedside, she is alert, awake, NAD. She denied headache, dizziness, chest pain, palpitations, SOB, nausea, or vomiting. She endorsed ongoing abdominal discomfort that was present prior to admission as well as loose stool that started approximately 2-3 days ago. Patient also reported decreased appetite.         ROS:  CONSTITUTIONAL:  No fever, chills, rigors  CARDIOVASCULAR:  No chest pain or palpitations  RESPIRATORY:   No SOB, cough, wheezing  GASTROINTESTINAL:  (+) abd pain; NO N/V  NEUROLOGIC:  No HA, visual disturbances  SKIN: No rashes        PAST MEDICAL & SURGICAL HISTORY:  Obese  Smoker  HTN (hypertension)  HLD (hyperlipidemia)  CAD (coronary artery disease)  CHF with cardiomyopathy  History of hip surgery          Vital Signs Last 24 Hrs  T(C): 36.7 (11 May 2024 21:04), Max: 37.2 (11 May 2024 04:27)  T(F): 98.1 (11 May 2024 21:04), Max: 98.9 (11 May 2024 04:27)  HR: 67 (11 May 2024 21:04) (63 - 67)  BP: 165/68 (11 May 2024 21:04) (116/54 - 165/68)  RR: 18 (11 May 2024 21:04) (18 - 18)  SpO2: 95% (11 May 2024 21:04) (95% - 97%)    Parameters below as of 11 May 2024 21:04  Patient On (Oxygen Delivery Method): room air          Physical Exam:  General: WN/WD female laying in bed, NAD, AOx3, nontoxic appearing  Head:  NC/AT  CV: RRR, S1S2   Respiratory: CTA B/L, nonlabored on room air  Abdominal: (+) mild tenderness to palpation of epigastrium, (+) bowel sounds x4. Soft abdomen, no guarding or rebound tenderness  MSK: + peripheral pulses, FROM all 4 extremity  Skin: (+) warm, dry   Psych: Appropriate affect       Labs:                        8.6    16.92 )-----------( 438      ( 11 May 2024 07:08 )             27.8     05-11    138  |  103  |  17  ----------------------------<  111<H>  4.0   |  23  |  0.84    Ca    8.8      11 May 2024 07:08  Mg     2.2     05-11              Radiology:  < from: US Abdomen Upper Quadrant Right (05.10.24 @ 09:34) >  No cholelithiasis or sonographic evidence of acute cholecystitis.  < end of copied text >    < from: CT Abdomen and Pelvis w/ IV Cont (05.07.24 @ 12:56) >  Left thigh intramuscular hematoma with question of active bleeding.  < end of copied text >          Assessment & Plan:  73yoF PMHx CAD, CHF, HTN, and L ORIF (5-6 years ago) presents after a ground level fall. CT shows a left thigh hematoma.  Patient now presenting acutely with episode of 12 beats WCT on telemetry. Patient also with complaints of epigastric pain/discomfort and loose stools.         #NSVT on telemetry- r/o electrolyte derangements  -Vital signs hemodynamically stable, patient is asymptomatic  -Telemetry record reviewed, noted with 12 beats WCT  -Continue to monitor on telemetry  -STAT BMP and Mg ordered   -Keep K >4.0, Mg >2.0  -Continue with Sotalol 40mg PO BID  -The above was discussed and reviewed with cardiology fellow Dr. Bertrand, recommended to follow up labs and continue with Sotalol  -Follow up EP recommendations in AM  -Will continue to closely monitor patient/vitals  -Will discuss with day team, attending to follow       #Abdominal discomfort likely secondary gastritis vs PUD  -Physical exam with mild tenderness to palpation of epigastrium  -EGD (1/31/24) reviewed, noted with gastritis and multiple superficial gastric ulcers  -Continue with Carafate 1g QID and Pantoprazole 40mg PO BID  -RUQ US (5/10) reviewed, no evidence of acute cholecystitis  -Patient recommended to have repeat EGD 2-3 months post EGD in 01/2024  -Follow up GI recommendations in AM  -Monitor for worsening of pain      #Loose stools ?secondary to sotalol  -Patient endorsing loose stools for 2-3 days  -GI PCR pending collection  -Discussed with patient to alert staff for any increased frequency of loose stools  -Monitor for any electrolyte derangements  -GI follow up in AM  -Plan of care discussed with patient's  Mason via patient's cellphone      Ronald Hester PA-C  Dept of Medicine  [id number]      Time-based billing (NON-critical care).   46 minutes spent on total encounter. The necessity of the time spent during the encounter on this date of service was due to:   Need to interview and examine patient and family, coordinate care with RN and cardiology, place orders, document, personally review labs, and review prior medical records. CC: NSVT on telemetry      HPI:  Notified by RN at 21:02 of episode of 12 beats NSVT on telemetry at occurred at 19:49, patient was in bed at the time of the episode and asymptomatic. Patient seen and assessed at bedside, she is alert, awake, NAD. She denied headache, dizziness, chest pain, palpitations, SOB, nausea, or vomiting. She endorsed ongoing abdominal discomfort that was present prior to admission as well as loose stool that started approximately 2-3 days ago. Patient also reported decreased appetite.         ROS:  CONSTITUTIONAL:  No fever, chills, rigors  CARDIOVASCULAR:  No chest pain or palpitations  RESPIRATORY:   No SOB, cough, wheezing  GASTROINTESTINAL:  (+) abd pain; NO N/V  NEUROLOGIC:  No HA, visual disturbances  SKIN: No rashes        PAST MEDICAL & SURGICAL HISTORY:  Obese  Smoker  HTN (hypertension)  HLD (hyperlipidemia)  CAD (coronary artery disease)  CHF with cardiomyopathy  History of hip surgery          Vital Signs Last 24 Hrs  T(C): 36.7 (11 May 2024 21:04), Max: 37.2 (11 May 2024 04:27)  T(F): 98.1 (11 May 2024 21:04), Max: 98.9 (11 May 2024 04:27)  HR: 67 (11 May 2024 21:04) (63 - 67)  BP: 165/68 (11 May 2024 21:04) (116/54 - 165/68)  RR: 18 (11 May 2024 21:04) (18 - 18)  SpO2: 95% (11 May 2024 21:04) (95% - 97%)    Parameters below as of 11 May 2024 21:04  Patient On (Oxygen Delivery Method): room air          Physical Exam:  General: WN/WD female laying in bed, NAD, AOx3, nontoxic appearing  Head:  NC/AT  CV: RRR, S1S2   Respiratory: CTA B/L, nonlabored on room air  Abdominal: (+) mild tenderness to palpation of epigastrium, (+) bowel sounds x4. Soft abdomen, no guarding or rebound tenderness  MSK: + peripheral pulses, FROM all 4 extremity  Skin: (+) warm, dry   Psych: Appropriate affect       Labs:                        8.6    16.92 )-----------( 438      ( 11 May 2024 07:08 )             27.8     05-11    138  |  103  |  17  ----------------------------<  111<H>  4.0   |  23  |  0.84    Ca    8.8      11 May 2024 07:08  Mg     2.2     05-11              Radiology:  < from: US Abdomen Upper Quadrant Right (05.10.24 @ 09:34) >  No cholelithiasis or sonographic evidence of acute cholecystitis.  < end of copied text >    < from: CT Abdomen and Pelvis w/ IV Cont (05.07.24 @ 12:56) >  Left thigh intramuscular hematoma with question of active bleeding.  < end of copied text >          Assessment & Plan:  73yoF PMHx CAD, CHF, HTN, and L ORIF (5-6 years ago) presents after a ground level fall. CT shows a left thigh hematoma.  Patient now presenting acutely with episode of 12 beats WCT on telemetry. Patient also with complaints of epigastric pain/discomfort and loose stools.         #NSVT on telemetry- r/o electrolyte derangements  -Vital signs hemodynamically stable, patient is asymptomatic  -Telemetry record reviewed, noted with 12 beats WCT  -Continue to monitor on telemetry  -STAT BMP and Mg ordered   -Keep K >4.0, Mg >2.0  -Continue with Sotalol 40mg PO BID  -The above was discussed and reviewed with cardiology fellow Dr. Bertrand, recommended to follow up labs and continue with Sotalol  -Follow up EP recommendations in AM  -Will continue to closely monitor patient/vitals  -Will discuss with day team, attending to follow       #Abdominal discomfort likely secondary gastritis vs PUD  -Physical exam with mild tenderness to palpation of epigastrium  -EGD (1/31/24) reviewed, noted with gastritis and multiple superficial gastric ulcers  -Continue with Carafate 1g QID and Pantoprazole 40mg PO BID  -RUQ US (5/10) reviewed, no evidence of acute cholecystitis  -Patient recommended to have repeat EGD 2-3 months post EGD in 01/2024  -Follow up GI recommendations in AM  -Monitor for worsening of pain      #Loose stools ?secondary to sotalol vs infectious etiology  -Labs with persistent but stable leukocytosis (WBC 16.9k)  -Patient endorsing loose stools for 2-3 days  -GI PCR negative (5/10)  -Discussed with patient to alert staff for any increased frequency of loose stools  -Monitor for any electrolyte derangements  -GI follow up in AM  -Plan of care discussed with patient's  Mason via patient's cellphone      Ronald Hester PA-C  Dept of Medicine  [id number]      Time-based billing (NON-critical care).   46 minutes spent on total encounter. The necessity of the time spent during the encounter on this date of service was due to:   Need to interview and examine patient and family, coordinate care with RN and cardiology, place orders, document, personally review labs, and review prior medical records. CC: NSVT on telemetry      HPI:  Notified by RN at 21:02 of episode of 12 beats NSVT on telemetry at occurred at 19:49, patient was in bed at the time of the episode and asymptomatic. Patient seen and assessed at bedside, she is alert, awake, NAD. She denied headache, dizziness, chest pain, palpitations, SOB, nausea, or vomiting. She endorsed ongoing abdominal discomfort that was present prior to admission as well as loose stool that started approximately 2-3 days ago. Patient also reported decreased appetite.         ROS:  CONSTITUTIONAL:  No fever, chills, rigors  CARDIOVASCULAR:  No chest pain or palpitations  RESPIRATORY:   No SOB, cough, wheezing  GASTROINTESTINAL:  (+) abd pain; NO N/V  NEUROLOGIC:  No HA, visual disturbances  SKIN: No rashes        PAST MEDICAL & SURGICAL HISTORY:  Obese  Smoker  HTN (hypertension)  HLD (hyperlipidemia)  CAD (coronary artery disease)  CHF with cardiomyopathy  History of hip surgery          Vital Signs Last 24 Hrs  T(C): 36.7 (11 May 2024 21:04), Max: 37.2 (11 May 2024 04:27)  T(F): 98.1 (11 May 2024 21:04), Max: 98.9 (11 May 2024 04:27)  HR: 67 (11 May 2024 21:04) (63 - 67)  BP: 165/68 (11 May 2024 21:04) (116/54 - 165/68)  RR: 18 (11 May 2024 21:04) (18 - 18)  SpO2: 95% (11 May 2024 21:04) (95% - 97%)    Parameters below as of 11 May 2024 21:04  Patient On (Oxygen Delivery Method): room air          Physical Exam:  General: WN/WD female laying in bed, NAD, AOx3, nontoxic appearing  Head:  NC/AT  CV: RRR, S1S2   Respiratory: CTA B/L, nonlabored on room air  Abdominal: (+) mild tenderness to palpation of epigastrium, (+) bowel sounds x4. Soft abdomen, no guarding or rebound tenderness  MSK: + peripheral pulses, FROM all 4 extremity  Skin: (+) warm, dry   Psych: Appropriate affect       Labs:                        8.6    16.92 )-----------( 438      ( 11 May 2024 07:08 )             27.8     05-11    138  |  103  |  17  ----------------------------<  111<H>  4.0   |  23  |  0.84    Ca    8.8      11 May 2024 07:08  Mg     2.2     05-11              Radiology:  < from: US Abdomen Upper Quadrant Right (05.10.24 @ 09:34) >  No cholelithiasis or sonographic evidence of acute cholecystitis.  < end of copied text >    < from: CT Abdomen and Pelvis w/ IV Cont (05.07.24 @ 12:56) >  Left thigh intramuscular hematoma with question of active bleeding.  < end of copied text >          Assessment & Plan:  73yoF PMHx CAD, CHF, HTN, and L ORIF (5-6 years ago) presents after a ground level fall. CT shows a left thigh hematoma.  Patient now presenting acutely with episode of 12 beats WCT on telemetry. Patient also with complaints of epigastric pain/discomfort and loose stools.         #NSVT on telemetry- r/o electrolyte derangements  -Vital signs hemodynamically stable, patient is asymptomatic  -Telemetry record reviewed, noted with 12 beats WCT  -Continue to monitor on telemetry  -STAT BMP and Mg ordered   -Keep K >4.0, Mg >2.0  -Continue with Sotalol 40mg PO BID  -The above was discussed and reviewed with cardiology fellow Dr. Bertrand, recommended to follow up labs and continue with Sotalol  -Follow up EP recommendations in AM  -Will continue to closely monitor patient/vitals  -Will discuss with day team, attending to follow       #Abdominal discomfort likely secondary gastritis vs PUD  -Physical exam with mild tenderness to palpation of epigastrium  -EGD (1/31/24) reviewed, noted with gastritis and multiple superficial gastric ulcers  -Continue with Carafate 1g QID and Pantoprazole 40mg PO BID  -RUQ US (5/10) reviewed, no evidence of acute cholecystitis  -Patient recommended to have repeat EGD 2-3 months post EGD in 01/2024  -Follow up GI recommendations in AM  -Monitor for worsening of pain      #Loose stools ?secondary to sotalol vs infectious etiology  -Labs with persistent but stable leukocytosis (WBC 16.9k), patient has been afebrile  -Patient endorsing loose stools for 2-3 days  -GI PCR negative (5/10)  -Discussed with patient to alert staff for any increased frequency of loose stools  -Monitor for any electrolyte derangements  -GI follow up in AM  -Plan of care discussed with patient's  Mason via patient's cellphone      Ronald Hester PA-C  Dept of Medicine  [id number]      Time-based billing (NON-critical care).   46 minutes spent on total encounter. The necessity of the time spent during the encounter on this date of service was due to:   Need to interview and examine patient and family, coordinate care with RN and cardiology, place orders, document, personally review labs, and review prior medical records.

## 2024-05-11 NOTE — PROGRESS NOTE ADULT - SUBJECTIVE AND OBJECTIVE BOX
24H hour events: NO acute overnight events. Complaining of nausea and upset stomach this AM    MEDICATIONS:  furosemide    Tablet 40 milliGRAM(s) Oral daily  sotalol 40 milliGRAM(s) Oral every 12 hours  budesonide 160 MICROgram(s)/formoterol 4.5 MICROgram(s) Inhaler 2 Puff(s) Inhalation two times a day  pantoprazole    Tablet 40 milliGRAM(s) Oral two times a day  polyethylene glycol 3350 17 Gram(s) Oral daily PRN  sucralfate suspension 1 Gram(s) Oral four times a day  atorvastatin 40 milliGRAM(s) Oral at bedtime    REVIEW OF SYSTEMS:  See HPI, otherwise ROS negative.    PHYSICAL EXAM:  T(C): 36.9 (05-11-24 @ 11:16), Max: 37.3 (05-10-24 @ 21:03)  HR: 63 (05-11-24 @ 11:16) (63 - 79)  BP: 149/75 (05-11-24 @ 11:16) (116/54 - 149/75)  RR: 18 (05-11-24 @ 11:16) (18 - 18)  SpO2: 97% (05-11-24 @ 11:16) (96% - 97%)  Wt(kg): --  I&O's Summary    10 May 2024 07:01  -  11 May 2024 07:00  --------------------------------------------------------  IN: 0 mL / OUT: 700 mL / NET: -700 mL    11 May 2024 07:01  -  11 May 2024 13:11  --------------------------------------------------------  IN: 480 mL / OUT: 0 mL / NET: 480 mL    Appearance: Alert. NAD	  HEENT:   NC/AT	  Cardiovascular: +S1S2 RRR no m/g/r  Respiratory: CTA B/L	  Psychiatry: A & O x 3,	  Neurologic: Non-focal  Extremities: No edema BLE  Vascular: Peripheral pulses palpable 2+ bilaterally    LABS:	 	  CBC Full  -  ( 11 May 2024 07:08 )  WBC Count : 16.92 K/uL  Hemoglobin : 8.6 g/dL  Hematocrit : 27.8 %  Platelet Count - Automated : 438 K/uL  Mean Cell Volume : 85.3 fl  Mean Cell Hemoglobin : 26.4 pg  Mean Cell Hemoglobin Concentration : 30.9 gm/dL  Auto Neutrophil # : x  Auto Lymphocyte # : x  Auto Monocyte # : x  Auto Eosinophil # : x  Auto Basophil # : x  Auto Neutrophil % : x  Auto Lymphocyte % : x  Auto Monocyte % : x  Auto Eosinophil % : x  Auto Basophil % : x    05-11    138  |  103  |  17  ----------------------------<  111<H>  4.0   |  23  |  0.84  05-10    139  |  106  |  14  ----------------------------<  103<H>  3.7   |  21<L>  |  0.79    Ca    8.8      11 May 2024 07:08  Ca    8.2<L>      10 May 2024 07:13  Mg     2.2     05-11  Mg     1.9     05-10    TELEMETRY: SR 60s  	    ECHO:    TRANSTHORACIC ECHOCARDIOGRAM REPORT  ________________________________________________________________________________                                      _______       Pt. Name:       AUSTIN LEE Study Date:    2/11/2024  MRN:            [id number]    YOB: 1950  Accession #:    [id number]     Age:           73 years  Account#:       [id number]  Gender:        F  Heart Rate:     54 bpm        Height:        62.00 in (157.48 cm)  Rhythm:                       Weight:        170.00 lb (77.11 kg)  Blood Pressure: 135/61 mmHg   BSA/BMI:       1.78 m² / 31.09 kg/m²  ________________________________________________________________________________________  Referring Physician:    [id number] Jaswinder Nuñez  Interpreting Physician: Bryant Wayne M.D.  Primary Sonographer:    Silvio Jerry RDCS    CPT:                ECHO TTE WITH CON COMP W DOPP - .m;DEFINITY ECHO                      CONTRAST PER ML - .m;DEFINITY ECHO CONTRAST PER ML                      WASTED - .m  Indication(s):      Cardiomyopathy, unspecified - I42.9  Procedure:          Transthoracic echocardiogram with 2-D, M-mode and complete                      spectral and color flow Doppler.  Ordering Location:  [facility]  Admission Status:   Inpatient  Contrast Injection: Verbal consent was obtained for injection of Ultrasonic                      Enhancing Agent following a discussion of risks and                      benefits.                      Endocardial visualization enhanced with 2 ml of Definity                      Ultrasound enhancing agent (Lot#:[id number] Exp.Date:2025-JAN Discarded Dose:8ml).  UEA Reaction:       Patient had no adverse reaction after injection of                      Ultrasound Enhancing Agent.  Study Information:  Image quality for this study is adequate.    _______________________________________________________________________________________     CONCLUSIONS:      1. Left ventricular cavity is moderately dilated. Left ventricular systolic function is normal with an ejection fraction of 58 % by Ahn's method of disks. Regional wall motion abnormalities present.   2. Basal inferoseptal segment, mid inferolateral segment, and basal inferior segment are abnormal.   3. Mildly enlarged right ventricular cavity size, wall thickness, and normal systolic function. Tricuspid annular plane systolic excursion (TAPSE) is 2.3 cm (normal >=1.7 cm).   4. The left atrium is severely dilated.   5. The right atrium is severely dilated.   6. No pericardial effusion seen.   7. Compared to the transthoracic echocardiogram performed on 10/5/2023, there have been no significant interval changes.    ________________________________________________________________________________________  FINDINGS:     Left Ventricle:  After obtaining consent, Definity ultrasound enhancing agent was given for enhanced left ventricular opacification and improved delineation of the left ventricular endocardial borders. The left ventricular cavity is moderately dilated. Left ventricular systolic function is normal with a calculated ejection fraction of 58 % by the Ahn's biplane method of disks. There are regional wall motion abnormalities present. Elevated filling pressure. Analysis of left ventricular diastolic function and filling pressure is made challenging by the presence of mitral annular calcification. Mild left ventricular hypertrophy. There is increased LV mass and eccentric hypertrophy.  LV Wall Scoring: The basal inferoseptal segment, mid inferolateral segment, and  basal inferior segment are hypokinetic. All remaining scored segments are  normal.          Right Ventricle:  The right ventricular cavity is mildly enlarged in size, normal wall thickness and normal systolic function. Tricuspid annular plane systolic excursion (TAPSE) is 2.3 cm (normal >=1.7 cm). Tricuspid annular tissue Doppler S' is 11.1 cm/s (normal >10 cm/s). A device lead is visualized in the right heart.     Left Atrium:  The left atrium is severely dilated with anindexed volume of 65.02 ml/m².     Right Atrium:  The right atrium is severely dilated with an indexed volume of 50.28 ml/m².     Interatrial Septum:  The interatrial septum appears intact.     Aortic Valve:  The aortic valve appears trileaflet. There is fibrocalcific aortic valve sclerosis without stenosis. There is no evidence of aortic regurgitation.     Mitral Valve:  Structurally normal mitral valve with normal leaflet excursion. There is calcification of the mitral valve annulus. There is mild leaflet calcification. There is trace mitral regurgitation.     Tricuspid Valve:  Structurally normal tricuspid valve with normal leaflet excursion. There is mild tricuspid regurgitation. Estimated pulmonary artery systolic pressure is 31 mmHg.     Pulmonic Valve:  Structurally normal pulmonic valve with normal leaflet excursion. There is trace pulmonic regurgitation.     Aorta:  The aortic annulus and aortic root appear normal in size.     Pericardium:  No pericardial effusion seen.     Systemic Veins:  The inferior vena cava is normal in size measuring 1.68 cm in diameter, (normal <2.1cm) with normal inspiratory collapse (normal >50%) consistent with normal right atrial pressure (~3, range 0-5mmHg).  ____________________________________________________________________  QUANTITATIVE DATA:  Left Ventricle Measurements: (Indexed to BSA)     IVSd (2D):   1.0 cm  LVPWd (2D):  0.7 cm  LVIDd (2D):  5.8 cm  LV Mass:     189 g  106.1 g/m²  LV Vol d, MOD A2C: 151.0 ml 84.64 ml/m²  LV Vol d, MOD A4C: 131.0 ml 73.43 ml/m²  LV Vol d, MOD BP:  142.3 ml 79.77 ml/m²  LV Vol s, MOD A2C: 59.9 ml  33.58 ml/m²  LV Vol s, MOD A4C: 57.2 ml  32.06 ml/m²  LV Vol s, MOD BP:  59.5 ml  33.33 ml/m²  LVOT SV MOD BP:    82.8 ml  LV EF% MOD BP:     58 %     MVE Vmax:    1.34 m/s  MV A Vmax:    1.14 m/s  MV E/A:       1.18  e' lateral:   5.50 cm/s  e' medial:    3.85 cm/s  E/e' lateral: 24.36  E/e' medial:  34.81  E/e' Average: 28.66    Aorta Measurements: (normal range) (Indexed to BSA)     Sinuses of Valsalva: 3.10 cm (2.7 - 3.3 cm)  Ao Asc prox:         3.00 cm       Left Atrium Measurements: (Indexed to BSA)  LA Diam 2D:        3.90 cm  LA Vol s, MOD A4C: 107.00 ml.  LA Vol s, MOD A2C: 123.00 ml.  LA Vol s, MOD BP:  116.00 ml  65.02 ml/m²    Right Ventricle Measurements: Right Atrial Measurements:     TAPSE:           2.3 cm       RA Vol:       89.70 ml  RV Base (RVID1): 4.4 cm       RA Vol Index: 50.28 ml/m²  RV Mid (RVID2):  3.3 cm       LVOT / RVOT/ Qp/Qs Data: (Indexed to BSA)  LVOT Diameter: 2.00 cm  LVOT Vmax:     1.02 m/s  LVOT VTI:      24.50 cm  LVOT SV:       77.0 ml  43.15 ml/m²    Mitral Valve Measurements:     MV Vmax:        1.39 m/s  MV VTI, mallika     0.511 m  MV Mean Grad:   3.00 mmHg  MV Peak Grad:   7.7 mmHg  MV E Vmax:1.3 m/s  MV A Vmax:      1.1 m/s  MV E/A:         1.2  MV Gradient HR: 92 bpm       Tricuspid Valve Measurements:     TR Vmax:          2.7 m/s  TR Peak Gradient: 28.3 mmHg  RA Pressure:      3 mmHg  PASP:             31 mmHg    ________________________________________________________________________________________  Electronically signed on 2/11/2024 at 2:33:22 PM by Bryant Wayne M.D.

## 2024-05-12 NOTE — PROGRESS NOTE ADULT - ASSESSMENT
The patient is a 73y Female complaining of syncope.    Syncope likely from VT:    Tele  Cardio f/up appreciated  On Sotalol  TTE: Limited/No Pericardial effusion    Lt thigh hematoma:    Hb stable      Epi pain:    GI f/up appreciated  RUQ  sono: No gall stones  PPI bid  [place] fate    DVT prophylaxis:    Eliquis    Hypokalemia:    S/p K supp

## 2024-05-12 NOTE — PROGRESS NOTE ADULT - SUBJECTIVE AND OBJECTIVE BOX
Patient is a 73y old  Female who presents with a chief complaint of The patient is a 73y Female complaining of syncope. (11 May 2024 15:28)      SUBJECTIVE / OVERNIGHT EVENTS:    Events noted.  CONSTITUTIONAL: No fever,  or fatigue  RESPIRATORY: No cough, wheezing,  No shortness of breath  CARDIOVASCULAR: No chest pain, palpitations, dizziness, or leg swelling  GASTROINTESTINAL: No abdominal or epigastric pain.       MEDICATIONS  (STANDING):  apixaban 5 milliGRAM(s) Oral every 12 hours  atorvastatin 40 milliGRAM(s) Oral at bedtime  budesonide 160 MICROgram(s)/formoterol 4.5 MICROgram(s) Inhaler 2 Puff(s) Inhalation two times a day  furosemide    Tablet 40 milliGRAM(s) Oral daily  pantoprazole    Tablet 40 milliGRAM(s) Oral two times a day  sotalol 40 milliGRAM(s) Oral every 12 hours  sucralfate suspension 1 Gram(s) Oral four times a day    MEDICATIONS  (PRN):  polyethylene glycol 3350 17 Gram(s) Oral daily PRN for constipation        CAPILLARY BLOOD GLUCOSE        I&O's Summary    11 May 2024 07:01  -  12 May 2024 07:00  --------------------------------------------------------  IN: 900 mL / OUT: 420 mL / NET: 480 mL    12 May 2024 07:01  -  12 May 2024 20:09  --------------------------------------------------------  IN: 680 mL / OUT: 900 mL / NET: -220 mL        T(C): 36.9 (05-12-24 @ 13:43), Max: 36.9 (05-12-24 @ 13:43)  HR: 62 (05-12-24 @ 13:43) (62 - 67)  BP: 134/78 (05-12-24 @ 13:43) (134/78 - 165/68)  RR: 18 (05-12-24 @ 13:43) (18 - 18)  SpO2: 96% (05-12-24 @ 13:43) (95% - 97%)    PHYSICAL EXAM:    NECK: Supple, No JVD  CHEST/LUNG: Clear to auscultation bilaterally; No wheezing.  HEART: Regular rate and rhythm; No murmurs, rubs, or gallops  ABDOMEN: Soft, Nontender, Nondistended; Bowel sounds present  EXTREMITIES:   No edema  NEUROLOGY: AAO       LABS:                        8.6    16.92 )-----------( 438      ( 11 May 2024 07:08 )             27.8     05-11    140  |  104  |  16  ----------------------------<  148<H>  3.4<L>   |  24  |  0.85    Ca    8.7      11 May 2024 23:16  Mg     2.0     05-11            Urinalysis Basic - ( 11 May 2024 23:16 )    Color: x / Appearance: x / SG: x / pH: x  Gluc: 148 mg/dL / Ketone: x  / Bili: x / Urobili: x   Blood: x / Protein: x / Nitrite: x   Leuk Esterase: x / RBC: x / WBC x   Sq Epi: x / Non Sq Epi: x / Bacteria: x      CAPILLARY BLOOD GLUCOSE            RADIOLOGY & ADDITIONAL TESTS:    Imaging Personally Reviewed:    Consultant(s) Notes Reviewed:      Care Discussed with Consultants/Other Providers:    Hermilo Dangelo MD, CMD, FACP    [address] [id number]  Office Tel: [phone]  Cell: [phone]

## 2024-05-13 NOTE — PROGRESS NOTE ADULT - SUBJECTIVE AND OBJECTIVE BOX
24H hour events:     MEDICATIONS:  apixaban 5 milliGRAM(s) Oral every 12 hours  furosemide    Tablet 40 milliGRAM(s) Oral daily  sotalol 40 milliGRAM(s) Oral every 12 hours      budesonide 160 MICROgram(s)/formoterol 4.5 MICROgram(s) Inhaler 2 Puff(s) Inhalation two times a day      pantoprazole    Tablet 40 milliGRAM(s) Oral two times a day  polyethylene glycol 3350 17 Gram(s) Oral daily PRN  sucralfate suspension 1 Gram(s) Oral four times a day    atorvastatin 40 milliGRAM(s) Oral at bedtime    magnesium sulfate  IVPB 2 Gram(s) IV Intermittent once  potassium chloride    Tablet ER 40 milliEquivalent(s) Oral every 4 hours      REVIEW OF SYSTEMS:  Complete 12 point ROS negative.    PHYSICAL EXAM:  T(C): 36.8 (05-13-24 @ 04:21), Max: 36.9 (05-12-24 @ 13:43)  HR: 64 (05-13-24 @ 04:21) (62 - 69)  BP: 141/73 (05-13-24 @ 04:21) (134/78 - 164/81)  RR: 18 (05-13-24 @ 04:21) (18 - 18)  SpO2: 97% (05-13-24 @ 04:21) (96% - 97%)  Wt(kg): --  I&O's Summary    12 May 2024 07:01  -  13 May 2024 07:00  --------------------------------------------------------  IN: 920 mL / OUT: 1600 mL / NET: -680 mL        Appearance: Normal	  HEENT: PERRL, EOMI	  Cardiovascular: Normal S1 S2, No JVD, No murmurs  Respiratory: Lungs clear to auscultation	  Psychiatry: A & O x 3, Mood & affect appropriate  Gastrointestinal:  Soft, Non-tender, + BS	  Skin: No rashes, No ecchymoses, No cyanosis	  Neurologic: Grossly intact  Extremities: No clubbing, cyanosis or edema  Vascular: Peripheral pulses palpable 2+ bilaterally        LABS:	 	    CBC Full  -  ( 13 May 2024 06:53 )  WBC Count : 22.11 K/uL  Hemoglobin : 9.5 g/dL  Hematocrit : 30.3 %  Platelet Count - Automated : 465 K/uL  Mean Cell Volume : 84.4 fl  Mean Cell Hemoglobin : 26.5 pg  Mean Cell Hemoglobin Concentration : 31.4 gm/dL  Auto Neutrophil # : x  Auto Lymphocyte # : x  Auto Monocyte # : x  Auto Eosinophil # : x  Auto Basophil # : x  Auto Neutrophil % : x  Auto Lymphocyte % : x  Auto Monocyte % : x  Auto Eosinophil % : x  Auto Basophil % : x    05-13    138  |  103  |  16  ----------------------------<  123<H>  3.7   |  23  |  0.80  05-11    140  |  104  |  16  ----------------------------<  148<H>  3.4<L>   |  24  |  0.85    Ca    9.1      13 May 2024 06:53  Ca    8.7      11 May 2024 23:16  Mg     1.8     05-13  Mg     2.0     05-11    TPro  6.2  /  Alb  2.9<L>  /  TBili  0.9  /  DBili  x   /  AST  10  /  ALT  8<L>  /  AlkPhos  75  05-13      proBNP:   Lipid Profile:   HgA1c:   TSH:       CARDIAC MARKERS:          TELEMETRY: 	    ECG:  	  RADIOLOGY:  OTHER: 	    PREVIOUS DIAGNOSTIC TESTING:    [ ] Echocardiogram:    [ ]  Catheterization:  [ ] Stress Test:  	  	  ASSESSMENT/PLAN: 	     24H hour events: Pt without complaint, reports loose BM improved, reports N/V improved. No acute events overnight, Tele: SR at 60-80's, had 12 beats NSVT on 5/11.     MEDICATIONS:  apixaban 5 milliGRAM(s) Oral every 12 hours  furosemide    Tablet 40 milliGRAM(s) Oral daily  sotalol 40 milliGRAM(s) Oral every 12 hours  budesonide 160 MICROgram(s)/formoterol 4.5 MICROgram(s) Inhaler 2 Puff(s) Inhalation two times a day  pantoprazole    Tablet 40 milliGRAM(s) Oral two times a day  polyethylene glycol 3350 17 Gram(s) Oral daily PRN  sucralfate suspension 1 Gram(s) Oral four times a day  atorvastatin 40 milliGRAM(s) Oral at bedtime  magnesium sulfate  IVPB 2 Gram(s) IV Intermittent once  potassium chloride    Tablet ER 40 milliEquivalent(s) Oral every 4 hours      REVIEW OF SYSTEMS:  Complete 12 point ROS negative.    PHYSICAL EXAM:  T(C): 36.8 (05-13-24 @ 04:21), Max: 36.9 (05-12-24 @ 13:43)  HR: 64 (05-13-24 @ 04:21) (62 - 69)  BP: 141/73 (05-13-24 @ 04:21) (134/78 - 164/81)  RR: 18 (05-13-24 @ 04:21) (18 - 18)  SpO2: 97% (05-13-24 @ 04:21) (96% - 97%)  Wt(kg): --  I&O's Summary    12 May 2024 07:01  -  13 May 2024 07:00  --------------------------------------------------------  IN: 920 mL / OUT: 1600 mL / NET: -680 mL        Appearance: Normal	  HEENT: PERRL, EOMI	  Cardiovascular: Normal S1 S2, No JVD, No murmurs  Respiratory: Lungs clear to auscultation	  Psychiatry: A & O x 3, Mood & affect appropriate  Gastrointestinal: Soft, Non-tender, + BS	  Skin: L thigh + ecchymosis from the fall  Neurologic: Grossly intact  Extremities: No clubbing, cyanosis or edema  Vascular: Peripheral pulses palpable 2+ bilaterally        LABS:	 	    CBC Full  -  ( 13 May 2024 06:53 )  WBC Count : 22.11 K/uL  Hemoglobin : 9.5 g/dL  Hematocrit : 30.3 %  Platelet Count - Automated : 465 K/uL  Mean Cell Volume : 84.4 fl  Mean Cell Hemoglobin : 26.5 pg  Mean Cell Hemoglobin Concentration : 31.4 gm/dL  Auto Neutrophil # : x  Auto Lymphocyte # : x  Auto Monocyte # : x  Auto Eosinophil # : x  Auto Basophil # : x  Auto Neutrophil % : x  Auto Lymphocyte % : x  Auto Monocyte % : x  Auto Eosinophil % : x  Auto Basophil % : x    05-13    138  |  103  |  16  ----------------------------<  123<H>  3.7   |  23  |  0.80  05-11    140  |  104  |  16  ----------------------------<  148<H>  3.4<L>   |  24  |  0.85    Ca    9.1      13 May 2024 06:53  Ca    8.7      11 May 2024 23:16  Mg     1.8     05-13  Mg     2.0     05-11    TPro  6.2  /  Alb  2.9<L>  /  TBili  0.9  /  DBili  x   /  AST  10  /  ALT  8<L>  /  AlkPhos  75  05-13    TSH: 5.85      TELEMETRY: SR at 60-80's, had 12 beats NSVT on 5/11.  	      < from: TTE Limited W or WO Ultrasound Enhancing Agent (05.08.24 @ 14:21) >  CONCLUSIONS:      1. Limited to r/o effusion.   2. No pericardial effusion seen.    ________________________________________________________________________________________  FINDINGS:     Pericardium:  No pericardial effusion seen.    < end of copied text >

## 2024-05-13 NOTE — PROGRESS NOTE ADULT - NS ATTEND AMEND GEN_ALL_CORE FT
Doing well from EP standpoint but I am still worried about leukocytosis in the setting of an ICD. ID following. Call us for further issues. No bacteremia documented yet
No VT, rare couplet. QTc 510 on sotalol 40 bid, will continue this dose. Sinus rate low 60's. Can stop Propranolol. She still has epigastric tenderness and it is worse post meals. She has had UGI bleeding before. Whlle most of anemia may be related to thigh hematoma, I am concerned with leukocytosis and pain. May be worthwhile to get GI consult and consider RUQ US. She still has her gallbladder. OK to remain off apixaban. WIll check QTc tonight.
So far no VT. Abdominal symptoms still present. QTc hovering around 500 on sotalol. Will continue current dose. K and Mg still needing supplementation.
Stable on Sotalol. Syncope did not correlate to arrhythmia.
NO VT. WIll switch to sotalol 40 q 12 and monitor QTc. Still with GI symptoms. UTI but ID recommends no antibiotics but she has a moderate leukocytosis. No fever. Still with GI pain

## 2024-05-13 NOTE — PROGRESS NOTE ADULT - ASSESSMENT
72 y/o female w/ PMHx COPD, paroxysmal AF (on eliquis), HLD, HTN, PVD, ICM/CAD s/p PCI (has  of RCA), cardiac arrest s/p left-sided ICD (6/4/2019) complicated by infection and s/p R sided [employer] single-chamber ICD ([place] in 9/23/2019), perforated gastric ulcer with pneumoperitoneum - sealed 11/26/23 on UGI series, prior admissions for VT storm with adjustment of medication and NIPS, prior ICD shock in the setting of recurrent monomorphic VT with unsuccessful ATP, VT ablation attempted 10/20/23 but aborted due to hemodynamically significant pericardial effusion with drain placement, recurrent VT s/p ICD shock, and afib w/ RVR s/p cardioversion 10/21/23 s/p pericardial window 10/28/23, admitted 2/2024 with ICD shocks for recurrent VT and underwent VT ablation on 2/13/24 by Dr. Hurt (off quinidine post ablation, on amio) who now brought in by EMS from home after witnessed fall w/ presyncope w/ left thigh intramuscular hematoma. ICD interrogation revealed 10 episodes of monomorphic VT with average -200 bpm between 7:36am-8:59am (duration 2-16 seconds), 4 of the 10 episodes were treated and terminated with one round of ATP, no ICD shock noted.      Dx: Recurrent VT s/p ICD therapy    Recommendation:  - Her fall was related to dehydration and not due to VT as VT occurred ~1 hour after she fell.   - Amiodarone was discontinued on 4/4/24 due to TSH of 11. Repeat TSH 5.85  - Continue sotalol 40mg Q12hr. QTc have been stable around 500s. Ok to continue per EP attendings as patient has an ICD  - Propranolol d/c'd on 5/9 as her SR are in the high 50-60's  - She was off AC due to left thigh intramuscular hematoma, eliquis has been resumed since 5/12  - GI recs appreciated. Pt's GI complaints likely PUD vs gastritis. On PPI and carafate  - Supplement to maintain K >4.0 and Mg >2.0  - Continue telemetry monitor  - Follow up with Dr. Hurt as scheduled on 7/11/24 at 9:45am  - EP further EP workup as inpatient. Reconsult as needed   - Plan discussed with Dr. Hurt and Medicine ACP.    SOO Comer NP-C  [phone]

## 2024-05-13 NOTE — PROGRESS NOTE ADULT - SUBJECTIVE AND OBJECTIVE BOX
Patient is a 73y old  Female who presents with a chief complaint of The patient is a 73y Female complaining of syncope. (11 May 2024 15:28)      SUBJECTIVE / OVERNIGHT EVENTS:    Events noted.  CONSTITUTIONAL: No fever,  or fatigue  RESPIRATORY: No cough, wheezing,  No shortness of breath  CARDIOVASCULAR: No chest pain, palpitations, dizziness, or leg swelling  GASTROINTESTINAL: No abdominal or epigastric pain.       MEDICATIONS  (STANDING):  apixaban 5 milliGRAM(s) Oral every 12 hours  atorvastatin 40 milliGRAM(s) Oral at bedtime  budesonide 160 MICROgram(s)/formoterol 4.5 MICROgram(s) Inhaler 2 Puff(s) Inhalation two times a day  furosemide    Tablet 40 milliGRAM(s) Oral daily  pantoprazole    Tablet 40 milliGRAM(s) Oral two times a day  sotalol 40 milliGRAM(s) Oral every 12 hours  sucralfate suspension 1 Gram(s) Oral four times a day    MEDICATIONS  (PRN):  polyethylene glycol 3350 17 Gram(s) Oral daily PRN for constipation        CAPILLARY BLOOD GLUCOSE        I&O's Summary    11 May 2024 07:01  -  12 May 2024 07:00  --------------------------------------------------------  IN: 900 mL / OUT: 420 mL / NET: 480 mL    12 May 2024 07:01  -  12 May 2024 20:09  --------------------------------------------------------  IN: 680 mL / OUT: 900 mL / NET: -220 mL        T(C): 36.9 (05-12-24 @ 13:43), Max: 36.9 (05-12-24 @ 13:43)  HR: 62 (05-12-24 @ 13:43) (62 - 67)  BP: 134/78 (05-12-24 @ 13:43) (134/78 - 165/68)  RR: 18 (05-12-24 @ 13:43) (18 - 18)  SpO2: 96% (05-12-24 @ 13:43) (95% - 97%)    PHYSICAL EXAM:    NECK: Supple, No JVD  CHEST/LUNG: Clear to auscultation bilaterally; No wheezing.  HEART: Regular rate and rhythm; No murmurs, rubs, or gallops  ABDOMEN: Soft, Nontender, Nondistended; Bowel sounds present  EXTREMITIES:   No edema  NEUROLOGY: AAO       LABS:                        8.6    16.92 )-----------( 438      ( 11 May 2024 07:08 )             27.8     05-11    140  |  104  |  16  ----------------------------<  148<H>  3.4<L>   |  24  |  0.85    Ca    8.7      11 May 2024 23:16  Mg     2.0     05-11            Urinalysis Basic - ( 11 May 2024 23:16 )    Color: x / Appearance: x / SG: x / pH: x  Gluc: 148 mg/dL / Ketone: x  / Bili: x / Urobili: x   Blood: x / Protein: x / Nitrite: x   Leuk Esterase: x / RBC: x / WBC x   Sq Epi: x / Non Sq Epi: x / Bacteria: x      CAPILLARY BLOOD GLUCOSE            RADIOLOGY & ADDITIONAL TESTS:    Imaging Personally Reviewed:    Consultant(s) Notes Reviewed:      Care Discussed with Consultants/Other Providers:    Hermilo Dangelo MD, CMD, FACP    [address] [address]  Office Tel: [phone]  Cell: [phone]

## 2024-05-13 NOTE — PROGRESS NOTE ADULT - SUBJECTIVE AND OBJECTIVE BOX
[facility]  Doe Almanza PA-C  [address] [place] [address]  [phone]      INTERVAL HPI/OVERNIGHT EVENTS:    patient s/e  abdominal pain almost resolved  now with diarrhea    MEDICATIONS  (STANDING):  apixaban 5 milliGRAM(s) Oral every 12 hours  atorvastatin 40 milliGRAM(s) Oral at bedtime  budesonide 160 MICROgram(s)/formoterol 4.5 MICROgram(s) Inhaler 2 Puff(s) Inhalation two times a day  furosemide    Tablet 40 milliGRAM(s) Oral daily  pantoprazole    Tablet 40 milliGRAM(s) Oral two times a day  sotalol 40 milliGRAM(s) Oral every 12 hours  sucralfate suspension 1 Gram(s) Oral four times a day    MEDICATIONS  (PRN):  polyethylene glycol 3350 17 Gram(s) Oral daily PRN for constipation      Allergies    No Known Allergies    Intolerances        ROS:   General:  No  fevers, chills, night sweats, fatigue,   Eyes:  Good vision, no reported pain  ENT:  No sore throat, pain, runny nose, dysphagia  CV:  No pain, palpitations, hypo/hypertension  Resp:  No dyspnea, cough, tachypnea, wheezing  GI:  No pain, No nausea, No vomiting, No diarrhea, No constipation, No weight loss, No fever, No pruritis, No rectal bleeding, No tarry stools, No dysphagia,  :  No pain, bleeding, incontinence, nocturia  Muscle:  No pain, weakness  Neuro:  No weakness, tingling, memory problems  Psych:  No fatigue, insomnia, mood problems, depression  Endocrine:  No polyuria, polydipsia, cold/heat intolerance  Heme:  No petechiae, ecchymosis, easy bruisability  Skin:  No rash, tattoos, scars, edema      PHYSICAL EXAM:   Vital Signs:  Vital Signs Last 24 Hrs  T(C): 36.8 (13 May 2024 11:28), Max: 36.8 (13 May 2024 04:21)  T(F): 98.2 (13 May 2024 11:28), Max: 98.3 (13 May 2024 04:21)  HR: 63 (13 May 2024 11:28) (63 - 69)  BP: 147/65 (13 May 2024 11:28) (141/73 - 164/81)  BP(mean): --  RR: 18 (13 May 2024 11:28) (18 - 18)  SpO2: 95% (13 May 2024 11:28) (95% - 97%)    Parameters below as of 13 May 2024 11:28  Patient On (Oxygen Delivery Method): room air      Daily     Daily Weight in k.3 (13 May 2024 06:16)    GENERAL:  Appears stated age,   HEENT:  NC/AT,    CHEST:  Full & symmetric excursion,   HEART:  Regular rhythm,  ABDOMEN:  Soft, non-tender, non-distended,  EXTEREMITIES:  no cyanosis  SKIN:  No rash  NEURO:  Alert,       LABS:                        9.5    22.11 )-----------( 465      ( 13 May 2024 06:53 )             30.3     05-    138  |  103  |  16  ----------------------------<  123<H>  3.7   |  23  |  0.80    Ca    9.1      13 May 2024 06:53  Mg     1.8         TPro  6.2  /  Alb  2.9<L>  /  TBili  0.9  /  DBili  x   /  AST  10  /  ALT  8<L>  /  AlkPhos  75  05-13      Urinalysis Basic - ( 13 May 2024 06:53 )    Color: x / Appearance: x / SG: x / pH: x  Gluc: 123 mg/dL / Ketone: x  / Bili: x / Urobili: x   Blood: x / Protein: x / Nitrite: x   Leuk Esterase: x / RBC: x / WBC x   Sq Epi: x / Non Sq Epi: x / Bacteria: x        RADIOLOGY & ADDITIONAL TESTS:

## 2024-05-13 NOTE — PROGRESS NOTE ADULT - SUBJECTIVE AND OBJECTIVE BOX
[facility], Division of Infectious   Dr Recinos, Dr Chandler, Dr Nuñez, AARON Mathis Amor  [phone]  after hours and weekends [phone]    Name: AUSTIN LEE  Age: 73y  Gender: Female  MRN: [id number]    Interval History--  Notes reviewed  loose stool once yesterday and 2 times today  still dizzy when she turns  ribs hurt         Allergies    No Known Allergies    Intolerances        Medications--  Antibiotics:    Immunologic:    Other:  apixaban  atorvastatin  budesonide 160 MICROgram(s)/formoterol 4.5 MICROgram(s) Inhaler  furosemide    Tablet  pantoprazole    Tablet  polyethylene glycol 3350 PRN  sotalol  sucralfate suspension      Review of Systems--  A 10-point review of systems was obtained.     Pertinent positives and negatives--  Constitutional: No fevers. No Chills. No Rigors.   Cardiovascular: No chest pain. No palpitations.  Respiratory: No shortness of breath. No cough.  Gastrointestinal: No nausea or vomiting. No diarrhea or constipation.   Psychiatric: Pleasant. Appropriate affect.    Review of systems otherwise negative except as previously noted.    Physical Examination--  Vital Signs: T(F): 98.2 (05-13-24 @ 11:28), Max: 98.4 (05-12-24 @ 13:43)  HR: 63 (05-13-24 @ 11:28)  BP: 147/65 (05-13-24 @ 11:28)  RR: 18 (05-13-24 @ 11:28)  SpO2: 95% (05-13-24 @ 11:28)  Wt(kg): --  General: Nontoxic-appearing Female in no acute distress.  HEENT: AT/NC.   Neck: Not rigid. No sense of mass.  Nodes: None palpable.  Lungs: Clear bilaterally without rales, wheezing or rhonchi  Heart: Regular rate and rhythm. No Murmur. No rub. No gallop. No palpable thrill.  Abdomen: Bowel sounds present and normoactive. Soft. Nondistended.  Back: No spinal tenderness. No costovertebral angle tenderness.   Extremities: No cyanosis or clubbing. No edema. left arm phlebitis   Skin: Warm. Dry. Good turgor. No rash. No vasculitic stigmata.  Psychiatric: Appropriate affect and mood for situation.         Laboratory Studies--  CBC                        9.5    22.11 )-----------( 465      ( 13 May 2024 06:53 )             30.3       Chemistries  05-13    138  |  103  |  16  ----------------------------<  123<H>  3.7   |  23  |  0.80    Ca    9.1      13 May 2024 06:53  Mg     1.8     05-13    TPro  6.2  /  Alb  2.9<L>  /  TBili  0.9  /  DBili  x   /  AST  10  /  ALT  8<L>  /  AlkPhos  75  05-13      Culture Data    Culture - Urine (collected 07 May 2024 13:11)  Source: Clean Catch Clean Catch (Midstream)  Final Report (10 May 2024 07:33):    50,000 - 99,000 CFU/mL Escherichia coli  Organism: Escherichia coli (10 May 2024 07:33)  Organism: Escherichia coli (10 May 2024 07:33)    Culture - Blood (collected 07 May 2024 10:38)  Source: .Blood Blood-Venous  Final Report (12 May 2024 15:00):    No growth at 5 days    Culture - Blood (collected 07 May 2024 09:30)  Source: .Blood Blood-Peripheral  Final Report (12 May 2024 15:00):    No growth at 5 days

## 2024-05-13 NOTE — PROGRESS NOTE ADULT - ASSESSMENT
73f with anemia  copd, former smoker, atrial fib, cad, hfref, hyperlipid, hypertension recurrent vt , s/p ablation 2/13,  icd, presents with witnessed fall.   hypotension - improved  rib fractures   left thigh hematoma   acute anemia   nausea vomiting diarrhea- resolved   leukocytosis - r/o infection v likely reactive     plan  wbc elevated but stable   afebrile  nontoxic  exam nonfocal  ct scan chest - no pna  ct abd no abscess, no diverticulitis , no free air  no gu symptoms  ua unimpressive   urine cx ecoli - colonized as pt without symptoms   blood cx neg to date   cdiff neg  gi pcr neg    monitor wbc  check icd  lle keep elevated and local care serial exam and trend h/h  warm compress to rue     infectious work up negative to date   monitor off antibx          73f with anemia  copd, former smoker, atrial fib, cad, hfref, hyperlipid, hypertension recurrent vt , s/p ablation 2/13,  icd, presents with witnessed fall.   hypotension - improved  rib fractures   left thigh hematoma   acute anemia   nausea vomiting diarrhea- resolved   leukocytosis - r/o infection v likely reactive     plan  wbc elevated but stable   afebrile  nontoxic  exam nonfocal  ct scan chest - no pna  ct abd no abscess, no diverticulitis , no free air  no gu symptoms  ua unimpressive   urine cx ecoli - colonized as pt without symptoms   blood cx neg to date   cdiff neg  gi pcr neg    monitor wbc  lle keep elevated and local care serial exam and trend h/h  warm compress to rue     infectious work up negative to date   monitor off antibx

## 2024-05-13 NOTE — PROGRESS NOTE ADULT - ASSESSMENT
diarrhea  abdominal pain    cont proton pump inhibitor   cont carafate  diet as tolerated  gi pcr negative  c diff negative  suspect diarrhea may be 2/2 magnesium  imodium prn  will follow

## 2024-05-14 NOTE — PROGRESS NOTE ADULT - ASSESSMENT
diarrhea  abdominal pain  leukocytosis     cont proton pump inhibitor   cont carafate  diet as tolerated  gi pcr negative  c diff negative  suspect diarrhea may be 2/2 magnesium  imodium prn  will follow

## 2024-05-14 NOTE — PROGRESS NOTE ADULT - ASSESSMENT
73f with anemia  copd, former smoker, atrial fib, cad, hfref, hyperlipid, hypertension recurrent vt , s/p ablation 2/13,  icd, presents with witnessed fall.   hypotension - improved  rib fractures   left thigh hematoma   acute anemia   nausea vomiting diarrhea- resolved   leukocytosis - r/o infection v likely reactive     plan    wbc elevated  will repeat blood cx  check procal   add diff to cbc       afebrile  nontoxic  exam nonfocal  ct scan chest - no pna  ct abd no abscess, no diverticulitis , no free air  ua unimpressive   urine cx ecoli - colonized as pt without symptoms   blood cx neg to date   cdiff neg  gi pcr neg    infectious work up negative to date   monitor off antibx     d/w SHANNON Man

## 2024-05-14 NOTE — PROGRESS NOTE ADULT - SUBJECTIVE AND OBJECTIVE BOX
Patient is a 73y old  Female who presents with a chief complaint of The patient is a 73y Female complaining of syncope. (11 May 2024 15:28)      SUBJECTIVE / OVERNIGHT EVENTS:    Events noted.  CONSTITUTIONAL: No fever,  or fatigue  RESPIRATORY: No cough, wheezing,  No shortness of breath  CARDIOVASCULAR: No chest pain, palpitations, dizziness, or leg swelling  GASTROINTESTINAL: No abdominal or epigastric pain.       MEDICATIONS  (STANDING):  apixaban 5 milliGRAM(s) Oral every 12 hours  atorvastatin 40 milliGRAM(s) Oral at bedtime  budesonide 160 MICROgram(s)/formoterol 4.5 MICROgram(s) Inhaler 2 Puff(s) Inhalation two times a day  furosemide    Tablet 40 milliGRAM(s) Oral daily  pantoprazole    Tablet 40 milliGRAM(s) Oral two times a day  sotalol 40 milliGRAM(s) Oral every 12 hours  sucralfate suspension 1 Gram(s) Oral four times a day    MEDICATIONS  (PRN):  polyethylene glycol 3350 17 Gram(s) Oral daily PRN for constipation        CAPILLARY BLOOD GLUCOSE        I&O's Summary    11 May 2024 07:01  -  12 May 2024 07:00  --------------------------------------------------------  IN: 900 mL / OUT: 420 mL / NET: 480 mL    12 May 2024 07:01  -  12 May 2024 20:09  --------------------------------------------------------  IN: 680 mL / OUT: 900 mL / NET: -220 mL        T(C): 36.9 (05-12-24 @ 13:43), Max: 36.9 (05-12-24 @ 13:43)  HR: 62 (05-12-24 @ 13:43) (62 - 67)  BP: 134/78 (05-12-24 @ 13:43) (134/78 - 165/68)  RR: 18 (05-12-24 @ 13:43) (18 - 18)  SpO2: 96% (05-12-24 @ 13:43) (95% - 97%)    PHYSICAL EXAM:    NECK: Supple, No JVD  CHEST/LUNG: Clear to auscultation bilaterally; No wheezing.  HEART: Regular rate and rhythm; No murmurs, rubs, or gallops  ABDOMEN: Soft, Nontender, Nondistended; Bowel sounds present  EXTREMITIES:   No edema  NEUROLOGY: AAO       LABS:                        8.6    16.92 )-----------( 438      ( 11 May 2024 07:08 )             27.8     05-11    140  |  104  |  16  ----------------------------<  148<H>  3.4<L>   |  24  |  0.85    Ca    8.7      11 May 2024 23:16  Mg     2.0     05-11            Urinalysis Basic - ( 11 May 2024 23:16 )    Color: x / Appearance: x / SG: x / pH: x  Gluc: 148 mg/dL / Ketone: x  / Bili: x / Urobili: x   Blood: x / Protein: x / Nitrite: x   Leuk Esterase: x / RBC: x / WBC x   Sq Epi: x / Non Sq Epi: x / Bacteria: x      CAPILLARY BLOOD GLUCOSE            RADIOLOGY & ADDITIONAL TESTS:    Imaging Personally Reviewed:    Consultant(s) Notes Reviewed:      Care Discussed with Consultants/Other Providers:    Hermilo Dangelo MD, CMD, FACP    [address] [address]  Office Tel: [phone]  Cell: [phone]   Patient is a 73y old  Female who presents with a chief complaint of The patient is a 73y Female complaining of syncope. (11 May 2024 15:28)      SUBJECTIVE / OVERNIGHT EVENTS:    Events noted. Nausea/ Loss of apetite  CONSTITUTIONAL: No fever,  or fatigue  RESPIRATORY: No cough, wheezing,  No shortness of breath  CARDIOVASCULAR: No chest pain, palpitations, dizziness, or leg swelling  GASTROINTESTINAL: No abdominal or epigastric pain.       MEDICATIONS  (STANDING):  apixaban 5 milliGRAM(s) Oral every 12 hours  atorvastatin 40 milliGRAM(s) Oral at bedtime  budesonide 160 MICROgram(s)/formoterol 4.5 MICROgram(s) Inhaler 2 Puff(s) Inhalation two times a day  furosemide    Tablet 40 milliGRAM(s) Oral daily  pantoprazole    Tablet 40 milliGRAM(s) Oral two times a day  sotalol 40 milliGRAM(s) Oral every 12 hours  sucralfate suspension 1 Gram(s) Oral four times a day    MEDICATIONS  (PRN):  polyethylene glycol 3350 17 Gram(s) Oral daily PRN for constipation        CAPILLARY BLOOD GLUCOSE        I&O's Summary    11 May 2024 07:01  -  12 May 2024 07:00  --------------------------------------------------------  IN: 900 mL / OUT: 420 mL / NET: 480 mL    12 May 2024 07:01  -  12 May 2024 20:09  --------------------------------------------------------  IN: 680 mL / OUT: 900 mL / NET: -220 mL        T(C): 36.9 (05-12-24 @ 13:43), Max: 36.9 (05-12-24 @ 13:43)  HR: 62 (05-12-24 @ 13:43) (62 - 67)  BP: 134/78 (05-12-24 @ 13:43) (134/78 - 165/68)  RR: 18 (05-12-24 @ 13:43) (18 - 18)  SpO2: 96% (05-12-24 @ 13:43) (95% - 97%)    PHYSICAL EXAM:    NECK: Supple, No JVD  CHEST/LUNG: Clear to auscultation bilaterally; No wheezing.  HEART: Regular rate and rhythm; No murmurs, rubs, or gallops  ABDOMEN: Soft, Nontender, Nondistended; Bowel sounds present  EXTREMITIES:   No edema  NEUROLOGY: AAO       LABS:                        8.6    16.92 )-----------( 438      ( 11 May 2024 07:08 )             27.8     05-11    140  |  104  |  16  ----------------------------<  148<H>  3.4<L>   |  24  |  0.85    Ca    8.7      11 May 2024 23:16  Mg     2.0     05-11            Urinalysis Basic - ( 11 May 2024 23:16 )    Color: x / Appearance: x / SG: x / pH: x  Gluc: 148 mg/dL / Ketone: x  / Bili: x / Urobili: x   Blood: x / Protein: x / Nitrite: x   Leuk Esterase: x / RBC: x / WBC x   Sq Epi: x / Non Sq Epi: x / Bacteria: x      CAPILLARY BLOOD GLUCOSE            RADIOLOGY & ADDITIONAL TESTS:    Imaging Personally Reviewed:    Consultant(s) Notes Reviewed:      Care Discussed with Consultants/Other Providers:    Hermilo Dangelo MD, CMD, FACP    [address] [place] [address]  Office Tel: [phone]  Cell: [phone]

## 2024-05-14 NOTE — PROGRESS NOTE ADULT - SUBJECTIVE AND OBJECTIVE BOX
[facility]  Doe Almanza PA-C  [address] [place] [address]  [phone]      INTERVAL HPI/OVERNIGHT EVENTS:    patient s/e  events noted    MEDICATIONS  (STANDING):  apixaban 5 milliGRAM(s) Oral every 12 hours  atorvastatin 40 milliGRAM(s) Oral at bedtime  budesonide 160 MICROgram(s)/formoterol 4.5 MICROgram(s) Inhaler 2 Puff(s) Inhalation two times a day  furosemide    Tablet 40 milliGRAM(s) Oral daily  pantoprazole    Tablet 40 milliGRAM(s) Oral two times a day  sotalol 40 milliGRAM(s) Oral every 12 hours  sucralfate suspension 1 Gram(s) Oral four times a day    MEDICATIONS  (PRN):  polyethylene glycol 3350 17 Gram(s) Oral daily PRN for constipation      Allergies    No Known Allergies    Intolerances        ROS:   General:  No  fevers, chills, night sweats, fatigue,   Eyes:  Good vision, no reported pain  ENT:  No sore throat, pain, runny nose, dysphagia  CV:  No pain, palpitations, hypo/hypertension  Resp:  No dyspnea, cough, tachypnea, wheezing  GI:  No pain, No nausea, No vomiting, No diarrhea, No constipation, No weight loss, No fever, No pruritis, No rectal bleeding, No tarry stools, No dysphagia,  :  No pain, bleeding, incontinence, nocturia  Muscle:  No pain, weakness  Neuro:  No weakness, tingling, memory problems  Psych:  No fatigue, insomnia, mood problems, depression  Endocrine:  No polyuria, polydipsia, cold/heat intolerance  Heme:  No petechiae, ecchymosis, easy bruisability  Skin:  No rash, tattoos, scars, edema      PHYSICAL EXAM:   Vital Signs:  Vital Signs Last 24 Hrs  T(C): 36.8 (13 May 2024 11:28), Max: 36.8 (13 May 2024 04:21)  T(F): 98.2 (13 May 2024 11:28), Max: 98.3 (13 May 2024 04:21)  HR: 63 (13 May 2024 11:28) (63 - 69)  BP: 147/65 (13 May 2024 11:28) (141/73 - 164/81)  BP(mean): --  RR: 18 (13 May 2024 11:28) (18 - 18)  SpO2: 95% (13 May 2024 11:28) (95% - 97%)    Parameters below as of 13 May 2024 11:28  Patient On (Oxygen Delivery Method): room air      Daily     Daily Weight in k.3 (13 May 2024 06:16)    GENERAL:  Appears stated age,   HEENT:  NC/AT,    CHEST:  Full & symmetric excursion,   HEART:  Regular rhythm,  ABDOMEN:  Soft, non-tender, non-distended,  EXTEREMITIES:  no cyanosis  SKIN:  No rash  NEURO:  Alert,       LABS:                        9.5    22.11 )-----------( 465      ( 13 May 2024 06:53 )             30.3     05-13    138  |  103  |  16  ----------------------------<  123<H>  3.7   |  23  |  0.80    Ca    9.1      13 May 2024 06:53  Mg     1.8     05-13    TPro  6.2  /  Alb  2.9<L>  /  TBili  0.9  /  DBili  x   /  AST  10  /  ALT  8<L>  /  AlkPhos  75  05-13      Urinalysis Basic - ( 13 May 2024 06:53 )    Color: x / Appearance: x / SG: x / pH: x  Gluc: 123 mg/dL / Ketone: x  / Bili: x / Urobili: x   Blood: x / Protein: x / Nitrite: x   Leuk Esterase: x / RBC: x / WBC x   Sq Epi: x / Non Sq Epi: x / Bacteria: x        RADIOLOGY & ADDITIONAL TESTS:

## 2024-05-14 NOTE — PROGRESS NOTE ADULT - ASSESSMENT
The patient is a 73y Female complaining of syncope.    Syncope likely from VT:    Tele  Cardio f/up appreciated  On Sotalol  TTE: Limited/No Pericardial effusion    Lt thigh hematoma:    Hb stable    Leukocytosis:    Hem eval appreciated  ID f/up noted  Monitor off abx     Epi pain:    GI f/up appreciated  RUQ  sono: No gall stones  PPI bid  [place] fate    DVT prophylaxis:    Eliquis    Hypokalemia:    S/p K supp     The patient is a 73y Female complaining of syncope.    Syncope likely from VT:    Tele  Cardio f/up appreciated  On Sotalol  TTE: Limited/No Pericardial effusion    Lt thigh hematoma:    Hb stable    Leukocytosis:    Hem eval appreciated  ID f/up noted  Monitor off abx     FTT:    IVF  GI f/up appreciated  RUQ  sono: No gall stones  PPI bid  [place] fate  CT Abd/P    DVT prophylaxis:    Nolan Block pt's

## 2024-05-14 NOTE — CONSULT NOTE ADULT - ATTENDING COMMENTS
Patient noted to have an elevated white cell count; review of peripheral blood smear shows a reactive neutrophilia; no leukemia is noted. Please continue antibiotic regimen for suspected infection.

## 2024-05-14 NOTE — CONSULT NOTE ADULT - SUBJECTIVE AND OBJECTIVE BOX
HPI:  Patient is a 73-year-old female brought in by EMS from home after witnessed fall.  Patient was attempting to walk with walker, endorsed that she felt "woozy" and fell over.  She struck her left costal margin on the edge of her dresser.  Denies head trauma. Patient was walking with her walker when she fell. Medical history significant for coronary artery disease, on Plavix.  Per EMS, patient was hypotensive 80s over 40s, responded to 1 L normal saline.  EMS also identified potential run of polymorphic V. tach on the monitor.       PAST MEDICAL & SURGICAL HISTORY:  Obese      Smoker      HTN (hypertension)      HLD (hyperlipidemia)      CAD (coronary artery disease)      CHF with cardiomyopathy      History of hip surgery          Allergies    No Known Allergies    Intolerances        MEDICATIONS  (STANDING):  apixaban 5 milliGRAM(s) Oral every 12 hours  atorvastatin 40 milliGRAM(s) Oral at bedtime  budesonide 160 MICROgram(s)/formoterol 4.5 MICROgram(s) Inhaler 2 Puff(s) Inhalation two times a day  furosemide    Tablet 40 milliGRAM(s) Oral daily  pantoprazole    Tablet 40 milliGRAM(s) Oral two times a day  sotalol 40 milliGRAM(s) Oral every 12 hours  sucralfate suspension 1 Gram(s) Oral four times a day    MEDICATIONS  (PRN):  loperamide 2 milliGRAM(s) Oral three times a day PRN Diarrhea  polyethylene glycol 3350 17 Gram(s) Oral daily PRN for constipation      FAMILY HISTORY:  Family history of Alzheimer's disease (Father)    Family history of cancer (Mother)        SOCIAL HISTORY: No EtOH, no tobacco    REVIEW OF SYSTEMS:   All other review of systems is negative unless indicated above.        T(F): 98 (05-14-24 @ 11:33), Max: 98.1 (05-14-24 @ 05:20)  HR: 71 (05-14-24 @ 11:33)  BP: 154/79 (05-14-24 @ 11:33)  RR: 18 (05-14-24 @ 11:33)  SpO2: 97% (05-14-24 @ 11:33)  Wt(kg): --    GENERAL: NAD, well-developed  HEAD:  Atraumatic, Normocephalic  EYES: EOMI, PERRLA, conjunctiva and sclera clear  NECK: Supple, No JVD  CHEST/LUNG: Clear to auscultation bilaterally; No wheeze  HEART: Regular rate and rhythm; No murmurs, rubs, or gallops  ABDOMEN: Soft, Nontender, Nondistended; Bowel sounds present  EXTREMITIES:  2+ Peripheral Pulses, No clubbing, cyanosis, or edema  NEUROLOGY: non-focal  SKIN: No rashes or lesions                          9.6    29.77 )-----------( 496      ( 14 May 2024 06:19 )             30.8       05-14    140  |  105  |  16  ----------------------------<  130<H>  4.0   |  22  |  0.75    Ca    8.9      14 May 2024 06:20  Mg     2.1     05-14    TPro  6.2  /  Alb  2.9<L>  /  TBili  0.9  /  DBili  x   /  AST  10  /  ALT  8<L>  /  AlkPhos  75  05-13      Magnesium: 2.1 mg/dL (05-14 @ 06:20)          Clean Catch Clean Catch (Midstream)  05-07 @ 13:11   50,000 - 99,000 CFU/mL Escherichia coli  --  Escherichia coli      .Blood Blood-Venous  05-07 @ 10:38   No growth at 5 days  --  --      .Blood Blood-Peripheral  05-07 @ 09:30   No growth at 5 days  --  --       HPI:  Patient is a 73-year-old female brought in by EMS from home after witnessed fall.  Patient was attempting to walk with walker, endorsed that she felt "woozy" and fell over.  She struck her left costal margin on the edge of her dresser.  Denies head trauma. Patient was walking with her walker when she fell. Medical history significant for coronary artery disease, on Plavix. Per EMS, patient was hypotensive 80s over 40s, responded to 1 L normal saline.  EMS also identified potential run of polymorphic V. tach on the monitor. Patient admitted for a fall but she's also found to have +ve urine culture.        PAST MEDICAL & SURGICAL HISTORY:  Obese      Smoker      HTN (hypertension)      HLD (hyperlipidemia)      CAD (coronary artery disease)      CHF with cardiomyopathy      History of hip surgery          Allergies    No Known Allergies    Intolerances        MEDICATIONS  (STANDING):  apixaban 5 milliGRAM(s) Oral every 12 hours  atorvastatin 40 milliGRAM(s) Oral at bedtime  budesonide 160 MICROgram(s)/formoterol 4.5 MICROgram(s) Inhaler 2 Puff(s) Inhalation two times a day  furosemide    Tablet 40 milliGRAM(s) Oral daily  pantoprazole    Tablet 40 milliGRAM(s) Oral two times a day  sotalol 40 milliGRAM(s) Oral every 12 hours  sucralfate suspension 1 Gram(s) Oral four times a day    MEDICATIONS  (PRN):  loperamide 2 milliGRAM(s) Oral three times a day PRN Diarrhea  polyethylene glycol 3350 17 Gram(s) Oral daily PRN for constipation      FAMILY HISTORY:  Family history of Alzheimer's disease (Father)    Family history of cancer (Mother)        SOCIAL HISTORY: No EtOH, no tobacco    REVIEW OF SYSTEMS:   All other review of systems is negative unless indicated above.        T(F): 98 (05-14-24 @ 11:33), Max: 98.1 (05-14-24 @ 05:20)  HR: 71 (05-14-24 @ 11:33)  BP: 154/79 (05-14-24 @ 11:33)  RR: 18 (05-14-24 @ 11:33)  SpO2: 97% (05-14-24 @ 11:33)  Wt(kg): --    Physical exam    GENERAL: NAD, well-developed  HEAD:  Atraumatic, Normocephalic  EYES: EOMI, PERRLA, conjunctiva and sclera clear  NECK: Supple, No JVD  CHEST/LUNG: Clear to auscultation bilaterally   HEART: Regular rate and rhythm; No murmurs, rubs, or gallops  ABDOMEN: Soft, Nontender, Nondistended   EXTREMITIES:  2+ Peripheral Pulses, old bruises over the LE bilaterally  NEUROLOGY: non-focal  SKIN: No rashes or lesions                          9.6    29.77 )-----------( 496      ( 14 May 2024 06:19 )             30.8       05-14    140  |  105  |  16  ----------------------------<  130<H>  4.0   |  22  |  0.75    Ca    8.9      14 May 2024 06:20  Mg     2.1     05-14    TPro  6.2  /  Alb  2.9<L>  /  TBili  0.9  /  DBili  x   /  AST  10  /  ALT  8<L>  /  AlkPhos  75  05-13      Magnesium: 2.1 mg/dL (05-14 @ 06:20)          Clean Catch Clean Catch (Midstream)  05-07 @ 13:11   50,000 - 99,000 CFU/mL Escherichia coli  --  Escherichia coli      .Blood Blood-Venous  05-07 @ 10:38   No growth at 5 days  --  --      .Blood Blood-Peripheral  05-07 @ 09:30   No growth at 5 days  --  --

## 2024-05-14 NOTE — CONSULT NOTE ADULT - ASSESSMENT
# leukocytosis  # Thrombocytosis  - likely reactive from infection  - will review peripheral smear      Incomplete** 73-year-old female brought in by EMS from home after witnessed fall, found to have positive urine culture. Heme consulted for leukocytosis.     # Leukocytosis  # Thrombocytosis  - WBC 29, plt 496  - likely reactive from infection and fall  - reviewed peripheral smear->  increased WBC with left shift, toxic granulations seen. a few atypical lymphocytes seen. Increased plt/hpf. RBC grossly normal.  - Urine culture: E.coli +ve  - CT C/A/P: Left thigh intramuscular hematoma with question of active bleeding.  - Trend CBC with diff daily  - No concern for heme malignancy.  - Would recommend treating infection since patient's WBC is uptrending.  - Please call back heme if there is any question    # Elevated INR  - INR 1.67  - Pls send mixing study and give Vit K 10 mg IV x 3.    73-year-old female brought in by EMS from home after witnessed fall, found to have positive urine culture. Heme consulted for leukocytosis.     # Leukocytosis  # Thrombocytosis  - WBC 29, plt 496  - likely reactive from infection and fall  - reviewed peripheral smear->  increased WBC with left shift, toxic granulations seen. a few atypical lymphocytes seen. Increased plt/hpf. RBC grossly normal.  - Urine culture: E.coli +ve  - CT C/A/P: Left thigh intramuscular hematoma with question of active bleeding.  - Trend CBC with diff daily  - No concern for heme malignancy so far based on smear findings  - However, given persistent leukocytosis, will send JAK2 and BCR-ABL to r/o MPN  - Would recommend treating infection since patient's WBC is uptrending.  - Please call back heme if there is any further question

## 2024-05-14 NOTE — PROGRESS NOTE ADULT - SUBJECTIVE AND OBJECTIVE BOX
[facility]  ELVIS Coles Y. Patel, S. Shah, G. Amor  [phone]  after hours and weekends [phone]    Name: AUSTIN LEE  Age: 73y  Gender: Female  MRN: [id number]    Interval History--  Notes reviewed  had a good morning was feeling better  less stool  went to eat lunch and vomited   no cough, no dyspnea, no chest pain       Allergies    No Known Allergies    Intolerances        Medications--  Antibiotics:    Immunologic:    Other:  apixaban  atorvastatin  budesonide 160 MICROgram(s)/formoterol 4.5 MICROgram(s) Inhaler  furosemide    Tablet  loperamide PRN  pantoprazole    Tablet  polyethylene glycol 3350 PRN  sotalol  sucralfate suspension      Review of Systems--  A 10-point review of systems was obtained.     Pertinent positives and negatives--  Constitutional: No fevers. No Chills. No Rigors.   Cardiovascular: No chest pain. No palpitations.  Respiratory: No shortness of breath. No cough.  Gastrointestinal: No nausea + vomiting. No diarrhea or constipation.   Psychiatric: Pleasant. Appropriate affect.    Review of systems otherwise negative except as previously noted.    Physical Examination--  Vital Signs: T(F): 98 (05-14-24 @ 11:33), Max: 98.1 (05-14-24 @ 05:20)  HR: 71 (05-14-24 @ 11:33)  BP: 154/79 (05-14-24 @ 11:33)  RR: 18 (05-14-24 @ 11:33)  SpO2: 97% (05-14-24 @ 11:33)  Wt(kg): --  General: Nontoxic-appearing Female in no acute distress.  HEENT: AT/NC.   Neck: Not rigid. No sense of mass.  Nodes: None palpable.  Lungs: Clear bilaterally without rales, wheezing or rhonchi  Heart: Regular rate and rhythm.   Abdomen: Bowel sounds present and normoactive. Soft. Nondistended. Nontender.   Back: No spinal tenderness. No costovertebral angle tenderness.   Extremities: No cyanosis or clubbing. trace edema.   Skin: Warm. Dry. Good turgor. No rash. No vasculitic stigmata.  Psychiatric: Appropriate affect and mood for situation.         Laboratory Studies--  CBC                        9.6    29.77 )-----------( 496      ( 14 May 2024 06:19 )             30.8       Chemistries  05-14    140  |  105  |  16  ----------------------------<  130<H>  4.0   |  22  |  0.75    Ca    8.9      14 May 2024 06:20  Mg     2.1     05-14    TPro  6.2  /  Alb  2.9<L>  /  TBili  0.9  /  DBili  x   /  AST  10  /  ALT  8<L>  /  AlkPhos  75  05-13      Culture Data    < from: CT Abdomen and Pelvis w/ IV Cont (05.07.24 @ 12:56) >  E DUCTS: Normal caliber.  GALLBLADDER: Within normal limits.  SPLEEN: Within normal limits.  PANCREAS: Within normal limits.  ADRENALS: Within normal limits.  KIDNEYS/URETERS: No hydronephrosis. Unchanged left cyst. Additional   bilateral hypodensities too small to characterize.    BLADDER: Within normal limits.  REPRODUCTIVE ORGANS: Small fibroid.    BOWEL: No bowel obstruction. Appendix is normal.  PERITONEUM: No ascites.  VESSELS: Atherosclerotic changes.  RETROPERITONEUM/LYMPH NODES: No lymphadenopathy.  ABDOMINAL WALL: Within normal limits.  BONES: No acute fractures or dislocations. A few chronic rib fractures.   Left femoral ORIF. There is intramuscular hematoma in the left thigh with   question of active bleeding. There is soft tissue contusion in the left   thigh and hip.    IMPRESSION:  Left thigh intramuscular hematoma with question of active bleeding.    < end of copied text >

## 2024-05-15 NOTE — PROGRESS NOTE ADULT - SUBJECTIVE AND OBJECTIVE BOX
[facility]  Doe Almanza PA-C  [address] [place] [address]  [phone]      INTERVAL HPI/OVERNIGHT EVENTS:    patient s/e  events noted  wbc and blood cultures noted  still with some pain     MEDICATIONS  (STANDING):  apixaban 5 milliGRAM(s) Oral every 12 hours  atorvastatin 40 milliGRAM(s) Oral at bedtime  budesonide 160 MICROgram(s)/formoterol 4.5 MICROgram(s) Inhaler 2 Puff(s) Inhalation two times a day  furosemide    Tablet 40 milliGRAM(s) Oral daily  pantoprazole    Tablet 40 milliGRAM(s) Oral two times a day  sotalol 40 milliGRAM(s) Oral every 12 hours  sucralfate suspension 1 Gram(s) Oral four times a day    MEDICATIONS  (PRN):  polyethylene glycol 3350 17 Gram(s) Oral daily PRN for constipation      Allergies    No Known Allergies    Intolerances        ROS:   General:  No  fevers, chills, night sweats, fatigue,   Eyes:  Good vision, no reported pain  ENT:  No sore throat, pain, runny nose, dysphagia  CV:  No pain, palpitations, hypo/hypertension  Resp:  No dyspnea, cough, tachypnea, wheezing  GI:  No pain, No nausea, No vomiting, No diarrhea, No constipation, No weight loss, No fever, No pruritis, No rectal bleeding, No tarry stools, No dysphagia,  :  No pain, bleeding, incontinence, nocturia  Muscle:  No pain, weakness  Neuro:  No weakness, tingling, memory problems  Psych:  No fatigue, insomnia, mood problems, depression  Endocrine:  No polyuria, polydipsia, cold/heat intolerance  Heme:  No petechiae, ecchymosis, easy bruisability  Skin:  No rash, tattoos, scars, edema      PHYSICAL EXAM:   Vital Signs:  Vital Signs Last 24 Hrs  T(C): 36.8 (13 May 2024 11:28), Max: 36.8 (13 May 2024 04:21)  T(F): 98.2 (13 May 2024 11:28), Max: 98.3 (13 May 2024 04:21)  HR: 63 (13 May 2024 11:28) (63 - 69)  BP: 147/65 (13 May 2024 11:28) (141/73 - 164/81)  BP(mean): --  RR: 18 (13 May 2024 11:28) (18 - 18)  SpO2: 95% (13 May 2024 11:28) (95% - 97%)    Parameters below as of 13 May 2024 11:28  Patient On (Oxygen Delivery Method): room air      Daily     Daily Weight in k.3 (13 May 2024 06:16)    GENERAL:  Appears stated age,   HEENT:  NC/AT,    CHEST:  Full & symmetric excursion,   HEART:  Regular rhythm,  ABDOMEN:  Soft, non-tender, non-distended,  EXTEREMITIES:  no cyanosis  SKIN:  No rash  NEURO:  Alert,       LABS:                        9.5    22.11 )-----------( 465      ( 13 May 2024 06:53 )             30.3     05-13    138  |  103  |  16  ----------------------------<  123<H>  3.7   |  23  |  0.80    Ca    9.1      13 May 2024 06:53  Mg     1.8     05-    TPro  6.2  /  Alb  2.9<L>  /  TBili  0.9  /  DBili  x   /  AST  10  /  ALT  8<L>  /  AlkPhos  75  05-13      Urinalysis Basic - ( 13 May 2024 06:53 )    Color: x / Appearance: x / SG: x / pH: x  Gluc: 123 mg/dL / Ketone: x  / Bili: x / Urobili: x   Blood: x / Protein: x / Nitrite: x   Leuk Esterase: x / RBC: x / WBC x   Sq Epi: x / Non Sq Epi: x / Bacteria: x        RADIOLOGY & ADDITIONAL TESTS:

## 2024-05-15 NOTE — PROGRESS NOTE ADULT - ASSESSMENT
diarrhea  abdominal pain  leukocytosis     cont proton pump inhibitor   cont carafate  diet as tolerated  gi pcr negative  c diff negative  now on iv antibiotics for bacteremia  check repeat CT with iv contrast  d/w patient and family bedside  d/w NP

## 2024-05-15 NOTE — PROGRESS NOTE ADULT - ASSESSMENT
73f with anemia  copd, former smoker, atrial fib, cad, hfref, hyperlipid, hypertension recurrent vt , s/p ablation 2/13,  icd, presents with witnessed fall.   hypotension - improved   rib fractures   left thigh hematoma   acute anemia   nausea vomiting diarrhea- resolved   leukocytosis - r/o infection v likely reactive   ct scan chest - no pna  ct abd no abscess, no diverticulitis , no free air  ua unimpressive   urine cx ecoli - colonized as pt without symptoms   blood cx neg to date   cdiff neg  gi pcr neg    Bacteremia   5/15 now with diarrhea since yest with abd cramps  worsening leukocytosis noted, remains afebrile, nontoxic appearing   abd benign, nontender on exam  Bcx with GPC in pairs -- ?GI source     Recommendations:   Repeat Bcx ordered   Follow Bcx for GPC ID and sensitivities   Started on zosyn pending above   Heme/Onc following, recs appreciated   Monitor temps/WBC    Dr. Chandler to follow from tomorrow.

## 2024-05-15 NOTE — PROGRESS NOTE ADULT - SUBJECTIVE AND OBJECTIVE BOX
Patient is a 73y old  Female who presents with a chief complaint of The patient is a 73y Female complaining of syncope. (15 May 2024 18:46)      SUBJECTIVE / OVERNIGHT EVENTS:    Events noted.  CONSTITUTIONAL: Leukocytosis  RESPIRATORY: No cough, wheezing,  No shortness of breath  CARDIOVASCULAR: No chest pain, palpitations, dizziness, or leg swelling  GASTROINTESTINAL: No abdominal or epigastric pain.       MEDICATIONS  (STANDING):  apixaban 5 milliGRAM(s) Oral every 12 hours  atorvastatin 40 milliGRAM(s) Oral at bedtime  budesonide 160 MICROgram(s)/formoterol 4.5 MICROgram(s) Inhaler 2 Puff(s) Inhalation two times a day  pantoprazole  Injectable 40 milliGRAM(s) IV Push two times a day  piperacillin/tazobactam IVPB.. 3.375 Gram(s) IV Intermittent every 8 hours  sodium chloride 0.9%. 1000 milliLiter(s) (100 mL/Hr) IV Continuous <Continuous>  sotalol 40 milliGRAM(s) Oral every 12 hours  sucralfate suspension 1 Gram(s) Oral four times a day    MEDICATIONS  (PRN):  loperamide 2 milliGRAM(s) Oral three times a day PRN Diarrhea  ondansetron Injectable 4 milliGRAM(s) IV Push every 8 hours PRN Nausea and/or Vomiting  polyethylene glycol 3350 17 Gram(s) Oral daily PRN for constipation        CAPILLARY BLOOD GLUCOSE        I&O's Summary    14 May 2024 07:01  -  15 May 2024 07:00  --------------------------------------------------------  IN: 360 mL / OUT: 650 mL / NET: -290 mL    15 May 2024 07:01  -  15 May 2024 21:59  --------------------------------------------------------  IN: 200 mL / OUT: 0 mL / NET: 200 mL        T(C): 37.6 (05-15-24 @ 20:51), Max: 37.6 (05-15-24 @ 20:51)  HR: 72 (05-15-24 @ 20:51) (72 - 76)  BP: 115/63 (05-15-24 @ 20:51) (110/45 - 139/67)  RR: 18 (05-15-24 @ 20:51) (18 - 18)  SpO2: 97% (05-15-24 @ 20:51) (92% - 98%)    PHYSICAL EXAM:    NECK: Supple, No JVD  CHEST/LUNG: Clear to auscultation bilaterally; No wheezing.  HEART: Regular rate and rhythm; No murmurs, rubs, or gallops  ABDOMEN: Soft, Nontender, Nondistended; Bowel sounds present  EXTREMITIES:   No edema  NEUROLOGY: AAO X 3      LABS:                        9.7    50.27 )-----------( 478      ( 15 May 2024 09:33 )             30.6     05-15    136  |  101  |  24<H>  ----------------------------<  153<H>  3.8   |  20<L>  |  0.97    Ca    9.2      15 May 2024 09:33  Mg     2.1     05-14            Urinalysis Basic - ( 15 May 2024 09:33 )    Color: x / Appearance: x / SG: x / pH: x  Gluc: 153 mg/dL / Ketone: x  / Bili: x / Urobili: x   Blood: x / Protein: x / Nitrite: x   Leuk Esterase: x / RBC: x / WBC x   Sq Epi: x / Non Sq Epi: x / Bacteria: x      CAPILLARY BLOOD GLUCOSE            RADIOLOGY & ADDITIONAL TESTS:    Imaging Personally Reviewed:    Consultant(s) Notes Reviewed:      Care Discussed with Consultants/Other Providers:    Hermilo Dangelo MD, CMD, FACP    [address] [place] [address]  Office Tel: [phone]  Cell: [phone]

## 2024-05-15 NOTE — PROGRESS NOTE ADULT - SUBJECTIVE AND OBJECTIVE BOX
[facility]  ELVIS Coles Y. Patel, S. Shah, G. Casimir  [phone]  ([phone] - weekdays after 5pm and weekends)    Name: AUSTIN LEE  Age/Gender: 73y Female  MRN: [id number]    Interval History:  Patient seen and examined this morning.   Reports lower abd cramping and loose stools since yest afternoon.   Denies fever, chills, nausea, vomiting or any other complaints.   Notes reviewed. Afebrile   Allergies: No Known Allergies      Objective:  Vitals:   T(F): 98.6 (05-15-24 @ 05:09), Max: 98.9 (05-14-24 @ 21:13)  HR: 76 (05-15-24 @ 05:09) (58 - 76)  BP: 139/67 (05-15-24 @ 05:09) (139/67 - 154/79)  RR: 18 (05-15-24 @ 05:09) (18 - 18)  SpO2: 92% (05-15-24 @ 05:09) (92% - 97%)  Physical Examination:  General: no acute distress  HEENT: NC/AT, anicteric, EOMI  Respiratory: clear to auscultation bilaterally  Cardiovascular: S1 and S2 present, normal rate   Gastrointestinal: soft, nontender, nondistended  Extremities: no edema, no cyanosis  Skin: no visible rash    Laboratory Studies:  CBC:                       9.7    50.27 )-----------( 478      ( 15 May 2024 09:33 )             30.6     WBC Trend:  50.27 05-15-24 @ 09:33  29.77 05-14-24 @ 06:19  22.11 05-13-24 @ 06:53  16.92 05-11-24 @ 07:08  16.60 05-10-24 @ 07:13  18.70 05-10-24 @ 00:28  17.98 05-09-24 @ 16:46  16.88 05-09-24 @ 06:54  21.72 05-08-24 @ 12:55    CMP: 05-15    136  |  101  |  24<H>  ----------------------------<  153<H>  3.8   |  20<L>  |  0.97    Ca    9.2      15 May 2024 09:33  Mg     2.1     05-14      Creatinine: 0.97 mg/dL (05-15-24 @ 09:33)  Creatinine: 0.75 mg/dL (05-14-24 @ 06:20)  Creatinine: 0.80 mg/dL (05-13-24 @ 06:53)  Creatinine: 0.85 mg/dL (05-11-24 @ 23:16)  Creatinine: 0.84 mg/dL (05-11-24 @ 07:08)  Creatinine: 0.79 mg/dL (05-10-24 @ 07:13)  Creatinine: 0.85 mg/dL (05-09-24 @ 06:53)    Microbiology: reviewed   Culture - Blood (collected 05-14-24 @ 15:39)  Source: .Blood Blood-Peripheral  Gram Stain (05-15-24 @ 09:21):    Growth in aerobic bottle: Gram positive cocci in pairs  Preliminary Report (05-15-24 @ 09:22):    Growth in aerobic bottle: Gram positive cocci in pairs    Direct identification is available within approximately 3-5    hours either by Blood Panel Multiplexed PCR or Direct    MALDI-TOF. Details: [email or web address]    Culture - Stool (collected 05-13-24 @ 11:40)  Source: .Stool Feces  Preliminary Report (05-14-24 @ 17:50):    No enteric pathogens to date: Final culture pending    Culture - Urine (collected 05-07-24 @ 13:11)  Source: Clean Catch Clean Catch (Midstream)  Final Report (05-10-24 @ 07:33):    50,000 - 99,000 CFU/mL Escherichia coli  Organism: Escherichia coli (05-10-24 @ 07:33)  Organism: Escherichia coli (05-10-24 @ 07:33)      Method Type: RONALDO      -  Amoxicillin/Clavulanic Acid: S <=8/4      -  Ampicillin: S <=8 These ampicillin results predict results for amoxicillin      -  Ampicillin/Sulbactam: S <=4/2      -  Aztreonam: S <=4      -  Cefazolin: S <=2 For uncomplicated UTI with K. pneumoniae, E. coli, or P. mirablis: RONALDO <=16 is sensitive and RONALDO >=32 is resistant. This also predicts results for oral agents cefaclor, cefdinir, cefpodoxime, cefprozil, cefuroxime axetil, cephalexin and locarbef for uncomplicated UTI. Note that some isolates may be susceptible to these agents while testing resistant to cefazolin.      -  Cefepime: S <=2      -  Cefoxitin: S <=8      -  Ceftriaxone: S <=1      -  Cefuroxime: S <=4      -  Ciprofloxacin: S <=0.25      -  Ertapenem: S <=0.5      -  Gentamicin: R >8      -  Imipenem: S <=1      -  Levofloxacin: S <=0.5      -  Meropenem: S <=1      -  Nitrofurantoin: S <=32 Should not be used to treat pyelonephritis      -  Piperacillin/Tazobactam: S <=8      -  Tobramycin: R >8      -  Trimethoprim/Sulfamethoxazole: S <=0.5/9.5    Culture - Blood (collected 05-07-24 @ 10:38)  Source: .Blood Blood-Venous  Final Report (05-12-24 @ 15:00):    No growth at 5 days    Culture - Blood (collected 05-07-24 @ 09:30)  Source: .Blood Blood-Peripheral  Final Report (05-12-24 @ 15:00):    No growth at 5 days    Radiology: reviewed     Medications:  apixaban 5 milliGRAM(s) Oral every 12 hours  atorvastatin 40 milliGRAM(s) Oral at bedtime  budesonide 160 MICROgram(s)/formoterol 4.5 MICROgram(s) Inhaler 2 Puff(s) Inhalation two times a day  furosemide    Tablet 40 milliGRAM(s) Oral daily  loperamide 2 milliGRAM(s) Oral three times a day PRN  pantoprazole    Tablet 40 milliGRAM(s) Oral two times a day  piperacillin/tazobactam IVPB. 3.375 Gram(s) IV Intermittent once  piperacillin/tazobactam IVPB.- 3.375 Gram(s) IV Intermittent once  piperacillin/tazobactam IVPB.. 3.375 Gram(s) IV Intermittent every 8 hours  polyethylene glycol 3350 17 Gram(s) Oral daily PRN  sotalol 40 milliGRAM(s) Oral every 12 hours  sucralfate suspension 1 Gram(s) Oral four times a day    Current Antimicrobials:  piperacillin/tazobactam IVPB. 3.375 Gram(s) IV Intermittent once  piperacillin/tazobactam IVPB.- 3.375 Gram(s) IV Intermittent once  piperacillin/tazobactam IVPB.. 3.375 Gram(s) IV Intermittent every 8 hours    Prior/Completed Antimicrobials:  piperacillin/tazobactam IVPB...

## 2024-05-15 NOTE — PROGRESS NOTE ADULT - ASSESSMENT
The patient is a 73y Female complaining of syncope.    Leukocytosis:    Hem eval appreciated  ID f/up noted  Started on IV Zosyn       Syncope likely from VT:    Tele  Cardio f/up appreciated  On Sotalol  TTE: Limited/No Pericardial effusion    Lt thigh hematoma:    Hb stable

## 2024-05-16 NOTE — PROGRESS NOTE ADULT - ASSESSMENT
The patient is a 73y Female complaining of syncope.    Bacteremia:      ID f/up noted  Started on IV Vanco  On IV Zosyn  CT C/A/P       Syncope likely from VT:    Tele  Cardio f/up appreciated  On Sotalol  TTE: Limited/No Pericardial effusion    Lt thigh hematoma:    Hb stable    Dw pt and her

## 2024-05-16 NOTE — PROGRESS NOTE ADULT - SUBJECTIVE AND OBJECTIVE BOX
[facility]  Doe Almanza PA-C  [address] [place] [address]  [phone]      INTERVAL HPI/OVERNIGHT EVENTS:    patient s/e  events noted  repeat cultures negative    MEDICATIONS  (STANDING):  apixaban 5 milliGRAM(s) Oral every 12 hours  atorvastatin 40 milliGRAM(s) Oral at bedtime  budesonide 160 MICROgram(s)/formoterol 4.5 MICROgram(s) Inhaler 2 Puff(s) Inhalation two times a day  furosemide    Tablet 40 milliGRAM(s) Oral daily  pantoprazole    Tablet 40 milliGRAM(s) Oral two times a day  sotalol 40 milliGRAM(s) Oral every 12 hours  sucralfate suspension 1 Gram(s) Oral four times a day    MEDICATIONS  (PRN):  polyethylene glycol 3350 17 Gram(s) Oral daily PRN for constipation      Allergies    No Known Allergies    Intolerances        ROS:   General:  No  fevers, chills, night sweats, fatigue,   Eyes:  Good vision, no reported pain  ENT:  No sore throat, pain, runny nose, dysphagia  CV:  No pain, palpitations, hypo/hypertension  Resp:  No dyspnea, cough, tachypnea, wheezing  GI:  No pain, No nausea, No vomiting, No diarrhea, No constipation, No weight loss, No fever, No pruritis, No rectal bleeding, No tarry stools, No dysphagia,  :  No pain, bleeding, incontinence, nocturia  Muscle:  No pain, weakness  Neuro:  No weakness, tingling, memory problems  Psych:  No fatigue, insomnia, mood problems, depression  Endocrine:  No polyuria, polydipsia, cold/heat intolerance  Heme:  No petechiae, ecchymosis, easy bruisability  Skin:  No rash, tattoos, scars, edema      PHYSICAL EXAM:   Vital Signs:  Vital Signs Last 24 Hrs  T(C): 36.8 (13 May 2024 11:28), Max: 36.8 (13 May 2024 04:21)  T(F): 98.2 (13 May 2024 11:28), Max: 98.3 (13 May 2024 04:21)  HR: 63 (13 May 2024 11:28) (63 - 69)  BP: 147/65 (13 May 2024 11:28) (141/73 - 164/81)  BP(mean): --  RR: 18 (13 May 2024 11:28) (18 - 18)  SpO2: 95% (13 May 2024 11:28) (95% - 97%)    Parameters below as of 13 May 2024 11:28  Patient On (Oxygen Delivery Method): room air      Daily     Daily Weight in k.3 (13 May 2024 06:16)    GENERAL:  Appears stated age,   HEENT:  NC/AT,    CHEST:  Full & symmetric excursion,   HEART:  Regular rhythm,  ABDOMEN:  Soft, non-tender, non-distended,  EXTEREMITIES:  no cyanosis  SKIN:  No rash  NEURO:  Alert,       LABS:                        9.5    22.11 )-----------( 465      ( 13 May 2024 06:53 )             30.3     05-13    138  |  103  |  16  ----------------------------<  123<H>  3.7   |  23  |  0.80    Ca    9.1      13 May 2024 06:53  Mg     1.8     05-13    TPro  6.2  /  Alb  2.9<L>  /  TBili  0.9  /  DBili  x   /  AST  10  /  ALT  8<L>  /  AlkPhos  75  05-13      Urinalysis Basic - ( 13 May 2024 06:53 )    Color: x / Appearance: x / SG: x / pH: x  Gluc: 123 mg/dL / Ketone: x  / Bili: x / Urobili: x   Blood: x / Protein: x / Nitrite: x   Leuk Esterase: x / RBC: x / WBC x   Sq Epi: x / Non Sq Epi: x / Bacteria: x        RADIOLOGY & ADDITIONAL TESTS:

## 2024-05-16 NOTE — PROGRESS NOTE ADULT - SUBJECTIVE AND OBJECTIVE BOX
[facility], Division of Infectious Diseases  ELVIS Coles Y. Patel, S. Shah, G. Amor  [phone]  after hours and weekends [phone]    Name: AUSTIN LEE  Age: 73y  Gender: Female  MRN: [id number]    Interval History--  Notes reviewed  c/o pain ruq     Allergies    No Known Allergies    Intolerances        Medications--  Antibiotics:  piperacillin/tazobactam IVPB.. 3.375 Gram(s) IV Intermittent every 8 hours  vancomycin  IVPB 100 milliGRAM(s) IV Intermittent every 12 hours    Immunologic:    Other:  apixaban  atorvastatin  budesonide 160 MICROgram(s)/formoterol 4.5 MICROgram(s) Inhaler  loperamide PRN  ondansetron Injectable PRN  pantoprazole  Injectable  polyethylene glycol 3350 PRN  sodium chloride 0.9%.  sotalol  sucralfate suspension      Review of Systems--  A 10-point review of systems was obtained.     Pertinent positives and negatives--  Constitutional: No fevers. No Chills. No Rigors.   Cardiovascular: No chest pain. No palpitations.  Respiratory: No shortness of breath. No cough.  Gastrointestinal: No nausea or vomiting. No diarrhea or constipation.   Psychiatric: Pleasant. Appropriate affect.    Review of systems otherwise negative except as previously noted.    Physical Examination--  Vital Signs: T(F): 97.4 (05-16-24 @ 11:52), Max: 99.6 (05-15-24 @ 20:51)  HR: 67 (05-16-24 @ 11:52)  BP: 144/68 (05-16-24 @ 11:52)  RR: 18 (05-16-24 @ 11:52)  SpO2: 98% (05-16-24 @ 11:52)  Wt(kg): --  General: Nontoxic-appearing Female in no acute distress.  HEENT: AT/NC.   Neck: Not rigid. No sense of mass.  Nodes: None palpable.  Lungs: Clear bilaterally without rales, wheezing or rhonchi  Heart: Regular rate and rhythm. N  Abdomen: Bowel sounds present and normoactive. Soft. Nondistended. tender   Back: No spinal tenderness. No costovertebral angle tenderness.   Extremities: No cyanosis or clubbing. trace edema.   Skin: Warm. Dry. Good turgor. No rash. No vasculitic stigmata.  Psychiatric: Appropriate affect and mood for situation.         Laboratory Studies--  CBC                        9.7    47.27 )-----------( 439      ( 16 May 2024 07:15 )             30.8       Chemistries  05-16    136  |  98  |  33<H>  ----------------------------<  97  3.3<L>   |  15<L>  |  1.18    Ca    9.3      16 May 2024 07:15        Culture Data    Culture - Blood (collected 15 May 2024 10:57)  Source: .Blood Blood-Peripheral  Preliminary Report (16 May 2024 14:03):    No growth at 24 hours    Culture - Blood (collected 15 May 2024 10:57)  Source: .Blood Blood-Peripheral  Preliminary Report (16 May 2024 14:03):    No growth at 24 hours    Culture - Blood (collected 14 May 2024 15:39)  Source: .Blood Blood-Peripheral  Gram Stain (15 May 2024 09:21):    Growth in aerobic bottle: Gram positive cocci in pairs  Preliminary Report (15 May 2024 09:22):    Growth in aerobic bottle: Gram positive cocci in pairs    Direct identification is available within approximately 3-5    hours either by Blood Panel Multiplexed PCR or Direct    MALDI-TOF. Details: [email or web address]  Organism: Blood Culture PCR (15 May 2024 11:58)  Organism: Blood Culture PCR (15 May 2024 11:58)    Culture - Blood (collected 14 May 2024 15:39)  Source: .Blood Blood-Venous  Preliminary Report (15 May 2024 20:01):    No growth at 24 hours    Culture - Stool (collected 13 May 2024 11:40)  Source: .Stool Feces  Final Report (15 May 2024 16:33):    No enteric pathogens isolated.    (Stool culture examined for Salmonella,    Shigella, Campylobacter, Aeromonas, Plesiomonas,    Vibrio, E.coli O157 and Yersinia)    < from: US Abdomen Upper Quadrant Right (05.10.24 @ 09:34) >    ACC: [id number] EXAM:  US ABDOMEN RT UPR QUADRANT   ORDERED BY: ELISE YADAV     PROCEDURE DATE:  05/10/2024          INTERPRETATION:  CLINICAL INFORMATION: 73 year old female with epigastric   pain and leukocytosis.    COMPARISON: CT abdomen and pelvis 5/7/2024    TECHNIQUE: Sonography of the right upper quadrant.    FINDINGS:    Liver: Normal echogenicity. Smooth surface contour. No focal abnormality   identified.  Bile ducts: Normal caliber. Common bile duct measures 4 mm.  Gallbladder: No cholelithiasis or wall thickening. No pericholecystic   fluid.A sonographic Strickland sign was not elicited.  Pancreas: Partially obscured by interposed bowel gas. Visualized portions   are within normal limits.  Right kidney: 10.6 cm. No hydronephrosis.  Ascites: None.  IVC/Aorta: Visualized portions are within normal limits.    IMPRESSION:    No cholelithiasis or sonographic evidence of acute cholecystitis.    --- End of Report ---    < end of copied text >

## 2024-05-16 NOTE — PROGRESS NOTE ADULT - SUBJECTIVE AND OBJECTIVE BOX
Patient is a 73y old  Female who presents with a chief complaint of The patient is a 73y Female complaining of syncope. (16 May 2024 15:39)      SUBJECTIVE / OVERNIGHT EVENTS:    Events noted.  CONSTITUTIONAL: No fever,  or fatigue  RESPIRATORY: No cough, wheezing,  No shortness of breath  CARDIOVASCULAR: No chest pain, palpitations, dizziness, or leg swelling  GASTROINTESTINAL: No abdominal or epigastric pain. No nausea, vomiting.  NEUROLOGICAL: No headache    MEDICATIONS  (STANDING):  apixaban 5 milliGRAM(s) Oral every 12 hours  atorvastatin 40 milliGRAM(s) Oral at bedtime  budesonide 160 MICROgram(s)/formoterol 4.5 MICROgram(s) Inhaler 2 Puff(s) Inhalation two times a day  pantoprazole  Injectable 40 milliGRAM(s) IV Push two times a day  piperacillin/tazobactam IVPB.. 3.375 Gram(s) IV Intermittent every 8 hours  sodium chloride 0.9%. 1000 milliLiter(s) (100 mL/Hr) IV Continuous <Continuous>  sotalol 40 milliGRAM(s) Oral every 12 hours  sucralfate suspension 1 Gram(s) Oral four times a day  vancomycin  IVPB 1250 milliGRAM(s) IV Intermittent every 24 hours    MEDICATIONS  (PRN):  loperamide 2 milliGRAM(s) Oral three times a day PRN Diarrhea  ondansetron Injectable 4 milliGRAM(s) IV Push every 8 hours PRN Nausea and/or Vomiting  polyethylene glycol 3350 17 Gram(s) Oral daily PRN for constipation        CAPILLARY BLOOD GLUCOSE        I&O's Summary    15 May 2024 07:01  -  16 May 2024 07:00  --------------------------------------------------------  IN: 200 mL / OUT: 0 mL / NET: 200 mL    16 May 2024 07:01  -  16 May 2024 22:38  --------------------------------------------------------  IN: 620 mL / OUT: 250 mL / NET: 370 mL        T(C): 37 (05-16-24 @ 21:16), Max: 37 (05-16-24 @ 21:16)  HR: 64 (05-16-24 @ 21:16) (64 - 70)  BP: 135/62 (05-16-24 @ 21:16) (134/71 - 144/68)  RR: 18 (05-16-24 @ 21:16) (18 - 18)  SpO2: 95% (05-16-24 @ 21:16) (94% - 98%)    PHYSICAL EXAM:  GENERAL: NAD  NECK: Supple, No JVD  CHEST/LUNG: Clear to auscultation bilaterally; No wheezing.  HEART: Regular rate and rhythm; No murmurs, rubs, or gallops  ABDOMEN: Soft, Nontender, Nondistended; Bowel sounds present  EXTREMITIES:   No edema  NEUROLOGY: AAO X 3      LABS:                        9.7    47.27 )-----------( 439      ( 16 May 2024 07:15 )             30.8     05-16    136  |  98  |  33<H>  ----------------------------<  97  3.3<L>   |  15<L>  |  1.18    Ca    9.3      16 May 2024 07:15            Urinalysis Basic - ( 16 May 2024 07:15 )    Color: x / Appearance: x / SG: x / pH: x  Gluc: 97 mg/dL / Ketone: x  / Bili: x / Urobili: x   Blood: x / Protein: x / Nitrite: x   Leuk Esterase: x / RBC: x / WBC x   Sq Epi: x / Non Sq Epi: x / Bacteria: x      CAPILLARY BLOOD GLUCOSE            RADIOLOGY & ADDITIONAL TESTS:    Imaging Personally Reviewed:    Consultant(s) Notes Reviewed:      Care Discussed with Consultants/Other Providers:    Hermilo Dangelo MD, CMD, FACP    [id number] [place] [address]  Office Tel: [phone]  Cell: [phone]

## 2024-05-16 NOTE — PROGRESS NOTE ADULT - ASSESSMENT
73f with anemia  copd, former smoker, atrial fib, cad, hfref, hyperlipid, hypertension recurrent vt , s/p ablation 2/13,  icd, presents with witnessed fall.   hypotension - improved   rib fractures   left thigh hematoma   acute anemia   leukocytosis - r/o infection v likely reactive   blood cx in 1/4 bottles gpc pair/chains - pcr neg ID p   ruq pain     Recommendations:   cdiff neg  gi pcr neg   ruq us neg for cholelithiasis   surveillance cx neg to date   unclear is blood cx is true pathogen    zosyn day 2   add vancomycin iv  check ct chest/abd/pelvis with contrast     trend wbc

## 2024-05-17 NOTE — CHART NOTE - NSCHARTNOTEFT_GEN_A_CORE
Called Dr. Dangelo for AM labs reviewed noted to have worsening renal function, increasing anion gap with likely metabolic acidosis. blood gas ordered to further elucidate Acid-base balance. Advised to call Dr. Goodwin called for nephrology evaluation. WIll follow.

## 2024-05-17 NOTE — RAPID RESPONSE TEAM SUMMARY - NSADDTLFINDINGSRRT_GEN_ALL_CORE
RRT called for hypotension, worsening resp status. FSG was 26 initially, given an amp of D50. Initial vitals were 80/50, 97.5 axillary temp, HR: 80s sat >95% on NRB with good waveform. PE notable for tachypnea, wheezes, hypoactive bowel sounds, cold extremities, aaox3. Given 1 amp HCO3, 500 cc of NS given. Started a bicarb gtt. Patient was a particularly hard stick and we were unable to gain access and her 1 existing IV had blew. Her repeat BPs remained stable between /50-60. Surg consulted due to concern for ischemic bowel with her most recent labs and her ct imaging. MICU consulted and she was subsequently accepted/

## 2024-05-17 NOTE — PRE-ANESTHESIA EVALUATION ADULT - NSANTHPEFT_GEN_ALL_CORE
Gen: alert and oriented x2, toxic appearing  Lung: clear   CV: S1 S2   Abd: sensitive to palpation throughout  neuro: CN 2-12 grossly intact, no gross motor or sensory deficits appreciated  Skin: dry

## 2024-05-17 NOTE — PROGRESS NOTE ADULT - SUBJECTIVE AND OBJECTIVE BOX
patient arrived from operating room in extreme critical condition  markedly acidotic - lactic acidoses  hypotensive  on ventilator respiratory failure - oxygenating well by blood gas  has shock liver pattern, the lower extremities are mottled  by flow track seems to have adequate cardiac output but globally is not perfusing  attempted to resuscitate with fluid but no real response  I have been in communication with patients  regarding the overall grave prognosis  The patient had a respiratory arrest - likely do to severe acidoses  the patient regained blood pressure  I spoke to patient on the phone he is on the way back to the hospital and has agreed to DNR status  giving bicarb for the acidoses,  giving calcium for hypocalcemia  attempting to hyperventilate to reduce respiratory component of acidoses  60 minutes of critical care management provided

## 2024-05-17 NOTE — PROVIDER CONTACT NOTE (CRITICAL VALUE NOTIFICATION) - ACTION/TREATMENT ORDERED:
See RRT sheet. Patient transferred to MICU
PA made aware. PA to follow up
STACI Terry made aware of critical result.
Provider notified. ABX ordered.

## 2024-05-17 NOTE — CONSULT NOTE ADULT - SUBJECTIVE AND OBJECTIVE BOX
[facility] Consult Note Nephrology - CONSULTATION NOTE    73 year old with history of a.fib / HLD, HTN, VT with AICD, CAD, COPD, CHF, admitted to medical service on 24 with syncope.   Surgical service called this afternoon as patient is complaining of increasing abdominal pain & has rapidly worsening MSOF.  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  Pt hypotensive , transferred to micu now in OR for exp lap.  called family- but unable to reach  spoke with icu and sicu team    PAST MEDICAL & SURGICAL HISTORY:  Obese      Smoker      HTN (hypertension)      HLD (hyperlipidemia)      CAD (coronary artery disease)      CHF with cardiomyopathy      History of hip surgery        No Known Allergies    Home Medications Reviewed  Hospital Medications:   MEDICATIONS  (STANDING):  apixaban 5 milliGRAM(s) Oral every 12 hours  atorvastatin 40 milliGRAM(s) Oral at bedtime  budesonide 160 MICROgram(s)/formoterol 4.5 MICROgram(s) Inhaler 2 Puff(s) Inhalation two times a day  meropenem  IVPB 2000 milliGRAM(s) IV Intermittent every 12 hours  norepinephrine Infusion 0.05 MICROgram(s)/kG/Min (6.81 mL/Hr) IV Continuous <Continuous>  pantoprazole  Injectable 40 milliGRAM(s) IV Push two times a day  sodium bicarbonate  Infusion 0.207 mEq/kG/Hr (100 mL/Hr) IV Continuous <Continuous>  sodium chloride 0.9%. 1000 milliLiter(s) (100 mL/Hr) IV Continuous <Continuous>  sodium chloride 0.9%. 1000 milliLiter(s) (100 mL/Hr) IV Continuous <Continuous>  sotalol 40 milliGRAM(s) Oral every 12 hours  sucralfate suspension 1 Gram(s) Oral four times a day    SOCIAL HISTORY:  Denies ETOh,Smoking,   FAMILY HISTORY:  Family history of Alzheimer's disease (Father)    Family history of cancer (Mother)      REVIEW OF SYSTEMS:  unable to obtain    VITALS:  T(F): 98.1 (24 @ 05:17), Max: 98.6 (24 @ 21:16)  HR: 76 (24 @ 16:30)  BP: 74/35 (24 @ 15:46)  RR: 32 (24 @ 16:30)  SpO2: 95% (24 @ 16:30)  Wt(kg): --     @ 07:01  -   @ 07:00  --------------------------------------------------------  IN: 970 mL / OUT: 250 mL / NET: 720 mL     @ 07:01  -   17:22  --------------------------------------------------------  IN: 1410.4 mL / OUT: 45 mL / NET: 1365.4 mL      Height (cm): 157.5 ( @ 15:46)  Weight (kg): 72.6 (05-17 @ 15:46)  BMI (kg/m2): 29.3 (05-17 @ 15:46)  BSA (m2): 1.74 (05-17 @ 15:46)  PHYSICAL EXAM:  not performed as pt is in the OR    LABS:      133<L>  |  95<L>  |  47<H>  ----------------------------<  102<H>  5.1   |  <10<LL>  |  2.07<H>    Ca    9.4      17 May 2024 14:39  Phos  7.4       Mg     2.8         TPro  5.9<L>  /  Alb  2.5<L>  /  TBili  2.4<H>  /  DBili      /  AST  1002<H>  /  ALT  549<H>  /  AlkPhos  159<H>      Creatinine Trend: 2.07 <--, 1.41 <--, 1.18 <--, 0.97 <--, 0.75 <--, 0.80 <--, 0.85 <--, 0.84 <--                        9.5    75.95 )-----------( 405      ( 17 May 2024 14:39 )             31.4     Urine Studies:  Urinalysis Basic - ( 17 May 2024 15:45 )    Color: Dark Yellow / Appearance: Turbid / S.029 / pH:   Gluc:  / Ketone: Trace mg/dL  / Bili: Small / Urobili: 0.2 mg/dL   Blood:  / Protein: 30 mg/dL / Nitrite: Negative   Leuk Esterase: Negative / RBC: 181 /HPF / WBC 1 /HPF   Sq Epi:  / Non Sq Epi: 8 /HPF / Bacteria: Negative /HPF        RADIOLOGY & ADDITIONAL STUDIES:

## 2024-05-17 NOTE — PROGRESS NOTE ADULT - ASSESSMENT
diarrhea  abdominal pain  leukocytosis     cont proton pump inhibitor   cont carafate  npo  gi pcr negative  c diff negative  now on iv antibiotics for bacteremia  check repeat CT with iv contrast (urgent)  d/w patient and family bedside  d/w PA

## 2024-05-17 NOTE — PROVIDER CONTACT NOTE (CRITICAL VALUE NOTIFICATION) - ASSESSMENT
Patient A&Ox3, VSS. No signs of distress noted. Patient is afebrile. Patient is on vancomycin and zosyn. HR 67, /68, RR 18, O2 saturation 97% on room air.
Patient A&Ox3. Patient more somnolent and diaphoretic upon returning back from CT. See RRT sheet
Pt A&Ox4 VSS

## 2024-05-17 NOTE — PRE-ANESTHESIA EVALUATION ADULT - NSANTHADDINFOFT_GEN_ALL_CORE
Risks and indications for general anesthesia were discussed including but not limited to nausea, throat soreness/dental injury, anaphylaxis, myocardial infarction and stroke. These risks were acknowledged and accepted by patient, all of their questions were answered as well. Risks and indications for general anesthesia were discussed including but not limited to nausea, throat soreness/dental injury, anaphylaxis, myocardial infarction and stroke. These risks were acknowledged and accepted by patient's spouse, all of his questions were answered as well.

## 2024-05-17 NOTE — CONSULT NOTE ADULT - PROVIDER SPECIALTY LIST ADULT
Gastroenterology
Cardiology
Heme/Onc
Nephrology
SICU
Infectious Disease
Trauma Surgery
Electrophysiology

## 2024-05-17 NOTE — CONSULT NOTE ADULT - ASSESSMENT
TAY LEE is a 73y female with PMHX COPD, paroxysmal AF (on eliquis), HLD, HTN, PVD, ICM/CAD s/p PCI (has  of RCA), cardiac arrest s/p left-sided ICD (6/4/2019) complicated by infection and s/p R sided [employer] single-chamber ICD ([place] in 9/23/2019), perforated gastric ulcer with pneumoperitoneum - sealed 11/26/23 on UGI series, prior admissions for VT storm with adjustment of medication and NIPS, prior ICD shock in the setting of recurrent monomorphic VT with unsuccessful ATP, VT ablation attempted 10/20/23 but aborted due to hemodynamically significant pericardial effusion with drain placement, recurrent VT s/p ICD shock, and afib w/ RVR s/p cardioversion 10/21/23 s/p pericardial window 10/28/23, admitted 2/2024 with ICD shocks for recurrent VT and underwent VT ablation on 2/13/24 by Dr. Hurt (off quinidine post ablation, on amio) in the hospital for 10d transferred to the MICU for pressor support (80s/50s) secondary to septic shock with enterococcus bacteremia of likely abdominal origin.     Patient s/p ex-lap, stomach and small bowel dilated. OGT placed by anesthesia. Small bowel examined from LoT to cecum, all small bowel threatened but not frankly necrotic except for small segment of distal ileum. Cecum threatened. Rest of colon appeared viable. RUQ explored, moderate amount of bile and bile staining noted, 1st-3rd portion of duodenum examined and no perforation identified. Lesser sac entered, appeared clean from bile staining. No leak noted with insufflation into NGT after filling RUQ with saline. Abthera VAC placed. Patient brought to SICU for further management.    Neuro:  - Sedation: Precedex   - pain: dilaudid    Resp:  - intubated  - VENT: PRVC 20/400/5/100    CVS: CAD w hx of PCI  - pressors: levo, vasopressor  - MAP >65  - lactate > 16    GI:   - Diet: NPO    Renal:  #metabolic lactic acidosis possibly 2/2 sepsis  - acidotic to 7.13, bicarb 7  - started on bicarb gtt  - Monitor I&Os and electrolytes  - replete electrolytes as needed     Heme:  - DVT ppx: holding  - SCDs    ID:  - ABX: Meropenem (5/17 - )  - leukocytosis to 75.95  - blood culture 5/14 Enterococcus raffinosus  - urine culture 5/17 E. Coli    Endo:   - monitor glucose    Lines  - RIJ CVC 5/17  - L axillary a-line 5/17  - farr

## 2024-05-17 NOTE — PROGRESS NOTE ADULT - ASSESSMENT
73-year-old female brought in by EMS from home after witnessed fall, found to have positive urine culture. Heme consulted for leukocytosis.     # Leukocytosis  # Thrombocytosis  - WBC 29, plt 496--> uptrending  - likely reactive from infection and fall  - reviewed peripheral smear->  increased WBC with left shift, toxic granulations seen. a few atypical lymphocytes seen. Increased plt/hpf. RBC grossly normal.  - Urine culture: E.coli +ve  - CT C/A/P: Left thigh intramuscular hematoma with question of active bleeding.  - Trend CBC with diff daily  - No concern for heme malignancy so far based on smear findings  - However, given persistent leukocytosis, sent flow cytometry which is normal.  -  JAK2 -ve and BCR-ABL -ve to r/o MPN  -  recommend treating infection since patient's WBC is uptrending. Blood culture with Enterococcus infection  - Given that patient has acute decompensation with abdominal pain, and CT findings with mild wall thickening in the distal ascending and proximal transverse colon, appreciate Sx eval    - Pls send Coags, D dimer, fibrinogen to r/o DIC in the setting of worsening leukocytosis

## 2024-05-17 NOTE — PROGRESS NOTE ADULT - PROVIDER SPECIALTY LIST ADULT
Gastroenterology
Infectious Disease
Infectious Disease
Internal Medicine
Internal Medicine
Electrophysiology
Gastroenterology
Gastroenterology
Heme/Onc
Infectious Disease
Infectious Disease
Internal Medicine
Electrophysiology
Electrophysiology
Gastroenterology
Gastroenterology
Infectious Disease
Infectious Disease
Internal Medicine
Internal Medicine
SICU
Electrophysiology
Electrophysiology
Infectious Disease
Internal Medicine
Internal Medicine
Trauma Surgery

## 2024-05-17 NOTE — CONSULT NOTE ADULT - CONSULT REASON
Left thigh hematoma w/ c/f active bleed
Syncope Eval
abdominal pain
ARLENE, metabolic acidosis
VT s/p ICD therapies
Leukocytosis
uti

## 2024-05-17 NOTE — CONSULT NOTE ADULT - ASSESSMENT
73 year old with history of a.fib / HLD, HTN, VT with AICD, CAD, COPD, CHF, admitted to medical service on 5/7/24 with syncope now with septic shock, arlene with metabolic acidosis-> renal consult called      ARLENE with metabolic acidosis  in setting of ct findings concerning for mesenteric ischemia vs other abd pathology  currently pt is in the OR  with the rapid rise in cr and metabolic acidosis and hemodynamic instability --> pt will require cvvhdf  will place order--> no uf  icu care  monitor closely  grave prognosis    Dr Goodwin  [phone]

## 2024-05-17 NOTE — CHART NOTE - NSCHARTNOTEFT_GEN_A_CORE
Called by MICU staff to see patient.  73 year old with history of a.fib / HLD, HTN, VT with AICD, CAD, COPD, CHF, admitted to medical service on 5/7/24 with syncope.   Surgical service called this afternoon as patient is complaining of increasing abdominal pain & has rapidly worsening MSOF.  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 Droplets of gas within   the duodenum and jejunum, favored to be intraluminal, with pneumatosis   and small bowel ischemia not entirely excluded. No portal or mesenteric   venous gas.  *  Diffusely distended and fluid-filled esophagus, distended stomach, and   dilated small bowel without discrete transition point, suggestive of   ileus.  *  Diffusely diminutive appearance of the vasculature, suggestive of   hypovolemic or vasoconstrictive state. Extensive atherosclerotic changes.   Limited evaluation for vessel patency or stenosis due to lack of arterial   phase imaging and globally diminutive vessels.  *  New patchy and wedge-shaped regions of hypoenhancement in both   kidneys, concerning for infarcts. Pyelonephritis is also a possibility   but is thought to be less likely    - I have reviewed H&P, there are no updates except what I documented above.  - Patient with diffuse abdominal pain and rapidly progressive MSOF, most likely secondary to septic shock  - Will bring to OR emergently for exploration.     - I have discussed the risks / benefits / alternatives to immediate laparotomy with  and daughter at length, including possible negative and/or nontherapeutic laparotomy  - I have discussed possibility of need for 2nd look laparotomy & ostomy creation

## 2024-05-17 NOTE — PROVIDER CONTACT NOTE (CRITICAL VALUE NOTIFICATION) - TEST AND RESULT REPORTED:
WBC 70.8 and carbon dioxide less than 10
Gram positive cocci in pairs in aerobic bottle
Ph 7.13 and Bicarb 7
Carbon Dioxide is 10

## 2024-05-17 NOTE — PROVIDER CONTACT NOTE (CHANGE IN STATUS NOTIFICATION) - ACTION/TREATMENT ORDERED:
RRT called. 1 amp D50 given. IVF bolus given.  1 amp Bicarb given. IV access lost during RRT. Mental status improved after medications. Patient transferred to MICU.

## 2024-05-17 NOTE — PROGRESS NOTE ADULT - SUBJECTIVE AND OBJECTIVE BOX
[facility], Division of Infectious Diseases  ELVIS Coles Y. Patel, S. Shah, G. Amor  [phone]  after hours and weekends [phone]    Name: AUSTIN LEE  Age: 73y  Gender: Female  MRN: [id number]    Interval History--  Notes reviewed      Allergies    No Known Allergies    Intolerances        Medications--  Antibiotics:  meropenem  IVPB 1000 milliGRAM(s) IV Intermittent every 8 hours  vancomycin    Solution 125 milliGRAM(s) Oral every 6 hours  vancomycin  IVPB 1000 milliGRAM(s) IV Intermittent once    Immunologic:    Other:  albuterol/ipratropium for Nebulization.  apixaban  atorvastatin  budesonide 160 MICROgram(s)/formoterol 4.5 MICROgram(s) Inhaler  loperamide PRN  ondansetron Injectable PRN  pantoprazole  Injectable  polyethylene glycol 3350 PRN  sodium bicarbonate  Infusion  sodium bicarbonate  Injectable  sodium chloride 0.9%.  sodium chloride 0.9%.  sotalol  sucralfate suspension      Review of Systems--  A 10-point review of systems was obtained.     Pertinent positives and negatives--  Constitutional: No fevers. No Chills. No Rigors.   Cardiovascular: No chest pain. No palpitations.  Respiratory: No shortness of breath. No cough.  Gastrointestinal: + gi complaints pain  Psychiatric: Pleasant. Appropriate affect.    Review of systems otherwise negative except as previously noted.    Physical Examination--  Vital Signs: T(F): 98.1 (05-17-24 @ 05:17), Max: 98.6 (05-16-24 @ 21:16)  HR: 67 (05-17-24 @ 08:48)  BP: 126/68 (05-17-24 @ 08:48)  RR: 18 (05-17-24 @ 08:48)  SpO2: 97% (05-17-24 @ 08:48)  Wt(kg): --  General: mild distress.  HEENT: AT/NC. PERRL.  Neck: Not rigid. No sense of mass.  Nodes: None palpable.  Lungs: Clear bilaterally without rales, wheezing or rhonchi  Heart: Regular rate and rhythm. No Murmur.   Abdomen: Bowel sounds present and normoactive. Soft. Nondistended. Nontender  Extremities: No cyanosis or clubbing. No edema.   Skin: Warm. Dry. Good turgor. No rash. No vasculitic stigmata.  Psychiatric: Appropriate affect and mood for situation.         Laboratory Studies--  CBC                        11.1   70.81 )-----------( 465      ( 17 May 2024 07:17 )             36.7       Chemistries  05-17    133<L>  |  95<L>  |  45<H>  ----------------------------<  102<H>  4.7   |  <10<LL>  |  1.41<H>    Ca    9.2      17 May 2024 07:17        Culture Data    Culture - Blood (collected 15 May 2024 10:57)  Source: .Blood Blood-Peripheral  Preliminary Report (16 May 2024 14:03):    No growth at 24 hours    Culture - Blood (collected 15 May 2024 10:57)  Source: .Blood Blood-Peripheral  Preliminary Report (16 May 2024 14:03):    No growth at 24 hours    Culture - Blood (collected 14 May 2024 15:39)  Source: .Blood Blood-Venous  Preliminary Report (16 May 2024 20:01):    No growth at 48 Hours    Culture - Blood (collected 14 May 2024 15:39)  Source: .Blood Blood-Peripheral  Gram Stain (15 May 2024 09:21):    Growth in aerobic bottle: Gram positive cocci in pairs  Final Report (17 May 2024 13:19):    Growth in aerobic bottle: Enterococcus raffinosus    Direct identification is available within approximately 3-5    hours either by Blood Panel Multiplexed PCR or Direct    MALDI-TOF. Details: [email or web address]  Organism: Blood Culture PCR  Enterococcus raffinosus (17 May 2024 13:19)  Organism: Enterococcus raffinosus (17 May 2024 13:19)  Organism: Blood Culture PCR (17 May 2024 13:19)    Culture - Stool (collected 13 May 2024 11:40)  Source: .Stool Feces  Final Report (15 May 2024 16:33):    No enteric pathogens isolated.    (Stool culture examined for Salmonella,    Shigella, Campylobacter, Aeromonas, Plesiomonas,    Vibrio, E.coli O157 and Yersinia)             [facility], Division of Infectious Diseases  ELVIS Coles Y. Patel, S. Shah, G. Amor  [phone]  after hours and weekends [phone]    Name: AUSTIN LEE  Age: 73y  Gender: Female  MRN: [id number]    Interval History--  Notes reviewed  abd pain     Allergies    No Known Allergies    Intolerances        Medications--  Antibiotics:  meropenem  IVPB 1000 milliGRAM(s) IV Intermittent every 8 hours  vancomycin    Solution 125 milliGRAM(s) Oral every 6 hours  vancomycin  IVPB 1000 milliGRAM(s) IV Intermittent once    Immunologic:    Other:  albuterol/ipratropium for Nebulization.  apixaban  atorvastatin  budesonide 160 MICROgram(s)/formoterol 4.5 MICROgram(s) Inhaler  loperamide PRN  ondansetron Injectable PRN  pantoprazole  Injectable  polyethylene glycol 3350 PRN  sodium bicarbonate  Infusion  sodium bicarbonate  Injectable  sodium chloride 0.9%.  sodium chloride 0.9%.  sotalol  sucralfate suspension      Review of Systems--  A 10-point review of systems was obtained.     Pertinent positives and negatives--  Constitutional: No fevers. No Chills. No Rigors.   Cardiovascular: No chest pain. No palpitations.  Respiratory: No shortness of breath. No cough.  Gastrointestinal: + gi complaints pain  Psychiatric: Pleasant. Appropriate affect.    Review of systems otherwise negative except as previously noted.    Physical Examination--  Vital Signs: T(F): 98.1 (05-17-24 @ 05:17), Max: 98.6 (05-16-24 @ 21:16)  HR: 67 (05-17-24 @ 08:48)  BP: 126/68 (05-17-24 @ 08:48)  RR: 18 (05-17-24 @ 08:48)  SpO2: 97% (05-17-24 @ 08:48)  Wt(kg): --  General: mild distress.  HEENT: AT/NC. PERRL.  Neck: Not rigid. No sense of mass.  Nodes: None palpable.  Lungs: Clear bilaterally without rales, wheezing or rhonchi  Heart: Regular rate and rhythm. No Murmur.   Abdomen: Bowel sounds present and normoactive. Soft. Nondistended. Nontender  Extremities: No cyanosis or clubbing. No edema.   Skin: Warm. Dry. Good turgor. No rash. No vasculitic stigmata.  Psychiatric: Appropriate affect and mood for situation.         Laboratory Studies--  CBC                        11.1   70.81 )-----------( 465      ( 17 May 2024 07:17 )             36.7       Chemistries  05-17    133<L>  |  95<L>  |  45<H>  ----------------------------<  102<H>  4.7   |  <10<LL>  |  1.41<H>    Ca    9.2      17 May 2024 07:17        Culture Data    Culture - Blood (collected 15 May 2024 10:57)  Source: .Blood Blood-Peripheral  Preliminary Report (16 May 2024 14:03):    No growth at 24 hours    Culture - Blood (collected 15 May 2024 10:57)  Source: .Blood Blood-Peripheral  Preliminary Report (16 May 2024 14:03):    No growth at 24 hours    Culture - Blood (collected 14 May 2024 15:39)  Source: .Blood Blood-Venous  Preliminary Report (16 May 2024 20:01):    No growth at 48 Hours    Culture - Blood (collected 14 May 2024 15:39)  Source: .Blood Blood-Peripheral  Gram Stain (15 May 2024 09:21):    Growth in aerobic bottle: Gram positive cocci in pairs  Final Report (17 May 2024 13:19):    Growth in aerobic bottle: Enterococcus raffinosus    Direct identification is available within approximately 3-5    hours either by Blood Panel Multiplexed PCR or Direct    MALDI-TOF. Details: [email or web address]  Organism: Blood Culture PCR  Enterococcus raffinosus (17 May 2024 13:19)  Organism: Enterococcus raffinosus (17 May 2024 13:19)  Organism: Blood Culture PCR (17 May 2024 13:19)    Culture - Stool (collected 13 May 2024 11:40)  Source: .Stool Feces  Final Report (15 May 2024 16:33):    No enteric pathogens isolated.    (Stool culture examined for Salmonella,    Shigella, Campylobacter, Aeromonas, Plesiomonas,    Vibrio, E.coli O157 and Yersinia)

## 2024-05-17 NOTE — PROVIDER CONTACT NOTE (CHANGE IN STATUS NOTIFICATION) - ASSESSMENT
Patient A&Ox3. Patient more somnolent. Patient diaphoretic with SBP 40's. FS 26, now improved after amp of D50.

## 2024-05-17 NOTE — PROGRESS NOTE ADULT - SUBJECTIVE AND OBJECTIVE BOX
INTERVAL HPI/OVERNIGHT EVENTS:  Patient S&E at bedside. No o/n events,     VITAL SIGNS:  T(F): 98.1 (24 @ 05:17)  HR: 73 (24 @ 15:46)  BP: 74/35 (24 @ 15:46)  RR: 27 (24 @ 15:46)  SpO2: 97% (24 @ 15:46)  Wt(kg): --    PHYSICAL EXAM:    Constitutional: NAD  Eyes: EOMI, sclera non-icteric  Neck: supple  Respiratory: CTAB, no wheezes or crackles   Cardiovascular: RRR  Gastrointestinal: soft, NTND, + BS  Extremities: no cyanosis, clubbing or edema   Neurological: awake and alert      MEDICATIONS  (STANDING):  apixaban 5 milliGRAM(s) Oral every 12 hours  atorvastatin 40 milliGRAM(s) Oral at bedtime  budesonide 160 MICROgram(s)/formoterol 4.5 MICROgram(s) Inhaler 2 Puff(s) Inhalation two times a day  lactated ringers Bolus 1000 milliLiter(s) IV Bolus once  meropenem  IVPB 2000 milliGRAM(s) IV Intermittent every 12 hours  norepinephrine Infusion 0.05 MICROgram(s)/kG/Min (6.81 mL/Hr) IV Continuous <Continuous>  pantoprazole  Injectable 40 milliGRAM(s) IV Push two times a day  prothrombin complex concentrate IVPB (KCENTRA) 3500 International Unit(s) IV Intermittent once  sodium bicarbonate  Infusion 0.207 mEq/kG/Hr (100 mL/Hr) IV Continuous <Continuous>  sodium bicarbonate  Injectable 50 milliEquivalent(s) IV Push once  sodium chloride 0.9%. 1000 milliLiter(s) (100 mL/Hr) IV Continuous <Continuous>  sodium chloride 0.9%. 1000 milliLiter(s) (100 mL/Hr) IV Continuous <Continuous>  sotalol 40 milliGRAM(s) Oral every 12 hours  sucralfate suspension 1 Gram(s) Oral four times a day  vancomycin    Solution 125 milliGRAM(s) Oral every 6 hours  vancomycin  IVPB 1000 milliGRAM(s) IV Intermittent once    MEDICATIONS  (PRN):  loperamide 2 milliGRAM(s) Oral three times a day PRN Diarrhea  ondansetron Injectable 4 milliGRAM(s) IV Push every 8 hours PRN Nausea and/or Vomiting  polyethylene glycol 3350 17 Gram(s) Oral daily PRN for constipation      Allergies    No Known Allergies    Intolerances        LABS:                        9.5    75.95 )-----------( 405      ( 17 May 2024 14:39 )             31.4     05-    133<L>  |  95<L>  |  47<H>  ----------------------------<  102<H>  5.1   |  <10<LL>  |  2.07<H>    Ca    9.4      17 May 2024 14:39  Phos  7.4     05-  Mg     2.8     05    TPro  5.9<L>  /  Alb  2.5<L>  /  TBili  2.4<H>  /  DBili  x   /  AST  1002<H>  /  ALT  549<H>  /  AlkPhos  159<H>      PT/INR - ( 17 May 2024 14:39 )   PT: 45.5 sec;   INR: 4.32 ratio         PTT - ( 17 May 2024 14:39 )  PTT:46.1 sec  Urinalysis Basic - ( 17 May 2024 15:45 )    Color: Dark Yellow / Appearance: Turbid / S.029 / pH: x  Gluc: x / Ketone: Trace mg/dL  / Bili: Small / Urobili: 0.2 mg/dL   Blood: x / Protein: 30 mg/dL / Nitrite: Negative   Leuk Esterase: Negative / RBC: x / WBC x   Sq Epi: x / Non Sq Epi: x / Bacteria: x        RADIOLOGY & ADDITIONAL TESTS:  Studies reviewed.

## 2024-05-17 NOTE — CHART NOTE - NSCHARTNOTESELECT_GEN_ALL_CORE
Accept Note
Event Note
Rapid response summary/Event Note

## 2024-05-17 NOTE — PROGRESS NOTE ADULT - SUBJECTIVE AND OBJECTIVE BOX
[facility]  Doe Almanza PA-C  [address] [place] [address]  [phone]      INTERVAL HPI/OVERNIGHT EVENTS:    patient s/e  events noted  wbc count elevated  worsening abdominal pain    MEDICATIONS  (STANDING):  apixaban 5 milliGRAM(s) Oral every 12 hours  atorvastatin 40 milliGRAM(s) Oral at bedtime  budesonide 160 MICROgram(s)/formoterol 4.5 MICROgram(s) Inhaler 2 Puff(s) Inhalation two times a day  furosemide    Tablet 40 milliGRAM(s) Oral daily  pantoprazole    Tablet 40 milliGRAM(s) Oral two times a day  sotalol 40 milliGRAM(s) Oral every 12 hours  sucralfate suspension 1 Gram(s) Oral four times a day    MEDICATIONS  (PRN):  polyethylene glycol 3350 17 Gram(s) Oral daily PRN for constipation      Allergies    No Known Allergies    Intolerances        ROS:   General:  No  fevers, chills, night sweats, fatigue,   Eyes:  Good vision, no reported pain  ENT:  No sore throat, pain, runny nose, dysphagia  CV:  No pain, palpitations, hypo/hypertension  Resp:  No dyspnea, cough, tachypnea, wheezing  GI:  No pain, No nausea, No vomiting, No diarrhea, No constipation, No weight loss, No fever, No pruritis, No rectal bleeding, No tarry stools, No dysphagia,  :  No pain, bleeding, incontinence, nocturia  Muscle:  No pain, weakness  Neuro:  No weakness, tingling, memory problems  Psych:  No fatigue, insomnia, mood problems, depression  Endocrine:  No polyuria, polydipsia, cold/heat intolerance  Heme:  No petechiae, ecchymosis, easy bruisability  Skin:  No rash, tattoos, scars, edema      PHYSICAL EXAM:   Vital Signs:  Vital Signs Last 24 Hrs  T(C): 36.8 (13 May 2024 11:28), Max: 36.8 (13 May 2024 04:21)  T(F): 98.2 (13 May 2024 11:28), Max: 98.3 (13 May 2024 04:21)  HR: 63 (13 May 2024 11:28) (63 - 69)  BP: 147/65 (13 May 2024 11:28) (141/73 - 164/81)  BP(mean): --  RR: 18 (13 May 2024 11:28) (18 - 18)  SpO2: 95% (13 May 2024 11:28) (95% - 97%)    Parameters below as of 13 May 2024 11:28  Patient On (Oxygen Delivery Method): room air      Daily     Daily Weight in k.3 (13 May 2024 06:16)    GENERAL:  Appears stated age,   HEENT:  NC/AT,    CHEST:  Full & symmetric excursion,   HEART:  Regular rhythm,  ABDOMEN:  Soft, non-tender, non-distended,  EXTEREMITIES:  no cyanosis  SKIN:  No rash  NEURO:  Alert,       LABS:                        9.5    22.11 )-----------( 465      ( 13 May 2024 06:53 )             30.3     05-    138  |  103  |  16  ----------------------------<  123<H>  3.7   |  23  |  0.80    Ca    9.1      13 May 2024 06:53  Mg     1.8         TPro  6.2  /  Alb  2.9<L>  /  TBili  0.9  /  DBili  x   /  AST  10  /  ALT  8<L>  /  AlkPhos  75  05-13      Urinalysis Basic - ( 13 May 2024 06:53 )    Color: x / Appearance: x / SG: x / pH: x  Gluc: 123 mg/dL / Ketone: x  / Bili: x / Urobili: x   Blood: x / Protein: x / Nitrite: x   Leuk Esterase: x / RBC: x / WBC x   Sq Epi: x / Non Sq Epi: x / Bacteria: x        RADIOLOGY & ADDITIONAL TESTS:

## 2024-05-17 NOTE — CONSULT NOTE ADULT - CONSULT REQUESTED DATE/TIME
07-May-2024
17-May-2024 17:22
07-May-2024 17:54
07-May-2024 14:49
08-May-2024 11:56
14-May-2024 13:56
10-May-2024 14:54

## 2024-05-17 NOTE — RAPID RESPONSE TEAM SUMMARY - NSSITUATIONBACKGROUNDRRT_GEN_ALL_CORE
74 y/o female w/ PMHx COPD, paroxysmal AF (on eliquis), HLD, HTN, PVD, ICM/CAD s/p PCI (has  of RCA), cardiac arrest s/p left-sided ICD (6/4/2019) complicated by infection and s/p R sided [employer] single-chamber ICD ([place] in 9/23/2019), perforated gastric ulcer with pneumoperitoneum - sealed 11/26/23 on UGI series, prior admissions for VT storm with adjustment of medication and NIPS, prior ICD shock in the setting of recurrent monomorphic VT with unsuccessful ATP, VT ablation attempted 10/20/23 but aborted due to hemodynamically significant pericardial effusion with drain placement, recurrent VT s/p ICD shock, and afib w/ RVR s/p cardioversion 10/21/23 s/p pericardial window 10/28/23, admitted 2/2024 with ICD shocks for recurrent VT and underwent VT ablation on 2/13/24 by Dr. Hurt (off quinidine post ablation, on amio) who now brought in by EMS from home after witnessed fall w/ presyncope w/ left thigh intramuscular hematoma. course c/b acute renal failure and metabolic acidosis. pt had recently went for a ct abd and pelvis, found to have severe leukocytosis

## 2024-05-17 NOTE — PROVIDER CONTACT NOTE (CRITICAL VALUE NOTIFICATION) - BACKGROUND
Patient admitted for UTI and bacteremia
Patient admitted for UTI and bacteremia
pt admitted for UTI/Vtach  PMH significant for CHF, CAD, HLD, and HTN.

## 2024-05-17 NOTE — BRIEF OPERATIVE NOTE - OPERATION/FINDINGS
Ex-lap, stomach and small bowel dilated. OGT placed by anesthesia. Small bowel examined from LoT to cecum, all small bowel threatened but not frankly necrotic except for small segment of distal ileum. Cecum threatened. Rest of colon appeared viable. RUQ explored, moderate amount of bile and bile staining noted, 1st-3rd portion of duodenum examined and no perforation identified. Lesser sac entered, appeared clean from bile staining. No leak noted with insufflation into NGT after filling RUQ with saline. Abthera VAC placed.

## 2024-05-17 NOTE — PRE-ANESTHESIA EVALUATION ADULT - NSANTHPMHFT_GEN_ALL_CORE
73 year old woman with anemia, COPD, former smoker, atrial fib ,HFrEF, CAD, HTN and recurrent VT, s/p ablation 2/13, with an ICD presents with witnessed fall 73 year old woman with anemia, COPD, former smoker, atrial fib ,HFrEF, CAD, HTN and recurrent VT, s/p ablation 2/13, with an ICD presents with witnessed fall  ICD interrogated 5/24; mode VVI at 35 bpm on lower threshold, battery longevity: 3.8 years remaining 73 year old woman with anemia, COPD, former smoker, atrial fib ,HFrEF, CAD, HTN and recurrent VT, s/p ablation 2/13, with an ICD presents with witnessed fall - presented with worsening leukocytosis and supratherapeutic INR of 4.3 after a rapid response was called for patient.  ICD interrogated 5/24; mode VVI at 35 bpm on lower threshold, battery longevity: 3.8 years remaining 73 year old woman with anemia, COPD, former smoker, atrial fib ,HFrEF, CAD, HTN and recurrent VT, s/p ablation 2/13, with an ICD presents with witnessed fall - presented with worsening leukocytosis and supratherapeutic INR of 4.3 after a rapid response was called for patient.  ICD interrogated 5/24; mode VVI at 35 bpm on lower threshold, battery longevity: 3.8 years remaining    CVC and arterial lines placed in MICU prior to being brought down to OR

## 2024-05-17 NOTE — CHART NOTE - NSCHARTNOTEFT_GEN_A_CORE
ABG was reviewed showing pH 7.13 Bicarb 7 and pCO2 20  with significant anion gap on AM labs concerning for metabolic acidosis attempting to have respiratory compensation. Reviewed with nephrology who recommended to call ICU evaluation and repeat ABG with lactate. Upon arrival to room patient returned from CT scan appearing tachypneic with respiratory distress and hypotensive with SBP in 80's. RRT was called, patient noted to be hypoglycemic to 26 and received D50 and was started on Bicarb gtt. CT scan was reviewed by Surgical team and ICU team with concern for obstruction vs mesenteric ischemia. It was determined patient would be best monitored in ICU setting and was transferred by ICU team from [facility] to MICU at conclusion of RRT. During RRT patients  was outside of room and was updated on events of RRT and plan. Code status was confirmed as full code during RRT. Dr. Dangelo was called and updated on events.

## 2024-05-17 NOTE — PROGRESS NOTE ADULT - ATTENDING COMMENTS
Patient seen in SICU; she has abdominal pain of new origin and given the leucocytosis she may have intraabdominal disease including possibility of ischemic colitis. Surgery staff is evaluating patient for management; antibiotic therapy continues. Patient had been seen two days previously and the abdominal pain is a ne finding to our examination.

## 2024-05-17 NOTE — PROGRESS NOTE ADULT - ASSESSMENT
73f with anemia  copd, former smoker, atrial fib, cad, hfref, hyperlipid, hypertension recurrent vt , s/p ablation 2/13,  icd, presents with witnessed fall.   hypotension - improved   rib fractures   left thigh hematoma   acute anemia   leukocytosis - r/o infection v likely reactive   blood cx in 1/4 bottles gpc pair/chains -enterococcus raffinosus amp sensitive   ruq pain     Recommendations:   cdiff neg  gi pcr neg   ruq us neg for cholelithiasis   surveillance cx neg to date     zosyn switch to meropenem   vancomycin iv- monitor creat and levels  check ct chest/abd/pelvis with contrast   empiric po vancomycin     trend wbc        73f with anemia  copd, former smoker, atrial fib, cad, hfref, hyperlipid, hypertension recurrent vt , s/p ablation 2/13,  icd, presents with witnessed fall.   hypotension - improved   rib fractures   left thigh hematoma   acute anemia   leukocytosis - r/o infection v likely reactive   blood cx in 1/4 bottles gpc pair/chains -enterococcus raffinosus amp sensitive   ruq pain     Recommendations:   cdiff neg  gi pcr neg   ruq us neg for cholelithiasis   surveillance cx neg to date     zosyn 5/15-5/17   switch to meropenem day 1  vancomycin day 2  - monitor creat and trough, 15-20 and auc/leida 400-600    empiric po vancomycin     trend wbc        73f with anemia  copd, former smoker, atrial fib, cad, hfref, hyperlipid, hypertension recurrent vt , s/p ablation 2/13,  icd, presents with witnessed fall.   hypotension - improved   rib fractures   left thigh hematoma   acute anemia   leukocytosis - r/o infection v likely reactive   blood cx in 1/4 bottles gpc pair/chains -enterococcus raffinosus- amp sensitive - etiology gi   ruq pain   5/17 shock - hypovolemic + /- septic icu transfer       Recommendations:   tte - no vegetations   cdiff neg  gi pcr neg   ruq us neg for cholelithiasis   surveillance cx neg to date   ct noted- low flow/ischemic areas. wedge shaped areas b/l kindneys    zosyn 5/15-5/17   switch to meropenem day 1  vancomycin day 2  - monitor creat and trough, 15-20 and auc/leida 400-600  empiric po vancomycin     surgery - noted for laparotomy

## 2024-05-17 NOTE — CONSULT NOTE ADULT - REASON FOR ADMISSION
The patient is a 73y Female complaining of syncope.
Syncope
The patient is a 73y Female complaining of syncope.
The patient is a 73y Female complaining of syncope.

## 2024-05-17 NOTE — CONSULT NOTE ADULT - SUBJECTIVE AND OBJECTIVE BOX
"   01/12/23 0000   Call Information   Date of Call 01/11/23   Time of Call 2329   Name of person requesting the team Clayton   Title of person requesting team RN   RRT Arrival time 2331   Time RRT ended 0100   Reason for call   Type of RRT Adult   Primary reason for call Respiratory   Respiratory Rate greater than 24;O2sat less than 88% for greater than 5 minutes despite O2;Labored breathing;Respiratory pattern change   Was patient transferred from the ED, ICU, or PACU within last 24 hours prior to RRT call? 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 Ongoing kidney injury, suspected to be ATN, as well as cirrhosis (likely 2/2 ARCEO)  Currently admitted for altered mental status.      Patient is altered on evaluation at the ED, limited history could be obtained. History gathered from his wife. Per wife, patient was doing Ok after his hospital discharge but developed more sleepiness one day prior to his current presentation. No fever at home. No overt bleeding noted, no hematemesis, no melena. Was able to urinate without problem. He was adherent to his medication. His wife mentions he had some bilateral leg swellings that she noted in the last two days. As his altered mental status progressed, she notes he had one episode of vomiting, ingested matter, non-bloody, she called 911 at that time. \"   Notable History/Conditions Cardiac;End-Stage disease;Organ failure;Transplant   Assessment Patient is an ED boarding patient who is not alert, agitated, not following commands. RR in 30s, switched to BiPAP 15/5 100% without any improvement in work of breathing, though oxygenation was 99%. "   SICU Consultation Note  =====================================================  HPI: 73y Female  HPI:  TAY LEE is a 73y female with PMHX COPD, paroxysmal AF (on eliquis), HLD, HTN, PVD, ICM/CAD s/p PCI (has  of RCA), cardiac arrest s/p left-sided ICD (6/4/2019) complicated by infection and s/p R sided [employer] single-chamber ICD ([place] in 9/23/2019), perforated gastric ulcer with pneumoperitoneum - sealed 11/26/23 on UGI series, prior admissions for VT storm with adjustment of medication and NIPS, prior ICD shock in the setting of recurrent monomorphic VT with unsuccessful ATP, VT ablation attempted 10/20/23 but aborted due to hemodynamically significant pericardial effusion with drain placement, recurrent VT s/p ICD shock, and afib w/ RVR s/p cardioversion 10/21/23 s/p pericardial window 10/28/23, admitted 2/2024 with ICD shocks for recurrent VT and underwent VT ablation on 2/13/24 by Dr. Hurt (off quinidine post ablation, on amio) in the hospital for 10d transferred to the MICU for pressor support (80s/50s) secondary to septic shock with enterococcus bacteremia of likely abdominal origin.     Patient s/p ex-lap, stomach and small bowel dilated. OGT placed by anesthesia. Small bowel examined from LoT to cecum, all small bowel threatened but not frankly necrotic except for small segment of distal ileum. Cecum threatened. Rest of colon appeared viable. RUQ explored, moderate amount of bile and bile staining noted, 1st-3rd portion of duodenum examined and no perforation identified. Lesser sac entered, appeared clean from bile staining. No leak noted with insufflation into NGT after filling RUQ with saline. Abthera VAC placed. Patient brought to SICU for further management.    Surgery Information  EBL: 10 mL         IV Fluids: 600 normosol, 1000 5% albumin       Blood Products: 1 U pRBC   UOP:  25 mL      PAST MEDICAL & SURGICAL HISTORY:  Obese      Smoker      HTN (hypertension)      HLD (hyperlipidemia)      CAD (coronary artery disease)      CHF with cardiomyopathy      History of hip surgery        Home Meds: Home Medications:  D3 50 mcg (2000 intl units) oral capsule: 1 cap(s) orally once a day (09 May 2024 11:01)  polyethylene glycol 3350 oral powder for reconstitution: 17 gram(s) orally once a day As needed for constipation (14 May 2024 13:18)  sotalol: 40 milligram(s) orally 2 times a day (14 May 2024 13:18)  sucralfate 1 g/10 mL oral suspension: 10 milliliter(s) orally 4 times a day (14 May 2024 13:18)  Symbicort 160 mcg-4.5 mcg/inh inhalation aerosol: 2 puff(s) inhaled 2 times a day (09 May 2024 11:01)    Allergies: Allergies    No Known Allergies    Intolerances    Soc:   Advanced Directives: Presumed Full Code     CURRENT MEDICATIONS:   --------------------------------------------------------------------------------------  Neurologic Medications  dexMEDEtomidine Infusion 0.5 MICROgram(s)/kG/Hr IV Continuous <Continuous>  HYDROmorphone  Injectable 0.5 milliGRAM(s) IV Push every 3 hours PRN Breakthrough    Respiratory Medications    Cardiovascular Medications  norepinephrine Infusion 0.15 MICROgram(s)/kG/Min IV Continuous <Continuous>    Gastrointestinal Medications  sodium bicarbonate  Infusion 0.207 mEq/kG/Hr IV Continuous <Continuous>    Genitourinary Medications    Hematologic/Oncologic Medications    Antimicrobial/Immunologic Medications    Endocrine/Metabolic Medications  vasopressin Infusion 0.03 Unit(s)/Min IV Continuous <Continuous>    Topical/Other Medications  chlorhexidine 0.12% Liquid 15 milliLiter(s) Oral Mucosa every 12 hours  chlorhexidine 2% Cloths 1 Application(s) Topical <User Schedule>    --------------------------------------------------------------------------------------    VITAL SIGNS, INS/OUTS (last 24 hours):  --------------------------------------------------------------------------------------  ICU Vital Signs Last 24 Hrs  T(C): 35.9 (17 May 2024 23:00), Max: 36.7 (17 May 2024 05:17)  T(F): 96.6 (17 May 2024 23:00), Max: 98.1 (17 May 2024 05:17)  HR: 100 (17 May 2024 23:00) (65 - 100)  BP: 115/74 (17 May 2024 19:00) (66/33 - 131/70)  BP(mean): 89 (17 May 2024 19:00) (45 - 89)  ABP: 114/43 (17 May 2024 23:00) (97/27 - 158/55)  ABP(mean): 68 (17 May 2024 23:00) (47 - 93)  RR: 20 (17 May 2024 23:00) (12 - 34)  SpO2: 100% (17 May 2024 23:00) (86% - 100%)    O2 Parameters below as of 17 May 2024 23:00  Patient On (Oxygen Delivery Method): ventilator    O2 Concentration (%): 60      I&O's Summary    16 May 2024 07:01  -  17 May 2024 07:00  --------------------------------------------------------  IN: 970 mL / OUT: 250 mL / NET: 720 mL    17 May 2024 07:01  -  17 May 2024 23:37  --------------------------------------------------------  IN: 2697.6 mL / OUT: 375 mL / NET: 2322.6 mL      --------------------------------------------------------------------------------------    EXAM:  General/Neuro: intubated sedated  Respiratory: Normal expansion/effort.   [] Tracheostomy   [X] Intubated  Mechanical Ventilation: Mode: AC/ CMV (Assist Control/ Continuous Mandatory Ventilation)  RR (machine): 12  TV (machine): 400  FiO2: 60  PEEP: 5  ITime: 1  MAP: 9.2  PIP: 23  Cardiovascular: S1, S2.  Regular rate and rhythm.   Cardiac Rhythm: Normal Sinus Rhythm  GI: Abdomen soft  Current Diet:  NPO  Tubes/Lines/Drains  ***  [x] Peripheral IV  [X] Central Venous Line     	[X] R	[] L	[X] IJ	[] Fem	[] SC        Type:	    Date Placed: 5/17  [X] Arterial Line		[] R	[X] L	[] Fem	[] Rad	[X] Ax	Date Placed: 5/17  [] PICC:         	[] Midline		[] Mediport           [] Urinary Catheter		Date Placed:   Extremities: Extremities warm  Derm: no skin breakdown.    : Mazariegos catheter in place.     LABS  --------------------------------------------------------------------------------------    05-17    147<H>  |  95<L>  |  40<H>  ----------------------------<  187<H>  4.8   |  10<LL>  |  2.03<H>    Ca    8.3<L>      17 May 2024 23:08  Phos  9.1     05-17  Mg     2.5     05-17    TPro  4.2<L>  /  Alb  2.6<L>  /  TBili  2.3<H>  /  DBili  x   /  AST  x   /  ALT  x   /  AlkPhos  210<H>  05-17    CARDIAC MARKERS ( 17 May 2024 14:39 )  x     / x     / 63 U/L / x     / 2.0 ng/mL      PT/INR - ( 17 May 2024 19:17 )   PT: 38.6 sec;   INR: 3.64 ratio         PTT - ( 17 May 2024 19:17 )  PTT:57.2 sec  ABG - ( 17 May 2024 23:09 )  pH, Arterial: 7.11  pH, Blood: x     /  pCO2: 35    /  pO2: 161   / HCO3: 11    / Base Excess: -17.0 /  SaO2: 99.1      Culture - Blood (collected 15 May 2024 10:57)  Source: .Blood Blood-Peripheral  Preliminary Report (17 May 2024 14:02):    No growth at 48 Hours    Culture - Blood (collected 15 May 2024 10:57)  Source: .Blood Blood-Peripheral  Preliminary Report (17 May 2024 14:02):    No growth at 48 Hours      CAPILLARY BLOOD GLUCOSE      POCT Blood Glucose.: 199 mg/dL (17 May 2024 22:54)  POCT Blood Glucose.: 45 mg/dL (17 May 2024 22:34)  POCT Blood Glucose.: 45 mg/dL (17 May 2024 22:33)  POCT Blood Glucose.: 181 mg/dL (17 May 2024 13:28)  POCT Blood Glucose.: 319 mg/dL (17 May 2024 13:01)  POCT Blood Glucose.: 26 mg/dL (17 May 2024 12:55)    Urinalysis Basic - ( 17 May 2024 23:08 )    Color: x / Appearance: x / SG: x / pH: x  Gluc: 187 mg/dL / Ketone: x  / Bili: x / Urobili: x   Blood: x / Protein: x / Nitrite: x   Leuk Esterase: x / RBC: x / WBC x   Sq Epi: x / Non Sq Epi: x / Bacteria: x   Patient intubated by ED team after maroon team spoke with wife.   Interventions CXR;Labs;Meds;Other (describe)  (intubated)   Patient Outcome   Patient Outcome Transferred to  (MICU)   RRT Team   Attending/Primary/Covering Physician Maroon   Date Attending Physician notified 01/11/23   Time Attending Physician notified 2330   Physician(s) Chitra Soler, ZHANNA   Lead RN Jaren Lopez, RN   RN Clayton FLORES RN   RT Kathryn Bradley, RRT   Other staff Dr Lozano   Post RRT Intervention Assessment   Post RRT Assessment Transferred to ICU

## 2024-05-17 NOTE — PROVIDER CONTACT NOTE (CRITICAL VALUE NOTIFICATION) - SITUATION
WBC 70.8 and carbon dioxide less than 10
Carbon Dioxide is 10
Gram positive cocci in pairs in aerobic bottle
Ph 7.13 and Bicarb 7

## 2024-05-17 NOTE — CHART NOTE - NSCHARTNOTEFT_GEN_A_CORE
Patient seen on monitor with VFib arrest, upon arrival chest compressions in process by nursing staff. In total patient given Epi x 2, Bicarb x 3, Ca x 1. No shock delivered, all pulse checks following placement of defibrillator pads concerning for PEA.  notified of cardiac arrest by attending Dr. Maradiaga, agreeable to DNR. At final pulse check, patient without ROSC however moments later regained pulse spontaneously.      arrived at bedside, agreeble to DNR. He asked that we do not prolong the patients suffering and is agreeable to withdrawing care including pressor support and ventilatory support. Comfort measures in place.    MOLST form completed and in chart.  Attending Dr. Maradiaga aware.

## 2024-05-17 NOTE — CHART NOTE - NSCHARTNOTEFT_GEN_A_CORE
MICU ACCEPT NOTE    CHIEF COMPLAINT: Patient is a 73y old  Female who presents with a chief complaint of The patient is a 73y Female complaining of syncope. (17 May 2024 13:30)      HPI:  Patient is a 73-year-old female brought in by EMS from home after witnessed fall.  Patient was attempting to walk with walker, endorsed that she felt "woozy" and fell over.  She struck her left costal margin on the edge of her dresser.  Denies head trauma.Patient was walking with her walker when she fell  Medical history significant for coronary artery disease, on Plavix.  Per EMS, patient was hypotensive 80s over 40s, responded to 1 L normal saline.  EMS also identified potential run of polymorphic V. tach on the monitor.   (07 May 2024 15:30)      PAST MEDICAL & SURGICAL HISTORY:  Obese      Smoker      HTN (hypertension)      HLD (hyperlipidemia)      CAD (coronary artery disease)      CHF with cardiomyopathy      History of hip surgery          FAMILY HISTORY:  Family history of Alzheimer's disease (Father)    Family history of cancer (Mother)        SOCIAL HISTORY:  Smoking:   Substance Use:   EtOH Use:   Advance Directives:     MEDICATIONS  (STANDING):  apixaban 5 milliGRAM(s) Oral every 12 hours  atorvastatin 40 milliGRAM(s) Oral at bedtime  budesonide 160 MICROgram(s)/formoterol 4.5 MICROgram(s) Inhaler 2 Puff(s) Inhalation two times a day  lactated ringers Bolus 1000 milliLiter(s) IV Bolus once  meropenem  IVPB 2000 milliGRAM(s) IV Intermittent every 12 hours  norepinephrine Infusion 0.05 MICROgram(s)/kG/Min (6.81 mL/Hr) IV Continuous <Continuous>  pantoprazole  Injectable 40 milliGRAM(s) IV Push two times a day  sodium bicarbonate  Infusion 0.207 mEq/kG/Hr (100 mL/Hr) IV Continuous <Continuous>  sodium bicarbonate  Injectable 50 milliEquivalent(s) IV Push once  sodium chloride 0.9%. 1000 milliLiter(s) (100 mL/Hr) IV Continuous <Continuous>  sodium chloride 0.9%. 1000 milliLiter(s) (100 mL/Hr) IV Continuous <Continuous>  sotalol 40 milliGRAM(s) Oral every 12 hours  sucralfate suspension 1 Gram(s) Oral four times a day  vancomycin    Solution 125 milliGRAM(s) Oral every 6 hours  vancomycin  IVPB 1000 milliGRAM(s) IV Intermittent once    MEDICATIONS  (PRN):  loperamide 2 milliGRAM(s) Oral three times a day PRN Diarrhea  ondansetron Injectable 4 milliGRAM(s) IV Push every 8 hours PRN Nausea and/or Vomiting  polyethylene glycol 3350 17 Gram(s) Oral daily PRN for constipation      Allergies    No Known Allergies    Intolerances        REVIEW OF SYSTEMS:  CONSTITUTIONAL: No weakness, fevers or chills  EYES/ENT: No visual changes;  No vertigo or throat pain   NECK: No pain or stiffness  RESPIRATORY: No cough, wheezing, hemoptysis; No shortness of breath  CARDIOVASCULAR: No chest pain or palpitations  GASTROINTESTINAL: No abdominal or epigastric pain. No nausea, vomiting, or hematemesis; No diarrhea or constipation. No melena or hematochezia.  GENITOURINARY: No dysuria, frequency or hematuria  NEUROLOGICAL: No numbness or weakness  SKIN: No itching, rashes  [ ] All other systems negative  [ ] Unable to assess ROS because ________    OBJECTIVE:  ICU Vital Signs Last 24 Hrs  T(C): 36.7 (17 May 2024 05:17), Max: 37 (16 May 2024 21:16)  T(F): 98.1 (17 May 2024 05:17), Max: 98.6 (16 May 2024 21:16)  HR: 67 (17 May 2024 08:48) (64 - 67)  BP: 126/68 (17 May 2024 08:48) (126/68 - 136/67)  BP(mean): --  ABP: --  ABP(mean): --  RR: 18 (17 May 2024 08:48) (18 - 18)  SpO2: 97% (17 May 2024 08:48) (95% - 97%)    O2 Parameters below as of 17 May 2024 08:48  Patient On (Oxygen Delivery Method): room air              05-16 @ 07:01  -  05-17 @ 07:00  --------------------------------------------------------  IN: 970 mL / OUT: 250 mL / NET: 720 mL      CAPILLARY BLOOD GLUCOSE      POCT Blood Glucose.: 181 mg/dL (17 May 2024 13:28)      PHYSICAL EXAM:  GENERAL: NAD, lying in bed comfortably  HEAD:  Atraumatic, normocephalic  EYES: EOMI, PERRLA, conjunctiva and sclera clear  ENT: Moist mucous membranes  NECK: Supple, no JVD  HEART: S1, S2, regular rate and rhythm, no murmurs, rubs, or gallops  LUNGS: Unlabored respirations.  Clear to auscultation bilaterally, no crackles, wheezing, or rhonchi  ABDOMEN: Soft, nontender, nondistended, +BS  EXTREMITIES: 2+ peripheral pulses bilaterally. No clubbing, cyanosis, or edema  NERVOUS SYSTEM:  A&Ox3, no focal deficits   SKIN: No rashes or lesions  LINES:     LABS:                        9.5    75.95 )-----------( 405      ( 17 May 2024 14:39 )             31.4     Hgb Trend: 9.5<--, 11.1<--, 9.7<--, 9.7<--, 9.6<--  05-17    133<L>  |  95<L>  |  45<H>  ----------------------------<  102<H>  4.7   |  <10<LL>  |  1.41<H>    Ca    9.2      17 May 2024 07:17      Creatinine Trend: 1.41<--, 1.18<--, 0.97<--, 0.75<--, 0.80<--, 0.85<--    Urinalysis Basic - ( 17 May 2024 07:17 )    Color: x / Appearance: x / SG: x / pH: x  Gluc: 102 mg/dL / Ketone: x  / Bili: x / Urobili: x   Blood: x / Protein: x / Nitrite: x   Leuk Esterase: x / RBC: x / WBC x   Sq Epi: x / Non Sq Epi: x / Bacteria: x      Arterial Blood Gas:  05-17 @ 11:42  7.13/20/51/7/73.8/-20.6  ABG lactate: --    Venous Blood Gas:  05-17 @ 14:35  7.06/29/58/8/77.3  VBG Lactate: >16.0      MICROBIOLOGY:     RADIOLOGY & ADDITIONAL TESTS:    ASSESSMENT  TAY LEE is a 73y female with PMHX COPD, paroxysmal AF (on eliquis), HLD, HTN, PVD, ICM/CAD s/p PCI (has  of RCA), cardiac arrest s/p left-sided ICD (6/4/2019) complicated by infection and s/p R sided Medtronic single-chamber ICD ([place] in 9/23/2019), perforated gastric ulcer with pneumoperitoneum - sealed 11/26/23 on UGI series, prior admissions for VT storm with adjustment of medication and NIPS, prior ICD shock in the setting of recurrent monomorphic VT with unsuccessful ATP, VT ablation attempted 10/20/23 but aborted due to hemodynamically significant pericardial effusion with drain placement, recurrent VT s/p ICD shock, and afib w/ RVR s/p cardioversion 10/21/23 s/p pericardial window 10/28/23, admitted 2/2024 with ICD shocks for recurrent VT and underwent VT ablation on 2/13/24 by Dr. Hurt (off quinidine post ablation, on amio) in the hospital for 10d transferred to the MICU for pressor support (80s/50s) secondary to septic shock in the setting of small bowel ischemia.    PLAN  =====Neurologic=====  P    =====Pulmonary=====  Patient breathing comfortably on room air    =====Cardiovascular=====  Patient does not currently require vasopressors and is normotensive    =====GI=====  #GERD  - Protonix 40mg IV QD    #Diet  - Full diet    =====Renal/=====  #Electrolytes  - Maintain K>4, Phos>3, Mag>2, iCal>1    =====Endocrine=====  #DM2  - While NPO, FSBG and LARRY q6h    =====Infectious Disease=====  Patient is afebrile and is not currently being treated for any infection.    =====Heme/Onc=====  #DVT Ppx  - Lovenox 40mg SQ qd    =====Ethics=====  FULL CODE MICU ACCEPT NOTE    CHIEF COMPLAINT: Patient is a 73y old  Female who presents with a chief complaint of The patient is a 73y Female complaining of syncope. (17 May 2024 13:30)      HPI:  Patient is a 73-year-old female brought in by EMS from home after witnessed fall.  Patient was attempting to walk with walker, endorsed that she felt "woozy" and fell over.  She struck her left costal margin on the edge of her dresser.  Denies head trauma.Patient was walking with her walker when she fell  Medical history significant for coronary artery disease, on Plavix.  Per EMS, patient was hypotensive 80s over 40s, responded to 1 L normal saline.  EMS also identified potential run of polymorphic V. tach on the monitor.   (07 May 2024 15:30)      PAST MEDICAL & SURGICAL HISTORY:  Obese      Smoker      HTN (hypertension)      HLD (hyperlipidemia)      CAD (coronary artery disease)      CHF with cardiomyopathy      History of hip surgery          FAMILY HISTORY:  Family history of Alzheimer's disease (Father)    Family history of cancer (Mother)        SOCIAL HISTORY:  Smoking:   Substance Use:   EtOH Use:   Advance Directives:     MEDICATIONS  (STANDING):  apixaban 5 milliGRAM(s) Oral every 12 hours  atorvastatin 40 milliGRAM(s) Oral at bedtime  budesonide 160 MICROgram(s)/formoterol 4.5 MICROgram(s) Inhaler 2 Puff(s) Inhalation two times a day  lactated ringers Bolus 1000 milliLiter(s) IV Bolus once  meropenem  IVPB 2000 milliGRAM(s) IV Intermittent every 12 hours  norepinephrine Infusion 0.05 MICROgram(s)/kG/Min (6.81 mL/Hr) IV Continuous <Continuous>  pantoprazole  Injectable 40 milliGRAM(s) IV Push two times a day  sodium bicarbonate  Infusion 0.207 mEq/kG/Hr (100 mL/Hr) IV Continuous <Continuous>  sodium bicarbonate  Injectable 50 milliEquivalent(s) IV Push once  sodium chloride 0.9%. 1000 milliLiter(s) (100 mL/Hr) IV Continuous <Continuous>  sodium chloride 0.9%. 1000 milliLiter(s) (100 mL/Hr) IV Continuous <Continuous>  sotalol 40 milliGRAM(s) Oral every 12 hours  sucralfate suspension 1 Gram(s) Oral four times a day  vancomycin    Solution 125 milliGRAM(s) Oral every 6 hours  vancomycin  IVPB 1000 milliGRAM(s) IV Intermittent once    MEDICATIONS  (PRN):  loperamide 2 milliGRAM(s) Oral three times a day PRN Diarrhea  ondansetron Injectable 4 milliGRAM(s) IV Push every 8 hours PRN Nausea and/or Vomiting  polyethylene glycol 3350 17 Gram(s) Oral daily PRN for constipation      Allergies    No Known Allergies    Intolerances        REVIEW OF SYSTEMS:  CONSTITUTIONAL: No weakness, fevers or chills  EYES/ENT: No visual changes;  No vertigo or throat pain   NECK: No pain or stiffness  RESPIRATORY: No cough, wheezing, hemoptysis; No shortness of breath  CARDIOVASCULAR: No chest pain or palpitations  GASTROINTESTINAL: No abdominal or epigastric pain. No nausea, vomiting, or hematemesis; No diarrhea or constipation. No melena or hematochezia.  GENITOURINARY: No dysuria, frequency or hematuria  NEUROLOGICAL: No numbness or weakness  SKIN: No itching, rashes  [ ] All other systems negative  [ ] Unable to assess ROS because ________    OBJECTIVE:  ICU Vital Signs Last 24 Hrs  T(C): 36.7 (17 May 2024 05:17), Max: 37 (16 May 2024 21:16)  T(F): 98.1 (17 May 2024 05:17), Max: 98.6 (16 May 2024 21:16)  HR: 67 (17 May 2024 08:48) (64 - 67)  BP: 126/68 (17 May 2024 08:48) (126/68 - 136/67)  BP(mean): --  ABP: --  ABP(mean): --  RR: 18 (17 May 2024 08:48) (18 - 18)  SpO2: 97% (17 May 2024 08:48) (95% - 97%)    O2 Parameters below as of 17 May 2024 08:48  Patient On (Oxygen Delivery Method): room air              05-16 @ 07:01  -  05-17 @ 07:00  --------------------------------------------------------  IN: 970 mL / OUT: 250 mL / NET: 720 mL      CAPILLARY BLOOD GLUCOSE      POCT Blood Glucose.: 181 mg/dL (17 May 2024 13:28)      PHYSICAL EXAM:  GENERAL: NAD, lying in bed comfortably  HEAD:  Atraumatic, normocephalic  EYES: EOMI, PERRLA, conjunctiva and sclera clear  ENT: Moist mucous membranes  NECK: Supple, no JVD  HEART: S1, S2, regular rate and rhythm, no murmurs, rubs, or gallops  LUNGS: Unlabored respirations.  Clear to auscultation bilaterally, no crackles, wheezing, or rhonchi  ABDOMEN: Soft, nontender, nondistended, +BS  EXTREMITIES: 2+ peripheral pulses bilaterally. No clubbing, cyanosis, or edema  NERVOUS SYSTEM:  A&Ox3, no focal deficits   SKIN: No rashes or lesions  LINES:     LABS:                        9.5    75.95 )-----------( 405      ( 17 May 2024 14:39 )             31.4     Hgb Trend: 9.5<--, 11.1<--, 9.7<--, 9.7<--, 9.6<--  05-17    133<L>  |  95<L>  |  45<H>  ----------------------------<  102<H>  4.7   |  <10<LL>  |  1.41<H>    Ca    9.2      17 May 2024 07:17      Creatinine Trend: 1.41<--, 1.18<--, 0.97<--, 0.75<--, 0.80<--, 0.85<--    Urinalysis Basic - ( 17 May 2024 07:17 )    Color: x / Appearance: x / SG: x / pH: x  Gluc: 102 mg/dL / Ketone: x  / Bili: x / Urobili: x   Blood: x / Protein: x / Nitrite: x   Leuk Esterase: x / RBC: x / WBC x   Sq Epi: x / Non Sq Epi: x / Bacteria: x      Arterial Blood Gas:  05-17 @ 11:42  7.13/20/51/7/73.8/-20.6  ABG lactate: --    Venous Blood Gas:  05-17 @ 14:35  7.06/29/58/8/77.3  VBG Lactate: >16.0      MICROBIOLOGY:     RADIOLOGY & ADDITIONAL TESTS:    ASSESSMENT  TAY LEE is a 73y female with PMHX COPD, paroxysmal AF (on eliquis), HLD, HTN, PVD, ICM/CAD s/p PCI (has  of RCA), cardiac arrest s/p left-sided ICD (6/4/2019) complicated by infection and s/p R sided Med[employer]onic single-chamber ICD ([place] in 9/23/2019), perforated gastric ulcer with pneumoperitoneum - sealed 11/26/23 on UGI series, prior admissions for VT storm with adjustment of medication and NIPS, prior ICD shock in the setting of recurrent monomorphic VT with unsuccessful ATP, VT ablation attempted 10/20/23 but aborted due to hemodynamically significant pericardial effusion with drain placement, recurrent VT s/p ICD shock, and afib w/ RVR s/p cardioversion 10/21/23 s/p pericardial window 10/28/23, admitted 2/2024 with ICD shocks for recurrent VT and underwent VT ablation on 2/13/24 by Dr. Hurt (off quinidine post ablation, on amio) in the hospital for 10d transferred to the MICU for pressor support (80s/50s) secondary to septic shock in the setting of small bowel ischemia.    PLAN  =====Neurologic=====  P    =====Pulmonary=====  Patient breathing comfortably on room air    =====Cardiovascular=====  Patient does not currently require vasopressors and is normotensive    =====GI=====  #GERD  - Protonix 40mg IV QD    #Diet  - Full diet    =====Renal/=====  #Electrolytes  - Maintain K>4, Phos>3, Mag>2, iCal>1    =====Endocrine=====  #DM2  - While NPO, FSBG and LARRY q6h    =====Infectious Disease=====  Patient is afebrile and is not currently being treated for any infection.    =====Heme/Onc=====  #DVT Ppx  - Lovenox 40mg SQ qd    =====Ethics=====  FULL CODE    Attending Addendum:  Agree with above. Seen and examined with team as she arrived to the MICU. Critically ill with shock liver, shock kidney, rising lactate and abdominal pain. CT shows evidence of low flow state. TLC catether placed emergently, A line placed, bolus given. Close follow up of electrolytes, close follow up of BP and labs. Surgery attending at the bedside and after a long discussion with family decision made to do an ex lap to evaluate abdomen. Will reverse Eloquis with K centra and prep for the OR. High risk with multiorgan failure.   Total CC time 45 min  DOREEN Meyers DO, FCCP MICU ACCEPT NOTE    CHIEF COMPLAINT: Patient is a 73y old  Female who presents with a chief complaint of The patient is a 73y Female complaining of syncope. (17 May 2024 13:30)    HPI:  Patient is a 73-year-old female brought in by EMS from home after witnessed fall.  Patient was attempting to walk with walker, endorsed that she felt "woozy" and fell over.  She struck her left costal margin on the edge of her dresser.  Denies head trauma.Patient was walking with her walker when she fell  Medical history significant for coronary artery disease, on Plavix.  Per EMS, patient was hypotensive 80s over 40s, responded to 1 L normal saline.  EMS also identified potential run of polymorphic V. tach on the monitor.   (07 May 2024 15:30)    Patient was admitted for fall, and had subsequent VT (which she has a hx of with ICD in place).   Over past few days, patient had worsening, abd pain, leukocytosis, and eventually acidemia. RRT called on 5/17 for hypotension. Patient transferred to medical ICU for further care.       PAST MEDICAL & SURGICAL HISTORY:  Obese      Smoker      HTN (hypertension)      HLD (hyperlipidemia)      CAD (coronary artery disease)      CHF with cardiomyopathy      History of hip surgery          FAMILY HISTORY:  Family history of Alzheimer's disease (Father)    Family history of cancer (Mother)    SOCIAL HISTORY:  See HPI    MEDICATIONS  (STANDING):  apixaban 5 milliGRAM(s) Oral every 12 hours  atorvastatin 40 milliGRAM(s) Oral at bedtime  budesonide 160 MICROgram(s)/formoterol 4.5 MICROgram(s) Inhaler 2 Puff(s) Inhalation two times a day  lactated ringers Bolus 1000 milliLiter(s) IV Bolus once  meropenem  IVPB 2000 milliGRAM(s) IV Intermittent every 12 hours  norepinephrine Infusion 0.05 MICROgram(s)/kG/Min (6.81 mL/Hr) IV Continuous <Continuous>  pantoprazole  Injectable 40 milliGRAM(s) IV Push two times a day  sodium bicarbonate  Infusion 0.207 mEq/kG/Hr (100 mL/Hr) IV Continuous <Continuous>  sodium bicarbonate  Injectable 50 milliEquivalent(s) IV Push once  sodium chloride 0.9%. 1000 milliLiter(s) (100 mL/Hr) IV Continuous <Continuous>  sodium chloride 0.9%. 1000 milliLiter(s) (100 mL/Hr) IV Continuous <Continuous>  sotalol 40 milliGRAM(s) Oral every 12 hours  sucralfate suspension 1 Gram(s) Oral four times a day  vancomycin    Solution 125 milliGRAM(s) Oral every 6 hours  vancomycin  IVPB 1000 milliGRAM(s) IV Intermittent once    MEDICATIONS  (PRN):  loperamide 2 milliGRAM(s) Oral three times a day PRN Diarrhea  ondansetron Injectable 4 milliGRAM(s) IV Push every 8 hours PRN Nausea and/or Vomiting  polyethylene glycol 3350 17 Gram(s) Oral daily PRN for constipation      Allergies    No Known Allergies    Intolerances        REVIEW OF SYSTEMS:  CONSTITUTIONAL: No weakness, fevers or chills  EYES/ENT: No visual changes;  No vertigo or throat pain   NECK: No pain or stiffness  RESPIRATORY: No cough, wheezing, hemoptysis; No shortness of breath  CARDIOVASCULAR: No chest pain or palpitations  GASTROINTESTINAL: + Abd pain  GENITOURINARY: No dysuria, frequency or hematuria  NEUROLOGICAL: No numbness or weakness  SKIN: No itching, rashes  [ ] All other systems negative  [ ] Unable to assess ROS because of     OBJECTIVE:  ICU Vital Signs Last 24 Hrs  T(C): 36.7 (17 May 2024 05:17), Max: 37 (16 May 2024 21:16)  T(F): 98.1 (17 May 2024 05:17), Max: 98.6 (16 May 2024 21:16)  HR: 67 (17 May 2024 08:48) (64 - 67)  BP: 126/68 (17 May 2024 08:48) (126/68 - 136/67)  BP(mean): --  ABP: --  ABP(mean): --  RR: 18 (17 May 2024 08:48) (18 - 18)  SpO2: 97% (17 May 2024 08:48) (95% - 97%)    O2 Parameters below as of 17 May 2024 08:48  Patient On (Oxygen Delivery Method): room air              05-16 @ 07:01  -  05-17 @ 07:00  --------------------------------------------------------  IN: 970 mL / OUT: 250 mL / NET: 720 mL      CAPILLARY BLOOD GLUCOSE      POCT Blood Glucose.: 181 mg/dL (17 May 2024 13:28)      PHYSICAL EXAM:  GENERAL: mild acute distress  HEAD:  Atraumatic, normocephalic  EYES: EOMI, PERRLA, conjunctiva and sclera clear  ENT: Moist mucous membranes  NECK: Supple, no JVD  HEART: S1, S2, regular rate and rhythm, no murmurs, rubs, or gallops  LUNGS: Tachypneic. Unlabored respirations.  Clear to auscultation bilaterally, no crackles, wheezing, or rhonchi  ABDOMEN: Soft, nontender, nondistended, +BS  EXTREMITIES: 2+ peripheral pulses bilaterally. No clubbing, cyanosis, or edema  NERVOUS SYSTEM:  A&Ox3, no focal deficits   SKIN: No rashes or lesions  LINES:     LABS:                        9.5    75.95 )-----------( 405      ( 17 May 2024 14:39 )             31.4     Hgb Trend: 9.5<--, 11.1<--, 9.7<--, 9.7<--, 9.6<--  05-17    133<L>  |  95<L>  |  45<H>  ----------------------------<  102<H>  4.7   |  <10<LL>  |  1.41<H>    Ca    9.2      17 May 2024 07:17      Creatinine Trend: 1.41<--, 1.18<--, 0.97<--, 0.75<--, 0.80<--, 0.85<--    Urinalysis Basic - ( 17 May 2024 07:17 )    Color: x / Appearance: x / SG: x / pH: x  Gluc: 102 mg/dL / Ketone: x  / Bili: x / Urobili: x   Blood: x / Protein: x / Nitrite: x   Leuk Esterase: x / RBC: x / WBC x   Sq Epi: x / Non Sq Epi: x / Bacteria: x      Arterial Blood Gas:  05-17 @ 11:42  7.13/20/51/7/73.8/-20.6  ABG lactate: --    Venous Blood Gas:  05-17 @ 14:35  7.06/29/58/8/77.3  VBG Lactate: >16.0      MICROBIOLOGY:     RADIOLOGY & ADDITIONAL TESTS:    ASSESSMENT  TAY LEE is a 73y female with PMHX COPD, paroxysmal AF (on eliquis), HLD, HTN, PVD, ICM/CAD s/p PCI (has  of RCA), cardiac arrest s/p left-sided ICD (6/4/2019) complicated by infection and s/p R sided Medtronic single-chamber ICD ([place] in 9/23/2019), perforated gastric ulcer with pneumoperitoneum - sealed 11/26/23 on UGI series, prior admissions for VT storm with adjustment of medication and NIPS, prior ICD shock in the setting of recurrent monomorphic VT with unsuccessful ATP, VT ablation attempted 10/20/23 but aborted due to hemodynamically significant pericardial effusion with drain placement, recurrent VT s/p ICD shock, and afib w/ RVR s/p cardioversion 10/21/23 s/p pericardial window 10/28/23, admitted 2/2024 with ICD shocks for recurrent VT and underwent VT ablation on 2/13/24 by Dr. Hurt (off quinidine post ablation, on amio) in the hospital for 10d transferred to the MICU for pressor support (80s/50s) secondary to septic shock with enterococcus bacteremia of likely abdominal origin.     PLAN  =====Neurologic=====  AOx4 at baseline  no active issues     =====Pulmonary=====  AHRF  Patient with slight hypoxia during RRT, was placed on NRB now saturating 100%  - wean O2 as tolerated    =====Cardiovascular=====  CAD w hx of PCI  No active CP  - c/w atorva qhs    AFib  On eliquis 5 BID  On Sotalol 40 q12  - c/w sotalol  - hold eliquis for surgery, resume per surgical team when appropriate     Hx of VT s/p ICD  - On Sotalol 40 q12    Shock  Likely septic and cardiogenic   - levophed map goal > 65  - IV fluid hydration  - arterial line placed for BP monitoring   - central line placed for IV fluid hydration and pressors administration     =====GI=====  #Concern for bowel obstruction vs ischemic bowel  - NPO  - NG tube placement  - surgery consulted, will perform exploratory laparotomy    #GERD  - Protonix 40mg IV QD    #Diet  NPO    =====Renal/=====  #ARLENE  Baseline SCr 0.8, now at 2.1.  Likely due to sepsis vs hypotension hypoperfusion  Also with renal infarct possibly seen on CTAP  - IV fluid hydration  - Pressors   - hyperkalemia, will give insulin and D50 to temporize     #Electrolytes  - Maintain K>4, Phos>3, Mag>2, iCal>1    =====Endocrine=====  #DM2  - While NPO, FSBG and LARRY q6h    =====Infectious Disease=====  BCx with enterococcus  On vanc and zosyn, increased to meropenem   - c/w IV vanc  - c/w meropenem    =====Heme/Onc=====  #DVT Ppx  Usually on eliquis, but being held for surgery.  - SCDs for now    =====Ethics=====  FULL CODE    Attending Addendum:  Agree with above. Seen and examined with team as she arrived to the MICU. Critically ill with shock liver, shock kidney, rising lactate and abdominal pain. CT shows evidence of low flow state. TLC catether placed emergently, A line placed, bolus given. Close follow up of electrolytes, close follow up of BP and labs. Surgery attending at the bedside and after a long discussion with family decision made to do an ex lap to evaluate abdomen. Will reverse Eloquis with K centra and prep for the OR. High risk with multiorgan failure.   Total CC time 45 min  DOREEN Meyers DO, FCCP MICU ACCEPT NOTE    CHIEF COMPLAINT: Patient is a 73y old  Female who presents with a chief complaint of The patient is a 73y Female complaining of syncope. (17 May 2024 13:30)    HPI:  Patient is a 73-year-old female brought in by EMS from home after witnessed fall.  Patient was attempting to walk with walker, endorsed that she felt "woozy" and fell over.  She struck her left costal margin on the edge of her dresser.  Denies head trauma.Patient was walking with her walker when she fell  Medical history significant for coronary artery disease, on Plavix.  Per EMS, patient was hypotensive 80s over 40s, responded to 1 L normal saline.  EMS also identified potential run of polymorphic V. tach on the monitor.   (07 May 2024 15:30)    Patient was admitted for fall, and had subsequent VT (which she has a hx of with ICD in place).   Over past few days, patient had worsening, abd pain, leukocytosis, and eventually acidemia. RRT called on 5/17 for hypotension. Patient transferred to medical ICU for further care.       PAST MEDICAL & SURGICAL HISTORY:  Obese      Smoker      HTN (hypertension)      HLD (hyperlipidemia)      CAD (coronary artery disease)      CHF with cardiomyopathy      History of hip surgery          FAMILY HISTORY:  Family history of Alzheimer's disease (Father)    Family history of cancer (Mother)    SOCIAL HISTORY:  See HPI    MEDICATIONS  (STANDING):  apixaban 5 milliGRAM(s) Oral every 12 hours  atorvastatin 40 milliGRAM(s) Oral at bedtime  budesonide 160 MICROgram(s)/formoterol 4.5 MICROgram(s) Inhaler 2 Puff(s) Inhalation two times a day  lactated ringers Bolus 1000 milliLiter(s) IV Bolus once  meropenem  IVPB 2000 milliGRAM(s) IV Intermittent every 12 hours  norepinephrine Infusion 0.05 MICROgram(s)/kG/Min (6.81 mL/Hr) IV Continuous <Continuous>  pantoprazole  Injectable 40 milliGRAM(s) IV Push two times a day  sodium bicarbonate  Infusion 0.207 mEq/kG/Hr (100 mL/Hr) IV Continuous <Continuous>  sodium bicarbonate  Injectable 50 milliEquivalent(s) IV Push once  sodium chloride 0.9%. 1000 milliLiter(s) (100 mL/Hr) IV Continuous <Continuous>  sodium chloride 0.9%. 1000 milliLiter(s) (100 mL/Hr) IV Continuous <Continuous>  sotalol 40 milliGRAM(s) Oral every 12 hours  sucralfate suspension 1 Gram(s) Oral four times a day  vancomycin    Solution 125 milliGRAM(s) Oral every 6 hours  vancomycin  IVPB 1000 milliGRAM(s) IV Intermittent once    MEDICATIONS  (PRN):  loperamide 2 milliGRAM(s) Oral three times a day PRN Diarrhea  ondansetron Injectable 4 milliGRAM(s) IV Push every 8 hours PRN Nausea and/or Vomiting  polyethylene glycol 3350 17 Gram(s) Oral daily PRN for constipation      Allergies    No Known Allergies    Intolerances        REVIEW OF SYSTEMS:  CONSTITUTIONAL: No weakness, fevers or chills  EYES/ENT: No visual changes;  No vertigo or throat pain   NECK: No pain or stiffness  RESPIRATORY: No cough, wheezing, hemoptysis; No shortness of breath  CARDIOVASCULAR: No chest pain or palpitations  GASTROINTESTINAL: + Abd pain  GENITOURINARY: No dysuria, frequency or hematuria  NEUROLOGICAL: No numbness or weakness  SKIN: No itching, rashes  [ ] All other systems negative  [ ] Unable to assess ROS because of     OBJECTIVE:  ICU Vital Signs Last 24 Hrs  T(C): 36.7 (17 May 2024 05:17), Max: 37 (16 May 2024 21:16)  T(F): 98.1 (17 May 2024 05:17), Max: 98.6 (16 May 2024 21:16)  HR: 67 (17 May 2024 08:48) (64 - 67)  BP: 126/68 (17 May 2024 08:48) (126/68 - 136/67)  BP(mean): --  ABP: --  ABP(mean): --  RR: 18 (17 May 2024 08:48) (18 - 18)  SpO2: 97% (17 May 2024 08:48) (95% - 97%)    O2 Parameters below as of 17 May 2024 08:48  Patient On (Oxygen Delivery Method): room air              05-16 @ 07:01  -  05-17 @ 07:00  --------------------------------------------------------  IN: 970 mL / OUT: 250 mL / NET: 720 mL      CAPILLARY BLOOD GLUCOSE      POCT Blood Glucose.: 181 mg/dL (17 May 2024 13:28)      PHYSICAL EXAM:  GENERAL: mild acute distress  HEAD:  Atraumatic, normocephalic  EYES: EOMI, PERRLA, conjunctiva and sclera clear  ENT: Moist mucous membranes  NECK: Supple, no JVD  HEART: S1, S2, regular rate and rhythm, no murmurs, rubs, or gallops  LUNGS: Tachypneic. Unlabored respirations.  Clear to auscultation bilaterally, no crackles, wheezing, or rhonchi  ABDOMEN: Soft, nontender, nondistended, +BS  EXTREMITIES: 2+ peripheral pulses bilaterally. No clubbing, cyanosis, or edema  NERVOUS SYSTEM:  A&Ox3, no focal deficits   SKIN: No rashes or lesions  LINES:     LABS:                        9.5    75.95 )-----------( 405      ( 17 May 2024 14:39 )             31.4     Hgb Trend: 9.5<--, 11.1<--, 9.7<--, 9.7<--, 9.6<--  05-17    133<L>  |  95<L>  |  45<H>  ----------------------------<  102<H>  4.7   |  <10<LL>  |  1.41<H>    Ca    9.2      17 May 2024 07:17      Creatinine Trend: 1.41<--, 1.18<--, 0.97<--, 0.75<--, 0.80<--, 0.85<--    Urinalysis Basic - ( 17 May 2024 07:17 )    Color: x / Appearance: x / SG: x / pH: x  Gluc: 102 mg/dL / Ketone: x  / Bili: x / Urobili: x   Blood: x / Protein: x / Nitrite: x   Leuk Esterase: x / RBC: x / WBC x   Sq Epi: x / Non Sq Epi: x / Bacteria: x      Arterial Blood Gas:  05-17 @ 11:42  7.13/20/51/7/73.8/-20.6  ABG lactate: --    Venous Blood Gas:  05-17 @ 14:35  7.06/29/58/8/77.3  VBG Lactate: >16.0      MICROBIOLOGY:     RADIOLOGY & ADDITIONAL TESTS:    ASSESSMENT  TAY LEE is a 73y female with PMHX COPD, paroxysmal AF (on eliquis), HLD, HTN, PVD, ICM/CAD s/p PCI (has  of RCA), cardiac arrest s/p left-sided ICD (6/4/2019) complicated by infection and s/p R sided Medtronic single-chamber ICD ([place] in 9/23/2019), perforated gastric ulcer with pneumoperitoneum - sealed 11/26/23 on UGI series, prior admissions for VT storm with adjustment of medication and NIPS, prior ICD shock in the setting of recurrent monomorphic VT with unsuccessful ATP, VT ablation attempted 10/20/23 but aborted due to hemodynamically significant pericardial effusion with drain placement, recurrent VT s/p ICD shock, and afib w/ RVR s/p cardioversion 10/21/23 s/p pericardial window 10/28/23, admitted 2/2024 with ICD shocks for recurrent VT and underwent VT ablation on 2/13/24 by Dr. Hurt (off quinidine post ablation, on amio) in the hospital for 10d transferred to the MICU for pressor support (80s/50s) secondary to septic shock with enterococcus bacteremia of likely abdominal origin.     PLAN  =====Neurologic=====  AOx4 at baseline  no active issues     =====Pulmonary=====  AHRF  Patient with slight hypoxia during RRT, was placed on NRB now saturating 100%  - wean O2 as tolerated    =====Cardiovascular=====  CAD w hx of PCI  No active CP  - c/w atorva qhs    AFib  On eliquis 5 BID  On Sotalol 40 q12  - c/w sotalol  - hold eliquis for surgery, resume per surgical team when appropriate     Hx of VT s/p ICD  - On Sotalol 40 q12    Shock  Likely septic and cardiogenic   - levophed map goal > 65  - IV fluid hydration  - arterial line placed for BP monitoring   - central line placed for IV fluid hydration and pressors administration     =====GI=====  #Concern for bowel obstruction vs ischemic bowel  - NPO  - NG tube placement  - surgery consulted, will perform exploratory laparotomy    #GERD  - Protonix 40mg IV QD    #Diet  NPO    =====Renal/=====  #ARLENE  Baseline SCr 0.8, now at 2.1.  Likely due to sepsis vs hypotension hypoperfusion  Also with renal infarct possibly seen on CTAP  - IV fluid hydration  - Pressors   - hyperkalemia, will give insulin and D50 to temporize   - dose meds per GFR    #Electrolytes  - Maintain K>4, Phos>3, Mag>2, iCal>1    =====Endocrine=====  No active issues.  - While NPO, FSBG and LARRY q6h    =====Infectious Disease=====  BCx with enterococcus  On vanc and zosyn, increased to meropenem   - c/w IV vanc by level  - c/w meropenem renally dosed    =====Heme/Onc=====  #DVT Ppx  Usually on eliquis, but being held for surgery.  - SCDs for now    =====Ethics=====  FULL CODE    Attending Addendum:  Agree with above. Seen and examined with team as she arrived to the MICU. Critically ill with shock liver, shock kidney, rising lactate and abdominal pain. CT shows evidence of low flow state. TLC catether placed emergently, A line placed, bolus given. Close follow up of electrolytes, close follow up of BP and labs. Surgery attending at the bedside and after a long discussion with family decision made to do an ex lap to evaluate abdomen. Will reverse Eloquis with K centra and prep for the OR. High risk with multiorgan failure.   Total CC time 45 min  DOREEN Meyers DO, FCCP

## 2024-05-17 NOTE — PROGRESS NOTE ADULT - REASON FOR ADMISSION
The patient is a 73y Female complaining of syncope.

## 2024-05-18 NOTE — DISCHARGE NOTE FOR THE EXPIRED PATIENT - HOSPITAL COURSE
Amie Kohli is a 72yo F with PMHX COPD, paroxysmal AF (on eliquis), HLD, HTN, PVD, ICM/CAD s/p PCI (has  of RCA), cardiac arrest s/p left-sided ICD (6/4/2019) complicated by infection and s/p R sided Med[employer]onic single-chamber ICD ([place] in 9/23/2019), perforated gastric ulcer with pneumoperitoneum - sealed 11/26/23 on UGI series, prior admissions for VT storm with adjustment of medication and NIPS, prior ICD shock in the setting of recurrent monomorphic VT with unsuccessful ATP, VT ablation attempted 10/20/23 but aborted due to hemodynamically significant pericardial effusion with drain placement, recurrent VT s/p ICD shock, and afib w/ RVR s/p cardioversion 10/21/23 s/p pericardial window 10/28/23, admitted 2/2024 with ICD shocks for recurrent VT and underwent VT ablation on 2/13/24 by Dr. Hurt (off quinidine post ablation, on amio)    Patient admitted to hospital on 5/7 for concern of syncope, found to be hypotensive with runs of VT on monitor. Hospital course complicated by worsening multi-organ failure concerning for septic shock with enterococcus bacteremia. She was initially transferred to the MICU on 5/17 for pressor support, and subsequently went to OR for ex-lap in setting of worsening septic shock, abdominal pain, and prior perforated gastric ulcer. Small bowel with ischemia but no khadijah necrosis, no perforation identified. Abthera placed and patient transferred to SICU for further management. Patient's shock worsened, with lactate >16 and severe metabolic acidosis. She developed cardiac arrest and ROSC was achieved, however patient's  notified by attending and aware of poor prognosis. Agreeable to DNR with palliative extubation and comfort measures only.     Called to bedside to evaluate the patient for asystole at 0048.      On physical exam, patient did not respond to verbal or noxious stimuli. No spontaneous respirations.  Absent heart and breath sounds.  Carotid pulses absent when checked for 10 sec on each side. Pupils were fixed and dilated. EKG rhythm strip demonstrated asystole. Patient pronounced dead at 0048 on 5/18/2024 with  Mason at bedside.     Dr. Maradiaga notified.   Dr. Trotter notified.

## 2024-05-19 LAB
CULTURE RESULTS: SIGNIFICANT CHANGE UP
SPECIMEN SOURCE: SIGNIFICANT CHANGE UP

## 2024-05-20 LAB
CULTURE RESULTS: SIGNIFICANT CHANGE UP
CULTURE RESULTS: SIGNIFICANT CHANGE UP
SPECIMEN SOURCE: SIGNIFICANT CHANGE UP
SPECIMEN SOURCE: SIGNIFICANT CHANGE UP

## 2024-05-21 LAB
CHROM ANALY INTERPHASE BLD FISH-IMP: SIGNIFICANT CHANGE UP
CHROM ANALY INTERPHASE BLD FISH-IMP: SIGNIFICANT CHANGE UP

## 2024-05-24 LAB
JAK2 EXON 13 MUT ANL BLD/T: SIGNIFICANT CHANGE UP
REFLEX:: SIGNIFICANT CHANGE UP

## 2024-05-31 LAB — CHROM ANALY OVERALL INTERP SPEC-IMP: SIGNIFICANT CHANGE UP

## 2024-06-06 NOTE — ED ADULT TRIAGE NOTE - HEIGHT IN INCHES
Reviewed with mother, she will about a a month prior to visit November visit to have stool kit mailed out so we can have results back and discuss plan at time of visit.    2

## 2024-06-14 ENCOUNTER — APPOINTMENT (OUTPATIENT)
Dept: CARDIOLOGY | Facility: CLINIC | Age: 74
End: 2024-06-14

## 2024-06-26 ENCOUNTER — APPOINTMENT (OUTPATIENT)
Dept: ELECTROPHYSIOLOGY | Facility: CLINIC | Age: 74
End: 2024-06-26

## 2024-07-11 ENCOUNTER — APPOINTMENT (OUTPATIENT)
Dept: ELECTROPHYSIOLOGY | Facility: CLINIC | Age: 74
End: 2024-07-11

## 2024-09-09 NOTE — ED ADULT NURSE NOTE - ALCOHOL PRE SCREEN (AUDIT - C)
"Pt is resting at ease,denies nausea and pain. BP is now 150/90 after a dose of 10 mg Labetalol. He is following commands,appears comfortable and neuro checks are intact. Able to move all his extremities,anxious to see his wife \"Yolette\",he said. Wife was updated.  Dr. Hsu  was updated,she will sign pt out,she said.  "
Pt care assumed at 1400,he first denies pain but later rated his pain at 7 out of 10 scale. He said,that's normal for him. Also noted /100.    
Pt is more awake,oriented and interacting well with staff. Taking sips of water without difficulty.  
Statement Selected

## 2024-10-07 NOTE — ED PROVIDER NOTE - ATTENDING SHARED VISIT SELECTOR YES
10/07/24                            Lorraine Car  [address] [place] [id number]    To Whom It May Concern:    This is to certify Lorraine Car was evaluated with Mamta Barclay NP on 10/07/24 and is excused from work on 10/07/2024 to 10/09/2024.         Mamta Barclay NP  [facility] [address]  [address] [facility] [address]  Dept Phone: [phone]      
Yes

## 2025-04-18 NOTE — DIETITIAN INITIAL EVALUATION ADULT - ORAL INTAKE PTA/DIET HISTORY
Attending Attestation (For Attendings USE Only)... Pt reports...  Allergies: No known food allergies  Pt reports no nutrition concerns PTA until acute illness 3 days ago.  Allergies: No known food allergies

## 2025-05-07 NOTE — PHYSICAL THERAPY INITIAL EVALUATION ADULT - NSPTDMEREC_GEN_A_CORE
home meds: Enalipril, Amlodipine, Spironolactone     - hold home meds for permissive hypertension while ruling out stroke
has RW and cane

## 2025-06-09 NOTE — ED PROVIDER NOTE - PRINCIPAL DIAGNOSIS
Medication passed protocol.     Medication: atorvastain passed protocol.   Last office visit date: 02/27/25  Next appointment scheduled?: No; follow up with cardiology and GI      Last Filled:02/11/25    Recent Visits  Date Type Provider Dept   02/27/25 Office Visit Laurie Brady APNP [facility]   04/30/24 Office Visit Laurie Brady APNP [facility]   03/19/24 Office Visit Laurie Brady APNP [facility]   01/30/24 Office Visit Laurie Brady APNP [facility]   Showing recent visits within past 540 days with a meds authorizing provider and meeting all other requirements  Future Appointments  No visits were found meeting these conditions.  Showing future appointments within next 150 days with a meds authorizing provider and meeting all other requirements      
Patient notified and verbalized good understanding of directions.   
Defibrillator discharge
